# Patient Record
Sex: MALE | Race: WHITE | Employment: PART TIME | URBAN - METROPOLITAN AREA
[De-identification: names, ages, dates, MRNs, and addresses within clinical notes are randomized per-mention and may not be internally consistent; named-entity substitution may affect disease eponyms.]

---

## 2017-04-04 ENCOUNTER — HOSPITAL ENCOUNTER (OUTPATIENT)
Dept: GENERAL RADIOLOGY | Age: 77
Discharge: HOME | End: 2017-04-04
Payer: COMMERCIAL

## 2017-04-04 DIAGNOSIS — R09.89 CHEST CRACKLES: ICD-10-CM

## 2017-04-04 DIAGNOSIS — R05.9 COUGH: ICD-10-CM

## 2017-04-04 DIAGNOSIS — R61 NIGHT SWEAT: ICD-10-CM

## 2017-04-04 PROCEDURE — 71020 XR CHEST PA LAT: CPT

## 2017-04-04 NOTE — PROGRESS NOTES
cxr was normal. Continue cough medication as prescribed. Follow up if cough does not improve over the next week or if fever recurs.

## 2017-05-10 ENCOUNTER — HOSPITAL ENCOUNTER (EMERGENCY)
Age: 77
Discharge: HOME | End: 2017-05-10
Attending: EMERGENCY MEDICINE
Payer: COMMERCIAL

## 2017-05-10 ENCOUNTER — APPOINTMENT (OUTPATIENT)
Dept: GENERAL RADIOLOGY | Age: 77
End: 2017-05-10
Attending: EMERGENCY MEDICINE
Payer: COMMERCIAL

## 2017-05-10 VITALS
HEART RATE: 63 BPM | DIASTOLIC BLOOD PRESSURE: 95 MMHG | SYSTOLIC BLOOD PRESSURE: 193 MMHG | OXYGEN SATURATION: 96 % | TEMPERATURE: 98.5 F | RESPIRATION RATE: 17 BRPM | BODY MASS INDEX: 30.31 KG/M2 | HEIGHT: 68 IN | WEIGHT: 200 LBS

## 2017-05-10 DIAGNOSIS — R53.1 WEAKNESS: Primary | ICD-10-CM

## 2017-05-10 LAB
ALBUMIN SERPL BCP-MCNC: 3.3 G/DL (ref 3.2–4.6)
ALBUMIN/GLOB SERPL: 0.9 {RATIO} (ref 1.2–3.5)
ALP SERPL-CCNC: 74 U/L (ref 50–136)
ALT SERPL-CCNC: 22 U/L (ref 12–65)
ANION GAP BLD CALC-SCNC: 10 MMOL/L (ref 7–16)
AST SERPL W P-5'-P-CCNC: 14 U/L (ref 15–37)
ATRIAL RATE: 63 BPM
BASOPHILS # BLD AUTO: 0 K/UL (ref 0–0.2)
BASOPHILS # BLD: 0 % (ref 0–2)
BILIRUB SERPL-MCNC: 0.6 MG/DL (ref 0.2–1.1)
BNP SERPL-MCNC: 46 PG/ML
BUN SERPL-MCNC: 7 MG/DL (ref 8–23)
CALCIUM SERPL-MCNC: 8.7 MG/DL (ref 8.3–10.4)
CALCULATED P AXIS, ECG09: 29 DEGREES
CALCULATED R AXIS, ECG10: -33 DEGREES
CALCULATED T AXIS, ECG11: 56 DEGREES
CHLORIDE SERPL-SCNC: 106 MMOL/L (ref 98–107)
CO2 SERPL-SCNC: 29 MMOL/L (ref 21–32)
CREAT SERPL-MCNC: 0.79 MG/DL (ref 0.8–1.5)
DIAGNOSIS, 93000: NORMAL
DIFFERENTIAL METHOD BLD: ABNORMAL
EOSINOPHIL # BLD: 0.3 K/UL (ref 0–0.8)
EOSINOPHIL NFR BLD: 3 % (ref 0.5–7.8)
ERYTHROCYTE [DISTWIDTH] IN BLOOD BY AUTOMATED COUNT: 13.7 % (ref 11.9–14.6)
GLOBULIN SER CALC-MCNC: 3.8 G/DL (ref 2.3–3.5)
GLUCOSE SERPL-MCNC: 91 MG/DL (ref 65–100)
HCT VFR BLD AUTO: 42.8 % (ref 41.1–50.3)
HGB BLD-MCNC: 14.4 G/DL (ref 13.6–17.2)
IMM GRANULOCYTES # BLD: 0 K/UL (ref 0–0.5)
IMM GRANULOCYTES NFR BLD AUTO: 0.2 % (ref 0–5)
LYMPHOCYTES # BLD AUTO: 31 % (ref 13–44)
LYMPHOCYTES # BLD: 3.8 K/UL (ref 0.5–4.6)
MCH RBC QN AUTO: 29.8 PG (ref 26.1–32.9)
MCHC RBC AUTO-ENTMCNC: 33.6 G/DL (ref 31.4–35)
MCV RBC AUTO: 88.6 FL (ref 79.6–97.8)
MONOCYTES # BLD: 0.9 K/UL (ref 0.1–1.3)
MONOCYTES NFR BLD AUTO: 7 % (ref 4–12)
NEUTS SEG # BLD: 7.3 K/UL (ref 1.7–8.2)
NEUTS SEG NFR BLD AUTO: 59 % (ref 43–78)
P-R INTERVAL, ECG05: 172 MS
PLATELET # BLD AUTO: 200 K/UL (ref 150–450)
PMV BLD AUTO: 10 FL (ref 10.8–14.1)
POTASSIUM SERPL-SCNC: 3.6 MMOL/L (ref 3.5–5.1)
PROT SERPL-MCNC: 7.1 G/DL (ref 6.3–8.2)
Q-T INTERVAL, ECG07: 424 MS
QRS DURATION, ECG06: 88 MS
QTC CALCULATION (BEZET), ECG08: 433 MS
RBC # BLD AUTO: 4.83 M/UL (ref 4.23–5.67)
SODIUM SERPL-SCNC: 145 MMOL/L (ref 136–145)
TROPONIN I SERPL-MCNC: 0.03 NG/ML (ref 0.02–0.05)
TROPONIN I SERPL-MCNC: 0.03 NG/ML (ref 0.02–0.05)
VENTRICULAR RATE, ECG03: 63 BPM
WBC # BLD AUTO: 12.3 K/UL (ref 4.3–11.1)

## 2017-05-10 PROCEDURE — 99284 EMERGENCY DEPT VISIT MOD MDM: CPT | Performed by: EMERGENCY MEDICINE

## 2017-05-10 PROCEDURE — 85025 COMPLETE CBC W/AUTO DIFF WBC: CPT | Performed by: EMERGENCY MEDICINE

## 2017-05-10 PROCEDURE — 83880 ASSAY OF NATRIURETIC PEPTIDE: CPT | Performed by: EMERGENCY MEDICINE

## 2017-05-10 PROCEDURE — 93005 ELECTROCARDIOGRAM TRACING: CPT | Performed by: EMERGENCY MEDICINE

## 2017-05-10 PROCEDURE — 84484 ASSAY OF TROPONIN QUANT: CPT | Performed by: EMERGENCY MEDICINE

## 2017-05-10 PROCEDURE — 71020 XR CHEST PA LAT: CPT

## 2017-05-10 PROCEDURE — 80053 COMPREHEN METABOLIC PANEL: CPT | Performed by: EMERGENCY MEDICINE

## 2017-05-10 RX ORDER — LISINOPRIL 40 MG/1
40 TABLET ORAL DAILY
Qty: 30 TAB | Refills: 0 | Status: SHIPPED | OUTPATIENT
Start: 2017-05-10 | End: 2017-05-16 | Stop reason: SINTOL

## 2017-05-10 NOTE — ED NOTES
I have reviewed discharge instructions with the patient. The patient verbalized understanding. The patient is ambulatory upon exit and is in no acute distress. The patient has discharge instructions and prescription in hand.

## 2017-05-10 NOTE — ED PROVIDER NOTES
HPI Comments: Patient states he's been feeling fatigued and slightly worn out. He had the flu about 3 weeks ago. Yesterday started having a lot of trouble with his blood pressure running high does normally take metoprolol for that. He also reported minimal chest tightness yesterday and today. No pain. He has been taking his aspirin and Plavix as he did have a stent a couple years ago. Patient has  No fever or chills. Patient is a 65 y.o. male presenting with fatigue. The history is provided by the patient. Fatigue   Pertinent negatives include no shortness of breath, no chest pain, no vomiting and no nausea.         Past Medical History:   Diagnosis Date    Abnormal EKG 4/22/15    CAD (coronary artery disease) 5/8/2015    Carotid artery stenosis without cerebral infarction 6/7/2016    US 6/15: <50% bilat ICAs    Coronary atherosclerosis of native coronary vessel 5/8/2015    VEGAS on brilinta 5/7/15: prox LAD PCI, normal EF     Dyslipidemia 6/7/2016    Dyspnea 5/8/2015    Echo 6/15: EF 60%, mod MR, mod LVH, mild AI     ED (erectile dysfunction)     GERD (gastroesophageal reflux disease)     GERD (gastroesophageal reflux disease)     HTN (hypertension) 5/8/2015    Hypertension     Hypokalemia 6/7/2016    Hypokalemia     Mitral valve regurgitation 6/7/2016    Myasthenia gravis ([address] 6/17/15    Myasthenia gravis (HCC)     Nocturia     Osteoporosis     PUD (peptic ulcer disease)     S/P coronary artery stent placement 5/8/2015    3.0x38 mm Xience ADRIANNA to pLAD 5/7/15     Sleep apnea     Syncope and collapse     Unspecified sleep apnea        Past Surgical History:   Procedure Laterality Date    HX APPENDECTOMY      HX COLONOSCOPY  2007    HX HEART CATHETERIZATION  5/7/2015    HX Liliana Cervantes/Xin for sleep apnea and reconstruction for extending jaw         Family History:   Problem Relation Age of Onset    Cancer Mother      kidney    Cancer Father      stomach       Social History     Social History    Marital status:      Spouse name: N/A    Number of children: N/A    Years of education: N/A     Occupational History    Not on file. Social History Main Topics    Smoking status: Never Smoker    Smokeless tobacco: Never Used    Alcohol use No    Drug use: No    Sexual activity: Not on file     Other Topics Concern    Not on file     Social History Narrative         ALLERGIES: Review of patient's allergies indicates no known allergies. Review of Systems   Constitutional: Positive for fatigue. Negative for appetite change, chills and fever. Respiratory: Negative for chest tightness, shortness of breath and stridor. Cardiovascular: Negative. Negative for chest pain and palpitations. Gastrointestinal: Negative for abdominal pain, diarrhea, nausea and vomiting. Skin: Negative. All other systems reviewed and are negative. Vitals:    05/10/17 1254 05/10/17 1259   BP:  161/79   Pulse: 63    Resp: 17    Temp: 98.5 °F (36.9 °C)    SpO2: 96%    Weight: 90.7 kg (200 lb)    Height: 5' 8\" (1.727 m)             Physical Exam   Constitutional: He is oriented to person, place, and time. He appears well-developed and well-nourished. No distress. HENT:   Head: Normocephalic and atraumatic. Eyes: Conjunctivae are normal. No scleral icterus. Neck: Normal range of motion. Neck supple. Cardiovascular: Normal rate, regular rhythm and normal heart sounds. Pulmonary/Chest: Effort normal and breath sounds normal. No stridor. No respiratory distress. He has no wheezes. He has no rales. He exhibits no tenderness. Abdominal: Soft. There is no tenderness. There is no rebound and no guarding. Neurological: He is alert and oriented to person, place, and time. No cranial nerve deficit. Coordination normal.   No focal weakness   Skin: Skin is warm and dry. No rash noted. He is not diaphoretic.    Psychiatric: He has a normal mood and affect. His behavior is normal.   Nursing note and vitals reviewed. MDM  Number of Diagnoses or Management Options  Weakness:   Diagnosis management comments: Patient states he is feeling fine on reevaluation blood pressure has remained elevated  He has 2 negative troponins and normal appearing EKG. I had a long conversation with him about plan and I have called a cardiology to arrange close follow-up for patient I will start him on lisinopril and they will manage blood pressure on an outpatient basis. Jessica Horne MD; 5/10/2017 @4:59 PM Voice dictation software was used during the making of this note. This software is not perfect and grammatical and other typographical errors may be present.   This note has not been proofread for errors.  ===================================================================        Amount and/or Complexity of Data Reviewed  Clinical lab tests: ordered and reviewed (Results for orders placed or performed during the hospital encounter of 05/10/17  -CBC WITH AUTOMATED DIFF       Result                                            Value                         Ref Range                       WBC                                               12.3 (H)                      4.3 - 11.1 K/uL                 RBC                                               4.83                          4.23 - 5.67 M/uL                HGB                                               14.4                          13.6 - 17.2 g/dL                HCT                                               42.8                          41.1 - 50.3 %                   MCV                                               88.6                          79.6 - 97.8 FL                  MCH                                               29.8                          26.1 - 32.9 PG                  MCHC                                              33.6                          31.4 - 35.0 g/dL                RDW 13.7                          11.9 - 14.6 %                   PLATELET                                          200                           150 - 450 K/uL                  MPV                                               10.0 (L)                      10.8 - 14.1 FL                  DF                                                AUTOMATED                                                     NEUTROPHILS                                       59                            43 - 78 %                       LYMPHOCYTES                                       31                            13 - 44 %                       MONOCYTES                                         7                             4.0 - 12.0 %                    EOSINOPHILS                                       3                             0.5 - 7.8 %                     BASOPHILS                                         0                             0.0 - 2.0 %                     IMMATURE GRANULOCYTES                             0.2                           0.0 - 5.0 %                     ABS. NEUTROPHILS                                  7.3                           1.7 - 8.2 K/UL                  ABS. LYMPHOCYTES                                  3.8                           0.5 - 4.6 K/UL                  ABS. MONOCYTES                                    0.9                           0.1 - 1.3 K/UL                  ABS. EOSINOPHILS                                  0.3                           0.0 - 0.8 K/UL                  ABS. BASOPHILS                                    0.0                           0.0 - 0.2 K/UL                  ABS. IMM.  GRANS.                                  0.0                           0.0 - 0.5 K/UL             -TROPONIN I       Result                                            Value                         Ref Range                       Troponin-I, Qt. 0.03                          0.02 - 0.05 NG/ML          -METABOLIC PANEL, COMPREHENSIVE       Result                                            Value                         Ref Range                       Sodium                                            145                           136 - 145 mmol/L                Potassium                                         3.6                           3.5 - 5.1 mmol/L                Chloride                                          106                           98 - 107 mmol/L                 CO2                                               29                            21 - 32 mmol/L                  Anion gap                                         10                            7 - 16 mmol/L                   Glucose                                           91                            65 - 100 mg/dL                  BUN                                               7 (L)                         8 - 23 MG/DL                    Creatinine                                        0.79 (L)                      0.8 - 1.5 MG/DL                 GFR est AA                                        >60                           >60 ml/min/1.73m2               GFR est non-AA                                    >60                           >60 ml/min/1.73m2               Calcium                                           8.7                           8.3 - 10.4 MG/DL                Bilirubin, total                                  0.6                           0.2 - 1.1 MG/DL                 ALT (SGPT)                                        22                            12 - 65 U/L                     AST (SGOT)                                        14 (L)                        15 - 37 U/L                     Alk. phosphatase                                  74                            50 - 136 U/L                    Protein, total                                    7.1 6.3 - 8.2 g/dL                  Albumin                                           3.3                           3.2 - 4.6 g/dL                  Globulin                                          3.8 (H)                       2.3 - 3.5 g/dL                  A-G Ratio                                         0.9 (L)                       1.2 - 3.5                  -EKG, 12 LEAD, INITIAL       Result                                            Value                         Ref Range                       Ventricular Rate                                  63                            BPM                             Atrial Rate                                       63                            BPM                             P-R Interval                                      172                           ms                              QRS Duration                                      88                            ms                              Q-T Interval                                      424                           ms                              QTC Calculation (Bezet)                           433                           ms                              Calculated P Axis                                 29                            degrees                         Calculated R Axis                                 -33                           degrees                         Calculated T Axis                                 56                            degrees                         Diagnosis                                                                                                   Sinus rhythm with Premature atrial complexes   Left axis deviation   Abnormal ECG   When compared with ECG of 08-MAY-2015 07:00,   Premature atrial complexes are now Present    )  Tests in the radiology section of CPT®: ordered and reviewed (Xr Chest Pa Lat    Result Date: 5/10/2017  Two view chest: 05/10/2017 History: Fatigue, cough, hypertension x1 day. Comparison: 04/14/2017 Findings: The heart is mildly enlarged, slightly more pronounced than seen previously. The lungs and pleural spaces are clear. The pulmonary vascularity is within normal limits. The visualized osseous structures are unremarkable.      Impression: Mildly enlarged cardiac silhouette, increased in size since the previous exam. This can be an early indicator of congestive heart failure.    )  Independent visualization of images, tracings, or specimens: yes (Normal sinus rhythm, narrow QRS complex, no bundle branch blocks, and no ST segment elevation, several pac present)      ED Course       Procedures

## 2017-05-10 NOTE — ED TRIAGE NOTES
Pt diagnosed with flu 3 weeks ago, complains of feeling fatigued and worn out. States BP has recently been high. Denies any other symptoms. Went to [facility] today and was sent here via ems for evaluation.

## 2017-05-10 NOTE — DISCHARGE INSTRUCTIONS
Weakness: Care Instructions  Your Care Instructions  Weakness is a lack of physical or muscle strength. You may feel that you need to make extra effort to move your arms, legs, or other muscles. Generalized weakness means that you feel weak in most areas of your body. Another type of weakness may affect just one muscle or group of muscles. You may feel weak and tired after you have done too much activity, such as taking an extra-long hike. This is not a serious problem. It often goes away on its own. Feeling weak can also be caused by medical conditions like thyroid problems, depression, or a virus. Sometimes the cause can be serious. Your doctor may want to do more tests to try to find the cause of the weakness. The doctor has checked you carefully, but problems can develop later. If you notice any problems or new symptoms, get medical treatment right away. Follow-up care is a key part of your treatment and safety. Be sure to make and go to all appointments, and call your doctor if you are having problems. It's also a good idea to know your test results and keep a list of the medicines you take. How can you care for yourself at home? · Rest when you feel tired. · Be safe with medicines. If your doctor prescribed medicine, take it exactly as prescribed. Call your doctor if you think you are having a problem with your medicine. You will get more details on the specific medicines your doctor prescribes. · Do not skip meals. Eating a balanced diet may increase your energy level. · Get some physical activity every day, but do not get too tired. When should you call for help? Call your doctor now or seek immediate medical care if:  · You have new or worse weakness. · You are dizzy or lightheaded, or you feel like you may faint. Watch closely for changes in your health, and be sure to contact your doctor if:  · You do not get better as expected. Where can you learn more?   Go to http://alexis.info/. Enter [id number] in the search box to learn more about \"Weakness: Care Instructions. \"  Current as of: May 27, 2016  Content Version: 11.2  © 8703-6570 [employer]. Care instructions adapted under license by [employer] (which disclaims liability or warranty for this information). If you have questions about a medical condition or this instruction, always ask your healthcare professional. [address] any warranty or liability for your use of this information.

## 2017-05-10 NOTE — ED NOTES
Patient is resting in chair. Patient denies needs at this time. Call light within reach. Will continue to monitor.

## 2017-06-30 PROBLEM — I48.0 PAROXYSMAL ATRIAL FIBRILLATION (HCC): Status: ACTIVE | Noted: 2017-06-30

## 2017-06-30 PROBLEM — I49.5 SSS (SICK SINUS SYNDROME) (HCC): Status: ACTIVE | Noted: 2017-06-30

## 2017-06-30 PROBLEM — I77.9 BILATERAL CAROTID ARTERY DISEASE (HCC): Status: ACTIVE | Noted: 2017-06-30

## 2017-07-24 ENCOUNTER — HOSPITAL ENCOUNTER (OUTPATIENT)
Dept: LAB | Age: 77
Discharge: HOME | End: 2017-07-24
Attending: INTERNAL MEDICINE
Payer: COMMERCIAL

## 2017-07-24 DIAGNOSIS — I48.0 PAROXYSMAL ATRIAL FIBRILLATION (HCC): ICD-10-CM

## 2017-07-24 LAB
ANION GAP BLD CALC-SCNC: 8 MMOL/L
BUN SERPL-MCNC: 14 MG/DL (ref 8–23)
CALCIUM SERPL-MCNC: 8.9 MG/DL (ref 8.3–10.4)
CHLORIDE SERPL-SCNC: 109 MMOL/L (ref 98–107)
CO2 SERPL-SCNC: 25 MMOL/L (ref 21–32)
CREAT SERPL-MCNC: 1 MG/DL (ref 0.8–1.5)
GLUCOSE SERPL-MCNC: 115 MG/DL (ref 65–100)
MAGNESIUM SERPL-MCNC: 2.1 MG/DL (ref 1.8–2.4)
POTASSIUM SERPL-SCNC: 3.8 MMOL/L (ref 3.5–5.1)
SODIUM SERPL-SCNC: 142 MMOL/L (ref 136–145)
TSH SERPL DL<=0.005 MIU/L-ACNC: 1.48 UIU/ML (ref 0.36–3.74)

## 2017-07-24 PROCEDURE — 84443 ASSAY THYROID STIM HORMONE: CPT | Performed by: INTERNAL MEDICINE

## 2017-07-24 PROCEDURE — 80048 BASIC METABOLIC PNL TOTAL CA: CPT | Performed by: INTERNAL MEDICINE

## 2017-07-24 PROCEDURE — 83735 ASSAY OF MAGNESIUM: CPT | Performed by: INTERNAL MEDICINE

## 2017-07-24 PROCEDURE — 36415 COLL VENOUS BLD VENIPUNCTURE: CPT | Performed by: INTERNAL MEDICINE

## 2017-10-24 ENCOUNTER — HOSPITAL ENCOUNTER (INPATIENT)
Age: 77
LOS: 6 days | Discharge: HOME | DRG: 242 | End: 2017-10-30
Attending: INTERNAL MEDICINE | Admitting: INTERNAL MEDICINE
Payer: COMMERCIAL

## 2017-10-24 ENCOUNTER — APPOINTMENT (OUTPATIENT)
Dept: GENERAL RADIOLOGY | Age: 77
DRG: 242 | End: 2017-10-24
Payer: COMMERCIAL

## 2017-10-24 DIAGNOSIS — A41.9 SEPSIS, DUE TO UNSPECIFIED ORGANISM: ICD-10-CM

## 2017-10-24 DIAGNOSIS — J69.0 ASPIRATION PNEUMONIA OF LEFT UPPER LOBE, UNSPECIFIED ASPIRATION PNEUMONIA TYPE (HCC): ICD-10-CM

## 2017-10-24 DIAGNOSIS — I48.91 ATRIAL FIBRILLATION WITH RVR (HCC): ICD-10-CM

## 2017-10-24 DIAGNOSIS — I95.89 OTHER SPECIFIED HYPOTENSION: ICD-10-CM

## 2017-10-24 DIAGNOSIS — G70.00 MYASTHENIA GRAVIS (HCC): ICD-10-CM

## 2017-10-24 DIAGNOSIS — I48.0 PAROXYSMAL ATRIAL FIBRILLATION (HCC): ICD-10-CM

## 2017-10-24 PROBLEM — R55 SYNCOPE: Status: ACTIVE | Noted: 2017-10-24

## 2017-10-24 LAB
ANION GAP SERPL CALC-SCNC: 8 MMOL/L (ref 7–16)
BASOPHILS # BLD: 0 K/UL (ref 0–0.2)
BASOPHILS NFR BLD: 0 % (ref 0–2)
BUN SERPL-MCNC: 11 MG/DL (ref 8–23)
CALCIUM SERPL-MCNC: 9 MG/DL (ref 8.3–10.4)
CHLORIDE SERPL-SCNC: 108 MMOL/L (ref 98–107)
CO2 SERPL-SCNC: 26 MMOL/L (ref 21–32)
CREAT SERPL-MCNC: 0.88 MG/DL (ref 0.8–1.5)
DIFFERENTIAL METHOD BLD: ABNORMAL
EOSINOPHIL # BLD: 0.3 K/UL (ref 0–0.8)
EOSINOPHIL NFR BLD: 2 % (ref 0.5–7.8)
ERYTHROCYTE [DISTWIDTH] IN BLOOD BY AUTOMATED COUNT: 13.6 % (ref 11.9–14.6)
GLUCOSE SERPL-MCNC: 119 MG/DL (ref 65–100)
HCT VFR BLD AUTO: 44.8 % (ref 41.1–50.3)
HGB BLD-MCNC: 15.8 G/DL (ref 13.6–17.2)
IMM GRANULOCYTES # BLD: 0 K/UL (ref 0–0.5)
IMM GRANULOCYTES NFR BLD: 0 % (ref 0–5)
LYMPHOCYTES # BLD: 4.1 K/UL (ref 0.5–4.6)
LYMPHOCYTES NFR BLD: 33 % (ref 13–44)
MAGNESIUM SERPL-MCNC: 2.2 MG/DL (ref 1.8–2.4)
MCH RBC QN AUTO: 31 PG (ref 26.1–32.9)
MCHC RBC AUTO-ENTMCNC: 35.3 G/DL (ref 31.4–35)
MCV RBC AUTO: 87.8 FL (ref 79.6–97.8)
MONOCYTES # BLD: 1 K/UL (ref 0.1–1.3)
MONOCYTES NFR BLD: 8 % (ref 4–12)
NEUTS SEG # BLD: 7 K/UL (ref 1.7–8.2)
NEUTS SEG NFR BLD: 57 % (ref 43–78)
PLATELET # BLD AUTO: 195 K/UL (ref 150–450)
PMV BLD AUTO: 9.7 FL (ref 10.8–14.1)
POTASSIUM SERPL-SCNC: 3.7 MMOL/L (ref 3.5–5.1)
RBC # BLD AUTO: 5.1 M/UL (ref 4.23–5.67)
SODIUM SERPL-SCNC: 142 MMOL/L (ref 136–145)
TROPONIN I SERPL-MCNC: 0.05 NG/ML (ref 0.02–0.05)
WBC # BLD AUTO: 12.4 K/UL (ref 4.3–11.1)

## 2017-10-24 PROCEDURE — 74011250637 HC RX REV CODE- 250/637: Performed by: PHYSICIAN ASSISTANT

## 2017-10-24 PROCEDURE — 71010 XR CHEST SNGL V: CPT

## 2017-10-24 PROCEDURE — 65660000000 HC RM CCU STEPDOWN

## 2017-10-24 PROCEDURE — 74011000250 HC RX REV CODE- 250: Performed by: PHYSICIAN ASSISTANT

## 2017-10-24 PROCEDURE — 85025 COMPLETE CBC W/AUTO DIFF WBC: CPT | Performed by: PHYSICIAN ASSISTANT

## 2017-10-24 PROCEDURE — 36415 COLL VENOUS BLD VENIPUNCTURE: CPT | Performed by: PHYSICIAN ASSISTANT

## 2017-10-24 PROCEDURE — 84484 ASSAY OF TROPONIN QUANT: CPT | Performed by: PHYSICIAN ASSISTANT

## 2017-10-24 PROCEDURE — 74011250636 HC RX REV CODE- 250/636: Performed by: PHYSICIAN ASSISTANT

## 2017-10-24 PROCEDURE — 80048 BASIC METABOLIC PNL TOTAL CA: CPT | Performed by: PHYSICIAN ASSISTANT

## 2017-10-24 PROCEDURE — 74011000258 HC RX REV CODE- 258: Performed by: PHYSICIAN ASSISTANT

## 2017-10-24 PROCEDURE — 74011250636 HC RX REV CODE- 250/636: Performed by: INTERNAL MEDICINE

## 2017-10-24 PROCEDURE — 83735 ASSAY OF MAGNESIUM: CPT | Performed by: PHYSICIAN ASSISTANT

## 2017-10-24 PROCEDURE — 65660000004 HC RM CVT STEPDOWN

## 2017-10-24 RX ORDER — DILTIAZEM HYDROCHLORIDE 5 MG/ML
10 INJECTION INTRAVENOUS ONCE
Status: COMPLETED | OUTPATIENT
Start: 2017-10-24 | End: 2017-10-24

## 2017-10-24 RX ORDER — BENZONATATE 100 MG/1
100 CAPSULE ORAL
Status: DISCONTINUED | OUTPATIENT
Start: 2017-10-24 | End: 2017-10-30 | Stop reason: HOSPADM

## 2017-10-24 RX ORDER — GUAIFENESIN 100 MG/5ML
81 LIQUID (ML) ORAL DAILY
Status: DISCONTINUED | OUTPATIENT
Start: 2017-10-25 | End: 2017-10-30 | Stop reason: HOSPADM

## 2017-10-24 RX ORDER — HEPARIN SODIUM 5000 [USP'U]/100ML
12-25 INJECTION, SOLUTION INTRAVENOUS
Status: DISCONTINUED | OUTPATIENT
Start: 2017-10-24 | End: 2017-10-26

## 2017-10-24 RX ORDER — HYDROCODONE BITARTRATE AND ACETAMINOPHEN 5; 325 MG/1; MG/1
1 TABLET ORAL
Status: DISCONTINUED | OUTPATIENT
Start: 2017-10-24 | End: 2017-10-30 | Stop reason: HOSPADM

## 2017-10-24 RX ORDER — PYRIDOSTIGMINE BROMIDE 60 MG/1
60 TABLET ORAL DAILY
Status: DISCONTINUED | OUTPATIENT
Start: 2017-10-25 | End: 2017-10-30 | Stop reason: HOSPADM

## 2017-10-24 RX ORDER — METOPROLOL TARTRATE 25 MG/1
25 TABLET, FILM COATED ORAL 2 TIMES DAILY
Status: DISCONTINUED | OUTPATIENT
Start: 2017-10-24 | End: 2017-10-26

## 2017-10-24 RX ORDER — MORPHINE SULFATE 2 MG/ML
2 INJECTION, SOLUTION INTRAMUSCULAR; INTRAVENOUS
Status: DISCONTINUED | OUTPATIENT
Start: 2017-10-24 | End: 2017-10-30 | Stop reason: HOSPADM

## 2017-10-24 RX ORDER — CLOPIDOGREL BISULFATE 75 MG/1
75 TABLET ORAL DAILY
Status: DISCONTINUED | OUTPATIENT
Start: 2017-10-25 | End: 2017-10-30 | Stop reason: HOSPADM

## 2017-10-24 RX ORDER — HEPARIN SODIUM 5000 [USP'U]/100ML
12-25 INJECTION, SOLUTION INTRAVENOUS
Status: DISCONTINUED | OUTPATIENT
Start: 2017-10-24 | End: 2017-10-24

## 2017-10-24 RX ORDER — ACETAMINOPHEN 325 MG/1
650 TABLET ORAL
Status: DISCONTINUED | OUTPATIENT
Start: 2017-10-24 | End: 2017-10-30 | Stop reason: HOSPADM

## 2017-10-24 RX ORDER — NITROGLYCERIN 0.4 MG/1
0.4 TABLET SUBLINGUAL
Status: DISCONTINUED | OUTPATIENT
Start: 2017-10-24 | End: 2017-10-30 | Stop reason: HOSPADM

## 2017-10-24 RX ORDER — HEPARIN SODIUM 5000 [USP'U]/ML
4000 INJECTION, SOLUTION INTRAVENOUS; SUBCUTANEOUS ONCE
Status: COMPLETED | OUTPATIENT
Start: 2017-10-24 | End: 2017-10-24

## 2017-10-24 RX ORDER — ATORVASTATIN CALCIUM 40 MG/1
80 TABLET, FILM COATED ORAL
Status: DISCONTINUED | OUTPATIENT
Start: 2017-10-24 | End: 2017-10-30 | Stop reason: HOSPADM

## 2017-10-24 RX ADMIN — DILTIAZEM HYDROCHLORIDE 10 MG: 5 INJECTION INTRAVENOUS at 18:45

## 2017-10-24 RX ADMIN — HEPARIN SODIUM 4000 UNITS: 5000 INJECTION, SOLUTION INTRAVENOUS; SUBCUTANEOUS at 18:05

## 2017-10-24 RX ADMIN — HEPARIN SODIUM 12 UNITS/KG/HR: 5000 INJECTION, SOLUTION INTRAVENOUS at 19:32

## 2017-10-24 RX ADMIN — METOPROLOL TARTRATE 25 MG: 25 TABLET ORAL at 18:18

## 2017-10-24 RX ADMIN — SODIUM CHLORIDE 5 MG/HR: 900 INJECTION, SOLUTION INTRAVENOUS at 18:07

## 2017-10-24 RX ADMIN — HEPARIN SODIUM 12 UNITS/KG/HR: 5000 INJECTION, SOLUTION INTRAVENOUS at 18:09

## 2017-10-24 RX ADMIN — ATORVASTATIN CALCIUM 80 MG: 40 TABLET, FILM COATED ORAL at 21:44

## 2017-10-24 NOTE — IP AVS SNAPSHOT
[address] 
[phone] Patient: Cecille Patel MRN: [id number] MMH:[birth year] My Medications STOP taking these medications   
 aspirin 81 mg chewable tablet  
   
  
 metoprolol tartrate 25 mg tablet Commonly known as:  LOPRESSOR  
   
  
  
TAKE these medications as instructed Instructions Each Dose to Equal  
 Morning Noon Evening Bedtime  
 apixaban 5 mg tablet Commonly known as:  Wonda Deem Your next dose is: Tonight Take 1 Tab by mouth two (2) times a day. 5 mg  
    
   
   
  
   
  
 atorvastatin 80 mg tablet Commonly known as:  LIPITOR Your next dose is: Tonight Take 1 Tab by mouth nightly. 80 mg  
    
   
   
   
  
  
 benzonatate 100 mg capsule Commonly known as:  TESSALON Take 100 mg by mouth three (3) times daily as needed for Cough. 100 mg  
    
   
   
   
  
 cefpodoxime 200 mg tablet Commonly known as:  Carmen Adjuntas Your next dose is: Tonight Take 1 Tab by mouth two (2) times a day for 7 days. 200 mg  
    
   
   
  
   
  
 clopidogrel 75 mg Tab Commonly known as:  PLAVIX Your next dose is:  Tomorrow Notes to Patient:  Take once daily Take  by mouth. HYDROcodone-acetaminophen 5-325 mg per tablet Commonly known as:  Harrison Gaston Your last dose was:  9:30 am  
   
 Take 1 Tab by mouth every six (6) hours as needed. Max Daily Amount: 4 Tabs. 1 Tab  
    
   
   
   
  
 metoprolol succinate 200 mg XL tablet Commonly known as:  TOPROL-XL Start taking on:  10/31/2017 Take 1 Tab by mouth daily. 200 mg  
    
  
   
   
   
  
 nitroglycerin 0.4 mg SL tablet Commonly known as:  NITROSTAT Place 1 sl under the tongue q 5 min prn cp, max 3 sl in a 15-min time period. Call 911 if no relief after the 3rd sl. pyridostigmine 60 mg tablet Commonly known as:  MESTINON Your next dose is:  Tomorrow Take 60 mg by mouth daily. 60 mg Where to Get Your Medications Information on where to get these meds will be given to you by the nurse or doctor. ! Ask your nurse or doctor about these medications  
  apixaban 5 mg tablet  
 cefpodoxime 200 mg tablet HYDROcodone-acetaminophen 5-325 mg per tablet  
 metoprolol succinate 200 mg XL tablet

## 2017-10-24 NOTE — PROGRESS NOTES
Dual skin assessment completed with RN. Sacrum visualized and is benign. No redness or breakdown noted.

## 2017-10-24 NOTE — IP AVS SNAPSHOT
[address] [facility] 
 
 
 [address] [facility] [address] [facility] 
[phone] Patient: Ken Montgomery MRN: [id number] About your hospitalization You were admitted on:  October 24, 2017 You last received care in the:  [facility] 2 CV STEPDOWN You were discharged on:  October 30, 2017 Why you were hospitalized Your primary diagnosis was:  Atrial Fibrillation With Rvr (Hcc) Your diagnoses also included:  Coronary Atherosclerosis Of Native Coronary Vessel, Htn (Hypertension), Sss (Sick Sinus Syndrome) (Hcc), Syncope, Sepsis (Hcc), Aspiration Pneumonia (Hcc), Hypotension, Gerd (Gastroesophageal Reflux Disease) Things You Need To Do (next 8 weeks) Follow up with Jenn Dye MD  
As needed Phone:  [phone] Where:  [address], [address] [facility], [address] [place] [address] Tuesday Nov 07, 2017 OFFICE DEVICE CHECKS with [employer] [facility] at 11:00 AM  
This upcoming device check will take place IN OUR OFFICE. Please arrive 15 minutes early to review any necessary paperwork requirements. If you have any further questions or need to change this appointment, we are happy to help and can be reached at [phone]. Where:  [facility] ([address] [place] [address]) Follow up with Dylan Arshad MD  
Follow up on November 7th at 11:00am [facility]) Phone:  [phone] Where:  [address], [address] [place], [place] [address] Wednesday Nov 08, 2017 Office Visit with Derek Aguilar DO at  1:30 PM  
Where:  [facility] ([address] [place] [address]) Discharge Orders None A check trung indicates which time of day the medication should be taken. My Medications STOP taking these medications   
 aspirin 81 mg chewable tablet  
   
  
 metoprolol tartrate 25 mg tablet Commonly known as:  LOPRESSOR  
   
  
  
 TAKE these medications as instructed Instructions Each Dose to Equal  
 Morning Noon Evening Bedtime  
 apixaban 5 mg tablet Commonly known as:  Zarina Anette Your next dose is: Tonight Take 1 Tab by mouth two (2) times a day. 5 mg  
    
   
   
  
   
  
 atorvastatin 80 mg tablet Commonly known as:  LIPITOR Your next dose is: Tonight Take 1 Tab by mouth nightly. 80 mg  
    
   
   
   
  
  
 benzonatate 100 mg capsule Commonly known as:  TESSALON Take 100 mg by mouth three (3) times daily as needed for Cough. 100 mg  
    
   
   
   
  
 cefpodoxime 200 mg tablet Commonly known as:  Mildred Quails Your next dose is: Tonight Take 1 Tab by mouth two (2) times a day for 7 days. 200 mg  
    
   
   
  
   
  
 clopidogrel 75 mg Tab Commonly known as:  PLAVIX Your next dose is:  Tomorrow Notes to Patient:  Take once daily Take  by mouth. HYDROcodone-acetaminophen 5-325 mg per tablet Commonly known as:  Ileana Arts Your last dose was:  9:30 am  
   
 Take 1 Tab by mouth every six (6) hours as needed. Max Daily Amount: 4 Tabs. 1 Tab  
    
   
   
   
  
 metoprolol succinate 200 mg XL tablet Commonly known as:  TOPROL-XL Start taking on:  10/31/2017 Take 1 Tab by mouth daily. 200 mg  
    
  
   
   
   
  
 nitroglycerin 0.4 mg SL tablet Commonly known as:  NITROSTAT Place 1 sl under the tongue q 5 min prn cp, max 3 sl in a 15-min time period. Call 911 if no relief after the 3rd sl. pyridostigmine 60 mg tablet Commonly known as:  MESTINON Your next dose is:  Tomorrow Take 60 mg by mouth daily. 60 mg Where to Get Your Medications Information on where to get these meds will be given to you by the nurse or doctor. ! Ask your nurse or doctor about these medications  
  apixaban 5 mg tablet  
 cefpodoxime 200 mg tablet HYDROcodone-acetaminophen 5-325 mg per tablet  
 metoprolol succinate 200 mg XL tablet Discharge Instructions DISCHARGE SUMMARY from Nurse PATIENT INSTRUCTIONS: 
 
 
F-face looks uneven A-arms unable to move or move unevenly S-speech slurred or non-existent T-time-call 911 as soon as signs and symptoms begin-DO NOT go Back to bed or wait to see if you get better-TIME IS BRAIN. Warning Signs of HEART ATTACK Call 911 if you have these symptoms: 
? Chest discomfort. Most heart attacks involve discomfort in the center of the chest that lasts more than a few minutes, or that goes away and comes back. It can feel like uncomfortable pressure, squeezing, fullness, or pain. ? Discomfort in other areas of the upper body. Symptoms can include pain or discomfort in one or both arms, the back, neck, jaw, or stomach. ? Shortness of breath with or without chest discomfort. ? Other signs may include breaking out in a cold sweat, nausea, or lightheadedness. Don't wait more than five minutes to call [address]! Fast action can save your life. Calling 911 is almost always the fastest way to get lifesaving treatment. Emergency Medical Services staff can begin treatment when they arrive  up to an hour sooner than if someone gets to the hospital by car. The discharge information has been reviewed with the {PATIENT PARENT GUARDIAN:64264}. The {PATIENT PARENT GUARDIAN:94694} verbalized understanding. Discharge medications reviewed with the {Dishcarge meds reviewed USHR:97492} and appropriate educational materials and side effects teaching were provided. ___________________________________________________________________________________________________________________________________ Pacemaker Placement: What to Expect at [facility] Your Recovery Pacemaker placement is surgery to put a pacemaker in your chest. This surgery may be done if you have bradycardia (a slow heart rate). A pacemaker is a small, battery-powered device. It sends electrical signals to the heart. These signals work to keep the heartbeat steady. Thin wires, called leads, carry the signals from the pacemaker to the heart. A pacemaker can prevent or reduce dizziness, fainting, and shortness of breath caused by a slow or unsteady heartbeat. Your chest may be sore where the doctor made the cut (incision) and put in the pacemaker. You also may have a bruise and mild swelling. These symptoms usually get better in 1 to 2 weeks. You may feel a hard ridge along the incision. This usually gets softer in the months after surgery. You may be able to see or feel the outline of the pacemaker under your skin. You will probably be able to go back to work or your usual routine 1 to 2 weeks after surgery. Pacemaker batteries usually last 5 to 15 years. Your doctor will talk to you about how often you will need to have your pacemaker checked. You can safely use most household and office electronics. This includes things such as kitchen appliances, electric power tools, and computers. You will need to stay away from things with strong magnetic and electrical fields. This includes things such as an MRI machine (unless your pacemaker is safe for an MRI), welding equipment, and power generators. You can use a cell phone, but keep it at least 6 inches away from your pacemaker. Check with your doctor about what you need to stay away from, what you need to use with care, and what is okay to use. This care sheet gives you a general idea about how long it will take for you to recover. But each person recovers at a different pace. Follow the steps below to get better as quickly as possible. How can you care for yourself at home? Activity ? · Rest when you feel tired. ? · Be active. Walking is a good choice. ? · For 4 to 6 weeks: ¨ Avoid activities that strain your chest or upper arm muscles. This includes pushing a  or vacuum, or mopping floors. It also includes swimming, or swinging a golf club or tennis racquet. ¨ Do not raise your arm (the one on the side of your body where the pacemaker is located) above your shoulder. ¨ Allow your body to heal. Don't move quickly or lift anything heavy until you are feeling better. ? · Many people are able to return to work within 1 to 2 weeks after surgery. ? · Ask your doctor when it is okay for you to have sex. Diet ? · You can eat your normal diet. If your stomach is upset, try bland, low-fat foods like plain rice, broiled chicken, toast, and yogurt. Medicines ? · Your doctor will tell you if and when you can restart your medicines. He or she will also give you instructions about taking any new medicines. ? · If you take aspirin or some other blood thinner, be sure to talk to your doctor. He or she will tell you if and when to start taking this medicine again. Make sure that you understand exactly what your doctor wants you to do. ? · Be safe with medicines. Read and follow all instructions on the label. ¨ If the doctor gave you a prescription medicine for pain, take it as prescribed. ¨ If you are not taking a prescription pain medicine, ask your doctor if you can take an over-the-counter medicine. ¨ Do not take aspirin, ibuprofen (Advil, Motrin), naproxen (Aleve), or other nonsteroidal anti-inflammatory drugs (NSAIDs) unless your doctor says it is okay. ? · If your doctor prescribed antibiotics, take them as directed. Do not stop taking them just because you feel better. You need to take the full course of antibiotics. Incision care ? · If you have strips of tape on the incision, leave the tape on for a week or until it falls off.  
? · Keep the incision dry while it heals.  Your doctor may recommend sponge baths for about 7 days, but do not get the incision wet. Your doctor will let you know when you may take showers. After a shower, pat the incision dry. ? · Don't use hydrogen peroxide or alcohol on the incision, which can slow healing. You may cover the area with a gauze bandage if it oozes fluid or rubs against clothing. Change the bandage every day. ? · Do not take a bath or get into a hot tub for the first 2 weeks, or until your doctor tells you it is okay. Other instructions ? · Keep a medical ID card with you at all times that says you have a pacemaker. The card should include the  and model information. ? · Wear medical alert jewelry stating that you have a pacemaker. You can buy this at most [employer]. ? · Check your pulse as directed by your doctor. ? · Have your pacemaker checked as often as your doctor recommends. In some cases, this may be done over the phone or the Internet. Your doctor will give you instructions about how to do this. Follow-up care is a key part of your treatment and safety. Be sure to make and go to all appointments, and call your doctor if you are having problems. It's also a good idea to know your test results and keep a list of the medicines you take. When should you call for help? Call 911 anytime you think you may need emergency care. For example, call if: 
? · You passed out (lost consciousness). ? · You have trouble breathing. ?Call your doctor now or seek immediate medical care if: 
? · You are dizzy or light-headed, or you feel like you may faint. ? · You have pain that does not get better after you take pain medicine. ? · You hear an alarm or feel a vibration from your pacemaker. ? · You have loose stitches, or your incision comes open. ? · Bright red blood has soaked through the bandage over your incision. ? · You have signs of infection, such as: 
¨ Increased pain, swelling, warmth, or redness. ¨ Red streaks leading from the incision. ¨ Pus draining from the incision. ¨ A fever. ? Watch closely for changes in your health, and be sure to contact your doctor if: 
? · You have any problems with your pacemaker. Where can you learn more? Go to http://tess-micheline.info/. Enter G550 in the search box to learn more about \"Pacemaker Placement: What to Expect at Home. \" Current as of: September 21, 2016 Content Version: 11.4 © 5108-9140 [employer]. Care instructions adapted under license by [employer] (which disclaims liability or warranty for this information). If you have questions about a medical condition or this instruction, always ask your healthcare professional. [address] any warranty or liability for your use of this information. Atrial Fibrillation: Care Instructions Your Care Instructions Atrial fibrillation is an irregular and often fast heartbeat. Treating this condition is important for several reasons. It can cause blood clots, which can travel from your heart to your brain and cause a stroke. If you have a fast heartbeat, you may feel lightheaded, dizzy, and weak. An irregular heartbeat can also increase your risk for heart failure. Atrial fibrillation is often the result of another heart condition, such as high blood pressure or coronary artery disease. Making changes to improve your heart condition will help you stay healthy and active. Follow-up care is a key part of your treatment and safety. Be sure to make and go to all appointments, and call your doctor if you are having problems. It's also a good idea to know your test results and keep a list of the medicines you take. How can you care for yourself at home? Medicines ? · Take your medicines exactly as prescribed. Call your doctor if you think you are having a problem with your medicine.  You will get more details on the specific medicines your doctor prescribes. ? · If your doctor has given you a blood thinner to prevent a stroke, be sure you get instructions about how to take your medicine safely. Blood thinners can cause serious bleeding problems. ? · Do not take any vitamins, over-the-counter drugs, or herbal products without talking to your doctor first. ? Lifestyle changes ? · Do not smoke. Smoking can increase your chance of a stroke and heart attack. If you need help quitting, talk to your doctor about stop-smoking programs and medicines. These can increase your chances of quitting for good. ? · Eat a heart-healthy diet. ? · Stay at a healthy weight. Lose weight if you need to.  
? · Limit alcohol to 2 drinks a day for men and 1 drink a day for women. Too much alcohol can cause health problems. ? · Avoid colds and flu. Get a pneumococcal vaccine shot. If you have had one before, ask your doctor whether you need another dose. Get a flu shot every year. If you must be around people with colds or flu, wash your hands often. Activity ? · If your doctor recommends it, get more exercise. Walking is a good choice. Bit by bit, increase the amount you walk every day. Try for at least 30 minutes on most days of the week. You also may want to swim, bike, or do other activities. Your doctor may suggest that you join a cardiac rehabilitation program so that you can have help increasing your physical activity safely. ? · Start light exercise if your doctor says it is okay. Even a small amount will help you get stronger, have more energy, and manage stress. Walking is an easy way to get exercise. Start out by walking a little more than you did in the hospital. Gradually increase the amount you walk. ? · When you exercise, watch for signs that your heart is working too hard. You are pushing too hard if you cannot talk while you are exercising.  If you become short of breath or dizzy or have chest pain, sit down and rest immediately. ? · Check your pulse regularly. Place two fingers on the artery at the palm side of your wrist, in line with your thumb. If your heartbeat seems uneven or fast, talk to your doctor. When should you call for help? Call 911 anytime you think you may need emergency care. For example, call if: 
? · You have symptoms of a heart attack. These may include: ¨ Chest pain or pressure, or a strange feeling in the chest. 
¨ Sweating. ¨ Shortness of breath. ¨ Nausea or vomiting. ¨ Pain, pressure, or a strange feeling in the back, neck, jaw, or upper belly or in one or both shoulders or arms. ¨ Lightheadedness or sudden weakness. ¨ A fast or irregular heartbeat. After you call 911, the  may tell you to chew 1 adult-strength or 2 to 4 low-dose aspirin. Wait for an ambulance. Do not try to drive yourself. ? · You have symptoms of a stroke. These may include: 
¨ Sudden numbness, tingling, weakness, or loss of movement in your face, arm, or leg, especially on only one side of your body. ¨ Sudden vision changes. ¨ Sudden trouble speaking. ¨ Sudden confusion or trouble understanding simple statements. ¨ Sudden problems with walking or balance. ¨ A sudden, severe headache that is different from past headaches. ? · You passed out (lost consciousness). ?Call your doctor now or seek immediate medical care if: 
? · You have new or increased shortness of breath. ? · You feel dizzy or lightheaded, or you feel like you may faint. ? · Your heart rate becomes irregular. ? · You can feel your heart flutter in your chest or skip heartbeats. Tell your doctor if these symptoms are new or worse. ? Watch closely for changes in your health, and be sure to contact your doctor if you have any problems. Where can you learn more? Go to http://tess-micheline.info/. Enter U020 in the search box to learn more about \"Atrial Fibrillation: Care Instructions. \" 
 Current as of: September 21, 2016 Content Version: 11.4 © 2612-8367 [employer]. Care instructions adapted under license by [employer] (which disclaims liability or warranty for this information). If you have questions about a medical condition or this instruction, always ask your healthcare professional. [address] any warranty or liability for your use of this information. [employer] Announcement We are excited to announce that we are making your provider's discharge notes available to you in [employer]. You will see these notes when they are completed and signed by the physician that discharged you from your recent hospital stay. If you have any questions or concerns about any information you see in [employer], please call the Health Information Department where you were seen or reach out to your Primary Care Provider for more information about your plan of care. Introducing [facility]! [facility] introduces [employer] patient portal. Now you can access parts of your medical record, email your doctor's office, and request medication refills online. 1. In your internet browser, go to [email or web address] 2. Click on the First Time User? Click Here link in the Sign In box. You will see the New Member Sign Up page. 3. Enter your [employer] Access Code exactly as it appears below. You will not need to use this code after youve completed the sign-up process. If you do not sign up before the expiration date, you must request a new code. · [employer] Access Code: [email or web address] Expires: 1/22/2018  9:34 AM 
 
4. Enter the last four digits of your Social Security Number (xxxx) and Date of Birth (mm/dd/yyyy) as indicated and click Submit. You will be taken to the next sign-up page. 5. Create a [employer] ID. This will be your [employer] login ID and cannot be changed, so think of one that is secure and easy to remember. 6. Create a [employer] password. You can change your password at any time. 7. Enter your Password Reset Question and Answer. This can be used at a later time if you forget your password. 8. Enter your e-mail address. You will receive e-mail notification when new information is available in [address] 9. Click Sign Up. You can now view and download portions of your medical record. 10. Click the Download Summary menu link to download a portable copy of your medical information. If you have questions, please visit the Frequently Asked Questions section of the [employer] website. Remember, [employer] is NOT to be used for urgent needs. For medical emergencies, dial 911. Now available from your iPhone and Android! Unresulted Labs-Please follow up with your PCP about these lab tests Order Current Status CULTURE, BLOOD Preliminary result CULTURE, BLOOD Preliminary result Providers Seen During Your Hospitalization Provider Specialty Primary office phone Judy Izaguirre DO Cardiology [phone] Your Primary Care Physician (PCP) Primary Care Physician Office Phone Office Fax Lonita Krabbe [phone] You are allergic to the following No active allergies Recent Documentation Height Weight BMI Smoking Status 1.676 m 96.6 kg 34.38 kg/m2 Never Smoker Emergency Contacts Name Discharge Info Relation Home Work Peri Sanders  Spouse [3] [phone] Patient Belongings The following personal items are in your possession at time of discharge: 
  Dental Appliances: Uppers, Lowers, Partials  Visual Aid: Glasses      Home Medications: Sent home   Jewelry: Watch  Clothing: At bedside    Other Valuables: Cell Phone Discharge Instructions Attachments/References BIVENTRICULAR PACEMAKER: POST-OP (ENGLISH) HEALTHY DIET: HEART (ENGLISH) HEART ATTACK: MEDICINE TO REDUCE RISK (ENGLISH) SECONDHAND SMOKE (ENGLISH) SMOKING: STOPPING (ENGLISH) APIXABAN (BY MOUTH) (ENGLISH) METOPROLOL (BY MOUTH) (ENGLISH) MEFS - CEFPODOXIME PROXETIL (VANTIN) - (BY MOUTH) (ENGLISH) Patient Handouts Pacemaker Placement for Heart Failure: What to Expect at Home Your Recovery A pacemaker for heart failure is a biventricular pacemaker (say \"by-maggie-TRICK-yuh-audra\"). Treatment that uses this type of pacemaker is called cardiac resynchronization therapy (CRT). When you have heart failure, the lower chambers of your heart may not pump at the same time. The pacemaker sends painless electrical signals to your heart. These signals make the chambers pump at the same time. This can help your heart pump blood better and help you feel better. Your pacemaker may be combined with an ICD, or implantable cardioverter-defibrillator. It can control abnormal heart rhythms. This can prevent sudden death. Your chest may be sore where the doctor made the cut (incision) and put in the pacemaker. You also may have a bruise and mild swelling. These symptoms usually get better in 1 to 2 weeks. You may feel a hard ridge along the incision. This usually gets softer in the months after surgery. You may be able to see or feel the outline of the pacemaker under your skin. You will probably be able to go back to work or your usual routine 1 to 2 weeks after surgery. Pacemaker batteries usually last 5 to 15 years. Your doctor will talk to you about how often you will need to have your pacemaker checked. You can safely use most household and office electronics. This includes things such as kitchen appliances, electric power tools, and computers. You will need to stay away from things with strong magnetic and electrical fields. This includes things such as an MRI machine (unless your pacemaker is safe for an MRI), welding equipment, and power generators.  You can use a cell phone, but keep it at least 6 inches away from your pacemaker. Check with your doctor about what you need to stay away from, what you need to use with care, and what is okay to use. This care sheet gives you a general idea about how long it will take for you to recover. But each person recovers at a different pace. Follow the steps below to get better as quickly as possible. How can you care for yourself at home? Activity ? · Rest when you feel tired. ? · Be active. Walking is a good choice. ? · For 4 to 6 weeks: ¨ Avoid activities that strain your chest or upper arm muscles. This includes pushing a  or vacuum, mopping floors, swimming, or swinging a golf club or tennis racquet. ¨ Do not raise your arm (the one on the side of your body where the pacemaker is located) above your shoulder. ¨ Allow your body to heal. Don't move quickly or lift anything heavy until you are feeling better. ? · Many people are able to return to work within 1 to 2 weeks after surgery. ? · Ask your doctor when it is okay for you to have sex. Diet ? · You can eat your normal diet. If your stomach is upset, try bland, low-fat foods like plain rice, broiled chicken, toast, and yogurt. Medicines ? · Your doctor will tell you if and when you can restart your medicines. He or she will also give you instructions about taking any new medicines. ? · If you take aspirin or some other blood thinner, be sure to talk to your doctor. He or she will tell you if and when to start taking this medicine again. Make sure that you understand exactly what your doctor wants you to do. ? · Be safe with medicines. Read and follow all instructions on the label. ¨ If the doctor gave you a prescription medicine for pain, take it as prescribed. ¨ If you are not taking a prescription pain medicine, ask your doctor if you can take an over-the-counter medicine. ¨ Do not take aspirin, ibuprofen (Advil, Motrin), naproxen (Aleve), or other nonsteroidal anti-inflammatory drugs (NSAIDs) unless your doctor says it is okay. ? · If your doctor prescribed antibiotics, take them as directed. Do not stop taking them just because you feel better. You need to take the full course of antibiotics. Incision care ? · If you have strips of tape on the incision, leave the tape on for a week or until it falls off.  
? · Keep the incision dry while it heals. Your doctor may recommend sponge baths for about 7 days, but do not get the incision wet. Your doctor will let you know when you may take showers. After a shower, pat the incision dry. ? · Don't use hydrogen peroxide or alcohol on the incision, which can slow healing. You may cover the area with a gauze bandage if it oozes fluid or rubs against clothing. Change the bandage every day. ? · Do not take a bath or get into a hot tub for the first 2 weeks, or until your doctor tells you it is okay. Other instructions ? · Keep a medical ID card with you at all times that says you have a pacemaker. The card should include the  and model information. ? · Wear medical alert jewelry stating that you have a pacemaker. You can buy this at most [employer]. ? · Check your pulse as directed by your doctor. ? · Have your pacemaker checked as often as your doctor recommends. In some cases, this may be done over the phone or the Internet. Your doctor will give you instructions about how to do this. Follow-up care is a key part of your treatment and safety. Be sure to make and go to all appointments, and call your doctor if you are having problems. It's also a good idea to know your test results and keep a list of the medicines you take. When should you call for help? Call 911 anytime you think you may need emergency care. For example, call if: 
? · You passed out (lost consciousness). ? · You have trouble breathing. ?Call your doctor now or seek immediate medical care if: 
? · You are dizzy or light-headed, or you feel like you may faint. ? · You have pain that does not get better after you take pain medicine. ? · You hear an alarm or feel a vibration from your pacemaker. ? · You have loose stitches, or your incision comes open. ? · Bright red blood has soaked through the bandage over your incision. ? · You have signs of infection, such as: 
¨ Increased pain, swelling, warmth, or redness. ¨ Red streaks leading from the incision. ¨ Pus draining from the incision. ¨ A fever. ? Watch closely for changes in your health, and be sure to contact your doctor if: 
? · You have any problems with your pacemaker. Where can you learn more? Go to http://tess-micheline.info/. Enter T984 in the search box to learn more about \"Pacemaker Placement for Heart Failure: What to Expect at Home. \" Current as of: September 21, 2016 Content Version: 11.4 © 5015-5874 "[employer]". Care instructions adapted under license by [employer] (which disclaims liability or warranty for this information). If you have questions about a medical condition or this instruction, always ask your healthcare professional. [address] any warranty or liability for your use of this information. Heart-Healthy Diet: Care Instructions Your Care Instructions A heart-healthy diet has lots of vegetables, fruits, nuts, beans, and whole grains, and is low in salt. It limits foods that are high in saturated fat, such as meats, cheeses, and fried foods. It may be hard to change your diet, but even small changes can lower your risk of heart attack and heart disease. Follow-up care is a key part of your treatment and safety. Be sure to make and go to all appointments, and call your doctor if you are having problems.  It's also a good idea to know your test results and keep a list of the medicines you take. How can you care for yourself at home? Watch your portions · Learn what a serving is. A \"serving\" and a \"portion\" are not always the same thing. Make sure that you are not eating larger portions than are recommended. For example, a serving of pasta is ½ cup. A serving size of meat is 2 to 3 ounces. A 3-ounce serving is about the size of a deck of cards. Measure serving sizes until you are good at [place]" them. Keep in mind that restaurants often serve portions that are 2 or 3 times the size of one serving. · To keep your energy level up and keep you from feeling hungry, eat often but in smaller portions. · Eat only the number of calories you need to stay at a healthy weight. If you need to lose weight, eat fewer calories than your body burns (through exercise and other physical activity). Eat more fruits and vegetables · Eat a variety of fruit and vegetables every day. Dark green, deep orange, red, or yellow fruits and vegetables are especially good for you. Examples include spinach, carrots, peaches, and berries. · Keep carrots, celery, and other veggies handy for snacks. Buy fruit that is in season and store it where you can see it so that you will be tempted to eat it. · Cook dishes that have a lot of veggies in them, such as stir-fries and soups. Limit saturated and trans fat · Read food labels, and try to avoid saturated and trans fats. They increase your risk of heart disease. Trans fat is found in many processed foods such as cookies and crackers. · Use olive or canola oil when you cook. Try cholesterol-lowering spreads, such as Benecol or Take Control. · Bake, broil, grill, or steam foods instead of frying them. · Choose lean meats instead of high-fat meats such as hot dogs and sausages. Cut off all visible fat when you prepare meat.  
· Eat fish, skinless poultry, and meat alternatives such as soy products instead of high-fat meats. Soy products, such as tofu, may be especially good for your heart. · Choose low-fat or fat-free milk and dairy products. Eat fish · Eat at least two servings of fish a week. Certain fish, such as salmon and tuna, contain omega-3 fatty acids, which may help reduce your risk of heart attack. Eat foods high in fiber · Eat a variety of grain products every day. Include whole-grain foods that have lots of fiber and nutrients. Examples of whole-grain foods include oats, whole wheat bread, and brown rice. · Buy whole-grain breads and cereals, instead of white bread or pastries. Limit salt and sodium · Limit how much salt and sodium you eat to help lower your blood pressure. · Taste food before you salt it. Add only a little salt when you think you need it. With time, your taste buds will adjust to less salt. · Eat fewer snack items, fast foods, and other high-salt, processed foods. Check food labels for the amount of sodium in packaged foods. · Choose low-sodium versions of canned goods (such as soups, vegetables, and beans). Limit sugar · Limit drinks and foods with added sugar. These include candy, desserts, and soda pop. Limit alcohol · Limit alcohol to no more than 2 drinks a day for men and 1 drink a day for women. Too much alcohol can cause health problems. When should you call for help? Watch closely for changes in your health, and be sure to contact your doctor if: 
? · You would like help planning heart-healthy meals. Where can you learn more? Go to http://tess-micheline.info/. Enter V137 in the search box to learn more about \"Heart-Healthy Diet: Care Instructions. \" Current as of: September 21, 2016 Content Version: 11.4 © 5125-6967 [employer]. Care instructions adapted under license by [employer] (which disclaims liability or warranty for this information).  If you have questions about a medical condition or this instruction, always ask your healthcare professional. [address] any warranty or liability for your use of this information. Reducing Heart Attack Risk With Daily Medicine: Care Instructions Your Care Instructions Heart disease is the number one cause of death. If you are at risk for heart disease, there are many medicines that can reduce your risk. These include: · ACE inhibitors. These are a type of blood pressure medicine. They can reduce the risk of heart attacks and strokes if you are at high risk. · Statin medicines. These lower cholesterol. They can also reduce the risk of heart disease and strokes. · Aspirin. It can help certain people lower their risk of a heart attack or stroke. · Beta-blocker medicines. These are a type of blood pressure and heart medicine. They can reduce the chance of early death if you have had a heart attack. All medicines can cause side effects. So it is important to understand the pros and cons of any medicine you take. It is also important to take your medicines exactly as your doctor tells you to. Follow-up care is a key part of your treatment and safety. Be sure to make and go to all appointments, and call your doctor if you are having problems. It's also a good idea to know your test results and keep a list of the medicines you take. ACE inhibitors ACE (angiotensin-converting enzyme) inhibitors are used for three main reasons. They lower blood pressure, protect the kidneys, and prevent heart attacks and strokes. Examples include benazepril (Lotensin), lisinopril (Prinivil, Zestril), and ramipril (Altace). Before you start taking an ACE inhibitor, make sure your doctor knows if: 
· You are taking a water pill (diuretic). · You are taking potassium pills or using salt substitutes. · You are pregnant or breastfeeding. · You have had a kidney transplant or other kidney problems. ACE inhibitors can cause side effects. Call your doctor right away if you have: · Trouble breathing. · Swelling in your face, head, neck, or tongue. · Dizziness or lightheadedness. · A dry cough. Statins Statins lower cholesterol. Examples include atorvastatin (Lipitor), lovastatin (Mevacor), pravastatin (Pravachol), and simvastatin (Zocor). Before you start taking a statin, make sure your doctor knows if: 
· You have had a kidney transplant or other kidney problems. · You have liver disease. · You take any other prescription medicine, over-the-counter medicine, vitamins, supplements, or herbal remedies. · You are pregnant or breastfeeding. Statins can cause side effects. Call your doctor right away if you have: · New, severe muscle aches. · Brown urine. Aspirin Taking an aspirin every day can lower your risk for a heart attack. A heart attack occurs when a blood vessel in the heart gets blocked. When this happens, oxygen can't get to the heart muscle, and part of the heart dies. Aspirin can help prevent blood clots that can block the blood vessels. Talk to your doctor before you start taking aspirin every day. He or she may recommend that you take one low-dose aspirin (81 mg) tablet each day, with a meal and a full glass of water. Taking aspirin isn't right for everyone. This is because it can cause serious bleeding. And you may not be able to use aspirin if you: 
· Have asthma. · Have an ulcer or other stomach problem. · Take some other medicine (called a blood thinner) that prevents blood clots. · Are allergic to aspirin. Before having a surgery or procedure, tell your doctor or dentist that you take aspirin. He or she will tell you if you should stop taking aspirin beforehand. Make sure that you understand exactly what your doctor wants you to do. Aspirin can cause side effects. Call your doctor right away if you have: · Unusual bleeding or bruising. · Nausea, vomiting, or heartburn. · Black or bloody stools. Beta-blockers Beta-blockers are used for three main reasons. They lower blood pressure, relieve angina symptoms (such as chest pain or pressure), and reduce the chances of a second heart attack. They include atenolol (Tenormin), carvedilol (Coreg), and metoprolol (Lopressor). Before you start taking a beta-blocker, make sure your doctor knows if you have: · Severe asthma or frequent asthma attacks. · A very slow pulse (less than 55 beats a minute). Beta-blockers can cause side effects. Call your doctor right away if you have: · Wheezing or trouble breathing. · Dizziness or lightheadedness. · Asthma that gets worse. When should you call for help? Watch closely for changes in your health, and be sure to contact your doctor if you have any problems. Where can you learn more? Go to http://tess-micheline.info/. Enter R428 in the search box to learn more about \"Reducing Heart Attack Risk With Daily Medicine: Care Instructions. \" Current as of: September 21, 2016 Content Version: 11.4 © 2484-4031 [employer]. Care instructions adapted under license by [employer] (which disclaims liability or warranty for this information). If you have questions about a medical condition or this instruction, always ask your healthcare professional. [place] [address] any warranty or liability for your use of this information. Secondhand Smoke: Care Instructions Your Care Instructions Secondhand smoke comes from the burning end of a cigarette, cigar, or pipe and the smoke that a smoker exhales. The smoke contains nicotine and many other harmful chemicals. Breathing secondhand smoke can cause or worsen health problems including cancer, asthma, coronary artery disease, and respiratory infections. It can make your eyes and nose burn and cause a sore throat.  
Secondhand smoke is especially bad for babies and young children whose lungs are still developing. Babies whose parents smoke are more likely to have ear infections, pneumonia, and bronchitis in the first few years of their lives. Secondhand smoke can make asthma symptoms worse in children. If you are pregnant, it is important that you not smoke and that you avoid secondhand smoke. You are more likely to give birth to a baby who weighs less than expected (low birth weight) if you smoke. And your baby may have a greater risk for sudden infant death syndrome (SIDS). Babies whose mothers are exposed to secondhand smoke during pregnancy have a higher risk for health problems. Follow-up care is a key part of your treatment and safety. Be sure to make and go to all appointments, and call your doctor if you are having problems. It's also a good idea to know your test results and keep a list of the medicines you take. How can you care for yourself at home? · Do not smoke or let anyone else smoke in your home. If people must smoke, ask them to go outside. · If people do smoke in your home, choose a room where you can open a window or use a fan to get the smoke outside. · Do not let anyone smoke in your car. If someone must smoke, pull over in a safe place and let him or her smoke away from the car. · Ask your employer to make sure that you have a smoke-free work area. · Make sure that your children are not exposed to secondhand smoke at day care, school, and after-school programs. · Try to choose nonsmoking bars, restaurants, and other public places when you go out. · Help your family and friends who smoke to quit by encouraging them to try. Tell them about treatment resources. Having support from others often helps. · If you smoke, quit. Quitting is hard, but there are ways to boost your chance of quitting tobacco for good. ¨ Use nicotine gum, patches, or lozenges. Call a quitline. Ask your doctor about stop-smoking programs and medicines. ¨ Keep trying. When should you call for help? Watch closely for changes in your health, and be sure to contact your doctor if you have any problems. Where can you learn more? Go to http://tess-micheline.info/. Enter L004 in the search box to learn more about \"Secondhand Smoke: Care Instructions. \" Current as of: March 20, 2017 Content Version: 11.4 © 3966-4334 [employer]. Care instructions adapted under license by [employer] (which disclaims liability or warranty for this information). If you have questions about a medical condition or this instruction, always ask your healthcare professional. [place] [address] any warranty or liability for your use of this information. Stopping Smoking: Care Instructions Your Care Instructions Cigarette smokers crave the nicotine in cigarettes. Giving it up is much harder than simply changing a habit. Your body has to stop craving the nicotine. It is hard to quit, but you can do it. There are many tools that people use to quit smoking. You may find that combining tools works best for you. There are several steps to quitting. First you get ready to quit. Then you get support to help you. After that, you learn new skills and behaviors to become a nonsmoker. For many people, a necessary step is getting and using medicine. Your doctor will help you set up the plan that best meets your needs. You may want to attend a smoking cessation program to help you quit smoking. When you choose a program, look for one that has proven success. Ask your doctor for ideas. You will greatly increase your chances of success if you take medicine as well as get counseling or join a cessation program. 
Some of the changes you feel when you first quit tobacco are uncomfortable. Your body will miss the nicotine at first, and you may feel short-tempered and grumpy. You may have trouble sleeping or concentrating. Medicine can help you deal with these symptoms. You may struggle with changing your smoking habits and rituals. The last step is the tricky one: Be prepared for the smoking urge to continue for a time. This is a lot to deal with, but keep at it. You will feel better. Follow-up care is a key part of your treatment and safety. Be sure to make and go to all appointments, and call your doctor if you are having problems. It's also a good idea to know your test results and keep a list of the medicines you take. How can you care for yourself at home? · Ask your family, friends, and coworkers for support. You have a better chance of quitting if you have help and support. · Join a support group, such as Nicotine Anonymous, for people who are trying to quit smoking. · Consider signing up for a smoking cessation program, such as the American [employer] Association's Freedom from Smoking program. 
· Set a quit date. Pick your date carefully so that it is not right in the middle of a big deadline or stressful time. Once you quit, do not even take a puff. Get rid of all ashtrays and lighters after your last cigarette. Clean your house and your clothes so that they do not smell of smoke. · Learn how to be a nonsmoker. Think about ways you can avoid those things that make you reach for a cigarette. ¨ Avoid situations that put you at greatest risk for smoking. For some people, it is hard to have a drink with friends without smoking. For others, they might skip a coffee break with coworkers who smoke. ¨ Change your daily routine. Take a different route to work or eat a meal in a different place. · Cut down on stress. Calm yourself or release tension by doing an activity you enjoy, such as reading a book, taking a hot bath, or gardening. · Talk to your doctor or pharmacist about nicotine replacement therapy, which replaces the nicotine in your body.  You still get nicotine but you do not use tobacco. Nicotine replacement products help you slowly reduce the amount of nicotine you need. These products come in several forms, many of them available over-the-counter: ¨ Nicotine patches ¨ Nicotine gum and lozenges ¨ Nicotine inhaler · Ask your doctor about bupropion (Wellbutrin) or varenicline (Chantix), which are prescription medicines. They do not contain nicotine. They help you by reducing withdrawal symptoms, such as stress and anxiety. · Some people find hypnosis, acupuncture, and massage helpful for ending the smoking habit. · Eat a healthy diet and get regular exercise. Having healthy habits will help your body move past its craving for nicotine. · Be prepared to keep trying. Most people are not successful the first few times they try to quit. Do not get mad at yourself if you smoke again. Make a list of things you learned and think about when you want to try again, such as next week, next month, or next year. Where can you learn more? Go to http://tess-micheline.info/. Enter T937 in the search box to learn more about \"Stopping Smoking: Care Instructions. \" Current as of: March 20, 2017 Content Version: 11.4 © 1501-2126 [employer]). Care instructions adapted under license by [employer] (which disclaims liability or warranty for this information). If you have questions about a medical condition or this instruction, always ask your healthcare professional. [address] any warranty or liability for your use of this information. Apixaban (By mouth) Apixaban (a-PIX-a-ban) Treats and prevents blood clots. This medicine is a blood thinner. Brand Name(s): Eliquis There may be other brand names for this medicine. When This Medicine Should Not Be Used: This medicine is not right for everyone. Do not use it if you had an allergic reaction to apixaban or you have active bleeding. How to Use This Medicine: Tablet · Your doctor will tell you how much medicine to use. Do not use more than directed. · If you are not able to swallow the tablets whole, they may be crushed and mixed in water, 5% dextrose in water (D5W), apple juice, or applesauce. The crushed tablets may be mixed with 60 mL of water or D5W dose and given through a nasogastric tube (NGT). · This medicine should come with a Medication Guide. Ask your pharmacist for a copy if you do not have one. · Missed dose: Take a dose as soon as you remember. If it is almost time for your next dose, wait until then and take a regular dose. Do not take extra medicine to make up for a missed dose. · Store the medicine in a closed container at room temperature, away from heat, moisture, and direct light. Drugs and Foods to Avoid: Ask your doctor or pharmacist before using any other medicine, including over-the-counter medicines, vitamins, and herbal products. · Some medicines can affect how apixaban works. Tell your doctor if you are using any of the following: ¨ Carbamazepine, clarithromycin, itraconazole, ketoconazole, phenytoin, rifampin, ritonavir, Giovanni's wort ¨ Blood thinner (including clopidogrel, heparin, prasugrel, warfarin) ¨ Medicine to treat depression ¨ NSAID pain or arthritis medicine (including aspirin, celecoxib, diclofenac, ibuprofen, naproxen) Warnings While Using This Medicine: · Tell your doctor if you are pregnant or breastfeeding, or if you have kidney disease, liver disease, bleeding problems, or an artificial heart valve. · Do not stop using this medicine suddenly without asking your doctor. You might have a higher risk of stroke for a short time after you stop using this medicine. · This medicine increases your risk for bleeding that can become serious if not controlled. You may also bruise easily, and it may take longer than usual for bleeding to stop.  
· This medicine may increase your risk for blood clots in your spine or back if you undergo an epidural or spinal puncture. This could lead to paralysis. Tell your doctor if you ever had spine problems or back surgery. · Tell any doctor or dentist who treats you that you are using this medicine. With your doctor's supervision, you may need to stop using this medicine several days before you have surgery or medical tests. · Your doctor will do lab tests at regular visits to check on the effects of this medicine. Keep all appointments. · Keep all medicine out of the reach of children. Never share your medicine with anyone. Possible Side Effects While Using This Medicine:  
Call your doctor right away if you notice any of these side effects: · Allergic reaction: Itching or hives, swelling in your face or hands, swelling or tingling in your mouth or throat, chest tightness, trouble breathing · Change in how much or how often you urinate, red or pink urine · Chest pain, trouble breathing · Coughing up blood, vomiting blood or material that looks like coffee grounds · Numbness, tingling, or muscle weakness in your legs or feet · Red or black, tarry stools · Unusual bleeding, bruising, or weakness If you notice other side effects that you think are caused by this medicine, tell your doctor. Call your doctor for medical advice about side effects. You may report side effects to FDA at 8-324-FDA-6586 © 2017 [facility] Information is for End User's use only and may not be sold, redistributed or otherwise used for commercial purposes. The above information is an  only. It is not intended as medical advice for individual conditions or treatments. Talk to your doctor, nurse or pharmacist before following any medical regimen to see if it is safe and effective for you. Metoprolol (By mouth) Metoprolol (met-oh-PROE-lol) Treats high blood pressure, angina (chest pain), and heart failure.  May lower the risk of death after a heart attack. This medicine is a beta-blocker. Brand Name(s): Lopressor, Toprol XL There may be other brand names for this medicine. When This Medicine Should Not Be Used: This medicine is not right for everyone. Do not use if you had an allergic reaction to metoprolol or similar medicines. Do not use this medicine if you have certain blood circulation or heart problems. Ask your doctor about these problems. How to Use This Medicine:  
Tablet, Long Acting Tablet · Take your medicine as directed. Your dose may need to be changed several times to find what works best for you. · Take this medicine with a meal or right after a meal. Take this medicine the same way every day, at the same time. · Swallow the tablet whole with a glass of water. You may break the extended-release tablet in half, but do not chew or crush it. · Missed dose: Take a dose as soon as you remember. If it is almost time for your next dose, wait until then and take a regular dose. Do not take extra medicine to make up for a missed dose. · Store the medicine in a closed container at room temperature, away from heat, moisture, and direct light. Drugs and Foods to Avoid: Ask your doctor or pharmacist before using any other medicine, including over-the-counter medicines, vitamins, and herbal products. · Some medicines can affect how metoprolol works. Tell your doctor if you are taking any of the following: ¨ Digoxin, dipyridamole, hydralazine, hydroxychloroquine, methyldopa, quinidine ¨ Medicine to treat depression (such as bupropion, clomipramine, desipramine, fluoxetine, fluvoxamine, paroxetine, sertraline), medicine to treat mental illness (such as chlorpromazine, fluphenazine, haloperidol, thioridazine), medicine for heart rhythm problems (such as propafenone), HIV/AIDS medicine (such as ritonavir), medicine to treat a fungus infection (such as terbinafine), a monoamine oxidase inhibitor (MAOI), an ergot medicine for headaches, a calcium channel blocker (such as amlodipine, diltiazem, verapamil), or an alpha blocker (such as clonidine, prazosin, reserpine, guanethidine) Warnings While Using This Medicine: · Tell your doctor if you are pregnant or breastfeeding, or if you have blood vessel, heart, or circulation problems (such as heart failure, rhythm problems, or slow heartbeat). Tell your doctor if you have kidney disease, liver disease, diabetes, lung disease (such as asthma), an overactive thyroid, or a history of allergies. · This medicine may cause worse symptoms of heart failure while the dose is being adjusted. · Do not stop using this medicine suddenly. Your doctor will need to slowly decrease your dose before you stop it completely. · Tell any doctor or dentist who treats you that you are using this medicine. You may need to stop using this medicine several days before you have surgery or medical tests. · This medicine could lower your blood pressure too much, especially when you first use it or if you are dehydrated. Stand or sit up slowly if you feel lightheaded or dizzy. · This medicine may make you dizzy or drowsy. Do not drive or do anything else that could be dangerous until you know how this medicine affects you. · Your doctor will check your progress and the effects of this medicine at regular visits. Keep all appointments. · Keep all medicine out of the reach of children. Never share your medicine with anyone. Possible Side Effects While Using This Medicine:  
Call your doctor right away if you notice any of these side effects: · Allergic reaction: Itching or hives, swelling in your face or hands, swelling or tingling in your mouth or throat, chest tightness, trouble breathing · Lightheadedness, dizziness, or fainting · Slow heartbeat · Swelling in your hands, ankles, or feet, trouble breathing, tiredness · Worsening chest pain If you notice these less serious side effects, talk with your doctor: · Diarrhea · Mild dizziness or tiredness If you notice other side effects that you think are caused by this medicine, tell your doctor. Call your doctor for medical advice about side effects. You may report side effects to FDA at 7-386-FDA-2729 © 2017 [facility] Information is for End User's use only and may not be sold, redistributed or otherwise used for commercial purposes. The above information is an  only. It is not intended as medical advice for individual conditions or treatments. Talk to your doctor, nurse or pharmacist before following any medical regimen to see if it is safe and effective for you. Cefpodoxime Proxetil (Vantin) - (By mouth) Why this medicine is used:  
Treats bacterial infections. Contact a nurse or doctor right away if you have: · Blistering or peeling skin · Severe diarrhea Common side effects: · Vaginal itching or discharge · Nausea © 2017 [facility] Information is for End User's use only and may not be sold, redistributed or otherwise used for commercial purposes. Please provide this summary of care documentation to your next provider. Signatures-by signing, you are acknowledging that this After Visit Summary has been reviewed with you and you have received a copy. Patient Signature:  ____________________________________________________________ Date:  ____________________________________________________________  
  
[facility] Provider Signature:  ____________________________________________________________ Date:  ____________________________________________________________

## 2017-10-25 ENCOUNTER — ANESTHESIA (OUTPATIENT)
Dept: SURGERY | Age: 77
DRG: 242 | End: 2017-10-25
Payer: COMMERCIAL

## 2017-10-25 ENCOUNTER — ANESTHESIA EVENT (OUTPATIENT)
Dept: SURGERY | Age: 77
DRG: 242 | End: 2017-10-25
Payer: COMMERCIAL

## 2017-10-25 ENCOUNTER — APPOINTMENT (OUTPATIENT)
Dept: GENERAL RADIOLOGY | Age: 77
DRG: 242 | End: 2017-10-25
Attending: INTERNAL MEDICINE
Payer: COMMERCIAL

## 2017-10-25 LAB
ANION GAP SERPL CALC-SCNC: 9 MMOL/L (ref 7–16)
APTT PPP: 101.7 SEC (ref 23.5–31.7)
APTT PPP: 42.1 SEC (ref 23.5–31.7)
ATRIAL RATE: 156 BPM
BUN SERPL-MCNC: 12 MG/DL (ref 8–23)
CALCIUM SERPL-MCNC: 8.7 MG/DL (ref 8.3–10.4)
CALCULATED R AXIS, ECG10: -20 DEGREES
CALCULATED T AXIS, ECG11: 69 DEGREES
CHLORIDE SERPL-SCNC: 107 MMOL/L (ref 98–107)
CO2 SERPL-SCNC: 26 MMOL/L (ref 21–32)
CREAT SERPL-MCNC: 0.86 MG/DL (ref 0.8–1.5)
DIAGNOSIS, 93000: NORMAL
ERYTHROCYTE [DISTWIDTH] IN BLOOD BY AUTOMATED COUNT: 13.7 % (ref 11.9–14.6)
GLUCOSE SERPL-MCNC: 136 MG/DL (ref 65–100)
HCT VFR BLD AUTO: 42.5 % (ref 41.1–50.3)
HGB BLD-MCNC: 14.6 G/DL (ref 13.6–17.2)
MCH RBC QN AUTO: 30.4 PG (ref 26.1–32.9)
MCHC RBC AUTO-ENTMCNC: 34.4 G/DL (ref 31.4–35)
MCV RBC AUTO: 88.4 FL (ref 79.6–97.8)
PLATELET # BLD AUTO: 181 K/UL (ref 150–450)
PMV BLD AUTO: 10 FL (ref 10.8–14.1)
POTASSIUM SERPL-SCNC: 3.4 MMOL/L (ref 3.5–5.1)
Q-T INTERVAL, ECG07: 356 MS
QRS DURATION, ECG06: 92 MS
QTC CALCULATION (BEZET), ECG08: 498 MS
RBC # BLD AUTO: 4.81 M/UL (ref 4.23–5.67)
SODIUM SERPL-SCNC: 142 MMOL/L (ref 136–145)
TROPONIN I SERPL-MCNC: 0.05 NG/ML (ref 0.02–0.05)
TSH SERPL DL<=0.005 MIU/L-ACNC: 2.08 UIU/ML (ref 0.36–3.74)
VENTRICULAR RATE, ECG03: 118 BPM
WBC # BLD AUTO: 12.4 K/UL (ref 4.3–11.1)

## 2017-10-25 PROCEDURE — 33225 L VENTRIC PACING LEAD ADD-ON: CPT

## 2017-10-25 PROCEDURE — 77030008703 HC TU ET UNCUF COVD -A: Performed by: NURSE ANESTHETIST, CERTIFIED REGISTERED

## 2017-10-25 PROCEDURE — 77030012935 HC DRSG AQUACEL BMS -B

## 2017-10-25 PROCEDURE — C1892 INTRO/SHEATH,FIXED,PEEL-AWAY: HCPCS

## 2017-10-25 PROCEDURE — 74011250636 HC RX REV CODE- 250/636

## 2017-10-25 PROCEDURE — C2621 PMKR, OTHER THAN SING/DUAL: HCPCS

## 2017-10-25 PROCEDURE — 76937 US GUIDE VASCULAR ACCESS: CPT

## 2017-10-25 PROCEDURE — 0JH607Z INSERTION OF CARDIAC RESYNCHRONIZATION PACEMAKER PULSE GENERATOR INTO CHEST SUBCUTANEOUS TISSUE AND FASCIA, OPEN APPROACH: ICD-10-PCS | Performed by: INTERNAL MEDICINE

## 2017-10-25 PROCEDURE — 77030013687 HC GD NDL BARD -B

## 2017-10-25 PROCEDURE — 74011250637 HC RX REV CODE- 250/637: Performed by: PHYSICIAN ASSISTANT

## 2017-10-25 PROCEDURE — C8929 TTE W OR WO FOL WCON,DOPPLER: HCPCS

## 2017-10-25 PROCEDURE — L3650 SO 8 ABD RESTRAINT PRE OTS: HCPCS

## 2017-10-25 PROCEDURE — 76210000016 HC OR PH I REC 1 TO 1.5 HR: Performed by: INTERNAL MEDICINE

## 2017-10-25 PROCEDURE — 74011250636 HC RX REV CODE- 250/636: Performed by: INTERNAL MEDICINE

## 2017-10-25 PROCEDURE — 33208 INSRT HEART PM ATRIAL & VENT: CPT

## 2017-10-25 PROCEDURE — 02HK3JZ INSERTION OF PACEMAKER LEAD INTO RIGHT VENTRICLE, PERCUTANEOUS APPROACH: ICD-10-PCS | Performed by: INTERNAL MEDICINE

## 2017-10-25 PROCEDURE — 71010 XR CHEST PORT: CPT

## 2017-10-25 PROCEDURE — 74011000250 HC RX REV CODE- 250: Performed by: INTERNAL MEDICINE

## 2017-10-25 PROCEDURE — 77030008477 HC STYL SATN SLP COVD -A: Performed by: NURSE ANESTHETIST, CERTIFIED REGISTERED

## 2017-10-25 PROCEDURE — C1769 GUIDE WIRE: HCPCS

## 2017-10-25 PROCEDURE — 74011000258 HC RX REV CODE- 258: Performed by: NURSE PRACTITIONER

## 2017-10-25 PROCEDURE — 74011636320 HC RX REV CODE- 636/320: Performed by: INTERNAL MEDICINE

## 2017-10-25 PROCEDURE — 76060000035 HC ANESTHESIA 2 TO 2.5 HR: Performed by: INTERNAL MEDICINE

## 2017-10-25 PROCEDURE — 77030014650 HC SEAL MTRX FLOSEL BAXT -C

## 2017-10-25 PROCEDURE — 02HL3JZ INSERTION OF PACEMAKER LEAD INTO LEFT VENTRICLE, PERCUTANEOUS APPROACH: ICD-10-PCS | Performed by: INTERNAL MEDICINE

## 2017-10-25 PROCEDURE — 74011000250 HC RX REV CODE- 250

## 2017-10-25 PROCEDURE — 74011000250 HC RX REV CODE- 250: Performed by: PHYSICIAN ASSISTANT

## 2017-10-25 PROCEDURE — C1751 CATH, INF, PER/CENT/MIDLINE: HCPCS

## 2017-10-25 PROCEDURE — C1887 CATHETER, GUIDING: HCPCS

## 2017-10-25 PROCEDURE — C1898 LEAD, PMKR, OTHER THAN TRANS: HCPCS

## 2017-10-25 PROCEDURE — C1900 LEAD, CORONARY VENOUS: HCPCS

## 2017-10-25 PROCEDURE — 77030008467 HC STPLR SKN COVD -B

## 2017-10-25 PROCEDURE — 74011000272 HC RX REV CODE- 272: Performed by: INTERNAL MEDICINE

## 2017-10-25 PROCEDURE — 65660000000 HC RM CCU STEPDOWN

## 2017-10-25 PROCEDURE — 84484 ASSAY OF TROPONIN QUANT: CPT | Performed by: PHYSICIAN ASSISTANT

## 2017-10-25 PROCEDURE — 80048 BASIC METABOLIC PNL TOTAL CA: CPT | Performed by: PHYSICIAN ASSISTANT

## 2017-10-25 PROCEDURE — 36415 COLL VENOUS BLD VENIPUNCTURE: CPT | Performed by: PHYSICIAN ASSISTANT

## 2017-10-25 PROCEDURE — 93005 ELECTROCARDIOGRAM TRACING: CPT | Performed by: INTERNAL MEDICINE

## 2017-10-25 PROCEDURE — 85027 COMPLETE CBC AUTOMATED: CPT | Performed by: PHYSICIAN ASSISTANT

## 2017-10-25 PROCEDURE — 74011000250 HC RX REV CODE- 250: Performed by: NURSE PRACTITIONER

## 2017-10-25 PROCEDURE — 84443 ASSAY THYROID STIM HORMONE: CPT | Performed by: PHYSICIAN ASSISTANT

## 2017-10-25 PROCEDURE — 77030019557 HC ELECTRD VES SEAL MEDT -F

## 2017-10-25 PROCEDURE — 85730 THROMBOPLASTIN TIME PARTIAL: CPT | Performed by: INTERNAL MEDICINE

## 2017-10-25 PROCEDURE — 74011000258 HC RX REV CODE- 258: Performed by: PHYSICIAN ASSISTANT

## 2017-10-25 RX ORDER — DILTIAZEM HYDROCHLORIDE 5 MG/ML
5 INJECTION INTRAVENOUS ONCE
Status: COMPLETED | OUTPATIENT
Start: 2017-10-25 | End: 2017-10-25

## 2017-10-25 RX ORDER — CEFAZOLIN SODIUM IN 0.9 % NACL 2 G/50 ML
2 INTRAVENOUS SOLUTION, PIGGYBACK (ML) INTRAVENOUS EVERY 8 HOURS
Status: COMPLETED | OUTPATIENT
Start: 2017-10-25 | End: 2017-10-26

## 2017-10-25 RX ORDER — LIDOCAINE HYDROCHLORIDE 20 MG/ML
INJECTION, SOLUTION EPIDURAL; INFILTRATION; INTRACAUDAL; PERINEURAL AS NEEDED
Status: DISCONTINUED | OUTPATIENT
Start: 2017-10-25 | End: 2017-10-25 | Stop reason: HOSPADM

## 2017-10-25 RX ORDER — LIDOCAINE HYDROCHLORIDE AND EPINEPHRINE 10; 10 MG/ML; UG/ML
40 INJECTION, SOLUTION INFILTRATION; PERINEURAL
Status: DISCONTINUED | OUTPATIENT
Start: 2017-10-25 | End: 2017-10-29 | Stop reason: ALTCHOICE

## 2017-10-25 RX ORDER — ESMOLOL HYDROCHLORIDE 10 MG/ML
INJECTION INTRAVENOUS AS NEEDED
Status: DISCONTINUED | OUTPATIENT
Start: 2017-10-25 | End: 2017-10-25 | Stop reason: HOSPADM

## 2017-10-25 RX ORDER — CEFAZOLIN SODIUM IN 0.9 % NACL 2 G/50 ML
2 INTRAVENOUS SOLUTION, PIGGYBACK (ML) INTRAVENOUS
Status: COMPLETED | OUTPATIENT
Start: 2017-10-25 | End: 2017-10-25

## 2017-10-25 RX ORDER — NEOSTIGMINE METHYLSULFATE 1 MG/ML
INJECTION, SOLUTION INTRAVENOUS AS NEEDED
Status: DISCONTINUED | OUTPATIENT
Start: 2017-10-25 | End: 2017-10-25 | Stop reason: HOSPADM

## 2017-10-25 RX ORDER — ROCURONIUM BROMIDE 10 MG/ML
INJECTION, SOLUTION INTRAVENOUS AS NEEDED
Status: DISCONTINUED | OUTPATIENT
Start: 2017-10-25 | End: 2017-10-25 | Stop reason: HOSPADM

## 2017-10-25 RX ORDER — ONDANSETRON 2 MG/ML
INJECTION INTRAMUSCULAR; INTRAVENOUS AS NEEDED
Status: DISCONTINUED | OUTPATIENT
Start: 2017-10-25 | End: 2017-10-25 | Stop reason: HOSPADM

## 2017-10-25 RX ORDER — HEPARIN SODIUM 5000 [USP'U]/ML
3360 INJECTION, SOLUTION INTRAVENOUS; SUBCUTANEOUS ONCE
Status: COMPLETED | OUTPATIENT
Start: 2017-10-25 | End: 2017-10-25

## 2017-10-25 RX ORDER — SODIUM CHLORIDE, SODIUM LACTATE, POTASSIUM CHLORIDE, CALCIUM CHLORIDE 600; 310; 30; 20 MG/100ML; MG/100ML; MG/100ML; MG/100ML
INJECTION, SOLUTION INTRAVENOUS
Status: DISCONTINUED | OUTPATIENT
Start: 2017-10-25 | End: 2017-10-25 | Stop reason: HOSPADM

## 2017-10-25 RX ORDER — FENTANYL CITRATE 50 UG/ML
INJECTION, SOLUTION INTRAMUSCULAR; INTRAVENOUS AS NEEDED
Status: DISCONTINUED | OUTPATIENT
Start: 2017-10-25 | End: 2017-10-25 | Stop reason: HOSPADM

## 2017-10-25 RX ORDER — SUCCINYLCHOLINE CHLORIDE 20 MG/ML
INJECTION INTRAMUSCULAR; INTRAVENOUS AS NEEDED
Status: DISCONTINUED | OUTPATIENT
Start: 2017-10-25 | End: 2017-10-25 | Stop reason: HOSPADM

## 2017-10-25 RX ORDER — PROPOFOL 10 MG/ML
INJECTION, EMULSION INTRAVENOUS AS NEEDED
Status: DISCONTINUED | OUTPATIENT
Start: 2017-10-25 | End: 2017-10-25 | Stop reason: HOSPADM

## 2017-10-25 RX ORDER — PROPOFOL 10 MG/ML
INJECTION, EMULSION INTRAVENOUS
Status: DISCONTINUED | OUTPATIENT
Start: 2017-10-25 | End: 2017-10-25 | Stop reason: HOSPADM

## 2017-10-25 RX ORDER — GLYCOPYRROLATE 0.2 MG/ML
INJECTION INTRAMUSCULAR; INTRAVENOUS AS NEEDED
Status: DISCONTINUED | OUTPATIENT
Start: 2017-10-25 | End: 2017-10-25 | Stop reason: HOSPADM

## 2017-10-25 RX ORDER — SODIUM CHLORIDE 0.9 % (FLUSH) 0.9 %
5-10 SYRINGE (ML) INJECTION AS NEEDED
Status: DISCONTINUED | OUTPATIENT
Start: 2017-10-25 | End: 2017-10-30 | Stop reason: HOSPADM

## 2017-10-25 RX ORDER — SODIUM CHLORIDE 0.9 % (FLUSH) 0.9 %
5-10 SYRINGE (ML) INJECTION EVERY 8 HOURS
Status: DISCONTINUED | OUTPATIENT
Start: 2017-10-25 | End: 2017-10-29

## 2017-10-25 RX ADMIN — CEFAZOLIN 2 G: 1 INJECTION, POWDER, FOR SOLUTION INTRAMUSCULAR; INTRAVENOUS; PARENTERAL at 16:12

## 2017-10-25 RX ADMIN — LIDOCAINE HYDROCHLORIDE AND EPINEPHRINE 400 MG: 10; 10 INJECTION, SOLUTION INFILTRATION; PERINEURAL at 14:00

## 2017-10-25 RX ADMIN — PROPOFOL 20 MG: 10 INJECTION, EMULSION INTRAVENOUS at 16:13

## 2017-10-25 RX ADMIN — PROPOFOL 30 MG: 10 INJECTION, EMULSION INTRAVENOUS at 16:11

## 2017-10-25 RX ADMIN — GLYCOPYRROLATE 0.2 MG: 0.2 INJECTION INTRAMUSCULAR; INTRAVENOUS at 17:59

## 2017-10-25 RX ADMIN — PYRIDOSTIGMINE BROMIDE 60 MG: 60 TABLET ORAL at 08:25

## 2017-10-25 RX ADMIN — ESMOLOL HYDROCHLORIDE 10 MG: 10 INJECTION INTRAVENOUS at 18:09

## 2017-10-25 RX ADMIN — LIDOCAINE HYDROCHLORIDE 40 MG: 20 INJECTION, SOLUTION EPIDURAL; INFILTRATION; INTRACAUDAL; PERINEURAL at 16:11

## 2017-10-25 RX ADMIN — ACETAMINOPHEN 650 MG: 325 TABLET ORAL at 20:03

## 2017-10-25 RX ADMIN — ASPIRIN 81 MG CHEWABLE TABLET 81 MG: 81 TABLET CHEWABLE at 08:25

## 2017-10-25 RX ADMIN — NEOSTIGMINE METHYLSULFATE 2 MG: 1 INJECTION, SOLUTION INTRAVENOUS at 17:59

## 2017-10-25 RX ADMIN — SODIUM CHLORIDE, SODIUM LACTATE, POTASSIUM CHLORIDE, CALCIUM CHLORIDE: 600; 310; 30; 20 INJECTION, SOLUTION INTRAVENOUS at 16:03

## 2017-10-25 RX ADMIN — ESMOLOL HYDROCHLORIDE 30 MG: 10 INJECTION INTRAVENOUS at 18:06

## 2017-10-25 RX ADMIN — CEFAZOLIN 2 G: 1 INJECTION, POWDER, FOR SOLUTION INTRAMUSCULAR; INTRAVENOUS; PARENTERAL at 22:12

## 2017-10-25 RX ADMIN — PERFLUTREN 1 ML: 6.52 INJECTION, SUSPENSION INTRAVENOUS at 11:23

## 2017-10-25 RX ADMIN — METOPROLOL TARTRATE 25 MG: 25 TABLET ORAL at 08:26

## 2017-10-25 RX ADMIN — DILTIAZEM HYDROCHLORIDE 5 MG: 5 INJECTION INTRAVENOUS at 22:38

## 2017-10-25 RX ADMIN — ONDANSETRON 4 MG: 2 INJECTION INTRAMUSCULAR; INTRAVENOUS at 17:05

## 2017-10-25 RX ADMIN — IOPAMIDOL 20 ML: 755 INJECTION, SOLUTION INTRAVENOUS at 15:00

## 2017-10-25 RX ADMIN — HEPARIN SODIUM 3360 UNITS: 5000 INJECTION, SOLUTION INTRAVENOUS; SUBCUTANEOUS at 02:34

## 2017-10-25 RX ADMIN — HEPARIN SODIUM 16 UNITS/KG/HR: 5000 INJECTION, SOLUTION INTRAVENOUS at 04:00

## 2017-10-25 RX ADMIN — NEOMYCIN AND POLYMYXIN B SULFATES: 40; 200000 IRRIGANT IRRIGATION at 17:18

## 2017-10-25 RX ADMIN — LIDOCAINE HYDROCHLORIDE 20 MG: 20 INJECTION, SOLUTION EPIDURAL; INFILTRATION; INTRACAUDAL; PERINEURAL at 16:35

## 2017-10-25 RX ADMIN — ROCURONIUM BROMIDE 20 MG: 10 INJECTION, SOLUTION INTRAVENOUS at 17:20

## 2017-10-25 RX ADMIN — Medication 10 ML: at 22:11

## 2017-10-25 RX ADMIN — PROPOFOL 100 MCG/KG/MIN: 10 INJECTION, EMULSION INTRAVENOUS at 16:11

## 2017-10-25 RX ADMIN — PROPOFOL 100 MG: 10 INJECTION, EMULSION INTRAVENOUS at 16:35

## 2017-10-25 RX ADMIN — SODIUM CHLORIDE 5 MG/HR: 900 INJECTION, SOLUTION INTRAVENOUS at 23:24

## 2017-10-25 RX ADMIN — ESMOLOL HYDROCHLORIDE 10 MG: 10 INJECTION INTRAVENOUS at 18:22

## 2017-10-25 RX ADMIN — HYDROCODONE BITARTRATE AND ACETAMINOPHEN 1 TABLET: 5; 325 TABLET ORAL at 22:19

## 2017-10-25 RX ADMIN — SUCCINYLCHOLINE CHLORIDE 160 MG: 20 INJECTION INTRAMUSCULAR; INTRAVENOUS at 16:35

## 2017-10-25 RX ADMIN — CLOPIDOGREL BISULFATE 75 MG: 75 TABLET ORAL at 08:26

## 2017-10-25 RX ADMIN — HYDROCODONE BITARTRATE AND ACETAMINOPHEN 1 TABLET: 5; 325 TABLET ORAL at 00:58

## 2017-10-25 RX ADMIN — SODIUM CHLORIDE 2.5 MG/HR: 900 INJECTION, SOLUTION INTRAVENOUS at 22:38

## 2017-10-25 RX ADMIN — PROPOFOL 50 MG: 10 INJECTION, EMULSION INTRAVENOUS at 17:20

## 2017-10-25 RX ADMIN — FENTANYL CITRATE 50 MCG: 50 INJECTION, SOLUTION INTRAMUSCULAR; INTRAVENOUS at 16:35

## 2017-10-25 RX ADMIN — ATORVASTATIN CALCIUM 80 MG: 40 TABLET, FILM COATED ORAL at 22:11

## 2017-10-25 NOTE — PROCEDURES
Pre-Procedure Diagnosis  1. Documented non-reversible symptomatic bradycardia due to sinus node dysfunction. 2. Atrial fibrillation with RVR  3. Tachy trell syndrome  4. Anticipated high degree of ventricular pacing (planned AV node ablation). Procedure Performed  1. Insertion of Biotronik biventricular permanent pacemaker. 2. Insertion of left ventricular lead at time of new system Implantation. Anesthesia: MAC     Estimated Blood Loss: Less than 10 mL     Specimens: * No specimens in log *     The procedure, indications, risks, benefits, and alternatives were discussed with the patient and family members, who desired to proceed after questions were answered and informed consent was documented. Procedure: After informed consent was obtained, the patient was brought to the Electrophysiology Laboratory in a fasting state and was prepped and draped in sterile fashion. Prophylactic antibiotic was administered 10 minutes prior to skin incision (refer to anesthesia documentation for details). MAC was administered by the anesthesia team with continuous oxygen saturation measurement and blood pressure measurement. Local anesthetic (sensorcaine) was delivered to the left pectoral region and an incision was made parallel to the deltopectoral groove. A subcutaneous pocket was created using blunt dissection and electrocautery, and adequate hemostasis was established. Access x 3 was obtained under ultrasound guidance using a modified Seldinger technique with placement of 3 short wires down into the RA and advanced below the diaphragm. Next, placement of a peel-away sheath over the first guidewire was performed. A permanent RV pacemaker lead was then advanced under fluoroscopic guidance into the RV apex in a stable position with satisfactory sensing and pacing characteristics. The peel away sheath was removed. Another Safe-sheath was then placed over the second guidewire.  A permanent pacing lead was advanced under fluoroscopic guidance and positioned in the right atrium at the location of the RAA. Stable RA appendage position with satisfactory sensing characteristics were obtained. The sheath was peeled. The leads were sutured to the underlying pectoralis fascia using 2 non-absorbable sutures around each lead's collar. The lead slack and position was assessed under fluoroscopy while securing the lead collars to ensure proper length. After positioning the RA and RV leads, a AfterYes delivery sheath was advanced over a Glide wire and was used to cannulate the coronary sinus. A coronary sinus venogram was then performed using a balloon occluding catheter. The left ventricular lead was then advanced with into a posterolateral branch over a coronary wire. Adequate thresholds were obtained with an adequate margin between phrenic stim and loss of capture. The delivery sheaths were then slit with fluoroscopy demonstrating stable position. The lead was then sutured to the underlying pectoralis fascia using 2 non-absorbable sutures around the lead collar. Final lead testing via the pacing system analyzer (PSA) demonstrated stable sensing, impedance and pacing thresholds. The lead pins were then cleaned with antibiotic soaked gauze, dried gently, and attached to a new pacemaker generator. Pins were directly observed to pass the tip electrode, and the ring hex wrench screws were secured, and leads tug tested. The device and leads were gently positioned within the pocket after the pocket was irrigated copiously with a saline antimicrobial solution. The wound was closed with multiple layers of absorbable suture followed skin closure with staples. Fluoroscopic images were interpreted by me, in multiple projections. Final device position was confirmed by stored fluoroscopic image from device pocket to lead tips in AP projection. The patient tolerated the procedure well and left the lab in good condition.     Lead Data:    Device and Lead Information  Pulse Generator Model #  Serial # Location Implant/Explant   [id number] Biotronik [id number] Left Pectoral Implant     Lead Model Number  Serial Number Lead position Implant/Explant   RA [id number] Biotronik [id number] RA Appendage Implant   RV [id number] Biotronik [id number] RV Bromide Implant   LV [id number] Biotronik [id number] LV Lateral Implant     Lead Sensitivity and Threshold  Lead R or P sensitivity (mv) Threshold (V) Threshold PW (msec) Impedance (ohms) Final output Voltage (V) Final PW (msec)   RA 1.0 -- 0.4 565 3.6 0.4   RV 11.1 0.4 0.4 819 3.6 0.4   LV 3.2 1.0 0.4 838 3.6 1.0     Bradycardia Settings  Omero Mode LRL URL Pace AVD (ms) Sense AVD (ms) Rate Response Mode Switching Mode SW Rate   DDD 60 120 125 125 Off On 140     Contrast: 20 cc     Fluoro Time:  61.9 min    Complications: None    IMPRESSION: Successful implantation of [employer] biventricular permanent pacemaker    Godwin Bernard MD, MS  Clinical Cardiac Electrophysiology  10/25/2017  6:05 PM

## 2017-10-25 NOTE — PROGRESS NOTES
PTT result is 42.1. Heparin bolus of 3,360 units given IV as per protocol. Heparin gtt increased to 16 units/kg/hr as per protocol.   Next PTT scheduled for 0830

## 2017-10-25 NOTE — PROGRESS NOTES
TRANSFER - IN REPORT:    Verbal report received from Graciela Deutsch RN(name) on Tiffany Sage  being received from Cath Lab(unit) for routine progression of care      Report consisted of patients Situation, Background, Assessment and   Recommendations(SBAR). Information from the following report(s) SBAR, Procedure Summary, MAR and Cardiac Rhythm A fib with RVR was reviewed with the receiving nurse. Opportunity for questions and clarification was provided. Assessment completed upon patients arrival to unit and care assumed.

## 2017-10-25 NOTE — ROUTINE PROCESS
TRANSFER - OUT REPORT:    Verbal report given to [address] [facility]  being transferred to [facility] for routine post - op       Report consisted of patients Situation, Background, Assessment and   Recommendations(SBAR). Information from the following report(s) SBAR, Kardex, OR Summary, Procedure Summary, Intake/Output and MAR was reviewed with the receiving nurse. Lines:   Peripheral IV 10/24/17 Right Arm (Active)   Site Assessment Clean, dry, & intact 10/25/2017  7:00 PM   Phlebitis Assessment 0 10/25/2017  7:00 PM   Infiltration Assessment 0 10/25/2017  7:00 PM   Dressing Status Clean, dry, & intact 10/25/2017  7:00 PM   Dressing Type Transparent;Tape 10/25/2017  7:00 PM   Hub Color/Line Status Infusing 10/25/2017  7:00 PM   Action Taken Open ports on tubing capped 10/25/2017  3:06 AM   Alcohol Cap Used No 10/25/2017  7:00 PM       Peripheral IV 10/24/17 Right Forearm (Active)   Site Assessment Clean, dry, & intact 10/25/2017  7:00 PM   Phlebitis Assessment 0 10/25/2017  7:00 PM   Infiltration Assessment 0 10/25/2017  7:00 PM   Dressing Status Clean, dry, & intact 10/25/2017  7:00 PM   Dressing Type Transparent;Tape 10/25/2017  7:00 PM   Hub Color/Line Status Infusing 10/25/2017  7:00 PM   Alcohol Cap Used No 10/25/2017  7:00 PM        Opportunity for questions and clarification was provided. Patient transported with:  Registered Nurses  Cardiac Monitor   O2 @ 4 liters    VTE prophylaxis orders have not been written for [facility]. Patient and family given floor number and nurses name. Family updated re: pt status after security code verified.

## 2017-10-25 NOTE — ANESTHESIA POSTPROCEDURE EVALUATION
Post-Anesthesia Evaluation and Assessment    Patient: Joenathan Goodpasture MRN: [id number]  SSN: [id number]    YOB: 1940  Age: 65 y.o. Sex: male       Cardiovascular Function/Vital Signs  Visit Vitals    /70    Pulse (!) 130    Temp 36.4 °C (97.5 °F)    Resp 12    Wt 94.3 kg (207 lb 12.8 oz)    SpO2 91%    BMI 31.6 kg/m2     Bedside , Sat 95%. Patient is status post total IV anesthesia anesthesia for Procedure(s):  PACEMAKER INSERTION. Nausea/Vomiting: None    Postoperative hydration reviewed and adequate. Pain:  Pain Scale 1: Numeric (0 - 10) (10/25/17 0306)  Pain Intensity 1: 0 (10/25/17 0306)   Managed    Neurological Status: At baseline    Mental Status and Level of Consciousness: Awake. Pulmonary Status:   O2 Device: Nasal cannula (10/25/17 5122)   Adequate oxygenation and airway patent    Complications related to anesthesia: None    Post-anesthesia assessment completed.  No concerns    Signed By: Brea Holliday MD     October 25, 2017

## 2017-10-25 NOTE — PROGRESS NOTES
Dual nurse skin assessment completed. Skin warm, dry and intact. Sacrum assessed, skin intact with no signs of skin breakdown. No other skin issues noted. Pt has L subclavian pacemaker incision, covered with aquacel, sling in place on L arm. Site clean, dry, and intact. No bleeding or hematoma noted. Pressure dressing present and 5lb sand bag placed over incision per orders. Pt and family educated on keeping left arm close to body, do not raise arm. Pt and family voices understanding.

## 2017-10-25 NOTE — PROGRESS NOTES
Problem: Falls - Risk of  Goal: *Absence of Falls  Document Hang Fall Risk and appropriate interventions in the flowsheet.    Outcome: Progressing Towards Goal  Fall Risk Interventions:            Medication Interventions: Patient to call before getting OOB, Teach patient to arise slowly, Bed/chair exit alarm    Elimination Interventions: Call light in reach, Patient to call for help with toileting needs, Urinal in reach    History of Falls Interventions: Bed/chair exit alarm

## 2017-10-25 NOTE — PROGRESS NOTES
[facility] CARDIOLOGY PROGRESS NOTE           10/25/2017 11:11 AM    Admit Date: 10/24/2017         Subjective: Patient feels poorly this morning. He states dyspnea was improved yesterday evening but today feels weak/fatigued.      ROS:  Cardiovascular:  As noted above    Objective:      Vitals:    10/25/17 0730 10/25/17 0741 10/25/17 0800 10/25/17 0840   BP:  134/77  139/87   Pulse: 76 77 77 (!) 108   Resp:       Temp:       SpO2:       Weight:           On telemetry: a-fib       Physical Exam:  General: Well Developed, Well Nourished, No Acute Distress, Alert & Oriented x 3, Appropriate mood  Neck: supple, no JVD  Heart: S1S2 with IRR  Lungs: Clear throughout auscultation bilaterally  Abd: soft, nontender, nondistended, with good bowel sounds  Ext: no edema bilaterally  Skin: warm and dry      Data Review:   Recent Labs      10/25/17   0115  10/24/17   1714   NA  142  142   K  3.4*  3.7   MG   --   2.2   BUN  12  11   CREA  0.86  0.88   GLU  136*  119*   WBC  12.4*  12.4*   HGB  14.6  15.8   HCT  42.5  44.8   PLT  181  195       Recent Labs      10/25/17   0115  10/24/17   1714   TROIQ  0.05  0.05       Assessment/Plan:     Principal Problem:    Atrial fibrillation with RVR (HCC) (10/24/2017)  Pt with tachy/trell syndrome, HR ranging from 30s-160s, on IV Heparin    Active Problems:    HTN (hypertension) (5/8/2015)    BP stable      Coronary atherosclerosis of native coronary vessel (5/8/2015)  Pt denies any chest pain, troponins negative, echo pending, continue medical therapy       SSS (sick sinus syndrome) ([facility] [address] (6/30/2017)  As above, will have EP see given tachy/trell syndrome, pt with symptomatic bradycardia this morning, Cardizem drip stopped due to bradycardia     Near syncope   felt due to tachy/trell syndrome        Ayesha Hines PA-C  10/25/2017 11:11 AM       [facility] CARDIOLOGY     10/25/2017     11:21 AM    I have personally seen and examined [facility] with Bandera Lincoln PA. I agree and confirm findings in history, physical exam, and assessment/plan as outlined above with following pertinent additions/exceptions:   Patient denies any chest pain or dyspnea. Feels fatigued with bradycardia. PE: CV: IRIR  L: CTA bilaterally E: No edema. ASS/Plan:  Echo pending. EP consult to consider PM and possible AV node ablation.        Lissy Russ MD

## 2017-10-25 NOTE — CONSULTS
[facility]/Electrophyiology Consult                Date of  Admission: 10/24/2017  4:28 PM     CC/Reason for consult: tachy trell syndrome    Makayla Back is a 65 y.o. male admitted for afib ;Atrial fibrillation with rapid ventricular response (*.    67yo WM with a history of HTN, CAD s/p PCI, atrial fibrillation with RVR, and syncope was admitted with worsening shortness of breath with exertion, episodes of feeling lightheaded and dizzy, \"falling to the ground\" on occasion and having periods of rapid palpitations associated with SOB. Pt reports this has been going on for several weeks to months and was admitted to the hospital from the office with afib with RVR. Pt has been monitored on telemetry and having episodes of marked bradycardia in the setting of afib with HRs into the 30s as well as periods of afib with RVR. Pt is symptomatic with associated SOB, fatigue and overall malaise. Pt reports he has been in afib for a long time. Pt afib is a chronic problem, worsening, aggravated by activity, no alleviating factors, with symptoms as noted above.      Cardiac PMH: (Old records have been reviewed and summarized below)  TTE (10/17): low normal EF    Patient Active Problem List   Diagnosis Code    HTN (hypertension) I10    Coronary atherosclerosis of native coronary vessel I25.10    S/P coronary artery stent placement Z95.5    Dyspnea R06.00    Dyslipidemia E78.5    Hypokalemia E87.6    Mitral valve regurgitation I34.0    Myasthenia gravis ([facility]) G70.00    Bilateral carotid artery disease ([facility]) I77.9    Paroxysmal atrial fibrillation ([facility]) I48.0    SSS (sick sinus syndrome) ([facility]) I49.5    Atrial fibrillation with RVR ([facility]) I48.91    Syncope R55    Atrial fibrillation with rapid ventricular response ([facility]) I48.91       Past Medical History:   Diagnosis Date    Abnormal EKG 4/22/15    CAD (coronary artery disease) 5/8/2015    Carotid artery stenosis without cerebral infarction 6/7/2016 US 6/15: <50% bilat ICAs    Coronary atherosclerosis of native coronary vessel 5/8/2015    VEGAS on brilinta 5/7/15: prox LAD PCI, normal EF     Dyslipidemia 6/7/2016    Dyspnea 5/8/2015    Echo 6/15: EF 60%, mod MR, mod LVH, mild AI     ED (erectile dysfunction)     GERD (gastroesophageal reflux disease)     GERD (gastroesophageal reflux disease)     HTN (hypertension) 5/8/2015    Hypertension     Hypokalemia 6/7/2016    Hypokalemia     Mitral valve regurgitation 6/7/2016    Myasthenia gravis ([address] 6/17/15    Myasthenia gravis ([address]     Nocturia     Osteoporosis     PUD (peptic ulcer disease)     S/P coronary artery stent placement 5/8/2015    3.0x38 mm Xience ADRIANNA to pLAD 5/7/15     Sleep apnea     Syncope and collapse     Unspecified sleep apnea       Past Surgical History:   Procedure Laterality Date    HX APPENDECTOMY      HX COLONOSCOPY  2007    HX HEART CATHETERIZATION  5/7/2015    HX HEMORRHOIDECTOMY      Edvin Cervantes/Xin for sleep apnea and reconstruction for extending jaw     No Known Allergies   Family History   Problem Relation Age of Onset    Cancer Mother      kidney    Cancer Father      stomach        Current Facility-Administered Medications   Medication Dose Route Frequency    bacitracin 50,000 Units, neomycin-polymyxin B  2 mL in sterile water irrigation 1,000 mL irrigation   Irrigation ONCE    iopamidol (ISOVUE-370) 76 % injection 50 mL  50 mL IntraVENous RAD ONCE    lidocaine-EPINEPHrine (XYLOCAINE) 1 %-1:100,000 injection 400 mg  40 mL SubCUTAneous Multiple    aspirin chewable tablet 81 mg  81 mg Oral DAILY    atorvastatin (LIPITOR) tablet 80 mg  80 mg Oral QHS    benzonatate (TESSALON) capsule 100 mg  100 mg Oral TID PRN    clopidogrel (PLAVIX) tablet 75 mg  75 mg Oral DAILY    metoprolol tartrate (LOPRESSOR) tablet 25 mg  25 mg Oral BID    pyridostigmine (MESTINON) tablet 60 mg  60 mg Oral DAILY    dilTIAZem (CARDIZEM) 100 mg in 0.9% sodium chloride (MBP/ADV) 100 mL infusion  5-15 mg/hr IntraVENous TITRATE    nitroglycerin (NITROSTAT) tablet 0.4 mg  0.4 mg SubLINGual Q5MIN PRN    HYDROcodone-acetaminophen (NORCO) 5-325 mg per tablet 1 Tab  1 Tab Oral Q6H PRN    morphine injection 2 mg  2 mg IntraVENous Q4H PRN    acetaminophen (TYLENOL) tablet 650 mg  650 mg Oral Q4H PRN    heparin 12,500 units in 0.45% NaCl infusion  12-25 Units/kg/hr IntraVENous TITRATE       Review of Symptoms:  A comprehensive ROS was performed with the pertinent positives and negatives mentioned in the HPI, all other systems reviewed and are negative       Physical Exam  Vitals:    10/25/17 0730 10/25/17 0741 10/25/17 0800 10/25/17 0840   BP:  134/77  139/87   Pulse: 76 77 77 (!) 108   Resp:       Temp:       SpO2:       Weight:           Physical Exam:  General appearance - Alert, well appearing, and in no distress   Mental status - Affect appropriate to mood. Eyes - Sclerae anicteric,  ENMT - Hearing grossly normal bilaterally  Neck - Carotids upstroke normal bilaterally, no JVD. Resp - Clear to auscultation, no wheezes, rales or rhonchi, symmetric air entry. Heart - irreg irreg, bradycardic, distant S1, S2, no murmurs, rubs, clicks or gallops. GI - Soft, nontender, nondistended  Neurological - Grossly intact - normal speech, no focal findings  Musculoskeletal - No joint tenderness, deformity or swelling, no muscular tenderness noted. Extremities - Peripheral pulses normal, no pedal edema, no clubbing or cyanosis. Skin - Normal coloration and turgor. Psych -  oriented to person, place, and time.        Cardiographics    Telemetry: afib, periods of marked bradycardia and tachycardia  ECG (Indpendently visualized and interpreted): afib with RVR  Echocardiogram:     Labs:   Recent Labs      10/25/17   0115  10/24/17   1714   NA  142  142   K  3.4*  3.7   MG   --   2.2   BUN  12  11   CREA  0.86  0.88   GLU  136*  119*   WBC  12.4*  12.4*   HGB  14.6  15.8 HCT  42.5  44.8   PLT  181  195        Assessment:      Principal Problem:    Atrial fibrillation with RVR ([facility]) (10/24/2017)    Active Problems:    HTN (hypertension) (5/8/2015)      Coronary atherosclerosis of native coronary vessel (5/8/2015)      Overview: VEGAS on brilinta      5/7/15: prox LAD PCI, normal EF      SSS (sick sinus syndrome) ([facility] (6/30/2017)      Syncope (10/24/2017)      Atrial fibrillation with rapid ventricular response ([facility] (10/24/2017)       Plan:   1. Tachy trell syndrome, uncontrolled: Pt is a 76yo with uncontrolled afib with HRs ranging from 30s to 120s consistent with tachy trell syndrome and is symptomatic with syncope and VEGAS. I had a long discussion today with the patient regarding the risks, benefits, and alternatives of an implantable cardiac rhythm device. I discussed in detail the potential benefits of pacemaker therapy that are largely aimed at improving symptoms from symptomatic bradycardia. Additionally, I discussed with the patient the potential risks device implantation, including the risk of bleeding, infection, large blood vessel injury, lung or cardiac injury, venous occlusion, DVT/PE, pneumothorax, cardiac tamponade, perforation, need for urgent open heart surgery or additional procedures, device/lead failure, lead dislodgement, heart attack, stroke, arrhythmia, oversedation, respiratory arrest, and even death. After our comprehensive discussion, the patient would like to proceed with scheduling the procedure. Will plan for BiV PPM as patient will likely undergo AV node ablation. --BiV PPM implantation today    2. Atrial fibrillation with RVR, uncontrolled: will consider sotalol loading post procedure for an attempt at a rhythm control strategy, if this fails, patient will require AV node ablation. Will restart anticoagulation post procedure. 3. CAD: s/p PCI, no active chest pain, cont medical therapy    4.  Syncope: likely secondary to marked bradycardia    Thank you very much for this referral. We appreciate the opportunity to participate in this patient's care. We will follow along with above stated plan. Jonah Bernard MD, MS  Cardiology/Electrophysiology

## 2017-10-25 NOTE — ANESTHESIA PREPROCEDURE EVALUATION
Anesthetic History               Review of Systems / Medical History  Patient summary reviewed and pertinent labs reviewed    Pulmonary        Sleep apnea: CPAP  Shortness of breath         Neuro/Psych             Comments: Myasthenia gravis- well controlled Cardiovascular    Hypertension: well controlled        Dysrhythmias ([place]) : atrial fibrillation  CAD, cardiac stents (5/2015 ADRIANNA to LAD) and hyperlipidemia    Exercise tolerance: >4 METS  Comments: Increased fatigue  Nml EF in 5/2017  Denies CP with current condition  Rate controlled at this time  Rate from  during admission  Syncope x 2   GI/Hepatic/Renal     GERD: well controlled      PUD     Endo/Other        Obesity     Other Findings            Physical Exam    Airway  Mallampati: II  TM Distance: 4 - 6 cm  Neck ROM: normal range of motion   Mouth opening: Normal     Cardiovascular    Rhythm: regular  Rate: normal         Dental    Dentition: Poor dentition     Pulmonary  Breath sounds clear to auscultation               Abdominal  GI exam deferred       Other Findings            Anesthetic Plan    ASA: 3  Anesthesia type: total IV anesthesia          Induction: Intravenous  Anesthetic plan and risks discussed with: Patient

## 2017-10-26 ENCOUNTER — APPOINTMENT (OUTPATIENT)
Dept: GENERAL RADIOLOGY | Age: 77
DRG: 242 | End: 2017-10-26
Attending: INTERNAL MEDICINE
Payer: COMMERCIAL

## 2017-10-26 PROBLEM — J69.0 ASPIRATION PNEUMONIA (HCC): Status: ACTIVE | Noted: 2017-10-26

## 2017-10-26 PROBLEM — I95.9 HYPOTENSION: Status: ACTIVE | Noted: 2017-10-26

## 2017-10-26 PROBLEM — A41.9 SEPSIS (HCC): Status: ACTIVE | Noted: 2017-10-26

## 2017-10-26 LAB
ANION GAP SERPL CALC-SCNC: 6 MMOL/L (ref 7–16)
ANION GAP SERPL CALC-SCNC: 9 MMOL/L (ref 7–16)
ATRIAL RATE: 101 BPM
ATRIAL RATE: 98 BPM
BASOPHILS # BLD: 0 K/UL (ref 0–0.2)
BASOPHILS NFR BLD: 0 % (ref 0–2)
BUN SERPL-MCNC: 12 MG/DL (ref 8–23)
BUN SERPL-MCNC: 17 MG/DL (ref 8–23)
CALCIUM SERPL-MCNC: 7.5 MG/DL (ref 8.3–10.4)
CALCIUM SERPL-MCNC: 8.4 MG/DL (ref 8.3–10.4)
CALCULATED R AXIS, ECG10: -10 DEGREES
CALCULATED R AXIS, ECG10: -5 DEGREES
CALCULATED T AXIS, ECG11: 35 DEGREES
CALCULATED T AXIS, ECG11: 41 DEGREES
CHLORIDE SERPL-SCNC: 105 MMOL/L (ref 98–107)
CHLORIDE SERPL-SCNC: 105 MMOL/L (ref 98–107)
CO2 SERPL-SCNC: 26 MMOL/L (ref 21–32)
CO2 SERPL-SCNC: 28 MMOL/L (ref 21–32)
CREAT SERPL-MCNC: 0.97 MG/DL (ref 0.8–1.5)
CREAT SERPL-MCNC: 1.26 MG/DL (ref 0.8–1.5)
DIAGNOSIS, 93000: NORMAL
DIAGNOSIS, 93000: NORMAL
DIFFERENTIAL METHOD BLD: ABNORMAL
EOSINOPHIL # BLD: 0.1 K/UL (ref 0–0.8)
EOSINOPHIL NFR BLD: 0 % (ref 0.5–7.8)
ERYTHROCYTE [DISTWIDTH] IN BLOOD BY AUTOMATED COUNT: 13.8 % (ref 11.9–14.6)
ERYTHROCYTE [DISTWIDTH] IN BLOOD BY AUTOMATED COUNT: 14 % (ref 11.9–14.6)
GLUCOSE SERPL-MCNC: 115 MG/DL (ref 65–100)
GLUCOSE SERPL-MCNC: 142 MG/DL (ref 65–100)
HCT VFR BLD AUTO: 40.9 % (ref 41.1–50.3)
HCT VFR BLD AUTO: 43.6 % (ref 41.1–50.3)
HGB BLD-MCNC: 13.8 G/DL (ref 13.6–17.2)
HGB BLD-MCNC: 14.9 G/DL (ref 13.6–17.2)
IMM GRANULOCYTES # BLD: 0.1 K/UL (ref 0–0.5)
IMM GRANULOCYTES NFR BLD: 0 % (ref 0–5)
LACTATE SERPL-SCNC: 1.5 MMOL/L (ref 0.4–2)
LACTATE SERPL-SCNC: 2.2 MMOL/L (ref 0.4–2)
LYMPHOCYTES # BLD: 2.4 K/UL (ref 0.5–4.6)
LYMPHOCYTES NFR BLD: 13 % (ref 13–44)
MAGNESIUM SERPL-MCNC: 1.8 MG/DL (ref 1.8–2.4)
MCH RBC QN AUTO: 30.3 PG (ref 26.1–32.9)
MCH RBC QN AUTO: 30.5 PG (ref 26.1–32.9)
MCHC RBC AUTO-ENTMCNC: 33.7 G/DL (ref 31.4–35)
MCHC RBC AUTO-ENTMCNC: 34.2 G/DL (ref 31.4–35)
MCV RBC AUTO: 89.2 FL (ref 79.6–97.8)
MCV RBC AUTO: 89.9 FL (ref 79.6–97.8)
MONOCYTES # BLD: 1.9 K/UL (ref 0.1–1.3)
MONOCYTES NFR BLD: 10 % (ref 4–12)
NEUTS SEG # BLD: 14.1 K/UL (ref 1.7–8.2)
NEUTS SEG NFR BLD: 77 % (ref 43–78)
PLATELET # BLD AUTO: 173 K/UL (ref 150–450)
PLATELET # BLD AUTO: 182 K/UL (ref 150–450)
PMV BLD AUTO: 10 FL (ref 10.8–14.1)
PMV BLD AUTO: 9.8 FL (ref 10.8–14.1)
POTASSIUM SERPL-SCNC: 3.9 MMOL/L (ref 3.5–5.1)
POTASSIUM SERPL-SCNC: 3.9 MMOL/L (ref 3.5–5.1)
PROCALCITONIN SERPL-MCNC: 0.8 NG/ML
Q-T INTERVAL, ECG07: 368 MS
Q-T INTERVAL, ECG07: 414 MS
QRS DURATION, ECG06: 88 MS
QRS DURATION, ECG06: 96 MS
QTC CALCULATION (BEZET), ECG08: 457 MS
QTC CALCULATION (BEZET), ECG08: 468 MS
RBC # BLD AUTO: 4.55 M/UL (ref 4.23–5.67)
RBC # BLD AUTO: 4.89 M/UL (ref 4.23–5.67)
SODIUM SERPL-SCNC: 139 MMOL/L (ref 136–145)
SODIUM SERPL-SCNC: 140 MMOL/L (ref 136–145)
VENTRICULAR RATE, ECG03: 77 BPM
VENTRICULAR RATE, ECG03: 93 BPM
WBC # BLD AUTO: 18.5 K/UL (ref 4.3–11.1)
WBC # BLD AUTO: 21.1 K/UL (ref 4.3–11.1)

## 2017-10-26 PROCEDURE — 74011000258 HC RX REV CODE- 258: Performed by: INTERNAL MEDICINE

## 2017-10-26 PROCEDURE — 83735 ASSAY OF MAGNESIUM: CPT | Performed by: PHYSICIAN ASSISTANT

## 2017-10-26 PROCEDURE — 85027 COMPLETE CBC AUTOMATED: CPT | Performed by: PHYSICIAN ASSISTANT

## 2017-10-26 PROCEDURE — 36569 INSJ PICC 5 YR+ W/O IMAGING: CPT | Performed by: INTERNAL MEDICINE

## 2017-10-26 PROCEDURE — 74011250636 HC RX REV CODE- 250/636: Performed by: INTERNAL MEDICINE

## 2017-10-26 PROCEDURE — 80048 BASIC METABOLIC PNL TOTAL CA: CPT | Performed by: PHYSICIAN ASSISTANT

## 2017-10-26 PROCEDURE — 65660000000 HC RM CCU STEPDOWN

## 2017-10-26 PROCEDURE — 74011250637 HC RX REV CODE- 250/637: Performed by: PHYSICIAN ASSISTANT

## 2017-10-26 PROCEDURE — 74011250636 HC RX REV CODE- 250/636

## 2017-10-26 PROCEDURE — C1751 CATH, INF, PER/CENT/MIDLINE: HCPCS

## 2017-10-26 PROCEDURE — 80048 BASIC METABOLIC PNL TOTAL CA: CPT | Performed by: INTERNAL MEDICINE

## 2017-10-26 PROCEDURE — 93005 ELECTROCARDIOGRAM TRACING: CPT | Performed by: INTERNAL MEDICINE

## 2017-10-26 PROCEDURE — 71020 XR CHEST PA LAT: CPT

## 2017-10-26 PROCEDURE — 83605 ASSAY OF LACTIC ACID: CPT | Performed by: INTERNAL MEDICINE

## 2017-10-26 PROCEDURE — 99223 1ST HOSP IP/OBS HIGH 75: CPT | Performed by: INTERNAL MEDICINE

## 2017-10-26 PROCEDURE — 74011000258 HC RX REV CODE- 258: Performed by: NURSE PRACTITIONER

## 2017-10-26 PROCEDURE — 93308 TTE F-UP OR LMTD: CPT

## 2017-10-26 PROCEDURE — 74011250637 HC RX REV CODE- 250/637: Performed by: INTERNAL MEDICINE

## 2017-10-26 PROCEDURE — 71010 XR CHEST SNGL V: CPT

## 2017-10-26 PROCEDURE — 77030018719 HC DRSG PTCH ANTIMIC J&J -A

## 2017-10-26 PROCEDURE — 76937 US GUIDE VASCULAR ACCESS: CPT

## 2017-10-26 PROCEDURE — 77030018786 HC NDL GD F/USND BARD -B

## 2017-10-26 PROCEDURE — 85025 COMPLETE CBC W/AUTO DIFF WBC: CPT | Performed by: INTERNAL MEDICINE

## 2017-10-26 PROCEDURE — 87040 BLOOD CULTURE FOR BACTERIA: CPT | Performed by: INTERNAL MEDICINE

## 2017-10-26 PROCEDURE — 84145 PROCALCITONIN (PCT): CPT | Performed by: INTERNAL MEDICINE

## 2017-10-26 PROCEDURE — 74011000250 HC RX REV CODE- 250: Performed by: NURSE PRACTITIONER

## 2017-10-26 PROCEDURE — 36415 COLL VENOUS BLD VENIPUNCTURE: CPT | Performed by: PHYSICIAN ASSISTANT

## 2017-10-26 RX ORDER — SODIUM CHLORIDE 0.9 % (FLUSH) 0.9 %
20 SYRINGE (ML) INJECTION AS NEEDED
Status: DISCONTINUED | OUTPATIENT
Start: 2017-10-26 | End: 2017-10-30 | Stop reason: HOSPADM

## 2017-10-26 RX ORDER — HYDROMORPHONE HYDROCHLORIDE 1 MG/ML
INJECTION, SOLUTION INTRAMUSCULAR; INTRAVENOUS; SUBCUTANEOUS
Status: COMPLETED
Start: 2017-10-26 | End: 2017-10-26

## 2017-10-26 RX ORDER — SODIUM CHLORIDE 0.9 % (FLUSH) 0.9 %
20 SYRINGE (ML) INJECTION EVERY 8 HOURS
Status: DISCONTINUED | OUTPATIENT
Start: 2017-10-26 | End: 2017-10-30 | Stop reason: HOSPADM

## 2017-10-26 RX ORDER — VANCOMYCIN HYDROCHLORIDE
1250 EVERY 12 HOURS
Status: DISCONTINUED | OUTPATIENT
Start: 2017-10-27 | End: 2017-10-28

## 2017-10-26 RX ORDER — SOTALOL HYDROCHLORIDE 80 MG/1
160 TABLET ORAL EVERY 12 HOURS
Status: DISCONTINUED | OUTPATIENT
Start: 2017-10-26 | End: 2017-10-26

## 2017-10-26 RX ORDER — HEPARIN 100 UNIT/ML
600 SYRINGE INTRAVENOUS EVERY 8 HOURS
Status: DISCONTINUED | OUTPATIENT
Start: 2017-10-26 | End: 2017-10-30 | Stop reason: HOSPADM

## 2017-10-26 RX ORDER — SODIUM CHLORIDE 9 MG/ML
75 INJECTION, SOLUTION INTRAVENOUS CONTINUOUS
Status: DISPENSED | OUTPATIENT
Start: 2017-10-26 | End: 2017-10-26

## 2017-10-26 RX ORDER — VANCOMYCIN 1.75 GRAM/500 ML IN 0.9 % SODIUM CHLORIDE INTRAVENOUS
1750 ONCE
Status: COMPLETED | OUTPATIENT
Start: 2017-10-26 | End: 2017-10-27

## 2017-10-26 RX ORDER — HEPARIN 100 UNIT/ML
600 SYRINGE INTRAVENOUS AS NEEDED
Status: DISCONTINUED | OUTPATIENT
Start: 2017-10-26 | End: 2017-10-30 | Stop reason: HOSPADM

## 2017-10-26 RX ORDER — SODIUM CHLORIDE 9 MG/ML
250 INJECTION, SOLUTION INTRAVENOUS AS NEEDED
Status: DISPENSED | OUTPATIENT
Start: 2017-10-26 | End: 2017-10-29

## 2017-10-26 RX ORDER — CHLORHEXIDINE GLUCONATE 1.2 MG/ML
15 RINSE ORAL AS NEEDED
Status: DISCONTINUED | OUTPATIENT
Start: 2017-10-26 | End: 2017-10-30 | Stop reason: HOSPADM

## 2017-10-26 RX ORDER — HYDROMORPHONE HYDROCHLORIDE 1 MG/ML
0.5 INJECTION, SOLUTION INTRAMUSCULAR; INTRAVENOUS; SUBCUTANEOUS ONCE
Status: COMPLETED | OUTPATIENT
Start: 2017-10-26 | End: 2017-10-26

## 2017-10-26 RX ADMIN — Medication 10 ML: at 05:20

## 2017-10-26 RX ADMIN — CLOPIDOGREL BISULFATE 75 MG: 75 TABLET ORAL at 08:15

## 2017-10-26 RX ADMIN — ACETAMINOPHEN 650 MG: 325 TABLET ORAL at 15:46

## 2017-10-26 RX ADMIN — Medication 20 ML: at 20:09

## 2017-10-26 RX ADMIN — VANCOMYCIN HYDROCHLORIDE 1750 MG: 10 INJECTION, POWDER, LYOPHILIZED, FOR SOLUTION INTRAVENOUS at 12:26

## 2017-10-26 RX ADMIN — ACETAMINOPHEN 650 MG: 325 TABLET ORAL at 23:25

## 2017-10-26 RX ADMIN — Medication 10 ML: at 15:15

## 2017-10-26 RX ADMIN — SODIUM CHLORIDE, PRESERVATIVE FREE 600 UNITS: 5 INJECTION INTRAVENOUS at 20:09

## 2017-10-26 RX ADMIN — Medication 10 ML: at 20:18

## 2017-10-26 RX ADMIN — SODIUM CHLORIDE 250 ML: 900 INJECTION, SOLUTION INTRAVENOUS at 11:15

## 2017-10-26 RX ADMIN — CEFAZOLIN 2 G: 1 INJECTION, POWDER, FOR SOLUTION INTRAMUSCULAR; INTRAVENOUS; PARENTERAL at 05:20

## 2017-10-26 RX ADMIN — HYDROCODONE BITARTRATE AND ACETAMINOPHEN 1 TABLET: 5; 325 TABLET ORAL at 20:08

## 2017-10-26 RX ADMIN — HYDROMORPHONE HYDROCHLORIDE 0.5 MG: 1 INJECTION, SOLUTION INTRAMUSCULAR; INTRAVENOUS; SUBCUTANEOUS at 13:05

## 2017-10-26 RX ADMIN — PYRIDOSTIGMINE BROMIDE 60 MG: 60 TABLET ORAL at 08:15

## 2017-10-26 RX ADMIN — HYDROCODONE BITARTRATE AND ACETAMINOPHEN 1 TABLET: 5; 325 TABLET ORAL at 04:19

## 2017-10-26 RX ADMIN — ATORVASTATIN CALCIUM 80 MG: 40 TABLET, FILM COATED ORAL at 20:08

## 2017-10-26 RX ADMIN — SODIUM CHLORIDE 7.5 MG/HR: 900 INJECTION, SOLUTION INTRAVENOUS at 00:30

## 2017-10-26 RX ADMIN — SODIUM CHLORIDE 250 ML: 900 INJECTION, SOLUTION INTRAVENOUS at 20:10

## 2017-10-26 RX ADMIN — SOTALOL HYDROCHLORIDE 160 MG: 80 TABLET ORAL at 07:49

## 2017-10-26 RX ADMIN — PIPERACILLIN SODIUM,TAZOBACTAM SODIUM 3.38 G: 3; .375 INJECTION, POWDER, FOR SOLUTION INTRAVENOUS at 12:25

## 2017-10-26 RX ADMIN — PIPERACILLIN SODIUM,TAZOBACTAM SODIUM 3.38 G: 3; .375 INJECTION, POWDER, FOR SOLUTION INTRAVENOUS at 20:09

## 2017-10-26 RX ADMIN — Medication 20 ML: at 15:35

## 2017-10-26 RX ADMIN — CHLORHEXIDINE GLUCONATE 15 ML: 1.2 RINSE ORAL at 07:52

## 2017-10-26 RX ADMIN — ASPIRIN 81 MG CHEWABLE TABLET 81 MG: 81 TABLET CHEWABLE at 08:15

## 2017-10-26 RX ADMIN — SODIUM CHLORIDE 250 ML: 900 INJECTION, SOLUTION INTRAVENOUS at 11:30

## 2017-10-26 NOTE — PROGRESS NOTES
Urine output decreasing. 15 mL noted over past hour. BP upper 33'X low 61'B systolic. Dr. Briceno Half notified. Orders given to give 250 ml bolus.

## 2017-10-26 NOTE — ADT AUTH CERT NOTES
Pneumonia Due to Aspiration - Care Day 3 (10/26/2017) by Jyoti Schmid RN        Review Entered Review Status       10/26/2017 Completed       Details              Care Day: 3 Care Date: 10/26/2017 Level of Care: Telemetry       Guideline Day 1        Clinical Status       (X) * Clinical Indications met [D]              Routes       (X) IV fluids, medications       10/26/2017 2:30 PM EDT by Espinoza Banks         IV NS  75 cc/ hr                     Interventions       (X) CBC       (X) Aspiration precautions       (X) Head of bed at 30 degrees              Medications       (X) Possible IV antibiotics       10/26/2017 2:30 PM EDT by Espinoza Banks         Zosyn 3.375 GM IV q 8 hrs,   Vancomcyin 1750 mg IV x 1,  Vancomycin 1250 mg IV q 12 hrs                                          * Milestone                  Pneumonia Due to Aspiration - Clinical Indications for Admission to Inpatient Care by Jyoti Schmid RN        Review Entered Review Status       10/26/2017 Completed       Details              Clinical Indications for Admission to Inpatient Care       Most Recent : Espinoza Banks Most Recent Date: 10/26/2017 2:27 PM EDT       (X) Admission is indicated for 1 or more of the following  (1) (2) (3) (4) (5):          (X) Aspiration pneumonitis associated with acute event (eg, hematemesis, drug overdose) that requires           inpatient care                                   Notes:               10/26/2017 2:27 PM EDT by Espinoza Banks       Subject: Additional Clinical Information       PULMONARY CONSULT NOTE:77yoM who with PMH HTN, s. Fib, CAD s/p PCI, myasthenia gravis on mestinon who was admitted on 10/24 with atrial fibrillation with RVR who then developed symptomatic bradycardia while inpatient with HR in 30s. A PPM was placed on 10/25 for sinus node dysfunction and during the procedure the patient was suspected to have experienced aspiration event.  His CXR after the procedure revealed a ALEKS perihilar infiltrate. This morning at 1045 he developed hypotension with SBP and was bolused 500ml of normal saline. He was also ordered for vanco/zosyn. He has not experienced any fevers, but he has a new leukocytosis to 21K today.  Currently he complains of dyspnea which he rates as 7/10, but has no chest pain. He feels lightheaded and he has had a dry cough but no chills or rigors.     A limited TTE has been ordered to assess for any complication of PM implantation and stat labs to evaluate H/H are pending. VS: 97.4-73-   74/50Plan:   (Medical Decision Making)  --f/u lactic acid, H/H.   --Agree with vanco/zosyn. --likely needs ongoing IVF for sepsis due to aspiration pneumonia but respiratory status will need close monitoring. Will not exceed 2L IVF and would favor vasopressors after that point. Currently only requiring 2lpm of O2 and respiratory status is stable. --Will get PICC for better IV access (may need pressors once IVF complete). --f/u limited TTE--Full code.                                Atrial Fibrillation - Care Day 2 (10/25/2017) by Jose Harrell RN        Review Entered Review Status       10/26/2017 Completed       Details              Care Day: 2 Care Date: 10/25/2017 Level of Care: Telemetry       Guideline Day 2        Clinical Status       ( ) * Hemodynamic stability       ( ) * Sinus rhythm or acceptable ventricular rate       ( ) * No evidence of myocardial ischemia       ( ) * Anticoagulation regimen for next level of care established or not necessary       ( ) * Discharge plans and education understood              Activity       ( ) * Ambulatory              Routes       ( ) * Oral hydration, diet, and medications              Interventions       (X) Cardiac monitoring              Medications       (X) Possible medication for rhythm or rate control       10/26/2017 2:19 PM EDT by Hakan Limon IV drip              (X) Possible anticoagulation       10/26/2017 2:19 PM EDT by Marian Torres         Heparin IV drip                     10/26/2017 2:19 PM EDT by Marian Torres       Subject: Additional Clinical Information       1. Tachy trell syndrome, uncontrolled: Pt is a 76yo with uncontrolled afib with HRs ranging from 30s to 120s consistent with tachy trell syndrome and is symptomatic with syncope and VEGAS. I had a long discussion today with the patient regarding the risks, benefits, and alternatives of an implantable cardiac rhythm device.   I discussed in detail the potential benefits of pacemaker therapy that are largely aimed at improving symptoms from symptomatic bradycardia. Additionally, I discussed with the patient the potential risks device implantation, including the risk of bleeding, infection, large blood vessel injury, lung or cardiac injury, venous occlusion, DVT/PE, pneumothorax, cardiac tamponade, perforation, need for urgent open heart surgery or additional procedures, device/lead failure, lead dislodgement, heart attack, stroke, arrhythmia, oversedation, respiratory arrest, and even death.   After our comprehensive discussion, the patient would like to proceed with scheduling the procedure. Will plan for BiV PPM as patient will likely undergo AV node ablation.         --BiV PPM implantation today  2. Atrial fibrillation with RVR, uncontrolled: will consider sotalol loading post procedure for an attempt at a rhythm control strategy, if this fails, patient will require AV node ablation. Will restart anticoagulation post procedure.   3. CAD: s/p PCI, no active chest pain, cont medical therapy  4. Syncope: likely secondary to marked bradycardiaPre-Procedure Diagnosis1. Documented non-reversible symptomatic bradycardia due to sinus node dysfunction. 2. Atrial fibrillation with RVR3. Tachy trell syndrome4. Anticipated high degree of ventricular pacing (planned AV node ablation).  Procedure Performed1. Insertion of Biotronik biventricular permanent pacemaker. 2. Insertion of left ventricular lead at time of new system Implantation.                                   * Milestone

## 2017-10-26 NOTE — PROGRESS NOTES
Problem: Falls - Risk of  Goal: *Absence of Falls  Document Hang Fall Risk and appropriate interventions in the flowsheet. Outcome: Progressing Towards Goal  Fall Risk Interventions:            Medication Interventions: Bed/chair exit alarm, Evaluate medications/consider consulting pharmacy, Teach patient to arise slowly    Elimination Interventions: Bed/chair exit alarm, Call light in reach, Patient to call for help with toileting needs, Toileting schedule/hourly rounds, Urinal in reach    History of Falls Interventions: Bed/chair exit alarm, Door open when patient unattended, Evaluate medications/consider consulting pharmacy, Investigate reason for fall, Room close to nurse's station  AEB pt free from falls this shift. Problem: Patient Education: Go to Patient Education Activity  Goal: Patient/Family Education  Outcome: Progressing Towards Goal  AEB pt calls for assist.    Problem: Pressure Injury - Risk of  Goal: *Prevention of pressure ulcer  Outcome: Progressing Towards Goal  AEB no new signs of skin breakdown. Problem: Patient Education: Go to Patient Education Activity  Goal: Patient/Family Education  Outcome: Progressing Towards Goal  AEB pt repositions self frequently.

## 2017-10-26 NOTE — PROGRESS NOTES
Bedside report received from Kalli Manley RN. PM assessment complete. Denies any needs at this time.

## 2017-10-26 NOTE — PROGRESS NOTES
Dr. Shanika Lugo notified of lactic acid and procalcitonin results. No new orders given at this time.

## 2017-10-26 NOTE — PROGRESS NOTES
Bedside shift report given to Tio jolly RN (oncoming nurse) by Natasha Martinez RN (offgoing nurse). Bedside shift report included the following information: SBAR, Kardex, ED Summary, MAR, and Recent Results.

## 2017-10-26 NOTE — CONSULTS
CONSULT NOTE    Nathaniel Keller    10/26/2017    Date of Admission:  10/24/2017    The patient's chart is reviewed and the patient is discussed with the staff. Dr. Tabitha Davis has requested consultation for possible aspiration pneumonia and acute respiratory distress    HPI:     Nathaniel Keller is a 78yoM who with PMH HTN, s. Fib, CAD s/p PCI, myasthenia gravis on mestinon who was admitted on 10/24 with atrial fibrillation with RVR who then developed symptomatic bradycardia while inpatient with HR in 30s. A PPM was placed on 10/25 for sinus node dysfunction and during the procedure the patient was suspected to have experienced aspiration event. His CXR after the procedure revealed a ALEKS perihilar infiltrate. This morning at 1045 he developed hypotension with SBP and was bolused 500ml of normal saline. He was also ordered for vanco/zosyn. He has not experienced any fevers, but he has a new leukocytosis to 21K today. Currently he complains of dyspnea which he rates as 7/10, but has no chest pain. He feels lightheaded and he has had a dry cough but no chills or rigors. A limited TTE has been ordered to assess for any complication of PM implantation and stat labs to evaluate H/H are pending. REVIEW OF SYSTEMS:  CONSTITUTIONAL:  There is no history of fever, chills, night sweats  EYES:  Denies problems with eye pain, erythema, blurred vision, or visual field loss. ENTM:  Denies history of tinnitus, epistaxis, sore throat,   LYMPH:  Denies swollen glands. CARDIAC:  No chest pain, pressure, discomfort, palpitations, orthopnea, murmurs, or edema. GI:  No dysphagia, heartburn reflux, nausea/vomiting, diarrhea, abdominal pain, or bleeding. :Denies history of dysuria, hematuria, polyuria, or decreased urine output. MS:  No history of myalgias, arthralgias, bone pain, or muscle cramps. SKIN:  No history of rashes, jaundice, cyanosis, nodules, or ulcers.   ENDO:  Negative for heat or cold intolerance. No history of DM. PSYCH:  Negative for anxiety, depression, insomnia, hallucinations. NEURO:  There is no history of AMS, persistent headache, decreased level of consciousness, seizures, or motor or sensory deficits. Patient Active Problem List   Diagnosis Code    HTN (hypertension) I10    Coronary atherosclerosis of native coronary vessel I25.10    S/P coronary artery stent placement Z95.5    Dyspnea R06.00    Dyslipidemia E78.5    Hypokalemia E87.6    Mitral valve regurgitation I34.0    Myasthenia gravis ([facility] G70.00    Bilateral carotid artery disease ([facility]) I77.9    Paroxysmal atrial fibrillation ([facility]) I48.0    SSS (sick sinus syndrome) ([facility] I49.5    Atrial fibrillation with RVR ([facility]) I48.91    Syncope R55    Atrial fibrillation with rapid ventricular response ([facility]) I48.91    Sepsis ([facility]) A41.9    Aspiration pneumonia ([facility]) J69.0    Hypotension I95.9       Prior to Admission Medications   Prescriptions Last Dose Informant Patient Reported? Taking?   aspirin 81 mg chewable tablet   Yes No   Sig: Take 1 Tab by mouth daily. atorvastatin (LIPITOR) 80 mg tablet   No No   Sig: Take 1 Tab by mouth nightly. benzonatate (TESSALON) 100 mg capsule   Yes No   Sig: Take 100 mg by mouth three (3) times daily as needed for Cough. clopidogrel (PLAVIX) 75 mg tab   Yes No   Sig: Take  by mouth.   metoprolol tartrate (LOPRESSOR) 25 mg tablet   No No   Sig: Take 1 Tab by mouth two (2) times a day. nitroglycerin (NITROSTAT) 0.4 mg SL tablet   No No   Sig: Place 1 sl under the tongue q 5 min prn cp, max 3 sl in a 15-min time period. Call 911 if no relief after the 3rd sl. pyridostigmine (MESTINON) 60 mg tablet   Yes No   Sig: Take 60 mg by mouth daily.       Facility-Administered Medications: None       Past Medical History:   Diagnosis Date    Abnormal EKG 4/22/15    CAD (coronary artery disease) 5/8/2015    Carotid artery stenosis without cerebral infarction 6/7/2016 US 6/15: <50% bilat ICAs    Coronary atherosclerosis of native coronary vessel 5/8/2015    VEGAS on brilinta 5/7/15: prox LAD PCI, normal EF     Dyslipidemia 6/7/2016    Dyspnea 5/8/2015    Echo 6/15: EF 60%, mod MR, mod LVH, mild AI     ED (erectile dysfunction)     GERD (gastroesophageal reflux disease)     GERD (gastroesophageal reflux disease)     HTN (hypertension) 5/8/2015    Hypertension     Hypokalemia 6/7/2016    Hypokalemia     Mitral valve regurgitation 6/7/2016    Myasthenia gravis ([address] 6/17/15    Myasthenia gravis ([address]     Nocturia     Osteoporosis     PUD (peptic ulcer disease)     S/P coronary artery stent placement 5/8/2015    3.0x38 mm Xience ADRIANNA to pLAD 5/7/15     Sleep apnea     Syncope and collapse     Unspecified sleep apnea      Past Surgical History:   Procedure Laterality Date    HX APPENDECTOMY      HX COLONOSCOPY  2007    HX HEART CATHETERIZATION  5/7/2015    HX Rambo Cervantes/Xin for sleep apnea and reconstruction for extending jaw     Social History     Social History    Marital status:      Spouse name: N/A    Number of children: N/A    Years of education: N/A     Occupational History    Not on file.      Social History Main Topics    Smoking status: Never Smoker    Smokeless tobacco: Never Used    Alcohol use No    Drug use: No    Sexual activity: Not on file     Other Topics Concern    Not on file     Social History Narrative     Family History   Problem Relation Age of Onset    Cancer Mother      kidney    Cancer Father      stomach     No Known Allergies    Current Facility-Administered Medications   Medication Dose Route Frequency    chlorhexidine (PERIDEX) 0.12 % mouthwash 15 mL  15 mL Oral PRN    0.9% sodium chloride infusion 250 mL  250 mL IntraVENous PRN    piperacillin-tazobactam (ZOSYN) 3.375 g in 0.9% sodium chloride (MBP/ADV) 100 mL  3.375 g IntraVENous Q8H    vancomycin (VANCOCIN) 1750 mg in  ml infusion  1,750 mg IntraVENous ONCE    [START ON 10/27/2017] vancomycin (VANCOCIN) 1250 mg in  ml infusion  1,250 mg IntraVENous Q12H    0.9% sodium chloride infusion  50 mL/hr IntraVENous CONTINUOUS    lidocaine-EPINEPHrine (XYLOCAINE) 1 %-1:100,000 injection 400 mg  40 mL SubCUTAneous Multiple    sodium chloride (NS) flush 5-10 mL  5-10 mL IntraVENous Q8H    sodium chloride (NS) flush 5-10 mL  5-10 mL IntraVENous PRN    aspirin chewable tablet 81 mg  81 mg Oral DAILY    atorvastatin (LIPITOR) tablet 80 mg  80 mg Oral QHS    benzonatate (TESSALON) capsule 100 mg  100 mg Oral TID PRN    clopidogrel (PLAVIX) tablet 75 mg  75 mg Oral DAILY    pyridostigmine (MESTINON) tablet 60 mg  60 mg Oral DAILY    nitroglycerin (NITROSTAT) tablet 0.4 mg  0.4 mg SubLINGual Q5MIN PRN    HYDROcodone-acetaminophen (NORCO) 5-325 mg per tablet 1 Tab  1 Tab Oral Q6H PRN    morphine injection 2 mg  2 mg IntraVENous Q4H PRN    acetaminophen (TYLENOL) tablet 650 mg  650 mg Oral Q4H PRN         Objective:     Vitals:    10/26/17 1045 10/26/17 1050 10/26/17 1101 10/26/17 1211   BP: (!) 65/48 (!) 89/57 94/59    Pulse: 73 72 67    Resp:       Temp:   97.4 °F (36.3 °C)    SpO2:   95%    Weight:       Height:    5' 6\" (1.676 m)       PHYSICAL EXAM     Constitutional:  the patient is well developed and in no acute distress  EENMT:  Sclera clear, pupils equal, oral mucosa moist  Respiratory: coarse with crackles bilaterally  Cardiovascular:  RRR without M,G,R  Gastrointestinal: soft and non-tender; with positive bowel sounds. Musculoskeletal: warm without cyanosis. There is no lower leg edema.   Skin:  no jaundice or rashes, no wounds  Neurologic: no gross neuro deficits     Psychiatric:  alert and oriented x 3    CXR:    10/26    10/25 afternoon    10/24      Recent Labs      10/26/17   0354  10/25/17   0115  10/24/17   1714   WBC  21.1*  12.4*  12.4*   HGB  14.9  14.6  15.8   HCT  43.6  42.5  44.8   PLT 173  181  195     Recent Labs      10/26/17   0354  10/25/17   0115  10/24/17   1714   NA  140  142  142   K  3.9  3.4*  3.7   CL  105  107  108*   GLU  142*  136*  119*   CO2  26 26 26   BUN  12  12  11   CREA  0.97  0.86  0.88   MG  1.8   --   2.2   CA  8.4  8.7  9.0   TROIQ   --   0.05  0.05     No results for input(s): PH, PCO2, PO2, HCO3 in the last 72 hours. No results for input(s): LCAD, LAC in the last 72 hours. Assessment:  (Medical Decision Making)     Hospital Problems  Date Reviewed: 10/25/2017          Codes Class Noted POA    Sepsis [facility]) ICD-10-CM: A41.9  ICD-9-CM: 038.9, 995.91  10/26/2017 Unknown    Now getting vanco/zosyn. Aspiration pneumonia ([facility] ICD-10-CM: J69.0  ICD-9-CM: 507.0  10/26/2017 Unknown    Zosyn, Vanco.  Suspect left hilar aspiration pneumonia    Hypotension ICD-10-CM: I95.9  ICD-9-CM: 458.9  10/26/2017 Unknown    Improved s/p 500ml bolus of IVF. Will continue 75/hr cautiously and monitor lactic acid, BMP, H/H.    * (Principal)Atrial fibrillation with RVR (HCC) ICD-10-CM: I48.91  ICD-9-CM: 427.31  10/24/2017 Unknown        Syncope ICD-10-CM: R55  ICD-9-CM: 780.2  10/24/2017 Unknown        Atrial fibrillation with rapid ventricular response ([facility] ICD-10-CM: I48.91  ICD-9-CM: 427.31  10/24/2017 Unknown        SSS (sick sinus syndrome) ([facility] ICD-10-CM: I49.5  ICD-9-CM: 427.81  6/30/2017 Yes        HTN (hypertension) (Chronic) ICD-10-CM: I10  ICD-9-CM: 401.9  5/8/2015 Yes        Coronary atherosclerosis of native coronary vessel ICD-10-CM: I25.10  ICD-9-CM: 414.01  5/8/2015 Yes    Overview Signed 6/7/2016  8:35 AM by [address] on brilinta  5/7/15: prox LAD PCI, normal EF               Hypotension: s/p 500ml bolus. Will continue IVF cautiously at present while monitoring respiratory status. Favor pressors if hypotension continues after completion of 1.5-2L IVF    Plan:  (Medical Decision Making)     --f/u lactic acid, H/H. --Agree with vanco/zosyn.   --likely needs ongoing IVF for sepsis due to aspiration pneumonia but respiratory status will need close monitoring. Will not exceed 2L IVF and would favor vasopressors after that point. Currently only requiring 2lpm of O2 and respiratory status is stable. --Will get PICC for better IV access (may need pressors once IVF complete). --f/u limited TTE  --Full code. More than 50% of the time documented was spent in face-to-face contact with the patient and in the care of the patient on the floor/unit where the patient is located. Thank you very much for this referral.  We appreciate the opportunity to participate in this patient's care. Will follow along with above stated plan.     Omero Holland MD

## 2017-10-26 NOTE — PROGRESS NOTES
PICC Placement Note    PRE-PROCEDURE VERIFICATION  Correct Procedure: yes. Time out completed with assistant Elza Vicente RN and all persons present in agreement with time out. Correct Site:  yes  Temperature: Temp: 97.4 °F (36.3 °C), Temperature Source: Temp Source: Oral  Recent Labs      10/26/17   1305  10/26/17   1115   BUN  17   --    CREA  1.26   --    PLT   --   182   WBC   --   18.5*     Allergies: Review of patient's allergies indicates no known allergies. Education materials for [facility] given to patient or family. PROCEDURE DETAIL  A double lumen PICC line was started for vascular access. The following documentation is in addition to the PICC properties in the lines/airways flowsheet :  Lot #: [id number]  xylocaine used: yes  Mid-Arm Circumference: 33 (cm)  Internal Catheter Length: 43 (cm)  Internal Catheter Total Length: 43 (cm)  Vein Selection for PICC:right basilic  Central Line Bundle followed yes  Complication Related to Insertion: none  Both the insertion guidewire and sherlock guidewire were removed intact all ports have positive blood return and were flush well with normal saline. The placement was verified by Chest X-RAY. PICC is at the atrial caval junction.         Line is okay to use: yes    Aicha Naqvi RN

## 2017-10-26 NOTE — PROGRESS NOTES
[facility] CARDIOLOGY PROGRESS NOTE           10/26/2017 10:12 AM    Admit Date: 10/24/2017    Admit Diagnosis: afib ;Atrial fibrillation with rapid ventricular response (*      Subjective:   Appropriate post op device pocket pain, feels tired, did not rest well last night, overall does not feel well    Interval History: (History of pertinent interval events obtained from nursing staff)  Cardiac device placed yesterday, no events overnight, no immediate complications    ROS:  GEN:  No fever or chills  Cardiovascular:  As noted above  Pulmonary:  As noted above  Neuro:  No new focal motor or sensory loss      Objective:     Vitals:    10/26/17 0730 10/26/17 0745 10/26/17 0800 10/26/17 0815   BP:       Pulse: (!) 105 (!) 106 (!) 103 93   Resp:       Temp:       SpO2:       Weight:           Physical Exam:  General-Well Developed, Well Nourished, No Acute Distress, Alert & Oriented x 3, appropriate mood. CV- irreg irreg, tachycardic this AM, distant S1, S2, left infraclavicular pocket with dressing in place, no evidence of hematoma, redness, warmth or excessive drainage  Lung- coarse  Abd- soft, nontender, nondistended  Ext- no edema bilaterally.     Current Facility-Administered Medications   Medication Dose Route Frequency    sotalol (BETAPACE) tablet 160 mg  160 mg Oral Q12H    chlorhexidine (PERIDEX) 0.12 % mouthwash 15 mL  15 mL Oral PRN    lidocaine-EPINEPHrine (XYLOCAINE) 1 %-1:100,000 injection 400 mg  40 mL SubCUTAneous Multiple    sodium chloride (NS) flush 5-10 mL  5-10 mL IntraVENous Q8H    sodium chloride (NS) flush 5-10 mL  5-10 mL IntraVENous PRN    dilTIAZem (CARDIZEM) 100 mg in 0.9% sodium chloride (MBP/ADV) 100 mL infusion  0-15 mg/hr IntraVENous TITRATE    aspirin chewable tablet 81 mg  81 mg Oral DAILY    atorvastatin (LIPITOR) tablet 80 mg  80 mg Oral QHS    benzonatate (TESSALON) capsule 100 mg  100 mg Oral TID PRN    clopidogrel (PLAVIX) tablet 75 mg  75 mg Oral DAILY    pyridostigmine (MESTINON) tablet 60 mg  60 mg Oral DAILY    nitroglycerin (NITROSTAT) tablet 0.4 mg  0.4 mg SubLINGual Q5MIN PRN    HYDROcodone-acetaminophen (NORCO) 5-325 mg per tablet 1 Tab  1 Tab Oral Q6H PRN    morphine injection 2 mg  2 mg IntraVENous Q4H PRN    acetaminophen (TYLENOL) tablet 650 mg  650 mg Oral Q4H PRN     Data Review:   Recent Results (from the past 24 hour(s))   EKG, 12 LEAD, INITIAL    Collection Time: 10/25/17  6:44 PM   Result Value Ref Range    Ventricular Rate 118 BPM    Atrial Rate 156 BPM    QRS Duration 92 ms    Q-T Interval 356 ms    QTC Calculation (Bezet) 498 ms    Calculated R Axis -20 degrees    Calculated T Axis 69 degrees    Diagnosis       Atrial fibrillation with rapid ventricular response  Incomplete right bundle branch block  Abnormal ECG  When compared with ECG of 10-MAY-2017 13:03,  Atrial fibrillation has replaced Sinus rhythm  Vent.  rate has increased BY  55 BPM  Confirmed by ST YAMILETH CHOWDARY MD (), RIVKA BROUSSARD ([id number]) on 10/25/2017 57:21:74 PM     METABOLIC PANEL, BASIC    Collection Time: 10/26/17  3:54 AM   Result Value Ref Range    Sodium 140 136 - 145 mmol/L    Potassium 3.9 3.5 - 5.1 mmol/L    Chloride 105 98 - 107 mmol/L    CO2 26 21 - 32 mmol/L    Anion gap 9 7 - 16 mmol/L    Glucose 142 (H) 65 - 100 mg/dL    BUN 12 8 - 23 MG/DL    Creatinine 0.97 0.8 - 1.5 MG/DL    GFR est AA >60 >60 ml/min/1.73m2    GFR est non-AA >60 >60 ml/min/1.73m2    Calcium 8.4 8.3 - 10.4 MG/DL   CBC W/O DIFF    Collection Time: 10/26/17  3:54 AM   Result Value Ref Range    WBC 21.1 (H) 4.3 - 11.1 K/uL    RBC 4.89 4.23 - 5.67 M/uL    HGB 14.9 13.6 - 17.2 g/dL    HCT 43.6 41.1 - 50.3 %    MCV 89.2 79.6 - 97.8 FL    MCH 30.5 26.1 - 32.9 PG    MCHC 34.2 31.4 - 35.0 g/dL    RDW 13.8 11.9 - 14.6 %    PLATELET 999 377 - 005 K/uL    MPV 9.8 (L) 10.8 - 14.1 FL   MAGNESIUM    Collection Time: 10/26/17  3:54 AM   Result Value Ref Range    Magnesium 1.8 1.8 - 2.4 mg/dL   EKG, 12 LEAD, INITIAL    Collection Time: 10/26/17  7:25 AM   Result Value Ref Range    Ventricular Rate 93 BPM    Atrial Rate 98 BPM    QRS Duration 88 ms    Q-T Interval 368 ms    QTC Calculation (Bezet) 457 ms    Calculated R Axis -10 degrees    Calculated T Axis 41 degrees    Diagnosis       Atrial fibrillation  Abnormal ECG  When compared with ECG of 25-OCT-2017 18:44,  No significant change was found  Confirmed by [facility]  MD ()GORGE ([id number]) on 10/26/2017 9:34:11 AM     EKG, 12 LEAD, SUBSEQUENT    Collection Time: 10/26/17  9:59 AM   Result Value Ref Range    Ventricular Rate 77 BPM    Atrial Rate 101 BPM    QRS Duration 96 ms    Q-T Interval 414 ms    QTC Calculation (Bezet) 468 ms    Calculated R Axis -5 degrees    Calculated T Axis 35 degrees    Diagnosis       Atrial fibrillation  Abnormal ECG  When compared with ECG of 26-OCT-2017 07:25,  No significant change was found         EKG:  (EKG has been independently visualized by me with interpretation below)  Assessment:     Principal Problem:    Atrial fibrillation with RVR ([address] (10/24/2017)    Active Problems:    HTN (hypertension) (5/8/2015)      Coronary atherosclerosis of native coronary vessel (5/8/2015)      Overview: [facility] on [facility]      5/7/15: prox LAD PCI, normal EF      SSS (sick sinus syndrome) ([address] (6/30/2017)      Syncope (10/24/2017)      Atrial fibrillation with rapid ventricular response ([address] (10/24/2017)      Plan:   1. BiV PPM: deviced placed yesterday, multiple different LV branches were tested with very poor LV capture thresholds, placed in lateral branch with LV threshold of 2.4 mV, however, there has been an increase in LV threshold and new diaphragmatic stimulation suggesting movement of the LV lead. Pt was restless overnight per staff. Will check PA/Lat CXR today. RA and RV with excellent thresholds and sensing parameters, CXR without pneumothorax. Will check limited TTE.   2. Hypotension, new problem: Concerned this AM for infectious process, patient WBCs increased to 21,000 from chronic baseline of around 11-12,000. Pt has initial rough MAC anesthesia course prior to device implantation yesterday, had secretions and brief desaturation and am concerned at this point he may have aspirated prior to getting intubated by anesthesia prior to the start of his device implant. Will check PA/Lat CXR, blood cultures, and will start empiric antibiotics with vanc and zosyn. IVF bolus this AM, cont supportive care and follow up closely today. 3. Afib, uncontrolled: Pt with NSR just a few months ago, feel he would benefit from a rhythm control strategy, however, is currently not feeling well, hypotensive, and am concerned for aspiration. Will move forward with rate control strategy at this time to get him through acute illness. May consider sotalol loading as an outpatient. 4. CAD: stable, cont current medical therapy  5. Tachy trell, SSS: now s/p PPM  6. PPX: SCDs, will transition to oral anticoagulation pending clinical course    Karen Bernard MD  Cardiology/Electrophysiology

## 2017-10-26 NOTE — PROGRESS NOTES
Pharmacokinetic Consult to Pharmacist    Kacy Wesley is a 65 y.o. male being treated for HAP with vancomycin and zosyn. Height: 5' 6\" (167.6 cm)  Weight: 94.3 kg (207 lb 12.8 oz)  Lab Results   Component Value Date/Time    BUN 12 10/26/2017 03:54 AM    Creatinine 0.97 10/26/2017 03:54 AM    WBC 21.1 10/26/2017 03:54 AM      Estimated Creatinine Clearance: 67.5 mL/min (based on Cr of 0.97). CULTURES:  pending    Day 1 of vancomycin. Goal trough is 15-20. Vancomycin dose initiated at 1750 mg x 1, then 1250 mg q 12 hours. Will continue to follow patient.       Thank you,  Cami Muñoz, PharmD  Clinical Pharmacist  [phone]

## 2017-10-26 NOTE — PROGRESS NOTES
Bedside shift report received from Kaity Simmons, [facility].     Aminata Mcelroy RN

## 2017-10-26 NOTE — PROGRESS NOTES
Spoke with ABBY Mckee. Updated on pt status. HR 130s-150s. Afib. /68. New orders received for cardizem bolus, cardizem gtt, and NPO after midnight.     Nayely Mayorga RN

## 2017-10-26 NOTE — PROGRESS NOTES
Problem: Falls - Risk of  Goal: *Absence of Falls  Document Hang Fall Risk and appropriate interventions in the flowsheet.    Outcome: Progressing Towards Goal  Fall Risk Interventions:            Medication Interventions: Bed/chair exit alarm, Evaluate medications/consider consulting pharmacy, Teach patient to arise slowly    Elimination Interventions: Bed/chair exit alarm, Call light in reach, Patient to call for help with toileting needs, Toileting schedule/hourly rounds, Urinal in reach    History of Falls Interventions: Bed/chair exit alarm, Door open when patient unattended, Evaluate medications/consider consulting pharmacy, Investigate reason for fall, Room close to nurse's station

## 2017-10-27 PROBLEM — K21.9 GERD (GASTROESOPHAGEAL REFLUX DISEASE): Chronic | Status: ACTIVE | Noted: 2017-10-27

## 2017-10-27 LAB
ANION GAP SERPL CALC-SCNC: 8 MMOL/L (ref 7–16)
BUN SERPL-MCNC: 11 MG/DL (ref 8–23)
CALCIUM SERPL-MCNC: 7.6 MG/DL (ref 8.3–10.4)
CHLORIDE SERPL-SCNC: 110 MMOL/L (ref 98–107)
CO2 SERPL-SCNC: 25 MMOL/L (ref 21–32)
CREAT SERPL-MCNC: 0.74 MG/DL (ref 0.8–1.5)
ERYTHROCYTE [DISTWIDTH] IN BLOOD BY AUTOMATED COUNT: 14.1 % (ref 11.9–14.6)
GLUCOSE SERPL-MCNC: 99 MG/DL (ref 65–100)
HCT VFR BLD AUTO: 36.5 % (ref 41.1–50.3)
HGB BLD-MCNC: 12.1 G/DL (ref 13.6–17.2)
MCH RBC QN AUTO: 30 PG (ref 26.1–32.9)
MCHC RBC AUTO-ENTMCNC: 33.2 G/DL (ref 31.4–35)
MCV RBC AUTO: 90.6 FL (ref 79.6–97.8)
PLATELET # BLD AUTO: 135 K/UL (ref 150–450)
PMV BLD AUTO: 9.9 FL (ref 10.8–14.1)
POTASSIUM SERPL-SCNC: 3.4 MMOL/L (ref 3.5–5.1)
RBC # BLD AUTO: 4.03 M/UL (ref 4.23–5.67)
SODIUM SERPL-SCNC: 143 MMOL/L (ref 136–145)
WBC # BLD AUTO: 11 K/UL (ref 4.3–11.1)

## 2017-10-27 PROCEDURE — 74011250636 HC RX REV CODE- 250/636: Performed by: INTERNAL MEDICINE

## 2017-10-27 PROCEDURE — 74011250637 HC RX REV CODE- 250/637: Performed by: PHYSICIAN ASSISTANT

## 2017-10-27 PROCEDURE — 80048 BASIC METABOLIC PNL TOTAL CA: CPT | Performed by: PHYSICIAN ASSISTANT

## 2017-10-27 PROCEDURE — 74011250637 HC RX REV CODE- 250/637: Performed by: NURSE PRACTITIONER

## 2017-10-27 PROCEDURE — 99232 SBSQ HOSP IP/OBS MODERATE 35: CPT | Performed by: INTERNAL MEDICINE

## 2017-10-27 PROCEDURE — 85027 COMPLETE CBC AUTOMATED: CPT | Performed by: PHYSICIAN ASSISTANT

## 2017-10-27 PROCEDURE — 74011000258 HC RX REV CODE- 258: Performed by: INTERNAL MEDICINE

## 2017-10-27 PROCEDURE — 65660000000 HC RM CCU STEPDOWN

## 2017-10-27 PROCEDURE — 36592 COLLECT BLOOD FROM PICC: CPT

## 2017-10-27 PROCEDURE — 74011000250 HC RX REV CODE- 250: Performed by: PHYSICIAN ASSISTANT

## 2017-10-27 PROCEDURE — 74011250637 HC RX REV CODE- 250/637: Performed by: INTERNAL MEDICINE

## 2017-10-27 RX ORDER — BISACODYL 5 MG
5 TABLET, DELAYED RELEASE (ENTERIC COATED) ORAL DAILY PRN
Status: DISCONTINUED | OUTPATIENT
Start: 2017-10-27 | End: 2017-10-30 | Stop reason: HOSPADM

## 2017-10-27 RX ORDER — POTASSIUM CHLORIDE 20 MEQ/1
40 TABLET, EXTENDED RELEASE ORAL
Status: COMPLETED | OUTPATIENT
Start: 2017-10-27 | End: 2017-10-27

## 2017-10-27 RX ORDER — METOPROLOL TARTRATE 5 MG/5ML
5 INJECTION INTRAVENOUS ONCE
Status: COMPLETED | OUTPATIENT
Start: 2017-10-27 | End: 2017-10-27

## 2017-10-27 RX ORDER — SIMETHICONE 80 MG
80 TABLET,CHEWABLE ORAL
Status: DISCONTINUED | OUTPATIENT
Start: 2017-10-27 | End: 2017-10-30 | Stop reason: HOSPADM

## 2017-10-27 RX ORDER — FAMOTIDINE 20 MG/1
20 TABLET, FILM COATED ORAL EVERY 12 HOURS
Status: DISCONTINUED | OUTPATIENT
Start: 2017-10-27 | End: 2017-10-30 | Stop reason: HOSPADM

## 2017-10-27 RX ORDER — METOPROLOL TARTRATE 25 MG/1
25 TABLET, FILM COATED ORAL EVERY 6 HOURS
Status: DISCONTINUED | OUTPATIENT
Start: 2017-10-27 | End: 2017-10-29

## 2017-10-27 RX ADMIN — CLOPIDOGREL BISULFATE 75 MG: 75 TABLET ORAL at 09:08

## 2017-10-27 RX ADMIN — SODIUM CHLORIDE, PRESERVATIVE FREE 600 UNITS: 5 INJECTION INTRAVENOUS at 22:45

## 2017-10-27 RX ADMIN — PIPERACILLIN SODIUM,TAZOBACTAM SODIUM 3.38 G: 3; .375 INJECTION, POWDER, FOR SOLUTION INTRAVENOUS at 04:49

## 2017-10-27 RX ADMIN — ASPIRIN 81 MG CHEWABLE TABLET 81 MG: 81 TABLET CHEWABLE at 09:08

## 2017-10-27 RX ADMIN — APIXABAN 5 MG: 5 TABLET, FILM COATED ORAL at 20:17

## 2017-10-27 RX ADMIN — Medication 20 ML: at 20:18

## 2017-10-27 RX ADMIN — VANCOMYCIN HYDROCHLORIDE 1250 MG: 10 INJECTION, POWDER, LYOPHILIZED, FOR SOLUTION INTRAVENOUS at 00:46

## 2017-10-27 RX ADMIN — SIMETHICONE CHEW TAB 80 MG 80 MG: 80 TABLET ORAL at 13:31

## 2017-10-27 RX ADMIN — Medication 10 ML: at 13:38

## 2017-10-27 RX ADMIN — SODIUM CHLORIDE, PRESERVATIVE FREE 600 UNITS: 5 INJECTION INTRAVENOUS at 04:49

## 2017-10-27 RX ADMIN — PIPERACILLIN SODIUM,TAZOBACTAM SODIUM 3.38 G: 3; .375 INJECTION, POWDER, FOR SOLUTION INTRAVENOUS at 13:30

## 2017-10-27 RX ADMIN — METOPROLOL TARTRATE 25 MG: 25 TABLET ORAL at 20:17

## 2017-10-27 RX ADMIN — POTASSIUM CHLORIDE 40 MEQ: 20 TABLET, EXTENDED RELEASE ORAL at 09:08

## 2017-10-27 RX ADMIN — FAMOTIDINE 20 MG: 20 TABLET, FILM COATED ORAL at 13:31

## 2017-10-27 RX ADMIN — ACETAMINOPHEN 650 MG: 325 TABLET ORAL at 23:25

## 2017-10-27 RX ADMIN — PIPERACILLIN SODIUM,TAZOBACTAM SODIUM 3.38 G: 3; .375 INJECTION, POWDER, FOR SOLUTION INTRAVENOUS at 20:18

## 2017-10-27 RX ADMIN — Medication 20 ML: at 13:37

## 2017-10-27 RX ADMIN — FAMOTIDINE 20 MG: 20 TABLET, FILM COATED ORAL at 20:17

## 2017-10-27 RX ADMIN — ATORVASTATIN CALCIUM 80 MG: 40 TABLET, FILM COATED ORAL at 20:17

## 2017-10-27 RX ADMIN — SODIUM CHLORIDE, PRESERVATIVE FREE 600 UNITS: 5 INJECTION INTRAVENOUS at 13:37

## 2017-10-27 RX ADMIN — PYRIDOSTIGMINE BROMIDE 60 MG: 60 TABLET ORAL at 09:08

## 2017-10-27 RX ADMIN — BISACODYL 5 MG: 5 TABLET, COATED ORAL at 13:38

## 2017-10-27 RX ADMIN — METOPROLOL TARTRATE 25 MG: 25 TABLET ORAL at 17:52

## 2017-10-27 RX ADMIN — Medication 10 ML: at 20:19

## 2017-10-27 RX ADMIN — Medication 20 ML: at 04:49

## 2017-10-27 RX ADMIN — VANCOMYCIN HYDROCHLORIDE 1250 MG: 10 INJECTION, POWDER, LYOPHILIZED, FOR SOLUTION INTRAVENOUS at 13:39

## 2017-10-27 RX ADMIN — METOROPROLOL TARTRATE 5 MG: 5 INJECTION, SOLUTION INTRAVENOUS at 18:29

## 2017-10-27 NOTE — PROGRESS NOTES
10/27/2017 12:43 PM    Admit Date: 10/24/2017    Admit Diagnosis: afib ;Atrial fibrillation with rapid ventricular response (*      Subjective:   No cp- still sob      Objective:      Visit Vitals    /80    Pulse 94    Temp 98.6 °F (37 °C)    Resp 22    Ht 5' 6\" (1.676 m)    Wt 97.3 kg (214 lb 8 oz)    SpO2 91%    BMI 34.62 kg/m2       Physical Exam:  General-Well Developed, Well Nourished, No Acute Distress, Alert & Oriented x 3, appropriate mood. Neck- supple, no JVD  CV- irregular rate and rhythm no MRG  Lung- clear bilaterally  Abd- soft, nontender, nondistended  Ext- no edema bilaterally. Skin- warm and dry        Data Review:   Recent Labs      10/27/17   0504   NA  143   K  3.4*   BUN  11   CREA  0.74*   WBC  11.0   HGB  12.1*   HCT  36.5*   PLT  135*       Assessment/Plan:     Principal Problem:    Atrial fibrillation with RVR ([facility] (10/24/2017)rate ok- monitor- will discuss anti-coagulation with Dr Gin Norris- ? restart eliquis    Active Problems:    HTN (hypertension) (5/8/2015)Stable. Continue current medical therapy. Coronary atherosclerosis of native coronary vessel (5/8/2015)      Overview: VEGAS on brilinta      5/7/15: prox LAD PCI, normal EF      SSS (sick sinus syndrome) ([facility] (6/30/2017)      Syncope (10/24/2017)      Sepsis ([facility] (10/26/2017)      Aspiration pneumonia ([facility] (10/26/2017) on abx- per pulm      Hypotension (10/26/2017)Stable. Continue current medical therapy.       GERD (gastroesophageal reflux disease) (10/27/2017)

## 2017-10-27 NOTE — PROGRESS NOTES
[facility]  Admission Date: 10/24/2017             Daily Progress Note: 10/27/2017    The patient's chart is reviewed and the patient is discussed with the staff. 66 yo male with a history of GERD, HTN, s. Fib, CAD s/p PCI, myasthenia gravis on mestinon who was admitted on 10/24 with atrial fibrillation with RVR who then developed symptomatic bradycardia while inpatient with HR in 30s. A PPM was placed on 10/25 for sinus node dysfunction and during the procedure the patient was suspected to have experienced aspiration event. His CXR after the procedure revealed a ALEKS perihilar infiltrate. He then developed hypotension and was bolused 500ml of normal saline with ongoing marginal BPs now improved. He was also ordered for vanco/zosyn. He has not experienced any fevers, but had a new leukocytosis to 21K. Currently he complains of dyspnea which he rates as 7/10, but has no chest pain. He feels lightheaded and he has had a dry cough but no chills or rigors. A limited TTE has been ordered to assess for any complication of PM implantation and stat labs to evaluate H/H are pending    Subjective:     Remains afebrile. Currently on RA with O2 sat 94%. Minimal cough, denies mucus production. Soreness at [facility] insertion site.   C/O abd discomfort this am.      Current Facility-Administered Medications   Medication Dose Route Frequency    chlorhexidine (PERIDEX) 0.12 % mouthwash 15 mL  15 mL Oral PRN    0.9% sodium chloride infusion 250 mL  250 mL IntraVENous PRN    piperacillin-tazobactam (ZOSYN) 3.375 g in 0.9% sodium chloride (MBP/ADV) 100 mL  3.375 g IntraVENous Q8H    vancomycin (VANCOCIN) 1250 mg in  ml infusion  1,250 mg IntraVENous Q12H    sodium chloride (NS) flush 20 mL  20 mL InterCATHeter Q8H    heparin (porcine) pf 600 Units  600 Units InterCATHeter Q8H    sodium chloride (NS) flush 20 mL  20 mL InterCATHeter PRN    heparin (porcine) pf 600 Units  600 Units InterCATHeter PRN    lidocaine-EPINEPHrine (XYLOCAINE) 1 %-1:100,000 injection 400 mg  40 mL SubCUTAneous Multiple    sodium chloride (NS) flush 5-10 mL  5-10 mL IntraVENous Q8H    sodium chloride (NS) flush 5-10 mL  5-10 mL IntraVENous PRN    aspirin chewable tablet 81 mg  81 mg Oral DAILY    atorvastatin (LIPITOR) tablet 80 mg  80 mg Oral QHS    benzonatate (TESSALON) capsule 100 mg  100 mg Oral TID PRN    clopidogrel (PLAVIX) tablet 75 mg  75 mg Oral DAILY    pyridostigmine (MESTINON) tablet 60 mg  60 mg Oral DAILY    nitroglycerin (NITROSTAT) tablet 0.4 mg  0.4 mg SubLINGual Q5MIN PRN    HYDROcodone-acetaminophen (NORCO) 5-325 mg per tablet 1 Tab  1 Tab Oral Q6H PRN    morphine injection 2 mg  2 mg IntraVENous Q4H PRN    acetaminophen (TYLENOL) tablet 650 mg  650 mg Oral Q4H PRN       Review of Systems  Constitutional: negative for fever, chills, sweats  Cardiovascular: negative for chest pain, palpitations, syncope, edema  Gastrointestinal:  negative for dysphagia, reflux, vomiting, diarrhea, abdominal pain, or melena  Neurologic:  negative for focal weakness, numbness, headache    Objective:     Vitals:    10/27/17 0407 10/27/17 0437 10/27/17 0448 10/27/17 0708   BP: 124/77 129/67  131/78   Pulse: 87 83  99   Resp:    22   Temp:    98.8 °F (37.1 °C)   SpO2: (!) 89% 91%  95%   Weight:   214 lb 8 oz (97.3 kg)    Height:         Intake and Output:   10/25 1901 - 10/27 0700  In: 1560 [P.O.:1310; I.V.:250]  Out: 1790 [YWGGI:[id number]]  10/27 0701 - 10/27 1900  In: 120 [P.O.:120]  Out: -     Physical Exam:   Constitution:  the patient is well developed and in no acute distress  EENMT:  Sclera clear, pupils equal, oral mucosa moist  Respiratory: clear to auscultation bilaterally, RA  Cardiovascular:  iRRR without M,G,R  Gastrointestinal: soft and non-tender; with positive bowel sounds. Musculoskeletal: warm without cyanosis. There is no lower leg edema.   Skin:  no jaundice or rashes, L chest PPM site Neurologic: no gross neuro deficits     Psychiatric:  alert and oriented x 3    CXR:   10/26      LAB   Recent Labs      10/27/17   0504  10/26/17   1115  10/26/17   0354  10/25/17   0115   WBC  11.0  18.5*  21.1*  12.4*   HGB  12.1*  13.8  14.9  14.6   HCT  36.5*  40.9*  43.6  42.5   PLT  135*  182  173  181     Recent Labs      10/27/17   0504  10/26/17   1305  10/26/17   0354  10/25/17   0115  10/24/17   1714   NA  143  139  140  142  142   K  3.4*  3.9  3.9  3.4*  3.7   CL  110*  105  105  107  108*   CO2  25 28 26 26 26   GLU  99  115*  142*  136*  119*   BUN  11  17  12  12  11   CREA  0.74*  1.26  0.97  0.86  0.88   MG   --    --   1.8   --   2.2   CA  7.6*  7.5*  8.4  8.7  9.0   TROIQ   --    --    --   0.05  0.05     No results for input(s): PH, PCO2, PO2, HCO3 in the last 72 hours.   Recent Labs      10/26/17   1703  10/26/17   1305   LAC  1.5  2.2*         Assessment:  (Medical Decision Making)     Hospital Problems  Date Reviewed: 10/25/2017          Codes Class Noted POA    Sepsis ([facility] ICD-10-CM: A41.9  ICD-9-CM: 038.9, 995.91  10/26/2017 Unknown    From aspiration      Aspiration pneumonia ([facility] ICD-10-CM: J69.0  ICD-9-CM: 507.0  10/26/2017 Unknown    On ABX  WBC steadily improving 21.1 >>>18.5 >>>11.0      Hypotension ICD-10-CM: I95.9  ICD-9-CM: 458.9  10/26/2017 Unknown        * (Principal)Atrial fibrillation with RVR ([facility] ICD-10-CM: I48.91  ICD-9-CM: 427.31  10/24/2017 Unknown    HR in the 80-100s      Syncope ICD-10-CM: R55  ICD-9-CM: 780.2  10/24/2017 Unknown        SSS (sick sinus syndrome) ([facility] ICD-10-CM: I49.5  ICD-9-CM: 427.81  6/30/2017 Yes    S/P PPM placement      HTN (hypertension) (Chronic) ICD-10-CM: I10  ICD-9-CM: 401.9  5/8/2015 Yes        Coronary atherosclerosis of native coronary vessel ICD-10-CM: I25.10  ICD-9-CM: 414.01  5/8/2015 Yes     VEGAS on brilinta  5/7/15: prox LAD PCI, normal EF            Plan:  (Medical Decision Making)     --Zosyn / Vanc 2   PCT 0.8   LA 2.2 >>>1.5   BC pending  --PICC line placed 10/25  --limited TTE - no pericardial effusion  --supplement K+    More than 50% of the time documented was spent in face-to-face contact with the patient and in the care of the patient on the floor/unit where the patient is located. Bright Bray NP          Lungs:  Clear   Heart:  RRR with no Murmur/Rubs/Gallops    Additional Comments:  Continue ABX,. Will deescalate in AM if stable ,recheck CXR    I have spoken with and examined the patient. I agree with the above assessment and plan as documented.     Svetlana Dejesus MD

## 2017-10-27 NOTE — PROGRESS NOTES
Bedside and Verbal shift change report given to Araceli Babb RN (oncoming nurse) by Alex Bloom RN (offgoing nurse).

## 2017-10-27 NOTE — PROGRESS NOTES
Patient requested to ambulate in acuña. Assisted patient to ambulate in acuña on room air, gait steady. Patient stable. Will continue to monitor.

## 2017-10-27 NOTE — PROGRESS NOTES
Problem: Falls - Risk of  Goal: *Absence of Falls  Document Hang Fall Risk and appropriate interventions in the flowsheet.   Outcome: Progressing Towards Goal  Fall Risk Interventions:  Mobility Interventions: Bed/chair exit alarm         Medication Interventions: Patient to call before getting OOB, Teach patient to arise slowly    Elimination Interventions: Patient to call for help with toileting needs, Toilet paper/wipes in reach, Toileting schedule/hourly rounds, Urinal in reach, Elevated toilet seat, Call light in reach    History of Falls Interventions: Door open when patient unattended, Room close to nurse's station

## 2017-10-27 NOTE — PROGRESS NOTES
Called Rhoda with [address] Cardiology due to patient's -150, /104, patient still in afib. New orders received. Give 5mg IV lopressor. Will continue to monitor patient.

## 2017-10-28 ENCOUNTER — APPOINTMENT (OUTPATIENT)
Dept: GENERAL RADIOLOGY | Age: 77
DRG: 242 | End: 2017-10-28
Attending: INTERNAL MEDICINE
Payer: COMMERCIAL

## 2017-10-28 LAB — VANCOMYCIN TROUGH SERPL-MCNC: 11.2 UG/ML (ref 5–20)

## 2017-10-28 PROCEDURE — 97161 PT EVAL LOW COMPLEX 20 MIN: CPT

## 2017-10-28 PROCEDURE — 65660000000 HC RM CCU STEPDOWN

## 2017-10-28 PROCEDURE — 71010 XR CHEST SNGL V: CPT

## 2017-10-28 PROCEDURE — 36592 COLLECT BLOOD FROM PICC: CPT

## 2017-10-28 PROCEDURE — 74011000258 HC RX REV CODE- 258: Performed by: INTERNAL MEDICINE

## 2017-10-28 PROCEDURE — 74011250637 HC RX REV CODE- 250/637: Performed by: INTERNAL MEDICINE

## 2017-10-28 PROCEDURE — 80202 ASSAY OF VANCOMYCIN: CPT | Performed by: INTERNAL MEDICINE

## 2017-10-28 PROCEDURE — 74011250636 HC RX REV CODE- 250/636: Performed by: INTERNAL MEDICINE

## 2017-10-28 PROCEDURE — 99232 SBSQ HOSP IP/OBS MODERATE 35: CPT | Performed by: INTERNAL MEDICINE

## 2017-10-28 PROCEDURE — 74011250637 HC RX REV CODE- 250/637: Performed by: PHYSICIAN ASSISTANT

## 2017-10-28 RX ADMIN — FAMOTIDINE 20 MG: 20 TABLET, FILM COATED ORAL at 21:09

## 2017-10-28 RX ADMIN — Medication 10 ML: at 21:12

## 2017-10-28 RX ADMIN — HYDROCODONE BITARTRATE AND ACETAMINOPHEN 1 TABLET: 5; 325 TABLET ORAL at 21:09

## 2017-10-28 RX ADMIN — PIPERACILLIN SODIUM,TAZOBACTAM SODIUM 3.38 G: 3; .375 INJECTION, POWDER, FOR SOLUTION INTRAVENOUS at 14:00

## 2017-10-28 RX ADMIN — Medication 10 ML: at 21:25

## 2017-10-28 RX ADMIN — APIXABAN 5 MG: 5 TABLET, FILM COATED ORAL at 08:44

## 2017-10-28 RX ADMIN — PYRIDOSTIGMINE BROMIDE 60 MG: 60 TABLET ORAL at 08:44

## 2017-10-28 RX ADMIN — ATORVASTATIN CALCIUM 80 MG: 40 TABLET, FILM COATED ORAL at 21:09

## 2017-10-28 RX ADMIN — Medication 20 ML: at 22:00

## 2017-10-28 RX ADMIN — HYDROCODONE BITARTRATE AND ACETAMINOPHEN 1 TABLET: 5; 325 TABLET ORAL at 17:35

## 2017-10-28 RX ADMIN — Medication 10 ML: at 14:07

## 2017-10-28 RX ADMIN — SODIUM CHLORIDE, PRESERVATIVE FREE 600 UNITS: 5 INJECTION INTRAVENOUS at 21:10

## 2017-10-28 RX ADMIN — METOPROLOL TARTRATE 25 MG: 25 TABLET ORAL at 21:10

## 2017-10-28 RX ADMIN — METOPROLOL TARTRATE 25 MG: 25 TABLET ORAL at 05:03

## 2017-10-28 RX ADMIN — PIPERACILLIN SODIUM,TAZOBACTAM SODIUM 3.38 G: 3; .375 INJECTION, POWDER, FOR SOLUTION INTRAVENOUS at 21:11

## 2017-10-28 RX ADMIN — Medication 10 ML: at 05:04

## 2017-10-28 RX ADMIN — SODIUM CHLORIDE, PRESERVATIVE FREE 300 UNITS: 5 INJECTION INTRAVENOUS at 05:03

## 2017-10-28 RX ADMIN — APIXABAN 5 MG: 5 TABLET, FILM COATED ORAL at 21:09

## 2017-10-28 RX ADMIN — FAMOTIDINE 20 MG: 20 TABLET, FILM COATED ORAL at 08:44

## 2017-10-28 RX ADMIN — Medication 10 ML: at 22:00

## 2017-10-28 RX ADMIN — PIPERACILLIN SODIUM,TAZOBACTAM SODIUM 3.38 G: 3; .375 INJECTION, POWDER, FOR SOLUTION INTRAVENOUS at 05:03

## 2017-10-28 RX ADMIN — Medication 20 ML: at 14:06

## 2017-10-28 RX ADMIN — VANCOMYCIN HYDROCHLORIDE 1250 MG: 10 INJECTION, POWDER, LYOPHILIZED, FOR SOLUTION INTRAVENOUS at 01:30

## 2017-10-28 RX ADMIN — Medication 10 ML: at 05:03

## 2017-10-28 RX ADMIN — Medication 20 ML: at 22:10

## 2017-10-28 RX ADMIN — METOPROLOL TARTRATE 25 MG: 25 TABLET ORAL at 08:44

## 2017-10-28 RX ADMIN — ASPIRIN 81 MG CHEWABLE TABLET 81 MG: 81 TABLET CHEWABLE at 08:43

## 2017-10-28 RX ADMIN — METOPROLOL TARTRATE 25 MG: 25 TABLET ORAL at 15:42

## 2017-10-28 RX ADMIN — SODIUM CHLORIDE, PRESERVATIVE FREE 600 UNITS: 5 INJECTION INTRAVENOUS at 14:08

## 2017-10-28 RX ADMIN — CLOPIDOGREL BISULFATE 75 MG: 75 TABLET ORAL at 08:44

## 2017-10-28 NOTE — PROGRESS NOTES
Problem: Mobility Impaired (Adult and Pediatric)  Goal: *Acute Goals and Plan of Care (Insert Text)  LTG:  (1.)Mr. Marco A Ng will move from supine to sit and sit to supine  in bed with MODIFIED INDEPENDENCE within 3 day(s). (2.)Mr. Marco A Ng will transfer from bed to chair and chair to bed with MODIFIED INDEPENDENCE using the least restrictive device within 3 day(s). (3.)Mr. Marco A gN will ambulate with MODIFIED INDEPENDENCE for 300 feet with the least restrictive device within 3 day(s). ________________________________________________________________________________________________      PHYSICAL THERAPY: Initial Assessment, Treatment Day: Day of Assessment, AM 10/28/2017  INPATIENT: Hospital Day: 5  Payor: Adrian Rodriguez. / Plan: [place] [employer]. / Product Type: Commerical /      NAME/AGE/GENDER: Damian Chiu is a 65 y.o. male   PRIMARY DIAGNOSIS: afib   Atrial fibrillation with rapid ventricular response (HCC)  Heart Failure  Atrial fibrillation with RVR (HCC) Atrial fibrillation with RVR (HCC)  Procedure(s) (LRB):  PACEMAKER INSERTION (N/A)  3 Days Post-Op  ICD-10: Treatment Diagnosis:   · Difficulty in walking, Not elsewhere classified (R26.2)   Precaution/Allergies:  Review of patient's allergies indicates no known allergies. ASSESSMENT:     Mr. Marco A Ng presents with decreased independence and activity tolerance with functional mobility status post pacemaker insertion. He notes improving activity tolerance with decreased SOB. He presents with gait deviations listed in detail below. He works as  and plans to return to job which requires prolonged standing and walking. He would benefit from continued skilled physical therapy in order to restore prior level of function and prevent future impairment. This section established at most recent assessment   PROBLEM LIST (Impairments causing functional limitations):  1. Decreased Strength  2.  Decreased ADL/Functional Activities  3. Decreased Transfer Abilities  4. Decreased Ambulation Ability/Technique  5. Decreased Balance  6. Increased Pain  7. Decreased Activity Tolerance  8. Decreased Pacing Skills  9. Decreased Work Simplification/Energy Conservation Techniques  10. Increased Fatigue  11. Increased Shortness of Breath  12. Decreased Flexibility/Joint Mobility  13. Decreased [place] with Home Exercise Program   INTERVENTIONS PLANNED: (Benefits and precautions of physical therapy have been discussed with the patient.)  1. Balance Exercise  2. Bed Mobility  3. Family Education  4. Gait Training  5. Home Exercise Program (HEP)  6. Manual Therapy  7. Neuromuscular Re-education/Strengthening  8. Range of Motion (ROM)  9. Therapeutic Activites  10. Therapeutic Exercise/Strengthening  11. Transfer Training     TREATMENT PLAN: Frequency/Duration: three times per week for duration of hospital stay  Rehabilitation Potential For Stated Goals: Good     RECOMMENDED REHABILITATION/EQUIPMENT: (at time of discharge pending progress): Due to the probability of continued deficits (see above) this patient will likely need continued skilled physical therapy after discharge. Equipment:    to be determined at time of discharge              HISTORY:   History of Present Injury/Illness (Reason for Referral):  Per doctors note: \"68 yo male with a history of GERD, HTN, s. Fib, CAD s/p PCI, myasthenia gravis on mestinon who was admitted on 10/24 with atrial fibrillation with RVR who then developed symptomatic bradycardia while inpatient with HR in 30s.  A PPM was placed on 10/25 for sinus node dysfunction and during the procedure the patient was suspected to have experienced aspiration event.  His CXR after the procedure revealed a ALEKS perihilar infiltrate.  He then developed hypotension and was bolused 500ml of normal saline with ongoing marginal BPs now improved.  He was also ordered for vanco/zosyn. Tiffany Ernesto has not experienced any fevers, but had a new leukocytosis to 21K.  Currently he complains of dyspnea which he rates as 7/10, but has no chest pain. He feels lightheaded and he has had a dry cough but no chills or rigors.   A limited TTE has been ordered to assess for any complication of PM implantation and stat labs to evaluate H/H are pending\"  Past Medical History/Comorbidities:   Mr. Omer Reese  has a past medical history of Abnormal EKG (4/22/15); CAD (coronary artery disease) (5/8/2015); Carotid artery stenosis without cerebral infarction (6/7/2016); Coronary atherosclerosis of native coronary vessel (5/8/2015); Dyslipidemia (6/7/2016); Dyspnea (5/8/2015); ED (erectile dysfunction); GERD (gastroesophageal reflux disease); GERD (gastroesophageal reflux disease); HTN (hypertension) (5/8/2015); Hypertension; Hypokalemia (6/7/2016); Hypokalemia; Mitral valve regurgitation (6/7/2016); Myasthenia gravis ([address] (6/17/15); Myasthenia gravis ([address] Nocturia; Osteoporosis; PUD (peptic ulcer disease); S/P coronary artery stent placement (5/8/2015); Sleep apnea; Syncope and collapse; and Unspecified sleep apnea. He also has no past medical history of Asthma; Diabetes ([address] Hypercholesterolemia; Kidney disease; Kidney stone; or Thyroid disease. Mr. Omer Reese  has a past surgical history that includes appendectomy; hemorrhoidectomy; heart catheterization (5/7/2015); tonsillectomy (1999); and colonoscopy (2007). Social History/Living Environment:   Home Environment: Private residence  One/Two Story Residence: One story  Living Alone: No (lives with wife)  Support Systems: Spouse/Significant Other/Partner  Patient Expects to be Discharged to[de-identified] Private residence  Current DME Used/Available at Home: None  Prior Level of Function/Work/Activity:  Kacy Olson notes he is independent with functional mobility and activities of daily living. He works as  which requires prolonged standing and walking.    Personal Factors:          Sex: male        Age:  65 y.o. Number of Personal Factors/Comorbidities that affect the Plan of Care: 1-2: MODERATE COMPLEXITY   EXAMINATION:   Most Recent Physical Functioning:   Gross Assessment:               RLE Strength  R Hip Flexion: 3+  R Knee Flexion: 4-  R Knee Extension: 4-  R Ankle Dorsiflexion: 4-  R Ankle Plantar Flexion: 4-  LLE Strength  L Hip Flexion: 3+  L Knee Flexion: 4-  L Knee Extension: 4-  L Ankle Dorsiflexion: 4-  L Ankle Plantar Flexion: 4-  Posture:  Posture (WDL): Exceptions to WDL  Posture Assessment: Forward head, Rounded shoulders  Balance:  Sitting: Intact  Sitting - Static: Good (unsupported)  Sitting - Dynamic: Good (unsupported)  Standing: Impaired  Standing - Static: Good  Standing - Dynamic : Fair Bed Mobility:  Rolling: Minimum assistance  Supine to Sit: Minimum assistance  Sit to Supine: Minimum assistance  Scooting: Minimum assistance  Wheelchair Mobility:     Transfers:  Sit to Stand: Contact guard assistance  Stand to Sit: Contact guard assistance  Bed to Chair: Contact guard assistance  Gait:     Base of Support: Widened  Speed/Mariela: Shuffled; Slow  Gait Abnormalities: Decreased step clearance; Path deviations  Distance (ft): 150 Feet (ft)  Assistive Device:  (no assistive device)  Ambulation - Level of Assistance: Contact guard assistance      Body Structures Involved:  1. Joints  2. Muscles  3. Ligaments Body Functions Affected:  1. Sensory/Pain  2. Cardio  3. Respiratory  4. Neuromusculoskeletal  5. Movement Related  6. Skin Related Activities and Participation Affected:  1. General Tasks and Demands  2. Mobility  3. Self Care  4. Domestic Life  5. Interpersonal Interactions and Relationships  6.  Community, Social and [place]   Number of elements that affect the Plan of Care: 3: MODERATE COMPLEXITY   CLINICAL PRESENTATION:   Presentation: Stable and uncomplicated: LOW COMPLEXITY   CLINICAL DECISION MAKING:   Chelo Olivas Inpatient Short Form  How much difficulty does the patient currently have. .. Unable A Lot A Little None   1. Turning over in bed (including adjusting bedclothes, sheets and blankets)? [] 1   [] 2   [x] 3   [] 4   2. Sitting down on and standing up from a chair with arms ( e.g., wheelchair, bedside commode, etc.)   [] 1   [] 2   [x] 3   [] 4   3. Moving from lying on back to sitting on the side of the bed? [] 1   [] 2   [x] 3   [] 4   How much help from another person does the patient currently need. .. Total A Lot A Little None   4. Moving to and from a bed to a chair (including a wheelchair)? [] 1   [] 2   [x] 3   [] 4   5. Need to walk in hospital room? [] 1   [] 2   [x] 3   [] 4   6. Climbing 3-5 steps with a railing? [] 1   [x] 2   [] 3   [] 4   © 2007, Trustees of [address] [place] [address], under license to [employer]. All rights reserved      Score:  Initial: 17     Interpretation of Tool:  Represents activities that are increasingly more difficult (i.e. Bed mobility, Transfers, Gait). Score 24 23 22-20 19-15 14-10 9-7 6     Modifier CH CI CJ CK CL CM CN      ? Mobility - Walking and Moving Around:     - CURRENT STATUS: CK - 40%-59% impaired, limited or restricted    - GOAL STATUS: CK - 40%-59% impaired, limited or restricted    - D/C STATUS:  ---------------To be determined---------------  Payor: [employer] / Plan: [place] [employer] / Product Type: Commerical /      Medical Necessity:     · Patient is expected to demonstrate progress in strength, range of motion, balance, coordination and functional technique to increase independence with functional mobility. · Patient demonstrates good rehab potential due to higher previous functional level. Reason for Services/Other Comments:  · Patient continues to require skilled intervention due to medical complications and decreased activity tolerance with functional mobility.    Use of outcome tool(s) and clinical judgement create a POC that gives a: Clear prediction of patient's progress: LOW COMPLEXITY            TREATMENT:   (In addition to Assessment/Re-Assessment sessions the following treatments were rendered)   Pre-treatment Symptoms/Complaints:  Lilian Elmore notes no complaints of pain, he does note concern over weakness in left arm - reminded him of precautions and he verbalized understanding  Pain: Initial:   Pain Intensity 1: 0  Post Session:  0/10     Assessment/Reassessment only, no treatment provided today    Braces/Orthotics/Lines/Etc:   · O2 Device: Room air  Treatment/Session Assessment:    · Response to Treatment:  Lilian Elmore notes improving activity tolerance with increased distance ambulated this date  · Interdisciplinary Collaboration:   o Physical Therapist  o Registered Nurse  · After treatment position/precautions:   o Up in chair  o Bed/Chair-wheels locked  o Call light within reach   · Compliance with Program/Exercises: Will assess as treatment progresses. · Recommendations/Intent for next treatment session: \"Next visit will focus on advancements to more challenging activities and reduction in assistance provided\".   Total Treatment Duration:  PT Patient Time In/Time Out  Time In: 1034  Time Out: [address], PT

## 2017-10-28 NOTE — PROGRESS NOTES
Rupal Hogan  Admission Date: 10/24/2017             Daily Progress Note: 10/28/2017    The patient's chart is reviewed and the patient is discussed with the staff. 66 yo male with a history of GERD, HTN, s. Fib, CAD s/p PCI, myasthenia gravis on mestinon who was admitted on 10/24 with atrial fibrillation with RVR who then developed symptomatic bradycardia while inpatient with HR in 30s. A PPM was placed on 10/25 for sinus node dysfunction and during the procedure the patient was suspected to have experienced aspiration event. His CXR after the procedure revealed a ALEKS perihilar infiltrate. He then developed hypotension and was bolused 500ml of normal saline with ongoing marginal BPs now improved. He was also ordered for vanco/zosyn. He has not experienced any fevers, but had a new leukocytosis to 21K. Currently he complains of dyspnea which he rates as 7/10, but has no chest pain. He feels lightheaded and he has had a dry cough but no chills or rigors. A limited TTE has been ordered to assess for any complication of PM implantation and stat labs to evaluate H/H are pending    Subjective:    denies SOB, cough, on RA.       Current Facility-Administered Medications   Medication Dose Route Frequency    famotidine (PEPCID) tablet 20 mg  20 mg Oral Q12H    simethicone (MYLICON) tablet 80 mg  80 mg Oral QID PRN    bisacodyl (DULCOLAX) tablet 5 mg  5 mg Oral DAILY PRN    apixaban (ELIQUIS) tablet 5 mg  5 mg Oral BID    metoprolol tartrate (LOPRESSOR) tablet 25 mg  25 mg Oral Q6H    chlorhexidine (PERIDEX) 0.12 % mouthwash 15 mL  15 mL Oral PRN    0.9% sodium chloride infusion 250 mL  250 mL IntraVENous PRN    piperacillin-tazobactam (ZOSYN) 3.375 g in 0.9% sodium chloride (MBP/ADV) 100 mL  3.375 g IntraVENous Q8H    sodium chloride (NS) flush 20 mL  20 mL InterCATHeter Q8H    heparin (porcine) pf 600 Units  600 Units InterCATHeter Q8H    sodium chloride (NS) flush 20 mL 20 mL InterCATHeter PRN    heparin (porcine) pf 600 Units  600 Units InterCATHeter PRN    lidocaine-EPINEPHrine (XYLOCAINE) 1 %-1:100,000 injection 400 mg  40 mL SubCUTAneous Multiple    sodium chloride (NS) flush 5-10 mL  5-10 mL IntraVENous Q8H    sodium chloride (NS) flush 5-10 mL  5-10 mL IntraVENous PRN    aspirin chewable tablet 81 mg  81 mg Oral DAILY    atorvastatin (LIPITOR) tablet 80 mg  80 mg Oral QHS    benzonatate (TESSALON) capsule 100 mg  100 mg Oral TID PRN    clopidogrel (PLAVIX) tablet 75 mg  75 mg Oral DAILY    pyridostigmine (MESTINON) tablet 60 mg  60 mg Oral DAILY    nitroglycerin (NITROSTAT) tablet 0.4 mg  0.4 mg SubLINGual Q5MIN PRN    HYDROcodone-acetaminophen (NORCO) 5-325 mg per tablet 1 Tab  1 Tab Oral Q6H PRN    morphine injection 2 mg  2 mg IntraVENous Q4H PRN    acetaminophen (TYLENOL) tablet 650 mg  650 mg Oral Q4H PRN       Review of Systems  Constitutional: negative for fever, chills, sweats  Cardiovascular: negative for chest pain, palpitations, syncope, edema  Gastrointestinal:  negative for dysphagia, reflux, vomiting, diarrhea, abdominal pain, or melena  Neurologic:  negative for focal weakness, numbness, headache    Objective:     Vitals:    10/27/17 2247 10/28/17 0239 10/28/17 0503 10/28/17 0651   BP: 149/85 133/78 (!) 134/98 (!) 148/93   Pulse: 82 95 76 93   Resp: 18 18  18   Temp: 98 °F (36.7 °C) 97.7 °F (36.5 °C)  97.9 °F (36.6 °C)   SpO2: 94% 94%  94%   Weight:  213 lb (96.6 kg)     Height:         Intake and Output:   10/26 1901 - 10/28 0700  In: 1560 [P.O.:1310; I.V.:250]  Out: 2580 [Urine:2580]       Physical Exam:   Constitution:  the patient is well developed and in no acute distress  EENMT:  Sclera clear, pupils equal, oral mucosa moist  Respiratory: clear to auscultation bilaterally, RA  Cardiovascular:  iRRR without M,G,R  Gastrointestinal: soft and non-tender; with positive bowel sounds. Musculoskeletal: warm without cyanosis.  There is no lower leg edema.  Skin:  no jaundice or rashes, L chest PPM site   Neurologic: no gross neuro deficits     Psychiatric:  alert and oriented x 3    CXR:   10/28      LAB   Recent Labs      10/27/17   0504  10/26/17   1115  10/26/17   0354   WBC  11.0  18.5*  21.1*   HGB  12.1*  13.8  14.9   HCT  36.5*  40.9*  43.6   PLT  135*  182  173     Recent Labs      10/27/17   0504  10/26/17   1305  10/26/17   0354   NA  143  139  140   K  3.4*  3.9  3.9   CL  110*  105  105   CO2  25  28  26   GLU  99  115*  142*   BUN  11  17  12   CREA  0.74*  1.26  0.97   MG   --    --   1.8   CA  7.6*  7.5*  8.4       Recent Labs      10/26/17   1703  10/26/17   1305   LAC  1.5  2.2*         Assessment:  (Medical Decision Making)     Hospital Problems  Date Reviewed: 10/25/2017          Codes Class Noted POA    Sepsis ([facility] ICD-10-CM: A41.9  ICD-9-CM: 038.9, 995.91  10/26/2017 Unknown    From aspiration,resolved       Aspiration pneumonia ([facility] ICD-10-CM: J69.0  ICD-9-CM: 507.0  10/26/2017 Unknown    On ABX  WBC steadily improving 21.1 >>>18.5 >>>11.0      Hypotension- resolved  ICD-10-CM: I95.9  ICD-9-CM: 458.9  10/26/2017 Unknown        * (Principal)Atrial fibrillation with RVR (HCC) ICD-10-CM: I48.91  ICD-9-CM: 427.31  10/24/2017 Unknown    HR in the 80-100s          SSS (sick sinus syndrome) ([facility] ICD-10-CM: I49.5  ICD-9-CM: 427.81  6/30/2017 Yes    S/P PPM placement      HTN (hypertension) (Chronic) ICD-10-CM: I10  ICD-9-CM: 401.9  5/8/2015 Yes        Coronary atherosclerosis of native coronary vessel ICD-10-CM: I25.10  ICD-9-CM: 414.01  5/8/2015 Yes     VEGAS on brilinta  5/7/15: prox LAD PCI, normal EF            Plan:  (Medical Decision Making)     --Edi / Hetal Bird 3- will stop Vanco       More than 50% of the time documented was spent in face-to-face contact with the patient and in the care of the patient on the floor/unit where the patient is located.     Chuiyta Maloney MD

## 2017-10-28 NOTE — PROGRESS NOTES
Bedside and Verbal shift change report given to Dima Pendleton (oncoming nurse) by Rey Galloway (offgoing nurse). Report included the following information SBAR, Kardex, Intake/Output, MAR, Recent Results and Med Rec Status.

## 2017-10-28 NOTE — PROGRESS NOTES
Bedside and Verbal shift change report given to Rey Galloway RN (oncoming nurse) by Garcia Dodge RN (offgoing nurse).

## 2017-10-28 NOTE — PROGRESS NOTES
[facility] CARDIOLOGY PROGRESS NOTE           10/28/2017 11:13 AM    Admit Date: 10/24/2017    Admit Diagnosis: afib ;Atrial fibrillation with rapid ventricular response (*      Subjective:   No complaints this AM, no chest pain or shortness of breath, feels much better    Interval History: (History of pertinent interval events obtained from nursing staff)  Afib, rates better controlled, BPs stabilized, no fevers    ROS:  GEN:  No fever or chills  Cardiovascular:  As noted above  Pulmonary:  As noted above  Neuro:  No new focal motor or sensory loss      Objective:     Vitals:    10/28/17 0651 10/28/17 0844 10/28/17 1103 10/28/17 1109   BP: (!) 148/93  150/85    Pulse: 93 (!) 109 100 100   Resp: 18  18    Temp: 97.9 °F (36.6 °C)  97.8 °F (36.6 °C)    SpO2: 94%  95% 97%   Weight:       Height:           Physical Exam:  General-Well Developed, Well Nourished, No Acute Distress, Alert & Oriented x 3, appropriate mood. Neck- supple, no JVD  CV- irreg irreg, normal rate, distant S1, S2, no MRG  Lung- clear bilaterally  Abd- soft, nontender, nondistended  Ext- no edema bilaterally.   Skin- warm and dry    Current Facility-Administered Medications   Medication Dose Route Frequency    famotidine (PEPCID) tablet 20 mg  20 mg Oral Q12H    simethicone (MYLICON) tablet 80 mg  80 mg Oral QID PRN    bisacodyl (DULCOLAX) tablet 5 mg  5 mg Oral DAILY PRN    apixaban (ELIQUIS) tablet 5 mg  5 mg Oral BID    metoprolol tartrate (LOPRESSOR) tablet 25 mg  25 mg Oral Q6H    chlorhexidine (PERIDEX) 0.12 % mouthwash 15 mL  15 mL Oral PRN    0.9% sodium chloride infusion 250 mL  250 mL IntraVENous PRN    piperacillin-tazobactam (ZOSYN) 3.375 g in 0.9% sodium chloride (MBP/ADV) 100 mL  3.375 g IntraVENous Q8H    sodium chloride (NS) flush 20 mL  20 mL InterCATHeter Q8H    heparin (porcine) pf 600 Units  600 Units InterCATHeter Q8H    sodium chloride (NS) flush 20 mL  20 mL InterCATHeter PRN    heparin (porcine) pf 600 Units  600 Units InterCATHeter PRN    lidocaine-EPINEPHrine (XYLOCAINE) 1 %-1:100,000 injection 400 mg  40 mL SubCUTAneous Multiple    sodium chloride (NS) flush 5-10 mL  5-10 mL IntraVENous Q8H    sodium chloride (NS) flush 5-10 mL  5-10 mL IntraVENous PRN    aspirin chewable tablet 81 mg  81 mg Oral DAILY    atorvastatin (LIPITOR) tablet 80 mg  80 mg Oral QHS    benzonatate (TESSALON) capsule 100 mg  100 mg Oral TID PRN    clopidogrel (PLAVIX) tablet 75 mg  75 mg Oral DAILY    pyridostigmine (MESTINON) tablet 60 mg  60 mg Oral DAILY    nitroglycerin (NITROSTAT) tablet 0.4 mg  0.4 mg SubLINGual Q5MIN PRN    HYDROcodone-acetaminophen (NORCO) 5-325 mg per tablet 1 Tab  1 Tab Oral Q6H PRN    morphine injection 2 mg  2 mg IntraVENous Q4H PRN    acetaminophen (TYLENOL) tablet 650 mg  650 mg Oral Q4H PRN     Data Review:   Recent Results (from the past 24 hour(s))   VANCOMYCIN, TROUGH    Collection Time: 10/28/17 12:45 AM   Result Value Ref Range    Vancomycin,trough 11.2 5 - 20 ug/mL       EKG:  (EKG has been independently visualized by me with interpretation below)  Assessment:     Principal Problem:    Atrial fibrillation with RVR ([facility] (10/24/2017)    Active Problems:    HTN (hypertension) (5/8/2015)      Coronary atherosclerosis of native coronary vessel (5/8/2015)      Overview: VEGAS on [facility]      5/7/15: prox LAD PCI, normal EF      SSS (sick sinus syndrome) ([facility] (6/30/2017)      Syncope (10/24/2017)      Sepsis ([facility] (10/26/2017)      Aspiration pneumonia ([facility] (10/26/2017)      Hypotension (10/26/2017)      GERD (gastroesophageal reflux disease) (10/27/2017)      Plan:   1. Afib: rates improved, cont metoprolol, will consolidate dose tomorrow if remains stable, restarted eliquis for cardioembolic protection  2. Tachy trell: s/p PPM  3. CAD: stable, no chest pain, cont meds  4.  Aspiration pneumonia: much improved, stopping Vanc, ?transition to PO antibiotic soon, appreciate pulmonary input  5. HTN: blood pressures now slightly elevated, cont meds  6. PPx: elaine Bernard MD  Cardiology/Electrophysiology

## 2017-10-29 LAB
ANION GAP SERPL CALC-SCNC: 7 MMOL/L (ref 7–16)
BUN SERPL-MCNC: 6 MG/DL (ref 8–23)
CALCIUM SERPL-MCNC: 8.8 MG/DL (ref 8.3–10.4)
CHLORIDE SERPL-SCNC: 107 MMOL/L (ref 98–107)
CO2 SERPL-SCNC: 28 MMOL/L (ref 21–32)
CREAT SERPL-MCNC: 0.63 MG/DL (ref 0.8–1.5)
GLUCOSE SERPL-MCNC: 113 MG/DL (ref 65–100)
POTASSIUM SERPL-SCNC: 3.4 MMOL/L (ref 3.5–5.1)
SODIUM SERPL-SCNC: 142 MMOL/L (ref 136–145)

## 2017-10-29 PROCEDURE — 36592 COLLECT BLOOD FROM PICC: CPT

## 2017-10-29 PROCEDURE — 74011000258 HC RX REV CODE- 258: Performed by: INTERNAL MEDICINE

## 2017-10-29 PROCEDURE — 74011250637 HC RX REV CODE- 250/637: Performed by: INTERNAL MEDICINE

## 2017-10-29 PROCEDURE — 65660000000 HC RM CCU STEPDOWN

## 2017-10-29 PROCEDURE — 74011250637 HC RX REV CODE- 250/637: Performed by: PHYSICIAN ASSISTANT

## 2017-10-29 PROCEDURE — 80048 BASIC METABOLIC PNL TOTAL CA: CPT | Performed by: INTERNAL MEDICINE

## 2017-10-29 PROCEDURE — 74011250636 HC RX REV CODE- 250/636: Performed by: INTERNAL MEDICINE

## 2017-10-29 PROCEDURE — 99232 SBSQ HOSP IP/OBS MODERATE 35: CPT | Performed by: INTERNAL MEDICINE

## 2017-10-29 PROCEDURE — 74011250637 HC RX REV CODE- 250/637: Performed by: NURSE PRACTITIONER

## 2017-10-29 PROCEDURE — L3670 SO ACRO/CLAV CAN WEB PRE OTS: HCPCS | Performed by: INTERNAL MEDICINE

## 2017-10-29 RX ORDER — POTASSIUM CHLORIDE 20 MEQ/1
40 TABLET, EXTENDED RELEASE ORAL
Status: COMPLETED | OUTPATIENT
Start: 2017-10-29 | End: 2017-10-29

## 2017-10-29 RX ORDER — CEFPODOXIME PROXETIL 200 MG/1
200 TABLET, FILM COATED ORAL EVERY 12 HOURS
Status: COMPLETED | OUTPATIENT
Start: 2017-10-29 | End: 2017-10-30

## 2017-10-29 RX ORDER — METOPROLOL TARTRATE 50 MG/1
50 TABLET ORAL EVERY 6 HOURS
Status: DISCONTINUED | OUTPATIENT
Start: 2017-10-29 | End: 2017-10-30

## 2017-10-29 RX ADMIN — SODIUM CHLORIDE, PRESERVATIVE FREE 600 UNITS: 5 INJECTION INTRAVENOUS at 22:00

## 2017-10-29 RX ADMIN — SODIUM CHLORIDE, PRESERVATIVE FREE 600 UNITS: 5 INJECTION INTRAVENOUS at 15:49

## 2017-10-29 RX ADMIN — PYRIDOSTIGMINE BROMIDE 60 MG: 60 TABLET ORAL at 10:12

## 2017-10-29 RX ADMIN — CLOPIDOGREL BISULFATE 75 MG: 75 TABLET ORAL at 10:12

## 2017-10-29 RX ADMIN — CEFPODOXIME PROXETIL 200 MG: 200 TABLET, FILM COATED ORAL at 11:45

## 2017-10-29 RX ADMIN — Medication 20 ML: at 04:46

## 2017-10-29 RX ADMIN — FAMOTIDINE 20 MG: 20 TABLET, FILM COATED ORAL at 19:45

## 2017-10-29 RX ADMIN — METOPROLOL TARTRATE 25 MG: 25 TABLET ORAL at 03:50

## 2017-10-29 RX ADMIN — SODIUM CHLORIDE, PRESERVATIVE FREE 600 UNITS: 5 INJECTION INTRAVENOUS at 19:54

## 2017-10-29 RX ADMIN — METOPROLOL TARTRATE 50 MG: 50 TABLET ORAL at 15:50

## 2017-10-29 RX ADMIN — METOPROLOL TARTRATE 50 MG: 50 TABLET ORAL at 19:46

## 2017-10-29 RX ADMIN — APIXABAN 5 MG: 5 TABLET, FILM COATED ORAL at 10:13

## 2017-10-29 RX ADMIN — FAMOTIDINE 20 MG: 20 TABLET, FILM COATED ORAL at 10:12

## 2017-10-29 RX ADMIN — POTASSIUM CHLORIDE 40 MEQ: 20 TABLET, EXTENDED RELEASE ORAL at 10:13

## 2017-10-29 RX ADMIN — METOPROLOL TARTRATE 50 MG: 50 TABLET ORAL at 10:24

## 2017-10-29 RX ADMIN — SODIUM CHLORIDE, PRESERVATIVE FREE 600 UNITS: 5 INJECTION INTRAVENOUS at 04:44

## 2017-10-29 RX ADMIN — ASPIRIN 81 MG CHEWABLE TABLET 81 MG: 81 TABLET CHEWABLE at 10:13

## 2017-10-29 RX ADMIN — CEFPODOXIME PROXETIL 200 MG: 200 TABLET, FILM COATED ORAL at 19:45

## 2017-10-29 RX ADMIN — Medication 20 ML: at 15:47

## 2017-10-29 RX ADMIN — APIXABAN 5 MG: 5 TABLET, FILM COATED ORAL at 19:45

## 2017-10-29 RX ADMIN — ATORVASTATIN CALCIUM 80 MG: 40 TABLET, FILM COATED ORAL at 19:45

## 2017-10-29 RX ADMIN — PIPERACILLIN SODIUM,TAZOBACTAM SODIUM 3.38 G: 3; .375 INJECTION, POWDER, FOR SOLUTION INTRAVENOUS at 03:53

## 2017-10-29 RX ADMIN — HYDROCODONE BITARTRATE AND ACETAMINOPHEN 1 TABLET: 5; 325 TABLET ORAL at 17:06

## 2017-10-29 RX ADMIN — HYDROCODONE BITARTRATE AND ACETAMINOPHEN 1 TABLET: 5; 325 TABLET ORAL at 03:50

## 2017-10-29 RX ADMIN — Medication 10 ML: at 19:54

## 2017-10-29 NOTE — PROGRESS NOTES
[facility] CARDIOLOGY PROGRESS NOTE           10/29/2017 9:51 AM    Admit Date: 10/24/2017    Admit Diagnosis: afib ;Atrial fibrillation with rapid ventricular response (*      Subjective:   No complaints this AM, no chest pain or shortness of breath    Interval History: (History of pertinent interval events obtained from nursing staff)  Transitioned to PO antibiotics, rates improved    ROS:  GEN:  No fever or chills  Cardiovascular:  As noted above  Pulmonary:  As noted above  Neuro:  No new focal motor or sensory loss      Objective:     Vitals:    10/29/17 0352 10/29/17 0353 10/29/17 0451 10/29/17 0729   BP: (!) 175/102 (!) 175/102 (!) 145/97 (!) 174/99   Pulse: 97 93 73 91   Resp: 18   18   Temp: 97.6 °F (36.4 °C)   97 °F (36.1 °C)   SpO2: 96% 93%     Weight:       Height:           Physical Exam:  General-Well Developed, Well Nourished, No Acute Distress, Alert & Oriented x 3, appropriate mood. Neck- supple, no JVD  CV- irreg irreg, normal rate, distant S1, S2, no MRG  Lung- clear bilaterally  Abd- soft, nontender, nondistended  Ext- no edema bilaterally.   Skin- warm and dry    Current Facility-Administered Medications   Medication Dose Route Frequency    potassium chloride (K-DUR, KLOR-CON) SR tablet 40 mEq  40 mEq Oral NOW    cefpodoxime (VANTIN) tablet 200 mg  200 mg Oral Q12H    metoprolol tartrate (LOPRESSOR) tablet 50 mg  50 mg Oral Q6H    famotidine (PEPCID) tablet 20 mg  20 mg Oral Q12H    simethicone (MYLICON) tablet 80 mg  80 mg Oral QID PRN    bisacodyl (DULCOLAX) tablet 5 mg  5 mg Oral DAILY PRN    apixaban (ELIQUIS) tablet 5 mg  5 mg Oral BID    chlorhexidine (PERIDEX) 0.12 % mouthwash 15 mL  15 mL Oral PRN    0.9% sodium chloride infusion 250 mL  250 mL IntraVENous PRN    sodium chloride (NS) flush 20 mL  20 mL InterCATHeter Q8H    heparin (porcine) pf 600 Units  600 Units InterCATHeter Q8H    sodium chloride (NS) flush 20 mL  20 mL InterCATHeter PRN    heparin (porcine) pf 600 Units  600 Units InterCATHeter PRN    sodium chloride (NS) flush 5-10 mL  5-10 mL IntraVENous Q8H    sodium chloride (NS) flush 5-10 mL  5-10 mL IntraVENous PRN    aspirin chewable tablet 81 mg  81 mg Oral DAILY    atorvastatin (LIPITOR) tablet 80 mg  80 mg Oral QHS    benzonatate (TESSALON) capsule 100 mg  100 mg Oral TID PRN    clopidogrel (PLAVIX) tablet 75 mg  75 mg Oral DAILY    pyridostigmine (MESTINON) tablet 60 mg  60 mg Oral DAILY    nitroglycerin (NITROSTAT) tablet 0.4 mg  0.4 mg SubLINGual Q5MIN PRN    HYDROcodone-acetaminophen (NORCO) 5-325 mg per tablet 1 Tab  1 Tab Oral Q6H PRN    morphine injection 2 mg  2 mg IntraVENous Q4H PRN    acetaminophen (TYLENOL) tablet 650 mg  650 mg Oral Q4H PRN     Data Review:   Recent Results (from the past 24 hour(s))   METABOLIC PANEL, BASIC    Collection Time: 10/29/17  6:19 AM   Result Value Ref Range    Sodium 142 136 - 145 mmol/L    Potassium 3.4 (L) 3.5 - 5.1 mmol/L    Chloride 107 98 - 107 mmol/L    CO2 28 21 - 32 mmol/L    Anion gap 7 7 - 16 mmol/L    Glucose 113 (H) 65 - 100 mg/dL    BUN 6 (L) 8 - 23 MG/DL    Creatinine 0.63 (L) 0.8 - 1.5 MG/DL    GFR est AA >60 >60 ml/min/1.73m2    GFR est non-AA >60 >60 ml/min/1.73m2    Calcium 8.8 8.3 - 10.4 MG/DL       EKG:  (EKG has been independently visualized by me with interpretation below)  Assessment:     Principal Problem:    Atrial fibrillation with RVR (HCC) (10/24/2017)    Active Problems:    HTN (hypertension) (5/8/2015)      Coronary atherosclerosis of native coronary vessel (5/8/2015)      Overview: VEGAS on brilinta      5/7/15: prox LAD PCI, normal EF      SSS (sick sinus syndrome) ([facility] (6/30/2017)      Syncope (10/24/2017)      Sepsis ([facility] (10/26/2017)      [facility] pneumonia ([facility] (10/26/2017)      Hypotension (10/26/2017)      GERD (gastroesophageal reflux disease) (10/27/2017)      Plan:   1.  Afib: rates improved, increase metoprolol to 50mg Q6H and will consolidate tomorrow if stable, restarted eliquis for cardioembolic protection  2. Tachy trell: s/p PPM  3. CAD: stable, no chest pain, cont meds  4. Aspiration pneumonia: much improved, transitioned to vantin 200mg this AM for total of 7 day course  5. HTN: blood pressures now slightly elevated, increased metoprolol this AM  6. PPx: elaine Bernard MD  Cardiology/Electrophysiology

## 2017-10-29 NOTE — PROGRESS NOTES
Rupal Hogan  Admission Date: 10/24/2017             Daily Progress Note: 10/29/2017    The patient's chart is reviewed and the patient is discussed with the staff. 64 yo male with a history of GERD, HTN, s. Fib, CAD s/p PCI, myasthenia gravis on mestinon who was admitted on 10/24 with atrial fibrillation with RVR who then developed symptomatic bradycardia while inpatient with HR in 30s. A PPM was placed on 10/25 for sinus node dysfunction and during the procedure the patient was suspected to have experienced aspiration event. His CXR after the procedure revealed a ALEKS perihilar infiltrate. He then developed hypotension and was bolused 500ml of normal saline with ongoing marginal BPs now improved. He was also ordered for vanco/zosyn. He has not experienced any fevers, but had a new leukocytosis to 21K. He complained of dyspnea which he rated as 7/10, but had no chest pain. He felt lightheaded and had a dry cough but no chills or rigors. A limited TTE was ordered to assess for any complication of PM implantation and stat labs to evaluate H/H were ordered. Had ongoing a.fib with RVR and medications were adjusted    Subjective:     BP elevated. Currently on RA with O2 sat 93%. Denies sob, cough, or mucus production. Ongoing tenderness at PPM insertion site.   Denies palpitations - remains in a.fib    Current Facility-Administered Medications   Medication Dose Route Frequency    famotidine (PEPCID) tablet 20 mg  20 mg Oral Q12H    simethicone (MYLICON) tablet 80 mg  80 mg Oral QID PRN    bisacodyl (DULCOLAX) tablet 5 mg  5 mg Oral DAILY PRN    apixaban (ELIQUIS) tablet 5 mg  5 mg Oral BID    metoprolol tartrate (LOPRESSOR) tablet 25 mg  25 mg Oral Q6H    chlorhexidine (PERIDEX) 0.12 % mouthwash 15 mL  15 mL Oral PRN    0.9% sodium chloride infusion 250 mL  250 mL IntraVENous PRN    piperacillin-tazobactam (ZOSYN) 3.375 g in 0.9% sodium chloride (MBP/ADV) 100 mL  3.375 g IntraVENous Q8H    sodium chloride (NS) flush 20 mL  20 mL InterCATHeter Q8H    heparin (porcine) pf 600 Units  600 Units InterCATHeter Q8H    sodium chloride (NS) flush 20 mL  20 mL InterCATHeter PRN    heparin (porcine) pf 600 Units  600 Units InterCATHeter PRN    lidocaine-EPINEPHrine (XYLOCAINE) 1 %-1:100,000 injection 400 mg  40 mL SubCUTAneous Multiple    sodium chloride (NS) flush 5-10 mL  5-10 mL IntraVENous Q8H    sodium chloride (NS) flush 5-10 mL  5-10 mL IntraVENous PRN    aspirin chewable tablet 81 mg  81 mg Oral DAILY    atorvastatin (LIPITOR) tablet 80 mg  80 mg Oral QHS    benzonatate (TESSALON) capsule 100 mg  100 mg Oral TID PRN    clopidogrel (PLAVIX) tablet 75 mg  75 mg Oral DAILY    pyridostigmine (MESTINON) tablet 60 mg  60 mg Oral DAILY    nitroglycerin (NITROSTAT) tablet 0.4 mg  0.4 mg SubLINGual Q5MIN PRN    HYDROcodone-acetaminophen (NORCO) 5-325 mg per tablet 1 Tab  1 Tab Oral Q6H PRN    morphine injection 2 mg  2 mg IntraVENous Q4H PRN    acetaminophen (TYLENOL) tablet 650 mg  650 mg Oral Q4H PRN       Review of Systems  Constitutional: negative for fever, chills, sweats  Cardiovascular: negative for chest pain, palpitations, syncope, edema  Gastrointestinal:  negative for dysphagia, reflux, vomiting, diarrhea, abdominal pain, or melena  Neurologic:  negative for focal weakness, numbness, headache    Objective:     Vitals:    10/29/17 0352 10/29/17 0353 10/29/17 0451 10/29/17 0729   BP: (!) 175/102 (!) 175/102 (!) 145/97 (!) 174/99   Pulse: 97 93 73 91   Resp: 18   18   Temp: 97.6 °F (36.4 °C)   97 °F (36.1 °C)   SpO2: 96% 93%     Weight:       Height:         Intake and Output:   10/27 1901 - 10/29 0700  In: 2640 [P.O.:1740;  I.V.:900]  Out: 1200 [Urine:1200]       Physical Exam:   Constitution:  the patient is well developed and in no acute distress  EENMT:  Sclera clear, pupils equal, oral mucosa moist  Respiratory: clear to auscultation bilaterally, RA  Cardiovascular:  iRRR without M,G,R  Gastrointestinal: soft and non-tender; with positive bowel sounds. Musculoskeletal: warm without cyanosis. There is no lower leg edema.   Skin:  no jaundice or rashes, L chest PPM site   Neurologic: no gross neuro deficits     Psychiatric:  alert and oriented x 3    CXR:   10/29        LAB   Recent Labs      10/27/17   0504  10/26/17   1115   WBC  11.0  18.5*   HGB  12.1*  13.8   HCT  36.5*  40.9*   PLT  135*  182     Recent Labs      10/29/17   0619  10/27/17   0504  10/26/17   1305   NA  142  143  139   K  3.4*  3.4*  3.9   CL  107  110*  105   CO2  28  25  28   GLU  113*  99  115*   BUN  6*  11  17   CREA  0.63*  0.74*  1.26   CA  8.8  7.6*  7.5*       Recent Labs      10/26/17   1703  10/26/17   1305   LAC  1.5  2.2*         Assessment:  (Medical Decision Making)     Hospital Problems  Date Reviewed: 10/25/2017          Codes Class Noted POA    Sepsis ([facility] ICD-10-CM: A41.9  ICD-9-CM: 038.9, 995.91  10/26/2017 Unknown    From aspiration,resolved       Aspiration pneumonia ([facility] ICD-10-CM: J69.0  ICD-9-CM: 507.0  10/26/2017 Unknown    On ABX  WBC steadily improving 21.1 >>>18.5 >>>11.0      Hypotension- resolved  ICD-10-CM: I95.9  ICD-9-CM: 458.9  10/26/2017 Unknown        * (Principal)Atrial fibrillation with RVR (HCC) ICD-10-CM: I48.91  ICD-9-CM: 427.31  10/24/2017 Unknown    HR in the 80s          SSS (sick sinus syndrome) ([facility] ICD-10-CM: I49.5  ICD-9-CM: 427.81  6/30/2017 Yes    S/P PPM placement      HTN (hypertension) (Chronic) ICD-10-CM: I10  ICD-9-CM: 401.9  5/8/2015 Yes    Elevated DBP in the 90-100s      Coronary atherosclerosis of native coronary vessel ICD-10-CM: I25.10  ICD-9-CM: 414.01  5/8/2015 Yes     VEGAS on brilinta  5/7/15: prox LAD PCI, normal EF            Plan:  (Medical Decision Making)     --Zosyn 4 (Vanc 3) - if ready for discharge could likely transition to po and complete a 7 day course         PCT 0.8           LA 2.2 >>>1.5           BC: NGTD x 2  --on RA  --PICC line placed 10/25  --supplement K+  --limited TTE - no pericardial effusion  --rate control/anticoagulation per Cardiology  --PT following for mobility - ambulated 150' yesterday      More than 50% of the time documented was spent in face-to-face contact with the patient and in the care of the patient on the floor/unit where the patient is located. Jessica Shi NP          Lungs:  clear  Heart:  RRR with no Murmur/Rubs/Gallops    Additional Comments: Will change to PO vantin to finish 7 days course     I have spoken with and examined the patient. I agree with the above assessment and plan as documented.     Brock Castaneda MD

## 2017-10-30 VITALS
WEIGHT: 213 LBS | SYSTOLIC BLOOD PRESSURE: 137 MMHG | HEIGHT: 66 IN | TEMPERATURE: 97.4 F | BODY MASS INDEX: 34.23 KG/M2 | HEART RATE: 70 BPM | DIASTOLIC BLOOD PRESSURE: 89 MMHG | OXYGEN SATURATION: 97 % | RESPIRATION RATE: 20 BRPM

## 2017-10-30 PROCEDURE — 74011250637 HC RX REV CODE- 250/637: Performed by: PHYSICIAN ASSISTANT

## 2017-10-30 PROCEDURE — 74011250636 HC RX REV CODE- 250/636: Performed by: INTERNAL MEDICINE

## 2017-10-30 PROCEDURE — 74011250637 HC RX REV CODE- 250/637: Performed by: INTERNAL MEDICINE

## 2017-10-30 RX ORDER — HYDROCODONE BITARTRATE AND ACETAMINOPHEN 5; 325 MG/1; MG/1
1 TABLET ORAL
Qty: 5 TAB | Refills: 0 | Status: SHIPPED | OUTPATIENT
Start: 2017-10-30 | End: 2019-02-01

## 2017-10-30 RX ORDER — METOPROLOL SUCCINATE 200 MG/1
200 TABLET, EXTENDED RELEASE ORAL DAILY
Qty: 30 TAB | Refills: 6 | Status: SHIPPED | OUTPATIENT
Start: 2017-10-31 | End: 2017-12-02

## 2017-10-30 RX ORDER — METOPROLOL SUCCINATE 100 MG/1
200 TABLET, EXTENDED RELEASE ORAL DAILY
Status: DISCONTINUED | OUTPATIENT
Start: 2017-10-30 | End: 2017-10-30 | Stop reason: HOSPADM

## 2017-10-30 RX ORDER — CEFPODOXIME PROXETIL 200 MG/1
200 TABLET, FILM COATED ORAL 2 TIMES DAILY
Qty: 14 TAB | Refills: 0 | Status: SHIPPED | OUTPATIENT
Start: 2017-10-30 | End: 2017-11-06

## 2017-10-30 RX ADMIN — HYDROCODONE BITARTRATE AND ACETAMINOPHEN 1 TABLET: 5; 325 TABLET ORAL at 01:30

## 2017-10-30 RX ADMIN — METOPROLOL TARTRATE 50 MG: 50 TABLET ORAL at 01:41

## 2017-10-30 RX ADMIN — CLOPIDOGREL BISULFATE 75 MG: 75 TABLET ORAL at 09:24

## 2017-10-30 RX ADMIN — Medication 10 ML: at 05:26

## 2017-10-30 RX ADMIN — APIXABAN 5 MG: 5 TABLET, FILM COATED ORAL at 09:23

## 2017-10-30 RX ADMIN — ASPIRIN 81 MG CHEWABLE TABLET 81 MG: 81 TABLET CHEWABLE at 09:24

## 2017-10-30 RX ADMIN — SODIUM CHLORIDE, PRESERVATIVE FREE 600 UNITS: 5 INJECTION INTRAVENOUS at 05:25

## 2017-10-30 RX ADMIN — METOPROLOL SUCCINATE 200 MG: 100 TABLET, EXTENDED RELEASE ORAL at 09:22

## 2017-10-30 RX ADMIN — CEFPODOXIME PROXETIL 200 MG: 200 TABLET, FILM COATED ORAL at 09:54

## 2017-10-30 RX ADMIN — HYDROCODONE BITARTRATE AND ACETAMINOPHEN 1 TABLET: 5; 325 TABLET ORAL at 13:26

## 2017-10-30 RX ADMIN — PYRIDOSTIGMINE BROMIDE 60 MG: 60 TABLET ORAL at 09:23

## 2017-10-30 RX ADMIN — FAMOTIDINE 20 MG: 20 TABLET, FILM COATED ORAL at 09:24

## 2017-10-30 RX ADMIN — HYDROCODONE BITARTRATE AND ACETAMINOPHEN 1 TABLET: 5; 325 TABLET ORAL at 09:22

## 2017-10-30 NOTE — DISCHARGE SUMMARY
[facility] Discharge Summary     Patient ID:  Anmol Alfonso  [id number]  87 y.o.  1940    Admit date: 10/24/2017    Discharge date:  10/30/2017    Admitting Physician: Janelle Wild DO     Discharge Phys[facility]: LEE James[facility]    Admission Diagnoses: afib   Atrial fibrillation with rapid ventricular response ([facility]  Heart Failure     Discharge Diagnoses:   Patient Active Problem List    Diagnosis Date Noted    GERD (gastroesophageal reflux disease) 10/27/2017    Sepsis ([facility] 10/26/2017    Aspiration pneumonia ([address] 10/26/2017    Hypotension 10/26/2017    Atrial fibrillation with RVR ([address] 10/24/2017    Syncope 10/24/2017    Bilateral carotid artery disease ([facility] 06/30/2017    Paroxysmal atrial fibrillation ([address] 06/30/2017    SSS (sick sinus syndrome) ([facility] 06/30/2017    Myasthenia gravis ([facility]     Dyslipidemia 06/07/2016    Hypokalemia 06/07/2016    Mitral valve regurgitation 06/07/2016    HTN (hypertension) 05/08/2015    Coronary atherosclerosis of native coronary vessel 05/08/2015    S/P coronary artery stent placement 05/08/2015    Dyspnea 05/08/2015       Cardiology Procedures this admission:  EchoCardiogram  implantation of biventricular ICD  Consults: Pulmonary/Intensive care and Electrophysiology    Hospital Course: Patient was seen at the office of [facility] by Dr. Jennifer Rodriguez for complaints of feeling poorly, recent near syncope and tachypalpitations. He had 2 near syncopal episodes. He was evaluated at [facility] minute clinic and [facility] with noted elevated BP. He had noted more tachy-palpitations and progressive VEGAS. He was in atrial fibrillation with RVR in the office. He was noted to be in a-fib on previous office visit but was asymptomatic at the time. He had stopped Eliquis on his own. He was directly admitted to [facility]. He was started on IV Heparin and IV Cardizem for rate control. Serial troponins were negative.  He remained in atrial fibrillation. He was noted to have episodes of slow VR with HR in the 30s as well as RVR with HRs up to the 160s. Cardizem drip was stopped due to bradycardia. He continued to have a-fib with slow VR with HR in the 30s-40s. He felt poorly with weakness/fatigue. EP was consulted given his tachy/trell syndrome. An echocardiogram was performed with report as follows:  -  Left ventricle: Systolic function was normal. Ejection fraction was estimated to be 60 %. There were no regional wall motion abnormalities. There was mild concentric hypertrophy. -  Left atrium: The atrium was moderately dilated. -  Right atrium: The atrium was moderately dilated. -  Inferior vena cava, hepatic veins: The inferior vena cava was mildly dilated. -  Mitral valve: There was mild annular calcification. There was mild to moderate regurgitation. Treatment options were discussed. Biventricular pacemaker implantation was planned with probable AV node ablation in the future. He was taken to the EP lab and underwent Biotron[employer] biventricular pacemaker implantation by Dr. Jamin Blanchard. Prior to device implant, he was noted to have increased secretions and brief desaturation. He was then intubated by anesthesia. He tolerated the procedure well. Chest xray was negative for pneumothorax but showed left perihilar infiltrate. He was started on empiric antibiotics for probable aspiration. He was noted to be restless overnight. He developed leukocytosis and hypotension. Pulmonary service was consulted. He was given IV fluids. Given his acute illness, a rate control strategy was planned for now. He improved quickly on IV antibiotics. His BP improved. Eliquis was resumed. Medications were adjusted for BP and heart rate control. He was transitioned to oral antibiotic. The morning of 10/30 patient was up feeling well without any complaints of chest pain, shortness of breath, or palpitations.  Patient's left subclavian site was clean, dry and intact without hematoma. Patient was seen and examined by Dr. Ebony Mohan and determined stable and ready for discharge. Patient was instructed on the importance of medication compliance and outpatient follow up. The patient will follow up with [facility] for device check on November 7th at 11:00am [facility] He will complete course of antibiotic at discharge. DISPOSITION: Patient has been instructed to keep affected arm below shoulder level for the next 4 weeks or until cleared by doctor. The arm sling should be worn while sleeping. The dressing will be removed at follow up appointment. The incision site must be kept clean and dry. The patient may shower in a few days. Lotions, powders, or creams should be avoided as these can increase the risk of infection. The affected arm should not be used for any pushing, pulling, or lifting until cleared by doctor. Driving is prohibited until cleared by doctor in a follow up appointment. Any signs of infection including increased redness, suspicious drainage, or unexplained fever should be reported to the doctor immediately by calling [phone]. Discharge Exam:   Visit Vitals    /92    Pulse 80    Temp 97.5 °F (36.4 °C)    Resp 20    Ht 5' 6\" (1.676 m)    Wt 96.6 kg (213 lb)    SpO2 96%    BMI 34.38 kg/m2       Pt has been seen by Dr. Ebony Mohan: see his progress note for exam details. Patient Instructions:   Current Discharge Medication List      START taking these medications    Details   metoprolol succinate (TOPROL-XL) 200 mg XL tablet Take 1 Tab by mouth daily. Qty: 30 Tab, Refills: 6      apixaban (ELIQUIS) 5 mg tablet Take 1 Tab by mouth two (2) times a day. Qty: 60 Tab, Refills: 11      HYDROcodone-acetaminophen (NORCO) 5-325 mg per tablet Take 1 Tab by mouth every six (6) hours as needed. Max Daily Amount: 4 Tabs.   Qty: 5 Tab, Refills: 0      cefpodoxime (VANTIN) 200 mg tablet Take 1 Tab by mouth two (2) times a day for 7 days. Qty: 14 Tab, Refills: 0         CONTINUE these medications which have NOT CHANGED    Details   clopidogrel (PLAVIX) 75 mg tab Take  by mouth.      benzonatate (TESSALON) 100 mg capsule Take 100 mg by mouth three (3) times daily as needed for Cough. atorvastatin (LIPITOR) 80 mg tablet Take 1 Tab by mouth nightly. Qty: 30 Tab, Refills: 11      nitroglycerin (NITROSTAT) 0.4 mg SL tablet Place 1 sl under the tongue q 5 min prn cp, max 3 sl in a 15-min time period. Call 911 if no relief after the 3rd sl. Qty: 1 Bottle, Refills: prn      pyridostigmine (MESTINON) 60 mg tablet Take 60 mg by mouth daily.          STOP taking these medications       metoprolol tartrate (LOPRESSOR) 25 mg tablet Comments:   Reason for Stopping:         aspirin 81 mg chewable tablet Comments:   Reason for Stopping:                 Signed:  Ming Greenwood PA-C  10/30/2017

## 2017-10-30 NOTE — PROGRESS NOTES
Problem: Mobility Impaired (Adult and Pediatric)  Goal: *Acute Goals and Plan of Care (Insert Text)  LTG:  (1.)Mr. Dg Trejo will move from supine to sit and sit to supine  in bed with MODIFIED INDEPENDENCE within 3 day(s). (2.)Mr. Dg Trejo will transfer from bed to chair and chair to bed with MODIFIED INDEPENDENCE using the least restrictive device within 3 day(s). (3.)Mr. Dg Trejo will ambulate with MODIFIED INDEPENDENCE for 300 feet with the least restrictive device within 3 day(s). ________________________________________________________________________________________________     Physical Therapy Note:    Attempted to see patient this am. Patient asked PT to return secondary to being fatigued after shower. Will check back on patient later as time and schedule permits. Thank you.      Brittney Hess, FOZIA

## 2017-10-30 NOTE — DISCHARGE INSTRUCTIONS
DISCHARGE SUMMARY from Nurse    PATIENT INSTRUCTIONS:    After general anesthesia or intravenous sedation, for 24 hours or while taking prescription Narcotics:  · Limit your activities  · Do not drive and operate hazardous machinery  · Do not make important personal or business decisions  · Do  not drink alcoholic beverages  · If you have not urinated within 8 hours after discharge, please contact your surgeon on call. Report the following to your surgeon:  · Excessive pain, swelling, redness or odor of or around the surgical area  · Temperature over 100.5  · Nausea and vomiting lasting longer than 4 hours or if unable to take medications  · Any signs of decreased circulation or nerve impairment to extremity: change in color, persistent  numbness, tingling, coldness or increase pain  · Any questions    *  Please give a list of your current medications to your Primary Care Provider. *  Please update this list whenever your medications are discontinued, doses are      changed, or new medications (including over-the-counter products) are added. *  Please carry medication information at all times in case of emergency situations. These are general instructions for a healthy lifestyle:    No smoking/ No tobacco products/ Avoid exposure to second hand smoke  Surgeon General's Warning:  Quitting smoking now greatly reduces serious risk to your health. Obesity, smoking, and sedentary lifestyle greatly increases your risk for illness    A healthy diet, regular physical exercise & weight monitoring are important for maintaining a healthy lifestyle    You may be retaining fluid if you have a history of heart failure or if you experience any of the following symptoms:  Weight gain of 3 pounds or more overnight or 5 pounds in a week, increased swelling in our hands or feet or shortness of breath while lying flat in bed.   Please call your doctor as soon as you notice any of these symptoms; do not wait until your next office visit. Recognize signs and symptoms of STROKE:    F-face looks uneven    A-arms unable to move or move unevenly    S-speech slurred or non-existent    T-time-call 911 as soon as signs and symptoms begin-DO NOT go       Back to bed or wait to see if you get better-TIME IS BRAIN. Warning Signs of HEART ATTACK     Call 911 if you have these symptoms:   Chest discomfort. Most heart attacks involve discomfort in the center of the chest that lasts more than a few minutes, or that goes away and comes back. It can feel like uncomfortable pressure, squeezing, fullness, or pain.  Discomfort in other areas of the upper body. Symptoms can include pain or discomfort in one or both arms, the back, neck, jaw, or stomach.  Shortness of breath with or without chest discomfort.  Other signs may include breaking out in a cold sweat, nausea, or lightheadedness. Don't wait more than five minutes to call 911 - MINUTES MATTER! Fast action can save your life. Calling 911 is almost always the fastest way to get lifesaving treatment. Emergency Medical Services staff can begin treatment when they arrive -- up to an hour sooner than if someone gets to the hospital by car. The discharge information has been reviewed with the patient. The patient verbalized understanding. Discharge medications reviewed with the patient and appropriate educational materials and side effects teaching were provided. ___________________________________________________________________________________________________________________________________    Pacemaker Placement: What to Expect at [address] [place] [address]    Pacemaker placement is surgery to put a pacemaker in your chest. This surgery may be done if you have bradycardia (a slow heart rate). A pacemaker is a small, battery-powered device. It sends electrical signals to the heart. These signals work to keep the heartbeat steady.  Thin wires, called leads, carry the signals from the pacemaker to the heart. A pacemaker can prevent or reduce dizziness, fainting, and shortness of breath caused by a slow or unsteady heartbeat. Your chest may be sore where the doctor made the cut (incision) and put in the pacemaker. You also may have a bruise and mild swelling. These symptoms usually get better in 1 to 2 weeks. You may feel a hard ridge along the incision. This usually gets softer in the months after surgery. You may be able to see or feel the outline of the pacemaker under your skin. You will probably be able to go back to work or your usual routine 1 to 2 weeks after surgery. Pacemaker batteries usually last 5 to 15 years. Your doctor will talk to you about how often you will need to have your pacemaker checked. You can safely use most household and office electronics. This includes things such as kitchen appliances, electric power tools, and computers. You will need to stay away from things with strong magnetic and electrical fields. This includes things such as an MRI machine (unless your pacemaker is safe for an MRI), welding equipment, and power generators. You can use a cell phone, but keep it at least 6 inches away from your pacemaker. Check with your doctor about what you need to stay away from, what you need to use with care, and what is okay to use. This care sheet gives you a general idea about how long it will take for you to recover. But each person recovers at a different pace. Follow the steps below to get better as quickly as possible. How can you care for yourself at home? Activity  ? · Rest when you feel tired. ? · Be active. Walking is a good choice. ? · For 4 to 6 weeks:  ¨ Avoid activities that strain your chest or upper arm muscles. This includes pushing a  or vacuum, or mopping floors. It also includes swimming, or swinging a golf club or tennis racquet.   ¨ Do not raise your arm (the one on the side of your body where the pacemaker is located) above your shoulder. ¨ Allow your body to heal. Don't move quickly or lift anything heavy until you are feeling better. ? · Many people are able to return to work within 1 to 2 weeks after surgery. ? · Ask your doctor when it is okay for you to have sex. Diet  ? · You can eat your normal diet. If your stomach is upset, try bland, low-fat foods like plain rice, broiled chicken, toast, and yogurt. Medicines  ? · Your doctor will tell you if and when you can restart your medicines. He or she will also give you instructions about taking any new medicines. ? · If you take aspirin or some other blood thinner, be sure to talk to your doctor. He or she will tell you if and when to start taking this medicine again. Make sure that you understand exactly what your doctor wants you to do. ? · Be safe with medicines. Read and follow all instructions on the label. ¨ If the doctor gave you a prescription medicine for pain, take it as prescribed. ¨ If you are not taking a prescription pain medicine, ask your doctor if you can take an over-the-counter medicine. ¨ Do not take aspirin, ibuprofen (Advil, Motrin), naproxen (Aleve), or other nonsteroidal anti-inflammatory drugs (NSAIDs) unless your doctor says it is okay. ? · If your doctor prescribed antibiotics, take them as directed. Do not stop taking them just because you feel better. You need to take the full course of antibiotics. Incision care  ? · If you have strips of tape on the incision, leave the tape on for a week or until it falls off.   ? · Keep the incision dry while it heals. Your doctor may recommend sponge baths for about 7 days, but do not get the incision wet. Your doctor will let you know when you may take showers. After a shower, pat the incision dry. ? · Don't use hydrogen peroxide or alcohol on the incision, which can slow healing. You may cover the area with a gauze bandage if it oozes fluid or rubs against clothing. Change the bandage every day. ? · Do not take a bath or get into a hot tub for the first 2 weeks, or until your doctor tells you it is okay. Other instructions  ? · Keep a medical ID card with you at all times that says you have a pacemaker. The card should include the  and model information. ? · Wear medical alert jewelry stating that you have a pacemaker. You can buy this at most drugstores. ? · Check your pulse as directed by your doctor. ? · Have your pacemaker checked as often as your doctor recommends. In some cases, this may be done over the phone or the Internet. Your doctor will give you instructions about how to do this. Follow-up care is a key part of your treatment and safety. Be sure to make and go to all appointments, and call your doctor if you are having problems. It's also a good idea to know your test results and keep a list of the medicines you take. When should you call for help? Call 911 anytime you think you may need emergency care. For example, call if:  ? · You passed out (lost consciousness). ? · You have trouble breathing. ?Call your doctor now or seek immediate medical care if:  ? · You are dizzy or light-headed, or you feel like you may faint. ? · You have pain that does not get better after you take pain medicine. ? · You hear an alarm or feel a vibration from your pacemaker. ? · You have loose stitches, or your incision comes open. ? · Bright red blood has soaked through the bandage over your incision. ? · You have signs of infection, such as:  ¨ Increased pain, swelling, warmth, or redness. ¨ Red streaks leading from the incision. ¨ Pus draining from the incision. ¨ A fever. ? Watch closely for changes in your health, and be sure to contact your doctor if:  ? · You have any problems with your pacemaker. Where can you learn more? Go to http://tess-micheline.info/.   Enter G581 in the search box to learn more about \"Pacemaker Placement: What to Expect at Home.\"  Current as of: September 21, 2016  Content Version: 11.4  © 2965-4131 [employer]. Care instructions adapted under license by [employer] (which disclaims liability or warranty for this information). If you have questions about a medical condition or this instruction, always ask your healthcare professional. [facility] any warranty or liability for your use of this information. Atrial Fibrillation: Care Instructions  Your Care Instructions    Atrial fibrillation is an irregular and often fast heartbeat. Treating this condition is important for several reasons. It can cause blood clots, which can travel from your heart to your brain and cause a stroke. If you have a fast heartbeat, you may feel lightheaded, dizzy, and weak. An irregular heartbeat can also increase your risk for heart failure. Atrial fibrillation is often the result of another heart condition, such as high blood pressure or coronary artery disease. Making changes to improve your heart condition will help you stay healthy and active. Follow-up care is a key part of your treatment and safety. Be sure to make and go to all appointments, and call your doctor if you are having problems. It's also a good idea to know your test results and keep a list of the medicines you take. How can you care for yourself at home? Medicines  ? · Take your medicines exactly as prescribed. Call your doctor if you think you are having a problem with your medicine. You will get more details on the specific medicines your doctor prescribes. ? · If your doctor has given you a blood thinner to prevent a stroke, be sure you get instructions about how to take your medicine safely. Blood thinners can cause serious bleeding problems. ? · Do not take any vitamins, over-the-counter drugs, or herbal products without talking to your doctor first.   ? Lifestyle changes  ? · Do not smoke.  Smoking can increase your chance of a stroke and heart attack. If you need help quitting, talk to your doctor about stop-smoking programs and medicines. These can increase your chances of quitting for good. ? · Eat a heart-healthy diet. ? · Stay at a healthy weight. Lose weight if you need to.   ? · Limit alcohol to 2 drinks a day for men and 1 drink a day for women. Too much alcohol can cause health problems. ? · Avoid colds and flu. Get a pneumococcal vaccine shot. If you have had one before, ask your doctor whether you need another dose. Get a flu shot every year. If you must be around people with colds or flu, wash your hands often. Activity  ? · If your doctor recommends it, get more exercise. Walking is a good choice. Bit by bit, increase the amount you walk every day. Try for at least 30 minutes on most days of the week. You also may want to swim, bike, or do other activities. Your doctor may suggest that you join a cardiac rehabilitation program so that you can have help increasing your physical activity safely. ? · Start light exercise if your doctor says it is okay. Even a small amount will help you get stronger, have more energy, and manage stress. Walking is an easy way to get exercise. Start out by walking a little more than you did in the hospital. Gradually increase the amount you walk. ? · When you exercise, watch for signs that your heart is working too hard. You are pushing too hard if you cannot talk while you are exercising. If you become short of breath or dizzy or have chest pain, sit down and rest immediately. ? · Check your pulse regularly. Place two fingers on the artery at the palm side of your wrist, in line with your thumb. If your heartbeat seems uneven or fast, talk to your doctor. When should you call for help? Call 911 anytime you think you may need emergency care. For example, call if:  ? · You have symptoms of a heart attack.  These may include:  ¨ Chest pain or pressure, or a strange feeling in the chest.  ¨ Sweating. ¨ Shortness of breath. ¨ Nausea or vomiting. ¨ Pain, pressure, or a strange feeling in the back, neck, jaw, or upper belly or in one or both shoulders or arms. ¨ Lightheadedness or sudden weakness. ¨ A fast or irregular heartbeat. After you call 911, the  may tell you to chew 1 adult-strength or 2 to 4 low-dose aspirin. Wait for an ambulance. Do not try to drive yourself. ? · You have symptoms of a stroke. These may include:  ¨ Sudden numbness, tingling, weakness, or loss of movement in your face, arm, or leg, especially on only one side of your body. ¨ Sudden vision changes. ¨ Sudden trouble speaking. ¨ Sudden confusion or trouble understanding simple statements. ¨ Sudden problems with walking or balance. ¨ A sudden, severe headache that is different from past headaches. ? · You passed out (lost consciousness). ?Call your doctor now or seek immediate medical care if:  ? · You have new or increased shortness of breath. ? · You feel dizzy or lightheaded, or you feel like you may faint. ? · Your heart rate becomes irregular. ? · You can feel your heart flutter in your chest or skip heartbeats. Tell your doctor if these symptoms are new or worse. ? Watch closely for changes in your health, and be sure to contact your doctor if you have any problems. Where can you learn more? Go to http://tess-micheline.info/. Enter U020 in the search box to learn more about \"Atrial Fibrillation: Care Instructions. \"  Current as of: September 21, 2016  Content Version: 11.4  © 5499-3081 [employer]. Care instructions adapted under license by [employer] (which disclaims liability or warranty for this information). If you have questions about a medical condition or this instruction, always ask your healthcare professional. [address] any warranty or liability for your use of this information.

## 2017-10-30 NOTE — PROGRESS NOTES
[facility] CARDIOLOGY PROGRESS NOTE           10/30/2017 8:20 AM    Admit Date: 10/24/2017    Admit Diagnosis: afib ;Atrial fibrillation with rapid ventricular response (*      Subjective:   No complaints this AM, no chest pain or shortness of breath    Interval History: (History of pertinent interval events obtained from nursing staff)  No events overnight, improved HR control, high blood pressures overnight    ROS:  GEN:  No fever or chills  Cardiovascular:  As noted above  Pulmonary:  As noted above  Neuro:  No new focal motor or sensory loss      Objective:     Vitals:    10/29/17 2125 10/30/17 0136 10/30/17 0408 10/30/17 0712   BP: (!) 133/94 (!) 182/110 147/85 (!) 180/96   Pulse: 73 90 81 80   Resp:    20   Temp:   97.6 °F (36.4 °C) 97.5 °F (36.4 °C)   SpO2:    96%   Weight:       Height:           Physical Exam:  General-Well Developed, Well Nourished, No Acute Distress, Alert & Oriented x 3, appropriate mood. Neck- supple, no JVD  CV- irreg irreg, distant S1, S2, no MRG  Lung- clear bilaterally  Abd- soft, nontender, nondistended  Ext- no edema bilaterally.   Skin- warm and dry    Current Facility-Administered Medications   Medication Dose Route Frequency    metoprolol succinate (TOPROL-XL) tablet 200 mg  200 mg Oral DAILY    cefpodoxime (VANTIN) tablet 200 mg  200 mg Oral Q12H    famotidine (PEPCID) tablet 20 mg  20 mg Oral Q12H    simethicone (MYLICON) tablet 80 mg  80 mg Oral QID PRN    bisacodyl (DULCOLAX) tablet 5 mg  5 mg Oral DAILY PRN    apixaban (ELIQUIS) tablet 5 mg  5 mg Oral BID    chlorhexidine (PERIDEX) 0.12 % mouthwash 15 mL  15 mL Oral PRN    sodium chloride (NS) flush 20 mL  20 mL InterCATHeter Q8H    heparin (porcine) pf 600 Units  600 Units InterCATHeter Q8H    sodium chloride (NS) flush 20 mL  20 mL InterCATHeter PRN    heparin (porcine) pf 600 Units  600 Units InterCATHeter PRN    sodium chloride (NS) flush 5-10 mL  5-10 mL IntraVENous PRN    aspirin chewable tablet 81 mg  81 mg Oral DAILY    atorvastatin (LIPITOR) tablet 80 mg  80 mg Oral QHS    benzonatate (TESSALON) capsule 100 mg  100 mg Oral TID PRN    clopidogrel (PLAVIX) tablet 75 mg  75 mg Oral DAILY    pyridostigmine (MESTINON) tablet 60 mg  60 mg Oral DAILY    nitroglycerin (NITROSTAT) tablet 0.4 mg  0.4 mg SubLINGual Q5MIN PRN    HYDROcodone-acetaminophen (NORCO) 5-325 mg per tablet 1 Tab  1 Tab Oral Q6H PRN    morphine injection 2 mg  2 mg IntraVENous Q4H PRN    acetaminophen (TYLENOL) tablet 650 mg  650 mg Oral Q4H PRN     Data Review: No results found for this or any previous visit (from the past 24 hour(s)). EKG:  (EKG has been independently visualized by me with interpretation below)  Assessment:     Principal Problem:    Atrial fibrillation with RVR ([facility] [address] (10/24/2017)    Active Problems:    HTN (hypertension) (5/8/2015)      Coronary atherosclerosis of native coronary vessel (5/8/2015)      Overview: VEGAS on brilinta      5/7/15: prox LAD PCI, normal EF      SSS (sick sinus syndrome) ([facility] [address] (6/30/2017)      Syncope (10/24/2017)      Sepsis ([facility] [address] (10/26/2017)      Aspiration pneumonia ([facility] [address] (10/26/2017)      Hypotension (10/26/2017)      GERD (gastroesophageal reflux disease) (10/27/2017)      Plan:   1. Afib: rates improved, transition to toprol xl 200mg, restarted eliquis for cardioembolic protection  2. Tachy trell: s/p PPM, will interrogate again today  3. CAD: stable, no chest pain, cont meds  4. Aspiration pneumonia: much improved, plan to discharge on vantin 200mg   5. HTN: will need outpatient follow up, transition to toprol xl today  6. Dispo: home today with close follow up    [address] [facility] [address] Sellers MD  Cardiology/Electrophysiology

## 2017-10-30 NOTE — PROGRESS NOTES
Patient's discharge delayed due to waiting on son to arrive to pick him up. Son now on unit and patient has been given discharge instructions and prescriptions. Follow up appointments reviewed and given to patient. All personal belongings taken with patient. Patient escorted to discharge area via wheelchair. Family member will drive patient home. Patient is stable at discharge. Patient voices understanding of discharge teaching.

## 2017-10-31 LAB
BACTERIA SPEC CULT: NORMAL
BACTERIA SPEC CULT: NORMAL
SERVICE CMNT-IMP: NORMAL
SERVICE CMNT-IMP: NORMAL

## 2017-10-31 NOTE — ADT AUTH CERT NOTES
Pneumonia Due to Aspiration - Care Day 6 (10/29/2017) by Harsha Mcintosh, RN        Review Entered Review Status       10/30/2017 Completed       Details              Care Day: 6 Care Date: 10/29/2017 Level of Care: Telemetry       Guideline Day 4        Clinical Status       (X) * Hemodynamic stability       10/30/2017 1:38 PM EDT by Haleigh Art         vitals- 174/99, 91, 18, 97.0, 93%.              (X) * Oxygen requirement absent or at baseline       10/30/2017 1:38 PM EDT by Haleigh Art         absent              (X) * Mental status at baseline       10/30/2017 1:38 PM EDT by Jim King   Well Developed, Well Nourished, No Acute Distress, Alert & Oriented x 3, appropriate mood              (X) * Uncompensated respiratory acidosis absent       10/30/2017 1:38 PM EDT by Haleigh Art         none noted              (X) * Tachypnea absent       10/30/2017 1:38 PM EDT by Haleigh Art         RR 18              (X) * Infection absent or treatment at next level of care arranged       10/30/2017 1:38 PM EDT by Jim King   managed              (X) * Aspiration addressed [H]       10/30/2017 1:38 PM EDT by Haleigh Art         none noted              (X) * Diet or tube feedings tolerated       10/30/2017 1:38 PM EDT by Jim King   yes              ( ) * Discharge plans and education understood              Activity       (X) * Ambulatory [I]       10/30/2017 1:38 PM EDT by Haleigh Art         ambulated 150' yesterday                     Routes       (X) * Diet as tolerated or tube feedings       10/30/2017 1:38 PM EDT by Haleigh Art         cardiac diet              (X) * Oral or enteral hydration and medications       10/30/2017 1:38 PM EDT by Haleigh Art         eliquis 5 mg po bid, asa 81, lipitor 80 mg po q hs,plavix 75 mg po q d, pepcid 20 mg po q 12 hrs, heparin 600 u ik q 8 hrs, norco po q 6 hrs prn x 2,  mestinon 60 mg po q d, vantin 200 mg po q 12 hrs, kcl 40 meq po x 1, lopressor 25 mg po q 6 hrs,                     Interventions       (X) * Oxygen absent       10/30/2017 1:38 PM EDT by Yvette Matthews         yes                     Medications       (X) Antibiotics as indicated       10/30/2017 1:38 PM EDT by Yvette Matthews         zosyn 3.375 g iv q 8 hrs                     10/30/2017 1:38 PM EDT by Yvette Matthews       Subject: Additional Clinical Information       10-29-17Pulm- BP elevated.   Currently on RA with O2 sat 93%.   Denies sob, cough, or mucus production.   Ongoing tenderness at PPM insertion site.   Denies palpitations - remains in a.fib. Zosyn 4 (Vanc 3) - if ready for discharge could likely transition to po and complete a 7 day course                                                         PCT 0.8  LA 2.2 >>>1.5  BC: NGTD x 2--on RA--PICC line placed 10/25--supplement K+--limited TTE - no pericardial effusion--rate control/anticoagulation per Cardiology--PT following for mobility - ambulated 150' yesterdayCardiology- No complaints this AM, no chest pain or shortness of breath. Afib: rates improved, increase metoprolol to 50mg Q6H and will consolidate tomorrow if stable, restarted eliquis for cardioembolic protectionTachy trell: s/p PPMCAD: stable, no chest pain, cont medsAspiration pneumonia: much improved, transitioned to vantin 200mg this AM for total of 7 day courseHTN: blood pressures now slightly elevated, increased metoprolol this AMPPx: eliquisAbnl labs- k 3.4, glucose 113, bun 6, creat 0.63. Meds- lopressor 50 mg po q 6 hrsPlan- cardiac monitor, iv abx, PT, elevate hob                                   * Milestone                  Pneumonia Due to Aspiration - Care Day 5 (10/28/2017) by Evelin Matthews RN        Review Entered Review Status       10/30/2017 Completed       Details              Care Day: 5 Care Date: 10/28/2017 Level of Care: Telemetry       Guideline Day 3        Level Of Care       (X) Floor       10/30/2017 1:31 PM EDT by Seymour Boxer         tele floor                     Clinical Status       (X) * Hemodynamic stability       10/30/2017 1:31 PM EDT by Seymour Boxer         vitals- 150/85, 100, 18, 97.8, 95%.              (X) * Oxygen requirement absent or reduced       10/30/2017 1:31 PM EDT by Pedro Pablo Campos   pt on room air              (X) * Tachypnea absent or improved       10/30/2017 1:31 PM EDT by Seymour Boxer         RR 18              (X) * Mental status at baseline or improved       10/30/2017 1:31 PM EDT by Pedro Pablo Campos   Well Developed, Well Nourished, No Acute Distress, Alert & Oriented x 3, appropriate mood              (X) * Fever absent or reduced       10/30/2017 1:31 PM EDT by Seymour Boxer         geri 97.5                     Activity       (X) Activity as tolerated       10/30/2017 1:31 PM EDT by Seymour Boxer         bed rest with brp                     Routes       (X) * Oral or enteral hydration and medications       10/30/2017 1:31 PM EDT by Seymour Boxer         eliquis 5 mg po bid, asa 81 mgpo q d, lipitor 80 mg po q d, plavix 75 mg po q d, pepcid 20 mg po q 12 hrs, heparin 600 u ik q 8 hrs, norco po q 6 hrs parn x 2, mestinon 60 mg po q d, lopressor 25 mg po q 6 hrs,regular diet                     Medications       (X) Possible IV or oral antibiotics       10/30/2017 1:31 PM EDT by Seymour Boxer         zosyn 3.375 gy iv q 8 hrs, vancocin 1250 mg iv q 12 hrs.                     10/30/2017 1:31 PM EDT by Seymour Boxer       Subject: Additional Clinical Information       10-28-17Pulm- denies SOB, cough, on RA. Zosyn / Vanc 3- will stop Vanco  Cardiology- No complaints this AM, no chest pain or shortness of breath, feels much better.  History of pertinent interval events obtained from nursing staff)Afib, rates better controlled, BPs stabilized, no fevers. Afib: rates improved, cont metoprolol, will consolidate dose tomorrow if remains stable, restarted eliquis for cardioembolic protectionTachy trell: s/p PPMCAD: stable, no chest pain, cont medsAspiration pneumonia: much improved, stopping Vanc, ?transition to PO antibiotic soon, appreciate pulmonary inputHTN: blood pressures now slightly elevated, cont medsPPx: eliquisCXR- No acute abnormalityPlan- elevate hob, PT, cardiac monitor                                     * Milestone

## 2017-11-08 PROBLEM — I48.91 ATRIAL FIBRILLATION WITH RVR (HCC): Status: RESOLVED | Noted: 2017-10-24 | Resolved: 2017-11-08

## 2017-11-29 ENCOUNTER — HOSPITAL ENCOUNTER (INPATIENT)
Age: 77
LOS: 3 days | Discharge: HOME | DRG: 310 | End: 2017-12-02
Attending: INTERNAL MEDICINE | Admitting: INTERNAL MEDICINE
Payer: COMMERCIAL

## 2017-11-29 PROBLEM — I48.91 ATRIAL FIBRILLATION (HCC): Status: ACTIVE | Noted: 2017-11-29

## 2017-11-29 LAB
ALBUMIN SERPL-MCNC: 3.1 G/DL (ref 3.2–4.6)
ALBUMIN/GLOB SERPL: 0.9 {RATIO} (ref 1.2–3.5)
ALP SERPL-CCNC: 85 U/L (ref 50–136)
ALT SERPL-CCNC: 31 U/L (ref 12–65)
ANION GAP SERPL CALC-SCNC: 9 MMOL/L (ref 7–16)
AST SERPL-CCNC: 47 U/L (ref 15–37)
ATRIAL RATE: 267 BPM
ATRIAL RATE: 90 BPM
BILIRUB SERPL-MCNC: 0.5 MG/DL (ref 0.2–1.1)
BUN SERPL-MCNC: 9 MG/DL (ref 8–23)
CALCIUM SERPL-MCNC: 8.8 MG/DL (ref 8.3–10.4)
CALCULATED R AXIS, ECG10: -13 DEGREES
CALCULATED R AXIS, ECG10: -9 DEGREES
CALCULATED T AXIS, ECG11: 36 DEGREES
CALCULATED T AXIS, ECG11: 42 DEGREES
CHLORIDE SERPL-SCNC: 107 MMOL/L (ref 98–107)
CO2 SERPL-SCNC: 27 MMOL/L (ref 21–32)
CREAT SERPL-MCNC: 0.94 MG/DL (ref 0.8–1.5)
DIAGNOSIS, 93000: NORMAL
DIAGNOSIS, 93000: NORMAL
ERYTHROCYTE [DISTWIDTH] IN BLOOD BY AUTOMATED COUNT: 13.9 % (ref 11.9–14.6)
GLOBULIN SER CALC-MCNC: 3.5 G/DL (ref 2.3–3.5)
GLUCOSE SERPL-MCNC: 141 MG/DL (ref 65–100)
HCT VFR BLD AUTO: 41.5 % (ref 41.1–50.3)
HGB BLD-MCNC: 13.6 G/DL (ref 13.6–17.2)
MAGNESIUM SERPL-MCNC: 2.1 MG/DL (ref 1.8–2.4)
MCH RBC QN AUTO: 29.9 PG (ref 26.1–32.9)
MCHC RBC AUTO-ENTMCNC: 32.8 G/DL (ref 31.4–35)
MCV RBC AUTO: 91.2 FL (ref 79.6–97.8)
PLATELET # BLD AUTO: 158 K/UL (ref 150–450)
PMV BLD AUTO: 10 FL (ref 10.8–14.1)
POTASSIUM SERPL-SCNC: 4 MMOL/L (ref 3.5–5.1)
PROT SERPL-MCNC: 6.6 G/DL (ref 6.3–8.2)
Q-T INTERVAL, ECG07: 374 MS
Q-T INTERVAL, ECG07: 392 MS
QRS DURATION, ECG06: 92 MS
QRS DURATION, ECG06: 92 MS
QTC CALCULATION (BEZET), ECG08: 447 MS
QTC CALCULATION (BEZET), ECG08: 460 MS
RBC # BLD AUTO: 4.55 M/UL (ref 4.23–5.67)
SODIUM SERPL-SCNC: 143 MMOL/L (ref 136–145)
VENTRICULAR RATE, ECG03: 83 BPM
VENTRICULAR RATE, ECG03: 86 BPM
WBC # BLD AUTO: 10.7 K/UL (ref 4.3–11.1)

## 2017-11-29 PROCEDURE — 74011250637 HC RX REV CODE- 250/637: Performed by: NURSE PRACTITIONER

## 2017-11-29 PROCEDURE — 93005 ELECTROCARDIOGRAM TRACING: CPT | Performed by: INTERNAL MEDICINE

## 2017-11-29 PROCEDURE — 83735 ASSAY OF MAGNESIUM: CPT | Performed by: PHYSICIAN ASSISTANT

## 2017-11-29 PROCEDURE — 36415 COLL VENOUS BLD VENIPUNCTURE: CPT | Performed by: PHYSICIAN ASSISTANT

## 2017-11-29 PROCEDURE — 74011250637 HC RX REV CODE- 250/637: Performed by: INTERNAL MEDICINE

## 2017-11-29 PROCEDURE — 85027 COMPLETE CBC AUTOMATED: CPT | Performed by: PHYSICIAN ASSISTANT

## 2017-11-29 PROCEDURE — 80053 COMPREHEN METABOLIC PANEL: CPT | Performed by: PHYSICIAN ASSISTANT

## 2017-11-29 PROCEDURE — 65660000000 HC RM CCU STEPDOWN

## 2017-11-29 RX ORDER — PYRIDOSTIGMINE BROMIDE 60 MG/1
60 TABLET ORAL DAILY
Status: DISCONTINUED | OUTPATIENT
Start: 2017-11-29 | End: 2017-12-02 | Stop reason: HOSPADM

## 2017-11-29 RX ORDER — ONDANSETRON 2 MG/ML
4 INJECTION INTRAMUSCULAR; INTRAVENOUS
Status: DISCONTINUED | OUTPATIENT
Start: 2017-11-29 | End: 2017-12-02 | Stop reason: HOSPADM

## 2017-11-29 RX ORDER — SODIUM CHLORIDE 0.9 % (FLUSH) 0.9 %
5-10 SYRINGE (ML) INJECTION AS NEEDED
Status: DISCONTINUED | OUTPATIENT
Start: 2017-11-29 | End: 2017-12-02 | Stop reason: HOSPADM

## 2017-11-29 RX ORDER — NITROGLYCERIN 0.4 MG/1
0.4 TABLET SUBLINGUAL AS NEEDED
Status: DISCONTINUED | OUTPATIENT
Start: 2017-11-29 | End: 2017-12-02 | Stop reason: HOSPADM

## 2017-11-29 RX ORDER — SOTALOL HYDROCHLORIDE 80 MG/1
160 TABLET ORAL EVERY 12 HOURS
Status: DISCONTINUED | OUTPATIENT
Start: 2017-11-29 | End: 2017-12-02 | Stop reason: HOSPADM

## 2017-11-29 RX ORDER — HYDROCODONE BITARTRATE AND ACETAMINOPHEN 5; 325 MG/1; MG/1
1 TABLET ORAL
Status: DISCONTINUED | OUTPATIENT
Start: 2017-11-29 | End: 2017-12-02 | Stop reason: HOSPADM

## 2017-11-29 RX ORDER — BENZONATATE 100 MG/1
100 CAPSULE ORAL
Status: DISCONTINUED | OUTPATIENT
Start: 2017-11-29 | End: 2017-12-02 | Stop reason: HOSPADM

## 2017-11-29 RX ORDER — MORPHINE SULFATE 2 MG/ML
2 INJECTION, SOLUTION INTRAMUSCULAR; INTRAVENOUS
Status: DISCONTINUED | OUTPATIENT
Start: 2017-11-29 | End: 2017-12-02 | Stop reason: HOSPADM

## 2017-11-29 RX ORDER — TEMAZEPAM 15 MG/1
15 CAPSULE ORAL
Status: DISCONTINUED | OUTPATIENT
Start: 2017-11-29 | End: 2017-12-02 | Stop reason: HOSPADM

## 2017-11-29 RX ORDER — CLOPIDOGREL BISULFATE 75 MG/1
75 TABLET ORAL DAILY
Status: DISCONTINUED | OUTPATIENT
Start: 2017-11-29 | End: 2017-12-02 | Stop reason: HOSPADM

## 2017-11-29 RX ORDER — SODIUM CHLORIDE 0.9 % (FLUSH) 0.9 %
5-10 SYRINGE (ML) INJECTION EVERY 8 HOURS
Status: DISCONTINUED | OUTPATIENT
Start: 2017-11-29 | End: 2017-12-02 | Stop reason: HOSPADM

## 2017-11-29 RX ORDER — ATORVASTATIN CALCIUM 40 MG/1
80 TABLET, FILM COATED ORAL
Status: DISCONTINUED | OUTPATIENT
Start: 2017-11-29 | End: 2017-12-02 | Stop reason: HOSPADM

## 2017-11-29 RX ADMIN — Medication 10 ML: at 10:00

## 2017-11-29 RX ADMIN — TEMAZEPAM 15 MG: 15 CAPSULE ORAL at 22:05

## 2017-11-29 RX ADMIN — PYRIDOSTIGMINE BROMIDE 60 MG: 60 TABLET ORAL at 11:17

## 2017-11-29 RX ADMIN — Medication 5 ML: at 21:59

## 2017-11-29 RX ADMIN — CLOPIDOGREL BISULFATE 75 MG: 75 TABLET ORAL at 11:17

## 2017-11-29 RX ADMIN — ATORVASTATIN CALCIUM 80 MG: 40 TABLET, FILM COATED ORAL at 21:58

## 2017-11-29 RX ADMIN — APIXABAN 5 MG: 5 TABLET, FILM COATED ORAL at 18:00

## 2017-11-29 RX ADMIN — SOTALOL HYDROCHLORIDE 160 MG: 80 TABLET ORAL at 17:56

## 2017-11-29 NOTE — PROGRESS NOTES
Patient arrive to floor, direct admit from the office for betapace loading. Admission assessment completed along with initial skin assessment. Care of patient assumed at this time. Patient oriented to room, call light within reach, bed low and locked. Patient placed on telemetry monitor.

## 2017-11-29 NOTE — H&P
[facility] Cardiology History & Physical      Date of  Admission: 11/29/2017  9:26 AM        HPI:    Sury Arias is a very pleasant 67yo WM with a history of HTN, CAD s/p PCI, atrial fibrillation with RVR, and syncope was admitted to the hospital recently with worsening shortness of breath on exertion, episodes of feeling lightheaded and dizzy, \"falling to the ground\" on occasion and having periods of rapid palpitations associated with SOB and is now s/p PPM implantation. Pt reports this has been going on for several weeks to months and was admitted to the hospital from the office with afib with RVR. Pt has been monitored on telemetry and having episodes of marked bradycardia in the setting of afib with HRs into the 30s as well as periods of afib with RVR. Pt is symptomatic with associated SOB, fatigue and overall malaise. Pt reports he has been in afib for a long time. Pt afib is a chronic problem, worsening, aggravated by activity, no alleviating factors, with symptoms as noted above. He was seen in the office by Dr. Fidelia Pollock for consideration of drug loading for afib. He continues to feel tired and have shortness of breath. He was scheduled for direct admission on 11/29 for Sotalol loading.     Cardiac PMH: (Old records have been reviewed and summarized below)  TTE (10/17): low normal EF     Past Medical History, Past Surgical History, Family history, Social History, and Medications were all reviewed with the patient today and updated as necessary.             Current Outpatient Prescriptions   Medication Sig Dispense Refill    metoprolol succinate (TOPROL-XL) 200 mg XL tablet Take 1 Tab by mouth daily. 30 Tab 6    apixaban (ELIQUIS) 5 mg tablet Take 1 Tab by mouth two (2) times a day. 60 Tab 11    HYDROcodone-acetaminophen (NORCO) 5-325 mg per tablet Take 1 Tab by mouth every six (6) hours as needed. Max Daily Amount: 4 Tabs.  5 Tab 0    clopidogrel (PLAVIX) 75 mg tab Take  by mouth.        benzonatate (TESSALON) 100 mg capsule Take 100 mg by mouth three (3) times daily as needed for Cough.        atorvastatin (LIPITOR) 80 mg tablet Take 1 Tab by mouth nightly. 30 Tab 11    nitroglycerin (NITROSTAT) 0.4 mg SL tablet Place 1 sl under the tongue q 5 min prn cp, max 3 sl in a 15-min time period.  Call 911 if no relief after the 3rd sl. 1 Bottle prn    pyridostigmine (MESTINON) 60 mg tablet Take 60 mg by mouth daily.          No Known Allergies       Patient Active Problem List     Diagnosis    GERD (gastroesophageal reflux disease)    [facility] [address]    Aspiration pneumonia (HCC)    Hypotension    Syncope    Bilateral carotid artery disease (HCC)    Paroxysmal atrial fibrillation (HCC)    SSS (sick sinus syndrome) ([facility] [address]    Myasthenia gravis ([facility] [address]    Dyslipidemia    Hypokalemia    Mitral valve regurgitation    HTN (hypertension)    Coronary atherosclerosis of native coronary vessel       VEGAS on brilinta  5/7/15: prox LAD PCI, normal EF    S/P coronary artery stent placement       3.0x38 mm Xience ADRIANNA to pLAD 5/7/15    Dyspnea       Echo 6/15: EF 60%, mod MR, mod LVH, mild AI           Past Medical History:   Diagnosis Date    Abnormal EKG 4/22/15    CAD (coronary artery disease) 5/8/2015    Carotid artery stenosis without cerebral infarction 6/7/2016     US 6/15: <50% bilat ICAs    Coronary atherosclerosis of native coronary vessel 5/8/2015     VEGAS on brilinta 5/7/15: prox LAD PCI, normal EF     Dyslipidemia 6/7/2016    Dyspnea 5/8/2015     Echo 6/15: EF 60%, mod MR, mod LVH, mild AI     ED (erectile dysfunction)      GERD (gastroesophageal reflux disease)      GERD (gastroesophageal reflux disease)      HTN (hypertension) 5/8/2015    Hypertension      Hypokalemia 6/7/2016    Hypokalemia      Mitral valve regurgitation 6/7/2016    Myasthenia gravis ([address] 6/17/15    Myasthenia gravis (HCC)      Nocturia      Osteoporosis      PUD (peptic ulcer disease)      S/P coronary artery stent placement 5/8/2015     3.0x38 mm Xience ADRIANNA to pLAD 5/7/15     Sleep apnea      Syncope and collapse      Unspecified sleep apnea              Past Surgical History:   Procedure Laterality Date    HX APPENDECTOMY        HX COLONOSCOPY   2007    HX HEART CATHETERIZATION   5/7/2015    HX HEMORRHOIDECTOMY        HX [place] [place] Dr. Cervantes/Xin for sleep apnea and reconstruction for extending jaw             Family History   Problem Relation Age of Onset    Cancer Mother         kidney    Cancer Father         stomach           Social History   Substance Use Topics    Smoking status: Never Smoker    Smokeless tobacco: Never Used    Alcohol use No         ROS:  A comprehensive review of systems was performed with the pertinent positives and negatives as noted in the HPI.  All other systems were reviewed and are negative     PHYSICAL EXAM:          Visit Vitals    BP (!) 176/112    Pulse 98    Ht 5' 6\" (1.676 m)    Wt 205 lb (93 kg)    BMI 33.09 kg/m2             Wt Readings from Last 5 Encounters:   11/22/17 205 lb (93 kg)   11/08/17 207 lb (93.9 kg)   10/28/17 213 lb (96.6 kg)   10/24/17 211 lb 6.4 oz (95.9 kg)   08/07/17 211 lb (95.7 kg)         BP Readings from Last 5 Encounters:   11/22/17 (!) 176/112   11/08/17 134/86   10/30/17 137/89   10/24/17 134/84   08/07/17 110/80         General appearance: Alert, well appearing, and in no distress   Eyes: Sclerae anicteric, Pupils equal, EOMI  ENT: Hearing grossly normal bilaterally, no oral lesions, normal dentition  CV: irreg irreg, no M/R/G, PMI not palpable, no JVD, normal distal pulses, no lower extremity edema  Pulm: Clear to auscultation, no wheezes, no crackles, no accessory muscle use  GI: Soft, nontender, nondistended, normal bowel sounds  Neuro: Alert and oriented     Medical problems and test results were reviewed with the patient today.            Lab Results   Component Value Date/Time     Potassium 3.4 10/29/2017 06:19 AM            Lab Results   Component Value Date/Time     Creatinine 0.63 10/29/2017 06:19 AM            Lab Results   Component Value Date/Time     HGB 12.1 10/27/2017 05:04 AM            Lab Results   Component Value Date/Time     INR 1.0 05/06/2015 01:38 PM     Prothrombin time 9.9 05/06/2015 01:38 PM         EKG: (EKG has been independently visualized by me with interpretation below)        Diagnoses and all orders for this visit:     1. Paroxysmal atrial fibrillation (HCC)  -     AMB POC EKG ROUTINE W/ 12 LEADS, INTER & REP           ASSESSMENT and PLAN  1. Tachy trell syndrome, uncontrolled: Pt is a 67yo with uncontrolled afib with HRs ranging from 30s to 120s consistent with tachy trell syndrome and is symptomatic with syncope and VEGAS now s/p PPM implantation. Discussed options for afib management and Sotalol loading recommended.       2. Atrial fibrillation with RVR, uncontrolled: admission for sotalol loading, cont eliquis, check baseline ECG and labs, start Sotalol and monitor Qtc      3. CAD: s/p PCI, no active chest pain, cont medical therapy      4. Syncope: likely secondary to marked bradycardia, now s/p PPM     Patient directly admitted for Sotalol loading, he states last took Metoprolol yesterday. Will check baseline labs and ECG in preparation for starting Sotalol and monitoring Qtc.     Janelle Ng PA-C/Dr. Ebony Mohan

## 2017-11-29 NOTE — PROGRESS NOTES
Bedside and Verbal shift change report given to Ekaterina Dorsey RN (oncoming nurse) by self Bandar Bellamyling nurse). Report included the following information SBAR, Kardex, Intake/Output, MAR and Recent Results.

## 2017-11-29 NOTE — IP AVS SNAPSHOT
Chano Canseco 
 
 
 [address] [facility] 
[phone] Patient: Ken Montgomery MRN: [id number] LHP:[birth year] About your hospitalization You were admitted on:  November 29, 2017 You last received care in the:  [facility] 3 TELEMETRY You were discharged on:  December 2, 2017 Why you were hospitalized Your primary diagnosis was:  Not on File Your diagnoses also included:  Atrial Fibrillation (Hcc) Things You Need To Do (next 8 weeks) Follow up with Derek Aguilar DO  
December 19 @ 2:15 Phone:  [phone] Where:  [address], [address] [facility] [address] Cardiology [facility] [address] Follow up with Jenn Dye MD  
  
Phone:  [phone] Where:  [address], [address] [facility], [address] [place] [address] Tuesday Dec 19, 2017 HOSPITAL FOLLOW-UP with Derek Aguilar DO at  2:15 PM  
Where:  [facility]ICE ([address] [place] [address]) Discharge Orders Procedure Order Date Status Priority Quantity Spec Type Associated Dx METABOLIC PANEL, BASIC 48/11/29 0913 Future Routine 1 Blood Comments:  Indication--hypokalemia Check in one week A check trung indicates which time of day the medication should be taken. My Medications STOP taking these medications   
 metoprolol succinate 200 mg XL tablet Commonly known as:  TOPROL-XL  
   
  
  
TAKE these medications as instructed Instructions Each Dose to Equal  
 Morning Noon Evening Bedtime  
 apixaban 5 mg tablet Commonly known as:  Lyell Mola Take 1 Tab by mouth two (2) times a day. 5 mg  
    
  
   
   
  
   
  
 atorvastatin 80 mg tablet Commonly known as:  LIPITOR Take 1 Tab by mouth nightly. 80 mg  
    
   
   
   
  
  
 benzonatate 100 mg capsule Commonly known as:  TESSALON Take 100 mg by mouth three (3) times daily as needed for Cough. 100 mg  
    
   
   
   
  
 clopidogrel 75 mg Tab Commonly known as:  PLAVIX Take  by mouth. HYDROcodone-acetaminophen 5-325 mg per tablet Commonly known as:  Harrison Gaston Take 1 Tab by mouth every six (6) hours as needed. Max Daily Amount: 4 Tabs. 1 Tab  
    
   
   
   
  
 nitroglycerin 0.4 mg SL tablet Commonly known as:  NITROSTAT Place 1 sl under the tongue q 5 min prn cp, max 3 sl in a 15-min time period. Call 911 if no relief after the 3rd sl. potassium chloride 20 mEq tablet Commonly known as:  K-DUR, KLOR-CON Take 2 Tabs by mouth now for 1 dose. 40 mEq  
    
  
   
   
   
  
 pyridostigmine 60 mg tablet Commonly known as:  MESTINON Take 60 mg by mouth daily. 60 mg  
    
  
   
   
   
  
 sotalol 160 mg tablet Commonly known as:  Manuelita Margy Take 1 Tab by mouth every twelve (12) hours. 160 mg Where to Get Your Medications Information on where to get these meds will be given to you by the nurse or doctor. ! Ask your nurse or doctor about these medications  
  potassium chloride 20 mEq tablet  
 sotalol 160 mg tablet Discharge Instructions Atrial Fibrillation: Care Instructions Your Care Instructions Atrial fibrillation is an irregular and often fast heartbeat. Treating this condition is important for several reasons. It can cause blood clots, which can travel from your heart to your brain and cause a stroke. If you have a fast heartbeat, you may feel lightheaded, dizzy, and weak. An irregular heartbeat can also increase your risk for heart failure. Atrial fibrillation is often the result of another heart condition, such as high blood pressure or coronary artery disease. Making changes to improve your heart condition will help you stay healthy and active. Follow-up care is a key part of your treatment and safety. Be sure to make and go to all appointments, and call your doctor if you are having problems. It's also a good idea to know your test results and keep a list of the medicines you take. How can you care for yourself at home? Medicines ? · Take your medicines exactly as prescribed. Call your doctor if you think you are having a problem with your medicine. You will get more details on the specific medicines your doctor prescribes. ? · If your doctor has given you a blood thinner to prevent a stroke, be sure you get instructions about how to take your medicine safely. Blood thinners can cause serious bleeding problems. ? · Do not take any vitamins, over-the-counter drugs, or herbal products without talking to your doctor first. ? Lifestyle changes ? · Do not smoke. Smoking can increase your chance of a stroke and heart attack. If you need help quitting, talk to your doctor about stop-smoking programs and medicines. These can increase your chances of quitting for good. ? · Eat a heart-healthy diet. ? · Stay at a healthy weight. Lose weight if you need to.  
? · Limit alcohol to 2 drinks a day for men and 1 drink a day for women. Too much alcohol can cause health problems. ? · Avoid colds and flu. Get a pneumococcal vaccine shot. If you have had one before, ask your doctor whether you need another dose. Get a flu shot every year. If you must be around people with colds or flu, wash your hands often. Activity ? · If your doctor recommends it, get more exercise. Walking is a good choice. Bit by bit, increase the amount you walk every day. Try for at least 30 minutes on most days of the week. You also may want to swim, bike, or do other activities. Your doctor may suggest that you join a cardiac rehabilitation program so that you can have help increasing your physical activity safely. ? · Start light exercise if your doctor says it is okay. Even a small amount will help you get stronger, have more energy, and manage stress. Walking is an easy way to get exercise. Start out by walking a little more than you did in the hospital. Gradually increase the amount you walk. ? · When you exercise, watch for signs that your heart is working too hard. You are pushing too hard if you cannot talk while you are exercising. If you become short of breath or dizzy or have chest pain, sit down and rest immediately. ? · Check your pulse regularly. Place two fingers on the artery at the palm side of your wrist, in line with your thumb. If your heartbeat seems uneven or fast, talk to your doctor. When should you call for help? Call 911 anytime you think you may need emergency care. For example, call if: 
? · You have symptoms of a heart attack. These may include: ¨ Chest pain or pressure, or a strange feeling in the chest. 
¨ Sweating. ¨ Shortness of breath. ¨ Nausea or vomiting. ¨ Pain, pressure, or a strange feeling in the back, neck, jaw, or upper belly or in one or both shoulders or arms. ¨ Lightheadedness or sudden weakness. ¨ A fast or irregular heartbeat. After you call 911, the  may tell you to chew 1 adult-strength or 2 to 4 low-dose aspirin. Wait for an ambulance. Do not try to drive yourself. ? · You have symptoms of a stroke. These may include: 
¨ Sudden numbness, tingling, weakness, or loss of movement in your face, arm, or leg, especially on only one side of your body. ¨ Sudden vision changes. ¨ Sudden trouble speaking. ¨ Sudden confusion or trouble understanding simple statements. ¨ Sudden problems with walking or balance. ¨ A sudden, severe headache that is different from past headaches. ? · You passed out (lost consciousness). ?Call your doctor now or seek immediate medical care if: 
? · You have new or increased shortness of breath. ? · You feel dizzy or lightheaded, or you feel like you may faint. ? · Your heart rate becomes irregular. ? · You can feel your heart flutter in your chest or skip heartbeats. Tell your doctor if these symptoms are new or worse. ? Watch closely for changes in your health, and be sure to contact your doctor if you have any problems. Sotalol (By mouth) Sotalol (SHANNAN-ta-lol) Treats heart rhythm problems. This medicine is a beta blocker. Brand Name(s): Betapace, Betapace AF, Sorine, [employer] There may be other brand names for this medicine. When This Medicine Should Not Be Used: This medicine is not right for everyone. Do not use it if you had an allergic reaction to sotalol, or you have certain heart or lung problems. Talk with your doctor about what these problems are. How to Use This Medicine:  
Liquid, Tablet · Take your medicine as directed. Your dose may need to be changed several times to find what works best for you. · Measure the oral liquid medicine with a marked measuring spoon, oral syringe, or medicine cup. · Contact your doctor or pharmacist before your supply of this medicine starts to run low. Do not allow yourself to run out of medicine. · Read and follow the patient instructions that come with this medicine. Talk to your doctor or pharmacist if you have any questions. · Missed dose: Take a dose as soon as you remember. If it is almost time for your next dose, wait until then and take a regular dose. Do not take extra medicine to make up for a missed dose. · Store the medicine in a closed container at room temperature, away from heat, moisture, and direct light. Drugs and Foods to Avoid: Ask your doctor or pharmacist before using any other medicine, including over-the-counter medicines, vitamins, and herbal products. · Some foods and medicines can affect how sotalol works. Tell your doctor if you are using the following: ¨ Albuterol, clonidine, digoxin, guanethidine, isoproterenol, reserpine, terbutaline ¨ Blood pressure medicines ¨ Insulin or diabetes medicine ¨ Medicine to treat depression ¨ Medicine to treat an infection ¨ Other medicine to treat heart rhythm problems, such as amiodarone, disopyramide, procainamide, or quinidine ¨ Phenothiazine medicine (such as chlorpromazine, perphenazine, prochlorperazine, promethazine, thioridazine) · If you are taking an antacid that contains aluminum or magnesium hydroxide, take it 2 hours before or 2 hours after you take sotalol. Warnings While Using This Medicine: · Tell your doctor if you are pregnant or breastfeeding, or if you have kidney disease, diabetes, heart disease, angina, low blood pressure, lung or breathing problems, overactive thyroid, or a history of severe allergic reactions or heart attack. · This medicine may cause increased heart rhythm problems while your dose is being adjusted. · Do not stop using this medicine suddenly. Your doctor will need to slowly decrease your dose before you stop it completely. You could have worsening chest pain or heart rhythm problems if you stop using this medicine suddenly. · This medicine may raise or lower your blood sugar level. · This medicine may make you dizzy. Do not drive or do anything else that could be dangerous until you know how this medicine affects you. Stand up slowly if you feel dizzy or lightheaded. · Tell any doctor or dentist who treats you that you are using this medicine. · Your doctor will do lab tests at regular visits to check on the effects of this medicine. Keep all appointments. ECG tests will be needed to check for unwanted effects. · Keep all medicine out of the reach of children. Never share your medicine with anyone. Possible Side Effects While Using This Medicine:  
Call your doctor right away if you notice any of these side effects: · Allergic reaction: Itching or hives, swelling in your face or hands, swelling or tingling in your mouth or throat, chest tightness, trouble breathing · Chest pain · Dry mouth, increased thirst, muscle cramps, nausea or vomiting · Fainting, dizziness, lightheadedness · Fast, slow, or irregular heartbeat · Rapid weight gain, swelling in your hands, feet, or ankles, trouble breathing If you notice these less serious side effects, talk with your doctor: · Diarrhea · Increased sweating · Tiredness or weakness If you notice other side effects that you think are caused by this medicine, tell your doctor. Call your doctor for medical advice about side effects. You may report side effects to FDA at 9-176-SAM-5909 © 2017 [facility] Information is for End User's use only and may not be sold, redistributed or otherwise used for commercial purposes. The above information is an  only. It is not intended as medical advice for individual conditions or treatments. Talk to your doctor, nurse or pharmacist before following any medical regimen to see if it is safe and effective for you. DISCHARGE SUMMARY from Nurse PATIENT INSTRUCTIONS: 
 
 
F-face looks uneven A-arms unable to move or move unevenly S-speech slurred or non-existent T-time-call 911 as soon as signs and symptoms begin-DO NOT go Back to bed or wait to see if you get better-TIME IS BRAIN. Warning Signs of HEART ATTACK Call 911 if you have these symptoms: 
? Chest discomfort. Most heart attacks involve discomfort in the center of the chest that lasts more than a few minutes, or that goes away and comes back. It can feel like uncomfortable pressure, squeezing, fullness, or pain. ? Discomfort in other areas of the upper body.  Symptoms can include pain or discomfort in one or both arms, the back, neck, jaw, or stomach. ? Shortness of breath with or without chest discomfort. ? Other signs may include breaking out in a cold sweat, nausea, or lightheadedness. Don't wait more than five minutes to call [address]! Fast action can save your life. Calling 911 is almost always the fastest way to get lifesaving treatment. Emergency Medical Services staff can begin treatment when they arrive  up to an hour sooner than if someone gets to the hospital by car.  
 
___________________________________________________________________________________________________________________________________ [employer] Announcement We are excited to announce that we are making your provider's discharge notes available to you in [employer]. You will see these notes when they are completed and signed by the physician that discharged you from your recent hospital stay. If you have any questions or concerns about any information you see in [employer], please call the Health Information Department where you were seen or reach out to your Primary Care Provider for more information about your plan of care. Introducing [facility]! Tracey Mittal introduces [employer] patient portal. Now you can access parts of your medical record, email your doctor's office, and request medication refills online. 1. In your internet browser, go to [email or web address] 2. Click on the First Time User? Click Here link in the Sign In box. You will see the New Member Sign Up page. 3. Enter your [employer] Access Code exactly as it appears below. You will not need to use this code after youve completed the sign-up process. If you do not sign up before the expiration date, you must request a new code. · [employer] Access Code: [email or web address] Expires: 1/22/2018  8:34 AM 
 
4.  Enter the last four digits of your Social Security Number (xxxx) and Date of Birth (mm/dd/yyyy) as indicated and click Submit. You will be taken to the next sign-up page. 5. Create a Quick Key ID. This will be your Quick Key login ID and cannot be changed, so think of one that is secure and easy to remember. 6. Create a Quick Key password. You can change your password at any time. 7. Enter your Password Reset Question and Answer. This can be used at a later time if you forget your password. 8. Enter your e-mail address. You will receive e-mail notification when new information is available in [address] 9. Click Sign Up. You can now view and download portions of your medical record. 10. Click the Download Summary menu link to download a portable copy of your medical information. If you have questions, please visit the Frequently Asked Questions section of the Quick Key website. Remember, Quick Key is NOT to be used for urgent needs. For medical emergencies, dial 911. Now available from your iPhone and Android! Unresulted Labs-Please follow up with your PCP about these lab tests Order Current Status EKG, 12 LEAD, SUBSEQUENT Preliminary result EKG, 12 LEAD, SUBSEQUENT Preliminary result Providers Seen During Your Hospitalization Provider Specialty Primary office phone Dylan Arshad MD Cardiology [phone] Your Primary Care Physician (PCP) Primary Care Physician Office Phone Office Fax [facility] [phone] You are allergic to the following No active allergies Recent Documentation Height Weight BMI Smoking Status 1.727 m 92.8 kg 31.11 kg/m2 Never Smoker Emergency Contacts Name Discharge Info Relation Home Work Mobile Peri Cox  Spouse [3] [phone] Patient Belongings  The following personal items are in your possession at time of discharge: 
  Dental Appliances: Uppers, Lowers, Partials, With patient  Visual Aid: Glasses, With patient             Clothing: Pants, Shirt, Undergarments, With patient Please provide this summary of care documentation to your next provider. Signatures-by signing, you are acknowledging that this After Visit Summary has been reviewed with you and you have received a copy. Patient Signature:  ____________________________________________________________ Date:  ____________________________________________________________  
  
Paulene Greener Provider Signature:  ____________________________________________________________ Date:  ____________________________________________________________

## 2017-11-29 NOTE — IP AVS SNAPSHOT
[place] 
 
 
 [address] 
[phone] Patient: Romie Yanes MRN: [id number] KMV:7/67/2767 My Medications STOP taking these medications   
 metoprolol succinate 200 mg XL tablet Commonly known as:  TOPROL-XL  
   
  
  
TAKE these medications as instructed Instructions Each Dose to Equal  
 Morning Noon Evening Bedtime  
 apixaban 5 mg tablet Commonly known as:  Jairon Gal[place]on Take 1 Tab by mouth two (2) times a day. 5 mg  
    
  
   
   
  
   
  
 atorvastatin 80 mg tablet Commonly known as:  LIPITOR Take 1 Tab by mouth nightly. 80 mg  
    
   
   
   
  
  
 benzonatate 100 mg capsule Commonly known as:  TESSALON Take 100 mg by mouth three (3) times daily as needed for Cough. 100 mg  
    
   
   
   
  
 clopidogrel 75 mg Tab Commonly known as:  PLAVIX Take  by mouth. HYDROcodone-acetaminophen 5-325 mg per tablet Commonly known as:  Opal Mura Take 1 Tab by mouth every six (6) hours as needed. Max Daily Amount: 4 Tabs. 1 Tab  
    
   
   
   
  
 nitroglycerin 0.4 mg SL tablet Commonly known as:  NITROSTAT Place 1 sl under the tongue q 5 min prn cp, max 3 sl in a 15-min time period. Call 911 if no relief after the 3rd sl. potassium chloride 20 mEq tablet Commonly known as:  K-DUR, KLOR-CON Take 2 Tabs by mouth now for 1 dose. 40 mEq  
    
  
   
   
   
  
 pyridostigmine 60 mg tablet Commonly known as:  MESTINON Take 60 mg by mouth daily. 60 mg  
    
  
   
   
   
  
 sotalol 160 mg tablet Commonly known as:  Tamir Colla Take 1 Tab by mouth every twelve (12) hours. 160 mg Where to Get Your Medications Information on where to get these meds will be given to you by the nurse or doctor. ! Ask your nurse or doctor about these medications  
  potassium chloride 20 mEq tablet  
 sotalol 160 mg tablet

## 2017-11-29 NOTE — PROGRESS NOTES
Admission skin assessment completed with second RN and reveals the following: skin is intact, no redness or breakdown noted. Back and sacrum, are intact to redness or breakdown.

## 2017-11-30 LAB
ANION GAP SERPL CALC-SCNC: 11 MMOL/L (ref 7–16)
ATRIAL RATE: 141 BPM
BUN SERPL-MCNC: 10 MG/DL (ref 8–23)
CALCIUM SERPL-MCNC: 8.8 MG/DL (ref 8.3–10.4)
CALCULATED R AXIS, ECG10: 15 DEGREES
CALCULATED T AXIS, ECG11: 44 DEGREES
CHLORIDE SERPL-SCNC: 108 MMOL/L (ref 98–107)
CHOLEST SERPL-MCNC: 89 MG/DL
CO2 SERPL-SCNC: 24 MMOL/L (ref 21–32)
CREAT SERPL-MCNC: 0.74 MG/DL (ref 0.8–1.5)
DIAGNOSIS, 93000: NORMAL
ERYTHROCYTE [DISTWIDTH] IN BLOOD BY AUTOMATED COUNT: 13.7 % (ref 11.9–14.6)
GLUCOSE SERPL-MCNC: 110 MG/DL (ref 65–100)
HCT VFR BLD AUTO: 39.7 % (ref 41.1–50.3)
HDLC SERPL-MCNC: 40 MG/DL (ref 40–60)
HDLC SERPL: 2.2 {RATIO}
HGB BLD-MCNC: 13 G/DL (ref 13.6–17.2)
LDLC SERPL CALC-MCNC: 28.4 MG/DL
LIPID PROFILE,FLP: NORMAL
MAGNESIUM SERPL-MCNC: 2 MG/DL (ref 1.8–2.4)
MCH RBC QN AUTO: 29.7 PG (ref 26.1–32.9)
MCHC RBC AUTO-ENTMCNC: 32.7 G/DL (ref 31.4–35)
MCV RBC AUTO: 90.8 FL (ref 79.6–97.8)
PLATELET # BLD AUTO: 155 K/UL (ref 150–450)
PMV BLD AUTO: 10.2 FL (ref 10.8–14.1)
POTASSIUM SERPL-SCNC: 3.6 MMOL/L (ref 3.5–5.1)
Q-T INTERVAL, ECG07: 410 MS
QRS DURATION, ECG06: 90 MS
QTC CALCULATION (BEZET), ECG08: 476 MS
RBC # BLD AUTO: 4.37 M/UL (ref 4.23–5.67)
SODIUM SERPL-SCNC: 143 MMOL/L (ref 136–145)
TRIGL SERPL-MCNC: 103 MG/DL (ref 35–150)
VENTRICULAR RATE, ECG03: 81 BPM
VLDLC SERPL CALC-MCNC: 20.6 MG/DL (ref 6–23)
WBC # BLD AUTO: 11.4 K/UL (ref 4.3–11.1)

## 2017-11-30 PROCEDURE — 74011250637 HC RX REV CODE- 250/637: Performed by: INTERNAL MEDICINE

## 2017-11-30 PROCEDURE — 99152 MOD SED SAME PHYS/QHP 5/>YRS: CPT

## 2017-11-30 PROCEDURE — 93005 ELECTROCARDIOGRAM TRACING: CPT | Performed by: INTERNAL MEDICINE

## 2017-11-30 PROCEDURE — 80048 BASIC METABOLIC PNL TOTAL CA: CPT | Performed by: PHYSICIAN ASSISTANT

## 2017-11-30 PROCEDURE — 80061 LIPID PANEL: CPT | Performed by: PHYSICIAN ASSISTANT

## 2017-11-30 PROCEDURE — 92960 CARDIOVERSION ELECTRIC EXT: CPT

## 2017-11-30 PROCEDURE — 36415 COLL VENOUS BLD VENIPUNCTURE: CPT | Performed by: PHYSICIAN ASSISTANT

## 2017-11-30 PROCEDURE — 74011250636 HC RX REV CODE- 250/636

## 2017-11-30 PROCEDURE — 83735 ASSAY OF MAGNESIUM: CPT | Performed by: PHYSICIAN ASSISTANT

## 2017-11-30 PROCEDURE — 85027 COMPLETE CBC AUTOMATED: CPT | Performed by: PHYSICIAN ASSISTANT

## 2017-11-30 PROCEDURE — 65660000000 HC RM CCU STEPDOWN

## 2017-11-30 RX ORDER — MIDAZOLAM HYDROCHLORIDE 1 MG/ML
.5-5 INJECTION, SOLUTION INTRAMUSCULAR; INTRAVENOUS
Status: DISCONTINUED | OUTPATIENT
Start: 2017-11-30 | End: 2017-12-02 | Stop reason: HOSPADM

## 2017-11-30 RX ORDER — ACETAMINOPHEN 325 MG/1
650 TABLET ORAL
Status: DISCONTINUED | OUTPATIENT
Start: 2017-11-30 | End: 2017-12-02 | Stop reason: HOSPADM

## 2017-11-30 RX ORDER — FENTANYL CITRATE 50 UG/ML
25-100 INJECTION, SOLUTION INTRAMUSCULAR; INTRAVENOUS
Status: DISCONTINUED | OUTPATIENT
Start: 2017-11-30 | End: 2017-12-01 | Stop reason: HOSPADM

## 2017-11-30 RX ADMIN — CLOPIDOGREL BISULFATE 75 MG: 75 TABLET ORAL at 09:08

## 2017-11-30 RX ADMIN — APIXABAN 5 MG: 5 TABLET, FILM COATED ORAL at 09:09

## 2017-11-30 RX ADMIN — Medication 10 ML: at 14:00

## 2017-11-30 RX ADMIN — MIDAZOLAM HYDROCHLORIDE 2 MG: 1 INJECTION, SOLUTION INTRAMUSCULAR; INTRAVENOUS at 16:25

## 2017-11-30 RX ADMIN — PYRIDOSTIGMINE BROMIDE 60 MG: 60 TABLET ORAL at 09:08

## 2017-11-30 RX ADMIN — Medication 5 ML: at 21:32

## 2017-11-30 RX ADMIN — FENTANYL CITRATE 25 MCG: 50 INJECTION, SOLUTION INTRAMUSCULAR; INTRAVENOUS at 16:26

## 2017-11-30 RX ADMIN — FENTANYL CITRATE 25 MCG: 50 INJECTION, SOLUTION INTRAMUSCULAR; INTRAVENOUS at 16:27

## 2017-11-30 RX ADMIN — Medication 10 ML: at 05:49

## 2017-11-30 RX ADMIN — SOTALOL HYDROCHLORIDE 160 MG: 80 TABLET ORAL at 17:48

## 2017-11-30 RX ADMIN — ATORVASTATIN CALCIUM 80 MG: 40 TABLET, FILM COATED ORAL at 21:32

## 2017-11-30 RX ADMIN — APIXABAN 5 MG: 5 TABLET, FILM COATED ORAL at 18:00

## 2017-11-30 RX ADMIN — SOTALOL HYDROCHLORIDE 160 MG: 80 TABLET ORAL at 05:46

## 2017-11-30 RX ADMIN — MIDAZOLAM HYDROCHLORIDE 1 MG: 1 INJECTION, SOLUTION INTRAMUSCULAR; INTRAVENOUS at 16:30

## 2017-11-30 NOTE — PROGRESS NOTES
Bedside and Verbal shift change report given to Carolyn Cotto RN (oncoming nurse) by self (offgoing nurse). Report included the following information SBAR, Kardex, MAR and Recent Results.

## 2017-11-30 NOTE — PROGRESS NOTES
Problem: Falls - Risk of  Goal: *Absence of Falls  Document Hang Fall Risk and appropriate interventions in the flowsheet. Outcome: Progressing Towards Goal  Fall Risk Interventions:            Medication Interventions: Patient to call before getting OOB, Teach patient to arise slowly              Patient free of falls during current admission.

## 2017-11-30 NOTE — PROGRESS NOTES
Care Management Interventions  PCP Verified by CM: Yes (2-3 months ago)  Transition of Care Consult (CM Consult): Discharge Planning  Current Support Network: Lives with Spouse  Confirm Follow Up Transport: Self  Plan discussed with Pt/Family/Caregiver: Yes  Freedom of Choice Offered: Yes  Discharge Location  Discharge Placement: Home  Met with patient for d/c planning. He is alert and oriented x 3, independent of ADL's and lives with his wife. He is currently working at [employer] and is still able to drive. Has Planned  that assist with his prescription medications. He voices no concerns or needs. Current d/c plan is home with wife.

## 2017-11-30 NOTE — PROGRESS NOTES
[facility] CARDIOLOGY PROGRESS NOTE           11/30/2017 6:40 AM    Admit Date: 11/29/2017    Admit Diagnosis: a fib; Atrial fibrillation (HCC)      Subjective:   No complaints this AM, no chest pain or shortness of breath    Interval History: (History of pertinent interval events obtained from nursing staff)  Remains in afib    ROS:  GEN:  No fever or chills  Cardiovascular:  As noted above  Pulmonary:  As noted above  Neuro:  No new focal motor or sensory loss      Objective:     Vitals:    11/29/17 2034 11/30/17 0053 11/30/17 0439 11/30/17 0543   BP: 151/89 131/84 123/85    Pulse: 86 75 88    Resp: 17 16 17    Temp: 98.3 °F (36.8 °C) 97.4 °F (36.3 °C) 97.9 °F (36.6 °C)    SpO2: 94% 91% 98%    Weight:    93.1 kg (205 lb 3.2 oz)   Height:           Physical Exam:  General-Well Developed, Well Nourished, No Acute Distress, Alert & Oriented x 3, appropriate mood. Neck- supple, no JVD  CV- irreg irreg, distant S1, S2 no MRG  Lung- distant BS  Abd- soft, nontender, nondistended  Ext- no edema bilaterally.   Skin- warm and dry    Current Facility-Administered Medications   Medication Dose Route Frequency    apixaban (ELIQUIS) tablet 5 mg  5 mg Oral BID    atorvastatin (LIPITOR) tablet 80 mg  80 mg Oral QHS    benzonatate (TESSALON) capsule 100 mg  100 mg Oral TID PRN    clopidogrel (PLAVIX) tablet 75 mg  75 mg Oral DAILY    HYDROcodone-acetaminophen (NORCO) 5-325 mg per tablet 1 Tab  1 Tab Oral Q6H PRN    nitroglycerin (NITROSTAT) tablet 0.4 mg  0.4 mg SubLINGual PRN    pyridostigmine (MESTINON) tablet 60 mg  60 mg Oral DAILY    sodium chloride (NS) flush 5-10 mL  5-10 mL IntraVENous Q8H    sodium chloride (NS) flush 5-10 mL  5-10 mL IntraVENous PRN    morphine injection 2 mg  2 mg IntraVENous Q4H PRN    ondansetron (ZOFRAN) injection 4 mg  4 mg IntraVENous Q4H PRN    sotalol (BETAPACE) tablet 160 mg  160 mg Oral Q12H    temazepam (RESTORIL) capsule 15 mg  15 mg Oral QHS PRN     Data Review:   Recent Results (from the past 24 hour(s))   EKG, 12 LEAD, INITIAL    Collection Time: 11/29/17  9:58 AM   Result Value Ref Range    Ventricular Rate 83 BPM    Atrial Rate 267 BPM    QRS Duration 92 ms    Q-T Interval 392 ms    QTC Calculation (Bezet) 460 ms    Calculated R Axis -13 degrees    Calculated T Axis 42 degrees    Diagnosis       Atrial fibrillation  Abnormal ECG  When compared with ECG of 26-OCT-2017 09:59,  Premature ventricular complexes are no longer Present  Confirmed by ELODIA MANRIQUEZ (), DEYSI REGALADO ([id number]) on 11/29/2017 10:34:21 AM     CBC W/O DIFF    Collection Time: 11/29/17 10:48 AM   Result Value Ref Range    WBC 10.7 4.3 - 11.1 K/uL    RBC 4.55 4.23 - 5.67 M/uL    HGB 13.6 13.6 - 17.2 g/dL    HCT 41.5 41.1 - 50.3 %    MCV 91.2 79.6 - 97.8 FL    MCH 29.9 26.1 - 32.9 PG    MCHC 32.8 31.4 - 35.0 g/dL    RDW 13.9 11.9 - 14.6 %    PLATELET 626 634 - 237 K/uL    MPV 10.0 (L) 10.8 - 26.8 FL   METABOLIC PANEL, COMPREHENSIVE    Collection Time: 11/29/17 10:48 AM   Result Value Ref Range    Sodium 143 136 - 145 mmol/L    Potassium 4.0 3.5 - 5.1 mmol/L    Chloride 107 98 - 107 mmol/L    CO2 27 21 - 32 mmol/L    Anion gap 9 7 - 16 mmol/L    Glucose 141 (H) 65 - 100 mg/dL    BUN 9 8 - 23 MG/DL    Creatinine 0.94 0.8 - 1.5 MG/DL    GFR est AA >60 >60 ml/min/1.73m2    GFR est non-AA >60 >60 ml/min/1.73m2    Calcium 8.8 8.3 - 10.4 MG/DL    Bilirubin, total 0.5 0.2 - 1.1 MG/DL    ALT (SGPT) 31 12 - 65 U/L    AST (SGOT) 47 (H) 15 - 37 U/L    Alk.  phosphatase 85 50 - 136 U/L    Protein, total 6.6 6.3 - 8.2 g/dL    Albumin 3.1 (L) 3.2 - 4.6 g/dL    Globulin 3.5 2.3 - 3.5 g/dL    A-G Ratio 0.9 (L) 1.2 - 3.5     MAGNESIUM    Collection Time: 11/29/17 10:48 AM   Result Value Ref Range    Magnesium 2.1 1.8 - 2.4 mg/dL   EKG, 12 LEAD, SUBSEQUENT    Collection Time: 11/29/17  8:01 PM   Result Value Ref Range    Ventricular Rate 86 BPM    Atrial Rate 90 BPM    QRS Duration 92 ms    Q-T Interval 374 ms    QTC Calculation (Bezet) 447 ms    Calculated R Axis -9 degrees    Calculated T Axis 36 degrees    Diagnosis       Atrial fibrillation  Abnormal ECG  When compared with ECG of 29-NOV-2017 09:58,  No significant change was found  Confirmed by ST YAMILETH CHOWDARY MD (), RIVKA BROUSSADR ([id number]) on 11/29/2017 9:51:29 PM         EKG:  (EKG has been independently visualized by me with interpretation below) afib, QTc ~450 msec  Assessment:     Active Problems:    Atrial fibrillation ([address] (11/29/2017)      Plan:   1. Afib: admission for sotalol loading, cont home eliquis, plan for DCCV today  2. Sotalol: cont telemetry and post dose ECGs, currently tolerating dose, QTc acceptable, HRs stable  3. PPx: eliquveronica Bernard MD  Cardiology/Electrophysiology

## 2017-11-30 NOTE — PROGRESS NOTES
Bedside and Verbal shift change report given to self (oncoming nurse) by Aura Branham RN (offgoing nurse). Report included the following information SBAR, Kardex, Intake/Output, MAR and Recent Results.

## 2017-11-30 NOTE — PROGRESS NOTES
Bedside and Verbal shift change report given to self (oncoming nurse) by Roz Vigil RN (offgoing nurse). Report included the following information SBAR, Kardex, MAR and Recent Results.

## 2017-12-01 LAB
ANION GAP SERPL CALC-SCNC: 12 MMOL/L (ref 7–16)
ATRIAL RATE: 60 BPM
ATRIAL RATE: 60 BPM
BUN SERPL-MCNC: 14 MG/DL (ref 8–23)
CALCIUM SERPL-MCNC: 8.6 MG/DL (ref 8.3–10.4)
CALCULATED R AXIS, ECG10: -11 DEGREES
CALCULATED R AXIS, ECG10: -7 DEGREES
CALCULATED T AXIS, ECG11: 35 DEGREES
CALCULATED T AXIS, ECG11: 39 DEGREES
CHLORIDE SERPL-SCNC: 110 MMOL/L (ref 98–107)
CO2 SERPL-SCNC: 25 MMOL/L (ref 21–32)
CREAT SERPL-MCNC: 0.84 MG/DL (ref 0.8–1.5)
DIAGNOSIS, 93000: NORMAL
DIAGNOSIS, 93000: NORMAL
ERYTHROCYTE [DISTWIDTH] IN BLOOD BY AUTOMATED COUNT: 14.1 % (ref 11.9–14.6)
GLUCOSE SERPL-MCNC: 95 MG/DL (ref 65–100)
HCT VFR BLD AUTO: 38.7 % (ref 41.1–50.3)
HGB BLD-MCNC: 13 G/DL (ref 13.6–17.2)
MAGNESIUM SERPL-MCNC: 2.1 MG/DL (ref 1.8–2.4)
MCH RBC QN AUTO: 30 PG (ref 26.1–32.9)
MCHC RBC AUTO-ENTMCNC: 33.6 G/DL (ref 31.4–35)
MCV RBC AUTO: 89.2 FL (ref 79.6–97.8)
P-R INTERVAL, ECG05: 180 MS
P-R INTERVAL, ECG05: 182 MS
PLATELET # BLD AUTO: 160 K/UL (ref 150–450)
PMV BLD AUTO: 9.8 FL (ref 10.8–14.1)
POTASSIUM SERPL-SCNC: 3.6 MMOL/L (ref 3.5–5.1)
Q-T INTERVAL, ECG07: 456 MS
Q-T INTERVAL, ECG07: 490 MS
QRS DURATION, ECG06: 90 MS
QRS DURATION, ECG06: 92 MS
QTC CALCULATION (BEZET), ECG08: 456 MS
QTC CALCULATION (BEZET), ECG08: 490 MS
RBC # BLD AUTO: 4.34 M/UL (ref 4.23–5.67)
SODIUM SERPL-SCNC: 147 MMOL/L (ref 136–145)
VENTRICULAR RATE, ECG03: 60 BPM
VENTRICULAR RATE, ECG03: 60 BPM
WBC # BLD AUTO: 9.9 K/UL (ref 4.3–11.1)

## 2017-12-01 PROCEDURE — 93005 ELECTROCARDIOGRAM TRACING: CPT | Performed by: INTERNAL MEDICINE

## 2017-12-01 PROCEDURE — 74011250637 HC RX REV CODE- 250/637: Performed by: INTERNAL MEDICINE

## 2017-12-01 PROCEDURE — 80048 BASIC METABOLIC PNL TOTAL CA: CPT | Performed by: PHYSICIAN ASSISTANT

## 2017-12-01 PROCEDURE — 36415 COLL VENOUS BLD VENIPUNCTURE: CPT | Performed by: PHYSICIAN ASSISTANT

## 2017-12-01 PROCEDURE — 74011250637 HC RX REV CODE- 250/637: Performed by: NURSE PRACTITIONER

## 2017-12-01 PROCEDURE — 83735 ASSAY OF MAGNESIUM: CPT | Performed by: PHYSICIAN ASSISTANT

## 2017-12-01 PROCEDURE — 85027 COMPLETE CBC AUTOMATED: CPT | Performed by: PHYSICIAN ASSISTANT

## 2017-12-01 PROCEDURE — 65660000000 HC RM CCU STEPDOWN

## 2017-12-01 RX ADMIN — SOTALOL HYDROCHLORIDE 160 MG: 80 TABLET ORAL at 17:51

## 2017-12-01 RX ADMIN — APIXABAN 5 MG: 5 TABLET, FILM COATED ORAL at 08:18

## 2017-12-01 RX ADMIN — ACETAMINOPHEN 650 MG: 325 TABLET ORAL at 06:20

## 2017-12-01 RX ADMIN — SOTALOL HYDROCHLORIDE 160 MG: 80 TABLET ORAL at 06:14

## 2017-12-01 RX ADMIN — ATORVASTATIN CALCIUM 80 MG: 40 TABLET, FILM COATED ORAL at 22:36

## 2017-12-01 RX ADMIN — Medication 10 ML: at 22:38

## 2017-12-01 RX ADMIN — CLOPIDOGREL BISULFATE 75 MG: 75 TABLET ORAL at 08:18

## 2017-12-01 RX ADMIN — PYRIDOSTIGMINE BROMIDE 60 MG: 60 TABLET ORAL at 08:18

## 2017-12-01 RX ADMIN — Medication 5 ML: at 06:15

## 2017-12-01 RX ADMIN — APIXABAN 5 MG: 5 TABLET, FILM COATED ORAL at 17:51

## 2017-12-01 RX ADMIN — Medication 10 ML: at 14:00

## 2017-12-01 RX ADMIN — ACETAMINOPHEN 650 MG: 325 TABLET ORAL at 22:40

## 2017-12-01 NOTE — PROGRESS NOTES
Verbal bedside report received from Bigg, [address] [facility]. Assumed care of patient.

## 2017-12-01 NOTE — PROGRESS NOTES
Bedside and Verbal shift change report given to Carol Orozco RN (oncoming nurse) by self Cam Waldrop nurse).  Report included the following information SBAR, Kardex, ED Summary, Procedure Summary, Intake/Output, MAR, Recent Results and Cardiac Rhythm SR.

## 2017-12-01 NOTE — PROGRESS NOTES
Bedside and Verbal shift change report given to self (oncoming nurse) by Opal Dobbins RN (offgoing nurse).  Report included the following information SBAR, Kardex, Procedure Summary, MAR, Recent Results and Cardiac Rhythm SR.

## 2017-12-01 NOTE — PROGRESS NOTES
Problem: Falls - Risk of  Goal: *Absence of Falls  Document Hang Fall Risk and appropriate interventions in the flowsheet. Outcome: Progressing Towards Goal  Fall Risk Interventions:            Medication Interventions: Teach patient to arise slowly, Patient to call before getting OOB       Patient progressing towards goal with no falls on current admission. Patient without confusion, agitation, or sensory perception deficits. Patient has steady gait on ambulation. Personal belongings are within reach. Bed is in the low and locked position with side rails up x2. Yellow gripper socks to bilateral feet. Call light within reach and patient verbalizes understanding of use. Problem: Afib Pathway: Day 2  Goal: Diagnostic Test/Procedures  Outcome: Resolved/Met Date Met: 11/30/17  Patient had a cardioversion today, resulting in NSR. EKGs have been completed 2 hours after each dose of Betapace. Goal: *Hemodynamically stable  Outcome: Resolved/Met Date Met: 11/30/17  Patient has been hemodynamically stable with HR primarily 60-80s and BP 110s-150s/60s-80s. Goal: *Optimal pain control at patient's stated goal  Outcome: Resolved/Met Date Met: 11/30/17  Patient has reported some discomfort at [address] pad site on chest following cardioversion, however that pain subsided and resolved. Goal: *Stable cardiac rhythm  Outcome: Resolved/Met Date Met: 11/30/17  Following cardioversion, patient has been in NSR.

## 2017-12-02 VITALS
RESPIRATION RATE: 18 BRPM | HEIGHT: 68 IN | DIASTOLIC BLOOD PRESSURE: 76 MMHG | WEIGHT: 204.6 LBS | HEART RATE: 60 BPM | OXYGEN SATURATION: 96 % | TEMPERATURE: 98.2 F | SYSTOLIC BLOOD PRESSURE: 133 MMHG | BODY MASS INDEX: 31.01 KG/M2

## 2017-12-02 LAB
ANION GAP SERPL CALC-SCNC: 11 MMOL/L (ref 7–16)
ATRIAL RATE: 60 BPM
ATRIAL RATE: 60 BPM
BUN SERPL-MCNC: 10 MG/DL (ref 8–23)
CALCIUM SERPL-MCNC: 9.3 MG/DL (ref 8.3–10.4)
CALCULATED P AXIS, ECG09: 12 DEGREES
CALCULATED R AXIS, ECG10: -4 DEGREES
CALCULATED R AXIS, ECG10: -6 DEGREES
CALCULATED T AXIS, ECG11: 41 DEGREES
CALCULATED T AXIS, ECG11: 50 DEGREES
CHLORIDE SERPL-SCNC: 107 MMOL/L (ref 98–107)
CO2 SERPL-SCNC: 24 MMOL/L (ref 21–32)
CREAT SERPL-MCNC: 0.75 MG/DL (ref 0.8–1.5)
DIAGNOSIS, 93000: NORMAL
DIAGNOSIS, 93000: NORMAL
GLUCOSE SERPL-MCNC: 135 MG/DL (ref 65–100)
MAGNESIUM SERPL-MCNC: 2.1 MG/DL (ref 1.8–2.4)
P-R INTERVAL, ECG05: 172 MS
P-R INTERVAL, ECG05: 180 MS
POTASSIUM SERPL-SCNC: 3.2 MMOL/L (ref 3.5–5.1)
Q-T INTERVAL, ECG07: 468 MS
Q-T INTERVAL, ECG07: 480 MS
QRS DURATION, ECG06: 94 MS
QRS DURATION, ECG06: 94 MS
QTC CALCULATION (BEZET), ECG08: 468 MS
QTC CALCULATION (BEZET), ECG08: 480 MS
SODIUM SERPL-SCNC: 142 MMOL/L (ref 136–145)
VENTRICULAR RATE, ECG03: 60 BPM
VENTRICULAR RATE, ECG03: 60 BPM

## 2017-12-02 PROCEDURE — 83735 ASSAY OF MAGNESIUM: CPT | Performed by: PHYSICIAN ASSISTANT

## 2017-12-02 PROCEDURE — 74011250637 HC RX REV CODE- 250/637: Performed by: INTERNAL MEDICINE

## 2017-12-02 PROCEDURE — 36415 COLL VENOUS BLD VENIPUNCTURE: CPT | Performed by: PHYSICIAN ASSISTANT

## 2017-12-02 PROCEDURE — 80048 BASIC METABOLIC PNL TOTAL CA: CPT | Performed by: PHYSICIAN ASSISTANT

## 2017-12-02 PROCEDURE — 93005 ELECTROCARDIOGRAM TRACING: CPT | Performed by: INTERNAL MEDICINE

## 2017-12-02 RX ORDER — POTASSIUM CHLORIDE 20 MEQ/1
40 TABLET, EXTENDED RELEASE ORAL
Status: COMPLETED | OUTPATIENT
Start: 2017-12-02 | End: 2017-12-02

## 2017-12-02 RX ORDER — SOTALOL HYDROCHLORIDE 160 MG/1
160 TABLET ORAL EVERY 12 HOURS
Qty: 60 TAB | Refills: 11 | Status: SHIPPED | OUTPATIENT
Start: 2017-12-02 | End: 2018-12-03 | Stop reason: SDUPTHER

## 2017-12-02 RX ORDER — POTASSIUM CHLORIDE 20 MEQ/1
40 TABLET, EXTENDED RELEASE ORAL
Qty: 30 TAB | Refills: 2 | Status: SHIPPED | OUTPATIENT
Start: 2017-12-02 | End: 2017-12-02

## 2017-12-02 RX ADMIN — CLOPIDOGREL BISULFATE 75 MG: 75 TABLET ORAL at 09:43

## 2017-12-02 RX ADMIN — Medication 10 ML: at 09:43

## 2017-12-02 RX ADMIN — APIXABAN 5 MG: 5 TABLET, FILM COATED ORAL at 09:43

## 2017-12-02 RX ADMIN — PYRIDOSTIGMINE BROMIDE 60 MG: 60 TABLET ORAL at 09:43

## 2017-12-02 RX ADMIN — POTASSIUM CHLORIDE 40 MEQ: 20 TABLET, EXTENDED RELEASE ORAL at 09:43

## 2017-12-02 RX ADMIN — Medication 10 ML: at 05:10

## 2017-12-02 RX ADMIN — SOTALOL HYDROCHLORIDE 160 MG: 80 TABLET ORAL at 06:04

## 2017-12-02 NOTE — PROGRESS NOTES
Patient removed heart monitor and returned to monitor room. Patient IVs removed. Discharge instructions reviewed with Patient. Prescriptions given for Betapace and k-Clor and med info sheets provided for all new medications. Opportunity for questions provided. Patient voiced understanding of all discharge instructions.

## 2017-12-02 NOTE — PROGRESS NOTES
Bedside and Verbal shift change report given to Gamal Weber RN (oncoming nurse) by self and Piyush Esparza RN (offgoing nurse).  Report included the following information SBAR, Kardex, Intake/Output, MAR, Recent Results and Cardiac Rhythm paced SR.

## 2017-12-02 NOTE — PROGRESS NOTES
[facility] CARDIOLOGY PROGRESS NOTE           12/2/2017 7:54 AM    Admit Date: 11/29/2017    Admit Diagnosis: a fib; Atrial fibrillation (HCC)      Subjective:   No complaints this AM, no chest pain or shortness of breath, feels better in NSR    Interval History: (History of pertinent interval events obtained from nursing staff)  No events overnight    ROS:  GEN:  No fever or chills  Cardiovascular:  As noted above  Pulmonary:  As noted above  Neuro:  No new focal motor or sensory loss      Objective:     Vitals:    12/01/17 1751 12/01/17 2130 12/02/17 0109 12/02/17 0501   BP:  131/73 146/78 138/77   Pulse: 70 (!) 59 64 60   Resp:  18 22 20   Temp:  97.9 °F (36.6 °C) 98 °F (36.7 °C) 98.2 °F (36.8 °C)   SpO2:  97% 94% 94%   Weight:    92.8 kg (204 lb 9.6 oz)   Height:           Physical Exam:  General-Well Developed, Well Nourished, No Acute Distress, Alert & Oriented x 3, appropriate mood. Neck- supple, no JVD  CV- regular rate and atrial paced rhythm no MRG  Lung- clear bilaterally  Abd- soft, nontender, nondistended  Ext- no edema bilaterally.   Skin- warm and dry    Current Facility-Administered Medications   Medication Dose Route Frequency    midazolam (VERSED) injection 0.5-5 mg  0.5-5 mg IntraVENous Multiple    acetaminophen (TYLENOL) tablet 650 mg  650 mg Oral Q6H PRN    apixaban (ELIQUIS) tablet 5 mg  5 mg Oral BID    atorvastatin (LIPITOR) tablet 80 mg  80 mg Oral QHS    benzonatate (TESSALON) capsule 100 mg  100 mg Oral TID PRN    clopidogrel (PLAVIX) tablet 75 mg  75 mg Oral DAILY    HYDROcodone-acetaminophen (NORCO) 5-325 mg per tablet 1 Tab  1 Tab Oral Q6H PRN    nitroglycerin (NITROSTAT) tablet 0.4 mg  0.4 mg SubLINGual PRN    pyridostigmine (MESTINON) tablet 60 mg  60 mg Oral DAILY    sodium chloride (NS) flush 5-10 mL  5-10 mL IntraVENous Q8H    sodium chloride (NS) flush 5-10 mL  5-10 mL IntraVENous PRN    morphine injection 2 mg  2 mg IntraVENous Q4H PRN    ondansetron (ZOFRAN) injection 4 mg  4 mg IntraVENous Q4H PRN    sotalol (BETAPACE) tablet 160 mg  160 mg Oral Q12H    temazepam (RESTORIL) capsule 15 mg  15 mg Oral QHS PRN     Data Review:   Recent Results (from the past 24 hour(s))   EKG, 12 LEAD, SUBSEQUENT    Collection Time: 12/01/17  8:09 AM   Result Value Ref Range    Ventricular Rate 60 BPM    Atrial Rate 60 BPM    P-R Interval 180 ms    QRS Duration 92 ms    Q-T Interval 490 ms    QTC Calculation (Bezet) 490 ms    Calculated R Axis -7 degrees    Calculated T Axis 35 degrees    Diagnosis       Electronic atrial pacemaker  Prolonged QT  Abnormal ECG  When compared with ECG of 30-NOV-2017 19:47,  Nonspecific T wave abnormality, improved in Anterolateral leads  Confirmed by TILA MANRIQUEZ (), Tia Christensen ([id number]) on 12/1/2017 8:25:33 AM     EKG, 12 LEAD, SUBSEQUENT    Collection Time: 12/01/17  8:08 PM   Result Value Ref Range    Ventricular Rate 60 BPM    Atrial Rate 60 BPM    P-R Interval 172 ms    QRS Duration 94 ms    Q-T Interval 468 ms    QTC Calculation (Bezet) 468 ms    Calculated P Axis 12 degrees    Calculated R Axis -4 degrees    Calculated T Axis 50 degrees    Diagnosis       Electronic atrial pacemaker  When compared with ECG of 01-DEC-2017 08:09,  No significant change was found         EKG:  (EKG has been independently visualized by me with interpretation below) atrial paced, QTc 458 msec  Assessment:     Active Problems:    Atrial fibrillation ([address] (11/29/2017)      Plan:   1. Afib: admission for sotalol loading, cont home eliquis, now NSR post DCCV  2. Sotalol: cont telemetry and post dose ECGs, currently tolerating dose, QTc acceptable, HRs stable, currently atrial paced rhythm, QTc ~460 msec, plan for home today pending AM ECG, cont eliquis  3. PPx: elaine Bernard MD  Cardiology/Electrophysiology

## 2017-12-02 NOTE — DISCHARGE INSTRUCTIONS
Atrial Fibrillation: Care Instructions  Your Care Instructions    Atrial fibrillation is an irregular and often fast heartbeat. Treating this condition is important for several reasons. It can cause blood clots, which can travel from your heart to your brain and cause a stroke. If you have a fast heartbeat, you may feel lightheaded, dizzy, and weak. An irregular heartbeat can also increase your risk for heart failure. Atrial fibrillation is often the result of another heart condition, such as high blood pressure or coronary artery disease. Making changes to improve your heart condition will help you stay healthy and active. Follow-up care is a key part of your treatment and safety. Be sure to make and go to all appointments, and call your doctor if you are having problems. It's also a good idea to know your test results and keep a list of the medicines you take. How can you care for yourself at home? Medicines  ? · Take your medicines exactly as prescribed. Call your doctor if you think you are having a problem with your medicine. You will get more details on the specific medicines your doctor prescribes. ? · If your doctor has given you a blood thinner to prevent a stroke, be sure you get instructions about how to take your medicine safely. Blood thinners can cause serious bleeding problems. ? · Do not take any vitamins, over-the-counter drugs, or herbal products without talking to your doctor first.   ? Lifestyle changes  ? · Do not smoke. Smoking can increase your chance of a stroke and heart attack. If you need help quitting, talk to your doctor about stop-smoking programs and medicines. These can increase your chances of quitting for good. ? · Eat a heart-healthy diet. ? · Stay at a healthy weight. Lose weight if you need to.   ? · Limit alcohol to 2 drinks a day for men and 1 drink a day for women. Too much alcohol can cause health problems. ? · Avoid colds and flu.  Get a pneumococcal vaccine shot. If you have had one before, ask your doctor whether you need another dose. Get a flu shot every year. If you must be around people with colds or flu, wash your hands often. Activity  ? · If your doctor recommends it, get more exercise. Walking is a good choice. Bit by bit, increase the amount you walk every day. Try for at least 30 minutes on most days of the week. You also may want to swim, bike, or do other activities. Your doctor may suggest that you join a cardiac rehabilitation program so that you can have help increasing your physical activity safely. ? · Start light exercise if your doctor says it is okay. Even a small amount will help you get stronger, have more energy, and manage stress. Walking is an easy way to get exercise. Start out by walking a little more than you did in the hospital. Gradually increase the amount you walk. ? · When you exercise, watch for signs that your heart is working too hard. You are pushing too hard if you cannot talk while you are exercising. If you become short of breath or dizzy or have chest pain, sit down and rest immediately. ? · Check your pulse regularly. Place two fingers on the artery at the palm side of your wrist, in line with your thumb. If your heartbeat seems uneven or fast, talk to your doctor. When should you call for help? Call 911 anytime you think you may need emergency care. For example, call if:  ? · You have symptoms of a heart attack. These may include:  ¨ Chest pain or pressure, or a strange feeling in the chest.  ¨ Sweating. ¨ Shortness of breath. ¨ Nausea or vomiting. ¨ Pain, pressure, or a strange feeling in the back, neck, jaw, or upper belly or in one or both shoulders or arms. ¨ Lightheadedness or sudden weakness. ¨ A fast or irregular heartbeat. After you call 911, the  may tell you to chew 1 adult-strength or 2 to 4 low-dose aspirin. Wait for an ambulance. Do not try to drive yourself.    ? · You have symptoms of a stroke. These may include:  ¨ Sudden numbness, tingling, weakness, or loss of movement in your face, arm, or leg, especially on only one side of your body. ¨ Sudden vision changes. ¨ Sudden trouble speaking. ¨ Sudden confusion or trouble understanding simple statements. ¨ Sudden problems with walking or balance. ¨ A sudden, severe headache that is different from past headaches. ? · You passed out (lost consciousness). ?Call your doctor now or seek immediate medical care if:  ? · You have new or increased shortness of breath. ? · You feel dizzy or lightheaded, or you feel like you may faint. ? · Your heart rate becomes irregular. ? · You can feel your heart flutter in your chest or skip heartbeats. Tell your doctor if these symptoms are new or worse. ? Watch closely for changes in your health, and be sure to contact your doctor if you have any problems. Sotalol (By mouth)   Sotalol (SHANNAN-ta-lol)  Treats heart rhythm problems. This medicine is a beta blocker. Brand Name(s): Betapace, Betapace AF, Sorine, Sotylize   There may be other brand names for this medicine. When This Medicine Should Not Be Used: This medicine is not right for everyone. Do not use it if you had an allergic reaction to sotalol, or you have certain heart or lung problems. Talk with your doctor about what these problems are. How to Use This Medicine:   Liquid, Tablet  · Take your medicine as directed. Your dose may need to be changed several times to find what works best for you. · Measure the oral liquid medicine with a marked measuring spoon, oral syringe, or medicine cup. · Contact your doctor or pharmacist before your supply of this medicine starts to run low. Do not allow yourself to run out of medicine. · Read and follow the patient instructions that come with this medicine. Talk to your doctor or pharmacist if you have any questions. · Missed dose: Take a dose as soon as you remember.  If it is almost time for your next dose, wait until then and take a regular dose. Do not take extra medicine to make up for a missed dose. · Store the medicine in a closed container at room temperature, away from heat, moisture, and direct light. Drugs and Foods to Avoid:   Ask your doctor or pharmacist before using any other medicine, including over-the-counter medicines, vitamins, and herbal products. · Some foods and medicines can affect how sotalol works. Tell your doctor if you are using the following:  ¨ Albuterol, clonidine, digoxin, guanethidine, isoproterenol, reserpine, terbutaline  ¨ Blood pressure medicines  ¨ Insulin or diabetes medicine  ¨ Medicine to treat depression  ¨ Medicine to treat an infection  ¨ Other medicine to treat heart rhythm problems, such as amiodarone, disopyramide, procainamide, or quinidine  ¨ Phenothiazine medicine (such as chlorpromazine, perphenazine, prochlorperazine, promethazine, thioridazine)  · If you are taking an antacid that contains aluminum or magnesium hydroxide, take it 2 hours before or 2 hours after you take sotalol. Warnings While Using This Medicine:   · Tell your doctor if you are pregnant or breastfeeding, or if you have kidney disease, diabetes, heart disease, angina, low blood pressure, lung or breathing problems, overactive thyroid, or a history of severe allergic reactions or heart attack. · This medicine may cause increased heart rhythm problems while your dose is being adjusted. · Do not stop using this medicine suddenly. Your doctor will need to slowly decrease your dose before you stop it completely. You could have worsening chest pain or heart rhythm problems if you stop using this medicine suddenly. · This medicine may raise or lower your blood sugar level. · This medicine may make you dizzy. Do not drive or do anything else that could be dangerous until you know how this medicine affects you. Stand up slowly if you feel dizzy or lightheaded.   · Tell any doctor or dentist who treats you that you are using this medicine. · Your doctor will do lab tests at regular visits to check on the effects of this medicine. Keep all appointments. ECG tests will be needed to check for unwanted effects. · Keep all medicine out of the reach of children. Never share your medicine with anyone. Possible Side Effects While Using This Medicine:   Call your doctor right away if you notice any of these side effects:  · Allergic reaction: Itching or hives, swelling in your face or hands, swelling or tingling in your mouth or throat, chest tightness, trouble breathing  · Chest pain  · Dry mouth, increased thirst, muscle cramps, nausea or vomiting  · Fainting, dizziness, lightheadedness  · Fast, slow, or irregular heartbeat  · Rapid weight gain, swelling in your hands, feet, or ankles, trouble breathing  If you notice these less serious side effects, talk with your doctor:   · Diarrhea  · Increased sweating  · Tiredness or weakness  If you notice other side effects that you think are caused by this medicine, tell your doctor. Call your doctor for medical advice about side effects. You may report side effects to FDA at 0-163-FDA-4757  © 2017 [address] Information is for End User's use only and may not be sold, redistributed or otherwise used for commercial purposes. The above information is an  only. It is not intended as medical advice for individual conditions or treatments. Talk to your doctor, nurse or pharmacist before following any medical regimen to see if it is safe and effective for you.       DISCHARGE SUMMARY from Nurse    PATIENT INSTRUCTIONS:    After general anesthesia or intravenous sedation, for 24 hours or while taking prescription Narcotics:  · Limit your activities  · Do not drive and operate hazardous machinery  · Do not make important personal or business decisions  · Do  not drink alcoholic beverages  · If you have not urinated within 8 hours after discharge, please contact your surgeon on call. Report the following to your surgeon:  · Excessive pain, swelling, redness or odor of or around the surgical area  · Temperature over 100.5  · Nausea and vomiting lasting longer than 4 hours or if unable to take medications  · Any signs of decreased circulation or nerve impairment to extremity: change in color, persistent  numbness, tingling, coldness or increase pain  · Any questions    What to do at Home:  Recommended activity: Activity as tolerated,     *  Please give a list of your current medications to your Primary Care Provider. *  Please update this list whenever your medications are discontinued, doses are      changed, or new medications (including over-the-counter products) are added. *  Please carry medication information at all times in case of emergency situations. These are general instructions for a healthy lifestyle:    No smoking/ No tobacco products/ Avoid exposure to second hand smoke  Surgeon General's Warning:  Quitting smoking now greatly reduces serious risk to your health. Obesity, smoking, and sedentary lifestyle greatly increases your risk for illness    A healthy diet, regular physical exercise & weight monitoring are important for maintaining a healthy lifestyle    You may be retaining fluid if you have a history of heart failure or if you experience any of the following symptoms:  Weight gain of 3 pounds or more overnight or 5 pounds in a week, increased swelling in our hands or feet or shortness of breath while lying flat in bed. Please call your doctor as soon as you notice any of these symptoms; do not wait until your next office visit. Recognize signs and symptoms of STROKE:    F-face looks uneven    A-arms unable to move or move unevenly    S-speech slurred or non-existent    T-time-call 911 as soon as signs and symptoms begin-DO NOT go       Back to bed or wait to see if you get better-TIME IS BRAIN.     Warning Signs of HEART ATTACK     Call 911 if you have these symptoms:   Chest discomfort. Most heart attacks involve discomfort in the center of the chest that lasts more than a few minutes, or that goes away and comes back. It can feel like uncomfortable pressure, squeezing, fullness, or pain.  Discomfort in other areas of the upper body. Symptoms can include pain or discomfort in one or both arms, the back, neck, jaw, or stomach.  Shortness of breath with or without chest discomfort.  Other signs may include breaking out in a cold sweat, nausea, or lightheadedness. Don't wait more than five minutes to call 911 - MINUTES MATTER! Fast action can save your life. Calling 911 is almost always the fastest way to get lifesaving treatment.  Emergency Medical Services staff can begin treatment when they arrive -- up to an hour sooner than if someone gets to the hospital by car.     ___________________________________________________________________________________________________________________________________

## 2017-12-02 NOTE — PROGRESS NOTES
Bedside and Verbal shift change report given to Audrey Presley RN and self (oncoming nurse) by Ruby Enriquez RN (offgoing nurse).  Report included the following information SBAR, Kardex, Intake/Output, MAR, Recent Results and Cardiac Rhythm SR.

## 2017-12-02 NOTE — PROGRESS NOTES
Bedside shift change report given to [facility], RN. Report included the following information SBAR, Kardex, MAR and Recent Results.

## 2017-12-02 NOTE — PROGRESS NOTES
Received bedside and verbal report from [place], [address] [facility]

## 2017-12-02 NOTE — PROGRESS NOTES
Problem: Falls - Risk of  Goal: *Absence of Falls  Document Hang Fall Risk and appropriate interventions in the flowsheet. Outcome: Progressing Towards Goal  Fall Risk Interventions:            Medication Interventions: Patient to call before getting OOB, Teach patient to arise slowly  Patient progressing towards goal with no falls on current admission. Patient without confusion, agitation, or sensory perception deficits. Patient has steady gait on ambulation. Personal belongings are within reach. Bed is in the low and locked position with side rails up x2. Yellow gripper socks to bilateral feet. Call light within reach and patient verbalizes understanding of use. Problem: Afib Pathway: Day 3  Goal: Discharge Planning  Outcome: Resolved/Met Date Met: 12/01/17  Patient anticipates going home with his wife Saturday morning after 6th dose of Betapace. Goal: Medications  Outcome: Resolved/Met Date Met: 12/01/17  All ordered medications given including Betapace every 12 hours, loading doses 4 and 5.

## 2017-12-02 NOTE — PROGRESS NOTES
[address] December 2, 2017       RE: Ken Montgomery      To Whom It May Concern,    This is to certify that Ken Montgomery may return to work on December 11th. Please feel free to contact my office if you have any questions or concerns. Thank you for your assistance in this matter.       Sincerely,  Garrel Gaucher, NP

## 2017-12-15 NOTE — PROCEDURES
Late entry for procedure completed 11/30    Pre-Procedure Diagnosis  1. Atrial fibrilllation     Procedure Performed  1. Direct Current Cardioversion    Anesthesia: * No surgery found *     Specimens: * Cannot find log *     Estimated Blood Loss: None, not applicable    Procedural Description: The risks, benefits and alternatives of the procedure were reviewed with the patient, and final questions answered. Informed consent was confirmed. A procedural timeout was called and completed per institutional policy. Once appropriate monitors were applied, conscious sedation was provided with fentanyl and versed with continuous blood pressure and oxygen monitoring by cath lab staff. Once an appropriate level of sedation was achieved, the patient was converted to sinus rhythm with pads across the anterior and posterior chest wall. The patient awoke from his procedure without overt complications. Post Procedure Diagnosis: Normal sinus rhythm    Godwin Bernard MD, MS  Clinical Cardiac Electrophysiology  12/15/2017  7:38 AM

## 2017-12-19 ENCOUNTER — HOSPITAL ENCOUNTER (OUTPATIENT)
Dept: LAB | Age: 77
Discharge: HOME | End: 2017-12-19
Payer: COMMERCIAL

## 2017-12-19 PROBLEM — I48.0 PAROXYSMAL ATRIAL FIBRILLATION (HCC): Status: RESOLVED | Noted: 2017-06-30 | Resolved: 2017-12-19

## 2017-12-19 PROBLEM — I49.5 SSS (SICK SINUS SYNDROME) (HCC): Status: RESOLVED | Noted: 2017-06-30 | Resolved: 2017-12-19

## 2017-12-19 LAB
ANION GAP SERPL CALC-SCNC: 7 MMOL/L
BUN SERPL-MCNC: 13 MG/DL (ref 8–23)
CALCIUM SERPL-MCNC: 9.1 MG/DL (ref 8.3–10.4)
CHLORIDE SERPL-SCNC: 106 MMOL/L (ref 98–107)
CO2 SERPL-SCNC: 27 MMOL/L (ref 21–32)
CREAT SERPL-MCNC: 0.8 MG/DL (ref 0.8–1.5)
GLUCOSE SERPL-MCNC: 109 MG/DL (ref 65–100)
POTASSIUM SERPL-SCNC: 3.9 MMOL/L (ref 3.5–5.1)
SODIUM SERPL-SCNC: 140 MMOL/L (ref 136–145)

## 2017-12-19 PROCEDURE — 80048 BASIC METABOLIC PNL TOTAL CA: CPT | Performed by: INTERNAL MEDICINE

## 2017-12-19 PROCEDURE — 36415 COLL VENOUS BLD VENIPUNCTURE: CPT | Performed by: INTERNAL MEDICINE

## 2018-02-13 ENCOUNTER — HOSPITAL ENCOUNTER (EMERGENCY)
Age: 78
Discharge: HOME | End: 2018-02-13
Attending: EMERGENCY MEDICINE
Payer: COMMERCIAL

## 2018-02-13 VITALS
HEIGHT: 68 IN | RESPIRATION RATE: 19 BRPM | TEMPERATURE: 97.9 F | BODY MASS INDEX: 31.83 KG/M2 | HEART RATE: 61 BPM | WEIGHT: 210 LBS | DIASTOLIC BLOOD PRESSURE: 88 MMHG | OXYGEN SATURATION: 96 % | SYSTOLIC BLOOD PRESSURE: 178 MMHG

## 2018-02-13 DIAGNOSIS — R58 MUCOSAL BLEEDING: Primary | ICD-10-CM

## 2018-02-13 PROCEDURE — 74011000250 HC RX REV CODE- 250: Performed by: EMERGENCY MEDICINE

## 2018-02-13 PROCEDURE — 99283 EMERGENCY DEPT VISIT LOW MDM: CPT | Performed by: EMERGENCY MEDICINE

## 2018-02-13 PROCEDURE — 17250 CHEM CAUT OF GRANLTJ TISSUE: CPT | Performed by: EMERGENCY MEDICINE

## 2018-02-13 RX ORDER — SILVER NITRATE 38.21; 12.74 MG/1; MG/1
4 STICK TOPICAL ONCE
Status: COMPLETED | OUTPATIENT
Start: 2018-02-13 | End: 2018-02-13

## 2018-02-13 RX ADMIN — SILVER NITRATE APPLICATORS 4 APPLICATOR: 25; 75 STICK TOPICAL at 19:46

## 2018-02-13 NOTE — Clinical Note
Keep gauze in place throughout the night Call and arrange follow-up with your dentist 
Return to the ER for any new or worsening symptoms

## 2018-02-13 NOTE — ED TRIAGE NOTES
Patient reports having a tooth pulled today. Patient family reports dentist did not \"sew him up\" after and is on a blood thinner.

## 2018-02-14 NOTE — ED PROVIDER NOTES
HPI Comments: Patient presents to the ER complaining of continuous bleeding from his mouth. Patient apparently had a dental extraction earlier. Reports when he got home he noticed significant bleeding from wound. States he does take Eliquis for atrial fibrillation. Patient is a 65 y.o. male presenting with dental problem. The history is provided by the patient. Dental Pain    This is a new problem. The current episode started 3 to 5 hours ago. The problem occurs constantly. The pain is located in the right lower mouth. The patient is experiencing no pain.          Past Medical History:   Diagnosis Date    Abnormal EKG 4/22/15    CAD (coronary artery disease) 5/8/2015    Carotid artery stenosis without cerebral infarction 6/7/2016    US 6/15: <50% bilat ICAs    Coronary atherosclerosis of native coronary vessel 5/8/2015    VEGAS on brilinta 5/7/15: prox LAD PCI, normal EF     Dyslipidemia 6/7/2016    Dyspnea 5/8/2015    Echo 6/15: EF 60%, mod MR, mod LVH, mild AI     ED (erectile dysfunction)     GERD (gastroesophageal reflux disease)     GERD (gastroesophageal reflux disease)     HTN (hypertension) 5/8/2015    Hypertension     Hypokalemia 6/7/2016    Hypokalemia     Mitral valve regurgitation 6/7/2016    Myasthenia gravis ([address] 6/17/15    Myasthenia gravis (HCC)     Nocturia     Osteoporosis     PUD (peptic ulcer disease)     S/P coronary artery stent placement 5/8/2015    3.0x38 mm Xience ADRIANNA to pLAD 5/7/15     Sleep apnea     Syncope and collapse     Unspecified sleep apnea        Past Surgical History:   Procedure Laterality Date    HX APPENDECTOMY      HX COLONOSCOPY  2007    HX HEART CATHETERIZATION  5/7/2015    HX Lul Cervantes/Xin for sleep apnea and reconstruction for extending jaw         Family History:   Problem Relation Age of Onset    Cancer Mother      kidney    Cancer Father      stomach       Social History     Social History    Marital status:      Spouse name: N/A    Number of children: N/A    Years of education: N/A     Occupational History    Not on file. Social History Main Topics    Smoking status: Never Smoker    Smokeless tobacco: Never Used    Alcohol use No    Drug use: No    Sexual activity: Not on file     Other Topics Concern    Not on file     Social History Narrative         ALLERGIES: Review of patient's allergies indicates no known allergies. Review of Systems   Constitutional: Negative for fatigue and fever. HENT: Positive for dental problem. Eyes: Negative for photophobia, redness and visual disturbance. Respiratory: Negative for chest tightness and shortness of breath. Endocrine: Negative for polydipsia and polyphagia. Genitourinary: Negative for discharge and hematuria. Allergic/Immunologic: Negative for food allergies and immunocompromised state. Neurological: Negative for syncope and speech difficulty. All other systems reviewed and are negative. Vitals:    02/13/18 1836   BP: (!) 196/95   Pulse: 60   Resp: 20   Temp: 97.5 °F (36.4 °C)   SpO2: 95%   Weight: 95.3 kg (210 lb)   Height: 5' 8\" (1.727 m)            Physical Exam   Constitutional: He is oriented to person, place, and time. He appears well-developed. HENT:   Mouth/Throat:       Cardiovascular: Normal rate and regular rhythm. Neurological: He is alert and oriented to person, place, and time. He has normal reflexes. Nursing note and vitals reviewed. MDM  Number of Diagnoses or Management Options  Diagnosis management comments: We'll remove clot, attempt chemical cauterization with silver nitrate    8:51 PM  Bleeding seems to have slowed. Patient appears stable to discharge.     Risk of Complications, Morbidity, and/or Mortality  Presenting problems: moderate  Diagnostic procedures: low  Management options: low    Patient Progress  Patient progress: stable        ED Course Procedures

## 2018-02-14 NOTE — DISCHARGE INSTRUCTIONS
Bleeding After Surgery: Care Instructions  Your Care Instructions  After surgery, it is common to have some minor bleeding from the cut (incision) made by your doctor. But problems may occur that cause you to bleed too much. An injury to a blood vessel can cause bleeding after surgery. Other causes include medicines such as aspirin or anticoagulants (blood thinners). Your doctor examined you to find the cause of the bleeding. You may have had imaging tests, such as a CT scan, MRI, or ultrasound. To help stop the bleeding, your doctor probably put pressure on the area and may have sewn up or cauterized (sealed) the incision. Your doctor also may have given you medicines that help stop the bleeding. Follow-up care is a key part of your treatment and safety. Be sure to make and go to all appointments, and call your doctor if you are having problems. It's also a good idea to know your test results and keep a list of the medicines you take. How can you care for yourself at home? · If you have strips of tape on the incision, leave the tape on for a week or until it falls off. Or follow your doctor's instructions for removing the tape. Keep the area dry at all times. · You will have a dressing over the incision. A dressing helps the incision heal and protects it. Your doctor will tell you how to take care of this. · If you do not have tape on the incision, wash the area daily with warm, soapy water, and pat it dry. Don't use hydrogen peroxide or alcohol, which can slow healing. · You may cover the area with a gauze bandage if it weeps or rubs against clothing. Change the bandage every day. · Keep the area clean and dry. When should you call for help? Call 911 anytime you think you may need emergency care. For example, call if:  ? · You passed out (lost consciousness). ?Call your doctor now or seek immediate medical care if:  ? · You are dizzy or lightheaded, or you feel like you may faint.    ? · You have bleeding that starts again or gets worse, such as soaking one or more bandages over 2 to 4 hours. ? Watch closely for changes in your health, and be sure to contact your doctor if:  ? · You do not get better as expected. Where can you learn more? Go to http://tess-micheline.info/. Enter C729 in the search box to learn more about \"Bleeding After Surgery: Care Instructions. \"  Current as of: March 20, 2017  Content Version: 11.4  © 5749-3385 [employer]. Care instructions adapted under license by [employer] (which disclaims liability or warranty for this information). If you have questions about a medical condition or this instruction, always ask your healthcare professional. [address] any warranty or liability for your use of this information.

## 2018-02-26 ENCOUNTER — HOSPITAL ENCOUNTER (OUTPATIENT)
Dept: LAB | Age: 78
Discharge: HOME | End: 2018-02-26
Payer: COMMERCIAL

## 2018-02-26 DIAGNOSIS — I10 ESSENTIAL HYPERTENSION: Chronic | ICD-10-CM

## 2018-02-26 DIAGNOSIS — I48.91 ATRIAL FIBRILLATION, UNSPECIFIED TYPE (HCC): ICD-10-CM

## 2018-02-26 LAB
ANION GAP SERPL CALC-SCNC: 7 MMOL/L
BUN SERPL-MCNC: 10 MG/DL (ref 8–23)
CALCIUM SERPL-MCNC: 9 MG/DL (ref 8.3–10.4)
CHLORIDE SERPL-SCNC: 104 MMOL/L (ref 98–107)
CO2 SERPL-SCNC: 29 MMOL/L (ref 21–32)
CREAT SERPL-MCNC: 0.9 MG/DL (ref 0.8–1.5)
GLUCOSE SERPL-MCNC: 120 MG/DL (ref 65–100)
POTASSIUM SERPL-SCNC: 4.3 MMOL/L (ref 3.5–5.1)
SODIUM SERPL-SCNC: 140 MMOL/L (ref 136–145)

## 2018-02-26 PROCEDURE — 80048 BASIC METABOLIC PNL TOTAL CA: CPT | Performed by: INTERNAL MEDICINE

## 2018-02-26 PROCEDURE — 36415 COLL VENOUS BLD VENIPUNCTURE: CPT | Performed by: INTERNAL MEDICINE

## 2018-07-18 ENCOUNTER — HOSPITAL ENCOUNTER (OUTPATIENT)
Dept: LAB | Age: 78
Discharge: HOME | End: 2018-07-18
Payer: COMMERCIAL

## 2018-07-18 DIAGNOSIS — R06.09 DYSPNEA ON EXERTION: Chronic | ICD-10-CM

## 2018-07-18 LAB
ANION GAP SERPL CALC-SCNC: 6 MMOL/L
BASOPHILS # BLD: 0 K/UL (ref 0–0.2)
BASOPHILS NFR BLD: 0 % (ref 0–2)
BNP SERPL-MCNC: 27 PG/ML
BUN SERPL-MCNC: 13 MG/DL (ref 8–23)
CALCIUM SERPL-MCNC: 8.8 MG/DL (ref 8.3–10.4)
CHLORIDE SERPL-SCNC: 104 MMOL/L (ref 98–107)
CO2 SERPL-SCNC: 28 MMOL/L (ref 21–32)
CREAT SERPL-MCNC: 0.8 MG/DL (ref 0.8–1.5)
DIFFERENTIAL METHOD BLD: ABNORMAL
EOSINOPHIL # BLD: 0.4 K/UL (ref 0–0.8)
EOSINOPHIL NFR BLD: 3 % (ref 0.5–7.8)
ERYTHROCYTE [DISTWIDTH] IN BLOOD BY AUTOMATED COUNT: 14.4 % (ref 11.9–14.6)
GLUCOSE SERPL-MCNC: 117 MG/DL (ref 65–100)
HCT VFR BLD AUTO: 40 % (ref 41.1–50.3)
HGB BLD-MCNC: 12.6 G/DL (ref 13.6–17.2)
LYMPHOCYTES # BLD: 3.2 K/UL (ref 0.5–4.6)
LYMPHOCYTES NFR BLD: 28 % (ref 13–44)
MCH RBC QN AUTO: 27.4 PG (ref 26.1–32.9)
MCHC RBC AUTO-ENTMCNC: 31.5 G/DL (ref 31.4–35)
MCV RBC AUTO: 87 FL (ref 79.6–97.8)
MONOCYTES # BLD: 0.7 K/UL (ref 0.1–1.3)
MONOCYTES NFR BLD: 7 % (ref 4–12)
NEUTS SEG # BLD: 7 K/UL (ref 1.7–8.2)
NEUTS SEG NFR BLD: 62 % (ref 43–78)
PLATELET # BLD AUTO: 185 K/UL (ref 150–450)
PMV BLD AUTO: 9.7 FL (ref 10.8–14.1)
POTASSIUM SERPL-SCNC: 4.5 MMOL/L (ref 3.5–5.1)
RBC # BLD AUTO: 4.6 M/UL (ref 4.23–5.67)
SODIUM SERPL-SCNC: 138 MMOL/L (ref 136–145)
WBC # BLD AUTO: 11.4 K/UL (ref 4.3–11.1)

## 2018-07-18 PROCEDURE — 85025 COMPLETE CBC W/AUTO DIFF WBC: CPT | Performed by: INTERNAL MEDICINE

## 2018-07-18 PROCEDURE — 83880 ASSAY OF NATRIURETIC PEPTIDE: CPT | Performed by: INTERNAL MEDICINE

## 2018-07-18 PROCEDURE — 36415 COLL VENOUS BLD VENIPUNCTURE: CPT | Performed by: INTERNAL MEDICINE

## 2018-07-18 PROCEDURE — 80048 BASIC METABOLIC PNL TOTAL CA: CPT | Performed by: INTERNAL MEDICINE

## 2018-07-20 NOTE — PROGRESS NOTES
Patient pre-assessment complete for Ayaka Viera scheduled for [facility], arrival time 0600 am. Patient verified using . Patient instructed to bring all home medications in labeled bottles on the day of procedure. NPO status reinforced. Patient informed to take a full dose aspirin 325mg  or 81 mg x 4 on the day of procedure. Patient instructed to HOLD Eliquis night prior to and morning of procedure. Instructed they can take all other medications excluding vitamins & supplements. Patient verbalizes understanding of all instructions & denies any questions at this time.

## 2018-07-23 ENCOUNTER — HOSPITAL ENCOUNTER (OUTPATIENT)
Dept: CARDIAC CATH/INVASIVE PROCEDURES | Age: 78
Discharge: HOME | End: 2018-07-23
Attending: INTERNAL MEDICINE | Admitting: INTERNAL MEDICINE
Payer: MEDICARE

## 2018-07-23 VITALS
WEIGHT: 216 LBS | OXYGEN SATURATION: 94 % | BODY MASS INDEX: 32.74 KG/M2 | TEMPERATURE: 97.8 F | HEART RATE: 60 BPM | HEIGHT: 68 IN | SYSTOLIC BLOOD PRESSURE: 154 MMHG | RESPIRATION RATE: 16 BRPM | DIASTOLIC BLOOD PRESSURE: 84 MMHG

## 2018-07-23 LAB
ATRIAL RATE: 82 BPM
CALCULATED P AXIS, ECG09: 30 DEGREES
CALCULATED R AXIS, ECG10: -45 DEGREES
CALCULATED T AXIS, ECG11: 57 DEGREES
DIAGNOSIS, 93000: NORMAL
P-R INTERVAL, ECG05: 202 MS
Q-T INTERVAL, ECG07: 410 MS
QRS DURATION, ECG06: 94 MS
QTC CALCULATION (BEZET), ECG08: 479 MS
VENTRICULAR RATE, ECG03: 82 BPM

## 2018-07-23 PROCEDURE — 77030019569 HC BND COMPR RAD TERU -B

## 2018-07-23 PROCEDURE — 93005 ELECTROCARDIOGRAM TRACING: CPT | Performed by: INTERNAL MEDICINE

## 2018-07-23 PROCEDURE — 74011000250 HC RX REV CODE- 250: Performed by: INTERNAL MEDICINE

## 2018-07-23 PROCEDURE — C1894 INTRO/SHEATH, NON-LASER: HCPCS

## 2018-07-23 PROCEDURE — 77030015766

## 2018-07-23 PROCEDURE — 74011250636 HC RX REV CODE- 250/636: Performed by: INTERNAL MEDICINE

## 2018-07-23 PROCEDURE — 74011000258 HC RX REV CODE- 258: Performed by: INTERNAL MEDICINE

## 2018-07-23 PROCEDURE — C1769 GUIDE WIRE: HCPCS

## 2018-07-23 PROCEDURE — 93458 L HRT ARTERY/VENTRICLE ANGIO: CPT

## 2018-07-23 PROCEDURE — 77030004534 HC CATH ANGI DX INFN CARD -A

## 2018-07-23 PROCEDURE — 74011250636 HC RX REV CODE- 250/636

## 2018-07-23 PROCEDURE — 74011636320 HC RX REV CODE- 636/320: Performed by: INTERNAL MEDICINE

## 2018-07-23 PROCEDURE — 99152 MOD SED SAME PHYS/QHP 5/>YRS: CPT

## 2018-07-23 RX ORDER — FENTANYL CITRATE 50 UG/ML
25-50 INJECTION, SOLUTION INTRAMUSCULAR; INTRAVENOUS
Status: DISCONTINUED | OUTPATIENT
Start: 2018-07-23 | End: 2018-07-23 | Stop reason: HOSPADM

## 2018-07-23 RX ORDER — SODIUM CHLORIDE 9 MG/ML
75 INJECTION, SOLUTION INTRAVENOUS CONTINUOUS
Status: DISCONTINUED | OUTPATIENT
Start: 2018-07-23 | End: 2018-07-23 | Stop reason: HOSPADM

## 2018-07-23 RX ORDER — SODIUM CHLORIDE 9 MG/ML
75 INJECTION, SOLUTION INTRAVENOUS CONTINUOUS
Status: CANCELLED | OUTPATIENT
Start: 2018-07-23

## 2018-07-23 RX ORDER — MIDAZOLAM HYDROCHLORIDE 1 MG/ML
.5-2 INJECTION, SOLUTION INTRAMUSCULAR; INTRAVENOUS
Status: DISCONTINUED | OUTPATIENT
Start: 2018-07-23 | End: 2018-07-23 | Stop reason: HOSPADM

## 2018-07-23 RX ORDER — SODIUM CHLORIDE 0.9 % (FLUSH) 0.9 %
5-10 SYRINGE (ML) INJECTION EVERY 8 HOURS
Status: CANCELLED | OUTPATIENT
Start: 2018-07-23

## 2018-07-23 RX ORDER — HEPARIN SODIUM 200 [USP'U]/100ML
3 INJECTION, SOLUTION INTRAVENOUS CONTINUOUS
Status: DISCONTINUED | OUTPATIENT
Start: 2018-07-23 | End: 2018-07-23 | Stop reason: HOSPADM

## 2018-07-23 RX ORDER — ACETAMINOPHEN 325 MG/1
650 TABLET ORAL
Status: CANCELLED | OUTPATIENT
Start: 2018-07-23

## 2018-07-23 RX ORDER — HYDROCODONE BITARTRATE AND ACETAMINOPHEN 5; 325 MG/1; MG/1
1 TABLET ORAL
Status: CANCELLED | OUTPATIENT
Start: 2018-07-23

## 2018-07-23 RX ORDER — DIAZEPAM 5 MG/1
5 TABLET ORAL ONCE
Status: DISCONTINUED | OUTPATIENT
Start: 2018-07-23 | End: 2018-07-23 | Stop reason: HOSPADM

## 2018-07-23 RX ORDER — SODIUM CHLORIDE 0.9 % (FLUSH) 0.9 %
5-10 SYRINGE (ML) INJECTION AS NEEDED
Status: CANCELLED | OUTPATIENT
Start: 2018-07-23

## 2018-07-23 RX ORDER — ONDANSETRON 2 MG/ML
4 INJECTION INTRAMUSCULAR; INTRAVENOUS
Status: CANCELLED | OUTPATIENT
Start: 2018-07-23 | End: 2018-07-24

## 2018-07-23 RX ORDER — GUAIFENESIN 100 MG/5ML
324 LIQUID (ML) ORAL ONCE
Status: DISCONTINUED | OUTPATIENT
Start: 2018-07-23 | End: 2018-07-23 | Stop reason: HOSPADM

## 2018-07-23 RX ORDER — LIDOCAINE HYDROCHLORIDE 20 MG/ML
60-140 INJECTION, SOLUTION INFILTRATION; PERINEURAL ONCE
Status: COMPLETED | OUTPATIENT
Start: 2018-07-23 | End: 2018-07-23

## 2018-07-23 RX ADMIN — FENTANYL CITRATE 25 MCG: 50 INJECTION, SOLUTION INTRAMUSCULAR; INTRAVENOUS at 08:12

## 2018-07-23 RX ADMIN — HEPARIN SODIUM 3 ML/HR: 5000 INJECTION, SOLUTION INTRAVENOUS; SUBCUTANEOUS at 08:12

## 2018-07-23 RX ADMIN — IOPAMIDOL 105 ML: 755 INJECTION, SOLUTION INTRAVENOUS at 08:25

## 2018-07-23 RX ADMIN — HEPARIN SODIUM 2 ML: 10000 INJECTION, SOLUTION INTRAVENOUS; SUBCUTANEOUS at 08:13

## 2018-07-23 RX ADMIN — SODIUM CHLORIDE 75 ML/HR: 900 INJECTION, SOLUTION INTRAVENOUS at 07:00

## 2018-07-23 RX ADMIN — MIDAZOLAM HYDROCHLORIDE 2 MG: 1 INJECTION, SOLUTION INTRAMUSCULAR; INTRAVENOUS at 08:12

## 2018-07-23 RX ADMIN — LIDOCAINE HYDROCHLORIDE 80 MG: 20 INJECTION, SOLUTION INFILTRATION; PERINEURAL at 08:13

## 2018-07-23 NOTE — PROGRESS NOTES
TRANSFER - OUT REPORT:    R radial diagnostic C with Dr Graciela Ponce  Versed 2 mg  Fentanyl 25 mcg  Tr band 14 ml at 0825  No bleeding or hematoma noted at site. Site soft    Verbal report given to Chen(name) on [facility]  being transferred to CPRU(unit) for routine progression of care       Report consisted of patients Situation, Background, Assessment and   Recommendations(SBAR). Information from the following report(s) Procedure Summary was reviewed with the receiving nurse. Lines:   Peripheral IV 07/23/18 Left Antecubital (Active)       Peripheral IV 07/23/18 Right Arm (Active)        Opportunity for questions and clarification was provided.       Patient transported with:   Registered Nurse

## 2018-07-23 NOTE — PROGRESS NOTES
Patient received to [address] [place] [address] room # 12  Ambulatory from [facility]. Patient scheduled for [facility] today with Dr Graciela Ponce. Procedure reviewed & questions answered, voiced good understanding consent obtained & placed on chart. All medications and medical history reviewed. Will prep patient per orders. Patient & family updated on plan of care.       The patient has a fraility score of 4-VULNERABLE, based on patient with shortness of breath on exertion to from waiting room

## 2018-07-23 NOTE — PROGRESS NOTES
Discharge instructions given. Wife at bedside. TR band removed with 4x4 gauze dsg placed and tegaderm applied.

## 2018-07-23 NOTE — PROCEDURES
[address].  MR#: [id number]  : 1940  ACCOUNT #: [de-identified]   DATE OF SERVICE: 2018    PROCEDURE:  Left heart catheterization, selective coronary aortography, left ventriculogram via the right radial approach. INDICATION:  Worsening dyspnea with exertion, felt to be anginal equivalent. Prior PCI stent of the LAD. Recent stress test showed normal perfusion, but given ongoing symptoms, cardiac catheterization arranged by Dr. Jessica Floyd. TECHNICAL FACTORS:  After informed consent was obtained, the patient was brought to the cardiac catheterization lab. Right radial artery was prepped and draped in usual sterile fashion. Utilizing modified Seldinger technique and micropuncture needle, the right radial artery was entered. A 6-Setswana Terumo slender sheath was placed without difficulty. A Tiger 4.0 catheter was advanced into the left heart into the aortic root. There was significant tortuosity of the left subclavian. The left main coronary artery was engaged with a Tiger catheter and a JR4 catheter was used to selectively engage the ostium of the right coronary artery with a moderate amount of difficulty given the subclavian tortuosity. Selective injection in various projections was performed. A pigtail catheter was used to cross the aortic valve and enter the left ventricle. Hemodynamic measurements and left ventriculogram were obtained. Left ventricular aortic pressure gradient was obtained by pullback technique. At the conclusion of the diagnostic procedure, the radial sheath was removed and a pneumatic band was placed with good hemostasis. No complications were encountered. SEDATION:  The patient received moderate supervised conscious sedation with 2 mg of Versed, 25 mcg of fentanyl. Sedation start time was 8:06 a.m. with a procedure completion time of 8:25 a.m.   Sedation was administered by Jennifer Marino Meryl Sosa RN, under my supervision. CONTRAST:  Isovue 105. HEMODYNAMIC RESULTS:  1. Aortic pressure 128/70 with a mean of 97.  2.  Left ventricular end diastolic pressure of 8.  3.  There is no significant gradient across the aortic valve. ANGIOGRAPHIC RESULTS:  1. Left main coronary artery:  Large caliber vessel. Angiographically normal.    2.  Left anterior descending artery:  The proximal vessel has been stented. Stent is widely patent with no significant in-stent restenosis. The mid vessel contains a 20-30% stenosis after the first diagonal artery and the distal vessel contains a 30% stenosis. 3.  First diagonal artery:  Small caliber vessel. 20% ostial stenosis. 4.  Left circumflex: This is a large caliber nondominant vessel. Becomes medium to small caliber after the origin of a large obtuse marginal.  There is a 40-50% eccentric stenosis in the mid circumflex after the origin of the obtuse marginal.  The ongoing circumflex is patent. 5.  First obtuse marginal:  This is a medium caliber vessel. 20% proximal stenosis. 6.  Right coronary artery: This is a medium caliber dominant vessel. It contains a 30% proximal stenosis. 7.  Right posterior descending artery:  Medium caliber vessel. Patent. 8.  Right posterolateral branch:  Small caliber vessel. Patent. 9.  Left ventriculogram performed in TUCKER projection shows normal left ventricular systolic function, EF 95-58%. No focal segmental wall motion abnormalities. Aortic root is mildly dilated. CONCLUSIONS:  1. Patent left anterior descending artery stent. 2.  Mild to moderate nonobstructive disease in other myocardial segments. 3.  Normal left ventricular systolic function. 4.  Dilated aortic root. PLAN:  Follow up with Dr. Nadine Campuzano in 2 weeks for ongoing care.       MD PAULETTE Webb / MARIA ESTHER  D: 07/23/2018 08:34     T: 07/23/2018 08:54  JOB #: [id number]  CC: Senait Chris MD

## 2018-07-23 NOTE — PROCEDURES
Brief Cardiac Procedure Note    Patient: Tien Wynne MRN: [id number]  SSN: [id number]    YOB: 1940  Age: 77 y.o. Sex: male      Date of Procedure: 7/23/2018     Pre-procedure Diagnosis: Shortness of Breath    Post-procedure Diagnosis: Coronary Artery Disease    Procedure: Left Heart Catheterization    Brief Description of Procedure: As above    Performed By: Akilah Moy MD     Assistants: None    Anesthesia: Moderate Sedation    Estimated Blood Loss: Less than 10 mL      Specimens: None    Implants: None    Findings: Stable CAD. Patent LAD stent. Normal LV function. LVEDP 8. Complications: None. Right radial     Recommendations: Continue medical therapy.     Signed By: Akilah Moy MD     July 23, 2018

## 2018-07-23 NOTE — IP AVS SNAPSHOT
[address] 
[phone] Patient: Yessy Layton MRN: [id number]:[id number] Discharge Summary 7/23/2018 Yessy Layton MRN[de-identified]  [id number] Admission Information Provider Pager Service Admission Date Expected D/C Date Yessenia Samuel MD  CARDIAC CATH LAB 7/23/2018 Actual LOS Patient Class 0 days OUTPATIENT Follow-up Information Follow up With Details Comments Contact Info Glo Davis DO On 8/13/2018 Follow-up @ 1:45 [address] [place] [address] [facility] [address] Cardiology [place] [facility] [address] 
[phone] My Medications ASK your physician about these medications Instructions Each Dose to Equal  
 Morning Noon Evening Bedtime  
 apixaban 5 mg tablet Commonly known as:  Lindsay Net Your last dose was: Your next dose is: Take 1 Tab by mouth two (2) times a day. 5 mg  
    
   
   
   
  
 atorvastatin 80 mg tablet Commonly known as:  LIPITOR Your last dose was: Your next dose is: Take 1 Tab by mouth nightly. 80 mg  
    
   
   
   
  
 clopidogrel 75 mg Tab Commonly known as:  PLAVIX Your last dose was: Your next dose is: Take 1 Tab by mouth daily. 75 mg HYDROcodone-acetaminophen 5-325 mg per tablet Commonly known as:  Benetta Pock Your last dose was: Your next dose is: Take 1 Tab by mouth every six (6) hours as needed. Max Daily Amount: 4 Tabs. 1 Tab  
    
   
   
   
  
 lisinopril 10 mg tablet Commonly known as:  Tawana Needy Your last dose was: Your next dose is: Take 1 Tab by mouth two (2) times a day. 10 mg  
    
   
   
   
  
 nitroglycerin 0.4 mg SL tablet Commonly known as:  NITROSTAT Your last dose was: Your next dose is: Place 1 sl under the tongue q 5 min prn cp, max 3 sl in a 15-min time period. Call 911 if no relief after the 3rd sl. potassium chloride 20 mEq tablet Commonly known as:  KLOR-CON M20 Your last dose was: Your next dose is: Take 2 Tabs by mouth daily. 40 mEq  
    
   
   
   
  
 pyridostigmine 60 mg tablet Commonly known as:  MESTINON Your last dose was: Your next dose is: Take 60 mg by mouth daily. 60 mg  
    
   
   
   
  
 sotalol 160 mg tablet Commonly known as:  Vernadine Jose Miguel Your last dose was: Your next dose is: Take 1 Tab by mouth every twelve (12) hours. 160 mg  
    
   
   
   
  
  
  
  
 
  
General Information Please provide this summary of care documentation to your next provider. Allergies No Known Allergies Current Immunizations  Never Reviewed Name Date Pneumococcal Polysaccharide (PPSV-23) 5/8/2015 Discharge Instructions Discharge Instructions HEART CATHETERIZATION/ANGIOGRAPHY DISCHARGE INSTRUCTIONS 1. Check puncture site frequently for swelling or bleeding. If there is any bleeding, lie down and apply pressure over the area with a clean towel or washcloth. Notify your doctor for any redness, swelling, drainage, or oozing from the puncture site. Notify your doctor for any fever or chills. 2. If the extremity becomes cold, numb, or painful call [facility] Cardiology at [phone]. 
3. Activity should be limited for the next 48 hours. Climb stairs as little as possible and avoid any stooping, bending, or strenuous activity for 48 hours. No heavy lifting (anything over 10 pounds) for 3 days. 4. You may resume your usual diet. Drink more fluids than usual. 
5. Have a responsible person drive you home and stay with you for at least 24 hours after your heart catheterization/angiography. 6. You may remove bandage from your Right wrist in 24 hours. You may shower in 24 hours. No tub baths, hot tubs, or swimming for 1 week. Do not place any lotions, creams, powders, or ointments over puncture site for 1 week. You may place a clean band-aid over the puncture site each day for 5 days. Change daily. I have read the above instructions and have had the opportunity to ask questions. Patient: ________________________   Date: 7/23/2018 Witness: _______________________   Date: 7/23/2018 Discharge Orders None  
  
` Patient Signature:  ____________________________________________________________ Date:  ____________________________________________________________  
  
 Hetal Moulding Provider Signature:  ____________________________________________________________ Date:  ____________________________________________________________

## 2018-07-23 NOTE — PROGRESS NOTES
Report received from [address] [place] [address]. Procedural findings communicated. Intra procedural  medication administration reviewed. Progression of care discussed.      Patient received into [address] [facility] 1 post sheath removal.     Access site without bleeding or swelling yes    Dressing dry and intact yes    Patient instructed to limit movement to right upp extremity    Routine post procedural vital signs and site assessment initiated yes

## 2018-07-23 NOTE — DISCHARGE INSTRUCTIONS
HEART CATHETERIZATION/ANGIOGRAPHY DISCHARGE INSTRUCTIONS    1. Check puncture site frequently for swelling or bleeding. If there is any bleeding, lie down and apply pressure over the area with a clean towel or washcloth. Notify your doctor for any redness, swelling, drainage, or oozing from the puncture site. Notify your doctor for any fever or chills. 2. If the extremity becomes cold, numb, or painful call [address] Cardiology at [phone].  3. Activity should be limited for the next 48 hours. Climb stairs as little as possible and avoid any stooping, bending, or strenuous activity for 48 hours. No heavy lifting (anything over 10 pounds) for 3 days. 4. You may resume your usual diet. Drink more fluids than usual.  5. Have a responsible person drive you home and stay with you for at least 24 hours after your heart catheterization/angiography. 6. You may remove bandage from your Right wrist in 24 hours. You may shower in 24 hours. No tub baths, hot tubs, or swimming for 1 week. Do not place any lotions, creams, powders, or ointments over puncture site for 1 week. You may place a clean band-aid over the puncture site each day for 5 days. Change daily. I have read the above instructions and have had the opportunity to ask questions.       Patient: ________________________   Date: 7/23/2018    Witness: _______________________   Date: 7/23/2018

## 2018-07-23 NOTE — PROGRESS NOTES
Patient states that he has been off of Eliquis for 2 to 3 months. Patient states he only takes Plavix and ASA.       mg and Plavix 75 mg given at 0700 am

## 2018-10-22 NOTE — DISCHARGE SUMMARY
Physician Discharge Summary     Patient ID:  Sharon Bee  [id number]  89 y.o.  1940    Admit date: 11/29/2017    Discharge date and time: 132/2/17    Admitting Physician: Maggie Allan MD     Primary Cardiologist:Dr Sanam Moyer     Primary Care MD:Jason Wyatt MD    Discharge Physician: Kenroy Claros NP    Admission Diagnoses: a fib  Atrial fibrillation [facility])    Discharge Diagnoses:   Patient Active Problem List    Diagnosis Date Noted    Atrial fibrillation ([address] 11/29/2017    GERD (gastroesophageal reflux disease) 10/27/2017    Sepsis ([address] 10/26/2017    Aspiration pneumonia ([facility] 10/26/2017    Hypotension 10/26/2017    Syncope 10/24/2017    Bilateral carotid artery disease ([address] 06/30/2017    Paroxysmal atrial fibrillation ([address] 06/30/2017    SSS (sick sinus syndrome) ([facility] 06/30/2017    Myasthenia gravis ([facility]     Dyslipidemia 06/07/2016    Hypokalemia 06/07/2016    Mitral valve regurgitation 06/07/2016    HTN (hypertension) 05/08/2015    Coronary atherosclerosis of native coronary vessel 05/08/2015    S/P coronary artery stent placement 05/08/2015    Dyspnea 05/08/2015           Hospital Course: Varun Coronel a very pleasant 67yo WM with a history of HTN, CAD s/p PCI, atrial fibrillation with RVR, and syncope was admitted to the hospital recently with worsening shortness of breath on exertion, episodes of feeling lightheaded and dizzy, \"falling to the ground\" on occasion and having periods of rapid palpitations associated with SOB and is now s/p PPM implantation. Pt reports this has been going on for several weeks to months and was admitted to the hospital from the office with afib with RVR. Pt has been monitored on telemetry and having episodes of marked bradycardia in the setting of afib with HRs into the 30s as well as periods of afib with RVR. Pt is symptomatic with associated SOB, fatigue and overall malaise. Pt reports he has been in afib for a long time. Prevention Guidelines, Women Ages 48 to 59  Screening tests and vaccines are an important part of managing your health. A screening test is done to find possible disorders or diseases in people who don't have any symptoms.  The goal is to find a disease e Chlamydia Women at increased risk for infection At routine exams   Colorectal cancer All women in this age group Flexible sigmoidoscopy every 5 years, or colonoscopy every 10 years, or double-contrast barium enema every 5 years; yearly fecal occult blood t Pt afib is a chronic problem, worsening, aggravated by activity, no alleviating factors, with symptoms as noted above. He was seen in the office by Dr. Jovana Navas for consideration of drug loading for afib. He continues to feel tired and have shortness of breath. He was scheduled for direct admission on 11/29 for Sotalol loading.    ---------------------------------------------------------------------------------------------------------------------------------------  Sotalol 160 mg BID initiated. He was in atrial fib on admission. He spontaneously converted to NSR. His QTC, electrolytes and HR were closely monitored. His ECG was reviewed today after 6th dose and Dr. Jovana Navas felt pt was acceptable for discharge. He will continue plavix 75 mg daily for CAD. He is not on aspirin due to bleeding risk. Hypokalemia was noted and supplemental K was started. Follow up BMP in one week.          It was discussed with patient the importance of oral anticoagulation, to take this every day and monitor stools for signs and symptoms of GI bleed. Report to us or PCP any dark tarry stools or kevin blood in stool. Discharge Exam:     Visit Vitals    /77 (BP 1 Location: Right arm, BP Patient Position: Sitting)    Pulse 60    Temp 98.2 °F (36.8 °C)    Resp 20    Ht 5' 8\" (1.727 m)    Wt 92.8 kg (204 lb 9.6 oz)    SpO2 94%    BMI 31.11 kg/m2     General Appearance:  Well developed, well nourished,alert and oriented x 3, and individual in no acute distress. Ears/Nose/Mouth/Throat:   Hearing grossly normal.         Neck: Supple. Chest:   Lungs clear to auscultation bilaterally. Cardiovascular:  Regular rate and rhythm, S1, S2 normal, no murmur. Abdomen:   Soft, non-tender, bowel sounds are active. Extremities: No edema bilaterally. Skin: Warm and dry.                  Final Laboratory Data:Recent Results (from the past 24 hour(s))   EKG, 12 LEAD, SUBSEQUENT    Collection Time: 12/01/17  8:08 PM   Result Hepatitis B Women at increased risk for infection – talk with your healthcare provider 3 doses over 6 months; second dose should be given 1 month after the first dose; the third dose should be given at least 2 months after the second dose and at least 4 mo Value Ref Range    Ventricular Rate 60 BPM    Atrial Rate 60 BPM    P-R Interval 172 ms    QRS Duration 94 ms    Q-T Interval 468 ms    QTC Calculation (Bezet) 468 ms    Calculated P Axis 12 degrees    Calculated R Axis -4 degrees    Calculated T Axis 50 degrees    Diagnosis       Electronic atrial pacemaker  When compared with ECG of 01-DEC-2017 08:09,  No significant change was found     METABOLIC PANEL, BASIC    Collection Time: 12/02/17  5:53 AM   Result Value Ref Range    Sodium 142 136 - 145 mmol/L    Potassium 3.2 (L) 3.5 - 5.1 mmol/L    Chloride 107 98 - 107 mmol/L    CO2 24 21 - 32 mmol/L    Anion gap 11 7 - 16 mmol/L    Glucose 135 (H) 65 - 100 mg/dL    BUN 10 8 - 23 MG/DL    Creatinine 0.75 (L) 0.8 - 1.5 MG/DL    GFR est AA >60 >60 ml/min/1.73m2    GFR est non-AA >60 >60 ml/min/1.73m2    Calcium 9.3 8.3 - 10.4 MG/DL   MAGNESIUM    Collection Time: 12/02/17  5:53 AM   Result Value Ref Range    Magnesium 2.1 1.8 - 2.4 mg/dL   EKG, 12 LEAD, SUBSEQUENT    Collection Time: 12/02/17  8:02 AM   Result Value Ref Range    Ventricular Rate 60 BPM    Atrial Rate 60 BPM    P-R Interval 180 ms    QRS Duration 94 ms    Q-T Interval 480 ms    QTC Calculation (Bezet) 480 ms    Calculated R Axis -6 degrees    Calculated T Axis 41 degrees    Diagnosis       Electronic atrial pacemaker  Prolonged QT  Abnormal ECG  When compared with ECG of 01-DEC-2017 20:08,  No significant change was found         Disposition: home    Patient Instructions:   Current Discharge Medication List      START taking these medications    Details   potassium chloride (K-DUR, KLOR-CON) 20 mEq tablet Take 2 Tabs by mouth now for 1 dose. Qty: 30 Tab, Refills: 2      sotalol (BETAPACE) 160 mg tablet Take 1 Tab by mouth every twelve (12) hours. Qty: 60 Tab, Refills: 11         CONTINUE these medications which have NOT CHANGED    Details   apixaban (ELIQUIS) 5 mg tablet Take 1 Tab by mouth two (2) times a day.   Qty: 60 Tab, Refills: 11 HYDROcodone-acetaminophen (NORCO) 5-325 mg per tablet Take 1 Tab by mouth every six (6) hours as needed. Max Daily Amount: 4 Tabs. Qty: 5 Tab, Refills: 0      clopidogrel (PLAVIX) 75 mg tab Take  by mouth.      benzonatate (TESSALON) 100 mg capsule Take 100 mg by mouth three (3) times daily as needed for Cough. atorvastatin (LIPITOR) 80 mg tablet Take 1 Tab by mouth nightly. Qty: 30 Tab, Refills: 11      nitroglycerin (NITROSTAT) 0.4 mg SL tablet Place 1 sl under the tongue q 5 min prn cp, max 3 sl in a 15-min time period. Call 911 if no relief after the 3rd sl. Qty: 1 Bottle, Refills: prn      pyridostigmine (MESTINON) 60 mg tablet Take 60 mg by mouth daily. STOP taking these medications       metoprolol succinate (TOPROL-XL) 200 mg XL tablet Comments:   Reason for Stopping:               Referenced discharge instructions provided by nursing for diet and activity. Follow-up:  Primary Cardiologist:Dr. Stella Lamar in 2 weeks  PCP: Conchita Arboleda MD) in about 4 weeks.     Signed:  Jennifer Aiken NP  12/2/2017  9:05 AM Use of daily aspirin Women ages 54 and up in this age group who are at risk for cardiovascular health problems such as stroke When your risk is known   Use of tobacco and the health effects it can cause All women in this age group Every exam   [address] [place]

## 2019-03-28 ENCOUNTER — HOSPITAL ENCOUNTER (OUTPATIENT)
Dept: CARDIAC CATH/INVASIVE PROCEDURES | Age: 79
Discharge: HOME | End: 2019-03-28
Attending: INTERNAL MEDICINE | Admitting: INTERNAL MEDICINE
Payer: MEDICARE

## 2019-03-28 VITALS
OXYGEN SATURATION: 96 % | SYSTOLIC BLOOD PRESSURE: 156 MMHG | HEART RATE: 60 BPM | BODY MASS INDEX: 30.31 KG/M2 | RESPIRATION RATE: 16 BRPM | WEIGHT: 200 LBS | DIASTOLIC BLOOD PRESSURE: 64 MMHG | HEIGHT: 68 IN

## 2019-03-28 LAB
ATRIAL RATE: 60 BPM
CALCULATED P AXIS, ECG09: -21 DEGREES
CALCULATED R AXIS, ECG10: -35 DEGREES
CALCULATED T AXIS, ECG11: 67 DEGREES
DIAGNOSIS, 93000: NORMAL
ERYTHROCYTE [DISTWIDTH] IN BLOOD BY AUTOMATED COUNT: 14.5 % (ref 11.9–14.6)
HCT VFR BLD AUTO: 40.8 % (ref 41.1–50.3)
HGB BLD-MCNC: 12.8 G/DL (ref 13.6–17.2)
INR PPP: 1
MAGNESIUM SERPL-MCNC: 2 MG/DL (ref 1.8–2.4)
MCH RBC QN AUTO: 26.6 PG (ref 26.1–32.9)
MCHC RBC AUTO-ENTMCNC: 31.4 G/DL (ref 31.4–35)
MCV RBC AUTO: 84.8 FL (ref 79.6–97.8)
NRBC # BLD: 0 K/UL (ref 0–0.2)
P-R INTERVAL, ECG05: 168 MS
PLATELET # BLD AUTO: 189 K/UL (ref 150–450)
PMV BLD AUTO: 10 FL (ref 9.4–12.3)
PROTHROMBIN TIME: 13.2 SEC (ref 11.7–14.5)
Q-T INTERVAL, ECG07: 438 MS
QRS DURATION, ECG06: 92 MS
QTC CALCULATION (BEZET), ECG08: 438 MS
RBC # BLD AUTO: 4.81 M/UL (ref 4.23–5.6)
VENTRICULAR RATE, ECG03: 60 BPM
WBC # BLD AUTO: 11.2 K/UL (ref 4.3–11.1)

## 2019-03-28 PROCEDURE — 74011250636 HC RX REV CODE- 250/636

## 2019-03-28 PROCEDURE — 83735 ASSAY OF MAGNESIUM: CPT

## 2019-03-28 PROCEDURE — 85610 PROTHROMBIN TIME: CPT

## 2019-03-28 PROCEDURE — 74011000250 HC RX REV CODE- 250: Performed by: INTERNAL MEDICINE

## 2019-03-28 PROCEDURE — 99152 MOD SED SAME PHYS/QHP 5/>YRS: CPT

## 2019-03-28 PROCEDURE — 85027 COMPLETE CBC AUTOMATED: CPT

## 2019-03-28 PROCEDURE — 93312 ECHO TRANSESOPHAGEAL: CPT

## 2019-03-28 PROCEDURE — 93005 ELECTROCARDIOGRAM TRACING: CPT | Performed by: INTERNAL MEDICINE

## 2019-03-28 RX ORDER — SODIUM CHLORIDE 9 MG/ML
75 INJECTION, SOLUTION INTRAVENOUS CONTINUOUS
Status: DISCONTINUED | OUTPATIENT
Start: 2019-03-28 | End: 2019-03-28 | Stop reason: HOSPADM

## 2019-03-28 RX ORDER — MIDAZOLAM HYDROCHLORIDE 1 MG/ML
.5-2 INJECTION, SOLUTION INTRAMUSCULAR; INTRAVENOUS
Status: DISCONTINUED | OUTPATIENT
Start: 2019-03-28 | End: 2019-03-28 | Stop reason: HOSPADM

## 2019-03-28 RX ORDER — LIDOCAINE HYDROCHLORIDE 20 MG/ML
15 SOLUTION OROPHARYNGEAL AS NEEDED
Status: DISCONTINUED | OUTPATIENT
Start: 2019-03-28 | End: 2019-03-28 | Stop reason: HOSPADM

## 2019-03-28 RX ORDER — FENTANYL CITRATE 50 UG/ML
25-50 INJECTION, SOLUTION INTRAMUSCULAR; INTRAVENOUS
Status: DISCONTINUED | OUTPATIENT
Start: 2019-03-28 | End: 2019-03-28 | Stop reason: HOSPADM

## 2019-03-28 RX ADMIN — MIDAZOLAM HYDROCHLORIDE 2 MG: 1 INJECTION, SOLUTION INTRAMUSCULAR; INTRAVENOUS at 10:20

## 2019-03-28 RX ADMIN — MIDAZOLAM HYDROCHLORIDE 2 MG: 1 INJECTION, SOLUTION INTRAMUSCULAR; INTRAVENOUS at 10:24

## 2019-03-28 RX ADMIN — FENTANYL CITRATE 50 MCG: 50 INJECTION, SOLUTION INTRAMUSCULAR; INTRAVENOUS at 10:15

## 2019-03-28 RX ADMIN — FENTANYL CITRATE 50 MCG: 50 INJECTION, SOLUTION INTRAMUSCULAR; INTRAVENOUS at 10:24

## 2019-03-28 RX ADMIN — LIDOCAINE HYDROCHLORIDE 15 ML: 20 SOLUTION ORAL; TOPICAL at 10:10

## 2019-03-28 RX ADMIN — MIDAZOLAM HYDROCHLORIDE 2 MG: 1 INJECTION, SOLUTION INTRAMUSCULAR; INTRAVENOUS at 10:15

## 2019-03-28 NOTE — PROGRESS NOTES
Patient received to [address] [place] [address] room # 1  Ambulatory from [facility]. Patient scheduled for [facility] 
 today with Dr Marya Nolan. Procedure reviewed & questions answered, voiced good understanding consent obtained & placed on chart. All medications and medical history reviewed. Will prep patient per orders. Patient & family updated on plan of care. The patient has a fraility score of 3-MANAGING WELL, based on ability to perform ADLS by self

## 2019-03-28 NOTE — H&P
[facility] Cardiology History & Physical  
  
Date of  Admission: 3/28/2019  8:49 AM  
 
CC: Watchman device evaluation HPI:  Giorgio Cardoza is a [de-identified] y.o. male Patient referred for consideration of left atrial appendage occlusion. He has no history of paroxysmal atrial fibrillation currently maintained on sotalol therapy. He has a history of tachy/trell syndrome with prior pacemaker placement. He has known history of coronary artery disease with most recent cardiac catheterization on 7/23/18 with npatent LAD stent and mild/moderate non-obstructive CAD.   
  
Had teeth pulled with pronounced bleeding. Any cuts has excessive bleeding. Not willing to take Eliquis long term. Past Medical History:  
Diagnosis Date  Abnormal EKG 4/22/15  CAD (coronary artery disease) 5/8/2015  Carotid artery stenosis without cerebral infarction 6/7/2016  6/15: <50% bilat ICAs  Coronary atherosclerosis of native coronary vessel 5/8/2015 VEGAS on [id number] [place] [address] 5/7/15: prox LAD PCI, normal EF  Dyslipidemia 6/7/2016  Dyspnea 5/8/2015 Echo 6/15: EF 60%, mod MR, mod LVH, mild AI   
 ED (erectile dysfunction)  GERD (gastroesophageal reflux disease)  GERD (gastroesophageal reflux disease)  HTN (hypertension) 5/8/2015  Hypertension  Hypokalemia 6/7/2016  Hypokalemia  Mitral valve regurgitation 6/7/2016  Myasthenia gravis ([address] 6/17/15  Myasthenia gravis ([address]  Nocturia  Osteoporosis  PUD (peptic ulcer disease)  S/P coronary artery stent placement 5/8/2015 3.0x38 mm Xience ADRIANNA to pLAD 5/7/15  Sleep apnea  Syncope and collapse  Unspecified sleep apnea Past Surgical History:  
Procedure Laterality Date  HX APPENDECTOMY  HX COLONOSCOPY  2007  HX HEART CATHETERIZATION  5/7/2015  HX HEMORRHOIDECTOMY 7[address] [facility] Dr. Cervantes/Xin for sleep apnea and reconstruction for extending jaw No Known Allergies Social History Socioeconomic History  Marital status:  Spouse name: Not on file  Number of children: Not on file  Years of education: Not on file  Highest education level: Not on file Occupational History  Not on file Social Needs  Financial resource strain: Not on file  Food insecurity:  
  Worry: Not on file Inability: Not on file  Transportation needs:  
  Medical: Not on file Non-medical: Not on file Tobacco Use  Smoking status: Never Smoker  Smokeless tobacco: Never Used Substance and Sexual Activity  Alcohol use: No  
 Drug use: No  
 Sexual activity: Not on file Lifestyle  Physical activity:  
  Days per week: Not on file Minutes per session: Not on file  Stress: Not on file Relationships  Social connections:  
  Talks on phone: Not on file Gets together: Not on file Attends Holiness service: Not on file Active member of club or organization: Not on file Attends meetings of clubs or organizations: Not on file Relationship status: Not on file  Intimate partner violence:  
  Fear of current or ex partner: Not on file Emotionally abused: Not on file Physically abused: Not on file Forced sexual activity: Not on file Other Topics Concern  Not on file Social History Narrative  Not on file Family History Problem Relation Age of Onset  Cancer Mother   
     kidney  Cancer Father   
     stomach Current Facility-Administered Medications Medication Dose Route Frequency  0.9% sodium chloride infusion  75 mL/hr IntraVENous CONTINUOUS Review of Systems ROS Constitution: Negative for chills and fever. HENT: Negative for hoarse voice. Eyes: Negative for blurred vision and double vision. Cardiovascular: Positive for irregular heartbeat. Negative for chest pain. Respiratory: Negative for cough and hemoptysis. Endocrine: Negative for cold intolerance and heat intolerance. Hematologic/Lymphatic: Negative for adenopathy. Does not bruise/bleed easily. Skin: Negative for color change and itching. Musculoskeletal: Positive for arthritis. Negative for muscle weakness. Gastrointestinal: Negative for bloating and heartburn. Genitourinary: Negative for dysuria and flank pain. Neurological: Negative for headaches, loss of balance, seizures and tremors. Psychiatric/Behavioral: Negative for depression and memory loss. Allergic/Immunologic: Negative for hives and persistent infections. Subjective:  
 
Visit Vitals /86 Pulse 60 Resp 16 Ht 5' 8\" (1.727 m) Wt 90.7 kg (200 lb) SpO2 98% BMI 30.41 kg/m² No intake/output data recorded. No intake/output data recorded. Physical Exam: 
 
Constitutional: He is oriented to person, place, and time. He appears well-developed and well-nourished. HENT:  
Head: Normocephalic. Eyes: Conjunctivae are normal. Pupils are equal, round, and reactive to light. Neck: Normal range of motion. No JVD present. Cardiovascular: Normal rate and regular rhythm. Exam reveals no friction rub. No murmur heard. Pulmonary/Chest: Effort normal and breath sounds normal.  
Abdominal: Soft. Bowel sounds are normal. He exhibits no distension. Musculoskeletal: He exhibits no edema. Neurological: He is alert and oriented to person, place, and time. Skin: Skin is warm and dry. No erythema. Psychiatric: He has a normal mood and affect. Assessment/Plan: 1. Paroxysmal atrial fibrillation (HCC) Patient is an appropriate candidate for consideration of left atrial appendage occlusion. The patient's [id number] score is  4 which indicates a 4.0% annual risk of stroke. Derrick Saunas Has-BLED score is 3 which indicates a 3.72% annual risk of a major bleeding event. . 
  
Plan for J LUIS for evaluation for candidacy.   
  
 2. Atherosclerosis of native coronary artery of native heart without angina pectoris Patient is doing well without any anginal symptoms. Continue current medical regimen. 
  
  
3. Myasthenia gravis ([address] Stable on medications.   
  
4. Dyslipidemia Lipids are at goal.    Patient is on appropriate statin therapy. Brisa Morales MD 
3/28/2019 10:14 AM

## 2019-03-28 NOTE — DISCHARGE INSTRUCTIONS
After your Transesophageal echocardiogram (J LUIS)    Do not eat or drink for at least two hours after your procedure (12:15). Your throat will be numb and there is a risk you might have difficulty swallowing for a while. Be careful when you do eat or drink for the first time especially with hot fluids since you could easily burn your throat. Call your doctor if:    You are bleeding from your throat or mouth  You have trouble breathing all of a sudden  You have chest pain or any pain that spreads to your neck, jaw or arms. You have questions or concerns  You have a fever greater than 101 F    Do not drive for 24 hours. Do not drink hot fluids for the next 3 hours (1:15).

## 2019-03-28 NOTE — PROGRESS NOTES
Post watchman J LUIS with Dr Zee Lipoma Versed 6 mg Fentanyl 100 mcg Pt numbed at 1015 Pt tolerated procedure well Pt in [address] room 1 report given to José Dawson

## 2019-03-28 NOTE — PROGRESS NOTES
Report received from Barbara Hyde cath lab RN for continue of care post J LUIS. Patient resting quietly will monitor until discharge

## 2019-04-03 ENCOUNTER — TELEPHONE (OUTPATIENT)
Dept: CARDIAC CATH/INVASIVE PROCEDURES | Age: 79
End: 2019-04-03

## 2019-04-03 DIAGNOSIS — I48.0 PAROXYSMAL A-FIB (HCC): Primary | ICD-10-CM

## 2019-04-05 ENCOUNTER — HOSPITAL ENCOUNTER (OUTPATIENT)
Dept: CT IMAGING | Age: 79
Discharge: HOME | End: 2019-04-05
Payer: MEDICARE

## 2019-04-05 DIAGNOSIS — I48.0 PAROXYSMAL A-FIB (HCC): ICD-10-CM

## 2019-04-05 LAB — CREAT BLD-MCNC: 0.9 MG/DL (ref 0.8–1.5)

## 2019-04-05 PROCEDURE — 82565 ASSAY OF CREATININE: CPT

## 2019-04-05 PROCEDURE — 74011000258 HC RX REV CODE- 258: Performed by: INTERNAL MEDICINE

## 2019-04-05 PROCEDURE — 75574 CT ANGIO HRT W/3D IMAGE: CPT

## 2019-04-05 PROCEDURE — 74011636320 HC RX REV CODE- 636/320: Performed by: INTERNAL MEDICINE

## 2019-04-05 RX ORDER — SODIUM CHLORIDE 0.9 % (FLUSH) 0.9 %
10 SYRINGE (ML) INJECTION
Status: COMPLETED | OUTPATIENT
Start: 2019-04-05 | End: 2019-04-05

## 2019-04-05 RX ADMIN — SODIUM CHLORIDE 100 ML: 900 INJECTION, SOLUTION INTRAVENOUS at 09:32

## 2019-04-05 RX ADMIN — IOPAMIDOL 119 ML: 755 INJECTION, SOLUTION INTRAVENOUS at 09:32

## 2019-04-05 RX ADMIN — Medication 10 ML: at 09:32

## 2019-04-22 ENCOUNTER — TELEPHONE (OUTPATIENT)
Dept: CARDIAC CATH/INVASIVE PROCEDURES | Age: 79
End: 2019-04-22

## 2019-05-20 ENCOUNTER — ANESTHESIA EVENT (OUTPATIENT)
Dept: SURGERY | Age: 79
DRG: 274 | End: 2019-05-20
Payer: MEDICARE

## 2019-05-20 ENCOUNTER — HOSPITAL ENCOUNTER (OUTPATIENT)
Dept: LAB | Age: 79
Discharge: HOME | DRG: 274 | End: 2019-05-20
Attending: INTERNAL MEDICINE
Payer: MEDICARE

## 2019-05-20 DIAGNOSIS — I48.0 PAROXYSMAL ATRIAL FIBRILLATION (HCC): ICD-10-CM

## 2019-05-20 DIAGNOSIS — I10 ESSENTIAL HYPERTENSION: Chronic | ICD-10-CM

## 2019-05-20 LAB
ANION GAP SERPL CALC-SCNC: 8 MMOL/L (ref 7–16)
BASOPHILS # BLD: 0 K/UL (ref 0–0.2)
BASOPHILS NFR BLD: 0 % (ref 0–2)
BUN SERPL-MCNC: 11 MG/DL (ref 8–23)
CALCIUM SERPL-MCNC: 8.9 MG/DL (ref 8.3–10.4)
CHLORIDE SERPL-SCNC: 107 MMOL/L (ref 98–107)
CO2 SERPL-SCNC: 27 MMOL/L (ref 21–32)
CREAT SERPL-MCNC: 0.8 MG/DL (ref 0.8–1.5)
DIFFERENTIAL METHOD BLD: ABNORMAL
EOSINOPHIL # BLD: 0.3 K/UL (ref 0–0.8)
EOSINOPHIL NFR BLD: 3 % (ref 0.5–7.8)
ERYTHROCYTE [DISTWIDTH] IN BLOOD BY AUTOMATED COUNT: 14.6 % (ref 11.9–14.6)
GLUCOSE SERPL-MCNC: 118 MG/DL (ref 65–100)
HCT VFR BLD AUTO: 38.6 % (ref 41.1–50.3)
HGB BLD-MCNC: 12.3 G/DL (ref 13.6–17.2)
IMM GRANULOCYTES # BLD AUTO: 0.1 K/UL (ref 0–0.5)
IMM GRANULOCYTES NFR BLD AUTO: 1 % (ref 0–5)
INR PPP: 1.3
LYMPHOCYTES # BLD: 3.1 K/UL (ref 0.5–4.6)
LYMPHOCYTES NFR BLD: 30 % (ref 13–44)
MCH RBC QN AUTO: 27.2 PG (ref 26.1–32.9)
MCHC RBC AUTO-ENTMCNC: 31.9 G/DL (ref 31.4–35)
MCV RBC AUTO: 85.2 FL (ref 79.6–97.8)
MONOCYTES # BLD: 0.7 K/UL (ref 0.1–1.3)
MONOCYTES NFR BLD: 7 % (ref 4–12)
NEUTS SEG # BLD: 6.2 K/UL (ref 1.7–8.2)
NEUTS SEG NFR BLD: 60 % (ref 43–78)
NRBC # BLD: 0 K/UL (ref 0–0.2)
PLATELET # BLD AUTO: 194 K/UL (ref 150–450)
PMV BLD AUTO: 9.7 FL (ref 9.4–12.3)
POTASSIUM SERPL-SCNC: 4 MMOL/L (ref 3.5–5.1)
PROTHROMBIN TIME: 15.3 SEC (ref 11.7–14.5)
RBC # BLD AUTO: 4.53 M/UL (ref 4.23–5.6)
SODIUM SERPL-SCNC: 142 MMOL/L (ref 136–145)
WBC # BLD AUTO: 10.4 K/UL (ref 4.3–11.1)

## 2019-05-20 PROCEDURE — 80048 BASIC METABOLIC PNL TOTAL CA: CPT

## 2019-05-20 PROCEDURE — 36415 COLL VENOUS BLD VENIPUNCTURE: CPT

## 2019-05-20 PROCEDURE — 86923 COMPATIBILITY TEST ELECTRIC: CPT

## 2019-05-20 PROCEDURE — 85610 PROTHROMBIN TIME: CPT

## 2019-05-20 PROCEDURE — 86900 BLOOD TYPING SEROLOGIC ABO: CPT

## 2019-05-20 PROCEDURE — 85025 COMPLETE CBC W/AUTO DIFF WBC: CPT

## 2019-05-20 NOTE — PROGRESS NOTES
Patient pre-assessment complete for LAAO scheduled for 19, arrival time 0800. Patient verified using . Patient instructed to bring all home medications in labeled bottles on the day of procedure. NPO status reinforced. Patient's last dose of eliquis will be on 19. He will take sotatol  with a small sip of water, the morning of his procedure ( he takes his others medications at night). Patient instructed to HOLD all other medications and supplements. Patient verbalizes understanding of all instructions & denies any questions at this time. Patient has watchman contact info if he/she were to have any other questions.

## 2019-05-21 ENCOUNTER — ANESTHESIA (OUTPATIENT)
Dept: SURGERY | Age: 79
DRG: 274 | End: 2019-05-21
Payer: MEDICARE

## 2019-05-21 ENCOUNTER — HOSPITAL ENCOUNTER (INPATIENT)
Age: 79
LOS: 1 days | Discharge: HOME | DRG: 274 | End: 2019-05-22
Attending: INTERNAL MEDICINE | Admitting: INTERNAL MEDICINE
Payer: MEDICARE

## 2019-05-21 ENCOUNTER — APPOINTMENT (OUTPATIENT)
Dept: CARDIAC CATH/INVASIVE PROCEDURES | Age: 79
DRG: 274 | End: 2019-05-21
Payer: MEDICARE

## 2019-05-21 PROBLEM — I48.91 A-FIB (HCC): Status: ACTIVE | Noted: 2019-05-21

## 2019-05-21 PROBLEM — I48.91 A-FIB (HCC): Status: RESOLVED | Noted: 2019-05-21 | Resolved: 2019-05-21

## 2019-05-21 LAB
ACT BLD: 274 SECS (ref 70–128)
ACT BLD: 340 SECS (ref 70–128)
ATRIAL RATE: 60 BPM
ATRIAL RATE: 63 BPM
CALCULATED P AXIS, ECG09: -10 DEGREES
CALCULATED P AXIS, ECG09: -8 DEGREES
CALCULATED R AXIS, ECG10: -20 DEGREES
CALCULATED R AXIS, ECG10: -31 DEGREES
CALCULATED T AXIS, ECG11: 45 DEGREES
CALCULATED T AXIS, ECG11: 63 DEGREES
DIAGNOSIS, 93000: NORMAL
DIAGNOSIS, 93000: NORMAL
ERYTHROCYTE [DISTWIDTH] IN BLOOD BY AUTOMATED COUNT: 14.3 % (ref 11.9–14.6)
HCT VFR BLD AUTO: 38.5 % (ref 41.1–50.3)
HGB BLD-MCNC: 12.1 G/DL (ref 13.6–17.2)
MCH RBC QN AUTO: 26.9 PG (ref 26.1–32.9)
MCHC RBC AUTO-ENTMCNC: 31.4 G/DL (ref 31.4–35)
MCV RBC AUTO: 85.7 FL (ref 79.6–97.8)
NRBC # BLD: 0 K/UL (ref 0–0.2)
P-R INTERVAL, ECG05: 162 MS
P-R INTERVAL, ECG05: 242 MS
PLATELET # BLD AUTO: 187 K/UL (ref 150–450)
PMV BLD AUTO: 9.8 FL (ref 9.4–12.3)
Q-T INTERVAL, ECG07: 450 MS
Q-T INTERVAL, ECG07: 480 MS
QRS DURATION, ECG06: 100 MS
QRS DURATION, ECG06: 94 MS
QTC CALCULATION (BEZET), ECG08: 450 MS
QTC CALCULATION (BEZET), ECG08: 491 MS
RBC # BLD AUTO: 4.49 M/UL (ref 4.23–5.6)
VENTRICULAR RATE, ECG03: 60 BPM
VENTRICULAR RATE, ECG03: 63 BPM
WBC # BLD AUTO: 10.5 K/UL (ref 4.3–11.1)

## 2019-05-21 PROCEDURE — 94760 N-INVAS EAR/PLS OXIMETRY 1: CPT

## 2019-05-21 PROCEDURE — 93005 ELECTROCARDIOGRAM TRACING: CPT | Performed by: INTERNAL MEDICINE

## 2019-05-21 PROCEDURE — 74011250636 HC RX REV CODE- 250/636

## 2019-05-21 PROCEDURE — 77030019908 HC STETH ESOPH SIMS -A: Performed by: NURSE ANESTHETIST, CERTIFIED REGISTERED

## 2019-05-21 PROCEDURE — 74011250636 HC RX REV CODE- 250/636: Performed by: INTERNAL MEDICINE

## 2019-05-21 PROCEDURE — 74011000258 HC RX REV CODE- 258

## 2019-05-21 PROCEDURE — C1893 INTRO/SHEATH, FIXED,NON-PEEL: HCPCS

## 2019-05-21 PROCEDURE — C1894 INTRO/SHEATH, NON-LASER: HCPCS

## 2019-05-21 PROCEDURE — 33340 PERQ CLSR TCAT L ATR APNDGE: CPT

## 2019-05-21 PROCEDURE — 85347 COAGULATION TIME ACTIVATED: CPT

## 2019-05-21 PROCEDURE — 77030013794 HC KT TRNSDUC BLD EDWD -B: Performed by: NURSE ANESTHETIST, CERTIFIED REGISTERED

## 2019-05-21 PROCEDURE — 74011250636 HC RX REV CODE- 250/636: Performed by: NURSE PRACTITIONER

## 2019-05-21 PROCEDURE — 74011250637 HC RX REV CODE- 250/637: Performed by: NURSE PRACTITIONER

## 2019-05-21 PROCEDURE — 77030013292 HC BOWL MX PRSM J&J -A: Performed by: NURSE ANESTHETIST, CERTIFIED REGISTERED

## 2019-05-21 PROCEDURE — 02L73DK OCCLUSION OF LEFT ATRIAL APPENDAGE WITH INTRALUMINAL DEVICE, PERCUTANEOUS APPROACH: ICD-10-PCS | Performed by: INTERNAL MEDICINE

## 2019-05-21 PROCEDURE — 74011000250 HC RX REV CODE- 250

## 2019-05-21 PROCEDURE — 77030038109 HC IMPL LAA WATCHMAN 21MM BSC -L

## 2019-05-21 PROCEDURE — 74011000258 HC RX REV CODE- 258: Performed by: INTERNAL MEDICINE

## 2019-05-21 PROCEDURE — 77030037088 HC TUBE ENDOTRACH ORAL NSL COVD-A: Performed by: NURSE ANESTHETIST, CERTIFIED REGISTERED

## 2019-05-21 PROCEDURE — 76210000006 HC OR PH I REC 0.5 TO 1 HR: Performed by: INTERNAL MEDICINE

## 2019-05-21 PROCEDURE — 94640 AIRWAY INHALATION TREATMENT: CPT

## 2019-05-21 PROCEDURE — 85027 COMPLETE CBC AUTOMATED: CPT

## 2019-05-21 PROCEDURE — C1769 GUIDE WIRE: HCPCS

## 2019-05-21 PROCEDURE — 74011250637 HC RX REV CODE- 250/637

## 2019-05-21 PROCEDURE — 74011250637 HC RX REV CODE- 250/637: Performed by: INTERNAL MEDICINE

## 2019-05-21 PROCEDURE — 77030013687 HC GD NDL BARD -B

## 2019-05-21 PROCEDURE — 77030004559 HC CATH ANGI DX SUPT CARD -B

## 2019-05-21 PROCEDURE — 77030039425 HC BLD LARYNG TRULITE DISP TELE -A: Performed by: NURSE ANESTHETIST, CERTIFIED REGISTERED

## 2019-05-21 PROCEDURE — 77030020506 HC NDL TRNSPTL NRG BAYL -F

## 2019-05-21 PROCEDURE — 65660000000 HC RM CCU STEPDOWN

## 2019-05-21 PROCEDURE — 74011636320 HC RX REV CODE- 636/320: Performed by: INTERNAL MEDICINE

## 2019-05-21 PROCEDURE — B24BZZ4 ULTRASONOGRAPHY OF HEART WITH AORTA, TRANSESOPHAGEAL: ICD-10-PCS | Performed by: INTERNAL MEDICINE

## 2019-05-21 PROCEDURE — 76060000034 HC ANESTHESIA 1.5 TO 2 HR: Performed by: INTERNAL MEDICINE

## 2019-05-21 PROCEDURE — 74011000250 HC RX REV CODE- 250: Performed by: NURSE PRACTITIONER

## 2019-05-21 PROCEDURE — C1760 CLOSURE DEV, VASC: HCPCS

## 2019-05-21 PROCEDURE — 74011250636 HC RX REV CODE- 250/636: Performed by: ANESTHESIOLOGY

## 2019-05-21 PROCEDURE — 77030005401 HC CATH RAD ARRO -A: Performed by: NURSE ANESTHETIST, CERTIFIED REGISTERED

## 2019-05-21 PROCEDURE — 93312 ECHO TRANSESOPHAGEAL: CPT

## 2019-05-21 RX ORDER — SODIUM CHLORIDE, SODIUM LACTATE, POTASSIUM CHLORIDE, CALCIUM CHLORIDE 600; 310; 30; 20 MG/100ML; MG/100ML; MG/100ML; MG/100ML
100 INJECTION, SOLUTION INTRAVENOUS CONTINUOUS
Status: DISCONTINUED | OUTPATIENT
Start: 2019-05-21 | End: 2019-05-21

## 2019-05-21 RX ORDER — PYRIDOSTIGMINE BROMIDE 60 MG/1
60 TABLET ORAL DAILY
Status: DISCONTINUED | OUTPATIENT
Start: 2019-05-21 | End: 2019-05-22 | Stop reason: HOSPADM

## 2019-05-21 RX ORDER — CLOPIDOGREL BISULFATE 75 MG/1
75 TABLET ORAL DAILY
Status: DISCONTINUED | OUTPATIENT
Start: 2019-05-21 | End: 2019-05-21

## 2019-05-21 RX ORDER — ASPIRIN 81 MG/1
81 TABLET ORAL DAILY
Status: DISCONTINUED | OUTPATIENT
Start: 2019-05-21 | End: 2019-05-21

## 2019-05-21 RX ORDER — ACETAMINOPHEN 325 MG/1
650 TABLET ORAL
Status: DISCONTINUED | OUTPATIENT
Start: 2019-05-21 | End: 2019-05-22 | Stop reason: HOSPADM

## 2019-05-21 RX ORDER — SODIUM CHLORIDE 0.9 % (FLUSH) 0.9 %
5-40 SYRINGE (ML) INJECTION EVERY 8 HOURS
Status: DISCONTINUED | OUTPATIENT
Start: 2019-05-21 | End: 2019-05-22 | Stop reason: HOSPADM

## 2019-05-21 RX ORDER — SOTALOL HYDROCHLORIDE 80 MG/1
160 TABLET ORAL EVERY 12 HOURS
Status: DISCONTINUED | OUTPATIENT
Start: 2019-05-21 | End: 2019-05-22 | Stop reason: HOSPADM

## 2019-05-21 RX ORDER — SODIUM CHLORIDE 9 MG/ML
75 INJECTION, SOLUTION INTRAVENOUS CONTINUOUS
Status: DISCONTINUED | OUTPATIENT
Start: 2019-05-21 | End: 2019-05-21 | Stop reason: HOSPADM

## 2019-05-21 RX ORDER — SODIUM CHLORIDE 0.9 % (FLUSH) 0.9 %
5-40 SYRINGE (ML) INJECTION AS NEEDED
Status: DISCONTINUED | OUTPATIENT
Start: 2019-05-21 | End: 2019-05-22 | Stop reason: HOSPADM

## 2019-05-21 RX ORDER — LEVALBUTEROL INHALATION SOLUTION 1.25 MG/3ML
1.25 SOLUTION RESPIRATORY (INHALATION)
Status: COMPLETED | OUTPATIENT
Start: 2019-05-21 | End: 2019-05-21

## 2019-05-21 RX ORDER — LIDOCAINE HYDROCHLORIDE 20 MG/ML
INJECTION, SOLUTION EPIDURAL; INFILTRATION; INTRACAUDAL; PERINEURAL AS NEEDED
Status: DISCONTINUED | OUTPATIENT
Start: 2019-05-21 | End: 2019-05-21 | Stop reason: HOSPADM

## 2019-05-21 RX ORDER — SODIUM CHLORIDE, SODIUM LACTATE, POTASSIUM CHLORIDE, CALCIUM CHLORIDE 600; 310; 30; 20 MG/100ML; MG/100ML; MG/100ML; MG/100ML
100 INJECTION, SOLUTION INTRAVENOUS CONTINUOUS
Status: ACTIVE | OUTPATIENT
Start: 2019-05-21 | End: 2019-05-21

## 2019-05-21 RX ORDER — FENTANYL CITRATE 50 UG/ML
INJECTION, SOLUTION INTRAMUSCULAR; INTRAVENOUS AS NEEDED
Status: DISCONTINUED | OUTPATIENT
Start: 2019-05-21 | End: 2019-05-21 | Stop reason: HOSPADM

## 2019-05-21 RX ORDER — LISINOPRIL 5 MG/1
10 TABLET ORAL 2 TIMES DAILY
Status: DISCONTINUED | OUTPATIENT
Start: 2019-05-21 | End: 2019-05-22 | Stop reason: HOSPADM

## 2019-05-21 RX ORDER — HEPARIN SODIUM 1000 [USP'U]/ML
INJECTION, SOLUTION INTRAVENOUS; SUBCUTANEOUS AS NEEDED
Status: DISCONTINUED | OUTPATIENT
Start: 2019-05-21 | End: 2019-05-21 | Stop reason: HOSPADM

## 2019-05-21 RX ORDER — METOPROLOL TARTRATE 5 MG/5ML
INJECTION INTRAVENOUS AS NEEDED
Status: DISCONTINUED | OUTPATIENT
Start: 2019-05-21 | End: 2019-05-21 | Stop reason: HOSPADM

## 2019-05-21 RX ORDER — HEPARIN SODIUM 200 [USP'U]/100ML
3 INJECTION, SOLUTION INTRAVENOUS CONTINUOUS
Status: DISCONTINUED | OUTPATIENT
Start: 2019-05-21 | End: 2019-05-21 | Stop reason: HOSPADM

## 2019-05-21 RX ORDER — CEFAZOLIN SODIUM/WATER 2 G/20 ML
2 SYRINGE (ML) INTRAVENOUS
Status: COMPLETED | OUTPATIENT
Start: 2019-05-21 | End: 2019-05-21

## 2019-05-21 RX ORDER — HYDROMORPHONE HYDROCHLORIDE 2 MG/ML
0.5 INJECTION, SOLUTION INTRAMUSCULAR; INTRAVENOUS; SUBCUTANEOUS
Status: DISCONTINUED | OUTPATIENT
Start: 2019-05-21 | End: 2019-05-21 | Stop reason: HOSPADM

## 2019-05-21 RX ORDER — FUROSEMIDE 10 MG/ML
40 INJECTION INTRAMUSCULAR; INTRAVENOUS ONCE
Status: COMPLETED | OUTPATIENT
Start: 2019-05-21 | End: 2019-05-21

## 2019-05-21 RX ORDER — ROCURONIUM BROMIDE 10 MG/ML
INJECTION, SOLUTION INTRAVENOUS AS NEEDED
Status: DISCONTINUED | OUTPATIENT
Start: 2019-05-21 | End: 2019-05-21 | Stop reason: HOSPADM

## 2019-05-21 RX ORDER — PROPOFOL 10 MG/ML
INJECTION, EMULSION INTRAVENOUS AS NEEDED
Status: DISCONTINUED | OUTPATIENT
Start: 2019-05-21 | End: 2019-05-21 | Stop reason: HOSPADM

## 2019-05-21 RX ORDER — LIDOCAINE HYDROCHLORIDE 10 MG/ML
0.3 INJECTION INFILTRATION; PERINEURAL ONCE
Status: DISCONTINUED | OUTPATIENT
Start: 2019-05-21 | End: 2019-05-21 | Stop reason: HOSPADM

## 2019-05-21 RX ORDER — SUCCINYLCHOLINE CHLORIDE 20 MG/ML
INJECTION INTRAMUSCULAR; INTRAVENOUS AS NEEDED
Status: DISCONTINUED | OUTPATIENT
Start: 2019-05-21 | End: 2019-05-21 | Stop reason: HOSPADM

## 2019-05-21 RX ORDER — OXYCODONE HYDROCHLORIDE 5 MG/1
10 TABLET ORAL
Status: DISCONTINUED | OUTPATIENT
Start: 2019-05-21 | End: 2019-05-21 | Stop reason: HOSPADM

## 2019-05-21 RX ORDER — ALBUTEROL SULFATE 90 UG/1
AEROSOL, METERED RESPIRATORY (INHALATION) AS NEEDED
Status: DISCONTINUED | OUTPATIENT
Start: 2019-05-21 | End: 2019-05-21 | Stop reason: HOSPADM

## 2019-05-21 RX ORDER — SODIUM CHLORIDE, SODIUM LACTATE, POTASSIUM CHLORIDE, CALCIUM CHLORIDE 600; 310; 30; 20 MG/100ML; MG/100ML; MG/100ML; MG/100ML
INJECTION, SOLUTION INTRAVENOUS
Status: DISCONTINUED | OUTPATIENT
Start: 2019-05-21 | End: 2019-05-21 | Stop reason: HOSPADM

## 2019-05-21 RX ORDER — HYDROCODONE BITARTRATE AND ACETAMINOPHEN 5; 325 MG/1; MG/1
1 TABLET ORAL
Status: DISCONTINUED | OUTPATIENT
Start: 2019-05-21 | End: 2019-05-22 | Stop reason: HOSPADM

## 2019-05-21 RX ORDER — ASPIRIN 81 MG/1
81 TABLET ORAL DAILY
Status: DISCONTINUED | OUTPATIENT
Start: 2019-05-21 | End: 2019-05-22 | Stop reason: HOSPADM

## 2019-05-21 RX ORDER — ATORVASTATIN CALCIUM 40 MG/1
80 TABLET, FILM COATED ORAL
Status: DISCONTINUED | OUTPATIENT
Start: 2019-05-21 | End: 2019-05-22 | Stop reason: HOSPADM

## 2019-05-21 RX ORDER — DIPHENHYDRAMINE HCL 25 MG
25 CAPSULE ORAL
Status: DISCONTINUED | OUTPATIENT
Start: 2019-05-21 | End: 2019-05-22 | Stop reason: HOSPADM

## 2019-05-21 RX ADMIN — Medication 10 ML: at 20:51

## 2019-05-21 RX ADMIN — ROCURONIUM BROMIDE 10 MG: 10 INJECTION, SOLUTION INTRAVENOUS at 10:02

## 2019-05-21 RX ADMIN — METOPROLOL TARTRATE 3 MG: 5 INJECTION INTRAVENOUS at 10:30

## 2019-05-21 RX ADMIN — PROPOFOL 30 MG: 10 INJECTION, EMULSION INTRAVENOUS at 09:18

## 2019-05-21 RX ADMIN — LISINOPRIL 10 MG: 5 TABLET ORAL at 17:08

## 2019-05-21 RX ADMIN — LEVALBUTEROL HYDROCHLORIDE 1.25 MG: 1.25 SOLUTION RESPIRATORY (INHALATION) at 20:34

## 2019-05-21 RX ADMIN — HEPARIN SODIUM 11000 UNITS: 1000 INJECTION, SOLUTION INTRAVENOUS; SUBCUTANEOUS at 09:41

## 2019-05-21 RX ADMIN — PROPOFOL 40 MG: 10 INJECTION, EMULSION INTRAVENOUS at 10:13

## 2019-05-21 RX ADMIN — PROPOFOL 40 MG: 10 INJECTION, EMULSION INTRAVENOUS at 09:59

## 2019-05-21 RX ADMIN — ALBUTEROL SULFATE 4 PUFF: 90 AEROSOL, METERED RESPIRATORY (INHALATION) at 10:30

## 2019-05-21 RX ADMIN — FUROSEMIDE 40 MG: 10 INJECTION, SOLUTION INTRAMUSCULAR; INTRAVENOUS at 20:46

## 2019-05-21 RX ADMIN — ATORVASTATIN CALCIUM 80 MG: 40 TABLET, FILM COATED ORAL at 21:57

## 2019-05-21 RX ADMIN — SODIUM CHLORIDE, SODIUM LACTATE, POTASSIUM CHLORIDE, CALCIUM CHLORIDE: 600; 310; 30; 20 INJECTION, SOLUTION INTRAVENOUS at 09:01

## 2019-05-21 RX ADMIN — ROCURONIUM BROMIDE 15 MG: 10 INJECTION, SOLUTION INTRAVENOUS at 09:30

## 2019-05-21 RX ADMIN — LIDOCAINE HYDROCHLORIDE 100 MG: 20 INJECTION, SOLUTION EPIDURAL; INFILTRATION; INTRACAUDAL; PERINEURAL at 09:09

## 2019-05-21 RX ADMIN — ROCURONIUM BROMIDE 10 MG: 10 INJECTION, SOLUTION INTRAVENOUS at 09:59

## 2019-05-21 RX ADMIN — SUCCINYLCHOLINE CHLORIDE 60 MG: 20 INJECTION INTRAMUSCULAR; INTRAVENOUS at 09:09

## 2019-05-21 RX ADMIN — Medication 10 ML: at 16:15

## 2019-05-21 RX ADMIN — SOTALOL HYDROCHLORIDE 160 MG: 80 TABLET ORAL at 17:08

## 2019-05-21 RX ADMIN — FENTANYL CITRATE 50 MCG: 50 INJECTION, SOLUTION INTRAMUSCULAR; INTRAVENOUS at 09:16

## 2019-05-21 RX ADMIN — PROPOFOL 30 MG: 10 INJECTION, EMULSION INTRAVENOUS at 09:16

## 2019-05-21 RX ADMIN — Medication 2 G: at 09:25

## 2019-05-21 RX ADMIN — PYRIDOSTIGMINE BROMIDE 60 MG: 60 TABLET ORAL at 16:14

## 2019-05-21 RX ADMIN — HEPARIN SODIUM 3 ML/HR: 200 INJECTION, SOLUTION INTRAVENOUS at 09:27

## 2019-05-21 RX ADMIN — IOPAMIDOL 70 ML: 755 INJECTION, SOLUTION INTRAVENOUS at 10:23

## 2019-05-21 RX ADMIN — PROPOFOL 140 MG: 10 INJECTION, EMULSION INTRAVENOUS at 09:09

## 2019-05-21 RX ADMIN — SODIUM CHLORIDE: 900 INJECTION, SOLUTION INTRAVENOUS at 09:01

## 2019-05-21 RX ADMIN — DIPHENHYDRAMINE HYDROCHLORIDE 25 MG: 25 CAPSULE ORAL at 21:57

## 2019-05-21 NOTE — PROGRESS NOTES
Initial visit to assess pt's spiritual needs. Feeling today?  good    Receiving good care? The best!    Needs from Spiritual Care:  Prayer    [facility]. Pt stated that when he came back to 3rd floor, 5 nurses were in his room, waiting for him, to welcome him back. Pt stated \"that will stay with me for a long time. \"    Ministry of presence & prayer to demonstrate caring & concern, convey emotional & spiritual support.      Nathanael Boas, MDiv,ThM,PhD

## 2019-05-21 NOTE — OP NOTES
PROCEDURE/OPERATIVE REPORT    Patient: Toño Reina MRN: [id number]  SSN: [id number]    YOB: 1940  Age: [de-identified] y.o. Sex: male      DATE OF PROCEDURE: 5/21/2019     PROCEDURE: Left atrial appendage occlusion with Watchman device. Pre-Procedure Diagnosis  1. Atrial fibrillation  2. Intolerant to long term anticoagulation     Procedure Performed  1. Transesophageal echocardiogram  2. Transeptal puncture   3. Watchman implantation    Cardiac Electrophysiologist: Feliberot Pierce. Raffi Jones MD  Interventional Cardiologist: Bradley Ocampo MD    Anesthesia: General     Estimated Blood Loss: Less than 10 mL     Specimens: * No specimens in log *    Procedure in Detail:  The patient was brought to the [facility] OR in the fasting state. The patient was intubated by anesthesiology, invasive arterial blood pressure monitoring obtained, a coronel catheter inserted. A tranesophageal echocardiogram was performed directly prior to the procedure and was negative for a HARI thrombus (see full report in chart). As the secondary , I obtained venous access under ultrasound guidance x 1 using modified Seldinger technique with placement of a 16Fr short sidearm sheath into the right femoral vein after deployment of Perclose device in \"preclose\" fashion. I placed an SL-O 63cm long braided sheath through the 16 Fr sheath in the right femoral vein and guided over a wire to the RA. Next, I inserted a trans-septal needle into the SLO and it was used to perform a trans-septal puncture with assistance from J LUIS, as well as fluoroscopy. The SLO sheath was advanced into the LA. Total weight based heparin bolus was administered (1/2 prior to transeptal puncture and 1/2 just after transeptal access) and systemic blood pressure monitored invasively. The ACT target was 250-300. As the primary implanting physician, Dr. Raffi Jones prepped the Watchman delivery sheath and delivered the device as outlined in his procedure note.   I was present and assisted him with this portion of the procedure. At the completion of the watchman delivery, all catheters were removed, the sheath was then removed and I deployed the Perclose device with good hemostasis. The patient tolerated the procedure well with no acute complications recognized. Just prior to pulling shealths, the J LUIS was used to obtain ultrasound images and revealed no evidence of pericardial effusion. Complications: None    Summary:   1. Successful Watchman implantation of a 21 mm device.       Maribeth Jeffries MD

## 2019-05-21 NOTE — PROGRESS NOTES
Bedside and Verbal shift change report given to Markus Delaney RN (oncoming nurse) by self Bandar Bellamyling nurse). Report included the following information SBAR, Kardex, MAR and Recent Results. Left radial and right groin sites assessed. Sites ecchymotic but soft.

## 2019-05-21 NOTE — ADDENDUM NOTE
Addendum  created 05/21/19 1405 by Raul Dangelo MD    Child order released for a procedure order, Intraprocedure Blocks edited, Sign clinical note, SmartForm saved

## 2019-05-21 NOTE — H&P
[facility] CARDIOLOGY History & Physical                   Subjective:     Patient is an [de-identified] male with known history of paroxysmal atrial fibrillation who presents for left atrial appendage occlusion. He has had recurrent excessive bleeding with procedures and cuts. He has declined long-term anticoagulation but is willing to take anticoagulation for short period of time to have left atrial appendage occlusion. Currently denies any chest pain, palpitations or tachycardia.     Past Medical History:   Diagnosis Date    Abnormal EKG 4/22/15    CAD (coronary artery disease) 5/8/2015    Carotid artery stenosis without cerebral infarction 6/7/2016    US 6/15: <50% bilat ICAs    Coronary atherosclerosis of native coronary vessel 5/8/2015    VEGAS on brilinta 5/7/15: prox LAD PCI, normal EF     Dyslipidemia 6/7/2016    Dyspnea 5/8/2015    Echo 6/15: EF 60%, mod MR, mod LVH, mild AI     ED (erectile dysfunction)     GERD (gastroesophageal reflux disease)     GERD (gastroesophageal reflux disease)     HTN (hypertension) 5/8/2015    Hypertension     Hypokalemia 6/7/2016    Hypokalemia     Mitral valve regurgitation 6/7/2016    Myasthenia gravis ([address] 6/17/15    Myasthenia gravis ([address]     Nocturia     Osteoporosis     PUD (peptic ulcer disease)     S/P coronary artery stent placement 5/8/2015    3.0x38 mm Xience ADRIANNA to pLAD 5/7/15     Sleep apnea     Syncope and collapse     Unspecified sleep apnea       Past Surgical History:   Procedure Laterality Date    HX APPENDECTOMY      HX COLONOSCOPY  2007    HX HEART CATHETERIZATION  5/7/2015    HX Allegra Manuel Cervantes/Xin for sleep apnea and reconstruction for extending jaw      No Known Allergies  Social History     Tobacco Use    Smoking status: Never Smoker    Smokeless tobacco: Never Used   Substance Use Topics    Alcohol use: No      FH:   Family History   Problem Relation Age of Onset    Cancer Mother         kidney    Cancer Father         stomach        Review of Systems   Constitutional: Negative for chills and fever. HENT: Negative for tinnitus. Eyes: Negative for blurred vision. Respiratory: Negative for cough and shortness of breath. Cardiovascular: Negative for orthopnea. Gastrointestinal: Negative for abdominal pain and nausea. Genitourinary: Negative for dysuria. Musculoskeletal: Positive for joint pain. Skin: Negative for rash. Neurological: Negative for tremors, sensory change and headaches. Endo/Heme/Allergies: Does not bruise/bleed easily. Psychiatric/Behavioral: Negative for depression and suicidal ideas. Objective: There were no vitals taken for this visit. No intake/output data recorded. No intake/output data recorded. Physical Exam   Constitutional: He is oriented to person, place, and time and well-developed, well-nourished, and in no distress. HENT:   Head: Atraumatic. Eyes: Pupils are equal, round, and reactive to light. Conjunctivae are normal.   Neck: No JVD present. No thyromegaly present. Cardiovascular: Normal rate and regular rhythm. No murmur heard. Pulmonary/Chest: Effort normal and breath sounds normal.   Abdominal: Soft. Bowel sounds are normal. He exhibits no distension. There is no tenderness. Musculoskeletal: He exhibits no edema. Neurological: He is alert and oriented to person, place, and time. Skin: Skin is warm. No rash noted. Psychiatric: Mood normal.             Data Review:   Labs:   Recent Labs     05/20/19  0837      K 4.0   BUN 11   CREA 0.80   *   WBC 10.4   HGB 12.3*   HCT 38.6*      INR 1.3      No results found for: TROIQ, PATRIZIA, TROPT, TNIPOC        Assessment/Plan:   Active Problems:    HTN (hypertension) (5/8/2015)  Resume home medications post procedure. Coronary atherosclerosis of native coronary vessel (5/8/2015)   No angina.   Continue medications directed at secondary prevention. Paroxysmal atrial fibrillation ([facility] [address] (6/30/2017)  Patient is an appropriate candidate for  left atrial appendage occlusion. The patient's [id number] score is  4 which indicates a 4.0% annual risk of stroke. Ubaldo Reasoner Has-BLED score is 3 which indicates a 3.72% annual risk of a major bleeding event. . I have discussed with the rationale for  left atrial appendage occlusion. We discussed the available data from randomized trials which demonstate left atrial appendage occlusion is as effective as long term anticoagulation at prevention of CVA or systemic embolism. We also discussed that left atrial appendage closure reduces risk of CVA or sytemic embolization by 67-83% compared to no anticoagulation. Randomized data also showed signifcant reduction in the following endpoints compared to long term Coumadin administration (Rate per 100 PY):                - 80% reduction in hemorrhagic CVA (0.37 vs 0.94%),              - 59% reduction in disabling CVA (0.37% vs 0.94%),                - 41% reduction in CV/unexplained death (1.3% vs 2.2%)              - 27% reduction in all cause death (3.6% vs 4.9%)              - 72% risk reduction of bleeding.       The risk of left atrial appendage were also discussed. Risk of major complication is approximately 1.5% based on available data. Risk include but not limited:              - Pericardial tamponade (1.28%) which can be treated percutaneously in 63% of cases but can require sugical therapy in  31% of cases (3 out of 1000 patients). - Procedural related CVA in 2 out of 1000 patients. - Device embolization in less than 3 out of 1000 patients              - Death related to procedure in approximately 6 out of 10,000 patients.       Based on our discussion, it is felt benefits of left atrial appendage significantly outweigh risk. Patient would like to proceed. All questions answered. Informed consent obtained. DOE Chavez  5/21/2019  8:37 AM

## 2019-05-21 NOTE — PROGRESS NOTES
TRANSFER - IN REPORT:    Verbal report received from Mireya Olivas RN on Jacobo Johnston being received from PACU for routine progression of care      Report consisted of patients Situation, Background, Assessment and Recommendations(SBAR). Information from the following report(s) SBAR, Kardex and Procedure Summary was reviewed with the receiving nurse. Opportunity for questions and clarification was provided. Assessment completed upon patients arrival to unit and care assumed. Telemetry monitor applied. VS taken. Right groin site and left radial site assessed and WDL. Both sites with dressing. Sites C/D/I. Pt verbalizes understanding of limited movement of RLE. Bed low and locked. Side rails x 2. Call light within reach. Pt verbalizes understanding to call for assistance.

## 2019-05-21 NOTE — ANESTHESIA PREPROCEDURE EVALUATION
Anesthetic History               Review of Systems / Medical History  Patient summary reviewed and pertinent labs reviewed    Pulmonary        Sleep apnea: CPAP  Shortness of breath         Neuro/Psych             Comments: Myasthenia gravis- well controlled Cardiovascular    Hypertension: well controlled        Dysrhythmias ([place]) : atrial fibrillation  CAD, PAD (nonobstructive carotid dz), cardiac stents (5/2015 ADRIANNA to LAD x2) and hyperlipidemia    Exercise tolerance: >4 METS  Comments: Normal EF and mild AI on J LUIS 2019   GI/Hepatic/Renal     GERD: well controlled      PUD     Endo/Other        Obesity     Other Findings              Physical Exam    Airway  Mallampati: II  TM Distance: 4 - 6 cm  Neck ROM: normal range of motion   Mouth opening: Normal     Cardiovascular    Rhythm: regular  Rate: normal         Dental    Dentition: Poor dentition     Pulmonary  Breath sounds clear to auscultation               Abdominal  GI exam deferred       Other Findings            Anesthetic Plan    ASA: 3  Anesthesia type: general    Monitoring Plan: Arterial line      Induction: Intravenous  Anesthetic plan and risks discussed with: Patient

## 2019-05-21 NOTE — PROGRESS NOTES
Verbal and bedside report received from [facility]. Left radial and right groin visualized, dressing c/d/i.

## 2019-05-21 NOTE — PROGRESS NOTES
Pt arrived, ambulated to room with no visible problems, planned LAAO for Dr Simona Cruz. Consent signed, Procedure discussed with pt all questions answered voiced understanding. Medications and history discussed with pt. Pt prepped per orders    The patient has a fraility score of 4-VULNERABLE, based on ability to complete ADLs without assistance, increased symptoms on exertion.

## 2019-05-21 NOTE — ANESTHESIA PROCEDURE NOTES
Arterial Line Placement after the induction of GA    Start time: 5/21/2019 9:07 AM  End time: 5/21/2019 9:17 AM  Performed by: Nate Agrawal MD  Authorized by:  Nate Agrawal MD     Pre-Procedure  Indications:  Arterial pressure monitoring and blood sampling  Preanesthetic Checklist: patient identified, risks and benefits discussed, anesthesia consent, site marked, patient being monitored and patient being monitored      Procedure:   Prep:  Chlorhexidine  Seldinger Technique?: Yes    Orientation:  Right (Kym Jorge Luis attempted left radial x2)  Location:  Radial artery  Catheter size:  20 G  Number of attempts:  3  Cont Cardiac Output Sensor: No      Assessment:   Post-procedure:  Line secured and sterile dressing applied  Patient Tolerance:  Patient tolerated the procedure well with no immediate complications

## 2019-05-21 NOTE — PROCEDURES
Pre-Electrophysiology Diagnosis  1. Atrial fibrillation  2. Intolerant to long term anticoagulation     Procedure Performed  1. Transesophageal echocardiogram  2. Transeptal puncture   3. Watchman implantation    Cardiac Electrophysiologist: Juan Nguyễn. Lorie Castellano MD  Interventional Cardiologist: Jeovanny Marroquin MD    Anesthesia: General     Estimated Blood Loss: Less than 10 mL     Specimens: * No specimens in log *    Procedure in Detail:  The patient was brought to the hybrid OR suite in the fasting state. The patient was intubated by anesthesiology and invasive arterial blood pressure monitoring obtained. A tranesophageal echocardiogram was performed directly prior to the procedure and was negative for a HARI thrombus (see full report in chart). Dr. Jose Gambino obtained venous access, placed an SLO and performed the transseptal as outlined in his procedure note. I was present and assisted with this portion of the procedure. As the primary implanting phyisician, I prepped and draped the Watchman delivery sheath and exchanged for the SLO via an Amplatz super stiff wire located in the left upper pulmonary vein. A pig tail catheter was then inserted into the delivery sheath and used to guide the delivery sheath into the appendage. Depth measurements were performed with fluoroscopy and depth and size measurements determined via J LUIS. The sheath was then advanced over the pig tail catheter into position within the HARI. The Watchman device was then prepped per protocol and placed into the delivery sheath via a wet to wet connection and advanced into the sheath. The sheath was then pulled back to allow delivery of the Watchman device within the left atrial appendage. The initial attempt was made with a 21 mm device. There was significant shoulder left protruding into the HARI and therefore this was recaptured. Successful delivery of a 21 mm device revealed excellent PASS criteria.  A contrast appendogram was performed revealing an adequate seal. After extensive evaluation including and excellent tug test, the device was deployed. Further measurements were taken post implant. The sheath was removed. At the completion of the Watchman implantation procedure, all catheters were removed, and a Perclose device was deployed by Dr. Garrick Nguyen for hemostasis. The patient tolerated the procedure well with no acute complications recognized. Just prior to pulling shealths, the J LUIS was used to obtain ultrasound images and revealed no evidence of pericardial effusion. Complications: None    Summary:   1. Successful Watchman implantation  2. Family updated. Lanney Heimlich Sellers MD, MS  Clinical Cardiac Electrophysiology

## 2019-05-21 NOTE — PERIOP NOTES
TRANSFER - OUT REPORT:    Verbal report given to [facility] RN on [facility]  being transferred to [phone] for routine post - op       Report consisted of patients Situation, Background, Assessment and   Recommendations(SBAR). Information from the following report(s) SBAR, OR Summary, Procedure Summary, Intake/Output and MAR was reviewed with the receiving nurse. Lines:   Peripheral IV 05/21/19 Right Hand (Active)   Site Assessment Clean, dry, & intact 5/21/2019 11:26 AM   Phlebitis Assessment 0 5/21/2019 11:26 AM   Infiltration Assessment 0 5/21/2019 11:26 AM   Dressing Status Clean, dry, & intact; Occlusive 5/21/2019 11:26 AM   Dressing Type Transparent;Tape 5/21/2019 11:26 AM   Hub Color/Line Status Green;Patent 5/21/2019 11:26 AM   Alcohol Cap Used No 5/21/2019 11:26 AM       Peripheral IV 05/21/19 Left Wrist (Active)   Site Assessment Clean, dry, & intact 5/21/2019 11:26 AM   Phlebitis Assessment 0 5/21/2019 11:26 AM   Infiltration Assessment 0 5/21/2019 11:26 AM   Dressing Status Clean, dry, & intact; Occlusive 5/21/2019 11:26 AM   Dressing Type Transparent;Tape 5/21/2019 11:26 AM   Hub Color/Line Status Pink;Patent 5/21/2019 11:26 AM   Alcohol Cap Used No 5/21/2019 11:26 AM        Opportunity for questions and clarification was provided. Patient transported with:   Monitor  O2 @ 4 liters  Registered Nurse    VTE prophylaxis orders have been written for [facility]. Patient and family given floor number and nurses name. Family updated re: pt status after security code verified.

## 2019-05-21 NOTE — PERIOP NOTES
Pt c/o urinary urgency; attempted in and out urinary catheter  insertion X2 with inability to get catheter tip past prostate. Noted scant amount of blood in catheter tip. Notified Idelle Riedel that patient requests to stand to urinate. Bedrest discontinued, per Dr. Garrick Nguyen, to allow pt to stand and urinate. Pt voided 200 pink urine with red flecks. Bladder palpation indicates soft bladder. Right groin c/d/i no bleeding or hematoma noted.

## 2019-05-21 NOTE — PROGRESS NOTES
Skin assessment completed with secondary RN. Sacrum and heels intact with no breakdown noted. Right groin and left radial puncture s/p watchman. Both sites intact and dressings C/D/I.

## 2019-05-21 NOTE — PROGRESS NOTES
RONAL with Dr Stevens Query  Successful implementation  16 fr sheath in R femoral vein  Site closed with Perclose   Site covered with tegaderm and gauze  No bleeding or hematoma noted at site  Pt taken to PACU

## 2019-05-21 NOTE — PROGRESS NOTES
Called to the floor for progressive wheezing the the patient and family stated has been increase since he was at home for several weaks. The patient is able to speak in clear sentences but state he had mild SOB. On physical exam the patient had expiratory wheezing in his bases with tight lung sounds, and trace to pitting edema in his lower extremities. Orders were given for Xopenex treatment along with a one time does of 40 of lasix after labs reviewed. Will continue to reevaluate overnight.       Snow Potter NP

## 2019-05-21 NOTE — ANESTHESIA POSTPROCEDURE EVALUATION
Procedure(s):  LEFT ATRIAL APPENDAGE CLOSURE.    general    Anesthesia Post Evaluation      Multimodal analgesia: multimodal analgesia used between 6 hours prior to anesthesia start to PACU discharge  Patient location during evaluation: PACU  Patient participation: complete - patient participated  Level of consciousness: awake and alert  Pain management: adequate  Airway patency: patent  Anesthetic complications: no  Cardiovascular status: acceptable  Respiratory status: acceptable  Hydration status: acceptable  Post anesthesia nausea and vomiting:  none      Vitals Value Taken Time   /83 5/21/2019  1:16 PM   Temp 36.7 °C (98 °F) 5/21/2019 11:26 AM   Pulse 60 5/21/2019  1:30 PM   Resp 18 5/21/2019  1:00 PM   SpO2 97 % 5/21/2019  1:30 PM   Vitals shown include unvalidated device data.

## 2019-05-22 VITALS
DIASTOLIC BLOOD PRESSURE: 62 MMHG | BODY MASS INDEX: 32.69 KG/M2 | HEART RATE: 67 BPM | SYSTOLIC BLOOD PRESSURE: 102 MMHG | TEMPERATURE: 97.7 F | RESPIRATION RATE: 18 BRPM | WEIGHT: 215.7 LBS | OXYGEN SATURATION: 93 % | HEIGHT: 68 IN

## 2019-05-22 LAB
ANION GAP SERPL CALC-SCNC: 9 MMOL/L (ref 7–16)
ATRIAL RATE: 60 BPM
BASOPHILS # BLD: 0 K/UL (ref 0–0.2)
BASOPHILS NFR BLD: 0 % (ref 0–2)
BNP SERPL-MCNC: 44 PG/ML
BUN SERPL-MCNC: 13 MG/DL (ref 8–23)
CALCIUM SERPL-MCNC: 8.4 MG/DL (ref 8.3–10.4)
CALCULATED P AXIS, ECG09: -28 DEGREES
CALCULATED R AXIS, ECG10: -40 DEGREES
CALCULATED T AXIS, ECG11: 61 DEGREES
CHLORIDE SERPL-SCNC: 104 MMOL/L (ref 98–107)
CO2 SERPL-SCNC: 27 MMOL/L (ref 21–32)
CREAT SERPL-MCNC: 0.78 MG/DL (ref 0.8–1.5)
DIAGNOSIS, 93000: NORMAL
DIFFERENTIAL METHOD BLD: ABNORMAL
EOSINOPHIL # BLD: 0.3 K/UL (ref 0–0.8)
EOSINOPHIL NFR BLD: 2 % (ref 0.5–7.8)
ERYTHROCYTE [DISTWIDTH] IN BLOOD BY AUTOMATED COUNT: 14.3 % (ref 11.9–14.6)
GLUCOSE SERPL-MCNC: 106 MG/DL (ref 65–100)
HCT VFR BLD AUTO: 33.9 % (ref 41.1–50.3)
HGB BLD-MCNC: 10.8 G/DL (ref 13.6–17.2)
IMM GRANULOCYTES # BLD AUTO: 0 K/UL (ref 0–0.5)
IMM GRANULOCYTES NFR BLD AUTO: 0 % (ref 0–5)
LYMPHOCYTES # BLD: 2.9 K/UL (ref 0.5–4.6)
LYMPHOCYTES NFR BLD: 24 % (ref 13–44)
MAGNESIUM SERPL-MCNC: 2.1 MG/DL (ref 1.8–2.4)
MCH RBC QN AUTO: 27.2 PG (ref 26.1–32.9)
MCHC RBC AUTO-ENTMCNC: 31.9 G/DL (ref 31.4–35)
MCV RBC AUTO: 85.4 FL (ref 79.6–97.8)
MONOCYTES # BLD: 1 K/UL (ref 0.1–1.3)
MONOCYTES NFR BLD: 8 % (ref 4–12)
NEUTS SEG # BLD: 7.7 K/UL (ref 1.7–8.2)
NEUTS SEG NFR BLD: 65 % (ref 43–78)
NRBC # BLD: 0 K/UL (ref 0–0.2)
P-R INTERVAL, ECG05: 228 MS
PLATELET # BLD AUTO: 160 K/UL (ref 150–450)
PMV BLD AUTO: 9.7 FL (ref 9.4–12.3)
POTASSIUM SERPL-SCNC: 3.4 MMOL/L (ref 3.5–5.1)
Q-T INTERVAL, ECG07: 468 MS
QRS DURATION, ECG06: 92 MS
QTC CALCULATION (BEZET), ECG08: 468 MS
RBC # BLD AUTO: 3.97 M/UL (ref 4.23–5.6)
SODIUM SERPL-SCNC: 140 MMOL/L (ref 136–145)
VENTRICULAR RATE, ECG03: 60 BPM
WBC # BLD AUTO: 11.9 K/UL (ref 4.3–11.1)

## 2019-05-22 PROCEDURE — 74011250637 HC RX REV CODE- 250/637: Performed by: INTERNAL MEDICINE

## 2019-05-22 PROCEDURE — 93005 ELECTROCARDIOGRAM TRACING: CPT | Performed by: INTERNAL MEDICINE

## 2019-05-22 PROCEDURE — 36415 COLL VENOUS BLD VENIPUNCTURE: CPT

## 2019-05-22 PROCEDURE — 80048 BASIC METABOLIC PNL TOTAL CA: CPT

## 2019-05-22 PROCEDURE — 74011250637 HC RX REV CODE- 250/637: Performed by: NURSE PRACTITIONER

## 2019-05-22 PROCEDURE — 83735 ASSAY OF MAGNESIUM: CPT

## 2019-05-22 PROCEDURE — 83880 ASSAY OF NATRIURETIC PEPTIDE: CPT

## 2019-05-22 PROCEDURE — 85025 COMPLETE CBC W/AUTO DIFF WBC: CPT

## 2019-05-22 RX ORDER — POTASSIUM CHLORIDE 20 MEQ/1
40 TABLET, EXTENDED RELEASE ORAL
Status: COMPLETED | OUTPATIENT
Start: 2019-05-22 | End: 2019-05-22

## 2019-05-22 RX ADMIN — PYRIDOSTIGMINE BROMIDE 60 MG: 60 TABLET ORAL at 09:03

## 2019-05-22 RX ADMIN — ASPIRIN 81 MG: 81 TABLET, COATED ORAL at 09:03

## 2019-05-22 RX ADMIN — Medication 5 ML: at 06:35

## 2019-05-22 RX ADMIN — SOTALOL HYDROCHLORIDE 160 MG: 80 TABLET ORAL at 06:35

## 2019-05-22 RX ADMIN — APIXABAN 5 MG: 5 TABLET, FILM COATED ORAL at 09:03

## 2019-05-22 RX ADMIN — LISINOPRIL 10 MG: 5 TABLET ORAL at 09:03

## 2019-05-22 RX ADMIN — POTASSIUM CHLORIDE 40 MEQ: 20 TABLET, EXTENDED RELEASE ORAL at 09:03

## 2019-05-22 NOTE — DISCHARGE SUMMARY
[facility] Discharge Summary     Patient ID:  Ezequiel Stinson  [id number]  89 y.o.  1940    Admit date: 5/21/2019    Discharge date:  5/22/19    Admitting Physician: Bette Muse MD     Discharge Physician: Seema Barcenas NP/Dr. Asha Ramirez    Admission Diagnoses: Atrial fibrillation, unspecified type ([facility] [I48.91]  A-fib ([facility] [address] [I48.91]  A-fib [facility]) [I48.91]    Discharge Diagnoses:   Patient Active Problem List    Diagnosis Date Noted    Atrial fibrillation ([address] 11/29/2017    GERD (gastroesophageal reflux disease) 10/27/2017    Sepsis ([address] 10/26/2017    Aspiration pneumonia ([address] 10/26/2017    Hypotension 10/26/2017    Syncope 10/24/2017    Bilateral carotid artery disease ([address] 06/30/2017    Paroxysmal atrial fibrillation ([address] 06/30/2017    Myasthenia gravis ([address]     Dyslipidemia 06/07/2016    Hypokalemia 06/07/2016    Mitral valve regurgitation 06/07/2016    HTN (hypertension) 05/08/2015    Coronary atherosclerosis of native coronary vessel 05/08/2015    S/P coronary artery stent placement 05/08/2015    Dyspnea 05/08/2015       Cardiology Procedures this admission:  J LUIS, Implantation of Watchman device, Echocardiogram    Consults: None    Hospital Course: Patient was seen at the office of [facility] by Dr. Sarabia Host in follow up for consideration for watchman device. The patient has prior h/o recurrent excessive bleeding with procedures and cuts. Patient is an appropriate candidate for  left atrial appendage occlusion.  The patient's [id number] score is  4 which indicates a 4.0% annual risk of stroke. .   Has-BLED score is 3 which indicates a 3.72% annual risk of a major bleeding event. The patient presented for procedure on 5/21/19. J LUIS was performed directly prior to procedure that was negative for HARI thrombus. The patient underwent successful implantation of a 21 mm Watchman device.   The patient was monitored closely in the telemetry unit  An post procedure J LUIS showed no evidence of pericardial effusion. The morning of 5/22/19, the patient was up feeling well without any complaints of chest pain or shortness of breath. Patient's labs were stable. Patient was seen and examined by Dr. Fidelia Pollock and determined stable and ready for discharge. Patient was instructed on the importance of medication compliance. The patient will be discharged home on eliquis and ASA 81mg. ASA 81mg will be continued indefinitely. The eliquis will be continued for 45 days until HARI seal is examined using J LUIS. Once HARI is noted to be sealed, eliquis will be discontinued, and Plavix 75mg will be started and will be continued until 6 months post watchman implant. This medication plan is due to hemorrhagic complications in the past.    The patient will have repeat J LUIS performed in 45 days at [facility] with Dr. Helga Roach on 6/27/19 at 8am. The patient will follow up with [facility] Dr. Fidelia Pollock on 7/1/19 at 65 am in [facility]. DISPOSITION: The patient is being discharged home in stable condition on a low saturated fat, low cholesterol and low salt diet. The patient is instructed to advance activities as tolerated to the limit of fatigue or shortness of breath. The patient is instructed to avoid lifting anything heavier than 10 lbs for 1 weeks. The patient is instructed to avoid any straining, stooping or squatting for 2 weeks. The patient is instructed not to drive for 1 week. The patient is instructed to watch the groin site for bleeding/oozing; if seen, the patient is instructed to apply firm pressure with a clean cloth and call [facility] at [phone]. The patient is instructed to watch for signs of infection which include: increasing area of redness, fever/hot to touch or purulent drainage at the groin site. The patient is instructed not to soak in a bathtub for 7-10 days, but is cleared to shower.    The patient is instructed to return to the ER immediately for any severe pain, color change, or temperature change in leg. The patient is informed not to stop any medications without discussing with our office and to contact our office if any dental work or possible surgeries are expected    Discharge Exam:   Visit Vitals  BP (!) 86/56 (BP 1 Location: Left arm, BP Patient Position: At rest)   Pulse 67   Temp 97.7 °F (36.5 °C)   Resp 18   Ht 5' 8\" (1.727 m)   Wt 97.8 kg (215 lb 11.2 oz)   SpO2 93%   BMI 32.80 kg/m²     Patient has been seen by Dr. Neville Care: see his progress note for exam details.     Recent Results (from the past 24 hour(s))   POC ACTIVATED CLOTTING TIME    Collection Time: 05/21/19 10:20 AM   Result Value Ref Range    Activated Clotting Time (POC) 274 (H) 70 - 128 SECS   EKG, 12 LEAD, INITIAL    Collection Time: 05/21/19 10:50 AM   Result Value Ref Range    Ventricular Rate 63 BPM    Atrial Rate 63 BPM    P-R Interval 162 ms    QRS Duration 100 ms    Q-T Interval 480 ms    QTC Calculation (Bezet) 491 ms    Calculated P Axis -8 degrees    Calculated R Axis -20 degrees    Calculated T Axis 45 degrees    Diagnosis       Normal sinus rhythm  Prolonged QT  Abnormal ECG  When compared with ECG of 21-MAY-2019 08:32,  Sinus rhythm has replaced Electronic atrial pacemaker  Confirmed by TILA MANRIQUEZ (), Rowena Nieves ([id number]) on 5/21/2019 83:85:54 PM     METABOLIC PANEL, BASIC    Collection Time: 05/22/19  3:48 AM   Result Value Ref Range    Sodium 140 136 - 145 mmol/L    Potassium 3.4 (L) 3.5 - 5.1 mmol/L    Chloride 104 98 - 107 mmol/L    CO2 27 21 - 32 mmol/L    Anion gap 9 7 - 16 mmol/L    Glucose 106 (H) 65 - 100 mg/dL    BUN 13 8 - 23 MG/DL    Creatinine 0.78 (L) 0.8 - 1.5 MG/DL    GFR est AA >60 >60 ml/min/1.73m2    GFR est non-AA >60 >60 ml/min/1.73m2    Calcium 8.4 8.3 - 10.4 MG/DL   MAGNESIUM    Collection Time: 05/22/19  3:48 AM   Result Value Ref Range    Magnesium 2.1 1.8 - 2.4 mg/dL   CBC WITH AUTOMATED DIFF    Collection Time: 05/22/19 3:48 AM   Result Value Ref Range    WBC 11.9 (H) 4.3 - 11.1 K/uL    RBC 3.97 (L) 4.23 - 5.6 M/uL    HGB 10.8 (L) 13.6 - 17.2 g/dL    HCT 33.9 (L) 41.1 - 50.3 %    MCV 85.4 79.6 - 97.8 FL    MCH 27.2 26.1 - 32.9 PG    MCHC 31.9 31.4 - 35.0 g/dL    RDW 14.3 11.9 - 14.6 %    PLATELET 793 228 - 089 K/uL    MPV 9.7 9.4 - 12.3 FL    ABSOLUTE NRBC 0.00 0.0 - 0.2 K/uL    DF AUTOMATED      NEUTROPHILS 65 43 - 78 %    LYMPHOCYTES 24 13 - 44 %    MONOCYTES 8 4.0 - 12.0 %    EOSINOPHILS 2 0.5 - 7.8 %    BASOPHILS 0 0.0 - 2.0 %    IMMATURE GRANULOCYTES 0 0.0 - 5.0 %    ABS. NEUTROPHILS 7.7 1.7 - 8.2 K/UL    ABS. LYMPHOCYTES 2.9 0.5 - 4.6 K/UL    ABS. MONOCYTES 1.0 0.1 - 1.3 K/UL    ABS. EOSINOPHILS 0.3 0.0 - 0.8 K/UL    ABS. BASOPHILS 0.0 0.0 - 0.2 K/UL    ABS. IMM. GRANS. 0.0 0.0 - 0.5 K/UL   BNP    Collection Time: 05/22/19  3:48 AM   Result Value Ref Range    BNP 44 (H) 0 pg/mL   EKG, 12 LEAD, INITIAL    Collection Time: 05/22/19  7:28 AM   Result Value Ref Range    Ventricular Rate 60 BPM    Atrial Rate 60 BPM    P-R Interval 228 ms    QRS Duration 92 ms    Q-T Interval 468 ms    QTC Calculation (Bezet) 468 ms    Calculated P Axis -28 degrees    Calculated R Axis -40 degrees    Calculated T Axis 61 degrees    Diagnosis       Atrial-paced rhythm with prolonged AV conduction  Left axis deviation  Abnormal ECG  When compared with ECG of 21-MAY-2019 10:50,  Electronic atrial pacemaker has replaced Sinus rhythm  Confirmed by Genoveva Moore MD (), Dee Quinones ([id number]) on 5/22/2019 9:44:30 AM           Patient Instructions:   Current Discharge Medication List      CONTINUE these medications which have CHANGED    Details   apixaban (ELIQUIS) 5 mg tablet Take 1 Tab by mouth two (2) times a day. Qty: 60 Tab, Refills: 5         CONTINUE these medications which have NOT CHANGED    Details   atorvastatin (LIPITOR) 80 mg tablet Take 1 Tab by mouth nightly.   Qty: 30 Tab, Refills: 11      lisinopril (PRINIVIL, ZESTRIL) 10 mg tablet Take 1 Tab by mouth two (2) times a day. Qty: 180 Tab, Refills: 3      sotalol (BETAPACE) 160 mg tablet Take 1 Tab by mouth every twelve (12) hours. Qty: 60 Tab, Refills: 11      aspirin delayed-release 81 mg tablet Take  by mouth daily. potassium chloride (KLOR-CON M20) 20 mEq tablet Take 2 Tabs by mouth daily. Qty: 180 Tab, Refills: 3      pyridostigmine (MESTINON) 60 mg tablet Take 60 mg by mouth daily. nitroglycerin (NITROSTAT) 0.4 mg SL tablet Place 1 sl under the tongue q 5 min prn cp, max 3 sl in a 15-min time period. Call 911 if no relief after the 3rd sl.   Qty: 1 Bottle, Refills: prn         STOP taking these medications       clopidogrel (PLAVIX) 75 mg tab Comments:   Reason for Stopping:                 Signed:  KAROLINE Osman  5/22/2019  7:32 AM

## 2019-05-22 NOTE — PROGRESS NOTES
[facility] CARDIOLOGY PROGRESS NOTE           5/22/2019 6:25 AM    Admit Date: 5/21/2019    Admit Diagnosis: Atrial fibrillation, unspecified type ([address] [I48.91]; A-fib ([address] [I48.91]; A-fib ([facility] [I48.91]      Subjective:   No complaints this AM, no chest pain or shortness of breath    Interval History: (History of pertinent interval events obtained from nursing staff)  S/p Watchman implantation yesterday without immediate complications    ROS:  GEN:  No fever or chills  Cardiovascular:  As noted above  Pulmonary:  As noted above  Neuro:  No new focal motor or sensory loss      Objective:     Vitals:    05/21/19 2046 05/21/19 2047 05/22/19 0055 05/22/19 0500   BP: 109/67 109/67 92/62 99/48   Pulse:  60 63 60   Resp:  17 16 18   Temp:  97.6 °F (36.4 °C) 97.9 °F (36.6 °C) 99 °F (37.2 °C)   SpO2:  94% 96% 95%   Weight:    97.8 kg (215 lb 11.2 oz)   Height:           Physical Exam:  General-Well Developed, Well Nourished, No Acute Distress, Alert & Oriented x 3, appropriate mood.   Neck- supple, no JVD  CV- regular rate and rhythm no MRG  Lung- clear bilaterally  Abd- soft, nontender, nondistended  Ext- no edema bilaterally, R femoral access site without hematoma or bruits  Skin- warm and dry    Current Facility-Administered Medications   Medication Dose Route Frequency    atorvastatin (LIPITOR) tablet 80 mg  80 mg Oral QHS    lisinopril (PRINIVIL, ZESTRIL) tablet 10 mg  10 mg Oral BID    pyridostigmine (MESTINON) tablet 60 mg  60 mg Oral DAILY    sotalol (BETAPACE) tablet 160 mg  160 mg Oral Q12H    sodium chloride (NS) flush 5-40 mL  5-40 mL IntraVENous Q8H    sodium chloride (NS) flush 5-40 mL  5-40 mL IntraVENous PRN    acetaminophen (TYLENOL) tablet 650 mg  650 mg Oral Q4H PRN    HYDROcodone-acetaminophen (NORCO) 5-325 mg per tablet 1 Tab  1 Tab Oral Q4H PRN    apixaban (ELIQUIS) tablet 5 mg  5 mg Oral Q12H    aspirin delayed-release tablet 81 mg  81 mg Oral DAILY    diphenhydrAMINE (BENADRYL) capsule 25 mg  25 mg Oral QHS PRN     Data Review:   Recent Results (from the past 24 hour(s))   EKG, 12 LEAD, INITIAL    Collection Time: 05/21/19  8:32 AM   Result Value Ref Range    Ventricular Rate 60 BPM    Atrial Rate 60 BPM    P-R Interval 242 ms    QRS Duration 94 ms    Q-T Interval 450 ms    QTC Calculation (Bezet) 450 ms    Calculated P Axis -10 degrees    Calculated R Axis -31 degrees    Calculated T Axis 63 degrees    Diagnosis       Atrial-paced rhythm with prolonged AV conduction  Left axis deviation  Abnormal ECG  When compared with ECG of 28-MAR-2019 09:36,  Electronic atrial pacemaker has replaced Sinus rhythm  Confirmed by TILA MANRIQUEZ (), VINICIUS PERERA ([id number]) on 5/21/2019 8:50:27 AM     CBC W/O DIFF    Collection Time: 05/21/19  8:48 AM   Result Value Ref Range    WBC 10.5 4.3 - 11.1 K/uL    RBC 4.49 4.23 - 5.6 M/uL    HGB 12.1 (L) 13.6 - 17.2 g/dL    HCT 38.5 (L) 41.1 - 50.3 %    MCV 85.7 79.6 - 97.8 FL    MCH 26.9 26.1 - 32.9 PG    MCHC 31.4 31.4 - 35.0 g/dL    RDW 14.3 11.9 - 14.6 %    PLATELET 170 181 - 243 K/uL    MPV 9.8 9.4 - 12.3 FL    ABSOLUTE NRBC 0.00 0.0 - 0.2 K/uL   POC ACTIVATED CLOTTING TIME    Collection Time: 05/21/19  9:47 AM   Result Value Ref Range    Activated Clotting Time (POC) 340 (H) 70 - 128 SECS   POC ACTIVATED CLOTTING TIME    Collection Time: 05/21/19 10:20 AM   Result Value Ref Range    Activated Clotting Time (POC) 274 (H) 70 - 128 SECS   EKG, 12 LEAD, INITIAL    Collection Time: 05/21/19 10:50 AM   Result Value Ref Range    Ventricular Rate 63 BPM    Atrial Rate 63 BPM    P-R Interval 162 ms    QRS Duration 100 ms    Q-T Interval 480 ms    QTC Calculation (Bezet) 491 ms    Calculated P Axis -8 degrees    Calculated R Axis -20 degrees    Calculated T Axis 45 degrees    Diagnosis       Normal sinus rhythm  Prolonged QT  Abnormal ECG  When compared with ECG of 21-MAY-2019 08:32,  Sinus rhythm has replaced Electronic atrial pacemaker  Confirmed by TILA MANRIQUEZ (), [facility] ([id number]) on 5/21/2019 76:54:96 PM     METABOLIC PANEL, BASIC    Collection Time: 05/22/19  3:48 AM   Result Value Ref Range    Sodium 140 136 - 145 mmol/L    Potassium 3.4 (L) 3.5 - 5.1 mmol/L    Chloride 104 98 - 107 mmol/L    CO2 27 21 - 32 mmol/L    Anion gap 9 7 - 16 mmol/L    Glucose 106 (H) 65 - 100 mg/dL    BUN 13 8 - 23 MG/DL    Creatinine 0.78 (L) 0.8 - 1.5 MG/DL    GFR est AA >60 >60 ml/min/1.73m2    GFR est non-AA >60 >60 ml/min/1.73m2    Calcium 8.4 8.3 - 10.4 MG/DL   MAGNESIUM    Collection Time: 05/22/19  3:48 AM   Result Value Ref Range    Magnesium 2.1 1.8 - 2.4 mg/dL   CBC WITH AUTOMATED DIFF    Collection Time: 05/22/19  3:48 AM   Result Value Ref Range    WBC 11.9 (H) 4.3 - 11.1 K/uL    RBC 3.97 (L) 4.23 - 5.6 M/uL    HGB 10.8 (L) 13.6 - 17.2 g/dL    HCT 33.9 (L) 41.1 - 50.3 %    MCV 85.4 79.6 - 97.8 FL    MCH 27.2 26.1 - 32.9 PG    MCHC 31.9 31.4 - 35.0 g/dL    RDW 14.3 11.9 - 14.6 %    PLATELET 721 673 - 220 K/uL    MPV 9.7 9.4 - 12.3 FL    ABSOLUTE NRBC 0.00 0.0 - 0.2 K/uL    DF AUTOMATED      NEUTROPHILS 65 43 - 78 %    LYMPHOCYTES 24 13 - 44 %    MONOCYTES 8 4.0 - 12.0 %    EOSINOPHILS 2 0.5 - 7.8 %    BASOPHILS 0 0.0 - 2.0 %    IMMATURE GRANULOCYTES 0 0.0 - 5.0 %    ABS. NEUTROPHILS 7.7 1.7 - 8.2 K/UL    ABS. LYMPHOCYTES 2.9 0.5 - 4.6 K/UL    ABS. MONOCYTES 1.0 0.1 - 1.3 K/UL    ABS. EOSINOPHILS 0.3 0.0 - 0.8 K/UL    ABS. BASOPHILS 0.0 0.0 - 0.2 K/UL    ABS. IMM.  GRANS. 0.0 0.0 - 0.5 K/UL   BNP    Collection Time: 05/22/19  3:48 AM   Result Value Ref Range    BNP 44 (H) 0 pg/mL       EKG:  (EKG has been independently visualized by me with interpretation below)  Assessment:     Active Problems:    HTN (hypertension) (5/8/2015)      Coronary atherosclerosis of native coronary vessel (5/8/2015)      Overview: VEGAS on brilinta      5/7/15: prox LAD PCI, normal EF      Paroxysmal atrial fibrillation ([address] (6/30/2017)      Overview: 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 Plan:   1. Watchman:  Pt underwent Watchman implantation yesterday without immediate complication, no questions or concerns this AM, ready and stable for discharge with appropriate follow up. Cont eliquis, ASA  2. Dispo: 45 day J LUIS follow up as scheduled     Nat Bernard MD  Cardiology/Electrophysiology

## 2019-05-22 NOTE — PROGRESS NOTES
Care Management Interventions  PCP Verified by CM: Yes  Last Visit to PCP: 01/20/19  Mode of Transport at Discharge: Other (see comment)(Spouse Yayo Officer [phone])  Transition of Care Consult (CM Consult): Discharge Planning  Discharge Durable Medical Equipment: No  Physical Therapy Consult: No  Occupational Therapy Consult: No  Speech Therapy Consult: No  Current Support Network: Lives with Spouse, Own Home  Confirm Follow Up Transport: Family  Plan discussed with Pt/Family/Caregiver: Yes  Freedom of Choice Offered: Yes  Discharge Location  Discharge Placement: Home      Pt admitted to 3rd floor [facility] for afib. CM met with pt to discuss CM needs & DCP. Pt is A&Ox4. Pt is indep at home with all ADLS. Pt lives with spouse. Pt has no DME needs. Pt has no difficulty with obtaining medications or transport. DCP home with spouse. No further needs noted. CM to continue to monitor.

## 2019-05-22 NOTE — PROGRESS NOTES
Bedside and Verbal shift change report given to Sukhjinder Braden RN (oncoming nurse) by Blanca Osuna RN (offgoing nurse). Report included the following information SBAR, Kardex, Accordion and Recent Results.

## 2019-05-22 NOTE — PROGRESS NOTES
Bedside and Verbal shift change report given to self (oncoming nurse) by Yang Valenzuela RN (offgoing nurse). Report included the following information SBAR, Kardex, MAR and Recent Results.

## 2019-05-22 NOTE — DISCHARGE INSTRUCTIONS
Watchman Discharge Instructions      Activity:     Follow your doctors recommendations   Return to normal activities gradually, pacing yourself as you feel better, resting when tired. · Activity should be limited for the next 48 hours. Climb stairs as little as possible and avoid any stooping, bending or strenuous activity for 48 hours. No heavy lifting (anything over 10 pounds) for three days. · Do not drive for 48 hoursHave a responsible person drive you home and stay with you for at least 24 hours after your heart catheterization/angiography. · Check the puncture site frequently for swelling or bleeding. If you see any bleeding, lie down and apply pressure over the area with a clean town or washcloth. Notify your doctor for any redness, swelling, drainage or oozing from the puncture site. Notify your doctor for any fever or chills. · You may resume your usual diet. Drink more fluids than usual.        Incision / Wound Care       Cleanse wounds with mild soap and water. Keep wound dry.  A small amount of bloody or clear drainage is normal.   Watch for redness, swelling, incision site hot to touch, foul or colored drainage from the incision site, these are all signs of infection and should be reported immediately to the physicians.  If there is site concern please notify your implanting physician.  You may remove the bandage from your Right and Groin in 24 hours. You may shower in 24 hours. No tub baths, hot tubs or swimming for one week. Do not place any lotions, creams, powders, ointments over the puncture site for one week. You may place a clean band-aid over the puncture site each day for 5 days. Change this daily. Continued        Medications:   DO NOT STOP TAKING YOUR WARFARIN OR ASPIRIN  (and/or PLAVIX) WITHOUT SPEAKING WITH YOUR CARDIOLOGIST FIRST!  Take your medications as ordered at the time of discharge.    Do NOT stop taking any medications without first discussing it with your doctor.  Notify all your doctors of current medication lists. Follow instructions on medication administration especially if blood thinning medications are prescribed. Your doctor will monitor your medications and advise you when or if you can stop taking them. Notify your doctor immediately if any of the following:   Sudden weight gain   Increasing shortness of breath   Pain, change in color, temperature or swelling in lower legs or feet.  Fevers greater than 101 degrees, redness, swelling, incision site hot to touch, foul or colored drainage from the incision site, these are all signs of infection and should be reported immediately to the physicians. Post Watchman Discharge Instructions Timeline     After a minimum of 45 days from date of procedure you will need to return to hospital to have an outpatient J LUIS performed to determine if the implant has closed the opening of the appendage. At this time you will see the [place] [id number]. Coordinator for a follow up. If the J LUIS reveals the appendage is not fully closed - you will require another J LUIS at a later date to reassess. Once the results from J LUIS have been reviewed the physicians will determine what medication changes need to be made. Your [facility] Coordinator can facilitate arranging these appointments.  6 months post implant you will need to see the [facility] Coordinator and visit the physician for a routine follow up and potentially EKG, and lab work. Your Coordinator can facilitate with setting up these appointments.  At 1 and 2 years post implant you will be contacted by your [place] [id number]. Coordinator for a brief interview. This allows us to complete required patient monitoring.        Prior to any dental work or surgery notify your dentist or surgeon about your Watchman implant and the medications you are on.     Maintain regular follow up visits with your cardiologist     Keep all bloodwork appointments. Thank you for entrusting us with your care! Patient Education        Atrial Fibrillation: Care Instructions  Your Care Instructions    Atrial fibrillation is an irregular and often fast heartbeat. Treating this condition is important for several reasons. It can cause blood clots, which can travel from your heart to your brain and cause a stroke. If you have a fast heartbeat, you may feel lightheaded, dizzy, and weak. An irregular heartbeat can also increase your risk for heart failure. Atrial fibrillation is often the result of another heart condition, such as high blood pressure or coronary artery disease. Making changes to improve your heart condition will help you stay healthy and active. Follow-up care is a key part of your treatment and safety. Be sure to make and go to all appointments, and call your doctor if you are having problems. It's also a good idea to know your test results and keep a list of the medicines you take. How can you care for yourself at home? Medicines    · Take your medicines exactly as prescribed. Call your doctor if you think you are having a problem with your medicine. You will get more details on the specific medicines your doctor prescribes.     · If your doctor has given you a blood thinner to prevent a stroke, be sure you get instructions about how to take your medicine safely. Blood thinners can cause serious bleeding problems.     · Do not take any vitamins, over-the-counter drugs, or herbal products without talking to your doctor first.    Lifestyle changes    · Do not smoke. Smoking can increase your chance of a stroke and heart attack. If you need help quitting, talk to your doctor about stop-smoking programs and medicines. These can increase your chances of quitting for good.     · Eat a heart-healthy diet.     · Stay at a healthy weight.  Lose weight if you need to.     · Limit alcohol to 2 drinks a day for men and 1 drink a day for women. Too much alcohol can cause health problems.     · Avoid colds and flu. Get a pneumococcal vaccine shot. If you have had one before, ask your doctor whether you need another dose. Get a flu shot every year. If you must be around people with colds or flu, wash your hands often. Activity    · If your doctor recommends it, get more exercise. Walking is a good choice. Bit by bit, increase the amount you walk every day. Try for at least 30 minutes on most days of the week. You also may want to swim, bike, or do other activities. Your doctor may suggest that you join a cardiac rehabilitation program so that you can have help increasing your physical activity safely.     · Start light exercise if your doctor says it is okay. Even a small amount will help you get stronger, have more energy, and manage stress. Walking is an easy way to get exercise. Start out by walking a little more than you did in the hospital. Gradually increase the amount you walk.     · When you exercise, watch for signs that your heart is working too hard. You are pushing too hard if you cannot talk while you are exercising. If you become short of breath or dizzy or have chest pain, sit down and rest immediately.     · Check your pulse regularly. Place two fingers on the artery at the palm side of your wrist, in line with your thumb. If your heartbeat seems uneven or fast, talk to your doctor. When should you call for help? Call 911 anytime you think you may need emergency care. For example, call if:    · You have symptoms of a heart attack. These may include:  ? Chest pain or pressure, or a strange feeling in the chest.  ? Sweating. ? Shortness of breath. ? Nausea or vomiting. ? Pain, pressure, or a strange feeling in the back, neck, jaw, or upper belly or in one or both shoulders or arms. ? Lightheadedness or sudden weakness. ? A fast or irregular heartbeat.   After you call 911, the  may tell you to chew 1 adult-strength or 2 to 4 low-dose aspirin. Wait for an ambulance. Do not try to drive yourself.     · You have symptoms of a stroke. These may include:  ? Sudden numbness, tingling, weakness, or loss of movement in your face, arm, or leg, especially on only one side of your body. ? Sudden vision changes. ? Sudden trouble speaking. ? Sudden confusion or trouble understanding simple statements. ? Sudden problems with walking or balance. ? A sudden, severe headache that is different from past headaches.     · You passed out (lost consciousness).    Call your doctor now or seek immediate medical care if:    · You have new or increased shortness of breath.     · You feel dizzy or lightheaded, or you feel like you may faint.     · Your heart rate becomes irregular.     · You can feel your heart flutter in your chest or skip heartbeats. Tell your doctor if these symptoms are new or worse.    Watch closely for changes in your health, and be sure to contact your doctor if you have any problems. Where can you learn more? Go to http://tess-micheline.info/. Enter U020 in the search box to learn more about \"Atrial Fibrillation: Care Instructions. \"  Current as of: July 22, 2018  Content Version: 11.9  © 3765-2467 [employer]. Care instructions adapted under license by [employer] (which disclaims liability or warranty for this information). If you have questions about a medical condition or this instruction, always ask your healthcare professional. [place] [address] any warranty or liability for your use of this information.          DISCHARGE SUMMARY from Nurse    PATIENT INSTRUCTIONS:    After general anesthesia or intravenous sedation, for 24 hours or while taking prescription Narcotics:  · Limit your activities  · Do not drive and operate hazardous machinery  · Do not make important personal or business decisions  · Do  not drink alcoholic beverages  · If you have not urinated within 8 hours after discharge, please contact your surgeon on call. Report the following to your surgeon:  · Excessive pain, swelling, redness or odor of or around the surgical area  · Temperature over 100.5  · Nausea and vomiting lasting longer than 4 hours or if unable to take medications  · Any signs of decreased circulation or nerve impairment to extremity: change in color, persistent  numbness, tingling, coldness or increase pain  · Any questions    What to do at Home:  Recommended activity: Activity as tolerated,    If you experience any of the following symptoms redness, swelling, oozing, or pain at site, please follow up with [facility]ology. *  Please give a list of your current medications to your Primary Care Provider. *  Please update this list whenever your medications are discontinued, doses are      changed, or new medications (including over-the-counter products) are added. *  Please carry medication information at all times in case of emergency situations. These are general instructions for a healthy lifestyle:    No smoking/ No tobacco products/ Avoid exposure to second hand smoke  Surgeon General's Warning:  Quitting smoking now greatly reduces serious risk to your health. Obesity, smoking, and sedentary lifestyle greatly increases your risk for illness    A healthy diet, regular physical exercise & weight monitoring are important for maintaining a healthy lifestyle    You may be retaining fluid if you have a history of heart failure or if you experience any of the following symptoms:  Weight gain of 3 pounds or more overnight or 5 pounds in a week, increased swelling in our hands or feet or shortness of breath while lying flat in bed. Please call your doctor as soon as you notice any of these symptoms; do not wait until your next office visit.     Recognize signs and symptoms of STROKE:    F-face looks uneven    A-arms unable to move or move unevenly    S-speech slurred or non-existent    T-time-call 911 as soon as signs and symptoms begin-DO NOT go       Back to bed or wait to see if you get better-TIME IS BRAIN. Warning Signs of HEART ATTACK     Call 911 if you have these symptoms:   Chest discomfort. Most heart attacks involve discomfort in the center of the chest that lasts more than a few minutes, or that goes away and comes back. It can feel like uncomfortable pressure, squeezing, fullness, or pain.  Discomfort in other areas of the upper body. Symptoms can include pain or discomfort in one or both arms, the back, neck, jaw, or stomach.  Shortness of breath with or without chest discomfort.  Other signs may include breaking out in a cold sweat, nausea, or lightheadedness. Don't wait more than five minutes to call 911 - MINUTES MATTER! Fast action can save your life. Calling 911 is almost always the fastest way to get lifesaving treatment. Emergency Medical Services staff can begin treatment when they arrive -- up to an hour sooner than if someone gets to the hospital by car. The discharge information has been reviewed with the patient. The patient verbalized understanding. Discharge medications reviewed with the patient and appropriate educational materials and side effects teaching were provided.   ___________________________________________________________________________________________________________________________________

## 2019-05-24 LAB
ABO + RH BLD: NORMAL
BLD PROD TYP BPU: NORMAL
BLOOD GROUP ANTIBODIES SERPL: NORMAL
BPU ID: NORMAL
CROSSMATCH RESULT,%XM: NORMAL
SPECIMEN EXP DATE BLD: NORMAL
STATUS OF UNIT,%ST: NORMAL
UNIT DIVISION, %UDIV: 0

## 2019-06-12 ENCOUNTER — APPOINTMENT (OUTPATIENT)
Dept: ULTRASOUND IMAGING | Age: 79
End: 2019-06-12
Payer: MEDICARE

## 2019-06-12 ENCOUNTER — HOSPITAL ENCOUNTER (EMERGENCY)
Age: 79
Discharge: HOME | End: 2019-06-12
Payer: MEDICARE

## 2019-06-12 VITALS
RESPIRATION RATE: 16 BRPM | OXYGEN SATURATION: 99 % | WEIGHT: 215 LBS | BODY MASS INDEX: 32.58 KG/M2 | SYSTOLIC BLOOD PRESSURE: 137 MMHG | HEIGHT: 68 IN | TEMPERATURE: 97.8 F | DIASTOLIC BLOOD PRESSURE: 84 MMHG | HEART RATE: 82 BPM

## 2019-06-12 DIAGNOSIS — I72.9 PSEUDOANEURYSM FOLLOWING PROCEDURE (HCC): Primary | ICD-10-CM

## 2019-06-12 DIAGNOSIS — T81.718A PSEUDOANEURYSM FOLLOWING PROCEDURE (HCC): Primary | ICD-10-CM

## 2019-06-12 PROCEDURE — 93926 LOWER EXTREMITY STUDY: CPT

## 2019-06-12 PROCEDURE — 99283 EMERGENCY DEPT VISIT LOW MDM: CPT

## 2019-06-12 NOTE — ED TRIAGE NOTES
C/o right wrist pain with swelling. Onset of swelling approx 10 days pta however worse tonight. Reports watchman placed 5/21, art line to right wrist. Swelling noted to site. Cap refill to right fingers <3 seconds. +radial pulse noted.  Currently taking eliquis

## 2019-06-12 NOTE — ED PROVIDER NOTES
[de-identified]year-old male by her painful lesion on his right wrist.  Patient had a watchman device placed to his right radial artery last monthor small raised area afterwards got much bigger recently became painful last night. The history is provided by the patient. Wrist Swelling    The incident occurred more than 2 days ago. The incident occurred at home. There was no injury mechanism (percutaneous arterial cannulation). The pain is present in the right wrist. The quality of the pain is described as burning. The pain is at a severity of 8/10. The pain is moderate. Pertinent negatives include no fever.         Past Medical History:   Diagnosis Date    Abnormal EKG 4/22/15    CAD (coronary artery disease) 5/8/2015    Carotid artery stenosis without cerebral infarction 6/7/2016    US 6/15: <50% bilat ICAs    Coronary atherosclerosis of native coronary vessel 5/8/2015    VEGAS on brilinta 5/7/15: prox LAD PCI, normal EF     Dyslipidemia 6/7/2016    Dyspnea 5/8/2015    Echo 6/15: EF 60%, mod MR, mod LVH, mild AI     ED (erectile dysfunction)     GERD (gastroesophageal reflux disease)     GERD (gastroesophageal reflux disease)     HTN (hypertension) 5/8/2015    Hypertension     Hypokalemia 6/7/2016    Hypokalemia     Mitral valve regurgitation 6/7/2016    Myasthenia gravis ([address] 6/17/15    Myasthenia gravis (HCC)     Nocturia     Osteoporosis     PUD (peptic ulcer disease)     S/P coronary artery stent placement 5/8/2015    3.0x38 mm Xience ADRIANNA to pLAD 5/7/15     Sleep apnea     Syncope and collapse     Unspecified sleep apnea        Past Surgical History:   Procedure Laterality Date    HX APPENDECTOMY      HX COLONOSCOPY  2007    HX HEART CATHETERIZATION  5/7/2015    HX Vasiliy Cervantes/Xin for sleep apnea and reconstruction for extending jaw         Family History:   Problem Relation Age of Onset    Cancer Mother         kidney    Cancer Father stomach       Social History     Socioeconomic History    Marital status:      Spouse name: Not on file    Number of children: Not on file    Years of education: Not on file    Highest education level: Not on file   Occupational History    Not on file   Social Needs    Financial resource strain: Not on file    Food insecurity:     Worry: Not on file     Inability: Not on file    Transportation needs:     Medical: Not on file     Non-medical: Not on file   Tobacco Use    Smoking status: Never Smoker    Smokeless tobacco: Never Used   Substance and Sexual Activity    Alcohol use: No    Drug use: No    Sexual activity: Not on file   Lifestyle    Physical activity:     Days per week: Not on file     Minutes per session: Not on file    Stress: Not on file   Relationships    Social connections:     Talks on phone: Not on file     Gets together: Not on file     Attends [employer] service: Not on file     Active member of club or organization: Not on file     Attends meetings of clubs or organizations: Not on file     Relationship status: Not on file    Intimate partner violence:     Fear of current or ex partner: Not on file     Emotionally abused: Not on file     Physically abused: Not on file     Forced sexual activity: Not on file   Other Topics Concern    Not on file   Social History Narrative    Not on file         ALLERGIES: Patient has no known allergies. Review of Systems   Constitutional: Negative. Negative for activity change and fever. HENT: Negative. Eyes: Negative. Respiratory: Negative. Cardiovascular: Negative. Gastrointestinal: Negative. Genitourinary: Negative. Musculoskeletal: Negative. Skin: Negative. Neurological: Negative. Psychiatric/Behavioral: Negative. All other systems reviewed and are negative.       Vitals:    06/12/19 0305   BP: 147/87   Pulse: 64   Resp: 18   Temp: 97.8 °F (36.6 °C)   SpO2: 94%   Weight: 97.5 kg (215 lb)   Height: 5' 8\" (1.727 m)            Physical Exam   Constitutional: He is oriented to person, place, and time. He appears well-developed and well-nourished. No distress. HENT:   Head: Normocephalic and atraumatic. Right Ear: External ear normal.   Left Ear: External ear normal.   Nose: Nose normal.   Mouth/Throat: Oropharynx is clear and moist. No oropharyngeal exudate. Eyes: Pupils are equal, round, and reactive to light. Conjunctivae and EOM are normal. Right eye exhibits no discharge. Left eye exhibits no discharge. No scleral icterus. Neck: Normal range of motion. Neck supple. No JVD present. No tracheal deviation present. Cardiovascular: Normal rate, regular rhythm and intact distal pulses. Pulmonary/Chest: Effort normal and breath sounds normal. No stridor. No respiratory distress. He has no wheezes. He exhibits no tenderness. Abdominal: Soft. Bowel sounds are normal. He exhibits no distension and no mass. There is no tenderness. Musculoskeletal: Normal range of motion. He exhibits no edema. Right wrist: He exhibits tenderness. Neurological: He is alert and oriented to person, place, and time. No cranial nerve deficit. Skin: Skin is warm and dry. No rash noted. He is not diaphoretic. No erythema. No pallor. Psychiatric: He has a normal mood and affect. His behavior is normal. Thought content normal.   Nursing note and vitals reviewed. 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        Amount and/or Complexity of Data Reviewed  Tests in the radiology section of CPT®: ordered and reviewed    Risk of Complications, Morbidity, and/or Mortality  Presenting problems: low  Diagnostic procedures: moderate  Management options: low           Procedures

## 2019-06-12 NOTE — DISCHARGE INSTRUCTIONS
Patient Education        Pseudo-Aneurysm: Care Instructions  Your Care Instructions  A pseudo-aneurysm is a hole in the wall of a blood vessel (artery). The blood from inside the artery can then leak out of the artery. It is most common in the artery that runs from the hip to the knee. It can be caused by the puncture of an artery during a medical test, such as certain heart tests. People who take blood thinners are more likely to develop a pseudo-aneurysm. Some pseudo-aneurysms heal without treatment. Those that continue to grow larger may need treatment. The doctor may inject medicine through a needle to make the blood clot in the pseudo-aneurysm. He or she may also use pressure (compression) to make the blood clot. If these do not work, you may need surgery to repair the hole in the artery. Follow-up care is a key part of your treatment and safety. Be sure to make and go to all appointments, and call your doctor if you are having problems. It's also a good idea to know your test results and keep a list of the medicines you take. How can you care for yourself at home? · Control high blood pressure. High blood pressure increases the chance of an aneurysm bursting. A healthy lifestyle along with medicines may help you lower your blood pressure. · Manage cholesterol to help keep your blood vessels healthy. A healthy lifestyle along with medicines may help you manage cholesterol. · Do not smoke. If you need help quitting, talk to your doctor about stop-smoking programs and medicines. These can increase your chances of quitting for good. · Eat heart-healthy foods. These include fruits, vegetables, whole grains, fish, and low-fat or nonfat dairy foods. Limit sodium, alcohol, and sweets. · Stay at a healthy weight. Being overweight may not make the pseudo-aneurysm any worse. But being at a healthy weight can reduce your risks from surgery if you need it. When should you call for help?   Call 911 anytime you think you may need emergency care. For example, call if:    · You have sudden chest pain and shortness of breath, or you cough up blood.     · You passed out (lost consciousness).    Call your doctor now or seek immediate medical care if:    · You have sudden or increasing pain in your groin.     · Your leg or foot is cool or pale or changes color.     · You have tingling or numbness in your foot.    Watch closely for changes in your health, and be sure to contact your doctor if:    · You do not get better as expected. Where can you learn more? Go to http://tess-micheline.info/. Enter Q112 in the search box to learn more about \"Pseudo-Aneurysm: Care Instructions. \"  Current as of: September 26, 2018  Content Version: 11.9  © 1444-8099 [employer]. Care instructions adapted under license by [employer] (which disclaims liability or warranty for this information). If you have questions about a medical condition or this instruction, always ask your healthcare professional. [address] any warranty or liability for your use of this information.

## 2019-06-12 NOTE — ED NOTES
I have reviewed discharge instructions with the patient. The patient verbalized understanding. Patient left ED via Discharge Method: ambulatory to Home with self    Opportunity for questions and clarification provided. Patient given 0 scripts. To continue your aftercare when you leave the hospital, you may receive an automated call from our care team to check in on how you are doing. This is a free service and part of our promise to provide the best care and service to meet your aftercare needs.  If you have questions, or wish to unsubscribe from this service please call [phone]. Thank you for Choosing our [facility] Emergency Department.

## 2019-06-24 NOTE — PROGRESS NOTES
Patient pre-assessment complete for J LUIS scheduled for 19, arrival time 0745. Patient verified using . Patient instructed to bring all home medications in labeled bottles on the day of procedure. NPO status reinforced. Patient instructed to take sotalol with a small sip of water, but to hold all other medications. Patient verbalizes understanding of all instructions & denies any questions at this time.

## 2019-06-27 ENCOUNTER — HOSPITAL ENCOUNTER (OUTPATIENT)
Dept: CARDIAC CATH/INVASIVE PROCEDURES | Age: 79
Discharge: HOME | End: 2019-06-27
Attending: INTERNAL MEDICINE | Admitting: INTERNAL MEDICINE
Payer: MEDICARE

## 2019-06-27 VITALS
OXYGEN SATURATION: 96 % | DIASTOLIC BLOOD PRESSURE: 81 MMHG | SYSTOLIC BLOOD PRESSURE: 184 MMHG | HEART RATE: 60 BPM | RESPIRATION RATE: 13 BRPM

## 2019-06-27 LAB
ANION GAP SERPL CALC-SCNC: 7 MMOL/L (ref 7–16)
ATRIAL RATE: 60 BPM
BUN SERPL-MCNC: 11 MG/DL (ref 8–23)
CALCIUM SERPL-MCNC: 9.1 MG/DL (ref 8.3–10.4)
CALCULATED R AXIS, ECG10: -29 DEGREES
CALCULATED T AXIS, ECG11: 49 DEGREES
CHLORIDE SERPL-SCNC: 104 MMOL/L (ref 98–107)
CO2 SERPL-SCNC: 28 MMOL/L (ref 21–32)
CREAT SERPL-MCNC: 0.88 MG/DL (ref 0.8–1.5)
DIAGNOSIS, 93000: NORMAL
ERYTHROCYTE [DISTWIDTH] IN BLOOD BY AUTOMATED COUNT: 14.2 % (ref 11.9–14.6)
GLUCOSE SERPL-MCNC: 113 MG/DL (ref 65–100)
HCT VFR BLD AUTO: 37.1 % (ref 41.1–50.3)
HGB BLD-MCNC: 11.4 G/DL (ref 13.6–17.2)
INR PPP: 1.1
MAGNESIUM SERPL-MCNC: 2.2 MG/DL (ref 1.8–2.4)
MCH RBC QN AUTO: 26.3 PG (ref 26.1–32.9)
MCHC RBC AUTO-ENTMCNC: 30.7 G/DL (ref 31.4–35)
MCV RBC AUTO: 85.5 FL (ref 79.6–97.8)
NRBC # BLD: 0 K/UL (ref 0–0.2)
P-R INTERVAL, ECG05: 252 MS
PLATELET # BLD AUTO: 211 K/UL (ref 150–450)
PMV BLD AUTO: 9.6 FL (ref 9.4–12.3)
POTASSIUM SERPL-SCNC: 3.8 MMOL/L (ref 3.5–5.1)
PROTHROMBIN TIME: 13.6 SEC (ref 11.7–14.5)
Q-T INTERVAL, ECG07: 464 MS
QRS DURATION, ECG06: 94 MS
QTC CALCULATION (BEZET), ECG08: 464 MS
RBC # BLD AUTO: 4.34 M/UL (ref 4.23–5.6)
SODIUM SERPL-SCNC: 139 MMOL/L (ref 136–145)
VENTRICULAR RATE, ECG03: 60 BPM
WBC # BLD AUTO: 11.4 K/UL (ref 4.3–11.1)

## 2019-06-27 PROCEDURE — 85610 PROTHROMBIN TIME: CPT

## 2019-06-27 PROCEDURE — 83735 ASSAY OF MAGNESIUM: CPT

## 2019-06-27 PROCEDURE — 80048 BASIC METABOLIC PNL TOTAL CA: CPT

## 2019-06-27 PROCEDURE — 99152 MOD SED SAME PHYS/QHP 5/>YRS: CPT

## 2019-06-27 PROCEDURE — 74011250636 HC RX REV CODE- 250/636

## 2019-06-27 PROCEDURE — 93005 ELECTROCARDIOGRAM TRACING: CPT | Performed by: INTERNAL MEDICINE

## 2019-06-27 PROCEDURE — 93312 ECHO TRANSESOPHAGEAL: CPT

## 2019-06-27 PROCEDURE — 85027 COMPLETE CBC AUTOMATED: CPT

## 2019-06-27 RX ORDER — MIDAZOLAM HYDROCHLORIDE 1 MG/ML
.5-5 INJECTION, SOLUTION INTRAMUSCULAR; INTRAVENOUS
Status: DISCONTINUED | OUTPATIENT
Start: 2019-06-27 | End: 2019-06-27 | Stop reason: HOSPADM

## 2019-06-27 RX ORDER — SODIUM CHLORIDE 0.9 % (FLUSH) 0.9 %
5 SYRINGE (ML) INJECTION AS NEEDED
Status: DISCONTINUED | OUTPATIENT
Start: 2019-06-27 | End: 2019-06-27 | Stop reason: HOSPADM

## 2019-06-27 RX ORDER — SODIUM CHLORIDE 9 MG/ML
75 INJECTION, SOLUTION INTRAVENOUS CONTINUOUS
Status: DISCONTINUED | OUTPATIENT
Start: 2019-06-27 | End: 2019-06-27 | Stop reason: HOSPADM

## 2019-06-27 RX ORDER — CLOPIDOGREL BISULFATE 75 MG/1
75 TABLET ORAL DAILY
Qty: 30 TAB | Refills: 6 | Status: SHIPPED | OUTPATIENT
Start: 2019-06-27 | End: 2020-08-19

## 2019-06-27 RX ORDER — FENTANYL CITRATE 50 UG/ML
25-100 INJECTION, SOLUTION INTRAMUSCULAR; INTRAVENOUS
Status: DISCONTINUED | OUTPATIENT
Start: 2019-06-27 | End: 2019-06-27 | Stop reason: HOSPADM

## 2019-06-27 RX ADMIN — FENTANYL CITRATE 25 MCG: 50 INJECTION, SOLUTION INTRAMUSCULAR; INTRAVENOUS at 08:37

## 2019-06-27 RX ADMIN — FENTANYL CITRATE 25 MCG: 50 INJECTION, SOLUTION INTRAMUSCULAR; INTRAVENOUS at 08:45

## 2019-06-27 RX ADMIN — MIDAZOLAM HYDROCHLORIDE 2 MG: 1 INJECTION, SOLUTION INTRAMUSCULAR; INTRAVENOUS at 08:42

## 2019-06-27 RX ADMIN — MIDAZOLAM HYDROCHLORIDE 2 MG: 1 INJECTION, SOLUTION INTRAMUSCULAR; INTRAVENOUS at 08:37

## 2019-06-27 RX ADMIN — MIDAZOLAM HYDROCHLORIDE 2 MG: 1 INJECTION, SOLUTION INTRAMUSCULAR; INTRAVENOUS at 08:45

## 2019-06-27 NOTE — PROGRESS NOTES
39 Day watchman follow up    Patient can d/c eliquis now per Dr. Miakla Saxena. He is to start plavix 75mg and continue asa 81mg.  He will d/c plavix at the 6 months post watchman

## 2019-06-27 NOTE — H&P
[facility] Cardiology History & Physical      Date of  Admission: 6/27/2019  7:42 AM     CC: Post watchman J LUIS    HPI:  Rosalino Rojas is a [de-identified] y.o. male     Patient presents for post watchman J LUIS. Alexi Fiore has a history of paroxysmal atrial fibrillation currently maintained on sotalol therapy. Alexi Fiore has a history of tachy/trell syndrome with prior pacemaker placement. Alexi Fiore has known history of coronary artery disease with most recent cardiac catheterization on 7/23/18 with npatent LAD stent and mild/moderate non-obstructive CAD.       Had teeth pulled with pronounced bleeding.  Any cuts has excessive bleeding. Not willing to take Eliquis long term. Doing well post watchman implantation.      Past Medical History:   Diagnosis Date    Abnormal EKG 4/22/15    CAD (coronary artery disease) 5/8/2015    Carotid artery stenosis without cerebral infarction 6/7/2016    US 6/15: <50% bilat ICAs    Coronary atherosclerosis of native coronary vessel 5/8/2015    VEGAS on brilinta 5/7/15: prox LAD PCI, normal EF     Dyslipidemia 6/7/2016    Dyspnea 5/8/2015    Echo 6/15: EF 60%, mod MR, mod LVH, mild AI     ED (erectile dysfunction)     GERD (gastroesophageal reflux disease)     GERD (gastroesophageal reflux disease)     HTN (hypertension) 5/8/2015    Hypertension     Hypokalemia 6/7/2016    Hypokalemia     Mitral valve regurgitation 6/7/2016    Myasthenia gravis ([address] 6/17/15    Myasthenia gravis (HCC)     Nocturia     Osteoporosis     PUD (peptic ulcer disease)     S/P coronary artery stent placement 5/8/2015    3.0x38 mm Xience ADRIANNA to pLAD 5/7/15     Sleep apnea     Syncope and collapse     Unspecified sleep apnea       Past Surgical History:   Procedure Laterality Date    HX APPENDECTOMY      HX COLONOSCOPY  2007    HX HEART CATHETERIZATION  5/7/2015    HX Lonzell Orn    Dr. Cervantes/Xin for sleep apnea and reconstruction for extending jaw       No Known Allergies Social History     Socioeconomic History    Marital status:      Spouse name: Not on file    Number of children: Not on file    Years of education: Not on file    Highest education level: Not on file   Occupational History    Not on file   Social Needs    Financial resource strain: Not on file    Food insecurity:     Worry: Not on file     Inability: Not on file    Transportation needs:     Medical: Not on file     Non-medical: Not on file   Tobacco Use    Smoking status: Never Smoker    Smokeless tobacco: Never Used   Substance and Sexual Activity    Alcohol use: No    Drug use: No    Sexual activity: Not on file   Lifestyle    Physical activity:     Days per week: Not on file     Minutes per session: Not on file    Stress: Not on file   Relationships    Social connections:     Talks on phone: Not on file     Gets together: Not on file     Attends Baptism service: Not on file     Active member of club or organization: Not on file     Attends meetings of clubs or organizations: Not on file     Relationship status: Not on file    Intimate partner violence:     Fear of current or ex partner: Not on file     Emotionally abused: Not on file     Physically abused: Not on file     Forced sexual activity: Not on file   Other Topics Concern    Not on file   Social History Narrative    Not on file     Family History   Problem Relation Age of Onset    Cancer Mother         kidney    Cancer Father         stomach        Current Facility-Administered Medications   Medication Dose Route Frequency    0.9% sodium chloride infusion  75 mL/hr IntraVENous CONTINUOUS    saline peripheral flush soln 5 mL  5 mL InterCATHeter PRN       Review of Systems    ROS    Constitutional: Negative for chills and fever. HENT: Negative for tinnitus. Eyes: Negative for blurred vision. Respiratory: Negative for cough and shortness of breath. Cardiovascular: Negative for orthopnea.    Gastrointestinal: Negative for abdominal pain and nausea. Genitourinary: Negative for dysuria. Musculoskeletal: Positive for joint pain. Skin: Negative for rash. Neurological: Negative for tremors, sensory change and headaches. Endo/Heme/Allergies: Does not bruise/bleed easily. Psychiatric/Behavioral: Negative for depression and suicidal ideas. Subjective:     Visit Vitals  /75 (BP 1 Location: Left arm)   Pulse 72   Resp 16   SpO2 97%       No intake/output data recorded. No intake/output data recorded. Physical Exam:  Constitutional: He is oriented to person, place, and time and well-developed, well-nourished, and in no distress. HENT:   Head: Atraumatic. Eyes: Pupils are equal, round, and reactive to light. Conjunctivae are normal.   Neck: No JVD present. No thyromegaly present. Cardiovascular: Normal rate and regular rhythm. No murmur heard. Pulmonary/Chest: Effort normal and breath sounds normal.   Abdominal: Soft. Bowel sounds are normal. He exhibits no distension. There is no tenderness. Musculoskeletal: He exhibits no edema. Neurological: He is alert and oriented to person, place, and time. Skin: Skin is warm. No rash noted. Psychiatric: Mood normal.         Labs:   Recent Results (from the past 24 hour(s))   EKG, 12 LEAD, INITIAL    Collection Time: 06/27/19  8:14 AM   Result Value Ref Range    Ventricular Rate 60 BPM    Atrial Rate 60 BPM    P-R Interval 252 ms    QRS Duration 94 ms    Q-T Interval 464 ms    QTC Calculation (Bezet) 464 ms    Calculated R Axis -29 degrees    Calculated T Axis 49 degrees    Diagnosis       Atrial-paced rhythm with prolonged AV conduction  Abnormal ECG  When compared with ECG of 22-MAY-2019 07:28,  No significant change was found          Assessment/Plan:      1. PAF s/p watchman device  2. CAD  3. Hyperlipidemia  4.  Myasthenia gravis    Plan for J LUIS; if no significant peridevice flow noted, will stop anticoagulation and plan aspirin/plavix for 6 months followed by aspirin indefinitely.        Nell Shipman MD  6/27/2019 8:26 AM

## 2019-06-27 NOTE — PROGRESS NOTES
Patient received to [address] [place] [address] room # 3  Ambulatory from [facility]. Patient scheduled for J LUIS today with Dr Linda Ruvalcaba. Procedure reviewed & questions answered, voiced good understanding consent obtained & placed on chart. All medications and medical history reviewed. Will prep patient per orders. Patient & family updated on plan of care.       The patient has a fraility score of 3-MANAGING WELL, based on ability to perform ADLs by self

## 2019-06-27 NOTE — PROGRESS NOTES
J LUIS with Dr Baltazar Yousif  ASA II Mallampati II  Post watchman  Versed 6mg  Fentanyl 50mcg  Pt tolerated procedure well

## 2019-07-02 ENCOUNTER — HOSPITAL ENCOUNTER (OUTPATIENT)
Dept: SURGERY | Age: 79
Discharge: HOME | End: 2019-07-02
Payer: MEDICARE

## 2019-07-02 VITALS
HEART RATE: 60 BPM | RESPIRATION RATE: 16 BRPM | HEIGHT: 68 IN | DIASTOLIC BLOOD PRESSURE: 70 MMHG | SYSTOLIC BLOOD PRESSURE: 143 MMHG | OXYGEN SATURATION: 96 % | BODY MASS INDEX: 33.8 KG/M2 | TEMPERATURE: 98.2 F | WEIGHT: 223 LBS

## 2019-07-02 LAB
ANION GAP SERPL CALC-SCNC: 7 MMOL/L (ref 7–16)
BUN SERPL-MCNC: 9 MG/DL (ref 8–23)
CALCIUM SERPL-MCNC: 8.9 MG/DL (ref 8.3–10.4)
CHLORIDE SERPL-SCNC: 106 MMOL/L (ref 98–107)
CO2 SERPL-SCNC: 27 MMOL/L (ref 21–32)
CREAT SERPL-MCNC: 0.71 MG/DL (ref 0.8–1.5)
ERYTHROCYTE [DISTWIDTH] IN BLOOD BY AUTOMATED COUNT: 14.2 % (ref 11.9–14.6)
GLUCOSE SERPL-MCNC: 94 MG/DL (ref 65–100)
HCT VFR BLD AUTO: 36.2 % (ref 41.1–50.3)
HGB BLD-MCNC: 11.4 G/DL (ref 13.6–17.2)
MCH RBC QN AUTO: 26.5 PG (ref 26.1–32.9)
MCHC RBC AUTO-ENTMCNC: 31.5 G/DL (ref 31.4–35)
MCV RBC AUTO: 84.2 FL (ref 79.6–97.8)
NRBC # BLD: 0 K/UL (ref 0–0.2)
PLATELET # BLD AUTO: 223 K/UL (ref 150–450)
PMV BLD AUTO: 9.4 FL (ref 9.4–12.3)
POTASSIUM SERPL-SCNC: 3.7 MMOL/L (ref 3.5–5.1)
RBC # BLD AUTO: 4.3 M/UL (ref 4.23–5.6)
SODIUM SERPL-SCNC: 140 MMOL/L (ref 136–145)
WBC # BLD AUTO: 12.2 K/UL (ref 4.3–11.1)

## 2019-07-02 PROCEDURE — 85027 COMPLETE CBC AUTOMATED: CPT

## 2019-07-02 PROCEDURE — 80048 BASIC METABOLIC PNL TOTAL CA: CPT

## 2019-07-02 NOTE — PERIOP NOTES
Recent Results (from the past 24 hour(s))   CBC W/O DIFF    Collection Time: 07/02/19 10:55 AM   Result Value Ref Range    WBC 12.2 (H) 4.3 - 11.1 K/uL    RBC 4.30 4.23 - 5.6 M/uL    HGB 11.4 (L) 13.6 - 17.2 g/dL    HCT 36.2 (L) 41.1 - 50.3 %    MCV 84.2 79.6 - 97.8 FL    MCH 26.5 26.1 - 32.9 PG    MCHC 31.5 31.4 - 35.0 g/dL    RDW 14.2 11.9 - 14.6 %    PLATELET 202 382 - 043 K/uL    MPV 9.4 9.4 - 12.3 FL    ABSOLUTE NRBC 0.00 0.0 - 0.2 K/uL   METABOLIC PANEL, BASIC    Collection Time: 07/02/19 10:55 AM   Result Value Ref Range    Sodium 140 136 - 145 mmol/L    Potassium 3.7 3.5 - 5.1 mmol/L    Chloride 106 98 - 107 mmol/L    CO2 27 21 - 32 mmol/L    Anion gap 7 7 - 16 mmol/L    Glucose 94 65 - 100 mg/dL    BUN 9 8 - 23 MG/DL    Creatinine 0.71 (L) 0.8 - 1.5 MG/DL    GFR est AA >60 >60 ml/min/1.73m2    GFR est non-AA >60 >60 ml/min/1.73m2    Calcium 8.9 8.3 - 10.4 MG/DL

## 2019-07-02 NOTE — PERIOP NOTES
Simin Goldstein at Dr. Kailee Juarez office called regarding patient stress test and that Dr. Carmen Pond wants results seen prior to surgery.

## 2019-07-02 NOTE — PERIOP NOTES
Patient verified name&  . Order to obtain consent  found in EHR &  matches case posting. Type 1B surgery,  assessment complete. Orders  received. Labs per surgeon: cbc, bmp 19  Labs per anesthesia protocol: none  Pacer interrogation 19, cardiology note 19, EKG 19, echo 19 reviewed by Dr. Kash Monson. No pacer rep needed as long as pacer is on opposite side. History of CAGB, pacemaker, watchman device, upcoming stress test discussed with Dr. Kash Monson. Stress test result to be followed up on per Dr. Kash Monson. Patient answered medical/surgical history questions at their best of ability. All prior to admission medications reviewed with patient and documented in [employer]. Patient instructed to take ONLY THE FOLLOWING MEDICATIONS ON THE DAY OF SURGERY according to anesthesia guidelines with sips of water: aspirin 81 mg, sotolol 160 mg . Pt verbalizes understanding to HOLD THE FOLLOWING MEDICATIONS: none. Plavix to be addressed by Dr. Evonne Barcenas. PT VERBALIZES UNDERSTANDING TO HOLD ALL VITAMINS AND SUPPLEMENTS 7 DAYS PRIOR TO SURGERY DATE (with exception to Renal Vitamins) and NSAIDS 5 DAYS PRIOR TO SURGERY DATE PER ANESTHESIA PROTOCOL. Patient instructed on the following:  Arrive at [facility], time of arrival to be called the day before by 1700. NPO after midnight including gum, mints, tobacco and ice chips. Responsible adult must drive patient to the hospital, stay during surgery, and patient requires supervision 24 hours after anesthesia  Use hibiclens in shower the night before surgery and on the morning of surgery. Leave all valuables (money and jewelry) at home but bring insurance card and ID on DOS. Do not wear make-up, nail polish, lotions, cologne, perfumes, powders, or oil on skin. Patient teach back successful and patient demonstrates knowledge of instruction.     If you do not receive a call with your arrival time by 5pm the business day prior to surgery, please call [phone].

## 2019-07-02 NOTE — PERIOP NOTES
[id number] Ashley Morales for patient to take pyridostigmine day of surgery per Dr. Becky Meyer. Called patient to request that he take this medication. Patient verbalized understanding.

## 2019-07-05 NOTE — PERIOP NOTES
Called and checked to see if patient had made it to appointment for stress test and per [facility] at [address] Cardiology patient is there now. Per note in [facility] patient may hold plavix for 5 days. Called and spoke with  Marco Sigrid and he said he was instructed for his last dose of plavix to be yesterday by Dr Curtis Mendez office, and he was in office having stress test as we spoke.

## 2019-07-08 NOTE — PERIOP NOTES
Spoke to Alek Aviles at [address] Cardiology. Results from NM stress test from 7/5/19 not in connect care. Alek Aviles states this has not been signed off on yet. Should be able to access report tomorrow.

## 2019-07-09 ENCOUNTER — ANESTHESIA EVENT (OUTPATIENT)
Dept: SURGERY | Age: 79
End: 2019-07-09
Payer: MEDICARE

## 2019-07-10 ENCOUNTER — HOSPITAL ENCOUNTER (OUTPATIENT)
Age: 79
Setting detail: OUTPATIENT SURGERY
Discharge: HOME | End: 2019-07-10
Attending: SURGERY | Admitting: SURGERY
Payer: MEDICARE

## 2019-07-10 ENCOUNTER — ANESTHESIA (OUTPATIENT)
Dept: SURGERY | Age: 79
End: 2019-07-10
Payer: MEDICARE

## 2019-07-10 VITALS
TEMPERATURE: 98.2 F | SYSTOLIC BLOOD PRESSURE: 185 MMHG | RESPIRATION RATE: 16 BRPM | OXYGEN SATURATION: 97 % | HEART RATE: 60 BPM | DIASTOLIC BLOOD PRESSURE: 87 MMHG

## 2019-07-10 PROCEDURE — 76210000020 HC REC RM PH II FIRST 0.5 HR: Performed by: SURGERY

## 2019-07-10 PROCEDURE — 74011000250 HC RX REV CODE- 250: Performed by: SURGERY

## 2019-07-10 PROCEDURE — 77030010512 HC APPL CLP LIG J&J -C: Performed by: SURGERY

## 2019-07-10 PROCEDURE — 76010000138 HC OR TIME 0.5 TO 1 HR: Performed by: SURGERY

## 2019-07-10 PROCEDURE — 77030019908 HC STETH ESOPH SIMS -A: Performed by: ANESTHESIOLOGY

## 2019-07-10 PROCEDURE — 74011250636 HC RX REV CODE- 250/636: Performed by: SURGERY

## 2019-07-10 PROCEDURE — 76210000063 HC OR PH I REC FIRST 0.5 HR: Performed by: SURGERY

## 2019-07-10 PROCEDURE — 77030031139 HC SUT VCRL2 J&J -A: Performed by: SURGERY

## 2019-07-10 PROCEDURE — 77030002916 HC SUT ETHLN J&J -A: Performed by: SURGERY

## 2019-07-10 PROCEDURE — 77030002933 HC SUT MCRYL J&J -A: Performed by: SURGERY

## 2019-07-10 PROCEDURE — 74011250636 HC RX REV CODE- 250/636: Performed by: ANESTHESIOLOGY

## 2019-07-10 PROCEDURE — 74011250636 HC RX REV CODE- 250/636

## 2019-07-10 PROCEDURE — 77030000032 HC CUF TRNQT ZIMM -B: Performed by: SURGERY

## 2019-07-10 PROCEDURE — 76060000033 HC ANESTHESIA 1 TO 1.5 HR: Performed by: SURGERY

## 2019-07-10 PROCEDURE — 77030032490 HC SLV COMPR SCD KNE COVD -B: Performed by: SURGERY

## 2019-07-10 PROCEDURE — 77030002996 HC SUT SLK J&J -A: Performed by: SURGERY

## 2019-07-10 PROCEDURE — 77030034888 HC SUT PROL 2 J&J -B: Performed by: SURGERY

## 2019-07-10 PROCEDURE — 74011250637 HC RX REV CODE- 250/637: Performed by: ANESTHESIOLOGY

## 2019-07-10 RX ORDER — FENTANYL CITRATE 50 UG/ML
INJECTION, SOLUTION INTRAMUSCULAR; INTRAVENOUS AS NEEDED
Status: DISCONTINUED | OUTPATIENT
Start: 2019-07-10 | End: 2019-07-10 | Stop reason: HOSPADM

## 2019-07-10 RX ORDER — LIDOCAINE HYDROCHLORIDE 10 MG/ML
INJECTION INFILTRATION; PERINEURAL AS NEEDED
Status: DISCONTINUED | OUTPATIENT
Start: 2019-07-10 | End: 2019-07-10 | Stop reason: HOSPADM

## 2019-07-10 RX ORDER — SODIUM CHLORIDE, SODIUM LACTATE, POTASSIUM CHLORIDE, CALCIUM CHLORIDE 600; 310; 30; 20 MG/100ML; MG/100ML; MG/100ML; MG/100ML
75 INJECTION, SOLUTION INTRAVENOUS CONTINUOUS
Status: DISCONTINUED | OUTPATIENT
Start: 2019-07-10 | End: 2019-07-10 | Stop reason: HOSPADM

## 2019-07-10 RX ORDER — GUAIFENESIN 100 MG/5ML
81 LIQUID (ML) ORAL
Status: COMPLETED | OUTPATIENT
Start: 2019-07-10 | End: 2019-07-10

## 2019-07-10 RX ORDER — FAMOTIDINE 20 MG/1
20 TABLET, FILM COATED ORAL ONCE
Status: COMPLETED | OUTPATIENT
Start: 2019-07-10 | End: 2019-07-10

## 2019-07-10 RX ORDER — BUPIVACAINE HYDROCHLORIDE 2.5 MG/ML
INJECTION, SOLUTION EPIDURAL; INFILTRATION; INTRACAUDAL AS NEEDED
Status: DISCONTINUED | OUTPATIENT
Start: 2019-07-10 | End: 2019-07-10 | Stop reason: HOSPADM

## 2019-07-10 RX ORDER — SODIUM CHLORIDE 0.9 % (FLUSH) 0.9 %
5-40 SYRINGE (ML) INJECTION AS NEEDED
Status: DISCONTINUED | OUTPATIENT
Start: 2019-07-10 | End: 2019-07-10 | Stop reason: HOSPADM

## 2019-07-10 RX ORDER — CEFAZOLIN SODIUM/WATER 2 G/20 ML
2 SYRINGE (ML) INTRAVENOUS ONCE
Status: COMPLETED | OUTPATIENT
Start: 2019-07-10 | End: 2019-07-10

## 2019-07-10 RX ORDER — OXYCODONE HYDROCHLORIDE 5 MG/1
5 TABLET ORAL
Status: DISCONTINUED | OUTPATIENT
Start: 2019-07-10 | End: 2019-07-10 | Stop reason: HOSPADM

## 2019-07-10 RX ORDER — LIDOCAINE HYDROCHLORIDE 10 MG/ML
0.1 INJECTION INFILTRATION; PERINEURAL AS NEEDED
Status: DISCONTINUED | OUTPATIENT
Start: 2019-07-10 | End: 2019-07-10 | Stop reason: HOSPADM

## 2019-07-10 RX ORDER — PROPOFOL 10 MG/ML
INJECTION, EMULSION INTRAVENOUS
Status: DISCONTINUED | OUTPATIENT
Start: 2019-07-10 | End: 2019-07-10 | Stop reason: HOSPADM

## 2019-07-10 RX ORDER — HEPARIN SODIUM 5000 [USP'U]/ML
INJECTION, SOLUTION INTRAVENOUS; SUBCUTANEOUS AS NEEDED
Status: DISCONTINUED | OUTPATIENT
Start: 2019-07-10 | End: 2019-07-10 | Stop reason: HOSPADM

## 2019-07-10 RX ORDER — LIDOCAINE HYDROCHLORIDE 20 MG/ML
INJECTION, SOLUTION EPIDURAL; INFILTRATION; INTRACAUDAL; PERINEURAL AS NEEDED
Status: DISCONTINUED | OUTPATIENT
Start: 2019-07-10 | End: 2019-07-10 | Stop reason: HOSPADM

## 2019-07-10 RX ORDER — OXYCODONE HYDROCHLORIDE 5 MG/1
10 TABLET ORAL
Status: DISCONTINUED | OUTPATIENT
Start: 2019-07-10 | End: 2019-07-10 | Stop reason: HOSPADM

## 2019-07-10 RX ORDER — SODIUM CHLORIDE 0.9 % (FLUSH) 0.9 %
5-40 SYRINGE (ML) INJECTION EVERY 8 HOURS
Status: DISCONTINUED | OUTPATIENT
Start: 2019-07-10 | End: 2019-07-10 | Stop reason: HOSPADM

## 2019-07-10 RX ORDER — HYDROMORPHONE HYDROCHLORIDE 2 MG/ML
0.5 INJECTION, SOLUTION INTRAMUSCULAR; INTRAVENOUS; SUBCUTANEOUS
Status: DISCONTINUED | OUTPATIENT
Start: 2019-07-10 | End: 2019-07-10 | Stop reason: HOSPADM

## 2019-07-10 RX ADMIN — ASPIRIN 81 MG 81 MG: 81 TABLET ORAL at 06:47

## 2019-07-10 RX ADMIN — PROPOFOL 100 MCG/KG/MIN: 10 INJECTION, EMULSION INTRAVENOUS at 07:06

## 2019-07-10 RX ADMIN — SODIUM CHLORIDE, SODIUM LACTATE, POTASSIUM CHLORIDE, AND CALCIUM CHLORIDE 75 ML/HR: 600; 310; 30; 20 INJECTION, SOLUTION INTRAVENOUS at 06:06

## 2019-07-10 RX ADMIN — LIDOCAINE HYDROCHLORIDE 100 MG: 20 INJECTION, SOLUTION EPIDURAL; INFILTRATION; INTRACAUDAL; PERINEURAL at 07:09

## 2019-07-10 RX ADMIN — Medication 2 G: at 06:59

## 2019-07-10 RX ADMIN — SODIUM CHLORIDE, SODIUM LACTATE, POTASSIUM CHLORIDE, AND CALCIUM CHLORIDE: 600; 310; 30; 20 INJECTION, SOLUTION INTRAVENOUS at 06:58

## 2019-07-10 RX ADMIN — FENTANYL CITRATE 25 MCG: 50 INJECTION, SOLUTION INTRAMUSCULAR; INTRAVENOUS at 07:10

## 2019-07-10 RX ADMIN — FAMOTIDINE 20 MG: 20 TABLET ORAL at 06:47

## 2019-07-10 NOTE — BRIEF OP NOTE
Judy  [facility]   [address] [facility]. 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 As a result, errors of speech recognition may have occurred.

## 2019-07-10 NOTE — ANESTHESIA PREPROCEDURE EVALUATION
Anesthetic History               Review of Systems / Medical History  Patient summary reviewed and pertinent labs reviewed    Pulmonary        Sleep apnea: CPAP  Shortness of breath         Neuro/Psych             Comments: Myasthenia gravis- well controlled Cardiovascular    Hypertension: well controlled        Dysrhythmias ([place]) : atrial fibrillation  CAD, PAD (nonobstructive carotid dz), cardiac stents (5/2015 ADRINANA to LAD x2) and hyperlipidemia    Exercise tolerance: >4 METS  Comments: Normal EF and mild AI on J LUIS 2019   GI/Hepatic/Renal     GERD: well controlled      PUD     Endo/Other        Obesity     Other Findings              Physical Exam    Airway  Mallampati: II  TM Distance: 4 - 6 cm  Neck ROM: normal range of motion   Mouth opening: Normal     Cardiovascular    Rhythm: regular  Rate: normal         Dental    Dentition: Poor dentition     Pulmonary  Breath sounds clear to auscultation               Abdominal  GI exam deferred       Other Findings            Anesthetic Plan    ASA: 3  Anesthesia type: total IV anesthesia          Induction: Intravenous  Anesthetic plan and risks discussed with: Patient

## 2019-07-10 NOTE — DISCHARGE INSTRUCTIONS
ACTIVITY  · As tolerated and as directed by your doctor. · Bathe or shower as directed by your doctor. DIET  · Clear liquids until no nausea or vomiting; then light diet for the first day. · Advance to regular diet on second day, unless your doctor orders otherwise. · If nausea and vomiting continues, call your doctor. PAIN  · Take pain medication as directed by your doctor. · Call your doctor if pain is NOT relieved by medication. · DO NOT take aspirin of blood thinners unless directed by your doctor. CALL YOUR DOCTOR IF   · Excessive bleeding that does not stop after holding pressure over the area  · Temperature of 101 degrees F or above  · Excessive redness, swelling or bruising, and/ or green or yellow, smelly discharge from incision    AFTER ANESTHESIA   · For the first 24 hours: DO NOT Drive, Drink alcoholic beverages, or Make important decisions. · Be aware of dizziness following anesthesia and while taking pain medication. After general anesthesia or intravenous sedation, for 24 hours or while taking prescription Narcotics:  · Limit your activities  · A responsible adult needs to be with you for the next 24 hours  · Do not drive and operate hazardous machinery  · Do not make important personal or business decisions  · Do  not drink alcoholic beverages  · If you have not urinated within 8 hours after discharge, please contact your surgeon on call. *  Please give a list of your current medications to your Primary Care Provider. *  Please update this list whenever your medications are discontinued, doses are      changed, or new medications (including over-the-counter products) are added. *  Please carry medication information at all times in case of emergency situations.     These are general instructions for a healthy lifestyle:  No smoking/ No tobacco products/ Avoid exposure to second hand smoke  Surgeon General's Warning:  Quitting smoking now greatly reduces serious risk to your health. Obesity, smoking, and sedentary lifestyle greatly increases your risk for illness  A healthy diet, regular physical exercise & weight monitoring are important for maintaining a healthy lifestyle    You may be retaining fluid if you have a history of heart failure or if you experience any of the following symptoms:  Weight gain of 3 pounds or more overnight or 5 pounds in a week, increased swelling in our hands or feet or shortness of breath while lying flat in bed. Please call your doctor as soon as you notice any of these symptoms; do not wait until your next office visit.

## 2019-07-10 NOTE — ANESTHESIA POSTPROCEDURE EVALUATION
Procedure(s):  REPAIR OF RIGHT RADIAL PSEUDOANEURYSM PACEMAKER. general    Anesthesia Post Evaluation      Multimodal analgesia: multimodal analgesia not used between 6 hours prior to anesthesia start to PACU discharge  Patient location during evaluation: PACU  Patient participation: complete - patient participated  Level of consciousness: awake and alert  Pain management: adequate  Airway patency: patent  Anesthetic complications: no  Cardiovascular status: hemodynamically stable  Respiratory status: acceptable  Hydration status: acceptable        Vitals Value Taken Time   /92 7/10/2019  8:20 AM   Temp 36.7 °C (98 °F) 7/10/2019  8:00 AM   Pulse 60 7/10/2019  8:21 AM   Resp 15 7/10/2019  8:05 AM   SpO2 95 % 7/10/2019  8:21 AM   Vitals shown include unvalidated device data.

## 2019-07-11 NOTE — OP NOTES
[address] [facility]  OPERATIVE REPORT    Name:  Pascual Castro  MR#:  [id number]  :  1940  ACCOUNT #:  [de-identified]  DATE OF SERVICE:  07/10/2019    CLINICAL SERVICE:  Vascular Surgery. PREOPERATIVE DIAGNOSIS:  Right radial artery symptomatic pseudoaneurysm. POSTOPERATIVE DIAGNOSIS:  Right radial artery symptomatic pseudoaneurysm. PROCEDURE PERFORMED:  Repair of right radial artery pseudoaneurysm. SURGEON:  Gabriela Rincon MD    ASSISTANT:      ANESTHESIA:  Sedation. COMPLICATIONS:  None. SPECIMENS REMOVED:  None. IMPLANTS:    ESTIMATED BLOOD LOSS:      INDICATION FOR PROCEDURE:  This is an 18-year-old male with history of coronary artery disease, status post right radial cath. Postoperatively, he developed a pseudoaneurysm, on anticoagulation. Aneurysm was symptomatic and secondary to pain, we elected to proceed with elective repair. PROCEDURE IN DETAIL:  After getting informed consent, the patient was brought to the operating room. Anesthesia was then induced. Preop antibiotics were given before skin incision. The patient's right arm was then prepped and draped in normal sterile fashion. Incision was made over the top of the swelling on the distal radial artery. We dissected down through the subcutaneous tissue where a thrombosed pseudoaneurysm catheter was identified. We dissected it circumferentially. In doing dissection, we transected it. There was a small neck. However, there was only minimal bleeding through the radial artery. Once we removed that pseudoaneurysm catheter, we put one figure-of-eight #6-0 Prolene stitched over the top of the anterior portion of the radial artery. There was good Doppler signal distally and proximal to the repair. The wound was then irrigated out with antibiotic solution. We closed with 3-0 Vicryl and 4-0 nylon. The patient was then extubated and returned to the PACU in stable condition.         KISHOR Maradiaga, MD [id number]  D:  07/10/2019 12:52  T:  07/10/2019 22:35  JOB #:  [id number]

## 2019-11-25 ENCOUNTER — TELEPHONE (OUTPATIENT)
Dept: CARDIAC CATH/INVASIVE PROCEDURES | Age: 79
End: 2019-11-25

## 2019-11-25 NOTE — TELEPHONE ENCOUNTER
Shayna 6 month follow up phone call from 11/22/19    Patient contacted for watchman 6 month follow up Phone Call. Patient currently taking TFF41sp and plavix 75mg. Patient can now stop plavix 75 mg per Dr. Nelson Friedman and the [facility] protocol. He will continue ASA 81mg indefinitely. He states that over all he feels well. His eliquis was d/c on 6/27/19 after adequate seal of his HARI was shown through J LUIS. Patient did have a repair of his right radial pseudoaneurysm. Patient has not had any other procedures or hospitalizations. He does not have any questions at this time.

## 2020-04-28 ENCOUNTER — TELEPHONE (OUTPATIENT)
Dept: CARDIAC CATH/INVASIVE PROCEDURES | Age: 80
End: 2020-04-28

## 2020-04-28 NOTE — TELEPHONE ENCOUNTER
Shayna 1 year Follow up Complete via phone    Patient is currently taking asa 81mg. Plavix was d/c at his 6 month follow up on 11/22/19. Patient states that he is doing very well. He still works some, but has cut back. He has not had any procedures or hospitalizations since his 6 month follow up.     THE   BARTHEL INDEX - completed through interview  Activity Score  FEEDING  0 = unable  5 = needs help cutting, spreading butter, etc., or requires modified diet  10 = independent   10  BATHING  0 = dependent  5 = independent (or in shower)  5  GROOMING  0 = needs to help with personal care  5 = independent face/hair/teeth/shaving (implements provided)  5  DRESSING  0 = dependent  5 = needs help but can do about half unaided  10 = independent (including buttons, zips, laces, etc.)   10  BOWELS  0 = incontinent (or needs to be given enemas)  5 = occasional accident  10 = continent  10  BLADDER  0 = incontinent, or catheterized and unable to manage alone  5 = occasional accident  10 = continent  10  TOILET USE  0 = dependent  5 = needs some help, but can do something alone  10 = independent (on and off, dressing, wiping)  10  TRANSFERS (BED TO CHAIR AND BACK)  0 = unable, no sitting balance  5 = major help (one or two people, physical), can sit  10 = minor help (verbal or physical)  15 = independent   15  MOBILITY (ON LEVEL SURFACES)  0 = immobile or < 50 yards  5 = wheelchair independent, including corners, > 50 yards  10 = walks with help of one person (verbal or physical) > 50 yards  15 = independent (but may use any aid; for example, stick) > 50 yards   15  STAIRS  0 = unable  5 = needs help (verbal, physical, carrying aid)  10 = independent  10  TOTAL (0-100): 100

## 2020-08-20 ENCOUNTER — HOSPITAL ENCOUNTER (OUTPATIENT)
Dept: LAB | Age: 80
Discharge: HOME | End: 2020-08-20
Payer: MEDICARE

## 2020-08-20 DIAGNOSIS — I25.10 ATHEROSCLEROSIS OF NATIVE CORONARY ARTERY OF NATIVE HEART WITHOUT ANGINA PECTORIS: ICD-10-CM

## 2020-08-20 LAB
ALBUMIN SERPL-MCNC: 3.4 G/DL (ref 3.2–4.6)
ALBUMIN/GLOB SERPL: 0.9 {RATIO} (ref 1.2–3.5)
ALP SERPL-CCNC: 96 U/L (ref 50–136)
ALT SERPL-CCNC: 24 U/L (ref 12–65)
ANION GAP SERPL CALC-SCNC: 4 MMOL/L (ref 7–16)
AST SERPL-CCNC: 17 U/L (ref 15–37)
BASOPHILS # BLD: 0 K/UL (ref 0–0.2)
BASOPHILS NFR BLD: 0 % (ref 0–2)
BILIRUB SERPL-MCNC: 0.4 MG/DL (ref 0.2–1.1)
BUN SERPL-MCNC: 9 MG/DL (ref 8–23)
CALCIUM SERPL-MCNC: 9 MG/DL (ref 8.3–10.4)
CHLORIDE SERPL-SCNC: 106 MMOL/L (ref 98–107)
CHOLEST SERPL-MCNC: 169 MG/DL
CO2 SERPL-SCNC: 30 MMOL/L (ref 21–32)
CREAT SERPL-MCNC: 0.88 MG/DL (ref 0.8–1.5)
DIFFERENTIAL METHOD BLD: ABNORMAL
EOSINOPHIL # BLD: 0.4 K/UL (ref 0–0.8)
EOSINOPHIL NFR BLD: 4 % (ref 0.5–7.8)
ERYTHROCYTE [DISTWIDTH] IN BLOOD BY AUTOMATED COUNT: 15.6 % (ref 11.9–14.6)
GLOBULIN SER CALC-MCNC: 3.6 G/DL (ref 2.3–3.5)
GLUCOSE SERPL-MCNC: 128 MG/DL (ref 65–100)
HCT VFR BLD AUTO: 39.2 % (ref 41.1–50.3)
HDLC SERPL-MCNC: 33 MG/DL (ref 40–60)
HDLC SERPL: 5.1 {RATIO}
HGB BLD-MCNC: 12.1 G/DL (ref 13.6–17.2)
IMM GRANULOCYTES # BLD AUTO: 0.1 K/UL (ref 0–0.5)
IMM GRANULOCYTES NFR BLD AUTO: 1 % (ref 0–5)
LDLC SERPL CALC-MCNC: 103 MG/DL
LIPID PROFILE,FLP: ABNORMAL
LYMPHOCYTES # BLD: 2.6 K/UL (ref 0.5–4.6)
LYMPHOCYTES NFR BLD: 27 % (ref 13–44)
MAGNESIUM SERPL-MCNC: 2.2 MG/DL (ref 1.8–2.4)
MCH RBC QN AUTO: 25.5 PG (ref 26.1–32.9)
MCHC RBC AUTO-ENTMCNC: 30.9 G/DL (ref 31.4–35)
MCV RBC AUTO: 82.5 FL (ref 79.6–97.8)
MONOCYTES # BLD: 0.6 K/UL (ref 0.1–1.3)
MONOCYTES NFR BLD: 7 % (ref 4–12)
NEUTS SEG # BLD: 5.7 K/UL (ref 1.7–8.2)
NEUTS SEG NFR BLD: 61 % (ref 43–78)
NRBC # BLD: 0 K/UL (ref 0–0.2)
PLATELET # BLD AUTO: 194 K/UL (ref 150–450)
PMV BLD AUTO: 10.1 FL (ref 9.4–12.3)
POTASSIUM SERPL-SCNC: 3.6 MMOL/L (ref 3.5–5.1)
PROT SERPL-MCNC: 7 G/DL (ref 6.3–8.2)
RBC # BLD AUTO: 4.75 M/UL (ref 4.23–5.6)
SODIUM SERPL-SCNC: 140 MMOL/L (ref 136–145)
TRIGL SERPL-MCNC: 165 MG/DL (ref 35–150)
TSH SERPL DL<=0.005 MIU/L-ACNC: 1.84 UIU/ML (ref 0.36–3.74)
VLDLC SERPL CALC-MCNC: 33 MG/DL (ref 6–23)
WBC # BLD AUTO: 9.4 K/UL (ref 4.3–11.1)

## 2020-08-20 PROCEDURE — 80053 COMPREHEN METABOLIC PANEL: CPT

## 2020-08-20 PROCEDURE — 85025 COMPLETE CBC W/AUTO DIFF WBC: CPT

## 2020-08-20 PROCEDURE — 84443 ASSAY THYROID STIM HORMONE: CPT

## 2020-08-20 PROCEDURE — 36415 COLL VENOUS BLD VENIPUNCTURE: CPT

## 2020-08-20 PROCEDURE — 80061 LIPID PANEL: CPT

## 2020-08-20 PROCEDURE — 83735 ASSAY OF MAGNESIUM: CPT

## 2021-03-17 ENCOUNTER — HOSPITAL ENCOUNTER (OUTPATIENT)
Dept: LAB | Age: 81
Discharge: HOME | End: 2021-03-17
Payer: MEDICARE

## 2021-03-17 DIAGNOSIS — I25.119 ATHEROSCLEROSIS OF NATIVE CORONARY ARTERY OF NATIVE HEART WITH ANGINA PECTORIS (HCC): ICD-10-CM

## 2021-03-17 LAB
ANION GAP SERPL CALC-SCNC: 3 MMOL/L (ref 7–16)
BASOPHILS # BLD: 0.1 K/UL (ref 0–0.2)
BASOPHILS NFR BLD: 1 % (ref 0–2)
BUN SERPL-MCNC: 10 MG/DL (ref 8–23)
CALCIUM SERPL-MCNC: 9.1 MG/DL (ref 8.3–10.4)
CHLORIDE SERPL-SCNC: 107 MMOL/L (ref 98–107)
CO2 SERPL-SCNC: 29 MMOL/L (ref 21–32)
CREAT SERPL-MCNC: 0.84 MG/DL (ref 0.8–1.5)
DIFFERENTIAL METHOD BLD: ABNORMAL
EOSINOPHIL # BLD: 0.2 K/UL (ref 0–0.8)
EOSINOPHIL NFR BLD: 2 % (ref 0.5–7.8)
ERYTHROCYTE [DISTWIDTH] IN BLOOD BY AUTOMATED COUNT: 16.4 % (ref 11.9–14.6)
GLUCOSE SERPL-MCNC: 124 MG/DL (ref 65–100)
HCT VFR BLD AUTO: 41.8 % (ref 41.1–50.3)
HGB BLD-MCNC: 12.8 G/DL (ref 13.6–17.2)
IMM GRANULOCYTES # BLD AUTO: 0 K/UL (ref 0–0.5)
IMM GRANULOCYTES NFR BLD AUTO: 0 % (ref 0–5)
INR PPP: 0.9
LYMPHOCYTES # BLD: 3.1 K/UL (ref 0.5–4.6)
LYMPHOCYTES NFR BLD: 29 % (ref 13–44)
MCH RBC QN AUTO: 25.3 PG (ref 26.1–32.9)
MCHC RBC AUTO-ENTMCNC: 30.6 G/DL (ref 31.4–35)
MCV RBC AUTO: 82.8 FL (ref 79.6–97.8)
MONOCYTES # BLD: 0.8 K/UL (ref 0.1–1.3)
MONOCYTES NFR BLD: 8 % (ref 4–12)
NEUTS SEG # BLD: 6.4 K/UL (ref 1.7–8.2)
NEUTS SEG NFR BLD: 60 % (ref 43–78)
NRBC # BLD: 0 K/UL (ref 0–0.2)
PLATELET # BLD AUTO: 223 K/UL (ref 150–450)
PMV BLD AUTO: 10.4 FL (ref 9.4–12.3)
POTASSIUM SERPL-SCNC: 4.2 MMOL/L (ref 3.5–5.1)
PROTHROMBIN TIME: 12.6 SEC (ref 12.5–14.7)
RBC # BLD AUTO: 5.05 M/UL (ref 4.23–5.6)
SODIUM SERPL-SCNC: 139 MMOL/L (ref 136–145)
WBC # BLD AUTO: 10.7 K/UL (ref 4.3–11.1)

## 2021-03-17 PROCEDURE — 36415 COLL VENOUS BLD VENIPUNCTURE: CPT

## 2021-03-17 PROCEDURE — 85025 COMPLETE CBC W/AUTO DIFF WBC: CPT

## 2021-03-17 PROCEDURE — 80048 BASIC METABOLIC PNL TOTAL CA: CPT

## 2021-03-17 PROCEDURE — 85610 PROTHROMBIN TIME: CPT

## 2021-03-18 NOTE — PROGRESS NOTES
Pre-assessment call completed. Instructed to arrive at 12 pm on 3/19/21 for [facility]. Instructed to take ASA 81 mg x 4 before arrival and to take all other am prescribed medications while HOLDING all vitamins, supplements.  Instructed to remain NPO after Mn.

## 2021-03-19 ENCOUNTER — HOSPITAL ENCOUNTER (OUTPATIENT)
Dept: CARDIAC CATH/INVASIVE PROCEDURES | Age: 81
Discharge: HOME | End: 2021-03-19
Attending: INTERNAL MEDICINE | Admitting: INTERNAL MEDICINE
Payer: MEDICARE

## 2021-03-19 VITALS
DIASTOLIC BLOOD PRESSURE: 76 MMHG | RESPIRATION RATE: 16 BRPM | WEIGHT: 216 LBS | SYSTOLIC BLOOD PRESSURE: 139 MMHG | HEIGHT: 68 IN | HEART RATE: 74 BPM | OXYGEN SATURATION: 94 % | BODY MASS INDEX: 32.74 KG/M2

## 2021-03-19 DIAGNOSIS — I25.119 ATHEROSCLEROSIS OF NATIVE CORONARY ARTERY OF NATIVE HEART WITH ANGINA PECTORIS (HCC): ICD-10-CM

## 2021-03-19 DIAGNOSIS — I48.0 PAROXYSMAL ATRIAL FIBRILLATION (HCC): ICD-10-CM

## 2021-03-19 LAB
ATRIAL RATE: 85 BPM
CALCULATED R AXIS, ECG10: -32 DEGREES
CALCULATED T AXIS, ECG11: 43 DEGREES
DIAGNOSIS, 93000: NORMAL
Q-T INTERVAL, ECG07: 410 MS
QRS DURATION, ECG06: 88 MS
QTC CALCULATION (BEZET), ECG08: 490 MS
VENTRICULAR RATE, ECG03: 86 BPM

## 2021-03-19 PROCEDURE — 99153 MOD SED SAME PHYS/QHP EA: CPT

## 2021-03-19 PROCEDURE — C1769 GUIDE WIRE: HCPCS

## 2021-03-19 PROCEDURE — 77030009397 HC ELECTRD ECG PACE ZOLL -B

## 2021-03-19 PROCEDURE — 74011250636 HC RX REV CODE- 250/636: Performed by: INTERNAL MEDICINE

## 2021-03-19 PROCEDURE — 93458 L HRT ARTERY/VENTRICLE ANGIO: CPT

## 2021-03-19 PROCEDURE — 93320 DOPPLER ECHO COMPLETE: CPT | Performed by: INTERNAL MEDICINE

## 2021-03-19 PROCEDURE — 99152 MOD SED SAME PHYS/QHP 5/>YRS: CPT | Performed by: INTERNAL MEDICINE

## 2021-03-19 PROCEDURE — 93315 ECHO TRANSESOPHAGEAL: CPT | Performed by: INTERNAL MEDICINE

## 2021-03-19 PROCEDURE — 77030042317 HC BND COMPR HEMSTAT -B

## 2021-03-19 PROCEDURE — 92960 CARDIOVERSION ELECTRIC EXT: CPT | Performed by: INTERNAL MEDICINE

## 2021-03-19 PROCEDURE — 93325 DOPPLER ECHO COLOR FLOW MAPG: CPT | Performed by: INTERNAL MEDICINE

## 2021-03-19 PROCEDURE — 99152 MOD SED SAME PHYS/QHP 5/>YRS: CPT

## 2021-03-19 PROCEDURE — 77030016699 HC CATH ANGI DX INFN1 CARD -A

## 2021-03-19 PROCEDURE — 74011000636 HC RX REV CODE- 636: Performed by: INTERNAL MEDICINE

## 2021-03-19 PROCEDURE — 93458 L HRT ARTERY/VENTRICLE ANGIO: CPT | Performed by: INTERNAL MEDICINE

## 2021-03-19 PROCEDURE — 93312 ECHO TRANSESOPHAGEAL: CPT

## 2021-03-19 PROCEDURE — 92960 CARDIOVERSION ELECTRIC EXT: CPT

## 2021-03-19 PROCEDURE — C1894 INTRO/SHEATH, NON-LASER: HCPCS

## 2021-03-19 PROCEDURE — 93005 ELECTROCARDIOGRAM TRACING: CPT | Performed by: INTERNAL MEDICINE

## 2021-03-19 PROCEDURE — 74011000250 HC RX REV CODE- 250: Performed by: INTERNAL MEDICINE

## 2021-03-19 RX ORDER — LIDOCAINE HYDROCHLORIDE 10 MG/ML
3-20 INJECTION, SOLUTION EPIDURAL; INFILTRATION; INTRACAUDAL; PERINEURAL ONCE
Status: COMPLETED | OUTPATIENT
Start: 2021-03-19 | End: 2021-03-19

## 2021-03-19 RX ORDER — LIDOCAINE HYDROCHLORIDE 20 MG/ML
15 SOLUTION OROPHARYNGEAL AS NEEDED
Status: DISCONTINUED | OUTPATIENT
Start: 2021-03-19 | End: 2021-03-19 | Stop reason: HOSPADM

## 2021-03-19 RX ORDER — MIDAZOLAM HYDROCHLORIDE 1 MG/ML
.5-2 INJECTION, SOLUTION INTRAMUSCULAR; INTRAVENOUS
Status: DISCONTINUED | OUTPATIENT
Start: 2021-03-19 | End: 2021-03-19 | Stop reason: HOSPADM

## 2021-03-19 RX ORDER — FENTANYL CITRATE 50 UG/ML
25-100 INJECTION, SOLUTION INTRAMUSCULAR; INTRAVENOUS
Status: DISCONTINUED | OUTPATIENT
Start: 2021-03-19 | End: 2021-03-19 | Stop reason: HOSPADM

## 2021-03-19 RX ORDER — HEPARIN SODIUM 200 [USP'U]/100ML
2 INJECTION, SOLUTION INTRAVENOUS CONTINUOUS
Status: DISCONTINUED | OUTPATIENT
Start: 2021-03-19 | End: 2021-03-19 | Stop reason: HOSPADM

## 2021-03-19 RX ORDER — GUAIFENESIN 100 MG/5ML
324 LIQUID (ML) ORAL
Status: DISCONTINUED | OUTPATIENT
Start: 2021-03-19 | End: 2021-03-19 | Stop reason: HOSPADM

## 2021-03-19 RX ORDER — SODIUM CHLORIDE 9 MG/ML
75 INJECTION, SOLUTION INTRAVENOUS CONTINUOUS
Status: DISCONTINUED | OUTPATIENT
Start: 2021-03-19 | End: 2021-03-19 | Stop reason: HOSPADM

## 2021-03-19 RX ADMIN — MIDAZOLAM 2 MG: 1 INJECTION INTRAMUSCULAR; INTRAVENOUS at 13:34

## 2021-03-19 RX ADMIN — LIDOCAINE HYDROCHLORIDE 15 ML: 20 SOLUTION ORAL; TOPICAL at 15:20

## 2021-03-19 RX ADMIN — MIDAZOLAM 2 MG: 1 INJECTION INTRAMUSCULAR; INTRAVENOUS at 15:27

## 2021-03-19 RX ADMIN — MIDAZOLAM 2 MG: 1 INJECTION INTRAMUSCULAR; INTRAVENOUS at 15:26

## 2021-03-19 RX ADMIN — MIDAZOLAM 1 MG: 1 INJECTION INTRAMUSCULAR; INTRAVENOUS at 15:32

## 2021-03-19 RX ADMIN — FENTANYL CITRATE 25 MCG: 50 INJECTION, SOLUTION INTRAMUSCULAR; INTRAVENOUS at 13:34

## 2021-03-19 RX ADMIN — IOPAMIDOL 130 ML: 755 INJECTION, SOLUTION INTRAVENOUS at 13:58

## 2021-03-19 RX ADMIN — LIDOCAINE HYDROCHLORIDE 3 ML: 10 INJECTION, SOLUTION EPIDURAL; INFILTRATION; INTRACAUDAL; PERINEURAL at 13:37

## 2021-03-19 RX ADMIN — VERAPAMIL HYDROCHLORIDE 2 ML: 2.5 INJECTION, SOLUTION INTRAVENOUS at 13:39

## 2021-03-19 RX ADMIN — FENTANYL CITRATE 50 MCG: 50 INJECTION, SOLUTION INTRAMUSCULAR; INTRAVENOUS at 15:26

## 2021-03-19 RX ADMIN — SODIUM CHLORIDE 75 ML/HR: 900 INJECTION, SOLUTION INTRAVENOUS at 12:03

## 2021-03-19 RX ADMIN — HEPARIN SODIUM 2 UNITS/HR: 200 INJECTION, SOLUTION INTRAVENOUS at 13:24

## 2021-03-19 NOTE — PROGRESS NOTES
TRANSFER - OUT REPORT:    Verbal report given to RN(name) on Bernardino Wang  being transferred to CPRU(unit) for routine progression of care       Report consisted of patients Situation, Background, Assessment and   Recommendations(SBAR). Information from the following report(s) SBAR was reviewed with the receiving nurse. J LUIS and cardioversion with Dr. Antonia Sam  5 mg versed  50 mcg fentanyl  1 synchonized shock at 200 joules into NSR  Biotronik here to interrogate device.  Change base rate from 60 BPM to 70 BPM.

## 2021-03-19 NOTE — PROGRESS NOTES
TRANSFER - OUT REPORT:    [facility]  RRA  Diagnostic no interventions  Versed 2 mg  Fentanyl 25 mcg  Band 14 ml  No bleeding/hematoma    Verbal report given to justin(name) on Kennedy Ralph  being transferred to cpru(unit) for routine progression of care       Report consisted of patients Situation, Background, Assessment and   Recommendations(SBAR). Information from the following report(s) SBAR and Procedure Summary was reviewed with the receiving nurse. Lines:   Peripheral IV 03/19/21 Left Hand (Active)        Opportunity for questions and clarification was provided.

## 2021-03-19 NOTE — PROCEDURES
[facility]  CARDIAC CATH    Name:  Evans Botello  MR#:  [id number]  :  1940  ACCOUNT #:  [de-identified]  DATE OF SERVICE:  2021    PROCEDURE PERFORMED:  Cardioversion of symptomatic atrial fibrillation. PREOPERATIVE DIAGNOSIS:  Symptomatic atrial fibrillation. POSTOPERATIVE DIAGNOSIS:  Symptomatic atrial fibrillation. SURGEON:  Vishal Cowart. MD Alvarez    ASSISTANT:  None. ESTIMATED BLOOD LOSS:  None. SPECIMENS REMOVED:  None. COMPLICATIONS:  None. IMPLANTS:  None. ANESTHESIA:  The patient received 5 mg of Versed, 50 mcg of fentanyl. Sedation start time was 15:15 with a procedure place time of 15:32. Sedation was administered by Yanelis Parekh RN under my supervision. The patient was monitored continuously in regards to his respiratory status, level of consciousness and hemodynamics. He remained stable throughout the procedure. TECHNIQUE FACTORS:  The patient was brought to the cardiac catheterization holding area. Transesophageal echocardiogram showed his Watchman device was in position with no evidence of device related thrombus. It was felt safe to proceed with cardioversion. Defibrillation pads were placed in the apical and right upper chest projections. 200 joules synchronized biphasic energy was applied with restoration of sinus rhythm. No complications were encountered. CONCLUSION:  Successful DC cardioversion of symptomatic atrial fibrillation to sinus rhythm. PLAN:  Continue sotalol. His pacemaker rate was increased to 70 to suppress ectopy.         Alla Mccray MD      [id number]  D:  2021 17:24  T:  2021 17:49  JOB #:  [id number]  CC:  Cornelia Angelo MD       [facility] Cardiology

## 2021-03-19 NOTE — PROGRESS NOTES
Patient received to [address] [place] [address] room # 10  Ambulatory from [facility]. Patient scheduled for [facility] today with Dr Peter Tay. Procedure reviewed & questions answered, voiced good understanding consent obtained & placed on chart. All medications and medical history reviewed. Will prep patient per orders. Patient & family updated on plan of care. The patient has a fraility score of 4-VULNERABLE, based on age and weakness.

## 2021-03-19 NOTE — PROGRESS NOTES
TRANSFER - IN REPORT:    [facility] for routine progression of care      Report consisted of patients Situation, Background, Assessment and Recommendations(SBAR). Information from the following report(s) Procedure Summary was reviewed with the receiving nurse. Opportunity for questions and clarification was provided. Assessment completed upon patients arrival to unit and care assumed.

## 2021-03-19 NOTE — DISCHARGE INSTRUCTIONS
HEART CATHETERIZATION/ANGIOGRAPHY DISCHARGE INSTRUCTIONS    1. Check puncture site frequently for swelling or bleeding. If there is any bleeding, lie down and apply pressure over the area with a clean towel or washcloth. Notify your doctor for any redness, swelling, drainage, or oozing from the puncture site. Notify your doctor for any fever or chills. 2. If the extremity becomes cold, numb, or painful call Dr. Sandra Manuel at [phone].  3. Activity should be limited for the next 48 hours. Climb stairs as little as possible and avoid any stooping, bending, or strenuous activity for 48 hours. No heavy lifting (anything over 10 pounds) for 3 days. 4. You may resume your usual diet. Drink more fluids than usual.  5. Have a responsible person drive you home and stay with you for at least 24 hours after your heart catheterization/angiography. 6. You may remove bandage from your Right Arm in 24 hours. You may shower in 24 hours. No tub baths, hot tubs, or swimming for 1 week. Do not place any lotions, creams, powders, or ointments over puncture site for 1 week. You may place a clean band-aid over the puncture site each day for 5 days. Change daily. I have read the above instructions and have had the opportunity to ask questions. Patient: ________________________   Date: 3/19/2021    Witness: _______________________   Date: 3/19/2021     AFTER YOU TRANSESOPHAGEAL ECHOCARDIOGRAM    Be sure someone else drives you home. You may feel drowsy for several hours. Do not eat or drink for at least two hours after your procedure. Your throat will be numb and there is a risk you might have difficulty swallowing for a while. Be careful when you do eat or drink for the first time especially with hot fluids since you could easily burn your throat. Call your doctor if:    · You are bleeding from your throat or mouth. · You have trouble breathing all of a sudden.   · You have chest pain or any pain that spreads to your neck, jaw, or arms. · You have questions or concerns. · You have a fever greater than 101°F.    Doctor: ***    Special Instructions:    No driving for 24 hours. ***    Patient Education        Electrical Cardioversion: What to Expect at Home  Your Recovery     Electrical cardioversion is a treatment for an abnormal heartbeat, such as atrial fibrillation, supraventricular tachycardia, or ventricular tachycardia (VT). Your doctor used a brief electrical shock to reset your heart's rhythm. After the procedure, you may have redness, like a sunburn, where the patches were. The medicines you got to make you sleepy may make you feel drowsy for the rest of the day. Your doctor may have you take medicines to help the heart beat normally and to prevent blood clots. This care sheet gives you a general idea about how long it will take for you to recover. But each person recovers at a different pace. Follow the steps below to feel better as quickly as possible. How can you care for yourself at home? Medicines    · Be safe with medicines. Take your medicines exactly as prescribed. Call your doctor if you think you are having a problem with your medicine. You may take one or more of the following medicines:  ? Rate-control medicines to slow the heart rate. These include beta-blockers, calcium channel blockers, and digoxin. ? Rhythm control medicines that help the heart keep a normal rhythm. ? Blood thinners, also called anticoagulants, which help prevent blood clots. You will get more details on the specific medicines your doctor prescribes. Be sure you know how to take your medicines safely.     · Do not take any vitamins, over-the-counter medicines, or herbal products without talking to your doctor first.   Exercise    · Start light exercise if your doctor says that it's okay. Even a small amount will help you get stronger, have more energy, and manage your stress. Walking is an easy way to get exercise.  Start out by walking a little more than you did in the hospital. Bit by bit, increase the amount you walk.     · When you exercise, watch for signs that your heart is working too hard. You are pushing too hard if you cannot talk while you are exercising. If you become short of breath or dizzy or have chest pain, sit down and rest right away.     · Check your pulse regularly. Place two fingers on the artery at the palm side of your wrist in line with your thumb. If your heartbeat seems uneven or fast, talk to your doctor. Other instructions    · Ask your doctor when you can drive again.     · Do not smoke. If you need help quitting, talk to your doctor about stop-smoking programs and medicines. These can increase your chances of quitting for good.     · Limit alcohol. Follow-up care is a key part of your treatment and safety. Be sure to make and go to all appointments, and call your doctor if you are having problems. It's also a good idea to know your test results and keep a list of the medicines you take. When should you call for help? Call 911 anytime you think you may need emergency care. For example, call if:    · You passed out (lost consciousness).     · You have chest pain or pressure. This may occur with:  ? Sweating. ? Shortness of breath. ? Nausea or vomiting. ? Pain that spreads from the chest to the neck, jaw, or one or both shoulders or arms. ? A fast or uneven pulse. After calling 911, the  may tell you to chew 1 adult-strength or 2 to 4 low-dose aspirin. Wait for an ambulance. Do not try to drive yourself.     · You have symptoms of a stroke. These may include:  ? Sudden numbness, tingling, weakness, or loss of movement in your face, arm, or leg, especially on only one side of your body. ? Sudden vision changes. ? Sudden trouble speaking. ? Sudden confusion or trouble understanding simple statements. ? Sudden problems with walking or balance.   ? A sudden, severe headache that is different from past headaches. Call your doctor now or seek immediate medical care if:    · You feel dizzy or lightheaded, or you feel like you may faint.     · You have a fast or irregular heartbeat. Watch closely for any changes in your health, and be sure to contact your doctor if you have any problems. Where can you learn more? Go to http://www.gray.com/  Enter A617 in the search box to learn more about \"Electrical Cardioversion: What to Expect at Home. \"  Current as of: December 16, 2019               Content Version: 12.6  © 7268-6543 [employer]. Care instructions adapted under license by [employer] (which disclaims liability or warranty for this information). If you have questions about a medical condition or this instruction, always ask your healthcare professional. [address] any warranty or liability for your use of this information.

## 2021-03-19 NOTE — PROGRESS NOTES
Discharge instructions given to patient and daughter in law. Patient alert. VSS. R radial dressing dry and intact. No hematoma noted. IV removed, tip intact. Transferred via wheelchair for discharge.

## 2021-03-19 NOTE — PROCEDURES
Brief Cardiac Procedure Note    Patient: Deidra Perez MRN: [id number]  SSN: [id number]    YOB: 1940  Age: 79 y.o. Sex: male      Date of Procedure: 3/19/2021     Pre-procedure Diagnosis: Shortness of Breath    Post-procedure Diagnosis: Coronary Artery Disease    Procedure: Left Heart Catheterization    Brief Description of Procedure: As above    Performed By: Cornelia Angelo MD     Assistants: None    Anesthesia: Moderate Sedation    Estimated Blood Loss: Less than 10 mL      Specimens: None    Implants: None    Findings: Small vessel disease involving distal circ. Normal EF. Complications: None    Recommendations: Continue medical therapy.     Signed By: Cornelia Angelo MD     March 19, 2021

## 2021-03-19 NOTE — PROGRESS NOTES
Radial compression band removed at 1600 after slowly reducing air from 12 cc to zero as per hospital protocol. No bleeding or hematoma noted. 2 x 2 gauze with tegaderm placed over puncture site. The affected extremity is warm and dry to the touch. Frequent vital signs documented per flowheet. Patient instructed to call if any bleeding noted on gauze. Patient verbalized understanding the nursing instructions.

## 2021-03-19 NOTE — PROCEDURES
Brief Cardiac Procedure Note    Patient: Reginald Anderson MRN: [id number]  SSN: [id number]    YOB: 1940  Age: 79 y.o. Sex: male      Date of Procedure: 3/19/2021     Pre-procedure Diagnosis: Atrial Fibrillation/Atrial Flutter    Post-procedure Diagnosis: Atrial fibrillation/Atrial Flutter    Procedure: Transesophageal Echocardiogram with Cardioversion    Brief Description of Procedure: See above    Performed By: Alva Nails MD     Assistants: None    Anesthesia: Moderate Sedation    Estimated Blood Loss: Less than 10 mL              Specimens: None    Implants: None    Findings: No clot in left atrium, left atrial appendage.  Successful DCCV to sinus rhythm.  Full report to follow. Complications: None    Recommendations: Continue medical therapy.       Signed By: Olena Loera MD     March 19, 2021

## 2021-03-19 NOTE — PROCEDURES
[facility]  CARDIAC CATH    Name:  Marylin Cherry  MR#:  [id number]  :  1940  ACCOUNT #:  [de-identified]  DATE OF SERVICE:  2021    PROCEDURES PERFORMED:  Left heart catheterization, selective coronary arteriography, and left ventriculogram via the right radial approach. PREOPERATIVE DIAGNOSES:  Exertional dyspnea with abnormal stress test indicative of lateral ischemia concerning for anginal equivalent. POSTOPERATIVE DIAGNOSIS:  Small vessel disease involving the distal circumflex. SURGEON:  Alan Brown MD    ASSISTANT:  None. ESTIMATED BLOOD LOSS:  Less than 5 mL. SPECIMENS REMOVED:  None. COMPLICATIONS:  None. IMPLANTS:  None. ANESTHESIA:  The patient received moderate supervised conscious sedation with 2 mg Versed, 25 mcg fentanyl. Sedation start time was 1334 with a procedure completion time of 1400. Sedation was administered by Olayinka Draper RN, under my supervision. The patient was monitored closely throughout the procedure in regards to level of consciousness, hemodynamic status, and respiratory status. He remained stable. FINDINGS:  As below. TECHNIQUE:  After informed consent was obtained, the patient was brought to the cardiac catheterization lab. The right radial artery was prepped and draped in the usual sterile fashion. Utilizing modified Seldinger technique and micropuncture needle, the right radial artery was entered. A 6-Croatian Terumo Slender sheath was placed without difficulty. A radial cocktail consisting of 2000 units of heparin, 2 mg of verapamil, and 200 mcg of nitroglycerin was administered. A 5-Croatian JR4 catheter was used to select and engage the ostium of the right coronary artery. This was very difficult given subclavian tortuosity. A 5-Croatian JL3.5 catheter was used to select and engage the ostium of the left main coronary artery. Selective injections were performed.   A pigtail catheter was used to cross the aortic valve and the left ventricle. Hemodynamic measurements and left ventriculogram were obtained. Left ventricular aortic pressure gradient was obtained by pullback technique. At the conclusion of the diagnostic procedure, the radial sheath was removed and a pneumatic band was placed with good hemostasis. CONTRAST:  Isovue 130. HEMODYNAMICS:  1. Aortic pressure is 126/89.  2.  Left ventricular end-diastolic pressure 29.  3.  There is no significant gradient across the aortic valve. ANGIOGRAPHIC RESULTS:  1. Left main coronary artery:  Diffuse 20% mid stenosis. 2.  LAD:  It is a medium-caliber vessel. It contains a stent in the mid segment which is patent with 20% in-stent restenosis. Distal vessel contains 20% stenosis. 3.  First and second diagonal arteries:  Small-caliber vessels. Patent. 4.  Left circumflex: It is a medium-caliber vessel. It gives rise to a large first obtuse marginal and it continues in the distal circumflex to a small posterolateral distribution. The proximal to mid circumflex contains 40% stenosis but the distal circumflex after the origin of the first obtuse marginal contains a 70% stenosis. 5.  First obtuse marginal.  Medium-caliber vessel. Patent. 6.  Right coronary artery is a medium-caliber vessel, 30% proximal stenosis, 20% distal stenosis. 7.  Right PDA is a medium-caliber vessel. Patent. 8.  Right posterolateral branch:  Small-caliber vessel. Patent. 9.  Left ventriculogram performed in the TUCKER projection shows normal left ventricular systolic function. EF 60% to 65%. No focal segmental wall motion abnormalities. A 1 to 2+ mitral regurgitation. CONCLUSIONS:  1.  Small vessel coronary artery disease involving the distal circumflex. This would certainly account for basilar lateral ischemia but given the small distribution felt best treated medically.   For intervention, the stenting of the ostium would likely jeopardize the large first obtuse marginal which is a much larger vascular distribution. Risks appear to outweigh benefit given the small vessel size and the small territory of ischemia. 2.  Normal left ventricular systolic function. 3.  Elevated left ventricular end-systolic pressure. NOTE:  Review of prior pacemaker interrogation shows that he has been atrially paced until recently. In the cath lab and on his stress test, he is in atrial fibrillation. I am concerned that persistent atrial fibrillation may be the reason for his worsening dyspnea. We will discuss this with his primary cardiologist and consider attempts at restoration of sinus rhythm. Treat coronary artery disease medically.     Francheska Whaley MD      [id number]  D:  03/19/2021 14:10  T:  03/19/2021 15:18  JOB #:  [id number]

## 2021-03-19 NOTE — PROGRESS NOTES
TRANSFER - IN REPORT:    Verbal report received from LAISHA Roberts on [facility]  being received from [address] [place] [address] for routine progression of care      Report consisted of patients Situation, Background, Assessment and   Recommendations(SBAR). Information from the following report(s) SBAR, Procedure Summary, MAR and Recent Results was reviewed with the receiving nurse. Opportunity for questions and clarification was provided. Assessment completed upon patients arrival to unit and care assumed.

## 2021-04-08 ENCOUNTER — HOSPITAL ENCOUNTER (OUTPATIENT)
Dept: CARDIAC REHAB | Age: 81
Discharge: HOME | End: 2021-04-08

## 2021-04-08 NOTE — ROUTINE PROCESS
Dear Dr. Julia Cheek,    Thank you for referring your patient,Mr. Casandra Maldonado ( 38), to the Cardiopulmonary Rehabilitation Program at [facility]. He is a good candidate for the Cardiac Rehab Program and should see improvements with regular participation. We will be addressing appropriate interventions for modifiable risk factors with your patient during the next 12 weeks. We will contact you with any issues or concerns that may arise, or you can follow your patient's progress through [address] [place] [address] at any time. A final summary will be sent to you when the program is completed. Again, thank you for the referral. If we can be of further assistance, please feel free to contact the Cardiopulmonary Rehab staff at [phone].     Sincerely,    NIKKI Diego, RN  Cardiopulmonary Rehabilitation Nurse Liaison  [facility]

## 2021-04-14 PROBLEM — Z95.0 PACEMAKER: Status: ACTIVE | Noted: 2021-04-14

## 2021-04-15 ENCOUNTER — HOSPITAL ENCOUNTER (OUTPATIENT)
Dept: CARDIAC REHAB | Age: 81
Discharge: HOME | End: 2021-04-15
Payer: MEDICARE

## 2021-04-15 VITALS — WEIGHT: 216.8 LBS | BODY MASS INDEX: 32.86 KG/M2 | HEIGHT: 68 IN

## 2021-04-15 PROCEDURE — 93798 PHYS/QHP OP CAR RHAB W/ECG: CPT

## 2021-04-19 ENCOUNTER — HOSPITAL ENCOUNTER (OUTPATIENT)
Dept: CARDIAC REHAB | Age: 81
Discharge: HOME | End: 2021-04-19
Payer: MEDICARE

## 2021-04-19 PROCEDURE — 93798 PHYS/QHP OP CAR RHAB W/ECG: CPT

## 2021-04-21 ENCOUNTER — APPOINTMENT (OUTPATIENT)
Dept: CARDIAC REHAB | Age: 81
End: 2021-04-21
Payer: MEDICARE

## 2021-04-21 ENCOUNTER — HOSPITAL ENCOUNTER (OUTPATIENT)
Dept: CARDIAC REHAB | Age: 81
Discharge: HOME | End: 2021-04-21
Payer: MEDICARE

## 2021-04-21 VITALS — WEIGHT: 212.8 LBS | BODY MASS INDEX: 32.36 KG/M2

## 2021-04-21 PROCEDURE — 93798 PHYS/QHP OP CAR RHAB W/ECG: CPT

## 2021-04-23 ENCOUNTER — HOSPITAL ENCOUNTER (OUTPATIENT)
Dept: CARDIAC REHAB | Age: 81
Discharge: HOME | End: 2021-04-23
Payer: MEDICARE

## 2021-04-23 ENCOUNTER — APPOINTMENT (OUTPATIENT)
Dept: CARDIAC REHAB | Age: 81
End: 2021-04-23
Payer: MEDICARE

## 2021-04-23 PROCEDURE — 93798 PHYS/QHP OP CAR RHAB W/ECG: CPT

## 2021-04-26 ENCOUNTER — APPOINTMENT (OUTPATIENT)
Dept: CARDIAC REHAB | Age: 81
End: 2021-04-26
Payer: MEDICARE

## 2021-04-26 ENCOUNTER — HOSPITAL ENCOUNTER (OUTPATIENT)
Dept: CARDIAC REHAB | Age: 81
Discharge: HOME | End: 2021-04-26
Payer: MEDICARE

## 2021-04-26 PROCEDURE — 93798 PHYS/QHP OP CAR RHAB W/ECG: CPT

## 2021-04-28 ENCOUNTER — HOSPITAL ENCOUNTER (OUTPATIENT)
Dept: CARDIAC REHAB | Age: 81
Discharge: HOME | End: 2021-04-28
Payer: MEDICARE

## 2021-04-28 ENCOUNTER — APPOINTMENT (OUTPATIENT)
Dept: CARDIAC REHAB | Age: 81
End: 2021-04-28
Payer: MEDICARE

## 2021-04-28 VITALS — WEIGHT: 213 LBS | BODY MASS INDEX: 32.39 KG/M2

## 2021-04-28 PROCEDURE — 93798 PHYS/QHP OP CAR RHAB W/ECG: CPT

## 2021-04-30 ENCOUNTER — HOSPITAL ENCOUNTER (OUTPATIENT)
Dept: CARDIAC REHAB | Age: 81
Discharge: HOME | End: 2021-04-30
Payer: MEDICARE

## 2021-04-30 ENCOUNTER — APPOINTMENT (OUTPATIENT)
Dept: CARDIAC REHAB | Age: 81
End: 2021-04-30
Payer: MEDICARE

## 2021-04-30 PROCEDURE — 93798 PHYS/QHP OP CAR RHAB W/ECG: CPT

## 2021-05-03 ENCOUNTER — HOSPITAL ENCOUNTER (OUTPATIENT)
Dept: CARDIAC REHAB | Age: 81
Discharge: HOME | End: 2021-05-03
Payer: MEDICARE

## 2021-05-03 ENCOUNTER — HOSPITAL ENCOUNTER (OUTPATIENT)
Dept: LAB | Age: 81
Discharge: HOME | End: 2021-05-03
Payer: MEDICARE

## 2021-05-03 ENCOUNTER — APPOINTMENT (OUTPATIENT)
Dept: CARDIAC REHAB | Age: 81
End: 2021-05-03
Payer: MEDICARE

## 2021-05-03 DIAGNOSIS — I48.0 PAROXYSMAL ATRIAL FIBRILLATION (HCC): ICD-10-CM

## 2021-05-03 DIAGNOSIS — E78.5 DYSLIPIDEMIA: ICD-10-CM

## 2021-05-03 DIAGNOSIS — I10 ESSENTIAL HYPERTENSION: ICD-10-CM

## 2021-05-03 LAB
ALBUMIN SERPL-MCNC: 3.4 G/DL (ref 3.2–4.6)
ALBUMIN/GLOB SERPL: 1 {RATIO} (ref 1.2–3.5)
ALP SERPL-CCNC: 138 U/L (ref 50–136)
ALT SERPL-CCNC: 20 U/L (ref 12–65)
ANION GAP SERPL CALC-SCNC: 5 MMOL/L (ref 7–16)
AST SERPL-CCNC: 15 U/L (ref 15–37)
BILIRUB SERPL-MCNC: 0.6 MG/DL (ref 0.2–1.1)
BUN SERPL-MCNC: 8 MG/DL (ref 8–23)
CALCIUM SERPL-MCNC: 8.9 MG/DL (ref 8.3–10.4)
CHLORIDE SERPL-SCNC: 107 MMOL/L (ref 98–107)
CHOLEST SERPL-MCNC: 96 MG/DL
CO2 SERPL-SCNC: 27 MMOL/L (ref 21–32)
CREAT SERPL-MCNC: 0.68 MG/DL (ref 0.8–1.5)
GLOBULIN SER CALC-MCNC: 3.4 G/DL (ref 2.3–3.5)
GLUCOSE SERPL-MCNC: 116 MG/DL (ref 65–100)
HDLC SERPL-MCNC: 36 MG/DL (ref 40–60)
HDLC SERPL: 2.7 {RATIO}
LDLC SERPL CALC-MCNC: 40.2 MG/DL
LIPID PROFILE,FLP: ABNORMAL
POTASSIUM SERPL-SCNC: 3.7 MMOL/L (ref 3.5–5.1)
PROT SERPL-MCNC: 6.8 G/DL (ref 6.3–8.2)
SODIUM SERPL-SCNC: 139 MMOL/L (ref 138–145)
TRIGL SERPL-MCNC: 99 MG/DL (ref 35–150)
VLDLC SERPL CALC-MCNC: 19.8 MG/DL (ref 6–23)

## 2021-05-03 PROCEDURE — 80053 COMPREHEN METABOLIC PANEL: CPT

## 2021-05-03 PROCEDURE — 80061 LIPID PANEL: CPT

## 2021-05-03 PROCEDURE — 36415 COLL VENOUS BLD VENIPUNCTURE: CPT

## 2021-05-03 PROCEDURE — 93798 PHYS/QHP OP CAR RHAB W/ECG: CPT

## 2021-05-05 ENCOUNTER — ANESTHESIA EVENT (OUTPATIENT)
Dept: SURGERY | Age: 81
End: 2021-05-05
Payer: MEDICARE

## 2021-05-05 ENCOUNTER — HOSPITAL ENCOUNTER (OUTPATIENT)
Dept: CARDIAC REHAB | Age: 81
Discharge: HOME | End: 2021-05-05
Payer: MEDICARE

## 2021-05-05 ENCOUNTER — APPOINTMENT (OUTPATIENT)
Dept: CARDIAC REHAB | Age: 81
End: 2021-05-05
Payer: MEDICARE

## 2021-05-05 VITALS — BODY MASS INDEX: 32.08 KG/M2 | WEIGHT: 211 LBS

## 2021-05-05 PROCEDURE — 93798 PHYS/QHP OP CAR RHAB W/ECG: CPT

## 2021-05-05 RX ORDER — LIDOCAINE HYDROCHLORIDE 10 MG/ML
0.1 INJECTION INFILTRATION; PERINEURAL AS NEEDED
Status: CANCELLED | OUTPATIENT
Start: 2021-05-05

## 2021-05-05 RX ORDER — SODIUM CHLORIDE, SODIUM LACTATE, POTASSIUM CHLORIDE, CALCIUM CHLORIDE 600; 310; 30; 20 MG/100ML; MG/100ML; MG/100ML; MG/100ML
75 INJECTION, SOLUTION INTRAVENOUS CONTINUOUS
Status: CANCELLED | OUTPATIENT
Start: 2021-05-05 | End: 2021-05-06

## 2021-05-05 RX ORDER — NALOXONE HYDROCHLORIDE 0.4 MG/ML
0.1 INJECTION, SOLUTION INTRAMUSCULAR; INTRAVENOUS; SUBCUTANEOUS
Status: CANCELLED | OUTPATIENT
Start: 2021-05-05

## 2021-05-05 RX ORDER — HYDROMORPHONE HYDROCHLORIDE 1 MG/ML
0.5 INJECTION, SOLUTION INTRAMUSCULAR; INTRAVENOUS; SUBCUTANEOUS
Status: CANCELLED | OUTPATIENT
Start: 2021-05-05

## 2021-05-05 RX ORDER — OXYCODONE HYDROCHLORIDE 5 MG/1
10 TABLET ORAL
Status: CANCELLED | OUTPATIENT
Start: 2021-05-05 | End: 2021-05-06

## 2021-05-05 RX ORDER — FLUMAZENIL 0.1 MG/ML
0.2 INJECTION INTRAVENOUS AS NEEDED
Status: CANCELLED | OUTPATIENT
Start: 2021-05-05

## 2021-05-05 RX ORDER — OXYCODONE HYDROCHLORIDE 5 MG/1
5 TABLET ORAL
Status: CANCELLED | OUTPATIENT
Start: 2021-05-05 | End: 2021-05-06

## 2021-05-05 RX ORDER — SODIUM CHLORIDE, SODIUM LACTATE, POTASSIUM CHLORIDE, CALCIUM CHLORIDE 600; 310; 30; 20 MG/100ML; MG/100ML; MG/100ML; MG/100ML
75 INJECTION, SOLUTION INTRAVENOUS CONTINUOUS
Status: CANCELLED | OUTPATIENT
Start: 2021-05-05

## 2021-05-05 RX ORDER — DIPHENHYDRAMINE HYDROCHLORIDE 50 MG/ML
12.5 INJECTION, SOLUTION INTRAMUSCULAR; INTRAVENOUS
Status: CANCELLED | OUTPATIENT
Start: 2021-05-05

## 2021-05-05 NOTE — PROGRESS NOTES
Patient pre-assessment complete for AV Node ablation with Dr Gabriel Belle scheduled for 21 at 1:30, arrival time 11:30am. Patient verified using . Patient instructed to bring all home medications in labeled bottles on the day of procedure. NPO status reinforced. Patient informed to take a full dose aspirin 325mg  or 81 mg x 4 on the day of procedure. Patient instructed to HOLD lisinopril in am. Instructed they can take all other medications excluding vitamins & supplements. Patient verbalizes understanding of all instructions & denies any questions at this time.

## 2021-05-06 ENCOUNTER — ANESTHESIA (OUTPATIENT)
Dept: SURGERY | Age: 81
End: 2021-05-06
Payer: MEDICARE

## 2021-05-06 ENCOUNTER — HOSPITAL ENCOUNTER (OUTPATIENT)
Age: 81
Setting detail: OUTPATIENT SURGERY
Discharge: HOME | End: 2021-05-06
Attending: INTERNAL MEDICINE | Admitting: INTERNAL MEDICINE
Payer: MEDICARE

## 2021-05-06 ENCOUNTER — HOSPITAL ENCOUNTER (OUTPATIENT)
Dept: CARDIAC CATH/INVASIVE PROCEDURES | Age: 81
Discharge: HOME | End: 2021-05-06
Attending: INTERNAL MEDICINE | Admitting: INTERNAL MEDICINE
Payer: MEDICARE

## 2021-05-06 VITALS
DIASTOLIC BLOOD PRESSURE: 64 MMHG | WEIGHT: 210 LBS | HEART RATE: 71 BPM | RESPIRATION RATE: 14 BRPM | HEIGHT: 68 IN | SYSTOLIC BLOOD PRESSURE: 136 MMHG | OXYGEN SATURATION: 100 % | TEMPERATURE: 98.4 F | BODY MASS INDEX: 31.83 KG/M2

## 2021-05-06 DIAGNOSIS — I48.19 PERSISTENT ATRIAL FIBRILLATION (HCC): ICD-10-CM

## 2021-05-06 LAB
ANION GAP SERPL CALC-SCNC: 7 MMOL/L (ref 7–16)
ATRIAL RATE: 73 BPM
BUN SERPL-MCNC: 12 MG/DL (ref 8–23)
CALCIUM SERPL-MCNC: 9 MG/DL (ref 8.3–10.4)
CALCULATED R AXIS, ECG10: -48 DEGREES
CALCULATED T AXIS, ECG11: 39 DEGREES
CHLORIDE SERPL-SCNC: 106 MMOL/L (ref 98–107)
CO2 SERPL-SCNC: 27 MMOL/L (ref 21–32)
CREAT SERPL-MCNC: 0.74 MG/DL (ref 0.8–1.5)
DIAGNOSIS, 93000: NORMAL
ERYTHROCYTE [DISTWIDTH] IN BLOOD BY AUTOMATED COUNT: 16.6 % (ref 11.9–14.6)
GLUCOSE SERPL-MCNC: 113 MG/DL (ref 65–100)
HCT VFR BLD AUTO: 41 % (ref 41.1–50.3)
HGB BLD-MCNC: 12.6 G/DL (ref 13.6–17.2)
INR PPP: 1
MAGNESIUM SERPL-MCNC: 2 MG/DL (ref 1.8–2.4)
MCH RBC QN AUTO: 25.1 PG (ref 26.1–32.9)
MCHC RBC AUTO-ENTMCNC: 30.7 G/DL (ref 31.4–35)
MCV RBC AUTO: 81.7 FL (ref 79.6–97.8)
NRBC # BLD: 0 K/UL (ref 0–0.2)
P-R INTERVAL, ECG05: 192 MS
PLATELET # BLD AUTO: 199 K/UL (ref 150–450)
PMV BLD AUTO: 9.7 FL (ref 9.4–12.3)
POTASSIUM SERPL-SCNC: 3.7 MMOL/L (ref 3.5–5.1)
PROTHROMBIN TIME: 13.2 SEC (ref 12.5–14.7)
Q-T INTERVAL, ECG07: 410 MS
QRS DURATION, ECG06: 94 MS
QTC CALCULATION (BEZET), ECG08: 451 MS
RBC # BLD AUTO: 5.02 M/UL (ref 4.23–5.6)
SODIUM SERPL-SCNC: 140 MMOL/L (ref 138–145)
VENTRICULAR RATE, ECG03: 73 BPM
WBC # BLD AUTO: 12.5 K/UL (ref 4.3–11.1)

## 2021-05-06 PROCEDURE — 93650 ICAR CATH ABLTJ AV NODE FUNC: CPT | Performed by: INTERNAL MEDICINE

## 2021-05-06 PROCEDURE — 76060000032 HC ANESTHESIA 0.5 TO 1 HR: Performed by: INTERNAL MEDICINE

## 2021-05-06 PROCEDURE — 74011250636 HC RX REV CODE- 250/636: Performed by: NURSE ANESTHETIST, CERTIFIED REGISTERED

## 2021-05-06 PROCEDURE — 74011250636 HC RX REV CODE- 250/636: Performed by: INTERNAL MEDICINE

## 2021-05-06 PROCEDURE — 74011000250 HC RX REV CODE- 250: Performed by: INTERNAL MEDICINE

## 2021-05-06 PROCEDURE — 85027 COMPLETE CBC AUTOMATED: CPT

## 2021-05-06 PROCEDURE — 93281 PM DEVICE PROGR EVAL MULTI: CPT

## 2021-05-06 PROCEDURE — 77030027107 HC PTCH EXT REF CARTO3 J&J -F

## 2021-05-06 PROCEDURE — C1894 INTRO/SHEATH, NON-LASER: HCPCS

## 2021-05-06 PROCEDURE — 77030035291 HC TBNG PMP SMARTABLATE J&J -B

## 2021-05-06 PROCEDURE — 80048 BASIC METABOLIC PNL TOTAL CA: CPT

## 2021-05-06 PROCEDURE — C1732 CATH, EP, DIAG/ABL, 3D/VECT: HCPCS

## 2021-05-06 PROCEDURE — 93005 ELECTROCARDIOGRAM TRACING: CPT | Performed by: INTERNAL MEDICINE

## 2021-05-06 PROCEDURE — 85610 PROTHROMBIN TIME: CPT

## 2021-05-06 PROCEDURE — 83735 ASSAY OF MAGNESIUM: CPT

## 2021-05-06 PROCEDURE — 93650 ICAR CATH ABLTJ AV NODE FUNC: CPT

## 2021-05-06 PROCEDURE — 77030013687 HC GD NDL BARD -B

## 2021-05-06 RX ORDER — PROPOFOL 10 MG/ML
INJECTION, EMULSION INTRAVENOUS
Status: DISCONTINUED | OUTPATIENT
Start: 2021-05-06 | End: 2021-05-06 | Stop reason: HOSPADM

## 2021-05-06 RX ORDER — PROPOFOL 10 MG/ML
INJECTION, EMULSION INTRAVENOUS AS NEEDED
Status: DISCONTINUED | OUTPATIENT
Start: 2021-05-06 | End: 2021-05-06 | Stop reason: HOSPADM

## 2021-05-06 RX ORDER — SODIUM CHLORIDE, SODIUM LACTATE, POTASSIUM CHLORIDE, CALCIUM CHLORIDE 600; 310; 30; 20 MG/100ML; MG/100ML; MG/100ML; MG/100ML
75 INJECTION, SOLUTION INTRAVENOUS CONTINUOUS
Status: DISCONTINUED | OUTPATIENT
Start: 2021-05-06 | End: 2021-05-07 | Stop reason: HOSPADM

## 2021-05-06 RX ORDER — LIDOCAINE HYDROCHLORIDE 10 MG/ML
10 INJECTION INFILTRATION; PERINEURAL
Status: DISCONTINUED | OUTPATIENT
Start: 2021-05-06 | End: 2021-05-07 | Stop reason: HOSPADM

## 2021-05-06 RX ORDER — HEPARIN SODIUM 200 [USP'U]/100ML
2000 INJECTION, SOLUTION INTRAVENOUS AS NEEDED
Status: DISCONTINUED | OUTPATIENT
Start: 2021-05-06 | End: 2021-05-07 | Stop reason: HOSPADM

## 2021-05-06 RX ADMIN — HEPARIN SODIUM 4000 UNITS: 200 INJECTION, SOLUTION INTRAVENOUS at 15:22

## 2021-05-06 RX ADMIN — PROPOFOL 50 MG: 10 INJECTION, EMULSION INTRAVENOUS at 14:52

## 2021-05-06 RX ADMIN — SODIUM CHLORIDE, SODIUM LACTATE, POTASSIUM CHLORIDE, AND CALCIUM CHLORIDE: 600; 310; 30; 20 INJECTION, SOLUTION INTRAVENOUS at 14:44

## 2021-05-06 RX ADMIN — LIDOCAINE HYDROCHLORIDE 10 ML: 10 INJECTION, SOLUTION INFILTRATION; PERINEURAL at 15:21

## 2021-05-06 RX ADMIN — PROPOFOL 200 MCG/KG/MIN: 10 INJECTION, EMULSION INTRAVENOUS at 14:52

## 2021-05-06 NOTE — ANESTHESIA PREPROCEDURE EVALUATION
Relevant Problems   RESPIRATORY SYSTEM   (+) Aspiration pneumonia (HCC)   (+) Dyspnea      CARDIOVASCULAR   (+) Atrial fibrillation (HCC)   (+) Coronary atherosclerosis of native coronary vessel   (+) HTN (hypertension)   (+) Mitral valve regurgitation   (+) Pacemaker   (+) Paroxysmal atrial fibrillation (HCC)   (+) SSS (sick sinus syndrome) (HCC)      GASTROINTESTINAL   (+) GERD (gastroesophageal reflux disease)       Anesthetic History   No history of anesthetic complications            Review of Systems / Medical History  Patient summary reviewed and pertinent labs reviewed    Pulmonary        Sleep apnea (Corrected with jaw surgery)           Neuro/Psych   Within defined limits           Cardiovascular    Hypertension        Dysrhythmias ([place]) : atrial fibrillation  Pacemaker, CAD, cardiac stents (2015 - remains on bASA) and hyperlipidemia    Exercise tolerance: >4 METS  Comments: Echo - normal EF, mild-mod AI    S/p Watchman 2019   GI/Hepatic/Renal     GERD: well controlled           Endo/Other        Obesity     Other Findings            Physical Exam    Airway  Mallampati: II  TM Distance: > 6 cm  Neck ROM: normal range of motion   Mouth opening: Normal     Cardiovascular    Rhythm: regular  Rate: normal         Dental    Dentition: Full upper dentures and Lower partial plate     Pulmonary  Breath sounds clear to auscultation               Abdominal         Other Findings            Anesthetic Plan    ASA: 3  Anesthesia type: total IV anesthesia            Anesthetic plan and risks discussed with: Patient and Spouse

## 2021-05-06 NOTE — PROGRESS NOTES
Patient up to bedside, vital signs and site stable. Patient ambulated to bathroom without difficulty. Patient voided without difficulty. Vascular site stable. Discharge instructions and home medications reviewed with patient. Time allowed for questions and answers. Site stable after ambulation. Peripheral IV sites dc'd without difficulty with tips intact.

## 2021-05-06 NOTE — DISCHARGE INSTRUCTIONS
Patient Education        Electrophysiology Study and Catheter Ablation: What to Expect at [address] [place] [address] Drive had an electrophysiology study for a problem with your heartbeat. You may also have had a catheter ablation to try to correct the problem. You may have swelling, bruising, or a small lump around the site where the catheters went into your body. These should go away in 3 to 4 weeks. Do not exercise hard or lift anything heavy for a week. You may be able to go back to work and to your normal routine in 1 or 2 days. This care sheet gives you a general idea about how long it will take for you to recover. But each person recovers at a different pace. Follow the steps below to get better as quickly as possible. How can you care for yourself at home? Activity    · For 1 week, do not lift anything that would make you strain. This may include heavy grocery bags and milk containers, a heavy briefcase or backpack, cat litter or dog food bags, a vacuum , or a child.     · For 1 week, do not exercise hard or do any activity that could strain your blood vessels or the site where the catheters went into your body.     · Ask your doctor when it is okay to have sex. Diet    · You can eat your normal diet. If your stomach is upset, try bland, low-fat foods like plain rice, broiled chicken, toast, and yogurt.     · Drink plenty of fluids (unless your doctor tells you not to). Medicines    · Your doctor will tell you if and when you can restart your medicines. He or she will also give you instructions about taking any new medicines.     · If you take aspirin or some other blood thinner, ask your doctor if and when to start taking it again. Make sure that you understand exactly what your doctor wants you to do.     · Ask your doctor if you can take acetaminophen (Tylenol) for pain.  Do not take aspirin for 3 days, unless your doctor says it is okay.     · Check with your doctor before you take aspirin or anti-inflammatory medicines to reduce pain and swelling. These include ibuprofen (Advil, Motrin) and naproxen (Aleve).   · Make sure you know which heart medicines to continue and which ones to stop. Ask your doctor if you aren't sure. Catheter site care    · You can remove your bandages the day after the procedure.     · You may shower 24 to 48 hours after the procedure, if your doctor okays it. Pat the incision dry.     · Do not soak the catheter site until it is healed. Don't take a bath for 1 week, or until your doctor tells you it is okay.     · Watch for bleeding from the site. A small amount of blood (up to the size of a quarter) on the bandage can be normal.     · If you are bleeding, lie down and press on the area for 15 minutes to try to make it stop. If the bleeding does not stop, call your doctor or seek immediate medical care. Follow-up care is a key part of your treatment and safety. Be sure to make and go to all appointments, and call your doctor if you are having problems. It's also a good idea to know your test results and keep a list of the medicines you take. When should you call for help? Call  911 anytime you think you may need emergency care. For example, call if:    · You passed out (lost consciousness).     · You have symptoms of a heart attack. These may include:  ? Chest pain or pressure, or a strange feeling in the chest.  ? Sweating. ? Shortness of breath. ? Nausea or vomiting. ? Pain, pressure, or a strange feeling in the back, neck, jaw, or upper belly, or in one or both shoulders or arms. ? Lightheadedness or sudden weakness. ? A fast or irregular heartbeat. After you call 911, the  may tell you to chew 1 adult-strength or 2 to 4 low-dose aspirin. Wait for an ambulance. Do not try to drive yourself.     · You have symptoms of a stroke.  These may include:  ? Sudden numbness, tingling, weakness, or loss of movement in your face, arm, or leg, especially on only one side of your body. ? Sudden vision changes. ? Sudden trouble speaking. ? Sudden confusion or trouble understanding simple statements. ? Sudden problems with walking or balance. ? A sudden, severe headache that is different from past headaches. Call your doctor now or seek immediate medical care if:    · You are bleeding from the area where the catheter was put in your blood vessel.     · You have a fast-growing, painful lump at the catheter site.     · You have signs of infection, such as:  ? Increased pain, swelling, warmth, or redness. ? Red streaks leading from the catheter site. ? Pus draining from the catheter site. ? A fever.     · Your leg, arm, or hand is painful, looks blue, or feels cold, numb, or tingly. Watch closely for any changes in your health, and be sure to contact your doctor if you have any problems. Where can you learn more? Go to http://www.gray.com/  Enter H580 in the search box to learn more about \"Electrophysiology Study and Catheter Ablation: What to Expect at Home. \"  Current as of: August 31, 2020               Content Version: 12.8  © 1152-0394 [employer]. Care instructions adapted under license by [employer] (which disclaims liability or warranty for this information). If you have questions about a medical condition or this instruction, always ask your healthcare professional. [place] [address] any warranty or liability for your use of this information.

## 2021-05-06 NOTE — PROCEDURES
Pre-Procedure Diagnosis  1. Persistent atrial fibrillation     Procedure Performed  1. AV node ablation     Anesthesia: MAC      Estimated Blood Loss: Less than 10 mL          Specimens: * No specimens in log *      The procedure, indications, risks, benefits, and alternatives were discussed with the patient and family members, who desired to proceed after questions were answered and informed consent was documented. Procedure: After informed consent was obtained, the patient was brought to the Electrophysiology Laboratory in a fasting state and was prepped and draped in sterile fashion. Local anesthetic was delivered to the region over the right femoral vein. Access x 1 was obtained under ultrasound guidance using a modified Seldinger technique with placement of a short sheath into the right femoral vein. An 3.5 mm Smart Touch Biosense Mckinnon irrigated ablation catheter was used with CARTO to create an electroanatomical map of the RA focusing on the His location. The ablation catheter was advanced to the region of the AV junction at the location of the marked His on CARTO and with delivery of RF there was complete heart block. This remained after a 15 minute waiting period. Ro Bernard MD, MS  Clinical Cardiac Electrophysiology  5/6/2021  3:29 PM

## 2021-05-06 NOTE — ANESTHESIA POSTPROCEDURE EVALUATION
Procedure(s):  AV NODE ABLATION. total IV anesthesia    Anesthesia Post Evaluation      Multimodal analgesia: multimodal analgesia used between 6 hours prior to anesthesia start to PACU discharge  Patient location during evaluation: PACU  Patient participation: complete - patient participated  Level of consciousness: awake and awake and alert  Pain management: adequate  Airway patency: patent  Anesthetic complications: no  Cardiovascular status: acceptable  Respiratory status: acceptable  Hydration status: acceptable  Post anesthesia nausea and vomiting:  controlled      INITIAL Post-op Vital signs:   Vitals Value Taken Time   /80 05/06/21 1621   Temp 36.9 °C (98.4 °F) 05/06/21 1537   Pulse 73 05/06/21 1622   Resp 14 05/06/21 1537   SpO2 100 % 05/06/21 1612   Vitals shown include unvalidated device data.

## 2021-05-06 NOTE — PROGRESS NOTES
Patient received to [address] [place] [address] room # 16  Ambulatory from [facility]. Patient scheduled for AVNA today with Dr Anshul Cabral. Procedure reviewed & questions answered, voiced good understanding consent obtained & placed on chart. All medications and medical history reviewed. Will prep patient per orders. Patient & family updated on plan of care.       The patient has a fraility score of 3-MANAGING WELL, based on ambulates independently

## 2021-05-07 ENCOUNTER — APPOINTMENT (OUTPATIENT)
Dept: CARDIAC REHAB | Age: 81
End: 2021-05-07
Payer: MEDICARE

## 2021-05-10 ENCOUNTER — APPOINTMENT (OUTPATIENT)
Dept: CARDIAC REHAB | Age: 81
End: 2021-05-10
Payer: MEDICARE

## 2021-05-12 ENCOUNTER — APPOINTMENT (OUTPATIENT)
Dept: CARDIAC REHAB | Age: 81
End: 2021-05-12
Payer: MEDICARE

## 2021-05-14 ENCOUNTER — HOSPITAL ENCOUNTER (OUTPATIENT)
Dept: CARDIAC REHAB | Age: 81
Discharge: HOME | End: 2021-05-14
Payer: MEDICARE

## 2021-05-14 ENCOUNTER — APPOINTMENT (OUTPATIENT)
Dept: CARDIAC REHAB | Age: 81
End: 2021-05-14
Payer: MEDICARE

## 2021-05-14 PROCEDURE — 93798 PHYS/QHP OP CAR RHAB W/ECG: CPT

## 2021-05-17 ENCOUNTER — HOSPITAL ENCOUNTER (OUTPATIENT)
Dept: CARDIAC REHAB | Age: 81
Discharge: HOME | End: 2021-05-17
Payer: MEDICARE

## 2021-05-17 ENCOUNTER — APPOINTMENT (OUTPATIENT)
Dept: CARDIAC REHAB | Age: 81
End: 2021-05-17
Payer: MEDICARE

## 2021-05-17 PROCEDURE — 93798 PHYS/QHP OP CAR RHAB W/ECG: CPT

## 2021-05-19 ENCOUNTER — APPOINTMENT (OUTPATIENT)
Dept: CARDIAC REHAB | Age: 81
End: 2021-05-19
Payer: MEDICARE

## 2021-05-19 ENCOUNTER — HOSPITAL ENCOUNTER (OUTPATIENT)
Dept: CARDIAC REHAB | Age: 81
Discharge: HOME | End: 2021-05-19
Payer: MEDICARE

## 2021-05-19 VITALS — WEIGHT: 215.4 LBS | BODY MASS INDEX: 32.75 KG/M2

## 2021-05-19 PROCEDURE — 93798 PHYS/QHP OP CAR RHAB W/ECG: CPT

## 2021-05-21 ENCOUNTER — APPOINTMENT (OUTPATIENT)
Dept: CARDIAC REHAB | Age: 81
End: 2021-05-21
Payer: MEDICARE

## 2021-05-21 ENCOUNTER — HOSPITAL ENCOUNTER (OUTPATIENT)
Dept: CARDIAC REHAB | Age: 81
Discharge: HOME | End: 2021-05-21
Payer: MEDICARE

## 2021-05-21 PROCEDURE — 93798 PHYS/QHP OP CAR RHAB W/ECG: CPT

## 2021-05-24 ENCOUNTER — HOSPITAL ENCOUNTER (OUTPATIENT)
Dept: CARDIAC REHAB | Age: 81
Discharge: HOME | End: 2021-05-24
Payer: MEDICARE

## 2021-05-24 ENCOUNTER — APPOINTMENT (OUTPATIENT)
Dept: CARDIAC REHAB | Age: 81
End: 2021-05-24
Payer: MEDICARE

## 2021-05-24 PROCEDURE — 93798 PHYS/QHP OP CAR RHAB W/ECG: CPT

## 2021-05-26 ENCOUNTER — APPOINTMENT (OUTPATIENT)
Dept: CARDIAC REHAB | Age: 81
End: 2021-05-26
Payer: MEDICARE

## 2021-05-26 ENCOUNTER — HOSPITAL ENCOUNTER (OUTPATIENT)
Dept: CARDIAC REHAB | Age: 81
Discharge: HOME | End: 2021-05-26
Payer: MEDICARE

## 2021-05-26 VITALS — BODY MASS INDEX: 32.57 KG/M2 | WEIGHT: 214.2 LBS

## 2021-05-26 PROCEDURE — 93798 PHYS/QHP OP CAR RHAB W/ECG: CPT

## 2021-05-28 ENCOUNTER — HOSPITAL ENCOUNTER (OUTPATIENT)
Dept: CARDIAC REHAB | Age: 81
Discharge: HOME | End: 2021-05-28
Payer: MEDICARE

## 2021-05-28 ENCOUNTER — APPOINTMENT (OUTPATIENT)
Dept: CARDIAC REHAB | Age: 81
End: 2021-05-28
Payer: MEDICARE

## 2021-05-28 PROCEDURE — 93798 PHYS/QHP OP CAR RHAB W/ECG: CPT

## 2021-06-02 ENCOUNTER — APPOINTMENT (OUTPATIENT)
Dept: CARDIAC REHAB | Age: 81
End: 2021-06-02
Payer: MEDICARE

## 2021-06-02 ENCOUNTER — HOSPITAL ENCOUNTER (OUTPATIENT)
Dept: CARDIAC REHAB | Age: 81
Discharge: HOME | End: 2021-06-02
Payer: MEDICARE

## 2021-06-02 VITALS — BODY MASS INDEX: 32.6 KG/M2 | WEIGHT: 214.4 LBS

## 2021-06-02 PROCEDURE — 93798 PHYS/QHP OP CAR RHAB W/ECG: CPT

## 2021-06-04 ENCOUNTER — HOSPITAL ENCOUNTER (OUTPATIENT)
Dept: CARDIAC REHAB | Age: 81
Discharge: HOME | End: 2021-06-04
Payer: MEDICARE

## 2021-06-04 ENCOUNTER — APPOINTMENT (OUTPATIENT)
Dept: CARDIAC REHAB | Age: 81
End: 2021-06-04
Payer: MEDICARE

## 2021-06-04 PROCEDURE — 93798 PHYS/QHP OP CAR RHAB W/ECG: CPT

## 2021-06-04 NOTE — PROGRESS NOTES
79year old male s/p pacemaker, with chronic a-fib and angina enrolled in cardiac rehabilitation, seen today for initial nutrition counseling. States goodenergy level and no concerns with appetite today. Stated Nutrition Goals: to learn about health tips when dining out. Medical History: CAD, GERD, Dyslipidemia, arrhythmia, HTN, sleep apnea  Nutrition related medications/supplements: statin, antihypertensive, aspirin, KCl  Nutrition Related Labs: reviewed. 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 Spouse works as well. Physical Activity: Rehabilitation 3x per week, off days pt remains active with work    Lifestyle, Culture Family Influence: eats out most meals. Presently does not have the time to prepare meals at home. Food and Nutrition Intake History:   Eats out most meals  Food Preferences: high saturated fat cuts of meat, cheese. Stated 1 day diet recall includes   Breakfast: take out- ascencio, eggs, grits and coffee  AM Snack: none   Lunch: pack of crackers and cola  Snack: none  Dinner: dinner out- rib eye steak, mashed potatoes, gravy, sweet tea  PM Snack: none  Beverages: cola, sweet tea- not in the habit of drinking water  Alcohol use: none    One day recall food recall appears high in fat, sugar, sodium, calories  One day food recall appears inadequate in fiber, fruit and non starchy vegetables     GI Hx: /Digestive Issues: reflux- on PPI    Anthropometric Data:  BMI: 32.6 kg/m²  Height : 5' 8\" (1.727 m). Weight: 97.3 kg (214 lb 6.4 oz).     Waist measurement (inches): >40- places pt at metabolic risk     Estimated Nutritional Needs:  Calories: MSJ x 1.2 (sedentary) for weight maintenance to start: 1900kcal/day   Protein: 95g (20% of estimated energy low end needs)  CHO: ~215 g per day(45% of estimated low end energy needs)      Stage of Behavior Change: preparation     Nutrition Diagnosis:    Excess intake of sodium, total fat related to food preferences and nutrition knowledge evidenced by diet recall, and >40 inches WC    NUTRITION INTERVENTIONS:  1. Nutrition Prescription Recommendation:  Recommended Modifications of Meals, Snacks and Beverages:  Include vegetables, fruits, whole grains, nuts/seeds, beans/legumes in all meals. Less than 13 grams of saturated fat and avoid trans fat daily. Include unsaturated fat sources (nuts, seeds, avocado). Consuming fish 2 or more times weekly. Daily-weekly consumption of dairy, poultry, and eggs  Limit standard western diet foods include processed meat, processed carbohydrates and fried foods  Water as primary beverage    2. Cardioprotective Nutrition Education:       Educated patient on cardioprotective meal pattern/Mediterranean meal pattern including  Guidelines for saturated fat (<7% total calories), sodium ( < 2000mg/day or follow MD recommendations), and added sugars ( < 25 grams for women/<36 grams for men) and high sources of each reviewed  Demonstrated food label reading and food nancy (Fooducate) for home use to support self efficacy goals. Sodium education; \"salty six\" foods, food label reading, low sodium food sources and meal planning. - tips for eating out were reviewed today. Handouts provided for home use:   Eating out tips  [place] plate method  Tips for reducing sodium    Nutrition Goals: To improve diet quality, by decreasing intake of sodium using tips for eating out and increasing intake of f/v by end of cardiopulmonary rehabilitation. Download food nancy to and scan foods and pick lower sodium options. Monitoring/Evaluation: RD to follow up with participant during rehab sessions for questions and assessment of progression toward goals. Anticipated Compliance: Fair. Pt agreeable to review materials and will work on cutting back sodium. Pt verbalizes understanding to recommendations discussed.    Barriers: None identified at this time     Grace Nunez, MS, RD, LD  Cardiopulmonary Rehab Dietitian

## 2021-06-07 ENCOUNTER — APPOINTMENT (OUTPATIENT)
Dept: CARDIAC REHAB | Age: 81
End: 2021-06-07
Payer: MEDICARE

## 2021-06-07 ENCOUNTER — HOSPITAL ENCOUNTER (OUTPATIENT)
Dept: CARDIAC REHAB | Age: 81
Discharge: HOME | End: 2021-06-07
Payer: MEDICARE

## 2021-06-07 PROCEDURE — 93798 PHYS/QHP OP CAR RHAB W/ECG: CPT

## 2021-06-09 ENCOUNTER — HOSPITAL ENCOUNTER (OUTPATIENT)
Dept: CARDIAC REHAB | Age: 81
Discharge: HOME | End: 2021-06-09
Payer: MEDICARE

## 2021-06-09 ENCOUNTER — APPOINTMENT (OUTPATIENT)
Dept: CARDIAC REHAB | Age: 81
End: 2021-06-09
Payer: MEDICARE

## 2021-06-09 VITALS — BODY MASS INDEX: 32.66 KG/M2 | WEIGHT: 214.8 LBS

## 2021-06-09 PROCEDURE — 93798 PHYS/QHP OP CAR RHAB W/ECG: CPT

## 2021-06-11 ENCOUNTER — APPOINTMENT (OUTPATIENT)
Dept: CARDIAC REHAB | Age: 81
End: 2021-06-11
Payer: MEDICARE

## 2021-06-11 ENCOUNTER — HOSPITAL ENCOUNTER (OUTPATIENT)
Dept: CARDIAC REHAB | Age: 81
Discharge: HOME | End: 2021-06-11
Payer: MEDICARE

## 2021-06-11 PROCEDURE — 93798 PHYS/QHP OP CAR RHAB W/ECG: CPT

## 2021-06-14 ENCOUNTER — APPOINTMENT (OUTPATIENT)
Dept: CARDIAC REHAB | Age: 81
End: 2021-06-14
Payer: MEDICARE

## 2021-06-14 ENCOUNTER — HOSPITAL ENCOUNTER (OUTPATIENT)
Dept: CARDIAC REHAB | Age: 81
Discharge: HOME | End: 2021-06-14
Payer: MEDICARE

## 2021-06-14 PROCEDURE — 93798 PHYS/QHP OP CAR RHAB W/ECG: CPT

## 2021-06-16 ENCOUNTER — HOSPITAL ENCOUNTER (OUTPATIENT)
Dept: CARDIAC REHAB | Age: 81
Discharge: HOME | End: 2021-06-16
Payer: MEDICARE

## 2021-06-16 ENCOUNTER — APPOINTMENT (OUTPATIENT)
Dept: CARDIAC REHAB | Age: 81
End: 2021-06-16
Payer: MEDICARE

## 2021-06-16 VITALS — BODY MASS INDEX: 32.63 KG/M2 | WEIGHT: 214.6 LBS

## 2021-06-16 PROCEDURE — 93798 PHYS/QHP OP CAR RHAB W/ECG: CPT

## 2021-06-18 ENCOUNTER — APPOINTMENT (OUTPATIENT)
Dept: CARDIAC REHAB | Age: 81
End: 2021-06-18
Payer: MEDICARE

## 2021-06-18 ENCOUNTER — HOSPITAL ENCOUNTER (OUTPATIENT)
Dept: CARDIAC REHAB | Age: 81
Discharge: HOME | End: 2021-06-18
Payer: MEDICARE

## 2021-06-18 PROCEDURE — 93798 PHYS/QHP OP CAR RHAB W/ECG: CPT

## 2021-06-21 ENCOUNTER — HOSPITAL ENCOUNTER (OUTPATIENT)
Dept: CARDIAC REHAB | Age: 81
Discharge: HOME | End: 2021-06-21
Payer: MEDICARE

## 2021-06-21 ENCOUNTER — APPOINTMENT (OUTPATIENT)
Dept: CARDIAC REHAB | Age: 81
End: 2021-06-21
Payer: MEDICARE

## 2021-06-21 PROCEDURE — 93798 PHYS/QHP OP CAR RHAB W/ECG: CPT

## 2021-06-23 ENCOUNTER — HOSPITAL ENCOUNTER (OUTPATIENT)
Dept: CARDIAC REHAB | Age: 81
Discharge: HOME | End: 2021-06-23
Payer: MEDICARE

## 2021-06-23 ENCOUNTER — APPOINTMENT (OUTPATIENT)
Dept: CARDIAC REHAB | Age: 81
End: 2021-06-23
Payer: MEDICARE

## 2021-06-23 VITALS — BODY MASS INDEX: 32.63 KG/M2 | WEIGHT: 214.6 LBS

## 2021-06-23 PROCEDURE — 93798 PHYS/QHP OP CAR RHAB W/ECG: CPT

## 2021-06-25 ENCOUNTER — APPOINTMENT (OUTPATIENT)
Dept: CARDIAC REHAB | Age: 81
End: 2021-06-25
Payer: MEDICARE

## 2021-06-25 ENCOUNTER — HOSPITAL ENCOUNTER (OUTPATIENT)
Dept: CARDIAC REHAB | Age: 81
Discharge: HOME | End: 2021-06-25
Payer: MEDICARE

## 2021-06-25 PROCEDURE — 93798 PHYS/QHP OP CAR RHAB W/ECG: CPT

## 2021-06-28 ENCOUNTER — HOSPITAL ENCOUNTER (OUTPATIENT)
Dept: CARDIAC REHAB | Age: 81
Discharge: HOME | End: 2021-06-28
Payer: MEDICARE

## 2021-06-28 ENCOUNTER — APPOINTMENT (OUTPATIENT)
Dept: CARDIAC REHAB | Age: 81
End: 2021-06-28
Payer: MEDICARE

## 2021-06-28 PROCEDURE — 93798 PHYS/QHP OP CAR RHAB W/ECG: CPT

## 2021-06-30 ENCOUNTER — APPOINTMENT (OUTPATIENT)
Dept: CARDIAC REHAB | Age: 81
End: 2021-06-30
Payer: MEDICARE

## 2021-06-30 ENCOUNTER — HOSPITAL ENCOUNTER (OUTPATIENT)
Dept: CARDIAC REHAB | Age: 81
Discharge: HOME | End: 2021-06-30
Payer: MEDICARE

## 2021-06-30 VITALS — BODY MASS INDEX: 32.48 KG/M2 | WEIGHT: 213.6 LBS

## 2021-06-30 PROCEDURE — 93798 PHYS/QHP OP CAR RHAB W/ECG: CPT

## 2021-07-02 ENCOUNTER — APPOINTMENT (OUTPATIENT)
Dept: CARDIAC REHAB | Age: 81
End: 2021-07-02
Payer: MEDICARE

## 2021-07-02 ENCOUNTER — HOSPITAL ENCOUNTER (OUTPATIENT)
Dept: CARDIAC REHAB | Age: 81
Discharge: HOME | End: 2021-07-02
Payer: MEDICARE

## 2021-07-02 VITALS — BODY MASS INDEX: 32.26 KG/M2 | WEIGHT: 212.2 LBS

## 2021-07-02 PROCEDURE — 93798 PHYS/QHP OP CAR RHAB W/ECG: CPT

## 2021-07-07 ENCOUNTER — HOSPITAL ENCOUNTER (OUTPATIENT)
Dept: CARDIAC REHAB | Age: 81
Discharge: HOME | End: 2021-07-07
Payer: MEDICARE

## 2021-07-07 ENCOUNTER — APPOINTMENT (OUTPATIENT)
Dept: CARDIAC REHAB | Age: 81
End: 2021-07-07
Payer: MEDICARE

## 2021-07-07 VITALS — BODY MASS INDEX: 32.36 KG/M2 | WEIGHT: 212.8 LBS

## 2021-07-07 PROCEDURE — 93798 PHYS/QHP OP CAR RHAB W/ECG: CPT

## 2021-07-09 ENCOUNTER — APPOINTMENT (OUTPATIENT)
Dept: CARDIAC REHAB | Age: 81
End: 2021-07-09
Payer: MEDICARE

## 2021-07-12 ENCOUNTER — HOSPITAL ENCOUNTER (OUTPATIENT)
Dept: CARDIAC REHAB | Age: 81
Discharge: HOME | End: 2021-07-12
Payer: MEDICARE

## 2021-07-12 PROCEDURE — 93798 PHYS/QHP OP CAR RHAB W/ECG: CPT

## 2021-07-14 ENCOUNTER — HOSPITAL ENCOUNTER (OUTPATIENT)
Dept: CARDIAC REHAB | Age: 81
Discharge: HOME | End: 2021-07-14
Payer: MEDICARE

## 2021-07-14 VITALS — WEIGHT: 212.2 LBS | BODY MASS INDEX: 32.26 KG/M2

## 2021-07-14 PROCEDURE — 93798 PHYS/QHP OP CAR RHAB W/ECG: CPT

## 2021-07-16 ENCOUNTER — HOSPITAL ENCOUNTER (OUTPATIENT)
Dept: CARDIAC REHAB | Age: 81
Discharge: HOME | End: 2021-07-16
Payer: MEDICARE

## 2021-07-16 PROCEDURE — 93798 PHYS/QHP OP CAR RHAB W/ECG: CPT

## 2021-07-19 ENCOUNTER — HOSPITAL ENCOUNTER (OUTPATIENT)
Dept: CARDIAC REHAB | Age: 81
Discharge: HOME | End: 2021-07-19
Payer: MEDICARE

## 2021-07-19 PROCEDURE — 93798 PHYS/QHP OP CAR RHAB W/ECG: CPT

## 2021-07-21 ENCOUNTER — HOSPITAL ENCOUNTER (OUTPATIENT)
Dept: CARDIAC REHAB | Age: 81
Discharge: HOME | End: 2021-07-21
Payer: MEDICARE

## 2021-07-21 VITALS — BODY MASS INDEX: 32.02 KG/M2 | WEIGHT: 210.6 LBS

## 2021-07-21 PROCEDURE — 93798 PHYS/QHP OP CAR RHAB W/ECG: CPT

## 2021-11-19 ENCOUNTER — APPOINTMENT (OUTPATIENT)
Dept: GENERAL RADIOLOGY | Age: 81
End: 2021-11-19
Payer: MEDICARE

## 2021-11-19 ENCOUNTER — TELEPHONE (OUTPATIENT)
Dept: CARDIOLOGY | Age: 81
End: 2021-11-19

## 2021-11-19 ENCOUNTER — HOSPITAL ENCOUNTER (EMERGENCY)
Age: 81
Discharge: HOME | End: 2021-11-19
Payer: MEDICARE

## 2021-11-19 ENCOUNTER — HOSPITAL ENCOUNTER (EMERGENCY)
Age: 81
Discharge: HOME | End: 2021-11-19
Attending: EMERGENCY MEDICINE
Payer: MEDICARE

## 2021-11-19 ENCOUNTER — APPOINTMENT (OUTPATIENT)
Dept: GENERAL RADIOLOGY | Age: 81
End: 2021-11-19
Attending: EMERGENCY MEDICINE
Payer: MEDICARE

## 2021-11-19 VITALS
HEART RATE: 64 BPM | BODY MASS INDEX: 32.89 KG/M2 | WEIGHT: 217 LBS | HEIGHT: 68 IN | TEMPERATURE: 98.1 F | RESPIRATION RATE: 16 BRPM | SYSTOLIC BLOOD PRESSURE: 178 MMHG | DIASTOLIC BLOOD PRESSURE: 84 MMHG

## 2021-11-19 VITALS
WEIGHT: 217 LBS | HEART RATE: 65 BPM | DIASTOLIC BLOOD PRESSURE: 77 MMHG | OXYGEN SATURATION: 98 % | SYSTOLIC BLOOD PRESSURE: 163 MMHG | BODY MASS INDEX: 32.99 KG/M2 | TEMPERATURE: 98.2 F | RESPIRATION RATE: 16 BRPM

## 2021-11-19 DIAGNOSIS — T82.110A PACEMAKER LEAD FAILURE, INITIAL ENCOUNTER: Primary | ICD-10-CM

## 2021-11-19 DIAGNOSIS — I25.118 ATHEROSCLEROSIS OF NATIVE CORONARY ARTERY OF NATIVE HEART WITH STABLE ANGINA PECTORIS (HCC): ICD-10-CM

## 2021-11-19 DIAGNOSIS — R07.89 OTHER CHEST PAIN: ICD-10-CM

## 2021-11-19 DIAGNOSIS — Z95.0 PACEMAKER-DEPENDENT DUE TO NATIVE CARDIAC RHYTHM INSUFFICIENT TO SUPPORT LIFE: ICD-10-CM

## 2021-11-19 DIAGNOSIS — I48.0 PAROXYSMAL ATRIAL FIBRILLATION (HCC): ICD-10-CM

## 2021-11-19 DIAGNOSIS — T82.9XXA PACEMAKER COMPLICATIONS, INITIAL ENCOUNTER: Primary | ICD-10-CM

## 2021-11-19 DIAGNOSIS — I49.8 PACEMAKER-DEPENDENT DUE TO NATIVE CARDIAC RHYTHM INSUFFICIENT TO SUPPORT LIFE: ICD-10-CM

## 2021-11-19 PROBLEM — R07.9 CHEST PAIN: Status: ACTIVE | Noted: 2021-11-19

## 2021-11-19 LAB
ALBUMIN SERPL-MCNC: 3.1 G/DL (ref 3.2–4.6)
ALBUMIN SERPL-MCNC: 3.1 G/DL (ref 3.2–4.6)
ALBUMIN/GLOB SERPL: 0.8 {RATIO} (ref 1.2–3.5)
ALBUMIN/GLOB SERPL: 0.9 {RATIO} (ref 1.2–3.5)
ALP SERPL-CCNC: 117 U/L (ref 50–136)
ALP SERPL-CCNC: 120 U/L (ref 50–136)
ALT SERPL-CCNC: 21 U/L (ref 12–65)
ALT SERPL-CCNC: 22 U/L (ref 12–65)
ANION GAP SERPL CALC-SCNC: 8 MMOL/L (ref 7–16)
ANION GAP SERPL CALC-SCNC: 8 MMOL/L (ref 7–16)
AST SERPL-CCNC: 16 U/L (ref 15–37)
AST SERPL-CCNC: 27 U/L (ref 15–37)
ATRIAL RATE: 250 BPM
BASOPHILS # BLD: 0.1 K/UL (ref 0–0.2)
BASOPHILS # BLD: 0.1 K/UL (ref 0–0.2)
BASOPHILS NFR BLD: 0 % (ref 0–2)
BASOPHILS NFR BLD: 0 % (ref 0–2)
BILIRUB SERPL-MCNC: 0.4 MG/DL (ref 0.2–1.1)
BILIRUB SERPL-MCNC: 0.5 MG/DL (ref 0.2–1.1)
BUN SERPL-MCNC: 12 MG/DL (ref 8–23)
BUN SERPL-MCNC: 13 MG/DL (ref 8–23)
CALCIUM SERPL-MCNC: 9.2 MG/DL (ref 8.3–10.4)
CALCIUM SERPL-MCNC: 9.4 MG/DL (ref 8.3–10.4)
CALCULATED R AXIS, ECG10: 126 DEGREES
CALCULATED T AXIS, ECG11: 58 DEGREES
CHLORIDE SERPL-SCNC: 109 MMOL/L (ref 98–107)
CHLORIDE SERPL-SCNC: 109 MMOL/L (ref 98–107)
CO2 SERPL-SCNC: 24 MMOL/L (ref 21–32)
CO2 SERPL-SCNC: 25 MMOL/L (ref 21–32)
CREAT SERPL-MCNC: 0.76 MG/DL (ref 0.8–1.5)
CREAT SERPL-MCNC: 0.89 MG/DL (ref 0.8–1.5)
DIAGNOSIS, 93000: NORMAL
DIFFERENTIAL METHOD BLD: ABNORMAL
DIFFERENTIAL METHOD BLD: ABNORMAL
EOSINOPHIL # BLD: 0.4 K/UL (ref 0–0.8)
EOSINOPHIL # BLD: 0.4 K/UL (ref 0–0.8)
EOSINOPHIL NFR BLD: 3 % (ref 0.5–7.8)
EOSINOPHIL NFR BLD: 3 % (ref 0.5–7.8)
ERYTHROCYTE [DISTWIDTH] IN BLOOD BY AUTOMATED COUNT: 16.7 % (ref 11.9–14.6)
ERYTHROCYTE [DISTWIDTH] IN BLOOD BY AUTOMATED COUNT: 16.8 % (ref 11.9–14.6)
GLOBULIN SER CALC-MCNC: 3.6 G/DL (ref 2.3–3.5)
GLOBULIN SER CALC-MCNC: 3.8 G/DL (ref 2.3–3.5)
GLUCOSE SERPL-MCNC: 117 MG/DL (ref 65–100)
GLUCOSE SERPL-MCNC: 122 MG/DL (ref 65–100)
HCT VFR BLD AUTO: 36 % (ref 41.1–50.3)
HCT VFR BLD AUTO: 36 % (ref 41.1–50.3)
HGB BLD-MCNC: 10.6 G/DL (ref 13.6–17.2)
HGB BLD-MCNC: 10.6 G/DL (ref 13.6–17.2)
IMM GRANULOCYTES # BLD AUTO: 0 K/UL (ref 0–0.5)
IMM GRANULOCYTES # BLD AUTO: 0 K/UL (ref 0–0.5)
IMM GRANULOCYTES NFR BLD AUTO: 0 % (ref 0–5)
IMM GRANULOCYTES NFR BLD AUTO: 0 % (ref 0–5)
LIPASE SERPL-CCNC: 104 U/L (ref 73–393)
LIPASE SERPL-CCNC: 126 U/L (ref 73–393)
LYMPHOCYTES # BLD: 3.3 K/UL (ref 0.5–4.6)
LYMPHOCYTES # BLD: 3.4 K/UL (ref 0.5–4.6)
LYMPHOCYTES NFR BLD: 28 % (ref 13–44)
LYMPHOCYTES NFR BLD: 30 % (ref 13–44)
MAGNESIUM SERPL-MCNC: 2 MG/DL (ref 1.8–2.4)
MAGNESIUM SERPL-MCNC: 2.1 MG/DL (ref 1.8–2.4)
MCH RBC QN AUTO: 22.2 PG (ref 26.1–32.9)
MCH RBC QN AUTO: 22.8 PG (ref 26.1–32.9)
MCHC RBC AUTO-ENTMCNC: 29.4 G/DL (ref 31.4–35)
MCHC RBC AUTO-ENTMCNC: 29.4 G/DL (ref 31.4–35)
MCV RBC AUTO: 75.5 FL (ref 79.6–97.8)
MCV RBC AUTO: 77.6 FL (ref 79.6–97.8)
MONOCYTES # BLD: 1 K/UL (ref 0.1–1.3)
MONOCYTES # BLD: 1.1 K/UL (ref 0.1–1.3)
MONOCYTES NFR BLD: 8 % (ref 4–12)
MONOCYTES NFR BLD: 9 % (ref 4–12)
NEUTS SEG # BLD: 6.5 K/UL (ref 1.7–8.2)
NEUTS SEG # BLD: 7.4 K/UL (ref 1.7–8.2)
NEUTS SEG NFR BLD: 57 % (ref 43–78)
NEUTS SEG NFR BLD: 60 % (ref 43–78)
NRBC # BLD: 0 K/UL (ref 0–0.2)
NRBC # BLD: 0 K/UL (ref 0–0.2)
PLATELET # BLD AUTO: 222 K/UL (ref 150–450)
PLATELET # BLD AUTO: 234 K/UL (ref 150–450)
PMV BLD AUTO: 9.7 FL (ref 9.4–12.3)
PMV BLD AUTO: 9.9 FL (ref 9.4–12.3)
POTASSIUM SERPL-SCNC: 3.8 MMOL/L (ref 3.5–5.1)
POTASSIUM SERPL-SCNC: 4 MMOL/L (ref 3.5–5.1)
PROT SERPL-MCNC: 6.7 G/DL (ref 6.3–8.2)
PROT SERPL-MCNC: 6.9 G/DL (ref 6.3–8.2)
Q-T INTERVAL, ECG07: 390 MS
QRS DURATION, ECG06: 138 MS
QTC CALCULATION (BEZET), ECG08: 449 MS
RBC # BLD AUTO: 4.64 M/UL (ref 4.23–5.6)
RBC # BLD AUTO: 4.77 M/UL (ref 4.23–5.6)
SODIUM SERPL-SCNC: 141 MMOL/L (ref 136–145)
SODIUM SERPL-SCNC: 142 MMOL/L (ref 136–145)
TROPONIN-HIGH SENSITIVITY: 46.2 PG/ML (ref 0–14)
TROPONIN-HIGH SENSITIVITY: 47.6 PG/ML (ref 0–14)
TROPONIN-HIGH SENSITIVITY: 52.1 PG/ML (ref 0–14)
VENTRICULAR RATE, ECG03: 80 BPM
WBC # BLD AUTO: 11.3 K/UL (ref 4.3–11.1)
WBC # BLD AUTO: 12.3 K/UL (ref 4.3–11.1)

## 2021-11-19 PROCEDURE — 99283 EMERGENCY DEPT VISIT LOW MDM: CPT | Performed by: INTERNAL MEDICINE

## 2021-11-19 PROCEDURE — 83735 ASSAY OF MAGNESIUM: CPT

## 2021-11-19 PROCEDURE — 94762 N-INVAS EAR/PLS OXIMTRY CONT: CPT

## 2021-11-19 PROCEDURE — 99284 EMERGENCY DEPT VISIT MOD MDM: CPT

## 2021-11-19 PROCEDURE — 99285 EMERGENCY DEPT VISIT HI MDM: CPT

## 2021-11-19 PROCEDURE — 71045 X-RAY EXAM CHEST 1 VIEW: CPT

## 2021-11-19 PROCEDURE — 93005 ELECTROCARDIOGRAM TRACING: CPT

## 2021-11-19 PROCEDURE — 84484 ASSAY OF TROPONIN QUANT: CPT

## 2021-11-19 PROCEDURE — 85025 COMPLETE CBC W/AUTO DIFF WBC: CPT

## 2021-11-19 PROCEDURE — 71046 X-RAY EXAM CHEST 2 VIEWS: CPT

## 2021-11-19 PROCEDURE — 74011250637 HC RX REV CODE- 250/637

## 2021-11-19 PROCEDURE — 93005 ELECTROCARDIOGRAM TRACING: CPT | Performed by: EMERGENCY MEDICINE

## 2021-11-19 PROCEDURE — 83690 ASSAY OF LIPASE: CPT

## 2021-11-19 PROCEDURE — 80053 COMPREHEN METABOLIC PANEL: CPT

## 2021-11-19 RX ORDER — SODIUM CHLORIDE 0.9 % (FLUSH) 0.9 %
5-10 SYRINGE (ML) INJECTION EVERY 8 HOURS
Status: DISCONTINUED | OUTPATIENT
Start: 2021-11-19 | End: 2021-11-19 | Stop reason: HOSPADM

## 2021-11-19 RX ORDER — ACETAMINOPHEN 325 MG/1
650 TABLET ORAL
Status: COMPLETED | OUTPATIENT
Start: 2021-11-19 | End: 2021-11-19

## 2021-11-19 RX ORDER — SODIUM CHLORIDE 0.9 % (FLUSH) 0.9 %
5-10 SYRINGE (ML) INJECTION AS NEEDED
Status: DISCONTINUED | OUTPATIENT
Start: 2021-11-19 | End: 2021-11-19 | Stop reason: HOSPADM

## 2021-11-19 RX ORDER — SODIUM CHLORIDE 0.9 % (FLUSH) 0.9 %
5-10 SYRINGE (ML) INJECTION AS NEEDED
Status: DISCONTINUED | OUTPATIENT
Start: 2021-11-19 | End: 2021-11-20 | Stop reason: HOSPADM

## 2021-11-19 RX ORDER — SODIUM CHLORIDE 0.9 % (FLUSH) 0.9 %
5-10 SYRINGE (ML) INJECTION EVERY 8 HOURS
Status: DISCONTINUED | OUTPATIENT
Start: 2021-11-19 | End: 2021-11-20 | Stop reason: HOSPADM

## 2021-11-19 RX ADMIN — ACETAMINOPHEN 650 MG: 325 TABLET ORAL at 10:53

## 2021-11-19 NOTE — ED PROVIDER NOTES
58-year-old male brought to the ER by [facility] ambulance with early complaint chest pain now having possible  ICD defibrillation. Chest Pain   This is a new problem. The current episode started 1 to 2 hours ago. The problem has not changed since onset. The problem occurs constantly. The pain is associated with normal activity. The pain is present in the epigastric region. The pain is at a severity of 4/10. The pain is moderate. The quality of the pain is described as sharp. The pain does not radiate.         Past Medical History:   Diagnosis Date    Abnormal EKG 4/22/15    Arrhythmia     CAD (coronary artery disease) 5/8/2015    Carotid artery stenosis without cerebral infarction 6/7/2016    US 6/15: <50% bilat ICAs    Coronary atherosclerosis of native coronary vessel 5/8/2015    VEGAS on brilinta 5/7/15: prox LAD PCI, normal EF     Dyslipidemia 6/7/2016    Dyspnea 5/8/2015    Echo 6/15: EF 60%, mod MR, mod LVH, mild AI     ED (erectile dysfunction)     GERD (gastroesophageal reflux disease)     no medication    GERD (gastroesophageal reflux disease)     HTN (hypertension) 5/8/2015    Hypertension     Hypokalemia 6/7/2016    Hypokalemia     Mitral valve regurgitation 6/7/2016    Morbid obesity ([address]     Myasthenia gravis ([address] 6/17/15    Myasthenia gravis (HCC)     Nocturia     Osteoporosis     PUD (peptic ulcer disease) 25 yrs ago    S/P coronary artery stent placement 5/8/2015    3.0x38 mm Xience ADRIANNA to pLAD 5/7/15     Sleep apnea     Syncope and collapse     Unspecified sleep apnea     no cpap       Past Surgical History:   Procedure Laterality Date    HX APPENDECTOMY      HX COLONOSCOPY  2007    HX HEART CATHETERIZATION  05/27/2015    stent    HX HEART CATHETERIZATION  05/21/2019    watchman device    HX HEMORRHOIDECTOMY      HX PACEMAKER  2018   Renetta Cervantes/Xin for sleep apnea and reconstruction for extending jaw    VASCULAR SURGERY PROCEDURE UNLIST Right 07/10/2019     Repair of right radial artery pseudoaneurysm         Family History:   Problem Relation Age of Onset    Cancer Mother         kidney    Cancer Father         stomach    No Known Problems Sister     Cancer Brother         brain tumor    No Known Problems Brother        Social History     Socioeconomic History    Marital status:      Spouse name: Not on file    Number of children: Not on file    Years of education: Not on file    Highest education level: Not on file   Occupational History    Not on file   Tobacco Use    Smoking status: Never Smoker    Smokeless tobacco: Never Used   Substance and Sexual Activity    Alcohol use: No    Drug use: No    Sexual activity: Not on file   Other Topics Concern    Not on file   Social History Narrative    Not on file     Social Determinants of Health     Financial Resource Strain:     Difficulty of Paying Living Expenses: Not on file   Food Insecurity:     Worried About Running Out of Food in the Last Year: Not on file    Libby of Food in the Last Year: Not on file   Transportation Needs:     Lack of Transportation (Medical): Not on file    Lack of Transportation (Non-Medical):  Not on file   Physical Activity:     Days of Exercise per Week: Not on file    Minutes of Exercise per Session: Not on file   Stress:     Feeling of Stress : Not on file   Social Connections:     Frequency of Communication with Friends and Family: Not on file    Frequency of Social Gatherings with Friends and Family: Not on file    Attends Synagogue Services: Not on file    Active Member of Clubs or Organizations: Not on file    Attends Club or Organization Meetings: Not on file    Marital Status: Not on file   Intimate Partner Violence:     Fear of Current or Ex-Partner: Not on file    Emotionally Abused: Not on file    Physically Abused: Not on file    Sexually Abused: Not on file   Housing Stability:     Unable to Pay for Housing in the Last Year: Not on file    Number of Places Lived in the Last Year: Not on file    Unstable Housing in the Last Year: Not on file         ALLERGIES: Patient has no known allergies. Review of Systems   Constitutional: Negative. Negative for activity change. HENT: Negative. Eyes: Negative. Respiratory: Negative. Cardiovascular: Positive for chest pain. Gastrointestinal: Negative. Genitourinary: Negative. Musculoskeletal: Negative. Skin: Negative. Neurological: Negative. Psychiatric/Behavioral: Negative. All other systems reviewed and are negative. Vitals:    11/19/21 0822 11/19/21 0824   BP: (!) 176/85    Pulse: 81    Resp: 16    Temp:  98.2 °F (36.8 °C)   SpO2: 95%    Weight: 98.4 kg (217 lb)             Physical Exam  Vitals and nursing note reviewed. Constitutional:       General: He is not in acute distress. Appearance: He is well-developed. HENT:      Head: Normocephalic and atraumatic. Right Ear: External ear normal.      Left Ear: External ear normal.      Nose: Nose normal.   Eyes:      General: No scleral icterus. Right eye: No discharge. Left eye: No discharge. Conjunctiva/sclera: Conjunctivae normal.      Pupils: Pupils are equal, round, and reactive to light. Cardiovascular:      Rate and Rhythm: Regular rhythm. Pulmonary:      Effort: Pulmonary effort is normal. No respiratory distress. Breath sounds: Normal breath sounds. No stridor. No wheezing or rales. Abdominal:      General: Bowel sounds are normal. There is no distension. Palpations: Abdomen is soft. Tenderness: There is no abdominal tenderness. Comments: Patient's diaphragms being stimulated by the pacemaker causing diaphragm contractions. Musculoskeletal:         General: Normal range of motion. Cervical back: Normal range of motion. Skin:     General: Skin is warm and dry. Findings: No rash.    Neurological:      Mental Status: He is alert and oriented to person, place, and time. Motor: No abnormal muscle tone. Coordination: Coordination normal.   Psychiatric:         Behavior: Behavior normal.          MDM  Number of Diagnoses or Management Options  Diagnosis management comments: Patient denies chest pain cardiology is at bedside upon arrival given the EMS thought he was having a STEMI. They do not that he is having a STEMI does not need to go to Cath Lab at this time. Appears that his pacemaker is stimulating his diaphragm causing contractions. The pacemaker  will be contacted to come to the ER stat and readjust the settings.        Amount and/or Complexity of Data Reviewed  Clinical lab tests: ordered and reviewed  Tests in the radiology section of CPT®: ordered and reviewed  Tests in the medicine section of CPT®: ordered and reviewed  Decide to obtain previous medical records or to obtain history from someone other than the patient: yes  Review and summarize past medical records: yes  Discuss the patient with other providers: yes  Independent visualization of images, tracings, or specimens: yes    Risk of Complications, Morbidity, and/or Mortality  Presenting problems: high  Diagnostic procedures: high  Management options: high           EKG    Date/Time: 11/19/2021 8:30 AM  Performed by: Ruben Cruz MD  Authorized by: Ruben Cruz MD     ECG reviewed by ED Physician in the absence of a cardiologist: no    Previous ECG:     Previous ECG:  Compared to current    Similarity:  Changes noted  Interpretation:     Interpretation: abnormal    Rate:     ECG rate:  80    ECG rate assessment: normal    Rhythm:     Rhythm: paced    ST segments:     ST segments:  Normal

## 2021-11-19 NOTE — ED TRIAGE NOTES
Patient presents with complaints of chest pain and \"jerking feeling\"  Patient noted by EMS to have pacemaker. 3 nitro enroute and 324 asa.    -cardiologist at the bedside. No STEMI noted on EKG. [employer] consulted by cardiology to look at pacemaker.

## 2021-11-19 NOTE — ED NOTES
I have reviewed discharge instructions with the patient. The patient verbalized understanding. Patient left ED via Discharge Method: ambulatory to Home with wife. Opportunity for questions and clarification provided. Patient given 0 scripts. To continue your aftercare when you leave the hospital, you may receive an automated call from our care team to check in on how you are doing. This is a free service and part of our promise to provide the best care and service to meet your aftercare needs.  If you have questions, or wish to unsubscribe from this service please call [phone]. Thank you for Choosing our [facility] Emergency Department.

## 2021-11-19 NOTE — H&P
[facility] Cardiology Initial Cardiac Evaluation                 Date of  Admission: 11/19/2021  8:15 AM     Primary Care Physician: Ronnie Latif MD  Primary Cardiologist: Dr Keegan Kyae  Referring Physician: Dr Arlin Ferraro  Attending Physician: Dr Kaylan Radford    CC: chest pain       Reginald Anderson is a 79 y.o. male  Seen in the ER for chest pain. He has a h/o htn, myasthenia gravis, PAF s/p [facility] PM in 2017, [facility] in 2019, [place] node ablation 63989, CAD s/p  PCI pLAD in 2015, [facility] 3-2021 showing small vessel disease. He had been doing well until he woke this morning at 0630 with chest pain- it felt like a \"jerking\" in his left chest. No nausea, diaphoresis or SOB. Pain was constant, severe, not radiating, associated with hiccups. No dizziness, edema, syncope, F/C. He came to the ER where EKG showed v pacing at 80. Labs pending. /85. His PM was interrogated, showed diaphragmatic pacing which was adjusted by pacer rep and chest pain resolved. Past cardiac work up:  PCI pLAD 2015  10/17: PPM placement for SSS.  5/2019: watchman device placement  [facility] 3/19/2021: small vessel CAD, no PCI.    [facility] 3/2021: normal EF, mild to mod AI.   5/2021: AVN ablation    Soc: No tobacco use  FH: Brother and sister w MI before age 72  Covid: Vaccinated w booster    Patient Active Problem List   Diagnosis Code    HTN (hypertension) I10    Coronary atherosclerosis of native coronary vessel I25.10    S/P coronary artery stent placement Z95.5    Dyspnea R06.00    Dyslipidemia E78.5    Hypokalemia E87.6    Mitral valve regurgitation I34.0    Myasthenia gravis ([address] G70.00    Bilateral carotid artery disease (HCC) I77.9    Paroxysmal atrial fibrillation (HCC) I48.0    SSS (sick sinus syndrome) ([facility]) I49.5    Syncope R55    Sepsis (HCC) A41.9    Aspiration pneumonia ([facility]) J69.0    Hypotension I95.9    GERD (gastroesophageal reflux disease) K21.9    Atrial fibrillation ([facility]) I48.91    Pacemaker Z95.0       Past Medical History:   Diagnosis Date    Abnormal EKG 4/22/15    Arrhythmia     CAD (coronary artery disease) 5/8/2015    Carotid artery stenosis without cerebral infarction 6/7/2016    US 6/15: <50% bilat ICAs    Coronary atherosclerosis of native coronary vessel 5/8/2015    VEGAS on brilinta 5/7/15: prox LAD PCI, normal EF     Dyslipidemia 6/7/2016    Dyspnea 5/8/2015    Echo 6/15: EF 60%, mod MR, mod LVH, mild AI     ED (erectile dysfunction)     GERD (gastroesophageal reflux disease)     no medication    GERD (gastroesophageal reflux disease)     HTN (hypertension) 5/8/2015    Hypertension     Hypokalemia 6/7/2016    Hypokalemia     Mitral valve regurgitation 6/7/2016    Morbid obesity ([address]     Myasthenia gravis ([address] 6/17/15    Myasthenia gravis ([address]     Nocturia     Osteoporosis     PUD (peptic ulcer disease) 25 yrs ago    S/P coronary artery stent placement 5/8/2015    3.0x38 mm Xience ADRIANNA to pLAD 5/7/15     Sleep apnea     Syncope and collapse     Unspecified sleep apnea     no cpap      Past Surgical History:   Procedure Laterality Date    HX APPENDECTOMY      HX COLONOSCOPY  2007    HX HEART CATHETERIZATION  05/27/2015    stent    HX HEART CATHETERIZATION  05/21/2019    watchman device    HX HEMORRHOIDECTOMY      HX PACEMAKER  2018   [facility] [employer]    Dr. Cervantes/Xin for sleep apnea and reconstruction for extending jaw    VASCULAR SURGERY PROCEDURE UNLIST Right 07/10/2019     Repair of right radial artery pseudoaneurysm     No Known Allergies   Family History   Problem Relation Age of Onset    Cancer Mother         kidney    Cancer Father         stomach    No Known Problems Sister     Cancer Brother         brain tumor    No Known Problems Brother       Social History     Tobacco Use    Smoking status: Never Smoker    Smokeless tobacco: Never Used   Substance Use Topics    Alcohol use: No        Current Facility-Administered Medications   Medication Dose Route Frequency    sodium chloride (NS) flush 5-10 mL  5-10 mL IntraVENous Q8H    sodium chloride (NS) flush 5-10 mL  5-10 mL IntraVENous PRN     Current Outpatient Medications   Medication Sig    lisinopriL (PRINIVIL, ZESTRIL) 10 mg tablet Take 1 Tablet by mouth two (2) times a day.  atorvastatin (LIPITOR) 80 mg tablet Take 1 Tablet by mouth nightly.  nitroglycerin (NITROSTAT) 0.4 mg SL tablet Place 1 sl under the tongue q 5 min prn cp, max 3 sl in a 15-min time period. Call 911 if no relief after the 3rd sl.  sildenafil citrate (VIAGRA) 100 mg tablet Take 1 Tab by mouth as needed for Erectile Dysfunction.  aspirin delayed-release 81 mg tablet Take 81 mg by mouth daily.  potassium chloride (KLOR-CON M20) 20 mEq tablet Take 2 Tabs by mouth daily.        Review of Symptoms:  General: no weight change,  no weakness, fever or chills  Skin: no rashes, lumps, or other skin changes  HEENT: no headache, dizziness, lightheadedness, vision changes, hearing changes, tinnitus, vertigo, sinus pressure/pain, bleeding gums, sore throat, or hoarseness  Neck: no swollen glands, goiter, pain or stiffness  Respiratory: no cough, sputum, hemoptysis, no dyspnea, wheezing  Cardiovascular: + as per HPI  Gastrointestinal: no GERD, constipation, diarrhea, liver problems, or h/o GI bleed  Urinary: no frequency, urgency , hematuria, burning/pain with urination, recent flank pain, polyuria, nocturia, or difficulty urinating  Peripheral Vascular: no claudication, leg cramps, prior DVTs, swelling of calves, legs, or feet, color change, or swelling with redness or tenderness  Musculoskeletal: no muscle or joint pain/stiffness, joint swelling, erythema of joints, or back pain  Psychiatric: no depression or excessive stress  Neurological: no sensory or motor loss, seizures, syncope, tremors, numbness, no dementia  Hematologic: no anemia, easy bruising or bleeding  Endocrine: no thyroid problems, heat or cold intolerance, excessive sweating, polyuria, polydipsia, no  diabetes. Physical Exam  Vitals:    11/19/21 0822 11/19/21 0824   BP: (!) 176/85    Pulse: 81    Resp: 16    Temp:  98.2 °F (36.8 °C)   SpO2: 95%    Weight: 98.4 kg (217 lb)        Physical Exam:  General: Well Developed, Well Nourished, No Acute Distress  Head: normocephalic atraumatic   ENT: pupils equal and round, no abnormalities noted  Neck: supple, no JVD, no carotid bruits  Heart: S1S2 with RRR without murmurs or gallops  Lungs: Clear throughout auscultation bilaterally without adventitious sounds  Abd: soft, nontender, nondistended, with good bowel sounds  Ext: warm, no edema, calves supple/nontender, pulses 2+ bilaterally  Skin: warm and dry  Psychiatric: Normal mood and affect  Neurologic: Alert and oriented X 3      Cardiographics    Telemetry: v paced      Labs: No results for input(s): NA, K, MG, BUN, CREA, GLU, WBC, HGB, HCT, PLT, INR, TRIGL, LDL, HDL, HGBEXT, HCTEXT, PLTEXT, INREXT in the last 72 hours. No lab exists for component: TROPI, TCHOL     Assessment/Plan:     Assessment:   Chest pain (11/19/2021)- probably due to diaphragmatic pacing, resolved w PM adjustment, check labs, cont home meds    HTN (hypertension) (5/8/2015)- ACE    Coronary atherosclerosis of native coronary vessel - cont ASA, statin, ACE    Paroxysmal atrial fibrillation - s/p watchman, PM and AV node ablation    Thank you very much for this referral. We appreciate the opportunity to participate in this patient's care. We will follow along with above stated plan.     Cristhian Harvey PA-C  Consulting MD: Cierra Chambers

## 2021-11-20 LAB
ATRIAL RATE: 138 BPM
CALCULATED P AXIS, ECG09: -135 DEGREES
CALCULATED R AXIS, ECG10: 126 DEGREES
CALCULATED T AXIS, ECG11: 66 DEGREES
DIAGNOSIS, 93000: NORMAL
Q-T INTERVAL, ECG07: 406 MS
QRS DURATION, ECG06: 132 MS
QTC CALCULATION (BEZET), ECG08: 456 MS
VENTRICULAR RATE, ECG03: 76 BPM

## 2021-11-20 NOTE — TELEPHONE ENCOUNTER
1924 -- Pt called and states that his PM is \"jerking\" again. States happened this AM at 0600. He came to ED and states adjustments were made to his PM. States he stayed in the ED 5-6hrs and was then discharged home as issue seemed to have resolved. However, has now returned. Instructed to return to ED for further evaluation. Pt v/u.        Linda Wright, NP-C

## 2021-11-20 NOTE — ED NOTES
I have reviewed discharge instructions with the patient. The patient verbalized understanding. Patient left ED via Discharge Method: ambulatory to Home with wife. .    Opportunity for questions and clarification provided. Patient given 0 scripts. To continue your aftercare when you leave the hospital, you may receive an automated call from our care team to check in on how you are doing. This is a free service and part of our promise to provide the best care and service to meet your aftercare needs.  If you have questions, or wish to unsubscribe from this service please call [phone]. Thank you for Choosing our [address] [place] [address] Emergency Department.

## 2021-11-20 NOTE — ED PROVIDER NOTES
Presents with complaint of pacemaker dysfunction. Patient was here earlier today and his pacemaker was evaluated and reprogrammed due to phrenic nerve stimulation. He was feeling better but then it restarted when he got home. He returns with the same problem and pacemaker was interrogated by [employer] rep prior to me seeing him. The [employer] rep turned off his LV lead and he feels much better at this time. Patient denies any significant chest pain new shortness of breath lower extremity swelling abdominal pain nausea or vomiting. Per [employer] rep pt is completely PM dependent. The history is provided by the patient. Pacemaker Problem   This is a recurrent problem. The current episode started 12 to 24 hours ago. The problem has been resolved. The problem occurs constantly. The pain is present in the epigastric region. The quality of the pain is described as tightness. Pertinent negatives include no diaphoresis, no dizziness, no fever, no leg pain, no lower extremity edema, no nausea, no near-syncope, no shortness of breath, no vomiting and no weakness.         Past Medical History:   Diagnosis Date    Abnormal EKG 4/22/15    Arrhythmia     CAD (coronary artery disease) 5/8/2015    Carotid artery stenosis without cerebral infarction 6/7/2016    US 6/15: <50% bilat ICAs    Coronary atherosclerosis of native coronary vessel 5/8/2015    VEGAS on brilinta 5/7/15: prox LAD PCI, normal EF     Dyslipidemia 6/7/2016    Dyspnea 5/8/2015    Echo 6/15: EF 60%, mod MR, mod LVH, mild AI     ED (erectile dysfunction)     GERD (gastroesophageal reflux disease)     no medication    GERD (gastroesophageal reflux disease)     HTN (hypertension) 5/8/2015    Hypertension     Hypokalemia 6/7/2016    Hypokalemia     Mitral valve regurgitation 6/7/2016    Morbid obesity ([address]     Myasthenia gravis ([address] 6/17/15    Myasthenia gravis (HCC)     Nocturia     Osteoporosis     PUD (peptic ulcer disease) 25 yrs ago  S/P coronary artery stent placement 5/8/2015    3.0x38 mm Xience ADRIANNA to pLAD 5/7/15     Sleep apnea     Syncope and collapse     Unspecified sleep apnea     no cpap       Past Surgical History:   Procedure Laterality Date    HX APPENDECTOMY      HX COLONOSCOPY  2007    HX HEART CATHETERIZATION  05/27/2015    stent    HX HEART CATHETERIZATION  05/21/2019    watchman device    HX HEMORRHOIDECTOMY      HX PACEMAKER  2018   Trell Cervantes/Xin for sleep apnea and reconstruction for extending jaw    VASCULAR SURGERY PROCEDURE UNLIST Right 07/10/2019     Repair of right radial artery pseudoaneurysm         Family History:   Problem Relation Age of Onset    Cancer Mother         kidney    Cancer Father         stomach    No Known Problems Sister     Cancer Brother         brain tumor    No Known Problems Brother        Social History     Socioeconomic History    Marital status:      Spouse name: Not on file    Number of children: Not on file    Years of education: Not on file    Highest education level: Not on file   Occupational History    Not on file   Tobacco Use    Smoking status: Never Smoker    Smokeless tobacco: Never Used   Substance and Sexual Activity    Alcohol use: No    Drug use: No    Sexual activity: Not on file   Other Topics Concern    Not on file   Social History Narrative    Not on file     Social Determinants of Health     Financial Resource Strain:     Difficulty of Paying Living Expenses: Not on file   Food Insecurity:     Worried About Running Out of Food in the Last Year: Not on file    Libby of Food in the Last Year: Not on file   Transportation Needs:     Lack of Transportation (Medical): Not on file    Lack of Transportation (Non-Medical):  Not on file   Physical Activity:     Days of Exercise per Week: Not on file    Minutes of Exercise per Session: Not on file   Stress:     Feeling of Stress : Not on file   Social Connections:     Frequency of Communication with Friends and Family: Not on file    Frequency of Social Gatherings with Friends and Family: Not on file    Attends Spiritism Services: Not on file    Active Member of Clubs or Organizations: Not on file    Attends Club or Organization Meetings: Not on file    Marital Status: Not on file   Intimate Partner Violence:     Fear of Current or Ex-Partner: Not on file    Emotionally Abused: Not on file    Physically Abused: Not on file    Sexually Abused: Not on file   Housing Stability:     Unable to Pay for Housing in the Last Year: Not on file    Number of [place] in the Last Year: Not on file    Unstable Housing in the Last Year: Not on file         ALLERGIES: Patient has no known allergies. Review of Systems   Constitutional: Negative for diaphoresis and fever. Respiratory: Negative for shortness of breath. Cardiovascular: Negative for near-syncope. Gastrointestinal: Negative for nausea and vomiting. Neurological: Negative for dizziness and weakness. All other systems reviewed and are negative. Vitals:    11/19/21 2017 11/19/21 2112   BP: (!) 238/105 (!) 178/84   Pulse: 75 64   Resp: 16    Temp: 98.1 °F (36.7 °C)    Weight: 98.4 kg (217 lb)    Height: 5' 8\" (1.727 m)             Physical Exam  Vitals and nursing note reviewed. Constitutional:       General: He is not in acute distress. Appearance: Normal appearance. He is well-developed. He is obese. He is not ill-appearing or diaphoretic. HENT:      Head: Normocephalic and atraumatic. Eyes:      General:         Right eye: No discharge. Left eye: No discharge. Conjunctiva/sclera: Conjunctivae normal.   Cardiovascular:      Rate and Rhythm: Normal rate and regular rhythm. Pulmonary:      Effort: Pulmonary effort is normal. No respiratory distress. Breath sounds: Normal breath sounds. Chest:      Chest wall: No tenderness.    Abdominal:      General: There is no distension. Palpations: Abdomen is soft. Tenderness: There is no abdominal tenderness. Musculoskeletal:         General: Normal range of motion. Cervical back: Normal range of motion and neck supple. Right lower leg: No edema. Left lower leg: No edema. Skin:     General: Skin is warm and dry. Capillary Refill: Capillary refill takes less than 2 seconds. Neurological:      General: No focal deficit present. Mental Status: He is alert and oriented to person, place, and time. Cranial Nerves: No cranial nerve deficit. Psychiatric:         Mood and Affect: Mood normal.         Behavior: Behavior normal.          MDM  Number of Diagnoses or Management Options  Pacemaker lead failure, initial encounter  Pacemaker-dependent due to native cardiac rhythm insufficient to support life  Diagnosis management comments: Discussed with Dr. Deja Billings. Patient is 100% pacemaker dependent and will need a follow-up appointment soon with Dr. Gino Harper. The [facility] also spoke with Dr. Deja Billings and will follow up on Monday with [place] office for an appointment. A electronic note was sent to the [facility] office.          Amount and/or Complexity of Data Reviewed  Clinical lab tests: ordered and reviewed  Tests in the radiology section of CPT®: ordered and reviewed  Discuss the patient with other providers: yes  Independent visualization of images, tracings, or specimens: yes (============================================  ED EKG Interpretation  EKG was interpreted in the absence of a cardiologist.    EKG rhythm: Paced rhythm  Rate: 76  ST Segments: Nonspecific ST segments - NO STEMI      Danial Tang MD; 11/19/2021 @10:37 PM================  )    Risk of Complications, Morbidity, and/or Mortality  Presenting problems: high  Diagnostic procedures: minimal  Management options: moderate    Patient Progress  Patient progress: improved         Procedures

## 2021-11-20 NOTE — ED TRIAGE NOTES
Patient states that he is having a problem with his pacemaker. States was seen here recently for same problem. States called his cardiologist and was told to come back up here. Patient is having abdominal spasms.

## 2022-02-28 ENCOUNTER — HOSPITAL ENCOUNTER (OUTPATIENT)
Dept: LAB | Age: 82
Discharge: HOME | End: 2022-02-28
Payer: MEDICARE

## 2022-02-28 DIAGNOSIS — R06.02 SOB (SHORTNESS OF BREATH): ICD-10-CM

## 2022-02-28 LAB
ALBUMIN SERPL-MCNC: 3.2 G/DL (ref 3.2–4.6)
ALBUMIN/GLOB SERPL: 0.9 {RATIO} (ref 1.2–3.5)
ALP SERPL-CCNC: 109 U/L (ref 50–136)
ALT SERPL-CCNC: 20 U/L (ref 12–65)
ANION GAP SERPL CALC-SCNC: 6 MMOL/L (ref 7–16)
AST SERPL-CCNC: 17 U/L (ref 15–37)
BILIRUB SERPL-MCNC: 0.3 MG/DL (ref 0.2–1.1)
BNP SERPL-MCNC: 626 PG/ML
BUN SERPL-MCNC: 13 MG/DL (ref 8–23)
CALCIUM SERPL-MCNC: 8.8 MG/DL (ref 8.3–10.4)
CHLORIDE SERPL-SCNC: 108 MMOL/L (ref 98–107)
CO2 SERPL-SCNC: 26 MMOL/L (ref 21–32)
CREAT SERPL-MCNC: 0.8 MG/DL (ref 0.8–1.5)
GLOBULIN SER CALC-MCNC: 3.5 G/DL (ref 2.3–3.5)
GLUCOSE SERPL-MCNC: 100 MG/DL (ref 65–100)
MAGNESIUM SERPL-MCNC: 2.2 MG/DL (ref 1.8–2.4)
POTASSIUM SERPL-SCNC: 4.3 MMOL/L (ref 3.5–5.1)
PROT SERPL-MCNC: 6.7 G/DL (ref 6.3–8.2)
SODIUM SERPL-SCNC: 140 MMOL/L (ref 138–145)
TSH SERPL DL<=0.005 MIU/L-ACNC: 1.87 UIU/ML (ref 0.36–3.74)

## 2022-02-28 PROCEDURE — 84443 ASSAY THYROID STIM HORMONE: CPT

## 2022-02-28 PROCEDURE — 83735 ASSAY OF MAGNESIUM: CPT

## 2022-02-28 PROCEDURE — 80053 COMPREHEN METABOLIC PANEL: CPT

## 2022-02-28 PROCEDURE — 83880 ASSAY OF NATRIURETIC PEPTIDE: CPT

## 2022-02-28 PROCEDURE — 36415 COLL VENOUS BLD VENIPUNCTURE: CPT

## 2022-03-02 ENCOUNTER — APPOINTMENT (OUTPATIENT)
Dept: CT IMAGING | Age: 82
End: 2022-03-02
Attending: EMERGENCY MEDICINE
Payer: MEDICARE

## 2022-03-02 VITALS
BODY MASS INDEX: 33.65 KG/M2 | HEART RATE: 70 BPM | DIASTOLIC BLOOD PRESSURE: 74 MMHG | SYSTOLIC BLOOD PRESSURE: 142 MMHG | OXYGEN SATURATION: 97 % | TEMPERATURE: 98 F | HEIGHT: 68 IN | WEIGHT: 222 LBS | RESPIRATION RATE: 18 BRPM

## 2022-03-02 PROBLEM — F33.0 MAJOR DEPRESSIVE DISORDER, RECURRENT, MILD (HCC): Status: ACTIVE | Noted: 2022-03-02

## 2022-03-02 PROBLEM — I50.32 DIASTOLIC CHF, CHRONIC (HCC): Status: ACTIVE | Noted: 2022-03-02

## 2022-03-02 PROBLEM — F33.1 MAJOR DEPRESSIVE DISORDER, RECURRENT, MODERATE (HCC): Status: ACTIVE | Noted: 2022-03-02

## 2022-03-02 PROBLEM — F33.9 MAJOR DEPRESSIVE DISORDER, RECURRENT, UNSPECIFIED (HCC): Status: ACTIVE | Noted: 2022-03-02

## 2022-03-02 PROCEDURE — 99284 EMERGENCY DEPT VISIT MOD MDM: CPT

## 2022-03-02 PROCEDURE — 70450 CT HEAD/BRAIN W/O DYE: CPT

## 2022-03-02 PROCEDURE — 72125 CT NECK SPINE W/O DYE: CPT

## 2022-03-02 PROCEDURE — 90471 IMMUNIZATION ADMIN: CPT

## 2022-03-03 ENCOUNTER — HOSPITAL ENCOUNTER (EMERGENCY)
Age: 82
Discharge: HOME | End: 2022-03-03
Attending: EMERGENCY MEDICINE
Payer: MEDICARE
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
## 2022-03-03 DIAGNOSIS — S00.83XA CONTUSION OF FACE, INITIAL ENCOUNTER: ICD-10-CM

## 2022-03-03 DIAGNOSIS — S00.31XA ABRASION OF NOSE, INITIAL ENCOUNTER: ICD-10-CM

## 2022-03-03 PROCEDURE — 90715 TDAP VACCINE 7 YRS/> IM: CPT | Performed by: EMERGENCY MEDICINE

## 2022-03-03 PROCEDURE — 74011250636 HC RX REV CODE- 250/636: Performed by: EMERGENCY MEDICINE

## 2022-03-03 PROCEDURE — 90471 IMMUNIZATION ADMIN: CPT

## 2022-03-03 RX ADMIN — TETANUS TOXOID, REDUCED DIPHTHERIA TOXOID AND ACELLULAR PERTUSSIS VACCINE, ADSORBED 0.5 ML: 5; 2.5; 8; 8; 2.5 SUSPENSION INTRAMUSCULAR at 02:08

## 2022-03-03 NOTE — ED TRIAGE NOTES
Patient ambulatory to triage with mask in place accompanied by wife. Pt reports that he was walking into the [place] and tripped over a parking barrier and hit his forehead and his nose. Pt is only on Aspirin. Pt wife denies the patient passing out or losing consciousness. Pt reports he occasionally has tingling in his forehead, but he denies pain.

## 2022-03-03 NOTE — ED NOTES
I have reviewed discharge instructions with the patient. The patient verbalized understanding. Patient left ED via Discharge Method: ambulatory to Home with self. Opportunity for questions and clarification provided. Patient given 0 scripts. To continue your aftercare when you leave the hospital, you may receive an automated call from our care team to check in on how you are doing. This is a free service and part of our promise to provide the best care and service to meet your aftercare needs.  If you have questions, or wish to unsubscribe from this service please call [phone]. Thank you for Choosing our [facility] Emergency Department.

## 2022-03-03 NOTE — ED PROVIDER NOTES
81 Danielle Alcocer is a 79 y.o. male seen on 3/3/2022 in the [facility] EMERGENCY DEPT in room ER16/16. Chief Complaint   Patient presents with   [facility] Fall     HPI: 48-year-old  male presented emergency department for evaluation after tripping and falling just prior to arrival.  Patient was not on blood thinners. Patient tripped over a parking barrier and fell directly onto his face and hit his glasses on the ground. Patient with abrasion to the bridge of his nose. Patient complains of mild tenderness to the bridge of his nose and mild pain to his anterior forehead. Patient did not lose consciousness. He denies any other injuries. He denies any headache, neck pain, nausea, vomiting, chest pain, abdominal pain or any other concerns. Patient tetanus shot is not up-to-date. Historian: Patient    REVIEW OF SYSTEMS     Review of Systems   Constitutional: Negative. HENT: Negative. Respiratory: Negative. Cardiovascular: Negative. Genitourinary: Negative. Musculoskeletal: Negative. Skin: Positive for wound. Neurological: Negative. Psychiatric/Behavioral: Negative. All other systems reviewed and are negative.       PAST MEDICAL HISTORY     Past Medical History:   Diagnosis Date    Abnormal EKG 4/22/15    Arrhythmia     CAD (coronary artery disease) 5/8/2015    Carotid artery stenosis without cerebral infarction 6/7/2016    US 6/15: <50% bilat ICAs    Coronary atherosclerosis of native coronary vessel 5/8/2015    VEGAS on brilinta 5/7/15: prox LAD PCI, normal EF     Diastolic CHF, chronic ([address] 3/2/2022    Dyslipidemia 6/7/2016    Dyspnea 5/8/2015    Echo 6/15: EF 60%, mod MR, mod LVH, mild AI     ED (erectile dysfunction)     GERD (gastroesophageal reflux disease)     no medication    GERD (gastroesophageal reflux disease)     HTN (hypertension) 5/8/2015    Hypertension     Hypokalemia 6/7/2016    Hypokalemia     Mitral valve regurgitation 6/7/2016    Morbid obesity [facility]     Myasthenia gravis ([facility] [facility] 6/17/15    Myasthenia gravis (HCC)     Nocturia     Osteoporosis     PUD (peptic ulcer disease) 25 yrs ago    S/P coronary artery stent placement 5/8/2015    3.0x38 mm Xience ADRIANNA to pLAD 5/7/15     Sleep apnea     Syncope and collapse     Unspecified sleep apnea     no cpap     Past Surgical History:   Procedure Laterality Date    HX APPENDECTOMY      HX COLONOSCOPY  2007    HX HEART CATHETERIZATION  05/27/2015    stent    HX HEART CATHETERIZATION  05/21/2019    watchman device    HX HEMORRHOIDECTOMY      HX PACEMAKER  2018   Lorene Cervantes/Xin for sleep apnea and reconstruction for extending jaw    VASCULAR SURGERY PROCEDURE UNLIST Right 07/10/2019     Repair of right radial artery pseudoaneurysm     Social History     Socioeconomic History    Marital status:    Tobacco Use    Smoking status: Never Smoker    Smokeless tobacco: Never Used   Substance and Sexual Activity    Alcohol use: No    Drug use: No     Prior to Admission Medications   Prescriptions Last Dose Informant Patient Reported? Taking?   aspirin delayed-release 81 mg tablet   Yes No   Sig: Take 81 mg by mouth daily. atorvastatin (LIPITOR) 80 mg tablet   No No   Sig: Take 1 Tablet by mouth nightly. escitalopram oxalate (LEXAPRO) 10 mg tablet   No No   Sig: Take 1 Tablet by mouth daily. furosemide (LASIX) 40 mg tablet   No No   Sig: Take 1 Tablet by mouth daily. lisinopriL (PRINIVIL, ZESTRIL) 10 mg tablet   No No   Sig: Take 1 Tablet by mouth two (2) times a day. nitroglycerin (NITROSTAT) 0.4 mg SL tablet   No No   Sig: Place 1 sl under the tongue q 5 min prn cp, max 3 sl in a 15-min time period. Call 911 if no relief after the 3rd sl. potassium chloride (KLOR-CON M20) 20 mEq tablet   No No   Sig: Take 2 Tabs by mouth daily.    sildenafil citrate (VIAGRA) 100 mg tablet   No No   Sig: Take 1 Tab by mouth as needed for Erectile Dysfunction. Facility-Administered Medications: None     No Known Allergies     PHYSICAL EXAM       Vitals:    03/02/22 2213 03/02/22 2258   BP: (!) 167/86 (!) 142/74   Pulse: 76 70   Resp: 18    Temp: 98 °F (36.7 °C)    SpO2: 97% 97%    Vital signs were reviewed. Physical Exam  Vitals and nursing note reviewed. Constitutional:       Appearance: Normal appearance. HENT:      Head: Normocephalic and atraumatic. Nose:      Comments: Abrasion to the bridge of the nose without obvious deformity or bleeding. No septal hematoma     Mouth/Throat:      Mouth: Mucous membranes are moist.   Eyes:      Extraocular Movements: Extraocular movements intact. Pupils: Pupils are equal, round, and reactive to light. Cardiovascular:      Rate and Rhythm: Normal rate and regular rhythm. Pulses: Normal pulses. Heart sounds: Normal heart sounds. Pulmonary:      Effort: Pulmonary effort is normal.      Breath sounds: Normal breath sounds. Abdominal:      Palpations: Abdomen is soft. Tenderness: There is no abdominal tenderness. Musculoskeletal:         General: Normal range of motion. Cervical back: Normal range of motion. Skin:     General: Skin is warm and dry. Neurological:      General: No focal deficit present. Mental Status: He is alert and oriented to person, place, and time. Psychiatric:         Mood and Affect: Mood normal.         Behavior: Behavior normal.         Thought Content: Thought content normal.         Judgment: Judgment normal.          MEDICAL DECISION MAKING     ED Course:    Orders Placed This Encounter    CT HEAD WO CONT    CT SPINE CERV WO CONT    diph,Pertuss(AC),Tet Vac-PF (BOOSTRIX) suspension 0.5 mL     No results found for this or any previous visit (from the past 8 hour(s)). CT HEAD WO CONT    Result Date: 3/2/2022  Noncontrast CT of the brain. COMPARISON: none INDICATION: Head trauma. Fall, hit forehead. Nose injury. TECHNIQUE: Contiguous axial images were obtained from the skull base through the vertex without IV contrast. Radiation dose reduction techniques were used for this study:  Our CT scanners use one or all of the following: Automated exposure control, adjustment of the mA and/or kVp according to patient's size, iterative reconstruction. FINDINGS: There is no acute intracranial hemorrhage or evidence for acute territorial infarction. There is no mass effect, midline shift or hydrocephalus. There is no extra-axial fluid collection. The cerebellum and brainstem are grossly unremarkable. Periventricular diffuse hypodensities are nonspecific and likely secondary to chronic small vessel changes. Included globes appear intact. Orbital walls are intact. The nasal bone and the zygomatic arches are intact. Mild mucosal thickening of the right maxillary sinus. Rest of the paranasal sinuses and the mastoid air cells are aerated. No skull fracture. 1. No CT evidence of acute intracranial process. CT SPINE CERV WO CONT    Result Date: 3/2/2022  CT cervical spine INDICATION: Head trauma. Fall, hit forehead. Nose injury. TECHNIQUE: Contiguous axial images from the skull base through the superior mediastinum were obtained with coronal and sagittal reformations. Radiation dose reduction techniques were used for this study:  Our CT scanners use one or all of the following: Automated exposure control, adjustment of the mA and/or kVp according to patient's size, iterative reconstruction. FINDINGS: Alignment of the cervical spine is maintained. There is no acute fracture or dislocation. Prevertebral soft tissue and the craniocervical junction are unremarkable. C1-C2 articulation is maintained. The right posterior arch of C1 appears hypoplastic. Multilevel disc height loss, most pronounced from C5 to T1. Anterior/posterior bone spurs and facet arthropathy are noted at multiple levels.  The posterior bone spurs result in neural foraminal canal narrowing. Visualized lung apices are unremarkable. 1. No acute fracture or dislocation in the cervical spine. 2. Moderate degenerative changes. MDM  Number of Diagnoses or Management Options  Abrasion of nose, initial encounter  Contusion of face, initial encounter  Fall from slip, trip, or stumble, initial encounter  Diagnosis management comments: 76-year-old  male presented emergency department for evaluation after trip and fall. Patient with small abrasion to the bridge of his nose. Patient's tetanus shot was up-to-date and it was updated in the emergency department today. Patient's CT head and neck are negative for acute injury. Patient will be discharged home with follow-up with his family doctor. Amount and/or Complexity of Data Reviewed  Tests in the radiology section of CPT®: reviewed    Patient Progress  Patient progress: stable      Disposition:   Discharged  Diagnosis:     ICD-10-CM ICD-9-CM   1. Fall from slip, trip, or stumble, initial encounter  W01. 0XXA E885.9   2. Contusion of face, initial encounter  S00.83XA 920   3. Abrasion of nose, initial encounter  S00.31XA 910.0     ____________________________________________________________________  A portion of this note was generated using voice recognition dictation software. While the note has been reviewed for accuracy, please note certain words and phrases may not be transcribed as intended and some grammatical and/or typographical errors may be present.

## 2022-03-18 PROBLEM — R55 SYNCOPE: Status: ACTIVE | Noted: 2017-10-24

## 2022-03-18 PROBLEM — I48.0 PAROXYSMAL ATRIAL FIBRILLATION (HCC): Status: ACTIVE | Noted: 2017-06-30

## 2022-03-18 PROBLEM — Z95.0 PACEMAKER: Status: ACTIVE | Noted: 2021-04-14

## 2022-03-18 PROBLEM — J69.0 ASPIRATION PNEUMONIA (HCC): Status: ACTIVE | Noted: 2017-10-26

## 2022-03-19 PROBLEM — F33.9 MAJOR DEPRESSIVE DISORDER, RECURRENT, UNSPECIFIED (HCC): Status: ACTIVE | Noted: 2022-03-02

## 2022-03-19 PROBLEM — I49.5 SSS (SICK SINUS SYNDROME) (HCC): Status: ACTIVE | Noted: 2017-06-30

## 2022-03-19 PROBLEM — I48.91 ATRIAL FIBRILLATION (HCC): Status: ACTIVE | Noted: 2017-11-29

## 2022-03-19 PROBLEM — F33.0 MAJOR DEPRESSIVE DISORDER, RECURRENT, MILD (HCC): Status: ACTIVE | Noted: 2022-03-02

## 2022-03-19 PROBLEM — I77.9 BILATERAL CAROTID ARTERY DISEASE (HCC): Status: ACTIVE | Noted: 2017-06-30

## 2022-03-19 PROBLEM — F33.1 MAJOR DEPRESSIVE DISORDER, RECURRENT, MODERATE (HCC): Status: ACTIVE | Noted: 2022-03-02

## 2022-03-19 PROBLEM — R07.9 CHEST PAIN: Status: ACTIVE | Noted: 2021-11-19

## 2022-03-19 PROBLEM — I95.9 HYPOTENSION: Status: ACTIVE | Noted: 2017-10-26

## 2022-03-19 PROBLEM — I50.32 DIASTOLIC CHF, CHRONIC (HCC): Status: ACTIVE | Noted: 2022-03-02

## 2022-03-19 PROBLEM — A41.9 SEPSIS (HCC): Status: ACTIVE | Noted: 2017-10-26

## 2022-03-19 PROBLEM — K21.9 GERD (GASTROESOPHAGEAL REFLUX DISEASE): Status: ACTIVE | Noted: 2017-10-27

## 2022-03-27 ENCOUNTER — APPOINTMENT (OUTPATIENT)
Dept: CT IMAGING | Age: 82
DRG: 853 | End: 2022-03-27
Attending: EMERGENCY MEDICINE
Payer: MEDICARE

## 2022-03-27 ENCOUNTER — APPOINTMENT (OUTPATIENT)
Dept: GENERAL RADIOLOGY | Age: 82
DRG: 853 | End: 2022-03-27
Attending: EMERGENCY MEDICINE
Payer: MEDICARE

## 2022-03-27 ENCOUNTER — HOSPITAL ENCOUNTER (INPATIENT)
Age: 82
LOS: 10 days | Discharge: NURSING, REHAB OR LONG-TERM CARE | DRG: 853 | End: 2022-04-07
Attending: EMERGENCY MEDICINE | Admitting: FAMILY MEDICINE
Payer: MEDICARE

## 2022-03-27 ENCOUNTER — APPOINTMENT (OUTPATIENT)
Dept: ULTRASOUND IMAGING | Age: 82
DRG: 853 | End: 2022-03-27
Attending: EMERGENCY MEDICINE
Payer: MEDICARE

## 2022-03-27 DIAGNOSIS — Z95.0 PACEMAKER: ICD-10-CM

## 2022-03-27 DIAGNOSIS — Z95.5 S/P CORONARY ARTERY STENT PLACEMENT: ICD-10-CM

## 2022-03-27 DIAGNOSIS — R33.9 URINARY RETENTION: ICD-10-CM

## 2022-03-27 DIAGNOSIS — R78.81 BACTEREMIA: ICD-10-CM

## 2022-03-27 DIAGNOSIS — I49.5 SSS (SICK SINUS SYNDROME) (HCC): ICD-10-CM

## 2022-03-27 DIAGNOSIS — I25.10 ATHEROSCLEROSIS OF NATIVE CORONARY ARTERY OF NATIVE HEART WITHOUT ANGINA PECTORIS: ICD-10-CM

## 2022-03-27 DIAGNOSIS — K80.00 CALCULUS OF GALLBLADDER WITH ACUTE CHOLECYSTITIS WITHOUT OBSTRUCTION: Primary | ICD-10-CM

## 2022-03-27 DIAGNOSIS — I10 PRIMARY HYPERTENSION: Chronic | ICD-10-CM

## 2022-03-27 DIAGNOSIS — K81.0 ACUTE CHOLECYSTITIS: ICD-10-CM

## 2022-03-27 DIAGNOSIS — R93.5 ABNORMAL CT OF THE ABDOMEN: ICD-10-CM

## 2022-03-27 DIAGNOSIS — R93.2 ABNORMAL GALLBLADDER ULTRASOUND: ICD-10-CM

## 2022-03-27 DIAGNOSIS — K85.10 ACUTE GALLSTONE PANCREATITIS: ICD-10-CM

## 2022-03-27 DIAGNOSIS — I50.32 DIASTOLIC CHF, CHRONIC (HCC): ICD-10-CM

## 2022-03-27 DIAGNOSIS — I48.19 PERSISTENT ATRIAL FIBRILLATION (HCC): ICD-10-CM

## 2022-03-27 LAB
ALBUMIN SERPL-MCNC: 3 G/DL (ref 3.2–4.6)
ALBUMIN/GLOB SERPL: 0.8 {RATIO} (ref 1.2–3.5)
ALP SERPL-CCNC: 198 U/L (ref 50–136)
ALT SERPL-CCNC: 55 U/L (ref 12–65)
ANION GAP SERPL CALC-SCNC: 11 MMOL/L (ref 7–16)
AST SERPL-CCNC: 127 U/L (ref 15–37)
BASOPHILS # BLD: 0 K/UL (ref 0–0.2)
BASOPHILS NFR BLD: 0 % (ref 0–2)
BILIRUB SERPL-MCNC: 0.5 MG/DL (ref 0.2–1.1)
BILIRUB UR QL: NEGATIVE
BNP SERPL-MCNC: 784 PG/ML
BUN SERPL-MCNC: 11 MG/DL (ref 8–23)
CALCIUM SERPL-MCNC: 8.8 MG/DL (ref 8.3–10.4)
CHLORIDE SERPL-SCNC: 103 MMOL/L (ref 98–107)
CO2 SERPL-SCNC: 24 MMOL/L (ref 21–32)
CREAT SERPL-MCNC: 1 MG/DL (ref 0.8–1.5)
DIFFERENTIAL METHOD BLD: ABNORMAL
EOSINOPHIL # BLD: 0.1 K/UL (ref 0–0.8)
EOSINOPHIL NFR BLD: 0 % (ref 0.5–7.8)
ERYTHROCYTE [DISTWIDTH] IN BLOOD BY AUTOMATED COUNT: 20.4 % (ref 11.9–14.6)
GLOBULIN SER CALC-MCNC: 3.6 G/DL (ref 2.3–3.5)
GLUCOSE SERPL-MCNC: 145 MG/DL (ref 65–100)
GLUCOSE UR QL STRIP.AUTO: 100 MG/DL
HCT VFR BLD AUTO: 27.9 % (ref 41.1–50.3)
HGB BLD-MCNC: 7.2 G/DL (ref 13.6–17.2)
IMM GRANULOCYTES # BLD AUTO: 0.1 K/UL (ref 0–0.5)
IMM GRANULOCYTES NFR BLD AUTO: 0 % (ref 0–5)
KETONES UR-MCNC: NEGATIVE MG/DL
LACTATE SERPL-SCNC: 4.9 MMOL/L (ref 0.4–2)
LEUKOCYTE ESTERASE UR QL STRIP: NEGATIVE
LIPASE SERPL-CCNC: 2029 U/L (ref 73–393)
LYMPHOCYTES # BLD: 0.8 K/UL (ref 0.5–4.6)
LYMPHOCYTES NFR BLD: 4 % (ref 13–44)
MCH RBC QN AUTO: 16.4 PG (ref 26.1–32.9)
MCHC RBC AUTO-ENTMCNC: 25.8 G/DL (ref 31.4–35)
MCV RBC AUTO: 63.6 FL (ref 79.6–97.8)
MONOCYTES # BLD: 0.9 K/UL (ref 0.1–1.3)
MONOCYTES NFR BLD: 5 % (ref 4–12)
NEUTS SEG # BLD: 18.3 K/UL (ref 1.7–8.2)
NEUTS SEG NFR BLD: 91 % (ref 43–78)
NITRITE UR QL: NEGATIVE
NRBC # BLD: 0.03 K/UL (ref 0–0.2)
PH UR: 5.5 [PH] (ref 5–9)
PLATELET # BLD AUTO: 217 K/UL (ref 150–450)
PMV BLD AUTO: 8.9 FL (ref 9.4–12.3)
POTASSIUM SERPL-SCNC: 4.1 MMOL/L (ref 3.5–5.1)
PROCALCITONIN SERPL-MCNC: 0.98 NG/ML (ref 0–0.49)
PROT SERPL-MCNC: 6.6 G/DL (ref 6.3–8.2)
PROT UR QL: NEGATIVE MG/DL
RBC # BLD AUTO: 4.39 M/UL (ref 4.23–5.6)
RBC # UR STRIP: NEGATIVE /UL
SERVICE CMNT-IMP: ABNORMAL
SODIUM SERPL-SCNC: 138 MMOL/L (ref 138–145)
SP GR UR: 1.02 (ref 1–1.02)
TROPONIN-HIGH SENSITIVITY: 36.5 PG/ML (ref 0–14)
UROBILINOGEN UR QL: 1 EU/DL (ref 0.2–1)
WBC # BLD AUTO: 20.2 K/UL (ref 4.3–11.1)

## 2022-03-27 PROCEDURE — 87205 SMEAR GRAM STAIN: CPT

## 2022-03-27 PROCEDURE — 83605 ASSAY OF LACTIC ACID: CPT

## 2022-03-27 PROCEDURE — 74011250636 HC RX REV CODE- 250/636: Performed by: EMERGENCY MEDICINE

## 2022-03-27 PROCEDURE — 85025 COMPLETE CBC W/AUTO DIFF WBC: CPT

## 2022-03-27 PROCEDURE — 84145 PROCALCITONIN (PCT): CPT

## 2022-03-27 PROCEDURE — 81003 URINALYSIS AUTO W/O SCOPE: CPT

## 2022-03-27 PROCEDURE — 83690 ASSAY OF LIPASE: CPT

## 2022-03-27 PROCEDURE — 87150 DNA/RNA AMPLIFIED PROBE: CPT

## 2022-03-27 PROCEDURE — 74177 CT ABD & PELVIS W/CONTRAST: CPT

## 2022-03-27 PROCEDURE — 84484 ASSAY OF TROPONIN QUANT: CPT

## 2022-03-27 PROCEDURE — 87186 SC STD MICRODIL/AGAR DIL: CPT

## 2022-03-27 PROCEDURE — 96375 TX/PRO/DX INJ NEW DRUG ADDON: CPT

## 2022-03-27 PROCEDURE — 87040 BLOOD CULTURE FOR BACTERIA: CPT

## 2022-03-27 PROCEDURE — 96374 THER/PROPH/DIAG INJ IV PUSH: CPT

## 2022-03-27 PROCEDURE — 74011000258 HC RX REV CODE- 258: Performed by: EMERGENCY MEDICINE

## 2022-03-27 PROCEDURE — 76705 ECHO EXAM OF ABDOMEN: CPT

## 2022-03-27 PROCEDURE — 71045 X-RAY EXAM CHEST 1 VIEW: CPT

## 2022-03-27 PROCEDURE — 74011000636 HC RX REV CODE- 636: Performed by: EMERGENCY MEDICINE

## 2022-03-27 PROCEDURE — 83880 ASSAY OF NATRIURETIC PEPTIDE: CPT

## 2022-03-27 PROCEDURE — 93005 ELECTROCARDIOGRAM TRACING: CPT | Performed by: EMERGENCY MEDICINE

## 2022-03-27 PROCEDURE — 2709999900 HC NON-CHARGEABLE SUPPLY

## 2022-03-27 PROCEDURE — 99285 EMERGENCY DEPT VISIT HI MDM: CPT

## 2022-03-27 PROCEDURE — 36415 COLL VENOUS BLD VENIPUNCTURE: CPT

## 2022-03-27 PROCEDURE — 80053 COMPREHEN METABOLIC PANEL: CPT

## 2022-03-27 PROCEDURE — 87077 CULTURE AEROBIC IDENTIFY: CPT

## 2022-03-27 RX ORDER — METRONIDAZOLE 500 MG/100ML
500 INJECTION, SOLUTION INTRAVENOUS ONCE
Status: COMPLETED | OUTPATIENT
Start: 2022-03-27 | End: 2022-03-28

## 2022-03-27 RX ORDER — SODIUM CHLORIDE 0.9 % (FLUSH) 0.9 %
5-10 SYRINGE (ML) INJECTION EVERY 8 HOURS
Status: DISCONTINUED | OUTPATIENT
Start: 2022-03-27 | End: 2022-04-07 | Stop reason: HOSPADM

## 2022-03-27 RX ORDER — ONDANSETRON 2 MG/ML
4 INJECTION INTRAMUSCULAR; INTRAVENOUS
Status: COMPLETED | OUTPATIENT
Start: 2022-03-27 | End: 2022-03-27

## 2022-03-27 RX ORDER — SODIUM CHLORIDE 0.9 % (FLUSH) 0.9 %
10 SYRINGE (ML) INJECTION
Status: COMPLETED | OUTPATIENT
Start: 2022-03-27 | End: 2022-03-27

## 2022-03-27 RX ORDER — MORPHINE SULFATE 4 MG/ML
4 INJECTION INTRAVENOUS
Status: COMPLETED | OUTPATIENT
Start: 2022-03-27 | End: 2022-03-27

## 2022-03-27 RX ORDER — SODIUM CHLORIDE 0.9 % (FLUSH) 0.9 %
5-10 SYRINGE (ML) INJECTION AS NEEDED
Status: DISCONTINUED | OUTPATIENT
Start: 2022-03-27 | End: 2022-04-07 | Stop reason: HOSPADM

## 2022-03-27 RX ADMIN — ONDANSETRON 4 MG: 2 INJECTION INTRAMUSCULAR; INTRAVENOUS at 22:35

## 2022-03-27 RX ADMIN — SODIUM CHLORIDE 100 ML: 9 INJECTION, SOLUTION INTRAVENOUS at 21:28

## 2022-03-27 RX ADMIN — MORPHINE SULFATE 4 MG: 4 INJECTION INTRAVENOUS at 22:35

## 2022-03-27 RX ADMIN — IOPAMIDOL 100 ML: 755 INJECTION, SOLUTION INTRAVENOUS at 21:28

## 2022-03-27 RX ADMIN — SODIUM CHLORIDE 500 ML: 900 INJECTION, SOLUTION INTRAVENOUS at 22:33

## 2022-03-27 RX ADMIN — Medication 10 ML: at 21:28

## 2022-03-27 NOTE — ED TRIAGE NOTES
Pt presents from home via [facility] with multiple complaints. Pt states during Baptism his heart started beating fast and abd pain. Now states low abd pain just above the pelvic bone. Unable to state the last time he voided. Last bowel movement this am. Also c/o being cold. Per EMS, the heat was on full in the home.

## 2022-03-28 ENCOUNTER — ANESTHESIA EVENT (OUTPATIENT)
Dept: SURGERY | Age: 82
DRG: 853 | End: 2022-03-28
Payer: MEDICARE

## 2022-03-28 PROBLEM — D64.9 ANEMIA: Status: ACTIVE | Noted: 2022-03-28

## 2022-03-28 PROBLEM — K81.0 ACUTE CHOLECYSTITIS: Status: ACTIVE | Noted: 2022-03-28

## 2022-03-28 PROBLEM — R78.81 BACTEREMIA: Status: ACTIVE | Noted: 2022-03-28

## 2022-03-28 PROBLEM — K85.10 PANCREATITIS, GALLSTONE: Status: ACTIVE | Noted: 2022-03-28

## 2022-03-28 LAB
ACC. NO. FROM MICRO ORDER, ACCP: ABNORMAL
ALBUMIN SERPL-MCNC: 2.7 G/DL (ref 3.2–4.6)
ALBUMIN/GLOB SERPL: 0.8 {RATIO} (ref 1.2–3.5)
ALP SERPL-CCNC: 165 U/L (ref 50–136)
ALT SERPL-CCNC: 53 U/L (ref 12–65)
ANION GAP SERPL CALC-SCNC: 9 MMOL/L (ref 7–16)
AST SERPL-CCNC: 68 U/L (ref 15–37)
ATRIAL RATE: 0 BPM
BASOPHILS # BLD: 0.1 K/UL (ref 0–0.2)
BASOPHILS NFR BLD: 0 % (ref 0–2)
BILIRUB SERPL-MCNC: 0.5 MG/DL (ref 0.2–1.1)
BUN SERPL-MCNC: 15 MG/DL (ref 8–23)
CALCIUM SERPL-MCNC: 8.3 MG/DL (ref 8.3–10.4)
CALCULATED P AXIS, ECG09: 0 DEGREES
CALCULATED R AXIS, ECG10: -87 DEGREES
CALCULATED T AXIS, ECG11: 81 DEGREES
CHLORIDE SERPL-SCNC: 106 MMOL/L (ref 98–107)
CO2 SERPL-SCNC: 24 MMOL/L (ref 21–32)
CREAT SERPL-MCNC: 1.4 MG/DL (ref 0.8–1.5)
DIAGNOSIS, 93000: NORMAL
DIFFERENTIAL METHOD BLD: ABNORMAL
EOSINOPHIL # BLD: 0.1 K/UL (ref 0–0.8)
EOSINOPHIL NFR BLD: 0 % (ref 0.5–7.8)
ERYTHROCYTE [DISTWIDTH] IN BLOOD BY AUTOMATED COUNT: 20.1 % (ref 11.9–14.6)
FERRITIN SERPL-MCNC: 14 NG/ML (ref 8–388)
FOLATE SERPL-MCNC: 6 NG/ML (ref 3.1–17.5)
GLOBULIN SER CALC-MCNC: 3.3 G/DL (ref 2.3–3.5)
GLUCOSE SERPL-MCNC: 142 MG/DL (ref 65–100)
HCT VFR BLD AUTO: 24.1 % (ref 41.1–50.3)
HCT VFR BLD AUTO: 25.4 % (ref 41.1–50.3)
HGB BLD-MCNC: 6.2 G/DL (ref 13.6–17.2)
HGB BLD-MCNC: 6.4 G/DL (ref 13.6–17.2)
HGB RETIC QN AUTO: 15 PG (ref 29–35)
HISTORY CHECKED?,CKHIST: NORMAL
IMM GRANULOCYTES # BLD AUTO: 0.3 K/UL (ref 0–0.5)
IMM GRANULOCYTES NFR BLD AUTO: 1 % (ref 0–5)
IMM RETICS NFR: 34.4 % (ref 2.3–13.4)
INTERPRETATION: ABNORMAL
IRON SATN MFR SERPL: 2 %
IRON SERPL-MCNC: 10 UG/DL (ref 35–150)
KLEBSIELLA PNEUMONIAE: DETECTED
KPC (CARBAPENEM RESISTANCE GENE): NOT DETECTED
LACTATE SERPL-SCNC: 3 MMOL/L (ref 0.4–2)
LACTATE SERPL-SCNC: 3.3 MMOL/L (ref 0.4–2)
LYMPHOCYTES # BLD: 0.9 K/UL (ref 0.5–4.6)
LYMPHOCYTES NFR BLD: 3 % (ref 13–44)
MCH RBC QN AUTO: 16 PG (ref 26.1–32.9)
MCHC RBC AUTO-ENTMCNC: 25.2 G/DL (ref 31.4–35)
MCV RBC AUTO: 63.3 FL (ref 79.6–97.8)
MONOCYTES # BLD: 1.6 K/UL (ref 0.1–1.3)
MONOCYTES NFR BLD: 6 % (ref 4–12)
NEUTS SEG # BLD: 25.4 K/UL (ref 1.7–8.2)
NEUTS SEG NFR BLD: 89 % (ref 43–78)
NRBC # BLD: 0.03 K/UL (ref 0–0.2)
P-R INTERVAL, ECG05: 53 MS
PLATELET # BLD AUTO: 200 K/UL (ref 150–450)
PMV BLD AUTO: 8.9 FL (ref 9.4–12.3)
POTASSIUM SERPL-SCNC: 4.4 MMOL/L (ref 3.5–5.1)
PROT SERPL-MCNC: 6 G/DL (ref 6.3–8.2)
Q-T INTERVAL, ECG07: 388 MS
QRS DURATION, ECG06: 151 MS
QTC CALCULATION (BEZET), ECG08: 442 MS
RBC # BLD AUTO: 4.01 M/UL (ref 4.23–5.6)
RETICS # AUTO: 0.06 M/UL (ref 0.03–0.1)
RETICS/RBC NFR AUTO: 1.7 % (ref 0.3–2)
SODIUM SERPL-SCNC: 139 MMOL/L (ref 138–145)
TIBC SERPL-MCNC: 440 UG/DL (ref 250–450)
VENTRICULAR RATE, ECG03: 78 BPM
VIT B12 SERPL-MCNC: 270 PG/ML (ref 193–986)
WBC # BLD AUTO: 28.4 K/UL (ref 4.3–11.1)

## 2022-03-28 PROCEDURE — P9016 RBC LEUKOCYTES REDUCED: HCPCS

## 2022-03-28 PROCEDURE — 85018 HEMOGLOBIN: CPT

## 2022-03-28 PROCEDURE — 99223 1ST HOSP IP/OBS HIGH 75: CPT | Performed by: SURGERY

## 2022-03-28 PROCEDURE — 74011000258 HC RX REV CODE- 258: Performed by: EMERGENCY MEDICINE

## 2022-03-28 PROCEDURE — 80053 COMPREHEN METABOLIC PANEL: CPT

## 2022-03-28 PROCEDURE — 65270000029 HC RM PRIVATE

## 2022-03-28 PROCEDURE — 74011250636 HC RX REV CODE- 250/636: Performed by: EMERGENCY MEDICINE

## 2022-03-28 PROCEDURE — 87040 BLOOD CULTURE FOR BACTERIA: CPT

## 2022-03-28 PROCEDURE — 86923 COMPATIBILITY TEST ELECTRIC: CPT

## 2022-03-28 PROCEDURE — 30233N1 TRANSFUSION OF NONAUTOLOGOUS RED BLOOD CELLS INTO PERIPHERAL VEIN, PERCUTANEOUS APPROACH: ICD-10-PCS | Performed by: INTERNAL MEDICINE

## 2022-03-28 PROCEDURE — 82728 ASSAY OF FERRITIN: CPT

## 2022-03-28 PROCEDURE — 36430 TRANSFUSION BLD/BLD COMPNT: CPT

## 2022-03-28 PROCEDURE — 74011250636 HC RX REV CODE- 250/636: Performed by: FAMILY MEDICINE

## 2022-03-28 PROCEDURE — 74011250636 HC RX REV CODE- 250/636: Performed by: INTERNAL MEDICINE

## 2022-03-28 PROCEDURE — 77010033678 HC OXYGEN DAILY

## 2022-03-28 PROCEDURE — 2709999900 HC NON-CHARGEABLE SUPPLY

## 2022-03-28 PROCEDURE — 99222 1ST HOSP IP/OBS MODERATE 55: CPT | Performed by: INTERNAL MEDICINE

## 2022-03-28 PROCEDURE — 74011000250 HC RX REV CODE- 250: Performed by: FAMILY MEDICINE

## 2022-03-28 PROCEDURE — 82746 ASSAY OF FOLIC ACID SERUM: CPT

## 2022-03-28 PROCEDURE — 82607 VITAMIN B-12: CPT

## 2022-03-28 PROCEDURE — 83605 ASSAY OF LACTIC ACID: CPT

## 2022-03-28 PROCEDURE — 74011000250 HC RX REV CODE- 250: Performed by: INTERNAL MEDICINE

## 2022-03-28 PROCEDURE — 99222 1ST HOSP IP/OBS MODERATE 55: CPT | Performed by: NURSE PRACTITIONER

## 2022-03-28 PROCEDURE — 36415 COLL VENOUS BLD VENIPUNCTURE: CPT

## 2022-03-28 PROCEDURE — 85025 COMPLETE CBC W/AUTO DIFF WBC: CPT

## 2022-03-28 PROCEDURE — 85046 RETICYTE/HGB CONCENTRATE: CPT

## 2022-03-28 PROCEDURE — 74011000250 HC RX REV CODE- 250: Performed by: EMERGENCY MEDICINE

## 2022-03-28 PROCEDURE — C9113 INJ PANTOPRAZOLE SODIUM, VIA: HCPCS | Performed by: INTERNAL MEDICINE

## 2022-03-28 PROCEDURE — 87205 SMEAR GRAM STAIN: CPT

## 2022-03-28 PROCEDURE — 94760 N-INVAS EAR/PLS OXIMETRY 1: CPT

## 2022-03-28 PROCEDURE — 86900 BLOOD TYPING SEROLOGIC ABO: CPT

## 2022-03-28 PROCEDURE — 74011000258 HC RX REV CODE- 258: Performed by: FAMILY MEDICINE

## 2022-03-28 PROCEDURE — 83540 ASSAY OF IRON: CPT

## 2022-03-28 RX ORDER — ACETAMINOPHEN 325 MG/1
650 TABLET ORAL
Status: DISCONTINUED | OUTPATIENT
Start: 2022-03-28 | End: 2022-04-07 | Stop reason: HOSPADM

## 2022-03-28 RX ORDER — SODIUM CHLORIDE 9 MG/ML
250 INJECTION, SOLUTION INTRAVENOUS AS NEEDED
Status: DISCONTINUED | OUTPATIENT
Start: 2022-03-28 | End: 2022-03-30

## 2022-03-28 RX ORDER — SODIUM CHLORIDE, SODIUM LACTATE, POTASSIUM CHLORIDE, CALCIUM CHLORIDE 600; 310; 30; 20 MG/100ML; MG/100ML; MG/100ML; MG/100ML
100 INJECTION, SOLUTION INTRAVENOUS CONTINUOUS
Status: DISCONTINUED | OUTPATIENT
Start: 2022-03-28 | End: 2022-03-28

## 2022-03-28 RX ORDER — FUROSEMIDE 40 MG/1
40 TABLET ORAL DAILY
Status: DISCONTINUED | OUTPATIENT
Start: 2022-03-29 | End: 2022-04-05

## 2022-03-28 RX ORDER — POLYETHYLENE GLYCOL 3350 17 G/17G
17 POWDER, FOR SOLUTION ORAL DAILY PRN
Status: DISCONTINUED | OUTPATIENT
Start: 2022-03-28 | End: 2022-04-07 | Stop reason: HOSPADM

## 2022-03-28 RX ORDER — ONDANSETRON 8 MG/1
4 TABLET, ORALLY DISINTEGRATING ORAL
Status: DISCONTINUED | OUTPATIENT
Start: 2022-03-28 | End: 2022-04-07 | Stop reason: HOSPADM

## 2022-03-28 RX ORDER — ONDANSETRON 2 MG/ML
4 INJECTION INTRAMUSCULAR; INTRAVENOUS
Status: DISCONTINUED | OUTPATIENT
Start: 2022-03-28 | End: 2022-04-07 | Stop reason: HOSPADM

## 2022-03-28 RX ORDER — HYDRALAZINE HYDROCHLORIDE 20 MG/ML
10 INJECTION INTRAMUSCULAR; INTRAVENOUS
Status: DISCONTINUED | OUTPATIENT
Start: 2022-03-28 | End: 2022-04-07 | Stop reason: HOSPADM

## 2022-03-28 RX ORDER — SODIUM CHLORIDE 0.9 % (FLUSH) 0.9 %
5-40 SYRINGE (ML) INJECTION EVERY 8 HOURS
Status: DISCONTINUED | OUTPATIENT
Start: 2022-03-28 | End: 2022-04-07 | Stop reason: HOSPADM

## 2022-03-28 RX ORDER — FUROSEMIDE 10 MG/ML
40 INJECTION INTRAMUSCULAR; INTRAVENOUS ONCE
Status: COMPLETED | OUTPATIENT
Start: 2022-03-28 | End: 2022-03-28

## 2022-03-28 RX ORDER — SODIUM CHLORIDE 0.9 % (FLUSH) 0.9 %
5-40 SYRINGE (ML) INJECTION AS NEEDED
Status: DISCONTINUED | OUTPATIENT
Start: 2022-03-28 | End: 2022-04-07 | Stop reason: HOSPADM

## 2022-03-28 RX ORDER — MORPHINE SULFATE 2 MG/ML
1 INJECTION, SOLUTION INTRAMUSCULAR; INTRAVENOUS
Status: DISCONTINUED | OUTPATIENT
Start: 2022-03-28 | End: 2022-04-07 | Stop reason: HOSPADM

## 2022-03-28 RX ORDER — ACETAMINOPHEN 650 MG/1
650 SUPPOSITORY RECTAL
Status: DISCONTINUED | OUTPATIENT
Start: 2022-03-28 | End: 2022-04-07 | Stop reason: HOSPADM

## 2022-03-28 RX ADMIN — PIPERACILLIN AND TAZOBACTAM 3.38 G: 3; .375 INJECTION, POWDER, LYOPHILIZED, FOR SOLUTION INTRAVENOUS; PARENTERAL at 14:41

## 2022-03-28 RX ADMIN — PIPERACILLIN AND TAZOBACTAM 3.38 G: 3; .375 INJECTION, POWDER, LYOPHILIZED, FOR SOLUTION INTRAVENOUS; PARENTERAL at 06:40

## 2022-03-28 RX ADMIN — FUROSEMIDE 40 MG: 10 INJECTION, SOLUTION INTRAMUSCULAR; INTRAVENOUS at 09:51

## 2022-03-28 RX ADMIN — SODIUM CHLORIDE, PRESERVATIVE FREE 10 ML: 5 INJECTION INTRAVENOUS at 21:47

## 2022-03-28 RX ADMIN — SODIUM CHLORIDE, PRESERVATIVE FREE 10 ML: 5 INJECTION INTRAVENOUS at 06:00

## 2022-03-28 RX ADMIN — SODIUM CHLORIDE, PRESERVATIVE FREE 10 ML: 5 INJECTION INTRAVENOUS at 14:46

## 2022-03-28 RX ADMIN — SODIUM CHLORIDE 40 MG: 9 INJECTION, SOLUTION INTRAMUSCULAR; INTRAVENOUS; SUBCUTANEOUS at 09:51

## 2022-03-28 RX ADMIN — METRONIDAZOLE 500 MG: 500 INJECTION, SOLUTION INTRAVENOUS at 01:20

## 2022-03-28 RX ADMIN — SODIUM CHLORIDE, PRESERVATIVE FREE 10 ML: 5 INJECTION INTRAVENOUS at 02:50

## 2022-03-28 RX ADMIN — PIPERACILLIN AND TAZOBACTAM 3.38 G: 3; .375 INJECTION, POWDER, LYOPHILIZED, FOR SOLUTION INTRAVENOUS; PARENTERAL at 21:47

## 2022-03-28 RX ADMIN — CEFTRIAXONE 2 G: 2 INJECTION, POWDER, FOR SOLUTION INTRAMUSCULAR; INTRAVENOUS at 00:41

## 2022-03-28 RX ADMIN — SODIUM CHLORIDE 40 MG: 9 INJECTION, SOLUTION INTRAMUSCULAR; INTRAVENOUS; SUBCUTANEOUS at 21:47

## 2022-03-28 RX ADMIN — SODIUM CHLORIDE, POTASSIUM CHLORIDE, SODIUM LACTATE AND CALCIUM CHLORIDE 100 ML/HR: 600; 310; 30; 20 INJECTION, SOLUTION INTRAVENOUS at 02:50

## 2022-03-28 NOTE — CONSULTS
Urology Consult    Requesting MD: Qi Torres    Patient: Reginald Holt MRN: [id number]  SSN: [id number]    YOB: 1940  Age: 79 y.o. Sex: male      Subjective:      Reginald Holt is a 79 y.o. male who we are consulted on for R ureteral dilatation. He presented to ER with c/o abdominal pain associated with nausea and vomiting without fever. ER workup showed WBC of 20.2. Lipase is 2,029. LFTs somewhat elevated. Cr 1.00, GFR >60, UA (see media tab) with NO bacteria, no blood. Lactic acid is 4.9 with elevated procal of 0.98. Blood cultures growing GNR. CT was obtained which shows evidence of likely gallstone pancreatitis as well as possible acute cholecystitis. Also shows the right distal ureter to be dilated with no evidence of hydronephrosis. General Surgery consulted. GI consulted. Hospitalist admitted pt. On IV zosyn. Pt voiding well. Urine ricky colored. Pt with hx of BPH on flomax. No LUTS reported. No other urological imaging available for comparison. Pt denies hematuria, dysuria. Reports RLQ pain on exam.   Denies CVA tenderness.     Past Medical History:   Diagnosis Date    Abnormal EKG 4/22/15    Arrhythmia     CAD (coronary artery disease) 5/8/2015    Carotid artery stenosis without cerebral infarction 6/7/2016    US 6/15: <50% bilat ICAs    Coronary atherosclerosis of native coronary vessel 5/8/2015    VEGAS on brilinta 5/7/15: prox LAD PCI, normal EF     Diastolic CHF, chronic ([address] 3/2/2022    Dyslipidemia 6/7/2016    Dyspnea 5/8/2015    Echo 6/15: EF 60%, mod MR, mod LVH, mild AI     ED (erectile dysfunction)     GERD (gastroesophageal reflux disease)     no medication    GERD (gastroesophageal reflux disease)     HTN (hypertension) 5/8/2015    Hypertension     Hypokalemia 6/7/2016    Hypokalemia     Mitral valve regurgitation 6/7/2016    Morbid obesity ([address]     Myasthenia gravis ([address] 6/17/15    Myasthenia gravis ([address]     Nocturia Osteoporosis     PUD (peptic ulcer disease) 25 yrs ago    S/P coronary artery stent placement 5/8/2015    3.0x38 mm Xience ADRIANNA to pLAD 5/7/15     Sleep apnea     Syncope and collapse     Unspecified sleep apnea     no cpap     Past Surgical History:   Procedure Laterality Date    HX APPENDECTOMY      HX COLONOSCOPY  2007    HX HEART CATHETERIZATION  05/27/2015    stent    HX HEART CATHETERIZATION  05/21/2019    watchman device    HX HEMORRHOIDECTOMY      HX PACEMAKER  2018    [place] Cervantes/Xin for sleep apnea and reconstruction for extending jaw    VASCULAR SURGERY PROCEDURE UNLIST Right 07/10/2019     Repair of right radial artery pseudoaneurysm      Family History   Problem Relation Age of Onset    Cancer Mother         kidney    Cancer Father         stomach    No Known Problems Sister     Cancer Brother         brain tumor    No Known Problems Brother      Social History     Tobacco Use    Smoking status: Never Smoker    Smokeless tobacco: Never Used   Substance Use Topics    Alcohol use: No      Prior to Admission medications    Medication Sig Start Date End Date Taking? Authorizing Provider   lisinopriL (PRINIVIL, ZESTRIL) 10 mg tablet Take 1 Tablet by mouth two (2) times a day. 3/3/22   Mittal, Court Goodpasture, MD   escitalopram oxalate (LEXAPRO) 10 mg tablet Take 1 Tablet by mouth daily. 3/3/22   Mittal, Court Goodpasture, MD   sildenafil citrate (Viagra) 100 mg tablet Take 1 Tablet by mouth as needed for Erectile Dysfunction. Patient not taking: Reported on 3/28/2022 3/3/22   Sun Mahoney MD   furosemide (LASIX) 40 mg tablet Take 1 Tablet by mouth daily. 3/3/22   Harrison Reyes MD   potassium chloride (Klor-Con M20) 20 mEq tablet Take 2 Tablets by mouth daily. 3/3/22   Harrison Reyes MD   atorvastatin (LIPITOR) 80 mg tablet Take 1 Tablet by mouth nightly. 3/3/22   Mittal, Court Goodpasture, MD   aspirin delayed-release 81 mg tablet Take 1 Tablet by mouth daily.  Once daily cheaper over-the-counter store brand 3/3/22   Osmany Reyes MD   rOPINIRole (REQUIP) 0.5 mg tablet 1 nightly, increase to 1 twice a day if needed 3/3/22   Harrison Reyes MD   nitroglycerin (NITROSTAT) 0.4 mg SL tablet Place 1 sl under the tongue q 5 min prn cp, max 3 sl in a 15-min time period. Call 911 if no relief after the 3rd sl. 2/13/20   Ma Dilling, DO        No Known Allergies    Review of Systems:  A comprehensive review of systems was negative except for that written in the History of Present Illness.     Objective:     Vitals:    03/28/22 0524 03/28/22 0740 03/28/22 0922 03/28/22 1114   BP: 123/76 108/80  112/67   Pulse: 77 81  78   Resp: 18 20  20   Temp: 98.3 °F (36.8 °C) 98.1 °F (36.7 °C)  98.2 °F (36.8 °C)   SpO2: 91% 96% 93% 97%   Weight:       Height:            Physical Exam:  GENERAL: alert, cooperative, weak, ill appearing  EYE: PERRLA   THROAT & NECK: neck supple  LUNG: clear to auscultation bilaterally  HEART: regular rate and rhythm, S1, S2   ABDOMEN: soft, RLQ tenderness  EXTREMITIES:  extremities normal, atraumatic, no cyanosis or edema  SKIN: no rash or abnormalities  NEUROLOGIC: AOx3    Assessment:     Hospital Problems  Date Reviewed: 3/2/2022            Codes Class Noted POA    Acute cholecystitis ICD-10-CM: K81.0  ICD-9-CM: 575.0  3/28/2022 Unknown        Pancreatitis, gallstone ICD-10-CM: K85.10  ICD-9-CM: 482.6, 574.20  3/28/2022 Unknown        Anemia ICD-10-CM: D64.9  ICD-9-CM: 285.9  3/28/2022 Yes        Bacteremia ICD-10-CM: R78.81  ICD-9-CM: 790.7  3/28/2022 Yes        Diastolic CHF, chronic (HCC) ICD-10-CM: I50.32  ICD-9-CM: 428.32, 428.0  3/2/2022 Yes        Atrial fibrillation (HCC) ICD-10-CM: I48.91  ICD-9-CM: 427.31  11/29/2017 Yes        * (Principal) Sepsis (HCC) ICD-10-CM: A41.9  ICD-9-CM: 038.9, 995.91  10/26/2017 Unknown        Paroxysmal atrial fibrillation (HCC) ICD-10-CM: I48.0  ICD-9-CM: 427.31  6/30/2017 Yes    Overview Signed 5/21/2019 10:36 AM by Elvin Clayton MD 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 SSS (sick sinus syndrome) ([facility]) ICD-10-CM: I49.5  ICD-9-CM: 427.81  6/30/2017 Yes        Myasthenia gravis ([facility] [address] ICD-10-CM: G70.00  ICD-9-CM: 358.00  Unknown Yes        HTN (hypertension) (Chronic) ICD-10-CM: I10  ICD-9-CM: 401.9  5/8/2015 Yes        Coronary atherosclerosis of native coronary vessel ICD-10-CM: I25.10  ICD-9-CM: 414.01  5/8/2015 Yes    Overview Signed 6/7/2016  8:35 AM by [address], [address] [facility] [address] [facility]  5/7/15: prox LAD PCI, normal EF                     Plan:     CT images personally reviewed. Pt with no hydronephrosis. Cr is normal.  UA with NO blood/bacteria (see POC urine macroscopic). Pt voiding well. Denies LUTS. R distal ureter is abnormal.  Dr. Darinel Patterson to review images for further plan. Signed By: Meggan Florez NP     March 28, 2022      Reviewed CT, he roberts has marked but focal dilation of the right distal ureter. Will need outpatient follow up. I have reviewed the above note and examined the patient. I agree with the exam, assessment and plan.     Chetan Levy MD

## 2022-03-28 NOTE — ED PROVIDER NOTES
Patient ate a sandwich for lunch. Shortly thereafter around 1300 developed some abdominal pain. Progressed throughout the afternoon into this evening getting worse and worse. Has had some nausea and vomiting with it. No diarrhea or constipation. No dysuria or hematuria. Feeling some better here at rest.  Mild confusion. The history is provided by the patient. No  was used. Abdominal Pain   This is a new problem. The current episode started 6 to 12 hours ago. The problem occurs constantly. The problem has been gradually improving. The pain is associated with an unknown factor. The pain is located in the suprapubic region, RLQ, generalized abdominal region and epigastric region. The quality of the pain is aching. The pain is moderate. Associated symptoms include nausea and vomiting. Pertinent negatives include no fever, no diarrhea, no melena, no constipation, no dysuria, no hematuria, no headaches, no chest pain and no back pain. The pain is worsened by palpation. The pain is relieved by nothing. His past medical history is significant for GERD. The patient's surgical history includes appendectomy.        Past Medical History:   Diagnosis Date    Abnormal EKG 4/22/15    Arrhythmia     CAD (coronary artery disease) 5/8/2015    Carotid artery stenosis without cerebral infarction 6/7/2016    US 6/15: <50% bilat ICAs    Coronary atherosclerosis of native coronary vessel 5/8/2015    VEGAS on brilinta 5/7/15: prox LAD PCI, normal EF     Diastolic CHF, chronic ([address] 3/2/2022    Dyslipidemia 6/7/2016    Dyspnea 5/8/2015    Echo 6/15: EF 60%, mod MR, mod LVH, mild AI     ED (erectile dysfunction)     GERD (gastroesophageal reflux disease)     no medication    GERD (gastroesophageal reflux disease)     HTN (hypertension) 5/8/2015    Hypertension     Hypokalemia 6/7/2016    Hypokalemia     Mitral valve regurgitation 6/7/2016    Morbid obesity ([address]     Myasthenia gravis ([address] 6/17/15    [facility]     Nocturia     Osteoporosis     PUD (peptic ulcer disease) 25 yrs ago    S/P coronary artery stent placement 5/8/2015    3.0x38 mm Xience ADRIANNA to pLAD 5/7/15     Sleep apnea     Syncope and collapse     Unspecified sleep apnea     no cpap       Past Surgical History:   Procedure Laterality Date    HX APPENDECTOMY      HX COLONOSCOPY  2007    HX HEART CATHETERIZATION  05/27/2015    stent    HX HEART CATHETERIZATION  05/21/2019    watchman device    HX HEMORRHOIDECTOMY      HX PACEMAKER  2018   Silvia Cervantes/Xin for sleep apnea and reconstruction for extending jaw    VASCULAR SURGERY PROCEDURE UNLIST Right 07/10/2019     Repair of right radial artery pseudoaneurysm         Family History:   Problem Relation Age of Onset    Cancer Mother         kidney    Cancer Father         stomach    No Known Problems Sister     Cancer Brother         brain tumor    No Known Problems Brother        Social History     Socioeconomic History    Marital status:      Spouse name: Not on file    Number of children: Not on file    Years of education: Not on file    Highest education level: Not on file   Occupational History    Not on file   Tobacco Use    Smoking status: Never Smoker    Smokeless tobacco: Never Used   Substance and Sexual Activity    Alcohol use: No    Drug use: No    Sexual activity: Not on file   Other Topics Concern    Not on file   Social History Narrative    Not on file     Social Determinants of Health     Financial Resource Strain:     Difficulty of Paying Living Expenses: Not on file   Food Insecurity:     Worried About [address] in the Last Year: Not on file    Libby of Food in the Last Year: Not on file   Transportation Needs:     Lack of Transportation (Medical): Not on file    Lack of Transportation (Non-Medical):  Not on file   Physical Activity:     Days of Exercise per Week: Not on file    Minutes of Exercise per Session: Not on file   Stress:     Feeling of Stress : Not on file   Social Connections:     Frequency of Communication with Friends and Family: Not on file    Frequency of Social Gatherings with Friends and Family: Not on file    Attends Buddhist Services: Not on file    Active Member of Clubs or Organizations: Not on file    Attends Club or Organization Meetings: Not on file    Marital Status: Not on file   Intimate Partner Violence:     Fear of Current or Ex-Partner: Not on file    Emotionally Abused: Not on file    Physically Abused: Not on file    Sexually Abused: Not on file   Housing Stability:     Unable to Pay for Housing in the Last Year: Not on file    Number of [place] in the Last Year: Not on file    Unstable Housing in the Last Year: Not on file         ALLERGIES: Patient has no known allergies. Review of Systems   Constitutional: Negative for chills and fever. HENT: Negative for rhinorrhea and sore throat. Eyes: Negative for pain and redness. Respiratory: Negative for chest tightness, shortness of breath and wheezing. Cardiovascular: Negative for chest pain and leg swelling. Gastrointestinal: Positive for abdominal pain, nausea and vomiting. Negative for constipation, diarrhea and melena. Genitourinary: Negative for dysuria and hematuria. Musculoskeletal: Negative for back pain, gait problem, neck pain and neck stiffness. Skin: Negative for color change and rash. Neurological: Negative for weakness, numbness and headaches. Psychiatric/Behavioral: Positive for confusion. Vitals:    03/27/22 1950   BP: (!) 153/60   Pulse: 75   Resp: 22   Temp: 99.4 °F (37.4 °C)   SpO2: 94%   Weight: 90.7 kg (200 lb)   Height: 5' 8\" (1.727 m)            Physical Exam  Constitutional:       Appearance: He is well-developed. He is ill-appearing. HENT:      Head: Normocephalic and atraumatic.    Cardiovascular:      Rate and Rhythm: Normal rate and regular rhythm. Pulmonary:      Effort: Pulmonary effort is normal.      Breath sounds: Normal breath sounds. Abdominal:      General: Bowel sounds are normal.      Palpations: Abdomen is soft. Tenderness: There is abdominal tenderness (epigastric). There is guarding. Musculoskeletal:         General: Swelling present. Normal range of motion. Cervical back: Normal range of motion and neck supple. Skin:     General: Skin is warm and dry. Neurological:      General: No focal deficit present. Mental Status: He is alert. Cranial Nerves: No cranial nerve deficit. MDM  Number of Diagnoses or Management Options  Diagnosis management comments: Patient with gallstone pancreatitis and cholecystitis. Has multiple gallstones with thickened gallbladder wall 7.6 mm. Has elevated white blood count of 20 and lipase of 2000. Elevated lactic and procalcitonin. No elevation in bilirubin and slight elevation in liver enzymes. Will consult surgery and admit patient for further work-up and treatment. Amount and/or Complexity of Data Reviewed  Clinical lab tests: ordered and reviewed  Tests in the radiology section of CPT®: ordered and reviewed  Tests in the medicine section of CPT®: ordered and reviewed    Patient Progress  Patient progress: stable         Procedures        EKG: nonspecific ST and T waves changes. Pace rhythm rate 78.              XR CHEST PORT (Final result)  Result time 03/27/22 20:09:58  Final result by Sylvia Barcenas MD (03/27/22 20:09:58)                Impression:    Cardiomegaly with pulmonary vascular congestion suggests CHF. There is no consolidation or significant pleural effusion. Narrative:    EXAM: XR CHEST PORT     HISTORY: palpitations.       TECHNIQUE: Frontal chest.     COMPARISON: 11/19/2021     FINDINGS:   There is moderate cardiomegaly. There is pulmonary vascular congestion.      There is no consolidation, significant pleural effusion, or pneumothorax. Stable left-sided cardiac device. No significant osseous abnormalities are observed.                        US ABD LTD (Final result)  Result time 03/27/22 22:59:17  Final result by Osiel Daniel MD (03/27/22 22:59:17)                Impression:    Cholelithiasis. The gallbladder wall is thickened measuring 7.6 mm. There is no   appreciable pericholecystic fluid. Sonographic Lynn Damon sign was not documented. The pancreas was not visualized. Hepatic steatosis. The right kidney appears somewhat echogenic. This can be seen with medical renal   disease. Please correlate clinically. Narrative:    EXAM: US ABD LTD     HISTORY: RUQ pain.       TECHNIQUE: Grayscale and limited color Doppler ultrasound of the right upper   quadrant. COMPARISON: CT abdomen and pelvis dated 3/27/2022     FINDINGS:   Aorta/IVC: Not visualized/Visualized portions are within normal limits. Pancreas: Not visualized     Liver: There is diffuse increased echogenicity within the liver parenchyma,   suggestive of hepatic steatosis. No focal lesions are demonstrated on the   provided images. Portal vein: The portal vein shows hepatopedal flow. Gallbladder: Multiple tiny gallstones are present. The wall is thickened   measuring 7.6 mm. No evidence of pericholecystic fluid. Sonographic Lynn Damon sign   was not documented. CBD: Normal in caliber and measures 7.1 mm. Right kidney: 12 cm in length and without evidence of obstruction. The kidney   appears somewhat echogenic.                         CT ABD PELV W CONT (Final result)  Result time 03/27/22 22:15:13  Final result by Osiel Daniel MD (03/27/22 22:15:13)                Impression:    There are multiple hyperechoic dense structures rather in the gallbladder neck. These may represent multiple stones. There is mild gallbladder wall thickening   with subtle pericholecystic fluid.  These findings raise suspicion for acute   cholecystitis. There is also mild peripancreatic fluid. This fluid could be due to   cholecystitis or acute pancreatitis. There is focal dilatation of the distal right ureter. There is no evidence of   hydronephrosis proximal to this point. Further work up with urology consult is   recommended to assess this region. Chronic appearing changes are noted in the kidneys. Multiple small low-density lesions are seen in the liver and are too small to   characterize. Narrative:    EXAM: CT ABD PELV W CONT     HISTORY: Abd pain. TECHNIQUE: Axial images of the abdomen and pelvis were performed following the   administration of intravenous contrast. Images were obtained in the axial plane   and coronal reformatted images were created and reviewed. Dose reduction technique used: Automated exposure control/Adjustment of the mA   and/or kV according to patient size/Use of iterative reconstruction technique. 100 mL of Isovue-370 were utilized. COMPARISON: None. FINDINGS:   The visualized lung bases are unremarkable. There are bilateral pleural-based   calcifications posteriorly. The visualized portion of the heart shows leads likely from a pacemaker. The liver contains multiple small hypodensities. These are too small to   characterize. The spleen and adrenal glands are within normal limits. The pancreas is not enlarged. There is mild peripancreatic fatty stranding and   fluid. There are no obvious pancreatic lesions. The gallbladder is mildly distended. There is mild wall thickening with   pericholecystic fluid. Multiple tiny hyperdensities are present in the neck of   the gallbladder. These may represent stones. The kidneys show a symmetric nephrogram. There is a small hypodense lesion in   the right kidney which is too small to characterize. Both kidneys show focal   areas suggesting scarring.      There is a tiny nonobstructing calcification in the left kidney. The right   ureter is normal in caliber throughout most of its course. Just proximal to the   ureterovesical junction there is focal dilatation of the distal ureter measuring   up to 2.3 cm. The bladder appears grossly normal.     The prostate is enlarged. Distal esophagus and stomach are unremarkable. Small and large bowel are without   evidence of obstruction. Patient history states appendectomy. Diverticulosis   with no evidence of acute diverticulitis. No evidence of free fluid, free air, focal fluid collection. No pathologic   adenopathy. No abdominal aortic aneurysm. Osseous structures are without evidence of acute fracture or suspicious lesion.                   Results Include:    Recent Results (from the past 24 hour(s))   CBC WITH AUTOMATED DIFF    Collection Time: 03/27/22  7:58 PM   Result Value Ref Range    WBC 20.2 (H) 4.3 - 11.1 K/uL    RBC 4.39 4.23 - 5.6 M/uL    HGB 7.2 (L) 13.6 - 17.2 g/dL    HCT 27.9 (L) 41.1 - 50.3 %    MCV 63.6 (L) 79.6 - 97.8 FL    MCH 16.4 (L) 26.1 - 32.9 PG    MCHC 25.8 (L) 31.4 - 35.0 g/dL    RDW 20.4 (H) 11.9 - 14.6 %    PLATELET 722 922 - 134 K/uL    MPV 8.9 (L) 9.4 - 12.3 FL    ABSOLUTE NRBC 0.03 0.0 - 0.2 K/uL    DF AUTOMATED      NEUTROPHILS 91 (H) 43 - 78 %    LYMPHOCYTES 4 (L) 13 - 44 %    MONOCYTES 5 4.0 - 12.0 %    EOSINOPHILS 0 (L) 0.5 - 7.8 %    BASOPHILS 0 0.0 - 2.0 %    IMMATURE GRANULOCYTES 0 0.0 - 5.0 %    ABS. NEUTROPHILS 18.3 (H) 1.7 - 8.2 K/UL    ABS. LYMPHOCYTES 0.8 0.5 - 4.6 K/UL    ABS. MONOCYTES 0.9 0.1 - 1.3 K/UL    ABS. EOSINOPHILS 0.1 0.0 - 0.8 K/UL    ABS. BASOPHILS 0.0 0.0 - 0.2 K/UL    ABS. IMM.  GRANS. 0.1 0.0 - 0.5 K/UL   METABOLIC PANEL, COMPREHENSIVE    Collection Time: 03/27/22  7:58 PM   Result Value Ref Range    Sodium 138 138 - 145 mmol/L    Potassium 4.1 3.5 - 5.1 mmol/L    Chloride 103 98 - 107 mmol/L    CO2 24 21 - 32 mmol/L    Anion gap 11 7 - 16 mmol/L    Glucose 145 (H) 65 - 100 mg/dL    BUN 11 8 - 23 MG/DL Creatinine 1.00 0.8 - 1.5 MG/DL    GFR est AA >60 >60 ml/min/1.73m2    GFR est non-AA >60 >60 ml/min/1.73m2    Calcium 8.8 8.3 - 10.4 MG/DL    Bilirubin, total 0.5 0.2 - 1.1 MG/DL    ALT (SGPT) 55 12 - 65 U/L    AST (SGOT) 127 (H) 15 - 37 U/L    Alk.  phosphatase 198 (H) 50 - 136 U/L    Protein, total 6.6 6.3 - 8.2 g/dL    Albumin 3.0 (L) 3.2 - 4.6 g/dL    Globulin 3.6 (H) 2.3 - 3.5 g/dL    A-G Ratio 0.8 (L) 1.2 - 3.5     LIPASE    Collection Time: 03/27/22  7:58 PM   Result Value Ref Range    Lipase 2,029 (H) 73 - 393 U/L   LACTIC ACID    Collection Time: 03/27/22  7:58 PM   Result Value Ref Range    Lactic acid 4.9 (HH) 0.4 - 2.0 MMOL/L   TROPONIN-HIGH SENSITIVITY    Collection Time: 03/27/22  7:58 PM   Result Value Ref Range    Troponin-High Sensitivity 36.5 (H) 0 - 14 pg/mL   NT-PRO BNP    Collection Time: 03/27/22  7:58 PM   Result Value Ref Range    NT pro- (H) <450 PG/ML   PROCALCITONIN    Collection Time: 03/27/22  7:58 PM   Result Value Ref Range    Procalcitonin 0.98 (H) 0.00 - 0.49 ng/mL   EKG, 12 LEAD, INITIAL    Collection Time: 03/27/22  8:03 PM   Result Value Ref Range    Ventricular Rate 78 BPM    Atrial Rate 0 BPM    P-R Interval 53 ms    QRS Duration 151 ms    Q-T Interval 388 ms    QTC Calculation (Bezet) 442 ms    Calculated P Axis 0 degrees    Calculated R Axis -87 degrees    Calculated T Axis 81 degrees    Diagnosis       Ventricular-paced rhythm  No further analysis attempted due to paced rhythm  Baseline wander in lead(s) V3     POC URINE MACROSCOPIC    Collection Time: 03/27/22  8:21 PM   Result Value Ref Range    Spec. gravity (POC) 1.020 1.001 - 1.023      pH, urine  (POC) 5.5 5.0 - 9.0      Protein (POC) Negative NEG mg/dL    Glucose, urine (POC) 100 (A) NEG mg/dL    Ketones (POC) Negative NEG mg/dL    Bilirubin (POC) Negative NEG      Blood (POC) Negative NEG      Urobilinogen (POC) 1.0 0.2 - 1.0 EU/dL    Nitrite (POC) Negative NEG      Leukocyte esterase (POC) Negative NEG Performed by Dhaval Corona minimal

## 2022-03-28 NOTE — PROGRESS NOTES
[address]       NAME: Dario Baldwin is a 11 y o  female  : 2006    MRN: [id number]  DATE: 2022  TIME: 12:55 PM    Assessment and Plan   Diagnoses and all orders for this visit:    Anxiety with depression    Adjustment disorder with mixed anxiety and depressed mood    Other orders  -     busPIRone (BUSPAR) 5 mg tablet; Take 5 mg by mouth 2 (two) times a day  -     busPIRone (BUSPAR) 5 mg tablet    Patient has an appointment with Psychiatry to review lab work which was ordered by Psychiatry  Patient also has referral to Neurology for what appears to be questionable absent seizures  Psychiatry also place referral for occupational therapy  Patient has appointment with psychiatry on  to review lab work and discuss further workup which was ordered by them  Patient instructed to call me with any questions concerns  Patient will follow with behavior health / counseling as well  Chief Complaint     Chief Complaint   Patient presents with    Follow-up     discuss paperwork         History of Present Illness       HPI    76-year-old female here today for follow-up visit  Patient was seen by Psychiatry  Multiple labs were ordered  Patient states psychiatry also placed referrals for Neurology and for occupational therapy  Patient has appointment on  review lab work and discuss further treatment with psychiatry  Patient denies any suicidal ideation or thoughts of hurting others  Patient was started on BuSpar  Review of Systems   Review of Systems   Psychiatric/Behavioral: Negative for hallucinations, self-injury and suicidal ideas  The patient is nervous/anxious  All other systems reviewed and are negative        As per HPI   all other systems negative    Current Medications       Current Outpatient Medications:     busPIRone (BUSPAR) 5 mg tablet, Take 5 mg by mouth 2 (two) times a day, Disp: , Rfl:     busPIRone (BUSPAR) 5 mg tablet, , Disp: , Patient resting in bed with no pain or distress at this time. Patient on 02 at 1 liter via NC with RR even/unlabored. Telemetry verified and patient is A-fib 63 per monitor room. LR Infusing at 100/ml/hr to right arm and Zosyn infusing to left upper arm. Call light in reach and will prepare bedside shift report for oncoming nurse. Rfl:     cholecalciferol (VITAMIN D3) 1,000 units tablet, Take 1 tablet (1,000 Units total) by mouth daily, Disp: 30 tablet, Rfl: 5    lidocaine (LIDODERM) 5 %, Apply 1 patch topically daily Remove & Discard patch within 12 hours or as directed by MD (Patient not taking: Reported on 1/11/2022 ), Disp: 6 patch, Rfl: 0    naproxen (NAPROSYN) 500 mg tablet, Take 1 tablet (500 mg total) by mouth 2 (two) times a day with meals (Patient not taking: Reported on 1/11/2022 ), Disp: 30 tablet, Rfl: 0    Current Allergies     Allergies as of 02/02/2022    (No Known Allergies)            The following portions of the patient's history were reviewed and updated as appropriate: allergies, current medications, past family history, past medical history, past social history, past surgical history and problem list      Past Medical History:   Diagnosis Date    Anxiety     Anxiety with depression 4/10/2019    Anxiety with depression     Depression     [facility]        History reviewed  No pertinent surgical history  History reviewed  No pertinent family history  Medications have been verified  Objective   BP (!) 108/68   Pulse 81   Temp 98 °F (36 7 °C)   Resp 16   Ht 5' 2" (1 575 m)   Wt 50 5 kg (111 lb 6 4 oz)   SpO2 93%   BMI 20 38 kg/m²        Physical Exam     Physical Exam  Vitals reviewed  Constitutional:       General: She is not in acute distress  Appearance: She is not ill-appearing, toxic-appearing or diaphoretic  Cardiovascular:      Rate and Rhythm: Normal rate and regular rhythm  Pulmonary:      Effort: Pulmonary effort is normal       Breath sounds: Normal breath sounds  Neurological:      Mental Status: She is alert     Psychiatric:         Mood and Affect: Mood normal

## 2022-03-28 NOTE — H&P
Hospitalist History and Physical   Admit Date:  3/27/2022  7:41 PM   Name:  Luis Alberto Fair   Age:  79 y.o. Sex:  male  :  1940   MRN:  [id number]     Presenting Complaint: abdominal pain  Reason(s) for Admission: Sepsis ([facility] [A41.9]  Acute cholecystitis [K81.0]  Pancreatitis, gallstone [K85.10]     History of Present Illness:   Luis Alberto Fair is a 79 y.o. male with medical history of HTN who presented to ED with abdominal pain. Pain started after lunch yesterday. Reports symptoms worsening throughout the evening. Associated nausea and vomiting. Denies overt fever. Mildly confused. Upon ER workup, patient has elevated WBC of 20.2. Lipase is 2,029. LFTs somewhat elevated. Lactic acid is 4.9 with elevated procal of 0.98. CT was obtained which shows evidence of likely gallstone pancreatitis as well as possible acute cholecystitis. General Surgery consulted and will follow. Hospitalist asked to admit. Review of Systems:  10 systems reviewed and negative except as noted in HPI. Assessment & Plan:   * Sepsis ([facility]  - Mets sepsis criteria  - IVFs  - Empiric antibiotics  - Blood cultures ordered in ER    Acute cholecystitis  - CT with findings suggestive of cholecystitis  - IV zosyn  - General Surgery aware  - NPO  - PRN pain meds    Pancreatitis, gallstone  - CT with likely obstructing stones and evidence of pancreatitis by imaging as well as lipase  - NPO  - IVFs  - PRN pain meds  - Zofran prn for N/V    Paroxysmal atrial fibrillation (HCC)  - Currently rate controlled  - H/o Watchman device  - Not on anticoagulation    HTN (hypertension)  - BP stable  - Holding PO meds  - IV hydralazine prn       Disposition: inpatient    Diet: DIET NPO  VTE ppx: SCDs  Code status: Prior      Past medical history reviewed.     Past Medical History:   Diagnosis Date    Abnormal EKG 4/22/15    Arrhythmia     CAD (coronary artery disease) 2015    Carotid artery stenosis without cerebral infarction 6/7/2016    US 6/15: <50% bilat ICAs    Coronary atherosclerosis of native coronary vessel 5/8/2015    VEGAS on brilinta 5/7/15: prox LAD PCI, normal EF     Diastolic CHF, chronic ([facility] 3/2/2022    Dyslipidemia 6/7/2016    Dyspnea 5/8/2015    Echo 6/15: EF 60%, mod MR, mod LVH, mild AI     ED (erectile dysfunction)     GERD (gastroesophageal reflux disease)     no medication    GERD (gastroesophageal reflux disease)     HTN (hypertension) 5/8/2015    Hypertension     Hypokalemia 6/7/2016    Hypokalemia     Mitral valve regurgitation 6/7/2016    Morbid obesity ([address]     Myasthenia gravis ([address] 6/17/15    Myasthenia gravis ([facility]     Nocturia     Osteoporosis     PUD (peptic ulcer disease) 25 yrs ago    S/P coronary artery stent placement 5/8/2015    3.0x38 mm Xience ADRIANNA to pLAD 5/7/15     Sleep apnea     Syncope and collapse     Unspecified sleep apnea     no cpap     Past surgical history reviewed. Past Surgical History:   Procedure Laterality Date    HX APPENDECTOMY      HX COLONOSCOPY  2007    HX HEART CATHETERIZATION  05/27/2015    stent    HX HEART CATHETERIZATION  05/21/2019    watchman device    HX HEMORRHOIDECTOMY      HX PACEMAKER  2018   Charlie Cervantes/Xin for sleep apnea and reconstruction for extending jaw    VASCULAR SURGERY PROCEDURE UNLIST Right 07/10/2019     Repair of right radial artery pseudoaneurysm      No Known Allergies   Social History     Tobacco Use    Smoking status: Never Smoker    Smokeless tobacco: Never Used   Substance Use Topics    Alcohol use: No      Family History   Problem Relation Age of Onset    Cancer Mother         kidney    Cancer Father         stomach    No Known Problems Sister     Cancer Brother         brain tumor    No Known Problems Brother       Family history reviewed and noncontributory to patient's acute condition; no relevant family history unless otherwise noted above.   Immunization History Administered Date(s) Administered    COVID-19, Pfizer Purple top, DILUTE for use, 12+ yrs, 30mcg/0.3mL dose 02/02/2021, 02/23/2021    Pneumococcal Polysaccharide (PPSV-23) 05/08/2015    Tdap 03/03/2022     PTA Medications:  Current Outpatient Medications   Medication Instructions    aspirin delayed-release 81 mg, Oral, DAILY, Once daily cheaper over-the-counter store brand    atorvastatin (LIPITOR) 80 mg, Oral, EVERY BEDTIME    escitalopram oxalate (LEXAPRO) 10 mg, Oral, DAILY    furosemide (LASIX) 40 mg, Oral, DAILY    lisinopriL (PRINIVIL, ZESTRIL) 10 mg, Oral, 2 TIMES DAILY    nitroglycerin (NITROSTAT) 0.4 mg SL tablet Place 1 sl under the tongue q 5 min prn cp, max 3 sl in a 15-min time period. Call 911 if no relief after the 3rd sl.  potassium chloride (Klor-Con M20) 20 mEq tablet 40 mEq, Oral, DAILY    rOPINIRole (REQUIP) 0.5 mg tablet 1 nightly, increase to 1 twice a day if needed    sildenafil citrate (VIAGRA) 100 mg, Oral, AS NEEDED       Objective:     Patient Vitals for the past 24 hrs:   Temp Pulse Resp BP SpO2   03/27/22 1950 99.4 °F (37.4 °C) 75 22 (!) 153/60 94 %     Oxygen Therapy  O2 Sat (%): 94 % (03/27/22 1950)  O2 Device: None (Room air) (03/27/22 1950)    Estimated body mass index is 30.41 kg/m² as calculated from the following:    Height as of this encounter: 5' 8\" (1.727 m). Weight as of this encounter: 90.7 kg (200 lb). No intake or output data in the 24 hours ending 03/28/22 0016      Physical Exam:  General:    Well-developed. Ill-appearing  Head:  Normocephalic, atraumatic  Eyes:  Sclerae appear normal.  Pupils equally round. HENT:  Nares appear normal, no drainage. Moist mucous membranes  Neck:  No restricted ROM. Trachea midline  CV:   RRR. S1/S2 auscultated  Lungs:   CTAB. No wheezing, rhonchi, or rales. Appears even, unlabored  Abdomen: Bowel sounds present. TTP throughout abdomen, worse in RUQ and epigastrium  Extremities: Warm and dry.   No cyanosis or clubbing. No edema. Skin:     No rashes. Normal turgor. Normal coloration  Neuro:  Cranial nerves II-XII grossly intact. Sensation intact  Psych:  Normal mood and affect. Alert and oriented x3    Data Ordered and Personally Reviewed:    Last 24hr Labs:  Recent Results (from the past 24 hour(s))   CBC WITH AUTOMATED DIFF    Collection Time: 03/27/22  7:58 PM   Result Value Ref Range    WBC 20.2 (H) 4.3 - 11.1 K/uL    RBC 4.39 4.23 - 5.6 M/uL    HGB 7.2 (L) 13.6 - 17.2 g/dL    HCT 27.9 (L) 41.1 - 50.3 %    MCV 63.6 (L) 79.6 - 97.8 FL    MCH 16.4 (L) 26.1 - 32.9 PG    MCHC 25.8 (L) 31.4 - 35.0 g/dL    RDW 20.4 (H) 11.9 - 14.6 %    PLATELET 343 286 - 840 K/uL    MPV 8.9 (L) 9.4 - 12.3 FL    ABSOLUTE NRBC 0.03 0.0 - 0.2 K/uL    DF AUTOMATED      NEUTROPHILS 91 (H) 43 - 78 %    LYMPHOCYTES 4 (L) 13 - 44 %    MONOCYTES 5 4.0 - 12.0 %    EOSINOPHILS 0 (L) 0.5 - 7.8 %    BASOPHILS 0 0.0 - 2.0 %    IMMATURE GRANULOCYTES 0 0.0 - 5.0 %    ABS. NEUTROPHILS 18.3 (H) 1.7 - 8.2 K/UL    ABS. LYMPHOCYTES 0.8 0.5 - 4.6 K/UL    ABS. MONOCYTES 0.9 0.1 - 1.3 K/UL    ABS. EOSINOPHILS 0.1 0.0 - 0.8 K/UL    ABS. BASOPHILS 0.0 0.0 - 0.2 K/UL    ABS. IMM. GRANS. 0.1 0.0 - 0.5 K/UL   METABOLIC PANEL, COMPREHENSIVE    Collection Time: 03/27/22  7:58 PM   Result Value Ref Range    Sodium 138 138 - 145 mmol/L    Potassium 4.1 3.5 - 5.1 mmol/L    Chloride 103 98 - 107 mmol/L    CO2 24 21 - 32 mmol/L    Anion gap 11 7 - 16 mmol/L    Glucose 145 (H) 65 - 100 mg/dL    BUN 11 8 - 23 MG/DL    Creatinine 1.00 0.8 - 1.5 MG/DL    GFR est AA >60 >60 ml/min/1.73m2    GFR est non-AA >60 >60 ml/min/1.73m2    Calcium 8.8 8.3 - 10.4 MG/DL    Bilirubin, total 0.5 0.2 - 1.1 MG/DL    ALT (SGPT) 55 12 - 65 U/L    AST (SGOT) 127 (H) 15 - 37 U/L    Alk.  phosphatase 198 (H) 50 - 136 U/L    Protein, total 6.6 6.3 - 8.2 g/dL    Albumin 3.0 (L) 3.2 - 4.6 g/dL    Globulin 3.6 (H) 2.3 - 3.5 g/dL    A-G Ratio 0.8 (L) 1.2 - 3.5     LIPASE    Collection Time: 03/27/22  7:58 PM   Result Value Ref Range    Lipase 2,029 (H) 73 - 393 U/L   LACTIC ACID    Collection Time: 03/27/22  7:58 PM   Result Value Ref Range    Lactic acid 4.9 (HH) 0.4 - 2.0 MMOL/L   TROPONIN-HIGH SENSITIVITY    Collection Time: 03/27/22  7:58 PM   Result Value Ref Range    Troponin-High Sensitivity 36.5 (H) 0 - 14 pg/mL   NT-PRO BNP    Collection Time: 03/27/22  7:58 PM   Result Value Ref Range    NT pro- (H) <450 PG/ML   PROCALCITONIN    Collection Time: 03/27/22  7:58 PM   Result Value Ref Range    Procalcitonin 0.98 (H) 0.00 - 0.49 ng/mL   EKG, 12 LEAD, INITIAL    Collection Time: 03/27/22  8:03 PM   Result Value Ref Range    Ventricular Rate 78 BPM    Atrial Rate 0 BPM    P-R Interval 53 ms    QRS Duration 151 ms    Q-T Interval 388 ms    QTC Calculation (Bezet) 442 ms    Calculated P Axis 0 degrees    Calculated R Axis -87 degrees    Calculated T Axis 81 degrees    Diagnosis       Ventricular-paced rhythm  No further analysis attempted due to paced rhythm  Baseline wander in lead(s) V3     POC URINE MACROSCOPIC    Collection Time: 03/27/22  8:21 PM   Result Value Ref Range    Spec. gravity (POC) 1.020 1.001 - 1.023      pH, urine  (POC) 5.5 5.0 - 9.0      Protein (POC) Negative NEG mg/dL    Glucose, urine (POC) 100 (A) NEG mg/dL    Ketones (POC) Negative NEG mg/dL    Bilirubin (POC) Negative NEG      Blood (POC) Negative NEG      Urobilinogen (POC) 1.0 0.2 - 1.0 EU/dL    Nitrite (POC) Negative NEG      Leukocyte esterase (POC) Negative NEG      Performed by [facility] [employer]        All Micro Results     Procedure Component Value Units Date/Time    CULTURE, BLOOD [[id number]] Collected: 03/27/22 2350    Order Status: Completed Specimen: Blood Updated: 03/28/22 0015    CULTURE, BLOOD [[id number]]     Order Status: Sent Specimen: Blood           Other Studies:  CT ABD PELV W CONT    Result Date: 3/27/2022  EXAM: CT ABD PELV W CONT HISTORY: Abd pain.  TECHNIQUE: Axial images of the abdomen and pelvis were performed following the administration of intravenous contrast. Images were obtained in the axial plane and coronal reformatted images were created and reviewed. Dose reduction technique used: Automated exposure control/Adjustment of the mA and/or kV according to patient size/Use of iterative reconstruction technique. 100 mL of Isovue-370 were utilized. COMPARISON: None. FINDINGS: The visualized lung bases are unremarkable. There are bilateral pleural-based calcifications posteriorly. The visualized portion of the heart shows leads likely from a pacemaker. The liver contains multiple small hypodensities. These are too small to characterize. The spleen and adrenal glands are within normal limits. The pancreas is not enlarged. There is mild peripancreatic fatty stranding and fluid. There are no obvious pancreatic lesions. The gallbladder is mildly distended. There is mild wall thickening with pericholecystic fluid. Multiple tiny hyperdensities are present in the neck of the gallbladder. These may represent stones. The kidneys show a symmetric nephrogram. There is a small hypodense lesion in the right kidney which is too small to characterize. Both kidneys show focal areas suggesting scarring. There is a tiny nonobstructing calcification in the left kidney. The right ureter is normal in caliber throughout most of its course. Just proximal to the ureterovesical junction there is focal dilatation of the distal ureter measuring up to 2.3 cm. The bladder appears grossly normal. The prostate is enlarged. Distal esophagus and stomach are unremarkable. Small and large bowel are without evidence of obstruction. Patient history states appendectomy. Diverticulosis with no evidence of acute diverticulitis. No evidence of free fluid, free air, focal fluid collection. No pathologic adenopathy. No abdominal aortic aneurysm. Osseous structures are without evidence of acute fracture or suspicious lesion.      There are multiple hyperechoic dense structures rather in the gallbladder neck. These may represent multiple stones. There is mild gallbladder wall thickening with subtle pericholecystic fluid. These findings raise suspicion for acute cholecystitis. There is also mild peripancreatic fluid. This fluid could be due to cholecystitis or acute pancreatitis. There is focal dilatation of the distal right ureter. There is no evidence of hydronephrosis proximal to this point. Further work up with urology consult is recommended to assess this region. Chronic appearing changes are noted in the kidneys. Multiple small low-density lesions are seen in the liver and are too small to characterize. US ABD LTD    Result Date: 3/27/2022  EXAM: US ABD LTD HISTORY: RUQ pain. TECHNIQUE: Grayscale and limited color Doppler ultrasound of the right upper quadrant. COMPARISON: CT abdomen and pelvis dated 3/27/2022 FINDINGS: Aorta/IVC: Not visualized/Visualized portions are within normal limits. Pancreas: Not visualized Liver: There is diffuse increased echogenicity within the liver parenchyma, suggestive of hepatic steatosis. No focal lesions are demonstrated on the provided images. Portal vein: The portal vein shows hepatopedal flow. Gallbladder: Multiple tiny gallstones are present. The wall is thickened measuring 7.6 mm. No evidence of pericholecystic fluid. Sonographic Esperanza Pair sign was not documented. CBD: Normal in caliber and measures 7.1 mm. Right kidney: 12 cm in length and without evidence of obstruction. The kidney appears somewhat echogenic. Cholelithiasis. The gallbladder wall is thickened measuring 7.6 mm. There is no appreciable pericholecystic fluid. Sonographic Esperanza Pair sign was not documented. The pancreas was not visualized. Hepatic steatosis. The right kidney appears somewhat echogenic. This can be seen with medical renal disease. Please correlate clinically.     XR CHEST PORT    Result Date: 3/27/2022  EXAM: XR CHEST PORT HISTORY: palpitations. TECHNIQUE: Frontal chest. COMPARISON: 11/19/2021 FINDINGS: There is moderate cardiomegaly. There is pulmonary vascular congestion. There is no consolidation, significant pleural effusion, or pneumothorax. Stable left-sided cardiac device. No significant osseous abnormalities are observed. Cardiomegaly with pulmonary vascular congestion suggests CHF. There is no consolidation or significant pleural effusion.          Medications Administered     iopamidoL (ISOVUE-370) 76 % injection 100 mL     Admin Date  03/27/2022 Action  Given Dose  100 mL Route  IntraVENous Administered By  Werner PERERA          morphine injection 4 mg     Admin Date  03/27/2022 Action  Given Dose  4 mg Route  IntraVENous Administered By  Danielle Hinkle RN          ondansetron [facility]) injection 4 mg     Admin Date  03/27/2022 Action  Given Dose  4 mg Route  IntraVENous Administered By  Danielle Hinkle RN          saline peripheral flush soln 10 mL     Admin Date  03/27/2022 Action  Given Dose  10 mL Route  InterCATHeter Administered By  Werner PERERA          sodium chloride 0.9 % bolus infusion 100 mL     Admin Date  03/27/2022 Action  New Bag Dose  100 mL Route  IntraVENous Administered By  Werner PERERA          sodium chloride 0.9 % bolus infusion 500 mL     Admin Date  03/27/2022 Action  New Bag Dose  500 mL Route  IntraVENous Administered By  Danielle Hinkle RN                  Signed:  Dana Celis MD

## 2022-03-28 NOTE — PROGRESS NOTES
New 22 gauge inserted to left upper by ICU [place] and is patent with flushing.   Patient tolerated well and bed alarm on with call light in reach

## 2022-03-28 NOTE — PROGRESS NOTES
This notified by Lab (Ej) that patient HGB is 6.4 and this nurse notified hospitalist [facility]) at this time and will check back for response.

## 2022-03-28 NOTE — PROGRESS NOTES
Dr. Claudell Birkenhead came to room and talked with son about patient care and condition at this time with this nurse present. Patient is stable and sleeping in bed at this time.   Bed alarm on and call light in reach

## 2022-03-28 NOTE — PROGRESS NOTES
Hospitalist Progress Note   Admit Date:  3/27/2022  7:41 PM   Name:  Edward Hernandez   Age:  79 y.o. Sex:  male  :  1940   MRN:  [id number]   Room:  604/01    Presenting Complaint: Abdominal Pain    Reason(s) for Admission: Sepsis [facility]) [A41.9]  Acute cholecystitis [K81.0]  Pancreatitis, gallstone [facility] Course & Interval History:     Mr. Tracey Interiano is an 80 yo male with PMH of AFIB s/p watchman, SSS s/p PPM, LVDD2, CAD,myasthenia gravis admitted with abdominal pain and meeting sepsis criteria. He has leukocytosis, lactic acidosis, elevated lipase and LFTS. CTAP shows   \"    IMPRESSION  There are multiple hyperechoic dense structures rather in the gallbladder neck. These may represent multiple stones. There is mild gallbladder wall thickening  with subtle pericholecystic fluid. These findings raise suspicion for acute  cholecystitis.     There is also mild peripancreatic fluid. This fluid could be due to  cholecystitis or acute pancreatitis.     There is focal dilatation of the distal right ureter. There is no evidence of  hydronephrosis proximal to this point. Further work up with urology consult is  recommended to assess this region.     Chronic appearing changes are noted in the kidneys.     Multiple small low-density lesions are seen in the liver and are too small to  Characterize. \"    ABD US     \"  IMPRESSION  Cholelithiasis. The gallbladder wall is thickened measuring 7.6 mm. There is no  appreciable pericholecystic fluid. Sonographic Marlyne Jaron sign was not documented.     The pancreas was not visualized.     Hepatic steatosis.     The right kidney appears somewhat echogenic. This can be seen with medical renal  disease. Please correlate clinically. \"        He is NPO pending surgery consult. He is on course of zosyn. Blood cultures pending. CXR shows vascular congestion. Discharge plans pending. Subjective/24hr Events (22):     Wife Nelson Hoover present, uncomfortable, wants water, some lower abd pain, no nausea/ emesis or bleeding.  No dyspnea or cough       Assessment & Plan:     Principal Problem:    Sepsis ([facility]   Likely GI source with acute cholecystitis  · NPO  · D2 zosyn  · Check UA  · followup blood cultures   · Stop IVF due to mild hypoxia and possible developing volume overload         Active Problems:    HTN (hypertension)   · BP low on admit, laisx/ lisinopril on hold             Coronary atherosclerosis of native coronary vessel  · Asa held   · lipitor held due to elevated LFTS        Myasthenia gravis (HCC) ()  ·   Monitor        SSS (sick sinus syndrome) ([facility])     Paroxysmal atrial fibrillation (HCC)   · In NSR  · S/p prior watchman / PPM         Diastolic CHF, chronic ([facility] (3/2/2022)  · Will resume lasix as tolerant  · O2 as needed         Pancreatitis, gallstone (3/28/2022)    Acute cholecystitis (3/28/2022)  ·  NPO  · GI/surgery consults   · D2 antibiotics           Anemia (3/28/2022)- worse 3-28-22  · NPO  · IV protonix every 12 hours   · Trend HGB  · 1 PRBC unit ordered  · Check occult stool        Right ureteral dilation:  · Check UA  · Will need eventual urology consult - no kevin hydronephrosis         Dispo/Discharge Planning:      Pending           Diet:  DIET NPO Ice Chips  DVT PPx:  SCD  Code status: Full Code    Hospital Problems as of 3/28/2022 Date Reviewed: 3/2/2022          Codes Class Noted - Resolved POA    Acute cholecystitis ICD-10-CM: K81.0  ICD-9-CM: 575.0  3/28/2022 - Present Unknown        Pancreatitis, gallstone ICD-10-CM: K85.10  ICD-9-CM: 577.0, 574.20  3/28/2022 - Present Unknown        Anemia ICD-10-CM: D64.9  ICD-9-CM: 285.9  3/28/2022 - Present Yes        Diastolic CHF, chronic (HCC) ICD-10-CM: I50.32  ICD-9-CM: 428.32, 428.0  3/2/2022 - Present Yes        Atrial fibrillation ([facility] ICD-10-CM: I48.91  ICD-9-CM: 427.31  11/29/2017 - Present Yes        * (Principal) Sepsis ([facility] ICD-10-CM: A41.9  ICD-9-CM: 038.9, 995.91 10/26/2017 - Present Unknown        Paroxysmal atrial fibrillation (HCC) ICD-10-CM: I48.0  ICD-9-CM: 427.31  6/30/2017 - Present Yes    Overview Signed 5/21/2019 10:36 AM by Krystal Bowen MD     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               SSS (sick sinus syndrome) (HCC) ICD-10-CM: I49.5  ICD-9-CM: 427.81  6/30/2017 - Present Yes        Myasthenia gravis ([facility] ICD-10-CM: G70.00  ICD-9-CM: 358.00  Unknown - Present Yes        HTN (hypertension) (Chronic) ICD-10-CM: I10  ICD-9-CM: 401.9  5/8/2015 - Present Yes        Coronary atherosclerosis of native coronary vessel ICD-10-CM: I25.10  ICD-9-CM: 414.01  5/8/2015 - Present Yes    Overview Signed 6/7/2016  8:35 AM by [address] on [facility]  5/7/15: prox LAD PCI, normal EF                   Objective:     Patient Vitals for the past 24 hrs:   Temp Pulse Resp BP SpO2   03/28/22 0740 98.1 °F (36.7 °C) 81 20 108/80 96 %   03/28/22 0524 98.3 °F (36.8 °C) 77 18 123/76 91 %   03/28/22 0215    100/61    03/28/22 0212 97.7 °F (36.5 °C) 70 18 (!) 78/49 91 %   03/28/22 0030  70  (!) 104/54    03/28/22 0015  70  (!) 99/54    03/28/22 0000  70  (!) 98/56    03/27/22 2345  70  (!) 101/59 94 %   03/27/22 2330  75  (!) 92/59 93 %   03/27/22 2315  76  98/62 (!) 89 %   03/27/22 2300  76  (!) 96/57 93 %   03/27/22 2245  75  (!) 93/53 90 %   03/27/22 2230  75  (!) 118/50 95 %   03/27/22 2215  75  (!) 103/48 96 %   03/27/22 2200  75  (!) 116/55 95 %   03/27/22 2145  75  (!) 121/55 94 %   03/27/22 2130    (!) 116/56    03/27/22 2115  76  110/62 92 %   03/27/22 2100  75  105/61 (!) 87 %   03/27/22 2045  82  132/67 95 %   03/27/22 2030  76  (!) 111/52 (!) 88 %   03/27/22 2015  100  (!) 134/53 93 %   03/27/22 2000  77 25 (!) 127/59 94 %   03/27/22 1950 99.4 °F (37.4 °C) 75 22 (!) 153/60 94 %     Oxygen Therapy  O2 Sat (%): 96 % (03/28/22 0740)  O2 Device: Nasal cannula (03/28/22 0215)  Skin Assessment: Clean, dry, & intact (03/28/22 0215)  O2 Flow Rate (L/min): 1 l/min (03/28/22 0215)    Estimated body mass index is 30.41 kg/m² as calculated from the following:    Height as of this encounter: 5' 8\" (1.727 m). Weight as of this encounter: 90.7 kg (200 lb). No intake or output data in the 24 hours ending 03/28/22 0847      Physical Exam:     Blood pressure 108/80, pulse 81, temperature 98.1 °F (36.7 °C), resp. rate 20, height 5' 8\" (1.727 m), weight 90.7 kg (200 lb), SpO2 96 %. General:    Well nourished. No overt distress, elderly, hard of hearing   CV:   RRR. No m/r/g. No jugular venous distension. No edema   Lungs:   CTAB. No wheezing, rhonchi, or rales. Respirations even, unlabored  Abdomen: Bowel sounds present. Soft,diffusely tender, nondistended. Extremities: No cyanosis or clubbing. Trace  edema  Skin:     No rashes and normal coloration. Warm and dry. Neuro:   grossly intact. Psych:  Normal mood and affect. I have reviewed ordered lab tests and independently visualized imaging below:    Recent Labs:  Recent Results (from the past 48 hour(s))   CBC WITH AUTOMATED DIFF    Collection Time: 03/27/22  7:58 PM   Result Value Ref Range    WBC 20.2 (H) 4.3 - 11.1 K/uL    RBC 4.39 4.23 - 5.6 M/uL    HGB 7.2 (L) 13.6 - 17.2 g/dL    HCT 27.9 (L) 41.1 - 50.3 %    MCV 63.6 (L) 79.6 - 97.8 FL    MCH 16.4 (L) 26.1 - 32.9 PG    MCHC 25.8 (L) 31.4 - 35.0 g/dL    RDW 20.4 (H) 11.9 - 14.6 %    PLATELET 383 606 - 104 K/uL    MPV 8.9 (L) 9.4 - 12.3 FL    ABSOLUTE NRBC 0.03 0.0 - 0.2 K/uL    DF AUTOMATED      NEUTROPHILS 91 (H) 43 - 78 %    LYMPHOCYTES 4 (L) 13 - 44 %    MONOCYTES 5 4.0 - 12.0 %    EOSINOPHILS 0 (L) 0.5 - 7.8 %    BASOPHILS 0 0.0 - 2.0 %    IMMATURE GRANULOCYTES 0 0.0 - 5.0 %    ABS. NEUTROPHILS 18.3 (H) 1.7 - 8.2 K/UL    ABS. LYMPHOCYTES 0.8 0.5 - 4.6 K/UL    ABS. MONOCYTES 0.9 0.1 - 1.3 K/UL    ABS. EOSINOPHILS 0.1 0.0 - 0.8 K/UL    ABS. BASOPHILS 0.0 0.0 - 0.2 K/UL    ABS. IMM. GRANS. 0.1 0.0 - 0.5 K/UL   METABOLIC PANEL, COMPREHENSIVE    Collection Time: 03/27/22  7:58 PM   Result Value Ref Range    Sodium 138 138 - 145 mmol/L    Potassium 4.1 3.5 - 5.1 mmol/L    Chloride 103 98 - 107 mmol/L    CO2 24 21 - 32 mmol/L    Anion gap 11 7 - 16 mmol/L    Glucose 145 (H) 65 - 100 mg/dL    BUN 11 8 - 23 MG/DL    Creatinine 1.00 0.8 - 1.5 MG/DL    GFR est AA >60 >60 ml/min/1.73m2    GFR est non-AA >60 >60 ml/min/1.73m2    Calcium 8.8 8.3 - 10.4 MG/DL    Bilirubin, total 0.5 0.2 - 1.1 MG/DL    ALT (SGPT) 55 12 - 65 U/L    AST (SGOT) 127 (H) 15 - 37 U/L    Alk.  phosphatase 198 (H) 50 - 136 U/L    Protein, total 6.6 6.3 - 8.2 g/dL    Albumin 3.0 (L) 3.2 - 4.6 g/dL    Globulin 3.6 (H) 2.3 - 3.5 g/dL    A-G Ratio 0.8 (L) 1.2 - 3.5     LIPASE    Collection Time: 03/27/22  7:58 PM   Result Value Ref Range    Lipase 2,029 (H) 73 - 393 U/L   LACTIC ACID    Collection Time: 03/27/22  7:58 PM   Result Value Ref Range    Lactic acid 4.9 (HH) 0.4 - 2.0 MMOL/L   TROPONIN-HIGH SENSITIVITY    Collection Time: 03/27/22  7:58 PM   Result Value Ref Range    Troponin-High Sensitivity 36.5 (H) 0 - 14 pg/mL   NT-PRO BNP    Collection Time: 03/27/22  7:58 PM   Result Value Ref Range    NT pro- (H) <450 PG/ML   PROCALCITONIN    Collection Time: 03/27/22  7:58 PM   Result Value Ref Range    Procalcitonin 0.98 (H) 0.00 - 0.49 ng/mL   EKG, 12 LEAD, INITIAL    Collection Time: 03/27/22  8:03 PM   Result Value Ref Range    Ventricular Rate 78 BPM    Atrial Rate 0 BPM    P-R Interval 53 ms    QRS Duration 151 ms    Q-T Interval 388 ms    QTC Calculation (Bezet) 442 ms    Calculated P Axis 0 degrees    Calculated R Axis -87 degrees    Calculated T Axis 81 degrees    Diagnosis       Ventricular-paced rhythm  No further analysis attempted due to paced rhythm  Baseline wander in lead(s) V3     POC URINE MACROSCOPIC    Collection Time: 03/27/22  8:21 PM   Result Value Ref Range    Spec. gravity (POC) 1.020 1.001 - 1.023 pH, urine  (POC) 5.5 5.0 - 9.0      Protein (POC) Negative NEG mg/dL    Glucose, urine (POC) 100 (A) NEG mg/dL    Ketones (POC) Negative NEG mg/dL    Bilirubin (POC) Negative NEG      Blood (POC) Negative NEG      Urobilinogen (POC) 1.0 0.2 - 1.0 EU/dL    Nitrite (POC) Negative NEG      Leukocyte esterase (POC) Negative NEG      Performed by Kong Manuel    LACTIC ACID    Collection Time: 03/27/22 11:50 PM   Result Value Ref Range    Lactic acid 3.0 (H) 0.4 - 2.0 MMOL/L   LACTIC ACID    Collection Time: 03/28/22  4:03 AM   Result Value Ref Range    Lactic acid 3.3 (H) 0.4 - 2.0 MMOL/L   METABOLIC PANEL, COMPREHENSIVE    Collection Time: 03/28/22  4:03 AM   Result Value Ref Range    Sodium 139 138 - 145 mmol/L    Potassium 4.4 3.5 - 5.1 mmol/L    Chloride 106 98 - 107 mmol/L    CO2 24 21 - 32 mmol/L    Anion gap 9 7 - 16 mmol/L    Glucose 142 (H) 65 - 100 mg/dL    BUN 15 8 - 23 MG/DL    Creatinine 1.40 0.8 - 1.5 MG/DL    GFR est AA >60 >60 ml/min/1.73m2    GFR est non-AA 51 (L) >60 ml/min/1.73m2    Calcium 8.3 8.3 - 10.4 MG/DL    Bilirubin, total 0.5 0.2 - 1.1 MG/DL    ALT (SGPT) 53 12 - 65 U/L    AST (SGOT) 68 (H) 15 - 37 U/L    Alk.  phosphatase 165 (H) 50 - 136 U/L    Protein, total 6.0 (L) 6.3 - 8.2 g/dL    Albumin 2.7 (L) 3.2 - 4.6 g/dL    Globulin 3.3 2.3 - 3.5 g/dL    A-G Ratio 0.8 (L) 1.2 - 3.5     CBC WITH AUTOMATED DIFF    Collection Time: 03/28/22  4:03 AM   Result Value Ref Range    WBC 28.4 (H) 4.3 - 11.1 K/uL    RBC 4.01 (L) 4.23 - 5.6 M/uL    HGB 6.4 (LL) 13.6 - 17.2 g/dL    HCT 25.4 (L) 41.1 - 50.3 %    MCV 63.3 (L) 79.6 - 97.8 FL    MCH 16.0 (L) 26.1 - 32.9 PG    MCHC 25.2 (L) 31.4 - 35.0 g/dL    RDW 20.1 (H) 11.9 - 14.6 %    PLATELET 185 265 - 166 K/uL    MPV 8.9 (L) 9.4 - 12.3 FL    ABSOLUTE NRBC 0.03 0.0 - 0.2 K/uL    DF AUTOMATED      NEUTROPHILS 89 (H) 43 - 78 %    LYMPHOCYTES 3 (L) 13 - 44 %    MONOCYTES 6 4.0 - 12.0 %    EOSINOPHILS 0 (L) 0.5 - 7.8 %    BASOPHILS 0 0.0 - 2.0 %    IMMATURE GRANULOCYTES 1 0.0 - 5.0 %    ABS. NEUTROPHILS 25.4 (H) 1.7 - 8.2 K/UL    ABS. LYMPHOCYTES 0.9 0.5 - 4.6 K/UL    ABS. MONOCYTES 1.6 (H) 0.1 - 1.3 K/UL    ABS. EOSINOPHILS 0.1 0.0 - 0.8 K/UL    ABS. BASOPHILS 0.1 0.0 - 0.2 K/UL    ABS. IMM. GRANS. 0.3 0.0 - 0.5 K/UL       All Micro Results     Procedure Component Value Units Date/Time    CULTURE, BLOOD [[id number]] Collected: 03/28/22 0019    Order Status: Completed Specimen: Blood Updated: 03/28/22 0144    CULTURE, BLOOD [[id number]] Collected: 03/27/22 2350    Order Status: Completed Specimen: Blood Updated: 03/28/22 0015          Other Studies:  CT ABD PELV W CONT    Result Date: 3/27/2022  EXAM: CT ABD PELV W CONT HISTORY: Abd pain. TECHNIQUE: Axial images of the abdomen and pelvis were performed following the administration of intravenous contrast. Images were obtained in the axial plane and coronal reformatted images were created and reviewed. Dose reduction technique used: Automated exposure control/Adjustment of the mA and/or kV according to patient size/Use of iterative reconstruction technique. 100 mL of Isovue-370 were utilized. COMPARISON: None. FINDINGS: The visualized lung bases are unremarkable. There are bilateral pleural-based calcifications posteriorly. The visualized portion of the heart shows leads likely from a pacemaker. The liver contains multiple small hypodensities. These are too small to characterize. The spleen and adrenal glands are within normal limits. The pancreas is not enlarged. There is mild peripancreatic fatty stranding and fluid. There are no obvious pancreatic lesions. The gallbladder is mildly distended. There is mild wall thickening with pericholecystic fluid. Multiple tiny hyperdensities are present in the neck of the gallbladder. These may represent stones. The kidneys show a symmetric nephrogram. There is a small hypodense lesion in the right kidney which is too small to characterize.  Both kidneys show focal areas suggesting scarring. There is a tiny nonobstructing calcification in the left kidney. The right ureter is normal in caliber throughout most of its course. Just proximal to the ureterovesical junction there is focal dilatation of the distal ureter measuring up to 2.3 cm. The bladder appears grossly normal. The prostate is enlarged. Distal esophagus and stomach are unremarkable. Small and large bowel are without evidence of obstruction. Patient history states appendectomy. Diverticulosis with no evidence of acute diverticulitis. No evidence of free fluid, free air, focal fluid collection. No pathologic adenopathy. No abdominal aortic aneurysm. Osseous structures are without evidence of acute fracture or suspicious lesion. There are multiple hyperechoic dense structures rather in the gallbladder neck. These may represent multiple stones. There is mild gallbladder wall thickening with subtle pericholecystic fluid. These findings raise suspicion for acute cholecystitis. There is also mild peripancreatic fluid. This fluid could be due to cholecystitis or acute pancreatitis. There is focal dilatation of the distal right ureter. There is no evidence of hydronephrosis proximal to this point. Further work up with urology consult is recommended to assess this region. Chronic appearing changes are noted in the kidneys. Multiple small low-density lesions are seen in the liver and are too small to characterize. US ABD LTD    Result Date: 3/27/2022  EXAM: US ABD LTD HISTORY: RUQ pain. TECHNIQUE: Grayscale and limited color Doppler ultrasound of the right upper quadrant. COMPARISON: CT abdomen and pelvis dated 3/27/2022 FINDINGS: Aorta/IVC: Not visualized/Visualized portions are within normal limits. Pancreas: Not visualized Liver: There is diffuse increased echogenicity within the liver parenchyma, suggestive of hepatic steatosis. No focal lesions are demonstrated on the provided images.  Portal vein: The portal vein shows hepatopedal flow. Gallbladder: Multiple tiny gallstones are present. The wall is thickened measuring 7.6 mm. No evidence of pericholecystic fluid. Sonographic Chelsea Reyna sign was not documented. CBD: Normal in caliber and measures 7.1 mm. Right kidney: 12 cm in length and without evidence of obstruction. The kidney appears somewhat echogenic. Cholelithiasis. The gallbladder wall is thickened measuring 7.6 mm. There is no appreciable pericholecystic fluid. Sonographic Chelsea Reyna sign was not documented. The pancreas was not visualized. Hepatic steatosis. The right kidney appears somewhat echogenic. This can be seen with medical renal disease. Please correlate clinically. XR CHEST PORT    Result Date: 3/27/2022  EXAM: XR CHEST PORT HISTORY: palpitations. TECHNIQUE: Frontal chest. COMPARISON: 11/19/2021 FINDINGS: There is moderate cardiomegaly. There is pulmonary vascular congestion. There is no consolidation, significant pleural effusion, or pneumothorax. Stable left-sided cardiac device. No significant osseous abnormalities are observed. Cardiomegaly with pulmonary vascular congestion suggests CHF. There is no consolidation or significant pleural effusion.        Current Meds:  Current Facility-Administered Medications   Medication Dose Route Frequency    sodium chloride (NS) flush 5-40 mL  5-40 mL IntraVENous Q8H    sodium chloride (NS) flush 5-40 mL  5-40 mL IntraVENous PRN    acetaminophen (TYLENOL) tablet 650 mg  650 mg Oral Q6H PRN    Or    acetaminophen (TYLENOL) suppository 650 mg  650 mg Rectal Q6H PRN    polyethylene glycol (MIRALAX) packet 17 g  17 g Oral DAILY PRN    ondansetron (ZOFRAN ODT) tablet 4 mg  4 mg Oral Q8H PRN    Or    ondansetron (ZOFRAN) injection 4 mg  4 mg IntraVENous Q6H PRN    piperacillin-tazobactam (ZOSYN) 3.375 g in 0.9% sodium chloride (MBP/ADV) 100 mL MBP  3.375 g IntraVENous Q8H    hydrALAZINE (APRESOLINE) 20 mg/mL injection 10 mg  10 mg IntraVENous Q6H PRN    lactated Ringers infusion  100 mL/hr IntraVENous CONTINUOUS    morphine injection 1 mg  1 mg IntraVENous Q4H PRN    0.9% sodium chloride infusion 250 mL  250 mL IntraVENous PRN    pantoprazole (PROTONIX) 40 mg in 0.9% sodium chloride 10 mL injection  40 mg IntraVENous Q12H    sodium chloride (NS) flush 5-10 mL  5-10 mL IntraVENous Q8H    sodium chloride (NS) flush 5-10 mL  5-10 mL IntraVENous PRN       Signed:  Tessa Potts MD    Part of this note may have been written by using a voice dictation software. The note has been proof read but may still contain some grammatical/other typographical errors.

## 2022-03-28 NOTE — ASSESSMENT & PLAN NOTE
- CT with likely obstructing stones and evidence of pancreatitis by imaging as well as lipase  - NPO  - IVFs  - PRN pain meds  - Zofran prn for N/V

## 2022-03-28 NOTE — PROGRESS NOTES
TRANSFER - IN REPORT:    Verbal report received from Jordy Shah, [facility] on Katlyn Carrera  being received from ER for routine progression of care      Report consisted of patients Situation, Background, Assessment and   Recommendations(SBAR). Information from the following report(s) SBAR, Kardex, Intake/Output, MAR and Recent Results was reviewed with the receiving nurse. Opportunity for questions and clarification was provided. Assessment completed upon patients arrival to unit and care assumed.

## 2022-03-28 NOTE — CONSULTS
Rubina Medrano MD   Bariatric & Advanced Laparoscopic Surgery & Endoscopy  [facility] [address] [facility]  Phone [phone]   Fax [phone]      Date of visit: 3/28/2022      Primary/Requesting provider: Anthony Robbins MD         Name: Jenny Cortez      MRN: [id number]       : 1940       Age: 79 y.o. Sex: male        PCP: Anthony Robbins MD     CC:    Chief Complaint   Patient presents with    Abdominal Pain       HPI:    Jenny Cortez is a 79 y.o. male with history of atrial fibrillation s/p watchman device implantation, s/p pacemaker/AV keerthi ablation, CAD with prior PCI LAD in , HFpEF, mild pulmonary HTN, mild to moderate aortic insufficiency who present with altered mental status, and abdominal pain. Pain started 1 day ago when patient was coming back home from [place]. He rpeoerts eating a lot of gravy prior to the pain starting. He reports having this pain in the past. He reports pain was 10/10/ he denies any nausea or vomiting. He reports some black tarry stools.      Previous abdominal surgeries - appendectomy  Last colonoscopy -         PMH:    Past Medical History:   Diagnosis Date    Abnormal EKG 4/22/15    Arrhythmia     CAD (coronary artery disease) 2015    Carotid artery stenosis without cerebral infarction 2016    US 6/15: <50% bilat ICAs    Coronary atherosclerosis of native coronary vessel 2015    VEGAS on brilinta 5/7/15: prox LAD PCI, normal EF     Diastolic CHF, chronic ([address] 3/2/2022    Dyslipidemia 2016    Dyspnea 2015    Echo 6/15: EF 60%, mod MR, mod LVH, mild AI     ED (erectile dysfunction)     GERD (gastroesophageal reflux disease)     no medication    GERD (gastroesophageal reflux disease)     HTN (hypertension) 2015    Hypertension     Hypokalemia 2016    Hypokalemia     Mitral valve regurgitation 2016    Morbid obesity ([address]     Myasthenia gravis ([address] 6/17/15    [facility]is [facility]     Nocturia     Osteoporosis     PUD (peptic ulcer disease) 25 yrs ago    S/P coronary artery stent placement 5/8/2015    3.0x38 mm Xience ADRIANNA to pLAD 5/7/15     Sleep apnea     Syncope and collapse     Unspecified sleep apnea     no cpap       PSH:    Past Surgical History:   Procedure Laterality Date    HX APPENDECTOMY      HX COLONOSCOPY  2007    HX HEART CATHETERIZATION  05/27/2015    stent    HX HEART CATHETERIZATION  05/21/2019    watchman device    HX HEMORRHOIDECTOMY      HX PACEMAKER  2018   Ivar Basket    Dr. Cervantes/Xin for sleep apnea and reconstruction for extending jaw    VASCULAR SURGERY PROCEDURE UNLIST Right 07/10/2019     Repair of right radial artery pseudoaneurysm       MEDS:    Current Facility-Administered Medications   Medication    sodium chloride (NS) flush 5-40 mL    sodium chloride (NS) flush 5-40 mL    acetaminophen (TYLENOL) tablet 650 mg    Or    acetaminophen (TYLENOL) suppository 650 mg    polyethylene glycol (MIRALAX) packet 17 g    ondansetron (ZOFRAN ODT) tablet 4 mg    Or    ondansetron (ZOFRAN) injection 4 mg    piperacillin-tazobactam (ZOSYN) 3.375 g in 0.9% sodium chloride (MBP/ADV) 100 mL MBP    hydrALAZINE (APRESOLINE) 20 mg/mL injection 10 mg    morphine injection 1 mg    0.9% sodium chloride infusion 250 mL    pantoprazole (PROTONIX) 40 mg in 0.9% sodium chloride 10 mL injection    [START ON 3/29/2022] furosemide (LASIX) tablet 40 mg    sodium chloride (NS) flush 5-10 mL    sodium chloride (NS) flush 5-10 mL       ALLERGIES:      No Known Allergies    SH:    Social History     Tobacco Use    Smoking status: Never Smoker    Smokeless tobacco: Never Used   Substance Use Topics    Alcohol use: No    Drug use: No       FH:    Family History   Problem Relation Age of Onset    Cancer Mother         kidney    Cancer Father         stomach    No Known Problems Sister     Cancer Brother         brain tumor    No Known Problems Brother        Review of systems:  Review of Systems   Constitutional: Negative for chills, fever and weight loss. HENT: Negative for sore throat. Eyes: Negative for discharge and redness. Respiratory: Negative for cough, hemoptysis, shortness of breath and stridor. Cardiovascular: Negative for chest pain and palpitations. Gastrointestinal: Positive for abdominal pain. Negative for blood in stool, constipation, diarrhea, heartburn, melena, nausea and vomiting. Genitourinary: Negative for dysuria and hematuria. Musculoskeletal: Negative for back pain, falls and joint pain. Skin: Negative for itching and rash. Neurological: Negative for sensory change, speech change, focal weakness and loss of consciousness. Endo/Heme/Allergies: Does not bruise/bleed easily. Psychiatric/Behavioral: Negative for memory loss. The patient is not nervous/anxious and does not have insomnia. Physical Exam:     Visit Vitals  BP 91/61 (BP 1 Location: Left upper arm, BP Patient Position: At rest)   Pulse 75   Temp 98 °F (36.7 °C)   Resp 18   Ht 5' 8\" (1.727 m)   Wt 200 lb (90.7 kg)   SpO2 99%   BMI 30.41 kg/m²       General:  Well-developed, well-nourished, no distress. Psych:  Cooperative, good insight and judgement. Neuro:  Alert, oriented to person, place and time. HEENT:  Normocephalic, atraumatic. Sclera clear. Lungs:  Unlabored breathing. Symmetrical chest expansion. Chest wall:  No tenderness or deformity. Heart:  Regular rate and rhythm. No JVD. Abdomen:  Soft, mild RUQ tenderness, non-distended. No guarding or rebound. Extremities:  Extremities normal, atraumatic, no cyanosis or edema. Skin:  Skin color, texture, turgor normal. No rashes. Labs: All recent labs were reviewed. Elevated WBC. Low Hgb.      Recent Results (from the past 24 hour(s))   EKG, 12 LEAD, INITIAL    Collection Time: 03/27/22  8:03 PM   Result Value Ref Range    Ventricular Rate 78 BPM    Atrial Rate 0 BPM    P-R Interval 53 ms    QRS Duration 151 ms    Q-T Interval 388 ms    QTC Calculation (Bezet) 442 ms    Calculated P Axis 0 degrees    Calculated R Axis -87 degrees    Calculated T Axis 81 degrees    Diagnosis       Ventricular-paced rhythm  Confirmed by Domenick Peabody MD (), DELFINA REYES ([id number]) on 3/28/2022 9:50:35 AM     POC URINE MACROSCOPIC    Collection Time: 03/27/22  8:21 PM   Result Value Ref Range    Spec. gravity (POC) 1.020 1.001 - 1.023      pH, urine  (POC) 5.5 5.0 - 9.0      Protein (POC) Negative NEG mg/dL    Glucose, urine (POC) 100 (A) NEG mg/dL    Ketones (POC) Negative NEG mg/dL    Bilirubin (POC) Negative NEG      Blood (POC) Negative NEG      Urobilinogen (POC) 1.0 0.2 - 1.0 EU/dL    Nitrite (POC) Negative NEG      Leukocyte esterase (POC) Negative NEG      Performed by Angie Tipton    LACTIC ACID    Collection Time: 03/27/22 11:50 PM   Result Value Ref Range    Lactic acid 3.0 (H) 0.4 - 2.0 MMOL/L   CULTURE, BLOOD    Collection Time: 03/27/22 11:50 PM    Specimen: Blood   Result Value Ref Range    Special Requests: RIGHT  ARM        GRAM STAIN GRAM NEGATIVE RODS      GRAM STAIN AEROBIC AND ANAEROBIC BOTTLES      GRAM STAIN        RESULTS VERIFIED, PHONED TO AND READ BACK BY Damaso Baptiste, RN @ 2252 ON 3/28/22 BY AMM    Culture result: CULTURE IN PROGRESS,FURTHER UPDATES TO FOLLOW      Culture result:        Refer to Blood Culture ID Panel Accession  [id number]     BLOOD CULTURE ID PANEL    Collection Time: 03/27/22 11:50 PM    Specimen: Blood   Result Value Ref Range    Acc. no. from Micro Order [id number]     Klebsiella pneumoniae Detected (A) NOTDET      KPC (Carbapenem Resistance Gene) NOT DETECTED NOTDET      INTERPRETATION        Gram negative ney, identified by real time PCR as Klebsiella pneumoniae.    CULTURE, BLOOD    Collection Time: 03/28/22 12:19 AM    Specimen: Blood   Result Value Ref Range    Special Requests: LEFT  ARM        GRAM STAIN GRAM NEGATIVE RODS      GRAM STAIN ANAEROBIC BOTTLE POSITIVE      GRAM STAIN        CRITICAL RESULT NOT CALLED DUE TO PREVIOUS NOTIFICATION OF CRITICAL RESULT WITHIN THE LAST 24 HOURS. Culture result: CULTURE IN PROGRESS,FURTHER UPDATES TO FOLLOW     LACTIC ACID    Collection Time: 03/28/22  4:03 AM   Result Value Ref Range    Lactic acid 3.3 (H) 0.4 - 2.0 MMOL/L   METABOLIC PANEL, COMPREHENSIVE    Collection Time: 03/28/22  4:03 AM   Result Value Ref Range    Sodium 139 138 - 145 mmol/L    Potassium 4.4 3.5 - 5.1 mmol/L    Chloride 106 98 - 107 mmol/L    CO2 24 21 - 32 mmol/L    Anion gap 9 7 - 16 mmol/L    Glucose 142 (H) 65 - 100 mg/dL    BUN 15 8 - 23 MG/DL    Creatinine 1.40 0.8 - 1.5 MG/DL    GFR est AA >60 >60 ml/min/1.73m2    GFR est non-AA 51 (L) >60 ml/min/1.73m2    Calcium 8.3 8.3 - 10.4 MG/DL    Bilirubin, total 0.5 0.2 - 1.1 MG/DL    ALT (SGPT) 53 12 - 65 U/L    AST (SGOT) 68 (H) 15 - 37 U/L    Alk. phosphatase 165 (H) 50 - 136 U/L    Protein, total 6.0 (L) 6.3 - 8.2 g/dL    Albumin 2.7 (L) 3.2 - 4.6 g/dL    Globulin 3.3 2.3 - 3.5 g/dL    A-G Ratio 0.8 (L) 1.2 - 3.5     CBC WITH AUTOMATED DIFF    Collection Time: 03/28/22  4:03 AM   Result Value Ref Range    WBC 28.4 (H) 4.3 - 11.1 K/uL    RBC 4.01 (L) 4.23 - 5.6 M/uL    HGB 6.4 (LL) 13.6 - 17.2 g/dL    HCT 25.4 (L) 41.1 - 50.3 %    MCV 63.3 (L) 79.6 - 97.8 FL    MCH 16.0 (L) 26.1 - 32.9 PG    MCHC 25.2 (L) 31.4 - 35.0 g/dL    RDW 20.1 (H) 11.9 - 14.6 %    PLATELET 050 786 - 471 K/uL    MPV 8.9 (L) 9.4 - 12.3 FL    ABSOLUTE NRBC 0.03 0.0 - 0.2 K/uL    DF AUTOMATED      NEUTROPHILS 89 (H) 43 - 78 %    LYMPHOCYTES 3 (L) 13 - 44 %    MONOCYTES 6 4.0 - 12.0 %    EOSINOPHILS 0 (L) 0.5 - 7.8 %    BASOPHILS 0 0.0 - 2.0 %    IMMATURE GRANULOCYTES 1 0.0 - 5.0 %    ABS. NEUTROPHILS 25.4 (H) 1.7 - 8.2 K/UL    ABS. LYMPHOCYTES 0.9 0.5 - 4.6 K/UL    ABS. MONOCYTES 1.6 (H) 0.1 - 1.3 K/UL    ABS. EOSINOPHILS 0.1 0.0 - 0.8 K/UL    ABS. BASOPHILS 0.1 0.0 - 0.2 K/UL    ABS. IMM.  GRANS. 0.3 0.0 - 0.5 K/UL   RBC, ALLOCATE    Collection Time: 03/28/22  8:45 AM   Result Value Ref Range    HISTORY CHECKED? Historical check performed    TYPE & SCREEN    Collection Time: 03/28/22 10:07 AM   Result Value Ref Range    Crossmatch Expiration 03/31/2022,2359     ABO/Rh(D) A POSITIVE     Antibody screen NEG     Unit number [id number]     Blood component type RC LR     Unit division 00     Status of unit ISSUED     Crossmatch result Compatible    RETICULOCYTE COUNT    Collection Time: 03/28/22 10:07 AM   Result Value Ref Range    Reticulocyte count 1.7 0.3 - 2.0 %    Absolute Retic Cnt. 0.0643 0.026 - 0.095 M/ul    Immature Retic Fraction 34.4 (H) 2.3 - 13.4 %    Retic Hgb Conc. 15 (L) 29 - 35 pg   HGB & HCT    Collection Time: 03/28/22 10:08 AM   Result Value Ref Range    HGB 6.2 (LL) 13.6 - 17.2 g/dL    HCT 24.1 (L) 41.1 - 50.3 %   TRANSFERRIN SATURATION    Collection Time: 03/28/22  1:09 PM   Result Value Ref Range    Iron 10 (L) 35 - 150 ug/dL    TIBC 440 250 - 450 ug/dL    Transferrin Saturation 2 (L) >20 %   VITAMIN B12    Collection Time: 03/28/22  1:09 PM   Result Value Ref Range    Vitamin B12 270 193 - 986 pg/mL   FERRITIN    Collection Time: 03/28/22  1:09 PM   Result Value Ref Range    Ferritin 14 8 - 388 NG/ML   FOLATE    Collection Time: 03/28/22  1:09 PM   Result Value Ref Range    Folate 6.0 3.1 - 17.5 ng/mL       Imaging: CT images were independently reviewed by me. + gallstones. Mild gallbladder thickening. No pericholecystic fluid. CT ABD PELV W CONT    Result Date: 3/27/2022  There are multiple hyperechoic dense structures rather in the gallbladder neck. These may represent multiple stones. There is mild gallbladder wall thickening with subtle pericholecystic fluid. These findings raise suspicion for acute cholecystitis. There is also mild peripancreatic fluid. This fluid could be due to cholecystitis or acute pancreatitis. There is focal dilatation of the distal right ureter.  There is no evidence of hydronephrosis proximal to this point. Further work up with urology consult is recommended to assess this region. Chronic appearing changes are noted in the kidneys. Multiple small low-density lesions are seen in the liver and are too small to characterize. US ABD LTD    Result Date: 3/27/2022  Cholelithiasis. The gallbladder wall is thickened measuring 7.6 mm. There is no appreciable pericholecystic fluid. Sonographic Ruthellen Majors sign was not documented. The pancreas was not visualized. Hepatic steatosis. The right kidney appears somewhat echogenic. This can be seen with medical renal disease. Please correlate clinically. XR CHEST PORT    Result Date: 3/27/2022  Cardiomegaly with pulmonary vascular congestion suggests CHF. There is no consolidation or significant pleural effusion. ICD-10-CM ICD-9-CM    1. Calculus of gallbladder with acute cholecystitis without obstruction  K80.00 574.00    2. Acute gallstone pancreatitis  K85.10 577.0      574.20    3. Bacteremia  R78.81 790.7    4. Acute cholecystitis  K81.0 575.0    5. Persistent atrial fibrillation (HCC)  I48.19 427.31    6. Diastolic CHF, chronic (HCC)  I50.32 428.32      428.0    7. Atherosclerosis of native coronary artery of native heart without angina pectoris  I25.10 414.01    8. S/P coronary artery stent placement  Z95.5 V45.82          Assessment/Plan:  Ama Holt is a 79 y.o. male who has signs and symptoms consistent with gallstone pancreatitis and possible cholecystitis    We discussed the gallbladder disease pathophysiology and patient verbalized understanding. He had elevated lipase on admission concerning for gallstone pancreatitis. He has multiple medical cardiac conditions and medical comorbidities. and cardiology has been consulted for surgical risk evaluation. They report lap cholecystectomy is low risk surgery and his cardiac conditions are stable currently. He also has anemia and black tarry stools per report.  GI has been consulted. I anticipate he will need EGD to look for possible cause of anemia as Hgb was 10 in Nov 2021. GI following. He will also need to be transfused to Hgb > 7 prior to surgery. Maybe 8 would be a better threshold due to his cardia conditions. Bacteremia - ID consulted. Cont IV antibiotics. HIDA scan ordered to eval for cholecystitis as gallbladder is mildly thickened but he needs optimization of Hgb and lipase to trend down. Time: I spent 70 minutes preparing to see patient (including chart review and preparation), obtaining and/or reviewing additional medical history, performing a physical exam and evaluation, documenting clinical information in the electronic health record, independently interpreting results, communicating results to patient, family or caregiver, and/or coordinating care.       Signed: Eri Vincent MD  Bariatric & Minimally Invasive Surgery  3/28/2022 7:58 PM

## 2022-03-28 NOTE — PROGRESS NOTES
Admit patient to room 604. Patient is alert to person and place with periodic confusion. Patient having no pain or distress at this time. Patient's son Kike Finn) accompanied patient to room. Patient is hard of hearing and wears glasses. Patient on 02 at 1-2 liters via NC with RR even/unlabored and patient is mouth breathing. Patient heart rate is 70 with S1 and S2 sounds. Patient abdomen is semi-soft and tender to touch with hypoactive bowel sounds in all quads and LBM yesterday afternoon. Bed alarm on and call light in reach for safety with son at bedside. Will continue to assess.

## 2022-03-28 NOTE — ASSESSMENT & PLAN NOTE
- CT with findings suggestive of cholecystitis  - IV zosyn  - General Surgery aware  - NPO  - PRN pain meds

## 2022-03-28 NOTE — H&P
[facility] Initial Cardiac Evaluation                 Date of  Admission: 3/27/2022  7:41 PM     Primary Care Physician: Deanna Taveras MD  Primary Cardiologist: Dr Yomaira Nguyen  Referring Physician: Dr Candelaria Max  Attending Physician: Dr Bao Carranza    CC: PAF, CAD      Kt Gaytan is a 79 y.o. male admitted for Sepsis [facility]) [A41.9]  Acute cholecystitis [K81.0]  Pancreatitis, gallstone [K85.10]. He has a h/o htn, myasthenia gravis, PAF s/p Biotronik PM in 2017 (no LV lead due to fracture), watchman in 2019, AV node ablation 89764, CAD s/p  PCI pLAD in 2015, [facility] 3-2021 showing small vessel disease. H/O dHF w echo  w normal EF and mod AI and pulm htn. Presented to the ER 3-27 w palpitations, abdominal pain, difficulty voiding and AMS. In ER lactic 4.9, procal .98, WBC 20, LFTs elevated, CT gallstone pancreatitis. Admitted, antibiotics, surgery and GI consulted. Pending possible cholecystectomy. Volume status - 200 cc, /60, HR 75, hgb dropped to 6.4, K 4.4, cr 1.4, HS trop 36, pBNP 784, CXR cardiomegaly w possible congestion. EKG v paced at 78. CT abdomen showed abnormal R distal ureter - urology following. BC positive for gram negative rods- ID pending. GI following- HIDA scan pending- not candidate for MRI due to LV lead fracture and non function. Pt denies any recent CP, SOB, dizziness, palpitations, syncope, LE edema. He was started on po lasix a few weeks ago and has done well. Weight is stable. Per wife he does not follow a low NA diet. Being transfused one unit PRB cells- given IV lasix this AM. IV hydration stopped. Past cardiac work up:  PCI pLAD 2015  10/17: Biotronik PM in 2017 (no LV lead due to fracture)  5/2019: watchman device placement  [facility] 3/19/2021: small vessel CAD, no PCI.    [facility]IS 3/2021: normal EF, mild to mod AI.   5/2021: AVN ablation  Echo 12/2021: normal EF, mod AI, mod pHTN     Soc: No tobacco use, worked at [employer]   FH: Brother and sister w MI before age 71  Covid: Vaccinated w booster    Patient Active Problem List   Diagnosis Code    HTN (hypertension) I10    Coronary atherosclerosis of native coronary vessel I25.10    S/P coronary artery stent placement Z95.5    Dyspnea R06.00    Dyslipidemia E78.5    Hypokalemia E87.6    Mitral valve regurgitation I34.0    Myasthenia gravis ([facility]) G70.00    Bilateral carotid artery disease ([facility]) I77.9    Paroxysmal atrial fibrillation ([facility]) I48.0    SSS (sick sinus syndrome) ([facility]) I49.5    Syncope R55    Sepsis ([facility]) A41.9    Aspiration pneumonia ([facility]) J69.0    Hypotension I95.9    GERD (gastroesophageal reflux disease) K21.9    Atrial fibrillation ([facility]) I48.91    Pacemaker Z95.0    Chest pain K49.1    Diastolic CHF, chronic ([facility]) I50.32    Major depressive disorder, recurrent, mild F33.0    Major depressive disorder, recurrent, moderate F33.1    Major depressive disorder, recurrent, unspecified F33.9    Acute cholecystitis K81.0    Pancreatitis, gallstone K85.10    Anemia D64.9    Bacteremia R78.81       Past Medical History:   Diagnosis Date    Abnormal EKG 4/22/15    Arrhythmia     CAD (coronary artery disease) 5/8/2015    Carotid artery stenosis without cerebral infarction 6/7/2016    US 6/15: <50% bilat ICAs    Coronary atherosclerosis of native coronary vessel 5/8/2015    VEGAS on brilinta 5/7/15: prox LAD PCI, normal EF     Diastolic CHF, chronic ([facility] [address] 3/2/2022    Dyslipidemia 6/7/2016    Dyspnea 5/8/2015    Echo 6/15: EF 60%, mod MR, mod LVH, mild AI     ED (erectile dysfunction)     GERD (gastroesophageal reflux disease)     no medication    GERD (gastroesophageal reflux disease)     HTN (hypertension) 5/8/2015    Hypertension     Hypokalemia 6/7/2016    Hypokalemia     Mitral valve regurgitation 6/7/2016    Morbid obesity ([address]     Myasthenia gravis ([address] 6/17/15    Myasthenia gravis ([facility])     Nocturia     Osteoporosis     PUD (peptic ulcer disease) 25 yrs ago    S/P coronary artery stent placement 5/8/2015    3.0x38 mm Xience ADRIANNA to pLAD 5/7/15     Sleep apnea     Syncope and collapse     Unspecified sleep apnea     no cpap      Past Surgical History:   Procedure Laterality Date    HX APPENDECTOMY      HX COLONOSCOPY  2007    HX HEART CATHETERIZATION  05/27/2015    stent    HX HEART CATHETERIZATION  05/21/2019    watchman device    HX HEMORRHOIDECTOMY      HX PACEMAKER  2018   Meliza Stamp    Dr. Cervantes/Xin for sleep apnea and reconstruction for extending jaw    VASCULAR SURGERY PROCEDURE UNLIST Right 07/10/2019     Repair of right radial artery pseudoaneurysm     No Known Allergies   Family History   Problem Relation Age of Onset    Cancer Mother         kidney    Cancer Father         stomach    No Known Problems Sister     Cancer Brother         brain tumor    No Known Problems Brother       Social History     Tobacco Use    Smoking status: Never Smoker    Smokeless tobacco: Never Used   Substance Use Topics    Alcohol use: No        Current Facility-Administered Medications   Medication Dose Route Frequency    sodium chloride (NS) flush 5-40 mL  5-40 mL IntraVENous Q8H    sodium chloride (NS) flush 5-40 mL  5-40 mL IntraVENous PRN    acetaminophen (TYLENOL) tablet 650 mg  650 mg Oral Q6H PRN    Or    acetaminophen (TYLENOL) suppository 650 mg  650 mg Rectal Q6H PRN    polyethylene glycol (MIRALAX) packet 17 g  17 g Oral DAILY PRN    ondansetron (ZOFRAN ODT) tablet 4 mg  4 mg Oral Q8H PRN    Or    ondansetron (ZOFRAN) injection 4 mg  4 mg IntraVENous Q6H PRN    piperacillin-tazobactam (ZOSYN) 3.375 g in 0.9% sodium chloride (MBP/ADV) 100 mL MBP  3.375 g IntraVENous Q8H    hydrALAZINE (APRESOLINE) 20 mg/mL injection 10 mg  10 mg IntraVENous Q6H PRN    morphine injection 1 mg  1 mg IntraVENous Q4H PRN    0.9% sodium chloride infusion 250 mL  250 mL IntraVENous PRN    pantoprazole (PROTONIX) 40 mg in 0.9% sodium chloride 10 mL injection 40 mg IntraVENous Q12H    [START ON 3/29/2022] furosemide (LASIX) tablet 40 mg  40 mg Oral DAILY    sodium chloride (NS) flush 5-10 mL  5-10 mL IntraVENous Q8H    sodium chloride (NS) flush 5-10 mL  5-10 mL IntraVENous PRN       Review of Symptoms:  General: no weight change,  no weakness, fever or chills  Skin: no rashes, lumps, or other skin changes  HEENT: no headache, + decreased hearing   Neck: no swollen glands, goiter, pain or stiffness  Respiratory: no cough, sputum, hemoptysis, no dyspnea, wheezing  Cardiovascular: + as per HPI  Gastrointestinal: + abdominal pain   Urinary: no frequency, urgency , hematuria, burning/pain with urination, recent flank pain, polyuria, nocturia, or difficulty urinating  Peripheral Vascular: no claudication, leg cramps, prior DVTs, swelling of calves, legs, or feet, color change, or swelling with redness or tenderness  Musculoskeletal: no muscle or joint pain/stiffness, joint swelling, erythema of joints, or back pain  Psychiatric: no depression or excessive stress  Neurological: + myasthenia gravis   Hematologic: + anemia  Endocrine: no thyroid problems, heat or cold intolerance, excessive sweating, polyuria, polydipsia, no  diabetes.        Physical Exam  Vitals:    03/28/22 0740 03/28/22 0922 03/28/22 1114 03/28/22 1459   BP: 108/80  112/67 117/60   Pulse: 81  78 75   Resp: 20  20 18   Temp: 98.1 °F (36.7 °C)  98.2 °F (36.8 °C) 100.3 °F (37.9 °C)   SpO2: 96% 93% 97% 98%   Weight:       Height:           Physical Exam:  General: Well Developed, Well Nourished, No Acute Distress, 1L O1 by NC  Head: normocephalic atraumatic   ENT: pupils equal and round, no abnormalities noted  Neck: supple, no JVD, no carotid bruits  Heart: S1S2 with RRR  Lungs: Few crackles at bases  Abd: soft, nontender, nondistended, with good bowel sounds  Ext: warm, trace B edema  Skin: warm and dry  Psychiatric: Normal mood and affect, A&O x 3  Neurologic: normal muscle tone    Labs:   Recent Labs 03/28/22  1008 03/28/22  0403 03/27/22 1958 03/27/22 1958   NA  --  139  --  138   K  --  4.4  --  4.1   BUN  --  15  --  11   CREA  --  1.40  --  1.00   GLU  --  142*  --  145*   WBC  --  28.4*  --  20.2*   HGB 6.2* 6.4*   < > 7.2*   HCT 24.1* 25.4*   < > 27.9*   PLT  --  200  --  217    < > = values in this interval not displayed. Assessment/Plan:     Assessment:   Sepsis ([facility] (10/26/2017)- GNR bacteremia, ID pending, and possible cholecystitis  - GI, surgery and ID consulted  - zosyn  - fluid resuscitation stopped     HTN (hypertension) (5/8/2015)- BP fluctuating  - lisinopril     Coronary atherosclerosis of native coronary vessel - PCI to LAD 2015, mild CAD 3-2021  - restart ASA, statin and ACE when stable    Myasthenia gravis ([facility] [address] ()    Paroxysmal atrial fibrillation-  s/p Biotronik PM in 2017 (no LV lead due to fracture), watchman in 2019, AV node ablation 22214  - not candidate for MRI due to LV lead fracture    Diastolic CHF, chronic ([facility]) (3/2/2022)- EF 50-55%, mod AI and mod pulmonary htn  - monitor volume status closely  - s/p IV lasix this AM, may need a dose after blood transfusion  - restart ACE when BP stable    Acute cholecystitis (3/28/2022)- low risk for non elective lap mala due to PAF, dHF and CAD, no further work up indicated prior to proceeding with surgery, monitor volume status and BP closely ryan-op    Pancreatitis, gallstone (3/28/2022)- Per GI     Anemia (3/28/2022)- s/p one unit PRB cells    Bacteremia (3/28/2022)- zosyn, ID pending    Thank you very much for this referral. We appreciate the opportunity to participate in this patient's care. We will follow along with above stated plan.     Jana Bear PA-C  Consulting MD: Carrie Rodriguez

## 2022-03-28 NOTE — ED NOTES
TRANSFER - OUT REPORT:    Verbal report given to 604(name) on Jose Mark  being transferred to 604(unit) for routine progression of care       Report consisted of patients Situation, Background, Assessment and   Recommendations(SBAR). Information from the following report(s) SBAR, ED Summary, MAR, Recent Results and Cardiac Rhythm paced was reviewed with the receiving nurse. Lines:   Peripheral IV 03/27/22 Posterior;Right Hand (Active)   Site Assessment Clean, dry, & intact 03/27/22 2012   Phlebitis Assessment 0 03/27/22 2012   Infiltration Assessment 0 03/27/22 2012   Dressing Status Clean, dry, & intact 03/27/22 2012   Dressing Type Transparent 03/27/22 2012   Hub Color/Line Status Pink 03/27/22 2012   Action Taken Blood drawn 03/27/22 2012        Opportunity for questions and clarification was provided.       Patient transported with:   O2 @ 2 liters  Tech

## 2022-03-28 NOTE — ED NOTES
Pt able to provide urine sample. Small amount voided in urinal. Pt states that the date is 2039 and the month is October. Family states that pt is normally not confused.

## 2022-03-29 ENCOUNTER — ANESTHESIA (OUTPATIENT)
Dept: ENDOSCOPY | Age: 82
DRG: 853 | End: 2022-03-29
Payer: MEDICARE

## 2022-03-29 ENCOUNTER — ANESTHESIA EVENT (OUTPATIENT)
Dept: ENDOSCOPY | Age: 82
DRG: 853 | End: 2022-03-29
Payer: MEDICARE

## 2022-03-29 ENCOUNTER — APPOINTMENT (OUTPATIENT)
Dept: GENERAL RADIOLOGY | Age: 82
DRG: 853 | End: 2022-03-29
Attending: SURGERY
Payer: MEDICARE

## 2022-03-29 ENCOUNTER — ANESTHESIA (OUTPATIENT)
Dept: SURGERY | Age: 82
DRG: 853 | End: 2022-03-29
Payer: MEDICARE

## 2022-03-29 ENCOUNTER — HOSPITAL ENCOUNTER (OUTPATIENT)
Dept: NUCLEAR MEDICINE | Age: 82
Discharge: HOME | DRG: 853 | End: 2022-03-29
Attending: SURGERY
Payer: MEDICARE

## 2022-03-29 LAB
ALBUMIN SERPL-MCNC: 2.7 G/DL (ref 3.2–4.6)
ALBUMIN/GLOB SERPL: 0.8 {RATIO} (ref 1.2–3.5)
ALP SERPL-CCNC: 127 U/L (ref 50–136)
ALT SERPL-CCNC: 41 U/L (ref 12–65)
ANION GAP SERPL CALC-SCNC: 11 MMOL/L (ref 7–16)
AST SERPL-CCNC: 32 U/L (ref 15–37)
BASOPHILS # BLD: 0 K/UL (ref 0–0.2)
BASOPHILS NFR BLD: 0 % (ref 0–2)
BILIRUB SERPL-MCNC: 0.8 MG/DL (ref 0.2–1.1)
BUN SERPL-MCNC: 24 MG/DL (ref 8–23)
CALCIUM SERPL-MCNC: 8.6 MG/DL (ref 8.3–10.4)
CHLORIDE SERPL-SCNC: 106 MMOL/L (ref 98–107)
CO2 SERPL-SCNC: 22 MMOL/L (ref 21–32)
CREAT SERPL-MCNC: 1.3 MG/DL (ref 0.8–1.5)
DIFFERENTIAL METHOD BLD: ABNORMAL
EOSINOPHIL # BLD: 0 K/UL (ref 0–0.8)
EOSINOPHIL NFR BLD: 0 % (ref 0.5–7.8)
ERYTHROCYTE [DISTWIDTH] IN BLOOD BY AUTOMATED COUNT: 23.3 % (ref 11.9–14.6)
GLOBULIN SER CALC-MCNC: 3.6 G/DL (ref 2.3–3.5)
GLUCOSE SERPL-MCNC: 148 MG/DL (ref 65–100)
HCT VFR BLD AUTO: 23.6 % (ref 41.1–50.3)
HCT VFR BLD AUTO: 27 % (ref 41.1–50.3)
HGB BLD-MCNC: 6.3 G/DL (ref 13.6–17.2)
HGB BLD-MCNC: 7.3 G/DL (ref 13.6–17.2)
HGB BLD-MCNC: 7.7 G/DL (ref 13.6–17.2)
HISTORY CHECKED?,CKHIST: NORMAL
IMM GRANULOCYTES # BLD AUTO: 0.1 K/UL (ref 0–0.5)
IMM GRANULOCYTES NFR BLD AUTO: 1 % (ref 0–5)
LIPASE SERPL-CCNC: 406 U/L (ref 73–393)
LYMPHOCYTES # BLD: 0.9 K/UL (ref 0.5–4.6)
LYMPHOCYTES NFR BLD: 6 % (ref 13–44)
MCH RBC QN AUTO: 17.7 PG (ref 26.1–32.9)
MCHC RBC AUTO-ENTMCNC: 27 G/DL (ref 31.4–35)
MCV RBC AUTO: 65.5 FL (ref 79.6–97.8)
MONOCYTES # BLD: 1 K/UL (ref 0.1–1.3)
MONOCYTES NFR BLD: 7 % (ref 4–12)
NEUTS SEG # BLD: 13.9 K/UL (ref 1.7–8.2)
NEUTS SEG NFR BLD: 87 % (ref 43–78)
NRBC # BLD: 0.02 K/UL (ref 0–0.2)
PLATELET # BLD AUTO: 200 K/UL (ref 150–450)
PMV BLD AUTO: 9.2 FL (ref 9.4–12.3)
POTASSIUM SERPL-SCNC: 3.8 MMOL/L (ref 3.5–5.1)
PROT SERPL-MCNC: 6.3 G/DL (ref 6.3–8.2)
RBC # BLD AUTO: 4.12 M/UL (ref 4.23–5.6)
SODIUM SERPL-SCNC: 139 MMOL/L (ref 138–145)
WBC # BLD AUTO: 15.9 K/UL (ref 4.3–11.1)

## 2022-03-29 PROCEDURE — 74011250636 HC RX REV CODE- 250/636: Performed by: NURSE ANESTHETIST, CERTIFIED REGISTERED

## 2022-03-29 PROCEDURE — 0FT44ZZ RESECTION OF GALLBLADDER, PERCUTANEOUS ENDOSCOPIC APPROACH: ICD-10-PCS | Performed by: SURGERY

## 2022-03-29 PROCEDURE — 77030008606 HC TRCR ENDOSC KII AMR -B: Performed by: SURGERY

## 2022-03-29 PROCEDURE — 77030020751 HC FLTR TBNG TRNSFUS HAEM -A: Performed by: ANESTHESIOLOGY

## 2022-03-29 PROCEDURE — 74300 X-RAY BILE DUCTS/PANCREAS: CPT

## 2022-03-29 PROCEDURE — 74011000250 HC RX REV CODE- 250: Performed by: SURGERY

## 2022-03-29 PROCEDURE — 77030019908 HC STETH ESOPH SIMS -A: Performed by: ANESTHESIOLOGY

## 2022-03-29 PROCEDURE — 36430 TRANSFUSION BLD/BLD COMPNT: CPT

## 2022-03-29 PROCEDURE — 94640 AIRWAY INHALATION TREATMENT: CPT

## 2022-03-29 PROCEDURE — P9016 RBC LEUKOCYTES REDUCED: HCPCS

## 2022-03-29 PROCEDURE — C1894 INTRO/SHEATH, NON-LASER: HCPCS | Performed by: SURGERY

## 2022-03-29 PROCEDURE — 77030040922 HC BLNKT HYPOTHRM STRY -A: Performed by: ANESTHESIOLOGY

## 2022-03-29 PROCEDURE — 76040000025: Performed by: INTERNAL MEDICINE

## 2022-03-29 PROCEDURE — 76010000162 HC OR TIME 1.5 TO 2 HR INTENSV-TIER 1: Performed by: SURGERY

## 2022-03-29 PROCEDURE — 0DJ08ZZ INSPECTION OF UPPER INTESTINAL TRACT, VIA NATURAL OR ARTIFICIAL OPENING ENDOSCOPIC: ICD-10-PCS | Performed by: INTERNAL MEDICINE

## 2022-03-29 PROCEDURE — 74011000250 HC RX REV CODE- 250: Performed by: FAMILY MEDICINE

## 2022-03-29 PROCEDURE — 86580 TB INTRADERMAL TEST: CPT | Performed by: INTERNAL MEDICINE

## 2022-03-29 PROCEDURE — 74011000250 HC RX REV CODE- 250: Performed by: INTERNAL MEDICINE

## 2022-03-29 PROCEDURE — 74011000250 HC RX REV CODE- 250: Performed by: STUDENT IN AN ORGANIZED HEALTH CARE EDUCATION/TRAINING PROGRAM

## 2022-03-29 PROCEDURE — 77010033678 HC OXYGEN DAILY

## 2022-03-29 PROCEDURE — 36415 COLL VENOUS BLD VENIPUNCTURE: CPT

## 2022-03-29 PROCEDURE — 99232 SBSQ HOSP IP/OBS MODERATE 35: CPT | Performed by: SURGERY

## 2022-03-29 PROCEDURE — 77030010513 HC APPL CLP LIG J&J -C: Performed by: SURGERY

## 2022-03-29 PROCEDURE — 77030020407 HC IV BLD WRMR ST 3M -A: Performed by: ANESTHESIOLOGY

## 2022-03-29 PROCEDURE — 74011250636 HC RX REV CODE- 250/636: Performed by: ANESTHESIOLOGY

## 2022-03-29 PROCEDURE — 74011000636 HC RX REV CODE- 636: Performed by: SURGERY

## 2022-03-29 PROCEDURE — 97165 OT EVAL LOW COMPLEX 30 MIN: CPT

## 2022-03-29 PROCEDURE — 77030031139 HC SUT VCRL2 J&J -A: Performed by: SURGERY

## 2022-03-29 PROCEDURE — 2709999900 HC NON-CHARGEABLE SUPPLY

## 2022-03-29 PROCEDURE — 76060000031 HC ANESTHESIA FIRST 0.5 HR: Performed by: INTERNAL MEDICINE

## 2022-03-29 PROCEDURE — 85025 COMPLETE CBC W/AUTO DIFF WBC: CPT

## 2022-03-29 PROCEDURE — 74011000250 HC RX REV CODE- 250: Performed by: NURSE ANESTHETIST, CERTIFIED REGISTERED

## 2022-03-29 PROCEDURE — 77030018876 HC APPL CLP LIG4 COVD -B: Performed by: SURGERY

## 2022-03-29 PROCEDURE — 47563 LAPARO CHOLECYSTECTOMY/GRAPH: CPT | Performed by: SURGERY

## 2022-03-29 PROCEDURE — 94664 DEMO&/EVAL PT USE INHALER: CPT

## 2022-03-29 PROCEDURE — 77030020829: Performed by: SURGERY

## 2022-03-29 PROCEDURE — 2709999900 HC NON-CHARGEABLE SUPPLY: Performed by: SURGERY

## 2022-03-29 PROCEDURE — 2709999900 HC NON-CHARGEABLE SUPPLY: Performed by: INTERNAL MEDICINE

## 2022-03-29 PROCEDURE — 74011250636 HC RX REV CODE- 250/636: Performed by: INTERNAL MEDICINE

## 2022-03-29 PROCEDURE — 83690 ASSAY OF LIPASE: CPT

## 2022-03-29 PROCEDURE — 80053 COMPREHEN METABOLIC PANEL: CPT

## 2022-03-29 PROCEDURE — 76210000016 HC OR PH I REC 1 TO 1.5 HR: Performed by: SURGERY

## 2022-03-29 PROCEDURE — 77030004818 HC CATH CHOLGM TELE -B: Performed by: SURGERY

## 2022-03-29 PROCEDURE — 74011000258 HC RX REV CODE- 258: Performed by: FAMILY MEDICINE

## 2022-03-29 PROCEDURE — 74011000250 HC RX REV CODE- 250: Performed by: EMERGENCY MEDICINE

## 2022-03-29 PROCEDURE — 78226 HEPATOBILIARY SYSTEM IMAGING: CPT

## 2022-03-29 PROCEDURE — 77030003575 HC NDL INSUF ENDOPTH J&J -B: Performed by: SURGERY

## 2022-03-29 PROCEDURE — 76060000034 HC ANESTHESIA 1.5 TO 2 HR: Performed by: SURGERY

## 2022-03-29 PROCEDURE — 65660000000 HC RM CCU STEPDOWN

## 2022-03-29 PROCEDURE — 77030008756 HC TU IRR SUC STRY -B: Performed by: SURGERY

## 2022-03-29 PROCEDURE — 77030002933 HC SUT MCRYL J&J -A: Performed by: SURGERY

## 2022-03-29 PROCEDURE — C9113 INJ PANTOPRAZOLE SODIUM, VIA: HCPCS | Performed by: INTERNAL MEDICINE

## 2022-03-29 PROCEDURE — P9040 RBC LEUKOREDUCED IRRADIATED: HCPCS

## 2022-03-29 PROCEDURE — 77030007955 HC PCH ENDOSC SPEC J&J -B: Performed by: SURGERY

## 2022-03-29 PROCEDURE — 97161 PT EVAL LOW COMPLEX 20 MIN: CPT

## 2022-03-29 PROCEDURE — 77030037088 HC TUBE ENDOTRACH ORAL NSL COVD-A: Performed by: ANESTHESIOLOGY

## 2022-03-29 PROCEDURE — 77030039425 HC BLD LARYNG TRULITE DISP TELE -A: Performed by: ANESTHESIOLOGY

## 2022-03-29 PROCEDURE — 77030000038 HC TIP SCIS LAPSCP SURI -B: Performed by: SURGERY

## 2022-03-29 PROCEDURE — 99232 SBSQ HOSP IP/OBS MODERATE 35: CPT | Performed by: INTERNAL MEDICINE

## 2022-03-29 PROCEDURE — 77030008518 HC TBNG INSUF ENDO STRY -B: Performed by: SURGERY

## 2022-03-29 PROCEDURE — 94760 N-INVAS EAR/PLS OXIMETRY 1: CPT

## 2022-03-29 PROCEDURE — 85018 HEMOGLOBIN: CPT

## 2022-03-29 PROCEDURE — BF131ZZ FLUOROSCOPY OF GALLBLADDER AND BILE DUCTS USING LOW OSMOLAR CONTRAST: ICD-10-PCS | Performed by: SURGERY

## 2022-03-29 PROCEDURE — 74011250637 HC RX REV CODE- 250/637: Performed by: INTERNAL MEDICINE

## 2022-03-29 PROCEDURE — 74011250636 HC RX REV CODE- 250/636: Performed by: FAMILY MEDICINE

## 2022-03-29 PROCEDURE — 88304 TISSUE EXAM BY PATHOLOGIST: CPT

## 2022-03-29 RX ORDER — IPRATROPIUM BROMIDE AND ALBUTEROL SULFATE 2.5; .5 MG/3ML; MG/3ML
3 SOLUTION RESPIRATORY (INHALATION)
Status: COMPLETED | OUTPATIENT
Start: 2022-03-29 | End: 2022-03-29

## 2022-03-29 RX ORDER — HYDRALAZINE HYDROCHLORIDE 20 MG/ML
INJECTION INTRAMUSCULAR; INTRAVENOUS AS NEEDED
Status: DISCONTINUED | OUTPATIENT
Start: 2022-03-29 | End: 2022-03-29 | Stop reason: HOSPADM

## 2022-03-29 RX ORDER — PROPOFOL 10 MG/ML
INJECTION, EMULSION INTRAVENOUS AS NEEDED
Status: DISCONTINUED | OUTPATIENT
Start: 2022-03-29 | End: 2022-03-29 | Stop reason: HOSPADM

## 2022-03-29 RX ORDER — FLUMAZENIL 0.1 MG/ML
0.2 INJECTION INTRAVENOUS AS NEEDED
Status: DISCONTINUED | OUTPATIENT
Start: 2022-03-29 | End: 2022-03-29 | Stop reason: HOSPADM

## 2022-03-29 RX ORDER — FENTANYL CITRATE 50 UG/ML
INJECTION, SOLUTION INTRAMUSCULAR; INTRAVENOUS AS NEEDED
Status: DISCONTINUED | OUTPATIENT
Start: 2022-03-29 | End: 2022-03-29 | Stop reason: HOSPADM

## 2022-03-29 RX ORDER — KIT FOR THE PREPARATION OF TECHNETIUM TC 99M MEBROFENIN 45 MG/10ML
6.2 INJECTION, POWDER, LYOPHILIZED, FOR SOLUTION INTRAVENOUS
Status: COMPLETED | OUTPATIENT
Start: 2022-03-29 | End: 2022-03-29

## 2022-03-29 RX ORDER — HYDROMORPHONE HYDROCHLORIDE 2 MG/ML
0.5 INJECTION, SOLUTION INTRAMUSCULAR; INTRAVENOUS; SUBCUTANEOUS
Status: COMPLETED | OUTPATIENT
Start: 2022-03-29 | End: 2022-03-29

## 2022-03-29 RX ORDER — SODIUM CHLORIDE 0.9 % (FLUSH) 0.9 %
10 SYRINGE (ML) INJECTION
Status: COMPLETED | OUTPATIENT
Start: 2022-03-29 | End: 2022-03-29

## 2022-03-29 RX ORDER — NALOXONE HYDROCHLORIDE 0.4 MG/ML
0.1 INJECTION, SOLUTION INTRAMUSCULAR; INTRAVENOUS; SUBCUTANEOUS
Status: DISCONTINUED | OUTPATIENT
Start: 2022-03-29 | End: 2022-03-29 | Stop reason: HOSPADM

## 2022-03-29 RX ORDER — SODIUM CHLORIDE 9 MG/ML
INJECTION, SOLUTION INTRAVENOUS
Status: DISCONTINUED | OUTPATIENT
Start: 2022-03-29 | End: 2022-03-29 | Stop reason: HOSPADM

## 2022-03-29 RX ORDER — ONDANSETRON 2 MG/ML
INJECTION INTRAMUSCULAR; INTRAVENOUS AS NEEDED
Status: DISCONTINUED | OUTPATIENT
Start: 2022-03-29 | End: 2022-03-29 | Stop reason: HOSPADM

## 2022-03-29 RX ORDER — SODIUM CHLORIDE, SODIUM LACTATE, POTASSIUM CHLORIDE, CALCIUM CHLORIDE 600; 310; 30; 20 MG/100ML; MG/100ML; MG/100ML; MG/100ML
75 INJECTION, SOLUTION INTRAVENOUS CONTINUOUS
Status: DISCONTINUED | OUTPATIENT
Start: 2022-03-29 | End: 2022-03-29 | Stop reason: HOSPADM

## 2022-03-29 RX ORDER — OXYCODONE HYDROCHLORIDE 5 MG/1
5 TABLET ORAL
Status: DISCONTINUED | OUTPATIENT
Start: 2022-03-29 | End: 2022-03-29 | Stop reason: HOSPADM

## 2022-03-29 RX ORDER — BUPIVACAINE HYDROCHLORIDE 5 MG/ML
INJECTION, SOLUTION EPIDURAL; INTRACAUDAL AS NEEDED
Status: DISCONTINUED | OUTPATIENT
Start: 2022-03-29 | End: 2022-03-29 | Stop reason: HOSPADM

## 2022-03-29 RX ORDER — SODIUM CHLORIDE 9 MG/ML
250 INJECTION, SOLUTION INTRAVENOUS AS NEEDED
Status: DISCONTINUED | OUTPATIENT
Start: 2022-03-29 | End: 2022-03-30

## 2022-03-29 RX ORDER — ROCURONIUM BROMIDE 10 MG/ML
INJECTION, SOLUTION INTRAVENOUS AS NEEDED
Status: DISCONTINUED | OUTPATIENT
Start: 2022-03-29 | End: 2022-03-29 | Stop reason: HOSPADM

## 2022-03-29 RX ORDER — SODIUM CHLORIDE, SODIUM LACTATE, POTASSIUM CHLORIDE, CALCIUM CHLORIDE 600; 310; 30; 20 MG/100ML; MG/100ML; MG/100ML; MG/100ML
INJECTION, SOLUTION INTRAVENOUS
Status: DISCONTINUED | OUTPATIENT
Start: 2022-03-29 | End: 2022-03-29 | Stop reason: HOSPADM

## 2022-03-29 RX ORDER — LIDOCAINE HYDROCHLORIDE 20 MG/ML
INJECTION, SOLUTION EPIDURAL; INFILTRATION; INTRACAUDAL; PERINEURAL AS NEEDED
Status: DISCONTINUED | OUTPATIENT
Start: 2022-03-29 | End: 2022-03-29 | Stop reason: HOSPADM

## 2022-03-29 RX ORDER — SODIUM CHLORIDE 9 MG/ML
250 INJECTION, SOLUTION INTRAVENOUS AS NEEDED
Status: DISCONTINUED | OUTPATIENT
Start: 2022-03-29 | End: 2022-04-07 | Stop reason: HOSPADM

## 2022-03-29 RX ORDER — DIPHENHYDRAMINE HYDROCHLORIDE 50 MG/ML
12.5 INJECTION, SOLUTION INTRAMUSCULAR; INTRAVENOUS
Status: DISCONTINUED | OUTPATIENT
Start: 2022-03-29 | End: 2022-03-29 | Stop reason: HOSPADM

## 2022-03-29 RX ORDER — OXYCODONE HYDROCHLORIDE 5 MG/1
10 TABLET ORAL
Status: DISCONTINUED | OUTPATIENT
Start: 2022-03-29 | End: 2022-03-29 | Stop reason: HOSPADM

## 2022-03-29 RX ORDER — LANOLIN ALCOHOL/MO/W.PET/CERES
1000 CREAM (GRAM) TOPICAL DAILY
Status: DISCONTINUED | OUTPATIENT
Start: 2022-03-29 | End: 2022-04-07 | Stop reason: HOSPADM

## 2022-03-29 RX ADMIN — SODIUM CHLORIDE: 900 INJECTION, SOLUTION INTRAVENOUS at 17:05

## 2022-03-29 RX ADMIN — LIDOCAINE HYDROCHLORIDE 60 MG: 20 INJECTION, SOLUTION EPIDURAL; INFILTRATION; INTRACAUDAL; PERINEURAL at 16:55

## 2022-03-29 RX ADMIN — HYDROMORPHONE HYDROCHLORIDE 0.5 MG: 2 INJECTION INTRAMUSCULAR; INTRAVENOUS; SUBCUTANEOUS at 19:10

## 2022-03-29 RX ADMIN — IPRATROPIUM BROMIDE AND ALBUTEROL SULFATE 3 ML: .5; 3 SOLUTION RESPIRATORY (INHALATION) at 14:52

## 2022-03-29 RX ADMIN — PROPOFOL 10 MG: 10 INJECTION, EMULSION INTRAVENOUS at 14:24

## 2022-03-29 RX ADMIN — FUROSEMIDE 40 MG: 40 TABLET ORAL at 09:27

## 2022-03-29 RX ADMIN — HYDROMORPHONE HYDROCHLORIDE 0.5 MG: 2 INJECTION INTRAMUSCULAR; INTRAVENOUS; SUBCUTANEOUS at 19:05

## 2022-03-29 RX ADMIN — PROPOFOL 150 MG: 10 INJECTION, EMULSION INTRAVENOUS at 16:55

## 2022-03-29 RX ADMIN — HYDRALAZINE HYDROCHLORIDE 5 MG: 20 INJECTION INTRAMUSCULAR; INTRAVENOUS at 14:40

## 2022-03-29 RX ADMIN — HYDROMORPHONE HYDROCHLORIDE 0.5 MG: 2 INJECTION INTRAMUSCULAR; INTRAVENOUS; SUBCUTANEOUS at 19:00

## 2022-03-29 RX ADMIN — PHENYLEPHRINE HYDROCHLORIDE 100 MCG: 10 INJECTION INTRAVENOUS at 17:33

## 2022-03-29 RX ADMIN — KIT FOR THE PREPARATION OF TECHNETIUM TC 99M MEBROFENIN 6.2 MILLICURIE: 45 INJECTION, POWDER, LYOPHILIZED, FOR SOLUTION INTRAVENOUS at 11:50

## 2022-03-29 RX ADMIN — TUBERCULIN PURIFIED PROTEIN DERIVATIVE 5 UNITS: 5 INJECTION INTRADERMAL at 09:23

## 2022-03-29 RX ADMIN — CYANOCOBALAMIN TAB 1000 MCG 1000 MCG: 1000 TAB at 09:27

## 2022-03-29 RX ADMIN — PIPERACILLIN AND TAZOBACTAM 3.38 G: 3; .375 INJECTION, POWDER, LYOPHILIZED, FOR SOLUTION INTRAVENOUS; PARENTERAL at 21:30

## 2022-03-29 RX ADMIN — PIPERACILLIN AND TAZOBACTAM 3.38 G: 3; .375 INJECTION, POWDER, LYOPHILIZED, FOR SOLUTION INTRAVENOUS; PARENTERAL at 16:36

## 2022-03-29 RX ADMIN — HYDROMORPHONE HYDROCHLORIDE 0.5 MG: 2 INJECTION INTRAMUSCULAR; INTRAVENOUS; SUBCUTANEOUS at 18:55

## 2022-03-29 RX ADMIN — PROPOFOL 20 MG: 10 INJECTION, EMULSION INTRAVENOUS at 14:22

## 2022-03-29 RX ADMIN — Medication 10 ML: at 11:50

## 2022-03-29 RX ADMIN — SODIUM CHLORIDE 40 MG: 9 INJECTION, SOLUTION INTRAMUSCULAR; INTRAVENOUS; SUBCUTANEOUS at 09:28

## 2022-03-29 RX ADMIN — FENTANYL CITRATE 50 MCG: 50 INJECTION INTRAMUSCULAR; INTRAVENOUS at 16:55

## 2022-03-29 RX ADMIN — ONDANSETRON 4 MG: 2 INJECTION INTRAMUSCULAR; INTRAVENOUS at 17:07

## 2022-03-29 RX ADMIN — PROPOFOL 60 MG: 10 INJECTION, EMULSION INTRAVENOUS at 14:20

## 2022-03-29 RX ADMIN — PIPERACILLIN AND TAZOBACTAM 3.38 G: 3; .375 INJECTION, POWDER, LYOPHILIZED, FOR SOLUTION INTRAVENOUS; PARENTERAL at 05:17

## 2022-03-29 RX ADMIN — SODIUM CHLORIDE, SODIUM LACTATE, POTASSIUM CHLORIDE, AND CALCIUM CHLORIDE: 600; 310; 30; 20 INJECTION, SOLUTION INTRAVENOUS at 14:14

## 2022-03-29 RX ADMIN — SODIUM CHLORIDE, PRESERVATIVE FREE 10 ML: 5 INJECTION INTRAVENOUS at 21:31

## 2022-03-29 RX ADMIN — FENTANYL CITRATE 50 MCG: 50 INJECTION INTRAMUSCULAR; INTRAVENOUS at 18:43

## 2022-03-29 RX ADMIN — ROCURONIUM BROMIDE 40 MG: 10 INJECTION, SOLUTION INTRAVENOUS at 16:56

## 2022-03-29 RX ADMIN — SUGAMMADEX 200 MG: 100 INJECTION, SOLUTION INTRAVENOUS at 18:29

## 2022-03-29 RX ADMIN — SODIUM CHLORIDE, PRESERVATIVE FREE 10 ML: 5 INJECTION INTRAVENOUS at 05:17

## 2022-03-29 RX ADMIN — LIDOCAINE HYDROCHLORIDE 100 MG: 20 INJECTION, SOLUTION EPIDURAL; INFILTRATION; INTRACAUDAL; PERINEURAL at 14:20

## 2022-03-29 RX ADMIN — SODIUM CHLORIDE, PRESERVATIVE FREE 10 ML: 5 INJECTION INTRAVENOUS at 21:30

## 2022-03-29 RX ADMIN — SODIUM CHLORIDE, SODIUM LACTATE, POTASSIUM CHLORIDE, AND CALCIUM CHLORIDE: 600; 310; 30; 20 INJECTION, SOLUTION INTRAVENOUS at 16:48

## 2022-03-29 RX ADMIN — SODIUM CHLORIDE 40 MG: 9 INJECTION, SOLUTION INTRAMUSCULAR; INTRAVENOUS; SUBCUTANEOUS at 21:30

## 2022-03-29 NOTE — ROUTINE PROCESS
TRANSFER - OUT REPORT:    Verbal report given to Coleman Silva RN(name) on [facility]  being transferred [facility] for routine post - op       Report consisted of patients Situation, Background, Assessment and   Recommendations(SBAR). Information from the following report(s) SBAR and Procedure Summary was reviewed with the receiving nurse. Lines:   Peripheral IV 03/27/22 Posterior;Right Hand (Active)   Site Assessment Clean, dry, & intact 03/29/22 0720   Phlebitis Assessment 0 03/29/22 0720   Infiltration Assessment 0 03/29/22 0720   Dressing Status Clean, dry, & intact 03/29/22 0720   Dressing Type Transparent;Tape 03/29/22 0720   Hub Color/Line Status Patent; Infusing 03/29/22 0720   Action Taken Open ports on tubing capped 03/29/22 0720   Alcohol Cap Used Yes 03/29/22 0720       Peripheral IV 03/28/22 Left;Posterior; Upper Arm (Active)   Site Assessment Clean, dry, & intact 03/29/22 0720   Phlebitis Assessment 0 03/29/22 0720   Infiltration Assessment 0 03/29/22 0720   Dressing Status Clean, dry, & intact 03/29/22 0720   Dressing Type Transparent;Tape 03/29/22 0720   Hub Color/Line Status Patent 03/29/22 0720   Action Taken Open ports on tubing capped 03/29/22 0720   Alcohol Cap Used Yes 03/29/22 0720        Opportunity for questions and clarification was provided.       Patient transported with:   Registered Nurse

## 2022-03-29 NOTE — PROCEDURES
Endoscopic Gastroduodenoscopy Procedure Note    Indications:    80 yo male with PMH including but not limited to A FIB s/p ablation and Watchman 2019 - now on ASA, Echo 12/2021: normal EF, mod AI, mod pHTN, SSS s/p PPM, LVDD2, HTN, HLD, CAD, carotid artery stenosis, myasthenia gravis, JEROD (no CPAP), diverticulosis, colon polyps, who is seen in consultation 28 March 2022 at the request of Dr. Niranjan Dawn for [facility], who was admitted 3/27/22 with abdominal pain, nausea, diarrhea, and met sepsis criteria with leukocytosis, lactic acidosis, elevated lipase and LFTs. CT A/P showed multiple hyperechoic dense structures in the gallbladder neck (? stones), GB wall thickening with subtle pericholecystic fluid - suspicion for acute cholecystitis, mild peripancreatic fluid, focal dilatation of the distal right ureter w/o  hydronephrosis, chronic appearing changes are noted in the kidneys, and multiple small low-density lesions are seen in the liver and are too small to characterize. RUQ US showed cholelithiasis, GB wall thickening measuring 7.6 mm without appreciable pericholecystic fluid, no sonographic [place] sign was not documented, CBD 7.1 mm, pancreas not visualized, hepatic steatosis, right kidney appears somewhat echogenic (? medicorenal disease). Today, labs significant for WBC 28.4, HGB 6.4 with MCV 63 (baseline 10.6 and hgb 7.2 on admission). He has not had overt bleeding. LFTs trending down with normal Tbili, but ALT 53, AST 68, and Alk phos 165. Lactic acid remains 3.3. Surgery has also been consulted. Symptoms may be associated with acute cholecystitis, pancreatitis - possible biliary etiology. With normal bili, may have passed a stone.     EGD to evaluate for a source of gi blood loss anemia    Anesthesia/Sedation: MAC IV     Pre-Procedure Physical:    Current Facility-Administered Medications   Medication Dose Route Frequency    0.9% sodium chloride infusion 250 mL  250 mL IntraVENous PRN    tuberculin injection 5 Units  5 Units IntraDERMal ONCE    cyanocobalamin tablet 1,000 mcg  1,000 mcg Oral DAILY    0.9% sodium chloride infusion 250 mL  250 mL IntraVENous PRN    sodium chloride (NS) flush 5-40 mL  5-40 mL IntraVENous Q8H    sodium chloride (NS) flush 5-40 mL  5-40 mL IntraVENous PRN    acetaminophen (TYLENOL) tablet 650 mg  650 mg Oral Q6H PRN    Or    acetaminophen (TYLENOL) suppository 650 mg  650 mg Rectal Q6H PRN    polyethylene glycol (MIRALAX) packet 17 g  17 g Oral DAILY PRN    ondansetron (ZOFRAN ODT) tablet 4 mg  4 mg Oral Q8H PRN    Or    ondansetron (ZOFRAN) injection 4 mg  4 mg IntraVENous Q6H PRN    piperacillin-tazobactam (ZOSYN) 3.375 g in 0.9% sodium chloride (MBP/ADV) 100 mL MBP  3.375 g IntraVENous Q8H    hydrALAZINE (APRESOLINE) 20 mg/mL injection 10 mg  10 mg IntraVENous Q6H PRN    morphine injection 1 mg  1 mg IntraVENous Q4H PRN    0.9% sodium chloride infusion 250 mL  250 mL IntraVENous PRN    pantoprazole (PROTONIX) 40 mg in 0.9% sodium chloride 10 mL injection  40 mg IntraVENous Q12H    furosemide (LASIX) tablet 40 mg  40 mg Oral DAILY    sodium chloride (NS) flush 5-10 mL  5-10 mL IntraVENous Q8H    sodium chloride (NS) flush 5-10 mL  5-10 mL IntraVENous PRN      Patient has no known allergies. Patient Vitals for the past 8 hrs:   BP Temp Pulse Resp SpO2   03/29/22 1340 (!) 147/86 97.7 °F (36.5 °C) 75 18 95 %   03/29/22 1042   75  96 %   03/29/22 0749 137/66 97.8 °F (36.6 °C) 75 24 98 %   03/29/22 0633 119/71 97.9 °F (36.6 °C) 70 20 98 %       Exam      Airway: clear   Heart: normal S1and S2    Lungs: clear bilateral  Abdomen: soft, nontender, bowel sounds present and normal in all quads   Mental Status: awake, alert and oriented to person, place and time          Procedure Details     Informed consent was obtained for the procedure, including conscious sedation.  Risks of pancreatitis, infection, perforation, hemorrhage, adverse drug reaction and aspiration were discussed. The patient was placed in the left lateral decubitus position. Based on the pre-procedure assessment, including review of the patient's medical history, medications, allergies, and review of systems, he had been deemed to be an appropriate candidate for conscious sedation; he was therefore sedated with the medications listed below. He was monitored continuously with ECG tracing, pulse oximetry, blood pressure monitoring, and direct observation. The EGD gastroscope was inserted into the mouth and advanced under direct vision to the second portion of the duodenum. A careful inspection was made as the gastroscope was withdrawn, including a retroflexed view of the proximal stomach; findings and interventions are described below. Appropriate photodocumentation was obtained. Findings:   Esophagus- Normal.  Stomach- Normal except for a small hiatal hernia  Duodenum- Normal.    Therapies: None    Specimens: None    Estimated Blood Loss: 0 cc           Complications:   None; patient tolerated the procedure well. Attending Attestation:  I performed the procedure. Impression:  No bleeding or source for gi blood loss anemia. Small hiatal hernia. Otherwise normal exam.      Recommendations: OK for surgery. Continue Protonix for now.     Jcarlos Menjivar MD

## 2022-03-29 NOTE — OP NOTES
Operative Report    Name: Kimo Velez      MRN: [id number]       : 1940       Age: 79 y.o. Sex: male    Date of Surgery: 3/29/2022     Preoperative Diagnosis: CHOLECYSTITIS     Postoperative Diagnosis: Cholecystitis      Name of procedure: Procedure(s):  [address] [place] [address]      Surgeon: Pablo Fuentes) and Role:     * Wyman Essex, MD - Primary      Anesthesia: General     Complications: None    Estimated Blood Loss: Minimal           Specimens:   ID Type Source Tests Collected by Time Destination   1 : Gallbladder Preservative Gallbladder  Wyman Essex, MD 3/29/2022 1814 Pathology       Implants:  * No implants in log *    Findings: Positive IOC. Statement of Medical Necessity: Kimo Velez is a 79 y.o. male who presented to the ER complaining of abdominal pain. Patient had an [address] showing gallstones. He was also found with bacteremia and elevated lipase. He also had low Hgb and EGD was done. EGD did not showed acute findings. A cholecystectomy with cholangiogram was recommended. We discussed risks, benefits and alternatives of surgery. Patient understood and agreed to proceed with laparoscopic cholecystectomy. We discussed risks of cholecystectomy including but not limited to pain, infection, bleeding, scar, conversion from laparoscopic to open, injury to organs, enterotomy, cautery injury, hernia, injury to bile ducts, biliary leak, biliary stricture, retained stone, need for additional procedures, need for cholangiography, change in bowel habits. Procedure Details   After informed consent was taken, patient was taken to the operating room and placed in supine position. Anesthesia was induced and patient was intubated. Patient received preoperative antibiotics. Abdomen was prepped and draped in standard sterile fashion. A timeout was performed with all team member present.     A 1 cm horizontal incision was made in the right upper quadrant area. A Veress needle was inserted. Saline drop test was normal. The abdomen was insufflated with carbon dioxide to a pressure of 15 mmHg. The patient tolerated the insufflation well. A 5 mm trocar was inserted using an Optiview technique. A general survey was performed and no injury was observed from trocar placement. Two additional 5 mm ports were inserted in the right upper quadrant as well as a 12 mm trocar in the subxiphoid area under direct visualization. The patient was placed in reverse Trendelenberg with his right side up. The fundus of the gallbladder was retracted cephalad with a grasper. The infundibulum of the gallbladder was exposed. The infundibulum was retracted inferiorly and laterally. Using a combination of hook cautery and blunt dissection, we cleared the triangle of Calot of peritoneum and fat both anteriorly and posteriorly. This dissection was extended laterally and cephalad on the anterior and posterior walls of the gallbladder. The cystic duct was identified at its connection with the gallbladder infundibulum and circumferentially dissected. The anterior cystic artery was identified and dissected circumferentially. A critical view was obtained of the triangle of Calot both anteriorly and posteriorly. The cystic artery was clipped twice proximally and once distally. The cystic duct was clipped once distally. The cystic artery was divided sharply. The cystic duct was partially divided. We inserted a cholangiogram catheter in the proximal cystic duct and clipped it in place. The C-arm was position. The cystic duct was injected with dye and fluoroscopy pictures were taken. The cystic duct was seen entering the common bile duct and filling of the right and left hepatic ducts was seen as well as filling of the duodenum. There was a filling defect within the common bile duct with what appear to be a floating stone. No filling defects were observed.  Pictures were recorded and sent to radiology. We then removed the clip and the cholangiogram catheter. The cystic duct was clipped twice proximally and divided. The gallbladder was dissected off the gallbladder fossa using a hook cautery. Hemostasis was verified along the liver bed and the clips were visualized with no evidence of bile leaking or bleeding. The gallbladder was placed in an endocatch bag and removed through the 12 mm port incision. The liver bed and clips were again visualized and in place, there was good hemostasis. The 12 mm port fascia was closed with 0-Vicryl sutures in interrupted fashion using ans Endoclosure device. The ports were removed and the abdomen was desufflated. The skin of all cannula insertion sites was closed with 4-0 Monocryl subcuticular sutures. Mastisol and Steri strips were applied to the skin incisions. All counts were reported as correct. The patient was awakened from anesthesia, transferred to a stretcher, and transported to the PACU in good condition.         Signed by: Antonio Handy MD  [id number] [facility]  3/29/2022 6:43 PM

## 2022-03-29 NOTE — PROGRESS NOTES
TRANSFER - IN REPORT:    Verbal report received from Eva Avila RN(name) on [facility]  being received from 605(unit) for ordered procedure      Report consisted of patients Situation, Background, Assessment and   Recommendations(SBAR). Information from the following report(s) SBAR and Kardex was reviewed with the receiving nurse. Opportunity for questions and clarification was provided. Assessment completed upon patients arrival to unit and care assumed.

## 2022-03-29 NOTE — PROGRESS NOTES
Hospitalist Progress Note   Admit Date:  3/27/2022  7:41 PM   Name:  Nishant Love   Age:  79 y.o. Sex:  male  :  1940   MRN:  [id number]   Room:  604/01    Presenting Complaint: Abdominal Pain    Reason(s) for Admission: Sepsis [facility]) [A41.9]  Acute cholecystitis [K81.0]  Pancreatitis, gallstone [facility] Course & Interval History:     Mr. Lulú Prince is an 78 yo male with PMH of AFIB s/p watchman, SSS s/p PPM, LVDD2, CAD,myasthenia gravis admitted with abdominal pain and meeting sepsis criteria. He has leukocytosis, lactic acidosis, elevated lipase and LFTS. CTAP shows   \"    IMPRESSION  There are multiple hyperechoic dense structures rather in the gallbladder neck. These may represent multiple stones. There is mild gallbladder wall thickening  with subtle pericholecystic fluid. These findings raise suspicion for acute  cholecystitis.     There is also mild peripancreatic fluid. This fluid could be due to  cholecystitis or acute pancreatitis.     There is focal dilatation of the distal right ureter. There is no evidence of  hydronephrosis proximal to this point. Further work up with urology consult is  recommended to assess this region.     Chronic appearing changes are noted in the kidneys.     Multiple small low-density lesions are seen in the liver and are too small to  Characterize. \"    ABD US     \"  IMPRESSION  Cholelithiasis. The gallbladder wall is thickened measuring 7.6 mm. There is no  appreciable pericholecystic fluid. Sonographic Vernona Rower sign was not documented.     The pancreas was not visualized.     Hepatic steatosis.     The right kidney appears somewhat echogenic. This can be seen with medical renal  disease. Please correlate clinically. \"        He has been NPO . S/p surgery consult. HIDA scan pending. He had acute blood loss anemia with recent dark stools. S/p 2 PRBC. GI following. He is on course of zosyn. Blood cultures 2/2 GNR. ID following.      Seen by urology for abnormal appearing right ureter on CTAP- no hydronephrosis. CXR shows vascular congestion. Lasix resumed. Cardiology following. Discharge plans pending. Lives with wife.            Subjective/24hr Events (03/29/22):    Up to chair, no family present, had recent dark stools, no current BM or bleeding, some intermittent lower abdominal pains, NPO, no edema or dyspnea, no chest pain    .3       Assessment & Plan:     Principal Problem:    Sepsis ([facility]   Likely GI source with acute cholecystitis:  GNR bacteremia:  · NPO  · D3 zosyn  ·  UA pending  · followup blood cultures   · ID following  · Stopped IVF3-28-22  due to mild hypoxia and possible developing volume overload         Active Problems:    HTN (hypertension)   · BP low on admit, laisx resumed/ lisinopril on hold             Coronary atherosclerosis of native coronary vessel  · Asa held   · lipitor held due to elevated LFTS  · Cardiology following         Myasthenia gravis ([facility] ()  ·   Monitor   · PT,OT       SSS (sick sinus syndrome) (HCC)     Paroxysmal atrial fibrillation (HCC)   · In NSR  · S/p prior watchman / PPM         Diastolic CHF, chronic ([facility] [address] (3/2/2022)  · Continued lasix  · O2 as needed to wean as tolerant         Pancreatitis, gallstone (3/28/2022)    Acute cholecystitis (3/28/2022)  ·  NPO  · GI/surgery consulted  · D3 antibiotics   · Held IVF          Acute iron deficient Anemia (3/28/2022)- worse 3-28-22  · NPO  · IV protonix every 12 hours   · Trend HGB  · 2 PRBC transfused   · pending occult stool  · Add oral B12        Right ureteral dilation: no kevin hydronephrosis   · pendingUA  ·  urology consulted -         Dispo/Discharge Planning:      Pending , PPD, PT,OT           Diet:  DIET NPO  DVT PPx:  SCD  Code status: Full Code    Hospital Problems as of 3/29/2022 Date Reviewed: 3/2/2022          Codes Class Noted - Resolved POA    Acute cholecystitis ICD-10-CM: K81.0  ICD-9-CM: 575.0  3/28/2022 - Present Unknown Pancreatitis, gallstone ICD-10-CM: K85.10  ICD-9-CM: 219.2, 574.20  3/28/2022 - Present Unknown        Anemia ICD-10-CM: D64.9  ICD-9-CM: 285.9  3/28/2022 - Present Yes        Bacteremia ICD-10-CM: R78.81  ICD-9-CM: 790.7  3/28/2022 - Present Yes        Diastolic CHF, chronic ([facility] ICD-10-CM: I50.32  ICD-9-CM: 428.32, 428.0  3/2/2022 - Present Yes        Atrial fibrillation ([facility] ICD-10-CM: I48.91  ICD-9-CM: 427.31  11/29/2017 - Present Yes        * (Principal) Sepsis ([facility] ICD-10-CM: A41.9  ICD-9-CM: 038.9, 995.91  10/26/2017 - Present Unknown        Paroxysmal atrial fibrillation ([facility] ICD-10-CM: I48.0  ICD-9-CM: 427.31  6/30/2017 - Present Yes    Overview Signed 5/21/2019 10:36 AM by Santo Mccallum MD     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               SSS (sick sinus syndrome) (HCC) ICD-10-CM: I49.5  ICD-9-CM: 427.81  6/30/2017 - Present Yes        Myasthenia gravis ([facility] ICD-10-CM: G70.00  ICD-9-CM: 358.00  Unknown - Present Yes        HTN (hypertension) (Chronic) ICD-10-CM: I10  ICD-9-CM: 401.9  5/8/2015 - Present Yes        Coronary atherosclerosis of native coronary vessel ICD-10-CM: I25.10  ICD-9-CM: 414.01  5/8/2015 - Present Yes    Overview Signed 6/7/2016  8:35 AM by Arthur Victor on brilinta  5/7/15: prox LAD PCI, normal EF                   Objective:     Patient Vitals for the past 24 hrs:   Temp Pulse Resp BP SpO2   03/29/22 0749 97.8 °F (36.6 °C) 75 24 137/66 98 %   03/29/22 0633 97.9 °F (36.6 °C) 70 20 119/71 98 %   03/29/22 0400  72      03/29/22 0353 98.1 °F (36.7 °C) 70 20 112/65 98 %   03/29/22 0334 97.6 °F (36.4 °C) 71 20 (!) 117/55 97 %   03/29/22 0331 97.6 °F (36.4 °C) 71 20 (!) 117/55 97 %   03/29/22 0023 98.5 °F (36.9 °C) 71 20 (!) 112/58 93 %   03/28/22 2150  76 18 (!) 99/48 91 %   03/28/22 1930 98 °F (36.7 °C) 75 18 91/61 99 %   03/28/22 1735 98.6 °F (37 °C) 77 18 120/60 95 %   03/28/22 1528 98.7 °F (37.1 °C) 77 18 122/64 95 % 03/28/22 1459 100.3 °F (37.9 °C) 75 18 117/60 98 %   03/28/22 1114 98.2 °F (36.8 °C) 78 20 112/67 97 %   03/28/22 0922     93 %     Oxygen Therapy  O2 Sat (%): 98 % (03/29/22 0749)  Pulse via Oximetry: 76 beats per minute (03/28/22 0922)  O2 Device: Nasal cannula (03/29/22 0209)  Skin Assessment: Clean, dry, & intact (03/28/22 0215)  O2 Flow Rate (L/min): 1 l/min (03/29/22 0209)    Estimated body mass index is 34.76 kg/m² as calculated from the following:    Height as of this encounter: 5' 8\" (1.727 m). Weight as of this encounter: 103.7 kg (228 lb 9.9 oz). Intake/Output Summary (Last 24 hours) at 3/29/2022 0751  Last data filed at 3/29/2022 6571  Gross per 24 hour   Intake 1485.4 ml   Output 400 ml   Net 1085.4 ml         Physical Exam:     Blood pressure 137/66, pulse 75, temperature 97.8 °F (36.6 °C), resp. rate 24, height 5' 8\" (1.727 m), weight 103.7 kg (228 lb 9.9 oz), SpO2 98 %. General:    Well nourished. No overt distress, elderly, hard of hearing, pleasant    CV:   RRR. No m/r/g. No jugular venous distension. Trace  edema   Lungs:   Coarse  Abdomen: Bowel sounds present. Soft,diffusely tender, nondistended. obese  Extremities: No cyanosis or clubbing. Trace  edema  Skin:     No rashes and normal coloration. Warm and dry. Neuro:   grossly intact. Psych:  Normal mood and affect.       I have reviewed ordered lab tests and independently visualized imaging below:    Recent Labs:  Recent Results (from the past 48 hour(s))   CBC WITH AUTOMATED DIFF    Collection Time: 03/27/22  7:58 PM   Result Value Ref Range    WBC 20.2 (H) 4.3 - 11.1 K/uL    RBC 4.39 4.23 - 5.6 M/uL    HGB 7.2 (L) 13.6 - 17.2 g/dL    HCT 27.9 (L) 41.1 - 50.3 %    MCV 63.6 (L) 79.6 - 97.8 FL    MCH 16.4 (L) 26.1 - 32.9 PG    MCHC 25.8 (L) 31.4 - 35.0 g/dL    RDW 20.4 (H) 11.9 - 14.6 %    PLATELET 247 139 - 137 K/uL    MPV 8.9 (L) 9.4 - 12.3 FL    ABSOLUTE NRBC 0.03 0.0 - 0.2 K/uL    DF AUTOMATED      NEUTROPHILS 91 (H) 43 - 78 %    LYMPHOCYTES 4 (L) 13 - 44 %    MONOCYTES 5 4.0 - 12.0 %    EOSINOPHILS 0 (L) 0.5 - 7.8 %    BASOPHILS 0 0.0 - 2.0 %    IMMATURE GRANULOCYTES 0 0.0 - 5.0 %    ABS. NEUTROPHILS 18.3 (H) 1.7 - 8.2 K/UL    ABS. LYMPHOCYTES 0.8 0.5 - 4.6 K/UL    ABS. MONOCYTES 0.9 0.1 - 1.3 K/UL    ABS. EOSINOPHILS 0.1 0.0 - 0.8 K/UL    ABS. BASOPHILS 0.0 0.0 - 0.2 K/UL    ABS. IMM. GRANS. 0.1 0.0 - 0.5 K/UL   METABOLIC PANEL, COMPREHENSIVE    Collection Time: 03/27/22  7:58 PM   Result Value Ref Range    Sodium 138 138 - 145 mmol/L    Potassium 4.1 3.5 - 5.1 mmol/L    Chloride 103 98 - 107 mmol/L    CO2 24 21 - 32 mmol/L    Anion gap 11 7 - 16 mmol/L    Glucose 145 (H) 65 - 100 mg/dL    BUN 11 8 - 23 MG/DL    Creatinine 1.00 0.8 - 1.5 MG/DL    GFR est AA >60 >60 ml/min/1.73m2    GFR est non-AA >60 >60 ml/min/1.73m2    Calcium 8.8 8.3 - 10.4 MG/DL    Bilirubin, total 0.5 0.2 - 1.1 MG/DL    ALT (SGPT) 55 12 - 65 U/L    AST (SGOT) 127 (H) 15 - 37 U/L    Alk.  phosphatase 198 (H) 50 - 136 U/L    Protein, total 6.6 6.3 - 8.2 g/dL    Albumin 3.0 (L) 3.2 - 4.6 g/dL    Globulin 3.6 (H) 2.3 - 3.5 g/dL    A-G Ratio 0.8 (L) 1.2 - 3.5     LIPASE    Collection Time: 03/27/22  7:58 PM   Result Value Ref Range    Lipase 2,029 (H) 73 - 393 U/L   LACTIC ACID    Collection Time: 03/27/22  7:58 PM   Result Value Ref Range    Lactic acid 4.9 (HH) 0.4 - 2.0 MMOL/L   TROPONIN-HIGH SENSITIVITY    Collection Time: 03/27/22  7:58 PM   Result Value Ref Range    Troponin-High Sensitivity 36.5 (H) 0 - 14 pg/mL   NT-PRO BNP    Collection Time: 03/27/22  7:58 PM   Result Value Ref Range    NT pro- (H) <450 PG/ML   PROCALCITONIN    Collection Time: 03/27/22  7:58 PM   Result Value Ref Range    Procalcitonin 0.98 (H) 0.00 - 0.49 ng/mL   EKG, 12 LEAD, INITIAL    Collection Time: 03/27/22  8:03 PM   Result Value Ref Range    Ventricular Rate 78 BPM    Atrial Rate 0 BPM    P-R Interval 53 ms    QRS Duration 151 ms    Q-T Interval 388 ms    QTC Calculation (Bezet) 442 ms    Calculated P Axis 0 degrees    Calculated R Axis -87 degrees    Calculated T Axis 81 degrees    Diagnosis       Ventricular-paced rhythm  Confirmed by César Ascencio MD (), DELFINA REYES ([id number]) on 3/28/2022 9:50:35 AM     POC URINE MACROSCOPIC    Collection Time: 03/27/22  8:21 PM   Result Value Ref Range    Spec. gravity (POC) 1.020 1.001 - 1.023      pH, urine  (POC) 5.5 5.0 - 9.0      Protein (POC) Negative NEG mg/dL    Glucose, urine (POC) 100 (A) NEG mg/dL    Ketones (POC) Negative NEG mg/dL    Bilirubin (POC) Negative NEG      Blood (POC) Negative NEG      Urobilinogen (POC) 1.0 0.2 - 1.0 EU/dL    Nitrite (POC) Negative NEG      Leukocyte esterase (POC) Negative NEG      Performed by Aiyana Neely    LACTIC ACID    Collection Time: 03/27/22 11:50 PM   Result Value Ref Range    Lactic acid 3.0 (H) 0.4 - 2.0 MMOL/L   CULTURE, BLOOD    Collection Time: 03/27/22 11:50 PM    Specimen: Blood   Result Value Ref Range    Special Requests: RIGHT  ARM        GRAM STAIN GRAM NEGATIVE RODS      GRAM STAIN AEROBIC AND ANAEROBIC BOTTLES      GRAM STAIN        RESULTS VERIFIED, PHONED TO AND READ BACK BY Kenny Harris RN @ 6405 ON 3/28/22 BY AMM    Culture result: (A)       GRAM NEGATIVE RODS IDENTIFICATION AND SUSCEPTIBILITY TO FOLLOW    Culture result:        Refer to Blood Culture ID Panel Accession  [id number]     BLOOD CULTURE ID PANEL    Collection Time: 03/27/22 11:50 PM    Specimen: Blood   Result Value Ref Range    Acc. no. from Micro Order [id number]     Klebsiella pneumoniae Detected (A) NOTDET      KPC (Carbapenem Resistance Gene) NOT DETECTED NOTDET      INTERPRETATION        Gram negative ney, identified by real time PCR as Klebsiella pneumoniae.    CULTURE, BLOOD    Collection Time: 03/28/22 12:19 AM    Specimen: Blood   Result Value Ref Range    Special Requests: LEFT  ARM        GRAM STAIN GRAM NEGATIVE RODS      GRAM STAIN AEROBIC AND ANAEROBIC BOTTLES      GRAM STAIN        CRITICAL RESULT NOT CALLED DUE TO PREVIOUS NOTIFICATION OF CRITICAL RESULT WITHIN THE LAST 24 HOURS. Culture result: GRAM NEGATIVE RODS (A)      Culture result: CULTURE IN PROGRESS,FURTHER UPDATES TO FOLLOW     LACTIC ACID    Collection Time: 03/28/22  4:03 AM   Result Value Ref Range    Lactic acid 3.3 (H) 0.4 - 2.0 MMOL/L   METABOLIC PANEL, COMPREHENSIVE    Collection Time: 03/28/22  4:03 AM   Result Value Ref Range    Sodium 139 138 - 145 mmol/L    Potassium 4.4 3.5 - 5.1 mmol/L    Chloride 106 98 - 107 mmol/L    CO2 24 21 - 32 mmol/L    Anion gap 9 7 - 16 mmol/L    Glucose 142 (H) 65 - 100 mg/dL    BUN 15 8 - 23 MG/DL    Creatinine 1.40 0.8 - 1.5 MG/DL    GFR est AA >60 >60 ml/min/1.73m2    GFR est non-AA 51 (L) >60 ml/min/1.73m2    Calcium 8.3 8.3 - 10.4 MG/DL    Bilirubin, total 0.5 0.2 - 1.1 MG/DL    ALT (SGPT) 53 12 - 65 U/L    AST (SGOT) 68 (H) 15 - 37 U/L    Alk. phosphatase 165 (H) 50 - 136 U/L    Protein, total 6.0 (L) 6.3 - 8.2 g/dL    Albumin 2.7 (L) 3.2 - 4.6 g/dL    Globulin 3.3 2.3 - 3.5 g/dL    A-G Ratio 0.8 (L) 1.2 - 3.5     CBC WITH AUTOMATED DIFF    Collection Time: 03/28/22  4:03 AM   Result Value Ref Range    WBC 28.4 (H) 4.3 - 11.1 K/uL    RBC 4.01 (L) 4.23 - 5.6 M/uL    HGB 6.4 (LL) 13.6 - 17.2 g/dL    HCT 25.4 (L) 41.1 - 50.3 %    MCV 63.3 (L) 79.6 - 97.8 FL    MCH 16.0 (L) 26.1 - 32.9 PG    MCHC 25.2 (L) 31.4 - 35.0 g/dL    RDW 20.1 (H) 11.9 - 14.6 %    PLATELET 072 527 - 388 K/uL    MPV 8.9 (L) 9.4 - 12.3 FL    ABSOLUTE NRBC 0.03 0.0 - 0.2 K/uL    DF AUTOMATED      NEUTROPHILS 89 (H) 43 - 78 %    LYMPHOCYTES 3 (L) 13 - 44 %    MONOCYTES 6 4.0 - 12.0 %    EOSINOPHILS 0 (L) 0.5 - 7.8 %    BASOPHILS 0 0.0 - 2.0 %    IMMATURE GRANULOCYTES 1 0.0 - 5.0 %    ABS. NEUTROPHILS 25.4 (H) 1.7 - 8.2 K/UL    ABS. LYMPHOCYTES 0.9 0.5 - 4.6 K/UL    ABS. MONOCYTES 1.6 (H) 0.1 - 1.3 K/UL    ABS. EOSINOPHILS 0.1 0.0 - 0.8 K/UL    ABS. BASOPHILS 0.1 0.0 - 0.2 K/UL    ABS. IMM.  GRANS. 0.3 0.0 - 0.5 K/UL   RBC, ALLOCATE    Collection Time: 03/28/22  8:45 AM   Result Value Ref Range    HISTORY CHECKED? Historical check performed    TYPE & SCREEN    Collection Time: 03/28/22 10:07 AM   Result Value Ref Range    Crossmatch Expiration 03/31/2022,2351     ABO/Rh(D) A POSITIVE     Antibody screen NEG     Unit number [id number]     Blood component type  LR     Unit division 00     Status of unit TRANSFUSED     Crossmatch result Compatible     Unit number [id number]     Blood component type RC LRIR     Unit division 00     Status of unit ISSUED     Crossmatch result Compatible    RETICULOCYTE COUNT    Collection Time: 03/28/22 10:07 AM   Result Value Ref Range    Reticulocyte count 1.7 0.3 - 2.0 %    Absolute Retic Cnt. 0.0643 0.026 - 0.095 M/ul    Immature Retic Fraction 34.4 (H) 2.3 - 13.4 %    Retic Hgb Conc. 15 (L) 29 - 35 pg   HGB & HCT    Collection Time: 03/28/22 10:08 AM   Result Value Ref Range    HGB 6.2 (LL) 13.6 - 17.2 g/dL    HCT 24.1 (L) 41.1 - 50.3 %   TRANSFERRIN SATURATION    Collection Time: 03/28/22  1:09 PM   Result Value Ref Range    Iron 10 (L) 35 - 150 ug/dL    TIBC 440 250 - 450 ug/dL    Transferrin Saturation 2 (L) >20 %   VITAMIN B12    Collection Time: 03/28/22  1:09 PM   Result Value Ref Range    Vitamin B12 270 193 - 986 pg/mL   FERRITIN    Collection Time: 03/28/22  1:09 PM   Result Value Ref Range    Ferritin 14 8 - 388 NG/ML   FOLATE    Collection Time: 03/28/22  1:09 PM   Result Value Ref Range    Folate 6.0 3.1 - 17.5 ng/mL   HGB & HCT    Collection Time: 03/28/22  9:41 PM   Result Value Ref Range    HGB 6.3 (LL) 13.6 - 17.2 g/dL    HCT 23.6 (L) 41.1 - 50.3 %   RBC, ALLOCATE    Collection Time: 03/29/22  1:45 AM   Result Value Ref Range    HISTORY CHECKED?  Historical check performed    METABOLIC PANEL, COMPREHENSIVE    Collection Time: 03/29/22  5:54 AM   Result Value Ref Range    Sodium 139 138 - 145 mmol/L    Potassium 3.8 3.5 - 5.1 mmol/L    Chloride 106 98 - 107 mmol/L    CO2 22 21 - 32 mmol/L    Anion gap 11 7 - 16 mmol/L    Glucose 148 (H) 65 - 100 mg/dL    BUN 24 (H) 8 - 23 MG/DL    Creatinine 1.30 0.8 - 1.5 MG/DL    GFR est AA >60 >60 ml/min/1.73m2    GFR est non-AA 56 (L) >60 ml/min/1.73m2    Calcium 8.6 8.3 - 10.4 MG/DL    Bilirubin, total 0.8 0.2 - 1.1 MG/DL    ALT (SGPT) 41 12 - 65 U/L    AST (SGOT) 32 15 - 37 U/L    Alk. phosphatase 127 50 - 136 U/L    Protein, total 6.3 6.3 - 8.2 g/dL    Albumin 2.7 (L) 3.2 - 4.6 g/dL    Globulin 3.6 (H) 2.3 - 3.5 g/dL    A-G Ratio 0.8 (L) 1.2 - 3.5     LIPASE    Collection Time: 03/29/22  5:54 AM   Result Value Ref Range    Lipase 406 (H) 73 - 393 U/L       All Micro Results     Procedure Component Value Units Date/Time    CULTURE, BLOOD [[id number]]  (Abnormal) Collected: 03/28/22 0019    Order Status: Completed Specimen: Blood Updated: 03/29/22 0713     Special Requests: --        LEFT  ARM       GRAM STAIN GRAM NEGATIVE RODS               AEROBIC AND ANAEROBIC BOTTLES                  CRITICAL RESULT NOT CALLED DUE TO PREVIOUS NOTIFICATION OF CRITICAL RESULT WITHIN THE LAST 24 HOURS.            Culture result: GRAM NEGATIVE RODS               CULTURE IN PROGRESS,FURTHER UPDATES TO FOLLOW          CULTURE, BLOOD [[id number]]  (Abnormal) Collected: 03/27/22 2350    Order Status: Completed Specimen: Blood Updated: 03/29/22 0708     Special Requests: --        RIGHT  ARM       GRAM STAIN GRAM NEGATIVE RODS               AEROBIC AND ANAEROBIC BOTTLES                  RESULTS VERIFIED, PHONED TO AND READ BACK BY Daja Monge RN @ 4896 ON 3/28/22 BY AMM           Culture result:       GRAM NEGATIVE RODS IDENTIFICATION AND SUSCEPTIBILITY TO FOLLOW                  Refer to Blood Culture ID Panel Accession  [id number]      BLOOD CULTURE ID PANEL [[id number]]  (Abnormal) Collected: 03/27/22 2350    Order Status: Completed Specimen: Blood Updated: 03/28/22 1236     Acc. no. from Micro Order [id number]     Klebsiella pneumoniae Detected        Comment: RESULTS VERIFIED, PHONED TO AND READ BACK BY  Mouna Diaz, RN @ 2270 ON 3/28/22 BY SERA          KPC (Carbapenem Resistance Gene) NOT DETECTED        Comment: WARNING:  A Not Detected result for the KPC gene does not indicate susceptibility to carbapenems. Gram negative bacteria can be resistant to carbapenems by mechanisms other than carrying the KPC gene. INTERPRETATION       Gram negative ney, identified by real time PCR as Klebsiella pneumoniae. Other Studies:  No results found. Current Meds:  Current Facility-Administered Medications   Medication Dose Route Frequency    0.9% sodium chloride infusion 250 mL  250 mL IntraVENous PRN    tuberculin injection 5 Units  5 Units IntraDERMal ONCE    sodium chloride (NS) flush 5-40 mL  5-40 mL IntraVENous Q8H    sodium chloride (NS) flush 5-40 mL  5-40 mL IntraVENous PRN    acetaminophen (TYLENOL) tablet 650 mg  650 mg Oral Q6H PRN    Or    acetaminophen (TYLENOL) suppository 650 mg  650 mg Rectal Q6H PRN    polyethylene glycol (MIRALAX) packet 17 g  17 g Oral DAILY PRN    ondansetron (ZOFRAN ODT) tablet 4 mg  4 mg Oral Q8H PRN    Or    ondansetron (ZOFRAN) injection 4 mg  4 mg IntraVENous Q6H PRN    piperacillin-tazobactam (ZOSYN) 3.375 g in 0.9% sodium chloride (MBP/ADV) 100 mL MBP  3.375 g IntraVENous Q8H    hydrALAZINE (APRESOLINE) 20 mg/mL injection 10 mg  10 mg IntraVENous Q6H PRN    morphine injection 1 mg  1 mg IntraVENous Q4H PRN    0.9% sodium chloride infusion 250 mL  250 mL IntraVENous PRN    pantoprazole (PROTONIX) 40 mg in 0.9% sodium chloride 10 mL injection  40 mg IntraVENous Q12H    furosemide (LASIX) tablet 40 mg  40 mg Oral DAILY    sodium chloride (NS) flush 5-10 mL  5-10 mL IntraVENous Q8H    sodium chloride (NS) flush 5-10 mL  5-10 mL IntraVENous PRN       Signed:  Marisa Noriega MD    Part of this note may have been written by using a voice dictation software.   The note has been proof read but may still contain some grammatical/other typographical errors.

## 2022-03-29 NOTE — ANESTHESIA POSTPROCEDURE EVALUATION
Procedure(s):  CHOLECYSTECTOMY LAPAROSCOPIC WITH INTRAOP CHOLANGIOGRAM.    general    Anesthesia Post Evaluation      Multimodal analgesia: multimodal analgesia used between 6 hours prior to anesthesia start to PACU discharge  Patient location during evaluation: PACU  Patient participation: complete - patient participated  Level of consciousness: awake  Pain management: adequate  Airway patency: patent  Anesthetic complications: no  Cardiovascular status: acceptable and hemodynamically stable  Respiratory status: acceptable  Hydration status: acceptable  Comments: Acceptable for discharge from PACU. Post anesthesia nausea and vomiting:  none  Final Post Anesthesia Temperature Assessment:  Normothermia (36.0-37.5 degrees C)      INITIAL Post-op Vital signs:   Vitals Value Taken Time   /63 03/29/22 1923   Temp 36.3 °C (97.4 °F) 03/29/22 1846   Pulse 77 03/29/22 1927   Resp 17 03/29/22 1919   SpO2 92 % 03/29/22 1927   Vitals shown include unvalidated device data.

## 2022-03-29 NOTE — PROGRESS NOTES
ACUTE OT GOALS:  (Developed with and agreed upon by patient and/or caregiver.)  1. Patient will complete lower body bathing and dressing with modified independence and adaptive equipment as needed. 2. Patient will complete toileting with modified independence. 3. Patient will tolerate 30 minutes of OT treatment with 2-3 rest breaks to increase activity tolerance for ADLs. 4. Patient will complete functional transfers with modified independence and adaptive equipment as needed. 5. Patient will complete functional mobility for household distances and good safety awareness with modified independence.       Timeframe: 7 visits       OCCUPATIONAL THERAPY ASSESSMENT: Initial Assessment OT Treatment Day Wilfrido Ferrer is a 79 y.o. male   PRIMARY DIAGNOSIS: Sepsis ([facility] [address]  Sepsis ([facility] [address] [A41.9]  Acute cholecystitis [K81.0]  Pancreatitis, gallstone [K85.10]  Procedure(s) (LRB):  CHOLECYSTECTOMY LAPAROSCOPIC WITH INTRAOP CHOLANGIOGRAM / AVAILABLE AT 1500 (N/A)  Day of Surgery  Reason for Referral:    ICD-10: Treatment Diagnosis: Generalized Muscle Weakness (M62.81)  Other lack of cordination (R27.8)  History of falling (Z91.81)  [place]PAT[place] / [place] / Product Type: Medicare /   ASSESSMENT:     REHAB RECOMMENDATIONS:   Recommendation to date pending progress:  Setting:   DC plan TBD while awaiting GI work-up  Equipment:    To Be Determined     PRIOR LEVEL OF FUNCTION:  (Prior to Hospitalization)  INITIAL/CURRENT LEVEL OF FUNCTION:  (Based on today's evaluation)   Bathing:   Independent  Dressing:   Independent  Feeding/Grooming:   Independent  Toileting:   Independent  Functional Mobility:   Independent Bathing:   Minimal Assistance  Dressing:   Minimal Assistance  Feeding/Grooming:   Contact Guard Assistance  Toileting:   Minimal Assistance  Functional Mobility:   Contact Guard Assistance     ASSESSMENT:  Mr. Gladys Hough presents to the hospital with sepsis, acute cholecystitis, and pancreatitis. Pt has been anemic with further GI work-up pending. Pt is up to the chair upon arrival. Pt is alert and pleasant but hard of hearing. Supportive wife is at bedside. Pt states he is typically independent. Pt states he does get short of breath with activity but states that this is chronic. O2 levels monitored with pt at 96% on RA and HR 75 at rest and increased HR to 91 with activity. Pt is a little unsteady needing CGA to min A at times with functional mobility. Pt not up for a lot of activity and will be going to ENDO soon. Pt back to the chair at end of session. Pt is currently functioning below baseline and will benefit from OT services to address stated goals and plan of care. SUBJECTIVE:   Mr. Catracho Good states, \"I don't hear well\"    SOCIAL HISTORY/LIVING ENVIRONMENT: Lives at home with his wife; 1 story with 1 FRANCOISE; Independent with ADL/functional mobility; 1 recent fall (fell over cement parking space barrier); walk-in shower;  No DME reported   Home Environment: Private residence  # Steps to Enter: 1  One/Two Story Residence: One story  Living Alone: No  Support Systems: Spouse/Significant Other,Child(naveen)    OBJECTIVE:     PAIN: VITAL SIGNS: LINES/DRAINS:   Pre Treatment: Pain Screen  Pain Scale 1: Numeric (0 - 10)  Pain Intensity 1: 0  Post Treatment: 0   IV  O2 Device: None (Room air)     GROSS EVALUATION:   Within Functional Limits Abnormal/ Functional Abnormal/ Non-Functional (see comments) Not Tested Comments:   AROM [x] [] [] []    PROM [] [] [] [x]    Strength [] [x] [] [] Generally decreased   Balance [] [x] [] [] Good sitting; fair standing    Posture [] [x] [] []    Sensation [x] [] [] []    Coordination [x] [] [] []    Tone [x] [] [] []    Edema [x] [] [] []    Activity Tolerance [] [x] [] []     [] [] [] []      COGNITION/  PERCEPTION: Intact Impaired   (see comments) Comments:   Orientation [x] []    Vision [x] []    Hearing [] [x] Does not have hearing aids   Judgment/ Insight [x] []    Attention [x] []    Memory [x] []    Command Following [x] []    Emotional Regulation [x] []     [] []      ACTIVITIES OF DAILY LIVING: I Mod I S SBA CGA Min Mod Max Total NT Comments   BASIC ADLs:              Bathing/ Showering [] [] [] [] [] [] [] [] [] [x]    Toileting [] [] [] [] [] [] [] [] [] [x]    Dressing [] [] [] [] [] [] [] [] [] [x]    Feeding [] [] [] [] [] [] [] [] [] [x]    Grooming [] [] [] [] [] [] [] [] [] [x]    Personal Device Care [] [] [] [] [] [] [] [] [] [x]    Functional Mobility [] [] [] [] [x] [x] [] [] [] [] No AD   I=Independent, Mod I=Modified Independent, S=Supervision, SBA=Standby Assistance, CGA=Contact Guard Assistance,   Min=Minimal Assistance, Mod=Moderate Assistance, Max=Maximal Assistance, Total=Total Assistance, NT=Not Tested    MOBILITY: I Mod I S SBA CGA Min Mod Max Total  NT x2 Comments:   Supine to sit [] [] [] [] [] [] [] [] [] [x] []    Sit to supine [] [] [] [] [] [] [] [] [] [x] []    Sit to stand [] [] [] [] [x] [] [] [] [] [] []    Bed to chair [] [] [] [] [x] [x] [] [] [] [] []    I=Independent, Mod I=Modified Independent, S=Supervision, SBA=Standby Assistance, CGA=Contact Guard Assistance,   Min=Minimal Assistance, Mod=Moderate Assistance, Max=Maximal Assistance, Total=Total Assistance, NT=Not [facility] 6 Clicks   Daily Activity Inpatient Short Form        How much help from another person does the patient currently need. .. Total A Lot A Little None   1. Putting on and taking off regular lower body clothing? [] 1   [] 2   [x] 3   [] 4   2. Bathing (including washing, rinsing, drying)? [] 1   [] 2   [x] 3   [] 4   3. Toileting, which includes using toilet, bedpan or urinal?   [] 1   [] 2   [x] 3   [] 4   4. Putting on and taking off regular upper body clothing? [] 1   [] 2   [x] 3   [] 4   5. Taking care of personal grooming such as brushing teeth? [] 1   [] 2   [x] 3   [] 4   6.   Eating meals?   [] 1   [] 2   [x] 3   [] 4   © 2007, Trustees of [address] [place] [address] [address] [address], under license to [employer]. All rights reserved     Score:  Initial: 18 Most Recent: X (Date: -- )   Interpretation of Tool:  Represents activities that are increasingly more difficult (i.e. Bed mobility, Transfers, Gait). PLAN:   FREQUENCY/DURATION: OT Plan of Care: 3 times/week for duration of hospital stay or until stated goals are met, whichever comes first.    PROBLEM LIST:   (Skilled intervention is medically necessary to address:)  1. Decreased ADL/Functional Activities  2. Decreased Activity Tolerance  3. Decreased Balance  4. Decreased Coordination  5. Decreased Gait Ability  6. Decreased Strength  7. Decreased Transfer Abilities   INTERVENTIONS PLANNED:   (Benefits and precautions of occupational therapy have been discussed with the patient.)  1. Self Care Training  2. Therapeutic Activity  3. Therapeutic Exercise/HEP  4. Neuromuscular Re-education  5.  Education     TREATMENT:     EVALUATION: Low Complexity : (Untimed Charge)    TREATMENT:   (     )  Evaluation only    TREATMENT GRID:  N/A    AFTER TREATMENT POSITION/PRECAUTIONS:  Alarm Activated, Chair, Needs within reach, RN notified and Visitors at bedside    INTERDISCIPLINARY COLLABORATION:  RN/PCT, PT/PTA and OT/GOULD    TOTAL TREATMENT DURATION:  OT Patient Time In/Time Out  Time In: 1042  Time Out: 888 [facility], OT

## 2022-03-29 NOTE — PROGRESS NOTES
Heber Lanes, MD   Bariatric & Advanced Laparoscopic Surgery & Endoscopy  [address] [facility] [address], [address] [facility]  [place]  Phone [phone]   Fax [phone]      Date of visit: 3/29/2022      Primary/Requesting provider: Anthony Robbins MD         Name: Katlyn Carrera      MRN: [id number]       : 1940       Age: 79 y.o. Sex: male        PCP: Anthony Robbins MD     CC:    Chief Complaint   Patient presents with    Abdominal Pain       HPI:    Katlyn Carrera is a 79 y.o. male with history of atrial fibrillation s/p watchman device implantation, s/p pacemaker/AV keerthi ablation, CAD with prior PCI LAD in , HFpEF, mild pulmonary HTN, mild to moderate aortic insufficiency who present with altered mental status, and abdominal pain. Pain started 1 day ago when patient was coming back home from [place]. He rpeoerts eating a lot of gravy prior to the pain starting. He reports having this pain in the past. He reports pain was 10/10/ he denies any nausea or vomiting. He reports some black tarry stools. Previous abdominal surgeries - appendectomy  Last colonoscopy - 2007    3/29/22 - doing well today. Still having some RUQ discomfort. EGD done and no bleeding found.          PMH:    Past Medical History:   Diagnosis Date    Abnormal EKG 4/22/15    Arrhythmia     CAD (coronary artery disease) 2015    Carotid artery stenosis without cerebral infarction 2016    US 6/15: <50% bilat ICAs    Coronary atherosclerosis of native coronary vessel 2015    VEGAS on brilinta 5/7/15: prox LAD PCI, normal EF     Diastolic CHF, chronic ([address] 3/2/2022    Dyslipidemia 2016    Dyspnea 2015    Echo 6/15: EF 60%, mod MR, mod LVH, mild AI     ED (erectile dysfunction)     GERD (gastroesophageal reflux disease)     no medication    GERD (gastroesophageal reflux disease)     HTN (hypertension) 2015    Hypertension     Hypokalemia 2016    Hypokalemia     Mitral valve regurgitation 6/7/2016    Morbid obesity ([facility] [address]     Myasthenia gravis ([facility] [address] 6/17/15    Myasthenia gravis (HCC)     Nocturia     Osteoporosis     PUD (peptic ulcer disease) 25 yrs ago    S/P coronary artery stent placement 5/8/2015    3.0x38 mm Xience ADRIANNA to pLAD 5/7/15     Sleep apnea     Syncope and collapse     Unspecified sleep apnea     no cpap       PSH:    Past Surgical History:   Procedure Laterality Date    HX APPENDECTOMY      HX COLONOSCOPY  2007    HX HEART CATHETERIZATION  05/27/2015    stent    HX HEART CATHETERIZATION  05/21/2019    watchman device    HX HEMORRHOIDECTOMY      HX PACEMAKER  2018   Olga Cervantes/Xin for sleep apnea and reconstruction for extending jaw    VASCULAR SURGERY PROCEDURE UNLIST Right 07/10/2019     Repair of right radial artery pseudoaneurysm       MEDS:    Current Facility-Administered Medications   Medication    0.9% sodium chloride infusion 250 mL    tuberculin injection 5 Units    cyanocobalamin tablet 1,000 mcg    0.9% sodium chloride infusion 250 mL    lactated Ringers infusion    0.9% sodium chloride infusion 250 mL    sodium chloride (NS) flush 5-40 mL    sodium chloride (NS) flush 5-40 mL    acetaminophen (TYLENOL) tablet 650 mg    Or    acetaminophen (TYLENOL) suppository 650 mg    polyethylene glycol (MIRALAX) packet 17 g    ondansetron (ZOFRAN ODT) tablet 4 mg    Or    ondansetron (ZOFRAN) injection 4 mg    piperacillin-tazobactam (ZOSYN) 3.375 g in 0.9% sodium chloride (MBP/ADV) 100 mL MBP    hydrALAZINE (APRESOLINE) 20 mg/mL injection 10 mg    morphine injection 1 mg    0.9% sodium chloride infusion 250 mL    pantoprazole (PROTONIX) 40 mg in 0.9% sodium chloride 10 mL injection    furosemide (LASIX) tablet 40 mg    sodium chloride (NS) flush 5-10 mL    sodium chloride (NS) flush 5-10 mL       ALLERGIES:      No Known Allergies    SH:    Social History     Tobacco Use    Smoking status: Never Smoker    Smokeless tobacco: Never Used   Substance Use Topics    Alcohol use: No    Drug use: No       FH:    Family History   Problem Relation Age of Onset   Chelsie Bran Cancer Mother         kidney    Cancer Father         stomach    No Known Problems Sister     Cancer Brother         brain tumor    No Known Problems Brother        Review of systems:  Review of Systems   Constitutional: Negative for chills, fever and weight loss. HENT: Negative for sore throat. Eyes: Negative for discharge and redness. Respiratory: Negative for cough, hemoptysis, shortness of breath and stridor. Cardiovascular: Negative for chest pain and palpitations. Gastrointestinal: Positive for abdominal pain. Negative for blood in stool, constipation, diarrhea, heartburn, melena, nausea and vomiting. Genitourinary: Negative for dysuria and hematuria. Musculoskeletal: Negative for back pain, falls and joint pain. Skin: Negative for itching and rash. Neurological: Negative for sensory change, speech change, focal weakness and loss of consciousness. Endo/Heme/Allergies: Does not bruise/bleed easily. Psychiatric/Behavioral: Negative for memory loss. The patient is not nervous/anxious and does not have insomnia. Physical Exam:     Visit Vitals  BP (!) 146/68 (BP 1 Location: Left upper arm, BP Patient Position: At rest)   Pulse 75   Temp 98.3 °F (36.8 °C)   Resp 24   Ht 5' 8\" (1.727 m)   Wt 228 lb 9.9 oz (103.7 kg)   SpO2 97%   BMI 34.76 kg/m²       General:  Well-developed, well-nourished, no distress. Psych:  Cooperative, good insight and judgement. Neuro:  Alert, oriented to person, place and time. HEENT:  Normocephalic, atraumatic. Sclera clear. Lungs:  Unlabored breathing. Symmetrical chest expansion. Chest wall:  No tenderness or deformity. Heart:  Regular rate and rhythm. No JVD. Abdomen:  Soft, mild RUQ tenderness, non-distended. No guarding or rebound.    Extremities:  Extremities normal, atraumatic, no cyanosis or edema. Skin:  Skin color, texture, turgor normal. No rashes. Labs: All recent labs were reviewed. Elevated WBC. Low Hgb. Recent Results (from the past 24 hour(s))   HGB & HCT    Collection Time: 03/28/22  9:41 PM   Result Value Ref Range    HGB 6.3 (LL) 13.6 - 17.2 g/dL    HCT 23.6 (L) 41.1 - 50.3 %   RBC, ALLOCATE    Collection Time: 03/29/22  1:45 AM   Result Value Ref Range    HISTORY CHECKED? Historical check performed    METABOLIC PANEL, COMPREHENSIVE    Collection Time: 03/29/22  5:54 AM   Result Value Ref Range    Sodium 139 138 - 145 mmol/L    Potassium 3.8 3.5 - 5.1 mmol/L    Chloride 106 98 - 107 mmol/L    CO2 22 21 - 32 mmol/L    Anion gap 11 7 - 16 mmol/L    Glucose 148 (H) 65 - 100 mg/dL    BUN 24 (H) 8 - 23 MG/DL    Creatinine 1.30 0.8 - 1.5 MG/DL    GFR est AA >60 >60 ml/min/1.73m2    GFR est non-AA 56 (L) >60 ml/min/1.73m2    Calcium 8.6 8.3 - 10.4 MG/DL    Bilirubin, total 0.8 0.2 - 1.1 MG/DL    ALT (SGPT) 41 12 - 65 U/L    AST (SGOT) 32 15 - 37 U/L    Alk. phosphatase 127 50 - 136 U/L    Protein, total 6.3 6.3 - 8.2 g/dL    Albumin 2.7 (L) 3.2 - 4.6 g/dL    Globulin 3.6 (H) 2.3 - 3.5 g/dL    A-G Ratio 0.8 (L) 1.2 - 3.5     CBC WITH AUTOMATED DIFF    Collection Time: 03/29/22  5:54 AM   Result Value Ref Range    WBC 15.9 (H) 4.3 - 11.1 K/uL    RBC 4.12 (L) 4.23 - 5.6 M/uL    HGB 7.3 (L) 13.6 - 17.2 g/dL    HCT 27.0 (L) 41.1 - 50.3 %    MCV 65.5 (L) 79.6 - 97.8 FL    MCH 17.7 (L) 26.1 - 32.9 PG    MCHC 27.0 (L) 31.4 - 35.0 g/dL    RDW 23.3 (H) 11.9 - 14.6 %    PLATELET 333 543 - 229 K/uL    MPV 9.2 (L) 9.4 - 12.3 FL    ABSOLUTE NRBC 0.02 0.0 - 0.2 K/uL    DF AUTOMATED      NEUTROPHILS 87 (H) 43 - 78 %    LYMPHOCYTES 6 (L) 13 - 44 %    MONOCYTES 7 4.0 - 12.0 %    EOSINOPHILS 0 (L) 0.5 - 7.8 %    BASOPHILS 0 0.0 - 2.0 %    IMMATURE GRANULOCYTES 1 0.0 - 5.0 %    ABS. NEUTROPHILS 13.9 (H) 1.7 - 8.2 K/UL    ABS. LYMPHOCYTES 0.9 0.5 - 4.6 K/UL    ABS.  MONOCYTES 1.0 0.1 - 1.3 K/UL ABS. EOSINOPHILS 0.0 0.0 - 0.8 K/UL    ABS. BASOPHILS 0.0 0.0 - 0.2 K/UL    ABS. IMM. GRANS. 0.1 0.0 - 0.5 K/UL   LIPASE    Collection Time: 03/29/22  5:54 AM   Result Value Ref Range    Lipase 406 (H) 73 - 393 U/L   RBC, ALLOCATE    Collection Time: 03/29/22 10:15 AM   Result Value Ref Range    HISTORY CHECKED? Historical check performed        Imaging: CT images were independently reviewed by me. + gallstones. Mild gallbladder thickening. No pericholecystic fluid. NM HEPATOBILIARY DUCT SCAN    Result Date: 3/29/2022  No evidence of acute cholecystitis or biliary obstruction. ICD-10-CM ICD-9-CM    1. Calculus of gallbladder with acute cholecystitis without obstruction  K80.00 574.00    2. Acute gallstone pancreatitis  K85.10 577.0      574.20    3. Bacteremia  R78.81 790.7    4. Acute cholecystitis  K81.0 575.0    5. Persistent atrial fibrillation ([facility])  I48.19 427.31    6. Diastolic CHF, chronic ([facility])  I50.32 428.32      428.0    7. Atherosclerosis of native coronary artery of native heart without angina pectoris  I25.10 414.01    8. S/P coronary artery stent placement  Z95.5 V45.82    9. Abnormal CT of the abdomen  R93.5 793.6    10. Abnormal gallbladder ultrasound  R93.2 793.3    11. SSS (sick sinus syndrome) ([facility])  I49.5 427.81    12. Pacemaker  Z95.0 V45.01          Assessment/Plan:  Héctor Clements is a 79 y.o. male who has signs and symptoms consistent with gallstone pancreatitis and possible cholecystitis    HIDA negative but patient has gallstones and complains of RUQ pain. GNR bacteremia on IV antibiotics. EGD done by Dr. Timothy Hood without major findings. Plan for laparoscopic, possible open, cholecystectomy with cholangiogram. I discussed risks, benefits and alternatives of surgery. Patient understood and agreed to proceed with cholecystectomy.    We discussed risks of cholecystectomy including but not limited to pain, infection, bleeding, scar, conversion from laparoscopic to open, injury to organs, enterotomy, cautery injury, hernia, injury to bile ducts, biliary leak, biliary stricture, retained stone, need for additional procedures, need for cholangiography, change in bowel habits. Time: I spent 25 minutes preparing to see patient (including chart review and preparation), obtaining and/or reviewing additional medical history, performing a physical exam and evaluation, documenting clinical information in the electronic health record, independently interpreting results, communicating results to patient, family or caregiver, and/or coordinating care.       Signed: Felton Castelan MD  Bariatric & Minimally Invasive Surgery  3/29/2022

## 2022-03-29 NOTE — PROGRESS NOTES
Gastroenterology    Discussed with Dr. Wu Dunbar with surgery and she prefers pt have EGD to evaluate anemia and melena prior to lap mala, scheduled for later today. She has also ordered HIDA scan. Discussed EGD and risks including but not limited to bleeding, perforation, acute cardiopulmonary event, sedation risks, IV complications, aspiration, and pt states understanding and agrees to proceed. - NPO.  - EGD today with Dr. Pepe Dey.  - HIDA scan pending.  - Follow. Ramona Ho  [facility]

## 2022-03-29 NOTE — ANESTHESIA PREPROCEDURE EVALUATION
Anesthetic History   No history of anesthetic complications            Review of Systems / Medical History  Patient summary reviewed and pertinent labs reviewed    Pulmonary        Sleep apnea (Corrected with jaw surgery)  Shortness of breath         Neuro/Psych   Within defined limits          Comments: Myasthenia gravis - no recent symptoms. Not on medication. Cardiovascular    Hypertension: well controlled        Dysrhythmias (paf, SSS) : atrial fibrillation  Pacemaker (Pacemaker for SSS), CAD, cardiac stents (2015 - remains on bASA) and hyperlipidemia    Exercise tolerance: >4 METS  Comments: Echo 12/2021 - normal EF, mild-mod AI    S/p Watchman 2019       GI/Hepatic/Renal     GERD: well controlled           Endo/Other        Obesity and anemia (Hgb 7.3)     Other Findings              Physical Exam    Airway  Mallampati: II  TM Distance: > 6 cm  Neck ROM: normal range of motion   Mouth opening: Normal     Cardiovascular    Rhythm: regular  Rate: normal         Dental    Dentition: Full upper dentures and Lower partial plate     Pulmonary        Wheezes:bilateral         Abdominal         Other Findings            Anesthetic Plan    ASA: 3, emergent  Anesthesia type: general            Anesthetic plan and risks discussed with: Patient and Spouse      Patient received Duoneb after EGD.

## 2022-03-29 NOTE — ANESTHESIA PREPROCEDURE EVALUATION
Relevant Problems   RESPIRATORY SYSTEM   (+) Aspiration pneumonia (HCC)   (+) Dyspnea      NEUROLOGY   (+) Major depressive disorder, recurrent, mild   (+) Major depressive disorder, recurrent, moderate   (+) Major depressive disorder, recurrent, unspecified      CARDIOVASCULAR   (+) Atrial fibrillation (HCC)   (+) Coronary atherosclerosis of native coronary vessel   (+) HTN (hypertension)   (+) Mitral valve regurgitation   (+) Pacemaker   (+) Paroxysmal atrial fibrillation (HCC)   (+) SSS (sick sinus syndrome) (HCC)      GASTROINTESTINAL   (+) GERD (gastroesophageal reflux disease)      HEMATOLOGY   (+) Anemia       Anesthetic History   No history of anesthetic complications            Review of Systems / Medical History  Patient summary reviewed and pertinent labs reviewed    Pulmonary        Sleep apnea (Corrected with jaw surgery)           Neuro/Psych   Within defined limits          Comments: Myasthenia gravis Cardiovascular    Hypertension        Dysrhythmias (paf, SSS) : atrial fibrillation  Pacemaker, CAD, cardiac stents (2015 - remains on bASA) and hyperlipidemia    Exercise tolerance: >4 METS  Comments: Echo - normal EF, mild-mod AI    S/p Watchman 2019    TTE 2021: LA: Left Atrium volume index is 44.4 mL/m2. AV: Aortic valve mean gradient is 10 mmHg. (RVSP) is 48 mmHg.  Normal EF, Moderate aortic insuff       GI/Hepatic/Renal     GERD: well controlled           Endo/Other        Obesity and anemia     Other Findings              Physical Exam    Airway  Mallampati: II  TM Distance: > 6 cm  Neck ROM: normal range of motion   Mouth opening: Normal     Cardiovascular    Rhythm: regular  Rate: normal         Dental    Dentition: Full upper dentures and Lower partial plate     Pulmonary  Breath sounds clear to auscultation               Abdominal         Other Findings            Anesthetic Plan    ASA: 3, emergent  Anesthesia type: total IV anesthesia            Anesthetic plan and risks discussed with: Patient and Spouse      I discussed with GI as well as general surgery. General surgery wants EGD done by GI as this may alter surgical plan for this afternoon vs delaying surgery. Normal HIDA scan.

## 2022-03-29 NOTE — H&P
Date of Surgery Update:  Jenny Cortez was seen and examined. History and physical has been reviewed. The patient has been examined.  There have been no significant clinical changes since the completion of the originally dated History and Physical.    Signed By: Claudette Charnley, MD     March 29, 2022 1:46 PM

## 2022-03-29 NOTE — PROGRESS NOTES
ACUTE PHYSICAL THERAPY GOALS:  (Developed with and agreed upon by patient and/or caregiver.)  (1.) Duy Carr will move from supine to sit and sit to supine , scoot up and down and roll side to side with MODIFIED INDEPENDENCE within 7 treatment day(s). (2.) Duy Carr will transfer from bed to chair and chair to bed with MODIFIED INDEPENDENCE using the least restrictive device within 7 treatment day(s). (3.) Duy Carr will ambulate with MODIFIED INDEPENDENCE for 300+ feet with the least restrictive device within 7 treatment day(s). (4.) Duy Carr will perform standing static and dynamic balance activities x 25 minutes with MODIFIED INDEPENDENCE to improve safety within 7 treatment day(s). (5.) Duy Carr will ascend and descend 1 stair using one hand rail(s) with MODIFIED INDEPENDENCE to improve functional mobility and safety within 7 treatment day(s). (6.) Duy Carr will perform therapeutic exercises x 15 min for HEP with INDEPENDENCE to improve strength, endurance, and functional mobility within 7 treatment day(s).      PHYSICAL THERAPY ASSESSMENT: Initial Assessment PT Treatment Day # 1    Duy Carr is a 79 y.o. male   PRIMARY DIAGNOSIS: Sepsis ([facility] [address]  Sepsis ([facility] [address] [A41.9]  Acute cholecystitis [K81.0]  Pancreatitis, gallstone [K85.10]  Procedure(s) (LRB):  CHOLECYSTECTOMY LAPAROSCOPIC WITH INTRAOP CHOLANGIOGRAM / AVAILABLE AT 1500 (N/A)     Reason for Referral:  ICD-10: Treatment Diagnosis: Difficulty in walking, Not elsewhere classified (R26.2)  Other abnormalities of gait and mobility (R26.89)  [place]PAT[place] / [place] / Product Type: Medicare /     ASSESSMENT:     REHAB RECOMMENDATIONS:   Recommendation to date pending progress:  Setting:   TBD pending GI workup/intervention  Equipment:    To Be Determined     PRIOR LEVEL OF FUNCTION:  (Prior to Hospitalization) INITIAL/CURRENT LEVEL OF FUNCTION:  (Most Recently Demonstrated)   Bed Mobility:   Independent  Sit to Stand:   Independent  Transfers:   Independent  Gait/Mobility:   Independent Bed Mobility:   Contact Guard Assistance  Sit to Stand:   Contact Guard Assistance  Transfers:   Contact Guard Assistance  Gait/Mobility:   Contact Guard Assistance     ASSESSMENT:  Mr. Thad Hodgkins is an 79year old M who presents to hospital with abdominal pain, admitted with sepsis criteria. Found to be anemic, received several units PRBCs. GI workup is pending, pt to go to [facility] today. This date pt performs mobility including transfers, standing balance activities, and ambulation in room with CGA. Pt with mild unsteadiness, increased trunk sway and path devaiations. He endorses some SOB with exertion; SpO2 96% on room air both at rest and with exertion, HR increased from 75 bpm at rest, to 91 bpm with exertion. Pt presents as functioning below his baseline, with deficits in mobility including transfers, gait, balance, and activity tolerance. Pt will benefit from skilled therapy services to address stated deficits to promote return to highest level of function, independence, and safety. Will continue to follow. SUBJECTIVE:   Mr. Thad Hodgkins states, \"I am feeling alright\"    SOCIAL HISTORY/LIVING ENVIRONMENT: Lives with his spouse, 1 level home, 1 [address]. Endorses on fall/trip. Does not use any DME. 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   Home Environment: Private residence  # Steps to Enter: 1  One/Two Story Residence: One story  Living Alone: No  Support Systems: Spouse/Significant Other,Child(naveen)  OBJECTIVE:     PAIN: VITAL SIGNS: LINES/DRAINS:   Pre Treatment: Pain Screen  Pain Scale 1: Numeric (0 - 10)  Pain Intensity 1: 0  Post Treatment: 0/10 Vital Signs  Pulse (Heart Rate): 75  O2 Sat (%): 96 %  O2 Device: None (Room air) IV  O2 Device: None (Room air)     GROSS EVALUATION:  BLE Within Functional Limits Abnormal/ Functional Abnormal/ Non-Functional (see comments) Not Tested Comments:   AROM [x] [] [] []    PROM [x] [] [] []    Strength [x] [] [] []    Balance [] [x] [] []  decreased standing balance control   Posture [x] [] [] []    Sensation [x] [] [] []  light touch BLE intact   Coordination [x] [] [] []    Tone [] [] [] [x]    Edema [] [] [] [x]    Activity Tolerance [] [x] [] []  SOB with exertion; SpO2 96%    [] [] [] []      COGNITION/  PERCEPTION: Intact Impaired   (see comments) Comments:   Orientation [x] []    Vision [x] []    Hearing [] [x]  hard of hearing   Command Following [x] []    Safety Awareness [x] []     [] []      MOBILITY: I Mod I S SBA CGA Min Mod Max Total  NT x2 Comments:   Bed Mobility    Rolling [] [] [] [] [] [] [] [] [] [x] []    Supine to Sit [] [] [] [] [] [] [] [] [] [x] []    Scooting [] [] [] [] [x] [] [] [] [] [] []    Sit to Supine [] [] [] [] [] [] [] [] [] [x] []    Transfers    Sit to Stand [] [] [] [] [x] [] [] [] [] [] []    Bed to Chair [] [] [] [] [x] [] [] [] [] [] []    Stand to Sit [] [] [] [] [x] [] [] [] [] [] []    I=Independent, Mod I=Modified Independent, S=Supervision, SBA=Standby Assistance, CGA=Contact Guard Assistance,   Min=Minimal Assistance, Mod=Moderate Assistance, Max=Maximal Assistance, Total=Total Assistance, NT=Not Tested  GAIT: I Mod I S SBA CGA Min Mod Max Total  NT x2 Comments:   Level of Assistance [] [] [] [] [x] [] [] [] [] [] []    Distance 40 ft    DME None    Gait Quality Mild trunk sway, path deviations, widened NGHIA    Weightbearing Status N/A     I=Independent, Mod I=Modified Independent, S=Supervision, SBA=Standby Assistance, CGA=Contact Guard Assistance,   Min=Minimal Assistance, Mod=Moderate Assistance, Max=Maximal Assistance, Total=Total Assistance, NT=Not Tested    [facility] [address] [address] AM-PAC [address] [place] [address],[address] Floor Inpatient Short Form       How much difficulty does the patient currently have. .. Unable A Lot A Little None   1. Turning over in bed (including adjusting bedclothes, sheets and blankets)? [] 1   [] 2   [x] 3   [] 4   2. Sitting down on and standing up from a chair with arms ( e.g., wheelchair, bedside commode, etc.)   [] 1   [] 2   [x] 3   [] 4   3. Moving from lying on back to sitting on the side of the bed? [] 1   [] 2   [x] 3   [] 4   How much help from another person does the patient currently need. .. Total A Lot A Little None   4. Moving to and from a bed to a chair (including a wheelchair)? [] 1   [] 2   [x] 3   [] 4   5. Need to walk in hospital room? [] 1   [] 2   [x] 3   [] 4   6. Climbing 3-5 steps with a railing? [] 1   [] 2   [x] 3   [] 4   © 2007, Trustees of [facility] [employer], under license to [employer]. All rights reserved     Score:  Initial: 18 Most Recent: X (Date: -- )    Interpretation of Tool:  Represents activities that are increasingly more difficult (i.e. Bed mobility, Transfers, Gait). PLAN:   FREQUENCY/DURATION: PT Plan of Care: 3 times/week for duration of hospital stay or until stated goals are met, whichever comes first.    PROBLEM LIST:   (Skilled intervention is medically necessary to address:)  1. Decreased Activity Tolerance  2. Decreased Balance  3. Decreased Coordination  4. Decreased Gait Ability  5. Decreased Strength  6. Decreased Transfer Abilities   INTERVENTIONS PLANNED:   (Benefits and precautions of physical therapy have been discussed with the patient.)  1. Therapeutic Activity  2. Therapeutic Exercise/HEP  3. Neuromuscular Re-education  4. Gait Training  5.  Education     TREATMENT:     EVALUATION: Low Complexity : (Untimed Charge)    TREATMENT:   (     )  No tx provided or billed today    TREATMENT GRID:  N/A    AFTER TREATMENT POSITION/PRECAUTIONS:  Alarm Activated, Chair, Needs within reach, RN notified and Visitors at bedside    INTERDISCIPLINARY COLLABORATION:  RN/PCT, PT/PTA and OT/GOULD    TOTAL TREATMENT DURATION:  PT Patient Time In/Time Out  Time In: 1042  Time Out: [address], PT

## 2022-03-29 NOTE — PROGRESS NOTES
TRANSFER - OUT REPORT:    Verbal report given to [address] on [facility]  being transferred to [facility] for routine post - op       Report consisted of patients Situation, Background, Assessment and   Recommendations(SBAR). Information from the following report(s) SBAR, OR Summary, Procedure Summary, Intake/Output, MAR and Cardiac Rhythm Ventricular Paced was reviewed with the receiving nurse. Lines:   Peripheral IV 03/27/22 Posterior;Right Hand (Active)   Site Assessment Clean, dry, & intact 03/29/22 1846   Phlebitis Assessment 0 03/29/22 1846   Infiltration Assessment 0 03/29/22 1846   Dressing Status Clean, dry, & intact 03/29/22 1846   Dressing Type Transparent;Tape 03/29/22 1846   Hub Color/Line Status Pink; Infusing;Patent 03/29/22 1846   Action Taken Open ports on tubing capped 03/29/22 0720   Alcohol Cap Used No 03/29/22 1846       Peripheral IV 03/28/22 Left;Posterior; Upper Arm (Active)   Site Assessment Clean, dry, & intact 03/29/22 1846   Phlebitis Assessment 0 03/29/22 1846   Infiltration Assessment 0 03/29/22 1846   Dressing Status Clean, dry, & intact 03/29/22 1846   Dressing Type Transparent;Tape 03/29/22 1846   Hub Color/Line Status Patent;Capped 03/29/22 1846   Action Taken Open ports on tubing capped 03/29/22 0720   Alcohol Cap Used No 03/29/22 1846       Peripheral IV 03/29/22 Right Arm (Active)   Site Assessment Clean, dry, & intact 03/29/22 1846   Phlebitis Assessment 0 03/29/22 1846   Infiltration Assessment 0 03/29/22 1846   Dressing Status Clean, dry, & intact 03/29/22 1846   Dressing Type Transparent;Tape 03/29/22 1846   Hub Color/Line Status Pink;Patent;Capped 03/29/22 1846   Alcohol Cap Used No 03/29/22 1846        Opportunity for questions and clarification was provided.       Patient transported with:   O2 @ 4 liters  Tech

## 2022-03-29 NOTE — PROGRESS NOTES
[facility] CARDIOLOGY PROGRESS NOTE           3/29/2022 9:15 AM    Admit Date: 3/27/2022      Subjective:   Patient denies chest pain, dyspnea, leg swelling. Does report that he has continued to have abdominal pain, no overt bleeding. No other complaints at this time. ROS:  Cardiovascular:  As noted above    Objective:      Vitals:    03/29/22 0353 03/29/22 0400 03/29/22 0633 03/29/22 0749   BP: 112/65  119/71 137/66   Pulse: 70 72 70 75   Resp: 20  20 24   Temp: 98.1 °F (36.7 °C)  97.9 °F (36.6 °C) 97.8 °F (36.6 °C)   SpO2: 98%  98% 98%   Weight:       Height:           Physical Exam:  General-No Acute Distress, awake, alert   Neck- supple, no JVD  CV- regular rate and rhythm no MRG  Lung- clear bilaterally aside from minimal rales right base, non-labored   Abd- soft, nontender, nondistended  Ext- no edema bilaterally in his legs. Skin- warm and dry    Data Review:   Recent Labs     03/29/22  0554 03/28/22  2141 03/28/22  1008 03/28/22  0403     --   --  139   K 3.8  --   --  4.4   BUN 24*  --   --  15   CREA 1.30  --   --  1.40   *  --   --  142*   WBC 15.9*  --   --  28.4*   HGB 7.3* 6.3*   < > 6.4*   HCT 27.0* 23.6*   < > 25.4*     --   --  200    < > = values in this interval not displayed. Assessment/Plan:     Principal Problem:    Sepsis ([address] (10/26/2017)    Acute cholecystitis (3/28/2022)    Pancreatitis, gallstone (3/28/2022)    - ongoing evaluation, HIDA planned, seen by GI/general surgery     - cholecystectomy is low risk operation, no obvious modifiable cardiac risk factors identified at this time. No evidence of ACS, decompensated CHF, or severe renal failure. - transfusions as needed, per medicine     Active Problems:    HTN (hypertension) (5/8/2015)    - controlled at this time       Coronary atherosclerosis of native coronary vessel (5/8/2015)    - no chest pain, ASA held re: Anemia. Not on beta blocker as he is pacer dependent. Paroxysmal atrial fibrillation (HCC) (6/30/2017)    SSS (sick sinus syndrome) ([facility] [address] (6/30/2017)    - s/p AV keerthi ablation, pacer dependent    - post Watchman  , ASA held re: anemia. - pacer in place, paced rhythm. LV lead is fractured and is off. Diastolic CHF, chronic ([facility] [address] (3/2/2022)    - on PO diuretics, does not appear decompensated from volume perspective at this time . Mixed HLD    - statin held RE: elevated LFTS, look to resume as labs improve        Myasthenia gravis (HCC) ()    - per medicine       Anemia (3/28/2022)    - transfusions as per medicine, GI consulted. Bacteremia   - on ABX, monitor cultures. Per medicine.      Lm Levy DO  3/29/2022 9:15 AM

## 2022-03-29 NOTE — PROGRESS NOTES
TRANSFER - IN REPORT:    Verbal report received from [address] on [facility] [address]  being received from PACU for progression of care. Report consisted of patients Situation, Background, Assessment and   Recommendations(SBAR). Information from the following report(s) IV to right hand 20 g and 20 g to left arm was reviewed with the receiving nurse. Opportunity for questions and clarification was provided. Awaiting pt arrival to unit.

## 2022-03-30 ENCOUNTER — APPOINTMENT (OUTPATIENT)
Dept: GENERAL RADIOLOGY | Age: 82
DRG: 853 | End: 2022-03-30
Attending: INTERNAL MEDICINE
Payer: MEDICARE

## 2022-03-30 ENCOUNTER — ANESTHESIA EVENT (OUTPATIENT)
Dept: ENDOSCOPY | Age: 82
DRG: 853 | End: 2022-03-30
Payer: MEDICARE

## 2022-03-30 ENCOUNTER — ANESTHESIA (OUTPATIENT)
Dept: ENDOSCOPY | Age: 82
DRG: 853 | End: 2022-03-30
Payer: MEDICARE

## 2022-03-30 LAB
ALBUMIN SERPL-MCNC: 2.5 G/DL (ref 3.2–4.6)
ALBUMIN/GLOB SERPL: 0.7 {RATIO} (ref 1.2–3.5)
ALP SERPL-CCNC: 126 U/L (ref 50–136)
ALT SERPL-CCNC: 46 U/L (ref 12–65)
ANION GAP SERPL CALC-SCNC: 9 MMOL/L (ref 7–16)
AST SERPL-CCNC: 35 U/L (ref 15–37)
BACTERIA SPEC CULT: ABNORMAL
BASOPHILS # BLD: 0 K/UL (ref 0–0.2)
BASOPHILS NFR BLD: 0 % (ref 0–2)
BILIRUB SERPL-MCNC: 0.6 MG/DL (ref 0.2–1.1)
BUN SERPL-MCNC: 25 MG/DL (ref 8–23)
CALCIUM SERPL-MCNC: 8.6 MG/DL (ref 8.3–10.4)
CHLORIDE SERPL-SCNC: 108 MMOL/L (ref 98–107)
CO2 SERPL-SCNC: 25 MMOL/L (ref 21–32)
CREAT SERPL-MCNC: 1.1 MG/DL (ref 0.8–1.5)
DIFFERENTIAL METHOD BLD: ABNORMAL
EOSINOPHIL # BLD: 0 K/UL (ref 0–0.8)
EOSINOPHIL NFR BLD: 0 % (ref 0.5–7.8)
ERYTHROCYTE [DISTWIDTH] IN BLOOD BY AUTOMATED COUNT: 24 % (ref 11.9–14.6)
GLOBULIN SER CALC-MCNC: 3.8 G/DL (ref 2.3–3.5)
GLUCOSE SERPL-MCNC: 131 MG/DL (ref 65–100)
GRAM STN SPEC: ABNORMAL
HCT VFR BLD AUTO: 31.4 % (ref 41.1–50.3)
HCT VFR BLD AUTO: 34 % (ref 41.1–50.3)
HGB BLD-MCNC: 8.8 G/DL (ref 13.6–17.2)
IMM GRANULOCYTES # BLD AUTO: 0.1 K/UL (ref 0–0.5)
IMM GRANULOCYTES NFR BLD AUTO: 1 % (ref 0–5)
LYMPHOCYTES # BLD: 0.9 K/UL (ref 0.5–4.6)
LYMPHOCYTES NFR BLD: 5 % (ref 13–44)
MCH RBC QN AUTO: 19 PG (ref 26.1–32.9)
MCHC RBC AUTO-ENTMCNC: 28 G/DL (ref 31.4–35)
MCV RBC AUTO: 67.8 FL (ref 79.6–97.8)
MM INDURATION POC: 0 MM (ref 0–5)
MONOCYTES # BLD: 1 K/UL (ref 0.1–1.3)
MONOCYTES NFR BLD: 5 % (ref 4–12)
NEUTS SEG # BLD: 18.2 K/UL (ref 1.7–8.2)
NEUTS SEG NFR BLD: 90 % (ref 43–78)
NRBC # BLD: 0.05 K/UL (ref 0–0.2)
PLATELET # BLD AUTO: 210 K/UL (ref 150–450)
PMV BLD AUTO: 9.2 FL (ref 9.4–12.3)
POTASSIUM SERPL-SCNC: 4 MMOL/L (ref 3.5–5.1)
PPD POC: NEGATIVE NEGATIVE
PROT SERPL-MCNC: 6.3 G/DL (ref 6.3–8.2)
RBC # BLD AUTO: 4.63 M/UL (ref 4.23–5.6)
SERVICE CMNT-IMP: ABNORMAL
SERVICE CMNT-IMP: ABNORMAL
SODIUM SERPL-SCNC: 142 MMOL/L (ref 138–145)
WBC # BLD AUTO: 20.3 K/UL (ref 4.3–11.1)

## 2022-03-30 PROCEDURE — 74011000250 HC RX REV CODE- 250: Performed by: ANESTHESIOLOGY

## 2022-03-30 PROCEDURE — 77030039425 HC BLD LARYNG TRULITE DISP TELE -A: Performed by: ANESTHESIOLOGY

## 2022-03-30 PROCEDURE — 77010033678 HC OXYGEN DAILY

## 2022-03-30 PROCEDURE — 36415 COLL VENOUS BLD VENIPUNCTURE: CPT

## 2022-03-30 PROCEDURE — 85025 COMPLETE CBC W/AUTO DIFF WBC: CPT

## 2022-03-30 PROCEDURE — 74011250637 HC RX REV CODE- 250/637: Performed by: ANESTHESIOLOGY

## 2022-03-30 PROCEDURE — 74011000250 HC RX REV CODE- 250: Performed by: EMERGENCY MEDICINE

## 2022-03-30 PROCEDURE — 2709999900 HC NON-CHARGEABLE SUPPLY: Performed by: INTERNAL MEDICINE

## 2022-03-30 PROCEDURE — 80053 COMPREHEN METABOLIC PANEL: CPT

## 2022-03-30 PROCEDURE — 74011250636 HC RX REV CODE- 250/636: Performed by: NURSE ANESTHETIST, CERTIFIED REGISTERED

## 2022-03-30 PROCEDURE — 74011250636 HC RX REV CODE- 250/636: Performed by: ANESTHESIOLOGY

## 2022-03-30 PROCEDURE — 74011000250 HC RX REV CODE- 250: Performed by: INTERNAL MEDICINE

## 2022-03-30 PROCEDURE — 77030040361 HC SLV COMPR DVT MDII -B: Performed by: INTERNAL MEDICINE

## 2022-03-30 PROCEDURE — 77030037088 HC TUBE ENDOTRACH ORAL NSL COVD-A: Performed by: ANESTHESIOLOGY

## 2022-03-30 PROCEDURE — C9113 INJ PANTOPRAZOLE SODIUM, VIA: HCPCS | Performed by: INTERNAL MEDICINE

## 2022-03-30 PROCEDURE — 77030009038 HC CATH BILI STN RTVR BSC -C: Performed by: INTERNAL MEDICINE

## 2022-03-30 PROCEDURE — 76040000026: Performed by: INTERNAL MEDICINE

## 2022-03-30 PROCEDURE — 85014 HEMATOCRIT: CPT

## 2022-03-30 PROCEDURE — 74011250636 HC RX REV CODE- 250/636: Performed by: INTERNAL MEDICINE

## 2022-03-30 PROCEDURE — 74011250637 HC RX REV CODE- 250/637: Performed by: FAMILY MEDICINE

## 2022-03-30 PROCEDURE — 76060000032 HC ANESTHESIA 0.5 TO 1 HR: Performed by: INTERNAL MEDICINE

## 2022-03-30 PROCEDURE — 74011000258 HC RX REV CODE- 258: Performed by: FAMILY MEDICINE

## 2022-03-30 PROCEDURE — 74011000636 HC RX REV CODE- 636: Performed by: INTERNAL MEDICINE

## 2022-03-30 PROCEDURE — 94760 N-INVAS EAR/PLS OXIMETRY 1: CPT

## 2022-03-30 PROCEDURE — 87040 BLOOD CULTURE FOR BACTERIA: CPT

## 2022-03-30 PROCEDURE — 74011000250 HC RX REV CODE- 250: Performed by: FAMILY MEDICINE

## 2022-03-30 PROCEDURE — 74011250636 HC RX REV CODE- 250/636: Performed by: FAMILY MEDICINE

## 2022-03-30 PROCEDURE — 74011250637 HC RX REV CODE- 250/637: Performed by: INTERNAL MEDICINE

## 2022-03-30 PROCEDURE — 0FC98ZZ EXTIRPATION OF MATTER FROM COMMON BILE DUCT, VIA NATURAL OR ARTIFICIAL OPENING ENDOSCOPIC: ICD-10-PCS | Performed by: INTERNAL MEDICINE

## 2022-03-30 PROCEDURE — 65270000029 HC RM PRIVATE

## 2022-03-30 PROCEDURE — 77030007288 HC DEV LOK BILI BSC -A: Performed by: INTERNAL MEDICINE

## 2022-03-30 PROCEDURE — 74011000250 HC RX REV CODE- 250: Performed by: NURSE ANESTHETIST, CERTIFIED REGISTERED

## 2022-03-30 PROCEDURE — 74330 X-RAY BILE/PANC ENDOSCOPY: CPT

## 2022-03-30 PROCEDURE — 99232 SBSQ HOSP IP/OBS MODERATE 35: CPT | Performed by: INTERNAL MEDICINE

## 2022-03-30 PROCEDURE — 77030012595 HC SPHNTOM BILI BSC -D: Performed by: INTERNAL MEDICINE

## 2022-03-30 PROCEDURE — 2709999900 HC NON-CHARGEABLE SUPPLY

## 2022-03-30 RX ORDER — PROPOFOL 10 MG/ML
INJECTION, EMULSION INTRAVENOUS AS NEEDED
Status: DISCONTINUED | OUTPATIENT
Start: 2022-03-30 | End: 2022-03-30 | Stop reason: HOSPADM

## 2022-03-30 RX ORDER — SODIUM CHLORIDE 0.9 % (FLUSH) 0.9 %
5-40 SYRINGE (ML) INJECTION EVERY 8 HOURS
Status: DISCONTINUED | OUTPATIENT
Start: 2022-03-30 | End: 2022-03-30 | Stop reason: HOSPADM

## 2022-03-30 RX ORDER — PANTOPRAZOLE SODIUM 40 MG/1
40 TABLET, DELAYED RELEASE ORAL
Status: DISCONTINUED | OUTPATIENT
Start: 2022-03-31 | End: 2022-04-07 | Stop reason: HOSPADM

## 2022-03-30 RX ORDER — SODIUM CHLORIDE 9 MG/ML
10 INJECTION, SOLUTION INTRAVENOUS CONTINUOUS
Status: DISCONTINUED | OUTPATIENT
Start: 2022-03-30 | End: 2022-03-31

## 2022-03-30 RX ORDER — GUAIFENESIN 100 MG/5ML
81 LIQUID (ML) ORAL
Status: COMPLETED | OUTPATIENT
Start: 2022-03-30 | End: 2022-03-30

## 2022-03-30 RX ORDER — SODIUM CHLORIDE, SODIUM LACTATE, POTASSIUM CHLORIDE, CALCIUM CHLORIDE 600; 310; 30; 20 MG/100ML; MG/100ML; MG/100ML; MG/100ML
100 INJECTION, SOLUTION INTRAVENOUS CONTINUOUS
Status: DISCONTINUED | OUTPATIENT
Start: 2022-03-30 | End: 2022-03-30 | Stop reason: HOSPADM

## 2022-03-30 RX ORDER — LIDOCAINE HYDROCHLORIDE 20 MG/ML
INJECTION, SOLUTION EPIDURAL; INFILTRATION; INTRACAUDAL; PERINEURAL AS NEEDED
Status: DISCONTINUED | OUTPATIENT
Start: 2022-03-30 | End: 2022-03-30 | Stop reason: HOSPADM

## 2022-03-30 RX ORDER — GUAIFENESIN 100 MG/5ML
81 LIQUID (ML) ORAL DAILY
Status: DISCONTINUED | OUTPATIENT
Start: 2022-03-31 | End: 2022-03-30

## 2022-03-30 RX ORDER — SODIUM CHLORIDE 0.9 % (FLUSH) 0.9 %
5-40 SYRINGE (ML) INJECTION AS NEEDED
Status: DISCONTINUED | OUTPATIENT
Start: 2022-03-30 | End: 2022-03-30 | Stop reason: HOSPADM

## 2022-03-30 RX ORDER — ONDANSETRON 2 MG/ML
INJECTION INTRAMUSCULAR; INTRAVENOUS AS NEEDED
Status: DISCONTINUED | OUTPATIENT
Start: 2022-03-30 | End: 2022-03-30 | Stop reason: HOSPADM

## 2022-03-30 RX ORDER — ROCURONIUM BROMIDE 10 MG/ML
INJECTION, SOLUTION INTRAVENOUS AS NEEDED
Status: DISCONTINUED | OUTPATIENT
Start: 2022-03-30 | End: 2022-03-30

## 2022-03-30 RX ADMIN — PHENYLEPHRINE HYDROCHLORIDE 100 MCG: 10 INJECTION INTRAVENOUS at 15:49

## 2022-03-30 RX ADMIN — CYANOCOBALAMIN TAB 1000 MCG 1000 MCG: 1000 TAB at 09:21

## 2022-03-30 RX ADMIN — SODIUM CHLORIDE, PRESERVATIVE FREE 10 ML: 5 INJECTION INTRAVENOUS at 22:02

## 2022-03-30 RX ADMIN — SODIUM CHLORIDE, PRESERVATIVE FREE 10 ML: 5 INJECTION INTRAVENOUS at 13:00

## 2022-03-30 RX ADMIN — MORPHINE SULFATE 1 MG: 2 INJECTION, SOLUTION INTRAMUSCULAR; INTRAVENOUS at 23:31

## 2022-03-30 RX ADMIN — SODIUM CHLORIDE, PRESERVATIVE FREE 10 ML: 5 INJECTION INTRAVENOUS at 05:03

## 2022-03-30 RX ADMIN — IOPAMIDOL 5 ML: 755 INJECTION, SOLUTION INTRAVENOUS at 15:50

## 2022-03-30 RX ADMIN — SODIUM CHLORIDE 10 ML/HR: 9 INJECTION, SOLUTION INTRAVENOUS at 18:05

## 2022-03-30 RX ADMIN — LIDOCAINE HYDROCHLORIDE 100 MG: 20 INJECTION, SOLUTION EPIDURAL; INFILTRATION; INTRACAUDAL; PERINEURAL at 15:28

## 2022-03-30 RX ADMIN — SODIUM CHLORIDE, PRESERVATIVE FREE 10 ML: 5 INJECTION INTRAVENOUS at 05:01

## 2022-03-30 RX ADMIN — ACETAMINOPHEN 650 MG: 325 TABLET ORAL at 19:17

## 2022-03-30 RX ADMIN — PROPOFOL 200 MG: 10 INJECTION, EMULSION INTRAVENOUS at 15:28

## 2022-03-30 RX ADMIN — SODIUM CHLORIDE, POTASSIUM CHLORIDE, SODIUM LACTATE AND CALCIUM CHLORIDE 100 ML/HR: 600; 310; 30; 20 INJECTION, SOLUTION INTRAVENOUS at 13:49

## 2022-03-30 RX ADMIN — PIPERACILLIN AND TAZOBACTAM 3.38 G: 3; .375 INJECTION, POWDER, LYOPHILIZED, FOR SOLUTION INTRAVENOUS; PARENTERAL at 21:59

## 2022-03-30 RX ADMIN — MORPHINE SULFATE 1 MG: 2 INJECTION, SOLUTION INTRAMUSCULAR; INTRAVENOUS at 04:55

## 2022-03-30 RX ADMIN — MORPHINE SULFATE 1 MG: 2 INJECTION, SOLUTION INTRAMUSCULAR; INTRAVENOUS at 00:50

## 2022-03-30 RX ADMIN — ASPIRIN 81 MG: 81 TABLET, CHEWABLE ORAL at 13:59

## 2022-03-30 RX ADMIN — FUROSEMIDE 40 MG: 40 TABLET ORAL at 09:21

## 2022-03-30 RX ADMIN — MORPHINE SULFATE 1 MG: 2 INJECTION, SOLUTION INTRAMUSCULAR; INTRAVENOUS at 17:33

## 2022-03-30 RX ADMIN — ONDANSETRON 4 MG: 2 INJECTION INTRAMUSCULAR; INTRAVENOUS at 15:44

## 2022-03-30 RX ADMIN — PIPERACILLIN AND TAZOBACTAM 3.38 G: 3; .375 INJECTION, POWDER, LYOPHILIZED, FOR SOLUTION INTRAVENOUS; PARENTERAL at 05:03

## 2022-03-30 RX ADMIN — SODIUM CHLORIDE 250 ML: 900 INJECTION, SOLUTION INTRAVENOUS at 17:33

## 2022-03-30 RX ADMIN — SODIUM CHLORIDE, PRESERVATIVE FREE 10 ML: 5 INJECTION INTRAVENOUS at 13:16

## 2022-03-30 RX ADMIN — FAMOTIDINE 20 MG: 10 INJECTION INTRAVENOUS at 17:33

## 2022-03-30 RX ADMIN — SODIUM CHLORIDE 40 MG: 9 INJECTION, SOLUTION INTRAMUSCULAR; INTRAVENOUS; SUBCUTANEOUS at 09:21

## 2022-03-30 RX ADMIN — SODIUM CHLORIDE, PRESERVATIVE FREE 10 ML: 5 INJECTION INTRAVENOUS at 22:03

## 2022-03-30 NOTE — PROGRESS NOTES
[facility] Progress Note         Admit Date:  3/27/2022    Today's Date:  3/30/2022    CC:  KISHORE    Subjective:     Patient is tired following surgery yesterday. He has not passed much gas. He has continued having abd pain, especially over right side abd, unsure if pain he had when admitted or if post-surgical pain. Discussed IOC. Medications:   Current Facility-Administered Medications   Medication Dose Route Frequency    0.9% sodium chloride infusion 250 mL  250 mL IntraVENous PRN    tuberculin injection 5 Units  5 Units IntraDERMal ONCE    cyanocobalamin tablet 1,000 mcg  1,000 mcg Oral DAILY    0.9% sodium chloride infusion 250 mL  250 mL IntraVENous PRN    0.9% sodium chloride infusion 250 mL  250 mL IntraVENous PRN    sodium chloride (NS) flush 5-40 mL  5-40 mL IntraVENous Q8H    sodium chloride (NS) flush 5-40 mL  5-40 mL IntraVENous PRN    acetaminophen (TYLENOL) tablet 650 mg  650 mg Oral Q6H PRN    Or    acetaminophen (TYLENOL) suppository 650 mg  650 mg Rectal Q6H PRN    polyethylene glycol (MIRALAX) packet 17 g  17 g Oral DAILY PRN    ondansetron (ZOFRAN ODT) tablet 4 mg  4 mg Oral Q8H PRN    Or    ondansetron (ZOFRAN) injection 4 mg  4 mg IntraVENous Q6H PRN    piperacillin-tazobactam (ZOSYN) 3.375 g in 0.9% sodium chloride (MBP/ADV) 100 mL MBP  3.375 g IntraVENous Q8H    hydrALAZINE (APRESOLINE) 20 mg/mL injection 10 mg  10 mg IntraVENous Q6H PRN    morphine injection 1 mg  1 mg IntraVENous Q4H PRN    0.9% sodium chloride infusion 250 mL  250 mL IntraVENous PRN    pantoprazole (PROTONIX) 40 mg in 0.9% sodium chloride 10 mL injection  40 mg IntraVENous Q12H    furosemide (LASIX) tablet 40 mg  40 mg Oral DAILY    sodium chloride (NS) flush 5-10 mL  5-10 mL IntraVENous Q8H    sodium chloride (NS) flush 5-10 mL  5-10 mL IntraVENous PRN       Review of Systems:  ROS was obtained, with pertinent positives as listed above. No chest pain or SOB.     Diet: NPO    Objective:   Vitals:  Visit Vitals  /76 (BP 1 Location: Left upper arm, BP Patient Position: At rest)   Pulse 74   Temp 97.9 °F (36.6 °C)   Resp 20   Ht 5' 8\" (1.727 m)   Wt 103.4 kg (228 lb)   SpO2 95%   BMI 34.67 kg/m²     Intake/Output:  No intake/output data recorded. 03/28 1901 - 03/30 0700  In: 1622.9 [I.V.:975]  Out: 535 [Urine:500]  Exam:  General appearance: alert, cooperative, no distress  Lungs: clear to auscultation bilaterally anteriorly  Heart: regular rate and rhythm  Abdomen: soft, non-tender. Bowel sounds normal. No masses, no organomegaly  Neuro:  alert and oriented    Data Review (Labs):    Recent Labs     03/30/22  0648 03/30/22  0044 03/29/22  1629 03/29/22  0554 03/28/22  2141 03/28/22  1008 03/28/22  0403 03/27/22  1958   WBC 20.3*  --   --  15.9*  --   --  28.4* 20.2*   HGB 8.8*  --  7.7* 7.3* 6.3* 6.2* 6.4* 7.2*   HCT 31.4* 34.0*  --  27.0* 23.6* 24.1* 25.4* 27.9*     --   --  200  --   --  200 217   MCV 67.8*  --   --  65.5*  --   --  63.3* 63.6*     --   --  139  --   --  139 138   K 4.0  --   --  3.8  --   --  4.4 4.1   *  --   --  106  --   --  106 103   CO2 25  --   --  22  --   --  24 24   BUN 25*  --   --  24*  --   --  15 11   CREA 1.10  --   --  1.30  --   --  1.40 1.00   CA 8.6  --   --  8.6  --   --  8.3 8.8   *  --   --  148*  --   --  142* 145*     --   --  127  --   --  165* 198*   AST 35  --   --  32  --   --  68* 127*   ALT 46  --   --  41  --   --  53 55   TBILI 0.6  --   --  0.8  --   --  0.5 0.5   ALB 2.5*  --   --  2.7*  --   --  2.7* 3.0*   TP 6.3  --   --  6.3  --   --  6.0* 6.6   LPSE  --   --   --  406*  --   --   --  2,029*     CT A/P with contrast 3/27: FINDINGS:   The visualized lung bases are unremarkable. There are bilateral pleural-based  calcifications posteriorly.   The visualized portion of the heart shows leads likely from a pacemaker.   The liver contains multiple small hypodensities.  These are too small to  characterize. The spleen and adrenal glands are within normal limits.    The pancreas is not enlarged. There is mild peripancreatic fatty stranding and  fluid. There are no obvious pancreatic lesions.   The gallbladder is mildly distended. There is mild wall thickening with  pericholecystic fluid. Multiple tiny hyperdensities are present in the neck of  the gallbladder. These may represent stones.   The kidneys show a symmetric nephrogram. There is a small hypodense lesion in  the right kidney which is too small to characterize. Both kidneys show focal  areas suggesting scarring.    There is a tiny nonobstructing calcification in the left kidney. The right  ureter is normal in caliber throughout most of its course. Just proximal to the  ureterovesical junction there is focal dilatation of the distal ureter measuring  up to 2.3 cm. The bladder appears grossly normal.   The prostate is enlarged.   Distal esophagus and stomach are unremarkable. Small and large bowel are without  evidence of obstruction. Patient history states appendectomy. Diverticulosis  with no evidence of acute diverticulitis.   No evidence of free fluid, free air, focal fluid collection. No pathologic  adenopathy. No abdominal aortic aneurysm.   Osseous structures are without evidence of acute fracture or suspicious lesion.    IMPRESSION  There are multiple hyperechoic dense structures rather in the gallbladder neck. These may represent multiple stones. There is mild gallbladder wall thickening  with subtle pericholecystic fluid. These findings raise suspicion for acute  cholecystitis.   There is also mild peripancreatic fluid. This fluid could be due to  cholecystitis or acute pancreatitis.   There is focal dilatation of the distal right ureter. There is no evidence of  hydronephrosis proximal to this point.  Further work up with urology consult is  recommended to assess this region.   Chronic appearing changes are noted in the kidneys.   Multiple small low-density lesions are seen in the liver and are too small to  Characterize.     RUQ US 3/27/22: FINDINGS:   Aorta/IVC: Not visualized/Visualized portions are within normal limits.   Pancreas: Not visualized   Liver: There is diffuse increased echogenicity within the liver parenchyma,  suggestive of hepatic steatosis. No focal lesions are demonstrated on the  provided images.   Portal vein: The portal vein shows hepatopedal flow.   Gallbladder: Multiple tiny gallstones are present. The wall is thickened  measuring 7.6 mm. No evidence of pericholecystic fluid. Sonographic Vernona Rower sign  was not documented.    CBD: Normal in caliber and measures 7.1 mm.   Right kidney: 12 cm in length and without evidence of obstruction. The kidney  appears somewhat echogenic.   IMPRESSION  Cholelithiasis. The gallbladder wall is thickened measuring 7.6 mm. There is no  appreciable pericholecystic fluid. Sonographic Vernona Rower sign was not documented.   The pancreas was not visualized.   Hepatic steatosis.   The right kidney appears somewhat echogenic. This can be seen with medical renal  disease. Please correlate clinically.     CXR 3/27/22; FINDINGS:   There is moderate cardiomegaly. There is pulmonary vascular congestion.   There is no consolidation, significant pleural effusion, or pneumothorax.   Stable left-sided cardiac device.   No significant osseous abnormalities are observed.   IMPRESSION  Cardiomegaly with pulmonary vascular congestion suggests CHF.   There is no consolidation or significant pleural effusion. EGD 29 March 2022  Findings:   Esophagus- Normal.  Stomach- Normal except for a small hiatal hernia  Duodenum- Normal.  Impression:  No bleeding or source for gi blood loss anemia. Small hiatal hernia. Otherwise normal exam.   Recommendations: OK for surgery. Continue Protonix for now.     OPERATIVE CHOLANGIOGRAM 29 March 2022   INDICATION: Cholecystectomy   Fluoroscopy time:  45 seconds, 4 spot films   Multiple spot films were obtained during an intraoperative cholangiogram  performed by Dr. Cammie Valenzuela .    FINDINGS: There is at least one small filling defect in the common duct near the  ampulla. There is also filling defect in the common hepatic duct. No bile duct  dilatation. No focal stricture. Contrast passes normally into the small bowel.   IMPRESSION  At least 2 small filling defects in the biliary system, probably air  bubbles. Small stones not completely excluded.     Assessment:     Principal Problem:    Sepsis ([address] (10/26/2017)    Active Problems:    HTN (hypertension) (5/8/2015)      Coronary atherosclerosis of native coronary vessel (5/8/2015)      Overview: VEGAS on brilinta      5/7/15: prox LAD PCI, normal EF      Myasthenia gravis ([address] ()      Paroxysmal atrial fibrillation ([address] (6/30/2017)      Overview: 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 SSS (sick sinus syndrome) (HCC) (6/30/2017)      Atrial fibrillation ([address] (15/16/1854)      Diastolic CHF, chronic (HCC) (3/2/2022)      Acute cholecystitis (3/28/2022)      Pancreatitis, gallstone (3/28/2022)      Anemia (3/28/2022)      Bacteremia (3/28/2022)    78 yo male with PMH including but not limited to A FIB s/p ablation and Watchman 2019 - now on ASA, Echo 12/2021: normal EF, mod AI, mod pHTN, SSS s/p PPM, LVDD2, HTN, HLD, CAD, carotid artery stenosis, myasthenia gravis, JEROD (no CPAP), diverticulosis, colon polyps, who was seen in consultation 28 March 2022 at the request of Dr. Mendoza Coughlin for [facility], who was admitted 3/27/22 with abdominal pain, nausea, diarrhea, and met sepsis criteria with leukocytosis, lactic acidosis, elevated lipase and LFTs.   CT A/P showed multiple hyperechoic dense structures in the gallbladder neck (? stones), GB wall thickening with subtle pericholecystic fluid - suspicion for acute cholecystitis, mild peripancreatic fluid, focal dilatation of the distal right ureter w/o  hydronephrosis, chronic appearing changes are noted in the kidneys, and multiple small low-density lesions are seen in the liver and are too small to characterize. RUQ US showed cholelithiasis, GB wall thickening measuring 7.6 mm without appreciable pericholecystic fluid, no sonographic Glendia Spray sign was not documented, CBD 7.1 mm, pancreas not visualized, hepatic steatosis, right kidney appears somewhat echogenic (? medicorenal disease). Labs 28 March 2022 significant for WBC 28.4, HGB 6.4 with MCV 63 (baseline 10.6 and hgb 7.2 on admission). Hgb has improved to 8 range since transfusion. He has not had overt bleeding. LFTs initially elevated, but have since improved, now normal Surgery has also been consulted for lap mala and requested EGD evaluation prior to surgery given microcytic anemia. EGD 29 March 2022 with Dr. Tiburcio Burleson with no bleeding or source for gi blood loss anemia, small hiatal hernia, otherwise normal exam.  Underwent lap mala 29 March 2022 with possible filling defect in CBD - air bubbles. Plan:     - Supportive care, maintain electrolytes. - Continue Protonix 40 mg IV Q12.  - On Zosyn. - ERCP today with Dr. Óscar Welsh. Discussed risks including but not limited to bleeding, perforation, acute cardiopulmonary event, sedation risks, IV complications, aspiration, cholangitis, and pancreatitis, and pt states understanding and agrees to proceed. - NPO.  - Consider colonoscopy as outpatient for further evaluation of microcytic anemia once current acute illness resolves. - Follow.     Krishna Montoya MD

## 2022-03-30 NOTE — INTERVAL H&P NOTE
Update History & Physical    The Patient's History and Physical of March 29, 2022 was reviewed with the patient and I examined the patient. There was no change. The surgical site was confirmed by the patient and me. Plan:  The risk, benefits, expected outcome, and alternative to the recommended procedure have been discussed with the patient. Patient understands and wants to proceed with the procedure.     Electronically signed by Carole Garrido MD on 3/30/2022 at 3:06 PM

## 2022-03-30 NOTE — PROCEDURES
ENDOSCOPIC  RETROGRADE CHOLANGIOPANCREATOGRAPHY    DATE of PROCEDURE: 3/30/2022    PT NAME: Earl Givens     [id number]    MEDICATION: general    INSTRUMENT:  [id number]    SPECIAL PROCEDURE:papillotomy w/ balloon sweep- 9/12 mm  BLOOD LOSS- 0 to min. SPEC- no  IMPLANT- none    PROCEDURE: After informed consent, the patient was placed under anesthesia in the semi-prone position. The duodenoscope was passed without difficulty to the area of the ampulla. A standard cannulation and ERCP was performed with the findings as outlined and described below. Patient tolerated the procedure well. ASSESSMENT:  1. Multiple small pigmented stones- 12 mm balloon easily passed post clearance  2.  Pancreas not evaluated    3. GB absent    PLAN:  1. inpt f/u     Napoleon Temple MD

## 2022-03-30 NOTE — PROGRESS NOTES
[facility] CARDIOLOGY PROGRESS NOTE           3/30/2022 7:24 PM    Admit Date: 3/27/2022      Subjective:   -No complaints of chest pain no any worsening dyspnea. His only complaint is associated with abdominal pain and distention postoperatively at the site of surgery. Has not had a bowel movement and does not seem to be passing gas. No significant palpitations. ROS:  Cardiovascular:  As noted above    Objective:      Vitals:    03/30/22 1636 03/30/22 1640 03/30/22 1706 03/30/22 1912   BP: 131/62 131/62 136/80 125/66   Pulse: 75 74 77 75   Resp: 16 16 20 20   Temp:  98.9 °F (37.2 °C) 98.1 °F (36.7 °C) 98.3 °F (36.8 °C)   SpO2: 96% 97% 99% 96%   Weight:       Height:           Physical Exam:  General-No Acute Distress  Neck- supple, no JVD  CV- regular rate and rhythm no MRG  Lung- clear bilaterally  Abd- soft, nontender, nondistended  Ext- no edema bilaterally. Skin- warm and dry    Data Review:   Recent Labs     03/30/22  0648 03/30/22  0044 03/29/22  1629 03/29/22  0554 03/29/22  0554     --   --   --  139   K 4.0  --   --   --  3.8   BUN 25*  --   --   --  24*   CREA 1.10  --   --   --  1.30   *  --   --   --  148*   WBC 20.3*  --   --   --  15.9*   HGB 8.8*  --  7.7*   < > 7.3*   HCT 31.4* 34.0*  --    < > 27.0*     --   --   --  200    < > = values in this interval not displayed. Assessment/Plan:     Principal Problem:    Sepsis ([address] (10/26/2017)    Acute cholecystitis (3/28/2022)    Pancreatitis, gallstone (3/28/2022)   -Status post ERCP with sphincterotomy and laparoscopic cholecystectomy.     Active Problems:    HTN (hypertension) (5/8/2015)    - controlled at this time , brief elevation depression noted while he was in pain last evening postop       Coronary atherosclerosis of native coronary vessel (5/8/2015)    - no chest pain, ASA held re: Anemia.  Not on beta blocker as he is pacer dependent.        Paroxysmal atrial fibrillation ([facility] (6/30/2017)    SSS (sick sinus syndrome) ([facility] (6/30/2017)    - s/p AV keerthi ablation, pacer dependent    - post Watchman  , ASA held re: anemia. - pacer in place, paced rhythm. LV lead is fractured and  has been turned off.        Diastolic CHF, chronic ([facility] (3/2/2022)    - on PO diuretics, does not appear decompensated from volume perspective at this time . Will need to be monitored -on Lasix 40 mg daily       Mixed HLD    - statin held RE: elevated LFTS, look to resume as labs improve         Myasthenia gravis (HCC) ()    - per medicine        Anemia (3/28/2022)    - transfusions as per medicine, GI also following  -Hemoglobin better today  -Significant iron deficiency with a transferrin saturation of 2%  -Consider IV iron       Bacteremia   - on ABX, monitor cultures. Per medicine-should be better following ERCP and cholecystectomy-Zosyn    Hemodynamically stable at this point postoperatively. -Should be stable to resume home antihypertensive therapy and statin therapy as blood pressures trend upwards and LFTs normalize .  -We will sign off for now. Please do not hesitate to contact us if needed.     Adan Morgan MD  3/30/2022 7:24 PM

## 2022-03-30 NOTE — PROGRESS NOTES
Pt returned to room from PACU. Pt assessed upon arrival. VS stable pt is very groggy and loopy. Will continue to monitor pt. Pt's bed alarm placed on pt and pt's wife at bedside educated on use.

## 2022-03-30 NOTE — ANESTHESIA PREPROCEDURE EVALUATION
Anesthetic History   No history of anesthetic complications            Review of Systems / Medical History  Patient summary reviewed and pertinent labs reviewed    Pulmonary        Sleep apnea (Corrected with jaw surgery)  Shortness of breath         Neuro/Psych   Within defined limits          Comments: Myasthenia gravis - no recent symptoms. Not on medication. Cardiovascular    Hypertension: well controlled        Dysrhythmias (paf, SSS) : atrial fibrillation  Pacemaker (Pacemaker for SSS), CAD, cardiac stents (2015 - remains on bASA) and hyperlipidemia    Exercise tolerance: >4 METS  Comments: Echo 12/2021 - normal EF, mild-mod AI    S/p Watchman 2019       GI/Hepatic/Renal     GERD: well controlled      PUD     Endo/Other        Obesity and anemia (Hgb 7.3)     Other Findings   Comments: Right radial pseudoaneurysm repair    Hx Myasthenia Gravis--lost touch with MD, stopped taking meds           Physical Exam    Airway  Mallampati: II  TM Distance: > 6 cm  Neck ROM: normal range of motion   Mouth opening: Normal     Cardiovascular    Rhythm: regular  Rate: normal         Dental    Dentition: Full upper dentures and Lower partial plate     Pulmonary        Wheezes (mild):bilateral         Abdominal  GI exam deferred       Other Findings            Anesthetic Plan    ASA: 3, emergent  Anesthesia type: general          Induction: Intravenous  Anesthetic plan and risks discussed with: Patient and Spouse      Patient received Duoneb after EGD this morning. Will be succinylcholine resistant; NDMR sensitive. Glidescope. Will try to avoid muscle relaxants/deep intubation.

## 2022-03-30 NOTE — PROGRESS NOTES
Admit Date: 3/27/2022    POD 1 Day Post-Op    Procedure: 3/29/22 Procedure(s):  CHOLECYSTECTOMY LAPAROSCOPIC WITH INTRAOP CHOLANGIOGRAM    Subjective:     Pt alert in bed Ox3. Needs reminders for EGD and CCY 3/29/22. NAD noted. On 4 L/min NC. Endorses abd pain 5/10  voiding    Objective:       Vitals:    22 0012 22 0314 22 0504 22 0806   BP: (!) 147/75 122/70  137/76   Pulse: 70 70  74   Resp: 16 20  20   Temp: 97 °F (36.1 °C) 98.2 °F (36.8 °C)  97.9 °F (36.6 °C)   SpO2: 97% 99%  95%   Weight:   228 lb (103.4 kg)    Height:           Temp (24hrs), Av.8 °F (36.6 °C), Min:96.9 °F (36.1 °C), Max:98.4 °F (36.9 °C)  . I&O reviewed as documented. Voiding   -flatus/-bm      Intake/Output Summary (Last 24 hours) at 3/30/2022 1057  Last data filed at 3/30/2022 0532  Gross per 24 hour   Intake 1285 ml   Output 335 ml   Net 950 ml        Physical Exam:   Physical Exam  Constitutional:       General: He is in acute distress. HENT:      Mouth/Throat:      Mouth: Mucous membranes are dry. Cardiovascular:      Rate and Rhythm: Normal rate. Rhythm irregular. Pulses: Normal pulses. Heart sounds: Normal heart sounds. No murmur heard. No friction rub. No gallop. Pulmonary:      Effort: Pulmonary effort is normal. No respiratory distress. Breath sounds: Normal breath sounds. Comments: 4 L/min NC  Abdominal:      General: Bowel sounds are decreased. There is distension. Comments: Moderate abd distention; hypoactive BS throughout; abd laparoscopic incisions sites with steri strips c/d/i   Genitourinary:     Comments: voiding  Skin:     General: Skin is warm and dry. Capillary Refill: Capillary refill takes 2 to 3 seconds. Comments: laparoscopic incisions sites with steri strips c/d/i   Neurological:      Mental Status: He is alert and oriented to person, place, and time.           Labs:   Recent Results (from the past 24 hour(s))   HEMOGLOBIN    Collection Time: 03/29/22  4:29 PM   Result Value Ref Range    HGB 7.7 (L) 13.6 - 17.2 g/dL   RBC, ALLOCATE    Collection Time: 03/29/22  4:30 PM   Result Value Ref Range    HISTORY CHECKED? Historical check performed    HEMATOCRIT    Collection Time: 03/30/22 12:44 AM   Result Value Ref Range    HCT 34.0 (L) 41.1 - 32.5 %   METABOLIC PANEL, COMPREHENSIVE    Collection Time: 03/30/22  6:48 AM   Result Value Ref Range    Sodium 142 138 - 145 mmol/L    Potassium 4.0 3.5 - 5.1 mmol/L    Chloride 108 (H) 98 - 107 mmol/L    CO2 25 21 - 32 mmol/L    Anion gap 9 7 - 16 mmol/L    Glucose 131 (H) 65 - 100 mg/dL    BUN 25 (H) 8 - 23 MG/DL    Creatinine 1.10 0.8 - 1.5 MG/DL    GFR est AA >60 >60 ml/min/1.73m2    GFR est non-AA >60 >60 ml/min/1.73m2    Calcium 8.6 8.3 - 10.4 MG/DL    Bilirubin, total 0.6 0.2 - 1.1 MG/DL    ALT (SGPT) 46 12 - 65 U/L    AST (SGOT) 35 15 - 37 U/L    Alk. phosphatase 126 50 - 136 U/L    Protein, total 6.3 6.3 - 8.2 g/dL    Albumin 2.5 (L) 3.2 - 4.6 g/dL    Globulin 3.8 (H) 2.3 - 3.5 g/dL    A-G Ratio 0.7 (L) 1.2 - 3.5     CBC WITH AUTOMATED DIFF    Collection Time: 03/30/22  6:48 AM   Result Value Ref Range    WBC 20.3 (H) 4.3 - 11.1 K/uL    RBC 4.63 4.23 - 5.6 M/uL    HGB 8.8 (L) 13.6 - 17.2 g/dL    HCT 31.4 (L) 41.1 - 50.3 %    MCV 67.8 (L) 79.6 - 97.8 FL    MCH 19.0 (L) 26.1 - 32.9 PG    MCHC 28.0 (L) 31.4 - 35.0 g/dL    RDW 24.0 (H) 11.9 - 14.6 %    PLATELET 947 318 - 955 K/uL    MPV 9.2 (L) 9.4 - 12.3 FL    ABSOLUTE NRBC 0.05 0.0 - 0.2 K/uL    DF AUTOMATED      NEUTROPHILS 90 (H) 43 - 78 %    LYMPHOCYTES 5 (L) 13 - 44 %    MONOCYTES 5 4.0 - 12.0 %    EOSINOPHILS 0 (L) 0.5 - 7.8 %    BASOPHILS 0 0.0 - 2.0 %    IMMATURE GRANULOCYTES 1 0.0 - 5.0 %    ABS. NEUTROPHILS 18.2 (H) 1.7 - 8.2 K/UL    ABS. LYMPHOCYTES 0.9 0.5 - 4.6 K/UL    ABS. MONOCYTES 1.0 0.1 - 1.3 K/UL    ABS. EOSINOPHILS 0.0 0.0 - 0.8 K/UL    ABS. BASOPHILS 0.0 0.0 - 0.2 K/UL    ABS. IMM.  GRANS. 0.1 0.0 - 0.5 K/UL   CULTURE, BLOOD    Collection Time: 03/30/22  6:48 AM    Specimen: Blood   Result Value Ref Range    Special Requests: LEFT  ARM        Culture result: PENDING          XR Results (most recent):  Results from Hospital Encounter encounter on 03/27/22    XR CHOLANG INTRAOPERATIVE    Narrative  OPERATIVE CHOLANGIOGRAM    INDICATION: Cholecystectomy    Fluoroscopy time:  45 seconds, 4 spot films    Multiple spot films were obtained during an intraoperative cholangiogram  performed by Dr. Jc Prather . FINDINGS: There is at least one small filling defect in the common duct near the  ampulla. There is also filling defect in the common hepatic duct. No bile duct  dilatation. No focal stricture. Contrast passes normally into the small bowel. Impression  At least 2 small filling defects in the biliary system, probably air  bubbles. Small stones not completely excluded. Assessment:     Principal Problem:    Sepsis ([address] (10/26/2017)    Active Problems:    HTN (hypertension) (5/8/2015)      Coronary atherosclerosis of native coronary vessel (5/8/2015)      Overview: VEGAS on [facility]      5/7/15: prox LAD PCI, normal EF      Myasthenia gravis ([address] ()      Paroxysmal atrial fibrillation ([address] (6/30/2017)      Overview: 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        SSS (sick sinus syndrome) (HCC) (6/30/2017)      Atrial fibrillation ([address] (51/84/1638)      Diastolic CHF, chronic (HCC) (3/2/2022)      Acute cholecystitis (3/28/2022)      Pancreatitis, gallstone (3/28/2022)      Anemia (3/28/2022)      Bacteremia (3/28/2022)          Plan/Recommendations/Medical Decision Making:     Continue present treatment  Attending care per Hospitalist services  GI following    GI notified of Dr. Wade Lua recommendation for ERCP  Lindsay Gonsalves NP

## 2022-03-30 NOTE — PROGRESS NOTES
TRANSFER - IN REPORT:    Verbal [place] from [place]  (name) on Nguyễn Curet  being received from [phone](unit) for ordered procedure      Report consisted of patients Situation, Background, Assessment and   Recommendations(SBAR). Information from the following report(s) SBAR and Kardex was reviewed with the receiving nurse. Opportunity for questions and clarification was provided. Assessment completed upon patients arrival to unit and care assumed.

## 2022-03-30 NOTE — PROGRESS NOTES
Hospitalist Progress Note   Admit Date:  3/27/2022  7:41 PM   Name:  Ama Holt   Age:  79 y.o. Sex:  male  :  1940   MRN:  [id number]   Room:  ENDO/PL    Presenting Complaint: Abdominal Pain    Reason(s) for Admission: Sepsis ([facility] [address] [A41.9]  Acute cholecystitis [K81.0]  Pancreatitis, gallstone [facility] Course & Interval History:     Mr. Alejandrina Bazan is an 80 yo male with PMH of AFIB s/p watchman, SSS s/p PPM, LVDD2, CAD,myasthenia gravis admitted with abdominal pain and meeting sepsis criteria. He has leukocytosis, lactic acidosis, elevated lipase and LFTS. CTAP shows   \"    IMPRESSION  There are multiple hyperechoic dense structures rather in the gallbladder neck. These may represent multiple stones. There is mild gallbladder wall thickening  with subtle pericholecystic fluid. These findings raise suspicion for acute  cholecystitis.     There is also mild peripancreatic fluid. This fluid could be due to  cholecystitis or acute pancreatitis.     There is focal dilatation of the distal right ureter. There is no evidence of  hydronephrosis proximal to this point. Further work up with urology consult is  recommended to assess this region.     Chronic appearing changes are noted in the kidneys.     Multiple small low-density lesions are seen in the liver and are too small to  Characterize. \"    ABD US     \"  IMPRESSION  Cholelithiasis. The gallbladder wall is thickened measuring 7.6 mm. There is no  appreciable pericholecystic fluid. Sonographic Ruthellen Majors sign was not documented.     The pancreas was not visualized.     Hepatic steatosis.     The right kidney appears somewhat echogenic. This can be seen with medical renal  disease. Please correlate clinically. \"        He has been NPO . S/p surgery consult. HIDA scan completed. Postop lap cholecystectomy on 3-29-22. He had acute blood loss anemia with recent dark stools. S/p 2 PRBC. GI following.   S/p EGD 3-29-22 no obvious source of bleeding. ERCP planned for 3-30-22. He is on course of zosyn. Blood cultures 2/2 klebsiella. ID following. Seen by urology for abnormal appearing right ureter on CTAP- no hydronephrosis. Plan for office followup. CXR shows vascular congestion. Lasix resumed. Cardiology following. Discharge plans pending. Lives with wife. Subjective/24hr Events (03/30/22):     Wife present, NPO, some abd pain, no nausea, no dyspnea , very hard of hearing        Assessment & Plan:     Principal Problem:    Sepsis ([address]   Likely GI source with acute cholecystitis:  GNR bacteremia:  · NPO  · D4 zosyn  ·  UA pending  · ID following  · Stopped IVF3-28-22  due to mild hypoxia and possible developing volume overload         Active Problems:    HTN (hypertension)   · BP low on admit, laisx resumed/ lisinopril on hold             Coronary atherosclerosis of native coronary vessel  · Asa held   · lipitor held due to elevated LFTS  · Cardiology following         Myasthenia gravis ([facility] [address] ()  ·   Monitor   · PT,OT       SSS (sick sinus syndrome) (HCC)     Paroxysmal atrial fibrillation (HCC)   · In NSR  · S/p prior watchman / PPM         Diastolic CHF, chronic ([facility] [address] (3/2/2022)  · Continued lasix  · O2 as needed to wean as tolerant         Pancreatitis, gallstone (3/28/2022)    Acute cholecystitis (3/28/2022)  ·  NPO  · GI/surgery consulted POD 1 s/p lap cholecystectomy   · D4 antibiotics   · Held IVF  · ERCP for 3-30-22          Acute iron deficient Anemia (3/28/2022)- worse 3-28-22  · NPO  · IV protonix every 12 hours   · Trend HGB  · 3 total PRBC transfused   · pending occult stool  · Added oral B12        Right ureteral dilation: no kevin hydronephrosis   · pendingUA  ·  urology consulted -office followup recommended         Dispo/Discharge Planning:      Pending , PPD, PT,OT           Diet:  DIET NPO  DVT PPx:  SCD  Code status: Full Code    Hospital Problems as of 3/30/2022 Date Reviewed: 3/2/2022 Codes Class Noted - Resolved POA    Acute cholecystitis ICD-10-CM: K81.0  ICD-9-CM: 575.0  3/28/2022 - Present Unknown        Pancreatitis, gallstone ICD-10-CM: K85.10  ICD-9-CM: 411.5, 574.20  3/28/2022 - Present Unknown        Anemia ICD-10-CM: D64.9  ICD-9-CM: 285.9  3/28/2022 - Present Yes        Bacteremia ICD-10-CM: R78.81  ICD-9-CM: 790.7  3/28/2022 - Present Yes        Diastolic CHF, chronic ([facility] ICD-10-CM: I50.32  ICD-9-CM: 428.32, 428.0  3/2/2022 - Present Yes        Atrial fibrillation ([facility] ICD-10-CM: I48.91  ICD-9-CM: 427.31  11/29/2017 - Present Yes        * (Principal) Sepsis ([facility] ICD-10-CM: A41.9  ICD-9-CM: 038.9, 995.91  10/26/2017 - Present Unknown        Paroxysmal atrial fibrillation ([facility] ICD-10-CM: I48.0  ICD-9-CM: 427.31  6/30/2017 - Present Yes    Overview Signed 5/21/2019 10:36 AM by Jessica Martinez MD     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               SSS (sick sinus syndrome) (HCC) ICD-10-CM: I49.5  ICD-9-CM: 427.81  6/30/2017 - Present Yes        Myasthenia gravis ([facility] ICD-10-CM: G70.00  ICD-9-CM: 358.00  Unknown - Present Yes        HTN (hypertension) (Chronic) ICD-10-CM: I10  ICD-9-CM: 401.9  5/8/2015 - Present Yes        Coronary atherosclerosis of native coronary vessel ICD-10-CM: I25.10  ICD-9-CM: 414.01  5/8/2015 - Present Yes    Overview Signed 6/7/2016  8:35 AM by Андрей Hassan on brilinta  5/7/15: prox LAD PCI, normal EF                   Objective:     Patient Vitals for the past 24 hrs:   Temp Pulse Resp BP SpO2   03/30/22 1323 98.8 °F (37.1 °C) 75 17 129/71 98 %   03/30/22 1206 98.4 °F (36.9 °C) 75 16 (!) 140/71 97 %   03/30/22 1149     94 %   03/30/22 0806 97.9 °F (36.6 °C) 74 20 137/76 95 %   03/30/22 0314 98.2 °F (36.8 °C) 70 20 122/70 99 %   03/30/22 0012 97 °F (36.1 °C) 70 16 (!) 147/75 97 %   03/29/22 2311 97.7 °F (36.5 °C) 74 20 (!) 150/79 96 %   03/29/22 2103 98.4 °F (36.9 °C) 76 18 (!) 152/92 96 %   03/29/22 2028 96.9 °F (36.1 °C) 74 18 125/79 92 %   03/29/22 2002 97.6 °F (36.4 °C) 83 20 (!) 147/87 94 %   03/29/22 1943  75 18 137/64 96 %   03/29/22 1938 97.9 °F (36.6 °C) 77 17 (!) 142/65 96 %   03/29/22 1933  78 18 138/63 96 %   03/29/22 1929  82 17 (!) 141/65 95 %   03/29/22 1923  75 18 133/63 97 %   03/29/22 1919  75 17 138/65 97 %   03/29/22 1914  75 19 (!) 145/69 96 %   03/29/22 1909  75 19 (!) 144/66 96 %   03/29/22 1903  75 18 (!) 140/67 96 %   03/29/22 1854  75 19 (!) 171/79 97 %   03/29/22 1849  91 20 (!) 168/90 96 %   03/29/22 1846 97.4 °F (36.3 °C) 80 16 (!) 193/76 97 %   03/29/22 1538 98.3 °F (36.8 °C) 75 24 (!) 146/68 97 %   03/29/22 1515  75 22 (!) 143/70 97 %   03/29/22 1505  75 22 (!) 162/81 96 %   03/29/22 1455  75 18 (!) 148/76 100 %   03/29/22 1443 98 °F (36.7 °C) 67 12 (!) 144/72 99 %     Oxygen Therapy  O2 Sat (%): 98 % (03/30/22 1323)  Pulse via Oximetry: 75 beats per minute (03/30/22 1323)  O2 Device: Nasal cannula (03/30/22 1323)  Skin Assessment: Clean, dry, & intact (03/28/22 0215)  O2 Flow Rate (L/min): 4 l/min (03/30/22 1323)    Estimated body mass index is 34.67 kg/m² as calculated from the following:    Height as of this encounter: 5' 8\" (1.727 m). Weight as of this encounter: 103.4 kg (228 lb). Intake/Output Summary (Last 24 hours) at 3/30/2022 1350  Last data filed at 3/30/2022 0532  Gross per 24 hour   Intake 1285 ml   Output 335 ml   Net 950 ml         Physical Exam:     Blood pressure 129/71, pulse 75, temperature 98.8 °F (37.1 °C), resp. rate 17, height 5' 8\" (1.727 m), weight 103.4 kg (228 lb), SpO2 98 %. General:    Well nourished. No overt distress, elderly, hard of hearing  CV:   RRR. No m/r/g. No jugular venous distension. Trace  edema   Lungs:   Coarse anterior   Abdomen: Bowel sounds decreased. ,diffusely tender, obese  Extremities: No cyanosis or clubbing. Trace  edema  Skin:     No rashes and normal coloration. Warm and dry. Neuro:   grossly intact. Psych:  Normal mood and affect. I have reviewed ordered lab tests and independently visualized imaging below:    Recent Labs:  Recent Results (from the past 48 hour(s))   HGB & HCT    Collection Time: 03/28/22  9:41 PM   Result Value Ref Range    HGB 6.3 (LL) 13.6 - 17.2 g/dL    HCT 23.6 (L) 41.1 - 50.3 %   RBC, ALLOCATE    Collection Time: 03/29/22  1:45 AM   Result Value Ref Range    HISTORY CHECKED? Historical check performed    METABOLIC PANEL, COMPREHENSIVE    Collection Time: 03/29/22  5:54 AM   Result Value Ref Range    Sodium 139 138 - 145 mmol/L    Potassium 3.8 3.5 - 5.1 mmol/L    Chloride 106 98 - 107 mmol/L    CO2 22 21 - 32 mmol/L    Anion gap 11 7 - 16 mmol/L    Glucose 148 (H) 65 - 100 mg/dL    BUN 24 (H) 8 - 23 MG/DL    Creatinine 1.30 0.8 - 1.5 MG/DL    GFR est AA >60 >60 ml/min/1.73m2    GFR est non-AA 56 (L) >60 ml/min/1.73m2    Calcium 8.6 8.3 - 10.4 MG/DL    Bilirubin, total 0.8 0.2 - 1.1 MG/DL    ALT (SGPT) 41 12 - 65 U/L    AST (SGOT) 32 15 - 37 U/L    Alk. phosphatase 127 50 - 136 U/L    Protein, total 6.3 6.3 - 8.2 g/dL    Albumin 2.7 (L) 3.2 - 4.6 g/dL    Globulin 3.6 (H) 2.3 - 3.5 g/dL    A-G Ratio 0.8 (L) 1.2 - 3.5     CBC WITH AUTOMATED DIFF    Collection Time: 03/29/22  5:54 AM   Result Value Ref Range    WBC 15.9 (H) 4.3 - 11.1 K/uL    RBC 4.12 (L) 4.23 - 5.6 M/uL    HGB 7.3 (L) 13.6 - 17.2 g/dL    HCT 27.0 (L) 41.1 - 50.3 %    MCV 65.5 (L) 79.6 - 97.8 FL    MCH 17.7 (L) 26.1 - 32.9 PG    MCHC 27.0 (L) 31.4 - 35.0 g/dL    RDW 23.3 (H) 11.9 - 14.6 %    PLATELET 905 206 - 562 K/uL    MPV 9.2 (L) 9.4 - 12.3 FL    ABSOLUTE NRBC 0.02 0.0 - 0.2 K/uL    DF AUTOMATED      NEUTROPHILS 87 (H) 43 - 78 %    LYMPHOCYTES 6 (L) 13 - 44 %    MONOCYTES 7 4.0 - 12.0 %    EOSINOPHILS 0 (L) 0.5 - 7.8 %    BASOPHILS 0 0.0 - 2.0 %    IMMATURE GRANULOCYTES 1 0.0 - 5.0 %    ABS. NEUTROPHILS 13.9 (H) 1.7 - 8.2 K/UL    ABS. LYMPHOCYTES 0.9 0.5 - 4.6 K/UL    ABS. MONOCYTES 1.0 0.1 - 1.3 K/UL    ABS. EOSINOPHILS 0.0 0.0 - 0.8 K/UL    ABS. BASOPHILS 0.0 0.0 - 0.2 K/UL    ABS. IMM. GRANS. 0.1 0.0 - 0.5 K/UL   LIPASE    Collection Time: 03/29/22  5:54 AM   Result Value Ref Range    Lipase 406 (H) 73 - 393 U/L   RBC, ALLOCATE    Collection Time: 03/29/22 10:15 AM   Result Value Ref Range    HISTORY CHECKED? Historical check performed    HEMOGLOBIN    Collection Time: 03/29/22  4:29 PM   Result Value Ref Range    HGB 7.7 (L) 13.6 - 17.2 g/dL   RBC, ALLOCATE    Collection Time: 03/29/22  4:30 PM   Result Value Ref Range    HISTORY CHECKED? Historical check performed    HEMATOCRIT    Collection Time: 03/30/22 12:44 AM   Result Value Ref Range    HCT 34.0 (L) 41.1 - 29.0 %   METABOLIC PANEL, COMPREHENSIVE    Collection Time: 03/30/22  6:48 AM   Result Value Ref Range    Sodium 142 138 - 145 mmol/L    Potassium 4.0 3.5 - 5.1 mmol/L    Chloride 108 (H) 98 - 107 mmol/L    CO2 25 21 - 32 mmol/L    Anion gap 9 7 - 16 mmol/L    Glucose 131 (H) 65 - 100 mg/dL    BUN 25 (H) 8 - 23 MG/DL    Creatinine 1.10 0.8 - 1.5 MG/DL    GFR est AA >60 >60 ml/min/1.73m2    GFR est non-AA >60 >60 ml/min/1.73m2    Calcium 8.6 8.3 - 10.4 MG/DL    Bilirubin, total 0.6 0.2 - 1.1 MG/DL    ALT (SGPT) 46 12 - 65 U/L    AST (SGOT) 35 15 - 37 U/L    Alk.  phosphatase 126 50 - 136 U/L    Protein, total 6.3 6.3 - 8.2 g/dL    Albumin 2.5 (L) 3.2 - 4.6 g/dL    Globulin 3.8 (H) 2.3 - 3.5 g/dL    A-G Ratio 0.7 (L) 1.2 - 3.5     CBC WITH AUTOMATED DIFF    Collection Time: 03/30/22  6:48 AM   Result Value Ref Range    WBC 20.3 (H) 4.3 - 11.1 K/uL    RBC 4.63 4.23 - 5.6 M/uL    HGB 8.8 (L) 13.6 - 17.2 g/dL    HCT 31.4 (L) 41.1 - 50.3 %    MCV 67.8 (L) 79.6 - 97.8 FL    MCH 19.0 (L) 26.1 - 32.9 PG    MCHC 28.0 (L) 31.4 - 35.0 g/dL    RDW 24.0 (H) 11.9 - 14.6 %    PLATELET 776 598 - 524 K/uL    MPV 9.2 (L) 9.4 - 12.3 FL    ABSOLUTE NRBC 0.05 0.0 - 0.2 K/uL    DF AUTOMATED      NEUTROPHILS 90 (H) 43 - 78 %    LYMPHOCYTES 5 (L) 13 - 44 %    MONOCYTES 5 4.0 - 12.0 % EOSINOPHILS 0 (L) 0.5 - 7.8 %    BASOPHILS 0 0.0 - 2.0 %    IMMATURE GRANULOCYTES 1 0.0 - 5.0 %    ABS. NEUTROPHILS 18.2 (H) 1.7 - 8.2 K/UL    ABS. LYMPHOCYTES 0.9 0.5 - 4.6 K/UL    ABS. MONOCYTES 1.0 0.1 - 1.3 K/UL    ABS. EOSINOPHILS 0.0 0.0 - 0.8 K/UL    ABS. BASOPHILS 0.0 0.0 - 0.2 K/UL    ABS. IMM. GRANS. 0.1 0.0 - 0.5 K/UL   CULTURE, BLOOD    Collection Time: 03/30/22  6:48 AM    Specimen: Blood   Result Value Ref Range    Special Requests: LEFT  ARM        Culture result: PENDING        All Micro Results     Procedure Component Value Units Date/Time    CULTURE, BLOOD [[id number]] Collected: 03/30/22 3857    Order Status: Completed Specimen: Blood Updated: 03/30/22 0740    CULTURE, BLOOD [[id number]] Collected: 03/30/22 0648    Order Status: Completed Specimen: Blood Updated: 03/30/22 0733     Special Requests: --        LEFT  ARM       Culture result: PENDING    CULTURE, BLOOD [[id number]]  (Abnormal) Collected: 03/28/22 0019    Order Status: Completed Specimen: Blood Updated: 03/30/22 0726     Special Requests: --        LEFT  ARM       GRAM STAIN GRAM NEGATIVE RODS               AEROBIC AND ANAEROBIC BOTTLES                  CRITICAL RESULT NOT CALLED DUE TO PREVIOUS NOTIFICATION OF CRITICAL RESULT WITHIN THE LAST 24 HOURS.            Culture result: GRAM NEGATIVE RODS               For identification and susceptibility refer to culture  Accession [id number]      CULTURE, BLOOD [[id number]]  (Abnormal)  (Susceptibility) Collected: 03/27/22 2350    Order Status: Completed Specimen: Blood Updated: 03/30/22 0725     Special Requests: --        RIGHT  ARM       GRAM STAIN GRAM NEGATIVE RODS               AEROBIC AND ANAEROBIC BOTTLES                  RESULTS VERIFIED, PHONED TO AND READ BACK BY Natalia Peacock RN @ 8862 ON 3/28/22 BY AMM           Culture result: KLEBSIELLA PNEUMONIAE               Refer to Blood Culture ID Panel Accession  [id number]      BLOOD CULTURE ID PANEL [[id number]]  (Abnormal) Collected: 03/27/22 2350    Order Status: Completed Specimen: Blood Updated: 03/28/22 1236     Acc. no. from Micro Order [id number]     Klebsiella pneumoniae Detected        Comment: RESULTS VERIFIED, PHONED TO AND READ BACK BY  Rebecca Urena RN @ 6774 ON 3/28/22 BY SERA          KPC (Carbapenem Resistance Gene) NOT DETECTED        Comment: WARNING:  A Not Detected result for the KPC gene does not indicate susceptibility to carbapenems. Gram negative bacteria can be resistant to carbapenems by mechanisms other than carrying the KPC gene. INTERPRETATION       Gram negative ney, identified by real time PCR as Klebsiella pneumoniae. Other Studies:  XR CHOLANG INTRAOPERATIVE    Result Date: 3/29/2022  OPERATIVE CHOLANGIOGRAM INDICATION: Cholecystectomy Fluoroscopy time:  45 seconds, 4 spot films Multiple spot films were obtained during an intraoperative cholangiogram performed by Dr. Lexie Bob . FINDINGS: There is at least one small filling defect in the common duct near the ampulla. There is also filling defect in the common hepatic duct. No bile duct dilatation. No focal stricture. Contrast passes normally into the small bowel. At least 2 small filling defects in the biliary system, probably air bubbles. Small stones not completely excluded.        Current Meds:  Current Facility-Administered Medications   Medication Dose Route Frequency    lactated Ringers infusion  100 mL/hr IntraVENous CONTINUOUS    tuberculin injection 5 Units  5 Units IntraDERMal ONCE    cyanocobalamin tablet 1,000 mcg  1,000 mcg Oral DAILY    0.9% sodium chloride infusion 250 mL  250 mL IntraVENous PRN    sodium chloride (NS) flush 5-40 mL  5-40 mL IntraVENous Q8H    sodium chloride (NS) flush 5-40 mL  5-40 mL IntraVENous PRN    acetaminophen (TYLENOL) tablet 650 mg  650 mg Oral Q6H PRN    Or    acetaminophen (TYLENOL) suppository 650 mg  650 mg Rectal Q6H PRN    polyethylene glycol (MIRALAX) packet 17 g  17 g Oral DAILY PRN    ondansetron (ZOFRAN ODT) tablet 4 mg  4 mg Oral Q8H PRN    Or    ondansetron (ZOFRAN) injection 4 mg  4 mg IntraVENous Q6H PRN    piperacillin-tazobactam (ZOSYN) 3.375 g in 0.9% sodium chloride (MBP/ADV) 100 mL MBP  3.375 g IntraVENous Q8H    hydrALAZINE (APRESOLINE) 20 mg/mL injection 10 mg  10 mg IntraVENous Q6H PRN    morphine injection 1 mg  1 mg IntraVENous Q4H PRN    pantoprazole (PROTONIX) 40 mg in 0.9% sodium chloride 10 mL injection  40 mg IntraVENous Q12H    furosemide (LASIX) tablet 40 mg  40 mg Oral DAILY    sodium chloride (NS) flush 5-10 mL  5-10 mL IntraVENous Q8H    sodium chloride (NS) flush 5-10 mL  5-10 mL IntraVENous PRN       Signed:  Yariel Carvalho MD    Part of this note may have been written by using a voice dictation software. The note has been proof read but may still contain some grammatical/other typographical errors.

## 2022-03-30 NOTE — PROGRESS NOTES
TRANSFER - IN REPORT:    Verbal report received from Juana(name) on Kaylynn Gear  being received from [facility]) for routine progression of care      Report consisted of patients Situation, Background, Assessment and   Recommendations(SBAR). Information from the following report(s) SBAR was reviewed with the receiving nurse. Opportunity for questions and clarification was provided. Assessment completed upon patients arrival to unit and care assumed.

## 2022-03-30 NOTE — PROGRESS NOTES
Problem: Falls - Risk of  Goal: *Absence of Falls  Description: Document Ely Dominguez Fall Risk and appropriate interventions in the flowsheet. Outcome: Progressing Towards Goal  Note: Fall Risk Interventions:  Mobility Interventions: Bed/chair exit alarm,Patient to call before getting OOB,Utilize walker, cane, or other assistive device    Mentation Interventions: Bed/chair exit alarm,Toileting rounds,Update white board,Reorient patient,More frequent rounding,Increase mobility    Medication Interventions: Evaluate medications/consider consulting pharmacy,Patient to call before getting OOB,Teach patient to arise slowly    Elimination Interventions: Patient to call for help with toileting needs,Toileting schedule/hourly rounds,Urinal in reach,Call light in reach    History of Falls Interventions: Bed/chair exit alarm         Problem: Patient Education: Go to Patient Education Activity  Goal: Patient/Family Education  Outcome: Progressing Towards Goal     Problem: Pressure Injury - Risk of  Goal: *Prevention of pressure injury  Description: Document Arvind Scale and appropriate interventions in the flowsheet. Outcome: Progressing Towards Goal  Note: Pressure Injury Interventions:  Sensory Interventions: Assess changes in LOC,Keep linens dry and wrinkle-free,Minimize linen layers,Maintain/enhance activity level,Pad between skin to skin,Pressure redistribution bed/mattress (bed type)    Moisture Interventions: Check for incontinence Q2 hours and as needed,Absorbent underpads,Apply protective barrier, creams and emollients,Offer toileting Q_hr,Moisture barrier,Minimize layers    Activity Interventions: Pressure redistribution bed/mattress(bed type),Increase time out of bed,PT/OT evaluation    Mobility Interventions: Pressure redistribution bed/mattress (bed type),Turn and reposition approx.  every two hours(pillow and wedges)    Nutrition Interventions: Document food/fluid/supplement intake,Discuss nutritional consult with provider,Offer support with meals,snacks and hydration    Friction and Shear Interventions: Apply protective barrier, creams and emollients,Foam dressings/transparent film/skin sealants,Lift sheet,Lift team/patient mobility team,Minimize layers                Problem: Patient Education: Go to Patient Education Activity  Goal: Patient/Family Education  Outcome: Progressing Towards Goal     Problem: Surgical Wound Care  Goal: *Non-infected Wound: Absence of infection signs and symptoms  Description: Infection control procedures (eg: clean dressings, clean gloves, hand washing, precautions to isolate wound from contamination, sterile instruments used for wound debridement) should be implemented.   Outcome: Progressing Towards Goal  Goal: *Improvement of existing wound and maintenance of skin integrity  Outcome: Progressing Towards Goal     Problem: Patient Education: Go to Patient Education Activity  Goal: Patient/Family Education  Outcome: Progressing Towards Goal

## 2022-03-30 NOTE — PERIOP NOTES
TRANSFER - OUT REPORT:    Verbal report given to Marek Wilkins on Jose Mark  being transferred to [facility] for routine post - op       Report consisted of patients Situation, Background, Assessment and   Recommendations(SBAR). Information from the following report(s) OR Summary, Procedure Summary, Intake/Output and MAR was reviewed with the receiving nurse. Lines:   Peripheral IV 03/27/22 Posterior;Right Hand (Active)   Site Assessment Clean, dry, & intact 03/30/22 1640   Phlebitis Assessment 0 03/30/22 1640   Infiltration Assessment 0 03/30/22 1640   Dressing Status Clean, dry, & intact 03/30/22 1640   Dressing Type Tape;Transparent 03/30/22 1640   Hub Color/Line Status Patent 03/30/22 1640   Action Taken Open ports on tubing capped 03/30/22 0730   Alcohol Cap Used No 03/30/22 0730       Peripheral IV 03/29/22 Right Arm (Active)   Site Assessment Clean, dry, & intact 03/30/22 1640   Phlebitis Assessment 0 03/30/22 1640   Infiltration Assessment 0 03/30/22 1640   Dressing Status Clean, dry, & intact 03/30/22 1640   Dressing Type Tape;Transparent 03/30/22 1640   Hub Color/Line Status Patent 03/30/22 1640   Alcohol Cap Used Yes 03/30/22 0730        Opportunity for questions and clarification was provided. Patient transported with:   O2 @ 3 liters    VTE prophylaxis orders have been written for Jose Mark. Patient and family given floor number and nurses name. Family updated re: pt status after security code verified.

## 2022-03-30 NOTE — ANESTHESIA POSTPROCEDURE EVALUATION
Procedure(s):  ESOPHAGOGASTRODUODENOSCOPY (EGD) * .     total IV anesthesia    Anesthesia Post Evaluation      Multimodal analgesia: multimodal analgesia used between 6 hours prior to anesthesia start to PACU discharge  Patient location during evaluation: bedside  Patient participation: complete - patient participated  Level of consciousness: awake and alert  Pain management: adequate  Airway patency: patent  Anesthetic complications: no  Cardiovascular status: acceptable  Respiratory status: acceptable  Hydration status: acceptable  Post anesthesia nausea and vomiting:  controlled  Final Post Anesthesia Temperature Assessment:  Normothermia (36.0-37.5 degrees C)      INITIAL Post-op Vital signs:   Vitals Value Taken Time   /76 03/30/22 0806   Temp 36.6 °C (97.9 °F) 03/30/22 0806   Pulse 74 03/30/22 0806   Resp 20 03/30/22 0806   SpO2 95 % 03/30/22 0806

## 2022-03-31 ENCOUNTER — APPOINTMENT (OUTPATIENT)
Dept: GENERAL RADIOLOGY | Age: 82
DRG: 853 | End: 2022-03-31
Attending: INTERNAL MEDICINE
Payer: MEDICARE

## 2022-03-31 LAB
ANION GAP SERPL CALC-SCNC: 6 MMOL/L (ref 7–16)
BASOPHILS # BLD: 0 K/UL (ref 0–0.2)
BASOPHILS NFR BLD: 0 % (ref 0–2)
BUN SERPL-MCNC: 20 MG/DL (ref 8–23)
CALCIUM SERPL-MCNC: 8.4 MG/DL (ref 8.3–10.4)
CHLORIDE SERPL-SCNC: 108 MMOL/L (ref 98–107)
CO2 SERPL-SCNC: 28 MMOL/L (ref 21–32)
CREAT SERPL-MCNC: 0.9 MG/DL (ref 0.8–1.5)
DIFFERENTIAL METHOD BLD: ABNORMAL
EOSINOPHIL # BLD: 0.1 K/UL (ref 0–0.8)
EOSINOPHIL NFR BLD: 1 % (ref 0.5–7.8)
ERYTHROCYTE [DISTWIDTH] IN BLOOD BY AUTOMATED COUNT: 24.7 % (ref 11.9–14.6)
GLUCOSE SERPL-MCNC: 133 MG/DL (ref 65–100)
HCT VFR BLD AUTO: 29 % (ref 41.1–50.3)
HGB BLD-MCNC: 8 G/DL (ref 13.6–17.2)
IMM GRANULOCYTES # BLD AUTO: 0.1 K/UL (ref 0–0.5)
IMM GRANULOCYTES NFR BLD AUTO: 1 % (ref 0–5)
LYMPHOCYTES # BLD: 1.2 K/UL (ref 0.5–4.6)
LYMPHOCYTES NFR BLD: 11 % (ref 13–44)
MCH RBC QN AUTO: 18.6 PG (ref 26.1–32.9)
MCHC RBC AUTO-ENTMCNC: 27.6 G/DL (ref 31.4–35)
MCV RBC AUTO: 67.3 FL (ref 79.6–97.8)
MM INDURATION POC: 0 MM (ref 0–5)
MONOCYTES # BLD: 1.1 K/UL (ref 0.1–1.3)
MONOCYTES NFR BLD: 9 % (ref 4–12)
NEUTS SEG # BLD: 8.8 K/UL (ref 1.7–8.2)
NEUTS SEG NFR BLD: 78 % (ref 43–78)
NRBC # BLD: 0.04 K/UL (ref 0–0.2)
PLATELET # BLD AUTO: 187 K/UL (ref 150–450)
PMV BLD AUTO: 9.2 FL (ref 9.4–12.3)
POTASSIUM SERPL-SCNC: 3.6 MMOL/L (ref 3.5–5.1)
PPD POC: NEGATIVE NEGATIVE
RBC # BLD AUTO: 4.31 M/UL (ref 4.23–5.6)
SODIUM SERPL-SCNC: 142 MMOL/L (ref 138–145)
WBC # BLD AUTO: 11.3 K/UL (ref 4.3–11.1)

## 2022-03-31 PROCEDURE — 74011250637 HC RX REV CODE- 250/637: Performed by: INTERNAL MEDICINE

## 2022-03-31 PROCEDURE — 85025 COMPLETE CBC W/AUTO DIFF WBC: CPT

## 2022-03-31 PROCEDURE — 94760 N-INVAS EAR/PLS OXIMETRY 1: CPT

## 2022-03-31 PROCEDURE — 74011250637 HC RX REV CODE- 250/637: Performed by: FAMILY MEDICINE

## 2022-03-31 PROCEDURE — 80048 BASIC METABOLIC PNL TOTAL CA: CPT

## 2022-03-31 PROCEDURE — 74011000258 HC RX REV CODE- 258: Performed by: INTERNAL MEDICINE

## 2022-03-31 PROCEDURE — 65270000029 HC RM PRIVATE

## 2022-03-31 PROCEDURE — 74011250636 HC RX REV CODE- 250/636: Performed by: INTERNAL MEDICINE

## 2022-03-31 PROCEDURE — 74011000250 HC RX REV CODE- 250: Performed by: FAMILY MEDICINE

## 2022-03-31 PROCEDURE — 74011250636 HC RX REV CODE- 250/636: Performed by: FAMILY MEDICINE

## 2022-03-31 PROCEDURE — 36415 COLL VENOUS BLD VENIPUNCTURE: CPT

## 2022-03-31 PROCEDURE — 74011000250 HC RX REV CODE- 250: Performed by: EMERGENCY MEDICINE

## 2022-03-31 PROCEDURE — 74011000258 HC RX REV CODE- 258: Performed by: FAMILY MEDICINE

## 2022-03-31 PROCEDURE — 74018 RADEX ABDOMEN 1 VIEW: CPT

## 2022-03-31 RX ORDER — HYDROCODONE BITARTRATE AND ACETAMINOPHEN 5; 325 MG/1; MG/1
1 TABLET ORAL
Status: DISCONTINUED | OUTPATIENT
Start: 2022-03-31 | End: 2022-04-07 | Stop reason: HOSPADM

## 2022-03-31 RX ORDER — FACIAL-BODY WIPES
10 EACH TOPICAL DAILY PRN
Status: DISCONTINUED | OUTPATIENT
Start: 2022-03-31 | End: 2022-04-07 | Stop reason: HOSPADM

## 2022-03-31 RX ORDER — METRONIDAZOLE 500 MG/1
500 TABLET ORAL 2 TIMES DAILY
Status: DISCONTINUED | OUTPATIENT
Start: 2022-03-31 | End: 2022-04-07 | Stop reason: HOSPADM

## 2022-03-31 RX ORDER — DOCUSATE SODIUM 100 MG/1
100 CAPSULE, LIQUID FILLED ORAL 2 TIMES DAILY
Status: DISCONTINUED | OUTPATIENT
Start: 2022-03-31 | End: 2022-04-02

## 2022-03-31 RX ORDER — SIMETHICONE 80 MG
80 TABLET,CHEWABLE ORAL
Status: DISCONTINUED | OUTPATIENT
Start: 2022-03-31 | End: 2022-04-07 | Stop reason: HOSPADM

## 2022-03-31 RX ADMIN — FUROSEMIDE 40 MG: 40 TABLET ORAL at 09:24

## 2022-03-31 RX ADMIN — ACETAMINOPHEN 650 MG: 325 TABLET ORAL at 09:57

## 2022-03-31 RX ADMIN — METRONIDAZOLE 500 MG: 500 TABLET ORAL at 17:42

## 2022-03-31 RX ADMIN — CEFTRIAXONE 2 G: 2 INJECTION, POWDER, FOR SOLUTION INTRAMUSCULAR; INTRAVENOUS at 12:19

## 2022-03-31 RX ADMIN — BISACODYL 10 MG: 10 SUPPOSITORY RECTAL at 17:42

## 2022-03-31 RX ADMIN — PANTOPRAZOLE SODIUM 40 MG: 40 TABLET, DELAYED RELEASE ORAL at 05:56

## 2022-03-31 RX ADMIN — PIPERACILLIN AND TAZOBACTAM 3.38 G: 3; .375 INJECTION, POWDER, LYOPHILIZED, FOR SOLUTION INTRAVENOUS; PARENTERAL at 05:53

## 2022-03-31 RX ADMIN — DOCUSATE SODIUM 100 MG: 100 CAPSULE, LIQUID FILLED ORAL at 16:01

## 2022-03-31 RX ADMIN — SIMETHICONE 80 MG: 80 TABLET, CHEWABLE ORAL at 13:31

## 2022-03-31 RX ADMIN — SODIUM CHLORIDE, PRESERVATIVE FREE 10 ML: 5 INJECTION INTRAVENOUS at 05:54

## 2022-03-31 RX ADMIN — SODIUM CHLORIDE, PRESERVATIVE FREE 10 ML: 5 INJECTION INTRAVENOUS at 16:02

## 2022-03-31 RX ADMIN — POLYETHYLENE GLYCOL 3350 17 G: 17 POWDER, FOR SOLUTION ORAL at 09:57

## 2022-03-31 RX ADMIN — HYDROCODONE BITARTRATE AND ACETAMINOPHEN 1 TABLET: 5; 325 TABLET ORAL at 16:01

## 2022-03-31 RX ADMIN — SODIUM CHLORIDE, PRESERVATIVE FREE 10 ML: 5 INJECTION INTRAVENOUS at 21:03

## 2022-03-31 RX ADMIN — METRONIDAZOLE 500 MG: 500 TABLET ORAL at 12:19

## 2022-03-31 RX ADMIN — SODIUM CHLORIDE, PRESERVATIVE FREE 10 ML: 5 INJECTION INTRAVENOUS at 21:04

## 2022-03-31 RX ADMIN — CYANOCOBALAMIN TAB 1000 MCG 1000 MCG: 1000 TAB at 09:26

## 2022-03-31 NOTE — PROGRESS NOTES
Care of pt assumed at 1900. Hourly rounds performed throughout shift. Pt bed in low/locked position, call bell and personal items within reach. Pt reports no needs or concerns at this time. Will continue to monitor and report to oncoming dayshift RN.

## 2022-03-31 NOTE — PROGRESS NOTES
Problem: Falls - Risk of  Goal: *Absence of Falls  Description: Document Mellissa Saleem Fall Risk and appropriate interventions in the flowsheet. Outcome: Progressing Towards Goal  Note: Fall Risk Interventions:  Mobility Interventions: Communicate number of staff needed for ambulation/transfer,Bed/chair exit alarm    Mentation Interventions: Adequate sleep, hydration, pain control,Bed/chair exit alarm    Medication Interventions: Patient to call before getting OOB,Teach patient to arise slowly,Bed/chair exit alarm    Elimination Interventions: Call light in reach,Bed/chair exit alarm,Toileting schedule/hourly rounds,Patient to call for help with toileting needs,Toilet paper/wipes in reach,Urinal in reach    History of Falls Interventions: Investigate reason for fall      Problem: Pressure Injury - Risk of  Goal: *Prevention of pressure injury  Description: Document Arvind Scale and appropriate interventions in the flowsheet.   Outcome: Progressing Towards Goal  Note: Pressure Injury Interventions:  Sensory Interventions: Assess changes in LOC,Keep linens dry and wrinkle-free,Minimize linen layers,Maintain/enhance activity level,Pad between skin to skin,Pressure redistribution bed/mattress (bed type)    Moisture Interventions: Check for incontinence Q2 hours and as needed,Absorbent underpads,Apply protective barrier, creams and emollients,Offer toileting Q_hr,Moisture barrier,Minimize layers    Activity Interventions: PT/OT evaluation,Increase time out of bed    Mobility Interventions: PT/OT evaluation,Pressure redistribution bed/mattress (bed type)    Nutrition Interventions: Document food/fluid/supplement intake,Offer support with meals,snacks and hydration    Friction and Shear Interventions: Apply protective barrier, creams and emollients,Minimize layers       Problem: Surgical Wound Care  Goal: *Non-infected Wound: Absence of infection signs and symptoms  Description: Infection control procedures (eg: clean dressings, clean gloves, hand washing, precautions to isolate wound from contamination, sterile instruments used for wound debridement) should be implemented.   Outcome: Progressing Towards Goal  Goal: *Improvement of existing wound and maintenance of skin integrity  Outcome: Progressing Towards Goal      Problem: Patient Education: Go to Patient Education Activity  Goal: Patient/Family Education  Outcome: Progressing Towards Goal

## 2022-03-31 NOTE — PROGRESS NOTES
Admit Date: 3/27/2022    POD 1 Day Post-Op    Procedure:  3/29/22 Procedure(s):  CHOLECYSTECTOMY LAPAROSCOPIC WITH INTRAOP CHOLANGIOGRAM  3/29/22 EGD  3/30/22 ERCP  Subjective:   Pt alert in bed with NAD noted. Respirations even and unlabored on 3 L/min NC. Tolerating clear liquid diet   Pt voiding   Pt with c/o abd pain 10. RN will administer pain medication. abd trocar incision with steri strips c/d/i  Objective:       Vitals:    22 0340 22 0544 22 0731 22 1116   BP: 113/63  (!) 143/77 123/69   Pulse: 69  75 75   Resp: 20  16 20   Temp: 97.6 °F (36.4 °C)  97.8 °F (36.6 °C) 98.2 °F (36.8 °C)   SpO2: 94%  98% 91%   Weight:  227 lb 15.3 oz (103.4 kg)     Height:           Temp (24hrs), Av.3 °F (36.8 °C), Min:97.6 °F (36.4 °C), Max:98.9 °F (37.2 °C)  . I&O reviewed as documented. Voiding with adequate UOP  -flatus/-bm    Intake/Output Summary (Last 24 hours) at 3/31/2022 1316  Last data filed at 3/31/2022 0944  Gross per 24 hour   Intake 760 ml   Output 300 ml   Net 460 ml        Physical Exam:   Physical Exam  Constitutional:       General: He is not in acute distress. Appearance: Normal appearance. HENT:      Mouth/Throat:      Mouth: Mucous membranes are moist.   Cardiovascular:      Rate and Rhythm: Normal rate. Rhythm irregular. Pulses: Normal pulses. Heart sounds: Normal heart sounds. No murmur heard. No friction rub. No gallop. Pulmonary:      Effort: Pulmonary effort is normal.      Breath sounds: Normal breath sounds. Abdominal:      General: Bowel sounds are normal. There is distension. Comments: Moderate abd distention  BS active throughout  -flatus/-bm   Genitourinary:     Comments: voiding with adequate UOP  Musculoskeletal:      Cervical back: Normal range of motion. Skin:     General: Skin is warm and dry. Capillary Refill: Capillary refill takes 2 to 3 seconds.       Comments: ABD trocar sites with steri strips fallon c/d/i Neurological:      Mental Status: He is alert and oriented to person, place, and time. Labs:   Recent Results (from the past 24 hour(s))   CBC WITH AUTOMATED DIFF    Collection Time: 03/31/22  5:46 AM   Result Value Ref Range    WBC 11.3 (H) 4.3 - 11.1 K/uL    RBC 4.31 4.23 - 5.6 M/uL    HGB 8.0 (L) 13.6 - 17.2 g/dL    HCT 29.0 (L) 41.1 - 50.3 %    MCV 67.3 (L) 79.6 - 97.8 FL    MCH 18.6 (L) 26.1 - 32.9 PG    MCHC 27.6 (L) 31.4 - 35.0 g/dL    RDW 24.7 (H) 11.9 - 14.6 %    PLATELET 862 553 - 076 K/uL    MPV 9.2 (L) 9.4 - 12.3 FL    ABSOLUTE NRBC 0.04 0.0 - 0.2 K/uL    DF AUTOMATED      NEUTROPHILS 78 43 - 78 %    LYMPHOCYTES 11 (L) 13 - 44 %    MONOCYTES 9 4.0 - 12.0 %    EOSINOPHILS 1 0.5 - 7.8 %    BASOPHILS 0 0.0 - 2.0 %    IMMATURE GRANULOCYTES 1 0.0 - 5.0 %    ABS. NEUTROPHILS 8.8 (H) 1.7 - 8.2 K/UL    ABS. LYMPHOCYTES 1.2 0.5 - 4.6 K/UL    ABS. MONOCYTES 1.1 0.1 - 1.3 K/UL    ABS. EOSINOPHILS 0.1 0.0 - 0.8 K/UL    ABS. BASOPHILS 0.0 0.0 - 0.2 K/UL    ABS. IMM. GRANS. 0.1 0.0 - 0.5 K/UL   METABOLIC PANEL, BASIC    Collection Time: 03/31/22  5:46 AM   Result Value Ref Range    Sodium 142 138 - 145 mmol/L    Potassium 3.6 3.5 - 5.1 mmol/L    Chloride 108 (H) 98 - 107 mmol/L    CO2 28 21 - 32 mmol/L    Anion gap 6 (L) 7 - 16 mmol/L    Glucose 133 (H) 65 - 100 mg/dL    BUN 20 8 - 23 MG/DL    Creatinine 0.90 0.8 - 1.5 MG/DL    GFR est AA >60 >60 ml/min/1.73m2    GFR est non-AA >60 >60 ml/min/1.73m2    Calcium 8.4 8.3 - 10.4 MG/DL       Data Review     XR Results (most recent):  Results from Hospital Encounter encounter on 03/27/22    XR ABD (KUB)    Narrative  History: Abdominal distention, pain    EXAM: Supine views of the abdomen    FINDINGS: There are mildly dilated loops of small bowel present. However, there  is also bowel gas within the colon. There is a small left pleural effusion. Impression  Mildly distended loops of small bowel.        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 SSS (sick sinus syndrome) (HCC) (6/30/2017)      Atrial fibrillation ([address] (35/53/1017)      Diastolic CHF, chronic (HCC) (3/2/2022)      Acute cholecystitis (3/28/2022)      Pancreatitis, gallstone (3/28/2022)      Anemia (3/28/2022)      Bacteremia (3/28/2022)          Plan/Recommendations/Medical Decision Making:   Needs to increase mobility with PT  Wbc 11.3 3/31 from 20.3 3/30  Labs normal 3/31/22  General surgery is signing off.  Please call with acute surgical needs  Pt will follow up with NP in the [place] office in 10 days    Mone Pham NP

## 2022-03-31 NOTE — PROGRESS NOTES
3/31/2022  Attempted to see patient for OT but patient states he is not feeling well and is unable to participate this afternoon. Will re-attempt as time permits.   Thank you,  Keke Justice, OT

## 2022-03-31 NOTE — PROGRESS NOTES
Spoke to Mr. Lizette Calloway in room 604 about discharge planning. He lives with his wife, and prior to this admission, was independent with ADLs. He wants to go home at discharge, but wait and see what OT and PT recommend:  HH vs STR at a SNF? Case Management to follow. Care Management Interventions  PCP Verified by CM:  Yes ([address] [place] a year aga, says Dr. Declan Winchester is his doctor, but he is cardiologist)  Support Systems: Spouse/Significant Other  The Plan for Transition of Care is Related to the Following Treatment Goals : he wants to go home when stable  Discharge Location  Patient Expects to be Discharged to[de-identified] Home

## 2022-03-31 NOTE — PROGRESS NOTES
Infectious Disease Progress Note    Today's Date: 3/31/2022   Admit Date: 3/27/2022    Impression:   · Klebsiella bacteremia, abdominal source (3/27, 3/28), repeat cx 3/30 pending  · Cholelithiasis, s/p EGD and lap mala 3/29/22, and ERCP 3/30  · Atrial fibrillation, SSS, CAD, stent, pacer  · Myasthenia gravis, cipro contraindicated    Plan:   · He cannot have cipro - while inpatient will change to ceftriaxone/flagyl and then transition to oral Bactrim/flagyl to complete two week course from negative cultures; EOT 22. · ID will sign off today but please call with questions. Anti-infectives:    zosyn   Ceftriaxone  metronidazole    Subjective: Interval History: ERCP 3/30 cleared multiple small stones. No Known Allergies     Review of Systems:  Pertinent items are noted in the History of Present Illness.     Objective:     Visit Vitals  BP (!) 143/77 (BP 1 Location: Left upper arm, BP Patient Position: At rest)   Pulse 75   Temp 97.8 °F (36.6 °C)   Resp 16   Ht 5' 8\" (1.727 m)   Wt 103.4 kg (227 lb 15.3 oz)   SpO2 98%   BMI 34.66 kg/m²     Temp (24hrs), Av.3 °F (36.8 °C), Min:97.6 °F (36.4 °C), Max:98.9 °F (37.2 °C)     Physical exam performed by me 3/31 and unchanged from prior 3/28 except as noted below:  Lines:  Peripheral IV:       Physical Exam:    General:  Alert, cooperative, well nourished, obese, well developed, appears stated age, sitting in the chair   Eyes:  Sclera anicteric, EOMI   Mouth/Throat: Mucous membranes normal, oral pharynx clear   Neck: Supple   Lungs:   Anterior lungs clear, sitting in chair   CV:  Regular rate and rhythm, (+) murmur, pacer   Abdomen:   Soft, mildly tender, distention   Extremities: No cyanosis, trace edema   Skin: No acute rash   Musculoskeletal: No swelling or deformity   Lines/Devices:  Intact, no erythema, drainage or tenderness   Psych: Alert and oriented, appropriate mood and affect given the setting       Data Review:     CBC:  Recent Labs 03/31/22  0546 03/30/22  8902 03/30/22  0044 03/29/22  1629 03/29/22  0554 03/29/22  0554   WBC 11.3* 20.3*  --   --   --  15.9*   GRANS 78 90*  --   --    < > 87*   MONOS 9 5  --   --    < > 7   EOS 1 0*  --   --    < > 0*   ANEU 8.8* 18.2*  --   --    < > 13.9*   ABL 1.2 0.9  --   --    < > 0.9   HGB 8.0* 8.8*  --  7.7*   < > 7.3*   HCT 29.0* 31.4* 34.0*  --    < > 27.0*    210  --   --   --  200    < > = values in this interval not displayed. BMP:  Recent Labs     03/31/22  0546 03/30/22  0648 03/29/22  0554   CREA 0.90 1.10 1.30   BUN 20 25* 24*    142 139   K 3.6 4.0 3.8   * 108* 106   CO2 28 25 22   AGAP 6* 9 11   * 131* 148*       LFTS:  Recent Labs     03/30/22  0648 03/29/22  0554   TBILI 0.6 0.8   ALT 46 41    127   TP 6.3 6.3   ALB 2.5* 2.7*       Microbiology:     All Micro Results     Procedure Component Value Units Date/Time    CULTURE, BLOOD [[id number]] Collected: 03/30/22 0712    Order Status: Completed Specimen: Blood Updated: 03/30/22 0740    CULTURE, BLOOD [[id number]] Collected: 03/30/22 0648    Order Status: Completed Specimen: Blood Updated: 03/30/22 0733     Special Requests: --        LEFT  ARM       Culture result: PENDING    CULTURE, BLOOD [[id number]]  (Abnormal) Collected: 03/28/22 0019    Order Status: Completed Specimen: Blood Updated: 03/30/22 0726     Special Requests: --        LEFT  ARM       GRAM STAIN GRAM NEGATIVE RODS               AEROBIC AND ANAEROBIC BOTTLES                  CRITICAL RESULT NOT CALLED DUE TO PREVIOUS NOTIFICATION OF CRITICAL RESULT WITHIN THE LAST 24 HOURS.            Culture result: GRAM NEGATIVE RODS               For identification and susceptibility refer to culture  Accession [id number]      CULTURE, BLOOD [[id number]]  (Abnormal)  (Susceptibility) Collected: 03/27/22 4810    Order Status: Completed Specimen: Blood Updated: 03/30/22 6904     Special Requests: --        RIGHT  ARM       GRAM STAIN GRAM NEGATIVE RODS AEROBIC AND ANAEROBIC BOTTLES                  RESULTS VERIFIED, PHONED TO AND READ BACK BY Zachary Wheatley RN @ 6033 ON 3/28/22 BY AMM           Culture result: KLEBSIELLA PNEUMONIAE               Refer to Blood Culture ID Panel Accession  [id number]      BLOOD CULTURE ID PANEL [[id number]]  (Abnormal) Collected: 03/27/22 2350    Order Status: Completed Specimen: Blood Updated: 03/28/22 1236     Acc. no. from Micro Order [id number]     Klebsiella pneumoniae Detected        Comment: RESULTS VERIFIED, PHONED TO AND READ BACK BY  Zachary Wheatley RN @ 5592 ON 3/28/22 BY AMM          KPC (Carbapenem Resistance Gene) NOT DETECTED        Comment: WARNING:  A Not Detected result for the KPC gene does not indicate susceptibility to carbapenems. Gram negative bacteria can be resistant to carbapenems by mechanisms other than carrying the KPC gene. INTERPRETATION       Gram negative ney, identified by real time PCR as Klebsiella pneumoniae. Imaging:   CXR 3/27 suggests HF, no consolidation  CT abd pelv 3/27  IMPRESSION  There are multiple hyperechoic dense structures rather in the gallbladder neck. These may represent multiple stones. There is mild gallbladder wall thickening with subtle pericholecystic fluid. These findings raise suspicion for acute cholecystitis. There is also mild peripancreatic fluid. This fluid could be due to cholecystitis or acute pancreatitis. There is focal dilatation of the distal right ureter. There is no evidence of hydronephrosis proximal to this point. Further work up with urology consult is recommended to assess this region. Chronic appearing changes are noted in the kidneys.   Multiple small low-density lesions are seen in the liver and are too small to characterize. US abd 3/27  IMPRESSION  Cholelithiasis. The gallbladder wall is thickened measuring 7.6 mm. There is no appreciable pericholecystic fluid. Sonographic Jesse Wade sign was not documented.   The pancreas was not visualized.   Hepatic steatosis. The right kidney appears somewhat echogenic. This can be seen with medical renal disease. Please correlate clinically.     Signed By: Todd Drake NP     March 31, 2022

## 2022-03-31 NOTE — PROGRESS NOTES
[facility] Progress Note         Admit Date:  3/27/2022    Today's Date:  3/31/2022    CC:  KISHORE    Subjective:     Patient is feeling better and has had decreased abd pain. He feels gas pain over upper abd, but not as severe as prior to surgery. He denies any nausea or vomiting. He has not had a BM, but feels he may be having once soon. He denies any bleeding. Discussed ERCP. Medications:   Current Facility-Administered Medications   Medication Dose Route Frequency    0.9% sodium chloride infusion  10 mL/hr IntraVENous CONTINUOUS    pantoprazole (PROTONIX) tablet 40 mg  40 mg Oral ACB    cyanocobalamin tablet 1,000 mcg  1,000 mcg Oral DAILY    0.9% sodium chloride infusion 250 mL  250 mL IntraVENous PRN    sodium chloride (NS) flush 5-40 mL  5-40 mL IntraVENous Q8H    sodium chloride (NS) flush 5-40 mL  5-40 mL IntraVENous PRN    acetaminophen (TYLENOL) tablet 650 mg  650 mg Oral Q6H PRN    Or    acetaminophen (TYLENOL) suppository 650 mg  650 mg Rectal Q6H PRN    polyethylene glycol (MIRALAX) packet 17 g  17 g Oral DAILY PRN    ondansetron (ZOFRAN ODT) tablet 4 mg  4 mg Oral Q8H PRN    Or    ondansetron (ZOFRAN) injection 4 mg  4 mg IntraVENous Q6H PRN    piperacillin-tazobactam (ZOSYN) 3.375 g in 0.9% sodium chloride (MBP/ADV) 100 mL MBP  3.375 g IntraVENous Q8H    hydrALAZINE (APRESOLINE) 20 mg/mL injection 10 mg  10 mg IntraVENous Q6H PRN    morphine injection 1 mg  1 mg IntraVENous Q4H PRN    furosemide (LASIX) tablet 40 mg  40 mg Oral DAILY    sodium chloride (NS) flush 5-10 mL  5-10 mL IntraVENous Q8H    sodium chloride (NS) flush 5-10 mL  5-10 mL IntraVENous PRN       Review of Systems:  ROS was obtained, with pertinent positives as listed above. No chest pain or SOB.     Diet:  Clear liquids    Objective:   Vitals:  Visit Vitals  BP (!) 143/77 (BP 1 Location: Left upper arm, BP Patient Position: At rest)   Pulse 75   Temp 97.8 °F (36.6 °C)   Resp 16   Ht 5' 8\" (1.727 m)   Wt 103.4 kg (227 lb 15.3 oz)   SpO2 98%   BMI 34.66 kg/m²     Intake/Output:  03/31 0701 - 03/31 1900  In: 660 [P.O.:660]  Out: 100 [Urine:100]  03/29 1901 - 03/31 0700  In: 325 [I.V.:325]  Out: 500 [Urine:500]  Exam:  General appearance: alert, cooperative, no distress, sitting in chair at bedside, O2 NC in place  Lungs: coarse BS to auscultation bilaterally anteriorly  Heart: regular rate and rhythm  Abdomen: soft in areas, mildly distended, mild-mod tender over RUQ. Bowel sounds present. No masses, no organomegaly  Neuro:  alert and oriented    Data Review (Labs):    Recent Labs     03/31/22  0546 03/30/22  0648 03/30/22  0044 03/29/22  1629 03/29/22  0554 03/28/22  2141 03/28/22  1008   WBC 11.3* 20.3*  --   --  15.9*  --   --    HGB 8.0* 8.8*  --  7.7* 7.3* 6.3* 6.2*   HCT 29.0* 31.4* 34.0*  --  27.0* 23.6* 24.1*    210  --   --  200  --   --    MCV 67.3* 67.8*  --   --  65.5*  --   --     142  --   --  139  --   --    K 3.6 4.0  --   --  3.8  --   --    * 108*  --   --  106  --   --    CO2 28 25  --   --  22  --   --    BUN 20 25*  --   --  24*  --   --    CREA 0.90 1.10  --   --  1.30  --   --    CA 8.4 8.6  --   --  8.6  --   --    * 131*  --   --  148*  --   --    AP  --  126  --   --  127  --   --    AST  --  35  --   --  32  --   --    ALT  --  46  --   --  41  --   --    TBILI  --  0.6  --   --  0.8  --   --    ALB  --  2.5*  --   --  2.7*  --   --    TP  --  6.3  --   --  6.3  --   --    LPSE  --   --   --   --  406*  --   --      CT A/P with contrast 3/27: FINDINGS:   The visualized lung bases are unremarkable. There are bilateral pleural-based  calcifications posteriorly.   The visualized portion of the heart shows leads likely from a pacemaker.   The liver contains multiple small hypodensities. These are too small to  characterize. The spleen and adrenal glands are within normal limits.    The pancreas is not enlarged.  There is mild peripancreatic fatty stranding and  fluid. There are no obvious pancreatic lesions.   The gallbladder is mildly distended. There is mild wall thickening with  pericholecystic fluid. Multiple tiny hyperdensities are present in the neck of  the gallbladder. These may represent stones.   The kidneys show a symmetric nephrogram. There is a small hypodense lesion in  the right kidney which is too small to characterize. Both kidneys show focal  areas suggesting scarring.    There is a tiny nonobstructing calcification in the left kidney. The right  ureter is normal in caliber throughout most of its course. Just proximal to the  ureterovesical junction there is focal dilatation of the distal ureter measuring  up to 2.3 cm. The bladder appears grossly normal.   The prostate is enlarged.   Distal esophagus and stomach are unremarkable. Small and large bowel are without  evidence of obstruction. Patient history states appendectomy. Diverticulosis  with no evidence of acute diverticulitis.   No evidence of free fluid, free air, focal fluid collection. No pathologic  adenopathy. No abdominal aortic aneurysm.   Osseous structures are without evidence of acute fracture or suspicious lesion.    IMPRESSION  There are multiple hyperechoic dense structures rather in the gallbladder neck. These may represent multiple stones. There is mild gallbladder wall thickening  with subtle pericholecystic fluid. These findings raise suspicion for acute  cholecystitis.   There is also mild peripancreatic fluid. This fluid could be due to  cholecystitis or acute pancreatitis.   There is focal dilatation of the distal right ureter. There is no evidence of  hydronephrosis proximal to this point.  Further work up with urology consult is  recommended to assess this region.   Chronic appearing changes are noted in the kidneys.   Multiple small low-density lesions are seen in the liver and are too small to  Characterize.     RUQ US 3/27/22: FINDINGS:   Aorta/IVC: Not visualized/Visualized portions are within normal limits.   Pancreas: Not visualized   Liver: There is diffuse increased echogenicity within the liver parenchyma,  suggestive of hepatic steatosis. No focal lesions are demonstrated on the  provided images.   Portal vein: The portal vein shows hepatopedal flow.   Gallbladder: Multiple tiny gallstones are present. The wall is thickened  measuring 7.6 mm. No evidence of pericholecystic fluid. Sonographic Jd Form sign  was not documented.    CBD: Normal in caliber and measures 7.1 mm.   Right kidney: 12 cm in length and without evidence of obstruction. The kidney  appears somewhat echogenic.   IMPRESSION  Cholelithiasis. The gallbladder wall is thickened measuring 7.6 mm. There is no  appreciable pericholecystic fluid. Sonographic Jd Form sign was not documented.   The pancreas was not visualized.   Hepatic steatosis.   The right kidney appears somewhat echogenic. This can be seen with medical renal  disease. Please correlate clinically.     CXR 3/27/22; FINDINGS:   There is moderate cardiomegaly. There is pulmonary vascular congestion.   There is no consolidation, significant pleural effusion, or pneumothorax.   Stable left-sided cardiac device.   No significant osseous abnormalities are observed.   IMPRESSION  Cardiomegaly with pulmonary vascular congestion suggests CHF.   There is no consolidation or significant pleural effusion.     EGD 29 March 2022  Findings:   Esophagus- Normal.  Stomach- Normal except for a small hiatal hernia  Duodenum- Normal.  Impression:  No bleeding or source for gi blood loss anemia. Small hiatal hernia. Otherwise normal exam.   Recommendations: OK for surgery.   Continue Protonix for now.     OPERATIVE CHOLANGIOGRAM 29 March 2022   INDICATION: Cholecystectomy   Fluoroscopy time:  45 seconds, 4 spot films   Multiple spot films were obtained during an intraoperative cholangiogram  performed by Dr. Josué Tuttle .    FINDINGS: There is at least one small filling defect in the common duct near the  ampulla.  There is also filling defect in the common hepatic duct.  No bile duct  dilatation.  No focal stricture.  Contrast passes normally into the small bowel.   IMPRESSION  At least 2 small filling defects in the biliary system, probably air  bubbles.  Small stones not completely excluded. ERCP with Dr. Armstrong Exon 30 March 2022  ASSESSMENT:  1. Multiple small pigmented stones- 12 mm balloon easily passed post clearance  2. Pancreas not evaluated    3. GB absent  PLAN:  1. 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        SSS (sick sinus syndrome) (HCC) (6/30/2017)      Atrial fibrillation ([address] (69/81/5551)      Diastolic CHF, chronic (HCC) (3/2/2022)      Acute cholecystitis (3/28/2022)      Pancreatitis, gallstone (3/28/2022)      Anemia (3/28/2022)      Bacteremia (3/28/2022)    83 yo male with PMH including but not limited to A FIB s/p ablation and Watchman 2019 - now on ASA, Echo 12/2021: normal EF, mod AI, mod pHTN, SSS s/p PPM, LVDD2, HTN, HLD, CAD, carotid artery stenosis, myasthenia gravis, JEROD (no CPAP), diverticulosis, colon polyps, who was seen in consultation 28 March 2022 at the request of Dr. Devyn Mackenzie, who was admitted 3/27/22 with abdominal pain, nausea, diarrhea, and met sepsis criteria with leukocytosis, lactic acidosis, elevated lipase and LFTs.  CT A/P showed multiple hyperechoic dense structures in the gallbladder neck (? stones), GB wall thickening with subtle pericholecystic fluid - suspicion for acute cholecystitis, mild peripancreatic fluid, focal dilatation of the distal right ureter w/o  hydronephrosis, chronic appearing changes are noted in the kidneys, and multiple small low-density lesions are seen in the liver and are too small to characterize. RUQ US showed cholelithiasis, GB wall thickening measuring 7.6 mm without appreciable pericholecystic fluid, no sonographic Kumar sign was not documented, CBD 7.1 mm, pancreas not visualized, hepatic steatosis, right kidney appears somewhat echogenic (? medicorenal disease).   Labs 28 March 2022 significant for WBC 28.4, HGB 6.4 with MCV 63 (baseline 10.6 and hgb 7.2 on admission).  Hgb has improved to 7-8 range since transfusion. He has not had overt bleeding.  LFTs initially elevated, but have since improved, now normal.  Surgery was consulted for lap mala and requested EGD evaluation prior to surgery given microcytic anemia.  EGD 29 March 2022 with Dr. Florecita Buitrago with no bleeding or source for gi blood loss anemia, small hiatal hernia, otherwise normal exam.  Underwent lap mala 29 March 2022 with IOC with possible filling defect in CBD - air bubbles. He underwent ERCP 30 March 2022 with Dr. Adrian Payne with multiple small pigmented stones noted, papillotomy. He is feeling better today and leukocytosis has improving. Plan:     - Supportive care, maintain electrolytes. - Continue Protonix 40 mg IV Q12.  - On Zosyn. - Clear liquids and ok to advance diet as tolerated to low fat diet. - Consider colonoscopy as outpatient for further evaluation of microcytic anemia once current acute illness resolves. - Follow up in our office in 4-6 weeks. Our office will contact pt to make the appt. Patient is seen and examined in collaboration with Dr. Jv Orourke. Assessment and plan as per Dr. Florecita Buitrago. Ramona Perales  [facility]

## 2022-03-31 NOTE — PROGRESS NOTES
Hospitalist Progress Note   Admit Date:  3/27/2022  7:41 PM   Name:  Apurva Castro   Age:  79 y.o. Sex:  male  :  1940   MRN:  [id number]   Room:  604/01    Presenting Complaint: Abdominal Pain    Reason(s) for Admission: Sepsis [facility]) [A41.9]  Acute cholecystitis [K81.0]  Pancreatitis, gallstone [facility] Course & Interval History:     Mr. Thad Hodgkins is an 80 yo male with PMH of AFIB s/p watchman, SSS s/p PPM- has fractured LV lead, LVDD2, CAD, myasthenia gravis admitted with abdominal pain and meeting sepsis criteria. He has leukocytosis, lactic acidosis, elevated lipase and LFTS. CTAP shows   \"    IMPRESSION  There are multiple hyperechoic dense structures rather in the gallbladder neck. These may represent multiple stones. There is mild gallbladder wall thickening  with subtle pericholecystic fluid. These findings raise suspicion for acute  cholecystitis.     There is also mild peripancreatic fluid. This fluid could be due to  cholecystitis or acute pancreatitis.     There is focal dilatation of the distal right ureter. There is no evidence of  hydronephrosis proximal to this point. Further work up with urology consult is  recommended to assess this region.     Chronic appearing changes are noted in the kidneys.     Multiple small low-density lesions are seen in the liver and are too small to  Characterize. \"    ABD US     \"  IMPRESSION  Cholelithiasis. The gallbladder wall is thickened measuring 7.6 mm. There is no  appreciable pericholecystic fluid. Sonographic Parviz Eastford sign was not documented.     The pancreas was not visualized.     Hepatic steatosis.     The right kidney appears somewhat echogenic. This can be seen with medical renal  disease. Please correlate clinically. \"        He has been NPO . S/p surgery consult. HIDA scan completed. Postop lap cholecystectomy on 3-29-22. ERCP completed  3-30-22. He had acute blood loss anemia with recent dark stools. S/p 3 PRBC. GI following. S/p EGD 3-29-22 no obvious source of bleeding. He is on course of zosyn. Blood cultures 2/2 klebsiella. ID following. Seen by urology for abnormal appearing right ureter on CTAP- no hydronephrosis. Plan for office followup. CXR shows vascular congestion. Lasix resumed. Cardiology following. Discharge plans pending. Lives with wife. Subjective/24hr Events (03/31/22):     Has abdominal pain, more right side, tolerant to clears, not passing gas, plans to get up today, no dyspnea or cough, no chest pain       Assessment & Plan:     Principal Problem:    Sepsis ([facility] [address]   Likely GI source with acute cholecystitis:  klebsiella bacteremia:  · D5 zosyn, defer to ID cipro/levaquin sensitive though contraindicated with myasthenia gravis, changed to rocephin/ flagyl and can use bactrim / flagyl on discharge EOT 4-13-22  ·  UA pending      Active Problems:    HTN (hypertension)   · BP low on admit, laisx resumed/ lisinopril on hold           Coronary atherosclerosis of native coronary vessel  · Asa held   · lipitor held due to elevated LFTS  · Cardiology following         Myasthenia gravis ([facility] [address] ()  ·   Monitor   · PT,OT       SSS (sick sinus syndrome) (HCC)     Paroxysmal atrial fibrillation (HCC)   · In NSR  · S/p prior watchman / PPM         Diastolic CHF, chronic ([facility] [address] (3/2/2022)  · Continued lasix  · O2 as needed to wean as tolerant         Pancreatitis, gallstone (3/28/2022)    Acute cholecystitis (3/28/2022)  ·  clear liquids  · KUB ordered 3-31-22  · GI/surgery consulted POD 2 s/p lap cholecystectomy   · D5 antibiotics   · ERCP 3-30-22          Acute iron deficient Anemia (3/28/2022)- worse 3-28-22  · IV protonix every 12 hours   · Trend HGB  · 3 total PRBC transfused   · pending occult stool  · cotninued oral B12        Right ureteral dilation: no kevin hydronephrosis   · Pending UA  ·  urology consulted -office followup recommended         Dispo/Discharge Planning:      Pending , PPD, PT,OT           Diet:  ADULT DIET Clear Liquid  DVT PPx:  SCD  Code status: Full Code    Hospital Problems as of 3/31/2022 Date Reviewed: 3/2/2022          Codes Class Noted - Resolved POA    Acute cholecystitis ICD-10-CM: K81.0  ICD-9-CM: 575.0  3/28/2022 - Present Unknown        Pancreatitis, gallstone ICD-10-CM: K85.10  ICD-9-CM: 577.0, 574.20  3/28/2022 - Present Unknown        Anemia ICD-10-CM: D64.9  ICD-9-CM: 285.9  3/28/2022 - Present Yes        Bacteremia ICD-10-CM: R78.81  ICD-9-CM: 790.7  3/28/2022 - Present Yes        Diastolic CHF, chronic (HCC) ICD-10-CM: I50.32  ICD-9-CM: 428.32, 428.0  3/2/2022 - Present Yes        Atrial fibrillation ([facility] ICD-10-CM: I48.91  ICD-9-CM: 427.31  11/29/2017 - Present Yes        * (Principal) Sepsis ([facility] ICD-10-CM: A41.9  ICD-9-CM: 038.9, 995.91  10/26/2017 - Present Unknown        Paroxysmal atrial fibrillation ([facility] ICD-10-CM: I48.0  ICD-9-CM: 427.31  6/30/2017 - Present Yes    Overview Signed 5/21/2019 10:36 AM by Vane Cheng MD     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               SSS (sick sinus syndrome) (HCC) ICD-10-CM: I49.5  ICD-9-CM: 427.81  6/30/2017 - Present Yes        Myasthenia gravis ([facility] ICD-10-CM: G70.00  ICD-9-CM: 358.00  Unknown - Present Yes        HTN (hypertension) (Chronic) ICD-10-CM: I10  ICD-9-CM: 401.9  5/8/2015 - Present Yes        Coronary atherosclerosis of native coronary vessel ICD-10-CM: I25.10  ICD-9-CM: 414.01  5/8/2015 - Present Yes    Overview Signed 6/7/2016  8:35 AM by Chase Unger on brilinta  5/7/15: prox LAD PCI, normal EF                   Objective:     Patient Vitals for the past 24 hrs:   Temp Pulse Resp BP SpO2   03/31/22 1116 98.2 °F (36.8 °C) 75 20 123/69 91 %   03/31/22 0731 97.8 °F (36.6 °C) 75 16 (!) 143/77 98 %   03/31/22 0340 97.6 °F (36.4 °C) 69 20 113/63 94 %   03/30/22 2336 98.3 °F (36.8 °C) 69 18 122/70 96 %   03/30/22 2025     96 %   03/30/22 1912 98.3 °F (36.8 °C) 75 20 125/66 96 %   03/30/22 1706 98.1 °F (36.7 °C) 77 20 136/80 99 %   03/30/22 1640 98.9 °F (37.2 °C) 74 16 131/62 97 %   03/30/22 1636  75 16 131/62 96 %   03/30/22 1631  85 16 114/68 94 %   03/30/22 1626  75 16 (!) 120/56 95 %   03/30/22 1621  79 16 (!) 112/58 96 %   03/30/22 1615  75 16 (!) 142/71 96 %   03/30/22 1611  75 16 138/68 97 %   03/30/22 1607 98.9 °F (37.2 °C) 75 14 130/63 97 %   03/30/22 1323 98.8 °F (37.1 °C) 75 17 129/71 98 %     Oxygen Therapy  O2 Sat (%): 91 % (03/31/22 1116)  Pulse via Oximetry: 71 beats per minute (03/30/22 2025)  O2 Device: Nasal cannula (03/31/22 0721)  Skin Assessment: Clean, dry, & intact (03/28/22 0215)  O2 Flow Rate (L/min): 3 l/min (03/31/22 0721)    Estimated body mass index is 34.66 kg/m² as calculated from the following:    Height as of this encounter: 5' 8\" (1.727 m). Weight as of this encounter: 103.4 kg (227 lb 15.3 oz). Intake/Output Summary (Last 24 hours) at 3/31/2022 1254  Last data filed at 3/31/2022 0944  Gross per 24 hour   Intake 760 ml   Output 300 ml   Net 460 ml         Physical Exam:     Blood pressure 123/69, pulse 75, temperature 98.2 °F (36.8 °C), resp. rate 20, height 5' 8\" (1.727 m), weight 103.4 kg (227 lb 15.3 oz), SpO2 91 %. General:    Well nourished. No overt distress  CV:   RRR. No m/r/g. No jugular venous distension. Trace  edema   Lungs:   Coarse anterior   Abdomen: Bowel sounds decreased. TTP RUQ, distended   Extremities: No cyanosis or clubbing. Trace  edema  Skin:     No rashes and normal coloration. Warm and dry. Neuro:   grossly intact. Psych:  Normal mood and affect.  Pleasant       I have reviewed ordered lab tests and independently visualized imaging below:    Recent Labs:  Recent Results (from the past 48 hour(s))   HEMOGLOBIN    Collection Time: 03/29/22  4:29 PM   Result Value Ref Range    HGB 7.7 (L) 13.6 - 17.2 g/dL   RBC, ALLOCATE    Collection Time: 03/29/22  4:30 PM   Result Value Ref Range HISTORY CHECKED? Historical check performed    HEMATOCRIT    Collection Time: 03/30/22 12:44 AM   Result Value Ref Range    HCT 34.0 (L) 41.1 - 64.4 %   METABOLIC PANEL, COMPREHENSIVE    Collection Time: 03/30/22  6:48 AM   Result Value Ref Range    Sodium 142 138 - 145 mmol/L    Potassium 4.0 3.5 - 5.1 mmol/L    Chloride 108 (H) 98 - 107 mmol/L    CO2 25 21 - 32 mmol/L    Anion gap 9 7 - 16 mmol/L    Glucose 131 (H) 65 - 100 mg/dL    BUN 25 (H) 8 - 23 MG/DL    Creatinine 1.10 0.8 - 1.5 MG/DL    GFR est AA >60 >60 ml/min/1.73m2    GFR est non-AA >60 >60 ml/min/1.73m2    Calcium 8.6 8.3 - 10.4 MG/DL    Bilirubin, total 0.6 0.2 - 1.1 MG/DL    ALT (SGPT) 46 12 - 65 U/L    AST (SGOT) 35 15 - 37 U/L    Alk. phosphatase 126 50 - 136 U/L    Protein, total 6.3 6.3 - 8.2 g/dL    Albumin 2.5 (L) 3.2 - 4.6 g/dL    Globulin 3.8 (H) 2.3 - 3.5 g/dL    A-G Ratio 0.7 (L) 1.2 - 3.5     CBC WITH AUTOMATED DIFF    Collection Time: 03/30/22  6:48 AM   Result Value Ref Range    WBC 20.3 (H) 4.3 - 11.1 K/uL    RBC 4.63 4.23 - 5.6 M/uL    HGB 8.8 (L) 13.6 - 17.2 g/dL    HCT 31.4 (L) 41.1 - 50.3 %    MCV 67.8 (L) 79.6 - 97.8 FL    MCH 19.0 (L) 26.1 - 32.9 PG    MCHC 28.0 (L) 31.4 - 35.0 g/dL    RDW 24.0 (H) 11.9 - 14.6 %    PLATELET 954 794 - 252 K/uL    MPV 9.2 (L) 9.4 - 12.3 FL    ABSOLUTE NRBC 0.05 0.0 - 0.2 K/uL    DF AUTOMATED      NEUTROPHILS 90 (H) 43 - 78 %    LYMPHOCYTES 5 (L) 13 - 44 %    MONOCYTES 5 4.0 - 12.0 %    EOSINOPHILS 0 (L) 0.5 - 7.8 %    BASOPHILS 0 0.0 - 2.0 %    IMMATURE GRANULOCYTES 1 0.0 - 5.0 %    ABS. NEUTROPHILS 18.2 (H) 1.7 - 8.2 K/UL    ABS. LYMPHOCYTES 0.9 0.5 - 4.6 K/UL    ABS. MONOCYTES 1.0 0.1 - 1.3 K/UL    ABS. EOSINOPHILS 0.0 0.0 - 0.8 K/UL    ABS. BASOPHILS 0.0 0.0 - 0.2 K/UL    ABS. IMM.  GRANS. 0.1 0.0 - 0.5 K/UL   CULTURE, BLOOD    Collection Time: 03/30/22  6:48 AM    Specimen: Blood   Result Value Ref Range    Special Requests: LEFT  ARM        Culture result: NO GROWTH 1 DAY     CULTURE, BLOOD Collection Time: 03/30/22  7:12 AM    Specimen: Blood   Result Value Ref Range    Special Requests: LEFT  FOREARM        Culture result: NO GROWTH 1 DAY     PLEASE READ & DOCUMENT PPD TEST IN 24 HRS    Collection Time: 03/30/22  9:23 AM   Result Value Ref Range    PPD Negative Negative    mm Induration 0 0 - 5 mm   CBC WITH AUTOMATED DIFF    Collection Time: 03/31/22  5:46 AM   Result Value Ref Range    WBC 11.3 (H) 4.3 - 11.1 K/uL    RBC 4.31 4.23 - 5.6 M/uL    HGB 8.0 (L) 13.6 - 17.2 g/dL    HCT 29.0 (L) 41.1 - 50.3 %    MCV 67.3 (L) 79.6 - 97.8 FL    MCH 18.6 (L) 26.1 - 32.9 PG    MCHC 27.6 (L) 31.4 - 35.0 g/dL    RDW 24.7 (H) 11.9 - 14.6 %    PLATELET 217 469 - 652 K/uL    MPV 9.2 (L) 9.4 - 12.3 FL    ABSOLUTE NRBC 0.04 0.0 - 0.2 K/uL    DF AUTOMATED      NEUTROPHILS 78 43 - 78 %    LYMPHOCYTES 11 (L) 13 - 44 %    MONOCYTES 9 4.0 - 12.0 %    EOSINOPHILS 1 0.5 - 7.8 %    BASOPHILS 0 0.0 - 2.0 %    IMMATURE GRANULOCYTES 1 0.0 - 5.0 %    ABS. NEUTROPHILS 8.8 (H) 1.7 - 8.2 K/UL    ABS. LYMPHOCYTES 1.2 0.5 - 4.6 K/UL    ABS. MONOCYTES 1.1 0.1 - 1.3 K/UL    ABS. EOSINOPHILS 0.1 0.0 - 0.8 K/UL    ABS. BASOPHILS 0.0 0.0 - 0.2 K/UL    ABS. IMM.  GRANS. 0.1 0.0 - 0.5 K/UL   METABOLIC PANEL, BASIC    Collection Time: 03/31/22  5:46 AM   Result Value Ref Range    Sodium 142 138 - 145 mmol/L    Potassium 3.6 3.5 - 5.1 mmol/L    Chloride 108 (H) 98 - 107 mmol/L    CO2 28 21 - 32 mmol/L    Anion gap 6 (L) 7 - 16 mmol/L    Glucose 133 (H) 65 - 100 mg/dL    BUN 20 8 - 23 MG/DL    Creatinine 0.90 0.8 - 1.5 MG/DL    GFR est AA >60 >60 ml/min/1.73m2    GFR est non-AA >60 >60 ml/min/1.73m2    Calcium 8.4 8.3 - 10.4 MG/DL       All Micro Results     Procedure Component Value Units Date/Time    CULTURE, BLOOD [[id number]] Collected: 03/30/22 0648    Order Status: Completed Specimen: Blood Updated: 03/31/22 0907     Special Requests: --        LEFT  ARM       Culture result: NO GROWTH 1 DAY       CULTURE, BLOOD [[id number]] Collected: 03/30/22 4475    Order Status: Completed Specimen: Blood Updated: 03/31/22 0907     Special Requests: --        LEFT  FOREARM       Culture result: NO GROWTH 1 DAY       CULTURE, BLOOD [[id number]]  (Abnormal) Collected: 03/28/22 0019    Order Status: Completed Specimen: Blood Updated: 03/30/22 0726     Special Requests: --        LEFT  ARM       GRAM STAIN GRAM NEGATIVE RODS               AEROBIC AND ANAEROBIC BOTTLES                  CRITICAL RESULT NOT CALLED DUE TO PREVIOUS NOTIFICATION OF CRITICAL RESULT WITHIN THE LAST 24 HOURS. Culture result: GRAM NEGATIVE RODS               For identification and susceptibility refer to culture  Accession [id number]      CULTURE, BLOOD [[id number]]  (Abnormal)  (Susceptibility) Collected: 03/27/22 2350    Order Status: Completed Specimen: Blood Updated: 03/30/22 0725     Special Requests: --        RIGHT  ARM       GRAM STAIN GRAM NEGATIVE RODS               AEROBIC AND ANAEROBIC BOTTLES                  RESULTS VERIFIED, PHONED TO AND READ BACK BY Lorie Saldana RN @ 3526 ON 3/28/22 BY [facility]           Culture result: KLEBSIELLA PNEUMONIAE               Refer to Blood Culture ID Panel Accession  [id number]      BLOOD CULTURE ID PANEL [[id number]]  (Abnormal) Collected: 03/27/22 2350    Order Status: Completed Specimen: Blood Updated: 03/28/22 1236     Acc. no. from Micro Order [id number]     Klebsiella pneumoniae Detected        Comment: RESULTS VERIFIED, PHONED TO AND READ BACK BY  Lorie Saldana RN @ 6388 ON 3/28/22 BY [facility]          KPC (Carbapenem Resistance Gene) NOT DETECTED        Comment: WARNING:  A Not Detected result for the KPC gene does not indicate susceptibility to carbapenems. Gram negative bacteria can be resistant to carbapenems by mechanisms other than carrying the KPC gene. INTERPRETATION       Gram negative ney, identified by real time PCR as Klebsiella pneumoniae.                 Other Studies:  XR ABD (KUB)    Result Date: 3/31/2022  History: Abdominal distention, pain EXAM: Supine views of the abdomen FINDINGS: There are mildly dilated loops of small bowel present. However, there is also bowel gas within the colon. There is a small left pleural effusion. Mildly distended loops of small bowel. XR ERCP / ERCB COMBINED    Result Date: 3/30/2022  ERCP CLINICAL INDICATION: Fluoroscopic spot images during an ERCP procedure for common bile duct stones FINDINGS: Six fluoroscopic spot images performed during ERCP procedure. A papillotomy was performed. Contrast was administered into the common bile duct. Filling defects are noted in keeping with common bile ducts stones. A balloon was performed. The stones were successfully extracted. Multiple common bile duct stones extracted after balloon sweep. Total fluoroscopy time: 1.0 minutes; six fluoroscopic spot image saved.       Current Meds:  Current Facility-Administered Medications   Medication Dose Route Frequency    cefTRIAXone (ROCEPHIN) 2 g in 0.9% sodium chloride (MBP/ADV) 50 mL MBP  2 g IntraVENous Q24H    metroNIDAZOLE (FLAGYL) tablet 500 mg  500 mg Oral BID    0.9% sodium chloride infusion  10 mL/hr IntraVENous CONTINUOUS    pantoprazole (PROTONIX) tablet 40 mg  40 mg Oral ACB    cyanocobalamin tablet 1,000 mcg  1,000 mcg Oral DAILY    0.9% sodium chloride infusion 250 mL  250 mL IntraVENous PRN    sodium chloride (NS) flush 5-40 mL  5-40 mL IntraVENous Q8H    sodium chloride (NS) flush 5-40 mL  5-40 mL IntraVENous PRN    acetaminophen (TYLENOL) tablet 650 mg  650 mg Oral Q6H PRN    Or    acetaminophen (TYLENOL) suppository 650 mg  650 mg Rectal Q6H PRN    polyethylene glycol (MIRALAX) packet 17 g  17 g Oral DAILY PRN    ondansetron (ZOFRAN ODT) tablet 4 mg  4 mg Oral Q8H PRN    Or    ondansetron (ZOFRAN) injection 4 mg  4 mg IntraVENous Q6H PRN    hydrALAZINE (APRESOLINE) 20 mg/mL injection 10 mg  10 mg IntraVENous Q6H PRN    morphine injection 1 mg  1 mg IntraVENous Q4H PRN    furosemide (LASIX) tablet 40 mg  40 mg Oral DAILY    sodium chloride (NS) flush 5-10 mL  5-10 mL IntraVENous Q8H    sodium chloride (NS) flush 5-10 mL  5-10 mL IntraVENous PRN       Signed:  Marisa Noriega MD    Part of this note may have been written by using a voice dictation software. The note has been proof read but may still contain some grammatical/other typographical errors.

## 2022-04-01 LAB
ABO + RH BLD: NORMAL
ANION GAP SERPL CALC-SCNC: 8 MMOL/L (ref 7–16)
BASOPHILS # BLD: 0.1 K/UL (ref 0–0.2)
BASOPHILS NFR BLD: 1 % (ref 0–2)
BLD PROD TYP BPU: NORMAL
BLOOD GROUP ANTIBODIES SERPL: NORMAL
BPU ID: NORMAL
BUN SERPL-MCNC: 19 MG/DL (ref 8–23)
CALCIUM SERPL-MCNC: 8.5 MG/DL (ref 8.3–10.4)
CHLORIDE SERPL-SCNC: 104 MMOL/L (ref 98–107)
CO2 SERPL-SCNC: 29 MMOL/L (ref 21–32)
CREAT SERPL-MCNC: 0.9 MG/DL (ref 0.8–1.5)
CROSSMATCH RESULT,%XM: NORMAL
DIFFERENTIAL METHOD BLD: ABNORMAL
EOSINOPHIL # BLD: 0.3 K/UL (ref 0–0.8)
EOSINOPHIL NFR BLD: 2 % (ref 0.5–7.8)
ERYTHROCYTE [DISTWIDTH] IN BLOOD BY AUTOMATED COUNT: 25.7 % (ref 11.9–14.6)
GLUCOSE SERPL-MCNC: 133 MG/DL (ref 65–100)
HCT VFR BLD AUTO: 32.1 % (ref 41.1–50.3)
HGB BLD-MCNC: 8.9 G/DL (ref 13.6–17.2)
IMM GRANULOCYTES # BLD AUTO: 0.2 K/UL (ref 0–0.5)
IMM GRANULOCYTES NFR BLD AUTO: 2 % (ref 0–5)
LYMPHOCYTES # BLD: 1.6 K/UL (ref 0.5–4.6)
LYMPHOCYTES NFR BLD: 11 % (ref 13–44)
MCH RBC QN AUTO: 18.6 PG (ref 26.1–32.9)
MCHC RBC AUTO-ENTMCNC: 27.7 G/DL (ref 31.4–35)
MCV RBC AUTO: 67.2 FL (ref 79.6–97.8)
MONOCYTES # BLD: 0.7 K/UL (ref 0.1–1.3)
MONOCYTES NFR BLD: 5 % (ref 4–12)
NEUTS SEG # BLD: 11.1 K/UL (ref 1.7–8.2)
NEUTS SEG NFR BLD: 80 % (ref 43–78)
NRBC # BLD: 0.07 K/UL (ref 0–0.2)
PLATELET # BLD AUTO: 205 K/UL (ref 150–450)
PMV BLD AUTO: 9.4 FL (ref 9.4–12.3)
POTASSIUM SERPL-SCNC: 3.2 MMOL/L (ref 3.5–5.1)
RBC # BLD AUTO: 4.78 M/UL (ref 4.23–5.6)
SODIUM SERPL-SCNC: 141 MMOL/L (ref 138–145)
SPECIMEN EXP DATE BLD: NORMAL
STATUS OF UNIT,%ST: NORMAL
UNIT DIVISION, %UDIV: 0
WBC # BLD AUTO: 14 K/UL (ref 4.3–11.1)

## 2022-04-01 PROCEDURE — 74011250637 HC RX REV CODE- 250/637: Performed by: FAMILY MEDICINE

## 2022-04-01 PROCEDURE — 74011000258 HC RX REV CODE- 258: Performed by: INTERNAL MEDICINE

## 2022-04-01 PROCEDURE — 74011250637 HC RX REV CODE- 250/637: Performed by: INTERNAL MEDICINE

## 2022-04-01 PROCEDURE — 74011250636 HC RX REV CODE- 250/636: Performed by: INTERNAL MEDICINE

## 2022-04-01 PROCEDURE — 65270000029 HC RM PRIVATE

## 2022-04-01 PROCEDURE — 80048 BASIC METABOLIC PNL TOTAL CA: CPT

## 2022-04-01 PROCEDURE — 97535 SELF CARE MNGMENT TRAINING: CPT

## 2022-04-01 PROCEDURE — 74011000250 HC RX REV CODE- 250: Performed by: FAMILY MEDICINE

## 2022-04-01 PROCEDURE — 85025 COMPLETE CBC W/AUTO DIFF WBC: CPT

## 2022-04-01 PROCEDURE — 74011000250 HC RX REV CODE- 250: Performed by: EMERGENCY MEDICINE

## 2022-04-01 PROCEDURE — 97112 NEUROMUSCULAR REEDUCATION: CPT

## 2022-04-01 PROCEDURE — 97530 THERAPEUTIC ACTIVITIES: CPT

## 2022-04-01 PROCEDURE — 36415 COLL VENOUS BLD VENIPUNCTURE: CPT

## 2022-04-01 RX ADMIN — PANTOPRAZOLE SODIUM 40 MG: 40 TABLET, DELAYED RELEASE ORAL at 06:04

## 2022-04-01 RX ADMIN — SODIUM CHLORIDE, PRESERVATIVE FREE 10 ML: 5 INJECTION INTRAVENOUS at 06:05

## 2022-04-01 RX ADMIN — SODIUM CHLORIDE, PRESERVATIVE FREE 10 ML: 5 INJECTION INTRAVENOUS at 17:28

## 2022-04-01 RX ADMIN — DOCUSATE SODIUM 100 MG: 100 CAPSULE, LIQUID FILLED ORAL at 09:11

## 2022-04-01 RX ADMIN — ACETAMINOPHEN 650 MG: 325 TABLET ORAL at 09:12

## 2022-04-01 RX ADMIN — SODIUM CHLORIDE, PRESERVATIVE FREE 5 ML: 5 INJECTION INTRAVENOUS at 21:15

## 2022-04-01 RX ADMIN — POTASSIUM BICARBONATE 40 MEQ: 782 TABLET, EFFERVESCENT ORAL at 09:16

## 2022-04-01 RX ADMIN — SODIUM CHLORIDE, PRESERVATIVE FREE 10 ML: 5 INJECTION INTRAVENOUS at 17:29

## 2022-04-01 RX ADMIN — DOCUSATE SODIUM 100 MG: 100 CAPSULE, LIQUID FILLED ORAL at 17:27

## 2022-04-01 RX ADMIN — CYANOCOBALAMIN TAB 1000 MCG 1000 MCG: 1000 TAB at 09:11

## 2022-04-01 RX ADMIN — SODIUM CHLORIDE, PRESERVATIVE FREE 10 ML: 5 INJECTION INTRAVENOUS at 06:04

## 2022-04-01 RX ADMIN — Medication 1 EACH: at 17:28

## 2022-04-01 RX ADMIN — FUROSEMIDE 40 MG: 40 TABLET ORAL at 09:11

## 2022-04-01 RX ADMIN — SIMETHICONE 80 MG: 80 TABLET, CHEWABLE ORAL at 09:11

## 2022-04-01 RX ADMIN — CEFTRIAXONE 2 G: 2 INJECTION, POWDER, FOR SOLUTION INTRAMUSCULAR; INTRAVENOUS at 13:04

## 2022-04-01 RX ADMIN — POLYETHYLENE GLYCOL 3350 17 G: 17 POWDER, FOR SOLUTION ORAL at 09:11

## 2022-04-01 RX ADMIN — METRONIDAZOLE 500 MG: 500 TABLET ORAL at 09:11

## 2022-04-01 RX ADMIN — METRONIDAZOLE 500 MG: 500 TABLET ORAL at 17:27

## 2022-04-01 NOTE — PROGRESS NOTES
ACUTE OT GOALS:  (Developed with and agreed upon by patient and/or caregiver.)  1. Patient will complete lower body bathing and dressing with modified independence and adaptive equipment as needed. 2. Patient will complete toileting with modified independence. 3. Patient will tolerate 30 minutes of OT treatment with 2-3 rest breaks to increase activity tolerance for ADLs. 4. Patient will complete functional transfers with modified independence and adaptive equipment as needed. 5. Patient will complete functional mobility for household distances and good safety awareness with modified independence.       Timeframe: 7 visits      OCCUPATIONAL THERAPY: Daily Note OT Treatment Day # 1    Earl Givens is a 79 y.o. male   PRIMARY DIAGNOSIS: Sepsis ([facility] [address]  Sepsis ([facility] [address] [A41.9]  Acute cholecystitis [K81.0]  Pancreatitis, gallstone [K85.10]  Procedure(s) (LRB):  ENDOSCOPIC RETROGRADE CHOLANGIOPANCREATOGRAPHY (ERCP) Rm 604 (N/A)  ENDOSCOPIC SPHINCTEROTOMY (N/A)  ENDOSCOPIC STONE EXTRACTION/BALLOON SWEEP (N/A)  2 Days Post-Op  [place] / [place] / Product Type: Medicare /   ASSESSMENT:     REHAB RECOMMENDATIONS: CURRENT LEVEL OF FUNCTION:  (Most Recently Demonstrated)   Recommendation to date pending progress:  [address] [place] [address]  Equipment:    To Be Determined Bathing:   Not tested  Dressing:   Not tested  Feeding/Grooming:   Contact Guard Assistance  Toileting:   Minimal Assistance  Functional Mobility:   Contact Guard Assistance     ASSESSMENT:  Mr. Tavon Callahan presents now s/p cholecystectomy and ERCP. Pt reports no significant pain this am and was ready to participate with therapy this am. Pt assisted up to sitting with pt needing CGA but reporting some discomfort in abdomen. Pt needed safety cueing for getting into the bathroom. Pt typically doesn't need AD for functional mobility but did use a RW today to assist with safety/balance.  Pt completed toileting standing with CGA and then grooming tasks sink side. Pt did not feel he needed a sitting rest break and was ready to work on more standing/fucntional mobility. Pt cued throughout for posture and safety (needs cues to stay inside walker). Pt did appear fatigued by end of session. Pt left up in the chair and with all needs at bedside. SUBJECTIVE:   Mr. Alejandrina Bazan states, \"Lets go! \"    SOCIAL HISTORY/LIVING ENVIRONMENT: See initial assessment   Home Environment: Private residence  # Steps to Enter: 1  One/Two Story Residence: One story  Living Alone: No  Support Systems: Spouse/Significant Other    OBJECTIVE:     PAIN: VITAL SIGNS: LINES/DRAINS:   Pre Treatment: Pain Screen  Pain Scale 1: Numeric (0 - 10)  Pain Intensity 1: 0  Post Treatment: 0   None  O2 Device: None (Room air)     ACTIVITIES OF DAILY LIVING: I Mod I S SBA CGA Min Mod Max Total NT Comments   BASIC ADLs:              Bathing/ Showering [] [] [] [] [] [] [] [] [] [x]    Toileting [] [] [] [] [x] [] [] [] [] [] Standing at the toilet   Dressing [] [] [] [] [] [] [] [] [] [x]    Feeding [] [] [] [] [] [] [] [] [] [x]    Grooming [] [] [] [] [x] [] [] [] [] [] Standing sink side   Personal Device Care [] [] [] [] [] [] [] [] [] [x]    Functional Mobility [] [] [] [] [x] [] [] [] [] [] Using RW   I=Independent, Mod I=Modified Independent, S=Supervision, SBA=Standby Assistance, CGA=Contact Guard Assistance,   Min=Minimal Assistance, Mod=Moderate Assistance, Max=Maximal Assistance, Total=Total Assistance, NT=Not Tested    MOBILITY: I Mod I S SBA CGA Min Mod Max Total  NT x2 Comments:   Supine to sit [] [] [] [] [x] [x] [] [] [] [] []    Sit to supine [] [] [] [] [] [] [] [] [] [x] []    Sit to stand [] [] [] [] [x] [] [] [] [] [] []    Bed to chair [] [] [] [] [x] [] [] [] [] [] []    I=Independent, Mod I=Modified Independent, S=Supervision, SBA=Standby Assistance, CGA=Contact Guard Assistance,   Min=Minimal Assistance, Mod=Moderate Assistance, Max=Maximal Assistance, Total=Total Assistance, NT=Not Tested    BALANCE: Good Fair+ Fair Fair- Poor NT Comments   Sitting Static [x] [] [] [] [] []    Sitting Dynamic [] [x] [] [] [] []              Standing Static [] [] [x] [] [] []    Standing Dynamic [] [] [x] [] [] []      PLAN:   FREQUENCY/DURATION: OT Plan of Care: 3 times/week for duration of hospital stay or until stated goals are met, whichever comes first.    TREATMENT:   TREATMENT:   ($$ Self Care/Home Management: 8-22 mins$$ Neuromuscular Re-Education: 8-22 mins   )  Co-Treatment PT/OT necessary due to patient's decreased overall endurance/tolerance levels, as well as need for high level skilled assistance to complete functional transfers/mobility and functional tasks  Self Care (15 Minutes): Self care including Toileting and Grooming to increase independence and decrease level of assistance required. Neuromuscular Re-education (8 Minutes): Neuromuscular Re-education included Balance Training, Coordination training, Postural training, Sitting balance training and Standing balance training to improve Balance, Coordination and Postural Control.     TREATMENT GRID:  N/A    AFTER TREATMENT POSITION/PRECAUTIONS:  Chair, Needs within reach and RN notified    INTERDISCIPLINARY COLLABORATION:  RN/PCT, PT/PTA and OT/GOULD    TOTAL TREATMENT DURATION:  OT Patient Time In/Time Out  Time In: 1128  Time Out: Professor Torres 108, OT

## 2022-04-01 NOTE — PROGRESS NOTES
ACUTE PHYSICAL THERAPY GOALS:  (Developed with and agreed upon by patient and/or caregiver.)  (1.) Dave Ferrell will move from supine to sit and sit to supine , scoot up and down and roll side to side with MODIFIED INDEPENDENCE within 7 treatment day(s). (2.) Dave Ferrell will transfer from bed to chair and chair to bed with MODIFIED INDEPENDENCE using the least restrictive device within 7 treatment day(s). (3.) Dave Ferrell will ambulate with MODIFIED INDEPENDENCE for 300+ feet with the least restrictive device within 7 treatment day(s). (4.) Dave Ferrell will perform standing static and dynamic balance activities x 25 minutes with MODIFIED INDEPENDENCE to improve safety within 7 treatment day(s). (5.) Dave Ferrell will ascend and descend 1 stair using one hand rail(s) with MODIFIED INDEPENDENCE to improve functional mobility and safety within 7 treatment day(s). (6.) Dave Ferrell will perform therapeutic exercises x 15 min for HEP with INDEPENDENCE to improve strength, endurance, and functional mobility within 7 treatment day(s).      PHYSICAL THERAPY: Daily Note Treatment Day # 1    Dave Ferrell is a 79 y.o. male   PRIMARY DIAGNOSIS: Sepsis ([facility] [address]  Sepsis ([facility] [address] [A41.9]  Acute cholecystitis [K81.0]  Pancreatitis, gallstone [K85.10]  Procedure(s) (LRB):  ENDOSCOPIC RETROGRADE CHOLANGIOPANCREATOGRAPHY (ERCP) Rm 604 (N/A)  ENDOSCOPIC SPHINCTEROTOMY (N/A)  ENDOSCOPIC STONE EXTRACTION/BALLOON SWEEP (N/A)  2 Days Post-Op    ASSESSMENT:     REHAB RECOMMENDATIONS: CURRENT LEVEL OF FUNCTION:  (Most Recently Demonstrated)   Recommendation to date pending progress:  [address] [place] [address]  Equipment:    None - pt already has  and BSC Bed Mobility:   Contact Guard Assistance  Sit to Stand:   Contact Guard Assistance  Transfers:   Contact Guard Assistance  Gait/Mobility:   Contact Guard Assistance     ASSESSMENT:  Mr. Cristobal Cain is an 79year old M who presents to hospital with abdominal pain, admitted with sepsis criteria. Since initial evaluation pt underwent cholecystectomy and ERCP. Pt is doing well post-op. Today her performed mobility with overall CGA. Does benefit from BUE support at RW during standing mobility to facilitate upright posture and balance control. Cues for upright posture and safe positioning and progression of RW. Pt ambulated x200 ft with RW. Spo2 97% on room air at rest, and 93% with mobility. Overall pt is mobilizing well. He will continue to benefit from skilled PT services. Will continue therapy efforts. SUBJECTIVE:   Mr. Juan Miguel Rahman states, \"I would love a hot dog and a coke\"    SOCIAL HISTORY/ LIVING ENVIRONMENT: Lives with his spouse, 1 level home, 1 [address]AN[address]ISE. Endorses on fall/trip. Does not use any DME. 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   Home Environment: Private residence  # Steps to Enter: 1  One/Two Story Residence: One story  Living Alone: No  Support Systems: Spouse/Significant Other  OBJECTIVE:     PAIN: VITAL SIGNS: LINES/DRAINS:   Pre Treatment: Pain Screen  Pain Scale 1: Numeric (0 - 10)  Pain Intensity 1: 0  Post Treatment: 0/10 Vital Signs  O2 Device: None (Room air) None  O2 Device: None (Room air)     MOBILITY: I Mod I S SBA CGA Min Mod Max Total  NT x2 Comments:   Bed Mobility    Rolling [] [] [] [] [x] [] [] [] [] [] []    Supine to Sit [] [] [] [] [x] [] [] [] [] [] []    Scooting [] [] [] [] [x] [] [] [] [] [] []    Sit to Supine [] [] [] [] [x] [] [] [] [] [] []    Transfers    Sit to Stand [] [] [] [] [x] [] [] [] [] [] []    Bed to Chair [] [] [] [] [x] [] [] [] [] [] []    Stand to Sit [] [] [] [] [x] [] [] [] [] [] []    I=Independent, Mod I=Modified Independent, S=Supervision, SBA=Standby Assistance, CGA=Contact Guard Assistance,   Min=Minimal Assistance, Mod=Moderate Assistance, Max=Maximal Assistance, Total=Total Assistance, NT=Not Tested    BALANCE: Good Fair+ Fair Fair- Poor NT Comments   Sitting Static [x] [] [] [] [] [] Sitting Dynamic [x] [] [] [] [] []              Standing Static [] [x] [] [] [] []    Standing Dynamic [] [] [x] [] [] []      GAIT: I Mod I S SBA CGA Min Mod Max Total  NT x2 Comments:   Level of Assistance [] [] [] [] [x] [] [] [] [] [] []    Distance 200 ft    DME Rolling Walker    Gait Quality Slow, shuffled    Weightbearing  Status N/A     I=Independent, Mod I=Modified Independent, S=Supervision, SBA=Standby Assistance, CGA=Contact Guard Assistance,   Min=Minimal Assistance, Mod=Moderate Assistance, Max=Maximal Assistance, Total=Total Assistance, NT=Not Tested    PLAN:   FREQUENCY/DURATION: PT Plan of Care: 3 times/week for duration of hospital stay or until stated goals are met, whichever comes first.  TREATMENT:     TREATMENT:    ($$ Therapeutic Activity: 23-37 mins    )  Therapeutic Activity (23 Minutes): Therapeutic activity included Rolling, Supine to Sit, Scooting, Transfer Training, Ambulation on level ground, Sitting balance  and Standing balance to improve functional Mobility, Strength and Activity tolerance.     TREATMENT GRID:  N/A    AFTER TREATMENT POSITION/PRECAUTIONS:  Alarm Activated, Chair, Needs within reach and RN notified    INTERDISCIPLINARY COLLABORATION:  RN/PCT, PT/PTA and OT/GOULD    TOTAL TREATMENT DURATION:  PT Patient Time In/Time Out  Time In: 1128  Time Out: Son 29, PT

## 2022-04-01 NOTE — PROGRESS NOTES
Problem: Falls - Risk of  Goal: *Absence of Falls  Description: Document Tracy Logan Fall Risk and appropriate interventions in the flowsheet. Outcome: Progressing Towards Goal  Note: Fall Risk Interventions:  Mobility Interventions: PT Consult for mobility concerns,OT consult for ADLs,Patient to call before getting OOB    Mentation Interventions: Adequate sleep, hydration, pain control    Medication Interventions: Patient to call before getting OOB,Teach patient to arise slowly    Elimination Interventions: Call light in reach,Stay With Me (per policy),Toileting schedule/hourly rounds,Patient to call for help with toileting needs,Urinal in reach,Toilet paper/wipes in reach,Bed/chair exit alarm    History of Falls Interventions: Investigate reason for fall         Problem: Patient Education: Go to Patient Education Activity  Goal: Patient/Family Education  Outcome: Progressing Towards Goal     Problem: Pressure Injury - Risk of  Goal: *Prevention of pressure injury  Description: Document Arvind Scale and appropriate interventions in the flowsheet.   Outcome: Progressing Towards Goal  Note: Pressure Injury Interventions:  Sensory Interventions: Assess changes in LOC,Keep linens dry and wrinkle-free,Minimize linen layers,Maintain/enhance activity level,Pad between skin to skin,Pressure redistribution bed/mattress (bed type)    Moisture Interventions: Check for incontinence Q2 hours and as needed,Absorbent underpads,Apply protective barrier, creams and emollients,Offer toileting Q_hr,Moisture barrier,Minimize layers    Activity Interventions: PT/OT evaluation,Increase time out of bed,Pressure redistribution bed/mattress(bed type)    Mobility Interventions: PT/OT evaluation    Nutrition Interventions: Document food/fluid/supplement intake,Offer support with meals,snacks and hydration    Friction and Shear Interventions: Apply protective barrier, creams and emollients,Minimize layers       Problem: Surgical Wound Care  Goal: *Non-infected Wound: Absence of infection signs and symptoms  Description: Infection control procedures (eg: clean dressings, clean gloves, hand washing, precautions to isolate wound from contamination, sterile instruments used for wound debridement) should be implemented.   Outcome: Progressing Towards Goal  Goal: *Improvement of existing wound and maintenance of skin integrity  Outcome: Progressing Towards Goal

## 2022-04-02 LAB
ANION GAP SERPL CALC-SCNC: 6 MMOL/L (ref 7–16)
APPEARANCE UR: ABNORMAL
BACTERIA URNS QL MICRO: 0 /HPF
BASOPHILS # BLD: 0.1 K/UL (ref 0–0.2)
BASOPHILS NFR BLD: 1 % (ref 0–2)
BILIRUB UR QL: NEGATIVE
BUN SERPL-MCNC: 16 MG/DL (ref 8–23)
CALCIUM SERPL-MCNC: 8.5 MG/DL (ref 8.3–10.4)
CASTS URNS QL MICRO: NORMAL /LPF
CHLORIDE SERPL-SCNC: 103 MMOL/L (ref 98–107)
CO2 SERPL-SCNC: 30 MMOL/L (ref 21–32)
COLOR UR: ABNORMAL
CREAT SERPL-MCNC: 0.8 MG/DL (ref 0.8–1.5)
CRYSTALS URNS QL MICRO: 0 /LPF
DIFFERENTIAL METHOD BLD: ABNORMAL
EOSINOPHIL # BLD: 0.5 K/UL (ref 0–0.8)
EOSINOPHIL NFR BLD: 4 % (ref 0.5–7.8)
EPI CELLS #/AREA URNS HPF: 0 /HPF
ERYTHROCYTE [DISTWIDTH] IN BLOOD BY AUTOMATED COUNT: 26.5 % (ref 11.9–14.6)
GLUCOSE SERPL-MCNC: 109 MG/DL (ref 65–100)
GLUCOSE UR STRIP.AUTO-MCNC: 100 MG/DL
HCT VFR BLD AUTO: 31.5 % (ref 41.1–50.3)
HGB BLD-MCNC: 8.8 G/DL (ref 13.6–17.2)
HGB UR QL STRIP: ABNORMAL
IMM GRANULOCYTES # BLD AUTO: 0.5 K/UL (ref 0–0.5)
IMM GRANULOCYTES NFR BLD AUTO: 3 % (ref 0–5)
KETONES UR QL STRIP.AUTO: ABNORMAL MG/DL
LEUKOCYTE ESTERASE UR QL STRIP.AUTO: ABNORMAL
LYMPHOCYTES # BLD: 2.1 K/UL (ref 0.5–4.6)
LYMPHOCYTES NFR BLD: 13 % (ref 13–44)
MCH RBC QN AUTO: 18.8 PG (ref 26.1–32.9)
MCHC RBC AUTO-ENTMCNC: 27.9 G/DL (ref 31.4–35)
MCV RBC AUTO: 67.5 FL (ref 79.6–97.8)
MONOCYTES # BLD: 1.1 K/UL (ref 0.1–1.3)
MONOCYTES NFR BLD: 7 % (ref 4–12)
MUCOUS THREADS URNS QL MICRO: 0 /LPF
NEUTS SEG # BLD: 11.1 K/UL (ref 1.7–8.2)
NEUTS SEG NFR BLD: 72 % (ref 43–78)
NITRITE UR QL STRIP.AUTO: POSITIVE
NRBC # BLD: 0.06 K/UL (ref 0–0.2)
OTHER OBSERVATIONS,UCOM: NORMAL
OTHER OBSERVATIONS,UCOM: NORMAL
PH UR STRIP: 7 [PH] (ref 5–9)
PLATELET # BLD AUTO: 219 K/UL (ref 150–450)
PMV BLD AUTO: 8.9 FL (ref 9.4–12.3)
POTASSIUM SERPL-SCNC: 3.2 MMOL/L (ref 3.5–5.1)
PROT UR STRIP-MCNC: 100 MG/DL
RBC # BLD AUTO: 4.67 M/UL (ref 4.23–5.6)
RBC #/AREA URNS HPF: >100 /HPF
SODIUM SERPL-SCNC: 139 MMOL/L (ref 138–145)
SP GR UR REFRACTOMETRY: 1.01 (ref 1–1.02)
UROBILINOGEN UR QL STRIP.AUTO: 0.2 EU/DL (ref 0.2–1)
WBC # BLD AUTO: 15.4 K/UL (ref 4.3–11.1)
WBC URNS QL MICRO: NORMAL /HPF

## 2022-04-02 PROCEDURE — 74011250636 HC RX REV CODE- 250/636: Performed by: INTERNAL MEDICINE

## 2022-04-02 PROCEDURE — 85025 COMPLETE CBC W/AUTO DIFF WBC: CPT

## 2022-04-02 PROCEDURE — 74011250637 HC RX REV CODE- 250/637: Performed by: INTERNAL MEDICINE

## 2022-04-02 PROCEDURE — 74011250636 HC RX REV CODE- 250/636: Performed by: FAMILY MEDICINE

## 2022-04-02 PROCEDURE — 74011000258 HC RX REV CODE- 258: Performed by: INTERNAL MEDICINE

## 2022-04-02 PROCEDURE — 36415 COLL VENOUS BLD VENIPUNCTURE: CPT

## 2022-04-02 PROCEDURE — 74011000250 HC RX REV CODE- 250: Performed by: EMERGENCY MEDICINE

## 2022-04-02 PROCEDURE — 74011000250 HC RX REV CODE- 250: Performed by: FAMILY MEDICINE

## 2022-04-02 PROCEDURE — 52000 CYSTOURETHROSCOPY: CPT | Performed by: UROLOGY

## 2022-04-02 PROCEDURE — 81015 MICROSCOPIC EXAM OF URINE: CPT

## 2022-04-02 PROCEDURE — 87086 URINE CULTURE/COLONY COUNT: CPT

## 2022-04-02 PROCEDURE — 81003 URINALYSIS AUTO W/O SCOPE: CPT

## 2022-04-02 PROCEDURE — 80048 BASIC METABOLIC PNL TOTAL CA: CPT

## 2022-04-02 PROCEDURE — 65270000029 HC RM PRIVATE

## 2022-04-02 RX ORDER — HYDROCODONE BITARTRATE AND ACETAMINOPHEN 5; 325 MG/1; MG/1
1 TABLET ORAL
Qty: 5 TABLET | Refills: 0 | Status: SHIPPED | OUTPATIENT
Start: 2022-04-02 | End: 2022-04-05

## 2022-04-02 RX ORDER — FUROSEMIDE 10 MG/ML
40 INJECTION INTRAMUSCULAR; INTRAVENOUS ONCE
Status: COMPLETED | OUTPATIENT
Start: 2022-04-02 | End: 2022-04-02

## 2022-04-02 RX ORDER — PANTOPRAZOLE SODIUM 40 MG/1
40 TABLET, DELAYED RELEASE ORAL
Qty: 30 TABLET | Refills: 0 | Status: SHIPPED | OUTPATIENT
Start: 2022-04-03

## 2022-04-02 RX ORDER — DOCUSATE SODIUM 100 MG/1
100 CAPSULE, LIQUID FILLED ORAL 2 TIMES DAILY
Qty: 60 CAPSULE | Refills: 0 | Status: SHIPPED | OUTPATIENT
Start: 2022-04-02 | End: 2022-04-02

## 2022-04-02 RX ORDER — SULFAMETHOXAZOLE AND TRIMETHOPRIM 800; 160 MG/1; MG/1
1 TABLET ORAL 2 TIMES DAILY
Qty: 24 TABLET | Refills: 0 | Status: SHIPPED | OUTPATIENT
Start: 2022-04-02 | End: 2022-04-07 | Stop reason: SDUPTHER

## 2022-04-02 RX ORDER — TAMSULOSIN HYDROCHLORIDE 0.4 MG/1
0.4 CAPSULE ORAL DAILY
Status: DISCONTINUED | OUTPATIENT
Start: 2022-04-03 | End: 2022-04-07 | Stop reason: HOSPADM

## 2022-04-02 RX ORDER — METRONIDAZOLE 500 MG/1
500 TABLET ORAL 2 TIMES DAILY
Qty: 24 TABLET | Refills: 0 | Status: SHIPPED | OUTPATIENT
Start: 2022-04-02 | End: 2022-04-07 | Stop reason: SDUPTHER

## 2022-04-02 RX ORDER — SULFAMETHOXAZOLE AND TRIMETHOPRIM 400; 80 MG/1; MG/1
1 TABLET ORAL 2 TIMES DAILY
Qty: 24 TABLET | Refills: 0 | Status: SHIPPED | OUTPATIENT
Start: 2022-04-02 | End: 2022-04-02

## 2022-04-02 RX ORDER — LANOLIN ALCOHOL/MO/W.PET/CERES
1000 CREAM (GRAM) TOPICAL DAILY
Qty: 90 TABLET | Refills: 0 | Status: SHIPPED | OUTPATIENT
Start: 2022-04-02

## 2022-04-02 RX ADMIN — METRONIDAZOLE 500 MG: 500 TABLET ORAL at 09:30

## 2022-04-02 RX ADMIN — FUROSEMIDE 40 MG: 10 INJECTION, SOLUTION INTRAMUSCULAR; INTRAVENOUS at 09:30

## 2022-04-02 RX ADMIN — SODIUM CHLORIDE, PRESERVATIVE FREE 10 ML: 5 INJECTION INTRAVENOUS at 22:00

## 2022-04-02 RX ADMIN — POTASSIUM BICARBONATE 40 MEQ: 782 TABLET, EFFERVESCENT ORAL at 06:51

## 2022-04-02 RX ADMIN — SODIUM CHLORIDE, PRESERVATIVE FREE 10 ML: 5 INJECTION INTRAVENOUS at 05:13

## 2022-04-02 RX ADMIN — CYANOCOBALAMIN TAB 1000 MCG 1000 MCG: 1000 TAB at 09:30

## 2022-04-02 RX ADMIN — PANTOPRAZOLE SODIUM 40 MG: 40 TABLET, DELAYED RELEASE ORAL at 05:13

## 2022-04-02 RX ADMIN — METRONIDAZOLE 500 MG: 500 TABLET ORAL at 17:29

## 2022-04-02 RX ADMIN — HYDROCODONE BITARTRATE AND ACETAMINOPHEN 1 TABLET: 5; 325 TABLET ORAL at 19:47

## 2022-04-02 RX ADMIN — CEFTRIAXONE 2 G: 2 INJECTION, POWDER, FOR SOLUTION INTRAMUSCULAR; INTRAVENOUS at 11:44

## 2022-04-02 RX ADMIN — MORPHINE SULFATE 1 MG: 2 INJECTION, SOLUTION INTRAMUSCULAR; INTRAVENOUS at 22:14

## 2022-04-02 RX ADMIN — MORPHINE SULFATE 1 MG: 2 INJECTION, SOLUTION INTRAMUSCULAR; INTRAVENOUS at 16:20

## 2022-04-02 RX ADMIN — SODIUM CHLORIDE, PRESERVATIVE FREE 10 ML: 5 INJECTION INTRAVENOUS at 17:29

## 2022-04-02 NOTE — PROGRESS NOTES
Hospitalist Progress Note   Admit Date:  3/27/2022  7:41 PM   Name:  Rand Hall   Age:  79 y.o. Sex:  male  :  1940   MRN:  [id number]   Room:  604/    Presenting Complaint: Abdominal Pain    Reason(s) for Admission: Sepsis [facility]) [A41.9]  Acute cholecystitis [K81.0]  Pancreatitis, gallstone [facility] Course & Interval History:       Mr. Bren Walker is an 80 yo male with PMH of AFIB s/p watchman, SSS s/p PPM- has fractured LV lead, LVDD2, CAD, myasthenia gravis admitted with abdominal pain and meeting sepsis criteria. He has leukocytosis, lactic acidosis, elevated lipase and LFTS.      CTAP shows   \"     IMPRESSION  There are multiple hyperechoic dense structures rather in the gallbladder neck. These may represent multiple stones. There is mild gallbladder wall thickening  with subtle pericholecystic fluid. These findings raise suspicion for acute  cholecystitis.     There is also mild peripancreatic fluid. This fluid could be due to  cholecystitis or acute pancreatitis.     There is focal dilatation of the distal right ureter. There is no evidence of  hydronephrosis proximal to this point. Further work up with urology consult is  recommended to assess this region.     Chronic appearing changes are noted in the kidneys.     Multiple small low-density lesions are seen in the liver and are too small to  Characterize. \"     ABD US      \"  IMPRESSION  Cholelithiasis. The gallbladder wall is thickened measuring 7.6 mm. There is no  appreciable pericholecystic fluid. Sonographic Karma Bottoms sign was not documented.     The pancreas was not visualized.     Hepatic steatosis.     The right kidney appears somewhat echogenic. This can be seen with medical renal  disease. Please correlate clinically. \"           He has been NPO . S/p surgery consult. HIDA scan completed. Postop lap cholecystectomy on 3-29-22.      ERCP completed  3-30-22.         He had acute blood loss anemia with recent dark stools. S/p 3 PRBC. GI following. S/p EGD 3-29-22 no obvious source of bleeding.      Blood cultures 2/2 klebsiella. ID following. He is on course of zosyn. Adjusted to rocephin and flagyl while admitted. Can discharge to home on bactrim/flagyl as unable to use quinolones with his known myasthenia gravis. EOT 4-13-22.               Seen by urology for abnormal appearing right ureter on CTAP- no hydronephrosis. Plan for office followup.     CXR shows vascular congestion. Lasix resumed. Cardiology following.         Discharge plans pending. Lives with wife.          Subjective/24hr Events (04/02/22):       Ate ok, having loose BM now, unable to fully urinate , no dyspnea, denies edema        Assessment & Plan:     Principal Problem:    Sepsis ([facility] [address]   Likely GI source with acute cholecystitis:  klebsiella bacteremia:  · D7 antibiotics, changed to rocephin/ flagyl and can use bactrim DS / flagyl on discharge EOT 4-13-22  ·  UA pending- reordered       Active Problems:    HTN (hypertension)   · BP low on admit, laisx resumed/ lisinopril on hold           Coronary atherosclerosis of native coronary vessel  · Asa held   · lipitor held due to elevated LFTS, resume at discharge  · Cardiology following         Myasthenia gravis ([facility] [address] ()  ·   Monitor   · PT,OT       SSS (sick sinus syndrome) (HCC)     Paroxysmal atrial fibrillation (HCC)   · In NSR  · S/p prior watchman / PPM         Diastolic CHF, chronic ([facility] [address] (3/2/2022)  · Continued lasix, dose IV once today and reassess edema  · O2 as needed to wean as tolerant , on room air         Pancreatitis, gallstone (3/28/2022)    Acute cholecystitis (3/28/2022)  ·  advance diet as tolerant, GI soft/ low fat   · KUB ordered 3-31-22, non obstructive  · GI/surgery consulted POD 4 s/p lap cholecystectomy   · D7 antibiotics   · ERCP 3-30-22          Acute iron deficient Anemia (3/28/2022)- worse 3-28-22  · IV protonix every 12 hours   · Trend HGB  · 3 total PRBC transfused   · pending occult stool  · continued oral B12        Right ureteral dilation: no kevin hydronephrosis   Urine retention  · Pending UA  ·  urology consulted -office followup recommended   · IN and OUT as needed        Hypokalemia:  · Replace and repeat lab        Dispo/Discharge Planning:      Pending , PPD, PT,OT           Diet:  ADULT DIET Easy to Chew; Low Fat/Low Chol/High Fiber/PATTIE  DVT PPx:  SCD  Code status: Full Code    Hospital Problems as of 4/2/2022 Date Reviewed: 3/2/2022          Codes Class Noted - Resolved POA    Acute cholecystitis ICD-10-CM: K81.0  ICD-9-CM: 575.0  3/28/2022 - Present Unknown        Pancreatitis, gallstone ICD-10-CM: K85.10  ICD-9-CM: 577.0, 574.20  3/28/2022 - Present Unknown        Anemia ICD-10-CM: D64.9  ICD-9-CM: 285.9  3/28/2022 - Present Yes        Bacteremia ICD-10-CM: R78.81  ICD-9-CM: 790.7  3/28/2022 - Present Yes        Diastolic CHF, chronic ([facility] ICD-10-CM: I50.32  ICD-9-CM: 428.32, 428.0  3/2/2022 - Present Yes        Atrial fibrillation ([facility] ICD-10-CM: I48.91  ICD-9-CM: 427.31  11/29/2017 - Present Yes        * (Principal) Sepsis ([facility] ICD-10-CM: A41.9  ICD-9-CM: 038.9, 995.91  10/26/2017 - Present Unknown        Paroxysmal atrial fibrillation ([facility] ICD-10-CM: I48.0  ICD-9-CM: 427.31  6/30/2017 - Present Yes    Overview Signed 5/21/2019 10:36 AM by Elvin Clayton MD     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               SSS (sick sinus syndrome) (HCC) ICD-10-CM: I49.5  ICD-9-CM: 427.81  6/30/2017 - Present Yes        Myasthenia gravis ([facility] ICD-10-CM: G70.00  ICD-9-CM: 358.00  Unknown - Present Yes        HTN (hypertension) (Chronic) ICD-10-CM: I10  ICD-9-CM: 401.9  5/8/2015 - Present Yes        Coronary atherosclerosis of native coronary vessel ICD-10-CM: I25.10  ICD-9-CM: 414.01  5/8/2015 - Present Yes    Overview Signed 6/7/2016  8:35 AM by [address] [facility]  5/7/15: prox LAD PCI, normal EF                   Objective:     Patient Vitals for the past 24 hrs:   Temp Pulse Resp BP SpO2   04/02/22 1131 98.5 °F (36.9 °C) 74 18 (!) 142/70 94 %   04/02/22 0225 98.2 °F (36.8 °C) 69 17 (!) 155/74 94 %   04/01/22 2320 98.1 °F (36.7 °C) 74 19 (!) 167/81 92 %   04/01/22 1914 97.9 °F (36.6 °C) 74 19 139/67 96 %   04/01/22 1731 98.3 °F (36.8 °C) 75 22 (!) 151/80 93 %     Oxygen Therapy  O2 Sat (%): 94 % (04/02/22 1131)  Pulse via Oximetry: 70 beats per minute (04/01/22 0017)  O2 Device: None (Room air) (04/02/22 0729)  Skin Assessment: Clean, dry, & intact (04/01/22 0017)  O2 Flow Rate (L/min): 2 l/min (04/01/22 0017)    Estimated body mass index is 34.66 kg/m² as calculated from the following:    Height as of this encounter: 5' 8\" (1.727 m). Weight as of this encounter: 103.4 kg (227 lb 15.3 oz). Intake/Output Summary (Last 24 hours) at 4/2/2022 1525  Last data filed at 4/2/2022 6770  Gross per 24 hour   Intake 240 ml   Output    Net 240 ml         Physical Exam:     Blood pressure (!) 142/70, pulse 74, temperature 98.5 °F (36.9 °C), resp. rate 18, height 5' 8\" (1.727 m), weight 103.4 kg (227 lb 15.3 oz), SpO2 94 %. General:    Well nourished. No overt distress, elderly   CV:   RRR. No m/r/g. No jugular venous distension. 1+ edema   Lungs:   Coarse   Abdomen: Bowel sounds present. nontender, slightly distended   Extremities: No cyanosis or clubbing. 1+ edema  Skin:     No rashes and normal coloration. Warm and dry. Neuro:   grossly intact.     Psych:  Normal mood and affect    I have reviewed ordered lab tests and independently visualized imaging below:    Recent Labs:  Recent Results (from the past 48 hour(s))   CBC WITH AUTOMATED DIFF    Collection Time: 04/01/22  5:21 AM   Result Value Ref Range    WBC 14.0 (H) 4.3 - 11.1 K/uL    RBC 4.78 4.23 - 5.6 M/uL    HGB 8.9 (L) 13.6 - 17.2 g/dL    HCT 32.1 (L) 41.1 - 50.3 %    MCV 67.2 (L) 79.6 - 97.8 FL    MCH 18.6 (L) 26.1 - 32.9 PG    MCHC 27.7 (L) 31.4 - 35.0 g/dL    RDW 25.7 (H) 11.9 - 14.6 %    PLATELET 386 322 - 331 K/uL    MPV 9.4 9.4 - 12.3 FL    ABSOLUTE NRBC 0.07 0.0 - 0.2 K/uL    DF AUTOMATED      NEUTROPHILS 80 (H) 43 - 78 %    LYMPHOCYTES 11 (L) 13 - 44 %    MONOCYTES 5 4.0 - 12.0 %    EOSINOPHILS 2 0.5 - 7.8 %    BASOPHILS 1 0.0 - 2.0 %    IMMATURE GRANULOCYTES 2 0.0 - 5.0 %    ABS. NEUTROPHILS 11.1 (H) 1.7 - 8.2 K/UL    ABS. LYMPHOCYTES 1.6 0.5 - 4.6 K/UL    ABS. MONOCYTES 0.7 0.1 - 1.3 K/UL    ABS. EOSINOPHILS 0.3 0.0 - 0.8 K/UL    ABS. BASOPHILS 0.1 0.0 - 0.2 K/UL    ABS. IMM. GRANS. 0.2 0.0 - 0.5 K/UL   METABOLIC PANEL, BASIC    Collection Time: 04/01/22  5:21 AM   Result Value Ref Range    Sodium 141 138 - 145 mmol/L    Potassium 3.2 (L) 3.5 - 5.1 mmol/L    Chloride 104 98 - 107 mmol/L    CO2 29 21 - 32 mmol/L    Anion gap 8 7 - 16 mmol/L    Glucose 133 (H) 65 - 100 mg/dL    BUN 19 8 - 23 MG/DL    Creatinine 0.90 0.8 - 1.5 MG/DL    GFR est AA >60 >60 ml/min/1.73m2    GFR est non-AA >60 >60 ml/min/1.73m2    Calcium 8.5 8.3 - 10.4 MG/DL   CBC WITH AUTOMATED DIFF    Collection Time: 04/02/22  5:10 AM   Result Value Ref Range    WBC 15.4 (H) 4.3 - 11.1 K/uL    RBC 4.67 4.23 - 5.6 M/uL    HGB 8.8 (L) 13.6 - 17.2 g/dL    HCT 31.5 (L) 41.1 - 50.3 %    MCV 67.5 (L) 79.6 - 97.8 FL    MCH 18.8 (L) 26.1 - 32.9 PG    MCHC 27.9 (L) 31.4 - 35.0 g/dL    RDW 26.5 (H) 11.9 - 14.6 %    PLATELET 460 249 - 257 K/uL    MPV 8.9 (L) 9.4 - 12.3 FL    ABSOLUTE NRBC 0.06 0.0 - 0.2 K/uL    DF AUTOMATED      NEUTROPHILS 72 43 - 78 %    LYMPHOCYTES 13 13 - 44 %    MONOCYTES 7 4.0 - 12.0 %    EOSINOPHILS 4 0.5 - 7.8 %    BASOPHILS 1 0.0 - 2.0 %    IMMATURE GRANULOCYTES 3 0.0 - 5.0 %    ABS. NEUTROPHILS 11.1 (H) 1.7 - 8.2 K/UL    ABS. LYMPHOCYTES 2.1 0.5 - 4.6 K/UL    ABS. MONOCYTES 1.1 0.1 - 1.3 K/UL    ABS. EOSINOPHILS 0.5 0.0 - 0.8 K/UL    ABS. BASOPHILS 0.1 0.0 - 0.2 K/UL    ABS. IMM. GRANS.  0.5 0.0 - 0.5 K/UL   METABOLIC PANEL, BASIC    Collection Time: 04/02/22  5:10 AM   Result Value Ref Range    Sodium 139 138 - 145 mmol/L    Potassium 3.2 (L) 3.5 - 5.1 mmol/L    Chloride 103 98 - 107 mmol/L    CO2 30 21 - 32 mmol/L    Anion gap 6 (L) 7 - 16 mmol/L    Glucose 109 (H) 65 - 100 mg/dL    BUN 16 8 - 23 MG/DL    Creatinine 0.80 0.8 - 1.5 MG/DL    GFR est AA >60 >60 ml/min/1.73m2    GFR est non-AA >60 >60 ml/min/1.73m2    Calcium 8.5 8.3 - 10.4 MG/DL       All Micro Results     Procedure Component Value Units Date/Time    CULTURE, BLOOD [[id number]] Collected: 03/30/22 0648    Order Status: Completed Specimen: Blood Updated: 04/02/22 0642     Special Requests: --        LEFT  ARM       Culture result: NO GROWTH 3 DAYS       CULTURE, BLOOD [[id number]] Collected: 03/30/22 9135    Order Status: Completed Specimen: Blood Updated: 04/02/22 0642     Special Requests: --        LEFT  FOREARM       Culture result: NO GROWTH 3 DAYS       CULTURE, BLOOD [[id number]]  (Abnormal) Collected: 03/28/22 0019    Order Status: Completed Specimen: Blood Updated: 03/30/22 0726     Special Requests: --        LEFT  ARM       GRAM STAIN GRAM NEGATIVE RODS               AEROBIC AND ANAEROBIC BOTTLES                  CRITICAL RESULT NOT CALLED DUE TO PREVIOUS NOTIFICATION OF CRITICAL RESULT WITHIN THE LAST 24 HOURS.            Culture result: GRAM NEGATIVE RODS               For identification and susceptibility refer to culture  Accession [id number]      CULTURE, BLOOD [[id number]]  (Abnormal)  (Susceptibility) Collected: 03/27/22 2350    Order Status: Completed Specimen: Blood Updated: 03/30/22 0725     Special Requests: --        RIGHT  ARM       GRAM STAIN GRAM NEGATIVE RODS               AEROBIC AND ANAEROBIC BOTTLES                  RESULTS VERIFIED, PHONED TO AND READ BACK BY Daja Monge RN @ 2031 ON 3/28/22 BY M           Culture result: KLEBSIELLA PNEUMONIAE               Refer to Blood Culture ID Panel Accession  [id number]      BLOOD CULTURE ID PANEL [[id number]]  (Abnormal) Collected: 03/27/22 2350    Order Status: Completed Specimen: Blood Updated: 03/28/22 1236     Acc. no. from Micro Order [id number]     Klebsiella pneumoniae Detected        Comment: RESULTS VERIFIED, PHONED TO AND READ BACK BY  Renan Hernandez RN @ 0077 ON 3/28/22 BY SERA          KPC (Carbapenem Resistance Gene) NOT DETECTED        Comment: WARNING:  A Not Detected result for the KPC gene does not indicate susceptibility to carbapenems. Gram negative bacteria can be resistant to carbapenems by mechanisms other than carrying the KPC gene. INTERPRETATION       Gram negative ney, identified by real time PCR as Klebsiella pneumoniae. Other Studies:  No results found.     Current Meds:  Current Facility-Administered Medications   Medication Dose Route Frequency    cefTRIAXone (ROCEPHIN) 2 g in 0.9% sodium chloride (MBP/ADV) 50 mL MBP  2 g IntraVENous Q24H    metroNIDAZOLE (FLAGYL) tablet 500 mg  500 mg Oral BID    HYDROcodone-acetaminophen (NORCO) 5-325 mg per tablet 1 Tablet  1 Tablet Oral Q6H PRN    simethicone (MYLICON) tablet 80 mg  80 mg Oral QID PRN    lip protectant (BLISTEX) ointment 1 Each  1 Each Topical PRN    bisacodyL (DULCOLAX) suppository 10 mg  10 mg Rectal DAILY PRN    pantoprazole (PROTONIX) tablet 40 mg  40 mg Oral ACB    cyanocobalamin tablet 1,000 mcg  1,000 mcg Oral DAILY    0.9% sodium chloride infusion 250 mL  250 mL IntraVENous PRN    sodium chloride (NS) flush 5-40 mL  5-40 mL IntraVENous Q8H    sodium chloride (NS) flush 5-40 mL  5-40 mL IntraVENous PRN    acetaminophen (TYLENOL) tablet 650 mg  650 mg Oral Q6H PRN    Or    acetaminophen (TYLENOL) suppository 650 mg  650 mg Rectal Q6H PRN    polyethylene glycol (MIRALAX) packet 17 g  17 g Oral DAILY PRN    ondansetron (ZOFRAN ODT) tablet 4 mg  4 mg Oral Q8H PRN    Or    ondansetron (ZOFRAN) injection 4 mg  4 mg IntraVENous Q6H PRN    hydrALAZINE (APRESOLINE) 20 mg/mL injection 10 mg  10 mg IntraVENous Q6H PRN    morphine injection 1 mg  1 mg IntraVENous Q4H PRN    furosemide (LASIX) tablet 40 mg  40 mg Oral DAILY    sodium chloride (NS) flush 5-10 mL  5-10 mL IntraVENous Q8H    sodium chloride (NS) flush 5-10 mL  5-10 mL IntraVENous PRN       Signed:  Essence Holland MD    Part of this note may have been written by using a voice dictation software. The note has been proof read but may still contain some grammatical/other typographical errors.

## 2022-04-02 NOTE — PROGRESS NOTES
Physician Progress Note      Harmeet Kidd  CSN #:                  [id number]  :                       1940  ADMIT DATE:       3/27/2022 7:41 PM  [address] [place] DATE:  RESPONDING  PROVIDER #:        BESS BAKER MD          QUERY TEXT:    Patient admitted with GI bleeding, Sepsis, and acute Cholecystitis noted to have cecal poly, internal hemorrhoids and diverticulosis on  colonoscopy. If possible, please document in progress notes and discharge summary the cause of the GI bleeding: The medical record reflects the following:  Risk Factors: 79 YOM, colon polyp, internal hemorrhoids, diverticulosis per  colonoscopy, GERD  Clinical Indicators: tarry dark stools,3/27--3/28  HGB 7.2---6.4---6.2  3/28 GI Consult: Last colonoscopy according to our records was in 2003 with findings of TA cecal polyp, IH, diverticulosis. 3/28 Surgical consult: He also has anemia and black tarry stools per report  Treatment: Monitoring, GI consult, Surgical Consult, Transfusion 3 U PRBCs, B12 PO, IV PPI,      Alexia Soliman RNC, BSN  Clinical   [email or web address]  Options provided:  -- GI bleeding due to colon polyps  -- GI bleeding due to hemorrhoids  -- Other - I will add my own diagnosis  -- Disagree - Not applicable / Not valid  -- Disagree - Clinically unable to determine / Unknown  -- Refer to Clinical Documentation Reviewer    PROVIDER RESPONSE TEXT:    Occult GI bleed    Query created by:  Yfn Smith on 2022 3:23 PM      Electronically signed by:  Nerissa BAKER MD 2022 6:31 AM

## 2022-04-02 NOTE — PROGRESS NOTES
1544: Patient complained of not being able to urinate. Patient bladdered scanned showing 650mL in the bladder. Nurse notified MD and received orders to straight cath. 1550: Nurse attempted to straight cath 3 times and met resistance. 1555: Nurse contacted Urology. 1600: Dr. Sandor Cloud placed coronel for patient. Will continue to monitor.

## 2022-04-02 NOTE — PROGRESS NOTES
Problem: Falls - Risk of  Goal: *Absence of Falls  Description: Document Harjinder Hicks Fall Risk and appropriate interventions in the flowsheet. Outcome: Progressing Towards Goal  Note: Fall Risk Interventions:  Mobility Interventions: OT consult for ADLs,PT Consult for mobility concerns    Mentation Interventions: Adequate sleep, hydration, pain control,More frequent rounding,Door open when patient unattended,Reorient patient    Medication Interventions: Bed/chair exit alarm    Elimination Interventions: Bed/chair exit alarm,Call light in reach,Toileting schedule/hourly rounds,Stay With Me (per policy),Patient to call for help with toileting needs    History of Falls Interventions: Investigate reason for fall       Problem: Patient Education: Go to Patient Education Activity  Goal: Patient/Family Education  Outcome: Progressing Towards Goal     Problem: Pressure Injury - Risk of  Goal: *Prevention of pressure injury  Description: Document Arvind Scale and appropriate interventions in the flowsheet.   Outcome: Progressing Towards Goal  Note: Pressure Injury Interventions:  Sensory Interventions: Assess changes in LOC,Keep linens dry and wrinkle-free,Minimize linen layers,Maintain/enhance activity level,Pad between skin to skin,Pressure redistribution bed/mattress (bed type)    Moisture Interventions: Absorbent underpads,Limit adult briefs,Minimize layers    Activity Interventions: PT/OT evaluation,Increase time out of bed    Mobility Interventions: PT/OT evaluation    Nutrition Interventions: Document food/fluid/supplement intake,Offer support with meals,snacks and hydration    Friction and Shear Interventions: Apply protective barrier, creams and emollients,Minimize layers       Problem: Patient Education: Go to Patient Education Activity  Goal: Patient/Family Education  Outcome: Progressing Towards Goal

## 2022-04-02 NOTE — DISCHARGE SUMMARY
Hospitalist Discharge Summary   Admit Date:  3/27/2022  7:41 PM   DC Note date: 2022  Name:  Katlyn Carrera   Age:  79 y.o. Sex:  male  :  1940   MRN:  [id number]   Room:  [facility]  PCP:  Anthony Robbins MD    Presenting Complaint: Abdominal Pain    Initial Admission Diagnosis: Sepsis ([facility] [A41.9]  Acute cholecystitis [K81.0]  Pancreatitis, gallstone [K85.10]     Problem List for this Hospitalization:  Hospital Problems as of 2022 Date Reviewed: 3/2/2022            Codes Class Noted - Resolved POA    Acute cholecystitis ICD-10-CM: K81.0  ICD-9-CM: 575.0  3/28/2022 - Present Unknown        Pancreatitis, gallstone ICD-10-CM: K85.10  ICD-9-CM: 577.0, 574.20  3/28/2022 - Present Unknown        Anemia ICD-10-CM: D64.9  ICD-9-CM: 285.9  3/28/2022 - Present Yes        Bacteremia ICD-10-CM: R78.81  ICD-9-CM: 790.7  3/28/2022 - Present Yes        Diastolic CHF, chronic ([facility] ICD-10-CM: I50.32  ICD-9-CM: 428.32, 428.0  3/2/2022 - Present Yes        Atrial fibrillation ([facility] ICD-10-CM: I48.91  ICD-9-CM: 427.31  2017 - Present Yes        * (Principal) Sepsis ([facility] ICD-10-CM: A41.9  ICD-9-CM: 038.9, 995.91  10/26/2017 - Present Unknown        Paroxysmal atrial fibrillation ([facility] ICD-10-CM: I48.0  ICD-9-CM: 427.31  2017 - Present Yes    Overview Signed 2019 10:36 AM by Brittny Rubi MD     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               SSS (sick sinus syndrome) ([facility] ICD-10-CM: I49.5  ICD-9-CM: 427.81  2017 - Present Yes        Myasthenia gravis ([facility] ICD-10-CM: G70.00  ICD-9-CM: 358.00  Unknown - Present Yes        HTN (hypertension) (Chronic) ICD-10-CM: I10  ICD-9-CM: 401.9  2015 - Present Yes        Coronary atherosclerosis of native coronary vessel ICD-10-CM: I25.10  ICD-9-CM: 414.01  2015 - Present Yes    Overview Signed 2016  8:35 AM by Erum Porter on brilin  5/7/15: prox LAD PCI, normal EF                   Did Patient have Sepsis (YES OR NO): yes     Hospital Course:    Mr. Nikolas Ann is an 80 yo male with PMH of AFIB s/p watchman, SSS s/p PPM- has fractured LV lead, LVDD2, CAD, myasthenia gravis admitted with abdominal pain and meeting sepsis criteria. He has leukocytosis, lactic acidosis, elevated lipase and LFTS. CTAP shows   \"     IMPRESSION  There are multiple hyperechoic dense structures rather in the gallbladder neck. These may represent multiple stones. There is mild gallbladder wall thickening  with subtle pericholecystic fluid. These findings raise suspicion for acute  cholecystitis. There is also mild peripancreatic fluid. This fluid could be due to  cholecystitis or acute pancreatitis. There is focal dilatation of the distal right ureter. There is no evidence of  hydronephrosis proximal to this point. Further work up with urology consult is  recommended to assess this region. Chronic appearing changes are noted in the kidneys. Multiple small low-density lesions are seen in the liver and are too small to  Characterize. \"     ABD US      \"  IMPRESSION  Cholelithiasis. The gallbladder wall is thickened measuring 7.6 mm. There is no  appreciable pericholecystic fluid. Sonographic Janas Shouts sign was not documented. The pancreas was not visualized. Hepatic steatosis. The right kidney appears somewhat echogenic. This can be seen with medical renal  disease. Please correlate clinically. \"           He has been NPO . S/p surgery consult. HIDA scan completed. Postop lap cholecystectomy on 3-29-22. He will need 10 day surgical followup. Diet to advance as tolerant. ERCP completed  3-30-22. He had acute blood loss anemia with recent dark stools. S/p 3 PRBC. GI following. S/p EGD 3-29-22 no obvious source of bleeding. He will need GI followup. Blood cultures 2/2 klebsiella. ID following. He is on course of zosyn. Adjusted to rocephin and flagyl while admitted.  Can discharge to home on bactrim/flagyl as unable to use quinolones with his known myasthenia gravis. EOT 4-13-22. he will hold his potassium and lisinopril while taking bactrim. He will need his renal function followed as well as potassium levels. He was Seen by urology for abnormal appearing right ureter on CTAP- no hydronephrosis. Plan for office followup. CXR shows vascular congestion. Lasix resumed. Cardiology following. Discharge plans are to home with home health. Lives with wife. Disposition:   Diet: ADULT DIET Easy to Chew  Code Status: Full Code    Follow Up Orders: Follow-up Appointments   Procedures    FOLLOW UP VISIT Appointment in: Ten Days Follow up with NP for laparoscopic cholecystectomy in 10 days. At [facility]. Follow up with NP for laparoscopic cholecystectomy in 10 days. At [facility]. Standing Status:   Standing     Number of Occurrences:   1     Order Specific Question:   Appointment in     Answer:   Ten Days    FOLLOW UP VISIT Appointment in: One Week Pcp 1 week, GI 2 weeks, surgery 10 days     Pcp 1 week, GI 2 weeks, surgery 10 days     Standing Status:   Standing     Number of Occurrences:   1     Standing Expiration Date:   4/3/2022     Order Specific Question:   Appointment in     Answer: One Week    FOLLOW UP VISIT Appointment in: One Week 1 week PCP, 2 weeks GI, [facility] weeks [facility], 2-4 weeks urology , 10 days surgery     1 week PCP, 2 weeks GI, 2 weeks [facility], 2-4 weeks urology , 10 days surgery     Standing Status:   Standing     Number of Occurrences:   1     Order Specific Question:   Appointment in     Answer:    One Week       Follow-up Information    None         Follow up labs/diagnostics (ultimately defer to outpatient provider):    BMP, HGB, potassium         Surgery 10 days  2 weeks cardiology  2 weeks GI  4 weeks urology   PCP 1 week     Time spent in patient discharge and coordination 35 minutes. Plan was discussed with patient. All questions answered. Patient was stable at time of discharge. Instructions given to call a physician or return if any concerns. Discharge Info:   Current Discharge Medication List        START taking these medications    Details   pantoprazole (PROTONIX) 40 mg tablet Take 1 Tablet by mouth Daily (before breakfast). Qty: 30 Tablet, Refills: 0  Start date: 4/3/2022      metroNIDAZOLE (FLAGYL) 500 mg tablet Take 1 Tablet by mouth two (2) times a day for 12 days. Qty: 24 Tablet, Refills: 0  Start date: 4/2/2022, End date: 4/14/2022      HYDROcodone-acetaminophen (NORCO) 5-325 mg per tablet Take 1 Tablet by mouth every six (6) hours as needed for Pain for up to 3 days. Max Daily Amount: 4 Tablets. Qty: 5 Tablet, Refills: 0  Start date: 4/2/2022, End date: 4/5/2022    Associated Diagnoses: Acute gallstone pancreatitis      cyanocobalamin 1,000 mcg tablet Take 1 Tablet by mouth daily. Qty: 90 Tablet, Refills: 0  Start date: 4/2/2022      trimethoprim-sulfamethoxazole (BACTRIM DS, SEPTRA DS) 160-800 mg per tablet Take 1 Tablet by mouth two (2) times a day for 12 days. Qty: 24 Tablet, Refills: 0  Start date: 4/2/2022, End date: 4/14/2022           CONTINUE these medications which have NOT CHANGED    Details   escitalopram oxalate (LEXAPRO) 10 mg tablet Take 1 Tablet by mouth daily. Qty: 90 Tablet, Refills: 3      furosemide (LASIX) 40 mg tablet Take 1 Tablet by mouth daily. Qty: 90 Tablet, Refills: 5      atorvastatin (LIPITOR) 80 mg tablet Take 1 Tablet by mouth nightly. Qty: 90 Tablet, Refills: 3      rOPINIRole (REQUIP) 0.5 mg tablet 1 nightly, increase to 1 twice a day if needed  Qty: 180 Tablet, Refills: 3    Associated Diagnoses: Restless leg syndrome      nitroglycerin (NITROSTAT) 0.4 mg SL tablet Place 1 sl under the tongue q 5 min prn cp, max 3 sl in a 15-min time period. Call 911 if no relief after the 3rd sl.   Qty: 1 Bottle, Refills: prn           STOP taking these medications       lisinopriL (PRINIVIL, ZESTRIL) 10 mg tablet Comments:   Reason for Stopping:         sildenafil citrate (Viagra) 100 mg tablet Comments:   Reason for Stopping:         potassium chloride (Klor-Con M20) 20 mEq tablet Comments:   Reason for Stopping:         aspirin delayed-release 81 mg tablet Comments:   Reason for Stopping:               Procedures done this admission:  Procedure(s):  ENDOSCOPIC RETROGRADE CHOLANGIOPANCREATOGRAPHY (ERCP) Rm 604  ENDOSCOPIC SPHINCTEROTOMY  ENDOSCOPIC STONE EXTRACTION/BALLOON SWEEP    Consults this admission:  IP CONSULT TO GENERAL SURGERY  IP CONSULT TO GASTROENTEROLOGY  IP CONSULT TO UROLOGY  IP CONSULT TO INFECTIOUS DISEASES  IP CONSULT TO CARDIOLOGY    Echocardiogram/EKG results:  Results from Ancillary Procedure encounter on 12/10/21    ECHO ADULT COMPLETE    Interpretation Summary  · LA: Left Atrium volume index is 44.4 mL/m2. · AV: Aortic valve mean gradient is 10 mmHg. · TV: Right Ventricular Arterial Pressure (RVSP) is 48 mmHg. · Mild LVH  · Normal EF  · Moderate aortic insuff       EKG Results       Procedure 720 Value Units Date/Time    EKG (Check If Upper Abdominal Pain or symptoms of SOB, Diaphoresis, or Tachycardia) [[id number]] Collected: 03/27/22 2003    Order Status: Completed Updated: 03/28/22 0950     Ventricular Rate 78 BPM      Atrial Rate 0 BPM      P-R Interval 53 ms      QRS Duration 151 ms      Q-T Interval 388 ms      QTC Calculation (Bezet) 442 ms      Calculated P Axis 0 degrees      Calculated R Axis -87 degrees      Calculated T Axis 81 degrees      Diagnosis --     Ventricular-paced rhythm  Confirmed by Star Leyva MD (), DELFINA REYES ([id number]) on 3/28/2022 9:50:35 AM              Diagnostic Imaging/Tests:   NM HEPATOBILIARY DUCT SCAN    Result Date: 3/29/2022  EXAM:  NM HEPATOBILIARY DUCT SCAN INDICATION:  Right upper quadrant pain. Concern for cholecystitis.  COMPARISON: Right upper quadrant ultrasound and CT abdomen and pelvis, 3/27/2022. TRACER: 6.2 mCi of Tc-99m Choletec. TECHNIQUE: Following the uneventful intravenous administration of Tc 99m Choletec, imaging of the abdomen was performed in the anterior projection for 60 minutes. Delayed right lateral view was also obtained. FINDINGS: Initial images demonstrate prompt hepatic tracer uptake. The gallbladder is visualized at 15 minutes. It demonstrates progressive filling. The common bile duct is visualized at 45 minutes. The duodenum is visualized at 50 minutes. No evidence of acute cholecystitis or biliary obstruction. XR CHOLANG INTRAOPERATIVE    Result Date: 3/29/2022  OPERATIVE CHOLANGIOGRAM INDICATION: Cholecystectomy Fluoroscopy time:  45 seconds, 4 spot films Multiple spot films were obtained during an intraoperative cholangiogram performed by Dr. Cliff Cortez . FINDINGS: There is at least one small filling defect in the common duct near the ampulla. There is also filling defect in the common hepatic duct. No bile duct dilatation. No focal stricture. Contrast passes normally into the small bowel. At least 2 small filling defects in the biliary system, probably air bubbles. Small stones not completely excluded. XR ERCP / ERCB COMBINED    Result Date: 3/30/2022  ERCP CLINICAL INDICATION: Fluoroscopic spot images during an ERCP procedure for common bile duct stones FINDINGS: Six fluoroscopic spot images performed during ERCP procedure. A papillotomy was performed. Contrast was administered into the common bile duct. Filling defects are noted in keeping with common bile ducts stones. A balloon was performed. The stones were successfully extracted. Multiple common bile duct stones extracted after balloon sweep. Total fluoroscopy time: 1.0 minutes; six fluoroscopic spot image saved.       All Micro Results       Procedure Component Value Units Date/Time    CULTURE, BLOOD [[id number]] Collected: 03/30/22 0648    Order Status: Completed Specimen: Blood Updated: 04/02/22 0642     Special Requests: --        LEFT  ARM       Culture result: NO GROWTH 3 DAYS       CULTURE, BLOOD [[id number]] Collected: 03/30/22 7966    Order Status: Completed Specimen: Blood Updated: 04/02/22 0642     Special Requests: --        LEFT  FOREARM       Culture result: NO GROWTH 3 DAYS       CULTURE, BLOOD [[id number]]  (Abnormal) Collected: 03/28/22 0019    Order Status: Completed Specimen: Blood Updated: 03/30/22 0726     Special Requests: --        LEFT  ARM       GRAM STAIN GRAM NEGATIVE RODS               AEROBIC AND ANAEROBIC BOTTLES                  CRITICAL RESULT NOT CALLED DUE TO PREVIOUS NOTIFICATION OF CRITICAL RESULT WITHIN THE LAST 24 HOURS. Culture result: GRAM NEGATIVE RODS               For identification and susceptibility refer to culture  Accession [id number]      CULTURE, BLOOD [[id number]]  (Abnormal)  (Susceptibility) Collected: 03/27/22 2350    Order Status: Completed Specimen: Blood Updated: 03/30/22 0725     Special Requests: --        RIGHT  ARM       GRAM STAIN GRAM NEGATIVE RODS               AEROBIC AND ANAEROBIC BOTTLES                  RESULTS VERIFIED, PHONED TO AND READ BACK BY Amelia Mead RN @ [id number] ON 3/28/22 BY AM           Culture result: KLEBSIELLA PNEUMONIAE               Refer to Blood Culture ID Panel Accession  [id number]      BLOOD CULTURE ID PANEL [[id number]]  (Abnormal) Collected: 03/27/22 2350    Order Status: Completed Specimen: Blood Updated: 03/28/22 1236     Acc. no. from Micro Order [id number]     Klebsiella pneumoniae Detected        Comment: RESULTS VERIFIED, PHONED TO AND READ BACK BY  Amelia Mead RN @ 7876 ON 3/28/22 BY AMM          KPC (Carbapenem Resistance Gene) NOT DETECTED        Comment: WARNING:  A Not Detected result for the KPC gene does not indicate susceptibility to carbapenems. Gram negative bacteria can be resistant to carbapenems by mechanisms other than carrying the KPC gene.         INTERPRETATION       Gram negative ney, identified by real time PCR as Klebsiella pneumoniae. Labs: Results:       BMP, Mg, Phos Recent Labs     04/02/22  0510 04/01/22  0521 03/31/22  0546    141 142   K 3.2* 3.2* 3.6    104 108*   CO2 30 29 28   AGAP 6* 8 6*   BUN 16 19 20   CREA 0.80 0.90 0.90   CA 8.5 8.5 8.4   * 133* 133*      CBC Recent Labs     04/02/22  0510 04/01/22  0521 03/31/22  0546   WBC 15.4* 14.0* 11.3*   RBC 4.67 4.78 4.31   HGB 8.8* 8.9* 8.0*   HCT 31.5* 32.1* 29.0*    205 187   GRANS 72 80* 78   LYMPH 13 11* 11*   EOS 4 2 1   MONOS 7 5 9   BASOS 1 1 0   IG 3 2 1   ANEU 11.1* 11.1* 8.8*   ABL 2.1 1.6 1.2   JAKOB 0.5 0.3 0.1   ABM 1.1 0.7 1.1   ABB 0.1 0.1 0.0   AIG 0.5 0.2 0.1      LFT No results for input(s): ALT, TBIL, AP, TP, ALB, GLOB, AGRAT in the last 72 hours.     No lab exists for component: SGOT, GPT   Cardiac Testing Lab Results   Component Value Date/Time    BNP 44 (H) 05/22/2019 03:48 AM    BNP 27 07/18/2018 10:35 AM    BNP 46 05/10/2017 01:12 PM    Troponin-I, Qt. 0.05 10/25/2017 01:15 AM    Troponin-I, Qt. 0.05 10/24/2017 05:14 PM    Troponin-I, Qt. 0.03 05/10/2017 03:45 PM      Coagulation Tests Lab Results   Component Value Date/Time    Prothrombin time 13.2 05/06/2021 11:53 AM    Prothrombin time 12.6 03/17/2021 09:35 AM    Prothrombin time 13.6 06/27/2019 08:08 AM    INR 1.0 05/06/2021 11:53 AM    INR 0.9 03/17/2021 09:35 AM    INR 1.1 06/27/2019 08:08 AM    aPTT 101.7 (HH) 10/25/2017 08:46 AM    aPTT 42.1 (H) 10/25/2017 01:15 AM      A1c No results found for: HBA1C, INV8CPQL, OHA7PEWJ   Lipid Panel Lab Results   Component Value Date/Time    Cholesterol, total 96 05/03/2021 10:33 AM    HDL Cholesterol 36 (L) 05/03/2021 10:33 AM    LDL, calculated 40.2 05/03/2021 10:33 AM    VLDL, calculated 19.8 05/03/2021 10:33 AM    Triglyceride 99 05/03/2021 10:33 AM    CHOL/HDL Ratio 2.7 05/03/2021 10:33 AM      Thyroid Panel Lab Results   Component Value Date/Time    TSH 1.870 02/28/2022 12:33 PM    TSH 1.840 08/20/2020 09:05 AM        Most Recent UA No results found for: COLOR, APPRN, REFSG, MYRIAM, PROTU, GLUCU, KETU, BILU, BLDU, UROU, JIGNESH, LEUKU, WBCU, RBCU, UEPI, BACTU, CASTS, UCRY, MUCUS, UCOM       All Labs from Last 24 Hrs:  Recent Results (from the past 24 hour(s))   CBC WITH AUTOMATED DIFF    Collection Time: 04/02/22  5:10 AM   Result Value Ref Range    WBC 15.4 (H) 4.3 - 11.1 K/uL    RBC 4.67 4.23 - 5.6 M/uL    HGB 8.8 (L) 13.6 - 17.2 g/dL    HCT 31.5 (L) 41.1 - 50.3 %    MCV 67.5 (L) 79.6 - 97.8 FL    MCH 18.8 (L) 26.1 - 32.9 PG    MCHC 27.9 (L) 31.4 - 35.0 g/dL    RDW 26.5 (H) 11.9 - 14.6 %    PLATELET 732 626 - 486 K/uL    MPV 8.9 (L) 9.4 - 12.3 FL    ABSOLUTE NRBC 0.06 0.0 - 0.2 K/uL    DF AUTOMATED      NEUTROPHILS 72 43 - 78 %    LYMPHOCYTES 13 13 - 44 %    MONOCYTES 7 4.0 - 12.0 %    EOSINOPHILS 4 0.5 - 7.8 %    BASOPHILS 1 0.0 - 2.0 %    IMMATURE GRANULOCYTES 3 0.0 - 5.0 %    ABS. NEUTROPHILS 11.1 (H) 1.7 - 8.2 K/UL    ABS. LYMPHOCYTES 2.1 0.5 - 4.6 K/UL    ABS. MONOCYTES 1.1 0.1 - 1.3 K/UL    ABS. EOSINOPHILS 0.5 0.0 - 0.8 K/UL    ABS. BASOPHILS 0.1 0.0 - 0.2 K/UL    ABS. IMM. GRANS.  0.5 0.0 - 0.5 K/UL   METABOLIC PANEL, BASIC    Collection Time: 04/02/22  5:10 AM   Result Value Ref Range    Sodium 139 138 - 145 mmol/L    Potassium 3.2 (L) 3.5 - 5.1 mmol/L    Chloride 103 98 - 107 mmol/L    CO2 30 21 - 32 mmol/L    Anion gap 6 (L) 7 - 16 mmol/L    Glucose 109 (H) 65 - 100 mg/dL    BUN 16 8 - 23 MG/DL    Creatinine 0.80 0.8 - 1.5 MG/DL    GFR est AA >60 >60 ml/min/1.73m2    GFR est non-AA >60 >60 ml/min/1.73m2    Calcium 8.5 8.3 - 10.4 MG/DL       Current Med List in Hospital:   Current Facility-Administered Medications   Medication Dose Route Frequency    cefTRIAXone (ROCEPHIN) 2 g in 0.9% sodium chloride (MBP/ADV) 50 mL MBP  2 g IntraVENous Q24H    metroNIDAZOLE (FLAGYL) tablet 500 mg  500 mg Oral BID    HYDROcodone-acetaminophen (NORCO) 5-325 mg per tablet 1 Tablet  1 Tablet Oral Q6H PRN    simethicone (MYLICON) tablet 80 mg  80 mg Oral QID PRN    lip protectant (BLISTEX) ointment 1 Each  1 Each Topical PRN    bisacodyL (DULCOLAX) suppository 10 mg  10 mg Rectal DAILY PRN    pantoprazole (PROTONIX) tablet 40 mg  40 mg Oral ACB    cyanocobalamin tablet 1,000 mcg  1,000 mcg Oral DAILY    0.9% sodium chloride infusion 250 mL  250 mL IntraVENous PRN    sodium chloride (NS) flush 5-40 mL  5-40 mL IntraVENous Q8H    sodium chloride (NS) flush 5-40 mL  5-40 mL IntraVENous PRN    acetaminophen (TYLENOL) tablet 650 mg  650 mg Oral Q6H PRN    Or    acetaminophen (TYLENOL) suppository 650 mg  650 mg Rectal Q6H PRN    polyethylene glycol (MIRALAX) packet 17 g  17 g Oral DAILY PRN    ondansetron (ZOFRAN ODT) tablet 4 mg  4 mg Oral Q8H PRN    Or    ondansetron (ZOFRAN) injection 4 mg  4 mg IntraVENous Q6H PRN    hydrALAZINE (APRESOLINE) 20 mg/mL injection 10 mg  10 mg IntraVENous Q6H PRN    morphine injection 1 mg  1 mg IntraVENous Q4H PRN    [Held by provider] furosemide (LASIX) tablet 40 mg  40 mg Oral DAILY    sodium chloride (NS) flush 5-10 mL  5-10 mL IntraVENous Q8H    sodium chloride (NS) flush 5-10 mL  5-10 mL IntraVENous PRN       No Known Allergies  Immunization History   Administered Date(s) Administered    COVID-19, Pfizer Purple top, DILUTE for use, 12+ yrs, 30mcg/0.3mL dose 02/02/2021, 02/23/2021    Pneumococcal Polysaccharide (PPSV-23) 05/08/2015    TB Skin Test (PPD) Intradermal 03/29/2022    Tdap 03/03/2022       Recent Vital Data:  Patient Vitals for the past 24 hrs:   Temp Pulse Resp BP SpO2   04/02/22 0225 98.2 °F (36.8 °C) 69 17 (!) 155/74 94 %   04/01/22 2320 98.1 °F (36.7 °C) 74 19 (!) 167/81 92 %   04/01/22 1914 97.9 °F (36.6 °C) 74 19 139/67 96 %   04/01/22 1731 98.3 °F (36.8 °C) 75 22 (!) 151/80 93 %   04/01/22 1250 98.1 °F (36.7 °C) 74 22 (!) 141/70 95 %     Oxygen Therapy  O2 Sat (%): 94 % (04/02/22 0225)  Pulse via Oximetry: 70 beats per minute (04/01/22 0017)  O2 Device: None (Room air) (04/02/22 0729)  Skin Assessment: Clean, dry, & intact (04/01/22 0017)  O2 Flow Rate (L/min): 2 l/min (04/01/22 0017)    Estimated body mass index is 34.66 kg/m² as calculated from the following:    Height as of this encounter: 5' 8\" (1.727 m). Weight as of this encounter: 103.4 kg (227 lb 15.3 oz). Intake/Output Summary (Last 24 hours) at 4/2/2022 1100  Last data filed at 4/2/2022 3198  Gross per 24 hour   Intake 240 ml   Output --   Net 240 ml         Physical Exam:    General:    Well nourished. No overt distress, elderly, obese  CV:   RRR. No m/r/g. No JVD, 1+ edema   Lungs:   CTAB. No wheezing, rhonchi, or rales. Even, unlabored  Abdomen:   Soft, nontender, nondistended. obese  Extremities: Warm and dry. No cyanosis or clubbing. Skin:     No rashes. Normal coloration  Neuro:  grossly intact. Psych:  Normal mood and affect. Signed:  Candelaria Mera MD    Part of this note may have been written by using a voice dictation software. The note has been proof read but may still contain some grammatical/other typographical errors.

## 2022-04-02 NOTE — PROGRESS NOTES
Called to the bedside due to urinary retention. The nurses had been unable to place a Velasquez catheter. I tried to place a coudé catheter [address] without success both with and without a catheter guide. Ultimately we performed cystoscopy at the bedside with a flexible cystoscope. This revealed a normal pendulous urethra. There is a false passage in the posterior aspect of the proximal bulbar urethra. Patient's prostate is enlarged. I was able to get into the bladder the patient was quite uncomfortable and I was not able to do a global inspection of the mucosa. Guidewire was placed through the cystoscope which was subsequently removed leaving the guidewire behind. An [address] [place] tip catheter was placed over the wire. The wire was removed and there was return of ricky-colored urine. The balloon was inflated with 10 cc of sterile water with egress of about 1500 cc of urine. Velasquez was left to gravity. I will start Flomax 0.4 mg 1 p.o. daily.

## 2022-04-03 PROBLEM — R33.9 URINE RETENTION: Status: ACTIVE | Noted: 2022-04-03

## 2022-04-03 LAB
ANION GAP SERPL CALC-SCNC: 5 MMOL/L (ref 7–16)
BASOPHILS # BLD: 0.1 K/UL (ref 0–0.2)
BASOPHILS NFR BLD: 1 % (ref 0–2)
BUN SERPL-MCNC: 15 MG/DL (ref 8–23)
CALCIUM SERPL-MCNC: 8.5 MG/DL (ref 8.3–10.4)
CHLORIDE SERPL-SCNC: 104 MMOL/L (ref 98–107)
CO2 SERPL-SCNC: 31 MMOL/L (ref 21–32)
CREAT SERPL-MCNC: 0.8 MG/DL (ref 0.8–1.5)
DIFFERENTIAL METHOD BLD: ABNORMAL
EOSINOPHIL # BLD: 0.6 K/UL (ref 0–0.8)
EOSINOPHIL NFR BLD: 4 % (ref 0.5–7.8)
ERYTHROCYTE [DISTWIDTH] IN BLOOD BY AUTOMATED COUNT: 27 % (ref 11.9–14.6)
GLUCOSE SERPL-MCNC: 107 MG/DL (ref 65–100)
HCT VFR BLD AUTO: 31.7 % (ref 41.1–50.3)
HGB BLD-MCNC: 8.8 G/DL (ref 13.6–17.2)
IMM GRANULOCYTES # BLD AUTO: 0.6 K/UL (ref 0–0.5)
IMM GRANULOCYTES NFR BLD AUTO: 4 % (ref 0–5)
LYMPHOCYTES # BLD: 2.4 K/UL (ref 0.5–4.6)
LYMPHOCYTES NFR BLD: 14 % (ref 13–44)
MAGNESIUM SERPL-MCNC: 2.2 MG/DL (ref 1.8–2.4)
MCH RBC QN AUTO: 18.8 PG (ref 26.1–32.9)
MCHC RBC AUTO-ENTMCNC: 27.8 G/DL (ref 31.4–35)
MCV RBC AUTO: 67.6 FL (ref 79.6–97.8)
MONOCYTES # BLD: 1.5 K/UL (ref 0.1–1.3)
MONOCYTES NFR BLD: 9 % (ref 4–12)
NEUTS SEG # BLD: 11.4 K/UL (ref 1.7–8.2)
NEUTS SEG NFR BLD: 69 % (ref 43–78)
NRBC # BLD: 0.04 K/UL (ref 0–0.2)
PLATELET # BLD AUTO: 203 K/UL (ref 150–450)
PMV BLD AUTO: 8.7 FL (ref 9.4–12.3)
POTASSIUM SERPL-SCNC: 3.3 MMOL/L (ref 3.5–5.1)
RBC # BLD AUTO: 4.69 M/UL (ref 4.23–5.6)
SODIUM SERPL-SCNC: 140 MMOL/L (ref 138–145)
WBC # BLD AUTO: 16.6 K/UL (ref 4.3–11.1)

## 2022-04-03 PROCEDURE — 85025 COMPLETE CBC W/AUTO DIFF WBC: CPT

## 2022-04-03 PROCEDURE — 74011250637 HC RX REV CODE- 250/637: Performed by: UROLOGY

## 2022-04-03 PROCEDURE — 74011250637 HC RX REV CODE- 250/637: Performed by: INTERNAL MEDICINE

## 2022-04-03 PROCEDURE — 74011250636 HC RX REV CODE- 250/636: Performed by: INTERNAL MEDICINE

## 2022-04-03 PROCEDURE — 65270000029 HC RM PRIVATE

## 2022-04-03 PROCEDURE — 36415 COLL VENOUS BLD VENIPUNCTURE: CPT

## 2022-04-03 PROCEDURE — 80048 BASIC METABOLIC PNL TOTAL CA: CPT

## 2022-04-03 PROCEDURE — 74011250636 HC RX REV CODE- 250/636: Performed by: FAMILY MEDICINE

## 2022-04-03 PROCEDURE — 74011000258 HC RX REV CODE- 258: Performed by: INTERNAL MEDICINE

## 2022-04-03 PROCEDURE — 74011000250 HC RX REV CODE- 250: Performed by: EMERGENCY MEDICINE

## 2022-04-03 PROCEDURE — 74011000250 HC RX REV CODE- 250: Performed by: FAMILY MEDICINE

## 2022-04-03 PROCEDURE — 83735 ASSAY OF MAGNESIUM: CPT

## 2022-04-03 RX ADMIN — POTASSIUM BICARBONATE 40 MEQ: 782 TABLET, EFFERVESCENT ORAL at 09:37

## 2022-04-03 RX ADMIN — SODIUM CHLORIDE, PRESERVATIVE FREE 10 ML: 5 INJECTION INTRAVENOUS at 21:21

## 2022-04-03 RX ADMIN — MORPHINE SULFATE 1 MG: 2 INJECTION, SOLUTION INTRAMUSCULAR; INTRAVENOUS at 21:18

## 2022-04-03 RX ADMIN — SODIUM CHLORIDE, PRESERVATIVE FREE 10 ML: 5 INJECTION INTRAVENOUS at 14:01

## 2022-04-03 RX ADMIN — PANTOPRAZOLE SODIUM 40 MG: 40 TABLET, DELAYED RELEASE ORAL at 05:38

## 2022-04-03 RX ADMIN — METRONIDAZOLE 500 MG: 500 TABLET ORAL at 18:11

## 2022-04-03 RX ADMIN — CYANOCOBALAMIN TAB 1000 MCG 1000 MCG: 1000 TAB at 09:37

## 2022-04-03 RX ADMIN — METRONIDAZOLE 500 MG: 500 TABLET ORAL at 09:37

## 2022-04-03 RX ADMIN — SODIUM CHLORIDE, PRESERVATIVE FREE 10 ML: 5 INJECTION INTRAVENOUS at 05:37

## 2022-04-03 RX ADMIN — TAMSULOSIN HYDROCHLORIDE 0.4 MG: 0.4 CAPSULE ORAL at 09:37

## 2022-04-03 RX ADMIN — CEFTRIAXONE 2 G: 2 INJECTION, POWDER, FOR SOLUTION INTRAMUSCULAR; INTRAVENOUS at 12:38

## 2022-04-03 RX ADMIN — FUROSEMIDE 40 MG: 40 TABLET ORAL at 09:38

## 2022-04-03 NOTE — PROGRESS NOTES
Hospitalist Progress Note   Admit Date:  3/27/2022  7:41 PM   Name:  Maia Lara   Age:  79 y.o. Sex:  male  :  1940   MRN:  [id number]   Room:  604/01    Presenting Complaint: Abdominal Pain    Reason(s) for Admission: Sepsis [facility]) [A41.9]  Acute cholecystitis [K81.0]  Pancreatitis, gallstone [facility] Course & Interval History:       Mr. Nikolas Ann is an 80 yo male with PMH of AFIB s/p watchman, SSS s/p PPM- has fractured LV lead, LVDD2, CAD, myasthenia gravis admitted with abdominal pain and meeting sepsis criteria. He has leukocytosis, lactic acidosis, elevated lipase and LFTS.      CTAP shows   \"     IMPRESSION  There are multiple hyperechoic dense structures rather in the gallbladder neck. These may represent multiple stones. There is mild gallbladder wall thickening  with subtle pericholecystic fluid. These findings raise suspicion for acute  cholecystitis.     There is also mild peripancreatic fluid. This fluid could be due to  cholecystitis or acute pancreatitis.     There is focal dilatation of the distal right ureter. There is no evidence of  hydronephrosis proximal to this point. Further work up with urology consult is  recommended to assess this region.     Chronic appearing changes are noted in the kidneys.     Multiple small low-density lesions are seen in the liver and are too small to  Characterize. \"     ABD US      \"  IMPRESSION  Cholelithiasis. The gallbladder wall is thickened measuring 7.6 mm. There is no  appreciable pericholecystic fluid. Sonographic Janas Shouts sign was not documented.     The pancreas was not visualized.     Hepatic steatosis.     The right kidney appears somewhat echogenic. This can be seen with medical renal  disease. Please correlate clinically. \"           He has been NPO . S/p surgery consult. HIDA scan completed. Postop lap cholecystectomy on 3-29-22.      ERCP completed  3-30-22.         He had acute blood loss anemia with recent dark stools. S/p 3 PRBC. GI following. S/p EGD 3-29-22 no obvious source of bleeding.      Blood cultures 2/2 klebsiella. ID following. He is on course of zosyn. Adjusted to rocephin and flagyl while admitted. Can discharge to home on bactrim/flagyl as unable to use quinolones with his known myasthenia gravis. EOT 4-13-22.          Seen by urology for abnormal appearing right ureter on CTAP- no hydronephrosis. Plan for office followup. Subsequently had urine retention without ability to place coronel and required repeat urology consult with bedside cystoscopy/ coronel placed. Started flomax.      CXR shows vascular congestion. Lasix resumed. Cardiology following.         Discharge plans pending.  Lives with wife.          Subjective/24hr Events (04/03/22):      Up to chair, ate, no more diarrhea, has gas, tolerant to coronel, some blood noted in bag,  Has edema, no dyspnea       Assessment & Plan:     Principal Problem:    Sepsis ([facility]   Likely GI source with acute cholecystitis:  klebsiella bacteremia:  · D8 antibiotics, changed to rocephin/ flagyl and can use bactrim DS / flagyl on discharge EOT 4-13-22 per ID note  · WBC increasing daily, ID to re-evaluate 4-3-22  ·  UA positive, check culture      Active Problems:    HTN (hypertension)   · BP low on admit, laisx resumed/ lisinopril on hold           Coronary atherosclerosis of native coronary vessel  · Asa held   · lipitor held due to elevated LFTS, resume at discharge  · Cardiology signed off         Myasthenia gravis ([facility] [address] ()  ·   Monitor   · PT,OT       SSS (sick sinus syndrome) (HCC)     Paroxysmal atrial fibrillation (HCC)   · In NSR  · S/p prior watchman / PPM         Diastolic CHF, chronic ([facility] [address] (3/2/2022)  · Continued oral lasix  · O2 as needed to wean as tolerant , on room air         Pancreatitis, gallstone (3/28/2022)    Acute cholecystitis (3/28/2022)  ·  advance diet as tolerant, GI soft/ low fat   · KUB ordered 3-31-22, non obstructive  · GI/surgery consulted POD 4 s/p lap cholecystectomy   · D8 antibiotics   · ERCP 3-30-22          Acute iron deficient Anemia (3/28/2022)- worse 3-28-22  · IV protonix every 12 hours   · Trend HGB, 8.8  · 3 total PRBC transfused   · pending occult stool  · continued oral B12        Right ureteral dilation: no kevin hydronephrosis   Urine retention  ·  urology consulted   · Continued flomax  · Velasquez remains         Hypokalemia:  · Replace and repeat lab        Dispo/Discharge Planning:      Pending , PPD, PT,OT           Diet:  ADULT DIET Easy to Chew; Low Fat/Low Chol/High Fiber/PATTIE  DVT PPx:  SCD  Code status: Full Code    Hospital Problems as of 4/3/2022 Date Reviewed: 3/2/2022          Codes Class Noted - Resolved POA    Acute cholecystitis ICD-10-CM: K81.0  ICD-9-CM: 575.0  3/28/2022 - Present Unknown        Pancreatitis, gallstone ICD-10-CM: K85.10  ICD-9-CM: 184.5, 574.20  3/28/2022 - Present Unknown        Anemia ICD-10-CM: D64.9  ICD-9-CM: 285.9  3/28/2022 - Present Yes        Bacteremia ICD-10-CM: R78.81  ICD-9-CM: 790.7  3/28/2022 - Present Yes        Diastolic CHF, chronic ([facility] ICD-10-CM: I50.32  ICD-9-CM: 428.32, 428.0  3/2/2022 - Present Yes        Atrial fibrillation ([facility] ICD-10-CM: I48.91  ICD-9-CM: 427.31  11/29/2017 - Present Yes        * (Principal) Sepsis ([facility] ICD-10-CM: A41.9  ICD-9-CM: 038.9, 995.91  10/26/2017 - Present Unknown        Paroxysmal atrial fibrillation ([facility] ICD-10-CM: I48.0  ICD-9-CM: 427.31  6/30/2017 - Present Yes    Overview Signed 5/21/2019 10:36 AM by Parag Lopes MD     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               SSS (sick sinus syndrome) (HCC) ICD-10-CM: I49.5  ICD-9-CM: 427.81  6/30/2017 - Present Yes        Myasthenia gravis ([facility] ICD-10-CM: G70.00  ICD-9-CM: 358.00  Unknown - Present Yes        HTN (hypertension) (Chronic) ICD-10-CM: I10  ICD-9-CM: 401.9  5/8/2015 - Present Yes        Coronary atherosclerosis of native coronary vessel ICD-10-CM: I25.10  ICD-9-CM: 414.01  5/8/2015 - Present Yes    Overview Signed 6/7/2016  8:35 AM by Eric Mcqueen sheryl hernandez  5/7/15: prox LAD PCI, normal EF                   Objective:     Patient Vitals for the past 24 hrs:   Temp Pulse Resp BP SpO2   04/03/22 0741 98.9 °F (37.2 °C) 76 18 136/87 91 %   04/03/22 0340 98 °F (36.7 °C) 70 18 113/73 91 %   04/02/22 2340 97.7 °F (36.5 °C) 70 18 125/73 95 %   04/02/22 1942 98.3 °F (36.8 °C) 75 20 (!) 164/83 95 %   04/02/22 1131 98.5 °F (36.9 °C) 74 18 (!) 142/70 94 %     Oxygen Therapy  O2 Sat (%): 91 % (04/03/22 0741)  Pulse via Oximetry: 70 beats per minute (04/01/22 0017)  O2 Device: None (Room air) (04/02/22 1940)  Skin Assessment: Clean, dry, & intact (04/01/22 0017)  O2 Flow Rate (L/min): 2 l/min (04/01/22 0017)    Estimated body mass index is 34.66 kg/m² as calculated from the following:    Height as of this encounter: 5' 8\" (1.727 m). Weight as of this encounter: 103.4 kg (227 lb 15.3 oz). Intake/Output Summary (Last 24 hours) at 4/3/2022 1022  Last data filed at 4/3/2022 0549  Gross per 24 hour   Intake    Output 1500 ml   Net -1500 ml         Physical Exam:     Blood pressure 136/87, pulse 76, temperature 98.9 °F (37.2 °C), resp. rate 18, height 5' 8\" (1.727 m), weight 103.4 kg (227 lb 15.3 oz), SpO2 91 %. General:    Well nourished. No overt distress, elderly, pleasant   CV:   RRR. No m/r/g. No jugular venous distension. 1+ edema   Lungs:   Clear   Abdomen: Bowel sounds present. nontender, slightly distended   Extremities: No cyanosis or clubbing. 1+ edema  Skin:     No rashes and normal coloration. Warm and dry. Neuro:   grossly intact.     Psych:  Normal mood and affect    I have reviewed ordered lab tests and independently visualized imaging below:    Recent Labs:  Recent Results (from the past 48 hour(s))   CBC WITH AUTOMATED DIFF    Collection Time: 04/02/22  5:10 AM   Result Value Ref Range    WBC 15.4 (H) 4.3 - 11.1 K/uL    RBC 4.67 4.23 - 5.6 M/uL    HGB 8.8 (L) 13.6 - 17.2 g/dL    HCT 31.5 (L) 41.1 - 50.3 %    MCV 67.5 (L) 79.6 - 97.8 FL    MCH 18.8 (L) 26.1 - 32.9 PG    MCHC 27.9 (L) 31.4 - 35.0 g/dL    RDW 26.5 (H) 11.9 - 14.6 %    PLATELET 157 042 - 567 K/uL    MPV 8.9 (L) 9.4 - 12.3 FL    ABSOLUTE NRBC 0.06 0.0 - 0.2 K/uL    DF AUTOMATED      NEUTROPHILS 72 43 - 78 %    LYMPHOCYTES 13 13 - 44 %    MONOCYTES 7 4.0 - 12.0 %    EOSINOPHILS 4 0.5 - 7.8 %    BASOPHILS 1 0.0 - 2.0 %    IMMATURE GRANULOCYTES 3 0.0 - 5.0 %    ABS. NEUTROPHILS 11.1 (H) 1.7 - 8.2 K/UL    ABS. LYMPHOCYTES 2.1 0.5 - 4.6 K/UL    ABS. MONOCYTES 1.1 0.1 - 1.3 K/UL    ABS. EOSINOPHILS 0.5 0.0 - 0.8 K/UL    ABS. BASOPHILS 0.1 0.0 - 0.2 K/UL    ABS. IMM. GRANS.  0.5 0.0 - 0.5 K/UL   METABOLIC PANEL, BASIC    Collection Time: 04/02/22  5:10 AM   Result Value Ref Range    Sodium 139 138 - 145 mmol/L    Potassium 3.2 (L) 3.5 - 5.1 mmol/L    Chloride 103 98 - 107 mmol/L    CO2 30 21 - 32 mmol/L    Anion gap 6 (L) 7 - 16 mmol/L    Glucose 109 (H) 65 - 100 mg/dL    BUN 16 8 - 23 MG/DL    Creatinine 0.80 0.8 - 1.5 MG/DL    GFR est AA >60 >60 ml/min/1.73m2    GFR est non-AA >60 >60 ml/min/1.73m2    Calcium 8.5 8.3 - 10.4 MG/DL   URINALYSIS W/ RFLX MICROSCOPIC    Collection Time: 04/02/22  6:11 PM   Result Value Ref Range    Color RED      Appearance CLOUDY      Specific gravity 1.015 1.001 - 1.023      pH (UA) 7.0 5.0 - 9.0      Protein 100 (A) NEG mg/dL    Glucose 100 mg/dL    Ketone TRACE (A) NEG mg/dL    Bilirubin Negative NEG      Blood LARGE (A) NEG      Urobilinogen 0.2 0.2 - 1.0 EU/dL    Nitrites Positive (A) NEG      Leukocyte Esterase TRACE (A) NEG     URINE MICROSCOPIC    Collection Time: 04/02/22  6:11 PM   Result Value Ref Range    Other observations DUPLICATE REQUEST    URINE MICROSCOPIC    Collection Time: 04/02/22  6:11 PM   Result Value Ref Range    WBC 0-3 0 /hpf    RBC >100 0 /hpf    Epithelial cells 0 0 /hpf    Bacteria 0 0 /hpf    Casts 0-3 0 /lpf    Crystals, urine 0 0 /LPF    Mucus 0 0 /lpf    Other observations RESULTS VERIFIED MANUALLY     METABOLIC PANEL, BASIC    Collection Time: 04/03/22  5:25 AM   Result Value Ref Range    Sodium 140 138 - 145 mmol/L    Potassium 3.3 (L) 3.5 - 5.1 mmol/L    Chloride 104 98 - 107 mmol/L    CO2 31 21 - 32 mmol/L    Anion gap 5 (L) 7 - 16 mmol/L    Glucose 107 (H) 65 - 100 mg/dL    BUN 15 8 - 23 MG/DL    Creatinine 0.80 0.8 - 1.5 MG/DL    GFR est AA >60 >60 ml/min/1.73m2    GFR est non-AA >60 >60 ml/min/1.73m2    Calcium 8.5 8.3 - 10.4 MG/DL   CBC WITH AUTOMATED DIFF    Collection Time: 04/03/22  5:25 AM   Result Value Ref Range    WBC 16.6 (H) 4.3 - 11.1 K/uL    RBC 4.69 4.23 - 5.6 M/uL    HGB 8.8 (L) 13.6 - 17.2 g/dL    HCT 31.7 (L) 41.1 - 50.3 %    MCV 67.6 (L) 79.6 - 97.8 FL    MCH 18.8 (L) 26.1 - 32.9 PG    MCHC 27.8 (L) 31.4 - 35.0 g/dL    RDW 27.0 (H) 11.9 - 14.6 %    PLATELET 576 565 - 433 K/uL    MPV 8.7 (L) 9.4 - 12.3 FL    ABSOLUTE NRBC 0.04 0.0 - 0.2 K/uL    DF AUTOMATED      NEUTROPHILS 69 43 - 78 %    LYMPHOCYTES 14 13 - 44 %    MONOCYTES 9 4.0 - 12.0 %    EOSINOPHILS 4 0.5 - 7.8 %    BASOPHILS 1 0.0 - 2.0 %    IMMATURE GRANULOCYTES 4 0.0 - 5.0 %    ABS. NEUTROPHILS 11.4 (H) 1.7 - 8.2 K/UL    ABS. LYMPHOCYTES 2.4 0.5 - 4.6 K/UL    ABS. MONOCYTES 1.5 (H) 0.1 - 1.3 K/UL    ABS. EOSINOPHILS 0.6 0.0 - 0.8 K/UL    ABS. BASOPHILS 0.1 0.0 - 0.2 K/UL    ABS. IMM.  GRANS. 0.6 (H) 0.0 - 0.5 K/UL   MAGNESIUM    Collection Time: 04/03/22  5:25 AM   Result Value Ref Range    Magnesium 2.2 1.8 - 2.4 mg/dL       All Micro Results     Procedure Component Value Units Date/Time    CULTURE, BLOOD [[id number]] Collected: 03/30/22 0648    Order Status: Completed Specimen: Blood Updated: 04/03/22 0638     Special Requests: --        LEFT  ARM       Culture result: NO GROWTH 4 DAYS       CULTURE, BLOOD [[id number]] Collected: 03/30/22 9256    Order Status: Completed Specimen: Blood Updated: 04/03/22 3537     Special Requests: --        LEFT  FOREARM Culture result: NO GROWTH 4 DAYS       CULTURE, BLOOD [[id number]]  (Abnormal) Collected: 03/28/22 0019    Order Status: Completed Specimen: Blood Updated: 03/30/22 0726     Special Requests: --        LEFT  ARM       GRAM STAIN GRAM NEGATIVE RODS               AEROBIC AND ANAEROBIC BOTTLES                  CRITICAL RESULT NOT CALLED DUE TO PREVIOUS NOTIFICATION OF CRITICAL RESULT WITHIN THE LAST 24 HOURS. Culture result: GRAM NEGATIVE RODS               For identification and susceptibility refer to culture  Accession [id number]      CULTURE, BLOOD [[id number]]  (Abnormal)  (Susceptibility) Collected: 03/27/22 2350    Order Status: Completed Specimen: Blood Updated: 03/30/22 0725     Special Requests: --        RIGHT  ARM       GRAM STAIN GRAM NEGATIVE RODS               AEROBIC AND ANAEROBIC BOTTLES                  RESULTS VERIFIED, PHONED TO AND READ BACK BY Gurpreet Calloway RN @ 8268 ON 3/28/22 BY [facility]           Culture result: KLEBSIELLA PNEUMONIAE               Refer to Blood Culture ID Panel Accession  [id number]      BLOOD CULTURE ID PANEL [[id number]]  (Abnormal) Collected: 03/27/22 2350    Order Status: Completed Specimen: Blood Updated: 03/28/22 1236     Acc. no. from Micro Order [id number]     Klebsiella pneumoniae Detected        Comment: RESULTS VERIFIED, PHONED TO AND READ BACK BY  Gurpreet Calloway RN @ 5917 ON 3/28/22 BY [facility]          KPC (Carbapenem Resistance Gene) NOT DETECTED        Comment: WARNING:  A Not Detected result for the KPC gene does not indicate susceptibility to carbapenems. Gram negative bacteria can be resistant to carbapenems by mechanisms other than carrying the KPC gene. INTERPRETATION       Gram negative ney, identified by real time PCR as Klebsiella pneumoniae. Other Studies:  No results found.     Current Meds:  Current Facility-Administered Medications   Medication Dose Route Frequency    tamsulosin (FLOMAX) capsule 0.4 mg  0.4 mg Oral DAILY    cefTRIAXone (ROCEPHIN) 2 g in 0.9% sodium chloride (MBP/ADV) 50 mL MBP  2 g IntraVENous Q24H    metroNIDAZOLE (FLAGYL) tablet 500 mg  500 mg Oral BID    HYDROcodone-acetaminophen (NORCO) 5-325 mg per tablet 1 Tablet  1 Tablet Oral Q6H PRN    simethicone (MYLICON) tablet 80 mg  80 mg Oral QID PRN    lip protectant (BLISTEX) ointment 1 Each  1 Each Topical PRN    bisacodyL (DULCOLAX) suppository 10 mg  10 mg Rectal DAILY PRN    pantoprazole (PROTONIX) tablet 40 mg  40 mg Oral ACB    cyanocobalamin tablet 1,000 mcg  1,000 mcg Oral DAILY    0.9% sodium chloride infusion 250 mL  250 mL IntraVENous PRN    sodium chloride (NS) flush 5-40 mL  5-40 mL IntraVENous Q8H    sodium chloride (NS) flush 5-40 mL  5-40 mL IntraVENous PRN    acetaminophen (TYLENOL) tablet 650 mg  650 mg Oral Q6H PRN    Or    acetaminophen (TYLENOL) suppository 650 mg  650 mg Rectal Q6H PRN    polyethylene glycol (MIRALAX) packet 17 g  17 g Oral DAILY PRN    ondansetron (ZOFRAN ODT) tablet 4 mg  4 mg Oral Q8H PRN    Or    ondansetron (ZOFRAN) injection 4 mg  4 mg IntraVENous Q6H PRN    hydrALAZINE (APRESOLINE) 20 mg/mL injection 10 mg  10 mg IntraVENous Q6H PRN    morphine injection 1 mg  1 mg IntraVENous Q4H PRN    furosemide (LASIX) tablet 40 mg  40 mg Oral DAILY    sodium chloride (NS) flush 5-10 mL  5-10 mL IntraVENous Q8H    sodium chloride (NS) flush 5-10 mL  5-10 mL IntraVENous PRN       Signed:  Marisa Noriega MD    Part of this note may have been written by using a voice dictation software. The note has been proof read but may still contain some grammatical/other typographical errors.

## 2022-04-03 NOTE — PROGRESS NOTES
Problem: Falls - Risk of  Goal: *Absence of Falls  Description: Document Marciana Distance Fall Risk and appropriate interventions in the flowsheet. Outcome: Progressing Towards Goal  Note: Fall Risk Interventions:  Mobility Interventions: OT consult for ADLs,Patient to call before getting OOB,PT Consult for mobility concerns,PT Consult for assist device competence,Bed/chair exit alarm    Mentation Interventions: Adequate sleep, hydration, pain control,Bed/chair exit alarm,Door open when patient unattended    Medication Interventions: Bed/chair exit alarm,Patient to call before getting OOB    Elimination Interventions: Bed/chair exit alarm,Call light in reach,Patient to call for help with toileting needs,Toilet paper/wipes in reach,Toileting schedule/hourly rounds    History of Falls Interventions: Bed/chair exit alarm  Problem: Pressure Injury - Risk of  Goal: *Prevention of pressure injury  Description: Document Arvind Scale and appropriate interventions in the flowsheet.   Outcome: Progressing Towards Goal  Note: Pressure Injury Interventions:  Sensory Interventions: Assess changes in LOC,Assess need for specialty bed,Discuss PT/OT consult with provider,Maintain/enhance activity level,Minimize linen layers    Moisture Interventions: Internal/External urinary devices    Activity Interventions: Pressure redistribution bed/mattress(bed type),Increase time out of bed,PT/OT evaluation    Mobility Interventions: Pressure redistribution bed/mattress (bed type),PT/OT evaluation    Nutrition Interventions: Document food/fluid/supplement intake    Friction and Shear Interventions: Apply protective barrier, creams and emollients,Minimize layers  Problem: Surgical Wound Care  Goal: *Non-infected Wound: Absence of infection signs and symptoms  Description: Infection control procedures (eg: clean dressings, clean gloves, hand washing, precautions to isolate wound from contamination, sterile instruments used for wound debridement) should be implemented.   Outcome: Progressing Towards Goal  Goal: *Improvement of existing wound and maintenance of skin integrity  Outcome: Progressing Towards Goal     Problem: Infection - Risk of, Urinary Catheter-Associated Urinary Tract Infection  Goal: *Absence of infection signs and symptoms  Outcome: Progressing Towards Goal

## 2022-04-03 NOTE — PROGRESS NOTES
Urine is clear. He will need at least 5 days of coronel drainage before removing catheter for trial of voiding. Continue flomax.

## 2022-04-04 ENCOUNTER — HOME HEALTH ADMISSION (OUTPATIENT)
Dept: HOME HEALTH SERVICES | Facility: HOME HEALTH | Age: 82
End: 2022-04-04

## 2022-04-04 LAB
ANION GAP SERPL CALC-SCNC: 8 MMOL/L (ref 7–16)
BACTERIA SPEC CULT: NORMAL
BACTERIA SPEC CULT: NORMAL
BASOPHILS # BLD: 0.2 K/UL (ref 0–0.2)
BASOPHILS NFR BLD: 1 % (ref 0–2)
BUN SERPL-MCNC: 14 MG/DL (ref 8–23)
CALCIUM SERPL-MCNC: 8.4 MG/DL (ref 8.3–10.4)
CHLORIDE SERPL-SCNC: 102 MMOL/L (ref 98–107)
CO2 SERPL-SCNC: 29 MMOL/L (ref 21–32)
CREAT SERPL-MCNC: 0.8 MG/DL (ref 0.8–1.5)
DIFFERENTIAL METHOD BLD: ABNORMAL
EOSINOPHIL # BLD: 0.6 K/UL (ref 0–0.8)
EOSINOPHIL NFR BLD: 4 % (ref 0.5–7.8)
ERYTHROCYTE [DISTWIDTH] IN BLOOD BY AUTOMATED COUNT: 27.3 % (ref 11.9–14.6)
GLUCOSE SERPL-MCNC: 131 MG/DL (ref 65–100)
HCT VFR BLD AUTO: 31.3 % (ref 41.1–50.3)
HGB BLD-MCNC: 8.6 G/DL (ref 13.6–17.2)
IMM GRANULOCYTES # BLD AUTO: 0.8 K/UL (ref 0–0.5)
IMM GRANULOCYTES NFR BLD AUTO: 5 % (ref 0–5)
LYMPHOCYTES # BLD: 2.5 K/UL (ref 0.5–4.6)
LYMPHOCYTES NFR BLD: 16 % (ref 13–44)
MCH RBC QN AUTO: 18.4 PG (ref 26.1–32.9)
MCHC RBC AUTO-ENTMCNC: 27.5 G/DL (ref 31.4–35)
MCV RBC AUTO: 67 FL (ref 79.6–97.8)
MONOCYTES # BLD: 1.3 K/UL (ref 0.1–1.3)
MONOCYTES NFR BLD: 8 % (ref 4–12)
NEUTS SEG # BLD: 10.4 K/UL (ref 1.7–8.2)
NEUTS SEG NFR BLD: 66 % (ref 43–78)
NRBC # BLD: 0.05 K/UL (ref 0–0.2)
PLATELET # BLD AUTO: 205 K/UL (ref 150–450)
PLATELET COMMENTS,PCOM: ADEQUATE
PMV BLD AUTO: 8.9 FL (ref 9.4–12.3)
POTASSIUM SERPL-SCNC: 3.4 MMOL/L (ref 3.5–5.1)
RBC # BLD AUTO: 4.67 M/UL (ref 4.23–5.6)
RBC MORPH BLD: ABNORMAL
SERVICE CMNT-IMP: NORMAL
SERVICE CMNT-IMP: NORMAL
SODIUM SERPL-SCNC: 139 MMOL/L (ref 138–145)
WBC # BLD AUTO: 15.8 K/UL (ref 4.3–11.1)
WBC MORPH BLD: ABNORMAL

## 2022-04-04 PROCEDURE — 74011250636 HC RX REV CODE- 250/636: Performed by: STUDENT IN AN ORGANIZED HEALTH CARE EDUCATION/TRAINING PROGRAM

## 2022-04-04 PROCEDURE — 74011000250 HC RX REV CODE- 250: Performed by: FAMILY MEDICINE

## 2022-04-04 PROCEDURE — 85025 COMPLETE CBC W/AUTO DIFF WBC: CPT

## 2022-04-04 PROCEDURE — 74011000250 HC RX REV CODE- 250: Performed by: EMERGENCY MEDICINE

## 2022-04-04 PROCEDURE — 65270000029 HC RM PRIVATE

## 2022-04-04 PROCEDURE — 74011250637 HC RX REV CODE- 250/637: Performed by: STUDENT IN AN ORGANIZED HEALTH CARE EDUCATION/TRAINING PROGRAM

## 2022-04-04 PROCEDURE — 74011250637 HC RX REV CODE- 250/637: Performed by: UROLOGY

## 2022-04-04 PROCEDURE — 97530 THERAPEUTIC ACTIVITIES: CPT

## 2022-04-04 PROCEDURE — 36415 COLL VENOUS BLD VENIPUNCTURE: CPT

## 2022-04-04 PROCEDURE — 80048 BASIC METABOLIC PNL TOTAL CA: CPT

## 2022-04-04 PROCEDURE — 74011000258 HC RX REV CODE- 258: Performed by: INTERNAL MEDICINE

## 2022-04-04 PROCEDURE — 74011250636 HC RX REV CODE- 250/636: Performed by: INTERNAL MEDICINE

## 2022-04-04 PROCEDURE — 2709999900 HC NON-CHARGEABLE SUPPLY

## 2022-04-04 PROCEDURE — 74011250637 HC RX REV CODE- 250/637: Performed by: INTERNAL MEDICINE

## 2022-04-04 PROCEDURE — 74011250636 HC RX REV CODE- 250/636: Performed by: FAMILY MEDICINE

## 2022-04-04 PROCEDURE — 97112 NEUROMUSCULAR REEDUCATION: CPT

## 2022-04-04 RX ORDER — ESCITALOPRAM OXALATE 10 MG/1
10 TABLET ORAL DAILY
Status: DISCONTINUED | OUTPATIENT
Start: 2022-04-05 | End: 2022-04-07 | Stop reason: HOSPADM

## 2022-04-04 RX ORDER — ROPINIROLE 0.5 MG/1
0.5 TABLET, FILM COATED ORAL EVERY EVENING
Status: DISCONTINUED | OUTPATIENT
Start: 2022-04-04 | End: 2022-04-07 | Stop reason: HOSPADM

## 2022-04-04 RX ORDER — ENOXAPARIN SODIUM 100 MG/ML
40 INJECTION SUBCUTANEOUS EVERY 24 HOURS
Status: DISCONTINUED | OUTPATIENT
Start: 2022-04-04 | End: 2022-04-07 | Stop reason: HOSPADM

## 2022-04-04 RX ORDER — ATORVASTATIN CALCIUM 80 MG/1
80 TABLET, FILM COATED ORAL
Status: DISCONTINUED | OUTPATIENT
Start: 2022-04-04 | End: 2022-04-07 | Stop reason: HOSPADM

## 2022-04-04 RX ORDER — ASPIRIN 81 MG/1
81 TABLET ORAL DAILY
Status: DISCONTINUED | OUTPATIENT
Start: 2022-04-05 | End: 2022-04-07 | Stop reason: HOSPADM

## 2022-04-04 RX ADMIN — METRONIDAZOLE 500 MG: 500 TABLET ORAL at 09:12

## 2022-04-04 RX ADMIN — FUROSEMIDE 40 MG: 40 TABLET ORAL at 09:12

## 2022-04-04 RX ADMIN — CYANOCOBALAMIN TAB 1000 MCG 1000 MCG: 1000 TAB at 09:12

## 2022-04-04 RX ADMIN — MORPHINE SULFATE 1 MG: 2 INJECTION, SOLUTION INTRAMUSCULAR; INTRAVENOUS at 19:48

## 2022-04-04 RX ADMIN — HYDROCODONE BITARTRATE AND ACETAMINOPHEN 1 TABLET: 5; 325 TABLET ORAL at 12:11

## 2022-04-04 RX ADMIN — MORPHINE SULFATE 1 MG: 2 INJECTION, SOLUTION INTRAMUSCULAR; INTRAVENOUS at 03:54

## 2022-04-04 RX ADMIN — METRONIDAZOLE 500 MG: 500 TABLET ORAL at 17:15

## 2022-04-04 RX ADMIN — ROPINIROLE HYDROCHLORIDE 0.5 MG: 0.5 TABLET, FILM COATED ORAL at 17:15

## 2022-04-04 RX ADMIN — SODIUM CHLORIDE, PRESERVATIVE FREE 10 ML: 5 INJECTION INTRAVENOUS at 14:27

## 2022-04-04 RX ADMIN — CEFTRIAXONE 2 G: 2 INJECTION, POWDER, FOR SOLUTION INTRAMUSCULAR; INTRAVENOUS at 12:03

## 2022-04-04 RX ADMIN — TAMSULOSIN HYDROCHLORIDE 0.4 MG: 0.4 CAPSULE ORAL at 09:12

## 2022-04-04 RX ADMIN — ENOXAPARIN SODIUM 40 MG: 40 INJECTION SUBCUTANEOUS at 12:03

## 2022-04-04 NOTE — PROGRESS NOTES
Hospitalist Progress Note   Admit Date:  3/27/2022  7:41 PM   Name:  Dedrick Monge   Age:  79 y.o. Sex:  male  :  1940   MRN:  [id number]   Room:  604/    Presenting Complaint: Abdominal Pain    Reason(s) for Admission: Sepsis [facility]) [A41.9]  Acute cholecystitis [K81.0]  Pancreatitis, gallstone [facility] Course & Interval History: This is an 30-year-old male with a history of A. fib status post watchman, sick sinus syndrome status post PPM (is known that he has a fractured LV lead), diastolic heart failure, CAD myasthenia gravis admitted with acute cholecystitis and pancreatitis. He presented on 3/28 with a WBC of 20 lipase of 2000 and LFTs elevated elevated lactate of 4.9. CT showed likely gallstone pancreatitis as well as acute cholecystitis. General surgery was consulted. He is status post a cholecystectomy on 3/29/2022. ERCP was completed by GI on 3/30/2022. He had acute blood loss anemia though to some extent he may have had this on admission because his MCV was depressed and he had a decreased hemoglobin even on arrival.  During this hospitalization he status post 3 units of RBCs. GI was following. He did have an EGD on 3/29/2022 without any obvious source of bleeding. He had blood cultures secondary to Klebsiella for which ID was consulted. He was on a course of Zosyn switched to Rocephin and Flagyl. End of treatment is 2022 and can be on p.o. antibiotics on discharge which would be Bactrim and Flagyl per ID. He was ready to be discharged on  but he was having acute urinary retention. A CT abdomen pelvis showed abnormal appearing right ureter but no hydronephrosis. It was a very difficult Velasquez placement so urology came bedside and use a cystoscopy to perform this procedure. They recommended continuing Velasquez catheter on discharge for least 5 days and then trying a void trial.  He will follow up with urology as an outpatient. He was started on Flomax. On 4/4 restarted a lot of his home medicines including aspirin and statin for his peripheral artery disease, Escitalopram for his depression and ropinirole for his restless leg syndrome. Subjective (04/04/22): Patient is feeling well today. Asked me repeatedly if we can take out the Velasquez catheter but I explained that he does not have it when he leaves the hospital before is safe to try a voiding trial again. Assessment & Plan:     1. Acute pancreatitis, acute cholecystitis: Status post CCK as well as ERCP, continue with IV antibiotics: Patient switched to p.o. patient Bactrim and Flagyl EOT: 4/13/2022.  2. Acute urinary retention: Appreciate urology assistance recommendations. Continue with his Velasquez catheter for a minimum of 5 days, that would be a minimum of 4/9 until he tries a voiding trial.  This can be done as an outpatient. He should follow-up with Dr. Shruthi Aiken prescription. 3. Acute blood loss anemia: Status post EGD during this hospitalization no source of bleeding found. Started empirically on PPI which will continue. 4. Peripheral artery disease, CAD, carotid artery stenosis: Statin and aspirin restarted on 4/4, no signs of bleeding and's hemoglobin is stable. .  5. Hypertension: Hold off on his home lisinopril as he is relatively normotensive.   6. Depression: Restarting his home escitalopram on 4/4.  7. Restless leg syndrome: Restarting his home ropinirole 4/4.  8. Diastolic heart failure: Continue with his home Lasix    DVT ppx: Started Lovenox 4/4, no signs or symptoms of bleeding  Dispo: Home with home health  PT/OT: Consulted    Full Code    Objective:     Patient Vitals for the past 24 hrs:   Temp Pulse Resp BP SpO2   04/04/22 0802 97.6 °F (36.4 °C) 75 18 137/79 96 %   04/04/22 0401 97.9 °F (36.6 °C) 69 18 (!) 145/71 93 %   04/03/22 2352 97.8 °F (36.6 °C) 70 18 137/88 96 %   04/03/22 1934 99.2 °F (37.3 °C) 73 18 128/72 99 %   04/03/22 1512 98.2 °F (36.8 °C) 79 17 129/82 96 %     Oxygen Therapy  O2 Sat (%): 96 % (04/04/22 0802)  Pulse via Oximetry: 70 beats per minute (04/01/22 0017)  O2 Device: None (Room air) (04/03/22 1930)  Skin Assessment: Clean, dry, & intact (04/01/22 0017)  O2 Flow Rate (L/min): 2 l/min (04/01/22 0017)    Estimated body mass index is 34.12 kg/m² as calculated from the following:    Height as of this encounter: 5' 8\" (1.727 m). Weight as of this encounter: 101.8 kg (224 lb 6.9 oz). Intake/Output Summary (Last 24 hours) at 4/4/2022 1128  Last data filed at 4/4/2022 0409  Gross per 24 hour   Intake    Output 1220 ml   Net -1220 ml         Physical Exam:   Physical Exam  Constitutional:       Appearance: Normal appearance. HENT:      Head: Normocephalic. Nose: Nose normal.      Mouth/Throat:      Mouth: Mucous membranes are moist.   Eyes:      Extraocular Movements: Extraocular movements intact. Cardiovascular:      Rate and Rhythm: Normal rate. Pulses: Normal pulses. Pulmonary:      Effort: Pulmonary effort is normal.      Breath sounds: Normal breath sounds. Abdominal:      Palpations: Abdomen is soft. Tenderness: There is no abdominal tenderness. Genitourinary:     Comments: Velasquez catheter draining concentrated, darker urine  Musculoskeletal:         General: Normal range of motion. Cervical back: Normal range of motion. Skin:     General: Skin is warm. Findings: No rash. Neurological:      General: No focal deficit present. Mental Status: He is alert and oriented to person, place, and time.    Psychiatric:         Mood and Affect: Mood normal.           I have reviewed ordered lab tests and independently visualized imaging below:    Recent Labs:  Recent Results (from the past 48 hour(s))   CULTURE, URINE    Collection Time: 04/02/22  6:10 PM    Specimen: Clean catch; Urine   Result Value Ref Range    Special Requests: NO SPECIAL REQUESTS      Culture result: NO GROWTH 1 DAY     URINALYSIS W/ RFLX MICROSCOPIC    Collection Time: 04/02/22  6:11 PM   Result Value Ref Range    Color RED      Appearance CLOUDY      Specific gravity 1.015 1.001 - 1.023      pH (UA) 7.0 5.0 - 9.0      Protein 100 (A) NEG mg/dL    Glucose 100 mg/dL    Ketone TRACE (A) NEG mg/dL    Bilirubin Negative NEG      Blood LARGE (A) NEG      Urobilinogen 0.2 0.2 - 1.0 EU/dL    Nitrites Positive (A) NEG      Leukocyte Esterase TRACE (A) NEG     URINE MICROSCOPIC    Collection Time: 04/02/22  6:11 PM   Result Value Ref Range    Other observations DUPLICATE REQUEST    URINE MICROSCOPIC    Collection Time: 04/02/22  6:11 PM   Result Value Ref Range    WBC 0-3 0 /hpf    RBC >100 0 /hpf    Epithelial cells 0 0 /hpf    Bacteria 0 0 /hpf    Casts 0-3 0 /lpf    Crystals, urine 0 0 /LPF    Mucus 0 0 /lpf    Other observations RESULTS VERIFIED MANUALLY     METABOLIC PANEL, BASIC    Collection Time: 04/03/22  5:25 AM   Result Value Ref Range    Sodium 140 138 - 145 mmol/L    Potassium 3.3 (L) 3.5 - 5.1 mmol/L    Chloride 104 98 - 107 mmol/L    CO2 31 21 - 32 mmol/L    Anion gap 5 (L) 7 - 16 mmol/L    Glucose 107 (H) 65 - 100 mg/dL    BUN 15 8 - 23 MG/DL    Creatinine 0.80 0.8 - 1.5 MG/DL    GFR est AA >60 >60 ml/min/1.73m2    GFR est non-AA >60 >60 ml/min/1.73m2    Calcium 8.5 8.3 - 10.4 MG/DL   CBC WITH AUTOMATED DIFF    Collection Time: 04/03/22  5:25 AM   Result Value Ref Range    WBC 16.6 (H) 4.3 - 11.1 K/uL    RBC 4.69 4.23 - 5.6 M/uL    HGB 8.8 (L) 13.6 - 17.2 g/dL    HCT 31.7 (L) 41.1 - 50.3 %    MCV 67.6 (L) 79.6 - 97.8 FL    MCH 18.8 (L) 26.1 - 32.9 PG    MCHC 27.8 (L) 31.4 - 35.0 g/dL    RDW 27.0 (H) 11.9 - 14.6 %    PLATELET 034 932 - 079 K/uL    MPV 8.7 (L) 9.4 - 12.3 FL    ABSOLUTE NRBC 0.04 0.0 - 0.2 K/uL    DF AUTOMATED      NEUTROPHILS 69 43 - 78 %    LYMPHOCYTES 14 13 - 44 %    MONOCYTES 9 4.0 - 12.0 %    EOSINOPHILS 4 0.5 - 7.8 %    BASOPHILS 1 0.0 - 2.0 %    IMMATURE GRANULOCYTES 4 0.0 - 5.0 %    ABS. NEUTROPHILS 11.4 (H) 1.7 - 8.2 K/UL    ABS.  LYMPHOCYTES 2.4 0.5 - 4.6 K/UL    ABS. MONOCYTES 1.5 (H) 0.1 - 1.3 K/UL    ABS. EOSINOPHILS 0.6 0.0 - 0.8 K/UL    ABS. BASOPHILS 0.1 0.0 - 0.2 K/UL    ABS. IMM. GRANS. 0.6 (H) 0.0 - 0.5 K/UL   MAGNESIUM    Collection Time: 04/03/22  5:25 AM   Result Value Ref Range    Magnesium 2.2 1.8 - 2.4 mg/dL   CBC WITH AUTOMATED DIFF    Collection Time: 04/04/22  5:04 AM   Result Value Ref Range    WBC 15.8 (H) 4.3 - 11.1 K/uL    RBC 4.67 4.23 - 5.6 M/uL    HGB 8.6 (L) 13.6 - 17.2 g/dL    HCT 31.3 (L) 41.1 - 50.3 %    MCV 67.0 (L) 79.6 - 97.8 FL    MCH 18.4 (L) 26.1 - 32.9 PG    MCHC 27.5 (L) 31.4 - 35.0 g/dL    RDW 27.3 (H) 11.9 - 14.6 %    PLATELET 681 379 - 131 K/uL    MPV 8.9 (L) 9.4 - 12.3 FL    ABSOLUTE NRBC 0.05 0.0 - 0.2 K/uL    NEUTROPHILS 66 43 - 78 %    LYMPHOCYTES 16 13 - 44 %    MONOCYTES 8 4.0 - 12.0 %    EOSINOPHILS 4 0.5 - 7.8 %    BASOPHILS 1 0.0 - 2.0 %    IMMATURE GRANULOCYTES 5 0.0 - 5.0 %    ABS. NEUTROPHILS 10.4 (H) 1.7 - 8.2 K/UL    ABS. LYMPHOCYTES 2.5 0.5 - 4.6 K/UL    ABS. MONOCYTES 1.3 0.1 - 1.3 K/UL    ABS. EOSINOPHILS 0.6 0.0 - 0.8 K/UL    ABS. BASOPHILS 0.2 0.0 - 0.2 K/UL    ABS. IMM.  GRANS. 0.8 (H) 0.0 - 0.5 K/UL    RBC COMMENTS MICROCYTOSIS      RBC COMMENTS HYPOCHROMIA      RBC COMMENTS MODERATE  ANISOCYTOSIS + POIKILOCYTOSIS        WBC COMMENTS Result Confirmed By Smear      PLATELET COMMENTS ADEQUATE      DF AUTOMATED     METABOLIC PANEL, BASIC    Collection Time: 04/04/22  5:04 AM   Result Value Ref Range    Sodium 139 138 - 145 mmol/L    Potassium 3.4 (L) 3.5 - 5.1 mmol/L    Chloride 102 98 - 107 mmol/L    CO2 29 21 - 32 mmol/L    Anion gap 8 7 - 16 mmol/L    Glucose 131 (H) 65 - 100 mg/dL    BUN 14 8 - 23 MG/DL    Creatinine 0.80 0.8 - 1.5 MG/DL    GFR est AA >60 >60 ml/min/1.73m2    GFR est non-AA >60 >60 ml/min/1.73m2    Calcium 8.4 8.3 - 10.4 MG/DL       All Micro Results     Procedure Component Value Units Date/Time    CULTURE, URINE [[id number]] Collected: 04/02/22 1810    Order Status: Completed Specimen: Urine from Clean catch Updated: 04/04/22 0818     Special Requests: NO SPECIAL REQUESTS        Culture result: NO GROWTH 1 DAY       CULTURE, BLOOD [[id number]] Collected: 03/30/22 0648    Order Status: Completed Specimen: Blood Updated: 04/04/22 0729     Special Requests: --        LEFT  ARM       Culture result: NO GROWTH 5 DAYS       CULTURE, BLOOD [[id number]] Collected: 03/30/22 9392    Order Status: Completed Specimen: Blood Updated: 04/04/22 0729     Special Requests: --        LEFT  FOREARM       Culture result: NO GROWTH 5 DAYS       CULTURE, BLOOD [[id number]]  (Abnormal) Collected: 03/28/22 0019    Order Status: Completed Specimen: Blood Updated: 03/30/22 0726     Special Requests: --        LEFT  ARM       GRAM STAIN GRAM NEGATIVE RODS               AEROBIC AND ANAEROBIC BOTTLES                  CRITICAL RESULT NOT CALLED DUE TO PREVIOUS NOTIFICATION OF CRITICAL RESULT WITHIN THE LAST 24 HOURS.            Culture result: GRAM NEGATIVE RODS               For identification and susceptibility refer to culture  Accession [id number]      CULTURE, BLOOD [[id number]]  (Abnormal)  (Susceptibility) Collected: 03/27/22 2350    Order Status: Completed Specimen: Blood Updated: 03/30/22 0725     Special Requests: --        RIGHT  ARM       GRAM STAIN GRAM NEGATIVE RODS               AEROBIC AND ANAEROBIC BOTTLES                  RESULTS VERIFIED, PHONED TO AND READ BACK BY Kristine Trivedi RN @ 0347 ON 3/28/22 BY AMM           Culture result: KLEBSIELLA PNEUMONIAE               Refer to Blood Culture ID Panel Accession  [id number]      BLOOD CULTURE ID PANEL [[id number]]  (Abnormal) Collected: 03/27/22 2350    Order Status: Completed Specimen: Blood Updated: 03/28/22 1236     Acc. no. from Micro Order [id number]     Klebsiella pneumoniae Detected        Comment: RESULTS VERIFIED, PHONED TO AND READ BACK BY  Kristine Trivedi RN @ 2710 ON 3/28/22 BY AMM          KPC (Carbapenem Resistance Gene) NOT DETECTED        Comment: WARNING:  A Not Detected result for the KPC gene does not indicate susceptibility to carbapenems. Gram negative bacteria can be resistant to carbapenems by mechanisms other than carrying the KPC gene. INTERPRETATION       Gram negative ney, identified by real time PCR as Klebsiella pneumoniae. Other Studies:  No results found.     Current Meds:  Current Facility-Administered Medications   Medication Dose Route Frequency    tamsulosin (FLOMAX) capsule 0.4 mg  0.4 mg Oral DAILY    cefTRIAXone (ROCEPHIN) 2 g in 0.9% sodium chloride (MBP/ADV) 50 mL MBP  2 g IntraVENous Q24H    metroNIDAZOLE (FLAGYL) tablet 500 mg  500 mg Oral BID    HYDROcodone-acetaminophen (NORCO) 5-325 mg per tablet 1 Tablet  1 Tablet Oral Q6H PRN    simethicone (MYLICON) tablet 80 mg  80 mg Oral QID PRN    lip protectant (BLISTEX) ointment 1 Each  1 Each Topical PRN    bisacodyL (DULCOLAX) suppository 10 mg  10 mg Rectal DAILY PRN    pantoprazole (PROTONIX) tablet 40 mg  40 mg Oral ACB    cyanocobalamin tablet 1,000 mcg  1,000 mcg Oral DAILY    0.9% sodium chloride infusion 250 mL  250 mL IntraVENous PRN    sodium chloride (NS) flush 5-40 mL  5-40 mL IntraVENous Q8H    sodium chloride (NS) flush 5-40 mL  5-40 mL IntraVENous PRN    acetaminophen (TYLENOL) tablet 650 mg  650 mg Oral Q6H PRN    Or    acetaminophen (TYLENOL) suppository 650 mg  650 mg Rectal Q6H PRN    polyethylene glycol (MIRALAX) packet 17 g  17 g Oral DAILY PRN    ondansetron (ZOFRAN ODT) tablet 4 mg  4 mg Oral Q8H PRN    Or    ondansetron (ZOFRAN) injection 4 mg  4 mg IntraVENous Q6H PRN    hydrALAZINE (APRESOLINE) 20 mg/mL injection 10 mg  10 mg IntraVENous Q6H PRN    morphine injection 1 mg  1 mg IntraVENous Q4H PRN    furosemide (LASIX) tablet 40 mg  40 mg Oral DAILY    sodium chloride (NS) flush 5-10 mL  5-10 mL IntraVENous Q8H    sodium chloride (NS) flush 5-10 mL  5-10 mL IntraVENous PRN       Signed:  Jarvis Lugo MD    This note was generated using Dragon speech recognition software.

## 2022-04-04 NOTE — PROGRESS NOTES
Dispo update:  Spoke to Mr. Susu Field in room 604 [facility]). We discussed discharge planning again. He agrees to go home with home health RN and PT, and from the choices provided, choose [facility] Order and referral placed for [facility]. However, Mr. Cristobal Dies says \"I'm not ready to go home today, maybe tomorrow. \"

## 2022-04-04 NOTE — PROGRESS NOTES
ACUTE PHYSICAL THERAPY GOALS:  (Developed with and agreed upon by patient and/or caregiver.)  (1.) Adolfo Pearl will move from supine to sit and sit to supine , scoot up and down and roll side to side with MODIFIED INDEPENDENCE within 7 treatment day(s). (2.) Adolfo Pearl will transfer from bed to chair and chair to bed with MODIFIED INDEPENDENCE using the least restrictive device within 7 treatment day(s). (3.) Adolfo Pearl will ambulate with MODIFIED INDEPENDENCE for 300+ feet with the least restrictive device within 7 treatment day(s). (4.) Adolfo Pearl will perform standing static and dynamic balance activities x 25 minutes with MODIFIED INDEPENDENCE to improve safety within 7 treatment day(s). (5.) Adolfo Pearl will ascend and descend 1 stair using one hand rail(s) with MODIFIED INDEPENDENCE to improve functional mobility and safety within 7 treatment day(s). (6.) Adolfo Pearl will perform therapeutic exercises x 15 min for HEP with INDEPENDENCE to improve strength, endurance, and functional mobility within 7 treatment day(s).      PHYSICAL THERAPY: Daily Note Treatment Day # 2    Adolfo Pearl is a 79 y.o. male   PRIMARY DIAGNOSIS: Sepsis ([facility] [address]  Sepsis ([facility] [address] [A41.9]  Acute cholecystitis [K81.0]  Pancreatitis, gallstone [K85.10]  Procedure(s) (LRB):  ENDOSCOPIC RETROGRADE CHOLANGIOPANCREATOGRAPHY (ERCP) Rm 604 (N/A)  ENDOSCOPIC SPHINCTEROTOMY (N/A)  ENDOSCOPIC STONE EXTRACTION/BALLOON SWEEP (N/A)  5 Days Post-Op    ASSESSMENT:     REHAB RECOMMENDATIONS: CURRENT LEVEL OF FUNCTION:  (Most Recently Demonstrated)   Recommendation to date pending progress:  Setting:   Short-term Rehab (however, pt not agreeable)  Equipment:    None - pt already has  and BS Bed Mobility:   Minimal Assistance  Sit to Stand:   Minimal Assistance  Transfers:   Minimal Assistance  Gait/Mobility:   Minimal Assistance     ASSESSMENT:  Mr. Benji Thompson is an 79year old M who presents to hospital with abdominal pain, admitted with sepsis criteria. Since initial evaluation pt underwent cholecystectomy and ERCP. Pt is not doing as well today. Endorses fatigue and pain. Required more assistance with bed mobility and transfers and gait. Only able to ambulate ~80 ft with RW; very slow. Had to sit and then be wheeled back to his room with a prolonged seated rest break. Vital signs assessed: /80 mm Hg, HR 73 bpm, O2 96%. No real progress made today. Concern for him returning home if he is functioning at this level. He will continue to benefit from skilled PT services. Will continue therapy efforts. SUBJECTIVE:   Mr. Gladys Hough states, \"I have not had a good day. \"    SOCIAL HISTORY/ LIVING ENVIRONMENT: Lives with his spouse, 1 level home, 1 [address]. Endorses on fall/trip. Does not use any DME. 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   Home Environment: Private residence  # Steps to Enter: 1  One/Two Story Residence: One story  Living Alone: No  Support Systems: Spouse/Significant Other  OBJECTIVE:     PAIN: VITAL SIGNS: LINES/DRAINS:   Pre Treatment:  0/10  Post Treatment: 0/10   None  O2 Device: None (Room air)     MOBILITY: I Mod I S SBA CGA Min Mod Max Total  NT x2 Comments:   Bed Mobility    Rolling [] [] [] [] [] [x] [] [] [] [] []    Supine to Sit [] [] [] [] [] [x] [] [] [] [] []    Scooting [] [] [] [] [] [x] [] [] [] [] []    Sit to Supine [] [] [] [] [] [x] [] [] [] [] []    Transfers    Sit to Stand [] [] [] [] [] [x] [] [] [] [] []    Bed to Chair [] [] [] [] [] [x] [] [] [] [] []    Stand to Sit [] [] [] [] [x] [x] [] [] [] [] []    I=Independent, Mod I=Modified Independent, S=Supervision, SBA=Standby Assistance, CGA=Contact Guard Assistance,   Min=Minimal Assistance, Mod=Moderate Assistance, Max=Maximal Assistance, Total=Total Assistance, NT=Not Tested    BALANCE: Good Fair+ Fair Fair- Poor NT Comments   Sitting Static [x] [] [] [] [] []    Sitting Dynamic [x] [] [] [] [] []              Standing Static [] [x] [] [] [] []    Standing Dynamic [] [] [x] [] [] []      GAIT: I Mod I S SBA CGA Min Mod Max Total  NT x2 Comments:   Level of Assistance [] [] [] [] [x] [] [] [] [] [] []    Distance 75 ft    DME Rolling Walker    Gait Quality Very slow, shuffled    Weightbearing  Status N/A     I=Independent, Mod I=Modified Independent, S=Supervision, SBA=Standby Assistance, CGA=Contact Guard Assistance,   Min=Minimal Assistance, Mod=Moderate Assistance, Max=Maximal Assistance, Total=Total Assistance, NT=Not Tested    PLAN:   FREQUENCY/DURATION: PT Plan of Care: 3 times/week for duration of hospital stay or until stated goals are met, whichever comes first.  TREATMENT:     TREATMENT:    ($$ Therapeutic Activity: 23-37 mins    )  Therapeutic Activity (31 Minutes): Therapeutic activity included Rolling, Supine to Sit, Scooting, Transfer Training, Ambulation on level ground, Sitting balance  and Standing balance to improve functional Mobility, Strength and Activity tolerance.     TREATMENT GRID:  N/A    AFTER TREATMENT POSITION/PRECAUTIONS:  Alarm Activated, Chair, Needs within reach and RN notified    INTERDISCIPLINARY COLLABORATION:  RN/PCT, PT/PTA and OT/GOULD    TOTAL TREATMENT DURATION:  PT Patient Time In/Time Out  Time In: 1512  Time Out: 733 [facility], PT

## 2022-04-04 NOTE — PROGRESS NOTES
ACUTE OT GOALS:  (Developed with and agreed upon by patient and/or caregiver.)  1. Patient will complete lower body bathing and dressing with modified independence and adaptive equipment as needed. 2. Patient will complete toileting with modified independence. 3. Patient will tolerate 30 minutes of OT treatment with 2-3 rest breaks to increase activity tolerance for ADLs. 4. Patient will complete functional transfers with modified independence and adaptive equipment as needed. 5. Patient will complete functional mobility for household distances and good safety awareness with modified independence.       Timeframe: 7 visits      OCCUPATIONAL THERAPY: Daily Note OT Treatment Day # 2    Leopoldo Moreno is a 79 y.o. male   PRIMARY DIAGNOSIS: Sepsis ([facility] [address]  Sepsis ([facility] [address] [A41.9]  Acute cholecystitis [K81.0]  Pancreatitis, gallstone [K85.10]  Procedure(s) (LRB):  ENDOSCOPIC RETROGRADE CHOLANGIOPANCREATOGRAPHY (ERCP) Rm 604 (N/A)  ENDOSCOPIC SPHINCTEROTOMY (N/A)  ENDOSCOPIC STONE EXTRACTION/BALLOON SWEEP (N/A)  5 Days Post-Op  [place] / [place] / Product Type: Medicare /   ASSESSMENT:     REHAB RECOMMENDATIONS: CURRENT LEVEL OF FUNCTION:  (Most Recently Demonstrated)   Recommendation to date pending progress:  Setting:   Short-term Rehab   (per chart pt declining rehab)  Equipment:    To Be Determined Bathing:   Not tested  Dressing:   Not tested  Feeding/Grooming:   Total Assistance (too fatigued today)  Toileting:   Not tested  Functional Mobility:   Minimal Assistance x 2     ASSESSMENT:  Mr. Pete Barrett reports he isn't feeling as well today! Pt is having more pain in his abdomen today and overall he just doesn't feel as well. Pt wanted to use the walker today and is moving slower today and needing more assistance. Pt cued for safety and balance throughout needing minimal assistance x 2 for functional mobility to increase safety.  Pt had to sit in the wheelchair in the hallway and be wheeled back to the room due to fatigue. Pt took extended rest break in sitting with vitals taken, which were stable but /80. Pt was too fatigued to comb hair after it was washed and needed total assistance for this today. Pt initially transferred to the bed but then asking to go to the chair with min A x 2 to get to chair. Pt not doing as well as previous session. Continue per plan of care. SUBJECTIVE:   Mr. Thad Hodgkins states, \"I'm not doing as good today! \"    SOCIAL HISTORY/LIVING ENVIRONMENT: See initial assessment   Home Environment: Private residence  # Steps to Enter: 1  One/Two Story Residence: One story  Living Alone: No  Support Systems: Spouse/Significant Other    OBJECTIVE:     PAIN: VITAL SIGNS: LINES/DRAINS:   Pre Treatment: Pain Screen  Pain Scale 1: Numeric (0 - 10)  Pain Intensity 1: 7 (7)  Pain Onset 1: post op  Pain Location 1: Abdomen  Pain Intervention(s) 1: Ambulation/Increased Activity  Post Treatment: 0   None  O2 Device: None (Room air)     ACTIVITIES OF DAILY LIVING: I Mod I S SBA CGA Min Mod Max Total NT Comments   BASIC ADLs:              Bathing/ Showering [] [] [] [] [] [] [] [] [] [x]    Toileting [] [] [] [] [] [] [] [] [] [x]    Dressing [] [] [] [] [] [] [] [] [] [x]    Feeding [] [] [] [] [] [] [] [] [] [x]    Grooming [] [] [] [] [] [] [] [] [x] [] Combing hair   Personal Device Care [] [] [] [] [] [] [] [] [] [x]    Functional Mobility [] [] [] [] [] [x] [] [] [] []  x  2Using RW   I=Independent, Mod I=Modified Independent, S=Supervision, SBA=Standby Assistance, CGA=Contact Guard Assistance,   Min=Minimal Assistance, Mod=Moderate Assistance, Max=Maximal Assistance, Total=Total Assistance, NT=Not Tested    MOBILITY: I Mod I S SBA CGA Min Mod Max Total  NT x2 Comments:   Supine to sit [] [] [] [] [] [x] [x] [] [] [] [x]    Sit to supine [] [] [] [] [] [] [] [] [] [x] []    Sit to stand [] [] [] [] [] [x] [] [] [] [] []    Bed to chair [] [] [] [] [] [x] [] [] [] [] [x]    I=Independent, Mod I=Modified Independent, S=Supervision, SBA=Standby Assistance, CGA=Contact Guard Assistance,   Min=Minimal Assistance, Mod=Moderate Assistance, Max=Maximal Assistance, Total=Total Assistance, NT=Not Tested    BALANCE: Good Fair+ Fair Fair- Poor NT Comments   Sitting Static [] [x] [] [] [] []    Sitting Dynamic [] [x] [] [] [] []              Standing Static [] [] [x] [] [] []    Standing Dynamic [] [] [] [x] [] []      PLAN:   FREQUENCY/DURATION: OT Plan of Care: 3 times/week for duration of hospital stay or until stated goals are met, whichever comes first.    TREATMENT:   TREATMENT:   ( $$ Neuromuscular Re-Education: 23-37 mins   )  Co-Treatment PT/OT necessary due to patient's decreased overall endurance/tolerance levels, as well as need for high level skilled assistance to complete functional transfers/mobility and functional tasks  Self Care (15 Minutes): Self care including Toileting and Grooming to increase independence and decrease level of assistance required. Neuromuscular Re-education (8 Minutes): Neuromuscular Re-education included Balance Training, Coordination training, Postural training, Sitting balance training and Standing balance training to improve Balance, Coordination and Postural Control.     TREATMENT GRID:  N/A    AFTER TREATMENT POSITION/PRECAUTIONS:  Chair, Needs within reach and RN notified    INTERDISCIPLINARY COLLABORATION:  RN/PCT, PT/PTA and OT/GOULD    TOTAL TREATMENT DURATION:  OT Patient Time In/Time Out  Time In: 1512  Time Out: [address], OT

## 2022-04-04 NOTE — PROGRESS NOTES
Hourly rounds completed throughout shift. Bed low/locked. Call light within reach. All pt needs are met at this time. Ron wells. Prn meds given per MAR. Will monitor and give bedside report to oncoming nurse.

## 2022-04-05 LAB
ANION GAP SERPL CALC-SCNC: 5 MMOL/L (ref 7–16)
B PERT DNA SPEC QL NAA+PROBE: NOT DETECTED
BACTERIA SPEC CULT: NORMAL
BASOPHILS # BLD: 0.2 K/UL (ref 0–0.2)
BASOPHILS NFR BLD: 1 % (ref 0–2)
BORDETELLA PARAPERTUSSIS PCR, BORPAR: NOT DETECTED
BUN SERPL-MCNC: 11 MG/DL (ref 8–23)
C PNEUM DNA SPEC QL NAA+PROBE: NOT DETECTED
CALCIUM SERPL-MCNC: 8.2 MG/DL (ref 8.3–10.4)
CHLORIDE SERPL-SCNC: 102 MMOL/L (ref 98–107)
CO2 SERPL-SCNC: 28 MMOL/L (ref 21–32)
CREAT SERPL-MCNC: 0.8 MG/DL (ref 0.8–1.5)
DIFFERENTIAL METHOD BLD: ABNORMAL
EOSINOPHIL # BLD: 0.6 K/UL (ref 0–0.8)
EOSINOPHIL NFR BLD: 4 % (ref 0.5–7.8)
ERYTHROCYTE [DISTWIDTH] IN BLOOD BY AUTOMATED COUNT: 27.8 % (ref 11.9–14.6)
FLUAV SUBTYP SPEC NAA+PROBE: NOT DETECTED
FLUBV RNA SPEC QL NAA+PROBE: NOT DETECTED
GLUCOSE SERPL-MCNC: 121 MG/DL (ref 65–100)
HADV DNA SPEC QL NAA+PROBE: NOT DETECTED
HCOV 229E RNA SPEC QL NAA+PROBE: NOT DETECTED
HCOV HKU1 RNA SPEC QL NAA+PROBE: NOT DETECTED
HCOV NL63 RNA SPEC QL NAA+PROBE: NOT DETECTED
HCOV OC43 RNA SPEC QL NAA+PROBE: NOT DETECTED
HCT VFR BLD AUTO: 31.1 % (ref 41.1–50.3)
HGB BLD-MCNC: 8.7 G/DL (ref 13.6–17.2)
HMPV RNA SPEC QL NAA+PROBE: NOT DETECTED
HPIV1 RNA SPEC QL NAA+PROBE: NOT DETECTED
HPIV2 RNA SPEC QL NAA+PROBE: NOT DETECTED
HPIV3 RNA SPEC QL NAA+PROBE: NOT DETECTED
HPIV4 RNA SPEC QL NAA+PROBE: NOT DETECTED
IMM GRANULOCYTES # BLD AUTO: 1.1 K/UL (ref 0–0.5)
IMM GRANULOCYTES NFR BLD AUTO: 7 % (ref 0–5)
LYMPHOCYTES # BLD: 2.4 K/UL (ref 0.5–4.6)
LYMPHOCYTES NFR BLD: 15 % (ref 13–44)
M PNEUMO DNA SPEC QL NAA+PROBE: NOT DETECTED
MCH RBC QN AUTO: 18.6 PG (ref 26.1–32.9)
MCHC RBC AUTO-ENTMCNC: 28 G/DL (ref 31.4–35)
MCV RBC AUTO: 66.3 FL (ref 79.6–97.8)
MONOCYTES # BLD: 1.3 K/UL (ref 0.1–1.3)
MONOCYTES NFR BLD: 8 % (ref 4–12)
NEUTS SEG # BLD: 10.2 K/UL (ref 1.7–8.2)
NEUTS SEG NFR BLD: 65 % (ref 43–78)
NRBC # BLD: 0.02 K/UL (ref 0–0.2)
PLATELET # BLD AUTO: 206 K/UL (ref 150–450)
PLATELET COMMENTS,PCOM: ADEQUATE
PMV BLD AUTO: 9.1 FL (ref 9.4–12.3)
POTASSIUM SERPL-SCNC: 3.7 MMOL/L (ref 3.5–5.1)
RBC # BLD AUTO: 4.69 M/UL (ref 4.23–5.6)
RBC MORPH BLD: ABNORMAL
RSV RNA SPEC QL NAA+PROBE: NOT DETECTED
RV+EV RNA SPEC QL NAA+PROBE: NOT DETECTED
SARS-COV-2 PCR, COVPCR: NOT DETECTED
SERVICE CMNT-IMP: NORMAL
SODIUM SERPL-SCNC: 135 MMOL/L (ref 138–145)
WBC # BLD AUTO: 15.8 K/UL (ref 4.3–11.1)
WBC MORPH BLD: ABNORMAL

## 2022-04-05 PROCEDURE — 74011250637 HC RX REV CODE- 250/637: Performed by: INTERNAL MEDICINE

## 2022-04-05 PROCEDURE — 74011000250 HC RX REV CODE- 250: Performed by: EMERGENCY MEDICINE

## 2022-04-05 PROCEDURE — 80048 BASIC METABOLIC PNL TOTAL CA: CPT

## 2022-04-05 PROCEDURE — 74011000258 HC RX REV CODE- 258: Performed by: INTERNAL MEDICINE

## 2022-04-05 PROCEDURE — 97110 THERAPEUTIC EXERCISES: CPT

## 2022-04-05 PROCEDURE — 74011250636 HC RX REV CODE- 250/636: Performed by: FAMILY MEDICINE

## 2022-04-05 PROCEDURE — 74011250636 HC RX REV CODE- 250/636: Performed by: EMERGENCY MEDICINE

## 2022-04-05 PROCEDURE — 36415 COLL VENOUS BLD VENIPUNCTURE: CPT

## 2022-04-05 PROCEDURE — 65270000029 HC RM PRIVATE

## 2022-04-05 PROCEDURE — 74011000250 HC RX REV CODE- 250: Performed by: FAMILY MEDICINE

## 2022-04-05 PROCEDURE — 85025 COMPLETE CBC W/AUTO DIFF WBC: CPT

## 2022-04-05 PROCEDURE — 74011250637 HC RX REV CODE- 250/637: Performed by: STUDENT IN AN ORGANIZED HEALTH CARE EDUCATION/TRAINING PROGRAM

## 2022-04-05 PROCEDURE — 97530 THERAPEUTIC ACTIVITIES: CPT

## 2022-04-05 PROCEDURE — 74011250637 HC RX REV CODE- 250/637: Performed by: UROLOGY

## 2022-04-05 PROCEDURE — 74011250636 HC RX REV CODE- 250/636: Performed by: INTERNAL MEDICINE

## 2022-04-05 PROCEDURE — 0202U NFCT DS 22 TRGT SARS-COV-2: CPT

## 2022-04-05 PROCEDURE — 74011250636 HC RX REV CODE- 250/636: Performed by: STUDENT IN AN ORGANIZED HEALTH CARE EDUCATION/TRAINING PROGRAM

## 2022-04-05 RX ORDER — LORAZEPAM 2 MG/ML
1 INJECTION INTRAMUSCULAR
Status: DISCONTINUED | OUTPATIENT
Start: 2022-04-05 | End: 2022-04-07 | Stop reason: HOSPADM

## 2022-04-05 RX ORDER — FUROSEMIDE 10 MG/ML
40 INJECTION INTRAMUSCULAR; INTRAVENOUS 2 TIMES DAILY
Status: DISCONTINUED | OUTPATIENT
Start: 2022-04-05 | End: 2022-04-07 | Stop reason: HOSPADM

## 2022-04-05 RX ADMIN — ESCITALOPRAM OXALATE 10 MG: 10 TABLET ORAL at 09:36

## 2022-04-05 RX ADMIN — FUROSEMIDE 40 MG: 40 TABLET ORAL at 09:36

## 2022-04-05 RX ADMIN — SODIUM CHLORIDE, PRESERVATIVE FREE 10 ML: 5 INJECTION INTRAVENOUS at 21:54

## 2022-04-05 RX ADMIN — LORAZEPAM 1 MG: 2 INJECTION INTRAMUSCULAR at 21:37

## 2022-04-05 RX ADMIN — SODIUM CHLORIDE, PRESERVATIVE FREE 10 ML: 5 INJECTION INTRAVENOUS at 14:35

## 2022-04-05 RX ADMIN — METRONIDAZOLE 500 MG: 500 TABLET ORAL at 17:48

## 2022-04-05 RX ADMIN — METRONIDAZOLE 500 MG: 500 TABLET ORAL at 09:35

## 2022-04-05 RX ADMIN — CEFTRIAXONE 2 G: 2 INJECTION, POWDER, FOR SOLUTION INTRAMUSCULAR; INTRAVENOUS at 12:43

## 2022-04-05 RX ADMIN — SODIUM CHLORIDE, PRESERVATIVE FREE 10 ML: 5 INJECTION INTRAVENOUS at 05:31

## 2022-04-05 RX ADMIN — FUROSEMIDE 40 MG: 10 INJECTION, SOLUTION INTRAMUSCULAR; INTRAVENOUS at 17:48

## 2022-04-05 RX ADMIN — ROPINIROLE HYDROCHLORIDE 0.5 MG: 0.5 TABLET, FILM COATED ORAL at 17:48

## 2022-04-05 RX ADMIN — MORPHINE SULFATE 1 MG: 2 INJECTION, SOLUTION INTRAMUSCULAR; INTRAVENOUS at 19:59

## 2022-04-05 RX ADMIN — PANTOPRAZOLE SODIUM 40 MG: 40 TABLET, DELAYED RELEASE ORAL at 05:30

## 2022-04-05 RX ADMIN — ASPIRIN 81 MG: 81 TABLET ORAL at 09:34

## 2022-04-05 RX ADMIN — SODIUM CHLORIDE, PRESERVATIVE FREE 10 ML: 5 INJECTION INTRAVENOUS at 21:55

## 2022-04-05 RX ADMIN — CYANOCOBALAMIN TAB 1000 MCG 1000 MCG: 1000 TAB at 09:34

## 2022-04-05 RX ADMIN — ATORVASTATIN CALCIUM 80 MG: 80 TABLET, FILM COATED ORAL at 22:00

## 2022-04-05 RX ADMIN — TAMSULOSIN HYDROCHLORIDE 0.4 MG: 0.4 CAPSULE ORAL at 09:35

## 2022-04-05 RX ADMIN — ENOXAPARIN SODIUM 40 MG: 40 INJECTION SUBCUTANEOUS at 12:43

## 2022-04-05 NOTE — PROGRESS NOTES
ACUTE OT GOALS:  (Developed with and agreed upon by patient and/or caregiver.)  1. Patient will complete lower body bathing and dressing with modified independence and adaptive equipment as needed. 2. Patient will complete toileting with modified independence. 3. Patient will tolerate 30 minutes of OT treatment with 2-3 rest breaks to increase activity tolerance for ADLs. 4. Patient will complete functional transfers with modified independence and adaptive equipment as needed. 5. Patient will complete functional mobility for household distances and good safety awareness with modified independence.       Timeframe: 7 visits      OCCUPATIONAL THERAPY: Daily Note OT Treatment Day # 3    Kaylynn Sehn is a 79 y.o. male   PRIMARY DIAGNOSIS: Sepsis ([facility] [address]  Sepsis ([facility] [address] [A41.9]  Acute cholecystitis [K81.0]  Pancreatitis, gallstone [K85.10]  Procedure(s) (LRB):  ENDOSCOPIC RETROGRADE CHOLANGIOPANCREATOGRAPHY (ERCP) Rm 604 (N/A)  ENDOSCOPIC SPHINCTEROTOMY (N/A)  ENDOSCOPIC STONE EXTRACTION/BALLOON SWEEP (N/A)  6 Days Post-Op  [place] / Product Type: Medicare /   ASSESSMENT:     REHAB RECOMMENDATIONS: CURRENT LEVEL OF FUNCTION:  (Most Recently Demonstrated)   Recommendation to date pending progress:  Setting:   Short-term Rehab  Equipment:    To Be Determined Bathing:   Not tested  Dressing:   Not tested  Feeding/Grooming:   Not tested   Toileting:   Not tested  Functional Mobility:   Not tested     ASSESSMENT:  Mr. Sandip Campa is doing fair today. Pt presents sitting up in chair upon arrival. Pt was educated on energy conservation techniques today. Pt verbalized understanding. Pt was also instructed in UE exercises to increase strength and activity tolerance. Pt tolerated exercises fairly. Pt noted to be fatigued after 2 exercises. Pt left in chair. All needs in reach. Minimal to no progress made today. Will continue to benefit from skilled OT during stay. SUBJECTIVE:   Mr. Alejandrina Bazan states, \"I will go if I have to\"-referring to going to rehab if necessary    SOCIAL HISTORY/LIVING ENVIRONMENT: See initial assessment   Home Environment: Private residence  # Steps to Enter: 1  One/Two Story Residence: One story  Living Alone: No  Support Systems: Spouse/Significant Other    OBJECTIVE:     PAIN: VITAL SIGNS: LINES/DRAINS:   Pre Treatment: Pain Screen  Pain Scale 1: Numeric (0 - 10)  Pain Intensity 1: 0  Post Treatment: 0   Velasquez Catheter and IV  O2 Device: None (Room air)     ACTIVITIES OF DAILY LIVING: I Mod I S SBA CGA Min Mod Max Total NT Comments   BASIC ADLs:              Bathing/ Showering [] [] [] [] [] [] [] [] [] [x]    Toileting [] [] [] [] [] [] [] [] [] [x]    Dressing [] [] [] [] [] [] [] [] [] [x]    Feeding [] [] [] [] [] [] [] [] [] [x]    Grooming [] [] [] [] [] [] [] [] [] [x] Combing hair   Personal Device Care [] [] [] [] [] [] [] [] [] [x]    Functional Mobility [] [] [] [] [] [] [] [] [] [x]  x  2Using RW   I=Independent, Mod I=Modified Independent, S=Supervision, SBA=Standby Assistance, CGA=Contact Guard Assistance,   Min=Minimal Assistance, Mod=Moderate Assistance, Max=Maximal Assistance, Total=Total Assistance, NT=Not Tested    MOBILITY: I Mod I S SBA CGA Min Mod Max Total  NT x2 Comments:   Supine to sit [] [] [] [] [] [] [] [] [] [x] [x]    Sit to supine [] [] [] [] [] [] [] [] [] [x] []    Sit to stand [] [] [] [] [] [] [] [] [] [x] []    Bed to chair [] [] [] [] [] [] [] [] [] [x] [x]    I=Independent, Mod I=Modified Independent, S=Supervision, SBA=Standby Assistance, CGA=Contact Guard Assistance,   Min=Minimal Assistance, Mod=Moderate Assistance, Max=Maximal Assistance, Total=Total Assistance, NT=Not Tested    BALANCE: Good Fair+ Fair Fair- Poor NT Comments   Sitting Static [] [] [x] [] [] []    Sitting Dynamic [] [] [x] [] [] []              Standing Static [] [] [] [] [] [x]    Standing Dynamic [] [] [] [] [] [x]      PLAN: FREQUENCY/DURATION: OT Plan of Care: 3 times/week for duration of hospital stay or until stated goals are met, whichever comes first.    TREATMENT:   TREATMENT:   ( $$ Therapeutic Exercises: 8-22 mins   )  Therapeutic Exercise (10 Minutes): Therapeutic exercises noted below to improve functional activity tolerance, strength and mobility.      TREATMENT GRID:   Date:  4/5/22 Date:   Date:     Activity/Exercise Parameters Parameters Parameters   Shoulder Abd/Adduction 10 reps     Punches  10 reps                                     AFTER TREATMENT POSITION/PRECAUTIONS:  Chair, Needs within reach and RN notified    INTERDISCIPLINARY COLLABORATION:  RN/PCT and OT/GOULD    TOTAL TREATMENT DURATION:  OT Patient Time In/Time Out  Time In: 1450  Time Out: [address] [facility]

## 2022-04-05 NOTE — PROGRESS NOTES
Dispo update:  Spoke to Mr. Catracho Good again in room 604 about discharge planning. He needs STR at a SNF. Provided him a SNF list, and he requested that I call his wife Ladonna Vera at [phone]. Called her and spoke. She will review, and call  back tomorrow morning.

## 2022-04-05 NOTE — PROGRESS NOTES
ACUTE PHYSICAL THERAPY GOALS:  (Developed with and agreed upon by patient and/or caregiver.)  (1.) Acosta Prather will move from supine to sit and sit to supine , scoot up and down and roll side to side with MODIFIED INDEPENDENCE within 7 treatment day(s). (2.) Acosta Prather will transfer from bed to chair and chair to bed with MODIFIED INDEPENDENCE using the least restrictive device within 7 treatment day(s). (3.) Acosta Prather will ambulate with MODIFIED INDEPENDENCE for 300+ feet with the least restrictive device within 7 treatment day(s). (4.) Acosta Prather will perform standing static and dynamic balance activities x 25 minutes with MODIFIED INDEPENDENCE to improve safety within 7 treatment day(s). (5.) Acosta Prather will ascend and descend 1 stair using one hand rail(s) with MODIFIED INDEPENDENCE to improve functional mobility and safety within 7 treatment day(s). (6.) Acosta Prather will perform therapeutic exercises x 15 min for HEP with INDEPENDENCE to improve strength, endurance, and functional mobility within 7 treatment day(s).      PHYSICAL THERAPY: Daily Note Treatment Day # 3    Acosta Prather is a 79 y.o. male   PRIMARY DIAGNOSIS: Sepsis ([facility] [address]  Sepsis ([facility] [address] [A41.9]  Acute cholecystitis [K81.0]  Pancreatitis, gallstone [K85.10]  Procedure(s) (LRB):  ENDOSCOPIC RETROGRADE CHOLANGIOPANCREATOGRAPHY (ERCP) Rm 604 (N/A)  ENDOSCOPIC SPHINCTEROTOMY (N/A)  ENDOSCOPIC STONE EXTRACTION/BALLOON SWEEP (N/A)  6 Days Post-Op    ASSESSMENT:     REHAB RECOMMENDATIONS: CURRENT LEVEL OF FUNCTION:  (Most Recently Demonstrated)   Recommendation to date pending progress:  Setting:   Short-term Rehab (however, pt not agreeable)  Equipment:    None - pt already has  and BSC Bed Mobility:   Contact Guard Assistance  Sit to Stand:   Contact Guard Assistance  Transfers:   Minimal Assistance  Gait/Mobility:   Minimal Assistance     ASSESSMENT:  Mr. Janneth Elise is an 79year old M who presents to hospital with abdominal pain, admitted with sepsis criteria. Since initial evaluation pt underwent cholecystectomy and ERCP. Pt continues to feel fatigued. He only felt up to ambulating to/from bathroom and performing sit <> stand transfers from bedside chair, toilet, and edge of bed before he requested to rest. Overall CGA to Min A for mobility. He did have a few episodes of unsteadiness, but able to correct with CGA. Requiring BUE support at [facility] for steadiness and energy conservation today. Continue to recommend STR as best and safest discharge plan at pt's current level of function. He will continue to benefit from skilled PT services. Will continue therapy efforts. SUBJECTIVE:   Mr. Susan Jacobs states, \"I am wiped out. \"    SOCIAL HISTORY/ LIVING ENVIRONMENT: Lives with his spouse, 1 level home, 1 [address]. Endorses on fall/trip. Does not use any DME. 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   Home Environment: Private residence  # Steps to Enter: 1  One/Two Story Residence: One story  Living Alone: No  Support Systems: Spouse/Significant Other  OBJECTIVE:     PAIN: VITAL SIGNS: LINES/DRAINS:   Pre Treatment: Pain Screen  Pain Scale 1: Numeric (0 - 10)  Pain Intensity 1: 00/10  Post Treatment: 0/10 Vital Signs  O2 Device: None (Room air) None  O2 Device: None (Room air)     MOBILITY: I Mod I S SBA CGA Min Mod Max Total  NT x2 Comments:   Bed Mobility    Rolling [] [] [] [] [x] [] [] [] [] [] []    Supine to Sit [] [] [] [] [x] [] [] [] [] [] []    Scooting [] [] [] [] [x] [] [] [] [] [] []    Sit to Supine [] [] [] [] [x] [] [] [] [] [] []    Transfers    Sit to Stand [] [] [] [] [] [x] [] [] [] [] []    Bed to Chair [] [] [] [] [] [x] [] [] [] [] []    Stand to Sit [] [] [] [] [x] [x] [] [] [] [] []    I=Independent, Mod I=Modified Independent, S=Supervision, SBA=Standby Assistance, CGA=Contact Guard Assistance,   Min=Minimal Assistance, Mod=Moderate Assistance, Max=Maximal Assistance, Total=Total Assistance, NT=Not Tested    BALANCE: Good Fair+ Fair Fair- Poor NT Comments   Sitting Static [x] [] [] [] [] []    Sitting Dynamic [x] [] [] [] [] []              Standing Static [] [x] [] [] [] []    Standing Dynamic [] [] [x] [] [] []      GAIT: I Mod I S SBA CGA Min Mod Max Total  NT x2 Comments:   Level of Assistance [] [] [] [] [x] [] [] [] [] [] []    Distance 2 x 15'    DME Rolling Walker    Gait Quality Slow, shuffled    Weightbearing  Status N/A     I=Independent, Mod I=Modified Independent, S=Supervision, SBA=Standby Assistance, CGA=Contact Guard Assistance,   Min=Minimal Assistance, Mod=Moderate Assistance, Max=Maximal Assistance, Total=Total Assistance, NT=Not Tested    PLAN:   FREQUENCY/DURATION: PT Plan of Care: 3 times/week for duration of hospital stay or until stated goals are met, whichever comes first.  TREATMENT:     TREATMENT:    ($$ Therapeutic Activity: 8-22 mins    )  Therapeutic Activity (9 Minutes): Therapeutic activity included Sit to Supine, Scooting, Transfer Training, Ambulation on level ground, Sitting balance  and Standing balance to improve functional Mobility, Strength and Activity tolerance.     TREATMENT GRID:  N/A    AFTER TREATMENT POSITION/PRECAUTIONS:  Bed, Needs within reach and RN notified    INTERDISCIPLINARY COLLABORATION:  RN/PCT and PT/PTA    TOTAL TREATMENT DURATION:  PT Patient Time In/Time Out  Time In: 1522  Time Out: [address] [facility], PT

## 2022-04-05 NOTE — PROGRESS NOTES
Hospitalist Progress Note   Admit Date:  3/27/2022  7:41 PM   Name:  Caleb Gonsales   Age:  79 y.o. Sex:  male  :  1940   MRN:  [id number]   Room:  604/    Presenting Complaint: Abdominal Pain    Reason(s) for Admission: Sepsis [facility]) [A41.9]  Acute cholecystitis [K81.0]  Pancreatitis, gallstone [facility] Course & Interval History: This is an 39-year-old male with a history of A. fib status post watchman, sick sinus syndrome status post PPM (is known that he has a fractured LV lead), diastolic heart failure, CAD myasthenia gravis admitted with acute cholecystitis and pancreatitis. He presented on 3/28 with a WBC of 20 lipase of 2000 and LFTs elevated elevated lactate of 4.9. CT showed likely gallstone pancreatitis as well as acute cholecystitis. General surgery was consulted. He is status post a cholecystectomy on 3/29/2022. ERCP was completed by GI on 3/30/2022. He had acute blood loss anemia though to some extent he may have had this on admission because his MCV was depressed and he had a decreased hemoglobin even on arrival.  During this hospitalization he status post 3 units of RBCs. GI was following. He did have an EGD on 3/29/2022 without any obvious source of bleeding. He had blood cultures secondary to Klebsiella for which ID was consulted. He was on a course of Zosyn switched to Rocephin and Flagyl. End of treatment is 2022 and can be on p.o. antibiotics on discharge which would be Bactrim and Flagyl per ID. He was ready to be discharged on  but he was having acute urinary retention. A CT abdomen pelvis showed abnormal appearing right ureter but no hydronephrosis. It was a very difficult Velasquez placement so urology came bedside and use a cystoscopy to perform this procedure. They recommended continuing Velasquez catheter on discharge for least 5 days and then trying a void trial.  He will follow up with urology as an outpatient. He was started on Flomax. On 4/4 restarted a lot of his home medicines including aspirin and statin for his peripheral artery disease, Escitalopram for his depression and ropinirole for his restless leg syndrome. Subjective/24hr Events (04/05/22):  C/o abdominal discomfort  Says willing to go to [address]:   1. Acute pancreatitis, acute cholecystitis: Status post CCK as well as ERCP, continue with IV antibiotics: on Rocephin and flagyl  2. Bactrim and Flagyl EOT: 4/13/2022.  3. Acute urinary retention: Appreciate urology assistance recommendations. Continue with his Velasquez catheter for a minimum of 5 days, that would be a minimum of 4/9 until he tries a voiding trial.  This can be done as an outpatient. He should follow-up with Dr. Huntley Moh prescription. 4. 4/5/2022- cont urinary cath  5. Acute blood loss anemia: Status post EGD during this hospitalization no source of bleeding found. Started empirically on PPI which will continue. 6. Peripheral artery disease, CAD, carotid artery stenosis: Statin and aspirin restarted on 4/4, no signs of bleeding and's hemoglobin is stable. .  7. Hypertension: Hold off on his home lisinopril as he is relatively normotensive. 8. Depression: Restarting his home escitalopram on 4/4.  9. Restless leg syndrome: Restarting his home ropinirole 4/4.   10. Diastolic heart failure- bilateral lower extremity pitting edema: Continue with his home Lasix     DVT ppx: Started Lovenox 4/4, no signs or symptoms of bleeding  Dispo: Home with home health  PT/OT: [facility] Problems as of 4/5/2022 Date Reviewed: 3/2/2022          Codes Class Noted - Resolved POA    Urine retention ICD-10-CM: R33.9  ICD-9-CM: 788.20  4/3/2022 - Present No        Acute cholecystitis ICD-10-CM: K81.0  ICD-9-CM: 575.0  3/28/2022 - Present Unknown        Pancreatitis, gallstone ICD-10-CM: K85.10  ICD-9-CM: 699.6, 574.20  3/28/2022 - Present Unknown        Anemia ICD-10-CM: D64.9  ICD-9-CM: 285.9  3/28/2022 - Present Yes        Bacteremia ICD-10-CM: R78.81  ICD-9-CM: 790.7  3/28/2022 - Present Yes        Diastolic CHF, chronic (HCC) ICD-10-CM: I50.32  ICD-9-CM: 428.32, 428.0  3/2/2022 - Present Yes        Atrial fibrillation ([place] [address].) ICD-10-CM: I48.91  ICD-9-CM: 427.31  11/29/2017 - Present Yes        * (Principal) Sepsis ([facility] ICD-10-CM: A41.9  ICD-9-CM: 038.9, 995.91  10/26/2017 - Present Unknown        Paroxysmal atrial fibrillation (HCC) ICD-10-CM: I48.0  ICD-9-CM: 427.31  6/30/2017 - Present Yes    Overview Signed 5/21/2019 10:36 AM by Krystal Bowen MD     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 SSS (sick sinus syndrome) (HCC) ICD-10-CM: I49.5  ICD-9-CM: 427.81  6/30/2017 - Present Yes        Myasthenia gravis ([facility] ICD-10-CM: G70.00  ICD-9-CM: 358.00  Unknown - Present Yes        HTN (hypertension) (Chronic) ICD-10-CM: I10  ICD-9-CM: 401.9  5/8/2015 - Present Yes        Coronary atherosclerosis of native coronary vessel ICD-10-CM: I25.10  ICD-9-CM: 414.01  5/8/2015 - Present Yes    Overview Signed 6/7/2016  8:35 AM by Shania Frank on brilinta  5/7/15: prox LAD PCI, normal EF                   Objective:     Patient Vitals for the past 24 hrs:   Temp Pulse Resp BP SpO2   04/05/22 0732 98.1 °F (36.7 °C) 75 24 (!) 151/82 95 %   04/05/22 0444 98.4 °F (36.9 °C) 69 18 (!) 145/78 93 %   04/04/22 2330 97.2 °F (36.2 °C) 74 18 128/72 93 %   04/04/22 1951 98.2 °F (36.8 °C) 75 18 135/70 96 %   04/04/22 1600 97.9 °F (36.6 °C) 74 18 (!) 141/72 94 %   04/04/22 1143 97.8 °F (36.6 °C) 75 18 (!) 150/76 91 %     Oxygen Therapy  O2 Sat (%): 95 % (04/05/22 0732)  Pulse via Oximetry: 70 beats per minute (04/01/22 0017)  O2 Device: None (Room air) (04/04/22 1910)  Skin Assessment: Clean, dry, & intact (04/01/22 0017)  O2 Flow Rate (L/min): 2 l/min (04/01/22 0017)    Estimated body mass index is 34.12 kg/m² as calculated from the following:    Height as of this encounter: 5' 8\" (1.727 m). Weight as of this encounter: 101.8 kg (224 lb 6.9 oz). Intake/Output Summary (Last 24 hours) at 4/5/2022 1053  Last data filed at 4/5/2022 0851  Gross per 24 hour   Intake 118 ml   Output 1800 ml   Net -1682 ml         Physical Exam:     Blood pressure (!) 151/82, pulse 75, temperature 98.1 °F (36.7 °C), resp. rate 24, height 5' 8\" (1.727 m), weight 101.8 kg (224 lb 6.9 oz), SpO2 95 %. General:    Well nourished. No overt distress  Head:  Normocephalic, atraumatic  Eyes:  Sclerae appear normal.  Pupils equally round. ENT:  Nares appear normal, no drainage. Moist oral mucosa  Neck:  No restricted ROM. Trachea midline   CV:   RRR. No m/r/g. No jugular venous distension. Lungs:   CTAB. No wheezing, rhonchi, or rales. Respirations even, unlabored  Abdomen:   Mild lower abdominal discomfort on palpation. Laparoscopic port areas intact- no signs of infection  Extremities: No cyanosis or clubbing. Bilateral lower extremity pitting edema  Skin:     No rashes and normal coloration. Warm and dry. Neuro:  CN II-XII grossly intact. Sensation intact. A&Ox3  Psych:  Normal mood and affect. I have reviewed ordered lab tests and independently visualized imaging below:    Recent Labs:  Recent Results (from the past 48 hour(s))   CBC WITH AUTOMATED DIFF    Collection Time: 04/04/22  5:04 AM   Result Value Ref Range    WBC 15.8 (H) 4.3 - 11.1 K/uL    RBC 4.67 4.23 - 5.6 M/uL    HGB 8.6 (L) 13.6 - 17.2 g/dL    HCT 31.3 (L) 41.1 - 50.3 %    MCV 67.0 (L) 79.6 - 97.8 FL    MCH 18.4 (L) 26.1 - 32.9 PG    MCHC 27.5 (L) 31.4 - 35.0 g/dL    RDW 27.3 (H) 11.9 - 14.6 %    PLATELET 831 681 - 667 K/uL    MPV 8.9 (L) 9.4 - 12.3 FL    ABSOLUTE NRBC 0.05 0.0 - 0.2 K/uL    NEUTROPHILS 66 43 - 78 %    LYMPHOCYTES 16 13 - 44 %    MONOCYTES 8 4.0 - 12.0 %    EOSINOPHILS 4 0.5 - 7.8 %    BASOPHILS 1 0.0 - 2.0 %    IMMATURE GRANULOCYTES 5 0.0 - 5.0 %    ABS. NEUTROPHILS 10.4 (H) 1.7 - 8.2 K/UL    ABS.  LYMPHOCYTES 2.5 0.5 - 4.6 K/UL    ABS. MONOCYTES 1.3 0.1 - 1.3 K/UL    ABS. EOSINOPHILS 0.6 0.0 - 0.8 K/UL    ABS. BASOPHILS 0.2 0.0 - 0.2 K/UL    ABS. IMM. GRANS. 0.8 (H) 0.0 - 0.5 K/UL    RBC COMMENTS MICROCYTOSIS      RBC COMMENTS HYPOCHROMIA      RBC COMMENTS MODERATE  ANISOCYTOSIS + POIKILOCYTOSIS        WBC COMMENTS Result Confirmed By Smear      PLATELET COMMENTS ADEQUATE      DF AUTOMATED     METABOLIC PANEL, BASIC    Collection Time: 04/04/22  5:04 AM   Result Value Ref Range    Sodium 139 138 - 145 mmol/L    Potassium 3.4 (L) 3.5 - 5.1 mmol/L    Chloride 102 98 - 107 mmol/L    CO2 29 21 - 32 mmol/L    Anion gap 8 7 - 16 mmol/L    Glucose 131 (H) 65 - 100 mg/dL    BUN 14 8 - 23 MG/DL    Creatinine 0.80 0.8 - 1.5 MG/DL    GFR est AA >60 >60 ml/min/1.73m2    GFR est non-AA >60 >60 ml/min/1.73m2    Calcium 8.4 8.3 - 10.4 MG/DL   CBC WITH AUTOMATED DIFF    Collection Time: 04/05/22  5:47 AM   Result Value Ref Range    WBC 15.8 (H) 4.3 - 11.1 K/uL    RBC 4.69 4.23 - 5.6 M/uL    HGB 8.7 (L) 13.6 - 17.2 g/dL    HCT 31.1 (L) 41.1 - 50.3 %    MCV 66.3 (L) 79.6 - 97.8 FL    MCH 18.6 (L) 26.1 - 32.9 PG    MCHC 28.0 (L) 31.4 - 35.0 g/dL    RDW 27.8 (H) 11.9 - 14.6 %    PLATELET 425 529 - 203 K/uL    MPV 9.1 (L) 9.4 - 12.3 FL    ABSOLUTE NRBC 0.02 0.0 - 0.2 K/uL    NEUTROPHILS 65 43 - 78 %    LYMPHOCYTES 15 13 - 44 %    MONOCYTES 8 4.0 - 12.0 %    EOSINOPHILS 4 0.5 - 7.8 %    BASOPHILS 1 0.0 - 2.0 %    IMMATURE GRANULOCYTES 7 (H) 0.0 - 5.0 %    ABS. NEUTROPHILS 10.2 (H) 1.7 - 8.2 K/UL    ABS. LYMPHOCYTES 2.4 0.5 - 4.6 K/UL    ABS. MONOCYTES 1.3 0.1 - 1.3 K/UL    ABS. EOSINOPHILS 0.6 0.0 - 0.8 K/UL    ABS. BASOPHILS 0.2 0.0 - 0.2 K/UL    ABS. IMM. GRANS.  1.1 (H) 0.0 - 0.5 K/UL    RBC COMMENTS MODERATE  ANISOCYTOSIS        RBC COMMENTS OCCASIONAL  POLYCHROMASIA        RBC COMMENTS MICROCYTOSIS      RBC COMMENTS OCCASIONAL  HYPOCHROMIA        WBC COMMENTS OCCASIONAL      PLATELET COMMENTS ADEQUATE      DF AUTOMATED     METABOLIC PANEL, BASIC Collection Time: 04/05/22  5:47 AM   Result Value Ref Range    Sodium 135 (L) 138 - 145 mmol/L    Potassium 3.7 3.5 - 5.1 mmol/L    Chloride 102 98 - 107 mmol/L    CO2 28 21 - 32 mmol/L    Anion gap 5 (L) 7 - 16 mmol/L    Glucose 121 (H) 65 - 100 mg/dL    BUN 11 8 - 23 MG/DL    Creatinine 0.80 0.8 - 1.5 MG/DL    GFR est AA >60 >60 ml/min/1.73m2    GFR est non-AA >60 >60 ml/min/1.73m2    Calcium 8.2 (L) 8.3 - 10.4 MG/DL       All Micro Results     Procedure Component Value Units Date/Time    RESPIRATORY VIRUS PANEL W/COVID-19, PCR [[id number]]     Order Status: Sent Specimen: NASOPHARYNGEAL SWAB     CULTURE, URINE [[id number]] Collected: 04/02/22 1810    Order Status: Completed Specimen: Urine from Clean catch Updated: 04/05/22 0916     Special Requests: NO SPECIAL REQUESTS        Culture result: NO GROWTH 2 DAYS       CULTURE, BLOOD [[id number]] Collected: 03/30/22 0648    Order Status: Completed Specimen: Blood Updated: 04/04/22 0729     Special Requests: --        LEFT  ARM       Culture result: NO GROWTH 5 DAYS       CULTURE, BLOOD [[id number]] Collected: 03/30/22 6144    Order Status: Completed Specimen: Blood Updated: 04/04/22 0729     Special Requests: --        LEFT  FOREARM       Culture result: NO GROWTH 5 DAYS       CULTURE, BLOOD [[id number]]  (Abnormal) Collected: 03/28/22 0019    Order Status: Completed Specimen: Blood Updated: 03/30/22 0726     Special Requests: --        LEFT  ARM       GRAM STAIN GRAM NEGATIVE RODS               AEROBIC AND ANAEROBIC BOTTLES                  CRITICAL RESULT NOT CALLED DUE TO PREVIOUS NOTIFICATION OF CRITICAL RESULT WITHIN THE LAST 24 HOURS.            Culture result: GRAM NEGATIVE RODS               For identification and susceptibility refer to culture  Accession [id number]      CULTURE, BLOOD [[id number]]  (Abnormal)  (Susceptibility) Collected: 03/27/22 2350    Order Status: Completed Specimen: Blood Updated: 03/30/22 0725     Special Requests: --        RIGHT  ARM GRAM STAIN GRAM NEGATIVE RODS               AEROBIC AND ANAEROBIC BOTTLES                  RESULTS VERIFIED, PHONED TO AND READ BACK BY Natalia Peacock RN @ 5564 ON 3/28/22 BY [facility]           Culture result: KLEBSIELLA PNEUMONIAE               Refer to Blood Culture ID Panel Accession  [id number]      BLOOD CULTURE ID PANEL [[id number]]  (Abnormal) Collected: 03/27/22 2391    Order Status: Completed Specimen: Blood Updated: 03/28/22 1236     Acc. no. from Micro Order [id number]     Klebsiella pneumoniae Detected        Comment: RESULTS VERIFIED, PHONED TO AND READ BACK BY  Natalia Peacock RN @ 307 ON 3/28/22 BY [facility]          KPC (Carbapenem Resistance Gene) NOT DETECTED        Comment: WARNING:  A Not Detected result for the KPC gene does not indicate susceptibility to carbapenems. Gram negative bacteria can be resistant to carbapenems by mechanisms other than carrying the KPC gene. INTERPRETATION       Gram negative ney, identified by real time PCR as Klebsiella pneumoniae. Other Studies:  No results found.     Current Meds:  Current Facility-Administered Medications   Medication Dose Route Frequency    aspirin delayed-release tablet 81 mg  81 mg Oral DAILY    atorvastatin (LIPITOR) tablet 80 mg  80 mg Oral QHS    escitalopram oxalate (LEXAPRO) tablet 10 mg  10 mg Oral DAILY    rOPINIRole (REQUIP) tablet 0.5 mg  0.5 mg Oral QPM    enoxaparin (LOVENOX) injection 40 mg  40 mg SubCUTAneous Q24H    tamsulosin (FLOMAX) capsule 0.4 mg  0.4 mg Oral DAILY    cefTRIAXone (ROCEPHIN) 2 g in 0.9% sodium chloride (MBP/ADV) 50 mL MBP  2 g IntraVENous Q24H    metroNIDAZOLE (FLAGYL) tablet 500 mg  500 mg Oral BID    HYDROcodone-acetaminophen (NORCO) 5-325 mg per tablet 1 Tablet  1 Tablet Oral Q6H PRN    simethicone (MYLICON) tablet 80 mg  80 mg Oral QID PRN    lip protectant (BLISTEX) ointment 1 Each  1 Each Topical PRN    bisacodyL (DULCOLAX) suppository 10 mg  10 mg Rectal DAILY PRN    pantoprazole (PROTONIX) tablet 40 mg  40 mg Oral ACB    cyanocobalamin tablet 1,000 mcg  1,000 mcg Oral DAILY    0.9% sodium chloride infusion 250 mL  250 mL IntraVENous PRN    sodium chloride (NS) flush 5-40 mL  5-40 mL IntraVENous Q8H    sodium chloride (NS) flush 5-40 mL  5-40 mL IntraVENous PRN    acetaminophen (TYLENOL) tablet 650 mg  650 mg Oral Q6H PRN    Or    acetaminophen (TYLENOL) suppository 650 mg  650 mg Rectal Q6H PRN    polyethylene glycol (MIRALAX) packet 17 g  17 g Oral DAILY PRN    ondansetron (ZOFRAN ODT) tablet 4 mg  4 mg Oral Q8H PRN    Or    ondansetron (ZOFRAN) injection 4 mg  4 mg IntraVENous Q6H PRN    hydrALAZINE (APRESOLINE) 20 mg/mL injection 10 mg  10 mg IntraVENous Q6H PRN    morphine injection 1 mg  1 mg IntraVENous Q4H PRN    furosemide (LASIX) tablet 40 mg  40 mg Oral DAILY    sodium chloride (NS) flush 5-10 mL  5-10 mL IntraVENous Q8H    sodium chloride (NS) flush 5-10 mL  5-10 mL IntraVENous PRN       Signed:  Riley Arroyo MD

## 2022-04-06 LAB
ANION GAP SERPL CALC-SCNC: 8 MMOL/L (ref 7–16)
BASOPHILS # BLD: 0.1 K/UL (ref 0–0.2)
BASOPHILS NFR BLD: 1 % (ref 0–2)
BUN SERPL-MCNC: 10 MG/DL (ref 8–23)
CALCIUM SERPL-MCNC: 8.6 MG/DL (ref 8.3–10.4)
CHLORIDE SERPL-SCNC: 102 MMOL/L (ref 98–107)
CO2 SERPL-SCNC: 27 MMOL/L (ref 21–32)
CREAT SERPL-MCNC: 0.8 MG/DL (ref 0.8–1.5)
DIFFERENTIAL METHOD BLD: ABNORMAL
EOSINOPHIL # BLD: 0.6 K/UL (ref 0–0.8)
EOSINOPHIL NFR BLD: 4 % (ref 0.5–7.8)
ERYTHROCYTE [DISTWIDTH] IN BLOOD BY AUTOMATED COUNT: 28.1 % (ref 11.9–14.6)
GLUCOSE SERPL-MCNC: 97 MG/DL (ref 65–100)
HCT VFR BLD AUTO: 31.8 % (ref 41.1–50.3)
HGB BLD-MCNC: 8.8 G/DL (ref 13.6–17.2)
IMM GRANULOCYTES # BLD AUTO: 0.8 K/UL (ref 0–0.5)
IMM GRANULOCYTES NFR BLD AUTO: 5 % (ref 0–5)
LYMPHOCYTES # BLD: 2.5 K/UL (ref 0.5–4.6)
LYMPHOCYTES NFR BLD: 17 % (ref 13–44)
MCH RBC QN AUTO: 18.9 PG (ref 26.1–32.9)
MCHC RBC AUTO-ENTMCNC: 27.7 G/DL (ref 31.4–35)
MCV RBC AUTO: 68.4 FL (ref 79.6–97.8)
MONOCYTES # BLD: 1.2 K/UL (ref 0.1–1.3)
MONOCYTES NFR BLD: 8 % (ref 4–12)
NEUTS SEG # BLD: 10 K/UL (ref 1.7–8.2)
NEUTS SEG NFR BLD: 66 % (ref 43–78)
NRBC # BLD: 0 K/UL (ref 0–0.2)
PLATELET # BLD AUTO: 241 K/UL (ref 150–450)
PMV BLD AUTO: 9.6 FL (ref 9.4–12.3)
POTASSIUM SERPL-SCNC: 4 MMOL/L (ref 3.5–5.1)
RBC # BLD AUTO: 4.65 M/UL (ref 4.23–5.6)
SODIUM SERPL-SCNC: 137 MMOL/L (ref 138–145)
WBC # BLD AUTO: 15.1 K/UL (ref 4.3–11.1)

## 2022-04-06 PROCEDURE — 74011000258 HC RX REV CODE- 258: Performed by: INTERNAL MEDICINE

## 2022-04-06 PROCEDURE — 74011000250 HC RX REV CODE- 250: Performed by: FAMILY MEDICINE

## 2022-04-06 PROCEDURE — 74011250637 HC RX REV CODE- 250/637: Performed by: INTERNAL MEDICINE

## 2022-04-06 PROCEDURE — 97530 THERAPEUTIC ACTIVITIES: CPT

## 2022-04-06 PROCEDURE — 74011250637 HC RX REV CODE- 250/637: Performed by: STUDENT IN AN ORGANIZED HEALTH CARE EDUCATION/TRAINING PROGRAM

## 2022-04-06 PROCEDURE — 36415 COLL VENOUS BLD VENIPUNCTURE: CPT

## 2022-04-06 PROCEDURE — 74011000250 HC RX REV CODE- 250: Performed by: EMERGENCY MEDICINE

## 2022-04-06 PROCEDURE — 74011250636 HC RX REV CODE- 250/636: Performed by: STUDENT IN AN ORGANIZED HEALTH CARE EDUCATION/TRAINING PROGRAM

## 2022-04-06 PROCEDURE — 65270000029 HC RM PRIVATE

## 2022-04-06 PROCEDURE — 80048 BASIC METABOLIC PNL TOTAL CA: CPT

## 2022-04-06 PROCEDURE — 85025 COMPLETE CBC W/AUTO DIFF WBC: CPT

## 2022-04-06 PROCEDURE — 74011250636 HC RX REV CODE- 250/636: Performed by: INTERNAL MEDICINE

## 2022-04-06 PROCEDURE — 74011250636 HC RX REV CODE- 250/636: Performed by: FAMILY MEDICINE

## 2022-04-06 PROCEDURE — 74011250637 HC RX REV CODE- 250/637: Performed by: UROLOGY

## 2022-04-06 RX ADMIN — SODIUM CHLORIDE, PRESERVATIVE FREE 10 ML: 5 INJECTION INTRAVENOUS at 22:05

## 2022-04-06 RX ADMIN — METRONIDAZOLE 500 MG: 500 TABLET ORAL at 18:06

## 2022-04-06 RX ADMIN — ATORVASTATIN CALCIUM 80 MG: 80 TABLET, FILM COATED ORAL at 22:04

## 2022-04-06 RX ADMIN — ESCITALOPRAM OXALATE 10 MG: 10 TABLET ORAL at 09:45

## 2022-04-06 RX ADMIN — CEFTRIAXONE 2 G: 2 INJECTION, POWDER, FOR SOLUTION INTRAMUSCULAR; INTRAVENOUS at 13:01

## 2022-04-06 RX ADMIN — CYANOCOBALAMIN TAB 1000 MCG 1000 MCG: 1000 TAB at 09:46

## 2022-04-06 RX ADMIN — METRONIDAZOLE 500 MG: 500 TABLET ORAL at 09:45

## 2022-04-06 RX ADMIN — FUROSEMIDE 40 MG: 10 INJECTION, SOLUTION INTRAMUSCULAR; INTRAVENOUS at 09:45

## 2022-04-06 RX ADMIN — HYDROCODONE BITARTRATE AND ACETAMINOPHEN 1 TABLET: 5; 325 TABLET ORAL at 23:26

## 2022-04-06 RX ADMIN — SODIUM CHLORIDE, PRESERVATIVE FREE 10 ML: 5 INJECTION INTRAVENOUS at 13:30

## 2022-04-06 RX ADMIN — SODIUM CHLORIDE, PRESERVATIVE FREE 10 ML: 5 INJECTION INTRAVENOUS at 06:19

## 2022-04-06 RX ADMIN — FUROSEMIDE 40 MG: 10 INJECTION, SOLUTION INTRAMUSCULAR; INTRAVENOUS at 18:06

## 2022-04-06 RX ADMIN — PANTOPRAZOLE SODIUM 40 MG: 40 TABLET, DELAYED RELEASE ORAL at 06:19

## 2022-04-06 RX ADMIN — ROPINIROLE HYDROCHLORIDE 0.5 MG: 0.5 TABLET, FILM COATED ORAL at 18:33

## 2022-04-06 RX ADMIN — TAMSULOSIN HYDROCHLORIDE 0.4 MG: 0.4 CAPSULE ORAL at 09:45

## 2022-04-06 RX ADMIN — SODIUM CHLORIDE, PRESERVATIVE FREE 10 ML: 5 INJECTION INTRAVENOUS at 06:20

## 2022-04-06 RX ADMIN — ASPIRIN 81 MG: 81 TABLET ORAL at 09:45

## 2022-04-06 RX ADMIN — ENOXAPARIN SODIUM 40 MG: 40 INJECTION SUBCUTANEOUS at 13:01

## 2022-04-06 NOTE — PROGRESS NOTES
ACUTE PHYSICAL THERAPY GOALS:  (Developed with and agreed upon by patient and/or caregiver.)  (1.) Edmar Lopez will move from supine to sit and sit to supine , scoot up and down and roll side to side with MODIFIED INDEPENDENCE within 7 treatment day(s). (2.) Edmar Lopez will transfer from bed to chair and chair to bed with MODIFIED INDEPENDENCE using the least restrictive device within 7 treatment day(s). (3.) Edmar Lopez will ambulate with MODIFIED INDEPENDENCE for 300+ feet with the least restrictive device within 7 treatment day(s). (4.) Edmar Lopez will perform standing static and dynamic balance activities x 25 minutes with MODIFIED INDEPENDENCE to improve safety within 7 treatment day(s). (5.) Edmar Lopez will ascend and descend 1 stair using one hand rail(s) with MODIFIED INDEPENDENCE to improve functional mobility and safety within 7 treatment day(s). (6.) Edmar Lopez will perform therapeutic exercises x 15 min for HEP with INDEPENDENCE to improve strength, endurance, and functional mobility within 7 treatment day(s).      PHYSICAL THERAPY: Daily Note Treatment Day # 4    Edmar Lopez is a 79 y.o. male   PRIMARY DIAGNOSIS: Sepsis ([facility]  Sepsis ([facility] [A41.9]  Acute cholecystitis [K81.0]  Pancreatitis, gallstone [K85.10]  Procedure(s) (LRB):  ENDOSCOPIC RETROGRADE CHOLANGIOPANCREATOGRAPHY (ERCP) Rm 604 (N/A)  ENDOSCOPIC SPHINCTEROTOMY (N/A)  ENDOSCOPIC STONE EXTRACTION/BALLOON SWEEP (N/A)  7 Days Post-Op    ASSESSMENT:     REHAB RECOMMENDATIONS: CURRENT LEVEL OF FUNCTION:  (Most Recently Demonstrated)   Recommendation to date pending progress:  Setting:   Short-term Rehab  Equipment:    None - pt already has  and BSC Bed Mobility:   Contact Guard Assistance  Sit to Stand:   Contact Guard Assistance  Transfers:   Contact Guard Assistance  Gait/Mobility:   Minimal Assistance     ASSESSMENT:  Mr. Catracho Good is an 79year old M who presents to hospital with abdominal pain, admitted with sepsis criteria. Since initial evaluation pt underwent cholecystectomy and ERCP. Today pt is feeling better than previous 2 sessions. Required less assistance than yesterday with transfers and bed mobility, and was able to increase ambulation to 120 ft in room and hallway with BUE support at [facility]. Still with slow, shuffled pace, and unsteady and decreased activity tolerance without use of RW, which pt does not use at baseline. Continue to recommend STR as best and safest discharge plan to promote return to highest level of function, mobility, and safety. He will continue to benefit from skilled PT services. Will continue therapy efforts. SUBJECTIVE:   Mr. Juancarlos Daly states, \"I hope I can get home soon\"    SOCIAL HISTORY/ LIVING ENVIRONMENT: Lives with his spouse, 1 level home, 1 FRANCOISE. Endorses on fall/trip. Does not use any DME. 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   Home Environment: Private residence  # Steps to Enter: 1  One/Two Story Residence: One story  Living Alone: No  Support Systems: Spouse/Significant Other  OBJECTIVE:     PAIN: VITAL SIGNS: LINES/DRAINS:   Pre Treatment: Pain Screen  Pain Scale 1: Numeric (0 - 10)  Pain Intensity 1: 0 /10  Post Treatment: 0/10 Vital Signs  O2 Device: None (Room air) None  O2 Device: None (Room air)     MOBILITY: I Mod I S SBA CGA Min Mod Max Total  NT x2 Comments:   Bed Mobility    Rolling [] [] [] [] [x] [] [] [] [] [] []    Supine to Sit [] [] [] [] [x] [] [] [] [] [] []    Scooting [] [] [] [] [x] [] [] [] [] [] []    Sit to Supine [] [] [] [] [x] [] [] [] [] [] []    Transfers    Sit to Stand [] [] [] [] [x] [] [] [] [] [] []    Bed to Chair [] [] [] [] [x] [] [] [] [] [] []    Stand to Sit [] [] [] [] [x] [] [] [] [] [] []    I=Independent, Mod I=Modified Independent, S=Supervision, SBA=Standby Assistance, CGA=Contact Guard Assistance,   Min=Minimal Assistance, Mod=Moderate Assistance, Max=Maximal Assistance, Total=Total Assistance, NT=Not Tested    BALANCE: Good Fair+ Fair Fair- Poor NT Comments   Sitting Static [x] [] [] [] [] []    Sitting Dynamic [x] [] [] [] [] []              Standing Static [] [x] [] [] [] []    Standing Dynamic [] [x] [x] [] [] []      GAIT: I Mod I S SBA CGA Min Mod Max Total  NT x2 Comments:   Level of Assistance [] [] [] [] [x] [] [] [] [] [] []    Distance 120'    DME Rolling Walker    Gait Quality Slow, shuffled    Weightbearing  Status N/A     I=Independent, Mod I=Modified Independent, S=Supervision, SBA=Standby Assistance, CGA=Contact Guard Assistance,   Min=Minimal Assistance, Mod=Moderate Assistance, Max=Maximal Assistance, Total=Total Assistance, NT=Not Tested    PLAN:   FREQUENCY/DURATION: PT Plan of Care: 3 times/week for duration of hospital stay or until stated goals are met, whichever comes first.  TREATMENT:     TREATMENT:    ($$ Therapeutic Activity: 23-37 mins    )  Therapeutic Activity (23 Minutes): Therapeutic activity included Supine to Sit, Scooting, Transfer Training, Ambulation on level ground, Sitting balance  and Standing balance to improve functional Mobility, Strength and Activity tolerance.     TREATMENT GRID:  N/A    AFTER TREATMENT POSITION/PRECAUTIONS:  Chair, Needs within reach and RN notified    INTERDISCIPLINARY COLLABORATION:  RN/PCT and PT/PTA    TOTAL TREATMENT DURATION:  PT Patient Time In/Time Out  Time In: 5260  Time Out: Giancarlo PT

## 2022-04-06 NOTE — PROGRESS NOTES
Hourly rounds completed throughout shift. Bed low/locked. Call light within reach. All pt needs are met at this time. Prn meds given per MAR. Ron wells. Will monitor and give bedside report to oncoming nurse.

## 2022-04-06 NOTE — PROGRESS NOTES
Comprehensive Nutrition Assessment    Type and Reason for Visit: Initial,RD nutrition re-screen/LOS    Nutrition Recommendations/Plan:   Meals and Snacks:  Continue current diet. Nutrition Supplement Therapy:   Medical food supplement therapy:  Pt declines changes at this time.  Pt encouraged to ask for additional items ad nicko between meals from tk to promote increased calorie and protein intake as current po meets 50-60% needs. Malnutrition Assessment:  Malnutrition Status: At risk for malnutrition (specify) (comrpomised oral intake this admission )    Nutrition Assessment:   Nutrition History:  Pt reports he eats well at baseline, denies any changes in intake until acute presentation. Cultural/Methodist/Ethnic Food Preference(s): None   Nutrition Background:  PMH remarkable for HTN, p a-fib. Presented with abdominal pain. Admitted with sepsis, acute cholecystitis, pancreatitis, acute blood loss anemia, acute urinary retention. s/p CCK, ERCP. Nutrition Interval:  Pt up to chair at RD visit. He  Reports po intake improving over earlier in admission. He is now consistently eating 50-75% of low fat diet (advanced 4/2). He reports anxious about getting too full, not wanting to [employer] do it. \"   Abdominal Status (last documented): Tender,Semi-soft abdomen with Active  bowel sounds. Last BM 04/02/22. Nutrition Related Findings:   no muscle or fat wasting appreciated      Current Nutrition Therapies:  ADULT DIET Easy to Chew; Low Fat/Low Chol/High Fiber/PATTIE    Current Intake:   Average Meal Intake: 51-75% Average Supplement Intake: None ordered      Anthropometric Measures:  Height: 5' 8\" (172.7 cm)  Current Body Wt: 101.8 kg (224 lb 6.9 oz) (4/4), Weight source: Not specified  BMI: 34.1, Obese class 1 (BMI 30.0-34. 9)  Admission Body Weight: 199 lb 15.3 oz (no wt source)  Ideal Body Weight (lbs) (Calculated): 154 lbs (70 kg), 145.7 %  Usual Body Wt: 97.3 kg (214 lb 8.1 oz) (Cardiac Rehab RD note in June unknown source), Percent weight change: 4.6        Edema: LLE: 1+; Non-pitting (4/6/2022  6:54 AM)  RLE: 1+; Non-pitting (4/6/2022  6:54 AM)    Estimated Daily Nutrient Needs:  Energy (kcal/day): 8069-4190 (Kcal/kg (20-25), Weight Used: Admission (97 kg))  Protein (g/day):  (20% calories) Weight Used: (Admission)  Fluid (ml/day):   (1 ml/kcal)    Nutrition Diagnosis:   · Inadequate oral intake related to early satiety (abdominal fullness) as evidenced by intake 51-75% (self reported barriers)    Nutrition Interventions:   Food and/or Nutrient Delivery: Continue current diet     Coordination of Nutrition Care: Continue to monitor while inpatient,Interdisciplinary rounds    Goals:       Active Goal: Meet >75% estimated needs by nutrition follow-up    Nutrition Monitoring and Evaluation:      Food/Nutrient Intake Outcomes: Food and nutrient intake,Supplement intake  Physical Signs/Symptoms Outcomes: Biochemical data,GI status,Meal time behavior    Discharge Planning:    Continue current diet    King Dandy, [place], RD, LDN   Contact: [phone]

## 2022-04-06 NOTE — PROGRESS NOTES
Hospitalist Progress Note   Admit Date:  3/27/2022  7:41 PM   Name:  Asa Palmer   Age:  79 y.o. Sex:  male  :  1940   MRN:  [id number]   Room:  604/01    Presenting Complaint: Abdominal Pain    Reason(s) for Admission: Sepsis [facility]) [A41.9]  Acute cholecystitis [K81.0]  Pancreatitis, gallstone [facility] Course & Interval History: This is an 26-year-old male with a history of A. fib status post watchman, sick sinus syndrome status post PPM (is known that he has a fractured LV lead), diastolic heart failure, CAD myasthenia gravis admitted with acute cholecystitis and pancreatitis. He presented on 3/28 with a WBC of 20 lipase of 2000 and LFTs elevated elevated lactate of 4.9. CT showed likely gallstone pancreatitis as well as acute cholecystitis. General surgery was consulted. He is status post a cholecystectomy on 3/29/2022. ERCP was completed by GI on 3/30/2022. He had acute blood loss anemia though to some extent he may have had this on admission because his MCV was depressed and he had a decreased hemoglobin even on arrival.  During this hospitalization he status post 3 units of RBCs. GI was following. He did have an EGD on 3/29/2022 without any obvious source of bleeding. He had blood cultures secondary to Klebsiella for which ID was consulted. He was on a course of Zosyn switched to Rocephin and Flagyl. End of treatment is 2022 and can be on p.o. antibiotics on discharge which would be Bactrim and Flagyl per ID. He was ready to be discharged on  but he was having acute urinary retention. A CT abdomen pelvis showed abnormal appearing right ureter but no hydronephrosis. It was a very difficult Velasquez placement so urology came bedside and use a cystoscopy to perform this procedure. They recommended continuing Velasquez catheter on discharge for least 5 days and then trying a void trial.  He will follow up with urology as an outpatient. He was started on Flomax. On 4/4 restarted a lot of his home medicines including aspirin and statin for his peripheral artery disease, Escitalopram for his depression and ropinirole for his restless leg syndrome. Subjective/24hr Events (04/06/22):  4/5/2022  C/o abdominal discomfort  Says willing to go to Rehab    4/6/2022  Says abdominal discomfort better  Case management discussed with the patient and patient wife yesterday regarding rehab placement. Assessment & Plan:   1. Acute pancreatitis, acute cholecystitis: Status post CCK as well as ERCP, continue with IV antibiotics: on Rocephin and flagyl  2. Bactrim and Flagyl EOT: 4/13/2022.  3. Acute urinary retention: Appreciate urology assistance recommendations. Continue with his Velasquez catheter for a minimum of 5 days, that would be a minimum of 4/9 until he tries a voiding trial.  This can be done as an outpatient. He should follow-up with Dr. Yojana Cleary prescription. 4. 4/5/2022- cont urinary cath  5. Acute blood loss anemia: Status post EGD during this hospitalization no source of bleeding found. Started empirically on PPI which will continue. 6. Peripheral artery disease, CAD, carotid artery stenosis: Statin and aspirin restarted on 4/4, no signs of bleeding and's hemoglobin is stable. .  7. Hypertension: Hold off on his home lisinopril as he is relatively normotensive. 8. Depression: Restarting his home escitalopram on 4/4.  9. Restless leg syndrome: Restarting his home ropinirole 4/4.   10. Diastolic heart failure- bilateral lower extremity pitting edema: Continue with his home Lasix     DVT ppx: Started Lovenox 4/4, no signs or symptoms of bleeding  Dispo: Home with home health  PT/OT: [facility] Problems as of 4/6/2022 Date Reviewed: 3/2/2022          Codes Class Noted - Resolved POA    Urine retention ICD-10-CM: R33.9  ICD-9-CM: 788.20  4/3/2022 - Present No        Acute cholecystitis ICD-10-CM: K81.0  ICD-9-CM: 575.0  3/28/2022 - Present Unknown Pancreatitis, gallstone ICD-10-CM: K85.10  ICD-9-CM: 310.0, 574.20  3/28/2022 - Present Unknown        Anemia ICD-10-CM: D64.9  ICD-9-CM: 285.9  3/28/2022 - Present Yes        Bacteremia ICD-10-CM: R78.81  ICD-9-CM: 790.7  3/28/2022 - Present Yes        Diastolic CHF, chronic ([facility] ICD-10-CM: I50.32  ICD-9-CM: 428.32, 428.0  3/2/2022 - Present Yes        Atrial fibrillation ([facility] ICD-10-CM: I48.91  ICD-9-CM: 427.31  11/29/2017 - Present Yes        * (Principal) Sepsis ([facility] ICD-10-CM: A41.9  ICD-9-CM: 038.9, 995.91  10/26/2017 - Present Unknown        Paroxysmal atrial fibrillation ([facility] ICD-10-CM: I48.0  ICD-9-CM: 427.31  6/30/2017 - Present Yes    Overview Signed 5/21/2019 10:36 AM by Eneida Mack MD     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               SSS (sick sinus syndrome) (HCC) ICD-10-CM: I49.5  ICD-9-CM: 427.81  6/30/2017 - Present Yes        Myasthenia gravis ([facility] ICD-10-CM: G70.00  ICD-9-CM: 358.00  Unknown - Present Yes        HTN (hypertension) (Chronic) ICD-10-CM: I10  ICD-9-CM: 401.9  5/8/2015 - Present Yes        Coronary atherosclerosis of native coronary vessel ICD-10-CM: I25.10  ICD-9-CM: 414.01  5/8/2015 - Present Yes    Overview Signed 6/7/2016  8:35 AM by Khang White on brilinta  5/7/15: prox LAD PCI, normal EF                   Objective:     Patient Vitals for the past 24 hrs:   Temp Pulse Resp BP SpO2   04/06/22 0731 97.8 °F (36.6 °C) 73 22 (!) 158/73 91 %   04/06/22 0414 97.5 °F (36.4 °C) 69 22 (!) 142/76 91 %   04/05/22 2346 97.5 °F (36.4 °C) 69 22 (!) 152/74 93 %   04/05/22 1913 97.7 °F (36.5 °C) 75 22 125/66 94 %   04/05/22 1550 97.9 °F (36.6 °C) 70 22 139/77 93 %   04/05/22 1102 97.3 °F (36.3 °C) 75 24 (!) 147/82 95 %     Oxygen Therapy  O2 Sat (%): 91 % (04/06/22 0731)  Pulse via Oximetry: 70 beats per minute (04/01/22 0017)  O2 Device: None (Room air) (04/06/22 0654)  Skin Assessment: Clean, dry, & intact (04/01/22 0017)  O2 Flow Rate (L/min): 2 l/min (04/01/22 0017)    Estimated body mass index is 34.12 kg/m² as calculated from the following:    Height as of this encounter: 5' 8\" (1.727 m). Weight as of this encounter: 101.8 kg (224 lb 6.9 oz). Intake/Output Summary (Last 24 hours) at 4/6/2022 0844  Last data filed at 4/5/2022 2347  Gross per 24 hour   Intake 598 ml   Output 1450 ml   Net -852 ml         Physical Exam:     Blood pressure (!) 158/73, pulse 73, temperature 97.8 °F (36.6 °C), resp. rate 22, height 5' 8\" (1.727 m), weight 101.8 kg (224 lb 6.9 oz), SpO2 91 %. General:    Well nourished. No overt distress  Head:  Normocephalic, atraumatic  Eyes:  Sclerae appear normal.  Pupils equally round. ENT:  Nares appear normal, no drainage. Moist oral mucosa  Neck:  No restricted ROM. Trachea midline   CV:   RRR. No m/r/g. No jugular venous distension. Lungs:   CTAB. No wheezing, rhonchi, or rales. Respirations even, unlabored  Abdomen:   Mild lower abdominal discomfort on palpation, improving  Laparoscopic port areas intact- no signs of infection  Extremities: No cyanosis or clubbing. Bilateral lower extremity pitting edema-improving  Skin:     No rashes and normal coloration. Warm and dry. Neuro:  CN II-XII grossly intact. Sensation intact. A&Ox3  Psych:  Normal mood and affect.       I have reviewed ordered lab tests and independently visualized imaging below:    Recent Labs:  Recent Results (from the past 48 hour(s))   CBC WITH AUTOMATED DIFF    Collection Time: 04/05/22  5:47 AM   Result Value Ref Range    WBC 15.8 (H) 4.3 - 11.1 K/uL    RBC 4.69 4.23 - 5.6 M/uL    HGB 8.7 (L) 13.6 - 17.2 g/dL    HCT 31.1 (L) 41.1 - 50.3 %    MCV 66.3 (L) 79.6 - 97.8 FL    MCH 18.6 (L) 26.1 - 32.9 PG    MCHC 28.0 (L) 31.4 - 35.0 g/dL    RDW 27.8 (H) 11.9 - 14.6 %    PLATELET 827 046 - 857 K/uL    MPV 9.1 (L) 9.4 - 12.3 FL    ABSOLUTE NRBC 0.02 0.0 - 0.2 K/uL    NEUTROPHILS 65 43 - 78 %    LYMPHOCYTES 15 13 - 44 %    MONOCYTES 8 4.0 - 12.0 %    EOSINOPHILS 4 0.5 - 7.8 %    BASOPHILS 1 0.0 - 2.0 %    IMMATURE GRANULOCYTES 7 (H) 0.0 - 5.0 %    ABS. NEUTROPHILS 10.2 (H) 1.7 - 8.2 K/UL    ABS. LYMPHOCYTES 2.4 0.5 - 4.6 K/UL    ABS. MONOCYTES 1.3 0.1 - 1.3 K/UL    ABS. EOSINOPHILS 0.6 0.0 - 0.8 K/UL    ABS. BASOPHILS 0.2 0.0 - 0.2 K/UL    ABS. IMM. GRANS.  1.1 (H) 0.0 - 0.5 K/UL    RBC COMMENTS MODERATE  ANISOCYTOSIS        RBC COMMENTS OCCASIONAL  POLYCHROMASIA        RBC COMMENTS MICROCYTOSIS      RBC COMMENTS OCCASIONAL  HYPOCHROMIA        WBC COMMENTS OCCASIONAL      PLATELET COMMENTS ADEQUATE      DF AUTOMATED     METABOLIC PANEL, BASIC    Collection Time: 04/05/22  5:47 AM   Result Value Ref Range    Sodium 135 (L) 138 - 145 mmol/L    Potassium 3.7 3.5 - 5.1 mmol/L    Chloride 102 98 - 107 mmol/L    CO2 28 21 - 32 mmol/L    Anion gap 5 (L) 7 - 16 mmol/L    Glucose 121 (H) 65 - 100 mg/dL    BUN 11 8 - 23 MG/DL    Creatinine 0.80 0.8 - 1.5 MG/DL    GFR est AA >60 >60 ml/min/1.73m2    GFR est non-AA >60 >60 ml/min/1.73m2    Calcium 8.2 (L) 8.3 - 10.4 MG/DL   RESPIRATORY VIRUS PANEL W/COVID-19, PCR    Collection Time: 04/05/22 11:13 AM    Specimen: Nasopharyngeal   Result Value Ref Range    Adenovirus NOT DETECTED NOTDET      Coronavirus 229E NOT DETECTED NOTDET      Coronavirus HKU1 NOT DETECTED NOTDET      Coronavirus CVNL63 NOT DETECTED NOTDET      Coronavirus OC43 NOT DETECTED NOTDET      SARS-CoV-2, PCR NOT DETECTED NOTDET      Metapneumovirus NOT DETECTED NOTDET      Rhinovirus and Enterovirus NOT DETECTED NOTDET      Influenza A NOT DETECTED NOTDET      Influenza B NOT DETECTED NOTDET      Parainfluenza 1 NOT DETECTED NOTDET      Parainfluenza 2 NOT DETECTED NOTDET      Parainfluenza 3 NOT DETECTED NOTDET      Parainfluenza virus 4 NOT DETECTED NOTDET      RSV by PCR NOT DETECTED NOTDET      B. parapertussis, PCR NOT DETECTED NOTDET      Bordetella pertussis - PCR NOT DETECTED NOTDET      Chlamydophila pneumoniae DNA, QL, PCR NOT DETECTED NOTDET      Mycoplasma pneumoniae DNA, QL, PCR NOT DETECTED NOTDET     CBC WITH AUTOMATED DIFF    Collection Time: 04/06/22  3:13 AM   Result Value Ref Range    WBC 15.1 (H) 4.3 - 11.1 K/uL    RBC 4.65 4.23 - 5.6 M/uL    HGB 8.8 (L) 13.6 - 17.2 g/dL    HCT 31.8 (L) 41.1 - 50.3 %    MCV 68.4 (L) 79.6 - 97.8 FL    MCH 18.9 (L) 26.1 - 32.9 PG    MCHC 27.7 (L) 31.4 - 35.0 g/dL    RDW 28.1 (H) 11.9 - 14.6 %    PLATELET 507 400 - 614 K/uL    MPV 9.6 9.4 - 12.3 FL    ABSOLUTE NRBC 0.00 0.0 - 0.2 K/uL    DF AUTOMATED      NEUTROPHILS 66 43 - 78 %    LYMPHOCYTES 17 13 - 44 %    MONOCYTES 8 4.0 - 12.0 %    EOSINOPHILS 4 0.5 - 7.8 %    BASOPHILS 1 0.0 - 2.0 %    IMMATURE GRANULOCYTES 5 0.0 - 5.0 %    ABS. NEUTROPHILS 10.0 (H) 1.7 - 8.2 K/UL    ABS. LYMPHOCYTES 2.5 0.5 - 4.6 K/UL    ABS. MONOCYTES 1.2 0.1 - 1.3 K/UL    ABS. EOSINOPHILS 0.6 0.0 - 0.8 K/UL    ABS. BASOPHILS 0.1 0.0 - 0.2 K/UL    ABS. IMM.  GRANS. 0.8 (H) 0.0 - 0.5 K/UL   METABOLIC PANEL, BASIC    Collection Time: 04/06/22  3:13 AM   Result Value Ref Range    Sodium 137 (L) 138 - 145 mmol/L    Potassium 4.0 3.5 - 5.1 mmol/L    Chloride 102 98 - 107 mmol/L    CO2 27 21 - 32 mmol/L    Anion gap 8 7 - 16 mmol/L    Glucose 97 65 - 100 mg/dL    BUN 10 8 - 23 MG/DL    Creatinine 0.80 0.8 - 1.5 MG/DL    GFR est AA >60 >60 ml/min/1.73m2    GFR est non-AA >60 >60 ml/min/1.73m2    Calcium 8.6 8.3 - 10.4 MG/DL       All Micro Results     Procedure Component Value Units Date/Time    RESPIRATORY VIRUS PANEL W/COVID-19, PCR [[id number]] Collected: 04/05/22 1113    Order Status: Completed Specimen: Nasopharyngeal Updated: 04/05/22 1228     Adenovirus NOT DETECTED        Coronavirus 229E NOT DETECTED        Coronavirus HKU1 NOT DETECTED        Coronavirus CVNL63 NOT DETECTED        Coronavirus OC43 NOT DETECTED        SARS-CoV-2, PCR NOT DETECTED        Metapneumovirus NOT DETECTED        Rhinovirus and Enterovirus NOT DETECTED        Influenza A NOT DETECTED        Influenza B NOT DETECTED        Parainfluenza 1 NOT DETECTED        Parainfluenza 2 NOT DETECTED        Parainfluenza 3 NOT DETECTED        Parainfluenza virus 4 NOT DETECTED        RSV by PCR NOT DETECTED        B. parapertussis, PCR NOT DETECTED        Bordetella pertussis - PCR NOT DETECTED        Chlamydophila pneumoniae DNA, QL, PCR NOT DETECTED        Mycoplasma pneumoniae DNA, QL, PCR NOT DETECTED       CULTURE, URINE [[id number]] Collected: 04/02/22 1810    Order Status: Completed Specimen: Urine from Clean catch Updated: 04/05/22 0916     Special Requests: NO SPECIAL REQUESTS        Culture result: NO GROWTH 2 DAYS       CULTURE, BLOOD [[id number]] Collected: 03/30/22 0648    Order Status: Completed Specimen: Blood Updated: 04/04/22 0729     Special Requests: --        LEFT  ARM       Culture result: NO GROWTH 5 DAYS       CULTURE, BLOOD [[id number]] Collected: 03/30/22 1418    Order Status: Completed Specimen: Blood Updated: 04/04/22 0729     Special Requests: --        LEFT  FOREARM       Culture result: NO GROWTH 5 DAYS       CULTURE, BLOOD [[id number]]  (Abnormal) Collected: 03/28/22 0019    Order Status: Completed Specimen: Blood Updated: 03/30/22 0726     Special Requests: --        LEFT  ARM       GRAM STAIN GRAM NEGATIVE RODS               AEROBIC AND ANAEROBIC BOTTLES                  CRITICAL RESULT NOT CALLED DUE TO PREVIOUS NOTIFICATION OF CRITICAL RESULT WITHIN THE LAST 24 HOURS.            Culture result: GRAM NEGATIVE RODS               For identification and susceptibility refer to culture  Accession [id number]      CULTURE, BLOOD [[id number]]  (Abnormal)  (Susceptibility) Collected: 03/27/22 2350    Order Status: Completed Specimen: Blood Updated: 03/30/22 0725     Special Requests: --        RIGHT  ARM       GRAM STAIN GRAM NEGATIVE RODS               AEROBIC AND ANAEROBIC BOTTLES                  RESULTS VERIFIED, PHONED TO AND READ BACK BY Rowdy Pierre RN @ 9638 ON 3/28/22 BY AMM           Culture result: KLEBSIELLA PNEUMONIAE               Refer to Blood Culture ID Panel Accession  [id number]      BLOOD CULTURE ID PANEL [[id number]]  (Abnormal) Collected: 03/27/22 7500    Order Status: Completed Specimen: Blood Updated: 03/28/22 1236     Acc. no. from Micro Order [id number]     Klebsiella pneumoniae Detected        Comment: RESULTS VERIFIED, PHONED TO AND READ BACK BY  Rowdy Pierre RN @ 6639 ON 3/28/22 BY [id number]          KPC (Carbapenem Resistance Gene) NOT DETECTED        Comment: WARNING:  A Not Detected result for the KPC gene does not indicate susceptibility to carbapenems. Gram negative bacteria can be resistant to carbapenems by mechanisms other than carrying the KPC gene. INTERPRETATION       Gram negative ney, identified by real time PCR as Klebsiella pneumoniae. Other Studies:  No results found.     Current Meds:  Current Facility-Administered Medications   Medication Dose Route Frequency    furosemide (LASIX) injection 40 mg  40 mg IntraVENous BID    LORazepam (ATIVAN) injection 1 mg  1 mg IntraVENous Q4H PRN    aspirin delayed-release tablet 81 mg  81 mg Oral DAILY    atorvastatin (LIPITOR) tablet 80 mg  80 mg Oral QHS    escitalopram oxalate (LEXAPRO) tablet 10 mg  10 mg Oral DAILY    rOPINIRole (REQUIP) tablet 0.5 mg  0.5 mg Oral QPM    enoxaparin (LOVENOX) injection 40 mg  40 mg SubCUTAneous Q24H    tamsulosin (FLOMAX) capsule 0.4 mg  0.4 mg Oral DAILY    cefTRIAXone (ROCEPHIN) 2 g in 0.9% sodium chloride (MBP/ADV) 50 mL MBP  2 g IntraVENous Q24H    metroNIDAZOLE (FLAGYL) tablet 500 mg  500 mg Oral BID    HYDROcodone-acetaminophen (NORCO) 5-325 mg per tablet 1 Tablet  1 Tablet Oral Q6H PRN    simethicone (MYLICON) tablet 80 mg  80 mg Oral QID PRN    lip protectant (BLISTEX) ointment 1 Each  1 Each Topical PRN    bisacodyL (DULCOLAX) suppository 10 mg  10 mg Rectal DAILY PRN    pantoprazole (PROTONIX) tablet 40 mg  40 mg Oral ACB    cyanocobalamin tablet 1,000 mcg  1,000 mcg Oral DAILY    0.9% sodium chloride infusion 250 mL  250 mL IntraVENous PRN    sodium chloride (NS) flush 5-40 mL  5-40 mL IntraVENous Q8H    sodium chloride (NS) flush 5-40 mL  5-40 mL IntraVENous PRN    acetaminophen (TYLENOL) tablet 650 mg  650 mg Oral Q6H PRN    Or    acetaminophen (TYLENOL) suppository 650 mg  650 mg Rectal Q6H PRN    polyethylene glycol (MIRALAX) packet 17 g  17 g Oral DAILY PRN    ondansetron (ZOFRAN ODT) tablet 4 mg  4 mg Oral Q8H PRN    Or    ondansetron (ZOFRAN) injection 4 mg  4 mg IntraVENous Q6H PRN    hydrALAZINE (APRESOLINE) 20 mg/mL injection 10 mg  10 mg IntraVENous Q6H PRN    morphine injection 1 mg  1 mg IntraVENous Q4H PRN    sodium chloride (NS) flush 5-10 mL  5-10 mL IntraVENous Q8H    sodium chloride (NS) flush 5-10 mL  5-10 mL IntraVENous PRN       Signed:  Enid Pedraza MD

## 2022-04-07 VITALS
HEIGHT: 68 IN | SYSTOLIC BLOOD PRESSURE: 124 MMHG | OXYGEN SATURATION: 98 % | WEIGHT: 213.63 LBS | BODY MASS INDEX: 32.38 KG/M2 | RESPIRATION RATE: 17 BRPM | HEART RATE: 75 BPM | DIASTOLIC BLOOD PRESSURE: 68 MMHG | TEMPERATURE: 97.9 F

## 2022-04-07 LAB
ANION GAP SERPL CALC-SCNC: 6 MMOL/L (ref 7–16)
BUN SERPL-MCNC: 10 MG/DL (ref 8–23)
CALCIUM SERPL-MCNC: 8.5 MG/DL (ref 8.3–10.4)
CHLORIDE SERPL-SCNC: 101 MMOL/L (ref 98–107)
CO2 SERPL-SCNC: 29 MMOL/L (ref 21–32)
CREAT SERPL-MCNC: 0.8 MG/DL (ref 0.8–1.5)
GLUCOSE SERPL-MCNC: 109 MG/DL (ref 65–100)
POTASSIUM SERPL-SCNC: 3.4 MMOL/L (ref 3.5–5.1)
SODIUM SERPL-SCNC: 136 MMOL/L (ref 138–145)

## 2022-04-07 PROCEDURE — 80048 BASIC METABOLIC PNL TOTAL CA: CPT

## 2022-04-07 PROCEDURE — 74011000258 HC RX REV CODE- 258: Performed by: INTERNAL MEDICINE

## 2022-04-07 PROCEDURE — 2709999900 HC NON-CHARGEABLE SUPPLY

## 2022-04-07 PROCEDURE — 74011250637 HC RX REV CODE- 250/637: Performed by: STUDENT IN AN ORGANIZED HEALTH CARE EDUCATION/TRAINING PROGRAM

## 2022-04-07 PROCEDURE — 74011250637 HC RX REV CODE- 250/637: Performed by: INTERNAL MEDICINE

## 2022-04-07 PROCEDURE — 97530 THERAPEUTIC ACTIVITIES: CPT

## 2022-04-07 PROCEDURE — 74011000250 HC RX REV CODE- 250: Performed by: EMERGENCY MEDICINE

## 2022-04-07 PROCEDURE — 74011250636 HC RX REV CODE- 250/636: Performed by: STUDENT IN AN ORGANIZED HEALTH CARE EDUCATION/TRAINING PROGRAM

## 2022-04-07 PROCEDURE — 97535 SELF CARE MNGMENT TRAINING: CPT

## 2022-04-07 PROCEDURE — 36415 COLL VENOUS BLD VENIPUNCTURE: CPT

## 2022-04-07 PROCEDURE — 74011250636 HC RX REV CODE- 250/636: Performed by: FAMILY MEDICINE

## 2022-04-07 PROCEDURE — 74011250637 HC RX REV CODE- 250/637: Performed by: FAMILY MEDICINE

## 2022-04-07 PROCEDURE — 74011250636 HC RX REV CODE- 250/636: Performed by: INTERNAL MEDICINE

## 2022-04-07 PROCEDURE — 74011250637 HC RX REV CODE- 250/637: Performed by: UROLOGY

## 2022-04-07 RX ORDER — METRONIDAZOLE 500 MG/1
500 TABLET ORAL 3 TIMES DAILY
Qty: 18 TABLET | Refills: 0 | Status: SHIPPED | OUTPATIENT
Start: 2022-04-07 | End: 2022-04-13

## 2022-04-07 RX ORDER — SULFAMETHOXAZOLE AND TRIMETHOPRIM 800; 160 MG/1; MG/1
1 TABLET ORAL 2 TIMES DAILY
Qty: 12 TABLET | Refills: 0 | Status: SHIPPED | OUTPATIENT
Start: 2022-04-07 | End: 2022-04-13

## 2022-04-07 RX ORDER — POTASSIUM CHLORIDE 20 MEQ/1
40 TABLET, EXTENDED RELEASE ORAL EVERY 4 HOURS
Status: COMPLETED | OUTPATIENT
Start: 2022-04-07 | End: 2022-04-07

## 2022-04-07 RX ORDER — POTASSIUM CHLORIDE 20 MEQ/1
20 TABLET, EXTENDED RELEASE ORAL DAILY
Qty: 4 TABLET | Refills: 0 | Status: SHIPPED | OUTPATIENT
Start: 2022-04-07

## 2022-04-07 RX ORDER — TAMSULOSIN HYDROCHLORIDE 0.4 MG/1
0.4 CAPSULE ORAL DAILY
Qty: 30 CAPSULE | Refills: 0 | Status: SHIPPED | OUTPATIENT
Start: 2022-04-08 | End: 2022-05-09 | Stop reason: SDUPTHER

## 2022-04-07 RX ORDER — ASPIRIN 81 MG/1
81 TABLET ORAL DAILY
Qty: 30 TABLET | Refills: 0 | Status: SHIPPED | OUTPATIENT
Start: 2022-04-08

## 2022-04-07 RX ADMIN — POTASSIUM CHLORIDE 40 MEQ: 20 TABLET, EXTENDED RELEASE ORAL at 08:25

## 2022-04-07 RX ADMIN — FUROSEMIDE 40 MG: 10 INJECTION, SOLUTION INTRAMUSCULAR; INTRAVENOUS at 08:21

## 2022-04-07 RX ADMIN — CYANOCOBALAMIN TAB 1000 MCG 1000 MCG: 1000 TAB at 08:21

## 2022-04-07 RX ADMIN — PANTOPRAZOLE SODIUM 40 MG: 40 TABLET, DELAYED RELEASE ORAL at 05:21

## 2022-04-07 RX ADMIN — TAMSULOSIN HYDROCHLORIDE 0.4 MG: 0.4 CAPSULE ORAL at 08:21

## 2022-04-07 RX ADMIN — METRONIDAZOLE 500 MG: 500 TABLET ORAL at 08:21

## 2022-04-07 RX ADMIN — ESCITALOPRAM OXALATE 10 MG: 10 TABLET ORAL at 08:21

## 2022-04-07 RX ADMIN — POTASSIUM CHLORIDE 40 MEQ: 20 TABLET, EXTENDED RELEASE ORAL at 12:13

## 2022-04-07 RX ADMIN — CEFTRIAXONE 2 G: 2 INJECTION, POWDER, FOR SOLUTION INTRAMUSCULAR; INTRAVENOUS at 12:13

## 2022-04-07 RX ADMIN — ENOXAPARIN SODIUM 40 MG: 40 INJECTION SUBCUTANEOUS at 12:13

## 2022-04-07 RX ADMIN — SODIUM CHLORIDE, PRESERVATIVE FREE 10 ML: 5 INJECTION INTRAVENOUS at 05:22

## 2022-04-07 RX ADMIN — ASPIRIN 81 MG: 81 TABLET ORAL at 08:21

## 2022-04-07 NOTE — DISCHARGE SUMMARY
Hospitalist Discharge Summary     Admit Date:  3/27/2022  7:41 PM   Name:  Héctor Clements   Age:  79 y.o.  :  1940   MRN:  [id number]   PCP:  Lendia Cheadle, MD  Treatment Team: Attending Provider: Kristin Pride MD; Consulting Provider: Erik Godoy NP; Care Manager: Hudson Kay RN; Utilization Review: Brant García RN; Hospitalist: Kristin Pride MD; Physical Therapist: Derrick Carter PT; Occupational Therapy Assistant: LIT Powell    Problem List for this Hospitalization:  Hospital Problems as of 2022 Date Reviewed: 3/2/2022          Codes Class Noted - Resolved POA    Urine retention ICD-10-CM: R33.9  ICD-9-CM: 788.20  4/3/2022 - Present No        Acute cholecystitis ICD-10-CM: K81.0  ICD-9-CM: 575.0  3/28/2022 - Present Unknown        Pancreatitis, gallstone ICD-10-CM: K85.10  ICD-9-CM: 577.0, 574.20  3/28/2022 - Present Unknown        Anemia ICD-10-CM: D64.9  ICD-9-CM: 285.9  3/28/2022 - Present Yes        Bacteremia ICD-10-CM: R78.81  ICD-9-CM: 790.7  3/28/2022 - Present Yes        Diastolic CHF, chronic ([facility] ICD-10-CM: I50.32  ICD-9-CM: 428.32, 428.0  3/2/2022 - Present Yes        Atrial fibrillation ([facility] ICD-10-CM: I48.91  ICD-9-CM: 427.31  2017 - Present Yes        * (Principal) Sepsis ([facility] ICD-10-CM: A41.9  ICD-9-CM: 038.9, 995.91  10/26/2017 - Present Unknown        Paroxysmal atrial fibrillation ([facility] ICD-10-CM: I48.0  ICD-9-CM: 427.31  2017 - Present Yes    Overview Signed 2019 10:36 AM by Ruben Reese MD     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               SSS (sick sinus syndrome) (HCC) ICD-10-CM: I49.5  ICD-9-CM: 427.81  2017 - Present Yes        Myasthenia gravis ([facility] ICD-10-CM: G70.00  ICD-9-CM: 358.00  Unknown - Present Yes        HTN (hypertension) (Chronic) ICD-10-CM: I10  ICD-9-CM: 401.9  2015 - Present Yes        Coronary atherosclerosis of native coronary vessel ICD-10-CM: I25.10  ICD-9-CM: 414.01  5/8/2015 - Present Yes    Overview Signed 6/7/2016  8:35 AM by Arthur Victor on brilinta  5/7/15: prox LAD PCI, normal EF                     Admission HPI from 3/27/2022:    Kaylynn Shen is a 79 y.o. male with medical history of HTN who presented to ED with abdominal pain. Pain started after lunch yesterday. Reports symptoms worsening throughout the evening. Associated nausea and vomiting. Denies overt fever. Mildly confused.     Upon ER workup, patient has elevated WBC of 20.2. Lipase is 2,029. LFTs somewhat elevated. Lactic acid is 4.9 with elevated procal of 0.98. CT was obtained which shows evidence of likely gallstone pancreatitis as well as possible acute cholecystitis. General Surgery consulted and will follow. Hospitalist asked to admit. Hospital Course:  Mr. Sandip Campa is an 80 yo male with PMH of AFIB s/p watchman, SSS s/p PPM- has fractured LV lead, LVDD2, CAD, myasthenia gravis admitted with abdominal pain and meeting sepsis criteria. He has leukocytosis, lactic acidosis, elevated lipase and LFTS.     CTAP shows   \"     IMPRESSION  There are multiple hyperechoic dense structures rather in the gallbladder neck. These may represent multiple stones. There is mild gallbladder wall thickening  with subtle pericholecystic fluid. These findings raise suspicion for acute  cholecystitis.     There is also mild peripancreatic fluid. This fluid could be due to  cholecystitis or acute pancreatitis.     There is focal dilatation of the distal right ureter. There is no evidence of  hydronephrosis proximal to this point. Further work up with urology consult is  recommended to assess this region.     Chronic appearing changes are noted in the kidneys.     Multiple small low-density lesions are seen in the liver and are too small to  Characterize. \"     ABD US      \"  IMPRESSION  Cholelithiasis. The gallbladder wall is thickened measuring 7.6 mm.  There is no  appreciable pericholecystic fluid. Sonographic Lorren Deal sign was not documented.     The pancreas was not visualized.     Hepatic steatosis.     The right kidney appears somewhat echogenic. This can be seen with medical renal  disease. Please correlate clinically. \"           He has been NPO . S/p surgery consult. HIDA scan completed. Postop lap cholecystectomy on 3-29-22. He will need 10 day surgical followup. Diet to advance as tolerant.      ERCP completed  3-30-22.         He had acute blood loss anemia with recent dark stools. S/p 3 PRBC. GI following.  S/p EGD 3-29-22 no obvious source of bleeding. He will need GI followup.     Blood cultures 2/2 klebsiella. ID following. He is on course of zosyn. Adjusted to rocephin and flagyl while admitted. Can discharge to home on bactrim/flagyl as unable to use quinolones with his known myasthenia gravis.  EOT 4-13-22.      Close follow up of potassium and creatinine levels on bactrim     He was Seen by urology for abnormal appearing right ureter on CTAP- no hydronephrosis. Plan for office followup. Can do a trial of removal of coronel catheter at rehab.     CXR shows vascular congestion. Lasix resumed. Cardiology following. Bilateral lower extremity swelling improving. Paroxysmal atrial fibrillation (HCC) (6/30/2017)    SSS (sick sinus syndrome) ([facility] (6/30/2017)    - s/p AV keerthi ablation, pacer dependent    - post Watchman .     - pacer in place, paced rhythm. LV lead is fractured and  has been turned off. PT recommended Rehab. Pt is stable to go to rehab.       Follow up instructions below. Plan was discussed with patient. All questions answered. Patient was stable at time of discharge and was instructed to call or return if there are any concerns or recurrence of symptoms. Diagnostic Imaging/Tests:   NM HEPATOBILIARY DUCT SCAN    Result Date: 3/29/2022  EXAM:  NM HEPATOBILIARY DUCT SCAN INDICATION:  Right upper quadrant pain.  Concern for cholecystitis. COMPARISON: Right upper quadrant ultrasound and CT abdomen and pelvis, 3/27/2022. TRACER: 6.2 mCi of Tc-99m Choletec. TECHNIQUE: Following the uneventful intravenous administration of Tc 99m Choletec, imaging of the abdomen was performed in the anterior projection for 60 minutes. Delayed right lateral view was also obtained. FINDINGS: Initial images demonstrate prompt hepatic tracer uptake. The gallbladder is visualized at 15 minutes. It demonstrates progressive filling. The common bile duct is visualized at 45 minutes. The duodenum is visualized at 50 minutes. No evidence of acute cholecystitis or biliary obstruction. XR CHOLANG INTRAOPERATIVE    Result Date: 3/29/2022  OPERATIVE CHOLANGIOGRAM INDICATION: Cholecystectomy Fluoroscopy time:  45 seconds, 4 spot films Multiple spot films were obtained during an intraoperative cholangiogram performed by Dr. Belkis Morgan . FINDINGS: There is at least one small filling defect in the common duct near the ampulla. There is also filling defect in the common hepatic duct. No bile duct dilatation. No focal stricture. Contrast passes normally into the small bowel. At least 2 small filling defects in the biliary system, probably air bubbles. Small stones not completely excluded. XR ERCP / ERCB COMBINED    Result Date: 3/30/2022  ERCP CLINICAL INDICATION: Fluoroscopic spot images during an ERCP procedure for common bile duct stones FINDINGS: Six fluoroscopic spot images performed during ERCP procedure. A papillotomy was performed. Contrast was administered into the common bile duct. Filling defects are noted in keeping with common bile ducts stones. A balloon was performed. The stones were successfully extracted. Multiple common bile duct stones extracted after balloon sweep. Total fluoroscopy time: 1.0 minutes; six fluoroscopic spot image saved. Echocardiogram results:  No results found for this visit on 03/27/22.       All Micro Results     Procedure Component Value Units Date/Time    RESPIRATORY VIRUS PANEL W/COVID-19, PCR [[id number]] Collected: 04/05/22 1113    Order Status: Completed Specimen: Nasopharyngeal Updated: 04/05/22 1228     Adenovirus NOT DETECTED        Coronavirus 229E NOT DETECTED        Coronavirus HKU1 NOT DETECTED        Coronavirus CVNL63 NOT DETECTED        Coronavirus OC43 NOT DETECTED        SARS-CoV-2, PCR NOT DETECTED        Metapneumovirus NOT DETECTED        Rhinovirus and Enterovirus NOT DETECTED        Influenza A NOT DETECTED        Influenza B NOT DETECTED        Parainfluenza 1 NOT DETECTED        Parainfluenza 2 NOT DETECTED        Parainfluenza 3 NOT DETECTED        Parainfluenza virus 4 NOT DETECTED        RSV by PCR NOT DETECTED        B. parapertussis, PCR NOT DETECTED        Bordetella pertussis - PCR NOT DETECTED        Chlamydophila pneumoniae DNA, QL, PCR NOT DETECTED        Mycoplasma pneumoniae DNA, QL, PCR NOT DETECTED       CULTURE, URINE [[id number]] Collected: 04/02/22 1810    Order Status: Completed Specimen: Urine from Clean catch Updated: 04/05/22 0916     Special Requests: NO SPECIAL REQUESTS        Culture result: NO GROWTH 2 DAYS       CULTURE, BLOOD [[id number]] Collected: 03/30/22 0648    Order Status: Completed Specimen: Blood Updated: 04/04/22 0729     Special Requests: --        LEFT  ARM       Culture result: NO GROWTH 5 DAYS       CULTURE, BLOOD [[id number]] Collected: 03/30/22 9315    Order Status: Completed Specimen: Blood Updated: 04/04/22 0729     Special Requests: --        LEFT  FOREARM       Culture result: NO GROWTH 5 DAYS       CULTURE, BLOOD [[id number]]  (Abnormal) Collected: 03/28/22 0019    Order Status: Completed Specimen: Blood Updated: 03/30/22 0726     Special Requests: --        LEFT  ARM       GRAM STAIN GRAM NEGATIVE RODS               AEROBIC AND ANAEROBIC BOTTLES                  CRITICAL RESULT NOT CALLED DUE TO PREVIOUS NOTIFICATION OF CRITICAL RESULT WITHIN THE LAST 24 HOURS. Culture result: GRAM NEGATIVE RODS               For identification and susceptibility refer to culture  Accession [id number]      CULTURE, BLOOD [[id number]]  (Abnormal)  (Susceptibility) Collected: 03/27/22 2350    Order Status: Completed Specimen: Blood Updated: 03/30/22 0772     Special Requests: --        RIGHT  ARM       GRAM STAIN GRAM NEGATIVE RODS               AEROBIC AND ANAEROBIC BOTTLES                  RESULTS VERIFIED, PHONED TO AND READ BACK BY Damaso Baptiste RN @ 2727 ON 3/28/22 BY AMM           Culture result: KLEBSIELLA PNEUMONIAE               Refer to Blood Culture ID Panel Accession  [id number]      BLOOD CULTURE ID PANEL [[id number]]  (Abnormal) Collected: 03/27/22 2350    Order Status: Completed Specimen: Blood Updated: 03/28/22 1236     Acc. no. from Micro Order [id number]     Klebsiella pneumoniae Detected        Comment: RESULTS VERIFIED, PHONED TO AND READ BACK BY  Damaso Baptiste, RN @ 8957 ON 3/28/22 BY AMM          KPC (Carbapenem Resistance Gene) NOT DETECTED        Comment: WARNING:  A Not Detected result for the KPC gene does not indicate susceptibility to carbapenems. Gram negative bacteria can be resistant to carbapenems by mechanisms other than carrying the KPC gene. INTERPRETATION       Gram negative ney, identified by real time PCR as Klebsiella pneumoniae.                 Labs: Results:       BMP, Mg, Phos Recent Labs     04/07/22  0444 04/06/22  0313 04/05/22  0547   * 137* 135*   K 3.4* 4.0 3.7    102 102   CO2 29 27 28   AGAP 6* 8 5*   BUN 10 10 11   CREA 0.80 0.80 0.80   CA 8.5 8.6 8.2*   * 97 121*      CBC Recent Labs     04/06/22  0313 04/05/22  0547   WBC 15.1* 15.8*   RBC 4.65 4.69   HGB 8.8* 8.7*   HCT 31.8* 31.1*    206   GRANS 66 65   LYMPH 17 15   EOS 4 4   MONOS 8 8   BASOS 1 1   IG 5 7*   ANEU 10.0* 10.2*   ABL 2.5 2.4   JAKOB 0.6 0.6   ABM 1.2 1.3   ABB 0.1 0.2   AIG 0.8* 1.1*      LFT No results for input(s): ALT, TBIL, AP, TP, ALB, GLOB, AGRAT in the last 72 hours.     No lab exists for component: SGOT, GPT   Cardiac Testing Lab Results   Component Value Date/Time    BNP 44 (H) 05/22/2019 03:48 AM    BNP 27 07/18/2018 10:35 AM    BNP 46 05/10/2017 01:12 PM    Troponin-I, Qt. 0.05 10/25/2017 01:15 AM    Troponin-I, Qt. 0.05 10/24/2017 05:14 PM    Troponin-I, Qt. 0.03 05/10/2017 03:45 PM      Coagulation Tests Lab Results   Component Value Date/Time    Prothrombin time 13.2 05/06/2021 11:53 AM    Prothrombin time 12.6 03/17/2021 09:35 AM    Prothrombin time 13.6 06/27/2019 08:08 AM    INR 1.0 05/06/2021 11:53 AM    INR 0.9 03/17/2021 09:35 AM    INR 1.1 06/27/2019 08:08 AM    aPTT 101.7 (HH) 10/25/2017 08:46 AM    aPTT 42.1 (H) 10/25/2017 01:15 AM      A1c No results found for: HBA1C, DNR0XTTY, EOD5JIYX   Lipid Panel Lab Results   Component Value Date/Time    Cholesterol, total 96 05/03/2021 10:33 AM    HDL Cholesterol 36 (L) 05/03/2021 10:33 AM    LDL, calculated 40.2 05/03/2021 10:33 AM    VLDL, calculated 19.8 05/03/2021 10:33 AM    Triglyceride 99 05/03/2021 10:33 AM    CHOL/HDL Ratio 2.7 05/03/2021 10:33 AM      Thyroid Panel Lab Results   Component Value Date/Time    TSH 1.870 02/28/2022 12:33 PM    TSH 1.840 08/20/2020 09:05 AM        Most Recent UA Lab Results   Component Value Date/Time    Color RED 04/02/2022 06:11 PM    Appearance CLOUDY 04/02/2022 06:11 PM    pH (UA) 7.0 04/02/2022 06:11 PM    Protein 100 (A) 04/02/2022 06:11 PM    Glucose 100 04/02/2022 06:11 PM    Ketone TRACE (A) 04/02/2022 06:11 PM    Bilirubin Negative 04/02/2022 06:11 PM    Blood LARGE (A) 04/02/2022 06:11 PM    Urobilinogen 0.2 04/02/2022 06:11 PM    Nitrites Positive (A) 04/02/2022 06:11 PM    Leukocyte Esterase TRACE (A) 04/02/2022 06:11 PM        No Known Allergies  Immunization History   Administered Date(s) Administered    COVID-19, Pfizer Purple top, DILUTE for use, 12+ yrs, 30mcg/0.3mL dose 02/02/2021, 02/23/2021    Pneumococcal Polysaccharide (PPSV-23) 05/08/2015    TB Skin Test (PPD) Intradermal 03/29/2022    Tdap 03/03/2022       All Labs from Last 24 Hrs:  Recent Results (from the past 24 hour(s))   METABOLIC PANEL, BASIC    Collection Time: 04/07/22  4:44 AM   Result Value Ref Range    Sodium 136 (L) 138 - 145 mmol/L    Potassium 3.4 (L) 3.5 - 5.1 mmol/L    Chloride 101 98 - 107 mmol/L    CO2 29 21 - 32 mmol/L    Anion gap 6 (L) 7 - 16 mmol/L    Glucose 109 (H) 65 - 100 mg/dL    BUN 10 8 - 23 MG/DL    Creatinine 0.80 0.8 - 1.5 MG/DL    GFR est AA >60 >60 ml/min/1.73m2    GFR est non-AA >60 >60 ml/min/1.73m2    Calcium 8.5 8.3 - 10.4 MG/DL       Discharge Exam:  Patient Vitals for the past 24 hrs:   Temp Pulse Resp BP SpO2   04/07/22 1118 97.9 °F (36.6 °C) 75 16 115/70 95 %   04/07/22 0746 98 °F (36.7 °C) 75  (!) 143/80 91 %   04/07/22 0745   18     04/07/22 0317 97.4 °F (36.3 °C) 69 20 135/77 93 %   04/07/22 0004 97.5 °F (36.4 °C) 70 20 (!) 145/78 95 %   04/06/22 1908 97.6 °F (36.4 °C) 74 20 (!) 151/75 96 %   04/06/22 1601 97.4 °F (36.3 °C) 75 20 (!) 144/73 96 %     Oxygen Therapy  O2 Sat (%): 95 % (04/07/22 1118)  Pulse via Oximetry: 70 beats per minute (04/01/22 0017)  O2 Device: None (Room air) (04/07/22 1130)  Skin Assessment: Clean, dry, & intact (04/01/22 0017)  O2 Flow Rate (L/min): 2 l/min (04/01/22 0017)    Intake/Output Summary (Last 24 hours) at 4/7/2022 1338  Last data filed at 4/7/2022 0004  Gross per 24 hour   Intake 240 ml   Output 1700 ml   Net -1460 ml       General:    Well nourished. Alert. No distress. Eyes:   Normal sclera. Extraocular movements intact. ENT:  Normocephalic, atraumatic. Moist mucous membranes  CV:   Regular rate and rhythm. No murmur, rub, or gallop. Lungs:  Clear to auscultation bilaterally. No wheezing, rhonchi, or rales. Abdomen: Soft, nontender, nondistended. Bowel sounds normal.   Extremities: Warm and dry. No cyanosis or edema. Neurologic: CN II-XII grossly intact. Sensation intact. Skin:     No rashes or jaundice. Psych:  Normal mood and affect. Discharge Info:   Current Discharge Medication List      START taking these medications    Details   metroNIDAZOLE (FLAGYL) 500 mg tablet Take 1 Tablet by mouth three (3) times daily for 6 days. Qty: 18 Tablet, Refills: 0  Start date: 4/7/2022, End date: 4/13/2022      tamsulosin (FLOMAX) 0.4 mg capsule Take 1 Capsule by mouth daily. Qty: 30 Capsule, Refills: 0  Start date: 4/8/2022      trimethoprim-sulfamethoxazole (BACTRIM DS, SEPTRA DS) 160-800 mg per tablet Take 1 Tablet by mouth two (2) times a day for 6 days. Qty: 12 Tablet, Refills: 0  Start date: 4/7/2022, End date: 4/13/2022      pantoprazole (PROTONIX) 40 mg tablet Take 1 Tablet by mouth Daily (before breakfast). Qty: 30 Tablet, Refills: 0  Start date: 4/3/2022      cyanocobalamin 1,000 mcg tablet Take 1 Tablet by mouth daily. Qty: 90 Tablet, Refills: 0  Start date: 4/2/2022         CONTINUE these medications which have CHANGED    Details   aspirin delayed-release 81 mg tablet Take 1 Tablet by mouth daily. Qty: 30 Tablet, Refills: 0  Start date: 4/8/2022      potassium chloride (K-DUR, KLOR-CON M20) 20 mEq tablet Take 1 Tablet by mouth daily. Qty: 4 Tablet, Refills: 0  Start date: 4/7/2022         CONTINUE these medications which have NOT CHANGED    Details   escitalopram oxalate (LEXAPRO) 10 mg tablet Take 1 Tablet by mouth daily. Qty: 90 Tablet, Refills: 3      furosemide (LASIX) 40 mg tablet Take 1 Tablet by mouth daily. Qty: 90 Tablet, Refills: 5      atorvastatin (LIPITOR) 80 mg tablet Take 1 Tablet by mouth nightly. Qty: 90 Tablet, Refills: 3      rOPINIRole (REQUIP) 0.5 mg tablet 1 nightly, increase to 1 twice a day if needed  Qty: 180 Tablet, Refills: 3    Associated Diagnoses: Restless leg syndrome      nitroglycerin (NITROSTAT) 0.4 mg SL tablet Place 1 sl under the tongue q 5 min prn cp, max 3 sl in a 15-min time period.  Call 911 if no relief after the 3rd sl. Qty: 1 Bottle, Refills: prn         STOP taking these medications       HYDROcodone-acetaminophen (NORCO) 5-325 mg per tablet Comments:   Reason for Stopping:         lisinopriL (PRINIVIL, ZESTRIL) 10 mg tablet Comments:   Reason for Stopping:         sildenafil citrate (Viagra) 100 mg tablet Comments:   Reason for Stopping:                 Disposition: Rehab   Activity: As per physical therapy. Diet: ADULT DIET Easy to Chew; Low Fat/Low Chol/High Fiber/PATTIE    Follow-up Appointments   Procedures    FOLLOW UP VISIT Appointment in: Ten Days Follow up with NP for laparoscopic cholecystectomy in 10 days. At [facility]. Follow up with NP for laparoscopic cholecystectomy in 10 days. At [facility]. Standing Status:   Standing     Number of Occurrences:   1     Order Specific Question:   Appointment in     Answer:   Ten Days    FOLLOW UP VISIT Appointment in: One Week 1 week PCP, 2 weeks GI, 2 weeks [facility], 2-4 weeks urology , 10 days surgery     1 week PCP, 2 weeks GI, 2 weeks [facility], 2-4 weeks urology , 10 days surgery     Standing Status:   Standing     Number of Occurrences:   1     Order Specific Question:   Appointment in     Answer: One Week    FOLLOW UP VISIT Appointment in: One Week CBC and BMP  every 4 days x2 at rehab as pt is on bactrim and potassium supplementation,also to follow up on creatinine. Rehab physician to follow up on the labs. Follow up with GI as per there recommendati. ..     CBC and BMP  every 4 days x2 at rehab as pt is on bactrim and potassium supplementation,also to follow up on creatinine. Rehab physician to follow up on the labs. Follow up with GI as per there recommendations. Follow up with Surgery as per there recommendations. Can do a trial at the rehab to remove urinary catheter. Follow up with Urology on 1-2 weeks.   If the blood pressure are persistently above 707 mmhg systolic ,can start on Lisinopril. Standing Status:   Standing     Number of Occurrences:   1     Standing Expiration Date:   4/8/2022     Order Specific Question:   Appointment in     Answer: One Week         Follow-up Information     Follow up With Specialties Details Why Leonardo Marquez [address] [place] [address]    Anthony Robbins MD    Patient can only remember the practice name and not the physician      Narendra Loza MD Family Medicine   [address] [place] [address]. [facility]  [address]  [phone]            Time spent in patient discharge planning and coordination 35 minutes.     Signed:  Michelle Kent MD

## 2022-04-07 NOTE — PROGRESS NOTES
ACUTE OT GOALS:  (Developed with and agreed upon by patient and/or caregiver.)  1. Patient will complete lower body bathing and dressing with modified independence and adaptive equipment as needed. 2. Patient will complete toileting with modified independence. 3. Patient will tolerate 30 minutes of OT treatment with 2-3 rest breaks to increase activity tolerance for ADLs. 4. Patient will complete functional transfers with modified independence and adaptive equipment as needed. 5. Patient will complete functional mobility for household distances and good safety awareness with modified independence.       Timeframe: 7 visits      OCCUPATIONAL THERAPY: Daily Note OT Treatment Day # 4    Kt Gaytan is a 79 y.o. male   PRIMARY DIAGNOSIS: Sepsis ([facility] [address]  Sepsis ([facility] [address] [A41.9]  Acute cholecystitis [K81.0]  Pancreatitis, gallstone [K85.10]  Procedure(s) (LRB):  ENDOSCOPIC RETROGRADE CHOLANGIOPANCREATOGRAPHY (ERCP) Rm 604 (N/A)  ENDOSCOPIC SPHINCTEROTOMY (N/A)  ENDOSCOPIC STONE EXTRACTION/BALLOON SWEEP (N/A)  8 Days Post-Op  [place] / [place] B / Product Type: Medicare /   ASSESSMENT:     REHAB RECOMMENDATIONS: CURRENT LEVEL OF FUNCTION:  (Most Recently Demonstrated)   Recommendation to date pending progress:  Setting:   Short-term Rehab  Equipment:    To Be Determined Bathing:   Moderate Assistance  Dressing:   Maximal Assistance for socks and pullup  Feeding/Grooming:   Maximal Assistance for shaving   Toileting:   Maximal Assistance for bowel hygiene  Functional Mobility:   Standby Assistance     ASSESSMENT:  Mr. Marianen Gonzalez is doing fair today. Pt presents supine upon arrival. Pt performed bed mobility with SBA. Pt demonstrates fair sitting balance at EOB. Pt assisted with shaving at EOB. Max A due to using safety razor and patient having skin tags on his face. Pt was able to enter bathroom and complete BM. Max A for bowel hygiene. Pt also was assisted with ADLs at sink.  Pt required more assistance with due to being fatigued. Pt left in recliner with all needs in reach. Good session for patient. Pt is to be discharged to SNF today.      SUBJECTIVE:   Mr. Chas Camara states, \"I left my wallet at home\"    SOCIAL HISTORY/LIVING ENVIRONMENT: See initial assessment   Home Environment: Private residence  # Steps to Enter: 1  One/Two Story Residence: One story  Living Alone: No  Support Systems: Spouse/Significant Other    OBJECTIVE:     PAIN: VITAL SIGNS: LINES/DRAINS:   Pre Treatment: Pain Screen  Pain Scale 1: Numeric (0 - 10)  Pain Intensity 1: 0  Post Treatment: 0   Velasquez Catheter and IV  O2 Device: None (Room air)     ACTIVITIES OF DAILY LIVING: I Mod I S SBA CGA Min Mod Max Total NT Comments   BASIC ADLs:              Bathing/ Showering [] [] [] [] [] [] [x] [] [] []    Toileting [] [] [] [] [] [] [] [x] [] []    Dressing [] [] [] [] [] [] [] [x] [] []    Feeding [] [] [] [] [] [] [] [] [] [x]    Grooming [] [] [] [] [] [] [] [x] [] []    Personal Device Care [] [] [] [] [] [] [] [] [] [x]    Functional Mobility [] [] [] [x] [] [] [] [] [] [x]  Using RW   I=Independent, Mod I=Modified Independent, S=Supervision, SBA=Standby Assistance, CGA=Contact Guard Assistance,   Min=Minimal Assistance, Mod=Moderate Assistance, Max=Maximal Assistance, Total=Total Assistance, NT=Not Tested    MOBILITY: I Mod I S SBA CGA Min Mod Max Total  NT x2 Comments:   Supine to sit [] [] [] [x] [] [] [] [] [] [] []    Sit to supine [] [] [] [] [] [] [] [] [] [] []    Sit to stand [] [] [] [x] [] [] [] [] [] [] []    Bed to chair [] [] [] [x] [] [] [] [] [] [] []    I=Independent, Mod I=Modified Independent, S=Supervision, SBA=Standby Assistance, CGA=Contact Guard Assistance,   Min=Minimal Assistance, Mod=Moderate Assistance, Max=Maximal Assistance, Total=Total Assistance, NT=Not Tested    BALANCE: Good Fair+ Fair Fair- Poor NT Comments   Sitting Static [] [] [x] [] [] []    Sitting Dynamic [] [] [x] [] [] [] Standing Static [] [] [x] [] [] []    Standing Dynamic [] [] [x] [] [] []      PLAN:   FREQUENCY/DURATION: OT Plan of Care: 3 times/week for duration of hospital stay or until stated goals are met, whichever comes first.    TREATMENT:   TREATMENT:   ($$ Self Care/Home Management: 23-37 mins$$ Therapeutic Activity: 8-22 mins   )  Therapeutic Activity (10 Minutes): Therapeutic activity included Supine to Sit, Transfer Training, Ambulation on level ground, Sitting balance  and Standing balance to improve functional Mobility, Strength and Activity tolerance. Self Care (30 Minutes): Self care including Upper Body Bathing, Lower Body Bathing, Toileting, Upper Body Dressing, Lower Body Dressing and Grooming to increase independence and decrease level of assistance required.     TREATMENT GRID:   Date:  4/5/22 Date:   Date:     Activity/Exercise Parameters Parameters Parameters   Shoulder Abd/Adduction 10 reps     Punches  10 reps                                     AFTER TREATMENT POSITION/PRECAUTIONS:  Chair, Needs within reach and RN notified    INTERDISCIPLINARY COLLABORATION:  RN/PCT and OT/GOULD    TOTAL TREATMENT DURATION:  OT Patient Time In/Time Out  Time In: 1330  Time Out: [address], LIT

## 2022-04-07 NOTE — DISCHARGE INSTRUCTIONS
CBC and BMP  in 4 days at rehab. Rehab physician to follow up on the labs. Follow up with GI as per there recommendations. Follow up with Surgery as per there recommendations. Can do a trial at the rehab to remove urinary catheter. Follow up with Urology on 1-2 weeks. If the blood pressure are persistently above 884 mmhg systolic ,can start on Lisinopril. No bathtub or pool for 2 weeks. Ok to shower. No weight lifting greater than 20 lbs for 4 weeks. Avoid fatty and spicy foods for 2-3 days and then introduce then slowly. Leave the Steri strips in place. They will fall off on their own in 7-10 days. Take tylenol or ibuprofen for as needed for mild pain every 4-6 hours. Percocet prescribed for mod to severe pain. This is a narcotic and can cause drowsiness, nausea and vomiting and constipation. Take Colace 100mg BID (over the counter) if constipated.

## 2022-04-07 NOTE — PROGRESS NOTES
[facility] transport arranged for 4 pm pickup (first available) for [facility], room 416, report [phone]. This plan is ok with Mr. Jonny Corbett in room 604 and with his wife Ms. Lyndsey Cespedes at [phone]. Care Management Interventions  PCP Verified by CM:  Yes ([address] adiel a year aga, says Dr. Brandin Russell is his doctor, but he is cardiologist)  Mode of Transport at Discharge: S  Transition of Care Consult (CM Consult): SNF  [address]: Yes  Physical Therapy Consult: Yes  Support Systems: Spouse/Significant Other  Confirm Follow Up Transport: Family  The Plan for Transition of Care is Related to the Following Treatment Goals : he wants to go home when stable  The Patient and/or Patient Representative was Provided with a Choice of Provider and Agrees with the Discharge Plan?: Yes  Freedom of Choice List was Provided with Basic Dialogue that Supports the Patient's Individualized Plan of Care/Goals, Treatment Preferences and Shares the Quality Data Associated with the Providers?: Yes  Discharge Location  Patient Expects to be Discharged to[de-identified] Skilled nursing facility [facility] for STR)

## 2022-04-07 NOTE — PROGRESS NOTES
ACUTE PHYSICAL THERAPY GOALS:  (Developed with and agreed upon by patient and/or caregiver.)  (1.) Kaylynn Shen will move from supine to sit and sit to supine , scoot up and down and roll side to side with MODIFIED INDEPENDENCE within 7 treatment day(s). (2.) Kaylynn Shen will transfer from bed to chair and chair to bed with MODIFIED INDEPENDENCE using the least restrictive device within 7 treatment day(s). (3.) Kaylynn Shen will ambulate with MODIFIED INDEPENDENCE for 300+ feet with the least restrictive device within 7 treatment day(s). (4.) Kaylynn Shen will perform standing static and dynamic balance activities x 25 minutes with MODIFIED INDEPENDENCE to improve safety within 7 treatment day(s). (5.) Kaylynn Shen will ascend and descend 1 stair using one hand rail(s) with MODIFIED INDEPENDENCE to improve functional mobility and safety within 7 treatment day(s). (6.) Kaylynn Shen will perform therapeutic exercises x 15 min for HEP with INDEPENDENCE to improve strength, endurance, and functional mobility within 7 treatment day(s).      PHYSICAL THERAPY: Daily Note Treatment Day # 5    Kaylynn Shen is a 79 y.o. male   PRIMARY DIAGNOSIS: Sepsis ([facility] [address]  Sepsis ([facility] [address] [A41.9]  Acute cholecystitis [K81.0]  Pancreatitis, gallstone [K85.10]  Procedure(s) (LRB):  ENDOSCOPIC RETROGRADE CHOLANGIOPANCREATOGRAPHY (ERCP) Rm 604 (N/A)  ENDOSCOPIC SPHINCTEROTOMY (N/A)  ENDOSCOPIC STONE EXTRACTION/BALLOON SWEEP (N/A)  8 Days Post-Op    ASSESSMENT:     REHAB RECOMMENDATIONS: CURRENT LEVEL OF FUNCTION:  (Most Recently Demonstrated)   Recommendation to date pending progress:  Setting:   Short-term Rehab  Equipment:    None - pt already has  and BSC Bed Mobility:   Contact Guard Assistance  Sit to Stand:   Contact Guard Assistance  Transfers:   Minimal Assistance  Gait/Mobility:   Contact Guard Assistance     ASSESSMENT:  Mr. Sandip Campa is an 79year old M who presents to hospital with abdominal pain, admitted with sepsis criteria. Since initial evaluation pt underwent cholecystectomy and ERCP. Pt continues to feel better and improve with therapy. Min A transfers, otherwise CGA for mobility. Increased ambulation distance in room and hallway; still requiring support at RW due to unsteadiness and decreased activity tolerance. PT instructed pt in seated and standing BLE exercises. Good progress towards goals which continue to be appropriate. He will continue to benefit from skilled PT services. Will continue therapy efforts. SUBJECTIVE:   Mr. Pete Barrett states, \"If you have my keys, we could go for a ride in my truck\"    SOCIAL HISTORY/ LIVING ENVIRONMENT: Lives with his spouse, 1 level home, 1 [address]. Endorses on fall/trip. Does not use any DME. 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   Home Environment: Private residence  # Steps to Enter: 1  One/Two Story Residence: One story  Living Alone: No  Support Systems: Spouse/Significant Other  OBJECTIVE:     PAIN: VITAL SIGNS: LINES/DRAINS:   Pre Treatment: Pain Screen  Pain Scale 1: Numeric (0 - 10)  Pain Intensity 1: 0 /10  Post Treatment: 0/10 Vital Signs  O2 Device: None (Room air) None  O2 Device: None (Room air)     MOBILITY: I Mod I S SBA CGA Min Mod Max Total  NT x2 Comments:   Bed Mobility    Rolling [] [] [] [] [x] [] [] [] [] [] []    Supine to Sit [] [] [] [] [x] [] [] [] [] [] []    Scooting [] [] [] [] [x] [] [] [] [] [] []    Sit to Supine [] [] [] [] [x] [] [] [] [] [] []    Transfers    Sit to Stand [] [] [] [] [x] [x] [] [] [] [] []    Bed to Chair [] [] [] [] [x] [] [] [] [] [] []    Stand to Sit [] [] [] [] [x] [] [] [] [] [] []    I=Independent, Mod I=Modified Independent, S=Supervision, SBA=Standby Assistance, CGA=Contact Guard Assistance,   Min=Minimal Assistance, Mod=Moderate Assistance, Max=Maximal Assistance, Total=Total Assistance, NT=Not Tested    BALANCE: Good Fair+ Fair Fair- Poor NT Comments   Sitting Static [x] [] [] [] [] []    Sitting Dynamic [x] [] [] [] [] []              Standing Static [] [x] [] [] [] []    Standing Dynamic [] [x] [x] [] [] []      GAIT: I Mod I S SBA CGA Min Mod Max Total  NT x2 Comments:   Level of Assistance [] [] [] [] [x] [] [] [] [] [] []    Distance 200 ft    DME Rolling Walker    Gait Quality Slow, shuffled    Weightbearing  Status N/A     I=Independent, Mod I=Modified Independent, S=Supervision, SBA=Standby Assistance, CGA=Contact Guard Assistance,   Min=Minimal Assistance, Mod=Moderate Assistance, Max=Maximal Assistance, Total=Total Assistance, NT=Not Tested    PLAN:   FREQUENCY/DURATION: PT Plan of Care: 3 times/week for duration of hospital stay or until stated goals are met, whichever comes first.  TREATMENT:     TREATMENT:    ($$ Therapeutic Activity: 23-37 mins    )  Therapeutic Activity (23 Minutes): Therapeutic activity included Supine to Sit, Scooting, Transfer Training, Ambulation on level ground, Sitting balance  and Standing balance to improve functional Mobility, Strength and Activity tolerance.     TREATMENT GRID:   Date:  4/7/22 Date:   Date:     Activity/Exercise Parameters Parameters Parameters   Standing Heel Raises 1 x 10 BLE     Seated Marches 1 x 10 BLE     Seated LAQ 1 x 10 BLE                               AFTER TREATMENT POSITION/PRECAUTIONS:  Chair, Needs within reach and RN notified    INTERDISCIPLINARY COLLABORATION:  RN/PCT and PT/PTA    TOTAL TREATMENT DURATION:  PT Patient Time In/Time Out  Time In: 1130  Time Out: [address] [facility], PT

## 2022-04-11 ENCOUNTER — HOSPITAL ENCOUNTER (EMERGENCY)
Age: 82
Discharge: HOME | End: 2022-04-11
Attending: EMERGENCY MEDICINE
Payer: MEDICARE

## 2022-04-11 VITALS
HEART RATE: 74 BPM | RESPIRATION RATE: 18 BRPM | BODY MASS INDEX: 32.28 KG/M2 | DIASTOLIC BLOOD PRESSURE: 74 MMHG | WEIGHT: 213 LBS | SYSTOLIC BLOOD PRESSURE: 127 MMHG | HEIGHT: 68 IN | OXYGEN SATURATION: 97 % | TEMPERATURE: 97.9 F

## 2022-04-11 DIAGNOSIS — R33.8 ACUTE URINARY RETENTION: Primary | ICD-10-CM

## 2022-04-11 LAB
BILIRUB UR QL: NEGATIVE
GLUCOSE UR QL STRIP.AUTO: NEGATIVE MG/DL
KETONES UR-MCNC: NEGATIVE MG/DL
LEUKOCYTE ESTERASE UR QL STRIP: NEGATIVE
NITRITE UR QL: NEGATIVE
PH UR: 5.5 [PH] (ref 5–9)
PROT UR QL: 30 MG/DL
RBC # UR STRIP: ABNORMAL /UL
SERVICE CMNT-IMP: ABNORMAL
SP GR UR: >1.03 (ref 1–1.02)
UROBILINOGEN UR QL: 0.2 EU/DL (ref 0.2–1)

## 2022-04-11 PROCEDURE — 96372 THER/PROPH/DIAG INJ SC/IM: CPT

## 2022-04-11 PROCEDURE — 74011250636 HC RX REV CODE- 250/636: Performed by: EMERGENCY MEDICINE

## 2022-04-11 PROCEDURE — 74011000250 HC RX REV CODE- 250: Performed by: EMERGENCY MEDICINE

## 2022-04-11 PROCEDURE — 99284 EMERGENCY DEPT VISIT MOD MDM: CPT

## 2022-04-11 PROCEDURE — 51702 INSERT TEMP BLADDER CATH: CPT

## 2022-04-11 PROCEDURE — 81003 URINALYSIS AUTO W/O SCOPE: CPT

## 2022-04-11 RX ORDER — LIDOCAINE HYDROCHLORIDE 20 MG/ML
JELLY TOPICAL ONCE
Status: COMPLETED | OUTPATIENT
Start: 2022-04-11 | End: 2022-04-11

## 2022-04-11 RX ORDER — ONDANSETRON 8 MG/1
8 TABLET, ORALLY DISINTEGRATING ORAL
Status: DISCONTINUED | OUTPATIENT
Start: 2022-04-11 | End: 2022-04-12 | Stop reason: HOSPADM

## 2022-04-11 RX ORDER — HYDROMORPHONE HYDROCHLORIDE 1 MG/ML
0.5 INJECTION, SOLUTION INTRAMUSCULAR; INTRAVENOUS; SUBCUTANEOUS
Status: COMPLETED | OUTPATIENT
Start: 2022-04-11 | End: 2022-04-11

## 2022-04-11 RX ADMIN — LIDOCAINE HYDROCHLORIDE: 20 JELLY TOPICAL at 19:26

## 2022-04-11 RX ADMIN — HYDROMORPHONE HYDROCHLORIDE 0.5 MG: 1 INJECTION, SOLUTION INTRAMUSCULAR; INTRAVENOUS; SUBCUTANEOUS at 19:26

## 2022-04-11 NOTE — ED TRIAGE NOTES
Patient arrives via EMS from [address] [place] [address] out d/t facility being unable to place coronel cath. Gall bladder sx about 1 week ago, rehab post op. Removed coronel yesterday and has been unable to produce urine. Tried multiple times to place catheter, but unsuccessful.

## 2022-04-12 NOTE — ED NOTES
I have reviewed discharge instructions with the patient. The patient verbalized understanding. Patient left ED via Discharge Method: stretcher to SNF with [facility] for questions and clarification provided. Patient given 0 scripts. To continue your aftercare when you leave the hospital, you may receive an automated call from our care team to check in on how you are doing. This is a free service and part of our promise to provide the best care and service to meet your aftercare needs.  If you have questions, or wish to unsubscribe from this service please call [phone]. Thank you for Choosing our [facility] Emergency Department.

## 2022-04-12 NOTE — ED PROVIDER NOTES
Per nurses notes: \"Patient arrives via EMS from [address] [place] [address] out d/t facility being unable to place coronel cath. Gall bladder sx about 1 week ago, rehab post op. Removed coronel yesterday and has been unable to produce urine. Tried multiple times to place catheter, but unsuccessful. \"    The history is provided by the patient. Urinary Catheter Problem   This is a new problem. The current episode started 12 to 24 hours ago. The problem occurs every urination. The problem has not changed since onset. The quality of the pain is described as aching. The pain is at a severity of 10/10. There has been no fever. He is not sexually active. There is no history of pyelonephritis. Associated symptoms include abdominal pain (Suprapubic). Pertinent negatives include no chills, no sweats, no nausea, no vomiting, no discharge and no flank pain. Treatments tried: Staff attempted to replace Coronel catheter without success. The treatment provided no relief. His past medical history is significant for catheterization and urinary catheter problem.         Past Medical History:   Diagnosis Date    Abnormal EKG 4/22/15    Arrhythmia     CAD (coronary artery disease) 5/8/2015    Carotid artery stenosis without cerebral infarction 6/7/2016    US 6/15: <50% bilat ICAs    Coronary atherosclerosis of native coronary vessel 5/8/2015    VEGAS on brilinta 5/7/15: prox LAD PCI, normal EF     Diastolic CHF, chronic ([address] 3/2/2022    Dyslipidemia 6/7/2016    Dyspnea 5/8/2015    Echo 6/15: EF 60%, mod MR, mod LVH, mild AI     ED (erectile dysfunction)     GERD (gastroesophageal reflux disease)     no medication    GERD (gastroesophageal reflux disease)     HTN (hypertension) 5/8/2015    Hypertension     Hypokalemia 6/7/2016    Hypokalemia     Mitral valve regurgitation 6/7/2016    Morbid obesity ([address]     Myasthenia gravis ([address] 6/17/15    Myasthenia gravis (HCC)     Nocturia     Osteoporosis     PUD (peptic ulcer disease) 25 yrs ago    S/P coronary artery stent placement 5/8/2015    3.0x38 mm Xience ADRIANNA to pLAD 5/7/15     Sleep apnea     Syncope and collapse     Unspecified sleep apnea     no cpap       Past Surgical History:   Procedure Laterality Date    ERCP  3/30/2022         HX APPENDECTOMY      HX COLONOSCOPY  2007    HX HEART CATHETERIZATION  05/27/2015    stent    HX HEART CATHETERIZATION  05/21/2019    watchman device    HX HEMORRHOIDECTOMY      HX PACEMAKER  2018   Maddy Cervantes/Xin for sleep apnea and reconstruction for extending jaw    VASCULAR SURGERY PROCEDURE UNLIST Right 07/10/2019     Repair of right radial artery pseudoaneurysm         Family History:   Problem Relation Age of Onset    Cancer Mother         kidney    Cancer Father         stomach    No Known Problems Sister     Cancer Brother         brain tumor    No Known Problems Brother        Social History     Socioeconomic History    Marital status:      Spouse name: Not on file    Number of children: Not on file    Years of education: Not on file    Highest education level: Not on file   Occupational History    Not on file   Tobacco Use    Smoking status: Never Smoker    Smokeless tobacco: Never Used   Substance and Sexual Activity    Alcohol use: No    Drug use: No    Sexual activity: Not on file   Other Topics Concern    Not on file   Social History Narrative    Not on file     Social Determinants of Health     Financial Resource Strain:     Difficulty of Paying Living Expenses: Not on file   Food Insecurity:     Worried About [address] [facility] in the Last Year: Not on file    Libby of Food in the Last Year: Not on file   Transportation Needs:     Lack of Transportation (Medical): Not on file    Lack of Transportation (Non-Medical):  Not on file   Physical Activity:     Days of Exercise per Week: Not on file    Minutes of Exercise per Session: Not on file   Stress:     Feeling of Stress : Not on file   Social Connections:     Frequency of Communication with Friends and Family: Not on file    Frequency of Social Gatherings with Friends and Family: Not on file    Attends Caodaism Services: Not on file    Active Member of Clubs or Organizations: Not on file    Attends Club or Organization Meetings: Not on file    Marital Status: Not on file   Intimate Partner Violence:     Fear of Current or Ex-Partner: Not on file    Emotionally Abused: Not on file    Physically Abused: Not on file    Sexually Abused: Not on file   Housing Stability:     Unable to Pay for Housing in the Last Year: Not on file    Number of [place] in the Last Year: Not on file    Unstable Housing in the Last Year: Not on file         ALLERGIES: Patient has no known allergies. Review of Systems   Constitutional: Negative for chills and fever. Gastrointestinal: Positive for abdominal pain (Suprapubic). Negative for blood in stool, nausea and vomiting. Genitourinary: Negative for flank pain. All other systems reviewed and are negative. Vitals:    04/11/22 1801   BP: 127/74   Pulse: 74   Resp: 18   Temp: 97.9 °F (36.6 °C)   SpO2: 97%   Weight: 96.6 kg (213 lb)   Height: 5' 8\" (1.727 m)            Physical Exam  Vitals and nursing note reviewed. Constitutional:       General: He is in acute distress. Appearance: He is well-developed. HENT:      Head: Normocephalic and atraumatic. Right Ear: External ear normal.      Left Ear: External ear normal.   Eyes:      Extraocular Movements: Extraocular movements intact. Conjunctiva/sclera: Conjunctivae normal.      Pupils: Pupils are equal, round, and reactive to light. Cardiovascular:      Rate and Rhythm: Normal rate and regular rhythm. Heart sounds: Normal heart sounds. No murmur heard. Pulmonary:      Effort: Pulmonary effort is normal.      Breath sounds: Normal breath sounds.    Abdominal:      General: Bowel sounds are normal. There is no distension. Palpations: Abdomen is soft. Tenderness: There is abdominal tenderness. There is no right CVA tenderness, left CVA tenderness, guarding or rebound. Hernia: No hernia is present. Musculoskeletal:         General: Normal range of motion. Cervical back: Normal range of motion and neck supple. Right lower leg: No edema. Left lower leg: No edema. Skin:     General: Skin is warm and dry. Capillary Refill: Capillary refill takes less than 2 seconds. Neurological:      Mental Status: He is alert and oriented to person, place, and time. Psychiatric:         Mood and Affect: Mood normal.         Behavior: Behavior normal.          MDM  Number of Diagnoses or Management Options  Acute urinary retention: new and requires workup     Amount and/or Complexity of Data Reviewed  Clinical lab tests: reviewed and ordered  Review and summarize past medical records: yes    Risk of Complications, Morbidity, and/or Mortality  Presenting problems: moderate  Diagnostic procedures: minimal  Management options: moderate    Patient Progress  Patient progress: improved         Procedures    The patient was observed in the ED. patient was given a Velasquez catheter here in the emergency department with significant relief of his discomfort and output of greater than a liter of urine. Patient is remained pain-free since that time.     Results Reviewed:      Recent Results (from the past 24 hour(s))   POC URINE MACROSCOPIC    Collection Time: 04/11/22  7:59 PM   Result Value Ref Range    Spec. gravity (POC) >1.030 (H) 1.001 - 1.023    pH, urine  (POC) 5.5 5.0 - 9.0      Protein (POC) 30 (A) NEG mg/dL    Glucose, urine (POC) Negative NEG mg/dL    Ketones (POC) Negative NEG mg/dL    Bilirubin (POC) Negative NEG      Blood (POC) LARGE (A) NEG      Urobilinogen (POC) 0.2 0.2 - 1.0 EU/dL    Nitrite (POC) Negative NEG      Leukocyte esterase (POC) Negative NEG Performed by Errol Oliveros discussed the results of all labs, procedures, radiographs, and treatments with the patient and available family. Treatment plan is agreed upon and the patient is ready for discharge. All voiced understanding of the discharge plan and medication instructions or changes as appropriate. Questions about treatment in the ED were answered. All were encouraged to return should symptoms worsen or new problems develop.

## 2022-04-29 ENCOUNTER — TELEPHONE (OUTPATIENT)
Dept: CARDIOLOGY | Age: 82
End: 2022-04-29

## 2022-04-30 NOTE — TELEPHONE ENCOUNTER
2027 -- received page from service. Attempted to return call to number provided [phone]. VM reached for [employer]. \"     Generic VM left instructed return call to service at [phone] or to go to ED if having a medical emergency.        Jeremy Cohn, NP-C

## 2022-04-30 NOTE — TELEPHONE ENCOUNTER
2104 -- Spoke with Pts son Ritchie D eLa Cruz. Has questions about a couple of Pts home meds. States Pt here at [facility] for GB surgery. D/c to STR and just home a couple days ago. Noted on d/c paperwork from STR that Pts Lisinopril and Sotalol had been discontinued. Also, Pt taking Lasix daily and was on K+ but that was stopped and now on Lasix only. Son states Pts BP has been 116-119/60s. Chart reviewed with son on phone --     Appears Lisinopril was stopped on dc from [facility] as Pt had low BPs during admit. Ok to con to hold med with above reported BPs. Would like to avoid hypotension with Pts age (ie falls, ortho hypoT). Asked Son to eight check BP daily or ask [facility] staff to check. Record -- can bring to next appt or  Rn can call if concerned BP getting too high. Med can be resumed as indicated. Appears Sotalol was stopped as Pt is s/p Watchman and s/p AVN ablation. Pt does not need to take this med. Appears K+ was only for a few doses on dc from [facility] ? ? Told son to ask [facility] RN if they can check Pts K level. May need to get an order from [facility] authorizing provider or may be covered under some protocol -- but will need to ask them how to best proceed. Son v/u to all above.        Justin Joy, NP-C

## 2022-06-02 ENCOUNTER — OFFICE VISIT (OUTPATIENT)
Dept: CARDIOLOGY CLINIC | Age: 82
End: 2022-06-02
Payer: MEDICARE

## 2022-06-02 VITALS
BODY MASS INDEX: 32.43 KG/M2 | DIASTOLIC BLOOD PRESSURE: 72 MMHG | WEIGHT: 214 LBS | HEART RATE: 80 BPM | SYSTOLIC BLOOD PRESSURE: 134 MMHG | HEIGHT: 68 IN

## 2022-06-02 DIAGNOSIS — I50.32 DIASTOLIC CHF, CHRONIC (HCC): ICD-10-CM

## 2022-06-02 DIAGNOSIS — I10 PRIMARY HYPERTENSION: ICD-10-CM

## 2022-06-02 DIAGNOSIS — I49.5 SSS (SICK SINUS SYNDROME) (HCC): ICD-10-CM

## 2022-06-02 DIAGNOSIS — I48.0 PAROXYSMAL ATRIAL FIBRILLATION (HCC): Primary | ICD-10-CM

## 2022-06-02 DIAGNOSIS — I65.23 BILATERAL CAROTID ARTERY STENOSIS: ICD-10-CM

## 2022-06-02 DIAGNOSIS — Z95.0 PACEMAKER: ICD-10-CM

## 2022-06-02 LAB
ALBUMIN SERPL-MCNC: 3.3 G/DL (ref 3.2–4.6)
ALBUMIN/GLOB SERPL: 1 {RATIO} (ref 1.2–3.5)
ALP SERPL-CCNC: 110 U/L (ref 50–136)
ALT SERPL-CCNC: 25 U/L (ref 12–65)
ANION GAP SERPL CALC-SCNC: 8 MMOL/L (ref 7–16)
AST SERPL-CCNC: 19 U/L (ref 15–37)
BILIRUB SERPL-MCNC: 0.4 MG/DL (ref 0.2–1.1)
BUN SERPL-MCNC: 12 MG/DL (ref 8–23)
CALCIUM SERPL-MCNC: 9.2 MG/DL (ref 8.3–10.4)
CHLORIDE SERPL-SCNC: 104 MMOL/L (ref 98–107)
CO2 SERPL-SCNC: 30 MMOL/L (ref 21–32)
CREAT SERPL-MCNC: 0.8 MG/DL (ref 0.8–1.5)
ERYTHROCYTE [DISTWIDTH] IN BLOOD BY AUTOMATED COUNT: 22.5 % (ref 11.9–14.6)
GLOBULIN SER CALC-MCNC: 3.2 G/DL (ref 2.3–3.5)
GLUCOSE SERPL-MCNC: 108 MG/DL (ref 65–100)
HCT VFR BLD AUTO: 29.7 % (ref 41.1–50.3)
HGB BLD-MCNC: 8 G/DL (ref 13.6–17.2)
MCH RBC QN AUTO: 20.2 PG (ref 26.1–32.9)
MCHC RBC AUTO-ENTMCNC: 26.9 G/DL (ref 31.4–35)
MCV RBC AUTO: 74.8 FL (ref 79.6–97.8)
NRBC # BLD: 0 K/UL (ref 0–0.2)
NT PRO BNP: 857 PG/ML
PLATELET # BLD AUTO: 257 K/UL (ref 150–450)
PMV BLD AUTO: 9.7 FL (ref 9.4–12.3)
POTASSIUM SERPL-SCNC: 3 MMOL/L (ref 3.5–5.1)
PROT SERPL-MCNC: 6.5 G/DL (ref 6.3–8.2)
RBC # BLD AUTO: 3.97 M/UL (ref 4.23–5.6)
SODIUM SERPL-SCNC: 142 MMOL/L (ref 138–145)
TSH, 3RD GENERATION: 2.51 UIU/ML (ref 0.36–3.74)
WBC # BLD AUTO: 11.3 K/UL (ref 4.3–11.1)

## 2022-06-02 PROCEDURE — 1123F ACP DISCUSS/DSCN MKR DOCD: CPT | Performed by: INTERNAL MEDICINE

## 2022-06-02 PROCEDURE — G8428 CUR MEDS NOT DOCUMENT: HCPCS | Performed by: INTERNAL MEDICINE

## 2022-06-02 PROCEDURE — G8417 CALC BMI ABV UP PARAM F/U: HCPCS | Performed by: INTERNAL MEDICINE

## 2022-06-02 PROCEDURE — 99214 OFFICE O/P EST MOD 30 MIN: CPT | Performed by: INTERNAL MEDICINE

## 2022-06-02 PROCEDURE — 1036F TOBACCO NON-USER: CPT | Performed by: INTERNAL MEDICINE

## 2022-06-02 RX ORDER — POTASSIUM CHLORIDE 20 MEQ/1
20 TABLET, EXTENDED RELEASE ORAL DAILY
Qty: 30 TABLET | Refills: 11 | Status: SHIPPED | OUTPATIENT
Start: 2022-06-02 | End: 2022-08-27

## 2022-06-02 NOTE — PROGRESS NOTES
[address], [address] [facility], [address] [place] [address]  PHONE: [phone]     22    NAME:  Leafy Jeans  : 1940  MRN: [id number]       SUBJECTIVE:   Leafy Jeans is a 79 y.o. male seen for a follow up visit regarding the following:     Chief Complaint   Patient presents with    Follow-up Chronic Condition     PAF    Hypertension       HPI: Here for CAD   prox LAD PCI 2015;   (GERMAIN on brilinta). 10/17: PPM placement for SSS.   2019: watchman device placement   C 3/19/2021: small vessel CAD, no PCI. 2021: AVN ablation   Echo 2021: normal EF, mod AI, mod pHTN    3/2022: GIB s/o 3u PRBC, ERCP     Home now, getting better. Off Ace with low BP. Taking lasix 40 daily. Edema and GERMAIN better. Some LE edema. Still working at [employer]. No CP, pressure. Some anxiety issues. Patient denies recent history of orthopnea, PND, excessive dizziness and/or syncope. Has myasthenia gravis, this has been well controlled. Past Medical History, Past Surgical History, Family history, Social History, and Medications were all reviewed with the patient today and updated as necessary. Current Outpatient Medications   Medication Sig Dispense Refill    potassium chloride (KLOR-CON M) 20 MEQ extended release tablet Take 1 tablet by mouth daily 30 tablet 11    aspirin 81 MG EC tablet Take 81 mg by mouth daily      atorvastatin (LIPITOR) 80 MG tablet Take 80 mg by mouth      cyanocobalamin 1000 MCG tablet Take 1,000 mcg by mouth daily      escitalopram (LEXAPRO) 10 MG tablet Take 10 mg by mouth daily      furosemide (LASIX) 40 MG tablet Take 40 mg by mouth daily      nitroGLYCERIN (NITROSTAT) 0.4 MG SL tablet Place 1 sl under the tongue q 5 min prn cp, max 3 sl in a 15-min time period. Call 911 if no relief after the 3rd sl.       pantoprazole (PROTONIX) 40 MG tablet Take 40 mg by mouth every morning (before breakfast)      rOPINIRole (REQUIP) 0.5 MG tablet 1 nightly, increase to 1 twice a day if needed      tamsulosin (FLOMAX) 0.4 MG capsule Take 0.4 mg by mouth daily       No current facility-administered medications for this visit. No Known Allergies  Patient Active Problem List    Diagnosis Date Noted    Urine retention 04/03/2022    Acute cholecystitis 03/28/2022    Pancreatitis, gallstone 03/28/2022    Anemia 03/28/2022    Bacteremia 03/28/2022    Major depressive disorder, recurrent, moderate (HCC) 04/19/2146    Diastolic CHF, chronic ([address] 03/02/2022    Major depressive disorder, recurrent, mild ([address] 03/02/2022    Major depressive disorder, recurrent, unspecified ([address] 03/02/2022    Chest pain 11/19/2021    Pacemaker 04/14/2021    Atrial fibrillation ([address] 11/29/2017    GERD (gastroesophageal reflux disease) 10/27/2017    Aspiration pneumonia ([address] 10/26/2017    Hypotension 10/26/2017    Sepsis ([address] 10/26/2017    Syncope 10/24/2017    Paroxysmal atrial fibrillation ([address] 06/30/2017     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          SSS (sick sinus syndrome) ([address] 06/30/2017    Bilateral carotid artery disease (HCC) 06/30/2017    Myasthenia gravis ([address]     Mitral valve regurgitation 06/07/2016    Hypokalemia 06/07/2016    Dyslipidemia 06/07/2016    HTN (hypertension) 05/08/2015    Dyspnea 05/08/2015     Echo 6/15: EF 60%, mod MR, mod LVH, mild AI        S/P coronary artery stent placement 05/08/2015     3.0x38 mm Xience CAROL to pLAD 5/7/15        Coronary atherosclerosis of native coronary vessel 05/08/2015     GERMAIN on brilinta  5/7/15: prox LAD PCI, normal EF          Past Surgical History:   Procedure Laterality Date    APPENDECTOMY      CARDIAC CATHETERIZATION  05/21/2019    watchman device    CARDIAC CATHETERIZATION  05/27/2015    stent    COLONOSCOPY  2007    ERCP  3/30/2022         HEMORRHOID SURGERY      PACEMAKER  2018   Beth Kraus/Giana for sleep apnea and reconstruction for extending jaw    VASCULAR SURGERY Right 07/10/2019     Repair of right radial artery pseudoaneurysm     Family History   Problem Relation Age of Onset    No Known Problems Brother     Cancer Brother         brain tumor    No Known Problems Sister     Cancer Mother         kidney    Cancer Father         stomach     Social History     Tobacco Use    Smoking status: Never Smoker    Smokeless tobacco: Never Used   Substance Use Topics    Alcohol use: No         ROS:    No obvious pertinent positives on review of systems except for what was outlined in the HPI today.       PHYSICAL EXAM:     /72   Pulse 80   Ht 5' 8\" (1.727 m)   Wt 214 lb (97.1 kg)   BMI 32.54 kg/m²    General/Constitutional:   Alert and oriented x 3, no acute distress  HEENT:   normocephalic, atraumatic, moist mucous membranes  Neck:   No JVD or carotid bruits bilaterally  Cardiovascular:   regular rate and rhythm, no murmur/rub/gallop appreciated  Pulmonary:   clear to auscultation bilaterally, no respiratory distress  Abdomen:   soft, non-tender, non-distended  Ext:   mod LE edema bilaterally  Skin:  warm and dry, no obvious rashes seen  Neuro:   no obvious sensory or motor deficits  Psychiatric:   normal mood and affect      Lab Results   Component Value Date     04/07/2022    K 3.4 04/07/2022     04/07/2022    CO2 29 04/07/2022    BUN 10 04/07/2022    CREATININE 0.80 04/07/2022    GLUCOSE 109 04/07/2022    CALCIUM 8.5 04/07/2022        Lab Results   Component Value Date    WBC 15.1 (H) 04/06/2022    HGB 8.8 (L) 04/06/2022    HCT 31.8 (L) 04/06/2022    MCV 68.4 (L) 04/06/2022     04/06/2022       Lab Results   Component Value Date    TSH 1.870 02/28/2022       No results found for: LABA1C  No results found for: EAG    Lab Results   Component Value Date    CHOL 96 05/03/2021    CHOL 169 08/20/2020     Lab Results   Component Value Date    TRIG 99 05/03/2021    TRIG 165 (H) 08/20/2020 Lab Results   Component Value Date    HDL 36 (L) 05/03/2021    HDL 33 (L) 08/20/2020     Lab Results   Component Value Date    LDLCALC 40.2 05/03/2021    LDLCALC 103 (H) 08/20/2020     Lab Results   Component Value Date    LABVLDL 19.8 05/03/2021    LABVLDL 33 (H) 08/20/2020     Lab Results   Component Value Date    CHOLHDLRATIO 2.7 05/03/2021    CHOLHDLRATIO 5.1 08/20/2020           I have Independently reviewed prior care notes, any ER records available, cardiac testing, labs and results with the patient and before seeing the patient today. Also independently reviewed outside records when available. ASSESSMENT:    Aubrey Jacobo was seen today for follow-up chronic condition and hypertension. Diagnoses and all orders for this visit:    Paroxysmal atrial fibrillation ([address]  -     Comprehensive Metabolic Panel; Future    Pacemaker    Primary hypertension  -     Comprehensive Metabolic Panel; Future    SSS (sick sinus syndrome) (HCC)    Bilateral carotid artery stenosis    Diastolic CHF, chronic (HCC)  -     Comprehensive Metabolic Panel; Future  -     proBNP, N-TERMINAL; Future  -     TSH; Future  -     CBC; Future    Other orders  -     potassium chloride (KLOR-CON M) 20 MEQ extended release tablet; Take 1 tablet by mouth daily          PLAN:    1. CAD:  Chronic stable angina. Follow for more angina, call PRN. Getting back into cardiac rehab, this is KEY for him. 2. DHF:  Better on lasix daily, Need to follow K and Cr. Get back on K. Labs today. More lasix needed??     3. PAF:  On ASA, s/p watchman. S/p AVN ablation. 4. HPL:  Remain on lipitor. 5. HTN:  off Ace, home Bps ok. 6. Device:  No events recorded. RV pacing 99%. LV lead off due to fx. Patient has been instructed and agrees to call our office with any issues or other concerns related to their cardiac condition(s) and/or complaint(s). Return in about 3 months (around 9/2/2022).        JOSE DAI, DO  6/2/2022

## 2022-06-13 ENCOUNTER — TELEPHONE (OUTPATIENT)
Dept: CARDIOLOGY CLINIC | Age: 82
End: 2022-06-13

## 2022-06-13 DIAGNOSIS — I48.0 PAROXYSMAL ATRIAL FIBRILLATION (HCC): ICD-10-CM

## 2022-06-13 DIAGNOSIS — I10 PRIMARY HYPERTENSION: ICD-10-CM

## 2022-06-13 DIAGNOSIS — I50.32 DIASTOLIC CHF, CHRONIC (HCC): Primary | ICD-10-CM

## 2022-06-13 NOTE — TELEPHONE ENCOUNTER
----- Message from [employer], DO sent at 6/12/2022  1:47 PM EDT -----  Please call him, labs are ok, Hb stable. K remains low. Please make sure that he is taking K with lasix now. Needs another BMP and CBC in 3 weeks. Call for issues, good news though.

## 2022-07-18 ENCOUNTER — OFFICE VISIT (OUTPATIENT)
Dept: CARDIOLOGY CLINIC | Age: 82
End: 2022-07-18
Payer: MEDICARE

## 2022-07-18 VITALS
WEIGHT: 207.8 LBS | HEIGHT: 68 IN | SYSTOLIC BLOOD PRESSURE: 112 MMHG | DIASTOLIC BLOOD PRESSURE: 62 MMHG | HEART RATE: 72 BPM | BODY MASS INDEX: 31.49 KG/M2

## 2022-07-18 DIAGNOSIS — Z95.0 PACEMAKER: ICD-10-CM

## 2022-07-18 DIAGNOSIS — I10 PRIMARY HYPERTENSION: ICD-10-CM

## 2022-07-18 DIAGNOSIS — I65.23 BILATERAL CAROTID ARTERY STENOSIS: ICD-10-CM

## 2022-07-18 DIAGNOSIS — I25.10 ATHEROSCLEROSIS OF NATIVE CORONARY ARTERY OF NATIVE HEART WITHOUT ANGINA PECTORIS: ICD-10-CM

## 2022-07-18 DIAGNOSIS — I34.0 NONRHEUMATIC MITRAL VALVE REGURGITATION: ICD-10-CM

## 2022-07-18 DIAGNOSIS — I49.5 SSS (SICK SINUS SYNDROME) (HCC): ICD-10-CM

## 2022-07-18 DIAGNOSIS — I50.32 DIASTOLIC CHF, CHRONIC (HCC): ICD-10-CM

## 2022-07-18 DIAGNOSIS — I48.0 PAROXYSMAL ATRIAL FIBRILLATION (HCC): Primary | ICD-10-CM

## 2022-07-18 PROCEDURE — 1123F ACP DISCUSS/DSCN MKR DOCD: CPT | Performed by: INTERNAL MEDICINE

## 2022-07-18 PROCEDURE — G8428 CUR MEDS NOT DOCUMENT: HCPCS | Performed by: INTERNAL MEDICINE

## 2022-07-18 PROCEDURE — 1036F TOBACCO NON-USER: CPT | Performed by: INTERNAL MEDICINE

## 2022-07-18 PROCEDURE — 99214 OFFICE O/P EST MOD 30 MIN: CPT | Performed by: INTERNAL MEDICINE

## 2022-07-18 PROCEDURE — G8417 CALC BMI ABV UP PARAM F/U: HCPCS | Performed by: INTERNAL MEDICINE

## 2022-07-18 NOTE — PROGRESS NOTES
[address], [address] [facility], [address] [place] [address]  PHONE: [phone]     22    NAME:  Caroline Sy  : 1940  MRN: [id number]       SUBJECTIVE:   Caroline Sy is a 79 y.o. male seen for a follow up visit regarding the following:     Chief Complaint   Patient presents with    Cardiomyopathy       HPI: Here for CAD   prox LAD PCI 2015;   (GERMAIN on brilinta). 10/17: PPM placement for SSS.   2019: watchman device placement   C 3/19/2021: small vessel CAD, no PCI. 2021: AVN ablation   Echo 2021: normal EF, mod AI, mod pHTN     3/2022: GIB s/p 3u PRBC, ERCP     Off Ace with low BP. Taking lasix 40 daily. Edema and GERMAIN better. Some LE edema. Still working at [employer]. No CP, pressure. Some anxiety issues. Patient denies recent history of orthopnea, PND, excessive dizziness and/or syncope. Has myasthenia gravis, this has been well controlled. Past Medical History, Past Surgical History, Family history, Social History, and Medications were all reviewed with the patient today and updated as necessary. Current Outpatient Medications   Medication Sig Dispense Refill    potassium chloride (KLOR-CON M) 20 MEQ extended release tablet Take 1 tablet by mouth daily 30 tablet 11    aspirin 81 MG EC tablet Take 81 mg by mouth daily      atorvastatin (LIPITOR) 80 MG tablet Take 80 mg by mouth      furosemide (LASIX) 40 MG tablet Take 40 mg by mouth daily      nitroGLYCERIN (NITROSTAT) 0.4 MG SL tablet Place 1 sl under the tongue q 5 min prn cp, max 3 sl in a 15-min time period. Call 911 if no relief after the 3rd sl.       rOPINIRole (REQUIP) 0.5 MG tablet 1 nightly, increase to 1 twice a day if needed      tamsulosin (FLOMAX) 0.4 MG capsule Take 0.4 mg by mouth daily      cyanocobalamin 1000 MCG tablet Take 1,000 mcg by mouth daily (Patient not taking: Reported on 2022)      escitalopram (LEXAPRO) 10 MG tablet Take 10 mg by mouth daily (Patient not taking: Reported on 7/18/2022)      pantoprazole (PROTONIX) 40 MG tablet Take 40 mg by mouth every morning (before breakfast) (Patient not taking: Reported on 7/18/2022)       No current facility-administered medications for this visit. No Known Allergies  Patient Active Problem List    Diagnosis Date Noted    Urine retention 04/03/2022    Acute cholecystitis 03/28/2022    Pancreatitis, gallstone 03/28/2022    Anemia 03/28/2022    Bacteremia 03/28/2022    Major depressive disorder, recurrent, moderate ([address] 00/28/4211    Diastolic CHF, chronic ([address] 03/02/2022    Major depressive disorder, recurrent, mild ([address] 03/02/2022    Major depressive disorder, recurrent, unspecified ([address] 03/02/2022    Chest pain 11/19/2021    Pacemaker 04/14/2021    Atrial fibrillation ([address] 11/29/2017    GERD (gastroesophageal reflux disease) 10/27/2017    Aspiration pneumonia ([address] 10/26/2017    Hypotension 10/26/2017    Sepsis ([address] 10/26/2017    Syncope 10/24/2017    Paroxysmal atrial fibrillation ([address] 06/30/2017     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          SSS (sick sinus syndrome) ([address] 06/30/2017    Bilateral carotid artery disease ([address] 06/30/2017    Myasthenia gravis ([address]     Mitral valve regurgitation 06/07/2016    Hypokalemia 06/07/2016    Dyslipidemia 06/07/2016    HTN (hypertension) 05/08/2015    Dyspnea 05/08/2015     Echo 6/15: EF 60%, mod MR, mod LVH, mild AI        S/P coronary artery stent placement 05/08/2015     3.0x38 mm Xience CAROL to pLAD 5/7/15        Coronary atherosclerosis of native coronary vessel 05/08/2015     GERMAIN on brilinta  5/7/15: prox LAD PCI, normal EF          Past Surgical History:   Procedure Laterality Date    APPENDECTOMY      CARDIAC CATHETERIZATION  05/21/2019    watchman device    CARDIAC CATHETERIZATION  05/27/2015    stent    COLONOSCOPY  2007    ERCP  3/30/2022         HEMORRHOID SURGERY      PACEMAKER  2018    [address] Lab Results   Component Value Date    TRIG 99 05/03/2021    TRIG 165 (H) 08/20/2020     Lab Results   Component Value Date    HDL 36 (L) 05/03/2021    HDL 33 (L) 08/20/2020     Lab Results   Component Value Date    LDLCALC 40.2 05/03/2021    LDLCALC 103 (H) 08/20/2020     Lab Results   Component Value Date    LABVLDL 19.8 05/03/2021    LABVLDL 33 (H) 08/20/2020     Lab Results   Component Value Date    CHOLHDLRATIO 2.7 05/03/2021    CHOLHDLRATIO 5.1 08/20/2020           I have Independently reviewed prior care notes, any ER records available, cardiac testing, labs and results with the patient and before seeing the patient today. Also independently reviewed outside records when available. ASSESSMENT:    Jose Costa was seen today for cardiomyopathy. Diagnoses and all orders for this visit:    Paroxysmal atrial fibrillation [facility])    Pacemaker    Primary hypertension    Nonrheumatic mitral valve regurgitation  -     Comprehensive Metabolic Panel; Future  -     Magnesium; Future    SSS (sick sinus syndrome) (HCC)    Bilateral carotid artery stenosis    Diastolic CHF, chronic (HCC)    Atherosclerosis of native coronary artery of native heart without angina pectoris        PLAN:    1. CAD:  Chronic stable angina. Follow for more angina, call PRN. Home PT, needs more rehab. 2. DHF:  Better on lasix daily, Need to follow K and Cr. Get back on K. Check labs, call him. 3. PAF:  On ASA, s/p watchman. S/p AVN ablation. 4. HPL:  Remain on lipitor. 5. HTN:  off Ace, home Bps ok. 6. Device:  No events recorded. RV pacing 99%. LV lead off due to fx. Patient has been instructed and agrees to call our office with any issues or other concerns related to their cardiac condition(s) and/or complaint(s). Return in about 4 months (around 11/18/2022).        JOSE DAI DO  7/18/2022

## 2022-07-20 DIAGNOSIS — I50.32 DIASTOLIC CHF, CHRONIC (HCC): ICD-10-CM

## 2022-07-20 DIAGNOSIS — I34.0 NONRHEUMATIC MITRAL VALVE REGURGITATION: ICD-10-CM

## 2022-07-20 DIAGNOSIS — I48.0 PAROXYSMAL ATRIAL FIBRILLATION (HCC): ICD-10-CM

## 2022-07-20 DIAGNOSIS — I10 PRIMARY HYPERTENSION: ICD-10-CM

## 2022-07-20 LAB
ALBUMIN SERPL-MCNC: 3.2 G/DL (ref 3.2–4.6)
ALBUMIN/GLOB SERPL: 1.1 {RATIO} (ref 1.2–3.5)
ALP SERPL-CCNC: 110 U/L (ref 50–136)
ALT SERPL-CCNC: 21 U/L (ref 12–65)
ANION GAP SERPL CALC-SCNC: 6 MMOL/L (ref 7–16)
AST SERPL-CCNC: 22 U/L (ref 15–37)
BASOPHILS # BLD: 0 K/UL (ref 0–0.2)
BASOPHILS NFR BLD: 0 % (ref 0–2)
BILIRUB SERPL-MCNC: 0.2 MG/DL (ref 0.2–1.1)
BUN SERPL-MCNC: 15 MG/DL (ref 8–23)
CALCIUM SERPL-MCNC: 9.1 MG/DL (ref 8.3–10.4)
CHLORIDE SERPL-SCNC: 109 MMOL/L (ref 98–107)
CO2 SERPL-SCNC: 25 MMOL/L (ref 21–32)
CREAT SERPL-MCNC: 1 MG/DL (ref 0.8–1.5)
DIFFERENTIAL METHOD BLD: ABNORMAL
EOSINOPHIL # BLD: 0.4 K/UL (ref 0–0.8)
EOSINOPHIL NFR BLD: 4 % (ref 0.5–7.8)
ERYTHROCYTE [DISTWIDTH] IN BLOOD BY AUTOMATED COUNT: 18.5 % (ref 11.9–14.6)
GLOBULIN SER CALC-MCNC: 2.9 G/DL (ref 2.3–3.5)
GLUCOSE SERPL-MCNC: 123 MG/DL (ref 65–100)
HCT VFR BLD AUTO: 26.6 % (ref 41.1–50.3)
HGB BLD-MCNC: 7 G/DL (ref 13.6–17.2)
IMM GRANULOCYTES # BLD AUTO: 0.1 K/UL (ref 0–0.5)
IMM GRANULOCYTES NFR BLD AUTO: 1 % (ref 0–5)
LYMPHOCYTES # BLD: 2.5 K/UL (ref 0.5–4.6)
LYMPHOCYTES NFR BLD: 23 % (ref 13–44)
MAGNESIUM SERPL-MCNC: 2.1 MG/DL (ref 1.8–2.4)
MCH RBC QN AUTO: 18.5 PG (ref 26.1–32.9)
MCHC RBC AUTO-ENTMCNC: 26.3 G/DL (ref 31.4–35)
MCV RBC AUTO: 70.2 FL (ref 79.6–97.8)
MONOCYTES # BLD: 1 K/UL (ref 0.1–1.3)
MONOCYTES NFR BLD: 10 % (ref 4–12)
NEUTS SEG # BLD: 6.9 K/UL (ref 1.7–8.2)
NEUTS SEG NFR BLD: 63 % (ref 43–78)
NRBC # BLD: 0 K/UL (ref 0–0.2)
PLATELET # BLD AUTO: 235 K/UL (ref 150–450)
PMV BLD AUTO: 10.2 FL (ref 9.4–12.3)
POTASSIUM SERPL-SCNC: 3.9 MMOL/L (ref 3.5–5.1)
PROT SERPL-MCNC: 6.1 G/DL (ref 6.3–8.2)
RBC # BLD AUTO: 3.79 M/UL (ref 4.23–5.6)
SODIUM SERPL-SCNC: 140 MMOL/L (ref 136–145)
WBC # BLD AUTO: 10.9 K/UL (ref 4.3–11.1)

## 2022-07-25 ENCOUNTER — TELEPHONE (OUTPATIENT)
Dept: CARDIOLOGY CLINIC | Age: 82
End: 2022-07-25

## 2022-07-25 DIAGNOSIS — D64.9 ANEMIA: ICD-10-CM

## 2022-07-25 DIAGNOSIS — K92.2 GIB (GASTROINTESTINAL BLEEDING): Primary | ICD-10-CM

## 2022-07-29 ENCOUNTER — PREP FOR PROCEDURE (OUTPATIENT)
Dept: ADMINISTRATIVE | Age: 82
End: 2022-07-29

## 2022-07-29 RX ORDER — SODIUM CHLORIDE 0.9 % (FLUSH) 0.9 %
5-40 SYRINGE (ML) INJECTION EVERY 12 HOURS SCHEDULED
OUTPATIENT
Start: 2022-07-29

## 2022-07-29 RX ORDER — SODIUM CHLORIDE 0.9 % (FLUSH) 0.9 %
5-40 SYRINGE (ML) INJECTION PRN
OUTPATIENT
Start: 2022-07-29

## 2022-07-29 RX ORDER — SODIUM CHLORIDE 9 MG/ML
INJECTION, SOLUTION INTRAVENOUS PRN
OUTPATIENT
Start: 2022-07-29

## 2022-08-04 ENCOUNTER — HOSPITAL ENCOUNTER (INPATIENT)
Age: 82
LOS: 15 days | Discharge: HOME HEALTH | DRG: 330 | End: 2022-08-19
Attending: STUDENT IN AN ORGANIZED HEALTH CARE EDUCATION/TRAINING PROGRAM | Admitting: INTERNAL MEDICINE
Payer: MEDICARE

## 2022-08-04 ENCOUNTER — APPOINTMENT (OUTPATIENT)
Dept: GENERAL RADIOLOGY | Age: 82
DRG: 330 | End: 2022-08-04
Payer: MEDICARE

## 2022-08-04 DIAGNOSIS — C18.0 ADENOCARCINOMA OF CECUM (HCC): ICD-10-CM

## 2022-08-04 DIAGNOSIS — D64.9 ANEMIA, UNSPECIFIED TYPE: Primary | ICD-10-CM

## 2022-08-04 DIAGNOSIS — K63.89 CECUM MASS: ICD-10-CM

## 2022-08-04 PROBLEM — I50.32 DIASTOLIC CHF, CHRONIC (HCC): Status: ACTIVE | Noted: 2022-03-02

## 2022-08-04 PROBLEM — K21.9 GERD (GASTROESOPHAGEAL REFLUX DISEASE): Status: ACTIVE | Noted: 2017-10-27

## 2022-08-04 PROBLEM — K92.2 GIB (GASTROINTESTINAL BLEEDING): Status: ACTIVE | Noted: 2022-08-04

## 2022-08-04 PROBLEM — D62 ACUTE BLOOD LOSS ANEMIA: Status: ACTIVE | Noted: 2022-08-04

## 2022-08-04 LAB
ALBUMIN SERPL-MCNC: 3 G/DL (ref 3.2–4.6)
ALBUMIN/GLOB SERPL: 0.9 {RATIO} (ref 1.2–3.5)
ALP SERPL-CCNC: 102 U/L (ref 50–136)
ALT SERPL-CCNC: 19 U/L (ref 12–65)
ANION GAP SERPL CALC-SCNC: 9 MMOL/L (ref 7–16)
APPEARANCE UR: ABNORMAL
AST SERPL-CCNC: 20 U/L (ref 15–37)
BACTERIA URNS QL MICRO: ABNORMAL /HPF
BASOPHILS # BLD: 0.1 K/UL (ref 0–0.2)
BASOPHILS NFR BLD: 1 % (ref 0–2)
BILIRUB SERPL-MCNC: 0.2 MG/DL (ref 0.2–1.1)
BILIRUB UR QL: NEGATIVE
BUN SERPL-MCNC: 16 MG/DL (ref 8–23)
CALCIUM SERPL-MCNC: 8.6 MG/DL (ref 8.3–10.4)
CASTS URNS QL MICRO: 0 /LPF
CHLORIDE SERPL-SCNC: 110 MMOL/L (ref 98–107)
CO2 SERPL-SCNC: 22 MMOL/L (ref 21–32)
COLOR UR: ABNORMAL
CREAT SERPL-MCNC: 0.9 MG/DL (ref 0.8–1.5)
CRYSTALS URNS QL MICRO: 0 /LPF
DIFFERENTIAL METHOD BLD: ABNORMAL
EOSINOPHIL # BLD: 0.4 K/UL (ref 0–0.8)
EOSINOPHIL NFR BLD: 3 % (ref 0.5–7.8)
EPI CELLS #/AREA URNS HPF: 0 /HPF
ERYTHROCYTE [DISTWIDTH] IN BLOOD BY AUTOMATED COUNT: 18.6 % (ref 11.9–14.6)
GLOBULIN SER CALC-MCNC: 3.4 G/DL (ref 2.3–3.5)
GLUCOSE SERPL-MCNC: 105 MG/DL (ref 65–100)
GLUCOSE UR STRIP.AUTO-MCNC: NEGATIVE MG/DL
HCT VFR BLD AUTO: 24.2 % (ref 41.1–50.3)
HGB BLD-MCNC: 6.5 G/DL (ref 13.6–17.2)
HGB UR QL STRIP: NEGATIVE
HISTORY CHECK: NORMAL
IMM GRANULOCYTES # BLD AUTO: 0.1 K/UL (ref 0–0.5)
IMM GRANULOCYTES NFR BLD AUTO: 1 % (ref 0–5)
KETONES UR QL STRIP.AUTO: NEGATIVE MG/DL
LEUKOCYTE ESTERASE UR QL STRIP.AUTO: ABNORMAL
LYMPHOCYTES # BLD: 3 K/UL (ref 0.5–4.6)
LYMPHOCYTES NFR BLD: 23 % (ref 13–44)
MCH RBC QN AUTO: 18.2 PG (ref 26.1–32.9)
MCHC RBC AUTO-ENTMCNC: 26.9 G/DL (ref 31.4–35)
MCV RBC AUTO: 67.8 FL (ref 79.6–97.8)
MONOCYTES # BLD: 1.1 K/UL (ref 0.1–1.3)
MONOCYTES NFR BLD: 9 % (ref 4–12)
MUCOUS THREADS URNS QL MICRO: 0 /LPF
NEUTS SEG # BLD: 8.1 K/UL (ref 1.7–8.2)
NEUTS SEG NFR BLD: 63 % (ref 43–78)
NITRITE UR QL STRIP.AUTO: NEGATIVE
NRBC # BLD: 0 K/UL (ref 0–0.2)
OTHER OBSERVATIONS: ABNORMAL
PH UR STRIP: 6 [PH] (ref 5–9)
PLATELET # BLD AUTO: 270 K/UL (ref 150–450)
PMV BLD AUTO: 9 FL (ref 9.4–12.3)
POTASSIUM SERPL-SCNC: 3.8 MMOL/L (ref 3.5–5.1)
PROT SERPL-MCNC: 6.4 G/DL (ref 6.3–8.2)
PROT UR STRIP-MCNC: NEGATIVE MG/DL
RBC # BLD AUTO: 3.57 M/UL (ref 4.23–5.6)
RBC #/AREA URNS HPF: ABNORMAL /HPF
SODIUM SERPL-SCNC: 141 MMOL/L (ref 136–145)
SP GR UR REFRACTOMETRY: 1.01 (ref 1–1.02)
URINE CULTURE IF INDICATED: ABNORMAL
UROBILINOGEN UR QL STRIP.AUTO: 1 EU/DL (ref 0.2–1)
WBC # BLD AUTO: 12.8 K/UL (ref 4.3–11.1)
WBC URNS QL MICRO: >100 /HPF
YEAST URNS QL MICRO: ABNORMAL

## 2022-08-04 PROCEDURE — 6370000000 HC RX 637 (ALT 250 FOR IP): Performed by: FAMILY MEDICINE

## 2022-08-04 PROCEDURE — 6370000000 HC RX 637 (ALT 250 FOR IP): Performed by: INTERNAL MEDICINE

## 2022-08-04 PROCEDURE — 1100000000 HC RM PRIVATE

## 2022-08-04 PROCEDURE — C9113 INJ PANTOPRAZOLE SODIUM, VIA: HCPCS | Performed by: INTERNAL MEDICINE

## 2022-08-04 PROCEDURE — 2580000003 HC RX 258: Performed by: INTERNAL MEDICINE

## 2022-08-04 PROCEDURE — 85025 COMPLETE CBC W/AUTO DIFF WBC: CPT

## 2022-08-04 PROCEDURE — 86923 COMPATIBILITY TEST ELECTRIC: CPT

## 2022-08-04 PROCEDURE — 71045 X-RAY EXAM CHEST 1 VIEW: CPT

## 2022-08-04 PROCEDURE — 87088 URINE BACTERIA CULTURE: CPT

## 2022-08-04 PROCEDURE — 86901 BLOOD TYPING SEROLOGIC RH(D): CPT

## 2022-08-04 PROCEDURE — 80053 COMPREHEN METABOLIC PANEL: CPT

## 2022-08-04 PROCEDURE — 6360000002 HC RX W HCPCS: Performed by: INTERNAL MEDICINE

## 2022-08-04 PROCEDURE — P9016 RBC LEUKOCYTES REDUCED: HCPCS

## 2022-08-04 PROCEDURE — 81001 URINALYSIS AUTO W/SCOPE: CPT

## 2022-08-04 PROCEDURE — 99285 EMERGENCY DEPT VISIT HI MDM: CPT

## 2022-08-04 PROCEDURE — 87086 URINE CULTURE/COLONY COUNT: CPT

## 2022-08-04 PROCEDURE — 87186 SC STD MICRODIL/AGAR DIL: CPT

## 2022-08-04 PROCEDURE — A4216 STERILE WATER/SALINE, 10 ML: HCPCS | Performed by: INTERNAL MEDICINE

## 2022-08-04 PROCEDURE — 30233N1 TRANSFUSION OF NONAUTOLOGOUS RED BLOOD CELLS INTO PERIPHERAL VEIN, PERCUTANEOUS APPROACH: ICD-10-PCS | Performed by: STUDENT IN AN ORGANIZED HEALTH CARE EDUCATION/TRAINING PROGRAM

## 2022-08-04 RX ORDER — ACETAMINOPHEN 650 MG/1
650 SUPPOSITORY RECTAL EVERY 6 HOURS PRN
Status: DISCONTINUED | OUTPATIENT
Start: 2022-08-04 | End: 2022-08-19 | Stop reason: HOSPADM

## 2022-08-04 RX ORDER — ONDANSETRON 4 MG/1
4 TABLET, ORALLY DISINTEGRATING ORAL EVERY 8 HOURS PRN
Status: DISCONTINUED | OUTPATIENT
Start: 2022-08-04 | End: 2022-08-19 | Stop reason: HOSPADM

## 2022-08-04 RX ORDER — SODIUM CHLORIDE 0.9 % (FLUSH) 0.9 %
5-40 SYRINGE (ML) INJECTION PRN
Status: DISCONTINUED | OUTPATIENT
Start: 2022-08-04 | End: 2022-08-19 | Stop reason: HOSPADM

## 2022-08-04 RX ORDER — IPRATROPIUM BROMIDE AND ALBUTEROL SULFATE 2.5; .5 MG/3ML; MG/3ML
1 SOLUTION RESPIRATORY (INHALATION)
Status: DISCONTINUED | OUTPATIENT
Start: 2022-08-04 | End: 2022-08-04

## 2022-08-04 RX ORDER — ROPINIROLE 0.5 MG/1
0.5 TABLET, FILM COATED ORAL ONCE
Status: COMPLETED | OUTPATIENT
Start: 2022-08-04 | End: 2022-08-04

## 2022-08-04 RX ORDER — POLYETHYLENE GLYCOL 3350 17 G/17G
17 POWDER, FOR SOLUTION ORAL DAILY PRN
Status: DISCONTINUED | OUTPATIENT
Start: 2022-08-04 | End: 2022-08-11

## 2022-08-04 RX ORDER — SODIUM CHLORIDE 9 MG/ML
INJECTION, SOLUTION INTRAVENOUS PRN
Status: DISCONTINUED | OUTPATIENT
Start: 2022-08-04 | End: 2022-08-19 | Stop reason: HOSPADM

## 2022-08-04 RX ORDER — TEMAZEPAM 15 MG/1
15 CAPSULE ORAL NIGHTLY PRN
Status: DISCONTINUED | OUTPATIENT
Start: 2022-08-04 | End: 2022-08-19 | Stop reason: HOSPADM

## 2022-08-04 RX ORDER — ALBUTEROL SULFATE 2.5 MG/3ML
2.5 SOLUTION RESPIRATORY (INHALATION) EVERY 6 HOURS PRN
Status: DISCONTINUED | OUTPATIENT
Start: 2022-08-04 | End: 2022-08-19 | Stop reason: HOSPADM

## 2022-08-04 RX ORDER — SODIUM CHLORIDE 9 MG/ML
INJECTION, SOLUTION INTRAVENOUS PRN
Status: DISCONTINUED | OUTPATIENT
Start: 2022-08-04 | End: 2022-08-11

## 2022-08-04 RX ORDER — SODIUM CHLORIDE 0.9 % (FLUSH) 0.9 %
5-40 SYRINGE (ML) INJECTION EVERY 12 HOURS SCHEDULED
Status: DISCONTINUED | OUTPATIENT
Start: 2022-08-04 | End: 2022-08-19 | Stop reason: HOSPADM

## 2022-08-04 RX ORDER — ACETAMINOPHEN 325 MG/1
650 TABLET ORAL EVERY 6 HOURS PRN
Status: DISCONTINUED | OUTPATIENT
Start: 2022-08-04 | End: 2022-08-19 | Stop reason: HOSPADM

## 2022-08-04 RX ORDER — ONDANSETRON 2 MG/ML
4 INJECTION INTRAMUSCULAR; INTRAVENOUS EVERY 6 HOURS PRN
Status: DISCONTINUED | OUTPATIENT
Start: 2022-08-04 | End: 2022-08-19 | Stop reason: HOSPADM

## 2022-08-04 RX ADMIN — SODIUM CHLORIDE, PRESERVATIVE FREE 10 ML: 5 INJECTION INTRAVENOUS at 23:29

## 2022-08-04 RX ADMIN — SODIUM CHLORIDE, PRESERVATIVE FREE 40 MG: 5 INJECTION INTRAVENOUS at 21:34

## 2022-08-04 RX ADMIN — ROPINIROLE HYDROCHLORIDE 0.5 MG: 0.5 TABLET, FILM COATED ORAL at 20:40

## 2022-08-04 RX ADMIN — TEMAZEPAM 15 MG: 15 CAPSULE ORAL at 22:31

## 2022-08-04 RX ADMIN — ALBUTEROL SULFATE 2.5 MG: 2.5 SOLUTION RESPIRATORY (INHALATION) at 21:21

## 2022-08-04 ASSESSMENT — ENCOUNTER SYMPTOMS
CHEST TIGHTNESS: 0
ABDOMINAL PAIN: 0
BACK PAIN: 0
VOMITING: 0
WHEEZING: 1
ABDOMINAL DISTENTION: 0
CONSTIPATION: 0
COLOR CHANGE: 0
COUGH: 0
SHORTNESS OF BREATH: 1
NAUSEA: 0

## 2022-08-04 ASSESSMENT — PAIN - FUNCTIONAL ASSESSMENT: PAIN_FUNCTIONAL_ASSESSMENT: NONE - DENIES PAIN

## 2022-08-04 NOTE — ED NOTES
Pt recently admitted for low HBG that resulted in a blood transfusion. Home health nurse sent out to obtain new labwork on pt. Per pt hgb \"below 6\".      Cuate Mane RN  08/04/22 [id number]

## 2022-08-04 NOTE — H&P
Admit date: 2022   Name:  Rissa Berumen   Age:  79 y.o.   :  1940   MRN:  [id number]   PCP:  None Provider   Provider:  Jess Espinoza MD      ASSESSMENT AND PLAN  79-year-old male with a past medical history of coronary artery disease, hyperlipidemia, hypertension, GERD, heart failure with preserved ejection fraction, coronary artery disease, that presented in the setting of black stools associated with weakness and low hemoglobin    1. Acute blood loss anemia likely in the setting of upper GI bleed  -N.p.o. for now  -Start IV PPI  -Transfuse 1 unit of PRBC  -Monitor H&H  -Transfuse if hemoglobin lower than 7  -Avoid antithrombotics and anticoagulants  -Gastroenterology consultation    2. Leukocytosis, likely reactive.  -Currently no signs of infection  -Obtain urinalysis  -Obtain chest x-ray  -Hold off on antibiotics  -Monitor CBC    3. Coronary artery disease  -Hold aspirin for now  -Resume statin    4. Heart failure with preserved ejection fraction  -Hold Lasix for now  -Monitor volume status    5. Hyperlipidemia  -Continue statin    DVT prophylaxis with SCD pumps    Full code    Patient aware of plan        CHIEF COMPLAINT:  Black stools, low hemoglobin, weakness      HISTORY OF PRESENT ILLNESS:  79-year-old male with a past medical history of coronary artery disease, hyperlipidemia, hypertension, GERD, heart failure with preserved ejection fraction, coronary artery disease, that presented in the setting of black stools associated with weakness and low hemoglobin. The patient states that for the past couple of days he has been presented with black stools associated with generalized weakness. He was sent for blood work today and he was called back letting him know his hemoglobin was lower than 7 so he needed to come to the emergency department for possible blood transfusion. He thinks he probably ran out of pantoprazole for the past week or 2.   Otherwise he denies any nausea, no vomiting, no diarrhea, no shortness of breath, no cough. In the emergency department patient was found to be hemodynamically stable. Past Medical History:   Diagnosis Date    Abnormal EKG 4/22/15    Arrhythmia     CAD (coronary artery disease) 5/8/2015    Carotid artery stenosis without cerebral infarction 6/7/2016    US 6/15: <50% bilat ICAs    Coronary atherosclerosis of native coronary vessel 5/8/2015    GERMAIN on brilinta 5/7/15: prox LAD PCI, normal EF     Diastolic CHF, chronic ([address] 3/2/2022    Dyslipidemia 6/7/2016    Dyspnea 5/8/2015    Echo 6/15: EF 60%, mod MR, mod LVH, mild AI     ED (erectile dysfunction)     GERD (gastroesophageal reflux disease)     GERD (gastroesophageal reflux disease)     no medication    HTN (hypertension) 5/8/2015    Hypertension     Hypokalemia     Hypokalemia 6/7/2016    Mitral valve regurgitation 6/7/2016    Morbid obesity ([address]     Myasthenia gravis ([address]     Myasthenia gravis ([address] 6/17/15    Nocturia     Osteoporosis     PUD (peptic ulcer disease) 25 yrs ago    S/P coronary artery stent placement 5/8/2015    3.0x38 mm Xience CAROL to pLAD 5/7/15     Sleep apnea     Syncope and collapse     Unspecified sleep apnea     no cpap       Past Surgical History:   Procedure Laterality Date    APPENDECTOMY      CARDIAC CATHETERIZATION  05/21/2019    watchman device    CARDIAC CATHETERIZATION  05/27/2015    stent    COLONOSCOPY  2007    ERCP  3/30/2022         HEMORRHOID SURGERY      PACEMAKER  2018    Angel Kraus/Giana for sleep apnea and reconstruction for extending jaw    VASCULAR SURGERY Right 07/10/2019     Repair of right radial artery pseudoaneurysm          HOME MEDICATION:  Prior to Admission medications    Medication Sig Start Date End Date Taking?  Authorizing Provider   potassium chloride (KLOR-CON M) 20 MEQ extended release tablet Take 1 tablet by mouth daily 6/2/22   [facility], DO   aspirin 81 MG EC tablet Take 81 mg by mouth daily 4/8/22   Ar Automatic Reconciliation   atorvastatin (LIPITOR) 80 MG tablet Take 80 mg by mouth 3/3/22   Ar Automatic Reconciliation   cyanocobalamin 1000 MCG tablet Take 1,000 mcg by mouth daily  Patient not taking: Reported on 7/18/2022 4/2/22   Ar Automatic Reconciliation   escitalopram (LEXAPRO) 10 MG tablet Take 10 mg by mouth daily  Patient not taking: Reported on 7/18/2022 3/3/22   Ar Automatic Reconciliation   furosemide (LASIX) 40 MG tablet Take 40 mg by mouth daily 3/3/22   Ar Automatic Reconciliation   nitroGLYCERIN (NITROSTAT) 0.4 MG SL tablet Place 1 sl under the tongue q 5 min prn cp, max 3 sl in a 15-min time period.  Call 911 if no relief after the 3rd sl. 2/13/20   Ar Automatic Reconciliation   pantoprazole (PROTONIX) 40 MG tablet Take 40 mg by mouth every morning (before breakfast)  Patient not taking: Reported on 7/18/2022 4/3/22   Ar Automatic Reconciliation   rOPINIRole (REQUIP) 0.5 MG tablet 1 nightly, increase to 1 twice a day if needed 3/3/22   Ar Automatic Reconciliation   tamsulosin (FLOMAX) 0.4 MG capsule Take 0.4 mg by mouth daily 4/8/22   Ar Automatic Reconciliation         REVIEW OF SYSTEMS:  14 ROS negative except from stated on HPI      Social History     Tobacco Use    Smoking status: Never    Smokeless tobacco: Never   Substance Use Topics    Alcohol use: No    Drug use: No         Family History   Problem Relation Age of Onset    No Known Problems Brother     Cancer Brother         brain tumor    No Known Problems Sister     Cancer Mother         kidney    Cancer Father         stomach       No Known Allergies      Vitals:    08/04/22 1700 08/04/22 1715 08/04/22 1730 08/04/22 1745   BP: 134/78 137/75 (!) 142/76 (!) 144/80   Pulse:       Resp:       Temp:       TempSrc:       SpO2:       Weight:       Height:             PHYSICAL EXAM:  General: Alert, oriented, NAD  HEENT: NC/AT, EOM are intact  Neck: supple, no JVD  Cardiovascular: RRR, S1, S2, no murmurs  Respiratory: Lungs are clear, no wheezes or rales  Abdomen: Soft, NT, ND  Back: No CVA tenderness, no paraspinal tenderness  Extremities: LE without pedal edema, no erythema  Neuro: A&O, CN are intact, no focal deficits  Skin: no rash or ulcers  Psych: good mood and affect      I have personally reviewed patients laboratory data showing  Lab Results   Component Value Date    WBC 12.8 (H) 08/04/2022    HGB 6.5 (LL) 08/04/2022    HCT 24.2 (L) 08/04/2022    MCV 67.8 (L) 08/04/2022     08/04/2022     No results found for: CKTOTAL, CKMB, CKMBINDEX, TROPONINI  Lab Results   Component Value Date    ANIONGAP 6 (L) 07/20/2022    CALCIUM 9.1 07/20/2022     07/20/2022    K 3.9 07/20/2022    CO2 25 07/20/2022     (H) 07/20/2022    BUN 15 07/20/2022    CREATININE 1.00 07/20/2022       Patients imaging showing  XR CHEST (2 VW)    (Results Pending)         Likely length of stay more than 2 midnights

## 2022-08-05 ENCOUNTER — ANESTHESIA EVENT (OUTPATIENT)
Dept: ENDOSCOPY | Age: 82
DRG: 330 | End: 2022-08-05
Payer: MEDICARE

## 2022-08-05 ENCOUNTER — ANESTHESIA (OUTPATIENT)
Dept: ENDOSCOPY | Age: 82
DRG: 330 | End: 2022-08-05
Payer: MEDICARE

## 2022-08-05 LAB
ABO + RH BLD: NORMAL
ANION GAP SERPL CALC-SCNC: 6 MMOL/L (ref 7–16)
BASOPHILS # BLD: 0.1 K/UL (ref 0–0.2)
BASOPHILS NFR BLD: 1 % (ref 0–2)
BLD PROD TYP BPU: NORMAL
BLOOD BANK CMNT PATIENT-IMP: NORMAL
BLOOD BANK DISPENSE STATUS: NORMAL
BLOOD GROUP ANTIBODIES SERPL: NORMAL
BPU ID: NORMAL
BUN SERPL-MCNC: 12 MG/DL (ref 8–23)
CALCIUM SERPL-MCNC: 8.6 MG/DL (ref 8.3–10.4)
CHLORIDE SERPL-SCNC: 110 MMOL/L (ref 98–107)
CO2 SERPL-SCNC: 26 MMOL/L (ref 21–32)
CREAT SERPL-MCNC: 0.8 MG/DL (ref 0.8–1.5)
CROSSMATCH RESULT: NORMAL
DIFFERENTIAL METHOD BLD: ABNORMAL
EOSINOPHIL # BLD: 0.4 K/UL (ref 0–0.8)
EOSINOPHIL NFR BLD: 4 % (ref 0.5–7.8)
ERYTHROCYTE [DISTWIDTH] IN BLOOD BY AUTOMATED COUNT: 20.5 % (ref 11.9–14.6)
GLUCOSE SERPL-MCNC: 103 MG/DL (ref 65–100)
HCT VFR BLD AUTO: 29.2 % (ref 41.1–50.3)
HCT VFR BLD AUTO: 29.8 % (ref 41.1–50.3)
HGB BLD-MCNC: 8 G/DL (ref 13.6–17.2)
HGB BLD-MCNC: 8.1 G/DL (ref 13.6–17.2)
HGB RETIC QN AUTO: 16 PG (ref 29–35)
IMM GRANULOCYTES # BLD AUTO: 0.1 K/UL (ref 0–0.5)
IMM GRANULOCYTES NFR BLD AUTO: 1 % (ref 0–5)
IMM RETICS NFR: 45.5 % (ref 2.3–13.4)
IRON SATN MFR SERPL: 5 %
IRON SERPL-MCNC: 24 UG/DL (ref 35–150)
LYMPHOCYTES # BLD: 2.4 K/UL (ref 0.5–4.6)
LYMPHOCYTES NFR BLD: 22 % (ref 13–44)
MCH RBC QN AUTO: 19.6 PG (ref 26.1–32.9)
MCHC RBC AUTO-ENTMCNC: 27.4 G/DL (ref 31.4–35)
MCV RBC AUTO: 71.6 FL (ref 79.6–97.8)
MONOCYTES # BLD: 0.9 K/UL (ref 0.1–1.3)
MONOCYTES NFR BLD: 8 % (ref 4–12)
NEUTS SEG # BLD: 7 K/UL (ref 1.7–8.2)
NEUTS SEG NFR BLD: 64 % (ref 43–78)
NRBC # BLD: 0 K/UL (ref 0–0.2)
PLATELET # BLD AUTO: 258 K/UL (ref 150–450)
PMV BLD AUTO: 9.1 FL (ref 9.4–12.3)
POTASSIUM SERPL-SCNC: 4 MMOL/L (ref 3.5–5.1)
RBC # BLD AUTO: 4.08 M/UL (ref 4.23–5.6)
RETICS # AUTO: 0.07 M/UL (ref 0.03–0.1)
RETICS/RBC NFR AUTO: 1.6 % (ref 0.3–2)
SODIUM SERPL-SCNC: 142 MMOL/L (ref 136–145)
SPECIMEN EXP DATE BLD: NORMAL
TIBC SERPL-MCNC: 500 UG/DL (ref 250–450)
UNIT DIVISION: 0
WBC # BLD AUTO: 10.8 K/UL (ref 4.3–11.1)

## 2022-08-05 PROCEDURE — 2709999900 HC NON-CHARGEABLE SUPPLY: Performed by: INTERNAL MEDICINE

## 2022-08-05 PROCEDURE — 36415 COLL VENOUS BLD VENIPUNCTURE: CPT

## 2022-08-05 PROCEDURE — 2580000003 HC RX 258: Performed by: INTERNAL MEDICINE

## 2022-08-05 PROCEDURE — C9113 INJ PANTOPRAZOLE SODIUM, VIA: HCPCS | Performed by: INTERNAL MEDICINE

## 2022-08-05 PROCEDURE — 2580000003 HC RX 258: Performed by: ANESTHESIOLOGY

## 2022-08-05 PROCEDURE — A4216 STERILE WATER/SALINE, 10 ML: HCPCS | Performed by: INTERNAL MEDICINE

## 2022-08-05 PROCEDURE — 2500000003 HC RX 250 WO HCPCS: Performed by: REGISTERED NURSE

## 2022-08-05 PROCEDURE — 1100000003 HC PRIVATE W/ TELEMETRY

## 2022-08-05 PROCEDURE — 82607 VITAMIN B-12: CPT

## 2022-08-05 PROCEDURE — 6360000002 HC RX W HCPCS: Performed by: INTERNAL MEDICINE

## 2022-08-05 PROCEDURE — 85018 HEMOGLOBIN: CPT

## 2022-08-05 PROCEDURE — 85025 COMPLETE CBC W/AUTO DIFF WBC: CPT

## 2022-08-05 PROCEDURE — 1100000000 HC RM PRIVATE

## 2022-08-05 PROCEDURE — 82728 ASSAY OF FERRITIN: CPT

## 2022-08-05 PROCEDURE — 6360000002 HC RX W HCPCS: Performed by: REGISTERED NURSE

## 2022-08-05 PROCEDURE — 80048 BASIC METABOLIC PNL TOTAL CA: CPT

## 2022-08-05 PROCEDURE — 7100000010 HC PHASE II RECOVERY - FIRST 15 MIN: Performed by: INTERNAL MEDICINE

## 2022-08-05 PROCEDURE — 0DJ08ZZ INSPECTION OF UPPER INTESTINAL TRACT, VIA NATURAL OR ARTIFICIAL OPENING ENDOSCOPIC: ICD-10-PCS | Performed by: INTERNAL MEDICINE

## 2022-08-05 PROCEDURE — 6370000000 HC RX 637 (ALT 250 FOR IP): Performed by: INTERNAL MEDICINE

## 2022-08-05 PROCEDURE — 3700000000 HC ANESTHESIA ATTENDED CARE: Performed by: INTERNAL MEDICINE

## 2022-08-05 PROCEDURE — 6370000000 HC RX 637 (ALT 250 FOR IP): Performed by: NURSE PRACTITIONER

## 2022-08-05 PROCEDURE — 6370000000 HC RX 637 (ALT 250 FOR IP): Performed by: FAMILY MEDICINE

## 2022-08-05 PROCEDURE — 85046 RETICYTE/HGB CONCENTRATE: CPT

## 2022-08-05 PROCEDURE — 3609017100 HC EGD: Performed by: INTERNAL MEDICINE

## 2022-08-05 PROCEDURE — 83540 ASSAY OF IRON: CPT

## 2022-08-05 PROCEDURE — 7100000011 HC PHASE II RECOVERY - ADDTL 15 MIN: Performed by: INTERNAL MEDICINE

## 2022-08-05 PROCEDURE — 82746 ASSAY OF FOLIC ACID SERUM: CPT

## 2022-08-05 RX ORDER — POLYETHYLENE GLYCOL 3350 17 G/17G
238 POWDER, FOR SOLUTION ORAL ONCE
Status: COMPLETED | OUTPATIENT
Start: 2022-08-05 | End: 2022-08-05

## 2022-08-05 RX ORDER — PROPOFOL 10 MG/ML
INJECTION, EMULSION INTRAVENOUS PRN
Status: DISCONTINUED | OUTPATIENT
Start: 2022-08-05 | End: 2022-08-05 | Stop reason: SDUPTHER

## 2022-08-05 RX ORDER — LIDOCAINE HYDROCHLORIDE 20 MG/ML
INJECTION, SOLUTION EPIDURAL; INFILTRATION; INTRACAUDAL; PERINEURAL PRN
Status: DISCONTINUED | OUTPATIENT
Start: 2022-08-05 | End: 2022-08-05 | Stop reason: SDUPTHER

## 2022-08-05 RX ORDER — PROPOFOL 10 MG/ML
INJECTION, EMULSION INTRAVENOUS CONTINUOUS PRN
Status: DISCONTINUED | OUTPATIENT
Start: 2022-08-05 | End: 2022-08-05 | Stop reason: SDUPTHER

## 2022-08-05 RX ORDER — SODIUM CHLORIDE, SODIUM LACTATE, POTASSIUM CHLORIDE, CALCIUM CHLORIDE 600; 310; 30; 20 MG/100ML; MG/100ML; MG/100ML; MG/100ML
INJECTION, SOLUTION INTRAVENOUS CONTINUOUS
Status: DISCONTINUED | OUTPATIENT
Start: 2022-08-05 | End: 2022-08-09

## 2022-08-05 RX ADMIN — TEMAZEPAM 15 MG: 15 CAPSULE ORAL at 22:59

## 2022-08-05 RX ADMIN — SODIUM CHLORIDE, PRESERVATIVE FREE 40 MG: 5 INJECTION INTRAVENOUS at 16:55

## 2022-08-05 RX ADMIN — BISACODYL 20 MG: 5 TABLET, COATED ORAL at 16:56

## 2022-08-05 RX ADMIN — LIDOCAINE HYDROCHLORIDE 100 MG: 20 INJECTION, SOLUTION EPIDURAL; INFILTRATION; INTRACAUDAL; PERINEURAL at 11:31

## 2022-08-05 RX ADMIN — POLYETHYLENE GLYCOL 3350 238 G: 17 POWDER, FOR SOLUTION ORAL at 18:30

## 2022-08-05 RX ADMIN — PROPOFOL 40 MG: 10 INJECTION, EMULSION INTRAVENOUS at 11:31

## 2022-08-05 RX ADMIN — SODIUM CHLORIDE, PRESERVATIVE FREE 40 MG: 5 INJECTION INTRAVENOUS at 05:04

## 2022-08-05 RX ADMIN — SODIUM CHLORIDE, PRESERVATIVE FREE 10 ML: 5 INJECTION INTRAVENOUS at 20:06

## 2022-08-05 RX ADMIN — SODIUM CHLORIDE, POTASSIUM CHLORIDE, SODIUM LACTATE AND CALCIUM CHLORIDE: 600; 310; 30; 20 INJECTION, SOLUTION INTRAVENOUS at 10:57

## 2022-08-05 RX ADMIN — PROPOFOL 20 MG: 10 INJECTION, EMULSION INTRAVENOUS at 11:33

## 2022-08-05 RX ADMIN — PROPOFOL 120 MCG/KG/MIN: 10 INJECTION, EMULSION INTRAVENOUS at 11:33

## 2022-08-05 RX ADMIN — CEFTRIAXONE 1000 MG: 1 INJECTION, POWDER, FOR SOLUTION INTRAMUSCULAR; INTRAVENOUS at 13:08

## 2022-08-05 RX ADMIN — ACETAMINOPHEN 650 MG: 325 TABLET, FILM COATED ORAL at 22:58

## 2022-08-05 RX ADMIN — SODIUM CHLORIDE, PRESERVATIVE FREE 10 ML: 5 INJECTION INTRAVENOUS at 09:26

## 2022-08-05 ASSESSMENT — PAIN DESCRIPTION - DESCRIPTORS: DESCRIPTORS: ACHING

## 2022-08-05 ASSESSMENT — PAIN DESCRIPTION - LOCATION: LOCATION: LEG

## 2022-08-05 ASSESSMENT — PAIN SCALES - GENERAL
PAINLEVEL_OUTOF10: 0
PAINLEVEL_OUTOF10: 3
PAINLEVEL_OUTOF10: 0

## 2022-08-05 ASSESSMENT — PAIN - FUNCTIONAL ASSESSMENT
PAIN_FUNCTIONAL_ASSESSMENT: NONE - DENIES PAIN

## 2022-08-05 ASSESSMENT — PAIN DESCRIPTION - ORIENTATION: ORIENTATION: LEFT;RIGHT

## 2022-08-05 NOTE — OP NOTE
EGD Procedure Note    PreOp Diagnosis:   Melena  ABLA    PostOp Diagnosis:  Gastric erosions  No active bleeding    Medications:  Monitored Anesthesia    Procedure:  EGD   Instrument:   After informed consent was obtained, the patient was sedated and the endoscope was inserted  in the mouth and advanced into the duodenum without difficulty. The scope was slowly withdrawn while the mucosa was carefully inspected, including a retroflexed view of the cardia and fundus. Findings:   Esophagus: The proximal and mid esophagus appeared normal.  In the distal esophagus, the Z line is minimally irregular. Stomach: There are 2 small erosions in the proximal stomach. Otherwise, there are no ulcers, erosions, or polyps. No trace of blood present. Duodenum:   Normal    Recommendations:   Follow up Hgb results  Avoid NSAIDs  Cont PPI  Clears today  Colonoscopy in AM to r/o lower source of bleeding    Waldemar Davis MD

## 2022-08-05 NOTE — PROGRESS NOTES
Hospitalist Progress Note   Admit Date:  2022  4:05 PM   Name:  Angelique Crandall   Age:  79 y.o. Sex:  male  :  1940   MRN:  [id number]   Room:  [facility]    Presenting Complaint: Abnormal Lab     Reason(s) for Admission: Acute blood loss anemia [D62]  Anemia, unspecified type [D64.9]     Hospital Course & Interval History:     50-year-old male with a past medical history of coronary artery disease, hyperlipidemia, hypertension, GERD, heart failure with preserved ejection fraction, coronary artery disease, that presented in the setting of black stools associated with weakness and low hemoglobin. The patient states that for the past couple of days he has been presented with black stools associated with generalized weakness. He was sent for blood work today and he was called back letting him know his hemoglobin was lower than 7 so he needed to come to the emergency department for possible blood transfusion. He thinks he probably ran out of pantoprazole for the past week or 2. Otherwise he denies any nausea, no vomiting, no diarrhea, no shortness of breath, no cough. In the emergency department patient was found to be hemodynamically stable. Subjective/24hr Events (22): Pt seen and evaluated. Overnight events discussed. Pt states that he has not seen any more bleeding this am.. He denies any CP/SOB. Assessment & Plan:     Principal Problem:    Acute blood loss anemia        IMP:    1.) ABLA/Anemia - prob due to GI Bleed. ? UGI v prox LGIB. For EGD +/- colo per GI. Ck retic count with LFT's and Fe studies in am.    2.) Leukocytosis - urine abnormal, repeat with cx, ck PCT and CRP with L Acid in am.    3.) CAD - ASA on hold due to above.  Resume statin    4.) Chronic LVDD/CHF - compensated    5.) HL - statin    6.) GERD - PPI    7.) PAD with h/o Carotid dz - resume statin and antiplt when able    8.) HTN - resume meds    9.) Myasthenia gravis - resume meds    10.) Osteoporosis    11.) 144/80 --   08/04/22 1730 -- -- -- (!) 142/76 --   08/04/22 1715 -- -- -- 137/75 --   08/04/22 1700 -- -- -- 134/78 --   08/04/22 1645 -- -- -- (!) 140/77 --       Oxygen Therapy  SpO2: 93 %  Pulse via Oximetry: 76 beats per minute  Pulse Oximeter Device Mode: Continuous  Pulse Oximeter Device Location: Right, Hand  O2 Device: None (Room air)  O2 Flow Rate (L/min): 4 L/min    Estimated body mass index is 28.89 kg/m² as calculated from the following:    Height as of this encounter: 5' 8\" (1.727 m). Weight as of this encounter: 190 lb (86.2 kg). Intake/Output Summary (Last 24 hours) at 8/5/2022 1637  Last data filed at 8/5/2022 1139  Gross per 24 hour   Intake 550 ml   Output 0 ml   Net 550 ml         Physical Exam:   Pt seen and evaluated on 8/5/2022    Blood pressure 115/73, pulse 76, temperature 97.6 °F (36.4 °C), temperature source Oral, resp. rate 20, height 5' 8\" (1.727 m), weight 190 lb (86.2 kg), SpO2 93 %. General:    Ill appearing  Head:  Normocephalic, atraumatic, + Pallor  Eyes:  Sclerae appear normal.  Pupils equally round. ENT:  Nares appear normal, no drainage. Moist oral mucosa  Neck:  No restricted ROM. Trachea midline   CV:   RRR. No m/r/g. No jugular venous distension. Lungs:   Diminished. No wheezing, rhonchi, or rales. Symmetric expansion. Abdomen: Bowel sounds present. Soft, nontender, nondistended. Extremities: No cyanosis or clubbing. No edema  Skin:     No rashes and normal coloration. Warm and dry. Neuro:  CN II-XII grossly intact. Sensation intact.   A&Ox3  Psych:  Flat      I have personally reviewed labs and tests showing:  Recent Labs:  Recent Results (from the past 48 hour(s))   CBC with Auto Differential    Collection Time: 08/04/22  3:44 PM   Result Value Ref Range    WBC 12.8 (H) 4.3 - 11.1 K/uL    RBC 3.57 (L) 4.23 - 5.6 M/uL    Hemoglobin 6.5 (LL) 13.6 - 17.2 g/dL    Hematocrit 24.2 (L) 41.1 - 50.3 %    MCV 67.8 (L) 79.6 - 97.8 FL    MCH 18.2 (L) 26.1 - 32.9 PG    MCHC 26.9 (L) 31.4 - 35.0 g/dL    RDW 18.6 (H) 11.9 - 14.6 %    Platelets 800 685 - 178 K/uL    MPV 9.0 (L) 9.4 - 12.3 FL    nRBC 0.00 0.0 - 0.2 K/uL    Differential Type AUTOMATED      Seg Neutrophils 63 43 - 78 %    Lymphocytes 23 13 - 44 %    Monocytes 9 4.0 - 12.0 %    Eosinophils % 3 0.5 - 7.8 %    Basophils 1 0.0 - 2.0 %    Immature Granulocytes 1 0.0 - 5.0 %    Segs Absolute 8.1 1.7 - 8.2 K/UL    Absolute Lymph # 3.0 0.5 - 4.6 K/UL    Absolute Mono # 1.1 0.1 - 1.3 K/UL    Absolute Eos # 0.4 0.0 - 0.8 K/UL    Basophils Absolute 0.1 0.0 - 0.2 K/UL    Absolute Immature Granulocyte 0.1 0.0 - 0.5 K/UL   Comprehensive Metabolic Panel    Collection Time: 08/04/22  3:44 PM   Result Value Ref Range    Sodium 141 136 - 145 mmol/L    Potassium 3.8 3.5 - 5.1 mmol/L    Chloride 110 (H) 98 - 107 mmol/L    CO2 22 21 - 32 mmol/L    Anion Gap 9 7 - 16 mmol/L    Glucose 105 (H) 65 - 100 mg/dL    BUN 16 8 - 23 MG/DL    Creatinine 0.90 0.8 - 1.5 MG/DL    GFR African American >60 >60 ml/min/1.73m2    GFR Non- >60 >60 ml/min/1.73m2    Calcium 8.6 8.3 - 10.4 MG/DL    Total Bilirubin 0.2 0.2 - 1.1 MG/DL    ALT 19 12 - 65 U/L    AST 20 15 - 37 U/L    Alk Phosphatase 102 50 - 136 U/L    Total Protein 6.4 6.3 - 8.2 g/dL    Albumin 3.0 (L) 3.2 - 4.6 g/dL    Globulin 3.4 2.3 - 3.5 g/dL    Albumin/Globulin Ratio 0.9 (L) 1.2 - 3.5     TYPE AND SCREEN    Collection Time: 08/04/22  3:44 PM   Result Value Ref Range    Crossmatch expiration date 08/07/2022,6725     ABO/Rh A POSITIVE     Antibody Screen NEG     Blood Bank Comment        NEENA IN ER NOTIFIED THAT BLOOD IS READY AT 1740 ON 146326 MT    Unit Number [id number]     Product Code Blood Bank RC LR     Unit Divison 00     Dispense Status Blood Bank TRANSFUSED     Crossmatch Result Compatible    PREPARE RBC (CROSSMATCH), 1 Units    Collection Time: 08/04/22  5:15 PM   Result Value Ref Range    History Check Historical check performed    Urinalysis with Reflex to Culture    Collection Time: 08/04/22  6:45 PM    Specimen: Urine   Result Value Ref Range    Color, UA YELLOW/STRAW      Appearance CLOUDY      Specific [place], UA 1.013 1.001 - 1.023      pH, Urine 6.0 5.0 - 9.0      Protein, UA Negative NEG mg/dL    Glucose, UA Negative mg/dL    Ketones, Urine Negative NEG mg/dL    Bilirubin Urine Negative NEG      Blood, Urine Negative NEG      Urobilinogen, Urine 1.0 0.2 - 1.0 EU/dL    Nitrite, Urine Negative NEG      Leukocyte Esterase, Urine MODERATE (A) NEG      WBC, UA >100 0 /hpf    RBC, UA 0-3 0 /hpf    BACTERIA, URINE 4+ (H) 0 /hpf    Urine Culture if Indicated URINE CULTURE ORDERED      Epithelial Cells UA 0 0 /hpf    Casts 0 0 /lpf    Crystals 0 0 /LPF    Mucus, UA 0 0 /lpf    Yeast, UA OCCASIONAL      OTHER OBSERVATIONS RESULTS VERIFIED MANUALLY     Culture, Urine    Collection Time: 08/04/22  6:45 PM    Specimen: Urine   Result Value Ref Range    Special Requests NO SPECIAL REQUESTS  Reflexed from [id number]        Culture        No growth after short period of incubation. Further results to follow after overnight incubation.    Hemoglobin and Hematocrit    Collection Time: 08/05/22  1:08 AM   Result Value Ref Range    Hemoglobin 8.1 (L) 13.6 - 17.2 g/dL    Hematocrit 29.8 (L) 41.1 - 50.3 %   CBC with Auto Differential    Collection Time: 08/05/22  7:13 AM   Result Value Ref Range    WBC 10.8 4.3 - 11.1 K/uL    RBC 4.08 (L) 4.23 - 5.6 M/uL    Hemoglobin 8.0 (L) 13.6 - 17.2 g/dL    Hematocrit 29.2 (L) 41.1 - 50.3 %    MCV 71.6 (L) 79.6 - 97.8 FL    MCH 19.6 (L) 26.1 - 32.9 PG    MCHC 27.4 (L) 31.4 - 35.0 g/dL    RDW 20.5 (H) 11.9 - 14.6 %    Platelets 611 172 - 515 K/uL    MPV 9.1 (L) 9.4 - 12.3 FL    nRBC 0.00 0.0 - 0.2 K/uL    Differential Type AUTOMATED      Seg Neutrophils 64 43 - 78 %    Lymphocytes 22 13 - 44 %    Monocytes 8 4.0 - 12.0 %    Eosinophils % 4 0.5 - 7.8 %    Basophils 1 0.0 - 2.0 %    Immature Granulocytes 1 0.0 - 5.0 %    Segs Absolute 7.0 1.7 - 8.2 K/UL    Absolute Lymph # 2.4 0.5 - 4.6 K/UL    Absolute Mono # 0.9 0.1 - 1.3 K/UL    Absolute Eos # 0.4 0.0 - 0.8 K/UL    Basophils Absolute 0.1 0.0 - 0.2 K/UL    Absolute Immature Granulocyte 0.1 0.0 - 0.5 K/UL   Basic Metabolic Panel    Collection Time: 08/05/22  7:13 AM   Result Value Ref Range    Sodium 142 136 - 145 mmol/L    Potassium 4.0 3.5 - 5.1 mmol/L    Chloride 110 (H) 98 - 107 mmol/L    CO2 26 21 - 32 mmol/L    Anion Gap 6 (L) 7 - 16 mmol/L    Glucose 103 (H) 65 - 100 mg/dL    BUN 12 8 - 23 MG/DL    Creatinine 0.80 0.8 - 1.5 MG/DL    GFR African American >60 >60 ml/min/1.73m2    GFR Non- >60 >60 ml/min/1.73m2    Calcium 8.6 8.3 - 10.4 MG/DL       I have personally reviewed imaging studies showing: Other Studies:  XR CHEST PORTABLE   Final Result   1. No consolidation. 2. Chronic cardiac enlargement, pacemaker.              Current Meds:  Current Facility-Administered Medications   Medication Dose Route Frequency    lactated ringers infusion   IntraVENous Continuous    cefTRIAXone (ROCEPHIN) 1,000 mg in sodium chloride 0.9 % 50 mL IVPB mini-bag  1,000 mg IntraVENous Q24H    bisacodyl (DULCOLAX) EC tablet 20 mg  20 mg Oral Once    polyethylene glycol (GLYCOLAX) powder 238 g  238 g Oral Once    0.9 % sodium chloride infusion   IntraVENous PRN    sodium chloride flush 0.9 % injection 5-40 mL  5-40 mL IntraVENous 2 times per day    sodium chloride flush 0.9 % injection 5-40 mL  5-40 mL IntraVENous PRN    0.9 % sodium chloride infusion   IntraVENous PRN    ondansetron (ZOFRAN-ODT) disintegrating tablet 4 mg  4 mg Oral Q8H PRN    Or    ondansetron (ZOFRAN) injection 4 mg  4 mg IntraVENous Q6H PRN    polyethylene glycol (GLYCOLAX) packet 17 g  17 g Oral Daily PRN    acetaminophen (TYLENOL) tablet 650 mg  650 mg Oral Q6H PRN    Or    acetaminophen (TYLENOL) suppository 650 mg  650 mg Rectal Q6H PRN    pantoprazole (PROTONIX) 40 mg in sodium chloride (PF) 10 mL injection  40 mg IntraVENous Q12H    albuterol (PROVENTIL) nebulizer solution 2.5 mg  2.5 mg Nebulization Q6H PRN    temazepam (RESTORIL) capsule 15 mg  15 mg Oral Nightly PRN       Signed:    Lavern Lezama. Nilson Leon MD, Dariel Bradley  Internal Medicine - Hospitalist      Part of this note may have been written by using a voice dictation software. The note has been proof read but may still contain some grammatical/other typographical errors.

## 2022-08-05 NOTE — PROGRESS NOTES
TRANSFER - OUT REPORT:    Verbal report given to george nicole on [facility]  being transferred to [facility] for routine post-op       Report consisted of patient's Situation, Background, Assessment and   Recommendations(SBAR). Information from the following report(s) Nurse Handoff Report was reviewed with the receiving nurse. Lines:   Peripheral IV 08/05/22 Right;Upper Forearm (Active)   Site Assessment Clean, dry & intact 08/05/22 1056   Line Status Blood return noted; Flushed; Infusing 08/05/22 1056   Phlebitis Assessment No symptoms 08/05/22 1056   Infiltration Assessment 0 08/05/22 1056   Dressing Status Clean, dry & intact 08/05/22 1056   Dressing Type Transparent 08/05/22 1056        Opportunity for questions and clarification was provided.       Patient transported with:hospital transport

## 2022-08-05 NOTE — ANESTHESIA POSTPROCEDURE EVALUATION
Department of Anesthesiology  Postprocedure Note    Patient: Opal Hassan  MRN: [id number]  YOB: 1940  Date of evaluation: 8/5/2022      Procedure Summary     Date: 08/05/22 Room / Location: [facility] ENDO 05 / [facility] ENDOSCOPY    Anesthesia Start: 1124 Anesthesia Stop: 7438    Procedure: EGD ESOPHAGOGASTRODUODENOSCOPY Rm 525 (Upper GI Region) Diagnosis:       Acute blood loss anemia      Melena      (Acute blood loss anemia [D62])      (Melena [K92.1])    Surgeons: Hilario Nuñez MD Responsible Provider: Catalina Ramírez MD    Anesthesia Type: TIVA ASA Status: 4          Anesthesia Type: TIVA    Vale Phase I: Vale Score: 10    Vale Phase II: Vale Score: 10      Anesthesia Post Evaluation    Patient location during evaluation: PACU  Patient participation: complete - patient participated  Level of consciousness: awake and alert  Pain score: 0  Airway patency: patent  Nausea & Vomiting: no nausea and no vomiting  Complications: no  Cardiovascular status: hemodynamically stable  Respiratory status: acceptable, nonlabored ventilation and spontaneous ventilation  Hydration status: euvolemic  Comments: BP (!) 154/111   Pulse (!) 41   Temp 97.7 °F (36.5 °C) (Temporal)   Resp 20   Ht 5' 8\" (1.727 m)   Wt 190 lb (86.2 kg)   SpO2 95%   BMI 28.89 kg/m²     Multimodal analgesia pain management approach

## 2022-08-05 NOTE — PLAN OF CARE
Problem: Safety - Adult  Goal: Free from fall injury  8/5/2022 1925 by Barney Robledo RN  Outcome: Progressing  8/5/2022 0846 by Kasey Schmidt RN  Outcome: Progressing     Problem: Chronic Conditions and Co-morbidities  Goal: Patient's chronic conditions and co-morbidity symptoms are monitored and maintained or improved  8/5/2022 1925 by Barney Robledo RN  Outcome: Progressing  8/5/2022 0846 by Kasey Schmidt RN  Outcome: Progressing  Flowsheets (Taken 8/5/2022 2348)  Care Plan - Patient's Chronic Conditions and Co-Morbidity Symptoms are Monitored and Maintained or Improved: Monitor and assess patient's chronic conditions and comorbid symptoms for stability, deterioration, or improvement     Problem: Skin/Tissue Integrity  Goal: Absence of new skin breakdown  Description: 1. Monitor for areas of redness and/or skin breakdown  2. Assess vascular access sites hourly  3. Every 4-6 hours minimum:  Change oxygen saturation probe site  4. Every 4-6 hours:  If on nasal continuous positive airway pressure, respiratory therapy assess nares and determine need for appliance change or resting period.   8/5/2022 0846 by Kasey Schmidt RN  Outcome: Progressing

## 2022-08-05 NOTE — H&P (VIEW-ONLY)
[facility] Consult Note           Referring Physician:     Consult Date: 8/4/2022    Reason for Consult: Melena, ABLA    History of Present Illness:  Patient is a 79 y.o. male with PMH afib a s/p Watchman and SSS s/p pacemaker who is seen in consultation for melena and ABLA. Hgb has been chronically low but now has fallen to 6.5. Having few days of melena. Had similar episode back in 3/22 when was hospitalized here and had EGD that only found hiatal hernia. He has a repeat EGD scheduled next month. He says his last colonoscopy was 20 yrs ago. He has had some n/v and mild heartburn but no abdominal pain. He is not on anticoagulation. Just on ASA. Also on Protonix 40 qd. [facility] gastric cancer in father.         Past Medical History:   Diagnosis Date    Abnormal EKG 4/22/15    Arrhythmia     CAD (coronary artery disease) 5/8/2015    Carotid artery stenosis without cerebral infarction 6/7/2016    US 6/15: <50% bilat ICAs    Coronary atherosclerosis of native coronary vessel 5/8/2015    GERMAIN on brilinta 5/7/15: prox LAD PCI, normal EF     Diastolic CHF, chronic ([address] 3/2/2022    Dyslipidemia 6/7/2016    Dyspnea 5/8/2015    Echo 6/15: EF 60%, mod MR, mod LVH, mild AI     ED (erectile dysfunction)     GERD (gastroesophageal reflux disease)     GERD (gastroesophageal reflux disease)     no medication    HTN (hypertension) 5/8/2015    Hypertension     Hypokalemia     Hypokalemia 6/7/2016    Mitral valve regurgitation 6/7/2016    Morbid obesity ([address]     Myasthenia gravis ([address]     Myasthenia gravis ([address] 6/17/15    Nocturia     Osteoporosis     PUD (peptic ulcer disease) 25 yrs ago    S/P coronary artery stent placement 5/8/2015    3.0x38 mm Xience CAROL to pLAD 5/7/15     Sleep apnea     Syncope and collapse     Unspecified sleep apnea     no cpap      Past Surgical History:   Procedure Laterality Date    APPENDECTOMY      CARDIAC CATHETERIZATION  05/21/2019    watchman device    CARDIAC CATHETERIZATION 05/27/2015    stent    COLONOSCOPY  2007    ERCP  3/30/2022         HEMORRHOID SURGERY      PACEMAKER  2018    TONSILLECTOMY  1999    Dr. Kraus/Giana for sleep apnea and reconstruction for extending jaw    VASCULAR SURGERY Right 07/10/2019     Repair of right radial artery pseudoaneurysm      Family History   Problem Relation Age of Onset    No Known Problems Brother     Cancer Brother         brain tumor    No Known Problems Sister     Cancer Mother         kidney    Cancer Father         stomach     Social History     Occupational History    Not on file   Tobacco Use    Smoking status: Never    Smokeless tobacco: Never   Substance and Sexual Activity    Alcohol use: No    Drug use: No    Sexual activity: Not on file       Hospital Medications:  Current Facility-Administered Medications   Medication Dose Route Frequency    0.9 % sodium chloride infusion   IntraVENous PRN    sodium chloride flush 0.9 % injection 5-40 mL  5-40 mL IntraVENous 2 times per day    sodium chloride flush 0.9 % injection 5-40 mL  5-40 mL IntraVENous PRN    0.9 % sodium chloride infusion   IntraVENous PRN    ondansetron (ZOFRAN-ODT) disintegrating tablet 4 mg  4 mg Oral Q8H PRN    Or    ondansetron (ZOFRAN) injection 4 mg  4 mg IntraVENous Q6H PRN    polyethylene glycol (GLYCOLAX) packet 17 g  17 g Oral Daily PRN    acetaminophen (TYLENOL) tablet 650 mg  650 mg Oral Q6H PRN    Or    acetaminophen (TYLENOL) suppository 650 mg  650 mg Rectal Q6H PRN    pantoprazole (PROTONIX) 40 mg in sodium chloride (PF) 10 mL injection  40 mg IntraVENous Q12H    albuterol (PROVENTIL) nebulizer solution 2.5 mg  2.5 mg Nebulization Q6H PRN     Current Outpatient Medications   Medication Sig    potassium chloride (KLOR-CON M) 20 MEQ extended release tablet Take 1 tablet by mouth daily    aspirin 81 MG EC tablet Take 81 mg by mouth daily    atorvastatin (LIPITOR) 80 MG tablet Take 80 mg by mouth    cyanocobalamin 1000 MCG tablet Take 1,000 mcg by mouth daily (Patient not taking: Reported on 7/18/2022)    escitalopram (LEXAPRO) 10 MG tablet Take 10 mg by mouth daily (Patient not taking: Reported on 7/18/2022)    furosemide (LASIX) 40 MG tablet Take 40 mg by mouth daily    nitroGLYCERIN (NITROSTAT) 0.4 MG SL tablet Place 1 sl under the tongue q 5 min prn cp, max 3 sl in a 15-min time period. Call 911 if no relief after the 3rd sl.    pantoprazole (PROTONIX) 40 MG tablet Take 40 mg by mouth every morning (before breakfast) (Patient not taking: Reported on 7/18/2022)    rOPINIRole (REQUIP) 0.5 MG tablet 1 nightly, increase to 1 twice a day if needed    tamsulosin (FLOMAX) 0.4 MG capsule Take 0.4 mg by mouth daily       Allergies:  No Known Allergies    Review of Systems:  A comprehensive review of systems was negative except for that written in the History of Present Illness. Objective:     Physical Exam:  Vitals:  Visit Vitals  BP (!) 143/86   Pulse 75   Temp 98.3 °F (36.8 °C)   Resp 20   Ht 5' 8\" (1.727 m)   Wt 198 lb (89.8 kg)   SpO2 98%   BMI 30.11 kg/m²       General: No acute distress. Skin:  Extremities and face reveal no rashes. No cody erythema. No telangiectasias on the chest wall. HEENT: Sclerae anicteric. No oral ulcers. No abnormal pigmentation of the lips. The neck is supple. Cardiovascular: Regular rate and rhythm. No murmurs, gallops, or rubs. Respiratory:  Comfortable breathing  With no accessory muscle use. Clear breath sounds with no wheezes, rales, or rhonchi. GI:  Abdomen nondistended, soft, and nontender. Normal active bowel sounds. No enlargement of the liver or spleen. No masses palpable. Musculoskeletal:  No pitting edema of the lower legs. Extremities have good range of motion. Neurological:  Gross memory appears intact. Patient is alert and oriented. Psychiatric:  Mood appears appropriate with judgement intact. Lymphatic:  No cervical or supraclavicular adenopathy.     Laboratory:    Recent Labs     08/04/22  1544   WBC 12.8* RBC 3.57*   HGB 6.5*   HCT 24.2*         No results for input(s): GLU, NA, K, CL, CO2, BUN, CREA, CA in the last 72 hours. No results for input(s): INR, APTT in the last 72 hours. Invalid input(s): PTP  No results for input(s): ALB, TP, AML in the last 72 hours. Invalid input(s): TBIL, CBIL, SGOT, GPT, AP, LPSE    Assessment:       A 79 y.o. male with melena and hgb 6.5. Concern for upper GI bleed. Plan:       Agree with blood transfusion- has going in now  EGD tomorrow  PPI  NPO after midnight  If EGD negative might need to consider colonoscopy      Signed By: Luis Daniel Chavez MD     August 4, 2022

## 2022-08-05 NOTE — PROGRESS NOTES
TRANSFER - IN REPORT:    Verbal report received from [facility] on [facility]  being received from 210 for ordered procedure      Report consisted of patient's Situation, Background, Assessment and   Recommendations(SBAR). Information from the following report(s) Nurse Handoff Report was reviewed with the receiving nurse. Opportunity for questions and clarification was provided. Assessment completed upon patient's arrival to unit and care assumed.

## 2022-08-05 NOTE — ANESTHESIA PRE PROCEDURE
Department of Anesthesiology  Preprocedure Note       Name:  Angelique Crandall   Age:  79 y.o.  :  1940                                          MRN:  [id number]         Date:  2022      Surgeon: Hemal Garcia):  Ronan Shepherd MD    Procedure: Procedure(s):  EGD ESOPHAGOGASTRODUODENOSCOPY Rm 525    Medications prior to admission:   Prior to Admission medications    Medication Sig Start Date End Date Taking? Authorizing Provider   potassium chloride (KLOR-CON M) 20 MEQ extended release tablet Take 1 tablet by mouth daily 22   Ciara Reynoso DO   aspirin 81 MG EC tablet Take 81 mg by mouth daily 22   Ar Automatic Reconciliation   atorvastatin (LIPITOR) 80 MG tablet Take 80 mg by mouth 3/3/22   Ar Automatic Reconciliation   cyanocobalamin 1000 MCG tablet Take 1,000 mcg by mouth daily  Patient not taking: No sig reported 22   Ar Automatic Reconciliation   escitalopram (LEXAPRO) 10 MG tablet Take 10 mg by mouth daily  Patient not taking: No sig reported 3/3/22   Ar Automatic Reconciliation   furosemide (LASIX) 40 MG tablet Take 40 mg by mouth daily 3/3/22   Ar Automatic Reconciliation   nitroGLYCERIN (NITROSTAT) 0.4 MG SL tablet Place 1 sl under the tongue q 5 min prn cp, max 3 sl in a 15-min time period.  Call 911 if no relief after the 3rd sl. 20   Ar Automatic Reconciliation   pantoprazole (PROTONIX) 40 MG tablet Take 40 mg by mouth every morning (before breakfast)  Patient not taking: No sig reported 4/3/22   Ar Automatic Reconciliation   rOPINIRole (REQUIP) 0.5 MG tablet 1 nightly, increase to 1 twice a day if needed 3/3/22   Ar Automatic Reconciliation   tamsulosin (FLOMAX) 0.4 MG capsule Take 0.4 mg by mouth daily 22   Ar Automatic Reconciliation       Current medications:    Current Facility-Administered Medications   Medication Dose Route Frequency Provider Last Rate Last Admin    lactated ringers infusion   IntraVENous Continuous Parveen Chua  mL/hr at 22 1105 NoRateChange at 08/05/22 1105    cefTRIAXone (ROCEPHIN) 1,000 mg in sodium chloride 0.9 % 50 mL IVPB mini-bag  1,000 mg IntraVENous Q24H Waynetta Aase, MD        0.9 % sodium chloride infusion   IntraVENous PRN Raven Zavala DO        sodium chloride flush 0.9 % injection 5-40 mL  5-40 mL IntraVENous 2 times per day Sonia Birch MD   10 mL at 08/05/22 0926    sodium chloride flush 0.9 % injection 5-40 mL  5-40 mL IntraVENous PRN Sonia Birch MD        0.9 % sodium chloride infusion   IntraVENous PRN Sonia Birch MD        ondansetron (ZOFRAN-ODT) disintegrating tablet 4 mg  4 mg Oral Q8H PRN Sonia Birch MD        Or    ondansetron [facility]) injection 4 mg  4 mg IntraVENous Q6H PRN Sonia Birch MD        polyethylene glycol [facility]) packet 17 g  17 g Oral Daily PRN Sonia Birch MD        acetaminophen (TYLENOL) tablet 650 mg  650 mg Oral Q6H PRN Sonia Birch MD        Or    acetaminophen (TYLENOL) suppository 650 mg  650 mg Rectal Q6H PRN Sonia Birch MD        pantoprazole (PROTONIX) 40 mg in sodium chloride (PF) 10 mL injection  40 mg IntraVENous Q12H Sonia Birch MD   40 mg at 08/05/22 0504    albuterol (PROVENTIL) nebulizer solution 2.5 mg  2.5 mg Nebulization Q6H PRN Sonia Birch MD   2.5 mg at 08/04/22 2121    temazepam (RESTORIL) capsule 15 mg  15 mg Oral Nightly PRN Alma Pham MD   15 mg at 08/04/22 2231       Allergies:  No Known Allergies    Problem List:    Patient Active Problem List   Diagnosis Code    Paroxysmal atrial fibrillation (HCC) I48.0    Pacemaker Z95.0    HTN (hypertension) I10    Dyspnea R06.00    Mitral valve regurgitation I34.0    Myasthenia gravis ([address] G70.00    Aspiration pneumonia ([address] J69.0    S/P coronary artery stent placement Z95.5    Syncope R55    Major depressive disorder, recurrent, moderate (HCC) F33.1    SSS (sick sinus syndrome) ([facility]) I49.5    Hypotension I95.9    Bilateral carotid artery disease (HCC) I77.9    GERD (gastroesophageal reflux disease) K21.9    Chest pain C24.3    Diastolic CHF, chronic (HCC) I50.32    Major depressive disorder, recurrent, mild (HCC) F33.0    Hypokalemia E87.6    Coronary atherosclerosis of native coronary vessel I25.10    Sepsis (HCC) A41.9    Dyslipidemia E78.5    Major depressive disorder, recurrent, unspecified ([address] F33.9    Atrial fibrillation ([address] I48.91    Acute cholecystitis K81.0    Pancreatitis, gallstone K85.10    Anemia D64.9    Bacteremia R78.81    Urine retention R33.9    Acute blood loss anemia D62    GIB (gastrointestinal bleeding) K92.2       Past Medical History:        Diagnosis Date    Abnormal EKG 4/22/15    Arrhythmia     CAD (coronary artery disease) 5/8/2015    Carotid artery stenosis without cerebral infarction 6/7/2016    US 6/15: <50% bilat ICAs    Coronary atherosclerosis of native coronary vessel 5/8/2015    GERMAIN on brilinta 5/7/15: prox LAD PCI, normal EF     Diastolic CHF, chronic ([address] 3/2/2022    Dyslipidemia 6/7/2016    Dyspnea 5/8/2015    Echo 6/15: EF 60%, mod MR, mod LVH, mild AI     ED (erectile dysfunction)     GERD (gastroesophageal reflux disease)     GERD (gastroesophageal reflux disease)     no medication    HTN (hypertension) 5/8/2015    Hypertension     Hypokalemia     Hypokalemia 6/7/2016    Mitral valve regurgitation 6/7/2016    Morbid obesity ([address]     [facility]     Myasthenia gravis ([facility] 6/17/15    Nocturia     Osteoporosis     PUD (peptic ulcer disease) 25 yrs ago    S/P coronary artery stent placement 5/8/2015    3.0x38 mm Xience CAROL to pLAD 5/7/15     Sleep apnea     Syncope and collapse     Unspecified sleep apnea     no cpap       Past Surgical History:        Procedure Laterality Date    APPENDECTOMY      CARDIAC CATHETERIZATION  05/21/2019    watchman device    CARDIAC CATHETERIZATION  05/27/2015    stent    COLONOSCOPY  2007    ERCP  3/30/2022         HEMORRHOID SURGERY      PACEMAKER  2018   Consuelo Kraus/Giana for sleep apnea and reconstruction for extending jaw    VASCULAR SURGERY Right 07/10/2019     Repair of right radial artery pseudoaneurysm       Social History:    Social History     Tobacco Use    Smoking status: Never    Smokeless tobacco: Never   Substance Use Topics    Alcohol use: No                                Counseling given: Not Answered      Vital Signs (Current):   Vitals:    08/05/22 0325 08/05/22 0745 08/05/22 0830 08/05/22 1012   BP: 126/72 133/74  139/75   Pulse: 72 74 75 66   Resp: 17 24  16   Temp: 98.1 °F (36.7 °C) 98 °F (36.7 °C)  97.9 °F (36.6 °C)   TempSrc: Oral Oral  Oral   SpO2: 98% 95%  97%   Weight:    190 lb (86.2 kg)   Height:    5' 8\" (1.727 m)                                              BP Readings from Last 3 Encounters:   08/05/22 139/75   07/18/22 112/62   06/02/22 134/72       NPO Status: Time of last liquid consumption: 1000                        Time of last solid consumption: 1200                        Date of last liquid consumption: 08/04/22                        Date of last solid food consumption: 08/04/22    BMI:   Wt Readings from Last 3 Encounters:   08/05/22 190 lb (86.2 kg)   07/18/22 207 lb 12.8 oz (94.3 kg)   06/02/22 214 lb (97.1 kg)     Body mass index is 28.89 kg/m².     CBC:   Lab Results   Component Value Date/Time    WBC 10.8 08/05/2022 07:13 AM    RBC 4.08 08/05/2022 07:13 AM    HGB 8.0 08/05/2022 07:13 AM    HCT 29.2 08/05/2022 07:13 AM    MCV 71.6 08/05/2022 07:13 AM    RDW 20.5 08/05/2022 07:13 AM     08/05/2022 07:13 AM       CMP:   Lab Results   Component Value Date/Time     08/05/2022 07:13 AM    K 4.0 08/05/2022 07:13 AM     08/05/2022 07:13 AM    CO2 26 08/05/2022 07:13 AM    BUN 12 08/05/2022 07:13 AM    CREATININE 0.80 08/05/2022 07:13 AM    GFRAA >60 08/05/2022 07:13 AM    AGRATIO 0.7 03/30/2022 06:48 AM    LABGLOM >60 08/05/2022 07:13 AM    GLUCOSE 103 08/05/2022 07:13 AM    PROT 6.4 08/04/2022 03:44 PM    CALCIUM 8.6 08/05/2022 07:13 AM    BILITOT 0.2 08/04/2022 03:44 PM    ALKPHOS 102 08/04/2022 03:44 PM    ALKPHOS 126 03/30/2022 06:48 AM    AST 20 08/04/2022 03:44 PM    ALT 19 08/04/2022 03:44 PM       POC Tests: No results for input(s): POCGLU, POCNA, POCK, POCCL, POCBUN, POCHEMO, POCHCT in the last 72 hours. Coags:   Lab Results   Component Value Date/Time    PROTIME 13.2 05/06/2021 11:53 AM    INR 1.0 05/06/2021 11:53 AM       HCG (If Applicable): No results found for: PREGTESTUR, PREGSERUM, HCG, HCGQUANT     ABGs: No results found for: PHART, PO2ART, OFD7QRO, VYF0ZUN, BEART, Q3XMPVMF     Type & Screen (If Applicable):  No results found for: LABABO, LABRH    Drug/Infectious Status (If Applicable):  No results found for: HIV, HEPCAB    COVID-19 Screening (If Applicable):   Lab Results   Component Value Date/Time    COVID19 Performed 05/03/2021 09:44 AM    COVID19 Not Detected 05/03/2021 09:44 AM           Anesthesia Evaluation  Patient summary reviewed and Nursing notes reviewed  Airway: Mallampati: II  TM distance: >3 FB   Neck ROM: full  Mouth opening: > = 3 FB   Dental:    (+) upper dentures, lower dentures and partials      Pulmonary:Negative Pulmonary ROS breath sounds clear to auscultation  (+) sleep apnea:                             Cardiovascular:Negative CV ROS  Exercise tolerance: good (>4 METS),   (+) hypertension:, valvular problems/murmurs (Moderate AI ): AI, pacemaker (SSS (sick sinus syndrome) ): pacemaker, CAD:, dysrhythmias (Dysrhythmias (paf, SSS) : atrial fibrillation): atrial fibrillation, CHF: diastolic,         Rhythm: regular  Rate: normal                 ROS comment: Dysrhythmias (paf, SSS) : atrial fibrillation  Pacemaker (Pacemaker for SSS), CAD, cardiac stents (2015 - remains on bASA) and hyperlipidemia      Echo 12/2021 -  LA: Left Atrium volume index is 44.4 mL/m2.   · AV: Aortic valve mean gradient is 10 mmHg. · TV: Right Ventricular Arterial Pressure (RVSP) is 48 mmHg. · Mild LVH  · Normal EF  · Moderate aortic insuff       Neuro/Psych:   Negative Neuro/Psych ROS  (+) neuromuscular disease (Myasthenia gravis - no recent symptoms. Not on medication): myasthenia gravis,             GI/Hepatic/Renal: Neg GI/Hepatic/Renal ROS  (+) GERD:, PUD,          ROS comment: Acute blood loss anemia -Hgb 8.0. Endo/Other: Negative Endo/Other ROS                    Abdominal:             Vascular: negative vascular ROS.  + PVD, aortic or cerebral, . Other Findings:           Anesthesia Plan      TIVA     ASA 4       Induction: intravenous. Anesthetic plan and risks discussed with patient. Plan discussed with CRNA.                     Naomi Rodríguez MD   8/5/2022

## 2022-08-05 NOTE — CONSULTS
[facility] Consult Note           Referring Physician:     Consult Date: 8/4/2022    Reason for Consult: Melena, ABLA    History of Present Illness:  Patient is a 79 y.o. male with PMH afib a s/p Watchman and SSS s/p pacemaker who is seen in consultation for melena and ABLA. Hgb has been chronically low but now has fallen to 6.5. Having few days of melena. Had similar episode back in 3/22 when was hospitalized here and had EGD that only found hiatal hernia. He has a repeat EGD scheduled next month. He says his last colonoscopy was 20 yrs ago. He has had some n/v and mild heartburn but no abdominal pain. He is not on anticoagulation. Just on ASA. Also on Protonix 40 qd. [facility] gastric cancer in father.         Past Medical History:   Diagnosis Date    Abnormal EKG 4/22/15    Arrhythmia     CAD (coronary artery disease) 5/8/2015    Carotid artery stenosis without cerebral infarction 6/7/2016    US 6/15: <50% bilat ICAs    Coronary atherosclerosis of native coronary vessel 5/8/2015    GERMAIN on brilinta 5/7/15: prox LAD PCI, normal EF     Diastolic CHF, chronic ([address] 3/2/2022    Dyslipidemia 6/7/2016    Dyspnea 5/8/2015    Echo 6/15: EF 60%, mod MR, mod LVH, mild AI     ED (erectile dysfunction)     GERD (gastroesophageal reflux disease)     GERD (gastroesophageal reflux disease)     no medication    HTN (hypertension) 5/8/2015    Hypertension     Hypokalemia     Hypokalemia 6/7/2016    Mitral valve regurgitation 6/7/2016    Morbid obesity ([address]     Myasthenia gravis ([address]     Myasthenia gravis ([address] 6/17/15    Nocturia     Osteoporosis     PUD (peptic ulcer disease) 25 yrs ago    S/P coronary artery stent placement 5/8/2015    3.0x38 mm Xience CAROL to pLAD 5/7/15     Sleep apnea     Syncope and collapse     Unspecified sleep apnea     no cpap      Past Surgical History:   Procedure Laterality Date    APPENDECTOMY      CARDIAC CATHETERIZATION  05/21/2019    watchman device    CARDIAC CATHETERIZATION 05/27/2015    stent    COLONOSCOPY  2007    ERCP  3/30/2022         HEMORRHOID SURGERY      PACEMAKER  2018    TONSILLECTOMY  1999    Dr. Kraus/Giana for sleep apnea and reconstruction for extending jaw    VASCULAR SURGERY Right 07/10/2019     Repair of right radial artery pseudoaneurysm      Family History   Problem Relation Age of Onset    No Known Problems Brother     Cancer Brother         brain tumor    No Known Problems Sister     Cancer Mother         kidney    Cancer Father         stomach     Social History     Occupational History    Not on file   Tobacco Use    Smoking status: Never    Smokeless tobacco: Never   Substance and Sexual Activity    Alcohol use: No    Drug use: No    Sexual activity: Not on file       Hospital Medications:  Current Facility-Administered Medications   Medication Dose Route Frequency    0.9 % sodium chloride infusion   IntraVENous PRN    sodium chloride flush 0.9 % injection 5-40 mL  5-40 mL IntraVENous 2 times per day    sodium chloride flush 0.9 % injection 5-40 mL  5-40 mL IntraVENous PRN    0.9 % sodium chloride infusion   IntraVENous PRN    ondansetron (ZOFRAN-ODT) disintegrating tablet 4 mg  4 mg Oral Q8H PRN    Or    ondansetron (ZOFRAN) injection 4 mg  4 mg IntraVENous Q6H PRN    polyethylene glycol (GLYCOLAX) packet 17 g  17 g Oral Daily PRN    acetaminophen (TYLENOL) tablet 650 mg  650 mg Oral Q6H PRN    Or    acetaminophen (TYLENOL) suppository 650 mg  650 mg Rectal Q6H PRN    pantoprazole (PROTONIX) 40 mg in sodium chloride (PF) 10 mL injection  40 mg IntraVENous Q12H    albuterol (PROVENTIL) nebulizer solution 2.5 mg  2.5 mg Nebulization Q6H PRN     Current Outpatient Medications   Medication Sig    potassium chloride (KLOR-CON M) 20 MEQ extended release tablet Take 1 tablet by mouth daily    aspirin 81 MG EC tablet Take 81 mg by mouth daily    atorvastatin (LIPITOR) 80 MG tablet Take 80 mg by mouth    cyanocobalamin 1000 MCG tablet Take 1,000 mcg by mouth daily (Patient not taking: Reported on 7/18/2022)    escitalopram (LEXAPRO) 10 MG tablet Take 10 mg by mouth daily (Patient not taking: Reported on 7/18/2022)    furosemide (LASIX) 40 MG tablet Take 40 mg by mouth daily    nitroGLYCERIN (NITROSTAT) 0.4 MG SL tablet Place 1 sl under the tongue q 5 min prn cp, max 3 sl in a 15-min time period. Call 911 if no relief after the 3rd sl.    pantoprazole (PROTONIX) 40 MG tablet Take 40 mg by mouth every morning (before breakfast) (Patient not taking: Reported on 7/18/2022)    rOPINIRole (REQUIP) 0.5 MG tablet 1 nightly, increase to 1 twice a day if needed    tamsulosin (FLOMAX) 0.4 MG capsule Take 0.4 mg by mouth daily       Allergies:  No Known Allergies    Review of Systems:  A comprehensive review of systems was negative except for that written in the History of Present Illness. Objective:     Physical Exam:  Vitals:  Visit Vitals  BP (!) 143/86   Pulse 75   Temp 98.3 °F (36.8 °C)   Resp 20   Ht 5' 8\" (1.727 m)   Wt 198 lb (89.8 kg)   SpO2 98%   BMI 30.11 kg/m²       General: No acute distress. Skin:  Extremities and face reveal no rashes. No cody erythema. No telangiectasias on the chest wall. HEENT: Sclerae anicteric. No oral ulcers. No abnormal pigmentation of the lips. The neck is supple. Cardiovascular: Regular rate and rhythm. No murmurs, gallops, or rubs. Respiratory:  Comfortable breathing  With no accessory muscle use. Clear breath sounds with no wheezes, rales, or rhonchi. GI:  Abdomen nondistended, soft, and nontender. Normal active bowel sounds. No enlargement of the liver or spleen. No masses palpable. Musculoskeletal:  No pitting edema of the lower legs. Extremities have good range of motion. Neurological:  Gross memory appears intact. Patient is alert and oriented. Psychiatric:  Mood appears appropriate with judgement intact. Lymphatic:  No cervical or supraclavicular adenopathy.     Laboratory:    Recent Labs     08/04/22  1544   WBC 12.8* RBC 3.57*   HGB 6.5*   HCT 24.2*         No results for input(s): GLU, NA, K, CL, CO2, BUN, CREA, CA in the last 72 hours. No results for input(s): INR, APTT in the last 72 hours. Invalid input(s): PTP  No results for input(s): ALB, TP, AML in the last 72 hours. Invalid input(s): TBIL, CBIL, SGOT, GPT, AP, LPSE    Assessment:       A 79 y.o. male with melena and hgb 6.5. Concern for upper GI bleed. Plan:       Agree with blood transfusion- has going in now  EGD tomorrow  PPI  NPO after midnight  If EGD negative might need to consider colonoscopy      Signed By: Yves Moe.  MD Scott     August 4, 2022

## 2022-08-05 NOTE — CARE COORDINATION
Chart reviewed by CM. Patient is a 79year old male, admitted with Acute Blood Loss Anemia. CM met with patient to discuss discharge planning. Patient getting ready for EGD. Patient advised CM to speak with son, for remaining information. Demographics verified by Antonia Iniguez [phone]. Patient lives with his spouse Lorrayne Opitz [phone] in a one level home with no steps. At baseline, patient is independent with completing ADL's and requires an assistive device such as a cane. Patient drives occassionally. Patient insured and son denies any challenges with obtaining medications in the community. PCP: Patient to establish care with Lacie Finn. Discharge planning: PT/OT has not been consulted. Patient has a history of Home Health and [place]. No concerns mentioned, with current mobility. Patient to return home with spouse at discharge. Please consult CM if needs arise. CM following. 08/05/22 1046   Service Assessment   Patient Orientation Alert and Oriented   Cognition Alert   History Provided By Patient; Child/Family   Primary Caregiver Self   Support Systems Spouse/Significant Other;Children   Patient's Healthcare Decision Maker is: Legal Next of Kin   PCP Verified by CM Yes   Last Visit to PCP   (Patient transferring to a new PCP Office.)   Prior Functional Level Independent in ADLs/IADLs   Can patient return to prior living arrangement Yes   Ability to make needs known: Good   Social/Functional History   Lives With Spouse   Type of [address] [facility] One level   Home Access Level entry   [address] [facility]  (walking stick.)   ADL Assistance Independent   Active  No   Patient's  Info Family provides transportation. Patient is able to drive.    Mode of Transportation Car   Occupation Retired   Discharge Planning   Type of [address] Prior To Admission None Potential Assistance Needed Home Care   DME Ordered? No   Potential Assistance Purchasing Medications No   Type of Home Care Services None   Services At/After Discharge   Transition of Care Consult (CM Consult) Discharge [address] Provided? No   Mode of Transport at Discharge Other (see comment)  (Family.)   Confirm Follow Up Transport Family   Condition of Participation: Discharge Planning   The Plan for Transition of Care is related to the following treatment goals: Return to prior level of functioning.

## 2022-08-05 NOTE — INTERVAL H&P NOTE
Update History & Physical    The Patient's History and Physical attached below was reviewed with the patient and I examined the patient. There was no change in the plan, or pertinent findings. The surgical site was confirmed with the patient. Plan:  The risk, benefits, expected outcome, and alternative to the recommended procedure have been discussed with the patient. Patient understands and wants to proceed with the procedure.     Electronically signed by Angelina Lopez MD

## 2022-08-06 ENCOUNTER — APPOINTMENT (OUTPATIENT)
Dept: CT IMAGING | Age: 82
DRG: 330 | End: 2022-08-06
Payer: MEDICARE

## 2022-08-06 ENCOUNTER — ANESTHESIA (OUTPATIENT)
Dept: ENDOSCOPY | Age: 82
DRG: 330 | End: 2022-08-06
Payer: MEDICARE

## 2022-08-06 LAB
ALBUMIN SERPL-MCNC: 3.1 G/DL (ref 3.2–4.6)
ALBUMIN/GLOB SERPL: 0.9 {RATIO} (ref 1.2–3.5)
ALP SERPL-CCNC: 114 U/L (ref 50–136)
ALT SERPL-CCNC: 22 U/L (ref 12–65)
ANION GAP SERPL CALC-SCNC: 6 MMOL/L (ref 7–16)
AST SERPL-CCNC: 19 U/L (ref 15–37)
BASOPHILS # BLD: 0.1 K/UL (ref 0–0.2)
BASOPHILS NFR BLD: 1 % (ref 0–2)
BILIRUB SERPL-MCNC: 0.5 MG/DL (ref 0.2–1.1)
BUN SERPL-MCNC: 8 MG/DL (ref 8–23)
CALCIUM SERPL-MCNC: 9 MG/DL (ref 8.3–10.4)
CHLORIDE SERPL-SCNC: 110 MMOL/L (ref 98–107)
CO2 SERPL-SCNC: 25 MMOL/L (ref 21–32)
CREAT SERPL-MCNC: 0.8 MG/DL (ref 0.8–1.5)
CRP SERPL-MCNC: 1 MG/DL (ref 0–0.9)
DIFFERENTIAL METHOD BLD: ABNORMAL
EOSINOPHIL # BLD: 0.4 K/UL (ref 0–0.8)
EOSINOPHIL NFR BLD: 4 % (ref 0.5–7.8)
ERYTHROCYTE [DISTWIDTH] IN BLOOD BY AUTOMATED COUNT: 20.9 % (ref 11.9–14.6)
FERRITIN SERPL-MCNC: 8 NG/ML (ref 8–388)
FOLATE SERPL-MCNC: 28.7 NG/ML (ref 3.1–17.5)
GLOBULIN SER CALC-MCNC: 3.5 G/DL (ref 2.3–3.5)
GLUCOSE SERPL-MCNC: 99 MG/DL (ref 65–100)
HCT VFR BLD AUTO: 29.9 % (ref 41.1–50.3)
HGB BLD-MCNC: 8.2 G/DL (ref 13.6–17.2)
IMM GRANULOCYTES # BLD AUTO: 0.1 K/UL (ref 0–0.5)
IMM GRANULOCYTES NFR BLD AUTO: 1 % (ref 0–5)
LACTATE SERPL-SCNC: 1 MMOL/L (ref 0.4–2)
LACTATE SERPL-SCNC: 1.9 MMOL/L (ref 0.4–2)
LYMPHOCYTES # BLD: 2.7 K/UL (ref 0.5–4.6)
LYMPHOCYTES NFR BLD: 26 % (ref 13–44)
MAGNESIUM SERPL-MCNC: 2.2 MG/DL (ref 1.8–2.4)
MCH RBC QN AUTO: 19.3 PG (ref 26.1–32.9)
MCHC RBC AUTO-ENTMCNC: 27.4 G/DL (ref 31.4–35)
MCV RBC AUTO: 70.4 FL (ref 79.6–97.8)
MONOCYTES # BLD: 0.9 K/UL (ref 0.1–1.3)
MONOCYTES NFR BLD: 8 % (ref 4–12)
NEUTS SEG # BLD: 6.4 K/UL (ref 1.7–8.2)
NEUTS SEG NFR BLD: 61 % (ref 43–78)
NRBC # BLD: 0 K/UL (ref 0–0.2)
PLATELET # BLD AUTO: 266 K/UL (ref 150–450)
PMV BLD AUTO: 9.1 FL (ref 9.4–12.3)
POTASSIUM SERPL-SCNC: 3.5 MMOL/L (ref 3.5–5.1)
PROCALCITONIN SERPL-MCNC: <0.05 NG/ML (ref 0–0.49)
PROT SERPL-MCNC: 6.6 G/DL (ref 6.3–8.2)
RBC # BLD AUTO: 4.25 M/UL (ref 4.23–5.6)
SODIUM SERPL-SCNC: 141 MMOL/L (ref 136–145)
VIT B12 SERPL-MCNC: 466 PG/ML (ref 193–986)
WBC # BLD AUTO: 10.5 K/UL (ref 4.3–11.1)

## 2022-08-06 PROCEDURE — 6360000004 HC RX CONTRAST MEDICATION: Performed by: INTERNAL MEDICINE

## 2022-08-06 PROCEDURE — 3609010600 HC COLONOSCOPY POLYPECTOMY SNARE/COLD BIOPSY: Performed by: INTERNAL MEDICINE

## 2022-08-06 PROCEDURE — A4216 STERILE WATER/SALINE, 10 ML: HCPCS | Performed by: INTERNAL MEDICINE

## 2022-08-06 PROCEDURE — 7100000011 HC PHASE II RECOVERY - ADDTL 15 MIN: Performed by: INTERNAL MEDICINE

## 2022-08-06 PROCEDURE — 80053 COMPREHEN METABOLIC PANEL: CPT

## 2022-08-06 PROCEDURE — 94760 N-INVAS EAR/PLS OXIMETRY 1: CPT

## 2022-08-06 PROCEDURE — 1100000003 HC PRIVATE W/ TELEMETRY

## 2022-08-06 PROCEDURE — 3700000000 HC ANESTHESIA ATTENDED CARE: Performed by: INTERNAL MEDICINE

## 2022-08-06 PROCEDURE — 71260 CT THORAX DX C+: CPT

## 2022-08-06 PROCEDURE — 6370000000 HC RX 637 (ALT 250 FOR IP): Performed by: INTERNAL MEDICINE

## 2022-08-06 PROCEDURE — 2580000003 HC RX 258: Performed by: ANESTHESIOLOGY

## 2022-08-06 PROCEDURE — 83605 ASSAY OF LACTIC ACID: CPT

## 2022-08-06 PROCEDURE — 6360000002 HC RX W HCPCS: Performed by: INTERNAL MEDICINE

## 2022-08-06 PROCEDURE — 2709999900 HC NON-CHARGEABLE SUPPLY: Performed by: INTERNAL MEDICINE

## 2022-08-06 PROCEDURE — 94640 AIRWAY INHALATION TREATMENT: CPT

## 2022-08-06 PROCEDURE — 76937 US GUIDE VASCULAR ACCESS: CPT

## 2022-08-06 PROCEDURE — 2580000003 HC RX 258: Performed by: INTERNAL MEDICINE

## 2022-08-06 PROCEDURE — 0DBK8ZZ EXCISION OF ASCENDING COLON, VIA NATURAL OR ARTIFICIAL OPENING ENDOSCOPIC: ICD-10-PCS | Performed by: INTERNAL MEDICINE

## 2022-08-06 PROCEDURE — 7100000010 HC PHASE II RECOVERY - FIRST 15 MIN: Performed by: INTERNAL MEDICINE

## 2022-08-06 PROCEDURE — 36415 COLL VENOUS BLD VENIPUNCTURE: CPT

## 2022-08-06 PROCEDURE — 85025 COMPLETE CBC W/AUTO DIFF WBC: CPT

## 2022-08-06 PROCEDURE — 84145 PROCALCITONIN (PCT): CPT

## 2022-08-06 PROCEDURE — 88305 TISSUE EXAM BY PATHOLOGIST: CPT

## 2022-08-06 PROCEDURE — 87040 BLOOD CULTURE FOR BACTERIA: CPT

## 2022-08-06 PROCEDURE — 6360000002 HC RX W HCPCS: Performed by: NURSE ANESTHETIST, CERTIFIED REGISTERED

## 2022-08-06 PROCEDURE — C9113 INJ PANTOPRAZOLE SODIUM, VIA: HCPCS | Performed by: INTERNAL MEDICINE

## 2022-08-06 PROCEDURE — 0DBH8ZX EXCISION OF CECUM, VIA NATURAL OR ARTIFICIAL OPENING ENDOSCOPIC, DIAGNOSTIC: ICD-10-PCS | Performed by: INTERNAL MEDICINE

## 2022-08-06 PROCEDURE — 3700000001 HC ADD 15 MINUTES (ANESTHESIA): Performed by: INTERNAL MEDICINE

## 2022-08-06 PROCEDURE — 83735 ASSAY OF MAGNESIUM: CPT

## 2022-08-06 PROCEDURE — 86140 C-REACTIVE PROTEIN: CPT

## 2022-08-06 RX ORDER — SODIUM CHLORIDE 0.9 % (FLUSH) 0.9 %
5-40 SYRINGE (ML) INJECTION PRN
Status: CANCELLED | OUTPATIENT
Start: 2022-08-06

## 2022-08-06 RX ORDER — SODIUM CHLORIDE, SODIUM LACTATE, POTASSIUM CHLORIDE, CALCIUM CHLORIDE 600; 310; 30; 20 MG/100ML; MG/100ML; MG/100ML; MG/100ML
INJECTION, SOLUTION INTRAVENOUS CONTINUOUS
Status: CANCELLED | OUTPATIENT
Start: 2022-08-06

## 2022-08-06 RX ORDER — ASPIRIN 81 MG/1
81 TABLET ORAL DAILY
Status: DISCONTINUED | OUTPATIENT
Start: 2022-08-06 | End: 2022-08-19 | Stop reason: HOSPADM

## 2022-08-06 RX ORDER — LIDOCAINE HYDROCHLORIDE 10 MG/ML
1 INJECTION, SOLUTION INFILTRATION; PERINEURAL
Status: CANCELLED | OUTPATIENT
Start: 2022-08-06 | End: 2022-08-06

## 2022-08-06 RX ORDER — ONDANSETRON 2 MG/ML
4 INJECTION INTRAMUSCULAR; INTRAVENOUS
Status: CANCELLED | OUTPATIENT
Start: 2022-08-06 | End: 2022-08-06

## 2022-08-06 RX ORDER — ATORVASTATIN CALCIUM 80 MG/1
80 TABLET, FILM COATED ORAL NIGHTLY
Status: DISCONTINUED | OUTPATIENT
Start: 2022-08-06 | End: 2022-08-19 | Stop reason: HOSPADM

## 2022-08-06 RX ORDER — SODIUM CHLORIDE 0.9 % (FLUSH) 0.9 %
5-40 SYRINGE (ML) INJECTION EVERY 12 HOURS SCHEDULED
Status: CANCELLED | OUTPATIENT
Start: 2022-08-06

## 2022-08-06 RX ORDER — ROPINIROLE 0.5 MG/1
0.5 TABLET, FILM COATED ORAL NIGHTLY
Status: DISCONTINUED | OUTPATIENT
Start: 2022-08-06 | End: 2022-08-19 | Stop reason: HOSPADM

## 2022-08-06 RX ORDER — PROPOFOL 10 MG/ML
INJECTION, EMULSION INTRAVENOUS CONTINUOUS PRN
Status: DISCONTINUED | OUTPATIENT
Start: 2022-08-06 | End: 2022-08-06 | Stop reason: SDUPTHER

## 2022-08-06 RX ORDER — HALOPERIDOL 5 MG/ML
1 INJECTION INTRAMUSCULAR
Status: CANCELLED | OUTPATIENT
Start: 2022-08-06 | End: 2022-08-06

## 2022-08-06 RX ORDER — FUROSEMIDE 40 MG/1
40 TABLET ORAL DAILY
Status: DISCONTINUED | OUTPATIENT
Start: 2022-08-06 | End: 2022-08-19 | Stop reason: HOSPADM

## 2022-08-06 RX ORDER — SODIUM CHLORIDE 9 MG/ML
INJECTION, SOLUTION INTRAVENOUS PRN
Status: CANCELLED | OUTPATIENT
Start: 2022-08-06

## 2022-08-06 RX ADMIN — IOPAMIDOL 100 ML: 755 INJECTION, SOLUTION INTRAVENOUS at 13:28

## 2022-08-06 RX ADMIN — SODIUM CHLORIDE, PRESERVATIVE FREE 40 MG: 5 INJECTION INTRAVENOUS at 05:34

## 2022-08-06 RX ADMIN — SODIUM CHLORIDE, POTASSIUM CHLORIDE, SODIUM LACTATE AND CALCIUM CHLORIDE: 600; 310; 30; 20 INJECTION, SOLUTION INTRAVENOUS at 05:37

## 2022-08-06 RX ADMIN — ATORVASTATIN CALCIUM 80 MG: 80 TABLET, FILM COATED ORAL at 21:21

## 2022-08-06 RX ADMIN — PROPOFOL 120 MCG/KG/MIN: 10 INJECTION, EMULSION INTRAVENOUS at 10:10

## 2022-08-06 RX ADMIN — SODIUM CHLORIDE, PRESERVATIVE FREE 10 ML: 5 INJECTION INTRAVENOUS at 08:15

## 2022-08-06 RX ADMIN — ALBUTEROL SULFATE 2.5 MG: 2.5 SOLUTION RESPIRATORY (INHALATION) at 09:05

## 2022-08-06 RX ADMIN — SODIUM CHLORIDE, PRESERVATIVE FREE 40 MG: 5 INJECTION INTRAVENOUS at 17:23

## 2022-08-06 RX ADMIN — DIATRIZOATE MEGLUMINE AND DIATRIZOATE SODIUM 15 ML: 660; 100 LIQUID ORAL; RECTAL at 12:13

## 2022-08-06 RX ADMIN — ROPINIROLE HYDROCHLORIDE 0.5 MG: 0.5 TABLET, FILM COATED ORAL at 21:21

## 2022-08-06 RX ADMIN — SODIUM CHLORIDE, PRESERVATIVE FREE 10 ML: 5 INJECTION INTRAVENOUS at 21:22

## 2022-08-06 RX ADMIN — ALBUTEROL SULFATE 2.5 MG: 2.5 SOLUTION RESPIRATORY (INHALATION) at 15:26

## 2022-08-06 RX ADMIN — CEFTRIAXONE 1000 MG: 1 INJECTION, POWDER, FOR SOLUTION INTRAMUSCULAR; INTRAVENOUS at 13:46

## 2022-08-06 ASSESSMENT — PAIN - FUNCTIONAL ASSESSMENT: PAIN_FUNCTIONAL_ASSESSMENT: NONE - DENIES PAIN

## 2022-08-06 ASSESSMENT — ENCOUNTER SYMPTOMS: SHORTNESS OF BREATH: 1

## 2022-08-06 ASSESSMENT — PAIN SCALES - GENERAL: PAINLEVEL_OUTOF10: 0

## 2022-08-06 NOTE — ANESTHESIA PRE PROCEDURE
Provider Last Rate Last Admin    lactated ringers infusion   IntraVENous Continuous Catalina Ramírez  mL/hr at 08/06/22 0537 New Bag at 08/06/22 0537    cefTRIAXone (ROCEPHIN) 1,000 mg in sodium chloride 0.9 % 50 mL IVPB mini-bag  1,000 mg IntraVENous Q24H Julián Saravia MD   Stopped at 08/05/22 1339    0.9 % sodium chloride infusion   IntraVENous PRN Andreas Zavala DO        sodium chloride flush 0.9 % injection 5-40 mL  5-40 mL IntraVENous 2 times per day Cayla Belle MD   10 mL at 08/05/22 2006    sodium chloride flush 0.9 % injection 5-40 mL  5-40 mL IntraVENous PRN Cayla Belle MD        0.9 % sodium chloride infusion   IntraVENous PRN Cayla Belle MD        ondansetron (ZOFRAN-ODT) disintegrating tablet 4 mg  4 mg Oral Q8H PRN Cayla Belle MD        Or    ondansetron [facility]) injection 4 mg  4 mg IntraVENous Q6H PRN Cayla Belle MD        polyethylene glycol [facility]) packet 17 g  17 g Oral Daily PRN Cayla Belle MD        acetaminophen (TYLENOL) tablet 650 mg  650 mg Oral Q6H PRN Cayla Belle MD   650 mg at 08/05/22 2258    Or    acetaminophen (TYLENOL) suppository 650 mg  650 mg Rectal Q6H PRN Cayla Belle MD        pantoprazole (PROTONIX) 40 mg in sodium chloride (PF) 10 mL injection  40 mg IntraVENous Q12H Cayla Belle MD   40 mg at 08/06/22 0534    albuterol (PROVENTIL) nebulizer solution 2.5 mg  2.5 mg Nebulization Q6H PRN Cayla Belle MD   2.5 mg at 08/06/22 8787    temazepam (RESTORIL) capsule 15 mg  15 mg Oral Nightly PRN Robbi Corona MD   15 mg at 08/05/22 2256       Allergies:  No Known Allergies    Problem List:    Patient Active Problem List   Diagnosis Code    Paroxysmal atrial fibrillation (HCC) I48.0    Pacemaker Z95.0    HTN (hypertension) I10    Dyspnea R06.00    Mitral valve regurgitation I34.0    Myasthenia gravis ([address] G70.00    Aspiration pneumonia ([facility] J69.0    S/P coronary artery stent placement Z95.5    Syncope R55    Major depressive disorder, recurrent, moderate (HCC) F33.1    SSS (sick sinus syndrome) (HCC) I49.5    Hypotension I95.9    Bilateral carotid artery disease (HCC) I77.9    GERD (gastroesophageal reflux disease) K21.9    Chest pain Y71.9    Diastolic CHF, chronic (HCC) I50.32    Major depressive disorder, recurrent, mild (HCC) F33.0    Hypokalemia E87.6    Coronary atherosclerosis of native coronary vessel I25.10    Sepsis (HCC) A41.9    Dyslipidemia E78.5    Major depressive disorder, recurrent, unspecified (HCC) F33.9    Atrial fibrillation (HCC) I48.91    Acute cholecystitis K81.0    Pancreatitis, gallstone K85.10    Anemia D64.9    Bacteremia R78.81    Urine retention R33.9    Acute blood loss anemia D62    GIB (gastrointestinal bleeding) K92.2       Past Medical History:        Diagnosis Date    Abnormal EKG 4/22/15    Arrhythmia     CAD (coronary artery disease) 5/8/2015    Carotid artery stenosis without cerebral infarction 6/7/2016    US 6/15: <50% bilat ICAs    Coronary atherosclerosis of native coronary vessel 5/8/2015    GERMAIN on brilinta 5/7/15: prox LAD PCI, normal EF     Diastolic CHF, chronic ([facility] 3/2/2022    Dyslipidemia 6/7/2016    Dyspnea 5/8/2015    Echo 6/15: EF 60%, mod MR, mod LVH, mild AI     ED (erectile dysfunction)     GERD (gastroesophageal reflux disease)     GERD (gastroesophageal reflux disease)     no medication    HTN (hypertension) 5/8/2015    Hypertension     Hypokalemia     Hypokalemia 6/7/2016    Mitral valve regurgitation 6/7/2016    Morbid obesity ([address]     Myasthenia gravis ([facility]     Myasthenia gravis ([facility] 6/17/15    Nocturia     Osteoporosis     PUD (peptic ulcer disease) 25 yrs ago    S/P coronary artery stent placement 5/8/2015    3.0x38 mm Xience CAROL to pLAD 5/7/15     Sleep apnea     Syncope and collapse     Unspecified sleep apnea     no cpap       Past Surgical History: Procedure Laterality Date    APPENDECTOMY      CARDIAC CATHETERIZATION  05/21/2019    watchman device    CARDIAC CATHETERIZATION  05/27/2015    stent    COLONOSCOPY  2007    ERCP  3/30/2022         HEMORRHOID SURGERY      PACEMAKER  2018   Tennille Kraus/Giana for sleep apnea and reconstruction for extending jaw    VASCULAR SURGERY Right 07/10/2019     Repair of right radial artery pseudoaneurysm       Social History:    Social History     Tobacco Use    Smoking status: Never    Smokeless tobacco: Never   Substance Use Topics    Alcohol use: No                                Counseling given: Not Answered      Vital Signs (Current): There were no vitals filed for this visit.                                            BP Readings from Last 3 Encounters:   08/06/22 (!) 174/80   07/18/22 112/62   06/02/22 134/72       NPO Status:                                                                                 BMI:   Wt Readings from Last 3 Encounters:   08/06/22 190 lb (86.2 kg)   07/18/22 207 lb 12.8 oz (94.3 kg)   06/02/22 214 lb (97.1 kg)     There is no height or weight on file to calculate BMI.    CBC:   Lab Results   Component Value Date/Time    WBC 10.5 08/06/2022 05:32 AM    RBC 4.25 08/06/2022 05:32 AM    HGB 8.2 08/06/2022 05:32 AM    HCT 29.9 08/06/2022 05:32 AM    MCV 70.4 08/06/2022 05:32 AM    RDW 20.9 08/06/2022 05:32 AM     08/06/2022 05:32 AM       CMP:   Lab Results   Component Value Date/Time     08/06/2022 05:32 AM    K 3.5 08/06/2022 05:32 AM     08/06/2022 05:32 AM    CO2 25 08/06/2022 05:32 AM    BUN 8 08/06/2022 05:32 AM    CREATININE 0.80 08/06/2022 05:32 AM    GFRAA >60 08/06/2022 05:32 AM    AGRATIO 0.7 03/30/2022 06:48 AM    LABGLOM >60 08/06/2022 05:32 AM    GLUCOSE 99 08/06/2022 05:32 AM    PROT 6.6 08/06/2022 05:32 AM    CALCIUM 9.0 08/06/2022 05:32 AM    BILITOT 0.5 08/06/2022 05:32 AM    ALKPHOS 114 08/06/2022 05:32 AM    ALKPHOS 126 03/30/2022 06:48 AM    AST 19 08/06/2022 05:32 AM    ALT 22 08/06/2022 05:32 AM       POC Tests: No results for input(s): POCGLU, POCNA, POCK, POCCL, POCBUN, POCHEMO, POCHCT in the last 72 hours. Coags:   Lab Results   Component Value Date/Time    PROTIME 13.2 05/06/2021 11:53 AM    INR 1.0 05/06/2021 11:53 AM       HCG (If Applicable): No results found for: PREGTESTUR, PREGSERUM, HCG, HCGQUANT     ABGs: No results found for: PHART, PO2ART, JDE7GQR, WTK6WPL, BEART, L6SCPUPS     Type & Screen (If Applicable):  No results found for: LABABO, LABRH    Drug/Infectious Status (If Applicable):  No results found for: HIV, HEPCAB    COVID-19 Screening (If Applicable):   Lab Results   Component Value Date/Time    COVID19 Performed 05/03/2021 09:44 AM    COVID19 Not Detected 05/03/2021 09:44 AM           Anesthesia Evaluation  Patient summary reviewed and Nursing notes reviewed  Airway: Mallampati: II  TM distance: >3 FB   Neck ROM: full  Mouth opening: > = 3 FB   Dental:    (+) upper dentures, lower dentures and partials      Pulmonary:Negative Pulmonary ROS   (+) shortness of breath:  sleep apnea:  wheezes: scattered                            Cardiovascular:Negative CV ROS  Exercise tolerance: good (>4 METS),   (+) hypertension:, valvular problems/murmurs (Moderate AI ): AI, pacemaker (SSS (sick sinus syndrome) ): pacemaker, CAD:, dysrhythmias (Dysrhythmias (paf, SSS) : atrial fibrillation): atrial fibrillation, CHF: diastolic,         Rhythm: regular  Rate: normal                 ROS comment: Dysrhythmias (paf, SSS) : atrial fibrillation  Pacemaker (Pacemaker for SSS), CAD, cardiac stents (2015 - remains on bASA) and hyperlipidemia      Echo 12/2021 -  LA: Left Atrium volume index is 44.4 mL/m2. · AV: Aortic valve mean gradient is 10 mmHg. · TV: Right Ventricular Arterial Pressure (RVSP) is 48 mmHg.   · Mild LVH  · Normal EF  · Moderate aortic insuff       Neuro/Psych:   Negative Neuro/Psych ROS  (+) neuromuscular disease (Myasthenia gravis - no recent symptoms. Not on medication): myasthenia gravis,             GI/Hepatic/Renal: Neg GI/Hepatic/Renal ROS  (+) GERD:, PUD,          ROS comment: Acute blood loss anemia -Hgb 8.0. Endo/Other: Negative Endo/Other ROS                    Abdominal:             Vascular: negative vascular ROS.  + PVD, aortic or cerebral, . Other Findings:             Anesthesia Plan      TIVA     ASA 3     (Duoneb pre-op)  Induction: intravenous. Anesthetic plan and risks discussed with patient. Plan discussed with CRNA.                     Selene Schneider MD   8/6/2022

## 2022-08-06 NOTE — PROGRESS NOTES
.  [facility] Progress Note             Date: 8/6/2022    GI Problem: Melena, ABLA    History of Present Illness:  Patient is a 79 y.o. male who is seen in consultation for melena and ABLA. Hospital Medications:  Current Facility-Administered Medications   Medication Dose Route Frequency    lactated ringers infusion   IntraVENous Continuous    cefTRIAXone (ROCEPHIN) 1,000 mg in sodium chloride 0.9 % 50 mL IVPB mini-bag  1,000 mg IntraVENous Q24H    0.9 % sodium chloride infusion   IntraVENous PRN    sodium chloride flush 0.9 % injection 5-40 mL  5-40 mL IntraVENous 2 times per day    sodium chloride flush 0.9 % injection 5-40 mL  5-40 mL IntraVENous PRN    0.9 % sodium chloride infusion   IntraVENous PRN    ondansetron (ZOFRAN-ODT) disintegrating tablet 4 mg  4 mg Oral Q8H PRN    Or    ondansetron (ZOFRAN) injection 4 mg  4 mg IntraVENous Q6H PRN    polyethylene glycol (GLYCOLAX) packet 17 g  17 g Oral Daily PRN    acetaminophen (TYLENOL) tablet 650 mg  650 mg Oral Q6H PRN    Or    acetaminophen (TYLENOL) suppository 650 mg  650 mg Rectal Q6H PRN    pantoprazole (PROTONIX) 40 mg in sodium chloride (PF) 10 mL injection  40 mg IntraVENous Q12H    albuterol (PROVENTIL) nebulizer solution 2.5 mg  2.5 mg Nebulization Q6H PRN    temazepam (RESTORIL) capsule 15 mg  15 mg Oral Nightly PRN       Objective:     Physical Exam:  Vitals:  Visit Vitals  BP (!) 174/80   Pulse 75   Temp 98.1 °F (36.7 °C) (Oral)   Resp 18   Ht 5' 8\" (1.727 m)   Wt 190 lb (86.2 kg)   SpO2 98%   BMI 28.89 kg/m²       General: No acute distress. Skin:  Extremities and face reveal no rashes. No cody erythema. No telangiectasias on the chest wall. HEENT: Sclerae anicteric. No oral ulcers. No abnormal pigmentation of the lips. The neck is supple. Cardiovascular: Regular rate and rhythm. No murmurs, gallops, or rubs. Respiratory:  Comfortable breathing  With no accessory muscle use.  Clear breath sounds with no wheezes, rales, or rhonchi. GI:  Abdomen nondistended, soft, and nontender. Normal active bowel sounds. No enlargement of the liver or spleen. No masses palpable. Musculoskeletal:  No pitting edema of the lower legs. Extremities have good range of motion. Neurological:  Gross memory appears intact. Patient is alert and oriented. Psychiatric:  Mood appears appropriate with judgement intact. Lymphatic:  No cervical or supraclavicular adenopathy. Laboratory:    Recent Labs     08/06/22  0532   WBC 10.5   RBC 4.25   HGB 8.2*   HCT 29.9*         Recent Labs     08/06/22  0532      K 3.5   *   CO2 25   BUN 8     No results for input(s): INR, APTT in the last 72 hours. Invalid input(s): PTP  No results for input(s): ALB, TP, AML in the last 72 hours. Invalid input(s): TBIL, CBIL, SGOT, GPT, AP, LPSE    Assessment:       A 79 y.o. male with melena, ABLA, normal EGD. Plan:       Colonoscopy    Signed By: Frank Cerna.  MD Scott     August 6, 2022

## 2022-08-06 NOTE — PROGRESS NOTES
US Guided PIV access-   Skin was cleaned and disinfected prior to IV puncture. Ultrasound was used to find the vein which was compressible and does not have any ultrasound features of an artery or nerve bundle. Under real-time ultrasound guidance peripheral access was obtained in the right forearm using 20 G 1.00\" Peripheral IV catheter . Blood return was present and IV flushed without difficulty with no clinical signs of infiltration. IV dressing applied and there were no immediate complications noted and patient tolerated the procedure well.

## 2022-08-06 NOTE — PROGRESS NOTES
END OF SHIFT SUMMARY:    Significant vitals this shift:  BP @0842 174/80  Significant labs this shift:  Hgb 8.2  Tests performed this shift:  CT abdomen, colonoscopy  Orders to be followed up on:  0  Blood products given this shift:  0  Additional events this shift:   Mass found in cecum. General surgery consult. Albuterol treatment given 2 times this shift for wheezing & shortness of breath; 0905, 1526. I/Os:  +/- this shift:   08/06 0701 - 08/06 1900  In: 850 [P.O.:600;  I.V.:250]  Out: 2   Occurrences this Shift:  Urine 3-4, BM 1, Emesis 0    Leotis Meigs, RN

## 2022-08-06 NOTE — PROGRESS NOTES
Hospitalist Progress Note   Admit Date:  2022  4:05 PM   Name:  Leopoldo Canterbury   Age:  79 y.o. Sex:  male  :  1940   MRN:  [id number]   Room:  [facility]    Presenting Complaint: Abnormal Lab     Reason(s) for Admission: Acute blood loss anemia [D62]  Anemia, unspecified type [D64.9]     Hospital Course & Interval History:     71-year-old male with a past medical history of coronary artery disease, hyperlipidemia, hypertension, GERD, heart failure with preserved ejection fraction, coronary artery disease, that presented in the setting of black stools associated with weakness and low hemoglobin. The patient states that for the past couple of days he has been presented with black stools associated with generalized weakness. He was sent for blood work today and he was called back letting him know his hemoglobin was lower than 7 so he needed to come to the emergency department for possible blood transfusion. He thinks he probably ran out of pantoprazole for the past week or 2. Otherwise he denies any nausea, no vomiting, no diarrhea, no shortness of breath, no cough. In the emergency department patient was found to be hemodynamically stable. Subjective/24hr Events (22): Pt seen and evaluated. Overnight events discussed. Pt states that he is still with some abd discomfort. Colon results noted. Assessment & Plan:     Principal Problem:    Acute blood loss anemia        IMP:    1.) ABLA/Anemia - s/p EGD with no acute findings. [place] with + cecal mass. ? Colon CA - surgery eval.    2.) Cecal Mass - surgery eval, ? CA    3.) Abn Urine - with + GPC's, ? Strep bovis with cecal mass. Start abx.    4.) Chronic LVDD/CHF - compensated    5.) CAD - ASA on hold due to above.  Resume statin    6.) GERD - PPI    7.) PAD with h/o Carotid dz - resume statin and antiplt when able    8.) HTN - resume meds    9.) Myasthenia gravis - resume meds    10.) Osteoporosis    11.) ARIN - ? CPAP, v O2 qhs    12.) Abn Urine - repeat with cx    13.) HL    14.) Debility      Plan:    F/u am cbc  Start abx  Ck BC x 2  Appreciate Dr. Frazier Dy      Discharge Planning:      Home     Diet:  ADULT DIET; Full Liquid  DVT PPx: SCD due to ABLA  Code status: Full Code    Hospital Problems:  Principal Problem:    Acute blood loss anemia  Active Problems:    GIB (gastrointestinal bleeding)    HTN (hypertension)    GERD (gastroesophageal reflux disease)    Diastolic CHF, chronic (HCC)    Dyslipidemia  Resolved Problems:    * No resolved hospital problems. *      Objective:   Patient Vitals for the past 24 hrs:   Temp Pulse Resp BP SpO2   08/06/22 1457 97.8 °F (36.6 °C) 75 20 129/68 100 %   08/06/22 1121 97.5 °F (36.4 °C) 78 18 128/76 98 %   08/06/22 1057 -- 78 16 (!) 156/79 --   08/06/22 1053 -- 75 16 (!) 154/76 --   08/06/22 1045 -- 75 16 (!) 147/67 --   08/06/22 1038 98.6 °F (37 °C) 78 14 -- 95 %   08/06/22 1030 -- 72 18 (!) 162/82 --   08/06/22 0906 -- 75 16 -- 97 %   08/06/22 0842 98.1 °F (36.7 °C) 75 18 (!) 174/80 98 %   08/06/22 0734 97.7 °F (36.5 °C) 74 20 136/69 97 %   08/06/22 0351 97.6 °F (36.4 °C) 80 22 (!) 140/85 99 %   08/05/22 2258 97.4 °F (36.3 °C) 69 20 (!) 143/80 99 %   08/05/22 2000 97.5 °F (36.4 °C) 73 22 (!) 154/87 100 %   08/05/22 1535 97.6 °F (36.4 °C) 76 20 115/73 93 %         Oxygen Therapy  SpO2: 100 %  Pulse via Oximetry: 76 beats per minute  Pulse Oximeter Device Mode: Continuous  Pulse Oximeter Device Location: Right, Finger  O2 Device: None (Room air)  O2 Flow Rate (L/min): 4 L/min    Estimated body mass index is 28.89 kg/m² as calculated from the following:    Height as of this encounter: 5' 8\" (1.727 m). Weight as of this encounter: 190 lb (86.2 kg).     Intake/Output Summary (Last 24 hours) at 8/6/2022 1514  Last data filed at 8/6/2022 1235  Gross per 24 hour   Intake 1068 ml   Output 2 ml   Net 1066 ml           Physical Exam:   Pt seen and evaluated on 8/6/2022    Blood pressure 129/68, pulse 75, temperature 97.8 °F (36.6 °C), temperature source Oral, resp. rate 20, height 5' 8\" (1.727 m), weight 190 lb (86.2 kg), SpO2 100 %. General:    Ill appearing  Head:  Normocephalic, atraumatic, + Pallor  Eyes:  Sclerae appear normal.  Pupils equally round. ENT:  Nares appear normal, no drainage. Moist oral mucosa  Neck:  No restricted ROM. Trachea midline   CV:   RRR. No m/r/g. No jugular venous distension. Lungs:   Diminished. No wheezing, rhonchi, or rales. Symmetric expansion. Abdomen: Bowel sounds present. Soft, nontender, nondistended. Extremities: No cyanosis or clubbing. No edema  Skin:     No rashes and normal coloration. Warm and dry. Neuro:  CN II-XII grossly intact. Sensation intact.   A&Ox3  Psych:  Flat      I have personally reviewed labs and tests showing:  Recent Labs:  Recent Results (from the past 48 hour(s))   CBC with Auto Differential    Collection Time: 08/04/22  3:44 PM   Result Value Ref Range    WBC 12.8 (H) 4.3 - 11.1 K/uL    RBC 3.57 (L) 4.23 - 5.6 M/uL    Hemoglobin 6.5 (LL) 13.6 - 17.2 g/dL    Hematocrit 24.2 (L) 41.1 - 50.3 %    MCV 67.8 (L) 79.6 - 97.8 FL    MCH 18.2 (L) 26.1 - 32.9 PG    MCHC 26.9 (L) 31.4 - 35.0 g/dL    RDW 18.6 (H) 11.9 - 14.6 %    Platelets 514 081 - 576 K/uL    MPV 9.0 (L) 9.4 - 12.3 FL    nRBC 0.00 0.0 - 0.2 K/uL    Differential Type AUTOMATED      Seg Neutrophils 63 43 - 78 %    Lymphocytes 23 13 - 44 %    Monocytes 9 4.0 - 12.0 %    Eosinophils % 3 0.5 - 7.8 %    Basophils 1 0.0 - 2.0 %    Immature Granulocytes 1 0.0 - 5.0 %    Segs Absolute 8.1 1.7 - 8.2 K/UL    Absolute Lymph # 3.0 0.5 - 4.6 K/UL    Absolute Mono # 1.1 0.1 - 1.3 K/UL    Absolute Eos # 0.4 0.0 - 0.8 K/UL    Basophils Absolute 0.1 0.0 - 0.2 K/UL    Absolute Immature Granulocyte 0.1 0.0 - 0.5 K/UL   Comprehensive Metabolic Panel    Collection Time: 08/04/22  3:44 PM   Result Value Ref Range    Sodium 141 136 - 145 mmol/L    Potassium 3.8 3.5 - 5.1 mmol/L    Chloride 110 (H) 98 - 107 mmol/L    CO2 22 21 - 32 mmol/L    Anion Gap 9 7 - 16 mmol/L    Glucose 105 (H) 65 - 100 mg/dL    BUN 16 8 - 23 MG/DL    Creatinine 0.90 0.8 - 1.5 MG/DL    GFR African American >60 >60 ml/min/1.73m2    GFR Non- >60 >60 ml/min/1.73m2    Calcium 8.6 8.3 - 10.4 MG/DL    Total Bilirubin 0.2 0.2 - 1.1 MG/DL    ALT 19 12 - 65 U/L    AST 20 15 - 37 U/L    Alk Phosphatase 102 50 - 136 U/L    Total Protein 6.4 6.3 - 8.2 g/dL    Albumin 3.0 (L) 3.2 - 4.6 g/dL    Globulin 3.4 2.3 - 3.5 g/dL    Albumin/Globulin Ratio 0.9 (L) 1.2 - 3.5     TYPE AND SCREEN    Collection Time: 08/04/22  3:44 PM   Result Value Ref Range    Crossmatch expiration date 08/07/2022,2359     ABO/Rh A POSITIVE     Antibody Screen NEG     Blood Bank Comment        NEENA IN ER NOTIFIED THAT BLOOD IS READY AT 1740 ON [id number] MT    Unit Number [id number]     Product Code Blood Bank RC LR     Unit Divison 00     Dispense Status Blood Bank TRANSFUSED     Crossmatch Result Compatible    PREPARE RBC (CROSSMATCH), 1 Units    Collection Time: 08/04/22  5:15 PM   Result Value Ref Range    History Check Historical check performed    Urinalysis with Reflex to Culture    Collection Time: 08/04/22  6:45 PM    Specimen: Urine   Result Value Ref Range    Color, UA YELLOW/STRAW      Appearance CLOUDY      Specific [place], UA 1.013 1.001 - 1.023      pH, Urine 6.0 5.0 - 9.0      Protein, UA Negative NEG mg/dL    Glucose, UA Negative mg/dL    Ketones, Urine Negative NEG mg/dL    Bilirubin Urine Negative NEG      Blood, Urine Negative NEG      Urobilinogen, Urine 1.0 0.2 - 1.0 EU/dL    Nitrite, Urine Negative NEG      Leukocyte Esterase, Urine MODERATE (A) NEG      WBC, UA >100 0 /hpf    RBC, UA 0-3 0 /hpf    BACTERIA, URINE 4+ (H) 0 /hpf    Urine Culture if Indicated URINE CULTURE ORDERED      Epithelial Cells UA 0 0 /hpf    Casts 0 0 /lpf    Crystals 0 0 /LPF    Mucus, UA 0 0 /lpf    Yeast, UA OCCASIONAL      OTHER OBSERVATIONS RESULTS VERIFIED MANUALLY Culture, Urine    Collection Time: 08/04/22  6:45 PM    Specimen: Urine   Result Value Ref Range    Special Requests NO SPECIAL REQUESTS  Reflexed from [id number]        Culture (A)       >100,000 COLONIES/mL GRAM POSITIVE COCCI SUBCULTURE IN PROGRESS    Culture        10,000 to 50,000 COLONIES/mL NORMAL SKIN LU ISOLATED   Hemoglobin and Hematocrit    Collection Time: 08/05/22  1:08 AM   Result Value Ref Range    Hemoglobin 8.1 (L) 13.6 - 17.2 g/dL    Hematocrit 29.8 (L) 41.1 - 50.3 %   CBC with Auto Differential    Collection Time: 08/05/22  7:13 AM   Result Value Ref Range    WBC 10.8 4.3 - 11.1 K/uL    RBC 4.08 (L) 4.23 - 5.6 M/uL    Hemoglobin 8.0 (L) 13.6 - 17.2 g/dL    Hematocrit 29.2 (L) 41.1 - 50.3 %    MCV 71.6 (L) 79.6 - 97.8 FL    MCH 19.6 (L) 26.1 - 32.9 PG    MCHC 27.4 (L) 31.4 - 35.0 g/dL    RDW 20.5 (H) 11.9 - 14.6 %    Platelets 477 760 - 059 K/uL    MPV 9.1 (L) 9.4 - 12.3 FL    nRBC 0.00 0.0 - 0.2 K/uL    Differential Type AUTOMATED      Seg Neutrophils 64 43 - 78 %    Lymphocytes 22 13 - 44 %    Monocytes 8 4.0 - 12.0 %    Eosinophils % 4 0.5 - 7.8 %    Basophils 1 0.0 - 2.0 %    Immature Granulocytes 1 0.0 - 5.0 %    Segs Absolute 7.0 1.7 - 8.2 K/UL    Absolute Lymph # 2.4 0.5 - 4.6 K/UL    Absolute Mono # 0.9 0.1 - 1.3 K/UL    Absolute Eos # 0.4 0.0 - 0.8 K/UL    Basophils Absolute 0.1 0.0 - 0.2 K/UL    Absolute Immature Granulocyte 0.1 0.0 - 0.5 K/UL   Basic Metabolic Panel    Collection Time: 08/05/22  7:13 AM   Result Value Ref Range    Sodium 142 136 - 145 mmol/L    Potassium 4.0 3.5 - 5.1 mmol/L    Chloride 110 (H) 98 - 107 mmol/L    CO2 26 21 - 32 mmol/L    Anion Gap 6 (L) 7 - 16 mmol/L    Glucose 103 (H) 65 - 100 mg/dL    BUN 12 8 - 23 MG/DL    Creatinine 0.80 0.8 - 1.5 MG/DL    GFR African American >60 >60 ml/min/1.73m2    GFR Non- >60 >60 ml/min/1.73m2    Calcium 8.6 8.3 - 10.4 MG/DL   Reticulocytes    Collection Time: 08/05/22 10:44 PM   Result Value Ref Range Reticulocyte Count,Automated 1.6 0.3 - 2.0 %    Absolute Retic # 0.0727 0.026 - 0.095 M/ul    Immature Retic Fraction 45.5 (H) 2.3 - 13.4 %    Retic Hemoglobin conc. 16 (L) 29 - 35 pg   Ferritin    Collection Time: 08/05/22 10:44 PM   Result Value Ref Range    Ferritin 8 8 - 388 NG/ML   Folate    Collection Time: 08/05/22 10:44 PM   Result Value Ref Range    Folate 28.7 (H) 3.1 - 17.5 ng/mL   Vitamin B12    Collection Time: 08/05/22 10:44 PM   Result Value Ref Range    Vitamin B-12 466 193 - 986 pg/mL   Transferrin Saturation    Collection Time: 08/05/22 10:44 PM   Result Value Ref Range    Iron 24 (L) 35 - 150 ug/dL    TIBC 500 (H) 250 - 450 ug/dL    TRANSFERRIN SATURATION 5 (L) >20 %   Lactic Acid    Collection Time: 08/06/22  5:32 AM   Result Value Ref Range    Lactic Acid, Plasma 1.0 0.4 - 2.0 MMOL/L   CBC with Auto Differential    Collection Time: 08/06/22  5:32 AM   Result Value Ref Range    WBC 10.5 4.3 - 11.1 K/uL    RBC 4.25 4.23 - 5.6 M/uL    Hemoglobin 8.2 (L) 13.6 - 17.2 g/dL    Hematocrit 29.9 (L) 41.1 - 50.3 %    MCV 70.4 (L) 79.6 - 97.8 FL    MCH 19.3 (L) 26.1 - 32.9 PG    MCHC 27.4 (L) 31.4 - 35.0 g/dL    RDW 20.9 (H) 11.9 - 14.6 %    Platelets 694 561 - 926 K/uL    MPV 9.1 (L) 9.4 - 12.3 FL    nRBC 0.00 0.0 - 0.2 K/uL    Differential Type AUTOMATED      Seg Neutrophils 61 43 - 78 %    Lymphocytes 26 13 - 44 %    Monocytes 8 4.0 - 12.0 %    Eosinophils % 4 0.5 - 7.8 %    Basophils 1 0.0 - 2.0 %    Immature Granulocytes 1 0.0 - 5.0 %    Segs Absolute 6.4 1.7 - 8.2 K/UL    Absolute Lymph # 2.7 0.5 - 4.6 K/UL    Absolute Mono # 0.9 0.1 - 1.3 K/UL    Absolute Eos # 0.4 0.0 - 0.8 K/UL    Basophils Absolute 0.1 0.0 - 0.2 K/UL    Absolute Immature Granulocyte 0.1 0.0 - 0.5 K/UL   Comprehensive Metabolic Panel w/ Reflex to MG    Collection Time: 08/06/22  5:32 AM   Result Value Ref Range    Sodium 141 136 - 145 mmol/L    Potassium 3.5 3.5 - 5.1 mmol/L    Chloride 110 (H) 98 - 107 mmol/L    CO2 25 21 - 32 mmol/L    Anion Gap 6 (L) 7 - 16 mmol/L    Glucose 99 65 - 100 mg/dL    BUN 8 8 - 23 MG/DL    Creatinine 0.80 0.8 - 1.5 MG/DL    GFR African American >60 >60 ml/min/1.73m2    GFR Non- >60 >60 ml/min/1.73m2    Calcium 9.0 8.3 - 10.4 MG/DL    Total Bilirubin 0.5 0.2 - 1.1 MG/DL    ALT 22 12 - 65 U/L    AST 19 15 - 37 U/L    Alk Phosphatase 114 50 - 136 U/L    Total Protein 6.6 6.3 - 8.2 g/dL    Albumin 3.1 (L) 3.2 - 4.6 g/dL    Globulin 3.5 2.3 - 3.5 g/dL    Albumin/Globulin Ratio 0.9 (L) 1.2 - 3.5     C-Reactive Protein    Collection Time: 08/06/22  5:32 AM   Result Value Ref Range    CRP 1.0 (H) 0.0 - 0.9 mg/dL   Procalcitonin    Collection Time: 08/06/22  5:32 AM   Result Value Ref Range    Procalcitonin <0.05 0.00 - 0.49 ng/mL   Magnesium    Collection Time: 08/06/22  5:32 AM   Result Value Ref Range    Magnesium 2.2 1.8 - 2.4 mg/dL       I have personally reviewed imaging studies showing: Other Studies:  CT CHEST ABDOMEN PELVIS W CONTRAST   Final Result   1.  3.5 cm x 2.4 cm cecal mass. No clear evidence for metastatic disease is   evident by CT imaging. Pulmonary nodules, and hepatic lesions are seen which   are favored to be benign as described above. This report was made using voice transcription. Despite my best efforts to avoid   any, transcription errors may persist. If there is any question about the   accuracy of the report or need for clarification, then please call [phone], or text me through [email or web address] for clarification or correction. XR CHEST PORTABLE   Final Result   1. No consolidation. 2. Chronic cardiac enlargement, pacemaker.              Current Meds:  Current Facility-Administered Medications   Medication Dose Route Frequency    [Held by provider] aspirin EC tablet 81 mg  81 mg Oral Daily    atorvastatin (LIPITOR) tablet 80 mg  80 mg Oral Nightly    [Held by provider] furosemide (LASIX) tablet 40 mg  40 mg Oral Daily    rOPINIRole (REQUIP) tablet 0.5 mg  0.5 mg Oral Nightly    diatrizoate meglumine-sodium (GASTROGRAFIN) 66-10 % solution 15 mL  15 mL Oral ONCE PRN    lactated ringers infusion   IntraVENous Continuous    cefTRIAXone (ROCEPHIN) 1,000 mg in sodium chloride 0.9 % 50 mL IVPB mini-bag  1,000 mg IntraVENous Q24H    0.9 % sodium chloride infusion   IntraVENous PRN    sodium chloride flush 0.9 % injection 5-40 mL  5-40 mL IntraVENous 2 times per day    sodium chloride flush 0.9 % injection 5-40 mL  5-40 mL IntraVENous PRN    0.9 % sodium chloride infusion   IntraVENous PRN    ondansetron (ZOFRAN-ODT) disintegrating tablet 4 mg  4 mg Oral Q8H PRN    Or    ondansetron (ZOFRAN) injection 4 mg  4 mg IntraVENous Q6H PRN    polyethylene glycol (GLYCOLAX) packet 17 g  17 g Oral Daily PRN    acetaminophen (TYLENOL) tablet 650 mg  650 mg Oral Q6H PRN    Or    acetaminophen (TYLENOL) suppository 650 mg  650 mg Rectal Q6H PRN    pantoprazole (PROTONIX) 40 mg in sodium chloride (PF) 10 mL injection  40 mg IntraVENous Q12H    albuterol (PROVENTIL) nebulizer solution 2.5 mg  2.5 mg Nebulization Q6H PRN    temazepam (RESTORIL) capsule 15 mg  15 mg Oral Nightly PRN       Signed:    Dolores Elizalde. Audie Dance, MD, [facility]  Internal Medicine - Hospitalist      Part of this note may have been written by using a voice dictation software. The note has been proof read but may still contain some grammatical/other typographical errors.

## 2022-08-06 NOTE — PROCEDURES
Colonoscopy Procedure Note    Indications: Melena, ABLA    Anesthesia/Sedation: MAC IV     Pre-Procedure Physical:  Current Facility-Administered Medications   Medication Dose Route Frequency    lactated ringers infusion   IntraVENous Continuous    cefTRIAXone (ROCEPHIN) 1,000 mg in sodium chloride 0.9 % 50 mL IVPB mini-bag  1,000 mg IntraVENous Q24H    0.9 % sodium chloride infusion   IntraVENous PRN    sodium chloride flush 0.9 % injection 5-40 mL  5-40 mL IntraVENous 2 times per day    sodium chloride flush 0.9 % injection 5-40 mL  5-40 mL IntraVENous PRN    0.9 % sodium chloride infusion   IntraVENous PRN    ondansetron (ZOFRAN-ODT) disintegrating tablet 4 mg  4 mg Oral Q8H PRN    Or    ondansetron (ZOFRAN) injection 4 mg  4 mg IntraVENous Q6H PRN    polyethylene glycol (GLYCOLAX) packet 17 g  17 g Oral Daily PRN    acetaminophen (TYLENOL) tablet 650 mg  650 mg Oral Q6H PRN    Or    acetaminophen (TYLENOL) suppository 650 mg  650 mg Rectal Q6H PRN    pantoprazole (PROTONIX) 40 mg in sodium chloride (PF) 10 mL injection  40 mg IntraVENous Q12H    albuterol (PROVENTIL) nebulizer solution 2.5 mg  2.5 mg Nebulization Q6H PRN    temazepam (RESTORIL) capsule 15 mg  15 mg Oral Nightly PRN     Facility-Administered Medications Ordered in Other Encounters   Medication Dose Route Frequency    propofol injection   IntraVENous Continuous PRN      Patient has no known allergies.     Patient Vitals for the past 8 hrs:   BP Temp Temp src Pulse Resp SpO2 Height Weight   08/06/22 0906 -- -- -- 75 16 97 % -- --   08/06/22 0842 (!) 174/80 98.1 °F (36.7 °C) Oral 75 18 98 % 5' 8\" (1.727 m) 190 lb (86.2 kg)   08/06/22 0734 136/69 97.7 °F (36.5 °C) Oral 74 20 97 % -- --   08/06/22 0351 (!) 140/85 97.6 °F (36.4 °C) Oral 80 22 99 % -- --       Exam:    Airway: clear   Heart: normal S1and S2    Lungs: clear bilateral  Abdomen: soft, nontender, bowel sounds present and normal in all quads   Mental Status: awake, alert and oriented to person, place and time        Procedure Details      Informed consent was obtained for the procedure, including sedation. Risks of perforation, hemorrhage, adverse drug reaction and aspiration were discussed. The patient was placed in the left lateral decubitus position. Based on the pre-procedure assessment, including review of the patient's medical history, medications, allergies, and review of systems, he had been deemed to be an appropriate candidate for conscious sedation; he was therefore sedated with the medications listed below. The patient was monitored continuously with ECG tracing, pulse oximetry, blood pressure monitoring, and direct observations. A rectal examination was performed. The colonoscope was inserted into the rectum and advanced under direct vision to the cecum. The quality of the colonic preparation was good. A careful inspection was made as the colonoscope was withdrawn, including a retroflexed view of the rectum; findings and interventions are described below. Appropriate photodocumentation was obtained. Findings:   Cecal mass concerning for colon cancer. Biopsied and tattooed. Two 7-8 mm sessile polyps removed by hot snare from the ascending colon. Specimens: Cecal mass, colon polyps    Estimated Blood Loss: 0 cc           Complications: None; patient tolerated the procedure well. Attending Attestation: I performed the procedure. Impression:    Cecal mass concerning for colon cancer. Biopsied and tattooed. Two 7-8 mm sessile polyps removed by hot snare from the ascending colon.     Recommendations:    Await path results  CT A/P/C  Consult surgery

## 2022-08-06 NOTE — PLAN OF CARE
Problem: Safety - Adult  Goal: Free from fall injury  Outcome: Progressing     Problem: Chronic Conditions and Co-morbidities  Goal: Patient's chronic conditions and co-morbidity symptoms are monitored and maintained or improved  Outcome: Progressing     Problem: Skin/Tissue Integrity  Goal: Absence of new skin breakdown  Description: 1. Monitor for areas of redness and/or skin breakdown  2. Assess vascular access sites hourly  3. Every 4-6 hours minimum:  Change oxygen saturation probe site  4. Every 4-6 hours:  If on nasal continuous positive airway pressure, respiratory therapy assess nares and determine need for appliance change or resting period.   Outcome: Progressing     Problem: Pain  Goal: Verbalizes/displays adequate comfort level or baseline comfort level  Outcome: Progressing

## 2022-08-06 NOTE — PROGRESS NOTES
SBAR called to Chani Ridley RN on this pt s/p colonoscopy. Opportunities for questions and clarification given.

## 2022-08-07 PROBLEM — K63.89 CECUM MASS: Status: ACTIVE | Noted: 2022-08-07

## 2022-08-07 PROBLEM — C18.9 COLON CANCER (HCC): Status: ACTIVE | Noted: 2022-08-07

## 2022-08-07 LAB
ANION GAP SERPL CALC-SCNC: 7 MMOL/L (ref 7–16)
BASOPHILS # BLD: 0.1 K/UL (ref 0–0.2)
BASOPHILS NFR BLD: 1 % (ref 0–2)
BUN SERPL-MCNC: 5 MG/DL (ref 8–23)
CALCIUM SERPL-MCNC: 9.1 MG/DL (ref 8.3–10.4)
CHLORIDE SERPL-SCNC: 109 MMOL/L (ref 98–107)
CO2 SERPL-SCNC: 26 MMOL/L (ref 21–32)
CREAT SERPL-MCNC: 0.82 MG/DL (ref 0.8–1.5)
DIFFERENTIAL METHOD BLD: ABNORMAL
EOSINOPHIL # BLD: 0.5 K/UL (ref 0–0.8)
EOSINOPHIL NFR BLD: 4 % (ref 0.5–7.8)
ERYTHROCYTE [DISTWIDTH] IN BLOOD BY AUTOMATED COUNT: 21.6 % (ref 11.9–14.6)
GLUCOSE SERPL-MCNC: 114 MG/DL (ref 65–100)
HCT VFR BLD AUTO: 29.1 % (ref 41.1–50.3)
HGB BLD-MCNC: 7.9 G/DL (ref 13.6–17.2)
IMM GRANULOCYTES # BLD AUTO: 0.1 K/UL (ref 0–0.5)
IMM GRANULOCYTES NFR BLD AUTO: 1 % (ref 0–5)
LACTATE SERPL-SCNC: 1.5 MMOL/L (ref 0.4–2)
LYMPHOCYTES # BLD: 2.4 K/UL (ref 0.5–4.6)
LYMPHOCYTES NFR BLD: 20 % (ref 13–44)
MCH RBC QN AUTO: 19 PG (ref 26.1–32.9)
MCHC RBC AUTO-ENTMCNC: 27.1 G/DL (ref 31.4–35)
MCV RBC AUTO: 70.1 FL (ref 79.6–97.8)
MONOCYTES # BLD: 1 K/UL (ref 0.1–1.3)
MONOCYTES NFR BLD: 8 % (ref 4–12)
NEUTS SEG # BLD: 8 K/UL (ref 1.7–8.2)
NEUTS SEG NFR BLD: 66 % (ref 43–78)
NRBC # BLD: 0 K/UL (ref 0–0.2)
PLATELET # BLD AUTO: 263 K/UL (ref 150–450)
PMV BLD AUTO: 9.1 FL (ref 9.4–12.3)
POTASSIUM SERPL-SCNC: 3.4 MMOL/L (ref 3.5–5.1)
RBC # BLD AUTO: 4.15 M/UL (ref 4.23–5.6)
SODIUM SERPL-SCNC: 142 MMOL/L (ref 138–145)
WBC # BLD AUTO: 12.1 K/UL (ref 4.3–11.1)

## 2022-08-07 PROCEDURE — 85025 COMPLETE CBC W/AUTO DIFF WBC: CPT

## 2022-08-07 PROCEDURE — 1100000003 HC PRIVATE W/ TELEMETRY

## 2022-08-07 PROCEDURE — 83605 ASSAY OF LACTIC ACID: CPT

## 2022-08-07 PROCEDURE — C9113 INJ PANTOPRAZOLE SODIUM, VIA: HCPCS | Performed by: INTERNAL MEDICINE

## 2022-08-07 PROCEDURE — 6360000002 HC RX W HCPCS: Performed by: INTERNAL MEDICINE

## 2022-08-07 PROCEDURE — 80048 BASIC METABOLIC PNL TOTAL CA: CPT

## 2022-08-07 PROCEDURE — 36415 COLL VENOUS BLD VENIPUNCTURE: CPT

## 2022-08-07 PROCEDURE — 6370000000 HC RX 637 (ALT 250 FOR IP): Performed by: HOSPITALIST

## 2022-08-07 PROCEDURE — 6370000000 HC RX 637 (ALT 250 FOR IP): Performed by: FAMILY MEDICINE

## 2022-08-07 PROCEDURE — 76937 US GUIDE VASCULAR ACCESS: CPT

## 2022-08-07 PROCEDURE — 6370000000 HC RX 637 (ALT 250 FOR IP): Performed by: INTERNAL MEDICINE

## 2022-08-07 PROCEDURE — A4216 STERILE WATER/SALINE, 10 ML: HCPCS | Performed by: INTERNAL MEDICINE

## 2022-08-07 PROCEDURE — 2580000003 HC RX 258: Performed by: INTERNAL MEDICINE

## 2022-08-07 PROCEDURE — 2580000003 HC RX 258: Performed by: HOSPITALIST

## 2022-08-07 RX ORDER — SODIUM CHLORIDE 0.9 % (FLUSH) 0.9 %
5-40 SYRINGE (ML) INJECTION EVERY 12 HOURS SCHEDULED
Status: DISCONTINUED | OUTPATIENT
Start: 2022-08-07 | End: 2022-08-19 | Stop reason: HOSPADM

## 2022-08-07 RX ORDER — METRONIDAZOLE 500 MG/1
500 TABLET ORAL EVERY 4 HOURS
Status: SHIPPED | OUTPATIENT
Start: 2022-08-02 | End: 2022-08-03

## 2022-08-07 RX ORDER — NEOMYCIN SULFATE 500 MG/1
500 TABLET ORAL EVERY 4 HOURS
Status: SHIPPED | OUTPATIENT
Start: 2022-08-02 | End: 2022-08-03

## 2022-08-07 RX ORDER — SODIUM CHLORIDE 0.9 % (FLUSH) 0.9 %
5-40 SYRINGE (ML) INJECTION PRN
Status: DISCONTINUED | OUTPATIENT
Start: 2022-08-07 | End: 2022-08-19 | Stop reason: HOSPADM

## 2022-08-07 RX ORDER — HEPARIN SODIUM (PORCINE) LOCK FLUSH IV SOLN 100 UNIT/ML 100 UNIT/ML
1 SOLUTION INTRAVENOUS EVERY 12 HOURS SCHEDULED
Status: DISCONTINUED | OUTPATIENT
Start: 2022-08-07 | End: 2022-08-10

## 2022-08-07 RX ORDER — POTASSIUM CHLORIDE 20 MEQ/1
40 TABLET, EXTENDED RELEASE ORAL 2 TIMES DAILY WITH MEALS
Status: COMPLETED | OUTPATIENT
Start: 2022-08-07 | End: 2022-08-08

## 2022-08-07 RX ORDER — SODIUM CHLORIDE 9 MG/ML
INJECTION, SOLUTION INTRAVENOUS PRN
Status: DISCONTINUED | OUTPATIENT
Start: 2022-08-07 | End: 2022-08-14

## 2022-08-07 RX ORDER — HEPARIN SODIUM (PORCINE) LOCK FLUSH IV SOLN 100 UNIT/ML 100 UNIT/ML
1 SOLUTION INTRAVENOUS PRN
Status: DISCONTINUED | OUTPATIENT
Start: 2022-08-07 | End: 2022-08-19 | Stop reason: HOSPADM

## 2022-08-07 RX ADMIN — CEFTRIAXONE 2000 MG: 2 INJECTION, POWDER, FOR SOLUTION INTRAMUSCULAR; INTRAVENOUS at 16:33

## 2022-08-07 RX ADMIN — TEMAZEPAM 15 MG: 15 CAPSULE ORAL at 21:10

## 2022-08-07 RX ADMIN — POTASSIUM CHLORIDE 40 MEQ: 20 TABLET, EXTENDED RELEASE ORAL at 08:39

## 2022-08-07 RX ADMIN — ROPINIROLE HYDROCHLORIDE 0.5 MG: 0.5 TABLET, FILM COATED ORAL at 21:10

## 2022-08-07 RX ADMIN — POTASSIUM CHLORIDE 40 MEQ: 20 TABLET, EXTENDED RELEASE ORAL at 16:33

## 2022-08-07 RX ADMIN — SODIUM CHLORIDE, PRESERVATIVE FREE 10 ML: 5 INJECTION INTRAVENOUS at 21:10

## 2022-08-07 RX ADMIN — SODIUM CHLORIDE, PRESERVATIVE FREE 40 MG: 5 INJECTION INTRAVENOUS at 05:39

## 2022-08-07 RX ADMIN — ATORVASTATIN CALCIUM 80 MG: 80 TABLET, FILM COATED ORAL at 21:10

## 2022-08-07 ASSESSMENT — PAIN SCALES - GENERAL: PAINLEVEL_OUTOF10: 0

## 2022-08-07 NOTE — PROGRESS NOTES
Vitals  BP (!) 146/79   Pulse 75   Temp 98.2 °F (36.8 °C) (Oral)   Resp 19   Ht 5' 8\" (1.727 m)   Wt 204 lb 2.3 oz (92.6 kg)   SpO2 100%   BMI 31.04 kg/m²       General: No acute distress. Skin:  Extremities and face reveal no rashes. No cody erythema. No telangiectasias on the chest wall. HEENT: Sclerae anicteric. No oral ulcers. No abnormal pigmentation of the lips. The neck is supple. Cardiovascular: Regular rate and rhythm. No murmurs, gallops, or rubs. Respiratory:  Comfortable breathing  With no accessory muscle use. Clear breath sounds with no wheezes, rales, or rhonchi. GI:  Abdomen nondistended, soft, and nontender. Normal active bowel sounds. No enlargement of the liver or spleen. No masses palpable. Musculoskeletal:  No pitting edema of the lower legs. Extremities have good range of motion. Neurological:  Gross memory appears intact. Patient is alert and oriented. Psychiatric:  Mood appears appropriate with judgement intact. Lymphatic:  No cervical or supraclavicular adenopathy. Laboratory:    Recent Labs     08/07/22  0519   WBC 12.1*   RBC 4.15*   HGB 7.9*   HCT 29.1*         Recent Labs     08/07/22 0519      K 3.4*   *   CO2 26   BUN 5*     No results for input(s): INR, APTT in the last 72 hours. Invalid input(s): PTP  No results for input(s): ALB, TP, AML in the last 72 hours. Invalid input(s): TBIL, CBIL, SGOT, GPT, AP, LPSE    Assessment:       A 79 y.o. male with cecal mass, likely colon cancer. Plan:       Surgery consulted- hopefully they can resect mass and achieve cure      Signed By: Sierra Chavez MD     August 7, 2022

## 2022-08-07 NOTE — PROCEDURES
US Guided PIV access-   Skin was cleaned and disinfected prior to IV puncture. Ultrasound was used to find the vein which was compressible and does not have any ultrasound features of an artery or nerve bundle. Under real-time ultrasound guidance peripheral access was obtained in the left basilic using 20 G 8 CM Extended Dwell Peripheral IV catheter LOT # [id number] . Blood return was present and IV flushed without difficulty with no clinical signs of infiltration. IV dressing applied and there were no immediate complications noted and patient tolerated the procedure well.

## 2022-08-07 NOTE — PROGRESS NOTES
Hospitalist Progress Note   Admit Date:  2022  4:05 PM   Name:  Toma Pineda   Age:  79 y.o. Sex:  male  :  1940   MRN:  [id number]   Room:  [facility]    Presenting Complaint: Abnormal Lab     Reason(s) for Admission: Acute blood loss anemia [D62]  Anemia, unspecified type [D64.9]     Hospital Course & Interval History:     79-year-old male with a past medical history of coronary artery disease, hyperlipidemia, hypertension, GERD, heart failure with preserved ejection fraction, coronary artery disease, that presented in the setting of black stools associated with weakness and low hemoglobin. The patient states that for the past couple of days he has been presented with black stools associated with generalized weakness. He was sent for blood work today and he was called back letting him know his hemoglobin was lower than 7 so he needed to come to the emergency department for possible blood transfusion. He thinks he probably ran out of pantoprazole for the past week or 2. Otherwise he denies any nausea, no vomiting, no diarrhea, no shortness of breath, no cough. In the emergency department patient was found to be hemodynamically stable. Subjective/24hr Events (22): Patient seen at bedside, resting in recliner comfortably. He is alert and awake, reports feeling okay, denies any significant abdominal pain. Denies nausea, vomiting, chest pain, palpitations, hematochezia. Denies any lightheadedness or dizziness. Discussed about CT chest abdomen pelvis negative for any metastatic disease and possible plan for colectomy in next 24 to 48 hours. ROS:  10 point review of system is negative except for what mentioned above. Assessment & Plan:     Principal Problem:    Acute blood loss anemia        IMP:    1.) ABLA/Anemia - s/p EGD with no acute findings. [place] with + cecal mass.  -  CT chest abdomen pelvis negative for any metastatic disease.   Plan for right hemicolectomy 8/8.  Patient n.p.o. after midnight. Pathology pending.    2.) Cecal Mass - surgery eval, ? CA    3.) Abn Urine -   8/7-  Urine culture positive for Enterobacter  Rocephin 2 g daily. Follow-up with ID and sensitivities. 4.) Chronic LVDD/CHF - compensated    5.) CAD - ASA on hold due to above. Resume statin    6.) GERD - PPI    7.) PAD with h/o Carotid dz - resume statin and antiplt when able    8.) HTN - resume meds    9.) Myasthenia gravis - resume meds    10.) Osteoporosis    11.) ARIN - ? CPAP, v O2 qhs    12.) Abn Urine - repeat with cx    13.) HL -Lipitor    14.) Debility -PT OT eval          Discharge Planning:    Pending clinical course after surgery is done. I spent 32 minutes of time caring for this patient on the unit nearby or at bedside, and more than 50 percent was spent on coordination of care activities, and/or patient/family counseling regarding status and plan of care. Diet:  ADULT DIET; Full Liquid  Diet NPO  DVT PPx: SCD due to ABLA  Code status: Full Code    Hospital Problems:  Principal Problem:    Acute blood loss anemia  Active Problems:    GIB (gastrointestinal bleeding)    Cecum mass    Colon cancer (HCC)    HTN (hypertension)    GERD (gastroesophageal reflux disease)    Diastolic CHF, chronic (HCC)    Dyslipidemia  Resolved Problems:    * No resolved hospital problems.  *      Objective:   Patient Vitals for the past 24 hrs:   Temp Pulse Resp BP SpO2   08/07/22 0735 98.2 °F (36.8 °C) 75 19 (!) 146/79 100 %   08/07/22 0215 98.2 °F (36.8 °C) 70 20 131/60 98 %   08/06/22 2343 98.3 °F (36.8 °C) 69 20 117/61 98 %   08/06/22 1845 98.2 °F (36.8 °C) 75 20 (!) 141/75 98 %   08/06/22 1526 -- 77 15 -- 96 %   08/06/22 1457 97.8 °F (36.6 °C) 75 20 129/68 100 %   08/06/22 1121 97.5 °F (36.4 °C) 78 18 128/76 98 %   08/06/22 1057 -- 78 16 (!) 156/79 --   08/06/22 1053 -- 75 16 (!) 154/76 --   08/06/22 1045 -- 75 16 (!) 147/67 --   08/06/22 1038 98.6 °F (37 °C) 78 14 -- 95 %   08/06/22 1030 -- 72 18 (!) 162/82 --   08/06/22 0906 -- 75 16 -- 97 %   08/06/22 0842 98.1 °F (36.7 °C) 75 18 (!) 174/80 98 %         Oxygen Therapy  SpO2: 100 %  Pulse via Oximetry: 76 beats per minute  Pulse Oximeter Device Mode: Intermittent  Pulse Oximeter Device Location: Right, Hand  O2 Device: None (Room air)  O2 Flow Rate (L/min): 4 L/min    Estimated body mass index is 31.04 kg/m² as calculated from the following:    Height as of this encounter: 5' 8\" (1.727 m). Weight as of this encounter: 204 lb 2.3 oz (92.6 kg). Intake/Output Summary (Last 24 hours) at 8/7/2022 0758  Last data filed at 8/6/2022 1819  Gross per 24 hour   Intake 850 ml   Output 2 ml   Net 848 ml           Physical Exam:   Pt seen and evaluated on 8/7/2022    Blood pressure (!) 146/79, pulse 75, temperature 98.2 °F (36.8 °C), temperature source Oral, resp. rate 19, height 5' 8\" (1.727 m), weight 204 lb 2.3 oz (92.6 kg), SpO2 100 %. General:    Awake, lethargic, on room air  HEENT:          head NCAT, PERRLA positive, MMM  Neck:  No restricted ROM. Trachea midline   CV:   RRR. No m/r/g. No jugular venous distension. Lungs:   Clear breath sounds auscultation bilaterally, no wheezing or rhonchi. Abdomen:   Soft, obese, nontender, nondistended, bowel sounds normoactive  Extremities: No cyanosis or clubbing. No edema  Skin:     No rashes and normal coloration. Warm and dry.     Neuro:  GCS 15, cranial nerves intact, no motor or sensory deficit  Psych:  AOx3, flat affect    I have personally reviewed labs and tests showing:  Recent Labs:  Recent Results (from the past 48 hour(s))   Reticulocytes    Collection Time: 08/05/22 10:44 PM   Result Value Ref Range    Reticulocyte Count,Automated 1.6 0.3 - 2.0 %    Absolute Retic # 0.0727 0.026 - 0.095 M/ul    Immature Retic Fraction 45.5 (H) 2.3 - 13.4 %    Retic Hemoglobin conc. 16 (L) 29 - 35 pg   Ferritin    Collection Time: 08/05/22 10:44 PM   Result Value Ref Range    Ferritin 8 8 - 388 NG/ML   Folate ALT 22 12 - 65 U/L    AST 19 15 - 37 U/L    Alk Phosphatase 114 50 - 136 U/L    Total Protein 6.6 6.3 - 8.2 g/dL    Albumin 3.1 (L) 3.2 - 4.6 g/dL    Globulin 3.5 2.3 - 3.5 g/dL    Albumin/Globulin Ratio 0.9 (L) 1.2 - 3.5     C-Reactive Protein    Collection Time: 08/06/22  5:32 AM   Result Value Ref Range    CRP 1.0 (H) 0.0 - 0.9 mg/dL   Procalcitonin    Collection Time: 08/06/22  5:32 AM   Result Value Ref Range    Procalcitonin <0.05 0.00 - 0.49 ng/mL   Magnesium    Collection Time: 08/06/22  5:32 AM   Result Value Ref Range    Magnesium 2.2 1.8 - 2.4 mg/dL   Lactic Acid    Collection Time: 08/06/22  4:13 PM   Result Value Ref Range    Lactic Acid, Plasma 1.9 0.4 - 2.0 MMOL/L   Lactic Acid    Collection Time: 08/07/22  5:19 AM   Result Value Ref Range    Lactic Acid, Plasma 1.5 0.4 - 2.0 MMOL/L   CBC with Auto Differential    Collection Time: 08/07/22  5:19 AM   Result Value Ref Range    WBC 12.1 (H) 4.3 - 11.1 K/uL    RBC 4.15 (L) 4.23 - 5.6 M/uL    Hemoglobin 7.9 (L) 13.6 - 17.2 g/dL    Hematocrit 29.1 (L) 41.1 - 50.3 %    MCV 70.1 (L) 79.6 - 97.8 FL    MCH 19.0 (L) 26.1 - 32.9 PG    MCHC 27.1 (L) 31.4 - 35.0 g/dL    RDW 21.6 (H) 11.9 - 14.6 %    Platelets 382 707 - 557 K/uL    MPV 9.1 (L) 9.4 - 12.3 FL    nRBC 0.00 0.0 - 0.2 K/uL    Differential Type AUTOMATED      Seg Neutrophils 66 43 - 78 %    Lymphocytes 20 13 - 44 %    Monocytes 8 4.0 - 12.0 %    Eosinophils % 4 0.5 - 7.8 %    Basophils 1 0.0 - 2.0 %    Immature Granulocytes 1 0.0 - 5.0 %    Segs Absolute 8.0 1.7 - 8.2 K/UL    Absolute Lymph # 2.4 0.5 - 4.6 K/UL    Absolute Mono # 1.0 0.1 - 1.3 K/UL    Absolute Eos # 0.5 0.0 - 0.8 K/UL    Basophils Absolute 0.1 0.0 - 0.2 K/UL    Absolute Immature Granulocyte 0.1 0.0 - 0.5 K/UL   Basic Metabolic Panel    Collection Time: 08/07/22  5:19 AM   Result Value Ref Range    Sodium 142 138 - 145 mmol/L    Potassium 3.4 (L) 3.5 - 5.1 mmol/L    Chloride 109 (H) 98 - 107 mmol/L    CO2 26 21 - 32 mmol/L    Anion Gap 7 7 - 16 mmol/L    Glucose 114 (H) 65 - 100 mg/dL    BUN 5 (L) 8 - 23 MG/DL    Creatinine 0.82 0.8 - 1.5 MG/DL    GFR African American >60 >60 ml/min/1.73m2    GFR Non- >60 >60 ml/min/1.73m2    Calcium 9.1 8.3 - 10.4 MG/DL       I have personally reviewed imaging studies showing: Other Studies:  CT CHEST ABDOMEN PELVIS W CONTRAST   Final Result   1.  3.5 cm x 2.4 cm cecal mass. No clear evidence for metastatic disease is   evident by CT imaging. Pulmonary nodules, and hepatic lesions are seen which   are favored to be benign as described above. This report was made using voice transcription. Despite my best efforts to avoid   any, transcription errors may persist. If there is any question about the   accuracy of the report or need for clarification, then please call [phone], or text me through [employer] for clarification or correction. XR CHEST PORTABLE   Final Result   1. No consolidation. 2. Chronic cardiac enlargement, pacemaker.              Current Meds:  Current Facility-Administered Medications   Medication Dose Route Frequency    [Held by provider] aspirin EC tablet 81 mg  81 mg Oral Daily    atorvastatin (LIPITOR) tablet 80 mg  80 mg Oral Nightly    [Held by provider] furosemide (LASIX) tablet 40 mg  40 mg Oral Daily    rOPINIRole (REQUIP) tablet 0.5 mg  0.5 mg Oral Nightly    diatrizoate meglumine-sodium (GASTROGRAFIN) 66-10 % solution 15 mL  15 mL Oral ONCE PRN    cefTRIAXone (ROCEPHIN) 2,000 mg in sodium chloride 0.9 % 50 mL IVPB mini-bag  2,000 mg IntraVENous Q24H    lactated ringers infusion   IntraVENous Continuous    0.9 % sodium chloride infusion   IntraVENous PRN    sodium chloride flush 0.9 % injection 5-40 mL  5-40 mL IntraVENous 2 times per day    sodium chloride flush 0.9 % injection 5-40 mL  5-40 mL IntraVENous PRN    0.9 % sodium chloride infusion   IntraVENous PRN    ondansetron (ZOFRAN-ODT) disintegrating tablet 4 mg  4 mg Oral Q8H PRN    Or ondansetron (ZOFRAN) injection 4 mg  4 mg IntraVENous Q6H PRN    polyethylene glycol (GLYCOLAX) packet 17 g  17 g Oral Daily PRN    acetaminophen (TYLENOL) tablet 650 mg  650 mg Oral Q6H PRN    Or    acetaminophen (TYLENOL) suppository 650 mg  650 mg Rectal Q6H PRN    pantoprazole (PROTONIX) 40 mg in sodium chloride (PF) 10 mL injection  40 mg IntraVENous Q12H    albuterol (PROVENTIL) nebulizer solution 2.5 mg  2.5 mg Nebulization Q6H PRN    temazepam (RESTORIL) capsule 15 mg  15 mg Oral Nightly PRN       Signed:    Elisa Ryan MD  8/7/22 - 2:08 PM      Part of this note may have been written by using a voice dictation software. The note has been proof read but may still contain some grammatical/other typographical errors.

## 2022-08-07 NOTE — ANESTHESIA POSTPROCEDURE EVALUATION
Department of Anesthesiology  Postprocedure Note    Patient: Joi Mason  MRN: [id number]  YOB: 1940  Date of evaluation: 8/7/2022      Procedure Summary     Date: 08/06/22 [facility] / [facility]    Anesthesia Start: 1001 Anesthesia Stop: 1039    Procedure: COLONOSCOPY POLYPECTOMY SNARE/COLD BIOPSY (Lower GI Region) Diagnosis:       Anemia, unspecified type      Melena      (Anemia, unspecified type [D64.9])      (Melena [K92.1])    Surgeons: Kishan Bain MD Responsible Provider: Anali Ng MD    Anesthesia Type: TIVA ASA Status: 3          Anesthesia Type: No value filed.     Vale Phase I: Vale Score: 10    Vale Phase II: Vale Score: 10      Anesthesia Post Evaluation    Patient location during evaluation: PACU  Patient participation: complete - patient participated  Level of consciousness: awake and alert  Airway patency: patent  Nausea & Vomiting: no nausea and no vomiting  Complications: no  Cardiovascular status: hemodynamically stable  Respiratory status: acceptable, nonlabored ventilation and spontaneous ventilation  Hydration status: euvolemic  Comments: /70   Pulse 75   Temp 98 °F (36.7 °C) (Oral)   Resp 19   Ht 5' 8\" (1.727 m)   Wt 204 lb 2.3 oz (92.6 kg)   SpO2 99%   BMI 31.04 kg/m²     Multimodal analgesia pain management approach

## 2022-08-07 NOTE — CONSULTS
Subjective:     Patient is a 79 y.o.  male presents with anemia. He had colonoscopy done on 8 5 which showed a lesion in the cecum consistent with a neoplasm. We were asked to evaluate for possible surgical resection. He reports no abdominal pain. Currently he denies nausea vomiting diarrhea constipation hematochezia hematemesis or melena. He has been on full liquids. [place] fevers or chills.       Patient Active Problem List    Diagnosis Date Noted    Acute blood loss anemia 08/04/2022    GIB (gastrointestinal bleeding) 08/04/2022    Urine retention 04/03/2022    Acute cholecystitis 03/28/2022    Pancreatitis, gallstone 03/28/2022    Anemia 03/28/2022    Bacteremia 03/28/2022    Major depressive disorder, recurrent, moderate ([address] 57/78/0991    Diastolic CHF, chronic ([address] 03/02/2022    Major depressive disorder, recurrent, mild ([address] 03/02/2022    Major depressive disorder, recurrent, unspecified ([address] 03/02/2022    Chest pain 11/19/2021    Pacemaker 04/14/2021    Atrial fibrillation ([address] 11/29/2017    GERD (gastroesophageal reflux disease) 10/27/2017    Aspiration pneumonia ([address] 10/26/2017    Hypotension 10/26/2017    Sepsis ([address] 10/26/2017    Syncope 10/24/2017    Paroxysmal atrial fibrillation ([address] 06/30/2017    SSS (sick sinus syndrome) ([address] 06/30/2017    Bilateral carotid artery disease ([address] 06/30/2017    Myasthenia gravis ([address]     Mitral valve regurgitation 06/07/2016    Hypokalemia 06/07/2016    Dyslipidemia 06/07/2016    HTN (hypertension) 05/08/2015    Dyspnea 05/08/2015    S/P coronary artery stent placement 05/08/2015    Coronary atherosclerosis of native coronary vessel 05/08/2015     Past Medical History:   Diagnosis Date    Abnormal EKG 4/22/15    Arrhythmia     CAD (coronary artery disease) 5/8/2015    Carotid artery stenosis without cerebral infarction 6/7/2016    US 6/15: <50% bilat ICAs    Coronary atherosclerosis of native coronary vessel 5/8/2015    GERMAIN on brilinta 5/7/15: prox LAD PCI, normal EF     Diastolic CHF, chronic ([address] 3/2/2022    Dyslipidemia 6/7/2016    Dyspnea 5/8/2015    Echo 6/15: EF 60%, mod MR, mod LVH, mild AI     ED (erectile dysfunction)     GERD (gastroesophageal reflux disease)     GERD (gastroesophageal reflux disease)     no medication    HTN (hypertension) 5/8/2015    Hypertension     Hypokalemia     Hypokalemia 6/7/2016    Mitral valve regurgitation 6/7/2016    Morbid obesity ([address]     Myasthenia gravis ([facility]     Myasthenia gravis ([facility] 6/17/15    Nocturia     Osteoporosis     PUD (peptic ulcer disease) 25 yrs ago    S/P coronary artery stent placement 5/8/2015    3.0x38 mm Xience CAROL to pLAD 5/7/15     Sleep apnea     Syncope and collapse     Unspecified sleep apnea     no cpap      Past Surgical History:   Procedure Laterality Date    APPENDECTOMY      CARDIAC CATHETERIZATION  05/21/2019    watchman device    CARDIAC CATHETERIZATION  05/27/2015    stent    COLONOSCOPY  2007    ERCP  3/30/2022         HEMORRHOID SURGERY      PACEMAKER  2018    Gerry Kraus/Giana for sleep apnea and reconstruction for extending jaw    VASCULAR SURGERY Right 07/10/2019     Repair of right radial artery pseudoaneurysm      Medications Prior to Admission: potassium chloride (KLOR-CON M) 20 MEQ extended release tablet, Take 1 tablet by mouth daily  aspirin 81 MG EC tablet, Take 81 mg by mouth daily  atorvastatin (LIPITOR) 80 MG tablet, Take 80 mg by mouth  cyanocobalamin 1000 MCG tablet, Take 1,000 mcg by mouth daily (Patient not taking: No sig reported)  escitalopram (LEXAPRO) 10 MG tablet, Take 10 mg by mouth daily (Patient not taking: No sig reported)  furosemide (LASIX) 40 MG tablet, Take 40 mg by mouth daily  nitroGLYCERIN (NITROSTAT) 0.4 MG SL tablet, Place 1 sl under the tongue q 5 min prn cp, max 3 sl in a 15-min time period.  Call 911 if no relief after the 3rd sl.  pantoprazole (PROTONIX) 40 MG tablet, Take 40 mg by mouth every morning (before breakfast) (Patient not taking: No sig reported)  rOPINIRole (REQUIP) 0.5 MG tablet, 1 nightly, increase to 1 twice a day if needed  tamsulosin (FLOMAX) 0.4 MG capsule, Take 0.4 mg by mouth daily  No Known Allergies   Social History     Tobacco Use    Smoking status: Never    Smokeless tobacco: Never   Substance Use Topics    Alcohol use: No      Family History   Problem Relation Age of Onset    No Known Problems Brother     Cancer Brother         brain tumor    No Known Problems Sister     Cancer Mother         kidney    Cancer Father         stomach      Review of Systems  Pertinent items are noted in HPI. Objective:     Patient Vitals for the past 8 hrs:   BP Temp Temp src Pulse Resp SpO2   08/07/22 1102 133/70 98 °F (36.7 °C) Oral 75 19 99 %   08/07/22 0735 (!) 146/79 98.2 °F (36.8 °C) Oral 75 19 100 %     I/O last 3 completed shifts: In: 5875 [P.O.:1428; I.V.:250]  Out: 2 [Blood:2]  No intake/output data recorded.         General: well appearing, no acute distress, alert and oriented  Eyes: PERRLA, sclerae white  ENT: External inspection of ears and nose normal, oropharynx normal  Respiratory: normal chest wall expansion, lungs CTA bilaterally  Cardiovascular: RRR, no m, femoral pulses 2+ bilaterally; extremities without edema  Abdomen: Soft, non-tender, non-distended, no masses or hernias  Heme/Lymph: without cervical or inguinal adenopathy  Musculoskeletal: gait normal, digits without clubbing or cyanosis  Integumentary: warm, dry, and pink, with no rash, purpura, or petechia  Neurological: Cranial Nerves II-XII grossly intact, normal sensation and muscle strength bilaterally       Data ReviewCBC:   Lab Results   Component Value Date/Time    WBC 12.1 08/07/2022 05:19 AM    RBC 4.15 08/07/2022 05:19 AM     BMP:   Lab Results   Component Value Date/Time    GLUCOSE 114 08/07/2022 05:19 AM    CO2 26 08/07/2022 05:19 AM    BUN 5 08/07/2022 05:19 AM    CREATININE 0.82 08/07/2022 05:19 AM    CALCIUM 9.1 08/07/2022 05:19 AM     Radiology review:   CT CHEST, ABDOMEN AND PELVIS WITH INTRAVENOUS CONTRAST DATED 8/6/2022. History: Cecal mass concerning for colon cancer. Comparison: CT the cardiac over read 4/5/2019, and CT abdomen and pelvis with   contrast 3/27/2022        Technique:   Multiple contiguous helical CT images reconstructed at 5 mm were   obtained from the base of the neck to the ischial tuberosities following oral   and 100 cc Isovue-370 without acute complication. All CT scans performed at   this facility use one or all of the following: Automated exposure control,   adjustment of the mA and/or kVp according to patient's size, iterative   reconstruction. Findings:   CT Chest:   The base of the neck is unremarkable in appearance. No axillary, mediastinal,   or hilar lymphadenopathy is seen. The thoracic aorta is normal in caliber. The   heart appears moderately enlarged. Evaluation with lung windows demonstrates no suspicious pulmonary lesion. Small   nodules are seen in the bilateral lungs measuring up to 5 mm in size. However,   these are unchanged in size and number when compared to a prior CT Overread   dated 4/5/2019. The lack of significant change over multiple years favors   benign nodules. Mild dependent atelectasis is seen. No pleural effusion is   seen. Lungs are expanded without evidence for pneumothorax. No aggressive   appearing osseous lesion is seen. CT ABDOMEN:     The Liver small scattered low-attenuation left lobe hepatic lesions measuring up   to 9 mm in size. Although incompletely characterized, these are also included   in the field of imaging on the prior CT over read dated 4/5/2019 and are   unchanged in size and number. The appearance suggests benign lesions such as   hepatic cysts. An additional 1.6 cm lesion is seen in the more inferior right   lobe on image 68.   This is unchanged when compared to the more recent CT scan of   the abdomen dated 3/27/2022 and does not demonstrate significant enhancement   suggesting an additional benign hepatic cyst.  The spleen is homogeneous in   attenuation. No contour deforming or enhancing mass lesions are seen of the   pancreas or adrenal glands. The gallbladder has been removed. The kidneys   enhance symmetrically and no evidence of hydronephrosis is seen. Chronic renal   cortical scarring is seen most evident in the upper pole cortex of the left   kidney       The visualized loops of small bowel and colon are normal in caliber. The   ileocecal valve is seen on axial image 98 demonstrating normal fat attenuation. Just proximal to this along the posterior wall of the cecum is an abnormal wall   lesion measuring 3.5 cm x 2.4 cm in size concerning for a colon neoplasm likely   representing the patient's known cecal mass. No obstruction is suggested by   this at this time. No free fluid and no free air is seen in the abdomen. No   adenopathy is seen of the abdomen. The abdominal aorta demonstrates mild to   moderate atherosclerotic changes. No aggressive appearing osseous lesion is   seen. CT PELVIS:   No abnormal pelvic fluid collections are present. No pelvic adenopathy is seen. The urinary bladder is unremarkable. No aggressive appearing osseous lesion is   seen. Impression   1.  3.5 cm x 2.4 cm cecal mass. No clear evidence for metastatic disease is   evident by CT imaging. Pulmonary nodules, and hepatic lesions are seen which   are favored to be benign as described above. Assessment:     Principal Problem:    Acute blood loss anemia  Active Problems:    GIB (gastrointestinal bleeding)    HTN (hypertension)    GERD (gastroesophageal reflux disease)    Diastolic CHF, chronic (HCC)    Dyslipidemia  Resolved Problems:    * No resolved hospital problems.  *      Plan:     Patient has a cecal neoplastic process    Plan right colectomy tomorrow    The procedure, alternatives, benefits, risks of bleeding, infection, recurrence, injury to intra-abdominal structures, possible colostomy were discussed thoroughly with patient in simple terms. He reports understanding explanation and freely consents to the procedure. All questions were asked and answered.

## 2022-08-07 NOTE — PROGRESS NOTES
END OF SHIFT SUMMARY:    Significant vitals this shift:    Significant labs this shift:  Hgb 7.9  Tests performed this shift:  0  Orders to be followed up on:  Right colectomy in the a.m   Blood products given this shift:  0  Additional events this shift:   0    I/Os:  +/- this shift:   08/07 0701 - 08/07 1900  In: 8532 [P.O.:1660]  Out: -   Occurrences this Shift:  Urine 4, BM 0, Emesis 0    Maame Olmstead RN

## 2022-08-08 ENCOUNTER — TELEPHONE (OUTPATIENT)
Dept: CARDIOLOGY CLINIC | Age: 82
End: 2022-08-08

## 2022-08-08 LAB
BACTERIA SPEC CULT: ABNORMAL
BACTERIA SPEC CULT: ABNORMAL
SERVICE CMNT-IMP: ABNORMAL

## 2022-08-08 PROCEDURE — C9113 INJ PANTOPRAZOLE SODIUM, VIA: HCPCS | Performed by: INTERNAL MEDICINE

## 2022-08-08 PROCEDURE — 99232 SBSQ HOSP IP/OBS MODERATE 35: CPT | Performed by: SURGERY

## 2022-08-08 PROCEDURE — A4216 STERILE WATER/SALINE, 10 ML: HCPCS | Performed by: INTERNAL MEDICINE

## 2022-08-08 PROCEDURE — 6370000000 HC RX 637 (ALT 250 FOR IP): Performed by: INTERNAL MEDICINE

## 2022-08-08 PROCEDURE — 6360000002 HC RX W HCPCS: Performed by: HOSPITALIST

## 2022-08-08 PROCEDURE — 6370000000 HC RX 637 (ALT 250 FOR IP): Performed by: FAMILY MEDICINE

## 2022-08-08 PROCEDURE — 6370000000 HC RX 637 (ALT 250 FOR IP): Performed by: HOSPITALIST

## 2022-08-08 PROCEDURE — 6360000002 HC RX W HCPCS: Performed by: INTERNAL MEDICINE

## 2022-08-08 PROCEDURE — 2580000003 HC RX 258: Performed by: INTERNAL MEDICINE

## 2022-08-08 PROCEDURE — 1100000000 HC RM PRIVATE

## 2022-08-08 PROCEDURE — 2580000003 HC RX 258: Performed by: HOSPITALIST

## 2022-08-08 RX ADMIN — AMPICILLIN SODIUM AND SULBACTAM SODIUM 3000 MG: 2; 1 INJECTION, POWDER, FOR SOLUTION INTRAMUSCULAR; INTRAVENOUS at 18:20

## 2022-08-08 RX ADMIN — ATORVASTATIN CALCIUM 80 MG: 80 TABLET, FILM COATED ORAL at 21:42

## 2022-08-08 RX ADMIN — ROPINIROLE HYDROCHLORIDE 0.5 MG: 0.5 TABLET, FILM COATED ORAL at 21:42

## 2022-08-08 RX ADMIN — POTASSIUM CHLORIDE 40 MEQ: 20 TABLET, EXTENDED RELEASE ORAL at 18:19

## 2022-08-08 RX ADMIN — POTASSIUM CHLORIDE 40 MEQ: 20 TABLET, EXTENDED RELEASE ORAL at 10:26

## 2022-08-08 RX ADMIN — SODIUM CHLORIDE, PRESERVATIVE FREE 10 ML: 5 INJECTION INTRAVENOUS at 21:48

## 2022-08-08 RX ADMIN — HEPARIN 100 UNITS: 100 SYRINGE at 10:25

## 2022-08-08 RX ADMIN — SODIUM CHLORIDE, PRESERVATIVE FREE 40 MG: 5 INJECTION INTRAVENOUS at 05:03

## 2022-08-08 RX ADMIN — SODIUM CHLORIDE, PRESERVATIVE FREE 40 MG: 5 INJECTION INTRAVENOUS at 18:20

## 2022-08-08 RX ADMIN — SODIUM CHLORIDE, PRESERVATIVE FREE 10 ML: 5 INJECTION INTRAVENOUS at 10:26

## 2022-08-08 RX ADMIN — AMPICILLIN SODIUM AND SULBACTAM SODIUM 3000 MG: 2; 1 INJECTION, POWDER, FOR SOLUTION INTRAMUSCULAR; INTRAVENOUS at 13:05

## 2022-08-08 RX ADMIN — TEMAZEPAM 15 MG: 15 CAPSULE ORAL at 21:42

## 2022-08-08 ASSESSMENT — PAIN SCALES - GENERAL
PAINLEVEL_OUTOF10: 0

## 2022-08-08 NOTE — PROGRESS NOTES
Subjective:     Patient is a 79 y.o.  male presents with anemia. He had colonoscopy done on 8 5 which showed a lesion in the cecum consistent with a neoplasm. We were asked to evaluate for possible surgical resection. He reports no abdominal pain. Currently he denies nausea vomiting diarrhea constipation hematochezia hematemesis or melena. He has been on full liquids. [place] fevers or chills. 8/8/2022 - Doing well this AM. No bleeding reported. Tolerating liquid diet. No prep received.       Patient Active Problem List    Diagnosis Date Noted    Cecum mass 08/07/2022    Colon cancer ([address] 08/07/2022    Acute blood loss anemia 08/04/2022    GIB (gastrointestinal bleeding) 08/04/2022    Urine retention 04/03/2022    Acute cholecystitis 03/28/2022    Pancreatitis, gallstone 03/28/2022    Anemia 03/28/2022    Bacteremia 03/28/2022    Major depressive disorder, recurrent, moderate ([address] 54/96/7091    Diastolic CHF, chronic (HCC) 03/02/2022    Major depressive disorder, recurrent, mild ([address] 03/02/2022    Major depressive disorder, recurrent, unspecified ([address] 03/02/2022    Chest pain 11/19/2021    Pacemaker 04/14/2021    Atrial fibrillation ([address] 11/29/2017    GERD (gastroesophageal reflux disease) 10/27/2017    Aspiration pneumonia ([address] 10/26/2017    Hypotension 10/26/2017    Sepsis ([address] 10/26/2017    Syncope 10/24/2017    Paroxysmal atrial fibrillation ([address] 06/30/2017    SSS (sick sinus syndrome) ([address] 06/30/2017    Bilateral carotid artery disease ([address] 06/30/2017    Myasthenia gravis ([address]     Mitral valve regurgitation 06/07/2016    Hypokalemia 06/07/2016    Dyslipidemia 06/07/2016    HTN (hypertension) 05/08/2015    Dyspnea 05/08/2015    S/P coronary artery stent placement 05/08/2015    Coronary atherosclerosis of native coronary vessel 05/08/2015     Past Medical History:   Diagnosis Date    Abnormal EKG 4/22/15    Arrhythmia     CAD (coronary artery disease) 5/8/2015    Carotid artery stenosis without cerebral infarction 6/7/2016    US 6/15: <50% bilat ICAs    Coronary atherosclerosis of native coronary vessel 5/8/2015    GERMAIN on brilinta 5/7/15: prox LAD PCI, normal EF     Diastolic CHF, chronic ([address] 3/2/2022    Dyslipidemia 6/7/2016    Dyspnea 5/8/2015    Echo 6/15: EF 60%, mod MR, mod LVH, mild AI     ED (erectile dysfunction)     GERD (gastroesophageal reflux disease)     GERD (gastroesophageal reflux disease)     no medication    HTN (hypertension) 5/8/2015    Hypertension     Hypokalemia     Hypokalemia 6/7/2016    Mitral valve regurgitation 6/7/2016    Morbid obesity ([address]     Myasthenia gravis ([facility]     Myasthenia gravis ([facility] 6/17/15    Nocturia     Osteoporosis     PUD (peptic ulcer disease) 25 yrs ago    S/P coronary artery stent placement 5/8/2015    3.0x38 mm Xience CAROL to pLAD 5/7/15     Sleep apnea     Syncope and collapse     Unspecified sleep apnea     no cpap      Past Surgical History:   Procedure Laterality Date    APPENDECTOMY      CARDIAC CATHETERIZATION  05/21/2019    watchman device    CARDIAC CATHETERIZATION  05/27/2015    stent    COLONOSCOPY  2007    ERCP  3/30/2022         HEMORRHOID SURGERY      PACEMAKER  2018    Latoya Kraus/Giana for sleep apnea and reconstruction for extending jaw    VASCULAR SURGERY Right 07/10/2019     Repair of right radial artery pseudoaneurysm      Medications Prior to Admission: potassium chloride (KLOR-CON M) 20 MEQ extended release tablet, Take 1 tablet by mouth daily  aspirin 81 MG EC tablet, Take 81 mg by mouth daily  atorvastatin (LIPITOR) 80 MG tablet, Take 80 mg by mouth  cyanocobalamin 1000 MCG tablet, Take 1,000 mcg by mouth daily (Patient not taking: No sig reported)  escitalopram (LEXAPRO) 10 MG tablet, Take 10 mg by mouth daily (Patient not taking: No sig reported)  furosemide (LASIX) 40 MG tablet, Take 40 mg by mouth daily  nitroGLYCERIN (NITROSTAT) 0.4 MG SL tablet, Place 1 sl under the tongue q 5 min prn cp, max 3 sl AM    CREATININE 0.82 08/07/2022 05:19 AM    CALCIUM 9.1 08/07/2022 05:19 AM     Radiology review:   CT CHEST, ABDOMEN AND PELVIS WITH INTRAVENOUS CONTRAST DATED 8/6/2022. History: Cecal mass concerning for colon cancer. Comparison: CT the cardiac over read 4/5/2019, and CT abdomen and pelvis with   contrast 3/27/2022        Technique:   Multiple contiguous helical CT images reconstructed at 5 mm were   obtained from the base of the neck to the ischial tuberosities following oral   and 100 cc Isovue-370 without acute complication. All CT scans performed at   this facility use one or all of the following: Automated exposure control,   adjustment of the mA and/or kVp according to patient's size, iterative   reconstruction. Findings:   CT Chest:   The base of the neck is unremarkable in appearance. No axillary, mediastinal,   or hilar lymphadenopathy is seen. The thoracic aorta is normal in caliber. The   heart appears moderately enlarged. Evaluation with lung windows demonstrates no suspicious pulmonary lesion. Small   nodules are seen in the bilateral lungs measuring up to 5 mm in size. However,   these are unchanged in size and number when compared to a prior CT Overread   dated 4/5/2019. The lack of significant change over multiple years favors   benign nodules. Mild dependent atelectasis is seen. No pleural effusion is   seen. Lungs are expanded without evidence for pneumothorax. No aggressive   appearing osseous lesion is seen. CT ABDOMEN:     The Liver small scattered low-attenuation left lobe hepatic lesions measuring up   to 9 mm in size. Although incompletely characterized, these are also included   in the field of imaging on the prior CT over read dated 4/5/2019 and are   unchanged in size and number. The appearance suggests benign lesions such as   hepatic cysts. An additional 1.6 cm lesion is seen in the more inferior right   lobe on image 68.   This is unchanged when compared to the more recent CT scan of   the abdomen dated 3/27/2022 and does not demonstrate significant enhancement   suggesting an additional benign hepatic cyst.  The spleen is homogeneous in   attenuation. No contour deforming or enhancing mass lesions are seen of the   pancreas or adrenal glands. The gallbladder has been removed. The kidneys   enhance symmetrically and no evidence of hydronephrosis is seen. Chronic renal   cortical scarring is seen most evident in the upper pole cortex of the left   kidney       The visualized loops of small bowel and colon are normal in caliber. The   ileocecal valve is seen on axial image 98 demonstrating normal fat attenuation. Just proximal to this along the posterior wall of the cecum is an abnormal wall   lesion measuring 3.5 cm x 2.4 cm in size concerning for a colon neoplasm likely   representing the patient's known cecal mass. No obstruction is suggested by   this at this time. No free fluid and no free air is seen in the abdomen. No   adenopathy is seen of the abdomen. The abdominal aorta demonstrates mild to   moderate atherosclerotic changes. No aggressive appearing osseous lesion is   seen. CT PELVIS:   No abnormal pelvic fluid collections are present. No pelvic adenopathy is seen. The urinary bladder is unremarkable. No aggressive appearing osseous lesion is   seen. Impression   1.  3.5 cm x 2.4 cm cecal mass. No clear evidence for metastatic disease is   evident by CT imaging. Pulmonary nodules, and hepatic lesions are seen which   are favored to be benign as described above. Assessment:     Principal Problem:    Acute blood loss anemia  Active Problems:    GIB (gastrointestinal bleeding)    Cecum mass    Colon cancer (HCC)    HTN (hypertension)    Myasthenia gravis (HCC)    GERD (gastroesophageal reflux disease)    Diastolic CHF, chronic (HCC)    Dyslipidemia  Resolved Problems:    * No resolved hospital problems. *      Plan:     Patient has a cecal mass on colonoscopy and will need right colectomy. We will plan to do mechanical and antibiotic bowel prep tomorrow. CLD after midnight. We will attempt robotic assisted laparoscopic right hemicolectomy on Wednesday if OR schedule permits.        Leanne eHrnandez MD  Bariatric & Minimally Invasive Surgery  [facility]  8/8/2022 7:45 AM

## 2022-08-08 NOTE — PROGRESS NOTES
's visit attempted. Medical staff was at bedside talking to Mr. Munir Ventura. No spiritual needs have been voiced. Chaplains remain available for support.      Mervin Jara 67  Board Certified

## 2022-08-08 NOTE — PROGRESS NOTES
[facility] Progress Note         Admit Date:  8/4/2022    Today's Date:  8/8/2022    CC:  acute on chronic anemia, melena    Subjective:     Patient denies further bleeding and denies abdominal pain. Disappointed that surgery is not being done today. Complains that he wants 2 hotdogs. Hgb stable at 8.2.      Medications:   Current Facility-Administered Medications   Medication Dose Route Frequency    potassium chloride (KLOR-CON M) extended release tablet 40 mEq  40 mEq Oral BID WC    lidocaine 1 % injection 5 mL  5 mL IntraDERmal Once    sodium chloride flush 0.9 % injection 5-40 mL  5-40 mL IntraVENous 2 times per day    sodium chloride flush 0.9 % injection 5-40 mL  5-40 mL IntraVENous PRN    0.9 % sodium chloride infusion   IntraVENous PRN    heparin flush 100 UNIT/ML injection 100 Units  1 mL IntraVENous 2 times per day    heparin flush 100 UNIT/ML injection 100 Units  1 mL IntraCATHeter PRN    [START ON 8/9/2022] polyethylene glycol (GoLYTELY) solution 4,000 mL  4,000 mL Oral Once    [Held by provider] aspirin EC tablet 81 mg  81 mg Oral Daily    atorvastatin (LIPITOR) tablet 80 mg  80 mg Oral Nightly    [Held by provider] furosemide (LASIX) tablet 40 mg  40 mg Oral Daily    rOPINIRole (REQUIP) tablet 0.5 mg  0.5 mg Oral Nightly    diatrizoate meglumine-sodium (GASTROGRAFIN) 66-10 % solution 15 mL  15 mL Oral ONCE PRN    cefTRIAXone (ROCEPHIN) 2,000 mg in sodium chloride 0.9 % 50 mL IVPB mini-bag  2,000 mg IntraVENous Q24H    lactated ringers infusion   IntraVENous Continuous    0.9 % sodium chloride infusion   IntraVENous PRN    sodium chloride flush 0.9 % injection 5-40 mL  5-40 mL IntraVENous 2 times per day    sodium chloride flush 0.9 % injection 5-40 mL  5-40 mL IntraVENous PRN    0.9 % sodium chloride infusion   IntraVENous PRN    ondansetron (ZOFRAN-ODT) disintegrating tablet 4 mg  4 mg Oral Q8H PRN    Or    ondansetron (ZOFRAN) injection 4 mg  4 mg IntraVENous Q6H PRN    polyethylene glycol (GLYCOLAX) packet 17 g  17 g Oral Daily PRN    acetaminophen (TYLENOL) tablet 650 mg  650 mg Oral Q6H PRN    Or    acetaminophen (TYLENOL) suppository 650 mg  650 mg Rectal Q6H PRN    pantoprazole (PROTONIX) 40 mg in sodium chloride (PF) 10 mL injection  40 mg IntraVENous Q12H    albuterol (PROVENTIL) nebulizer solution 2.5 mg  2.5 mg Nebulization Q6H PRN    temazepam (RESTORIL) capsule 15 mg  15 mg Oral Nightly PRN       Review of Systems:  ROS was obtained, with pertinent positives as listed above. No chest pain or SOB. Diet:      Objective:   Vitals:  BP (!) 147/79   Pulse 76   Temp 98 °F (36.7 °C) (Oral)   Resp 20   Ht 5' 8\" (1.727 m)   Wt 206 lb 9.6 oz (93.7 kg)   SpO2 97%   BMI 31.41 kg/m²   Intake/Output:  No intake/output data recorded. 08/06 1901 - 08/08 0700  In: 200 [P.O.:1660]  Out: -   Exam:  General appearance: alert, cooperative, no distress  Lungs: clear to auscultation bilaterally anteriorly  Heart: regular rate and rhythm  Abdomen: soft, non-tender. Bowel sounds normal. No masses, no organomegaly  Extremities: extremities normal, atraumatic, no cyanosis or edema  Neuro:  alert and oriented    Data Review (Labs):    Recent Labs     08/06/22  0532 08/07/22  0519   WBC 10.5 12.1*   HGB 8.2* 7.9*   HCT 29.9* 29.1*    263   MCV 70.4* 70.1*    142   K 3.5 3.4*   * 109*   CO2 25 26   BUN 8 5*   CREATININE 0.80 0.82   CALCIUM 9.0 9.1   MG 2.2  --    GLUCOSE 99 114*   ALKPHOS 114  --    AST 19  --    ALT 22  --    BILITOT 0.5  --    ALBUMIN 0.9*  --    PROT 6.6  --      Radiology review:  CT CHEST, ABDOMEN AND PELVIS WITH INTRAVENOUS CONTRAST DATED 8/6/2022. History: Cecal mass concerning for colon cancer.        Comparison: CT the cardiac over read 4/5/2019, and CT abdomen and pelvis with   contrast 3/27/2022       Technique:   Multiple contiguous helical CT images reconstructed at 5 mm were   obtained from the base of the neck to the ischial tuberosities following oral   and 100 cc Isovue-370 without acute complication. All CT scans performed at   this facility use one or all of the following: Automated exposure control,   adjustment of the mA and/or kVp according to patient's size, iterative   reconstruction. Findings:   CT Chest:   The base of the neck is unremarkable in appearance. No axillary, mediastinal,   or hilar lymphadenopathy is seen. The thoracic aorta is normal in caliber. The   heart appears moderately enlarged. Evaluation with lung windows demonstrates no suspicious pulmonary lesion. Small   nodules are seen in the bilateral lungs measuring up to 5 mm in size. However,   these are unchanged in size and number when compared to a prior CT Overread   dated 4/5/2019. The lack of significant change over multiple years favors   benign nodules. Mild dependent atelectasis is seen. No pleural effusion is   seen. Lungs are expanded without evidence for pneumothorax. No aggressive   appearing osseous lesion is seen. CT ABDOMEN:     The Liver small scattered low-attenuation left lobe hepatic lesions measuring up   to 9 mm in size. Although incompletely characterized, these are also included   in the field of imaging on the prior CT over read dated 4/5/2019 and are   unchanged in size and number. The appearance suggests benign lesions such as   hepatic cysts. An additional 1.6 cm lesion is seen in the more inferior right   lobe on image 68. This is unchanged when compared to the more recent CT scan of   the abdomen dated 3/27/2022 and does not demonstrate significant enhancement   suggesting an additional benign hepatic cyst.  The spleen is homogeneous in   attenuation. No contour deforming or enhancing mass lesions are seen of the   pancreas or adrenal glands. The gallbladder has been removed. The kidneys   enhance symmetrically and no evidence of hydronephrosis is seen.   Chronic renal   cortical scarring is seen most evident in the upper pole cortex of the left   kidney       The visualized loops of small bowel and colon are normal in caliber. The   ileocecal valve is seen on axial image 98 demonstrating normal fat attenuation. Just proximal to this along the posterior wall of the cecum is an abnormal wall   lesion measuring 3.5 cm x 2.4 cm in size concerning for a colon neoplasm likely   representing the patient's known cecal mass. No obstruction is suggested by   this at this time. No free fluid and no free air is seen in the abdomen. No   adenopathy is seen of the abdomen. The abdominal aorta demonstrates mild to   moderate atherosclerotic changes. No aggressive appearing osseous lesion is   seen. CT PELVIS:   No abnormal pelvic fluid collections are present. No pelvic adenopathy is seen. The urinary bladder is unremarkable. No aggressive appearing osseous lesion is   seen. Impression   1.  3.5 cm x 2.4 cm cecal mass. No clear evidence for metastatic disease is   evident by CT imaging. Pulmonary nodules, and hepatic lesions are seen which   are favored to be benign as described above. EGD 8/5/22, Dr. Richar Rogers; Findings:  Esophagus: The proximal and mid esophagus appeared normal.  In the distal esophagus, the Z line is minimally irregular. Stomach: There are 2 small erosions in the proximal stomach. Otherwise, there are no ulcers, erosions, or polyps. No trace of blood present. Duodenum:   Normal     Recommendations: Follow up Hgb results  Avoid NSAIDs  Cont PPI  Clears today  Colonoscopy in AM to r/o lower source of bleeding     Danielle Mckay MD     Colonoscopy Dr. Saad Chatman 8/6/22; Findings:   Cecal mass concerning for colon cancer. Biopsied and tattooed. Two 7-8 mm sessile polyps removed by hot snare from the ascending colon. Specimens: Cecal mass, colon polyps     Estimated Blood Loss: 0 cc           Complications: None; patient tolerated the procedure well.            Attending

## 2022-08-08 NOTE — PROGRESS NOTES
Hospitalist Progress Note   Admit Date:  2022  4:05 PM   Name:  Licha Mattson   Age:  79 y.o. Sex:  male  :  1940   MRN:  [id number]   Room:  [facility]    Presenting Complaint: Abnormal Lab     Reason(s) for Admission: Acute blood loss anemia [D62]  Anemia, unspecified type [D64.9]     Hospital Course & Interval History:     70-year-old male with a past medical history of coronary artery disease, hyperlipidemia, hypertension, GERD, heart failure with preserved ejection fraction, coronary artery disease, that presented in the setting of black stools associated with weakness and low hemoglobin. The patient states that for the past couple of days he has been presented with black stools associated with generalized weakness. He was sent for blood work today and he was called back letting him know his hemoglobin was lower than 7 so he needed to come to the emergency department for possible blood transfusion. He thinks he probably ran out of pantoprazole for the past week or 2. Otherwise he denies any nausea, no vomiting, no diarrhea, no shortness of breath, no cough. In the emergency department patient was found to be hemodynamically stable. Subjective/24hr Events (22): Patient seen at bedside, resting in recliner comfortably. He is alert and awake, denies any acute abdominal pain, nausea vomiting, melena or hematochezia. Patient is informed that hemicolectomy has been postponed till Wednesday. No overnight events including fever chills. ROS:  10 point review of system is negative except for what mentioned above. Assessment & Plan:     Principal Problem:    Acute blood loss anemia        IMP:    1.) ABLA/Anemia - s/p EGD with no acute findings. [place] with + cecal mass.  -  CT chest abdomen pelvis negative for any metastatic disease.   Plan for right hemicolectomy 8/10  Full liquid diet for now, clear liquids from midnight    2.) Cecal Mass - plan for right hemicolectomy on 8/10.    3.) Abn Urine -   8/8-  Urine culture positive for Enterobacter  Switch IV Rocephin to Unasyn on 8/8 x 5 days. 4.) Chronic LVDD/CHF - compensated    5.) CAD - ASA on hold due to above. Resume statin    6.) GERD - PPI    7.) PAD with h/o Carotid dz - resume statin and antiplt when able    8.) HTN - resume meds    9.) Myasthenia gravis - resume meds    10.) Osteoporosis    11.) ARIN - ? CPAP, v O2 qhs    12.) Abn Urine - repeat with cx    13.) HL -Lipitor    14.) Debility -PT OT eval          Discharge Planning:    Pending clinical course after surgery is done. Diet:  ADULT DIET; Full Liquid  ADULT DIET; Clear Liquid  Diet NPO  DVT PPx: SCD due to ABLA  Code status: Full Code    Hospital Problems:  Principal Problem:    Acute blood loss anemia  Active Problems:    GIB (gastrointestinal bleeding)    Cecum mass    Colon cancer (HCC)    HTN (hypertension)    Myasthenia gravis (HCC)    GERD (gastroesophageal reflux disease)    Diastolic CHF, chronic (HCC)    Dyslipidemia  Resolved Problems:    * No resolved hospital problems. *      Objective:   Patient Vitals for the past 24 hrs:   Temp Pulse Resp BP SpO2   08/08/22 0326 98.3 °F (36.8 °C) 70 20 133/65 94 %   08/07/22 2332 97.5 °F (36.4 °C) 70 20 124/78 97 %   08/07/22 1940 98.1 °F (36.7 °C) 73 24 (!) 152/78 100 %   08/07/22 1525 97.7 °F (36.5 °C) 75 19 125/72 100 %   08/07/22 1102 98 °F (36.7 °C) 75 19 133/70 99 %   08/07/22 0735 98.2 °F (36.8 °C) 75 19 (!) 146/79 100 %         Oxygen Therapy  SpO2: 94 %  Pulse via Oximetry: 76 beats per minute  Pulse Oximeter Device Mode: Intermittent  Pulse Oximeter Device Location: Right, Hand  O2 Device: None (Room air)  O2 Flow Rate (L/min): 4 L/min    Estimated body mass index is 31.41 kg/m² as calculated from the following:    Height as of this encounter: 5' 8\" (1.727 m). Weight as of this encounter: 206 lb 9.6 oz (93.7 kg).     Intake/Output Summary (Last 24 hours) at 8/8/2022 3751  Last data filed at 8/7/2022 1820  Gross per 24 hour   Intake 1660 ml   Output --   Net 1660 ml           Physical Exam:   Pt seen and evaluated on 8/8/2022    Blood pressure 133/65, pulse 70, temperature 98.3 °F (36.8 °C), temperature source Oral, resp. rate 20, height 5' 8\" (1.727 m), weight 206 lb 9.6 oz (93.7 kg), SpO2 94 %. General:    Awake, alert, on room air  HEENT:          head NCAT, PERRLA positive, MMM  Neck:  No restricted ROM. Trachea midline   CV:   RRR. No m/r/g. No jugular venous distension. Lungs:   Clear breath sounds auscultation bilaterally, no wheezing or rhonchi. Abdomen:   Soft, obese, nontender, nondistended, bowel sounds normoactive  Extremities: No cyanosis or clubbing. No edema  Skin:     No rashes and normal coloration. Warm and dry.     Neuro:  GCS 15, cranial nerves intact, no motor or sensory deficit  Psych:  AOx3, flat affect    I have personally reviewed labs and tests showing:  Recent Labs:  Recent Results (from the past 48 hour(s))   Culture, Blood 1    Collection Time: 08/06/22  4:13 PM    Specimen: Blood   Result Value Ref Range    Special Requests RIGHT  FOREARM        Culture NO GROWTH AFTER 16 HOURS     Lactic Acid    Collection Time: 08/06/22  4:13 PM   Result Value Ref Range    Lactic Acid, Plasma 1.9 0.4 - 2.0 MMOL/L   Culture, Blood 1    Collection Time: 08/06/22  8:03 PM    Specimen: Blood   Result Value Ref Range    Special Requests LEFT  Antecubital        Culture NO GROWTH AFTER 10 HOURS     Lactic Acid    Collection Time: 08/07/22  5:19 AM   Result Value Ref Range    Lactic Acid, Plasma 1.5 0.4 - 2.0 MMOL/L   CBC with Auto Differential    Collection Time: 08/07/22  5:19 AM   Result Value Ref Range    WBC 12.1 (H) 4.3 - 11.1 K/uL    RBC 4.15 (L) 4.23 - 5.6 M/uL    Hemoglobin 7.9 (L) 13.6 - 17.2 g/dL    Hematocrit 29.1 (L) 41.1 - 50.3 %    MCV 70.1 (L) 79.6 - 97.8 FL    MCH 19.0 (L) 26.1 - 32.9 PG    MCHC 27.1 (L) 31.4 - 35.0 g/dL    RDW 21.6 (H) 11.9 - 14.6 % Platelets 661 203 - 147 K/uL    MPV 9.1 (L) 9.4 - 12.3 FL    nRBC 0.00 0.0 - 0.2 K/uL    Differential Type AUTOMATED      Seg Neutrophils 66 43 - 78 %    Lymphocytes 20 13 - 44 %    Monocytes 8 4.0 - 12.0 %    Eosinophils % 4 0.5 - 7.8 %    Basophils 1 0.0 - 2.0 %    Immature Granulocytes 1 0.0 - 5.0 %    Segs Absolute 8.0 1.7 - 8.2 K/UL    Absolute Lymph # 2.4 0.5 - 4.6 K/UL    Absolute Mono # 1.0 0.1 - 1.3 K/UL    Absolute Eos # 0.5 0.0 - 0.8 K/UL    Basophils Absolute 0.1 0.0 - 0.2 K/UL    Absolute Immature Granulocyte 0.1 0.0 - 0.5 K/UL   Basic Metabolic Panel    Collection Time: 08/07/22  5:19 AM   Result Value Ref Range    Sodium 142 138 - 145 mmol/L    Potassium 3.4 (L) 3.5 - 5.1 mmol/L    Chloride 109 (H) 98 - 107 mmol/L    CO2 26 21 - 32 mmol/L    Anion Gap 7 7 - 16 mmol/L    Glucose 114 (H) 65 - 100 mg/dL    BUN 5 (L) 8 - 23 MG/DL    Creatinine 0.82 0.8 - 1.5 MG/DL    GFR African American >60 >60 ml/min/1.73m2    GFR Non- >60 >60 ml/min/1.73m2    Calcium 9.1 8.3 - 10.4 MG/DL       I have personally reviewed imaging studies showing: Other Studies:  CT CHEST ABDOMEN PELVIS W CONTRAST   Final Result   1.  3.5 cm x 2.4 cm cecal mass. No clear evidence for metastatic disease is   evident by CT imaging. Pulmonary nodules, and hepatic lesions are seen which   are favored to be benign as described above. This report was made using voice transcription. Despite my best efforts to avoid   any, transcription errors may persist. If there is any question about the   accuracy of the report or need for clarification, then please call [phone], or text me through [email or web address] for clarification or correction. XR CHEST PORTABLE   Final Result   1. No consolidation. 2. Chronic cardiac enlargement, pacemaker.              Current Meds:  Current Facility-Administered Medications   Medication Dose Route Frequency    potassium chloride (KLOR-CON M) extended release tablet 40 mEq  40 mEq Oral BID WC    lidocaine 1 % injection 5 mL  5 mL IntraDERmal Once    sodium chloride flush 0.9 % injection 5-40 mL  5-40 mL IntraVENous 2 times per day    sodium chloride flush 0.9 % injection 5-40 mL  5-40 mL IntraVENous PRN    0.9 % sodium chloride infusion   IntraVENous PRN    heparin flush 100 UNIT/ML injection 100 Units  1 mL IntraVENous 2 times per day    heparin flush 100 UNIT/ML injection 100 Units  1 mL IntraCATHeter PRN    [START ON 8/9/2022] polyethylene glycol (GoLYTELY) solution 4,000 mL  4,000 mL Oral Once    [Held by provider] aspirin EC tablet 81 mg  81 mg Oral Daily    atorvastatin (LIPITOR) tablet 80 mg  80 mg Oral Nightly    [Held by provider] furosemide (LASIX) tablet 40 mg  40 mg Oral Daily    rOPINIRole (REQUIP) tablet 0.5 mg  0.5 mg Oral Nightly    diatrizoate meglumine-sodium (GASTROGRAFIN) 66-10 % solution 15 mL  15 mL Oral ONCE PRN    cefTRIAXone (ROCEPHIN) 2,000 mg in sodium chloride 0.9 % 50 mL IVPB mini-bag  2,000 mg IntraVENous Q24H    lactated ringers infusion   IntraVENous Continuous    0.9 % sodium chloride infusion   IntraVENous PRN    sodium chloride flush 0.9 % injection 5-40 mL  5-40 mL IntraVENous 2 times per day    sodium chloride flush 0.9 % injection 5-40 mL  5-40 mL IntraVENous PRN    0.9 % sodium chloride infusion   IntraVENous PRN    ondansetron (ZOFRAN-ODT) disintegrating tablet 4 mg  4 mg Oral Q8H PRN    Or    ondansetron (ZOFRAN) injection 4 mg  4 mg IntraVENous Q6H PRN    polyethylene glycol (GLYCOLAX) packet 17 g  17 g Oral Daily PRN    acetaminophen (TYLENOL) tablet 650 mg  650 mg Oral Q6H PRN    Or    acetaminophen (TYLENOL) suppository 650 mg  650 mg Rectal Q6H PRN    pantoprazole (PROTONIX) 40 mg in sodium chloride (PF) 10 mL injection  40 mg IntraVENous Q12H    albuterol (PROVENTIL) nebulizer solution 2.5 mg  2.5 mg Nebulization Q6H PRN    temazepam (RESTORIL) capsule 15 mg  15 mg Oral Nightly PRN       Signed:    Fabiana Lara MD  8/8/22 - 2:08 PM      Part of this note may have been written by using a voice dictation software. The note has been proof read but may still contain some grammatical/other typographical errors.

## 2022-08-08 NOTE — CARE COORDINATION
Chart reviewed by CM for continued stay. LOS 4 days. Per chart review, patient received Home Health Therapy with [facility] prior to admission. Staff to discharge patient form [facility] due to patient meeting all of his goals. CM will initiate a new referral if patient would like services arranged at discharge. PT/OT has not been consulted at this time. Plan for right hemicolectomy on Wednesday, if OR Schedule permits. CM continues to follow care plan. Please consult CM if needs arise.

## 2022-08-09 ENCOUNTER — ANESTHESIA EVENT (OUTPATIENT)
Dept: SURGERY | Age: 82
DRG: 330 | End: 2022-08-09
Payer: MEDICARE

## 2022-08-09 PROBLEM — I49.5 SSS (SICK SINUS SYNDROME) (HCC): Status: ACTIVE | Noted: 2017-06-30

## 2022-08-09 PROBLEM — J69.0 ASPIRATION PNEUMONIA (HCC): Status: RESOLVED | Noted: 2017-10-26 | Resolved: 2022-08-09

## 2022-08-09 PROBLEM — R07.9 CHEST PAIN: Status: RESOLVED | Noted: 2021-11-19 | Resolved: 2022-08-09

## 2022-08-09 PROBLEM — I48.0 PAROXYSMAL ATRIAL FIBRILLATION (HCC): Status: ACTIVE | Noted: 2017-06-30

## 2022-08-09 PROCEDURE — 6360000002 HC RX W HCPCS: Performed by: INTERNAL MEDICINE

## 2022-08-09 PROCEDURE — 6370000000 HC RX 637 (ALT 250 FOR IP): Performed by: INTERNAL MEDICINE

## 2022-08-09 PROCEDURE — 6370000000 HC RX 637 (ALT 250 FOR IP): Performed by: FAMILY MEDICINE

## 2022-08-09 PROCEDURE — A4216 STERILE WATER/SALINE, 10 ML: HCPCS | Performed by: INTERNAL MEDICINE

## 2022-08-09 PROCEDURE — 1100000000 HC RM PRIVATE

## 2022-08-09 PROCEDURE — 99232 SBSQ HOSP IP/OBS MODERATE 35: CPT | Performed by: SURGERY

## 2022-08-09 PROCEDURE — 6360000002 HC RX W HCPCS: Performed by: HOSPITALIST

## 2022-08-09 PROCEDURE — 2580000003 HC RX 258: Performed by: HOSPITALIST

## 2022-08-09 PROCEDURE — 2580000003 HC RX 258: Performed by: INTERNAL MEDICINE

## 2022-08-09 PROCEDURE — 6370000000 HC RX 637 (ALT 250 FOR IP): Performed by: SURGERY

## 2022-08-09 PROCEDURE — C9113 INJ PANTOPRAZOLE SODIUM, VIA: HCPCS | Performed by: INTERNAL MEDICINE

## 2022-08-09 RX ORDER — POTASSIUM CHLORIDE 20 MEQ/1
40 TABLET, EXTENDED RELEASE ORAL ONCE
Status: COMPLETED | OUTPATIENT
Start: 2022-08-09 | End: 2022-08-09

## 2022-08-09 RX ORDER — POTASSIUM CHLORIDE 7.45 MG/ML
10 INJECTION INTRAVENOUS PRN
Status: DISCONTINUED | OUTPATIENT
Start: 2022-08-09 | End: 2022-08-19 | Stop reason: HOSPADM

## 2022-08-09 RX ORDER — SODIUM CHLORIDE, SODIUM LACTATE, POTASSIUM CHLORIDE, CALCIUM CHLORIDE 600; 310; 30; 20 MG/100ML; MG/100ML; MG/100ML; MG/100ML
INJECTION, SOLUTION INTRAVENOUS CONTINUOUS
Status: ACTIVE | OUTPATIENT
Start: 2022-08-10 | End: 2022-08-10

## 2022-08-09 RX ORDER — POTASSIUM CHLORIDE 20 MEQ/1
40 TABLET, EXTENDED RELEASE ORAL PRN
Status: DISCONTINUED | OUTPATIENT
Start: 2022-08-09 | End: 2022-08-19 | Stop reason: HOSPADM

## 2022-08-09 RX ORDER — MAGNESIUM SULFATE IN WATER 40 MG/ML
2000 INJECTION, SOLUTION INTRAVENOUS PRN
Status: DISCONTINUED | OUTPATIENT
Start: 2022-08-09 | End: 2022-08-19 | Stop reason: HOSPADM

## 2022-08-09 RX ADMIN — ATORVASTATIN CALCIUM 80 MG: 80 TABLET, FILM COATED ORAL at 21:14

## 2022-08-09 RX ADMIN — SODIUM CHLORIDE, PRESERVATIVE FREE 10 ML: 5 INJECTION INTRAVENOUS at 00:13

## 2022-08-09 RX ADMIN — AMPICILLIN SODIUM AND SULBACTAM SODIUM 3000 MG: 2; 1 INJECTION, POWDER, FOR SOLUTION INTRAMUSCULAR; INTRAVENOUS at 11:44

## 2022-08-09 RX ADMIN — SODIUM CHLORIDE, PRESERVATIVE FREE 10 ML: 5 INJECTION INTRAVENOUS at 08:31

## 2022-08-09 RX ADMIN — POTASSIUM CHLORIDE 40 MEQ: 20 TABLET, EXTENDED RELEASE ORAL at 09:39

## 2022-08-09 RX ADMIN — ROPINIROLE HYDROCHLORIDE 0.5 MG: 0.5 TABLET, FILM COATED ORAL at 21:14

## 2022-08-09 RX ADMIN — AMPICILLIN SODIUM AND SULBACTAM SODIUM 3000 MG: 2; 1 INJECTION, POWDER, FOR SOLUTION INTRAMUSCULAR; INTRAVENOUS at 00:14

## 2022-08-09 RX ADMIN — SODIUM CHLORIDE, PRESERVATIVE FREE 40 MG: 5 INJECTION INTRAVENOUS at 17:55

## 2022-08-09 RX ADMIN — AMPICILLIN SODIUM AND SULBACTAM SODIUM 3000 MG: 2; 1 INJECTION, POWDER, FOR SOLUTION INTRAMUSCULAR; INTRAVENOUS at 17:51

## 2022-08-09 RX ADMIN — TEMAZEPAM 15 MG: 15 CAPSULE ORAL at 21:14

## 2022-08-09 RX ADMIN — SODIUM CHLORIDE, PRESERVATIVE FREE 40 MG: 5 INJECTION INTRAVENOUS at 05:49

## 2022-08-09 RX ADMIN — AMPICILLIN SODIUM AND SULBACTAM SODIUM 3000 MG: 2; 1 INJECTION, POWDER, FOR SOLUTION INTRAMUSCULAR; INTRAVENOUS at 05:49

## 2022-08-09 RX ADMIN — POLYETHYLENE GLYCOL 3350, SODIUM SULFATE ANHYDROUS, SODIUM BICARBONATE, SODIUM CHLORIDE, POTASSIUM CHLORIDE 4000 ML: 236; 22.74; 6.74; 5.86; 2.97 POWDER, FOR SOLUTION ORAL at 15:56

## 2022-08-09 ASSESSMENT — PAIN SCALES - GENERAL
PAINLEVEL_OUTOF10: 0
PAINLEVEL_OUTOF10: 0

## 2022-08-09 ASSESSMENT — ENCOUNTER SYMPTOMS: SHORTNESS OF BREATH: 1

## 2022-08-09 NOTE — PROGRESS NOTES
clinical course after surgery is done. Diet:  ADULT DIET; Clear Liquid  Diet NPO  DVT PPx: SCD due to ABLA  Code status: Full Code    Hospital Problems:  Principal Problem:    Acute blood loss anemia  Active Problems:    GIB (gastrointestinal bleeding)    Cecum mass    Colon cancer (HCC)    HTN (hypertension)    Myasthenia gravis (HCC)    GERD (gastroesophageal reflux disease)    Diastolic CHF, chronic (HCC)    Dyslipidemia  Resolved Problems:    * No resolved hospital problems. *      Objective:   Patient Vitals for the past 24 hrs:   Temp Pulse Resp BP SpO2   08/09/22 0733 97.7 °F (36.5 °C) 75 19 (!) 143/78 97 %   08/09/22 0253 97.7 °F (36.5 °C) 73 20 113/68 97 %   08/08/22 2253 97.7 °F (36.5 °C) 69 19 111/71 97 %   08/08/22 1909 97.8 °F (36.6 °C) 75 20 (!) 128/93 97 %   08/08/22 1555 97.9 °F (36.6 °C) 75 20 135/72 97 %   08/08/22 1126 97.5 °F (36.4 °C) 76 19 134/72 98 %         Oxygen Therapy  SpO2: 97 %  Pulse via Oximetry: 76 beats per minute  Pulse Oximeter Device Mode: Intermittent  Pulse Oximeter Device Location: Right, Hand  O2 Device: None (Room air)  O2 Flow Rate (L/min): 4 L/min    Estimated body mass index is 31.41 kg/m² as calculated from the following:    Height as of this encounter: 5' 8\" (1.727 m). Weight as of this encounter: 206 lb 9.6 oz (93.7 kg). Intake/Output Summary (Last 24 hours) at 8/9/2022 0825  Last data filed at 8/8/2022 1802  Gross per 24 hour   Intake 1314 ml   Output --   Net 1314 ml           Physical Exam:   Pt seen and evaluated on 8/9/2022    Blood pressure (!) 143/78, pulse 75, temperature 97.7 °F (36.5 °C), temperature source Oral, resp. rate 19, height 5' 8\" (1.727 m), weight 206 lb 9.6 oz (93.7 kg), SpO2 97 %. General:    Awake, alert, on room air  HEENT:          head NCAT, PERRLA positive, MMM  Neck:  No restricted ROM. Trachea midline   CV:   RRR. No m/r/g. No jugular venous distension.   Lungs:   Clear breath sounds auscultation bilaterally, no wheezing or atorvastatin (LIPITOR) tablet 80 mg  80 mg Oral Nightly    [Held by provider] furosemide (LASIX) tablet 40 mg  40 mg Oral Daily    rOPINIRole (REQUIP) tablet 0.5 mg  0.5 mg Oral Nightly    diatrizoate meglumine-sodium (GASTROGRAFIN) 66-10 % solution 15 mL  15 mL Oral ONCE PRN    lactated ringers infusion   IntraVENous Continuous    0.9 % sodium chloride infusion   IntraVENous PRN    sodium chloride flush 0.9 % injection 5-40 mL  5-40 mL IntraVENous 2 times per day    sodium chloride flush 0.9 % injection 5-40 mL  5-40 mL IntraVENous PRN    0.9 % sodium chloride infusion   IntraVENous PRN    ondansetron (ZOFRAN-ODT) disintegrating tablet 4 mg  4 mg Oral Q8H PRN    Or    ondansetron (ZOFRAN) injection 4 mg  4 mg IntraVENous Q6H PRN    polyethylene glycol (GLYCOLAX) packet 17 g  17 g Oral Daily PRN    acetaminophen (TYLENOL) tablet 650 mg  650 mg Oral Q6H PRN    Or    acetaminophen (TYLENOL) suppository 650 mg  650 mg Rectal Q6H PRN    pantoprazole (PROTONIX) 40 mg in sodium chloride (PF) 10 mL injection  40 mg IntraVENous Q12H    albuterol (PROVENTIL) nebulizer solution 2.5 mg  2.5 mg Nebulization Q6H PRN    temazepam (RESTORIL) capsule 15 mg  15 mg Oral Nightly PRN       Signed:    Elvie Long MD  8/8/22 - 2:08 PM      Part of this note may have been written by using a voice dictation software. The note has been proof read but may still contain some grammatical/other typographical errors.

## 2022-08-09 NOTE — PROGRESS NOTES
[facility] Progress Note         Admit Date:  8/4/2022    Today's Date:  8/9/2022    CC:  Cecal mass    Subjective:     Patient is up in a chair. Reports having a black stool yesterday. Denies any abdominal pain, nausea, or vomiting this morning. Had a clear liquid breakfast. He is anxious for surgery.     Medications:   Current Facility-Administered Medications   Medication Dose Route Frequency    magnesium sulfate 2000 mg in 50 mL IVPB premix  2,000 mg IntraVENous PRN    potassium chloride (KLOR-CON M) extended release tablet 40 mEq  40 mEq Oral PRN    Or    potassium bicarb-citric acid (EFFER-K) effervescent tablet 40 mEq  40 mEq Oral PRN    Or    potassium chloride 10 mEq/100 mL IVPB (Peripheral Line)  10 mEq IntraVENous PRN    potassium chloride (KLOR-CON M) extended release tablet 40 mEq  40 mEq Oral Once    ampicillin-sulbactam (UNASYN) 3000 mg in 100 mL NS IVPB minibag  3,000 mg IntraVENous Q6H    lidocaine 1 % injection 5 mL  5 mL IntraDERmal Once    sodium chloride flush 0.9 % injection 5-40 mL  5-40 mL IntraVENous 2 times per day    sodium chloride flush 0.9 % injection 5-40 mL  5-40 mL IntraVENous PRN    0.9 % sodium chloride infusion   IntraVENous PRN    heparin flush 100 UNIT/ML injection 100 Units  1 mL IntraVENous 2 times per day    heparin flush 100 UNIT/ML injection 100 Units  1 mL IntraCATHeter PRN    polyethylene glycol (GoLYTELY) solution 4,000 mL  4,000 mL Oral Once    [Held by provider] aspirin EC tablet 81 mg  81 mg Oral Daily    atorvastatin (LIPITOR) tablet 80 mg  80 mg Oral Nightly    [Held by provider] furosemide (LASIX) tablet 40 mg  40 mg Oral Daily    rOPINIRole (REQUIP) tablet 0.5 mg  0.5 mg Oral Nightly    diatrizoate meglumine-sodium (GASTROGRAFIN) 66-10 % solution 15 mL  15 mL Oral ONCE PRN    0.9 % sodium chloride infusion   IntraVENous PRN    sodium chloride flush 0.9 % injection 5-40 mL  5-40 mL IntraVENous 2 times per day    sodium chloride flush 0.9 % injection 5-40 mL  5-40 mL IntraVENous PRN    0.9 % sodium chloride infusion   IntraVENous PRN    ondansetron (ZOFRAN-ODT) disintegrating tablet 4 mg  4 mg Oral Q8H PRN    Or    ondansetron (ZOFRAN) injection 4 mg  4 mg IntraVENous Q6H PRN    polyethylene glycol (GLYCOLAX) packet 17 g  17 g Oral Daily PRN    acetaminophen (TYLENOL) tablet 650 mg  650 mg Oral Q6H PRN    Or    acetaminophen (TYLENOL) suppository 650 mg  650 mg Rectal Q6H PRN    pantoprazole (PROTONIX) 40 mg in sodium chloride (PF) 10 mL injection  40 mg IntraVENous Q12H    albuterol (PROVENTIL) nebulizer solution 2.5 mg  2.5 mg Nebulization Q6H PRN    temazepam (RESTORIL) capsule 15 mg  15 mg Oral Nightly PRN       Review of Systems:  ROS was obtained, with pertinent positives as listed above. No chest pain or SOB. Diet:      Objective:   Vitals:  BP (!) 143/78   Pulse 75   Temp 97.7 °F (36.5 °C) (Oral)   Resp 19   Ht 5' 8\" (1.727 m)   Wt 206 lb 9.6 oz (93.7 kg)   SpO2 97%   BMI 31.41 kg/m²   Intake/Output:  No intake/output data recorded. 08/07 1901 - 08/09 0700  In: 3950 [P.O.:1314]  Out: -   Exam:  General appearance: alert, cooperative, no distress  Lungs: clear to auscultation bilaterally anteriorly  Heart: regular rate and rhythm  Abdomen: soft, non-tender. Bowel sounds normal. No masses, no organomegaly  Extremities: extremities normal, atraumatic, no cyanosis or edema  Neuro:  alert and oriented    Data Review (Labs):    Recent Labs     08/07/22  0519   WBC 12.1*   HGB 7.9*   HCT 29.1*      MCV 70.1*      K 3.4*   *   CO2 26   BUN 5*       Assessment:     Principal Problem:    Acute blood loss anemia  Active Problems:    GIB (gastrointestinal bleeding)    Cecum mass    Colon cancer (HCC)    HTN (hypertension)    Myasthenia gravis (HCC)    GERD (gastroesophageal reflux disease)    Diastolic CHF, chronic (HCC)    Dyslipidemia  Resolved Problems:    * No resolved hospital problems.  *      79 y.o. male with PMH afib a s/p Watchman and SSS s/p pacemaker who was seen in consultation for melena and ABLA (hgb 6.5). EGD 8/5 unremarkable. Colonoscopy 8/6 with cecal mass. Surgery has seen and plans for mechanical and antibiotic bowel prep tomorrow with surgical resection Wednesday 8/10. Plan:     Path has not returned. Surgery is planned for Wednesday, 8/10 following mechanical and antibiotic bowel prep. NAOMY Colin    Patient is seen and examined in collaboration with Dr. Lukas Wilburn. Assessment and plan as per Dr. Lukas Wilburn.

## 2022-08-09 NOTE — CARE COORDINATION
Chart reviewed by CM. Hemicolectomy has been postponed till Wednesday 8/10. No CM needs identified at this time. CM continues to follow care plan. Please consult CM if needs arise.

## 2022-08-09 NOTE — ANESTHESIA PRE PROCEDURE
Department of Anesthesiology  Preprocedure Note       Name:  Lore Peña   Age:  79 y.o.  :  1940                                          MRN:  [id number]         Date:  2022      Surgeon: Sharmin Hsu):  Malvin Beal MD    Procedure: Procedure(s):  BOWEL RESECTION HEMICOLECTOMY LAPAROSCOPIC ROBOTIC, Right [place]    Medications prior to admission:   Prior to Admission medications    Medication Sig Start Date End Date Taking? Authorizing Provider   potassium chloride (KLOR-CON M) 20 MEQ extended release tablet Take 1 tablet by mouth daily 22   Latasha Rinaldiore, DO   aspirin 81 MG EC tablet Take 81 mg by mouth daily 22   Ar Automatic Reconciliation   atorvastatin (LIPITOR) 80 MG tablet Take 80 mg by mouth 3/3/22   Ar Automatic Reconciliation   cyanocobalamin 1000 MCG tablet Take 1,000 mcg by mouth daily  Patient not taking: No sig reported 22   Ar Automatic Reconciliation   escitalopram (LEXAPRO) 10 MG tablet Take 10 mg by mouth daily  Patient not taking: No sig reported 3/3/22   Ar Automatic Reconciliation   furosemide (LASIX) 40 MG tablet Take 40 mg by mouth daily 3/3/22   Ar Automatic Reconciliation   nitroGLYCERIN (NITROSTAT) 0.4 MG SL tablet Place 1 sl under the tongue q 5 min prn cp, max 3 sl in a 15-min time period. Call 911 if no relief after the 3rd sl. 20   Ar Automatic Reconciliation   pantoprazole (PROTONIX) 40 MG tablet Take 40 mg by mouth every morning (before breakfast)  Patient not taking: No sig reported 4/3/22   Ar Automatic Reconciliation   rOPINIRole (REQUIP) 0.5 MG tablet 1 nightly, increase to 1 twice a day if needed 3/3/22   Ar Automatic Reconciliation   tamsulosin (FLOMAX) 0.4 MG capsule Take 0.4 mg by mouth daily 22   Ar Automatic Reconciliation       Current medications:    No current facility-administered medications for this visit. No current outpatient medications on file.      Facility-Administered Medications Ordered in Other Visits Medication Dose Route Frequency Provider Last Rate Last Admin    magnesium sulfate 2000 mg in 50 mL IVPB premix  2,000 mg IntraVENous PRN Gayle Waddy, DO        potassium chloride (KLOR-CON M) extended release tablet 40 mEq  40 mEq Oral PRN Gayle Waddy, DO        Or    potassium bicarb-citric acid (EFFER-K) effervescent tablet 40 mEq  40 mEq Oral PRN Gayle Waddy, DO        Or    potassium chloride 10 mEq/100 mL IVPB (Peripheral Line)  10 mEq IntraVENous PRN Gayle Waddy, DO        [START ON 8/10/2022] lactated ringers infusion   IntraVENous Continuous Sinai Cost, APRN - CNP        ampicillin-sulbactam (UNASYN) 3000 mg in 100 mL NS IVPB minibag  3,000 mg IntraVENous Q6H Hanna Kat  mL/hr at 08/09/22 1751 3,000 mg at 08/09/22 1751    lidocaine 1 % injection 5 mL  5 mL IntraDERmal Once Hanna Kat MD        sodium chloride flush 0.9 % injection 5-40 mL  5-40 mL IntraVENous 2 times per day Hanna Kat MD   10 mL at 08/09/22 0831    sodium chloride flush 0.9 % injection 5-40 mL  5-40 mL IntraVENous PRN Hanna Kat MD        0.9 % sodium chloride infusion   IntraVENous PRN Hanna Kat MD        heparin flush 100 UNIT/ML injection 100 Units  1 mL IntraVENous 2 times per day Hanna Kat MD   100 Units at 08/08/22 1025    heparin flush 100 UNIT/ML injection 100 Units  1 mL IntraCATHeter PRN Hanna Kat MD       [facility] by [facility]] aspirin EC tablet 81 mg  81 mg Oral Daily Krista Lindsey MD        atorvastatin (LIPITOR) tablet 80 mg  80 mg Oral Nightly Krista Lindsey MD   80 mg at 08/08/22 2142    [Held by provider] furosemide (LASIX) tablet 40 mg  40 mg Oral Daily Krista Lindsey MD        rOPINIRole (REQUIP) tablet 0.5 mg  0.5 mg Oral Nightly Krista Lindsey MD   0.5 mg at 08/08/22 2142    diatrizoate meglumine-sodium (GASTROGRAFIN) 66-10 % solution 15 mL  15 mL Oral ONCE PRN Veronika Valle MD   15 mL at 08/06/22 1768    0.9 % sodium chloride infusion   IntraVENous PRN Gauri Zavala,         sodium chloride flush 0.9 % injection 5-40 mL  5-40 mL IntraVENous 2 times per day Holger Clayton MD   10 mL at 08/09/22 0013    sodium chloride flush 0.9 % injection 5-40 mL  5-40 mL IntraVENous PRN Holger Clayton MD        0.9 % sodium chloride infusion   IntraVENous PRN Holger Clayton MD        ondansetron (ZOFRAN-ODT) disintegrating tablet 4 mg  4 mg Oral Q8H PRN Holger Clayton MD        Or    ondansetron [facility]) injection 4 mg  4 mg IntraVENous Q6H PRN Holger Clayton MD        polyethylene glycol [facility]) packet 17 g  17 g Oral Daily PRN Holger Clayton MD        acetaminophen (TYLENOL) tablet 650 mg  650 mg Oral Q6H PRN Holger Clayton MD   650 mg at 08/05/22 2258    Or    acetaminophen (TYLENOL) suppository 650 mg  650 mg Rectal Q6H PRN Holger Clayton MD        pantoprazole (PROTONIX) 40 mg in sodium chloride (PF) 10 mL injection  40 mg IntraVENous Q12H Holger Clayton MD   40 mg at 08/09/22 1755    albuterol (PROVENTIL) nebulizer solution 2.5 mg  2.5 mg Nebulization Q6H PRN Holger Clayton MD   2.5 mg at 08/06/22 1526    temazepam (RESTORIL) capsule 15 mg  15 mg Oral Nightly PRN Alva Arevalo MD   15 mg at 08/08/22 2142       Allergies:  No Known Allergies    Problem List:    Patient Active Problem List   Diagnosis Code    Paroxysmal atrial fibrillation (HCC) I48.0    Pacemaker Z95.0    HTN (hypertension) I10    Dyspnea R06.00    Mitral valve regurgitation I34.0    Myasthenia gravis ([facility] G70.00    S/P coronary artery stent placement Z95.5    Syncope R55    Major depressive disorder, recurrent, moderate ([facility]) F33.1    SSS (sick sinus syndrome) ([facility]) I49.5    Hypotension I95.9    Bilateral carotid artery disease ([facility]) I77.9    GERD (gastroesophageal reflux disease) B48.6    Diastolic CHF, chronic ([facility]) I50.32    Major depressive disorder, recurrent, mild (HCC) F33.0    Hypokalemia E87.6    Coronary atherosclerosis of native coronary vessel I25.10    Sepsis (HCC) A41.9    Dyslipidemia E78.5    Major depressive disorder, recurrent, unspecified ([facility] [address] [address] F33.9    Atrial fibrillation ([facility] [address] I48.91    Acute cholecystitis K81.0    Pancreatitis, gallstone K85.10    Anemia D64.9    Bacteremia R78.81    Urine retention R33.9    Acute blood loss anemia D62    GIB (gastrointestinal bleeding) K92.2    Cecum mass K63.89    Colon cancer (HCC) C18.9       Past Medical History:        Diagnosis Date    Abnormal EKG 4/22/15    Arrhythmia     Aspiration pneumonia ([facility] [address] 10/26/2017    CAD (coronary artery disease) 5/8/2015    Carotid artery stenosis without cerebral infarction 6/7/2016    US 6/15: <50% bilat ICAs    Coronary atherosclerosis of native coronary vessel 5/8/2015    GERMAIN on brilinta 5/7/15: prox LAD PCI, normal EF     Diastolic CHF, chronic ([facility] [address] 3/2/2022    Dyslipidemia 6/7/2016    Dyspnea 5/8/2015    Echo 6/15: EF 60%, mod MR, mod LVH, mild AI     ED (erectile dysfunction)     GERD (gastroesophageal reflux disease)     GERD (gastroesophageal reflux disease)     no medication    HTN (hypertension) 5/8/2015    Hypertension     Hypokalemia     Hypokalemia 6/7/2016    Mitral valve regurgitation 6/7/2016    Morbid obesity ([facility] [address]     Myasthenia gravis ([facility] [address]     Myasthenia gravis ([facility] [address] 6/17/15    Nocturia     Osteoporosis     PUD (peptic ulcer disease) 25 yrs ago    S/P coronary artery stent placement 5/8/2015    3.0x38 mm Xience CAROL to pLAD 5/7/15     Sleep apnea     Syncope and collapse     Unspecified sleep apnea     no cpap       Past Surgical History:        Procedure Laterality Date    APPENDECTOMY      CARDIAC CATHETERIZATION  05/21/2019    watchman device    CARDIAC CATHETERIZATION  05/27/2015    stent    COLONOSCOPY  2007    COLONOSCOPY N/A 8/6/2022    COLONOSCOPY POLYPECTOMY SNARE/COLD BIOPSY performed by [employer] MD Scott at [facility] ENDOSCOPY    ERCP  3/30/2022         HEMORRHOID SURGERY      PACEMAKER  2018   Mahsa Kraus/Giana for sleep apnea and reconstruction for extending jaw    UPPER GASTROINTESTINAL ENDOSCOPY N/A 8/5/2022    EGD ESOPHAGOGASTRODUODENOSCOPY Rm 525 performed by Denise Lopez MD at [address] Right 07/10/2019     Repair of right radial artery pseudoaneurysm       Social History:    Social History     Tobacco Use    Smoking status: Never    Smokeless tobacco: Never   Substance Use Topics    Alcohol use: No                                Counseling given: Not Answered      Vital Signs (Current): There were no vitals filed for this visit.                                            BP Readings from Last 3 Encounters:   08/09/22 130/76   07/18/22 112/62   06/02/22 134/72       NPO Status:                                                                                 BMI:   Wt Readings from Last 3 Encounters:   08/07/22 206 lb 9.6 oz (93.7 kg)   07/18/22 207 lb 12.8 oz (94.3 kg)   06/02/22 214 lb (97.1 kg)     There is no height or weight on file to calculate BMI.    CBC:   Lab Results   Component Value Date/Time    WBC 12.1 08/07/2022 05:19 AM    RBC 4.15 08/07/2022 05:19 AM    HGB 7.9 08/07/2022 05:19 AM    HCT 29.1 08/07/2022 05:19 AM    MCV 70.1 08/07/2022 05:19 AM    RDW 21.6 08/07/2022 05:19 AM     08/07/2022 05:19 AM       CMP:   Lab Results   Component Value Date/Time     08/07/2022 05:19 AM    K 3.4 08/07/2022 05:19 AM     08/07/2022 05:19 AM    CO2 26 08/07/2022 05:19 AM    BUN 5 08/07/2022 05:19 AM    CREATININE 0.82 08/07/2022 05:19 AM    GFRAA >60 08/07/2022 05:19 AM    AGRATIO 0.7 03/30/2022 06:48 AM    LABGLOM >60 08/07/2022 05:19 AM    GLUCOSE 114 08/07/2022 05:19 AM    PROT 6.6 08/06/2022 05:32 AM    CALCIUM 9.1 08/07/2022 05:19 AM    BILITOT 0.5 08/06/2022 05:32 AM    ALKPHOS 114 08/06/2022 05:32 AM    ALKPHOS 126 03/30/2022 06:48 AM    AST 19 08/06/2022 05:32 AM    ALT 22 08/06/2022 05:32 AM       POC Tests: No results for input(s): POCGLU, POCNA, POCK, POCCL, POCBUN, POCHEMO, POCHCT in the last 72 hours. Coags:   Lab Results   Component Value Date/Time    PROTIME 13.2 05/06/2021 11:53 AM    INR 1.0 05/06/2021 11:53 AM       HCG (If Applicable): No results found for: PREGTESTUR, PREGSERUM, HCG, HCGQUANT     ABGs: No results found for: PHART, PO2ART, EWO4ZPY, TAT4GHA, BEART, R7EPEDVN     Type & Screen (If Applicable):  No results found for: LABABO, LABRH    Drug/Infectious Status (If Applicable):  No results found for: HIV, HEPCAB    COVID-19 Screening (If Applicable):   Lab Results   Component Value Date/Time    COVID19 Performed 05/03/2021 09:44 AM    COVID19 Not Detected 05/03/2021 09:44 AM           Anesthesia Evaluation  Patient summary reviewed and Nursing notes reviewed  Airway: Mallampati: II  TM distance: >3 FB   Neck ROM: full  Mouth opening: > = 3 FB   Dental:    (+) upper dentures, lower dentures and partials      Pulmonary:Negative Pulmonary ROS breath sounds clear to auscultation  (+) shortness of breath: no interval change,  sleep apnea:                             Cardiovascular:Negative CV ROS  Exercise tolerance: good (>4 METS),   (+) hypertension:, valvular problems/murmurs (Moderate AI ): AI, pacemaker (SSS (sick sinus syndrome) ): pacemaker, CAD (Stent in 2015):, dysrhythmias (Dysrhythmias (paf, SSS) : atrial fibrillation; s/p watchman 2019): atrial fibrillation, CHF: diastolic,         Rhythm: regular  Rate: normal                 ROS comment: Pacemaker (Pacemaker for SSS), CAD, cardiac stents (2015 - remains on bASA)    3/2022 - Ventricular-paced rhythm    Echo 12/2021 -  LA: Left Atrium volume index is 44.4 mL/m2. · AV: Aortic valve mean gradient is 10 mmHg. · TV: Right Ventricular Arterial Pressure (RVSP) is 48 mmHg.   · Mild LVH  · Normal EF  · Moderate aortic insuff         Neuro/Psych:

## 2022-08-09 NOTE — PROGRESS NOTES
Subjective:     Patient is a 79 y.o.  male presents with anemia. He had colonoscopy done on 8 5 which showed a lesion in the cecum consistent with a neoplasm. We were asked to evaluate for possible surgical resection. He reports no abdominal pain. Currently he denies nausea vomiting diarrhea constipation hematochezia hematemesis or melena. He has been on full liquids. [place] fevers or chills. 8/8/2022 - Doing well this AM. No bleeding reported. Tolerating liquid diet. No prep received. 8/9/22: No complaints, no bleeding. Liquid diet and bowel prep today.         Patient Active Problem List    Diagnosis Date Noted    Cecum mass 08/07/2022    Colon cancer ([address] 08/07/2022    Acute blood loss anemia 08/04/2022    GIB (gastrointestinal bleeding) 08/04/2022    Urine retention 04/03/2022    Acute cholecystitis 03/28/2022    Pancreatitis, gallstone 03/28/2022    Anemia 03/28/2022    Bacteremia 03/28/2022    Major depressive disorder, recurrent, moderate ([address] 47/41/2493    Diastolic CHF, chronic (HCC) 03/02/2022    Major depressive disorder, recurrent, mild ([address] 03/02/2022    Major depressive disorder, recurrent, unspecified ([address] 03/02/2022    Chest pain 11/19/2021    Pacemaker 04/14/2021    Atrial fibrillation ([address] 11/29/2017    GERD (gastroesophageal reflux disease) 10/27/2017    Aspiration pneumonia ([address] 10/26/2017    Hypotension 10/26/2017    Sepsis ([address] 10/26/2017    Syncope 10/24/2017    Paroxysmal atrial fibrillation ([address] 06/30/2017    SSS (sick sinus syndrome) ([address] 06/30/2017    Bilateral carotid artery disease ([address] 06/30/2017    Myasthenia gravis ([address]     Mitral valve regurgitation 06/07/2016    Hypokalemia 06/07/2016    Dyslipidemia 06/07/2016    HTN (hypertension) 05/08/2015    Dyspnea 05/08/2015    S/P coronary artery stent placement 05/08/2015    Coronary atherosclerosis of native coronary vessel 05/08/2015     Past Medical History:   Diagnosis Date    Abnormal EKG 4/22/15 Arrhythmia     CAD (coronary artery disease) 5/8/2015    Carotid artery stenosis without cerebral infarction 6/7/2016    US 6/15: <50% bilat ICAs    Coronary atherosclerosis of native coronary vessel 5/8/2015    GERMAIN on brilinta 5/7/15: prox LAD PCI, normal EF     Diastolic CHF, chronic ([address] 3/2/2022    Dyslipidemia 6/7/2016    Dyspnea 5/8/2015    Echo 6/15: EF 60%, mod MR, mod LVH, mild AI     ED (erectile dysfunction)     GERD (gastroesophageal reflux disease)     GERD (gastroesophageal reflux disease)     no medication    HTN (hypertension) 5/8/2015    Hypertension     Hypokalemia     Hypokalemia 6/7/2016    Mitral valve regurgitation 6/7/2016    Morbid obesity ([address]     Myasthenia gravis ([facility] [address]     Myasthenia gravis ([facility] [address] 6/17/15    Nocturia     Osteoporosis     PUD (peptic ulcer disease) 25 yrs ago    S/P coronary artery stent placement 5/8/2015    3.0x38 mm Xience CAROL to pLAD 5/7/15     Sleep apnea     Syncope and collapse     Unspecified sleep apnea     no cpap      Past Surgical History:   Procedure Laterality Date    APPENDECTOMY      CARDIAC CATHETERIZATION  05/21/2019    watchman device    CARDIAC CATHETERIZATION  05/27/2015    stent    COLONOSCOPY  2007    COLONOSCOPY N/A 8/6/2022    COLONOSCOPY POLYPECTOMY SNARE/COLD BIOPSY performed by Nomi Salazar MD at [facility] ENDOSCOPY    ERCP  3/30/2022         Jerelyn Ano      PACEMAKER  2018    Angel Kraus/Giana for sleep apnea and reconstruction for extending jaw    UPPER GASTROINTESTINAL ENDOSCOPY N/A 8/5/2022    EGD ESOPHAGOGASTRODUODENOSCOPY Rm 525 performed by Liam Contreras MD at [address] Right 07/10/2019     Repair of right radial artery pseudoaneurysm      Medications Prior to Admission: potassium chloride (KLOR-CON M) 20 MEQ extended release tablet, Take 1 tablet by mouth daily  aspirin 81 MG EC tablet, Take 81 mg by mouth daily  atorvastatin (LIPITOR) 80 MG tablet, Take 80 mg by petechia  Neurological: Cranial Nerves II-XII grossly intact, normal sensation and muscle strength bilaterally      Data ReviewCBC:   Lab Results   Component Value Date/Time    WBC 12.1 08/07/2022 05:19 AM    RBC 4.15 08/07/2022 05:19 AM     BMP:   Lab Results   Component Value Date/Time    GLUCOSE 114 08/07/2022 05:19 AM    CO2 26 08/07/2022 05:19 AM    BUN 5 08/07/2022 05:19 AM    CREATININE 0.82 08/07/2022 05:19 AM    CALCIUM 9.1 08/07/2022 05:19 AM     Radiology review:   CT CHEST, ABDOMEN AND PELVIS WITH INTRAVENOUS CONTRAST DATED 8/6/2022. History: Cecal mass concerning for colon cancer. Comparison: CT the cardiac over read 4/5/2019, and CT abdomen and pelvis with   contrast 3/27/2022        Technique:   Multiple contiguous helical CT images reconstructed at 5 mm were   obtained from the base of the neck to the ischial tuberosities following oral   and 100 cc Isovue-370 without acute complication. All CT scans performed at   this facility use one or all of the following: Automated exposure control,   adjustment of the mA and/or kVp according to patient's size, iterative   reconstruction. Findings:   CT Chest:   The base of the neck is unremarkable in appearance. No axillary, mediastinal,   or hilar lymphadenopathy is seen. The thoracic aorta is normal in caliber. The   heart appears moderately enlarged. Evaluation with lung windows demonstrates no suspicious pulmonary lesion. Small   nodules are seen in the bilateral lungs measuring up to 5 mm in size. However,   these are unchanged in size and number when compared to a prior CT Overread   dated 4/5/2019. The lack of significant change over multiple years favors   benign nodules. Mild dependent atelectasis is seen. No pleural effusion is   seen. Lungs are expanded without evidence for pneumothorax. No aggressive   appearing osseous lesion is seen.        CT ABDOMEN:     The Liver small scattered low-attenuation left lobe hepatic lesions measuring up   to 9 mm in size. Although incompletely characterized, these are also included   in the field of imaging on the prior CT over read dated 4/5/2019 and are   unchanged in size and number. The appearance suggests benign lesions such as   hepatic cysts. An additional 1.6 cm lesion is seen in the more inferior right   lobe on image 68. This is unchanged when compared to the more recent CT scan of   the abdomen dated 3/27/2022 and does not demonstrate significant enhancement   suggesting an additional benign hepatic cyst.  The spleen is homogeneous in   attenuation. No contour deforming or enhancing mass lesions are seen of the   pancreas or adrenal glands. The gallbladder has been removed. The kidneys   enhance symmetrically and no evidence of hydronephrosis is seen. Chronic renal   cortical scarring is seen most evident in the upper pole cortex of the left   kidney       The visualized loops of small bowel and colon are normal in caliber. The   ileocecal valve is seen on axial image 98 demonstrating normal fat attenuation. Just proximal to this along the posterior wall of the cecum is an abnormal wall   lesion measuring 3.5 cm x 2.4 cm in size concerning for a colon neoplasm likely   representing the patient's known cecal mass. No obstruction is suggested by   this at this time. No free fluid and no free air is seen in the abdomen. No   adenopathy is seen of the abdomen. The abdominal aorta demonstrates mild to   moderate atherosclerotic changes. No aggressive appearing osseous lesion is   seen. CT PELVIS:   No abnormal pelvic fluid collections are present. No pelvic adenopathy is seen. The urinary bladder is unremarkable. No aggressive appearing osseous lesion is   seen. Impression   1.  3.5 cm x 2.4 cm cecal mass. No clear evidence for metastatic disease is   evident by CT imaging.   Pulmonary nodules, and hepatic lesions are seen which   are favored to be benign as described above. Assessment:     Principal Problem:    Acute blood loss anemia  Active Problems:    GIB (gastrointestinal bleeding)    Cecum mass    Colon cancer (HCC)    HTN (hypertension)    Myasthenia gravis (HCC)    GERD (gastroesophageal reflux disease)    Diastolic CHF, chronic (HCC)    Dyslipidemia  Resolved Problems:    * No resolved hospital problems. *      Plan:     Patient has a cecal mass on colonoscopy and will need right colectomy. We will plan to do mechanical and antibiotic bowel prep today. CLD after midnight 8/8/22 in progress, NPO & IVF after MN    We will attempt robotic assisted laparoscopic right hemicolectomy on Wednesday 8/10/22 per Dr Kong Tim, APRN - CNP    8/9/2022 7:25 AM        Surgeon Attestation    I have personally seen and examined this patient. I have reviewed and agreed with the plan of care. In summary, Alan Vasquez is a 79 y.o. male who was admitted with GI bleeding and was found to have a cecal mass. He is doing well and tolerating bowel prep. /76   Pulse 76   Temp 97.8 °F (36.6 °C) (Oral)   Resp 18   Ht 5' 8\" (1.727 m)   Wt 206 lb 9.6 oz (93.7 kg)   SpO2 97%   BMI 31.41 kg/m²      Exam:    AAO in NAD  Abd soft, nontender, nondistended         Plan: continue bowel prep   Antibiotic prep ordered  Will plan for robotic laparoscopic assisted right hemicolectomy with anastomosis tomorrow if bowel prep is adequate.      Clementine Cohen MD  [facility] - Bariatric & Minimally Invasive Surgery  8/9/2022 8:12 PM

## 2022-08-09 NOTE — PROGRESS NOTES
's follow-up visit with Mr. Dionne Varela. Patient appeared more calm compared to yesterday. He is looking for a more restful night. I actively listened to Mr. Dionne Varela share about his emmett, caring family, and beloved dog, and offered spiritual/emotional support. Mr. Dionne Varela expressed gratitude for the visit.       Mervin Camara 67  Board Certified

## 2022-08-10 ENCOUNTER — ANESTHESIA (OUTPATIENT)
Dept: SURGERY | Age: 82
DRG: 330 | End: 2022-08-10
Payer: MEDICARE

## 2022-08-10 LAB
ABO + RH BLD: NORMAL
ANION GAP SERPL CALC-SCNC: 4 MMOL/L (ref 7–16)
BLD PROD TYP BPU: NORMAL
BLOOD BANK DISPENSE STATUS: NORMAL
BLOOD GROUP ANTIBODIES SERPL: NORMAL
BPU ID: NORMAL
BUN SERPL-MCNC: 4 MG/DL (ref 8–23)
CALCIUM SERPL-MCNC: 9.4 MG/DL (ref 8.3–10.4)
CHLORIDE SERPL-SCNC: 107 MMOL/L (ref 98–107)
CO2 SERPL-SCNC: 29 MMOL/L (ref 21–32)
CREAT SERPL-MCNC: 0.8 MG/DL (ref 0.8–1.5)
CROSSMATCH RESULT: NORMAL
ERYTHROCYTE [DISTWIDTH] IN BLOOD BY AUTOMATED COUNT: 22.3 % (ref 11.9–14.6)
GLUCOSE SERPL-MCNC: 96 MG/DL (ref 65–100)
HCT VFR BLD AUTO: 29.2 % (ref 41.1–50.3)
HGB BLD-MCNC: 7.9 G/DL (ref 13.6–17.2)
HISTORY CHECK: NORMAL
HISTORY CHECK: NORMAL
MAGNESIUM SERPL-MCNC: 1.9 MG/DL (ref 1.8–2.4)
MCH RBC QN AUTO: 19.1 PG (ref 26.1–32.9)
MCHC RBC AUTO-ENTMCNC: 27.1 G/DL (ref 31.4–35)
MCV RBC AUTO: 70.5 FL (ref 79.6–97.8)
NRBC # BLD: 0 K/UL (ref 0–0.2)
PLATELET # BLD AUTO: 228 K/UL (ref 150–450)
PMV BLD AUTO: 8.8 FL (ref 9.4–12.3)
POTASSIUM SERPL-SCNC: 4.4 MMOL/L (ref 3.5–5.1)
RBC # BLD AUTO: 4.14 M/UL (ref 4.23–5.6)
SODIUM SERPL-SCNC: 140 MMOL/L (ref 138–145)
SPECIMEN EXP DATE BLD: NORMAL
UNIT DIVISION: 0
WBC # BLD AUTO: 10.7 K/UL (ref 4.3–11.1)

## 2022-08-10 PROCEDURE — A4216 STERILE WATER/SALINE, 10 ML: HCPCS | Performed by: INTERNAL MEDICINE

## 2022-08-10 PROCEDURE — 2500000003 HC RX 250 WO HCPCS: Performed by: NURSE ANESTHETIST, CERTIFIED REGISTERED

## 2022-08-10 PROCEDURE — 6360000002 HC RX W HCPCS: Performed by: INTERNAL MEDICINE

## 2022-08-10 PROCEDURE — 8E0W4CZ ROBOTIC ASSISTED PROCEDURE OF TRUNK REGION, PERCUTANEOUS ENDOSCOPIC APPROACH: ICD-10-PCS | Performed by: SURGERY

## 2022-08-10 PROCEDURE — 86901 BLOOD TYPING SEROLOGIC RH(D): CPT

## 2022-08-10 PROCEDURE — 2500000003 HC RX 250 WO HCPCS: Performed by: REGISTERED NURSE

## 2022-08-10 PROCEDURE — 7100000000 HC PACU RECOVERY - FIRST 15 MIN: Performed by: SURGERY

## 2022-08-10 PROCEDURE — 2500000003 HC RX 250 WO HCPCS: Performed by: SURGERY

## 2022-08-10 PROCEDURE — 83735 ASSAY OF MAGNESIUM: CPT

## 2022-08-10 PROCEDURE — C9113 INJ PANTOPRAZOLE SODIUM, VIA: HCPCS | Performed by: INTERNAL MEDICINE

## 2022-08-10 PROCEDURE — 6360000002 HC RX W HCPCS: Performed by: NURSE ANESTHETIST, CERTIFIED REGISTERED

## 2022-08-10 PROCEDURE — 2580000003 HC RX 258: Performed by: REGISTERED NURSE

## 2022-08-10 PROCEDURE — 6370000000 HC RX 637 (ALT 250 FOR IP): Performed by: ANESTHESIOLOGY

## 2022-08-10 PROCEDURE — 0DTF4ZZ RESECTION OF RIGHT LARGE INTESTINE, PERCUTANEOUS ENDOSCOPIC APPROACH: ICD-10-PCS | Performed by: SURGERY

## 2022-08-10 PROCEDURE — 88309 TISSUE EXAM BY PATHOLOGIST: CPT

## 2022-08-10 PROCEDURE — 94760 N-INVAS EAR/PLS OXIMETRY 1: CPT

## 2022-08-10 PROCEDURE — 99232 SBSQ HOSP IP/OBS MODERATE 35: CPT | Performed by: SURGERY

## 2022-08-10 PROCEDURE — 2580000003 HC RX 258: Performed by: NURSE PRACTITIONER

## 2022-08-10 PROCEDURE — 2580000003 HC RX 258: Performed by: HOSPITALIST

## 2022-08-10 PROCEDURE — 2700000000 HC OXYGEN THERAPY PER DAY

## 2022-08-10 PROCEDURE — 80048 BASIC METABOLIC PNL TOTAL CA: CPT

## 2022-08-10 PROCEDURE — 88361 TUMOR IMMUNOHISTOCHEM/COMPUT: CPT

## 2022-08-10 PROCEDURE — 6360000002 HC RX W HCPCS: Performed by: HOSPITALIST

## 2022-08-10 PROCEDURE — 3600000019 HC SURGERY ROBOT ADDTL 15MIN: Performed by: SURGERY

## 2022-08-10 PROCEDURE — 3700000000 HC ANESTHESIA ATTENDED CARE: Performed by: SURGERY

## 2022-08-10 PROCEDURE — 3700000001 HC ADD 15 MINUTES (ANESTHESIA): Performed by: SURGERY

## 2022-08-10 PROCEDURE — 1100000000 HC RM PRIVATE

## 2022-08-10 PROCEDURE — 94640 AIRWAY INHALATION TREATMENT: CPT

## 2022-08-10 PROCEDURE — 44140 PARTIAL REMOVAL OF COLON: CPT | Performed by: SURGERY

## 2022-08-10 PROCEDURE — 81479 UNLISTED MOLECULAR PATHOLOGY: CPT

## 2022-08-10 PROCEDURE — 2709999900 HC NON-CHARGEABLE SUPPLY: Performed by: SURGERY

## 2022-08-10 PROCEDURE — S2900 ROBOTIC SURGICAL SYSTEM: HCPCS | Performed by: SURGERY

## 2022-08-10 PROCEDURE — 7100000001 HC PACU RECOVERY - ADDTL 15 MIN: Performed by: SURGERY

## 2022-08-10 PROCEDURE — 3600000009 HC SURGERY ROBOT BASE: Performed by: SURGERY

## 2022-08-10 PROCEDURE — 2720000010 HC SURG SUPPLY STERILE: Performed by: SURGERY

## 2022-08-10 PROCEDURE — 2580000003 HC RX 258: Performed by: ANESTHESIOLOGY

## 2022-08-10 PROCEDURE — 36415 COLL VENOUS BLD VENIPUNCTURE: CPT

## 2022-08-10 PROCEDURE — 86923 COMPATIBILITY TEST ELECTRIC: CPT

## 2022-08-10 PROCEDURE — 6360000002 HC RX W HCPCS: Performed by: REGISTERED NURSE

## 2022-08-10 PROCEDURE — 2580000003 HC RX 258: Performed by: INTERNAL MEDICINE

## 2022-08-10 PROCEDURE — 85027 COMPLETE CBC AUTOMATED: CPT

## 2022-08-10 RX ORDER — SODIUM CHLORIDE 9 MG/ML
INJECTION, SOLUTION INTRAVENOUS PRN
Status: DISCONTINUED | OUTPATIENT
Start: 2022-08-10 | End: 2022-08-11

## 2022-08-10 RX ORDER — ACETAMINOPHEN 500 MG
1000 TABLET ORAL ONCE
Status: COMPLETED | OUTPATIENT
Start: 2022-08-10 | End: 2022-08-10

## 2022-08-10 RX ORDER — ONDANSETRON 2 MG/ML
INJECTION INTRAMUSCULAR; INTRAVENOUS PRN
Status: DISCONTINUED | OUTPATIENT
Start: 2022-08-10 | End: 2022-08-10 | Stop reason: SDUPTHER

## 2022-08-10 RX ORDER — ONDANSETRON 2 MG/ML
4 INJECTION INTRAMUSCULAR; INTRAVENOUS
Status: DISCONTINUED | OUTPATIENT
Start: 2022-08-10 | End: 2022-08-10 | Stop reason: HOSPADM

## 2022-08-10 RX ORDER — HYDROMORPHONE HYDROCHLORIDE 2 MG/ML
0.5 INJECTION, SOLUTION INTRAMUSCULAR; INTRAVENOUS; SUBCUTANEOUS EVERY 5 MIN PRN
Status: DISCONTINUED | OUTPATIENT
Start: 2022-08-10 | End: 2022-08-10 | Stop reason: HOSPADM

## 2022-08-10 RX ORDER — SODIUM CHLORIDE 9 MG/ML
INJECTION, SOLUTION INTRAVENOUS PRN
Status: DISCONTINUED | OUTPATIENT
Start: 2022-08-10 | End: 2022-08-10 | Stop reason: HOSPADM

## 2022-08-10 RX ORDER — LABETALOL HYDROCHLORIDE 5 MG/ML
10 INJECTION, SOLUTION INTRAVENOUS
Status: DISCONTINUED | OUTPATIENT
Start: 2022-08-10 | End: 2022-08-10 | Stop reason: HOSPADM

## 2022-08-10 RX ORDER — PROPOFOL 10 MG/ML
INJECTION, EMULSION INTRAVENOUS PRN
Status: DISCONTINUED | OUTPATIENT
Start: 2022-08-10 | End: 2022-08-10 | Stop reason: SDUPTHER

## 2022-08-10 RX ORDER — OXYCODONE HYDROCHLORIDE 5 MG/1
5 TABLET ORAL PRN
Status: DISCONTINUED | OUTPATIENT
Start: 2022-08-10 | End: 2022-08-10 | Stop reason: HOSPADM

## 2022-08-10 RX ORDER — HYDRALAZINE HYDROCHLORIDE 20 MG/ML
INJECTION INTRAMUSCULAR; INTRAVENOUS PRN
Status: DISCONTINUED | OUTPATIENT
Start: 2022-08-10 | End: 2022-08-10 | Stop reason: SDUPTHER

## 2022-08-10 RX ORDER — SODIUM CHLORIDE 9 MG/ML
INJECTION, SOLUTION INTRAVENOUS CONTINUOUS PRN
Status: DISCONTINUED | OUTPATIENT
Start: 2022-08-10 | End: 2022-08-10 | Stop reason: SDUPTHER

## 2022-08-10 RX ORDER — ROCURONIUM BROMIDE 10 MG/ML
INJECTION, SOLUTION INTRAVENOUS PRN
Status: DISCONTINUED | OUTPATIENT
Start: 2022-08-10 | End: 2022-08-10 | Stop reason: SDUPTHER

## 2022-08-10 RX ORDER — OXYCODONE HYDROCHLORIDE 5 MG/1
10 TABLET ORAL PRN
Status: DISCONTINUED | OUTPATIENT
Start: 2022-08-10 | End: 2022-08-10 | Stop reason: HOSPADM

## 2022-08-10 RX ORDER — LIDOCAINE HYDROCHLORIDE 10 MG/ML
1 INJECTION, SOLUTION INFILTRATION; PERINEURAL
Status: DISCONTINUED | OUTPATIENT
Start: 2022-08-10 | End: 2022-08-10 | Stop reason: HOSPADM

## 2022-08-10 RX ORDER — HYDROMORPHONE HYDROCHLORIDE 2 MG/ML
0.25 INJECTION, SOLUTION INTRAMUSCULAR; INTRAVENOUS; SUBCUTANEOUS
Status: DISCONTINUED | OUTPATIENT
Start: 2022-08-10 | End: 2022-08-10 | Stop reason: HOSPADM

## 2022-08-10 RX ORDER — VECURONIUM BROMIDE 1 MG/ML
INJECTION, POWDER, LYOPHILIZED, FOR SOLUTION INTRAVENOUS PRN
Status: DISCONTINUED | OUTPATIENT
Start: 2022-08-10 | End: 2022-08-10 | Stop reason: SDUPTHER

## 2022-08-10 RX ORDER — SODIUM CHLORIDE 9 MG/ML
INJECTION, SOLUTION INTRAVENOUS PRN
Status: DISCONTINUED | OUTPATIENT
Start: 2022-08-10 | End: 2022-08-14

## 2022-08-10 RX ORDER — BUPIVACAINE HYDROCHLORIDE 5 MG/ML
INJECTION, SOLUTION EPIDURAL; INTRACAUDAL PRN
Status: DISCONTINUED | OUTPATIENT
Start: 2022-08-10 | End: 2022-08-10 | Stop reason: ALTCHOICE

## 2022-08-10 RX ORDER — MIDAZOLAM HYDROCHLORIDE 2 MG/2ML
2 INJECTION, SOLUTION INTRAMUSCULAR; INTRAVENOUS
Status: DISCONTINUED | OUTPATIENT
Start: 2022-08-10 | End: 2022-08-10 | Stop reason: HOSPADM

## 2022-08-10 RX ORDER — DEXAMETHASONE SODIUM PHOSPHATE 4 MG/ML
INJECTION, SOLUTION INTRA-ARTICULAR; INTRALESIONAL; INTRAMUSCULAR; INTRAVENOUS; SOFT TISSUE PRN
Status: DISCONTINUED | OUTPATIENT
Start: 2022-08-10 | End: 2022-08-10 | Stop reason: SDUPTHER

## 2022-08-10 RX ORDER — SODIUM CHLORIDE 0.9 % (FLUSH) 0.9 %
5-40 SYRINGE (ML) INJECTION PRN
Status: DISCONTINUED | OUTPATIENT
Start: 2022-08-10 | End: 2022-08-10 | Stop reason: HOSPADM

## 2022-08-10 RX ORDER — HYDROMORPHONE HYDROCHLORIDE 2 MG/ML
0.25 INJECTION, SOLUTION INTRAMUSCULAR; INTRAVENOUS; SUBCUTANEOUS EVERY 5 MIN PRN
Status: DISCONTINUED | OUTPATIENT
Start: 2022-08-10 | End: 2022-08-10 | Stop reason: HOSPADM

## 2022-08-10 RX ORDER — HYDROMORPHONE HCL 110MG/55ML
PATIENT CONTROLLED ANALGESIA SYRINGE INTRAVENOUS PRN
Status: DISCONTINUED | OUTPATIENT
Start: 2022-08-10 | End: 2022-08-10 | Stop reason: SDUPTHER

## 2022-08-10 RX ORDER — DEXTROSE MONOHYDRATE 100 MG/ML
INJECTION, SOLUTION INTRAVENOUS CONTINUOUS PRN
Status: DISCONTINUED | OUTPATIENT
Start: 2022-08-10 | End: 2022-08-10 | Stop reason: HOSPADM

## 2022-08-10 RX ORDER — IPRATROPIUM BROMIDE AND ALBUTEROL SULFATE 2.5; .5 MG/3ML; MG/3ML
1 SOLUTION RESPIRATORY (INHALATION) ONCE
Status: DISCONTINUED | OUTPATIENT
Start: 2022-08-10 | End: 2022-08-10 | Stop reason: HOSPADM

## 2022-08-10 RX ORDER — LIDOCAINE HYDROCHLORIDE 20 MG/ML
INJECTION, SOLUTION EPIDURAL; INFILTRATION; INTRACAUDAL; PERINEURAL PRN
Status: DISCONTINUED | OUTPATIENT
Start: 2022-08-10 | End: 2022-08-10 | Stop reason: SDUPTHER

## 2022-08-10 RX ORDER — HYDROMORPHONE HYDROCHLORIDE 2 MG/ML
0.5 INJECTION, SOLUTION INTRAMUSCULAR; INTRAVENOUS; SUBCUTANEOUS
Status: DISCONTINUED | OUTPATIENT
Start: 2022-08-10 | End: 2022-08-10 | Stop reason: HOSPADM

## 2022-08-10 RX ORDER — INDOCYANINE GREEN AND WATER 25 MG
KIT INJECTION PRN
Status: DISCONTINUED | OUTPATIENT
Start: 2022-08-10 | End: 2022-08-10 | Stop reason: SDUPTHER

## 2022-08-10 RX ORDER — SODIUM CHLORIDE, SODIUM LACTATE, POTASSIUM CHLORIDE, CALCIUM CHLORIDE 600; 310; 30; 20 MG/100ML; MG/100ML; MG/100ML; MG/100ML
INJECTION, SOLUTION INTRAVENOUS CONTINUOUS
Status: DISCONTINUED | OUTPATIENT
Start: 2022-08-10 | End: 2022-08-10 | Stop reason: HOSPADM

## 2022-08-10 RX ORDER — SODIUM CHLORIDE 0.9 % (FLUSH) 0.9 %
5-40 SYRINGE (ML) INJECTION EVERY 12 HOURS SCHEDULED
Status: DISCONTINUED | OUTPATIENT
Start: 2022-08-10 | End: 2022-08-10 | Stop reason: HOSPADM

## 2022-08-10 RX ADMIN — FENTANYL CITRATE 100 MCG: 50 INJECTION INTRAMUSCULAR; INTRAVENOUS at 13:46

## 2022-08-10 RX ADMIN — PROPOFOL 140 MG: 10 INJECTION, EMULSION INTRAVENOUS at 13:46

## 2022-08-10 RX ADMIN — INDOCYANINE GREEN AND WATER 2.5 MG: KIT at 16:20

## 2022-08-10 RX ADMIN — SODIUM CHLORIDE, SODIUM LACTATE, POTASSIUM CHLORIDE, AND CALCIUM CHLORIDE: 600; 310; 30; 20 INJECTION, SOLUTION INTRAVENOUS at 00:53

## 2022-08-10 RX ADMIN — SODIUM CHLORIDE: 900 INJECTION INTRAVENOUS at 16:09

## 2022-08-10 RX ADMIN — HYDRALAZINE HYDROCHLORIDE 5 MG: 20 INJECTION INTRAMUSCULAR; INTRAVENOUS at 14:49

## 2022-08-10 RX ADMIN — VECURONIUM BROMIDE 2 MG: 1 INJECTION, POWDER, LYOPHILIZED, FOR SOLUTION INTRAVENOUS at 16:41

## 2022-08-10 RX ADMIN — FENTANYL CITRATE 50 MCG: 50 INJECTION INTRAMUSCULAR; INTRAVENOUS at 14:33

## 2022-08-10 RX ADMIN — AMPICILLIN SODIUM AND SULBACTAM SODIUM 3000 MG: 2; 1 INJECTION, POWDER, FOR SOLUTION INTRAMUSCULAR; INTRAVENOUS at 16:10

## 2022-08-10 RX ADMIN — SODIUM CHLORIDE: 900 INJECTION INTRAVENOUS at 14:00

## 2022-08-10 RX ADMIN — AMPICILLIN SODIUM AND SULBACTAM SODIUM 3000 MG: 2; 1 INJECTION, POWDER, FOR SOLUTION INTRAMUSCULAR; INTRAVENOUS at 14:00

## 2022-08-10 RX ADMIN — SODIUM CHLORIDE, PRESERVATIVE FREE 10 ML: 5 INJECTION INTRAVENOUS at 08:00

## 2022-08-10 RX ADMIN — AMPICILLIN SODIUM AND SULBACTAM SODIUM 3000 MG: 2; 1 INJECTION, POWDER, FOR SOLUTION INTRAMUSCULAR; INTRAVENOUS at 19:33

## 2022-08-10 RX ADMIN — HYDROMORPHONE HYDROCHLORIDE 0.4 MG: 2 INJECTION INTRAMUSCULAR; INTRAVENOUS; SUBCUTANEOUS at 16:25

## 2022-08-10 RX ADMIN — AMPICILLIN SODIUM AND SULBACTAM SODIUM 3000 MG: 2; 1 INJECTION, POWDER, FOR SOLUTION INTRAMUSCULAR; INTRAVENOUS at 00:53

## 2022-08-10 RX ADMIN — ROCURONIUM BROMIDE 50 MG: 50 INJECTION, SOLUTION INTRAVENOUS at 13:47

## 2022-08-10 RX ADMIN — ONDANSETRON 4 MG: 2 INJECTION INTRAMUSCULAR; INTRAVENOUS at 14:32

## 2022-08-10 RX ADMIN — SODIUM CHLORIDE, PRESERVATIVE FREE 10 ML: 5 INJECTION INTRAVENOUS at 00:52

## 2022-08-10 RX ADMIN — FENTANYL CITRATE 50 MCG: 50 INJECTION INTRAMUSCULAR; INTRAVENOUS at 15:24

## 2022-08-10 RX ADMIN — SODIUM CHLORIDE, PRESERVATIVE FREE 10 ML: 5 INJECTION INTRAVENOUS at 21:30

## 2022-08-10 RX ADMIN — LIDOCAINE HYDROCHLORIDE 60 MG: 20 INJECTION, SOLUTION EPIDURAL; INFILTRATION; INTRACAUDAL; PERINEURAL at 13:46

## 2022-08-10 RX ADMIN — VECURONIUM BROMIDE 2 MG: 1 INJECTION, POWDER, LYOPHILIZED, FOR SOLUTION INTRAVENOUS at 15:37

## 2022-08-10 RX ADMIN — SUGAMMADEX 400 MG: 100 INJECTION, SOLUTION INTRAVENOUS at 17:34

## 2022-08-10 RX ADMIN — DEXAMETHASONE SODIUM PHOSPHATE 4 MG: 4 INJECTION, SOLUTION INTRAMUSCULAR; INTRAVENOUS at 14:32

## 2022-08-10 RX ADMIN — INDOCYANINE GREEN AND WATER 2.5 MG: KIT at 16:06

## 2022-08-10 RX ADMIN — SODIUM CHLORIDE, PRESERVATIVE FREE 40 MG: 5 INJECTION INTRAVENOUS at 06:18

## 2022-08-10 RX ADMIN — SODIUM CHLORIDE, POTASSIUM CHLORIDE, SODIUM LACTATE AND CALCIUM CHLORIDE: 600; 310; 30; 20 INJECTION, SOLUTION INTRAVENOUS at 06:18

## 2022-08-10 RX ADMIN — ACETAMINOPHEN 1000 MG: 500 TABLET, FILM COATED ORAL at 10:40

## 2022-08-10 RX ADMIN — SODIUM CHLORIDE, SODIUM LACTATE, POTASSIUM CHLORIDE, AND CALCIUM CHLORIDE: 600; 310; 30; 20 INJECTION, SOLUTION INTRAVENOUS at 16:09

## 2022-08-10 RX ADMIN — AMPICILLIN SODIUM AND SULBACTAM SODIUM 3000 MG: 2; 1 INJECTION, POWDER, FOR SOLUTION INTRAMUSCULAR; INTRAVENOUS at 06:18

## 2022-08-10 RX ADMIN — ROCURONIUM BROMIDE 10 MG: 50 INJECTION, SOLUTION INTRAVENOUS at 14:30

## 2022-08-10 RX ADMIN — INDOCYANINE GREEN AND WATER 5 MG: KIT at 16:39

## 2022-08-10 RX ADMIN — ALBUTEROL SULFATE 2.5 MG: 2.5 SOLUTION RESPIRATORY (INHALATION) at 11:26

## 2022-08-10 ASSESSMENT — PAIN - FUNCTIONAL ASSESSMENT
PAIN_FUNCTIONAL_ASSESSMENT: 0-10

## 2022-08-10 ASSESSMENT — PAIN SCALES - GENERAL
PAINLEVEL_OUTOF10: 0
PAINLEVEL_OUTOF10: 0

## 2022-08-10 NOTE — PROGRESS NOTES
Subjective:     Patient is a 79 y.o.  male presents with anemia. He had colonoscopy done on 8 5 which showed a lesion in the cecum consistent with a neoplasm. We were asked to evaluate for possible surgical resection. He reports no abdominal pain. Currently he denies nausea vomiting diarrhea constipation hematochezia hematemesis or melena. He has been on full liquids. [place] fevers or chills. 8/8/2022 - Doing well this AM. No bleeding reported. Tolerating liquid diet. No prep received. 8/9/22: No complaints, no bleeding. Liquid diet and bowel prep today. 8/10/2022: doing well. Bowel prep went well and he reports clear Bms.        Patient Active Problem List    Diagnosis Date Noted    Cecum mass 08/07/2022    Colon cancer ([address] 08/07/2022    Acute blood loss anemia 08/04/2022    GIB (gastrointestinal bleeding) 08/04/2022    Urine retention 04/03/2022    Acute cholecystitis 03/28/2022    Pancreatitis, gallstone 03/28/2022    Anemia 03/28/2022    Bacteremia 03/28/2022    Major depressive disorder, recurrent, moderate ([address] 93/23/5940    Diastolic CHF, chronic (HCC) 03/02/2022    Major depressive disorder, recurrent, mild ([address] 03/02/2022    Major depressive disorder, recurrent, unspecified ([address] 03/02/2022    Pacemaker 04/14/2021    Atrial fibrillation ([address] 11/29/2017    GERD (gastroesophageal reflux disease) 10/27/2017    Hypotension 10/26/2017    Sepsis ([address] 10/26/2017    Syncope 10/24/2017    Paroxysmal atrial fibrillation ([address] 06/30/2017    SSS (sick sinus syndrome) ([address] 06/30/2017    Bilateral carotid artery disease ([address] 06/30/2017    Myasthenia gravis ([address]     Mitral valve regurgitation 06/07/2016    Hypokalemia 06/07/2016    Dyslipidemia 06/07/2016    HTN (hypertension) 05/08/2015    Dyspnea 05/08/2015    S/P coronary artery stent placement 05/08/2015    Coronary atherosclerosis of native coronary vessel 05/08/2015     Past Medical History:   Diagnosis Date    Abnormal EKG 4/22/15 Arrhythmia     Aspiration pneumonia ([facility] 10/26/2017    CAD (coronary artery disease) 5/8/2015    Carotid artery stenosis without cerebral infarction 6/7/2016    US 6/15: <50% bilat ICAs    Coronary atherosclerosis of native coronary vessel 5/8/2015    GERMAIN on brilinta 5/7/15: prox LAD PCI, normal EF     Diastolic CHF, chronic ([address] 3/2/2022    Dyslipidemia 6/7/2016    Dyspnea 5/8/2015    Echo 6/15: EF 60%, mod MR, mod LVH, mild AI     ED (erectile dysfunction)     GERD (gastroesophageal reflux disease)     GERD (gastroesophageal reflux disease)     no medication    HTN (hypertension) 5/8/2015    Hypertension     Hypokalemia     Hypokalemia 6/7/2016    Mitral valve regurgitation 6/7/2016    Morbid obesity ([facility]     Myasthenia gravis ([facility]     Myasthenia gravis ([facility] 6/17/15    Nocturia     Osteoporosis     PUD (peptic ulcer disease) 25 yrs ago    S/P coronary artery stent placement 5/8/2015    3.0x38 mm Xience CAROL to pLAD 5/7/15     Sleep apnea     Syncope and collapse     Unspecified sleep apnea     no cpap      Past Surgical History:   Procedure Laterality Date    APPENDECTOMY      CARDIAC CATHETERIZATION  05/21/2019    watchman device    CARDIAC CATHETERIZATION  05/27/2015    stent    COLONOSCOPY  2007    COLONOSCOPY N/A 8/6/2022    COLONOSCOPY POLYPECTOMY SNARE/COLD BIOPSY performed by Johan Yung MD at [facility] ENDOSCOPY    ERCP  3/30/2022         HEMORRHOID SURGERY      PACEMAKER  2018    Perlita Kraus/Giana for sleep apnea and reconstruction for extending jaw    UPPER GASTROINTESTINAL ENDOSCOPY N/A 8/5/2022    EGD ESOPHAGOGASTRODUODENOSCOPY Rm 525 performed by Joni Noble MD at [address] Right 07/10/2019     Repair of right radial artery pseudoaneurysm      Medications Prior to Admission: potassium chloride (KLOR-CON M) 20 MEQ extended release tablet, Take 1 tablet by mouth daily  aspirin 81 MG EC tablet, Take 81 mg by mouth daily  atorvastatin (LIPITOR) 80 MG tablet, Take 80 mg by mouth  cyanocobalamin 1000 MCG tablet, Take 1,000 mcg by mouth daily (Patient not taking: No sig reported)  escitalopram (LEXAPRO) 10 MG tablet, Take 10 mg by mouth daily (Patient not taking: No sig reported)  furosemide (LASIX) 40 MG tablet, Take 40 mg by mouth daily  nitroGLYCERIN (NITROSTAT) 0.4 MG SL tablet, Place 1 sl under the tongue q 5 min prn cp, max 3 sl in a 15-min time period. Call 911 if no relief after the 3rd sl.  pantoprazole (PROTONIX) 40 MG tablet, Take 40 mg by mouth every morning (before breakfast) (Patient not taking: No sig reported)  rOPINIRole (REQUIP) 0.5 MG tablet, 1 nightly, increase to 1 twice a day if needed  tamsulosin (FLOMAX) 0.4 MG capsule, Take 0.4 mg by mouth daily  No Known Allergies   Social History     Tobacco Use    Smoking status: Never    Smokeless tobacco: Never   Substance Use Topics    Alcohol use: No      Family History   Problem Relation Age of Onset    No Known Problems Brother     Cancer Brother         brain tumor    No Known Problems Sister     Cancer Mother         kidney    Cancer Father         stomach      Review of Systems  Pertinent items are noted in HPI. Objective:     Patient Vitals for the past 8 hrs:   BP Temp Temp src Pulse Resp SpO2   08/10/22 1127 -- -- -- 74 18 99 %   08/10/22 1100 (!) 171/81 98.4 °F (36.9 °C) Temporal 77 16 98 %   08/10/22 0742 (!) 159/82 98.2 °F (36.8 °C) Oral 75 16 98 %       I/O last 3 completed shifts: In: 1160 [P.O.:760; I.V.:400]  Out: -   I/O this shift:   In: 400 [I.V.:400]  Out: -         General: well appearing, no acute distress, alert and oriented  Eyes: PERRLA, sclerae white  ENT: External inspection of ears and nose normal, oropharynx normal  Respiratory: normal chest wall expansion, lungs CTA bilaterally  Cardiovascular: RRR, no m, femoral pulses 2+ bilaterally; extremities without edema  Abdomen: Soft, non-tender, non-distended, no masses or hernias  Heme/Lymph: without cervical or inguinal adenopathy  Musculoskeletal: gait normal, digits without clubbing or cyanosis  Integumentary: warm, dry, and pink, with no rash, purpura, or petechia  Neurological: Cranial Nerves II-XII grossly intact, normal sensation and muscle strength bilaterally      Data ReviewCBC:   Lab Results   Component Value Date/Time    WBC 10.7 08/10/2022 11:19 AM    RBC 4.14 08/10/2022 11:19 AM     BMP:   Lab Results   Component Value Date/Time    GLUCOSE 96 08/10/2022 11:19 AM    CO2 29 08/10/2022 11:19 AM    BUN 4 08/10/2022 11:19 AM    CREATININE 0.80 08/10/2022 11:19 AM    CALCIUM 9.4 08/10/2022 11:19 AM     Radiology review:   CT CHEST, ABDOMEN AND PELVIS WITH INTRAVENOUS CONTRAST DATED 8/6/2022. History: Cecal mass concerning for colon cancer. Comparison: CT the cardiac over read 4/5/2019, and CT abdomen and pelvis with   contrast 3/27/2022        Technique:   Multiple contiguous helical CT images reconstructed at 5 mm were   obtained from the base of the neck to the ischial tuberosities following oral   and 100 cc Isovue-370 without acute complication. All CT scans performed at   this facility use one or all of the following: Automated exposure control,   adjustment of the mA and/or kVp according to patient's size, iterative   reconstruction. Findings:   CT Chest:   The base of the neck is unremarkable in appearance. No axillary, mediastinal,   or hilar lymphadenopathy is seen. The thoracic aorta is normal in caliber. The   heart appears moderately enlarged. Evaluation with lung windows demonstrates no suspicious pulmonary lesion. Small   nodules are seen in the bilateral lungs measuring up to 5 mm in size. However,   these are unchanged in size and number when compared to a prior CT Overread   dated 4/5/2019. The lack of significant change over multiple years favors   benign nodules. Mild dependent atelectasis is seen. No pleural effusion is   seen.   Lungs are expanded without evidence for pneumothorax. No aggressive   appearing osseous lesion is seen. CT ABDOMEN:     The Liver small scattered low-attenuation left lobe hepatic lesions measuring up   to 9 mm in size. Although incompletely characterized, these are also included   in the field of imaging on the prior CT over read dated 4/5/2019 and are   unchanged in size and number. The appearance suggests benign lesions such as   hepatic cysts. An additional 1.6 cm lesion is seen in the more inferior right   lobe on image 68. This is unchanged when compared to the more recent CT scan of   the abdomen dated 3/27/2022 and does not demonstrate significant enhancement   suggesting an additional benign hepatic cyst.  The spleen is homogeneous in   attenuation. No contour deforming or enhancing mass lesions are seen of the   pancreas or adrenal glands. The gallbladder has been removed. The kidneys   enhance symmetrically and no evidence of hydronephrosis is seen. Chronic renal   cortical scarring is seen most evident in the upper pole cortex of the left   kidney       The visualized loops of small bowel and colon are normal in caliber. The   ileocecal valve is seen on axial image 98 demonstrating normal fat attenuation. Just proximal to this along the posterior wall of the cecum is an abnormal wall   lesion measuring 3.5 cm x 2.4 cm in size concerning for a colon neoplasm likely   representing the patient's known cecal mass. No obstruction is suggested by   this at this time. No free fluid and no free air is seen in the abdomen. No   adenopathy is seen of the abdomen. The abdominal aorta demonstrates mild to   moderate atherosclerotic changes. No aggressive appearing osseous lesion is   seen. CT PELVIS:   No abnormal pelvic fluid collections are present. No pelvic adenopathy is seen. The urinary bladder is unremarkable. No aggressive appearing osseous lesion is   seen.            Impression   1.  3.5 cm x 2.4 cm cecal mass. No clear evidence for metastatic disease is   evident by CT imaging. Pulmonary nodules, and hepatic lesions are seen which   are favored to be benign as described above. Assessment:     Principal Problem:    Acute blood loss anemia  Active Problems:    GIB (gastrointestinal bleeding)    Cecum mass    Colon cancer (HCC)    Paroxysmal atrial fibrillation (HCC)    HTN (hypertension)    Myasthenia gravis (HCC)    SSS (sick sinus syndrome) (HCC)    GERD (gastroesophageal reflux disease)    Diastolic CHF, chronic (HCC)    Dyslipidemia  Resolved Problems:    * No resolved hospital problems. *      Plan:     Patient has a cecal mass on colonoscopy     Received mechanical and antibiotic bowel prep. Bowel prep is adequate. Plan to proceed with robotic assisted laparoscopic right hemicolectomy today. We discussed risks and benefits of the procedure were discussed and he agreed to proceed.          Tin Aly MD    8/10/2022 1:05 PM

## 2022-08-10 NOTE — ANESTHESIA PROCEDURE NOTES
Airway  Date/Time: 8/10/2022 1:50 PM  Urgency: elective    Airway not difficult    General Information and Staff    Patient location during procedure: OR  Resident/CRNA: NAOMY Bain - CRNA  Performed: resident/CRNA     Indications and Patient Condition  Indications for airway management: anesthesia  Spontaneous Ventilation: absent  Sedation level: deep  Preoxygenated: yes  Patient position: sniffing  Mask difficulty assessment: vent by bag mask + OA or adjuvant +/- NMBA    Final Airway Details  Final airway type: endotracheal airway      Successful airway: ETT  Cuffed: yes   Successful intubation technique: direct laryngoscopy  Facilitating devices/methods: intubating stylet  Endotracheal tube insertion site: oral  Blade: Louann  Blade size: #4  ETT size (mm): 8.0  Cormack-Lehane Classification: grade I - full view of glottis  Placement verified by: chest auscultation and capnometry   Cuff volume (mL): 10  Measured from: lips  ETT to lips (cm): 23  Number of attempts at approach: 1  Ventilation between attempts: bag mask  Number of other approaches attempted: 0    Additional Comments  PreO2 > 5 minutes. Standard IV induction by MDA. Atraumatic insertion. Positive equal and bilateral breath sounds noted with positive ETCO2 present. Lips and dentition unchanged from pre-op.     no

## 2022-08-10 NOTE — PROGRESS NOTES
Patient remains in OR for R hemicolectomy. Not seen today, no charge for chart review. Will follow up tomorrow.     Clovis Lozano MD

## 2022-08-10 NOTE — ANESTHESIA POSTPROCEDURE EVALUATION
Department of Anesthesiology  Postprocedure Note    Patient: Dewitt Councilman  MRN: [id number]  YOB: 1940  Date of evaluation: 8/10/2022      Procedure Summary     Date: 08/10/22 Room / Location: [facility] MAIN OR 11 / [facility] MAIN OR    Anesthesia Start: 1335 Anesthesia Stop: 4860    Procedure: BOWEL RESECTION HEMICOLECTOMY LAPAROSCOPIC ROBOTIC, Right Bulmaro (Right: Abdomen) Diagnosis:       Malignant neoplasm of ascending colon ([address]      (Colon Cancer)    Providers: Marleny Martines MD Responsible Provider: Andrew Abernathy MD    Anesthesia Type: General ASA Status: 3          Anesthesia Type: General    Vale Phase I: Vale Score: 8    Vale Phase II: Vale Score: 10      Anesthesia Post Evaluation    Patient location during evaluation: PACU  Patient participation: complete - patient participated  Level of consciousness: awake and alert  Airway patency: patent  Nausea & Vomiting: no nausea and no vomiting  Complications: no  Cardiovascular status: hemodynamically stable  Respiratory status: acceptable, nonlabored ventilation and spontaneous ventilation  Hydration status: euvolemic  Comments: BP (!) 142/65   Pulse 75   Temp 98 °F (36.7 °C) (Temporal)   Resp 15   Ht 5' 8\" (1.727 m)   Wt 206 lb 9.6 oz (93.7 kg)   SpO2 97%   BMI 31.41 kg/m²     Multimodal analgesia pain management approach

## 2022-08-10 NOTE — OP NOTE
Operative Note      Patient: Isadora Vidal  YOB: 1940  MRN: [id number]    Date of Procedure: 8/10/2022    Pre-Op Diagnosis: Colon Cancer    Post-Op Diagnosis: Same       Procedure(s):  BOWEL RESECTION HEMICOLECTOMY LAPAROSCOPIC ROBOTIC, Right Bulmaro    Surgeon(s):  Christine Schulte MD    Assistant:   * No surgical staff found *    Anesthesia: General    Estimated Blood Loss (mL): Minimal    Complications: None    Specimens:   ID Type Source Tests Collected by Time Destination   A : Right colon Tissue Colon SURGICAL PATHOLOGY Christine Schulte MD 8/10/2022 1720        Implants:  * No implants in log *      Drains:   Urinary Catheter 2 Way (Active)       Findings: cecal mass palpable on gross specimen    Detailed Description of Procedure:   After informed consent was taken, patient was taken to the operating room and placed in supine position. Anesthesia was induced and patient was intubated. Patient received preoperative antibiotics. Patient was placed in supine position with pressure points adequately padded. Patient received a mechanical and antibiotic prep the day prior to the procedure. Rush was placed. Abdomen and perineum were prepped and draped in standard sterile fashion. A timeout was performed with all team members present. A 1 cm vertical incision was made superior to the umbilicus. The fascia was elevated. A Veress needle was inserted. Saline drop test was normal. The abdomen was insufflated with carbon dioxide to a pressure of 15 mmHg and the patient tolerated insufflation well. The abdomen was bluntly accessed with a blunt 8 mm robotic trocar. A laparoscope was inserted and a general survey was performed. There was no evidence of intraabdominal injury from trocar placement. Two additional 8 mm  and 12 mm robotic cannulas were inserted in the left upper quadrant of the abdomen. A 8 mm robotic cannula was placed in the right lower quadrant.  An assistant 5 mm port was placed in the left lower quadrant. The patient was placed in a Trendelenburg position and left side tilt. The robot was docked. The instruments were carefully inserted under direct visualization. A general survey was performed and the right colon was tattooed. There were some adhesions at his previous cholecystectomy site that were carefully taken down. The terminal ileum was identified and elevated. The distal terminal ileum and appendix were mobilized from their natural attachments in the right lower quadrant. Once the terminal ileum was free, the cecum was elevated and mobilized off the retroperitoneum by dissecting the line of Toldt. The lateral attachments were taken down with the cautery. We continue to dissection up and around the hepatic flexure. The right colon was gradually reflected medially off of the retroperitoneum, making sure to not mobilize the right kidney. The duodenum came into view and was protected throughout the dissection. The transverse colon was grasped and elevated, and the omentum taken off of it from the midline out to the right upper quadrant. Once the colon was adequately medialized, the hepatic flexure was further mobilized as well as the proximal transverse colon. I opened the gastrocolic ligament and divided it, to allow the colon to fall away from the stomach. Once I felt that mobilization was adequate the specimen, a point in the distal ileum was identified and a window made in the mesentery. I transected the ileum with a blue load  robotic stapler. I then picked a point in the mid-transverse colon. The mesocolon and omentum were divided. The bowel transection point was chosen. Prior to dividing the ileum and the colon, ICG was given and fluorescence was used to make sure there was good perfusion to the staple line. The mesentery was divided with Vessel Sealer including the ileocolic pedicle. The duodenum was protected.        I assured hemostasis in the right paracolic gutter. The TI and transverse colon were laid side by side. The TI was opened with hook cautery as well as the transverse colon. The two limbs of bowel were laid next to each other and an isoperistaltic anastomosis was done using a robotic 45 mm blue load stapler. The TI lay nicely along the colon without any twisting or kinking. I ensured that the mesentery was not twisted. The stapler was fired creating an anastomosis on the antimesenteric border of the bowel. The common enterostomy was closed with a 3-0 Vicryl in two layers. An omental patch was then placed over the closure. The gutter was checked for hemostasis. the right colon was inserted into a retrieval bag. The robot was undocked. The specimen was retrieved through the left upper quadrant port site. The fascia was closed with running #0 vicryl in two layers. Mastisol and Steri strips were placed. The patient was awakened in the room and extubated and taken to recovery in stable condition. All instrument and lap counts were correct x2 at the completion of procedure. The patient tolerated the procedure well with no immediate complications.       Signed by: Bon Moyer MD  [id number] [facility]  8/10/2022 5:37 PM

## 2022-08-11 LAB
ANION GAP SERPL CALC-SCNC: 6 MMOL/L (ref 7–16)
BACTERIA SPEC CULT: NORMAL
BACTERIA SPEC CULT: NORMAL
BUN SERPL-MCNC: 7 MG/DL (ref 8–23)
CALCIUM SERPL-MCNC: 8.7 MG/DL (ref 8.3–10.4)
CHLORIDE SERPL-SCNC: 108 MMOL/L (ref 98–107)
CO2 SERPL-SCNC: 26 MMOL/L (ref 21–32)
CREAT SERPL-MCNC: 0.8 MG/DL (ref 0.8–1.5)
ERYTHROCYTE [DISTWIDTH] IN BLOOD BY AUTOMATED COUNT: 21.8 % (ref 11.9–14.6)
GLUCOSE SERPL-MCNC: 119 MG/DL (ref 65–100)
HCT VFR BLD AUTO: 25.5 % (ref 41.1–50.3)
HGB BLD-MCNC: 7 G/DL (ref 13.6–17.2)
MCH RBC QN AUTO: 19.3 PG (ref 26.1–32.9)
MCHC RBC AUTO-ENTMCNC: 27.5 G/DL (ref 31.4–35)
MCV RBC AUTO: 70.4 FL (ref 79.6–97.8)
NRBC # BLD: 0 K/UL (ref 0–0.2)
PLATELET # BLD AUTO: 213 K/UL (ref 150–450)
PMV BLD AUTO: 9.3 FL (ref 9.4–12.3)
POTASSIUM SERPL-SCNC: 4.2 MMOL/L (ref 3.5–5.1)
RBC # BLD AUTO: 3.62 M/UL (ref 4.23–5.6)
SERVICE CMNT-IMP: NORMAL
SERVICE CMNT-IMP: NORMAL
SODIUM SERPL-SCNC: 140 MMOL/L (ref 138–145)
WBC # BLD AUTO: 14.8 K/UL (ref 4.3–11.1)

## 2022-08-11 PROCEDURE — 51798 US URINE CAPACITY MEASURE: CPT

## 2022-08-11 PROCEDURE — 6360000002 HC RX W HCPCS: Performed by: INTERNAL MEDICINE

## 2022-08-11 PROCEDURE — 6370000000 HC RX 637 (ALT 250 FOR IP): Performed by: INTERNAL MEDICINE

## 2022-08-11 PROCEDURE — 6370000000 HC RX 637 (ALT 250 FOR IP): Performed by: SURGERY

## 2022-08-11 PROCEDURE — 2580000003 HC RX 258: Performed by: HOSPITALIST

## 2022-08-11 PROCEDURE — 6370000000 HC RX 637 (ALT 250 FOR IP): Performed by: NURSE PRACTITIONER

## 2022-08-11 PROCEDURE — 6360000002 HC RX W HCPCS: Performed by: HOSPITALIST

## 2022-08-11 PROCEDURE — 80048 BASIC METABOLIC PNL TOTAL CA: CPT

## 2022-08-11 PROCEDURE — 36415 COLL VENOUS BLD VENIPUNCTURE: CPT

## 2022-08-11 PROCEDURE — 2700000000 HC OXYGEN THERAPY PER DAY

## 2022-08-11 PROCEDURE — 2580000003 HC RX 258: Performed by: INTERNAL MEDICINE

## 2022-08-11 PROCEDURE — C9113 INJ PANTOPRAZOLE SODIUM, VIA: HCPCS | Performed by: INTERNAL MEDICINE

## 2022-08-11 PROCEDURE — 85027 COMPLETE CBC AUTOMATED: CPT

## 2022-08-11 PROCEDURE — 1100000000 HC RM PRIVATE

## 2022-08-11 RX ORDER — HYDROMORPHONE HYDROCHLORIDE 1 MG/ML
0.5 INJECTION, SOLUTION INTRAMUSCULAR; INTRAVENOUS; SUBCUTANEOUS EVERY 4 HOURS PRN
Status: DISCONTINUED | OUTPATIENT
Start: 2022-08-11 | End: 2022-08-11

## 2022-08-11 RX ORDER — PANTOPRAZOLE SODIUM 40 MG/1
40 TABLET, DELAYED RELEASE ORAL
Status: DISCONTINUED | OUTPATIENT
Start: 2022-08-12 | End: 2022-08-15

## 2022-08-11 RX ORDER — DOCUSATE SODIUM 100 MG/1
100 CAPSULE, LIQUID FILLED ORAL 2 TIMES DAILY
Status: DISCONTINUED | OUTPATIENT
Start: 2022-08-11 | End: 2022-08-19 | Stop reason: HOSPADM

## 2022-08-11 RX ORDER — AMOXICILLIN 500 MG/1
500 CAPSULE ORAL EVERY 8 HOURS SCHEDULED
Status: DISCONTINUED | OUTPATIENT
Start: 2022-08-11 | End: 2022-08-11

## 2022-08-11 RX ORDER — HYDROMORPHONE HYDROCHLORIDE 1 MG/ML
0.5 INJECTION, SOLUTION INTRAMUSCULAR; INTRAVENOUS; SUBCUTANEOUS EVERY 6 HOURS PRN
Status: DISCONTINUED | OUTPATIENT
Start: 2022-08-11 | End: 2022-08-15

## 2022-08-11 RX ORDER — AMOXICILLIN 500 MG/1
500 CAPSULE ORAL EVERY 8 HOURS SCHEDULED
Status: COMPLETED | OUTPATIENT
Start: 2022-08-11 | End: 2022-08-15

## 2022-08-11 RX ORDER — OXYCODONE AND ACETAMINOPHEN 7.5; 325 MG/1; MG/1
1 TABLET ORAL EVERY 4 HOURS PRN
Status: DISCONTINUED | OUTPATIENT
Start: 2022-08-11 | End: 2022-08-15

## 2022-08-11 RX ADMIN — SODIUM CHLORIDE, PRESERVATIVE FREE 10 ML: 5 INJECTION INTRAVENOUS at 22:17

## 2022-08-11 RX ADMIN — ACETAMINOPHEN 650 MG: 325 TABLET, FILM COATED ORAL at 03:40

## 2022-08-11 RX ADMIN — ATORVASTATIN CALCIUM 80 MG: 80 TABLET, FILM COATED ORAL at 22:14

## 2022-08-11 RX ADMIN — ROPINIROLE HYDROCHLORIDE 0.5 MG: 0.5 TABLET, FILM COATED ORAL at 22:14

## 2022-08-11 RX ADMIN — SODIUM CHLORIDE, PRESERVATIVE FREE 40 MG: 5 INJECTION INTRAVENOUS at 07:00

## 2022-08-11 RX ADMIN — OXYCODONE AND ACETAMINOPHEN 1 TABLET: 7.5; 325 TABLET ORAL at 09:29

## 2022-08-11 RX ADMIN — SODIUM CHLORIDE, PRESERVATIVE FREE 10 ML: 5 INJECTION INTRAVENOUS at 22:18

## 2022-08-11 RX ADMIN — AMPICILLIN SODIUM AND SULBACTAM SODIUM 3000 MG: 2; 1 INJECTION, POWDER, FOR SOLUTION INTRAMUSCULAR; INTRAVENOUS at 00:01

## 2022-08-11 RX ADMIN — DOCUSATE SODIUM 100 MG: 100 CAPSULE, LIQUID FILLED ORAL at 22:14

## 2022-08-11 RX ADMIN — AMOXICILLIN 500 MG: 500 CAPSULE ORAL at 13:36

## 2022-08-11 RX ADMIN — SODIUM CHLORIDE, PRESERVATIVE FREE 10 ML: 5 INJECTION INTRAVENOUS at 07:43

## 2022-08-11 RX ADMIN — AMPICILLIN SODIUM AND SULBACTAM SODIUM 3000 MG: 2; 1 INJECTION, POWDER, FOR SOLUTION INTRAMUSCULAR; INTRAVENOUS at 07:00

## 2022-08-11 RX ADMIN — OXYCODONE AND ACETAMINOPHEN 1 TABLET: 7.5; 325 TABLET ORAL at 22:14

## 2022-08-11 RX ADMIN — AMOXICILLIN 500 MG: 500 CAPSULE ORAL at 22:14

## 2022-08-11 RX ADMIN — OXYCODONE AND ACETAMINOPHEN 1 TABLET: 7.5; 325 TABLET ORAL at 15:12

## 2022-08-11 ASSESSMENT — PAIN - FUNCTIONAL ASSESSMENT
PAIN_FUNCTIONAL_ASSESSMENT: PREVENTS OR INTERFERES SOME ACTIVE ACTIVITIES AND ADLS
PAIN_FUNCTIONAL_ASSESSMENT: ACTIVITIES ARE NOT PREVENTED
PAIN_FUNCTIONAL_ASSESSMENT: PREVENTS OR INTERFERES SOME ACTIVE ACTIVITIES AND ADLS

## 2022-08-11 ASSESSMENT — PAIN SCALES - GENERAL
PAINLEVEL_OUTOF10: 3
PAINLEVEL_OUTOF10: 8
PAINLEVEL_OUTOF10: 8
PAINLEVEL_OUTOF10: 0
PAINLEVEL_OUTOF10: 5
PAINLEVEL_OUTOF10: 9

## 2022-08-11 ASSESSMENT — PAIN DESCRIPTION - DESCRIPTORS
DESCRIPTORS: ACHING

## 2022-08-11 ASSESSMENT — PAIN DESCRIPTION - LOCATION
LOCATION: ABDOMEN

## 2022-08-11 ASSESSMENT — PAIN DESCRIPTION - ORIENTATION
ORIENTATION: MID
ORIENTATION: RIGHT
ORIENTATION: MID

## 2022-08-11 NOTE — CARE COORDINATION
Chart reviewed by ILEANA. PT/OT ordered today. CM will await therapy recommendations, to assist further with discharge planning. CM following.

## 2022-08-11 NOTE — PROGRESS NOTES
Comprehensive Nutrition Assessment    Type and Reason for Visit: Initial, RD Nutrition Re-Screen/LOS    Nutrition Recommendations/Plan:   Meals and Snacks:  Diet: Continue current order. Diet progression per surgery  Nutrition Supplement Therapy:  Medical food supplement therapy:  Initiate Ensure Clear three times per day (this provides 240 kcal and 8 grams protein per bottle)  If patient to remain NPO/CLD status for additional 4-5 days, would recommend nutrition support for primary needs. Malnutrition Assessment:  Malnutrition Status: At risk for malnutrition (Comment) (prolonged admission with NPO/CLD/FLD, colon cancer, advanced age)    Nutrition Assessment:  Nutrition History: Patient reports good appetite at home. He states wife prepares meals and he eats 2-3 times per day. He denies any changes to PO intake PTA. He has not had any weight loss that he is aware of. No kodi wasting. Nutrition Background:   Patient with PMH significant for CAD, CHF, HLD, afib s/p Watchman, GERD, HTN, obesity, Myasthenia Gravis. He presented with black stools and weakness due to low Hbg. He is s/p EGD 8/5 with findings of 2 small erosions in proximal stomach. S/p colo 8/6 with findings of cecal mass concerning for cancer. S/p right hemicolectomy 8/10. Nutrition Interval:  Diet progression: 8/4 NPO; 8/5 CLD; 8/6 NPO then FLD; 8/8 NPO; 8/9 CLD; 8/10 NPO then CLD. Patient seen reclined in bed. He states he is just ready for all of this to be over and go home. He states that he has eaten everything that he has been sent when allowed PO. He does state that he is not currently hungry. Discussed starting Ensure Clear until diet can be advanced. He voiced understanding and agreed. Current Nutrition Therapies:  ADULT DIET;  Clear Liquid    Current Intake:   Average Meal Intake: % (CLD inadequate to meet needs) Average Supplements Intake: None Ordered      Anthropometric Measures:  Height: 5' 8\" (172.7 cm)  Current Body Wt: 206 lb 9.1 oz (93.7 kg) (8/7), Weight source: Standing Scale  BMI: 31.4, Obese Class 1 (BMI 30.0-34. 9)  Admission Body Weight: 197 lb 15.6 oz (89.8 kg) (8/4, not specified)  Ideal Body Weight (Kg) (Calculated): 70 kg (154 lbs), 134.1 %  Usual Body Wt: 217 lb (98.4 kg) (office wt 8/2021), Percent weight change: -4.8       Edema:    BMI Category Obese Class 1 (BMI 30.0-34. 9)  Estimated Daily Nutrient Needs:  Energy (kcal/day): 0019-7249 (Kcal/kg (18-23) Weight used: 93.7 kg Current  Protein (g/day):  (20% kcal) Weight Used: (Current)    Fluid (ml/day):   (1 ml/kcal)    Nutrition Diagnosis:   Inadequate oral intake related to altered GI function as evidenced by NPO or clear liquid status due to medical condition (s/p right hemicoloectomy)  Nutrition Interventions:   Food and/or Nutrient Delivery: Continue Current Diet, Start Oral Nutrition Supplement     Coordination of Nutrition Care: Continue to monitor while inpatient       Goals: Active Goal:  (Diet advance to at least FLD within 5 days)       Nutrition Monitoring and Evaluation:      Food/Nutrient Intake Outcomes: Diet Advancement/Tolerance, Food and Nutrient Intake, Supplement Intake  Physical Signs/Symptoms Outcomes: GI Status, Meal Time Behavior, Weight    Discharge Planning:     Too soon to determine    ANGÉLICA JACOBSEN RD

## 2022-08-11 NOTE — PROGRESS NOTES
Hospitalist Progress Note   Admit Date:  2022  4:05 PM   Name:  Isadora Vidal   Age:  79 y.o. Sex:  male  :  1940   MRN:  [id number]   Room:  [facility]/    Reason(s) for Admission: Acute blood loss anemia [D62]  Anemia, unspecified type [D64.9]     Hospital Course & Interval History:   Mr. Bernadette Medina is a very nice 80 y/o WM with a h/o atrial fib s/p Watchman, SSS with PPM, HTN, GERD, HLD, CAD, HFpEF who was admitted to our service on  with ABLA from GI source. Initial Hb 6.5. GI consulted. EGD on  showed 2 small erosions in proximal stomach, otherwise unremarkable. Colonoscopy on  showed a cecal mass and 2 sessile polyps (hot snare removal). General Surgery consulted and he underwent laparoscopic R hemicolectomy on 8/10. Subjective/24hr Events (22):  C/o some lower abdominal pain this AM. Tolerating clears. No N/V, no flatus or BM yet. No chest pain or SOB. Assessment & Plan:   # Acute blood loss anemia 2/2 cecal mass   - EGD/colonoscopy results noted. - S/p R hemicolectomy on 8/10   - Hb 7 this AM .    - Diet/pain control per Surgery    # Acute uncomplicated cystitis 2/2 enterococcus   - Change Unasyn to amoxicillin. Blood cxs neg    # HypoK   - Resolved. # Chronic dCHF // HFpEF   - Compensated. # CAD   - Resume ASA when ok from surgical standpoint. Con't statin. # GERD   - PO PPI    # PAD // CAD // HLD   - ASA as above, statin    # HTN   - Well controlled currently. # Myasthenia gravis? - Noted from prior notes, problem list. No MG meds on PTA list, d/w Pharmacy and couldn't see any outpatient fill history. Patient denies this history? Discharge Planning: Pending. 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 HTN (hypertension) 8/4/2022 Yes    Myasthenia gravis ([facility] [address] 8/7/2022 Yes    SSS (sick sinus syndrome) ([facility] [address] 8/9/2022 Yes    GERD (gastroesophageal reflux disease) 2/5/9511 Yes    Diastolic CHF, chronic ([facility] [address] 8/4/2022 Yes    Dyslipidemia 8/4/2022 Yes       Objective:   Patient Vitals for the past 24 hrs:   Temp Pulse Resp BP SpO2   08/11/22 0743 97.9 °F (36.6 °C) 75 20 130/75 96 %   08/11/22 0320 97.9 °F (36.6 °C) 70 16 (!) 147/75 100 %   08/10/22 2240 98.5 °F (36.9 °C) 70 15 (!) 149/78 98 %   08/10/22 1908 98 °F (36.7 °C) 75 16 (!) 159/79 90 %   08/10/22 1830 98 °F (36.7 °C) 75 15 (!) 142/65 97 %   08/10/22 1824 -- 75 15 (!) 147/71 97 %   08/10/22 1820 -- 75 15 (!) 159/78 99 %   08/10/22 1815 -- 75 15 (!) 151/70 98 %   08/10/22 1810 -- 75 15 (!) 144/62 96 %   08/10/22 1805 -- 75 15 (!) 141/67 99 %   08/10/22 1800 -- 75 15 (!) 151/70 98 %   08/10/22 1755 97.9 °F (36.6 °C) 75 18 (!) 153/73 98 %   08/10/22 1127 -- 74 18 -- 99 %   08/10/22 1100 98.4 °F (36.9 °C) 77 16 (!) 171/81 98 %       Estimated body mass index is 31.41 kg/m² as calculated from the following:    Height as of this encounter: 5' 8\" (1.727 m). Weight as of this encounter: 206 lb 9.6 oz (93.7 kg). Intake/Output Summary (Last 24 hours) at 8/11/2022 0905  Last data filed at 8/11/2022 0646  Gross per 24 hour   Intake 3777 ml   Output 1525 ml   Net 2252 ml         Physical Exam:   Blood pressure 130/75, pulse 75, temperature 97.9 °F (36.6 °C), temperature source Oral, resp. rate 20, height 5' 8\" (1.727 m), weight 206 lb 9.6 oz (93.7 kg), SpO2 96 %. General:    Well nourished. No overt distress. Obese. Pleasant. Head:  Normocephalic, atraumatic  Eyes:  Sclerae appear normal. Pupils equally round. ENT:  Nares appear normal, no drainage. Moist oral mucosa  Neck:  No restricted ROM. Trachea midline. CV:   RRR. No m/r/g. No jugular venous distension. Lungs:   CTAB. No wheezing, rhonchi, or rales. Respirations even, unlabored. Abdomen: Bowel sounds present. Soft, nondistended. Mild generalized ttp. Lap incisions are bandaged, clean and dry. Extremities: No cyanosis or clubbing. Trace edema. Skin:     No rashes and normal coloration. Warm and dry. Neuro:  CN II-XII grossly intact. Sensation intact. A&Ox3  Psych:  Normal mood and affect.       I have reviewed ordered lab tests and independently visualized imaging below:    Recent Labs:  Recent Results (from the past 48 hour(s))   Magnesium    Collection Time: 08/10/22  3:27 AM   Result Value Ref Range    Magnesium 1.9 1.8 - 2.4 mg/dL   PREPARE RBC (CROSSMATCH), 1 Units    Collection Time: 08/10/22  9:00 AM   Result Value Ref Range    History Check Historical check performed    TYPE AND SCREEN    Collection Time: 08/10/22  9:46 AM   Result Value Ref Range    Crossmatch expiration date 08/09/2022,0724     ABO/Rh A POSITIVE     Antibody Screen NEG     Unit Number [id number]     Product Code Blood Bank RC LR     Unit Divison 00     Dispense Status Blood Bank REL FROM [facility]     Crossmatch Result Compatible    PREPARE RBC (CROSSMATCH), 1 Units    Collection Time: 08/10/22 11:15 AM   Result Value Ref Range    History Check Historical check performed    CBC    Collection Time: 08/10/22 11:19 AM   Result Value Ref Range    WBC 10.7 4.3 - 11.1 K/uL    RBC 4.14 (L) 4.23 - 5.6 M/uL    Hemoglobin 7.9 (L) 13.6 - 17.2 g/dL    Hematocrit 29.2 (L) 41.1 - 50.3 %    MCV 70.5 (L) 79.6 - 97.8 FL    MCH 19.1 (L) 26.1 - 32.9 PG    MCHC 27.1 (L) 31.4 - 35.0 g/dL    RDW 22.3 (H) 11.9 - 14.6 %    Platelets 340 658 - 426 K/uL    MPV 8.8 (L) 9.4 - 12.3 FL    nRBC 0.00 0.0 - 0.2 K/uL   Basic Metabolic Panel    Collection Time: 08/10/22 11:19 AM   Result Value Ref Range    Sodium 140 138 - 145 mmol/L    Potassium 4.4 3.5 - 5.1 mmol/L    Chloride 107 98 - 107 mmol/L    CO2 29 21 - 32 mmol/L    Anion Gap 4 (L) 7 - 16 mmol/L    Glucose 96 65 - 100 mg/dL    BUN 4 (L) 8 - 23 MG/DL    Creatinine 0.80 0.8 - 1.5 MG/DL    GFR African American >60 >60 ml/min/1.73m2    GFR Non- >60 >60 ml/min/1.73m2    Calcium 9.4 8.3 - 10.4 MG/DL   TYPE AND SCREEN    Collection Time: 08/10/22 11:35 AM   Result Value Ref Range    Crossmatch expiration date 08/13/2022,4086     ABO/Rh A POSITIVE     Antibody Screen NEG     Unit Number [id number]     Product Code Blood Bank RC LR     Unit Divison 00     Dispense Status Blood Bank ALLOCATED     Crossmatch Result Compatible    CBC    Collection Time: 08/11/22  7:56 AM   Result Value Ref Range    WBC 14.8 (H) 4.3 - 11.1 K/uL    RBC 3.62 (L) 4.23 - 5.6 M/uL    Hemoglobin 7.0 (L) 13.6 - 17.2 g/dL    Hematocrit 25.5 (L) 41.1 - 50.3 %    MCV 70.4 (L) 79.6 - 97.8 FL    MCH 19.3 (L) 26.1 - 32.9 PG    MCHC 27.5 (L) 31.4 - 35.0 g/dL    RDW 21.8 (H) 11.9 - 14.6 %    Platelets 717 033 - 576 K/uL    MPV 9.3 (L) 9.4 - 12.3 FL    nRBC 0.00 0.0 - 0.2 K/uL   Basic Metabolic Panel w/ Reflex to MG    Collection Time: 08/11/22  7:56 AM   Result Value Ref Range    Sodium 140 138 - 145 mmol/L    Potassium 4.2 3.5 - 5.1 mmol/L    Chloride 108 (H) 98 - 107 mmol/L    CO2 26 21 - 32 mmol/L    Anion Gap 6 (L) 7 - 16 mmol/L    Glucose 119 (H) 65 - 100 mg/dL    BUN 7 (L) 8 - 23 MG/DL    Creatinine 0.80 0.8 - 1.5 MG/DL    GFR African American >60 >60 ml/min/1.73m2    GFR Non- >60 >60 ml/min/1.73m2    Calcium 8.7 8.3 - 10.4 MG/DL         Other Studies:  No results found.     Current Meds:  Current Facility-Administered Medications   Medication Dose Route Frequency    HYDROmorphone HCl PF (DILAUDID) injection 0.5 mg  0.5 mg IntraVENous Q4H PRN oxyCODONE-acetaminophen (PERCOCET) 7.5-325 MG per tablet 1 tablet  1 tablet Oral Q4H PRN    amoxicillin (AMOXIL) capsule 500 mg  500 mg Oral 3 times per day    0.9 % sodium chloride infusion   IntraVENous PRN    0.9 % sodium chloride infusion   IntraVENous PRN    magnesium sulfate 2000 mg in 50 mL IVPB premix  2,000 mg IntraVENous PRN    potassium chloride (KLOR-CON M) extended release tablet 40 mEq  40 mEq Oral PRN    Or    potassium bicarb-citric acid (EFFER-K) effervescent tablet 40 mEq  40 mEq Oral PRN    Or    potassium chloride 10 mEq/100 mL IVPB (Peripheral Line)  10 mEq IntraVENous PRN    lidocaine 1 % injection 5 mL  5 mL IntraDERmal Once    sodium chloride flush 0.9 % injection 5-40 mL  5-40 mL IntraVENous 2 times per day    sodium chloride flush 0.9 % injection 5-40 mL  5-40 mL IntraVENous PRN    0.9 % sodium chloride infusion   IntraVENous PRN    heparin flush 100 UNIT/ML injection 100 Units  1 mL IntraCATHeter PRN    [Held by provider] aspirin EC tablet 81 mg  81 mg Oral Daily    atorvastatin (LIPITOR) tablet 80 mg  80 mg Oral Nightly    [Held by provider] furosemide (LASIX) tablet 40 mg  40 mg Oral Daily    rOPINIRole (REQUIP) tablet 0.5 mg  0.5 mg Oral Nightly    diatrizoate meglumine-sodium (GASTROGRAFIN) 66-10 % solution 15 mL  15 mL Oral ONCE PRN    0.9 % sodium chloride infusion   IntraVENous PRN    sodium chloride flush 0.9 % injection 5-40 mL  5-40 mL IntraVENous 2 times per day    sodium chloride flush 0.9 % injection 5-40 mL  5-40 mL IntraVENous PRN    0.9 % sodium chloride infusion   IntraVENous PRN    ondansetron (ZOFRAN-ODT) disintegrating tablet 4 mg  4 mg Oral Q8H PRN    Or    ondansetron (ZOFRAN) injection 4 mg  4 mg IntraVENous Q6H PRN    polyethylene glycol (GLYCOLAX) packet 17 g  17 g Oral Daily PRN    acetaminophen (TYLENOL) tablet 650 mg  650 mg Oral Q6H PRN    Or    acetaminophen (TYLENOL) suppository 650 mg  650 mg Rectal Q6H PRN    albuterol (PROVENTIL) nebulizer solution 2.5 mg 2.5 mg Nebulization Q6H PRN    temazepam (RESTORIL) capsule 15 mg  15 mg Oral Nightly PRN       Signed:  Thao Acosta MD    Part of this note may have been written by using a voice dictation software. The note has been proof read but may still contain some grammatical/other typographical errors.

## 2022-08-11 NOTE — PROGRESS NOTES
END OF SHIFT SUMMARY:    Significant vitals this shift:    Significant labs this shift:    Tests performed this shift:    Orders to be followed up on:    Blood products given this shift:    Additional events this shift:   C/o abd pain pain for meds given. D/C cowan per patient last time he had cowan it took awhi;le to start stream because of prostate. Encourage walking walked the halls today.   Bladder scan of only 35ml encouraged to drink fluids     I/Os:  +/- this shift:   08/11 0701 - 08/11 1900  In: 456 [P.O.:340; I.V.:116]  Out: 250 [Urine:250]  Occurrences this Shift:  Urine , BM , Emesis     Anderson Salcido RN

## 2022-08-12 LAB
BASOPHILS # BLD: 0.1 K/UL (ref 0–0.2)
BASOPHILS NFR BLD: 1 % (ref 0–2)
DIFFERENTIAL METHOD BLD: ABNORMAL
EOSINOPHIL # BLD: 0.9 K/UL (ref 0–0.8)
EOSINOPHIL NFR BLD: 7 % (ref 0.5–7.8)
ERYTHROCYTE [DISTWIDTH] IN BLOOD BY AUTOMATED COUNT: 22 % (ref 11.9–14.6)
HCT VFR BLD AUTO: 28.8 % (ref 41.1–50.3)
HGB BLD-MCNC: 7.8 G/DL (ref 13.6–17.2)
IMM GRANULOCYTES # BLD AUTO: 0.1 K/UL (ref 0–0.5)
IMM GRANULOCYTES NFR BLD AUTO: 0 % (ref 0–5)
LYMPHOCYTES # BLD: 2.6 K/UL (ref 0.5–4.6)
LYMPHOCYTES NFR BLD: 19 % (ref 13–44)
MCH RBC QN AUTO: 19.2 PG (ref 26.1–32.9)
MCHC RBC AUTO-ENTMCNC: 27.1 G/DL (ref 31.4–35)
MCV RBC AUTO: 70.8 FL (ref 79.6–97.8)
MONOCYTES # BLD: 1.1 K/UL (ref 0.1–1.3)
MONOCYTES NFR BLD: 8 % (ref 4–12)
NEUTS SEG # BLD: 9.2 K/UL (ref 1.7–8.2)
NEUTS SEG NFR BLD: 66 % (ref 43–78)
NRBC # BLD: 0 K/UL (ref 0–0.2)
PLATELET # BLD AUTO: 235 K/UL (ref 150–450)
PMV BLD AUTO: 9.1 FL (ref 9.4–12.3)
RBC # BLD AUTO: 4.07 M/UL (ref 4.23–5.6)
WBC # BLD AUTO: 13.9 K/UL (ref 4.3–11.1)

## 2022-08-12 PROCEDURE — 6370000000 HC RX 637 (ALT 250 FOR IP): Performed by: SURGERY

## 2022-08-12 PROCEDURE — 2580000003 HC RX 258: Performed by: HOSPITALIST

## 2022-08-12 PROCEDURE — 6370000000 HC RX 637 (ALT 250 FOR IP): Performed by: NURSE PRACTITIONER

## 2022-08-12 PROCEDURE — 97535 SELF CARE MNGMENT TRAINING: CPT

## 2022-08-12 PROCEDURE — 97161 PT EVAL LOW COMPLEX 20 MIN: CPT

## 2022-08-12 PROCEDURE — 97530 THERAPEUTIC ACTIVITIES: CPT

## 2022-08-12 PROCEDURE — 85025 COMPLETE CBC W/AUTO DIFF WBC: CPT

## 2022-08-12 PROCEDURE — 36415 COLL VENOUS BLD VENIPUNCTURE: CPT

## 2022-08-12 PROCEDURE — 2580000003 HC RX 258: Performed by: INTERNAL MEDICINE

## 2022-08-12 PROCEDURE — 6370000000 HC RX 637 (ALT 250 FOR IP): Performed by: INTERNAL MEDICINE

## 2022-08-12 PROCEDURE — 97165 OT EVAL LOW COMPLEX 30 MIN: CPT

## 2022-08-12 PROCEDURE — 1100000000 HC RM PRIVATE

## 2022-08-12 RX ADMIN — PANTOPRAZOLE SODIUM 40 MG: 40 TABLET, DELAYED RELEASE ORAL at 08:27

## 2022-08-12 RX ADMIN — DOCUSATE SODIUM 100 MG: 100 CAPSULE, LIQUID FILLED ORAL at 08:27

## 2022-08-12 RX ADMIN — OXYCODONE AND ACETAMINOPHEN 1 TABLET: 7.5; 325 TABLET ORAL at 04:35

## 2022-08-12 RX ADMIN — OXYCODONE AND ACETAMINOPHEN 1 TABLET: 7.5; 325 TABLET ORAL at 20:57

## 2022-08-12 RX ADMIN — AMOXICILLIN 500 MG: 500 CAPSULE ORAL at 05:35

## 2022-08-12 RX ADMIN — ROPINIROLE HYDROCHLORIDE 0.5 MG: 0.5 TABLET, FILM COATED ORAL at 21:01

## 2022-08-12 RX ADMIN — DOCUSATE SODIUM 100 MG: 100 CAPSULE, LIQUID FILLED ORAL at 21:01

## 2022-08-12 RX ADMIN — ATORVASTATIN CALCIUM 80 MG: 80 TABLET, FILM COATED ORAL at 21:01

## 2022-08-12 RX ADMIN — AMOXICILLIN 500 MG: 500 CAPSULE ORAL at 15:27

## 2022-08-12 RX ADMIN — AMOXICILLIN 500 MG: 500 CAPSULE ORAL at 21:01

## 2022-08-12 RX ADMIN — SODIUM CHLORIDE, PRESERVATIVE FREE 10 ML: 5 INJECTION INTRAVENOUS at 21:02

## 2022-08-12 RX ADMIN — SODIUM CHLORIDE, PRESERVATIVE FREE 10 ML: 5 INJECTION INTRAVENOUS at 08:27

## 2022-08-12 RX ADMIN — SODIUM CHLORIDE, PRESERVATIVE FREE 10 ML: 5 INJECTION INTRAVENOUS at 08:34

## 2022-08-12 RX ADMIN — OXYCODONE AND ACETAMINOPHEN 1 TABLET: 7.5; 325 TABLET ORAL at 09:11

## 2022-08-12 RX ADMIN — SODIUM CHLORIDE, PRESERVATIVE FREE 10 ML: 5 INJECTION INTRAVENOUS at 21:01

## 2022-08-12 ASSESSMENT — PAIN SCALES - GENERAL
PAINLEVEL_OUTOF10: 7
PAINLEVEL_OUTOF10: 10
PAINLEVEL_OUTOF10: 5
PAINLEVEL_OUTOF10: 9
PAINLEVEL_OUTOF10: 9
PAINLEVEL_OUTOF10: 7

## 2022-08-12 ASSESSMENT — PAIN DESCRIPTION - ORIENTATION
ORIENTATION: RIGHT
ORIENTATION: RIGHT;MID
ORIENTATION: MID
ORIENTATION: RIGHT

## 2022-08-12 ASSESSMENT — PAIN DESCRIPTION - DESCRIPTORS
DESCRIPTORS: ACHING
DESCRIPTORS: ACHING
DESCRIPTORS: ACHING;PRESSURE
DESCRIPTORS: ACHING

## 2022-08-12 ASSESSMENT — PAIN DESCRIPTION - LOCATION
LOCATION: ABDOMEN

## 2022-08-12 ASSESSMENT — PAIN SCALES - WONG BAKER: WONGBAKER_NUMERICALRESPONSE: 2

## 2022-08-12 ASSESSMENT — PAIN - FUNCTIONAL ASSESSMENT: PAIN_FUNCTIONAL_ASSESSMENT: ACTIVITIES ARE NOT PREVENTED

## 2022-08-12 NOTE — PROGRESS NOTES
Hospitalist Progress Note   Admit Date:  2022  4:05 PM   Name:  Morgan Red   Age:  79 y.o. Sex:  male  :  1940   MRN:  [id number]   Room:  [facility]/    Reason(s) for Admission: Acute blood loss anemia [D62]  Anemia, unspecified type [D64.9]     Hospital Course & Interval History:   Mr. Dewayne Rolle is a very nice 80 y/o WM with a h/o atrial fib s/p Watchman, SSS with PPM, HTN, GERD, HLD, CAD, HFpEF who was admitted to our service on  with ABLA from GI source. Initial Hb 6.5. GI consulted. EGD on  showed 2 small erosions in proximal stomach, otherwise unremarkable. Colonoscopy on  showed a cecal mass and 2 sessile polyps (hot snare removal). General Surgery consulted and he underwent laparoscopic R hemicolectomy on 8/10. Subjective/24hr Events (22):  Up to chair, ambulating more, tolerating sips of clears. Abdomen is less painful today, still some distension. Says he had a few watery BMs. No chest pain or SOB, N/V. Assessment & Plan:   # Acute blood loss anemia 2/2 cecal mass              - EGD/colonoscopy results noted. - S/p R hemicolectomy on 8/10              - Labs pending 8/12 AM, follow up Hb              - Diet/pain control per Surgery     # Acute uncomplicated cystitis 2/2 enterococcus              - Amoxicillin, EOT 8/15. Blood cxs neg. # HypoK              - Resolved. # Chronic dCHF // HFpEF              - Compensated. # CAD              - Resume ASA when ok from surgical standpoint. Con't statin. # GERD              - PO PPI     # PAD // CAD // HLD              - ASA as above, statin     # HTN              - Well controlled currently. # Myasthenia gravis? - Noted from problem list. No MG meds on PTA list, d/w Pharmacy  and couldn't see any outpatient fill history. Patient denied this history? Discharge Planning: Likely home when able.   Diet:  Diet NPO Exceptions are: Sips of Clear Liquids, Popsicles, Sips of Water with Meds  DVT PPx: Ambulation, SCD  Code status: Full Code    Hospital Problems             Last Modified POA    * (Principal) Acute blood loss anemia 8/4/2022 Yes    GIB (gastrointestinal bleeding) 8/4/2022 Yes    Cecum mass 8/7/2022 Yes    Colon cancer ([facility] [address] 8/11/2022 Yes    Overview Signed 8/11/2022  7:46 AM by NAOMY Devries - CNP     8/10/22 Jovita Chopra MD) BOWEL RESECTION HEMICOLECTOMY LAPAROSCOPIC ROBOTIC, Right Bulmaro         Paroxysmal atrial fibrillation ([facility] [address] 8/9/2022 Yes    Overview Signed 3/28/2022 12:16 AM by Marisol Romeo MD     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 HTN (hypertension) 8/4/2022 Yes    Myasthenia gravis ([facility] [address] 8/7/2022 Yes    SSS (sick [facility] syndrome) ([facility] 8/9/2022 Yes    GERD (gastroesophageal reflux disease) 3/8/3831 Yes    Diastolic CHF, chronic ([facility] [address] 8/4/2022 Yes    Dyslipidemia 8/4/2022 Yes       Objective:   Patient Vitals for the past 24 hrs:   Temp Pulse Resp BP SpO2   08/12/22 0911 -- -- 17 -- --   08/12/22 0800 97.7 °F (36.5 °C) 74 18 131/75 94 %   08/12/22 0300 97.4 °F (36.3 °C) 70 16 100/67 97 %   08/11/22 2254 97.5 °F (36.4 °C) 97 17 (!) 112/52 95 %   08/11/22 1952 97.4 °F (36.3 °C) 75 16 (!) 141/69 96 %   08/11/22 1610 97.7 °F (36.5 °C) 75 16 126/75 96 %   08/11/22 1114 97.4 °F (36.3 °C) 74 16 106/61 95 %       Estimated body mass index is 31.41 kg/m² as calculated from the following:    Height as of this encounter: 5' 8\" (1.727 m). Weight as of this encounter: 206 lb 9.6 oz (93.7 kg). Intake/Output Summary (Last 24 hours) at 8/12/2022 0943  Last data filed at 8/12/2022 0518  Gross per 24 hour   Intake 220 ml   Output 253 ml   Net -33 ml         Physical Exam:   Blood pressure 131/75, pulse 74, temperature 97.7 °F (36.5 °C), temperature source Oral, resp. rate 17, height 5' 8\" (1.727 m), weight 206 lb 9.6 oz (93.7 kg), SpO2 94 %. General:    Well nourished. No overt distress. Obese.   Head:  Normocephalic, atraumatic  Eyes:  Sclerae appear normal. Pupils equally round. ENT:  Nares appear normal, no drainage. Moist oral mucosa  Neck:  No restricted ROM. Trachea midline. CV:   RRR. No m/r/g. No jugular venous distension. Lungs:   CTAB. No wheezing, rhonchi, or rales. Respirations even, unlabored. Abdomen: Bowel sounds decreased. Abdo distended but non-tender, softer to lower half of abdomen. Extremities: No cyanosis or clubbing. No edema. Skin:     No rashes and normal coloration. Warm and dry. Neuro:  CN II-XII grossly intact. Sensation intact. A&Ox3  Psych:  Normal mood and affect.       I have reviewed ordered lab tests and independently visualized imaging below:    Recent Labs:  Recent Results (from the past 48 hour(s))   TYPE AND SCREEN    Collection Time: 08/10/22  9:46 AM   Result Value Ref Range    Crossmatch expiration date 08/09/2022,2359     ABO/Rh A POSITIVE     Antibody Screen NEG     Unit Number [id number]     Product Code Blood Bank  LR     Unit Divison 00     Dispense Status Blood Bank REL FROM [facility]     Crossmatch Result Compatible    PREPARE RBC (CROSSMATCH), 1 Units    Collection Time: 08/10/22 11:15 AM   Result Value Ref Range    History Check Historical check performed    CBC    Collection Time: 08/10/22 11:19 AM   Result Value Ref Range    WBC 10.7 4.3 - 11.1 K/uL    RBC 4.14 (L) 4.23 - 5.6 M/uL    Hemoglobin 7.9 (L) 13.6 - 17.2 g/dL    Hematocrit 29.2 (L) 41.1 - 50.3 %    MCV 70.5 (L) 79.6 - 97.8 FL    MCH 19.1 (L) 26.1 - 32.9 PG    MCHC 27.1 (L) 31.4 - 35.0 g/dL    RDW 22.3 (H) 11.9 - 14.6 %    Platelets 111 036 - 667 K/uL    MPV 8.8 (L) 9.4 - 12.3 FL    nRBC 0.00 0.0 - 0.2 K/uL   Basic Metabolic Panel    Collection Time: 08/10/22 11:19 AM   Result Value Ref Range    Sodium 140 138 - 145 mmol/L    Potassium 4.4 3.5 - 5.1 mmol/L    Chloride 107 98 - 107 mmol/L    CO2 29 21 - 32 mmol/L    Anion Gap 4 (L) 7 - 16 mmol/L    Glucose 96 65 - 100 mg/dL    BUN 4 (L) 8 - 23 MG/DL Creatinine 0.80 0.8 - 1.5 MG/DL    GFR African American >60 >60 ml/min/1.73m2    GFR Non- >60 >60 ml/min/1.73m2    Calcium 9.4 8.3 - 10.4 MG/DL   TYPE AND SCREEN    Collection Time: 08/10/22 11:35 AM   Result Value Ref Range    Crossmatch expiration date 08/13/2022,2359     ABO/Rh A POSITIVE     Antibody Screen NEG     Unit Number [id number]     Product Code Blood Bank RC LR     Unit Divison 00     Dispense Status Blood Bank ALLOCATED     Crossmatch Result Compatible    CBC    Collection Time: 08/11/22  7:56 AM   Result Value Ref Range    WBC 14.8 (H) 4.3 - 11.1 K/uL    RBC 3.62 (L) 4.23 - 5.6 M/uL    Hemoglobin 7.0 (L) 13.6 - 17.2 g/dL    Hematocrit 25.5 (L) 41.1 - 50.3 %    MCV 70.4 (L) 79.6 - 97.8 FL    MCH 19.3 (L) 26.1 - 32.9 PG    MCHC 27.5 (L) 31.4 - 35.0 g/dL    RDW 21.8 (H) 11.9 - 14.6 %    Platelets 202 356 - 870 K/uL    MPV 9.3 (L) 9.4 - 12.3 FL    nRBC 0.00 0.0 - 0.2 K/uL   Basic Metabolic Panel w/ Reflex to MG    Collection Time: 08/11/22  7:56 AM   Result Value Ref Range    Sodium 140 138 - 145 mmol/L    Potassium 4.2 3.5 - 5.1 mmol/L    Chloride 108 (H) 98 - 107 mmol/L    CO2 26 21 - 32 mmol/L    Anion Gap 6 (L) 7 - 16 mmol/L    Glucose 119 (H) 65 - 100 mg/dL    BUN 7 (L) 8 - 23 MG/DL    Creatinine 0.80 0.8 - 1.5 MG/DL    GFR African American >60 >60 ml/min/1.73m2    GFR Non- >60 >60 ml/min/1.73m2    Calcium 8.7 8.3 - 10.4 MG/DL         Other Studies:  No results found.     Current Meds:  Current Facility-Administered Medications   Medication Dose Route Frequency    oxyCODONE-acetaminophen (PERCOCET) 7.5-325 MG per tablet 1 tablet  1 tablet Oral Q4H PRN    amoxicillin (AMOXIL) capsule 500 mg  500 mg Oral 3 times per day    pantoprazole (PROTONIX) tablet 40 mg  40 mg Oral QAM AC    docusate sodium (COLACE) capsule 100 mg  100 mg Oral BID    HYDROmorphone HCl PF (DILAUDID) injection 0.5 mg  0.5 mg IntraVENous Q6H PRN    0.9 % sodium chloride infusion   IntraVENous PRN magnesium sulfate 2000 mg in 50 mL IVPB premix  2,000 mg IntraVENous PRN    potassium chloride (KLOR-CON M) extended release tablet 40 mEq  40 mEq Oral PRN    Or    potassium bicarb-citric acid (EFFER-K) effervescent tablet 40 mEq  40 mEq Oral PRN    Or    potassium chloride 10 mEq/100 mL IVPB (Peripheral Line)  10 mEq IntraVENous PRN    lidocaine 1 % injection 5 mL  5 mL IntraDERmal Once    sodium chloride flush 0.9 % injection 5-40 mL  5-40 mL IntraVENous 2 times per day    sodium chloride flush 0.9 % injection 5-40 mL  5-40 mL IntraVENous PRN    0.9 % sodium chloride infusion   IntraVENous PRN    heparin flush 100 UNIT/ML injection 100 Units  1 mL IntraCATHeter PRN    [Held by provider] aspirin EC tablet 81 mg  81 mg Oral Daily    atorvastatin (LIPITOR) tablet 80 mg  80 mg Oral Nightly    [Held by provider] furosemide (LASIX) tablet 40 mg  40 mg Oral Daily    rOPINIRole (REQUIP) tablet 0.5 mg  0.5 mg Oral Nightly    diatrizoate meglumine-sodium (GASTROGRAFIN) 66-10 % solution 15 mL  15 mL Oral ONCE PRN    sodium chloride flush 0.9 % injection 5-40 mL  5-40 mL IntraVENous 2 times per day    sodium chloride flush 0.9 % injection 5-40 mL  5-40 mL IntraVENous PRN    0.9 % sodium chloride infusion   IntraVENous PRN    ondansetron (ZOFRAN-ODT) disintegrating tablet 4 mg  4 mg Oral Q8H PRN    Or    ondansetron (ZOFRAN) injection 4 mg  4 mg IntraVENous Q6H PRN    acetaminophen (TYLENOL) tablet 650 mg  650 mg Oral Q6H PRN    Or    acetaminophen (TYLENOL) suppository 650 mg  650 mg Rectal Q6H PRN    albuterol (PROVENTIL) nebulizer solution 2.5 mg  2.5 mg Nebulization Q6H PRN    temazepam (RESTORIL) capsule 15 mg  15 mg Oral Nightly PRN       Signed:  Delmis Soares MD    Part of this note may have been written by using a voice dictation software. The note has been proof read but may still contain some grammatical/other typographical errors.

## 2022-08-12 NOTE — PROGRESS NOTES
OCCUPATIONAL THERAPY Initial Assessment, Daily Note, and AM       OT Visit Days: 1  Acknowledge Orders  Time  OT Charge Capture  Rehab Caseload Tracker      Jermaine Hallman is a 79 y.o. male   PRIMARY DIAGNOSIS: Acute blood loss anemia  Acute blood loss anemia [D62]  Anemia, unspecified type [D64.9]  Procedure(s) (LRB):  BOWEL RESECTION HEMICOLECTOMY LAPAROSCOPIC ROBOTIC, Right Bulmaro (Right)  2 Days Post-Op  Reason for Referral: Generalized Muscle Weakness (M62.81)  Other abnormalities of gait and mobility (R26.89)  History of falling (Z91.81)  Inpatient: Payor: MEDICARE / Plan: MEDICARE PART A AND B / Product Type: *No Product type* /     ASSESSMENT:     REHAB RECOMMENDATIONS:   Recommendation to date pending progress:  Setting:  Home Health Therapy    Equipment:    To Be Determined     ASSESSMENT:  Mr. Harshil Eaton is a 80 y/o M presenting with acute blood loss anemia. Pt seated up in recliner on entry on RA A/O x 4. Applicable PMHx: A fib s/p watchmann, HTN, GERD, HLD. Pt denied light headedness, dizziness or SOB. Pt reports 1 fall(s) in past 6 months. PLOF Mod I. DME present in home; quad cane, RW, SC. Pt currently CGA - SBA with UB ADLs, CGA with LB ADLs, CGA for toileting and CGA for functional t/fs and household mobility for ADLs with quad cane. Pt had 1 LOB while turning to enter bathroom, therapist assisted recovery. Rest breaks utilized as needed throughout session. Pt presents with deficits in ADLs, functional t/fs, household mobility for ADLs and activity tolerance compared to reported PLOF. Pt tolerated session well. Pt presents pleasant and motivated with good rehab potential. Pt would benefit from continued skilled OT services for duration of hospital stay and after t/f to next level of care or until all stated goals met.       [address] [facility] [address] [id number] AM-PAC 6 Clicks Daily Activity Inpatient Short Form:    AM-PAC Daily Activity Inpatient   How much help for putting on and taking off regular lower body clothing?: A Little  How much help for Bathing?: A Little  How much help for Toileting?: A Little  How much help for putting on and taking off regular upper body clothing?: None  How much help for taking care of personal grooming?: A Little  How much help for eating meals?: None  AM-PAC Inpatient Daily Activity Raw Score: 20  AM-PAC Inpatient ADL T-Scale Score : 42.03  ADL Inpatient CMS 0-100% Score: 38.32  ADL Inpatient CMS G-Code Modifier : CJ           SUBJECTIVE:     Mr. Stephanie Stinson states, \"I can walk around out there\"     Social/Functional Lives With: Spouse  Type of Home: House  Home Layout: One level  Home Access: Level entry  Home Equipment: Cane (walking stick.)  ADL Assistance: Independent  Active : No  Patient's  Info: Family provides transportation. Patient is able to drive.   Mode of Transportation: Car  Occupation: Retired    OBJECTIVE:     LINES / Toi Dunk / AIRWAY: NA    RESTRICTIONS/PRECAUTIONS:       PAIN: Ingrid Dominion / O2:   Pre Treatment:   Pain Assessment: None - Denies Pain      Post Treatment: None       Vitals          Oxygen            GROSS EVALUATION: INTACT IMPAIRED   (See Comments)   UE AROM [x] []   UE PROM [] []NT   Strength [] LUE Strength  Gross LUE Strength: WFL  LUE Strength Comment: 4/5  RUE Strength  Gross RUE Strength: WFL  RUE Strength Comment: 4/5     Posture / Balance [] Sitting - Static: Good  Sitting - Dynamic: Fair, +  Standing - Static: Fair, +  Standing - Dynamic: Fair   Sensation [x]     Coordination [x]  BUE WFL     Tone []  N/A     Edema [] N/A   Activity Tolerance [] Patient limited by fatigue, Patient Tolerated treatment well     Hand Dominance R [x] L []      COGNITION/  PERCEPTION: INTACT IMPAIRED   (See Comments)   Orientation [x]     Vision [x]     Hearing [] No hearing aid, Hard of hearing/hearing concerns   Cognition  [x]     Perception []       MOBILITY: I Mod I S SBA CGA Min Mod Max Total  NT x2 Comments:   Bed Mobility    Rolling [] [] [] [] [] [] [] [] [] [x] []    Supine to Sit [] [] [] [] [] [] [] [] [] [x] []    Scooting [] [] [] [] [] [] [] [] [] [x] []    Sit to Supine [] [] [] [] [] [] [] [] [] [x] []    Transfers    Sit to Stand [] [] [] [] [x] [] [] [] [] [] []    Bed to Chair [] [] [] [] [x] [] [] [] [] [] []    Stand to Sit [] [] [] [] [x] [] [] [] [] [] []    Tub/Shower [] [] [] [] [] [] [] [] [] [x] []     Toilet [] [] [] [] [] [] [] [] [] [x] []      [] [] [] [] [] [] [] [] [] [] []    I=Independent, Mod I=Modified Independent, S=Supervision/Setup, SBA=Standby Assistance, CGA=Contact Guard Assistance, Min=Minimal Assistance, Mod=Moderate Assistance, Max=Maximal Assistance, Total=Total Assistance, NT=Not Tested    ACTIVITIES OF DAILY LIVING: I Mod I S SBA CGA Min Mod Max Total NT Comments   BASIC ADLs:              Upper Body Bathing  [] [] [] [x] [] [] [] [] [] []    Lower Body Bathing [] [] [] [] [x] [] [] [] [] []    Toileting [] [] [] [] [x] [] [] [] [] [] Attempted void x 2 standing at commode.  Pt reported urge, unable to produce stream with increased time   Upper Body Dressing [] [] [] [x] [] [] [] [] [] []    Lower Body Dressing [] [] [] [] [x] [] [] [] [] [] Doff/don elastic lace shoes   Feeding [] [x] [] [] [] [] [] [] [] []    Grooming [] [] [] [] [x] [] [] [] [] [] Hand hygiene, oral hygiene, face washing standing at sink   Personal Device Care [] [] [] [] [] [] [] [] [] [x]    Functional Mobility [] [] [] [] [x] [] [] [] [] [] Household mobility for ADLs with quad cane   I=Independent, Mod I=Modified Independent, S=Supervision/Setup, SBA=Standby Assistance, CGA=Contact Guard Assistance, Min=Minimal Assistance, Mod=Moderate Assistance, Max=Maximal Assistance, Total=Total Assistance, NT=Not Tested    PLAN:     [facility] of Care: 3 times/week for duration of hospital stay or until stated goals are met, whichever comes first.    ACUTE OCCUPATIONAL THERAPY GOALS:   (Developed with and agreed upon by patient and/or caregiver.)  1. Patient will complete lower body bathing and dressing with Mod I and adaptive equipment as   needed. 2. Patient will completed upper body bathing and dressing with Mod I and adaptive equipment as needed. 3. Patient will complete grooming standing at sink with Mod I and adaptive equipment as needed. 4. Patient will complete toileting with Mod I and adaptive equipment as needed. 5. Patient will tolerate 30 minutes of OT treatment with no rest breaks to increase activity tolerance for ADLs. 6. Patient will complete functional transfers with Mod I and adaptive equipment as needed. Timeframe: 7 visits         PROBLEM LIST:   (Skilled intervention is medically necessary to address:)  Decreased ADL/Functional Activities  Decreased Activity Tolerance  Decreased Balance  Decreased Gait Ability  Decreased Safety Awareness  Decreased Strength  Decreased Transfer Abilities   INTERVENTIONS PLANNED:  (Benefits and precautions of occupational therapy have been discussed with the patient.)  Self Care Training  Therapeutic Activity  Therapeutic Exercise/HEP  Neuromuscular Re-education  Manual Therapy  Education         TREATMENT:     EVALUATION: LOW COMPLEXITY: (Untimed Charge)    TREATMENT:   Self Care (10 minutes): Patient participated in toileting, lower body dressing, and grooming ADLs in unsupported sitting and standing with no verbal cueing to increase independence, decrease assistance required, and increase activity tolerance. Patient also participated in functional mobility, functional transfer, and energy conservation training to increase independence, decrease assistance required, and increase activity tolerance.      TREATMENT GRID:  N/A    AFTER TREATMENT PRECAUTIONS: Bed/Chair Locked, Call light within reach, Chair, Needs within reach, and RN notified    INTERDISCIPLINARY COLLABORATION:  RN/ PCT and OT/ CORLEY    EDUCATION:  Education Given To: Patient  Education Provided: Role of Therapy;Plan of Care;Energy Conservation  Education Method: Verbal  Barriers to Learning: None  Education Outcome: Verbalized understanding    TOTAL TREATMENT DURATION AND TIME:  Time In: 1052  Time Out: 1108  Minutes: Chung Isabel OT

## 2022-08-12 NOTE — PROGRESS NOTES
Follow-up visit attempted. Patient did not respond to knocking on his door. Chaplains remain available for support.      Mervin Raines 67  Board Certified

## 2022-08-12 NOTE — PROGRESS NOTES
PHYSICAL THERAPY Initial Assessment, Daily Note, and AM  (Link to Caseload Tracking: PT Visit Days : 1  Acknowledge Orders  Time In/Out  PT Charge Capture  Rehab Caseload Tracker    Stan Langley is a 79 y.o. male   PRIMARY DIAGNOSIS: Acute blood loss anemia  Acute blood loss anemia [D62]  Anemia, unspecified type [D64.9]  Procedure(s) (LRB):  BOWEL RESECTION HEMICOLECTOMY LAPAROSCOPIC ROBOTIC, Right Bulmaro (Right)  2 Days Post-Op  Reason for Referral: Generalized Muscle Weakness (M62.81)  Other lack of cordination (R27.8)  Difficulty in walking, Not elsewhere classified (R26.2)  Other abnormalities of gait and mobility (R26.89)  Inpatient: Payor: MEDICARE / Plan: MEDICARE PART A AND B / Product Type: *No Product type* /     ASSESSMENT:     REHAB RECOMMENDATIONS:   Recommendation to date pending progress:  Setting:  Home Health Therapy    Equipment:    None     ASSESSMENT:  Mr. Deniz Patterson presents in bedside chair, agreeable to session. He is [place]. Upon entering, Pnt is agreeable to PT treatment. he reports 7/10 pain in his abdomen at rest. Pnt performed Sit > stand with CGA using RW. Gait x 200 ft with CGA, cues for step length. Gait is noted to be shuffled. Stand > sit with CGA, followed by positioning for comfort. At end of session pt up in bedside chair with all needs within reach, alarm activated for safety, RN notified. Overall, he presents as functioning below his baseline, with deficits in mobility including transfers, gait, balance, and activity tolerance. Pt will continue to benefit from skilled therapy services to address stated deficits to promote return to highest level of function, independence, and safety. Will continue to follow.        [address] [id number] AM-PAC 6 Clicks Basic Mobility Inpatient Short Form  AM-PAC Mobility Inpatient   How much difficulty turning over in bed?: A Little  How much difficulty sitting down on / standing up from a chair with arms?: A Little  How much difficulty moving from lying on back to sitting on side of bed?: A Little  How much help from another person moving to and from a bed to a chair?: A Little  How much help from another person needed to walk in hospital room?: A Little  How much help from another person for climbing 3-5 steps with a railing?: A Little  AM-PAC Inpatient Mobility Raw Score : 18  AM-PAC Inpatient T-Scale Score : 43.63  Mobility Inpatient CMS 0-100% Score: 46.58  Mobility Inpatient CMS G-Code Modifier : CK    SUBJECTIVE:   Mr. Ricci Corral states, \"Nice to meet you\"     Social/Functional Lives With: Spouse  Type of Home: House  Home Layout: One level  Home Access: Level entry  Home Equipment: Cane (walking stick.)  ADL Assistance: Independent  Active : No  Patient's  Info: Family provides transportation. Patient is able to drive.   Mode of Transportation: Car  Occupation: Retired    OBJECTIVE:     PAIN: VITALS / O2: PRECAUTION / Claudia Triplett / DRAINS:   Pre Treatment:   Pain Assessment: 0-10  Pain Level: 7      Post Treatment: 6 Vitals        Oxygen      none    RESTRICTIONS/PRECAUTIONS:                    GROSS EVALUATION: Intact Impaired (Comments):   AROM []  Decreased global extension   PROM []    Strength []  Mild generalized weakness   Balance []  Mild trunk sway   Posture [] Forward Head  Rounded Shoulders   Sensation [x]     Coordination [x]      Tone [x]     Edema [x]    Activity Tolerance []  Fatigues in walking     []      COGNITION/  PERCEPTION: Intact Impaired (Comments):   Orientation [x]     Vision [x]     Hearing []  [place]   Cognition  [x]       MOBILITY: I Mod I S SBA CGA Min Mod Max Total  NT x2 Comments:   Bed Mobility    Rolling [] [] [] [] [] [] [] [] [] [x] []    Supine to Sit [] [] [] [] [] [] [] [] [] [x] []    Scooting [] [] [] [] [] [] [] [] [] [x] []    Sit to Supine [] [] [] [] [] [] [] [] [] [x] []    Transfers    Sit to Stand [] [] [] [] [x] [] [] [] [] [] []    Bed to Chair [] [] [] [] [] [] [] [] [] [x] []    Stand to Activities  Decreased Activity Tolerance  Decreased AROM/PROM  Decreased Balance  Decreased Coordination  Decreased Gait Ability  Decreased Strength  Decreased Transfer Abilities  Increased Pain INTERVENTIONS PLANNED:   (Benefits and precautions of physical therapy have been discussed with the patient.)  Self Care Training  Therapeutic Activity  Therapeutic Exercise/HEP  Neuromuscular Re-education  Gait Training  Manual Therapy  Modalities  Education       TREATMENT:   EVALUATION: LOW COMPLEXITY: (Untimed Charge)    TREATMENT:   Therapeutic Activity (8 Minutes): Therapeutic activity included Ambulation on level ground, Sitting balance , and Standing balance to improve functional Activity tolerance.     TREATMENT GRID:  N/A    AFTER TREATMENT PRECAUTIONS: Call light within reach, Chair, Heels floated, Needs within reach, and RN notified    INTERDISCIPLINARY COLLABORATION:  RN/ PCT and PT/ PTA    EDUCATION: Education Given To: Patient    TIME IN/OUT:  Time In: [address] [facility] [address]  Time Out: [facility]  Minutes: [facility],

## 2022-08-12 NOTE — CARE COORDINATION
CM met with patient to discuss discharge planning. Patient evaluated by PT and recommended HH. Patient received Home Health therapy with [facility] prior to admission. Patient nicely declined services, as [facility]. Patient plans to return home with family support at discharge. No additional discharge needs noted at this time. Patient to potentially discharge over the weekend. CM following.

## 2022-08-12 NOTE — PROGRESS NOTES
Subjective:     Patient is a 79 y.o.  male presents with anemia. He had colonoscopy done on 8 5 which showed a lesion in the cecum consistent with a neoplasm. We were asked to evaluate for possible surgical resection. He reports no abdominal pain. Currently he denies nausea vomiting diarrhea constipation hematochezia hematemesis or melena. He has been on full liquids. [place] fevers or chills. 8/8/2022 - Doing well this AM. No bleeding reported. Tolerating liquid diet. No prep received. 8/9/22: No complaints, no bleeding. Liquid diet and bowel prep today. 8/10/22 BOWEL RESECTION HEMICOLECTOMY LAPAROSCOPIC ROBOTIC, Right Bulmaro    8/11/22 POD1: Comfortable overnight. Pain controlled. AF/VSS. Abd: surgical dsg CDI, abd mildly distended, BS+ with tenderness as expected. -Flatus, -BM   overnight. KVX69.5    8/12/22 POD2: Awake and sitting up. Has been oob/ and ambulating. Pain currently controlled. Abd: surgical dsg CDI, abd mildly distended, BS+ and appropriately ttp  +Flatus, +BM (loose)  Rush out.   TLH85.9      Patient Active Problem List    Diagnosis Date Noted    Cecum mass 08/07/2022    Colon cancer ([address] 08/07/2022    Acute blood loss anemia 08/04/2022    GIB (gastrointestinal bleeding) 08/04/2022    Urine retention 04/03/2022    Acute cholecystitis 03/28/2022    Pancreatitis, gallstone 03/28/2022    Anemia 03/28/2022    Bacteremia 03/28/2022    Major depressive disorder, recurrent, moderate ([address] 74/47/7851    Diastolic CHF, chronic ([address] 03/02/2022    Major depressive disorder, recurrent, mild ([address] 03/02/2022    Major depressive disorder, recurrent, unspecified ([address] 03/02/2022    Pacemaker 04/14/2021    Atrial fibrillation ([address] 11/29/2017    GERD (gastroesophageal reflux disease) 10/27/2017    Hypotension 10/26/2017    Sepsis ([address] 10/26/2017    Syncope 10/24/2017    Paroxysmal atrial fibrillation ([address] 06/30/2017    SSS (sick sinus syndrome) ([facility] 06/30/2017    Bilateral carotid artery disease ([facility] 06/30/2017    Myasthenia gravis ([facility]     Mitral valve regurgitation 06/07/2016    Hypokalemia 06/07/2016    Dyslipidemia 06/07/2016    HTN (hypertension) 05/08/2015    Dyspnea 05/08/2015    S/P coronary artery stent placement 05/08/2015    Coronary atherosclerosis of native coronary vessel 05/08/2015     Past Medical History:   Diagnosis Date    Abnormal EKG 4/22/15    Arrhythmia     Aspiration pneumonia ([address] 10/26/2017    CAD (coronary artery disease) 5/8/2015    Carotid artery stenosis without cerebral infarction 6/7/2016    US 6/15: <50% bilat ICAs    Coronary atherosclerosis of native coronary vessel 5/8/2015    GERMAIN on brilinta 5/7/15: prox LAD PCI, normal EF     Diastolic CHF, chronic ([facility] 3/2/2022    Dyslipidemia 6/7/2016    Dyspnea 5/8/2015    Echo 6/15: EF 60%, mod MR, mod LVH, mild AI     ED (erectile dysfunction)     GERD (gastroesophageal reflux disease)     GERD (gastroesophageal reflux disease)     no medication    HTN (hypertension) 5/8/2015    Hypertension     Hypokalemia     Hypokalemia 6/7/2016    Mitral valve regurgitation 6/7/2016    Morbid obesity ([facility]     Myasthenia gravis ([facility]     Myasthenia gravis ([facility] 6/17/15    Nocturia     Osteoporosis     PUD (peptic ulcer disease) 25 yrs ago    S/P coronary artery stent placement 5/8/2015    3.0x38 mm Xience CAROL to pLAD 5/7/15     Sleep apnea     Syncope and collapse     Unspecified sleep apnea     no cpap      Past Surgical History:   Procedure Laterality Date    APPENDECTOMY      CARDIAC CATHETERIZATION  05/21/2019    watchman device    CARDIAC CATHETERIZATION  05/27/2015    stent    COLONOSCOPY  2007    COLONOSCOPY N/A 8/6/2022    COLONOSCOPY POLYPECTOMY SNARE/COLD BIOPSY performed by Dm Angela MD at [facility] ENDOSCOPY    ERCP  3/30/2022         Ashlyn Mckeon  2018    Jesus Kraus/Giana for sleep apnea and reconstruction for extending jaw    UPPER GASTROINTESTINAL ENDOSCOPY N/A 8/5/2022    EGD ESOPHAGOGASTRODUODENOSCOPY Rm 525 performed by Linette Hassan MD at [address] Right 07/10/2019     Repair of right radial artery pseudoaneurysm      Medications Prior to Admission: potassium chloride (KLOR-CON M) 20 MEQ extended release tablet, Take 1 tablet by mouth daily  aspirin 81 MG EC tablet, Take 81 mg by mouth daily  atorvastatin (LIPITOR) 80 MG tablet, Take 80 mg by mouth  cyanocobalamin 1000 MCG tablet, Take 1,000 mcg by mouth daily (Patient not taking: No sig reported)  escitalopram (LEXAPRO) 10 MG tablet, Take 10 mg by mouth daily (Patient not taking: No sig reported)  furosemide (LASIX) 40 MG tablet, Take 40 mg by mouth daily  nitroGLYCERIN (NITROSTAT) 0.4 MG SL tablet, Place 1 sl under the tongue q 5 min prn cp, max 3 sl in a 15-min time period. Call 911 if no relief after the 3rd sl.  pantoprazole (PROTONIX) 40 MG tablet, Take 40 mg by mouth every morning (before breakfast) (Patient not taking: No sig reported)  rOPINIRole (REQUIP) 0.5 MG tablet, 1 nightly, increase to 1 twice a day if needed  tamsulosin (FLOMAX) 0.4 MG capsule, Take 0.4 mg by mouth daily  No Known Allergies   Social History     Tobacco Use    Smoking status: Never    Smokeless tobacco: Never   Substance Use Topics    Alcohol use: No      Family History   Problem Relation Age of Onset    No Known Problems Brother     Cancer Brother         brain tumor    No Known Problems Sister     Cancer Mother         kidney    Cancer Father         stomach      Review of Systems  Pertinent items are noted in HPI. Objective:     Patient Vitals for the past 8 hrs:   BP Temp Temp src Pulse Resp SpO2   08/12/22 1130 (!) 137/58 97.5 °F (36.4 °C) Oral 75 17 98 %   08/12/22 0941 -- -- -- -- 18 --   08/12/22 0911 -- -- -- -- 17 --   08/12/22 0800 131/75 97.7 °F (36.5 °C) Oral 74 18 94 %     I/O last 3 completed shifts:   In: 8292 [P.O.:460; I.V.:1293]  Out: 36 [Urine:977; Emesis/NG output:1]  No intake/output data recorded. General: well appearing, no acute distress, alert and oriented  Eyes: PERRLA, sclerae white  ENT: External inspection of ears and nose normal, oropharynx normal  Respiratory: normal chest wall expansion, lungs CTA bilaterally  Cardiovascular: RRR, no m, femoral pulses 2+ bilaterally; extremities without edema    Abdomen: mild distension, surgical dsg CDI. TTP as expected. BS+    Heme/Lymph: without cervical or inguinal adenopathy  Musculoskeletal: gait normal, digits without clubbing or cyanosis  Integumentary: warm, dry, and pink, with no rash, purpura, or petechia  Neurological: Cranial Nerves II-XII grossly intact, normal sensation and muscle strength bilaterally      Data ReviewCBC:   Lab Results   Component Value Date/Time    WBC 13.9 08/12/2022 12:23 PM    RBC 4.07 08/12/2022 12:23 PM     BMP:   Lab Results   Component Value Date/Time    GLUCOSE 119 08/11/2022 07:56 AM    CO2 26 08/11/2022 07:56 AM    BUN 7 08/11/2022 07:56 AM    CREATININE 0.80 08/11/2022 07:56 AM    CALCIUM 8.7 08/11/2022 07:56 AM     Radiology review:   CT CHEST, ABDOMEN AND PELVIS WITH INTRAVENOUS CONTRAST DATED 8/6/2022. History: Cecal mass concerning for colon cancer. Comparison: CT the cardiac over read 4/5/2019, and CT abdomen and pelvis with   contrast 3/27/2022        Technique:   Multiple contiguous helical CT images reconstructed at 5 mm were   obtained from the base of the neck to the ischial tuberosities following oral   and 100 cc Isovue-370 without acute complication. All CT scans performed at   this facility use one or all of the following: Automated exposure control,   adjustment of the mA and/or kVp according to patient's size, iterative   reconstruction. Findings:   CT Chest:   The base of the neck is unremarkable in appearance. No axillary, mediastinal,   or hilar lymphadenopathy is seen. The thoracic aorta is normal in caliber.   The   heart appears moderately enlarged. Evaluation with lung windows demonstrates no suspicious pulmonary lesion. Small   nodules are seen in the bilateral lungs measuring up to 5 mm in size. However,   these are unchanged in size and number when compared to a prior CT Overread   dated 4/5/2019. The lack of significant change over multiple years favors   benign nodules. Mild dependent atelectasis is seen. No pleural effusion is   seen. Lungs are expanded without evidence for pneumothorax. No aggressive   appearing osseous lesion is seen. CT ABDOMEN:     The Liver small scattered low-attenuation left lobe hepatic lesions measuring up   to 9 mm in size. Although incompletely characterized, these are also included   in the field of imaging on the prior CT over read dated 4/5/2019 and are   unchanged in size and number. The appearance suggests benign lesions such as   hepatic cysts. An additional 1.6 cm lesion is seen in the more inferior right   lobe on image 68. This is unchanged when compared to the more recent CT scan of   the abdomen dated 3/27/2022 and does not demonstrate significant enhancement   suggesting an additional benign hepatic cyst.  The spleen is homogeneous in   attenuation. No contour deforming or enhancing mass lesions are seen of the   pancreas or adrenal glands. The gallbladder has been removed. The kidneys   enhance symmetrically and no evidence of hydronephrosis is seen. Chronic renal   cortical scarring is seen most evident in the upper pole cortex of the left   kidney       The visualized loops of small bowel and colon are normal in caliber. The   ileocecal valve is seen on axial image 98 demonstrating normal fat attenuation. Just proximal to this along the posterior wall of the cecum is an abnormal wall   lesion measuring 3.5 cm x 2.4 cm in size concerning for a colon neoplasm likely   representing the patient's known cecal mass. No obstruction is suggested by   this at this time.   No free fluid and no free air is seen in the abdomen. No   adenopathy is seen of the abdomen. The abdominal aorta demonstrates mild to   moderate atherosclerotic changes. No aggressive appearing osseous lesion is   seen. CT PELVIS:   No abnormal pelvic fluid collections are present. No pelvic adenopathy is seen. The urinary bladder is unremarkable. No aggressive appearing osseous lesion is   seen. Impression   1.  3.5 cm x 2.4 cm cecal mass. No clear evidence for metastatic disease is   evident by CT imaging. Pulmonary nodules, and hepatic lesions are seen which   are favored to be benign as described above. Assessment:     Principal Problem:    Acute blood loss anemia  Active Problems:    GIB (gastrointestinal bleeding)    Cecum mass    Colon cancer (HCC)    Paroxysmal atrial fibrillation (HCC)    HTN (hypertension)    Myasthenia gravis (HCC)    SSS (sick sinus syndrome) (HCC)    GERD (gastroesophageal reflux disease)    Diastolic CHF, chronic (HCC)    Dyslipidemia  Resolved Problems:    * No resolved hospital problems.  *    8/10/22 S/P BOWEL RESECTION HEMICOLECTOMY LAPAROSCOPIC ROBOTIC, Right Bulmaro  Plan:   > Care Management per Hospitalist  > Rush out 8/11  >OOB for all meals  >Sips of Clear liquids until distention resolves  >Follow electrolytes and replace prn  >follow up pathology  >pain control  >PO/OT      Leela Acevedo NP  8/12/2022 2:20 PM

## 2022-08-13 PROBLEM — C18.0 ADENOCARCINOMA OF CECUM (HCC): Status: ACTIVE | Noted: 2022-08-13

## 2022-08-13 LAB
ERYTHROCYTE [DISTWIDTH] IN BLOOD BY AUTOMATED COUNT: 21.8 % (ref 11.9–14.6)
HCT VFR BLD AUTO: 27.6 % (ref 41.1–50.3)
HGB BLD-MCNC: 7.2 G/DL (ref 13.6–17.2)
MCH RBC QN AUTO: 18.3 PG (ref 26.1–32.9)
MCHC RBC AUTO-ENTMCNC: 26.1 G/DL (ref 31.4–35)
MCV RBC AUTO: 70.1 FL (ref 79.6–97.8)
NRBC # BLD: 0 K/UL (ref 0–0.2)
PLATELET # BLD AUTO: 213 K/UL (ref 150–450)
PMV BLD AUTO: 9 FL (ref 9.4–12.3)
RBC # BLD AUTO: 3.94 M/UL (ref 4.23–5.6)
WBC # BLD AUTO: 11.8 K/UL (ref 4.3–11.1)

## 2022-08-13 PROCEDURE — 1100000000 HC RM PRIVATE

## 2022-08-13 PROCEDURE — 6370000000 HC RX 637 (ALT 250 FOR IP): Performed by: SURGERY

## 2022-08-13 PROCEDURE — 6370000000 HC RX 637 (ALT 250 FOR IP): Performed by: INTERNAL MEDICINE

## 2022-08-13 PROCEDURE — 2580000003 HC RX 258: Performed by: HOSPITALIST

## 2022-08-13 PROCEDURE — 6370000000 HC RX 637 (ALT 250 FOR IP): Performed by: NURSE PRACTITIONER

## 2022-08-13 PROCEDURE — 36415 COLL VENOUS BLD VENIPUNCTURE: CPT

## 2022-08-13 PROCEDURE — 2580000003 HC RX 258: Performed by: INTERNAL MEDICINE

## 2022-08-13 PROCEDURE — 85027 COMPLETE CBC AUTOMATED: CPT

## 2022-08-13 RX ADMIN — ATORVASTATIN CALCIUM 80 MG: 80 TABLET, FILM COATED ORAL at 20:44

## 2022-08-13 RX ADMIN — SODIUM CHLORIDE, PRESERVATIVE FREE 10 ML: 5 INJECTION INTRAVENOUS at 20:45

## 2022-08-13 RX ADMIN — OXYCODONE AND ACETAMINOPHEN 1 TABLET: 7.5; 325 TABLET ORAL at 21:02

## 2022-08-13 RX ADMIN — PANTOPRAZOLE SODIUM 40 MG: 40 TABLET, DELAYED RELEASE ORAL at 06:34

## 2022-08-13 RX ADMIN — ROPINIROLE HYDROCHLORIDE 0.5 MG: 0.5 TABLET, FILM COATED ORAL at 20:44

## 2022-08-13 RX ADMIN — SODIUM CHLORIDE, PRESERVATIVE FREE 10 ML: 5 INJECTION INTRAVENOUS at 09:40

## 2022-08-13 RX ADMIN — DOCUSATE SODIUM 100 MG: 100 CAPSULE, LIQUID FILLED ORAL at 20:44

## 2022-08-13 RX ADMIN — DOCUSATE SODIUM 100 MG: 100 CAPSULE, LIQUID FILLED ORAL at 09:34

## 2022-08-13 RX ADMIN — AMOXICILLIN 500 MG: 500 CAPSULE ORAL at 20:44

## 2022-08-13 RX ADMIN — AMOXICILLIN 500 MG: 500 CAPSULE ORAL at 14:30

## 2022-08-13 RX ADMIN — OXYCODONE AND ACETAMINOPHEN 1 TABLET: 7.5; 325 TABLET ORAL at 00:52

## 2022-08-13 RX ADMIN — AMOXICILLIN 500 MG: 500 CAPSULE ORAL at 06:34

## 2022-08-13 ASSESSMENT — PAIN SCALES - GENERAL
PAINLEVEL_OUTOF10: 9
PAINLEVEL_OUTOF10: 8

## 2022-08-13 ASSESSMENT — PAIN DESCRIPTION - FREQUENCY: FREQUENCY: CONTINUOUS

## 2022-08-13 ASSESSMENT — PAIN DESCRIPTION - ORIENTATION
ORIENTATION: LOWER
ORIENTATION: MID;LOWER

## 2022-08-13 ASSESSMENT — PAIN - FUNCTIONAL ASSESSMENT
PAIN_FUNCTIONAL_ASSESSMENT: ACTIVITIES ARE NOT PREVENTED
PAIN_FUNCTIONAL_ASSESSMENT: ACTIVITIES ARE NOT PREVENTED

## 2022-08-13 ASSESSMENT — PAIN SCALES - WONG BAKER: WONGBAKER_NUMERICALRESPONSE: 2

## 2022-08-13 ASSESSMENT — PAIN DESCRIPTION - DESCRIPTORS
DESCRIPTORS: SHARP
DESCRIPTORS: SHARP

## 2022-08-13 ASSESSMENT — PAIN DESCRIPTION - LOCATION
LOCATION: ABDOMEN
LOCATION: ABDOMEN

## 2022-08-13 ASSESSMENT — PAIN DESCRIPTION - PAIN TYPE: TYPE: ACUTE PAIN

## 2022-08-13 ASSESSMENT — PAIN DESCRIPTION - ONSET: ONSET: ON-GOING

## 2022-08-13 NOTE — PROGRESS NOTES
(Principal) Acute blood loss anemia 8/4/2022 Yes    GIB (gastrointestinal bleeding) 8/4/2022 Yes    Cecum mass 8/7/2022 Yes    Colon cancer ([facility] 8/11/2022 Yes    Overview Signed 8/11/2022  7:46 AM by NAOMY Hutton - CNP     8/10/22 Jorge Choi MD) BOWEL RESECTION HEMICOLECTOMY LAPAROSCOPIC ROBOTIC, Right Bulmaro         Adenocarcinoma of cecum ([facility] 8/13/2022 Yes    Paroxysmal atrial fibrillation (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 HTN (hypertension) 8/4/2022 Yes    Myasthenia gravis ([facility] 8/7/2022 Yes    SSS (sick sinus syndrome) ([facility] 8/9/2022 Yes    GERD (gastroesophageal reflux disease) 5/2/1776 Yes    Diastolic CHF, chronic ([facility] 8/4/2022 Yes    Dyslipidemia 8/4/2022 Yes       Objective:   Patient Vitals for the past 24 hrs:   Temp Pulse Resp BP SpO2   08/13/22 1049 97.6 °F (36.4 °C) 75 20 134/67 98 %   08/13/22 0723 97.7 °F (36.5 °C) 74 20 (!) 145/70 94 %   08/13/22 0306 97.5 °F (36.4 °C) 78 19 (!) 142/65 93 %   08/13/22 0122 -- -- 18 -- --   08/13/22 0052 -- -- 16 -- --   08/13/22 0008 97.5 °F (36.4 °C) 70 18 115/61 97 %   08/12/22 2257 -- -- 19 -- --   08/12/22 2057 -- -- 16 -- --   08/12/22 2019 98.7 °F (37.1 °C) 76 19 128/87 98 %   08/12/22 1530 98 °F (36.7 °C) 75 18 (!) 150/71 97 %   08/12/22 1130 97.5 °F (36.4 °C) 75 17 (!) 137/58 98 %       Estimated body mass index is 31.41 kg/m² as calculated from the following:    Height as of this encounter: 5' 8\" (1.727 m). Weight as of this encounter: 206 lb 9.6 oz (93.7 kg). Intake/Output Summary (Last 24 hours) at 8/13/2022 1125  Last data filed at 8/13/2022 0848  Gross per 24 hour   Intake 360 ml   Output --   Net 360 ml         Physical Exam:   Blood pressure 134/67, pulse 75, temperature 97.6 °F (36.4 °C), temperature source Oral, resp. rate 20, height 5' 8\" (1.727 m), weight 206 lb 9.6 oz (93.7 kg), SpO2 98 %.   General: Well nourished. No overt distress. Obese. Hard of hearing. Head:  Normocephalic, atraumatic. Eyes:  Sclerae appear normal. Pupils equally round. ENT:  Nares appear normal, no drainage. Moist oral mucosa  Neck:  No restricted ROM. Trachea midline. CV:   RRR. No m/r/g. No jugular venous distension. Lungs:   CTAB. No wheezing, rhonchi, or rales. Respirations even, unlabored. Abdomen: Bowel sounds present. Minimally tender, + distended more so above umbilicus, soft below. Lap incisions healing well. Extremities: No cyanosis or clubbing. Trace edema. Skin:     No rashes and normal coloration. Warm and dry. Neuro:  CN II-XII grossly intact. Sensation intact. A&Ox3  Psych:  Normal mood and affect.       I have reviewed ordered lab tests and independently visualized imaging below:    Recent Labs:  Recent Results (from the past 48 hour(s))   CBC with Auto Differential    Collection Time: 08/12/22 12:23 PM   Result Value Ref Range    WBC 13.9 (H) 4.3 - 11.1 K/uL    RBC 4.07 (L) 4.23 - 5.6 M/uL    Hemoglobin 7.8 (L) 13.6 - 17.2 g/dL    Hematocrit 28.8 (L) 41.1 - 50.3 %    MCV 70.8 (L) 79.6 - 97.8 FL    MCH 19.2 (L) 26.1 - 32.9 PG    MCHC 27.1 (L) 31.4 - 35.0 g/dL    RDW 22.0 (H) 11.9 - 14.6 %    Platelets 127 182 - 355 K/uL    MPV 9.1 (L) 9.4 - 12.3 FL    nRBC 0.00 0.0 - 0.2 K/uL    Differential Type AUTOMATED      Seg Neutrophils 66 43 - 78 %    Lymphocytes 19 13 - 44 %    Monocytes 8 4.0 - 12.0 %    Eosinophils % 7 0.5 - 7.8 %    Basophils 1 0.0 - 2.0 %    Immature Granulocytes 0 0.0 - 5.0 %    Segs Absolute 9.2 (H) 1.7 - 8.2 K/UL    Absolute Lymph # 2.6 0.5 - 4.6 K/UL    Absolute Mono # 1.1 0.1 - 1.3 K/UL    Absolute Eos # 0.9 (H) 0.0 - 0.8 K/UL    Basophils Absolute 0.1 0.0 - 0.2 K/UL    Absolute Immature Granulocyte 0.1 0.0 - 0.5 K/UL   CBC    Collection Time: 08/13/22  6:36 AM   Result Value Ref Range    WBC 11.8 (H) 4.3 - 11.1 K/uL    RBC 3.94 (L) 4.23 - 5.6 M/uL    Hemoglobin 7.2 (L) 13.6 - 17.2 g/dL Hematocrit 27.6 (L) 41.1 - 50.3 %    MCV 70.1 (L) 79.6 - 97.8 FL    MCH 18.3 (L) 26.1 - 32.9 PG    MCHC 26.1 (L) 31.4 - 35.0 g/dL    RDW 21.8 (H) 11.9 - 14.6 %    Platelets 309 006 - 139 K/uL    MPV 9.0 (L) 9.4 - 12.3 FL    nRBC 0.00 0.0 - 0.2 K/uL         Other Studies:  No results found.     Current Meds:  Current Facility-Administered Medications   Medication Dose Route Frequency    oxyCODONE-acetaminophen (PERCOCET) 7.5-325 MG per tablet 1 tablet  1 tablet Oral Q4H PRN    amoxicillin (AMOXIL) capsule 500 mg  500 mg Oral 3 times per day    pantoprazole (PROTONIX) tablet 40 mg  40 mg Oral QAM AC    docusate sodium (COLACE) capsule 100 mg  100 mg Oral BID    HYDROmorphone HCl PF (DILAUDID) injection 0.5 mg  0.5 mg IntraVENous Q6H PRN    0.9 % sodium chloride infusion   IntraVENous PRN    magnesium sulfate 2000 mg in 50 mL IVPB premix  2,000 mg IntraVENous PRN    potassium chloride (KLOR-CON M) extended release tablet 40 mEq  40 mEq Oral PRN    Or    potassium bicarb-citric acid (EFFER-K) effervescent tablet 40 mEq  40 mEq Oral PRN    Or    potassium chloride 10 mEq/100 mL IVPB (Peripheral Line)  10 mEq IntraVENous PRN    lidocaine 1 % injection 5 mL  5 mL IntraDERmal Once    sodium chloride flush 0.9 % injection 5-40 mL  5-40 mL IntraVENous 2 times per day    sodium chloride flush 0.9 % injection 5-40 mL  5-40 mL IntraVENous PRN    0.9 % sodium chloride infusion   IntraVENous PRN    heparin flush 100 UNIT/ML injection 100 Units  1 mL IntraCATHeter PRN    aspirin EC tablet 81 mg  81 mg Oral Daily    atorvastatin (LIPITOR) tablet 80 mg  80 mg Oral Nightly    [Held by provider] furosemide (LASIX) tablet 40 mg  40 mg Oral Daily    rOPINIRole (REQUIP) tablet 0.5 mg  0.5 mg Oral Nightly    diatrizoate meglumine-sodium (GASTROGRAFIN) 66-10 % solution 15 mL  15 mL Oral ONCE PRN    sodium chloride flush 0.9 % injection 5-40 mL  5-40 mL IntraVENous 2 times per day    sodium chloride flush 0.9 % injection 5-40 mL  5-40 mL IntraVENous PRN    0.9 % sodium chloride infusion   IntraVENous PRN    ondansetron (ZOFRAN-ODT) disintegrating tablet 4 mg  4 mg Oral Q8H PRN    Or    ondansetron (ZOFRAN) injection 4 mg  4 mg IntraVENous Q6H PRN    acetaminophen (TYLENOL) tablet 650 mg  650 mg Oral Q6H PRN    Or    acetaminophen (TYLENOL) suppository 650 mg  650 mg Rectal Q6H PRN    albuterol (PROVENTIL) nebulizer solution 2.5 mg  2.5 mg Nebulization Q6H PRN    temazepam (RESTORIL) capsule 15 mg  15 mg Oral Nightly PRN       Signed:  Wing Marlene MD    Part of this note may have been written by using a voice dictation software. The note has been proof read but may still contain some grammatical/other typographical errors.

## 2022-08-13 NOTE — PROGRESS NOTES
11/29/2017    GERD (gastroesophageal reflux disease) 10/27/2017    Hypotension 10/26/2017    Sepsis ([facility] 10/26/2017    Syncope 10/24/2017    Paroxysmal atrial fibrillation ([address] 06/30/2017    SSS (sick sinus syndrome) ([facility] [address] 06/30/2017    Bilateral carotid artery disease ([facility] [address] 06/30/2017    Myasthenia gravis ([facility] [address]     Mitral valve regurgitation 06/07/2016    Hypokalemia 06/07/2016    Dyslipidemia 06/07/2016    HTN (hypertension) 05/08/2015    Dyspnea 05/08/2015    S/P coronary artery stent placement 05/08/2015    Coronary atherosclerosis of native coronary vessel 05/08/2015     Past Medical History:   Diagnosis Date    Abnormal EKG 4/22/15    Arrhythmia     Aspiration pneumonia ([facility] 10/26/2017    CAD (coronary artery disease) 5/8/2015    Carotid artery stenosis without cerebral infarction 6/7/2016    US 6/15: <50% bilat ICAs    Coronary atherosclerosis of native coronary vessel 5/8/2015    GERMAIN on brilinta 5/7/15: prox LAD PCI, normal EF     Diastolic CHF, chronic ([facility] 3/2/2022    Dyslipidemia 6/7/2016    Dyspnea 5/8/2015    Echo 6/15: EF 60%, mod MR, mod LVH, mild AI     ED (erectile dysfunction)     GERD (gastroesophageal reflux disease)     GERD (gastroesophageal reflux disease)     no medication    HTN (hypertension) 5/8/2015    Hypertension     Hypokalemia     Hypokalemia 6/7/2016    Mitral valve regurgitation 6/7/2016    Morbid obesity ([facility] [address]     Myasthenia gravis ([facility] [address]     Myasthenia gravis ([facility] [address] 6/17/15    Nocturia     Osteoporosis     PUD (peptic ulcer disease) 25 yrs ago    S/P coronary artery stent placement 5/8/2015    3.0x38 mm Xience CAROL to pLAD 5/7/15     Sleep apnea     Syncope and collapse     Unspecified sleep apnea     no cpap      Past Surgical History:   Procedure Laterality Date    APPENDECTOMY      CARDIAC CATHETERIZATION  05/21/2019    watchman device    CARDIAC CATHETERIZATION  05/27/2015    stent    COLONOSCOPY  2007    COLONOSCOPY N/A 8/6/2022    COLONOSCOPY POLYPECTOMY SNARE/COLD BIOPSY performed by Amanda Whitlock MD at [facility] ENDOSCOPY    ERCP  3/30/2022         HEMICOLECTOMY Right 8/10/2022    BOWEL RESECTION HEMICOLECTOMY LAPAROSCOPIC ROBOTIC, Right Bulmaro performed by Heriberto Storm MD at [facility]  2018    Gerry Kraus/Giana for sleep apnea and reconstruction for extending jaw    UPPER GASTROINTESTINAL ENDOSCOPY N/A 8/5/2022    EGD ESOPHAGOGASTRODUODENOSCOPY Rm 525 performed by Linette Hassan MD at [address] Right 07/10/2019     Repair of right radial artery pseudoaneurysm      Medications Prior to Admission: potassium chloride (KLOR-CON M) 20 MEQ extended release tablet, Take 1 tablet by mouth daily  aspirin 81 MG EC tablet, Take 81 mg by mouth daily  atorvastatin (LIPITOR) 80 MG tablet, Take 80 mg by mouth  cyanocobalamin 1000 MCG tablet, Take 1,000 mcg by mouth daily (Patient not taking: No sig reported)  escitalopram (LEXAPRO) 10 MG tablet, Take 10 mg by mouth daily (Patient not taking: No sig reported)  furosemide (LASIX) 40 MG tablet, Take 40 mg by mouth daily  nitroGLYCERIN (NITROSTAT) 0.4 MG SL tablet, Place 1 sl under the tongue q 5 min prn cp, max 3 sl in a 15-min time period.  Call 911 if no relief after the 3rd sl.  pantoprazole (PROTONIX) 40 MG tablet, Take 40 mg by mouth every morning (before breakfast) (Patient not taking: No sig reported)  rOPINIRole (REQUIP) 0.5 MG tablet, 1 nightly, increase to 1 twice a day if needed  tamsulosin (FLOMAX) 0.4 MG capsule, Take 0.4 mg by mouth daily  No Known Allergies   Social History     Tobacco Use    Smoking status: Never    Smokeless tobacco: Never   Substance Use Topics    Alcohol use: No      Family History   Problem Relation Age of Onset    No Known Problems Brother     Cancer Brother         brain tumor    No Known Problems Sister     Cancer Mother         kidney    Cancer Father         stomach      Review of Systems  Pertinent Automated exposure control,   adjustment of the mA and/or kVp according to patient's size, iterative   reconstruction. Findings:   CT Chest:   The base of the neck is unremarkable in appearance. No axillary, mediastinal,   or hilar lymphadenopathy is seen. The thoracic aorta is normal in caliber. The   heart appears moderately enlarged. Evaluation with lung windows demonstrates no suspicious pulmonary lesion. Small   nodules are seen in the bilateral lungs measuring up to 5 mm in size. However,   these are unchanged in size and number when compared to a prior CT Overread   dated 4/5/2019. The lack of significant change over multiple years favors   benign nodules. Mild dependent atelectasis is seen. No pleural effusion is   seen. Lungs are expanded without evidence for pneumothorax. No aggressive   appearing osseous lesion is seen. CT ABDOMEN:     The Liver small scattered low-attenuation left lobe hepatic lesions measuring up   to 9 mm in size. Although incompletely characterized, these are also included   in the field of imaging on the prior CT over read dated 4/5/2019 and are   unchanged in size and number. The appearance suggests benign lesions such as   hepatic cysts. An additional 1.6 cm lesion is seen in the more inferior right   lobe on image 68. This is unchanged when compared to the more recent CT scan of   the abdomen dated 3/27/2022 and does not demonstrate significant enhancement   suggesting an additional benign hepatic cyst.  The spleen is homogeneous in   attenuation. No contour deforming or enhancing mass lesions are seen of the   pancreas or adrenal glands. The gallbladder has been removed. The kidneys   enhance symmetrically and no evidence of hydronephrosis is seen. Chronic renal   cortical scarring is seen most evident in the upper pole cortex of the left   kidney       The visualized loops of small bowel and colon are normal in caliber.   The   ileocecal valve is seen on axial image 98 demonstrating normal fat attenuation. Just proximal to this along the posterior wall of the cecum is an abnormal wall   lesion measuring 3.5 cm x 2.4 cm in size concerning for a colon neoplasm likely   representing the patient's known cecal mass. No obstruction is suggested by   this at this time. No free fluid and no free air is seen in the abdomen. No   adenopathy is seen of the abdomen. The abdominal aorta demonstrates mild to   moderate atherosclerotic changes. No aggressive appearing osseous lesion is   seen. CT PELVIS:   No abnormal pelvic fluid collections are present. No pelvic adenopathy is seen. The urinary bladder is unremarkable. No aggressive appearing osseous lesion is   seen. Impression   1.  3.5 cm x 2.4 cm cecal mass. No clear evidence for metastatic disease is   evident by CT imaging. Pulmonary nodules, and hepatic lesions are seen which   are favored to be benign as described above. Assessment:     Principal Problem:    Acute blood loss anemia  Active Problems:    GIB (gastrointestinal bleeding)    Cecum mass    Colon cancer (HCC)    Paroxysmal atrial fibrillation (HCC)    HTN (hypertension)    Myasthenia gravis (HCC)    SSS (sick sinus syndrome) (HCC)    GERD (gastroesophageal reflux disease)    Diastolic CHF, chronic (HCC)    Dyslipidemia  Resolved Problems:    * No resolved hospital problems.  *    8/10/22 S/P BOWEL RESECTION HEMICOLECTOMY LAPAROSCOPIC ROBOTIC, Right Bulmaro  Plan:   > Care Management per Hospitalist  > Rush out 8/11  >OOB for all meals  >CLD - per hospitalist  >Follow electrolytes and replace prn  >follow up pathology  >pain control  >PO/OT      Jason Rowe NP  8/13/2022 10:58 AM

## 2022-08-13 NOTE — PROGRESS NOTES
Report received from [facility] at bedside. I agree with his initial assessment. Pt resting quietly in bed with eyes closed. No complaints or interventions required at this time.

## 2022-08-14 ENCOUNTER — APPOINTMENT (OUTPATIENT)
Dept: GENERAL RADIOLOGY | Age: 82
DRG: 330 | End: 2022-08-14
Payer: MEDICARE

## 2022-08-14 PROBLEM — K91.89 ILEUS FOLLOWING GASTROINTESTINAL SURGERY (HCC): Status: ACTIVE | Noted: 2022-08-14

## 2022-08-14 PROBLEM — K56.7 ILEUS FOLLOWING GASTROINTESTINAL SURGERY (HCC): Status: ACTIVE | Noted: 2022-08-14

## 2022-08-14 LAB
ABO + RH BLD: NORMAL
BLD PROD TYP BPU: NORMAL
BLOOD BANK DISPENSE STATUS: NORMAL
BLOOD GROUP ANTIBODIES SERPL: NORMAL
BPU ID: NORMAL
CROSSMATCH RESULT: NORMAL
ERYTHROCYTE [DISTWIDTH] IN BLOOD BY AUTOMATED COUNT: 21.9 % (ref 11.9–14.6)
HCT VFR BLD AUTO: 24.4 % (ref 41.1–50.3)
HGB BLD-MCNC: 6.7 G/DL (ref 13.6–17.2)
HISTORY CHECK: NORMAL
MCH RBC QN AUTO: 19.2 PG (ref 26.1–32.9)
MCHC RBC AUTO-ENTMCNC: 27.5 G/DL (ref 31.4–35)
MCV RBC AUTO: 69.9 FL (ref 79.6–97.8)
NRBC # BLD: 0 K/UL (ref 0–0.2)
PLATELET # BLD AUTO: 204 K/UL (ref 150–450)
PMV BLD AUTO: 9.6 FL (ref 9.4–12.3)
RBC # BLD AUTO: 3.49 M/UL (ref 4.23–5.6)
SPECIMEN EXP DATE BLD: NORMAL
UNIT DIVISION: 0
WBC # BLD AUTO: 9.8 K/UL (ref 4.3–11.1)

## 2022-08-14 PROCEDURE — 6370000000 HC RX 637 (ALT 250 FOR IP): Performed by: SURGERY

## 2022-08-14 PROCEDURE — 86923 COMPATIBILITY TEST ELECTRIC: CPT

## 2022-08-14 PROCEDURE — 6370000000 HC RX 637 (ALT 250 FOR IP): Performed by: INTERNAL MEDICINE

## 2022-08-14 PROCEDURE — 74018 RADEX ABDOMEN 1 VIEW: CPT

## 2022-08-14 PROCEDURE — 36430 TRANSFUSION BLD/BLD COMPNT: CPT

## 2022-08-14 PROCEDURE — P9040 RBC LEUKOREDUCED IRRADIATED: HCPCS

## 2022-08-14 PROCEDURE — 86901 BLOOD TYPING SEROLOGIC RH(D): CPT

## 2022-08-14 PROCEDURE — 1100000000 HC RM PRIVATE

## 2022-08-14 PROCEDURE — 36415 COLL VENOUS BLD VENIPUNCTURE: CPT

## 2022-08-14 PROCEDURE — 2580000003 HC RX 258: Performed by: HOSPITALIST

## 2022-08-14 PROCEDURE — 85027 COMPLETE CBC AUTOMATED: CPT

## 2022-08-14 PROCEDURE — 2580000003 HC RX 258: Performed by: INTERNAL MEDICINE

## 2022-08-14 PROCEDURE — P9016 RBC LEUKOCYTES REDUCED: HCPCS

## 2022-08-14 PROCEDURE — 6370000000 HC RX 637 (ALT 250 FOR IP): Performed by: NURSE PRACTITIONER

## 2022-08-14 RX ORDER — SODIUM CHLORIDE 9 MG/ML
INJECTION, SOLUTION INTRAVENOUS PRN
Status: DISCONTINUED | OUTPATIENT
Start: 2022-08-14 | End: 2022-08-19 | Stop reason: HOSPADM

## 2022-08-14 RX ADMIN — SODIUM CHLORIDE, PRESERVATIVE FREE 10 ML: 5 INJECTION INTRAVENOUS at 11:02

## 2022-08-14 RX ADMIN — SODIUM CHLORIDE, PRESERVATIVE FREE 10 ML: 5 INJECTION INTRAVENOUS at 21:38

## 2022-08-14 RX ADMIN — SODIUM CHLORIDE, PRESERVATIVE FREE 10 ML: 5 INJECTION INTRAVENOUS at 10:55

## 2022-08-14 RX ADMIN — AMOXICILLIN 500 MG: 500 CAPSULE ORAL at 06:32

## 2022-08-14 RX ADMIN — ROPINIROLE HYDROCHLORIDE 0.5 MG: 0.5 TABLET, FILM COATED ORAL at 21:37

## 2022-08-14 RX ADMIN — OXYCODONE AND ACETAMINOPHEN 1 TABLET: 7.5; 325 TABLET ORAL at 19:08

## 2022-08-14 RX ADMIN — AMOXICILLIN 500 MG: 500 CAPSULE ORAL at 21:37

## 2022-08-14 RX ADMIN — AMOXICILLIN 500 MG: 500 CAPSULE ORAL at 13:16

## 2022-08-14 RX ADMIN — PANTOPRAZOLE SODIUM 40 MG: 40 TABLET, DELAYED RELEASE ORAL at 06:32

## 2022-08-14 RX ADMIN — ATORVASTATIN CALCIUM 80 MG: 80 TABLET, FILM COATED ORAL at 21:37

## 2022-08-14 RX ADMIN — DOCUSATE SODIUM 100 MG: 100 CAPSULE, LIQUID FILLED ORAL at 07:54

## 2022-08-14 RX ADMIN — DOCUSATE SODIUM 100 MG: 100 CAPSULE, LIQUID FILLED ORAL at 21:37

## 2022-08-14 ASSESSMENT — PAIN SCALES - GENERAL: PAINLEVEL_OUTOF10: 0

## 2022-08-14 NOTE — PROGRESS NOTES
Hospitalist Progress Note   Admit Date:  2022  4:05 PM   Name:  Isadora Vidal   Age:  79 y.o. Sex:  male  :  1940   MRN:  [id number]   Room:  [facility]    Reason(s) for Admission: Acute blood loss anemia [D62]  Anemia, unspecified type [D64.9]     Hospital Course & Interval History:   Mr. Bernadette Medina is a very nice 78 y/o WM with a h/o atrial fib s/p Watchman, SSS with PPM, HTN, GERD, HLD, CAD, HFpEF who was admitted to our service on  with ABLA from GI source. Initial Hb 6.5. GI consulted. EGD on  showed 2 small erosions in proximal stomach, otherwise unremarkable. Colonoscopy on  showed a cecal mass and 2 sessile polyps (hot snare removal). General Surgery consulted and he underwent laparoscopic R hemicolectomy on 8/10. Transfused 1U RBCs on . Subjective/24hr Events (22): Still bloated and having some discomfort but does not think it's worse than it has been. No BM or gas yesterday. Tolerating clears. Hb 6.7, transfusion pending. No chest pain, N/V. 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 - S/p R hemicolectomy on 8/10, mass pathology is adenocarcinoma. - KUB  with ileus              - Diet/pain control per Surgery     # Acute uncomplicated cystitis 2/2 enterococcus              - Amoxicillin, EOT 8/15. Blood cxs neg. # HypoK              - Resolved. # Chronic dCHF // HFpEF              - Compensated. # CAD              - ASA resumed for today  but never given, now holding again with low Hb. # GERD              - PO PPI     # PAD // CAD // HLD              - ASA as above, statin     # HTN              - Well controlled currently. # Myasthenia gravis? - Noted from problem list. No MG meds on PTA list, d/w Pharmacy  and couldn't see any outpatient fill history. Patient denied this history? Discharge Planning: Pending.   Diet: %.  General:    Well nourished. No overt distress. Obese. Head:  Normocephalic, atraumatic  Eyes:  Sclerae appear normal. Pupils equally round. ENT:  Nares appear normal, no drainage. Moist oral mucosa  Neck:  No restricted ROM. Trachea midline. CV:   RRR. No m/r/g. No jugular venous distension. Lungs:   CTAB. No wheezing, rhonchi, or rales. Respirations even, unlabored. Abdomen: Bowel sounds decreased. Softer, mild ttp to lower part, still distended. Lap incisions appear to be healing well. Extremities: No cyanosis or clubbing. No edema. Skin:     No rashes and normal coloration. Warm and dry. Neuro:  CN II-XII grossly intact. Sensation intact. A&Ox3  Psych:  Normal mood and affect.       I have reviewed ordered lab tests and independently visualized imaging below:    Recent Labs:  Recent Results (from the past 48 hour(s))   CBC with Auto Differential    Collection Time: 08/12/22 12:23 PM   Result Value Ref Range    WBC 13.9 (H) 4.3 - 11.1 K/uL    RBC 4.07 (L) 4.23 - 5.6 M/uL    Hemoglobin 7.8 (L) 13.6 - 17.2 g/dL    Hematocrit 28.8 (L) 41.1 - 50.3 %    MCV 70.8 (L) 79.6 - 97.8 FL    MCH 19.2 (L) 26.1 - 32.9 PG    MCHC 27.1 (L) 31.4 - 35.0 g/dL    RDW 22.0 (H) 11.9 - 14.6 %    Platelets 862 944 - 186 K/uL    MPV 9.1 (L) 9.4 - 12.3 FL    nRBC 0.00 0.0 - 0.2 K/uL    Differential Type AUTOMATED      Seg Neutrophils 66 43 - 78 %    Lymphocytes 19 13 - 44 %    Monocytes 8 4.0 - 12.0 %    Eosinophils % 7 0.5 - 7.8 %    Basophils 1 0.0 - 2.0 %    Immature Granulocytes 0 0.0 - 5.0 %    Segs Absolute 9.2 (H) 1.7 - 8.2 K/UL    Absolute Lymph # 2.6 0.5 - 4.6 K/UL    Absolute Mono # 1.1 0.1 - 1.3 K/UL    Absolute Eos # 0.9 (H) 0.0 - 0.8 K/UL    Basophils Absolute 0.1 0.0 - 0.2 K/UL    Absolute Immature Granulocyte 0.1 0.0 - 0.5 K/UL   CBC    Collection Time: 08/13/22  6:36 AM   Result Value Ref Range    WBC 11.8 (H) 4.3 - 11.1 K/uL    RBC 3.94 (L) 4.23 - 5.6 M/uL    Hemoglobin 7.2 (L) 13.6 - 17.2 g/dL Hematocrit 27.6 (L) 41.1 - 50.3 %    MCV 70.1 (L) 79.6 - 97.8 FL    MCH 18.3 (L) 26.1 - 32.9 PG    MCHC 26.1 (L) 31.4 - 35.0 g/dL    RDW 21.8 (H) 11.9 - 14.6 %    Platelets 461 927 - 809 K/uL    MPV 9.0 (L) 9.4 - 12.3 FL    nRBC 0.00 0.0 - 0.2 K/uL   CBC    Collection Time: 08/14/22  6:14 AM   Result Value Ref Range    WBC 9.8 4.3 - 11.1 K/uL    RBC 3.49 (L) 4.23 - 5.6 M/uL    Hemoglobin 6.7 (LL) 13.6 - 17.2 g/dL    Hematocrit 24.4 (L) 41.1 - 50.3 %    MCV 69.9 (L) 79.6 - 97.8 FL    MCH 19.2 (L) 26.1 - 32.9 PG    MCHC 27.5 (L) 31.4 - 35.0 g/dL    RDW 21.9 (H) 11.9 - 14.6 %    Platelets 099 373 - 322 K/uL    MPV 9.6 9.4 - 12.3 FL    nRBC 0.00 0.0 - 0.2 K/uL   PREPARE RBC (CROSSMATCH), 1 Units    Collection Time: 08/14/22  7:30 AM   Result Value Ref Range    History Check Historical check performed    TYPE AND SCREEN    Collection Time: 08/14/22  7:48 AM   Result Value Ref Range    Crossmatch expiration date 08/17/2022,2351     ABO/Rh A POSITIVE     Antibody Screen NEG     Unit Number [id number]     Product Code Blood Bank  LRIR     Unit Divison 00     Dispense Status Blood Bank ALLOCATED     Crossmatch Result Compatible          Other Studies:  XR ABDOMEN (KUB) (SINGLE AP VIEW)    Result Date: 8/14/2022  KUB CLINICAL INDICATION: Status post colectomy with abdominal distention. FINDINGS: Two supine views of the abdomen and pelvis show mildly prominent loops of small bowel in the central upper and left upper abdominal quadrant. Nondilated loops of bowel noted in the right hemiabdomen. Cholecystectomy clips are present. Nonspecific prominent loops of bowel in the central upper and left upper abdominal quadrant. Ileus suspected.       Current Meds:  Current Facility-Administered Medications   Medication Dose Route Frequency    0.9 % sodium chloride infusion   IntraVENous PRN    oxyCODONE-acetaminophen (PERCOCET) 7.5-325 MG per tablet 1 tablet  1 tablet Oral Q4H PRN    amoxicillin (AMOXIL) capsule 500 mg  500 mg Oral 3 times per day    pantoprazole (PROTONIX) tablet 40 mg  40 mg Oral QAM AC    docusate sodium (COLACE) capsule 100 mg  100 mg Oral BID    HYDROmorphone HCl PF (DILAUDID) injection 0.5 mg  0.5 mg IntraVENous Q6H PRN    0.9 % sodium chloride infusion   IntraVENous PRN    magnesium sulfate 2000 mg in 50 mL IVPB premix  2,000 mg IntraVENous PRN    potassium chloride (KLOR-CON M) extended release tablet 40 mEq  40 mEq Oral PRN    Or    potassium bicarb-citric acid (EFFER-K) effervescent tablet 40 mEq  40 mEq Oral PRN    Or    potassium chloride 10 mEq/100 mL IVPB (Peripheral Line)  10 mEq IntraVENous PRN    lidocaine 1 % injection 5 mL  5 mL IntraDERmal Once    sodium chloride flush 0.9 % injection 5-40 mL  5-40 mL IntraVENous 2 times per day    sodium chloride flush 0.9 % injection 5-40 mL  5-40 mL IntraVENous PRN    0.9 % sodium chloride infusion   IntraVENous PRN    heparin flush 100 UNIT/ML injection 100 Units  1 mL IntraCATHeter PRN    [Held by provider] aspirin EC tablet 81 mg  81 mg Oral Daily    atorvastatin (LIPITOR) tablet 80 mg  80 mg Oral Nightly    [Held by provider] furosemide (LASIX) tablet 40 mg  40 mg Oral Daily    rOPINIRole (REQUIP) tablet 0.5 mg  0.5 mg Oral Nightly    diatrizoate meglumine-sodium (GASTROGRAFIN) 66-10 % solution 15 mL  15 mL Oral ONCE PRN    sodium chloride flush 0.9 % injection 5-40 mL  5-40 mL IntraVENous 2 times per day    sodium chloride flush 0.9 % injection 5-40 mL  5-40 mL IntraVENous PRN    0.9 % sodium chloride infusion   IntraVENous PRN    ondansetron (ZOFRAN-ODT) disintegrating tablet 4 mg  4 mg Oral Q8H PRN    Or    ondansetron (ZOFRAN) injection 4 mg  4 mg IntraVENous Q6H PRN    acetaminophen (TYLENOL) tablet 650 mg  650 mg Oral Q6H PRN    Or    acetaminophen (TYLENOL) suppository 650 mg  650 mg Rectal Q6H PRN    albuterol (PROVENTIL) nebulizer solution 2.5 mg  2.5 mg Nebulization Q6H PRN    temazepam (RESTORIL) capsule 15 mg  15 mg Oral Nightly PRN Signed:  Leann Fleischer, MD    Part of this note may have been written by using a voice dictation software. The note has been proof read but may still contain some grammatical/other typographical errors.

## 2022-08-14 NOTE — PROGRESS NOTES
Subjective:     Patient is a 79 y.o.  male presents with anemia. He had colonoscopy done on 8 5 which showed a lesion in the cecum consistent with a neoplasm. We were asked to evaluate for possible surgical resection. He reports no abdominal pain. Currently he denies nausea vomiting diarrhea constipation hematochezia hematemesis or melena. He has been on full liquids. [place] fevers or chills. 8/8/2022 - Doing well this AM. No bleeding reported. Tolerating liquid diet. No prep received. 8/9/22: No complaints, no bleeding. Liquid diet and bowel prep today. 8/10/22 BOWEL RESECTION HEMICOLECTOMY LAPAROSCOPIC ROBOTIC, Right Bulmaro    8/11/22 POD1: Comfortable overnight. Pain controlled. AF/VSS. Abd: surgical dsg CDI, abd mildly distended, BS+ with tenderness as expected. -Flatus, -BM   overnight. FHB28.9    8/12/22 POD2: Awake and sitting up. Has been oob/ and ambulating. Pain currently controlled. Abd: surgical dsg CDI, abd mildly distended, BS+ and appropriately ttp  +Flatus, +BM (loose)  Rush out. SUG39.6    8/13/22 POD3: Awake and sitting in recliner. Has been oob/ and ambulating. Pain currently controlled. Feels distended, however, denies nausea. Abd: surgical dsg CDI, abd  distended, BS+ and appropriately ttp  +Flatus, +BM (loose) Rush out. YVN04.2    8/14/22 POD4: Awake and up walking in room. Has been oob/ and ambulating. Pain currently controlled. States feeling better but still feels bloated. Abd: surgical dsg CDI, abd  distended, BS+ and appropriately ttp  +Flatus/ +BM.  WBC 9.4, HGB 6.7    Patient Active Problem List    Diagnosis Date Noted    Ileus following gastrointestinal surgery ([facility] 08/14/2022    Adenocarcinoma of cecum ([facility] 08/13/2022    Cecum mass 08/07/2022    Colon cancer ([facility] [address] 08/07/2022    Acute blood loss anemia 08/04/2022    GIB (gastrointestinal bleeding) 08/04/2022    Urine retention 04/03/2022    Acute cholecystitis 03/28/2022    Pancreatitis, gallstone Sleep apnea     Syncope and collapse     Unspecified sleep apnea     no cpap      Past Surgical History:   Procedure Laterality Date    APPENDECTOMY      CARDIAC CATHETERIZATION  05/21/2019    watchman device    CARDIAC CATHETERIZATION  05/27/2015    stent    COLONOSCOPY  2007    COLONOSCOPY N/A 8/6/2022    COLONOSCOPY POLYPECTOMY SNARE/COLD BIOPSY performed by Clem De Luna MD at [facility] ENDOSCOPY    ERCP  3/30/2022         HEMICOLECTOMY Right 8/10/2022    BOWEL RESECTION HEMICOLECTOMY LAPAROSCOPIC ROBOTIC, Right Bulmaro performed by Che Reyez MD at [address] [address]  2018    [facility]    Dr. Kraus/Giana for sleep apnea and reconstruction for extending jaw    UPPER GASTROINTESTINAL ENDOSCOPY N/A 8/5/2022    EGD ESOPHAGOGASTRODUODENOSCOPY Rm 525 performed by Yonathan Rivers MD at [address] Right 07/10/2019     Repair of right radial artery pseudoaneurysm      Medications Prior to Admission: potassium chloride (KLOR-CON M) 20 MEQ extended release tablet, Take 1 tablet by mouth daily  aspirin 81 MG EC tablet, Take 81 mg by mouth daily  atorvastatin (LIPITOR) 80 MG tablet, Take 80 mg by mouth  cyanocobalamin 1000 MCG tablet, Take 1,000 mcg by mouth daily (Patient not taking: No sig reported)  escitalopram (LEXAPRO) 10 MG tablet, Take 10 mg by mouth daily (Patient not taking: No sig reported)  furosemide (LASIX) 40 MG tablet, Take 40 mg by mouth daily  nitroGLYCERIN (NITROSTAT) 0.4 MG SL tablet, Place 1 sl under the tongue q 5 min prn cp, max 3 sl in a 15-min time period.  Call 911 if no relief after the 3rd sl.  pantoprazole (PROTONIX) 40 MG tablet, Take 40 mg by mouth every morning (before breakfast) (Patient not taking: No sig reported)  rOPINIRole (REQUIP) 0.5 MG tablet, 1 nightly, increase to 1 twice a day if needed  tamsulosin (FLOMAX) 0.4 MG capsule, Take 0.4 mg by mouth daily  No Known Allergies   Social History     Tobacco attenuation. No contour deforming or enhancing mass lesions are seen of the   pancreas or adrenal glands. The gallbladder has been removed. The kidneys   enhance symmetrically and no evidence of hydronephrosis is seen. Chronic renal   cortical scarring is seen most evident in the upper pole cortex of the left   kidney       The visualized loops of small bowel and colon are normal in caliber. The   ileocecal valve is seen on axial image 98 demonstrating normal fat attenuation. Just proximal to this along the posterior wall of the cecum is an abnormal wall   lesion measuring 3.5 cm x 2.4 cm in size concerning for a colon neoplasm likely   representing the patient's known cecal mass. No obstruction is suggested by   this at this time. No free fluid and no free air is seen in the abdomen. No   adenopathy is seen of the abdomen. The abdominal aorta demonstrates mild to   moderate atherosclerotic changes. No aggressive appearing osseous lesion is   seen. CT PELVIS:   No abnormal pelvic fluid collections are present. No pelvic adenopathy is seen. The urinary bladder is unremarkable. No aggressive appearing osseous lesion is   seen. Impression   1.  3.5 cm x 2.4 cm cecal mass. No clear evidence for metastatic disease is   evident by CT imaging. Pulmonary nodules, and hepatic lesions are seen which   are favored to be benign as described above. Assessment:     Principal Problem:    Acute blood loss anemia  Active Problems:    GIB (gastrointestinal bleeding)    Cecum mass    Colon cancer (HCC)    Adenocarcinoma of cecum (HCC)    Ileus following gastrointestinal surgery (HCC)    Paroxysmal atrial fibrillation (HCC)    HTN (hypertension)    Myasthenia gravis (HCC)    SSS (sick sinus syndrome) (HCC)    GERD (gastroesophageal reflux disease)    Diastolic CHF, chronic (HCC)    Dyslipidemia  Resolved Problems:    * No resolved hospital problems.  *    8/10/22 S/P BOWEL RESECTION HEMICOLECTOMY LAPAROSCOPIC ROBOTIC, Right Bulmaro  Plan:   > Care Management per Hospitalist      1U PRBC  > Rush out 8/11  >OOB for all meals  >CLD - per hospitalist  >Follow electrolytes and replace prn  >follow up pathology  >pain control  >PO/OT      Tanvi REKHA Romero  8/14/2022 1:00 PM

## 2022-08-15 ENCOUNTER — APPOINTMENT (OUTPATIENT)
Dept: ULTRASOUND IMAGING | Age: 82
DRG: 330 | End: 2022-08-15
Payer: MEDICARE

## 2022-08-15 PROBLEM — I82.A12 ACUTE DEEP VEIN THROMBOSIS (DVT) OF AXILLARY VEIN OF LEFT UPPER EXTREMITY (HCC): Status: ACTIVE | Noted: 2022-08-15

## 2022-08-15 LAB
ABO + RH BLD: NORMAL
BLD PROD TYP BPU: NORMAL
BLOOD BANK DISPENSE STATUS: NORMAL
BLOOD GROUP ANTIBODIES SERPL: NORMAL
BPU ID: NORMAL
CROSSMATCH RESULT: NORMAL
ERYTHROCYTE [DISTWIDTH] IN BLOOD BY AUTOMATED COUNT: 22.7 % (ref 11.9–14.6)
HCT VFR BLD AUTO: 30.1 % (ref 41.1–50.3)
HCT VFR BLD AUTO: 34.5 % (ref 41.1–50.3)
HGB BLD-MCNC: 8.5 G/DL (ref 13.6–17.2)
HGB BLD-MCNC: 9.8 G/DL (ref 13.6–17.2)
MCH RBC QN AUTO: 20.1 PG (ref 26.1–32.9)
MCHC RBC AUTO-ENTMCNC: 28.2 G/DL (ref 31.4–35)
MCV RBC AUTO: 71.3 FL (ref 79.6–97.8)
NRBC # BLD: 0.03 K/UL (ref 0–0.2)
PLATELET # BLD AUTO: 225 K/UL (ref 150–450)
PMV BLD AUTO: 9.2 FL (ref 9.4–12.3)
RBC # BLD AUTO: 4.22 M/UL (ref 4.23–5.6)
SPECIMEN EXP DATE BLD: NORMAL
UNIT DIVISION: 0
WBC # BLD AUTO: 10.8 K/UL (ref 4.3–11.1)

## 2022-08-15 PROCEDURE — 85027 COMPLETE CBC AUTOMATED: CPT

## 2022-08-15 PROCEDURE — 6370000000 HC RX 637 (ALT 250 FOR IP): Performed by: INTERNAL MEDICINE

## 2022-08-15 PROCEDURE — 6360000002 HC RX W HCPCS: Performed by: INTERNAL MEDICINE

## 2022-08-15 PROCEDURE — 6370000000 HC RX 637 (ALT 250 FOR IP): Performed by: SURGERY

## 2022-08-15 PROCEDURE — 85018 HEMOGLOBIN: CPT

## 2022-08-15 PROCEDURE — C9113 INJ PANTOPRAZOLE SODIUM, VIA: HCPCS | Performed by: INTERNAL MEDICINE

## 2022-08-15 PROCEDURE — 1100000000 HC RM PRIVATE

## 2022-08-15 PROCEDURE — A4216 STERILE WATER/SALINE, 10 ML: HCPCS | Performed by: INTERNAL MEDICINE

## 2022-08-15 PROCEDURE — 36415 COLL VENOUS BLD VENIPUNCTURE: CPT

## 2022-08-15 PROCEDURE — 2580000003 HC RX 258: Performed by: INTERNAL MEDICINE

## 2022-08-15 PROCEDURE — 93971 EXTREMITY STUDY: CPT

## 2022-08-15 PROCEDURE — 6370000000 HC RX 637 (ALT 250 FOR IP): Performed by: NURSE PRACTITIONER

## 2022-08-15 PROCEDURE — 2580000003 HC RX 258: Performed by: HOSPITALIST

## 2022-08-15 RX ORDER — OXYCODONE HYDROCHLORIDE AND ACETAMINOPHEN 5; 325 MG/1; MG/1
1 TABLET ORAL EVERY 4 HOURS PRN
Status: DISCONTINUED | OUTPATIENT
Start: 2022-08-15 | End: 2022-08-19 | Stop reason: HOSPADM

## 2022-08-15 RX ADMIN — SODIUM CHLORIDE, PRESERVATIVE FREE 10 ML: 5 INJECTION INTRAVENOUS at 21:30

## 2022-08-15 RX ADMIN — OXYCODONE AND ACETAMINOPHEN 1 TABLET: 7.5; 325 TABLET ORAL at 08:00

## 2022-08-15 RX ADMIN — DOCUSATE SODIUM 100 MG: 100 CAPSULE, LIQUID FILLED ORAL at 08:00

## 2022-08-15 RX ADMIN — SODIUM CHLORIDE, PRESERVATIVE FREE 40 MG: 5 INJECTION INTRAVENOUS at 10:59

## 2022-08-15 RX ADMIN — SODIUM CHLORIDE, PRESERVATIVE FREE 10 ML: 5 INJECTION INTRAVENOUS at 23:00

## 2022-08-15 RX ADMIN — PANTOPRAZOLE SODIUM 40 MG: 40 TABLET, DELAYED RELEASE ORAL at 07:35

## 2022-08-15 RX ADMIN — OXYCODONE AND ACETAMINOPHEN 1 TABLET: 5; 325 TABLET ORAL at 21:57

## 2022-08-15 RX ADMIN — SODIUM CHLORIDE, PRESERVATIVE FREE 40 MG: 5 INJECTION INTRAVENOUS at 21:55

## 2022-08-15 RX ADMIN — DOCUSATE SODIUM 100 MG: 100 CAPSULE, LIQUID FILLED ORAL at 21:55

## 2022-08-15 RX ADMIN — ROPINIROLE HYDROCHLORIDE 0.5 MG: 0.5 TABLET, FILM COATED ORAL at 21:55

## 2022-08-15 RX ADMIN — SODIUM CHLORIDE, PRESERVATIVE FREE 10 ML: 5 INJECTION INTRAVENOUS at 08:45

## 2022-08-15 RX ADMIN — ATORVASTATIN CALCIUM 80 MG: 80 TABLET, FILM COATED ORAL at 21:55

## 2022-08-15 RX ADMIN — AMOXICILLIN 500 MG: 500 CAPSULE ORAL at 06:03

## 2022-08-15 ASSESSMENT — PAIN DESCRIPTION - ORIENTATION: ORIENTATION: LOWER

## 2022-08-15 ASSESSMENT — PAIN SCALES - GENERAL
PAINLEVEL_OUTOF10: 0
PAINLEVEL_OUTOF10: 7
PAINLEVEL_OUTOF10: 8

## 2022-08-15 ASSESSMENT — PAIN - FUNCTIONAL ASSESSMENT: PAIN_FUNCTIONAL_ASSESSMENT: PREVENTS OR INTERFERES SOME ACTIVE ACTIVITIES AND ADLS

## 2022-08-15 ASSESSMENT — PAIN DESCRIPTION - DESCRIPTORS: DESCRIPTORS: DISCOMFORT

## 2022-08-15 ASSESSMENT — PAIN DESCRIPTION - LOCATION: LOCATION: ABDOMEN

## 2022-08-15 ASSESSMENT — PAIN DESCRIPTION - PAIN TYPE: TYPE: ACUTE PAIN

## 2022-08-15 NOTE — PROGRESS NOTES
Comprehensive Nutrition Assessment    Type and Reason for Visit: Reassess, RD Nutrition Re-Screen/LOS, NPO/Clear Liquid    Nutrition Recommendations/Plan:   Meals and Snacks:  Diet: Continue current order. Diet progression per surgery  Patient has had prolonged admission (11 days)  with NPO/CLD with brief FLD status. He is NPO again at a this time due to suspected ileus. May need to consider PN until able to advance diet to at least FLD. Malnutrition Assessment:  Malnutrition Status: At risk for malnutrition (Comment) (prolonged admission with NPO/CLD/FLD, colon cancer, advanced age)    Nutrition Assessment:  Nutrition History: Patient reports good appetite at home. He states wife prepares meals and he eats 2-3 times per day. He denies any changes to PO intake PTA. He has not had any weight loss that he is aware of. No kodi wasting. Nutrition Background:   Patient with PMH significant for CAD, CHF, HLD, afib s/p Watchman, GERD, HTN, obesity, Myasthenia Gravis. He presented with black stools and weakness due to low Hbg. He is s/p EGD 8/5 with findings of 2 small erosions in proximal stomach. S/p colo 8/6 with findings of cecal mass concerning for cancer. S/p right hemicolectomy 8/10. Nutrition Interval:  Diet progression: 8/4 NPO; 8/5 CLD; 8/6 NPO then FLD; 8/8 NPO; 8/9 CLD; 8/10 NPO then CLD. Diet downgraded to NPO 8/15 due to suspected ileus on KUB 8/14. Discussed with Dr. Adam Michele during IDT rounds. MD deferring to surgery for diet/nutrition support. Patient seen up to recliner. He states that he is a little hungry. He states he is passing gas but no BM yet. He denies N/V. He has no questions or needs.     Current Nutrition Therapies:  Diet NPO Exceptions are: Sips of Clear Liquids, Popsicles, Sips of Water with Meds    Current Intake:   Average Meal Intake: NPO Average Supplements Intake: None Ordered      Anthropometric Measures:  Height: 5' 8\" (172.7 cm)  Current Body Wt: 206 lb 9.1 oz (93.7 kg) (8/7), Weight source: Standing Scale  BMI: 31.4, Obese Class 1 (BMI 30.0-34. 9)  Admission Body Weight: 197 lb 15.6 oz (89.8 kg) (8/4, not specified)  Ideal Body Weight (Kg) (Calculated): 70 kg (154 lbs), 134.1 %  Usual Body Wt: 217 lb (98.4 kg) (office wt 8/2021), Percent weight change: -4.8       Edema:    BMI Category Obese Class 1 (BMI 30.0-34. 9)  Estimated Daily Nutrient Needs:  Energy (kcal/day): 6321-9504 (Kcal/kg (18-23) Weight used: 93.7 kg Current  Protein (g/day):  (20% kcal) Weight Used: (Current)    Fluid (ml/day):   (1 ml/kcal)    Nutrition Diagnosis:   Inadequate oral intake related to altered GI function as evidenced by NPO or clear liquid status due to medical condition (s/p right hemicololectomy, now with suspected ileus)  Nutrition Interventions:   Food and/or Nutrient Delivery: Continue NPO     Coordination of Nutrition Care: Continue to monitor while inpatient       Goals:   Previous Goal Met: No Progress toward Goal(s)  Active Goal:  (Diet advance vs initiate nutrition support within 2 days)       Nutrition Monitoring and Evaluation:      Food/Nutrient Intake Outcomes: Diet Advancement/Tolerance  Physical Signs/Symptoms Outcomes: GI Status, Weight    Discharge Planning:     Too soon to determine    ANGÉLICA JACOBSEN RD

## 2022-08-15 NOTE — CONSULTS
[facility] RE-Consult Note       Primary GI Physician: Lukas Pompa MD  Consulting GI Physician: Arvin Person MD  Referring Provider:  Krystle Perez MD    Consult Date:  8/15/2022  Admit Date:  8/4/2022    Chief Complaint:  GI bleeding    Subjective:     History of Present Illness:  Patient is a 79 y.o. male with PMHx of Afib  s/p Watchman and SSS s/p pacemaker, HTN, CAD, CHF, Myasthenia Gravis, and sleep apnea who is seen in consultation at the request of Dr. Pablo Castorena for further evaluation of GI bleeding. GI consulted (Dr. Pedro Lindsey) 8/4 for ABLA, melena. EGD 8/5 by Dr. Randi Mcneal showed small gastric erosions. Colonoscopy 8/6 by Dr. Pedro Lindsey revealed cecal mass c/w adenocarcinoma and 2 TVA colon polyps. Patient underwent right hemicolectomy on 8/10. He developed post-op ileus. KUB yesterday 8/14 c/w ileus. Patient reports having 4-5 loose BM with black flecks that floated to the bottom of the commode. Denies BRBPR or maroon colored stools. Reports some abdominal discomfort. No N/V. No BM today, but passing flatus. Upper extremity US revealed DVT in the axillary vein and superficial vein thrombus in the basilic vein.            PMH:  Past Medical History:   Diagnosis Date    Abnormal EKG 4/22/15    Arrhythmia     Aspiration pneumonia ([facility] [address] 10/26/2017    CAD (coronary artery disease) 5/8/2015    Carotid artery stenosis without cerebral infarction 6/7/2016    US 6/15: <50% bilat ICAs    Coronary atherosclerosis of native coronary vessel 5/8/2015    GERMAIN on brilinta 5/7/15: prox LAD PCI, normal EF     Diastolic CHF, chronic ([address] 3/2/2022    Dyslipidemia 6/7/2016    Dyspnea 5/8/2015    Echo 6/15: EF 60%, mod MR, mod LVH, mild AI     ED (erectile dysfunction)     GERD (gastroesophageal reflux disease)     GERD (gastroesophageal reflux disease)     no medication    HTN (hypertension) 5/8/2015    Hypertension     Hypokalemia     Hypokalemia 6/7/2016    Mitral valve regurgitation 6/7/2016    Morbid obesity (LASIX) 40 MG tablet Take 40 mg by mouth daily 3/3/22   Ar Automatic Reconciliation   nitroGLYCERIN (NITROSTAT) 0.4 MG SL tablet Place 1 sl under the tongue q 5 min prn cp, max 3 sl in a 15-min time period.  Call 911 if no relief after the 3rd sl. 2/13/20   Ar Automatic Reconciliation   pantoprazole (PROTONIX) 40 MG tablet Take 40 mg by mouth every morning (before breakfast)  Patient not taking: No sig reported 4/3/22   Ar Automatic Reconciliation   rOPINIRole (REQUIP) 0.5 MG tablet 1 nightly, increase to 1 twice a day if needed 3/3/22   Ar Automatic Reconciliation   tamsulosin (FLOMAX) 0.4 MG capsule Take 0.4 mg by mouth daily 4/8/22   Ar Automatic Reconciliation       Hospital Medications:  Current Facility-Administered Medications   Medication Dose Route Frequency    oxyCODONE-acetaminophen (PERCOCET) 5-325 MG per tablet 1 tablet  1 tablet Oral Q4H PRN    pantoprazole (PROTONIX) 40 mg in sodium chloride (PF) 10 mL injection  40 mg IntraVENous Q12H    0.9 % sodium chloride infusion   IntraVENous PRN    docusate sodium (COLACE) capsule 100 mg  100 mg Oral BID    magnesium sulfate 2000 mg in 50 mL IVPB premix  2,000 mg IntraVENous PRN    potassium chloride (KLOR-CON M) extended release tablet 40 mEq  40 mEq Oral PRN    Or    potassium bicarb-citric acid (EFFER-K) effervescent tablet 40 mEq  40 mEq Oral PRN    Or    potassium chloride 10 mEq/100 mL IVPB (Peripheral Line)  10 mEq IntraVENous PRN    lidocaine 1 % injection 5 mL  5 mL IntraDERmal Once    sodium chloride flush 0.9 % injection 5-40 mL  5-40 mL IntraVENous 2 times per day    sodium chloride flush 0.9 % injection 5-40 mL  5-40 mL IntraVENous PRN    heparin flush 100 UNIT/ML injection 100 Units  1 mL IntraCATHeter PRN    [Held by provider] aspirin EC tablet 81 mg  81 mg Oral Daily    atorvastatin (LIPITOR) tablet 80 mg  80 mg Oral Nightly    [Held by provider] furosemide (LASIX) tablet 40 mg  40 mg Oral Daily    rOPINIRole (REQUIP) tablet 0.5 mg  0.5 mg Oral Nightly    diatrizoate meglumine-sodium (GASTROGRAFIN) 66-10 % solution 15 mL  15 mL Oral ONCE PRN    sodium chloride flush 0.9 % injection 5-40 mL  5-40 mL IntraVENous 2 times per day    sodium chloride flush 0.9 % injection 5-40 mL  5-40 mL IntraVENous PRN    0.9 % sodium chloride infusion   IntraVENous PRN    ondansetron (ZOFRAN-ODT) disintegrating tablet 4 mg  4 mg Oral Q8H PRN    Or    ondansetron (ZOFRAN) injection 4 mg  4 mg IntraVENous Q6H PRN    acetaminophen (TYLENOL) tablet 650 mg  650 mg Oral Q6H PRN    Or    acetaminophen (TYLENOL) suppository 650 mg  650 mg Rectal Q6H PRN    albuterol (PROVENTIL) nebulizer solution 2.5 mg  2.5 mg Nebulization Q6H PRN    temazepam (RESTORIL) capsule 15 mg  15 mg Oral Nightly PRN       Social History:  Social History     Tobacco Use    Smoking status: Never    Smokeless tobacco: Never   Substance Use Topics    Alcohol use: No       Pt denies any history of drug use, blood transfusions, or tattoos. Family History:  Family History   Problem Relation Age of Onset    No Known Problems Brother     Cancer Brother         brain tumor    No Known Problems Sister     Cancer Mother         kidney    Cancer Father         stomach       Review of Systems:  A detailed 10 system ROS is obtained, with pertinent positives as listed above. All others are negative. Diet:  NPO, sips of clears/ice chips    Objective:     Physical Exam:  Vitals:  BP (!) 160/78   Pulse 75   Temp 97.6 °F (36.4 °C) (Oral)   Resp 18   Ht 5' 8\" (1.727 m)   Wt 206 lb 9.6 oz (93.7 kg)   SpO2 95%   BMI 31.41 kg/m²   Gen:  Pt is alert, cooperative, NAD  Cardiovascular: RRR. No murmurs, gallops, or rubs. Respiratory:  CTAB. GI:  Abdomen distended, mildly TTP diffusely, tinkling bowel sounds. Rectal:  Deferred   Neurological:  Gross memory appears intact. Patient is alert and oriented. Psychiatric:  Mood appears appropriate with judgement intact.       Laboratory:    Recent Labs     08/12/22  1223 08/13/22  0636 08/14/22  0614 08/15/22  0715   WBC 13.9* 11.8* 9.8 10.8   HGB 7.8* 7.2* 6.7* 8.5*   HCT 28.8* 27.6* 24.4* 30.1*    213 204 225   MCV 70.8* 70.1* 69.9* 71.3*          Assessment:     Principal Problem:    Acute blood loss anemia    Active Problems:    GIB (gastrointestinal bleeding)    Cecum mass    Colon cancer (HCC)    Adenocarcinoma of cecum (HCC)    Ileus following gastrointestinal surgery (HCC)    Paroxysmal atrial fibrillation (HCC)    HTN (hypertension)    Myasthenia gravis (HCC)    SSS (sick sinus syndrome) (HCC)    GERD (gastroesophageal reflux disease)    Diastolic CHF, chronic (HCC)    Dyslipidemia    78 y/o M who presented with ABLA, found to have cecal mass on colonoscopy 8/6, path c/w adenocarcinoma, s/p right hemicolectomy 8/10. Ongoing issues with post-op ileus. GI re-consulted 8/15 for GI bleeding, requiring 1u pRBC on 8/14 for Hgb of 6.7, improved to 8.5. Patient described loose stools with black flecks, no kodi hematochezia or maroon colored stools. EGD 8/6 with small gastric erosions. Recent bleed possibly from post surgical inflammation +/- anastomotic ulcer. Plan:     Consider NM bleeding scan vs CTA for active / ongoing GI bleeding. Otherwise, monitor H/H and s/sx of GI bleeding. Transfuse prn. Continue PPI. Upper extremity DVT per primary. Post-op ileus per surgery. Follow. Patient is seen and examined in collaboration with Dr. Kenrick Red. Assessment and plan as per Dr. Liban Medrano PA-C  [facility]

## 2022-08-15 NOTE — PROGRESS NOTES
Subjective:     Patient is a 79 y.o.  male presents with anemia. He had colonoscopy done on 8 5 which showed a lesion in the cecum consistent with a neoplasm. We were asked to evaluate for possible surgical resection. He reports no abdominal pain. Currently he denies nausea vomiting diarrhea constipation hematochezia hematemesis or melena. He has been on full liquids. [place] fevers or chills. 8/8/2022 - Doing well this AM. No bleeding reported. Tolerating liquid diet. No prep received. 8/9/22: No complaints, no bleeding. Liquid diet and bowel prep today. 8/10/22 BOWEL RESECTION HEMICOLECTOMY LAPAROSCOPIC ROBOTIC, Right Bulmaro    8/11/22 POD1: Comfortable overnight. Pain controlled. AF/VSS. Abd: surgical dsg CDI, abd mildly distended, BS+ with tenderness as expected. -Flatus, -BM   overnight. KNZ73.1    8/12/22 POD2: Awake and sitting up. Has been oob/ and ambulating. Pain currently controlled. Abd: surgical dsg CDI, abd mildly distended, BS+ and appropriately ttp  +Flatus, +BM (loose)  Rush out. TGV73.7    8/13/22 POD3: Awake and sitting in recliner. Has been oob/ and ambulating. Pain currently controlled. Feels distended, however, denies nausea. Abd: surgical dsg CDI, abd  distended, BS+ and appropriately ttp  +Flatus, +BM (loose) Rush out. KUA33.7    8/14/22 POD4: Awake and up walking in room. Has been oob/ and ambulating. Pain currently controlled. States feeling better but still feels bloated. Abd: surgical dsg CDI, abd  distended, BS+ and appropriately ttp  +Flatus/ +BM. WBC 9.4, HGB 6.7    8/15/2022 POD5: doing well. Sitting in a chair this AM. Mo flatus or BM. Abd distended. Walking. No nausea or vomiting.      Patient Active Problem List    Diagnosis Date Noted    Ileus following gastrointestinal surgery ([facility] [address] 08/14/2022    Adenocarcinoma of cecum ([facility] [address] 08/13/2022    Cecum mass 08/07/2022    Colon cancer ([facility] [address] 08/07/2022    Acute blood loss anemia 08/04/2022    GIB (gastrointestinal bleeding) 08/04/2022    Urine retention 04/03/2022    Acute cholecystitis 03/28/2022    Pancreatitis, gallstone 03/28/2022    Anemia 03/28/2022    Bacteremia 03/28/2022    Major depressive disorder, recurrent, moderate ([address] 81/63/8660    Diastolic CHF, chronic ([address] 03/02/2022    Major depressive disorder, recurrent, mild ([address] 03/02/2022    Major depressive disorder, recurrent, unspecified ([address] 03/02/2022    Pacemaker 04/14/2021    Atrial fibrillation ([address] 11/29/2017    GERD (gastroesophageal reflux disease) 10/27/2017    Hypotension 10/26/2017    Sepsis ([address] 10/26/2017    Syncope 10/24/2017    Paroxysmal atrial fibrillation ([address] 06/30/2017    SSS (sick sinus syndrome) ([address] 06/30/2017    Bilateral carotid artery disease ([address] 06/30/2017    Myasthenia gravis ([address]     Mitral valve regurgitation 06/07/2016    Hypokalemia 06/07/2016    Dyslipidemia 06/07/2016    HTN (hypertension) 05/08/2015    Dyspnea 05/08/2015    S/P coronary artery stent placement 05/08/2015    Coronary atherosclerosis of native coronary vessel 05/08/2015     Past Medical History:   Diagnosis Date    Abnormal EKG 4/22/15    Arrhythmia     Aspiration pneumonia ([address] 10/26/2017    CAD (coronary artery disease) 5/8/2015    Carotid artery stenosis without cerebral infarction 6/7/2016    US 6/15: <50% bilat ICAs    Coronary atherosclerosis of native coronary vessel 5/8/2015    GERMAIN on brilinta 5/7/15: prox LAD PCI, normal EF     Diastolic CHF, chronic ([address] 3/2/2022    Dyslipidemia 6/7/2016    Dyspnea 5/8/2015    Echo 6/15: EF 60%, mod MR, mod LVH, mild AI     ED (erectile dysfunction)     GERD (gastroesophageal reflux disease)     GERD (gastroesophageal reflux disease)     no medication    HTN (hypertension) 5/8/2015    Hypertension     Hypokalemia     Hypokalemia 6/7/2016    Mitral valve regurgitation 6/7/2016    Morbid obesity ([facility] [address]     Myasthenia gravis ([facility] [address]     Myasthenia gravis ([facility] [address] 6/17/15    Nocturia     Osteoporosis PUD (peptic ulcer disease) 25 yrs ago    S/P coronary artery stent placement 5/8/2015    3.0x38 mm Xience CAROL to pLAD 5/7/15     Sleep apnea     Syncope and collapse     Unspecified sleep apnea     no cpap      Past Surgical History:   Procedure Laterality Date    APPENDECTOMY      CARDIAC CATHETERIZATION  05/21/2019    watchman device    CARDIAC CATHETERIZATION  05/27/2015    stent    COLONOSCOPY  2007    COLONOSCOPY N/A 8/6/2022    COLONOSCOPY POLYPECTOMY SNARE/COLD BIOPSY performed by Nallely Romero MD at [facility] ENDOSCOPY    ERCP  3/30/2022         HEMICOLECTOMY Right 8/10/2022    BOWEL RESECTION HEMICOLECTOMY LAPAROSCOPIC ROBOTIC, Right Bulmaro performed by Clementine Cohen MD at [address] [address]  2018    Mi Mis    Dr. Kraus/Giana for sleep apnea and reconstruction for extending jaw    UPPER GASTROINTESTINAL ENDOSCOPY N/A 8/5/2022    EGD ESOPHAGOGASTRODUODENOSCOPY Rm 525 performed by Destini Bryant MD at [address] Right 07/10/2019     Repair of right radial artery pseudoaneurysm      Medications Prior to Admission: potassium chloride (KLOR-CON M) 20 MEQ extended release tablet, Take 1 tablet by mouth daily  aspirin 81 MG EC tablet, Take 81 mg by mouth daily  atorvastatin (LIPITOR) 80 MG tablet, Take 80 mg by mouth  cyanocobalamin 1000 MCG tablet, Take 1,000 mcg by mouth daily (Patient not taking: No sig reported)  escitalopram (LEXAPRO) 10 MG tablet, Take 10 mg by mouth daily (Patient not taking: No sig reported)  furosemide (LASIX) 40 MG tablet, Take 40 mg by mouth daily  nitroGLYCERIN (NITROSTAT) 0.4 MG SL tablet, Place 1 sl under the tongue q 5 min prn cp, max 3 sl in a 15-min time period.  Call 911 if no relief after the 3rd sl.  pantoprazole (PROTONIX) 40 MG tablet, Take 40 mg by mouth every morning (before breakfast) (Patient not taking: No sig reported)  rOPINIRole (REQUIP) 0.5 MG tablet, 1 nightly, increase to 1 twice a day if needed  tamsulosin (FLOMAX) 0.4 MG capsule, Take 0.4 mg by mouth daily  No Known Allergies   Social History     Tobacco Use    Smoking status: Never    Smokeless tobacco: Never   Substance Use Topics    Alcohol use: No      Family History   Problem Relation Age of Onset    No Known Problems Brother     Cancer Brother         brain tumor    No Known Problems Sister     Cancer Mother         kidney    Cancer Father         stomach      Review of Systems  Pertinent items are noted in HPI. Objective:     Patient Vitals for the past 8 hrs:   BP Temp Temp src Pulse Resp SpO2   08/15/22 0800 (!) 160/78 97.6 °F (36.4 °C) Oral 75 18 95 %   08/15/22 0244 137/69 98.7 °F (37.1 °C) Oral 70 18 (!) 87 %       I/O last 3 completed shifts: In: 1324.1 [P.O.:930; Blood:394.1]  Out: 2 [Urine:2]  No intake/output data recorded. General: well appearing, no acute distress, alert and oriented  Eyes: PERRLA, sclerae white  ENT: External inspection of ears and nose normal, oropharynx normal  Respiratory: normal chest wall expansion, lungs CTA bilaterally  Cardiovascular: RRR, no m, femoral pulses 2+ bilaterally; extremities without edema  Abdomen: distended, surgical dsg CDI. TTP as expected.   BS+  Heme/Lymph: without cervical or inguinal adenopathy  Musculoskeletal: gait normal, digits without clubbing or cyanosis  Integumentary: warm, dry, and pink, with no rash, purpura, or petechia  Neurological: Cranial Nerves II-XII grossly intact, normal sensation and muscle strength bilaterally      Data ReviewCBC:   Lab Results   Component Value Date/Time    WBC 10.8 08/15/2022 07:15 AM    RBC 4.22 08/15/2022 07:15 AM     BMP:   Lab Results   Component Value Date/Time    GLUCOSE 119 08/11/2022 07:56 AM    CO2 26 08/11/2022 07:56 AM    BUN 7 08/11/2022 07:56 AM    CREATININE 0.80 08/11/2022 07:56 AM    CALCIUM 8.7 08/11/2022 07:56 AM     Radiology review:   CT CHEST, ABDOMEN AND PELVIS WITH INTRAVENOUS CONTRAST DATED 8/6/2022. History: Cecal mass concerning for colon cancer. Comparison: CT the cardiac over read 4/5/2019, and CT abdomen and pelvis with   contrast 3/27/2022        Technique:   Multiple contiguous helical CT images reconstructed at 5 mm were   obtained from the base of the neck to the ischial tuberosities following oral   and 100 cc Isovue-370 without acute complication. All CT scans performed at   this facility use one or all of the following: Automated exposure control,   adjustment of the mA and/or kVp according to patient's size, iterative   reconstruction. Findings:   CT Chest:   The base of the neck is unremarkable in appearance. No axillary, mediastinal,   or hilar lymphadenopathy is seen. The thoracic aorta is normal in caliber. The   heart appears moderately enlarged. Evaluation with lung windows demonstrates no suspicious pulmonary lesion. Small   nodules are seen in the bilateral lungs measuring up to 5 mm in size. However,   these are unchanged in size and number when compared to a prior CT Overread   dated 4/5/2019. The lack of significant change over multiple years favors   benign nodules. Mild dependent atelectasis is seen. No pleural effusion is   seen. Lungs are expanded without evidence for pneumothorax. No aggressive   appearing osseous lesion is seen. CT ABDOMEN:     The Liver small scattered low-attenuation left lobe hepatic lesions measuring up   to 9 mm in size. Although incompletely characterized, these are also included   in the field of imaging on the prior CT over read dated 4/5/2019 and are   unchanged in size and number. The appearance suggests benign lesions such as   hepatic cysts. An additional 1.6 cm lesion is seen in the more inferior right   lobe on image 68.   This is unchanged when compared to the more recent CT scan of   the abdomen dated 3/27/2022 and does not demonstrate significant enhancement   suggesting an additional benign hepatic cyst.  The spleen is homogeneous in   attenuation. No contour deforming or enhancing mass lesions are seen of the   pancreas or adrenal glands. The gallbladder has been removed. The kidneys   enhance symmetrically and no evidence of hydronephrosis is seen. Chronic renal   cortical scarring is seen most evident in the upper pole cortex of the left   kidney       The visualized loops of small bowel and colon are normal in caliber. The   ileocecal valve is seen on axial image 98 demonstrating normal fat attenuation. Just proximal to this along the posterior wall of the cecum is an abnormal wall   lesion measuring 3.5 cm x 2.4 cm in size concerning for a colon neoplasm likely   representing the patient's known cecal mass. No obstruction is suggested by   this at this time. No free fluid and no free air is seen in the abdomen. No   adenopathy is seen of the abdomen. The abdominal aorta demonstrates mild to   moderate atherosclerotic changes. No aggressive appearing osseous lesion is   seen. CT PELVIS:   No abnormal pelvic fluid collections are present. No pelvic adenopathy is seen. The urinary bladder is unremarkable. No aggressive appearing osseous lesion is   seen. Impression   1.  3.5 cm x 2.4 cm cecal mass. No clear evidence for metastatic disease is   evident by CT imaging. Pulmonary nodules, and hepatic lesions are seen which   are favored to be benign as described above. Assessment:     Principal Problem:    Acute blood loss anemia  Active Problems:    GIB (gastrointestinal bleeding)    Cecum mass    Colon cancer (HCC)    Adenocarcinoma of cecum (HCC)    Ileus following gastrointestinal surgery (HCC)    Paroxysmal atrial fibrillation (HCC)    HTN (hypertension)    Myasthenia gravis (HCC)    SSS (sick sinus syndrome) (HCC)    GERD (gastroesophageal reflux disease)    Diastolic CHF, chronic (HCC)    Dyslipidemia  Resolved Problems:    * No resolved hospital problems. *    8/10/22 S/P BOWEL RESECTION HEMICOLECTOMY LAPAROSCOPIC ROBOTIC, Right Bulmaro  Plan:   > Care Management per Hospitalist      1U PRBC  > Rush out 8/11  >OOB for all meals  Make NPO until bowel function - he still distended  >Follow electrolytes and replace prn  Pathology - RIGHT COLON\":         ADENOCARCINOMA, MODERATELY DIFFERENTIATED MEASURING APPROXIMATELY 5.2 X 4.3 X 2.9 CM. MICROSCOPICALLY TUMOR INFILTRATES THROUGH MUSCULARIS PROPRIA INTO PERICOLONIC ADIPOSE TISSUE. MARGINS OF RESECTION AND 14 PERICOLONIC LYMPH NODES ARE NEGATIVE FOR MALIGNANT TUMOR.  SEPARATE ULCERATED AREA CONSISTENT WITH PRIOR BIOPSY SITE.    >pain control  >PO/OT      Joshua Honeycutt NP  8/15/2022 9:49 AM

## 2022-08-15 NOTE — PROGRESS NOTES
Hospitalist Progress Note   Admit Date:  2022  4:05 PM   Name:  Molly Galindo   Age:  79 y.o. Sex:  male  :  1940   MRN:  [id number]   Room:  [facility]/    Reason(s) for Admission: Acute blood loss anemia [D62]  Anemia, unspecified type [D64.9]     Hospital Course & Interval History:   Mr. Skyler Case is a very nice 78 y/o WM with a h/o atrial fib s/p Watchman, SSS with PPM, HTN, GERD, HLD, CAD, HFpEF who was admitted to our service on  with ABLA from GI source. Initial Hb 6.5. GI consulted. EGD on  showed 2 small erosions in proximal stomach, otherwise unremarkable. Colonoscopy on  showed a cecal mass and 2 sessile polyps (hot snare removal). General Surgery consulted and he underwent laparoscopic R hemicolectomy on 8/10. Transfused 1U RBCs on . LUE US 8/15 showed axillary DVT. GI re-consulted given anemia with transfusion  and melena. Subjective/24hr Events (08/15/22): Melena yesterday (watery BM with \"flakes). Hb is stable today, no BM yet. Still distended, KUB yesterday showed ileus. US was done overnight for LUE swelling and showed an axillary DVT. He has no chest pain, SOB. Assessment & Plan:   # Acute LUE axillary vein DVT   - Will require anticoagulation, however required blood transfusion yesterday and he has now been having some melena. Hb is stable today. I discussed this with Dr. Chilo Almonte this morning, will change his PPI back to IV, follow H/H q12. Tagged RBC scan vs CTA if significant bleeding. I am hesitant to anticoagulate him until he can at least show a stable Hb trend. I discussed this with the patient as well who understands. 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 - S/p R hemicolectomy on 8/10, mass pathology is adenocarcinoma. - KUB  with ileus. Made NPO again per Surgery 8/15. # Acute uncomplicated cystitis 2/2 enterococcus              - Amoxicillin, EOT 8/15. Blood cxs neg. # HypoK              - Resolved. # Chronic dCHF // HFpEF              - Compensated. # CAD              - ASA on hold with melena. # GERD              - PO PPI     # PAD // CAD // HLD              - ASA as above, statin     # HTN              - Well controlled currently. # Myasthenia gravis? - Noted from problem list. No MG meds on PTA list, d/w Pharmacy 8/11 and couldn't see any outpatient fill history. Patient denied this history? Discharge Planning: Pending. Diet:  Diet NPO Exceptions are: Sips of Clear Liquids, Popsicles, Sips of Water with Meds  Code status: Full Code    Hospital Problems             Last Modified POA    * (Principal) Acute blood loss anemia 8/4/2022 Yes    GIB (gastrointestinal bleeding) 8/4/2022 Yes    Cecum mass 8/7/2022 Yes    Colon cancer ([facility] [address] 8/11/2022 Yes    Overview Signed 8/11/2022  7:46 AM by Amalia Saunders APRN - CNP     8/10/22 Lorena Davidson MD) BOWEL RESECTION HEMICOLECTOMY LAPAROSCOPIC ROBOTIC, Right Bulmaro         Adenocarcinoma of cecum ([address] 8/13/2022 Yes    Ileus following gastrointestinal surgery ([address] 8/14/2022 Yes    Paroxysmal atrial fibrillation ([address] 8/9/2022 Yes    Overview Signed 3/28/2022 12:16 AM by Carlyle Dhillon MD     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             HTN (hypertension) 8/4/2022 Yes    Myasthenia gravis ([address] 8/7/2022 Yes    SSS (sick sinus syndrome) ([address] 8/9/2022 Yes    GERD (gastroesophageal reflux disease) 0/4/7984 Yes    Diastolic CHF, chronic ([address] 8/4/2022 Yes    Dyslipidemia 8/4/2022 Yes       Objective:   Patient Vitals for the past 24 hrs:   Temp Pulse Resp BP SpO2   08/15/22 1201 97.9 °F (36.6 °C) 85 18 (!) 163/90 --   08/15/22 0800 97.6 °F (36.4 °C) 75 18 (!) 160/78 95 %   08/15/22 0244 98.7 °F (37.1 °C) 70 18 137/69 (!) 87 %   08/14/22 2232 98.5 °F (36.9 °C) 77 18 (!) 149/83 97 %   08/14/22 1924 98.5 °F (36.9 °C) 75 18 (!) 159/85 97 %   08/14/22 1644 98.6 °F (37 °C) 74 18 (!) 154/79 96 %   08/14/22 1538 97.3 °F (36.3 °C) 75 18 126/68 95 %   08/14/22 1330 -- -- -- (!) 180/90 --       Estimated body mass index is 31.41 kg/m² as calculated from the following:    Height as of this encounter: 5' 8\" (1.727 m). Weight as of this encounter: 206 lb 9.6 oz (93.7 kg). Intake/Output Summary (Last 24 hours) at 8/15/2022 1316  Last data filed at 8/15/2022 1044  Gross per 24 hour   Intake 634.08 ml   Output 2 ml   Net 632.08 ml         Physical Exam:   Blood pressure (!) 163/90, pulse 85, temperature 97.9 °F (36.6 °C), temperature source Oral, resp. rate 18, height 5' 8\" (1.727 m), weight 206 lb 9.6 oz (93.7 kg), SpO2 95 %. General:    Well nourished. No overt distress. Obese. Appears stated age. Head:  Normocephalic, atraumatic  Eyes:  Sclerae appear normal. Pupils equally round. ENT:  Nares appear normal, no drainage. Moist oral mucosa  Neck:  No restricted ROM. Trachea midline. CV:   RRR. No m/r/g. No jugular venous distension. Lungs:   CTAB. No wheezing, rhonchi, or rales. Respirations even, unlabored. Abdomen: Bowel sounds decreased. Still distended, mild generalized ttp. Surgical incisions healing well. Extremities: No cyanosis or clubbing. No edema. Skin:     No rashes and normal coloration. Warm and dry. Neuro:  CN II-XII grossly intact. Sensation intact. A&Ox3  Psych:  Normal mood and affect.       I have reviewed ordered lab tests and independently visualized imaging below:    Recent Labs:  Recent Results (from the past 48 hour(s))   CBC    Collection Time: 08/14/22  6:14 AM   Result Value Ref Range    WBC 9.8 4.3 - 11.1 K/uL    RBC 3.49 (L) 4.23 - 5.6 M/uL    Hemoglobin 6.7 (LL) 13.6 - 17.2 g/dL    Hematocrit 24.4 (L) 41.1 - 50.3 %    MCV 69.9 (L) 79.6 - 97.8 FL    MCH 19.2 (L) 26.1 - 32.9 PG    MCHC 27.5 (L) 31.4 - 35.0 g/dL    RDW 21.9 (H) 11.9 - 14.6 %    Platelets 905 849 - 257 K/uL    MPV 9.6 9.4 - 12.3 FL    nRBC 0.00 0.0 - 0.2 K/uL   PREPARE RBC (CROSSMATCH), 1 Units    Collection Time: 08/14/22  7:30 AM   Result Value Ref Range    History Check Historical check performed    TYPE AND SCREEN    Collection Time: 08/14/22  7:48 AM   Result Value Ref Range    Crossmatch expiration date 08/17/2022,2359     ABO/Rh A POSITIVE     Antibody Screen NEG     Unit Number [id number]     Product Code Blood Bank [facility] LRIR     Unit Divison 00     Dispense Status Blood Bank TRANSFUSED     Crossmatch Result Compatible    CBC    Collection Time: 08/15/22  7:15 AM   Result Value Ref Range    WBC 10.8 4.3 - 11.1 K/uL    RBC 4.22 (L) 4.23 - 5.6 M/uL    Hemoglobin 8.5 (L) 13.6 - 17.2 g/dL    Hematocrit 30.1 (L) 41.1 - 50.3 %    MCV 71.3 (L) 79.6 - 97.8 FL    MCH 20.1 (L) 26.1 - 32.9 PG    MCHC 28.2 (L) 31.4 - 35.0 g/dL    RDW 22.7 (H) 11.9 - 14.6 %    Platelets 805 402 - 882 K/uL    MPV 9.2 (L) 9.4 - 12.3 FL    nRBC 0.03 0.0 - 0.2 K/uL         Other Studies:  XR ABDOMEN (KUB) (SINGLE AP VIEW)    Result Date: 8/14/2022  KUB CLINICAL INDICATION: Status post colectomy with abdominal distention. FINDINGS: Two supine views of the abdomen and pelvis show mildly prominent loops of small bowel in the central upper and left upper abdominal quadrant. Nondilated loops of bowel noted in the right hemiabdomen. Cholecystectomy clips are present. Nonspecific prominent loops of bowel in the central upper and left upper abdominal quadrant. Ileus suspected. Vascular duplex upper extremity venous left    Result Date: 8/15/2022  Clinical history: Left upper extremity edema. TECHNIQUE: Grayscale and duplex venous ultrasound of the left upper extremity. FINDINGS: The internal jugular, innominate and subclavian veins are patent and demonstrate color Doppler flow. The brachial, radial and ulnar veins are patent and compressible. There is color Doppler flow. The cephalic vein is patent. There is thrombus within the axillary vein. There is significant decrease color Doppler flow.  There is lack of compressibility. There is superficial vein thrombosis in the basilic vein, occlusive. There is absent color Doppler flow. 1. Deep vein thrombosis in the axillary vein. 2. Superficial vein thrombus in the basilic vein.       Current Meds:  Current Facility-Administered Medications   Medication Dose Route Frequency    oxyCODONE-acetaminophen (PERCOCET) 5-325 MG per tablet 1 tablet  1 tablet Oral Q4H PRN    pantoprazole (PROTONIX) 40 mg in sodium chloride (PF) 10 mL injection  40 mg IntraVENous Q12H    0.9 % sodium chloride infusion   IntraVENous PRN    docusate sodium (COLACE) capsule 100 mg  100 mg Oral BID    magnesium sulfate 2000 mg in 50 mL IVPB premix  2,000 mg IntraVENous PRN    potassium chloride (KLOR-CON M) extended release tablet 40 mEq  40 mEq Oral PRN    Or    potassium bicarb-citric acid (EFFER-K) effervescent tablet 40 mEq  40 mEq Oral PRN    Or    potassium chloride 10 mEq/100 mL IVPB (Peripheral Line)  10 mEq IntraVENous PRN    lidocaine 1 % injection 5 mL  5 mL IntraDERmal Once    sodium chloride flush 0.9 % injection 5-40 mL  5-40 mL IntraVENous 2 times per day    sodium chloride flush 0.9 % injection 5-40 mL  5-40 mL IntraVENous PRN    heparin flush 100 UNIT/ML injection 100 Units  1 mL IntraCATHeter PRN    [Held by provider] aspirin EC tablet 81 mg  81 mg Oral Daily    atorvastatin (LIPITOR) tablet 80 mg  80 mg Oral Nightly    [Held by provider] furosemide (LASIX) tablet 40 mg  40 mg Oral Daily    rOPINIRole (REQUIP) tablet 0.5 mg  0.5 mg Oral Nightly    diatrizoate meglumine-sodium (GASTROGRAFIN) 66-10 % solution 15 mL  15 mL Oral ONCE PRN    sodium chloride flush 0.9 % injection 5-40 mL  5-40 mL IntraVENous 2 times per day    sodium chloride flush 0.9 % injection 5-40 mL  5-40 mL IntraVENous PRN    0.9 % sodium chloride infusion   IntraVENous PRN    ondansetron (ZOFRAN-ODT) disintegrating tablet 4 mg  4 mg Oral Q8H PRN    Or    ondansetron (ZOFRAN) injection 4 mg  4 mg IntraVENous Q6H PRN    acetaminophen (TYLENOL) tablet 650 mg  650 mg Oral Q6H PRN    Or    acetaminophen (TYLENOL) suppository 650 mg  650 mg Rectal Q6H PRN    albuterol (PROVENTIL) nebulizer solution 2.5 mg  2.5 mg Nebulization Q6H PRN    temazepam (RESTORIL) capsule 15 mg  15 mg Oral Nightly PRN       Signed:  Delmis Soares MD    Part of this note may have been written by using a voice dictation software. The note has been proof read but may still contain some grammatical/other typographical errors.

## 2022-08-15 NOTE — PROGRESS NOTES
Patient were discussed in [facility] team meeting this AM.  Patient declined PT stating that he is feeling very fatigued and would like to take a nap. CM will continue to follow for any needs or concerns that may occur.

## 2022-08-16 LAB
ANION GAP SERPL CALC-SCNC: ABNORMAL MMOL/L (ref 7–16)
BUN SERPL-MCNC: 6 MG/DL (ref 8–23)
CALCIUM SERPL-MCNC: 9.1 MG/DL (ref 8.3–10.4)
CHLORIDE SERPL-SCNC: 110 MMOL/L (ref 98–107)
CO2 SERPL-SCNC: 26 MMOL/L (ref 21–32)
CREAT SERPL-MCNC: 0.8 MG/DL (ref 0.8–1.5)
GLUCOSE SERPL-MCNC: 119 MG/DL (ref 65–100)
HCT VFR BLD AUTO: 32.2 % (ref 41.1–50.3)
HCT VFR BLD AUTO: 36.5 % (ref 41.1–50.3)
HGB BLD-MCNC: 10.4 G/DL (ref 13.6–17.2)
HGB BLD-MCNC: 8.9 G/DL (ref 13.6–17.2)
MAGNESIUM SERPL-MCNC: 1.9 MG/DL (ref 1.8–2.4)
POTASSIUM SERPL-SCNC: 3.5 MMOL/L (ref 3.5–5.1)
SODIUM SERPL-SCNC: 134 MMOL/L (ref 136–145)
UFH PPP CHRO-ACNC: 0.35 IU/ML (ref 0.3–0.7)

## 2022-08-16 PROCEDURE — C9113 INJ PANTOPRAZOLE SODIUM, VIA: HCPCS | Performed by: INTERNAL MEDICINE

## 2022-08-16 PROCEDURE — 85520 HEPARIN ASSAY: CPT

## 2022-08-16 PROCEDURE — 2580000003 HC RX 258: Performed by: HOSPITALIST

## 2022-08-16 PROCEDURE — 83735 ASSAY OF MAGNESIUM: CPT

## 2022-08-16 PROCEDURE — 6360000002 HC RX W HCPCS: Performed by: INTERNAL MEDICINE

## 2022-08-16 PROCEDURE — A4216 STERILE WATER/SALINE, 10 ML: HCPCS | Performed by: INTERNAL MEDICINE

## 2022-08-16 PROCEDURE — 2580000003 HC RX 258: Performed by: INTERNAL MEDICINE

## 2022-08-16 PROCEDURE — 1100000000 HC RM PRIVATE

## 2022-08-16 PROCEDURE — 80048 BASIC METABOLIC PNL TOTAL CA: CPT

## 2022-08-16 PROCEDURE — 97535 SELF CARE MNGMENT TRAINING: CPT

## 2022-08-16 PROCEDURE — 85014 HEMATOCRIT: CPT

## 2022-08-16 PROCEDURE — 6370000000 HC RX 637 (ALT 250 FOR IP): Performed by: SURGERY

## 2022-08-16 PROCEDURE — 6370000000 HC RX 637 (ALT 250 FOR IP): Performed by: INTERNAL MEDICINE

## 2022-08-16 PROCEDURE — 97116 GAIT TRAINING THERAPY: CPT

## 2022-08-16 PROCEDURE — 97530 THERAPEUTIC ACTIVITIES: CPT

## 2022-08-16 PROCEDURE — 36415 COLL VENOUS BLD VENIPUNCTURE: CPT

## 2022-08-16 RX ORDER — HEPARIN SODIUM 10000 [USP'U]/100ML
5-30 INJECTION, SOLUTION INTRAVENOUS CONTINUOUS
Status: DISCONTINUED | OUTPATIENT
Start: 2022-08-16 | End: 2022-08-18

## 2022-08-16 RX ORDER — HEPARIN SODIUM 1000 [USP'U]/ML
40 INJECTION, SOLUTION INTRAVENOUS; SUBCUTANEOUS PRN
Status: DISCONTINUED | OUTPATIENT
Start: 2022-08-16 | End: 2022-08-18

## 2022-08-16 RX ORDER — HEPARIN SODIUM 1000 [USP'U]/ML
80 INJECTION, SOLUTION INTRAVENOUS; SUBCUTANEOUS PRN
Status: DISCONTINUED | OUTPATIENT
Start: 2022-08-16 | End: 2022-08-18

## 2022-08-16 RX ADMIN — ATORVASTATIN CALCIUM 80 MG: 80 TABLET, FILM COATED ORAL at 20:58

## 2022-08-16 RX ADMIN — HEPARIN SODIUM 18 UNITS/KG/HR: 10000 INJECTION, SOLUTION INTRAVENOUS at 13:12

## 2022-08-16 RX ADMIN — SODIUM CHLORIDE, PRESERVATIVE FREE 40 MG: 5 INJECTION INTRAVENOUS at 11:50

## 2022-08-16 RX ADMIN — SODIUM CHLORIDE, PRESERVATIVE FREE 10 ML: 5 INJECTION INTRAVENOUS at 09:00

## 2022-08-16 RX ADMIN — SODIUM CHLORIDE, PRESERVATIVE FREE 40 MG: 5 INJECTION INTRAVENOUS at 21:03

## 2022-08-16 RX ADMIN — SODIUM CHLORIDE, PRESERVATIVE FREE 10 ML: 5 INJECTION INTRAVENOUS at 20:59

## 2022-08-16 RX ADMIN — SODIUM CHLORIDE, PRESERVATIVE FREE 10 ML: 5 INJECTION INTRAVENOUS at 21:03

## 2022-08-16 RX ADMIN — ROPINIROLE HYDROCHLORIDE 0.5 MG: 0.5 TABLET, FILM COATED ORAL at 20:58

## 2022-08-16 RX ADMIN — OXYCODONE AND ACETAMINOPHEN 1 TABLET: 5; 325 TABLET ORAL at 20:58

## 2022-08-16 ASSESSMENT — PAIN SCALES - GENERAL
PAINLEVEL_OUTOF10: 7
PAINLEVEL_OUTOF10: 0
PAINLEVEL_OUTOF10: 0

## 2022-08-16 NOTE — PROGRESS NOTES
OCCUPATIONAL THERAPY: Daily Note    OT Visit Days: 2   Time  OT Charge Capture  Rehab Caseload Tracker  OT Orders    Emre Chavez is a 79 y.o. male   PRIMARY DIAGNOSIS: Acute blood loss anemia  Acute blood loss anemia [D62]  Anemia, unspecified type [D64.9]  Procedure(s) (LRB):  BOWEL RESECTION HEMICOLECTOMY LAPAROSCOPIC ROBOTIC, Right Bulmaro (Right)  6 Days Post-Op  Inpatient: Payor: MEDICARE / Plan: MEDICARE PART A AND B / Product Type: *No Product type* /     ASSESSMENT:     REHAB RECOMMENDATIONS: CURRENT LEVEL OF FUNCTION:  (Most Recently Demonstrated)   Recommendation to date pending progress:  Setting:  Home Health Therapy    Equipment:    To Be Determined Bathing:  Not Tested  Dressing:  Contact Guard Assist  Feeding/Grooming:  Contact Guard Assist  Toileting:  Not Tested  Functional Mobility:  Contact Guard Assist     ASSESSMENT:  Mr. Juan Trevizo is progressing well towards OT goals. Today, pt was received sitting in the chair. Completed LB dressing, sit>stand, ambulation with RW, and grooming tasks standing at the sink with overall CGA. Pt required encouragement to participate at the beginning of the session but was then very agreeable. Continue to [facility] services. Mr. Juan Trevizo continues to demonstrate overall deficits in strength, balance, activity tolerance, and ADL performance. Continue OT efforts and POC in order to address functional deficits and OT goals stated above. SUBJECTIVE:     Mr. Juan Trevizo states, \"I just dont feel good today. \"     Social/Functional Lives With: Spouse  Type of Home: House  Home Layout: One level  Home Access: Level entry  Home Equipment: Cane (walking stick.)  ADL Assistance: Independent  Active : No  Patient's  Info: Family provides transportation. Patient is able to drive.   Mode of Transportation: Car  Occupation: Retired    OBJECTIVE:     LIANNA / Annabella Hallmark / AIRWAY: NA    RESTRICTIONS/PRECAUTIONS:           PAIN: Teretha Backers / O2:   Pre Treatment:   Pain Assessment: None - Denies Pain          Post Treatment: no change  Vitals          Oxygen            MOBILITY: I Mod I S SBA CGA Min Mod Max Total  NT x2 Comments:   Bed Mobility    Rolling [] [] [] [] [] [] [] [] [] [x] []    Supine to Sit [] [] [] [] [] [] [] [] [] [x] [] Received sitting in the chair    Scooting [] [] [] [] [] [] [] [] [] [x] []    Sit to Supine [] [] [] [] [] [] [] [] [] [x] [] Left sitting in the chair    Transfers    Sit to Stand [] [] [] [] [x] [] [] [] [] [] []    Bed to Chair [] [] [] [] [] [] [] [] [] [] [] CGA for ambulation in room without AD   Stand to Sit [] [] [] [] [x] [] [] [] [] [] []    Tub/Shower [] [] [] [] [] [] [] [] [] [x] []     Toilet [] [] [] [] [] [] [] [] [] [x] []      [] [] [] [] [] [] [] [] [] [] []    I=Independent, Mod I=Modified Independent, S=Supervision/Setup, SBA=Standby Assistance, CGA=Contact Guard Assistance, Min=Minimal Assistance, Mod=Moderate Assistance, Max=Maximal Assistance, Total=Total Assistance, NT=Not Tested    ACTIVITIES OF DAILY LIVING: I Mod I S SBA CGA Min Mod Max Total NT Comments   BASIC ADLs:              Upper Body   Bathing [] [] [] [] [] [] [] [] [] [x]    Lower Body Bathing [] [] [] [] [] [] [] [] [] [x]    Toileting [] [] [] [] [] [] [] [] [] [x]    Upper Body Dressing [] [] [] [] [] [] [] [] [] [x]    Lower Body Dressing [] [] [] [] [x] [] [] [] [] [] Socks    Feeding [] [] [] [] [] [] [] [] [] [x]    Grooming [] [] [] [] [x] [] [] [] [] [] Washed face and brushed teeth standing at the sink    Personal Device Care [] [] [] [] [] [] [] [] [] [x]    Functional Mobility [] [] [] [] [x] [] [] [] [] [] RW   I=Independent, Mod I=Modified Independent, S=Supervision/Setup, SBA=Standby Assistance, CGA=Contact Guard Assistance, Min=Minimal Assistance, Mod=Moderate Assistance, Max=Maximal Assistance, Total=Total Assistance, NT=Not Tested    BALANCE: Good Fair+ Fair Fair- Poor NT Comments   Sitting Static [x] [] [] [] [] [] Sitting Dynamic [] [x] [] [] [] []              Standing Static [] [x] [] [] [] []    Standing Dynamic [] [x] [] [] [] []        PLAN:     FREQUENCY/DURATION   OT Plan of Care: 3 times/week for duration of hospital stay or until stated goals are met, whichever comes first.    ACUTE OCCUPATIONAL THERAPY GOALS:   (Developed with and agreed upon by patient and/or caregiver.)  1. Patient will complete lower body bathing and dressing with Mod I and adaptive equipment as  needed. 2. Patient will completed upper body bathing and dressing with Mod I and adaptive equipment as needed. 3. Patient will complete grooming standing at sink with Mod I and adaptive equipment as needed. 4. Patient will complete toileting with Mod I and adaptive equipment as needed. 5. Patient will tolerate 30 minutes of OT treatment with no rest breaks to increase activity tolerance for ADLs. 6. Patient will complete functional transfers with Mod I and adaptive equipment as needed. Timeframe: 7 visits     TREATMENT:     TREATMENT:   Therapeutic Activity (10 Minutes): Therapeutic activity included Transfer Training, Ambulation on level ground, Sitting balance , and Standing balance to improve functional Activity tolerance, Balance, Coordination, Mobility, and Strength. Self Care (13 minutes): Patient participated in lower body dressing and grooming ADLs in unsupported sitting and standing with no verbal cueing to increase independence, decrease assistance required, increase activity tolerance, and increase safety awareness. Patient also participated in functional mobility and functional transfer training to increase independence, decrease assistance required, increase activity tolerance, and increase safety awareness.      TREATMENT GRID:  N/A    AFTER TREATMENT PRECAUTIONS: Call light within reach, Chair, Needs within reach, and RN notified    INTERDISCIPLINARY COLLABORATION:  RN/ PCT, PT/ PTA, and OT/ CORLEY    EDUCATION:       TOTAL

## 2022-08-16 NOTE — PROGRESS NOTES
PHYSICAL THERAPY: Daily Note AM   (Link to Caseload Tracking: PT Visit Days : 2  Time In/Out PT Charge Capture  Rehab Caseload Tracker  Orders    Parul Rodríguez is a 79 y.o. male   PRIMARY DIAGNOSIS: Acute blood loss anemia  Acute blood loss anemia [D62]  Anemia, unspecified type [D64.9]  Procedure(s) (LRB):  BOWEL RESECTION HEMICOLECTOMY LAPAROSCOPIC ROBOTIC, Right Bulmaro (Right)  6 Days Post-Op  Inpatient: Payor: Gosia Single / Plan: MEDICARE PART A AND B / Product Type: *No Product type* /     ASSESSMENT:     REHAB RECOMMENDATIONS:   Recommendation to date pending progress:  Setting:  Home Health Therapy    Equipment:    None     ASSESSMENT:  Mr. Maria Elena Lucero is seated upright in chair and at first not wanting to participate in PT/OT, however after some encouragement he was agreeable and performed very well. Pt ambulated 250ft with RW and SBA and took a seated rest break then CGA for STS from low surface to ambulate another 250ft back to room with RW and SBA. Pt demonstrated good liza, but slightly shuffled gait with forward trunk lean and needed a few verbal cues for keeping the RW close to him. Pt demonstrated fair + standing balance performing ADLs with OT at sink. SBA for bathroom>chair transfer and left reclined with needs in reach and RN present in room. PT to continue to follow. SUBJECTIVE:   Mr. Maria Elena Lucero states, \"Alright we will just do it now\"     Social/Functional Lives With: Spouse  Type of Home: House  Home Layout: One level  Home Access: Level entry  Home Equipment: Cane (walking stick.)  ADL Assistance: Independent  Active : No  Patient's  Info: Family provides transportation. Patient is able to drive.   Mode of Transportation: Car  Occupation: Retired  OBJECTIVE:     PAIN: VITALS / O2: PRECAUTION / Amita Sours / DRAINS:   Pre Treatment:          Post Treatment: 0 Vitals        Oxygen    None    RESTRICTIONS/PRECAUTIONS:        MOBILITY: I Mod I S SBA CGA Min Mod Max Total  NT x2 Comments: Bed Mobility    Rolling [] [] [] [] [] [] [] [] [] [x] []    Supine to Sit [] [] [] [] [] [] [] [] [] [x] []    Scooting [] [] [] [] [] [] [] [] [] [x] []    Sit to Supine [] [] [] [] [] [] [] [] [] [x] []    Transfers    Sit to Stand [] [] [] [x] [x] [] [] [] [] [] []    Bed to Chair [] [] [] [] [] [] [] [] [] [] []    Stand to Sit [] [] [] [x] [] [] [] [] [] [] []     [] [] [] [] [] [] [] [] [] [] []    I=Independent, Mod I=Modified Independent, S=Supervision, SBA=Standby Assistance, CGA=Contact Guard Assistance,   Min=Minimal Assistance, Mod=Moderate Assistance, Max=Maximal Assistance, Total=Total Assistance, NT=Not Tested    BALANCE: Good Fair+ Fair Fair- Poor NT Comments   Sitting Static [x] [] [] [] [] []    Sitting Dynamic [x] [] [] [] [] []              Standing Static [] [x] [] [] [] []    Standing Dynamic [] [x] [x] [] [] []      GAIT: I Mod I S SBA CGA Min Mod Max Total  NT x2 Comments:   Level of Assistance [] [] [] [x] [] [] [] [] [] [] []    Distance 250 feet x2    DME Rolling Walker    Gait Quality Decreased step clearance, Shuffling , and Trunk flexion    Weightbearing Status      Stairs      I=Independent, Mod I=Modified Independent, S=Supervision, SBA=Standby Assistance, CGA=Contact Guard Assistance,   Min=Minimal Assistance, Mod=Moderate Assistance, Max=Maximal Assistance, Total=Total Assistance, NT=Not Tested    PLAN:   ACUTE PHYSICAL THERAPY GOALS:  (Developed with and agreed upon by patient and/or caregiver. )     LTG:  (1.)Mr. Tee Flynn will move from supine to sit and sit to supine , scoot up and down, and roll side to side in bed with INDEPENDENT within 7 treatment day(s). (2.)Mr. Tee Flynn will transfer from bed to chair and chair to bed with MODIFIED INDEPENDENCE using the least restrictive device within 7 treatment day(s). (3.)Mr. Tee Flynn will ambulate with MODIFIED INDEPENDENCE for 500 feet with the least restrictive device within 7 treatment day(s). (4.)Mr. Tee Flynn will tolerate at least 23 min of dynamic standing activity to assist standing ADLs with the least restrictive device within 7 treatment days. FREQUENCY AND DURATION: 3 times/week for duration of hospital stay or until stated goals are met, whichever comes first.    TREATMENT:   TREATMENT:   Co-Treatment PT/OT necessary due to patient's decreased overall endurance/tolerance levels, as well as need for high level skilled assistance to complete functional transfers/mobility and functional tasks  Gait Training (23 Minutes): Gait training for 250ft x2 feet utilizing [facility]. Patient required Verbal cueing to improve Activity Pacing and Assistive Device Utilization.      TREATMENT GRID:  N/A    AFTER TREATMENT PRECAUTIONS: Bed/Chair Locked, Call light within reach, Chair, Needs within reach, and RN at bedside    INTERDISCIPLINARY COLLABORATION:  RN/ PCT, PT/ PTA, and OT/ CORLEY    EDUCATION:      TIME IN/OUT:  Time In: 1132  Time Out: [facility]  Minutes: [address], PT

## 2022-08-16 NOTE — PROGRESS NOTES
Subjective:     Patient is a 79 y.o.  male presents with anemia. He had colonoscopy done on 8 5 which showed a lesion in the cecum consistent with a neoplasm. We were asked to evaluate for possible surgical resection. He reports no abdominal pain. Currently he denies nausea vomiting diarrhea constipation hematochezia hematemesis or melena. He has been on full liquids. [place] fevers or chills. 8/8/2022 - Doing well this AM. No bleeding reported. Tolerating liquid diet. No prep received. 8/9/22: No complaints, no bleeding. Liquid diet and bowel prep today. 8/10/22 BOWEL RESECTION HEMICOLECTOMY LAPAROSCOPIC ROBOTIC, Right Bulmaro    8/11/22 POD1: Comfortable overnight. Pain controlled. AF/VSS. Abd: surgical dsg CDI, abd mildly distended, BS+ with tenderness as expected. -Flatus, -BM   overnight. NOB63.5    8/12/22 POD2: Awake and sitting up. Has been oob/ and ambulating. Pain currently controlled. Abd: surgical dsg CDI, abd mildly distended, BS+ and appropriately ttp  +Flatus, +BM (loose)  Rush out. YPH60.6    8/13/22 POD3: Awake and sitting in recliner. Has been oob/ and ambulating. Pain currently controlled. Feels distended, however, denies nausea. Abd: surgical dsg CDI, abd  distended, BS+ and appropriately ttp  +Flatus, +BM (loose) Rush out. KPZ51.5    8/14/22 POD4: Awake and up walking in room. Has been oob/ and ambulating. Pain currently controlled. States feeling better but still feels bloated. Abd: surgical dsg CDI, abd  distended, BS+ and appropriately ttp  +Flatus/ +BM. WBC 9.4, HGB 6.7    8/15/2022 POD5: doing well. Sitting in a chair this AM. Mo flatus or BM. Abd distended. Walking. No nausea or vomiting.     8/16/2022 POD6: doing well. Sitting in a chair this AM. +flatus +BM. Abd soft, trochar sites intact. Walking. No nausea or vomiting.      Patient Active Problem List    Diagnosis Date Noted    Acute deep vein thrombosis (DVT) of axillary vein of left upper extremity ([facility] 08/15/2022    Ileus following gastrointestinal surgery ([address] 08/14/2022    Adenocarcinoma of cecum ([address] 08/13/2022    Cecum mass 08/07/2022    Colon cancer ([address] 08/07/2022    Acute blood loss anemia 08/04/2022    GIB (gastrointestinal bleeding) 08/04/2022    Urine retention 04/03/2022    Acute cholecystitis 03/28/2022    Pancreatitis, gallstone 03/28/2022    Anemia 03/28/2022    Bacteremia 03/28/2022    Major depressive disorder, recurrent, moderate ([address] 83/75/1812    Diastolic CHF, chronic (HCC) 03/02/2022    Major depressive disorder, recurrent, mild ([address] 03/02/2022    Major depressive disorder, recurrent, unspecified ([facility] 03/02/2022    Pacemaker 04/14/2021    Atrial fibrillation ([facility] 11/29/2017    GERD (gastroesophageal reflux disease) 10/27/2017    Hypotension 10/26/2017    Sepsis ([address] 10/26/2017    Syncope 10/24/2017    Paroxysmal atrial fibrillation ([address] 06/30/2017    SSS (sick sinus syndrome) ([facility] 06/30/2017    Bilateral carotid artery disease ([facility] 06/30/2017    Myasthenia gravis ([address]     Mitral valve regurgitation 06/07/2016    Hypokalemia 06/07/2016    Dyslipidemia 06/07/2016    HTN (hypertension) 05/08/2015    Dyspnea 05/08/2015    S/P coronary artery stent placement 05/08/2015    Coronary atherosclerosis of native coronary vessel 05/08/2015     Past Medical History:   Diagnosis Date    Abnormal EKG 4/22/15    Arrhythmia     Aspiration pneumonia ([address] 10/26/2017    CAD (coronary artery disease) 5/8/2015    Carotid artery stenosis without cerebral infarction 6/7/2016    US 6/15: <50% bilat ICAs    Coronary atherosclerosis of native coronary vessel 5/8/2015    GERMAIN on brilinta 5/7/15: prox LAD PCI, normal EF     Diastolic CHF, chronic ([address] 3/2/2022    Dyslipidemia 6/7/2016    Dyspnea 5/8/2015    Echo 6/15: EF 60%, mod MR, mod LVH, mild AI     ED (erectile dysfunction)     GERD (gastroesophageal reflux disease)     GERD (gastroesophageal reflux disease)     no medication    HTN (hypertension) 5/8/2015    Hypertension     Hypokalemia     Hypokalemia 6/7/2016    Mitral valve regurgitation 6/7/2016    Morbid obesity ([address]     Myasthenia gravis ([facility]     Myasthenia gravis ([facility] [address] 6/17/15    Nocturia     Osteoporosis     PUD (peptic ulcer disease) 25 yrs ago    S/P coronary artery stent placement 5/8/2015    3.0x38 mm Xience CAROL to pLAD 5/7/15     Sleep apnea     Syncope and collapse     Unspecified sleep apnea     no cpap      Past Surgical History:   Procedure Laterality Date    APPENDECTOMY      CARDIAC CATHETERIZATION  05/21/2019    watchman device    CARDIAC CATHETERIZATION  05/27/2015    stent    COLONOSCOPY  2007    COLONOSCOPY N/A 8/6/2022    COLONOSCOPY POLYPECTOMY SNARE/COLD BIOPSY performed by Adrián Peraza MD at [facility] ENDOSCOPY    ERCP  3/30/2022         HEMICOLECTOMY Right 8/10/2022    BOWEL RESECTION HEMICOLECTOMY LAPAROSCOPIC ROBOTIC, Right Bulmaro performed by Teddy Patel MD at [address] [address]  2018    Beny Shown    Dr. Kraus/Giana for sleep apnea and reconstruction for extending jaw    UPPER GASTROINTESTINAL ENDOSCOPY N/A 8/5/2022    EGD ESOPHAGOGASTRODUODENOSCOPY Rm 525 performed by Mackenzie Hill MD at [address] Right 07/10/2019     Repair of right radial artery pseudoaneurysm      Medications Prior to Admission: potassium chloride (KLOR-CON M) 20 MEQ extended release tablet, Take 1 tablet by mouth daily  aspirin 81 MG EC tablet, Take 81 mg by mouth daily  atorvastatin (LIPITOR) 80 MG tablet, Take 80 mg by mouth  cyanocobalamin 1000 MCG tablet, Take 1,000 mcg by mouth daily (Patient not taking: No sig reported)  escitalopram (LEXAPRO) 10 MG tablet, Take 10 mg by mouth daily (Patient not taking: No sig reported)  furosemide (LASIX) 40 MG tablet, Take 40 mg by mouth daily  nitroGLYCERIN (NITROSTAT) 0.4 MG SL tablet, Place 1 sl under the tongue q 5 min prn cp, max 3 sl in a 15-min time period.  Call 911 if no relief after the 3rd sl.  pantoprazole (PROTONIX) 40 MG tablet, Take 40 mg by mouth every morning (before breakfast) (Patient not taking: No sig reported)  rOPINIRole (REQUIP) 0.5 MG tablet, 1 nightly, increase to 1 twice a day if needed  tamsulosin (FLOMAX) 0.4 MG capsule, Take 0.4 mg by mouth daily  No Known Allergies   Social History     Tobacco Use    Smoking status: Never    Smokeless tobacco: Never   Substance Use Topics    Alcohol use: No      Family History   Problem Relation Age of Onset    No Known Problems Brother     Cancer Brother         brain tumor    No Known Problems Sister     Cancer Mother         kidney    Cancer Father         stomach      Review of Systems  Pertinent items are noted in HPI. Objective:     Patient Vitals for the past 8 hrs:   BP Temp Temp src Pulse Resp SpO2   08/16/22 0750 (!) 153/76 97.8 °F (36.6 °C) Oral 75 20 95 %   08/16/22 0356 (!) 141/72 98.3 °F (36.8 °C) Oral 69 20 93 %     I/O last 3 completed shifts: In: 250 [P.O.:240; I.V.:10]  Out: 2 [Urine:2]  No intake/output data recorded. General: well appearing, no acute distress, alert and oriented  Eyes: PERRLA, sclerae white  ENT: External inspection of ears and nose normal, oropharynx normal  Respiratory: normal chest wall expansion, lungs CTA bilaterally  Cardiovascular: RRR, no m, femoral pulses 2+ bilaterally; extremities without edema  Abdomen:soft, surgical dsg CDI. TTP as expected.   BS+  Heme/Lymph: without cervical or inguinal adenopathy  Musculoskeletal: gait normal, digits without clubbing or cyanosis  Integumentary: warm, dry, and pink, with no rash, purpura, or petechia  Neurological: Cranial Nerves II-XII grossly intact, normal sensation and muscle strength bilaterally      Data ReviewCBC:   Lab Results   Component Value Date/Time    WBC 10.8 08/15/2022 07:15 AM    RBC 4.22 08/15/2022 07:15 AM     BMP:   Lab Results   Component Value Date/Time    GLUCOSE 119 08/16/2022 08:46 AM    CO2 26 08/16/2022 08:46 AM    BUN 6 08/16/2022 08:46 AM    CREATININE 0.80 08/16/2022 08:46 AM    CALCIUM 9.1 08/16/2022 08:46 AM     Radiology review:   CT CHEST, ABDOMEN AND PELVIS WITH INTRAVENOUS CONTRAST DATED 8/6/2022. History: Cecal mass concerning for colon cancer. Comparison: CT the cardiac over read 4/5/2019, and CT abdomen and pelvis with   contrast 3/27/2022        Technique:   Multiple contiguous helical CT images reconstructed at 5 mm were   obtained from the base of the neck to the ischial tuberosities following oral   and 100 cc Isovue-370 without acute complication. All CT scans performed at   this facility use one or all of the following: Automated exposure control,   adjustment of the mA and/or kVp according to patient's size, iterative   reconstruction. Findings:   CT Chest:   The base of the neck is unremarkable in appearance. No axillary, mediastinal,   or hilar lymphadenopathy is seen. The thoracic aorta is normal in caliber. The   heart appears moderately enlarged. Evaluation with lung windows demonstrates no suspicious pulmonary lesion. Small   nodules are seen in the bilateral lungs measuring up to 5 mm in size. However,   these are unchanged in size and number when compared to a prior CT Overread   dated 4/5/2019. The lack of significant change over multiple years favors   benign nodules. Mild dependent atelectasis is seen. No pleural effusion is   seen. Lungs are expanded without evidence for pneumothorax. No aggressive   appearing osseous lesion is seen. CT ABDOMEN:     The Liver small scattered low-attenuation left lobe hepatic lesions measuring up   to 9 mm in size. Although incompletely characterized, these are also included   in the field of imaging on the prior CT over read dated 4/5/2019 and are   unchanged in size and number. The appearance suggests benign lesions such as   hepatic cysts.   An additional 1.6 cm lesion is seen in the more inferior right lobe on image 68. This is unchanged when compared to the more recent CT scan of   the abdomen dated 3/27/2022 and does not demonstrate significant enhancement   suggesting an additional benign hepatic cyst.  The spleen is homogeneous in   attenuation. No contour deforming or enhancing mass lesions are seen of the   pancreas or adrenal glands. The gallbladder has been removed. The kidneys   enhance symmetrically and no evidence of hydronephrosis is seen. Chronic renal   cortical scarring is seen most evident in the upper pole cortex of the left   kidney       The visualized loops of small bowel and colon are normal in caliber. The   ileocecal valve is seen on axial image 98 demonstrating normal fat attenuation. Just proximal to this along the posterior wall of the cecum is an abnormal wall   lesion measuring 3.5 cm x 2.4 cm in size concerning for a colon neoplasm likely   representing the patient's known cecal mass. No obstruction is suggested by   this at this time. No free fluid and no free air is seen in the abdomen. No   adenopathy is seen of the abdomen. The abdominal aorta demonstrates mild to   moderate atherosclerotic changes. No aggressive appearing osseous lesion is   seen. CT PELVIS:   No abnormal pelvic fluid collections are present. No pelvic adenopathy is seen. The urinary bladder is unremarkable. No aggressive appearing osseous lesion is   seen. Impression   1.  3.5 cm x 2.4 cm cecal mass. No clear evidence for metastatic disease is   evident by CT imaging. Pulmonary nodules, and hepatic lesions are seen which   are favored to be benign as described above.        Assessment:     Principal Problem:    Acute blood loss anemia  Active Problems:    GIB (gastrointestinal bleeding)    Cecum mass    Colon cancer (HCC)    Adenocarcinoma of cecum (HCC)    Ileus following gastrointestinal surgery (HCC)    Acute deep vein thrombosis (DVT) of axillary vein of left upper extremity (HCC)    Paroxysmal atrial fibrillation (HCC)    HTN (hypertension)    Myasthenia gravis (HCC)    SSS (sick sinus syndrome) (HCC)    GERD (gastroesophageal reflux disease)    Diastolic CHF, chronic (HCC)    Dyslipidemia  Resolved Problems:    * No resolved hospital problems. *    8/10/22 S/P BOWEL RESECTION HEMICOLECTOMY LAPAROSCOPIC ROBOTIC, Right Bulmaro  Plan:   > Care Management per Hospitalist      1U PRBC  > Rush out 8/11  >OOB for all meals  FLD 8/16 breakfast  >Follow electrolytes and replace prn  Pathology - RIGHT COLON\":         ADENOCARCINOMA, MODERATELY DIFFERENTIATED MEASURING APPROXIMATELY 5.2 X 4.3 X 2.9 CM. MICROSCOPICALLY TUMOR INFILTRATES THROUGH MUSCULARIS PROPRIA INTO PERICOLONIC ADIPOSE TISSUE. MARGINS OF RESECTION AND 14 PERICOLONIC LYMPH NODES ARE NEGATIVE FOR MALIGNANT TUMOR.  SEPARATE ULCERATED AREA CONSISTENT WITH PRIOR BIOPSY SITE.    >pain control  >PO/OT      NAOMY Sarkar - CNP    8/16/2022 11:00 AM

## 2022-08-16 NOTE — PROGRESS NOTES
enterococcus              - Amoxicillin, EOT 8/15. Blood cxs neg. # HypoK              - Resolved. # Chronic dCHF // HFpEF              - Compensated. # CAD              - ASA on hold with melena. # GERD              - PO PPI     # PAD // CAD // HLD              - ASA as above, statin     # HTN              - Well controlled currently. # Myasthenia gravis? - Noted from problem list. No MG meds on PTA list, d/w Pharmacy 8/11 and couldn't see any outpatient fill history. Patient denied this history? Discharge Planning: Likely home when able. Will call and update his son again today 8/16. Diet:  ADULT ORAL NUTRITION SUPPLEMENT; AM Snack, PM Snack; Standard High Calorie/High Protein Oral Supplement  ADULT DIET; Full Liquid  Code status: Full Code    Hospital Problems             Last Modified POA    * (Principal) Acute blood loss anemia 8/4/2022 Yes    GIB (gastrointestinal bleeding) 8/4/2022 Yes    Cecum mass 8/7/2022 Yes    Colon cancer ([address] 8/11/2022 Yes    Overview Signed 8/11/2022  7:46 AM by NAOMY Traylor - CNP     8/10/22 Madhuri Yang MD) BOWEL RESECTION HEMICOLECTOMY LAPAROSCOPIC ROBOTIC, Right Bulmaro         Adenocarcinoma of cecum ([address] 8/13/2022 Yes    Ileus following gastrointestinal surgery ([address] 8/14/2022 Yes    Acute deep vein thrombosis (DVT) of axillary vein of left upper extremity ([address] 8/15/2022 Yes    Paroxysmal atrial fibrillation ([address] 8/9/2022 Yes    Overview Signed 3/28/2022 12:16 AM by Edmar Noriega MD     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             HTN (hypertension) 8/4/2022 Yes    Myasthenia gravis ([address] 8/7/2022 Yes    SSS (sick sinus syndrome) ([address] 8/9/2022 Yes    GERD (gastroesophageal reflux disease) 7/2/4311 Yes    Diastolic CHF, chronic ([address] 8/4/2022 Yes    Dyslipidemia 8/4/2022 Yes       Objective:   Patient Vitals for the past 24 hrs:   Temp Pulse Resp BP SpO2   08/16/22 1115 97.8 °F (36.6 °C) 75 20 127/76 95 %   08/16/22 0750 97.8 °F (36.6 °C) 75 20 (!) 153/76 95 %   08/16/22 0356 98.3 °F (36.8 °C) 69 20 (!) 141/72 93 %   08/15/22 2230 98.2 °F (36.8 °C) 75 20 (!) 163/77 99 %   08/15/22 1955 98.5 °F (36.9 °C) 75 16 (!) 168/79 95 %   08/15/22 1545 97.5 °F (36.4 °C) 78 18 (!) 170/80 95 %       Estimated body mass index is 31.41 kg/m² as calculated from the following:    Height as of this encounter: 5' 8\" (1.727 m). Weight as of this encounter: 206 lb 9.6 oz (93.7 kg). Intake/Output Summary (Last 24 hours) at 8/16/2022 1209  Last data filed at 8/16/2022 1115  Gross per 24 hour   Intake 360 ml   Output --   Net 360 ml         Physical Exam:   Blood pressure 127/76, pulse 75, temperature 97.8 °F (36.6 °C), temperature source Oral, resp. rate 20, height 5' 8\" (1.727 m), weight 206 lb 9.6 oz (93.7 kg), SpO2 95 %. General:    Well nourished. No overt distress. Obese. Appears stated age. Head:  Normocephalic, atraumatic  Eyes:  Sclerae appear normal. Pupils equally round. ENT:  Nares appear normal, no drainage. Moist oral mucosa  Neck:  No restricted ROM. Trachea midline. CV:   RRR. No m/r/g. No jugular venous distension. Lungs:   CTAB. No wheezing, rhonchi, or rales. Respirations even, unlabored. Abdomen: Bowel sounds present. Soft, mildly distended. Lap sites healing well. Extremities: No cyanosis or clubbing. Trace b/l LE edema. Pitting edema to LUE, no appreciable cording. Skin:     No rashes and normal coloration. Warm and dry. Neuro:  CN II-XII grossly intact. Sensation intact. A&Ox3  Psych:  Normal mood and affect.       I have reviewed ordered lab tests and independently visualized imaging below:    Recent Labs:  Recent Results (from the past 48 hour(s))   CBC    Collection Time: 08/15/22  7:15 AM   Result Value Ref Range    WBC 10.8 4.3 - 11.1 K/uL    RBC 4.22 (L) 4.23 - 5.6 M/uL    Hemoglobin 8.5 (L) 13.6 - 17.2 g/dL    Hematocrit 30.1 (L) 41.1 - 50.3 %    MCV 71.3 (L) 79.6 - 97.8 FL MCH 20.1 (L) 26.1 - 32.9 PG    MCHC 28.2 (L) 31.4 - 35.0 g/dL    RDW 22.7 (H) 11.9 - 14.6 %    Platelets 791 736 - 159 K/uL    MPV 9.2 (L) 9.4 - 12.3 FL    nRBC 0.03 0.0 - 0.2 K/uL   Hemoglobin and Hematocrit    Collection Time: 08/15/22  7:19 PM   Result Value Ref Range    Hemoglobin 9.8 (L) 13.6 - 17.2 g/dL    Hematocrit 34.5 (L) 41.1 - 50.3 %   Hemoglobin and Hematocrit    Collection Time: 08/16/22  6:10 AM   Result Value Ref Range    Hemoglobin 10.4 (L) 13.6 - 17.2 g/dL    Hematocrit 36.5 (L) 41.1 - 50.3 %   Basic Metabolic Panel w/ Reflex to MG    Collection Time: 08/16/22  8:46 AM   Result Value Ref Range    Sodium 134 (L) 136 - 145 mmol/L    Potassium 3.5 3.5 - 5.1 mmol/L    Chloride 110 (H) 98 - 107 mmol/L    CO2 26 21 - 32 mmol/L    Anion Gap Cannot be calculated 7 - 16 mmol/L    Glucose 119 (H) 65 - 100 mg/dL    BUN 6 (L) 8 - 23 MG/DL    Creatinine 0.80 0.8 - 1.5 MG/DL    GFR African American >60 >60 ml/min/1.73m2    GFR Non- >60 >60 ml/min/1.73m2    Calcium 9.1 8.3 - 10.4 MG/DL   Magnesium    Collection Time: 08/16/22  8:46 AM   Result Value Ref Range    Magnesium 1.9 1.8 - 2.4 mg/dL         Other Studies:  Vascular duplex upper extremity venous left    Result Date: 8/15/2022  Clinical history: Left upper extremity edema. TECHNIQUE: Grayscale and duplex venous ultrasound of the left upper extremity. FINDINGS: The internal jugular, innominate and subclavian veins are patent and demonstrate color Doppler flow. The brachial, radial and ulnar veins are patent and compressible. There is color Doppler flow. The cephalic vein is patent. There is thrombus within the axillary vein. There is significant decrease color Doppler flow. There is lack of compressibility. There is superficial vein thrombosis in the basilic vein, occlusive. There is absent color Doppler flow. 1. Deep vein thrombosis in the axillary vein. 2. Superficial vein thrombus in the basilic vein.       Current Meds:  Current Facility-Administered Medications   Medication Dose Route Frequency    heparin (porcine) injection 7,500 Units  80 Units/kg IntraVENous PRN    heparin (porcine) injection 3,750 Units  40 Units/kg IntraVENous PRN    heparin 25,000 units in dextrose 5% 250 mL (premix) infusion  5-30 Units/kg/hr IntraVENous Continuous    oxyCODONE-acetaminophen (PERCOCET) 5-325 MG per tablet 1 tablet  1 tablet Oral Q4H PRN    pantoprazole (PROTONIX) 40 mg in sodium chloride (PF) 10 mL injection  40 mg IntraVENous Q12H    0.9 % sodium chloride infusion   IntraVENous PRN    docusate sodium (COLACE) capsule 100 mg  100 mg Oral BID    magnesium sulfate 2000 mg in 50 mL IVPB premix  2,000 mg IntraVENous PRN    potassium chloride (KLOR-CON M) extended release tablet 40 mEq  40 mEq Oral PRN    Or    potassium bicarb-citric acid (EFFER-K) effervescent tablet 40 mEq  40 mEq Oral PRN    Or    potassium chloride 10 mEq/100 mL IVPB (Peripheral Line)  10 mEq IntraVENous PRN    lidocaine 1 % injection 5 mL  5 mL IntraDERmal Once    sodium chloride flush 0.9 % injection 5-40 mL  5-40 mL IntraVENous 2 times per day    sodium chloride flush 0.9 % injection 5-40 mL  5-40 mL IntraVENous PRN    heparin flush 100 UNIT/ML injection 100 Units  1 mL IntraCATHeter PRN    [Held by provider] aspirin EC tablet 81 mg  81 mg Oral Daily    atorvastatin (LIPITOR) tablet 80 mg  80 mg Oral Nightly    [Held by provider] furosemide (LASIX) tablet 40 mg  40 mg Oral Daily    rOPINIRole (REQUIP) tablet 0.5 mg  0.5 mg Oral Nightly    diatrizoate meglumine-sodium (GASTROGRAFIN) 66-10 % solution 15 mL  15 mL Oral ONCE PRN    sodium chloride flush 0.9 % injection 5-40 mL  5-40 mL IntraVENous 2 times per day    sodium chloride flush 0.9 % injection 5-40 mL  5-40 mL IntraVENous PRN    0.9 % sodium chloride infusion   IntraVENous PRN    ondansetron (ZOFRAN-ODT) disintegrating tablet 4 mg  4 mg Oral Q8H PRN    Or    ondansetron (ZOFRAN) injection 4 mg  4 mg IntraVENous Q6H PRN acetaminophen (TYLENOL) tablet 650 mg  650 mg Oral Q6H PRN    Or    acetaminophen (TYLENOL) suppository 650 mg  650 mg Rectal Q6H PRN    albuterol (PROVENTIL) nebulizer solution 2.5 mg  2.5 mg Nebulization Q6H PRN    temazepam (RESTORIL) capsule 15 mg  15 mg Oral Nightly PRN       Signed:  Nestor Aschoff, MD    Part of this note may have been written by using a voice dictation software. The note has been proof read but may still contain some grammatical/other typographical errors.

## 2022-08-16 NOTE — PROGRESS NOTES
Spoke to son, Media Cover, over the phone and gave him an update on diet, Hb and plan for heparin.      Les Soliman MD

## 2022-08-16 NOTE — PROGRESS NOTES
Agree with note. Had some bleeding post colon ca resection that seems now to have subsided. Hgb stable. If rebleeds will get tagged RBC scan. Will sign off for now. Collette Cortes MD      [facility] Progress Note         Admit Date:  8/4/2022  Today's Date:  8/16/2022    CC: Dark stools    Subjective:     Patient sitting in bedside chair, feels well. No N/V. Reports some lower abdominal discomfort yesterday, none this morning. Describes dark loose stools x2 since last night, but not black tarry like.     Medications:   Current Facility-Administered Medications   Medication Dose Route Frequency    oxyCODONE-acetaminophen (PERCOCET) 5-325 MG per tablet 1 tablet  1 tablet Oral Q4H PRN    pantoprazole (PROTONIX) 40 mg in sodium chloride (PF) 10 mL injection  40 mg IntraVENous Q12H    0.9 % sodium chloride infusion   IntraVENous PRN    docusate sodium (COLACE) capsule 100 mg  100 mg Oral BID    magnesium sulfate 2000 mg in 50 mL IVPB premix  2,000 mg IntraVENous PRN    potassium chloride (KLOR-CON M) extended release tablet 40 mEq  40 mEq Oral PRN    Or    potassium bicarb-citric acid (EFFER-K) effervescent tablet 40 mEq  40 mEq Oral PRN    Or    potassium chloride 10 mEq/100 mL IVPB (Peripheral Line)  10 mEq IntraVENous PRN    lidocaine 1 % injection 5 mL  5 mL IntraDERmal Once    sodium chloride flush 0.9 % injection 5-40 mL  5-40 mL IntraVENous 2 times per day    sodium chloride flush 0.9 % injection 5-40 mL  5-40 mL IntraVENous PRN    heparin flush 100 UNIT/ML injection 100 Units  1 mL IntraCATHeter PRN    [Held by provider] aspirin EC tablet 81 mg  81 mg Oral Daily    atorvastatin (LIPITOR) tablet 80 mg  80 mg Oral Nightly    [Held by provider] furosemide (LASIX) tablet 40 mg  40 mg Oral Daily    rOPINIRole (REQUIP) tablet 0.5 mg  0.5 mg Oral Nightly    diatrizoate meglumine-sodium (GASTROGRAFIN) 66-10 % solution 15 mL  15 mL Oral ONCE PRN    sodium chloride flush 0.9 % injection 5-40 mL  5-40 mL IntraVENous 2 times per day    sodium chloride flush 0.9 % injection 5-40 mL  5-40 mL IntraVENous PRN    0.9 % sodium chloride infusion   IntraVENous PRN    ondansetron (ZOFRAN-ODT) disintegrating tablet 4 mg  4 mg Oral Q8H PRN    Or    ondansetron (ZOFRAN) injection 4 mg  4 mg IntraVENous Q6H PRN    acetaminophen (TYLENOL) tablet 650 mg  650 mg Oral Q6H PRN    Or    acetaminophen (TYLENOL) suppository 650 mg  650 mg Rectal Q6H PRN    albuterol (PROVENTIL) nebulizer solution 2.5 mg  2.5 mg Nebulization Q6H PRN    temazepam (RESTORIL) capsule 15 mg  15 mg Oral Nightly PRN       Review of Systems:  ROS was obtained, with pertinent positives as listed above. No chest pain or SOB. Diet:  CLD    Objective:   Vitals:  BP (!) 141/72   Pulse 69   Temp 98.3 °F (36.8 °C) (Oral)   Resp 20   Ht 5' 8\" (1.727 m)   Wt 206 lb 9.6 oz (93.7 kg)   SpO2 93%   BMI 31.41 kg/m²   Intake/Output:  No intake/output data recorded. 08/14 1901 - 08/16 0700  In: 250 [P.O.:240; I.V.:10]  Out: 2 [Urine:2]  Exam:  Gen:  Pt is alert, cooperative, NAD  Cardiovascular: RRR. No murmurs, gallops, or rubs. Respiratory:  CTAB. GI:  Abdomen distended, mildly TTP diffusely, + bowel sounds. Rectal:  Deferred  Neurological:  Gross memory appears intact. Patient A&O  Psychiatric:  Mood appears appropriate with judgement intact.           Data Review (Labs):    Recent Labs     08/14/22  0614 08/15/22  0715 08/15/22  1919 08/16/22  0610   WBC 9.8 10.8  --   --    HGB 6.7* 8.5* 9.8* 10.4*   HCT 24.4* 30.1* 34.5* 36.5*    225  --   --    MCV 69.9* 71.3*  --   --        Assessment:     Principal Problem:    Acute blood loss anemia    Active Problems:    GIB (gastrointestinal bleeding)    Cecum mass    Colon cancer (HCC)    Adenocarcinoma of cecum (HCC)    Ileus following gastrointestinal surgery (HCC)    Acute deep vein thrombosis (DVT) of axillary vein of left upper extremity (HCC)    Paroxysmal atrial fibrillation (HCC)    HTN (hypertension)    Myasthenia gravis (HCC)    SSS (sick sinus syndrome) (HCC)    GERD (gastroesophageal reflux disease)    Diastolic CHF, chronic (HCC)    Dyslipidemia    GI work-up / evaluation earlier this hospitalization for ABLA and melena. . EGD 8/5 was relatively unremarkable, but colonoscopy 8/6 showed a cecal adenocarcinoma, s/p R hemicolectomy 8/10. Hospital course c/b post-op ileus. Also now found to have LUE DVT with need for anticoagulation. We were called back out of concern for possible melena with drop in Hgb, although his reports of melena are not very impressive. Hgb improved. Suspect post-surgical inflammation vs an anastomotic bleed, particularly in light of a recent unremarkable EGD    Plan:     Recommend conservative management for now. Continue PPI and monitor Hgb, transfusing PRN. If his Hgb remains stable, should be okay to anticoagulate for his UE DVT. Should he develop significant signs of bleeding, would consider tagged RBC scan vs CTA to evaluate further. Will sign-off for now. Please call if we can be of any further assistance or if patient demonstrates overt/active bleeding with significant decline in Hgb.        Yajaira Spears PA-C  [facility]

## 2022-08-17 LAB
HCT VFR BLD AUTO: 28.7 % (ref 41.1–50.3)
HGB BLD-MCNC: 8.2 G/DL (ref 13.6–17.2)
UFH PPP CHRO-ACNC: 0.8 IU/ML (ref 0.3–0.7)
UFH PPP CHRO-ACNC: 0.97 IU/ML (ref 0.3–0.7)

## 2022-08-17 PROCEDURE — 6370000000 HC RX 637 (ALT 250 FOR IP): Performed by: FAMILY MEDICINE

## 2022-08-17 PROCEDURE — C9113 INJ PANTOPRAZOLE SODIUM, VIA: HCPCS | Performed by: INTERNAL MEDICINE

## 2022-08-17 PROCEDURE — 85018 HEMOGLOBIN: CPT

## 2022-08-17 PROCEDURE — 85520 HEPARIN ASSAY: CPT

## 2022-08-17 PROCEDURE — A4216 STERILE WATER/SALINE, 10 ML: HCPCS | Performed by: INTERNAL MEDICINE

## 2022-08-17 PROCEDURE — 36415 COLL VENOUS BLD VENIPUNCTURE: CPT

## 2022-08-17 PROCEDURE — 6370000000 HC RX 637 (ALT 250 FOR IP): Performed by: INTERNAL MEDICINE

## 2022-08-17 PROCEDURE — 6370000000 HC RX 637 (ALT 250 FOR IP): Performed by: SURGERY

## 2022-08-17 PROCEDURE — 94760 N-INVAS EAR/PLS OXIMETRY 1: CPT

## 2022-08-17 PROCEDURE — 2580000003 HC RX 258: Performed by: INTERNAL MEDICINE

## 2022-08-17 PROCEDURE — 6360000002 HC RX W HCPCS: Performed by: INTERNAL MEDICINE

## 2022-08-17 PROCEDURE — 2580000003 HC RX 258: Performed by: HOSPITALIST

## 2022-08-17 PROCEDURE — 1100000000 HC RM PRIVATE

## 2022-08-17 RX ADMIN — ATORVASTATIN CALCIUM 80 MG: 80 TABLET, FILM COATED ORAL at 20:36

## 2022-08-17 RX ADMIN — HEPARIN SODIUM 15 UNITS/KG/HR: 10000 INJECTION, SOLUTION INTRAVENOUS at 19:35

## 2022-08-17 RX ADMIN — DOCUSATE SODIUM 100 MG: 100 CAPSULE, LIQUID FILLED ORAL at 09:36

## 2022-08-17 RX ADMIN — SODIUM CHLORIDE, PRESERVATIVE FREE 40 MG: 5 INJECTION INTRAVENOUS at 23:02

## 2022-08-17 RX ADMIN — TEMAZEPAM 15 MG: 15 CAPSULE ORAL at 20:36

## 2022-08-17 RX ADMIN — SODIUM CHLORIDE, PRESERVATIVE FREE 10 ML: 5 INJECTION INTRAVENOUS at 09:41

## 2022-08-17 RX ADMIN — SODIUM CHLORIDE, PRESERVATIVE FREE 10 ML: 5 INJECTION INTRAVENOUS at 20:36

## 2022-08-17 RX ADMIN — DOCUSATE SODIUM 100 MG: 100 CAPSULE, LIQUID FILLED ORAL at 20:36

## 2022-08-17 RX ADMIN — SODIUM CHLORIDE, PRESERVATIVE FREE 40 MG: 5 INJECTION INTRAVENOUS at 09:36

## 2022-08-17 RX ADMIN — ROPINIROLE HYDROCHLORIDE 0.5 MG: 0.5 TABLET, FILM COATED ORAL at 20:36

## 2022-08-17 NOTE — PROGRESS NOTES
Intake:  (Not receiving d/t ordered as AM and PM snack)      Anthropometric Measures:  Height: 5' 8\" (172.7 cm)  Current Body Wt: 205 lb 14.6 oz (93.4 kg) (8/16), Weight source: Bed Scale  BMI: 31.3, Obese Class 1 (BMI 30.0-34. 9)  Admission Body Weight: 197 lb 15.6 oz (89.8 kg) (8/4, not specified)  Ideal Body Weight (Kg) (Calculated): 70 kg (154 lbs), 134.1 %  Usual Body Wt: 217 lb (98.4 kg) (office wt 8/2021), Percent weight change: -4.8       Edema:    BMI Category Obese Class 1 (BMI 30.0-34. 9)  Estimated Daily Nutrient Needs:  Energy (kcal/day): 1089-0178 (Kcal/kg (18-23) Weight used: 93.7 kg Current  Protein (g/day):  (20% kcal) Weight Used: (Current)    Fluid (ml/day):   (1 ml/kcal)    Nutrition Diagnosis:   Inadequate oral intake related to altered GI function as evidenced by  (slow progression of diet s/p colectomy and ileus)  Nutrition Interventions:   Food and/or Nutrient Delivery: Continue Current Diet, Modify Oral Nutrition Supplement     Coordination of Nutrition Care: Continue to monitor while inpatient       Goals:   Previous Goal Met: Goal(s) Achieved  Active Goal: PO intake 50% or greater, by next RD assessment       Nutrition Monitoring and Evaluation:      Food/Nutrient Intake Outcomes: Diet Advancement/Tolerance, Food and Nutrient Intake, Supplement Intake  Physical Signs/Symptoms Outcomes: GI Status, Meal Time Behavior, Weight    Discharge Planning:     Too soon to determine    ANGÉLICA JACOBSEN, BYRON

## 2022-08-17 NOTE — PROGRESS NOTES
Hospitalist Progress Note   Admit Date:  2022  4:05 PM   Name:  Teddie Barthel   Age:  79 y.o. Sex:  male  :  1940   MRN:  [id number]   Room:  [facility]    Presenting Complaint: Abnormal Lab     Reason(s) for Admission: Acute blood loss anemia [D62]  Anemia, unspecified type [D64.9]     Hospital Course & Interval History:   24-year-old male with history of atrial fibrillation status post watchman, hypertension, sick sinus syndrome with PPM, GERD hyperlipidemia, coronary artery disease, heart failure preserved ejection fraction is admitted with acute blood loss anemia from GI source. Initial hemoglobin was 6.5.  GI consulted. EGD on  showed 2 small erosion in the proximal stomach and otherwise unremarkable. Colonoscopy  showed cecal mass and 2 sessile polyps that were removed with hot snare. General surgery consulted and he underwent laparoscopic right hemicolectomy on 8/10. Transfused 1 unit packed red blood cell on  left upper extremity ultrasound on 8/15 showed axillary DVT. GI reconsulted given anemia with transfusion and melena. Hemoglobin improved, heparin drip without initial bolus ordered  after discussing with patient. Subjective/24hr Events (22): Patient was seen and examined at the bedside. No overnight events. Patient sitting in chair comfortably without any major complaints. Patient able to pass gas and had 4 bowel movements. Patient denied any cardiac chest pain, shortness of breath, fever/chills.       Assessment & Plan:   Acute left upper extremity axillary vein DVT  - GI reconsulted on 8/15  - On IV PPI  - Hemoglobin stable at 8.2  - Risks and benefits of heparin versus no heparin discussed with patient and patient's son  - Started on heparin drip without initial bolus  - Continue to monitor hemoglobin and any obvious signs of bleeding  - Patient can have heparin changed to NOAC  if hemoglobin stable and no obvious sign of bleeding    Acute Time: 08/16/22  6:10 AM   Result Value Ref Range    Hemoglobin 10.4 (L) 13.6 - 17.2 g/dL    Hematocrit 36.5 (L) 41.1 - 50.3 %   Basic Metabolic Panel w/ Reflex to MG    Collection Time: 08/16/22  8:46 AM   Result Value Ref Range    Sodium 134 (L) 136 - 145 mmol/L    Potassium 3.5 3.5 - 5.1 mmol/L    Chloride 110 (H) 98 - 107 mmol/L    CO2 26 21 - 32 mmol/L    Anion Gap Cannot be calculated 7 - 16 mmol/L    Glucose 119 (H) 65 - 100 mg/dL    BUN 6 (L) 8 - 23 MG/DL    Creatinine 0.80 0.8 - 1.5 MG/DL    GFR African American >60 >60 ml/min/1.73m2    GFR Non- >60 >60 ml/min/1.73m2    Calcium 9.1 8.3 - 10.4 MG/DL   Magnesium    Collection Time: 08/16/22  8:46 AM   Result Value Ref Range    Magnesium 1.9 1.8 - 2.4 mg/dL   Hemoglobin and Hematocrit    Collection Time: 08/16/22  7:07 PM   Result Value Ref Range    Hemoglobin 8.9 (L) 13.6 - 17.2 g/dL    Hematocrit 32.2 (L) 41.1 - 50.3 %   Anti-Xa, Unfractionated Heparin    Collection Time: 08/16/22  7:07 PM   Result Value Ref Range    Anti-XA Unfrac Heparin 0.35 0.3 - 0.7 IU/mL   Hemoglobin and Hematocrit    Collection Time: 08/17/22  2:26 AM   Result Value Ref Range    Hemoglobin 8.2 (L) 13.6 - 17.2 g/dL    Hematocrit 28.7 (L) 41.1 - 50.3 %   Anti-Xa, Unfractionated Heparin    Collection Time: 08/17/22  2:26 AM   Result Value Ref Range    Anti-XA Unfrac Heparin 0.80 (H) 0.3 - 0.7 IU/mL   Anti-Xa, Unfractionated Heparin    Collection Time: 08/17/22 10:09 AM   Result Value Ref Range    Anti-XA Unfrac Heparin 0.97 (H) 0.3 - 0.7 IU/mL       I have personally reviewed imaging studies showing: Other Studies:  Vascular duplex upper extremity venous left   Final Result      1. Deep vein thrombosis in the axillary vein. 2. Superficial vein thrombus in the basilic vein. XR ABDOMEN (KUB) (SINGLE AP VIEW)   Final Result   Nonspecific prominent loops of bowel in the central upper and left   upper abdominal quadrant. Ileus suspected.       CT CHEST ABDOMEN PELVIS W CONTRAST   Final Result   1.  3.5 cm x 2.4 cm cecal mass. No clear evidence for metastatic disease is   evident by CT imaging. Pulmonary nodules, and hepatic lesions are seen which   are favored to be benign as described above. This report was made using voice transcription. Despite my best efforts to avoid   any, transcription errors may persist. If there is any question about the   accuracy of the report or need for clarification, then please call [phone], or text me through [email or web address] for clarification or correction. XR CHEST PORTABLE   Final Result   1. No consolidation. 2. Chronic cardiac enlargement, pacemaker.              Current Meds:  Current Facility-Administered Medications   Medication Dose Route Frequency    heparin (porcine) injection 7,500 Units  80 Units/kg IntraVENous PRN    heparin (porcine) injection 3,750 Units  40 Units/kg IntraVENous PRN    heparin 25,000 units in dextrose 5% 250 mL (premix) infusion  5-30 Units/kg/hr IntraVENous Continuous    oxyCODONE-acetaminophen (PERCOCET) 5-325 MG per tablet 1 tablet  1 tablet Oral Q4H PRN    pantoprazole (PROTONIX) 40 mg in sodium chloride (PF) 10 mL injection  40 mg IntraVENous Q12H    0.9 % sodium chloride infusion   IntraVENous PRN    docusate sodium (COLACE) capsule 100 mg  100 mg Oral BID    magnesium sulfate 2000 mg in 50 mL IVPB premix  2,000 mg IntraVENous PRN    potassium chloride (KLOR-CON M) extended release tablet 40 mEq  40 mEq Oral PRN    Or    potassium bicarb-citric acid (EFFER-K) effervescent tablet 40 mEq  40 mEq Oral PRN    Or    potassium chloride 10 mEq/100 mL IVPB (Peripheral Line)  10 mEq IntraVENous PRN    lidocaine 1 % injection 5 mL  5 mL IntraDERmal Once    sodium chloride flush 0.9 % injection 5-40 mL  5-40 mL IntraVENous 2 times per day    sodium chloride flush 0.9 % injection 5-40 mL  5-40 mL IntraVENous PRN    heparin flush 100 UNIT/ML injection 100 Units  1 mL IntraCATHeter PRN    [Held by provider] aspirin EC tablet 81 mg  81 mg Oral Daily    atorvastatin (LIPITOR) tablet 80 mg  80 mg Oral Nightly    [Held by provider] furosemide (LASIX) tablet 40 mg  40 mg Oral Daily    rOPINIRole (REQUIP) tablet 0.5 mg  0.5 mg Oral Nightly    diatrizoate meglumine-sodium (GASTROGRAFIN) 66-10 % solution 15 mL  15 mL Oral ONCE PRN    sodium chloride flush 0.9 % injection 5-40 mL  5-40 mL IntraVENous 2 times per day    sodium chloride flush 0.9 % injection 5-40 mL  5-40 mL IntraVENous PRN    0.9 % sodium chloride infusion   IntraVENous PRN    ondansetron (ZOFRAN-ODT) disintegrating tablet 4 mg  4 mg Oral Q8H PRN    Or    ondansetron (ZOFRAN) injection 4 mg  4 mg IntraVENous Q6H PRN    acetaminophen (TYLENOL) tablet 650 mg  650 mg Oral Q6H PRN    Or    acetaminophen (TYLENOL) suppository 650 mg  650 mg Rectal Q6H PRN    albuterol (PROVENTIL) nebulizer solution 2.5 mg  2.5 mg Nebulization Q6H PRN    temazepam (RESTORIL) capsule 15 mg  15 mg Oral Nightly PRN       Signed:  Tayler Shah MD    Part of this note may have been written by using a voice dictation software. The note has been proof read but may still contain some grammatical/other typographical errors.

## 2022-08-17 NOTE — PROGRESS NOTES
Subjective:     Patient is a 79 y.o.  male presents with anemia. He had colonoscopy done on 8 5 which showed a lesion in the cecum consistent with a neoplasm. We were asked to evaluate for possible surgical resection. He reports no abdominal pain. Currently he denies nausea vomiting diarrhea constipation hematochezia hematemesis or melena. He has been on full liquids. [place] fevers or chills. 8/8/2022 - Doing well this AM. No bleeding reported. Tolerating liquid diet. No prep received. 8/9/22: No complaints, no bleeding. Liquid diet and bowel prep today. 8/10/22 BOWEL RESECTION HEMICOLECTOMY LAPAROSCOPIC ROBOTIC, Right Bulmaro    8/11/22 POD1: Comfortable overnight. Pain controlled. AF/VSS. Abd: surgical dsg CDI, abd mildly distended, BS+ with tenderness as expected. -Flatus, -BM   overnight. AJL04.7    8/12/22 POD2: Awake and sitting up. Has been oob/ and ambulating. Pain currently controlled. Abd: surgical dsg CDI, abd mildly distended, BS+ and appropriately ttp  +Flatus, +BM (loose)  Rush out. BFJ93.4    8/13/22 POD3: Awake and sitting in recliner. Has been oob/ and ambulating. Pain currently controlled. Feels distended, however, denies nausea. Abd: surgical dsg CDI, abd  distended, BS+ and appropriately ttp  +Flatus, +BM (loose) Rush out. UCP92.4    8/14/22 POD4: Awake and up walking in room. Has been oob/ and ambulating. Pain currently controlled. States feeling better but still feels bloated. Abd: surgical dsg CDI, abd  distended, BS+ and appropriately ttp  +Flatus/ +BM. WBC 9.4, HGB 6.7    8/15/2022 POD5: doing well. Sitting in a chair this AM. Mo flatus or BM. Abd distended. Walking. No nausea or vomiting.     8/16/2022 POD6: doing well. Sitting in a chair this AM. +flatus +BM. Abd soft, trochar sites intact. Walking. No nausea or vomiting.     8/17/2022 POD7: doing well. Sitting in a chair this AM. +flatus +BM. x4 (Liquid brown yesterday) Abd soft, ttp as expected, trochar sites intact. Walking. No nausea or vomiting.      Patient Active Problem List    Diagnosis Date Noted    Acute deep vein thrombosis (DVT) of axillary vein of left upper extremity (HCC) 08/15/2022    Ileus following gastrointestinal surgery ([address] 08/14/2022    Adenocarcinoma of cecum ([address] 08/13/2022    Cecum mass 08/07/2022    Colon cancer ([address] 08/07/2022    Acute blood loss anemia 08/04/2022    GIB (gastrointestinal bleeding) 08/04/2022    Urine retention 04/03/2022    Acute cholecystitis 03/28/2022    Pancreatitis, gallstone 03/28/2022    Anemia 03/28/2022    Bacteremia 03/28/2022    Major depressive disorder, recurrent, moderate ([address] 31/58/9431    Diastolic CHF, chronic ([facility]) 03/02/2022    Major depressive disorder, recurrent, mild ([address] 03/02/2022    Major depressive disorder, recurrent, unspecified ([address] 03/02/2022    Pacemaker 04/14/2021    Atrial fibrillation ([address] 11/29/2017    GERD (gastroesophageal reflux disease) 10/27/2017    Hypotension 10/26/2017    Sepsis ([address] 10/26/2017    Syncope 10/24/2017    Paroxysmal atrial fibrillation ([address] 06/30/2017    SSS (sick sinus syndrome) ([address] 06/30/2017    Bilateral carotid artery disease ([address] 06/30/2017    Myasthenia gravis ([address]     Mitral valve regurgitation 06/07/2016    Hypokalemia 06/07/2016    Dyslipidemia 06/07/2016    HTN (hypertension) 05/08/2015    Dyspnea 05/08/2015    S/P coronary artery stent placement 05/08/2015    Coronary atherosclerosis of native coronary vessel 05/08/2015     Past Medical History:   Diagnosis Date    Abnormal EKG 4/22/15    Arrhythmia     Aspiration pneumonia ([address] 10/26/2017    CAD (coronary artery disease) 5/8/2015    Carotid artery stenosis without cerebral infarction 6/7/2016    US 6/15: <50% bilat ICAs    Coronary atherosclerosis of native coronary vessel 5/8/2015    GERMAIN on brilinta 5/7/15: prox LAD PCI, normal EF     Diastolic CHF, chronic ([address] 3/2/2022    Dyslipidemia 6/7/2016    Dyspnea 5/8/2015    Echo 6/15: EF 60%, mod MR, mod LVH, mild AI     ED (erectile dysfunction)     GERD (gastroesophageal reflux disease)     GERD (gastroesophageal reflux disease)     no medication    HTN (hypertension) 5/8/2015    Hypertension     Hypokalemia     Hypokalemia 6/7/2016    Mitral valve regurgitation 6/7/2016    Morbid obesity ([address]     Myasthenia gravis ([address]     Myasthenia gravis ([address] 6/17/15    Nocturia     Osteoporosis     PUD (peptic ulcer disease) 25 yrs ago    S/P coronary artery stent placement 5/8/2015    3.0x38 mm Xience CAROL to pLAD 5/7/15     Sleep apnea     Syncope and collapse     Unspecified sleep apnea     no cpap      Past Surgical History:   Procedure Laterality Date    APPENDECTOMY      CARDIAC CATHETERIZATION  05/21/2019    watchman device    CARDIAC CATHETERIZATION  05/27/2015    stent    COLONOSCOPY  2007    COLONOSCOPY N/A 8/6/2022    COLONOSCOPY POLYPECTOMY SNARE/COLD BIOPSY performed by Amanda Whitlock MD at [facility] ENDOSCOPY    ERCP  3/30/2022         HEMICOLECTOMY Right 8/10/2022    BOWEL RESECTION HEMICOLECTOMY LAPAROSCOPIC ROBOTIC, Right Bulmaro performed by Heriberto Storm MD at [facility]  2018    Gerry Kraus/Giana for sleep apnea and reconstruction for extending jaw    UPPER GASTROINTESTINAL ENDOSCOPY N/A 8/5/2022    EGD ESOPHAGOGASTRODUODENOSCOPY Rm 525 performed by Linette Hassan MD at [address] Right 07/10/2019     Repair of right radial artery pseudoaneurysm      Medications Prior to Admission: potassium chloride (KLOR-CON M) 20 MEQ extended release tablet, Take 1 tablet by mouth daily  aspirin 81 MG EC tablet, Take 81 mg by mouth daily  atorvastatin (LIPITOR) 80 MG tablet, Take 80 mg by mouth  cyanocobalamin 1000 MCG tablet, Take 1,000 mcg by mouth daily (Patient not taking: No sig reported)  escitalopram (LEXAPRO) 10 MG tablet, Take 10 mg by mouth daily (Patient not taking: No sig reported)  furosemide (LASIX) 40 MG tablet, Take 40 mg by mouth daily  nitroGLYCERIN (NITROSTAT) 0.4 MG SL tablet, Place 1 sl under the tongue q 5 min prn cp, max 3 sl in a 15-min time period. Call 911 if no relief after the 3rd sl.  pantoprazole (PROTONIX) 40 MG tablet, Take 40 mg by mouth every morning (before breakfast) (Patient not taking: No sig reported)  rOPINIRole (REQUIP) 0.5 MG tablet, 1 nightly, increase to 1 twice a day if needed  tamsulosin (FLOMAX) 0.4 MG capsule, Take 0.4 mg by mouth daily  No Known Allergies   Social History     Tobacco Use    Smoking status: Never    Smokeless tobacco: Never   Substance Use Topics    Alcohol use: No      Family History   Problem Relation Age of Onset    No Known Problems Brother     Cancer Brother         brain tumor    No Known Problems Sister     Cancer Mother         kidney    Cancer Father         stomach      Review of Systems  Pertinent items are noted in HPI. Objective:     Patient Vitals for the past 8 hrs:   BP Temp Temp src Pulse Resp SpO2   08/17/22 0315 (!) 148/77 98.1 °F (36.7 °C) Oral 70 20 96 %     I/O last 3 completed shifts: In: 1214 [P.O.:1080; I.V.:288]  Out: 3 [Urine:3]  No intake/output data recorded. General: well appearing, no acute distress, alert and oriented  Eyes: PERRLA, sclerae white  ENT: External inspection of ears and nose normal, oropharynx normal  Respiratory: normal chest wall expansion, lungs CTA bilaterally  Cardiovascular: RRR, no m, femoral pulses 2+ bilaterally; extremities without edema  Abdomen:soft, surgical dsg CDI. TTP as expected.   BS+  Heme/Lymph: without cervical or inguinal adenopathy  Musculoskeletal: gait normal, digits without clubbing or cyanosis  Integumentary: warm, dry, and pink, with no rash, purpura, or petechia  Neurological: Cranial Nerves II-XII grossly intact, normal sensation and muscle strength bilaterally      Data ReviewCBC:   Lab Results   Component Value Date/Time    WBC 10.8 08/15/2022 07:15 AM    RBC 4.22 08/15/2022 07:15 AM     BMP:   Lab Results   Component Value Date/Time    GLUCOSE 119 08/16/2022 08:46 AM    CO2 26 08/16/2022 08:46 AM    BUN 6 08/16/2022 08:46 AM    CREATININE 0.80 08/16/2022 08:46 AM    CALCIUM 9.1 08/16/2022 08:46 AM     Radiology review:   CT CHEST, ABDOMEN AND PELVIS WITH INTRAVENOUS CONTRAST DATED 8/6/2022. History: Cecal mass concerning for colon cancer. Comparison: CT the cardiac over read 4/5/2019, and CT abdomen and pelvis with   contrast 3/27/2022        Technique:   Multiple contiguous helical CT images reconstructed at 5 mm were   obtained from the base of the neck to the ischial tuberosities following oral   and 100 cc Isovue-370 without acute complication. All CT scans performed at   this facility use one or all of the following: Automated exposure control,   adjustment of the mA and/or kVp according to patient's size, iterative   reconstruction. Findings:   CT Chest:   The base of the neck is unremarkable in appearance. No axillary, mediastinal,   or hilar lymphadenopathy is seen. The thoracic aorta is normal in caliber. The   heart appears moderately enlarged. Evaluation with lung windows demonstrates no suspicious pulmonary lesion. Small   nodules are seen in the bilateral lungs measuring up to 5 mm in size. However,   these are unchanged in size and number when compared to a prior CT Overread   dated 4/5/2019. The lack of significant change over multiple years favors   benign nodules. Mild dependent atelectasis is seen. No pleural effusion is   seen. Lungs are expanded without evidence for pneumothorax. No aggressive   appearing osseous lesion is seen. CT ABDOMEN:     The Liver small scattered low-attenuation left lobe hepatic lesions measuring up   to 9 mm in size.   Although incompletely characterized, these are also included   in the field of imaging on the prior CT over read dated 4/5/2019 and are   unchanged in size and number. The appearance suggests benign lesions such as   hepatic cysts. An additional 1.6 cm lesion is seen in the more inferior right   lobe on image 68. This is unchanged when compared to the more recent CT scan of   the abdomen dated 3/27/2022 and does not demonstrate significant enhancement   suggesting an additional benign hepatic cyst.  The spleen is homogeneous in   attenuation. No contour deforming or enhancing mass lesions are seen of the   pancreas or adrenal glands. The gallbladder has been removed. The kidneys   enhance symmetrically and no evidence of hydronephrosis is seen. Chronic renal   cortical scarring is seen most evident in the upper pole cortex of the left   kidney       The visualized loops of small bowel and colon are normal in caliber. The   ileocecal valve is seen on axial image 98 demonstrating normal fat attenuation. Just proximal to this along the posterior wall of the cecum is an abnormal wall   lesion measuring 3.5 cm x 2.4 cm in size concerning for a colon neoplasm likely   representing the patient's known cecal mass. No obstruction is suggested by   this at this time. No free fluid and no free air is seen in the abdomen. No   adenopathy is seen of the abdomen. The abdominal aorta demonstrates mild to   moderate atherosclerotic changes. No aggressive appearing osseous lesion is   seen. CT PELVIS:   No abnormal pelvic fluid collections are present. No pelvic adenopathy is seen. The urinary bladder is unremarkable. No aggressive appearing osseous lesion is   seen. Impression   1.  3.5 cm x 2.4 cm cecal mass. No clear evidence for metastatic disease is   evident by CT imaging. Pulmonary nodules, and hepatic lesions are seen which   are favored to be benign as described above.        Assessment:     Principal Problem:    Acute blood loss anemia  Active Problems:    GIB (gastrointestinal bleeding)    Cecum mass    Colon cancer (HCC) Adenocarcinoma of cecum ([facility]    Ileus following gastrointestinal surgery ([facility]    Acute deep vein thrombosis (DVT) of axillary vein of left upper extremity (HCC)    Paroxysmal atrial fibrillation (HCC)    HTN (hypertension)    Myasthenia gravis (HCC)    SSS (sick sinus syndrome) (HCC)    GERD (gastroesophageal reflux disease)    Diastolic CHF, chronic (HCC)    Dyslipidemia  Resolved Problems:    * No resolved hospital problems. *    8/10/22 S/P BOWEL RESECTION HEMICOLECTOMY LAPAROSCOPIC ROBOTIC, Right Bulmaro  Plan:   > Care Management per Hospitalist  Trial heparin for DVT started 8/16- H/H stable      1U PRBC 8/14  > Rush out 8/11  >OOB for all meals  >FLD 8/16 breakfast- tolerated  >Follow electrolytes and replace prn    Pathology - RIGHT COLON\":         ADENOCARCINOMA, MODERATELY DIFFERENTIATED MEASURING APPROXIMATELY 5.2 X 4.3 X 2.9 CM. MICROSCOPICALLY TUMOR INFILTRATES THROUGH MUSCULARIS PROPRIA INTO PERICOLONIC ADIPOSE TISSUE. MARGINS OF RESECTION AND 14 PERICOLONIC LYMPH NODES ARE NEGATIVE FOR MALIGNANT TUMOR.  SEPARATE ULCERATED AREA CONSISTENT WITH PRIOR BIOPSY SITE.    >pain control- minimal pain   >PO/OT        Domingo Mckeon, APRN - CNP    8/17/2022 8:10 AM

## 2022-08-17 NOTE — PROGRESS NOTES
END OF SHIFT SUMMARY:    -Pt continues to have small, loose BMs, brown with tinges of red  -Pt up in chair most of day with no complaints  -Heparin verified with Roberto DOS SANTOS    I/Os:  08/17 0701 - 08/17 1900  In: 480 [P.O.:480]  Out: -   Occurrences this Shift:  Urine 3, BM 4    Report given to oncoming nurse Kalyani Pearl, RN

## 2022-08-17 NOTE — PROGRESS NOTES
Patient were discussed in [facility] team meeting this AM.  PT/OT has recommended HH. CM will met with patient to discuss [place] options. CM will continue to monitor.

## 2022-08-17 NOTE — PLAN OF CARE
Problem: Safety - Adult  Goal: Free from fall injury  Outcome: Progressing  Flowsheets (Taken 8/17/2022 0830)  Free From Fall Injury: Instruct family/caregiver on patient safety     Problem: Chronic Conditions and Co-morbidities  Goal: Patient's chronic conditions and co-morbidity symptoms are monitored and maintained or improved  Outcome: Progressing     Problem: Skin/Tissue Integrity  Goal: Absence of new skin breakdown  Description: 1. Monitor for areas of redness and/or skin breakdown  2. Assess vascular access sites hourly  3. Every 4-6 hours minimum:  Change oxygen saturation probe site  4. Every 4-6 hours:  If on nasal continuous positive airway pressure, respiratory therapy assess nares and determine need for appliance change or resting period.   Outcome: Progressing     Problem: Pain  Goal: Verbalizes/displays adequate comfort level or baseline comfort level  Outcome: Progressing     Problem: ABCDS Injury Assessment  Goal: Absence of physical injury  Outcome: Progressing  Flowsheets (Taken 8/17/2022 0830)  Absence of Physical Injury: Implement safety measures based on patient assessment

## 2022-08-18 LAB
ALBUMIN SERPL-MCNC: 2.9 G/DL (ref 3.2–4.6)
ALBUMIN/GLOB SERPL: 1 {RATIO} (ref 1.2–3.5)
ALP SERPL-CCNC: 95 U/L (ref 50–136)
ALT SERPL-CCNC: 19 U/L (ref 12–65)
ANION GAP SERPL CALC-SCNC: 4 MMOL/L (ref 7–16)
AST SERPL-CCNC: 14 U/L (ref 15–37)
BASOPHILS # BLD: 0.1 K/UL (ref 0–0.2)
BASOPHILS NFR BLD: 1 % (ref 0–2)
BILIRUB SERPL-MCNC: 0.6 MG/DL (ref 0.2–1.1)
BUN SERPL-MCNC: 7 MG/DL (ref 8–23)
CALCIUM SERPL-MCNC: 8.8 MG/DL (ref 8.3–10.4)
CHLORIDE SERPL-SCNC: 109 MMOL/L (ref 98–107)
CO2 SERPL-SCNC: 25 MMOL/L (ref 21–32)
CREAT SERPL-MCNC: 0.7 MG/DL (ref 0.8–1.5)
DIFFERENTIAL METHOD BLD: ABNORMAL
EOSINOPHIL # BLD: 0.8 K/UL (ref 0–0.8)
EOSINOPHIL NFR BLD: 7 % (ref 0.5–7.8)
ERYTHROCYTE [DISTWIDTH] IN BLOOD BY AUTOMATED COUNT: 24.9 % (ref 11.9–14.6)
GLOBULIN SER CALC-MCNC: 2.8 G/DL (ref 2.3–3.5)
GLUCOSE SERPL-MCNC: 95 MG/DL (ref 65–100)
HCT VFR BLD AUTO: 31.2 % (ref 41.1–50.3)
HGB BLD-MCNC: 8.8 G/DL (ref 13.6–17.2)
IMM GRANULOCYTES # BLD AUTO: 0.1 K/UL (ref 0–0.5)
IMM GRANULOCYTES NFR BLD AUTO: 1 % (ref 0–5)
LYMPHOCYTES # BLD: 3.3 K/UL (ref 0.5–4.6)
LYMPHOCYTES NFR BLD: 29 % (ref 13–44)
MCH RBC QN AUTO: 20.3 PG (ref 26.1–32.9)
MCHC RBC AUTO-ENTMCNC: 28.2 G/DL (ref 31.4–35)
MCV RBC AUTO: 72.1 FL (ref 79.6–97.8)
MONOCYTES # BLD: 1 K/UL (ref 0.1–1.3)
MONOCYTES NFR BLD: 9 % (ref 4–12)
NEUTS SEG # BLD: 6.3 K/UL (ref 1.7–8.2)
NEUTS SEG NFR BLD: 55 % (ref 43–78)
NRBC # BLD: 0 K/UL (ref 0–0.2)
PLATELET # BLD AUTO: 214 K/UL (ref 150–450)
PMV BLD AUTO: 8.9 FL (ref 9.4–12.3)
POTASSIUM SERPL-SCNC: 3.6 MMOL/L (ref 3.5–5.1)
PROT SERPL-MCNC: 5.7 G/DL (ref 6.3–8.2)
RBC # BLD AUTO: 4.33 M/UL (ref 4.23–5.6)
SODIUM SERPL-SCNC: 138 MMOL/L (ref 136–145)
UFH PPP CHRO-ACNC: 0.67 IU/ML (ref 0.3–0.7)
UFH PPP CHRO-ACNC: 0.86 IU/ML (ref 0.3–0.7)
WBC # BLD AUTO: 11.4 K/UL (ref 4.3–11.1)

## 2022-08-18 PROCEDURE — 36415 COLL VENOUS BLD VENIPUNCTURE: CPT

## 2022-08-18 PROCEDURE — 85025 COMPLETE CBC W/AUTO DIFF WBC: CPT

## 2022-08-18 PROCEDURE — 97110 THERAPEUTIC EXERCISES: CPT

## 2022-08-18 PROCEDURE — 97530 THERAPEUTIC ACTIVITIES: CPT

## 2022-08-18 PROCEDURE — 6370000000 HC RX 637 (ALT 250 FOR IP): Performed by: FAMILY MEDICINE

## 2022-08-18 PROCEDURE — 6370000000 HC RX 637 (ALT 250 FOR IP): Performed by: STUDENT IN AN ORGANIZED HEALTH CARE EDUCATION/TRAINING PROGRAM

## 2022-08-18 PROCEDURE — 6370000000 HC RX 637 (ALT 250 FOR IP): Performed by: SURGERY

## 2022-08-18 PROCEDURE — 6370000000 HC RX 637 (ALT 250 FOR IP): Performed by: INTERNAL MEDICINE

## 2022-08-18 PROCEDURE — 1100000000 HC RM PRIVATE

## 2022-08-18 PROCEDURE — 2580000003 HC RX 258: Performed by: INTERNAL MEDICINE

## 2022-08-18 PROCEDURE — C9113 INJ PANTOPRAZOLE SODIUM, VIA: HCPCS | Performed by: INTERNAL MEDICINE

## 2022-08-18 PROCEDURE — 80053 COMPREHEN METABOLIC PANEL: CPT

## 2022-08-18 PROCEDURE — A4216 STERILE WATER/SALINE, 10 ML: HCPCS | Performed by: INTERNAL MEDICINE

## 2022-08-18 PROCEDURE — 2580000003 HC RX 258: Performed by: HOSPITALIST

## 2022-08-18 PROCEDURE — 85520 HEPARIN ASSAY: CPT

## 2022-08-18 PROCEDURE — 6360000002 HC RX W HCPCS: Performed by: INTERNAL MEDICINE

## 2022-08-18 RX ADMIN — DOCUSATE SODIUM 100 MG: 100 CAPSULE, LIQUID FILLED ORAL at 10:02

## 2022-08-18 RX ADMIN — SODIUM CHLORIDE, PRESERVATIVE FREE 10 ML: 5 INJECTION INTRAVENOUS at 20:32

## 2022-08-18 RX ADMIN — DOCUSATE SODIUM 100 MG: 100 CAPSULE, LIQUID FILLED ORAL at 20:31

## 2022-08-18 RX ADMIN — ATORVASTATIN CALCIUM 80 MG: 80 TABLET, FILM COATED ORAL at 20:31

## 2022-08-18 RX ADMIN — APIXABAN 10 MG: 5 TABLET, FILM COATED ORAL at 20:31

## 2022-08-18 RX ADMIN — ROPINIROLE HYDROCHLORIDE 0.5 MG: 0.5 TABLET, FILM COATED ORAL at 20:31

## 2022-08-18 RX ADMIN — SODIUM CHLORIDE, PRESERVATIVE FREE 10 ML: 5 INJECTION INTRAVENOUS at 20:31

## 2022-08-18 RX ADMIN — APIXABAN 10 MG: 5 TABLET, FILM COATED ORAL at 12:52

## 2022-08-18 RX ADMIN — SODIUM CHLORIDE, PRESERVATIVE FREE 10 ML: 5 INJECTION INTRAVENOUS at 10:30

## 2022-08-18 RX ADMIN — SODIUM CHLORIDE, PRESERVATIVE FREE 40 MG: 5 INJECTION INTRAVENOUS at 10:02

## 2022-08-18 RX ADMIN — SODIUM CHLORIDE, PRESERVATIVE FREE 40 MG: 5 INJECTION INTRAVENOUS at 22:44

## 2022-08-18 RX ADMIN — OXYCODONE AND ACETAMINOPHEN 1 TABLET: 5; 325 TABLET ORAL at 20:37

## 2022-08-18 RX ADMIN — TEMAZEPAM 15 MG: 15 CAPSULE ORAL at 20:37

## 2022-08-18 ASSESSMENT — PAIN DESCRIPTION - ORIENTATION: ORIENTATION: MID

## 2022-08-18 ASSESSMENT — PAIN SCALES - GENERAL
PAINLEVEL_OUTOF10: 8
PAINLEVEL_OUTOF10: 0

## 2022-08-18 ASSESSMENT — PAIN SCALES - WONG BAKER: WONGBAKER_NUMERICALRESPONSE: 2

## 2022-08-18 ASSESSMENT — PAIN DESCRIPTION - LOCATION: LOCATION: ABDOMEN

## 2022-08-18 ASSESSMENT — PAIN DESCRIPTION - DESCRIPTORS: DESCRIPTORS: ACHING

## 2022-08-18 NOTE — PROGRESS NOTES
PHYSICAL THERAPY: Daily Note AM   (Link to Caseload Tracking: PT Visit Days : 3  Time In/Out PT Charge Capture  Rehab Caseload Tracker  Orders    Molly Galindo is a 79 y.o. male   PRIMARY DIAGNOSIS: Acute blood loss anemia  Acute blood loss anemia [D62]  Anemia, unspecified type [D64.9]  Procedure(s) (LRB):  BOWEL RESECTION HEMICOLECTOMY LAPAROSCOPIC ROBOTIC, Right Bulmaro (Right)  8 Days Post-Op  Inpatient: Payor: Chase Gonzalez / Plan: MEDICARE PART A AND B / Product Type: *No Product type* /     ASSESSMENT:     REHAB RECOMMENDATIONS:   Recommendation to date pending progress:  Setting:  Home Health Therapy    Equipment:    None     ASSESSMENT:  Mr. Skyler Case was sitting up in chair on arrival. He is agreeable to PT and plans to go home with HHPT. Ambulated 225' and 200' with RW and SBA. Required long sitting rest break. Exercises performed while up in chair. Pt left in chair with needs in reach. SUBJECTIVE:   Mr. Skyler Case states, \"I think I'm done now\"     Social/Functional Lives With: Spouse  Type of Home: House  Home Layout: One level  Home Access: Level entry  Home Equipment: Cane (walking stick.)  ADL Assistance: Independent  Active : No  Patient's  Info: Family provides transportation. Patient is able to drive.   Mode of Transportation: Car  Occupation: Retired  OBJECTIVE:     PAIN: VITALS / O2: PRECAUTION / Elridge Nilay / Annabella Remak:   Pre Treatment: none         Post Treatment: none Vitals        Oxygen    None    RESTRICTIONS/PRECAUTIONS:        MOBILITY: I Mod I S SBA CGA Min Mod Max Total  NT x2 Comments:   Bed Mobility    Rolling [] [] [] [] [] [] [] [] [] [x] []    Supine to Sit [] [] [] [] [] [] [] [] [] [x] []    Scooting [] [] [] [] [] [] [] [] [] [x] []    Sit to Supine [] [] [] [] [] [] [] [] [] [x] []    Transfers    Sit to Stand [] [] [] [x] [] [] [] [] [] [] []    Bed to Chair [] [] [] [] [] [] [] [] [] [] []    Stand to Sit [] [] [] [x] [] [] [] [] [] [] []     [] [] [] [] [] [] [] [] [] [] []    I=Independent, Mod I=Modified Independent, S=Supervision, SBA=Standby Assistance, CGA=Contact Guard Assistance,   Min=Minimal Assistance, Mod=Moderate Assistance, Max=Maximal Assistance, Total=Total Assistance, NT=Not Tested    BALANCE: Good Fair+ Fair Fair- Poor NT Comments   Sitting Static [x] [] [] [] [] []    Sitting Dynamic [x] [] [] [] [] []              Standing Static [] [x] [] [] [] []    Standing Dynamic [] [x] [] [] [] []      GAIT: I Mod I S SBA CGA Min Mod Max Total  NT x2 Comments:   Level of Assistance [] [] [] [x] [] [] [] [] [] [] []    Distance 225 (additional 200') feet     DME Rolling Walker    Gait Quality Decreased step clearance, Shuffling , and Trunk flexion    Weightbearing Status      Stairs      I=Independent, Mod I=Modified Independent, S=Supervision, SBA=Standby Assistance, CGA=Contact Guard Assistance,   Min=Minimal Assistance, Mod=Moderate Assistance, Max=Maximal Assistance, Total=Total Assistance, NT=Not Tested    PLAN:   ACUTE PHYSICAL THERAPY GOALS:  (Developed with and agreed upon by patient and/or caregiver. )     LTG:  (1.)Mr. Carolyn Aschoff will move from supine to sit and sit to supine , scoot up and down, and roll side to side in bed with INDEPENDENT within 7 treatment day(s). (2.)Mr. Carolyn Aschoff will transfer from bed to chair and chair to bed with MODIFIED INDEPENDENCE using the least restrictive device within 7 treatment day(s). (3.)Mr. Carolyn Aschoff will ambulate with MODIFIED INDEPENDENCE for 500 feet with the least restrictive device within 7 treatment day(s). (4.)Mr. Carolyn Aschoff will tolerate at least 23 min of dynamic standing activity to assist standing ADLs with the least restrictive device within 7 treatment days. FREQUENCY AND DURATION: 3 times/week for duration of hospital stay or until stated goals are met, whichever comes first.    TREATMENT:   TREATMENT:   Therapeutic Activity (20 Minutes):  Therapeutic activity included Ambulation on level ground, Sitting balance , and Standing balance to improve functional Activity tolerance, Balance, Mobility, and Strength. Therapeutic Exercise (10 Minutes): Therapeutic exercises noted below to improve functional activity tolerance, strength, and mobility.      TREATMENT GRID:  N/A    AFTER TREATMENT PRECAUTIONS: Bed/Chair Locked, Call light within reach, Chair, Needs within reach, and RN at bedside    INTERDISCIPLINARY COLLABORATION:  RN/ PCT and PT/ PTA    EDUCATION:      TIME IN/OUT:  Time In: 1045  Time Out: 1115  Minutes: [address] PTA

## 2022-08-18 NOTE — PROGRESS NOTES
OCCUPATIONAL THERAPY: Daily Note    OT Visit Days: 3   Time  OT Charge Capture  Rehab Caseload Tracker  OT Orders    Anel Garcia is a 79 y.o. male   PRIMARY DIAGNOSIS: Acute blood loss anemia  Acute blood loss anemia [D62]  Anemia, unspecified type [D64.9]  Procedure(s) (LRB):  BOWEL RESECTION HEMICOLECTOMY LAPAROSCOPIC ROBOTIC, Right Bulmaro (Right)  8 Days Post-Op  Inpatient: Payor: MEDICARE / Plan: MEDICARE PART A AND B / Product Type: *No Product type* /     ASSESSMENT:     REHAB RECOMMENDATIONS: CURRENT LEVEL OF FUNCTION:  (Most Recently Demonstrated)   Recommendation to date pending progress:  Setting:  Home Health Therapy    Equipment:    To Be Determined Bathing:  Not Tested  Dressing:  Not Tested  Feeding/Grooming:  Not Tested  Toileting:  Not Tested  Functional Mobility:  Not Tested     ASSESSMENT:  Mr. Karoline Bell is doing well at this time. Pt instructed in UE exercises and energy conservation education. Pt did well with exercises and verbalized understanding of education. HEP issued as well today. Pt left in chair with all needs in reach. Minimal progress made towards goals due to having already performed ADLs. Will continue to benefit from skilled OT during stay. SUBJECTIVE:     Mr. Karoline Bell states, \"I still work\"     Social/Functional Lives With: Spouse  Type of Home: House  Home Layout: One level  Home Access: Level entry  Home Equipment: Cane (walking stick.)  ADL Assistance: Independent  Active : No  Patient's  Info: Family provides transportation. Patient is able to drive.   Mode of Transportation: Car  Occupation: Retired    OBJECTIVE:     LIANNA / Bryant Thomas / AIRWAY: NA    RESTRICTIONS/PRECAUTIONS:           PAIN: Guinevere Gilbert / O2:   Pre Treatment:              Post Treatment: no change  Vitals          Oxygen            MOBILITY: I Mod I S SBA CGA Min Mod Max Total  NT x2 Comments:   Bed Mobility    Rolling [] [] [] [] [] [] [] [] [] [x] []    Supine to Sit [] [] [] [] [] [] [] [] [] [x] [] Received sitting in the chair    Scooting [] [] [] [] [] [] [] [] [] [x] []    Sit to Supine [] [] [] [] [] [] [] [] [] [x] [] Left sitting in the chair    Transfers    Sit to Stand [] [] [] [] [] [] [] [] [] [x] []    Bed to Chair [] [] [] [] [] [] [] [] [] [x] []    Stand to Sit [] [] [] [] [] [] [] [] [] [x] []    Tub/Shower [] [] [] [] [] [] [] [] [] [x] []     Toilet [] [] [] [] [] [] [] [] [] [x] []      [] [] [] [] [] [] [] [] [] [] []    I=Independent, Mod I=Modified Independent, S=Supervision/Setup, SBA=Standby Assistance, CGA=Contact Guard Assistance, Min=Minimal Assistance, Mod=Moderate Assistance, Max=Maximal Assistance, Total=Total Assistance, NT=Not Tested    ACTIVITIES OF DAILY LIVING: I Mod I S SBA CGA Min Mod Max Total NT Comments   BASIC ADLs:              Upper Body   Bathing [] [] [] [] [] [] [] [] [] [x]    Lower Body Bathing [] [] [] [] [] [] [] [] [] [x]    Toileting [] [] [] [] [] [] [] [] [] [x]    Upper Body Dressing [] [] [] [] [] [] [] [] [] [x]    Lower Body Dressing [] [] [] [] [] [] [] [] [] [x]    Feeding [] [] [] [] [] [] [] [] [] [x]    Grooming [] [] [] [] [] [] [] [] [] [x]    Personal Device Care [] [] [] [] [] [] [] [] [] [x]    Functional Mobility [] [] [] [] [] [] [] [] [] [x]    I=Independent, Mod I=Modified Independent, S=Supervision/Setup, SBA=Standby Assistance, CGA=Contact Guard Assistance, Min=Minimal Assistance, Mod=Moderate Assistance, Max=Maximal Assistance, Total=Total Assistance, NT=Not Tested    BALANCE: Good Fair+ Fair Fair- Poor NT Comments   Sitting Static [x] [] [] [] [] []    Sitting Dynamic [] [x] [] [] [] []              Standing Static [] [] [] [] [] [x]    Standing Dynamic [] [] [] [] [] [x]        PLAN:     FREQUENCY/DURATION   OT Plan of Care: 3 times/week for duration of hospital stay or until stated goals are met, whichever comes first.    ACUTE OCCUPATIONAL THERAPY GOALS:   (Developed with and agreed upon by patient and/or caregiver.)  1. Patient will complete lower body bathing and dressing with Mod I and adaptive equipment as  needed. 2. Patient will completed upper body bathing and dressing with Mod I and adaptive equipment as needed. 3. Patient will complete grooming standing at sink with Mod I and adaptive equipment as needed. 4. Patient will complete toileting with Mod I and adaptive equipment as needed. 5. Patient will tolerate 30 minutes of OT treatment with no rest breaks to increase activity tolerance for ADLs. 6. Patient will complete functional transfers with Mod I and adaptive equipment as needed. Timeframe: 7 visits     TREATMENT:     TREATMENT:   Therapeutic Exercise (15 Minutes): Therapeutic exercises noted below to improve functional activity tolerance, strength, and mobility.      TREATMENT GRID:   Date:  8/18/22 Date:   Date:     Activity/Exercise Parameters Parameters Parameters   Shoulder Abd/Adduction 10 reps     Shoulder Shrugs 10 reps     Elbow Flexion 10 reps     Punches  10 reps     Arm Circles 10 reps                   AFTER TREATMENT PRECAUTIONS: Call light within reach, Chair, Needs within reach, and RN notified    INTERDISCIPLINARY COLLABORATION:  RN/ PCT and OT/ CORLEY    EDUCATION:       TOTAL TREATMENT DURATION AND TIME:  Time In: 1005  Time Out: 16499 Dinesh Armando  Minutes: [address], [place]

## 2022-08-18 NOTE — PROGRESS NOTES
Subjective:     Patient is a 79 y.o.  male presents with anemia. He had colonoscopy done on 8 5 which showed a lesion in the cecum consistent with a neoplasm. We were asked to evaluate for possible surgical resection. He reports no abdominal pain. Currently he denies nausea vomiting diarrhea constipation hematochezia hematemesis or melena. He has been on full liquids. [place] fevers or chills. 8/8/2022 - Doing well this AM. No bleeding reported. Tolerating liquid diet. No prep received. 8/9/22: No complaints, no bleeding. Liquid diet and bowel prep today. 8/10/22 BOWEL RESECTION HEMICOLECTOMY LAPAROSCOPIC ROBOTIC, Right Bulmaro    8/11/22 POD1: Comfortable overnight. Pain controlled. AF/VSS. Abd: surgical dsg CDI, abd mildly distended, BS+ with tenderness as expected. -Flatus, -BM   overnight. MZY49.4    8/12/22 POD2: Awake and sitting up. Has been oob/ and ambulating. Pain currently controlled. Abd: surgical dsg CDI, abd mildly distended, BS+ and appropriately ttp  +Flatus, +BM (loose)  Rush out. YTJ06.0    8/13/22 POD3: Awake and sitting in recliner. Has been oob/ and ambulating. Pain currently controlled. Feels distended, however, denies nausea. Abd: surgical dsg CDI, abd  distended, BS+ and appropriately ttp  +Flatus, +BM (loose) Rush out. HTY42.8    8/14/22 POD4: Awake and up walking in room. Has been oob/ and ambulating. Pain currently controlled. States feeling better but still feels bloated. Abd: surgical dsg CDI, abd  distended, BS+ and appropriately ttp  +Flatus/ +BM. WBC 9.4, HGB 6.7    8/15/2022 POD5: doing well. Sitting in a chair this AM. Mo flatus or BM. Abd distended. Walking. No nausea or vomiting.     8/16/2022 POD6: doing well. Sitting in a chair this AM. +flatus +BM. Abd soft, trochar sites intact. Walking. No nausea or vomiting.     8/17/2022 POD7: doing well. Sitting in a chair this AM. +flatus +BM. x4 (Liquid brown yesterday) Abd soft, ttp as expected, trochar sites intact. Walking. No nausea or vomiting.     8/18/2022 POD8: doing well. Sitting in a chair this AM. +flatus +BM. x4 (Liquid brown yesterday with red streaks) Abd soft, ttp as expected, trochar sites intact. Walking. No nausea or vomiting. Heparin gtt in progress H/H stable.          Patient Active Problem List    Diagnosis Date Noted    Acute deep vein thrombosis (DVT) of axillary vein of left upper extremity (HCC) 08/15/2022    Ileus following gastrointestinal surgery ([address] 08/14/2022    Adenocarcinoma of cecum ([address] 08/13/2022    Cecum mass 08/07/2022    Colon cancer ([address] 08/07/2022    Acute blood loss anemia 08/04/2022    GIB (gastrointestinal bleeding) 08/04/2022    Urine retention 04/03/2022    Acute cholecystitis 03/28/2022    Pancreatitis, gallstone 03/28/2022    Anemia 03/28/2022    Bacteremia 03/28/2022    Major depressive disorder, recurrent, moderate ([address] 71/70/0333    Diastolic CHF, chronic ([facility]) 03/02/2022    Major depressive disorder, recurrent, mild ([address] 03/02/2022    Major depressive disorder, recurrent, unspecified ([address] 03/02/2022    Pacemaker 04/14/2021    Atrial fibrillation ([address] 11/29/2017    GERD (gastroesophageal reflux disease) 10/27/2017    Hypotension 10/26/2017    Sepsis ([address] 10/26/2017    Syncope 10/24/2017    Paroxysmal atrial fibrillation ([address] 06/30/2017    SSS (sick sinus syndrome) ([address] 06/30/2017    Bilateral carotid artery disease ([address] 06/30/2017    Myasthenia gravis ([address]     Mitral valve regurgitation 06/07/2016    Hypokalemia 06/07/2016    Dyslipidemia 06/07/2016    HTN (hypertension) 05/08/2015    Dyspnea 05/08/2015    S/P coronary artery stent placement 05/08/2015    Coronary atherosclerosis of native coronary vessel 05/08/2015     Past Medical History:   Diagnosis Date    Abnormal EKG 4/22/15    Arrhythmia     Aspiration pneumonia ([facility] 10/26/2017    CAD (coronary artery disease) 5/8/2015    Carotid artery stenosis without cerebral infarction 6/7/2016     6/15: <50% bilat ICAs    Coronary atherosclerosis of native coronary vessel 5/8/2015    GERMAIN on brilinta 5/7/15: prox LAD PCI, normal EF     Diastolic CHF, chronic ([address] 3/2/2022    Dyslipidemia 6/7/2016    Dyspnea 5/8/2015    Echo 6/15: EF 60%, mod MR, mod LVH, mild AI     ED (erectile dysfunction)     GERD (gastroesophageal reflux disease)     GERD (gastroesophageal reflux disease)     no medication    HTN (hypertension) 5/8/2015    Hypertension     Hypokalemia     Hypokalemia 6/7/2016    Mitral valve regurgitation 6/7/2016    Morbid obesity ([address]     Myasthenia gravis ([facility]     Myasthenia gravis ([address] 6/17/15    Nocturia     Osteoporosis     PUD (peptic ulcer disease) 25 yrs ago    S/P coronary artery stent placement 5/8/2015    3.0x38 mm Xience CAROL to pLAD 5/7/15     Sleep apnea     Syncope and collapse     Unspecified sleep apnea     no cpap      Past Surgical History:   Procedure Laterality Date    APPENDECTOMY      CARDIAC CATHETERIZATION  05/21/2019    watchman device    CARDIAC CATHETERIZATION  05/27/2015    stent    COLONOSCOPY  2007    COLONOSCOPY N/A 8/6/2022    COLONOSCOPY POLYPECTOMY SNARE/COLD BIOPSY performed by Geoff Rondon MD at [facility] ENDOSCOPY    ERCP  3/30/2022         HEMICOLECTOMY Right 8/10/2022    BOWEL RESECTION HEMICOLECTOMY LAPAROSCOPIC ROBOTIC, Right Bulmaro performed by Marilin Loera MD at [address]    Dr. Kraus/Giana for sleep apnea and reconstruction for extending jaw    UPPER GASTROINTESTINAL ENDOSCOPY N/A 8/5/2022    EGD ESOPHAGOGASTRODUODENOSCOPY Rm 525 performed by Serafin Alvarado MD at [address] Right 07/10/2019     Repair of right radial artery pseudoaneurysm      Medications Prior to Admission: potassium chloride (KLOR-CON M) 20 MEQ extended release tablet, Take 1 tablet by mouth daily  aspirin 81 MG EC tablet, Take 81 mg by mouth daily  atorvastatin (LIPITOR) 80 MG tablet, Take 80 mg by mouth  cyanocobalamin 1000 MCG tablet, Take 1,000 mcg by mouth daily (Patient not taking: No sig reported)  escitalopram (LEXAPRO) 10 MG tablet, Take 10 mg by mouth daily (Patient not taking: No sig reported)  furosemide (LASIX) 40 MG tablet, Take 40 mg by mouth daily  nitroGLYCERIN (NITROSTAT) 0.4 MG SL tablet, Place 1 sl under the tongue q 5 min prn cp, max 3 sl in a 15-min time period. Call 911 if no relief after the 3rd sl.  pantoprazole (PROTONIX) 40 MG tablet, Take 40 mg by mouth every morning (before breakfast) (Patient not taking: No sig reported)  rOPINIRole (REQUIP) 0.5 MG tablet, 1 nightly, increase to 1 twice a day if needed  tamsulosin (FLOMAX) 0.4 MG capsule, Take 0.4 mg by mouth daily  No Known Allergies   Social History     Tobacco Use    Smoking status: Never    Smokeless tobacco: Never   Substance Use Topics    Alcohol use: No      Family History   Problem Relation Age of Onset    No Known Problems Brother     Cancer Brother         brain tumor    No Known Problems Sister     Cancer Mother         kidney    Cancer Father         stomach      Review of Systems  Pertinent items are noted in HPI. Objective:     Patient Vitals for the past 8 hrs:   BP Temp Temp src Pulse Resp SpO2   08/18/22 0743 (!) 153/83 97.4 °F (36.3 °C) Oral 75 17 98 %   08/18/22 0230 (!) 144/72 98.4 °F (36.9 °C) Oral 77 17 97 %     I/O last 3 completed shifts: In: 758 [P.O.:480; I.V.:278]  Out: -   No intake/output data recorded. General: well appearing, no acute distress, alert and oriented  Eyes: PERRLA, sclerae white  ENT: External inspection of ears and nose normal, oropharynx normal  Respiratory: normal chest wall expansion, lungs CTA bilaterally  Cardiovascular: RRR, no m, femoral pulses 2+ bilaterally; extremities without edema  Abdomen:soft, surgical dsg CDI. TTP as expected.   BS+  Heme/Lymph: without cervical or inguinal adenopathy  Musculoskeletal: gait normal, digits without clubbing or cyanosis  Integumentary: warm, dry, and pink, with no rash, purpura, or petechia  Neurological: Cranial Nerves II-XII grossly intact, normal sensation and muscle strength bilaterally      Data ReviewCBC:   Lab Results   Component Value Date/Time    WBC 11.4 08/18/2022 06:53 AM    RBC 4.33 08/18/2022 06:53 AM     BMP:   Lab Results   Component Value Date/Time    GLUCOSE 119 08/16/2022 08:46 AM    CO2 26 08/16/2022 08:46 AM    BUN 6 08/16/2022 08:46 AM    CREATININE 0.80 08/16/2022 08:46 AM    CALCIUM 9.1 08/16/2022 08:46 AM     Radiology review:   CT CHEST, ABDOMEN AND PELVIS WITH INTRAVENOUS CONTRAST DATED 8/6/2022. History: Cecal mass concerning for colon cancer. Comparison: CT the cardiac over read 4/5/2019, and CT abdomen and pelvis with   contrast 3/27/2022        Technique:   Multiple contiguous helical CT images reconstructed at 5 mm were   obtained from the base of the neck to the ischial tuberosities following oral   and 100 cc Isovue-370 without acute complication. All CT scans performed at   this facility use one or all of the following: Automated exposure control,   adjustment of the mA and/or kVp according to patient's size, iterative   reconstruction. Findings:   CT Chest:   The base of the neck is unremarkable in appearance. No axillary, mediastinal,   or hilar lymphadenopathy is seen. The thoracic aorta is normal in caliber. The   heart appears moderately enlarged. Evaluation with lung windows demonstrates no suspicious pulmonary lesion. Small   nodules are seen in the bilateral lungs measuring up to 5 mm in size. However,   these are unchanged in size and number when compared to a prior CT Overread   dated 4/5/2019. The lack of significant change over multiple years favors   benign nodules. Mild dependent atelectasis is seen. No pleural effusion is   seen. Lungs are expanded without evidence for pneumothorax. No aggressive   appearing osseous lesion is seen. Pulmonary nodules, and hepatic lesions are seen which   are favored to be benign as described above. Assessment:     Principal Problem:    Acute blood loss anemia  Active Problems:    GIB (gastrointestinal bleeding)    Cecum mass    Colon cancer (HCC)    Adenocarcinoma of cecum (HCC)    Ileus following gastrointestinal surgery (HCC)    Acute deep vein thrombosis (DVT) of axillary vein of left upper extremity (HCC)    Paroxysmal atrial fibrillation (HCC)    HTN (hypertension)    Myasthenia gravis (HCC)    SSS (sick sinus syndrome) (HCC)    GERD (gastroesophageal reflux disease)    Diastolic CHF, chronic (HCC)    Dyslipidemia  Resolved Problems:    * No resolved hospital problems. *    8/10/22 S/P BOWEL RESECTION HEMICOLECTOMY LAPAROSCOPIC ROBOTIC, Right Bulmaro  Plan:   > Care Management per Hospitalist  Trial heparin for DVT started 8/16- H/H stable- waiting to transition to [address] [place] [address]       1U PRBC 8/14  > Rush out 8/11  >OOB for all meals  >FLD 8/16 breakfast- tolerated>> soft this AM  >Follow electrolytes and replace prn  >pain control- minimal pain   >PO/OT  >Follow up with Dr Johnny Miller when ready for dc in 10 days. >Oncology consulted today    Pathology - RIGHT COLON\":         ADENOCARCINOMA, MODERATELY DIFFERENTIATED MEASURING APPROXIMATELY 5.2 X 4.3 X 2.9 CM. MICROSCOPICALLY TUMOR INFILTRATES THROUGH MUSCULARIS PROPRIA INTO PERICOLONIC ADIPOSE TISSUE. MARGINS OF RESECTION AND 14 PERICOLONIC LYMPH NODES ARE NEGATIVE FOR MALIGNANT TUMOR. SEPARATE ULCERATED AREA CONSISTENT WITH PRIOR BIOPSY SITE.             Candance Seashore, NAOMY - CNP    8/18/2022 7:49 AM

## 2022-08-18 NOTE — PROGRESS NOTES
Hospitalist Progress Note   Admit Date:  2022  4:05 PM   Name:  Parul Rodríguez   Age:  79 y.o. Sex:  male  :  1940   MRN:  [id number]   Room:  [facility]    Presenting Complaint: Abnormal Lab     Reason(s) for Admission: Acute blood loss anemia [D62]  Anemia, unspecified type [D64.9]     Hospital Course & Interval History:   65-year-old male with history of atrial fibrillation status post watchman, hypertension, sick sinus syndrome with PPM, GERD hyperlipidemia, coronary artery disease, heart failure preserved ejection fraction is admitted with acute blood loss anemia from GI source. Initial hemoglobin was 6.5.  GI consulted. EGD on  showed 2 small erosion in the proximal stomach and otherwise unremarkable. Colonoscopy  showed cecal mass and 2 sessile polyps that were removed with hot snare. General surgery consulted and he underwent laparoscopic right hemicolectomy on 8/10. Transfused 1 unit packed red blood cell on  left upper extremity ultrasound on 8/15 showed axillary DVT. GI reconsulted given anemia with transfusion and melena. Hemoglobin improved, heparin drip without initial bolus ordered  after discussing with patient. Subjective/24hr Events (22): Patient was seen and examined at the bedside. No overnight events. Patient sitting in chair comfortably without any major complaints. Tolerating diet. Patient denied any cardiac chest pain, shortness of breath, fever/chills.       Assessment & Plan:   Acute left upper extremity axillary vein DVT  - GI reconsulted on 8/15  - On IV PPI  - Hemoglobin stable at 8.2  - Risks and benefits of heparin versus no heparin discussed with patient and patient's son  - Started on heparin drip without initial bolus  - Continue to monitor hemoglobin and any obvious signs of bleeding  - Patient can have heparin changed to NOAC  if hemoglobin stable and no obvious sign of bleeding  -  patient transition to Eliquis 10 mg Reflux Management:    - [email or web address].com Dr. Holcomb.  - Alginate Therapy: [employer] (Gaviscon Advance) and Reflux Gourmet  - avoid menthol lozenges and mints. Consider using Mendez's Breezers (fruit flavoured lozenges) or Xylimelts ([employer], [employer]) instead.     twice daily for next 7 days. 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 Kylee Orr in 10 days status post discharge    Acute uncomplicated cystitis secondary to Enterococcus  - Amoxicillin, end of treatment 8/15  - Blood cultures nothing grown to date    Hypokalemia  - Resolved  - Replete as needed    Chronic diastolic congestive heart failure/heart failure preserved ejection fraction  - Compensated    Coronary artery disease  - Aspirin on hold with melena    GERD  - P.o. PPI    Peripheral artery disease/coronary artery disease, hyperlipidemia  - Continue aspirin  - Continue statin    Hypertension  - Blood pressure currently controlled      Discharge Planning:    Dispo pending clinical course. Follow-up oncology recommendations. Possible discharge next 24 hours. Diet:  ADULT ORAL NUTRITION SUPPLEMENT; Breakfast, Lunch, Dinner; Standard High Calorie/High Protein Oral Supplement  ADULT DIET;  Dysphagia - Soft and Bite Sized  DVT PPx: Eliquis  Code status: Full Code    Hospital Problems:  Principal Problem:    Acute blood loss anemia  Active Problems:    GIB (gastrointestinal bleeding)    Cecum mass    Colon cancer (HCC)    Adenocarcinoma of cecum (HCC)    Ileus following gastrointestinal surgery (HCC)    Acute deep vein thrombosis (DVT) of axillary vein of left upper extremity (HCC)    Paroxysmal atrial fibrillation (HCC)    HTN (hypertension)    Myasthenia gravis (HCC)    SSS (sick sinus syndrome) (HCC)    GERD (gastroesophageal reflux disease)    Diastolic CHF, chronic (HCC)    Dyslipidemia  Resolved Problems:    * No resolved hospital problems. *      Objective:   Patient Vitals for the past 24 hrs:   Temp Pulse Resp BP SpO2   08/18/22 1052 97.7 °F (36.5 °C) 75 17 (!) 153/80 99 %   08/18/22 0743 97.4 °F (36.3 °C) 75 17 (!) 153/83 98 %   08/18/22 0230 98.4 °F (36.9 °C) 77 17 (!) 144/72 97 %   08/17/22 2305 98.9 °F (37.2 °C) 70 17 (!) 150/78 97 %   08/17/22 1927 99.1 °F (37.3 °C) 75 17 (!) 154/81 96 %   08/17/22 1540 98.1 °F (36.7 °C) 74 16 (!) 148/74 99 %       Oxygen Therapy  SpO2: 99 %  Pulse via Oximetry: 75 beats per minute  Pulse Oximeter Device Mode: Continuous  Pulse Oximeter Device Location: Left, Finger  O2 Device: None (Room air)  O2 Flow Rate (L/min): 2 L/min    Estimated body mass index is 31.29 kg/m² as calculated from the following:    Height as of this encounter: 5' 8\" (1.727 m). Weight as of this encounter: 205 lb 12.8 oz (93.4 kg). Intake/Output Summary (Last 24 hours) at 8/18/2022 1440  Last data filed at 8/18/2022 1254  Gross per 24 hour   Intake 720 ml   Output --   Net 720 ml         Physical Exam:   Blood pressure (!) 153/80, pulse 75, temperature 97.7 °F (36.5 °C), temperature source Oral, resp. rate 17, height 5' 8\" (1.727 m), weight 205 lb 12.8 oz (93.4 kg), SpO2 99 %. General:    Well nourished. Head:  Normocephalic, atraumatic  Eyes:  Sclerae appear normal.  Pupils equally round. ENT:  Nares appear normal, no drainage. Moist oral mucosa  Neck:  No restricted ROM. Trachea midline   CV:   RRR. No m/r/g. No jugular venous distension. Lungs:   CTAB. No wheezing, rhonchi, or rales. Symmetric expansion. Abdomen: Bowel sounds present. Soft, nontender, nondistended. Laparotomy sites healing well without any drainage. Extremities: No cyanosis or clubbing. Trace bilateral lower extremity edema. Pitting edema to left upper extremity. Skin:     No rashes and normal coloration. Warm and dry. Neuro:  CN II-XII grossly intact. Sensation intact.   A&Ox3  Psych:  Normal mood and affect.       I have personally reviewed labs and tests showing:  Recent Labs:  Recent Results (from the past 48 hour(s))   Hemoglobin and Hematocrit    Collection Time: 08/16/22  7:07 PM   Result Value Ref Range    Hemoglobin 8.9 (L) 13.6 - 17.2 g/dL    Hematocrit 32.2 (L) 41.1 - 50.3 %   Anti-Xa, Unfractionated Heparin    Collection Time: 08/16/22  7:07 PM   Result Value Ref Range    Anti-XA Unfrac Heparin 0.35 0.3 - 0.7 IU/mL   Hemoglobin and Hematocrit    Collection Time: 08/17/22  2:26 AM   Result Value Ref Range    Hemoglobin 8.2 (L) 13.6 - 17.2 g/dL    Hematocrit 28.7 (L) 41.1 - 50.3 %   Anti-Xa, Unfractionated Heparin    Collection Time: 08/17/22  2:26 AM   Result Value Ref Range    Anti-XA Unfrac Heparin 0.80 (H) 0.3 - 0.7 IU/mL   Anti-Xa, Unfractionated Heparin    Collection Time: 08/17/22 10:09 AM   Result Value Ref Range    Anti-XA Unfrac Heparin 0.97 (H) 0.3 - 0.7 IU/mL   Anti-Xa, Unfractionated Heparin    Collection Time: 08/17/22 10:25 PM   Result Value Ref Range    Anti-XA Unfrac Heparin 0.67 0.3 - 0.7 IU/mL   Comprehensive Metabolic Panel w/ Reflex to MG    Collection Time: 08/18/22  6:53 AM   Result Value Ref Range    Sodium 138 136 - 145 mmol/L    Potassium 3.6 3.5 - 5.1 mmol/L    Chloride 109 (H) 98 - 107 mmol/L    CO2 25 21 - 32 mmol/L    Anion Gap 4 (L) 7 - 16 mmol/L    Glucose 95 65 - 100 mg/dL    BUN 7 (L) 8 - 23 MG/DL    Creatinine 0.70 (L) 0.8 - 1.5 MG/DL    GFR African American >60 >60 ml/min/1.73m2    GFR Non- >60 >60 ml/min/1.73m2    Calcium 8.8 8.3 - 10.4 MG/DL    Total Bilirubin 0.6 0.2 - 1.1 MG/DL    ALT 19 12 - 65 U/L    AST 14 (L) 15 - 37 U/L    Alk Phosphatase 95 50 - 136 U/L    Total Protein 5.7 (L) 6.3 - 8.2 g/dL    Albumin 2.9 (L) 3.2 - 4.6 g/dL    Globulin 2.8 2.3 - 3.5 g/dL    Albumin/Globulin Ratio 1.0 (L) 1.2 - 3.5     CBC with Auto Differential    Collection Time: 08/18/22  6:53 AM   Result Value Ref Range    WBC 11.4 (H) 4.3 - 11.1 K/uL    RBC 4.33 4.23 - 5.6 M/uL    Hemoglobin 8.8 (L) 13.6 - 17.2 g/dL    Hematocrit 31.2 (L) 41.1 - 50.3 %    MCV 72.1 (L) 79.6 - 97.8 FL    MCH 20.3 (L) 26.1 - 32.9 PG    MCHC 28.2 (L) 31.4 - 35.0 g/dL    RDW 24.9 (H) 11.9 - 14.6 %    Platelets 379 091 - 680 K/uL    MPV 8.9 (L) 9.4 - 12.3 FL    nRBC 0.00 0.0 - 0.2 K/uL    Differential Type AUTOMATED      Seg Neutrophils 55 43 - 78 %    Lymphocytes 29 13 - 44 %    Monocytes 9 4.0 - 12.0 %    Eosinophils % 7 0.5 - 7.8 %    Basophils 1 0.0 - 2.0 %    Immature Granulocytes 1 0.0 - 5.0 %    Segs Absolute 6.3 1.7 - 8.2 K/UL    Absolute Lymph # 3.3 0.5 - 4.6 K/UL    Absolute Mono # 1.0 0.1 - 1.3 K/UL    Absolute Eos # 0.8 0.0 - 0.8 K/UL    Basophils Absolute 0.1 0.0 - 0.2 K/UL    Absolute Immature Granulocyte 0.1 0.0 - 0.5 K/UL   Anti-Xa, Unfractionated Heparin    Collection Time: 08/18/22  6:53 AM   Result Value Ref Range    Anti-XA Unfrac Heparin 0.86 (H) 0.3 - 0.7 IU/mL       I have personally reviewed imaging studies showing: Other Studies:  Vascular duplex upper extremity venous left   Final Result      1. Deep vein thrombosis in the axillary vein. 2. Superficial vein thrombus in the basilic vein. XR ABDOMEN (KUB) (SINGLE AP VIEW)   Final Result   Nonspecific prominent loops of bowel in the central upper and left   upper abdominal quadrant. Ileus suspected. CT CHEST ABDOMEN PELVIS W CONTRAST   Final Result   1.  3.5 cm x 2.4 cm cecal mass. No clear evidence for metastatic disease is   evident by CT imaging. Pulmonary nodules, and hepatic lesions are seen which   are favored to be benign as described above. This report was made using voice transcription. Despite my best efforts to avoid   any, transcription errors may persist. If there is any question about the   accuracy of the report or need for clarification, then please call [phone], or text me through [email or web address] for clarification or correction. XR CHEST PORTABLE   Final Result   1.  No consolidation. 2. Chronic cardiac enlargement, pacemaker.              Current Meds:  Current Facility-Administered Medications   Medication Dose Route Frequency    apixaban (ELIQUIS) tablet 10 mg  10 mg Oral BID    oxyCODONE-acetaminophen (PERCOCET) 5-325 MG per tablet 1 tablet  1 tablet Oral Q4H PRN    pantoprazole (PROTONIX) 40 mg in sodium chloride (PF) 10 mL injection  40 mg IntraVENous Q12H    0.9 % sodium chloride infusion   IntraVENous PRN    docusate sodium (COLACE) capsule 100 mg  100 mg Oral BID    magnesium sulfate 2000 mg in 50 mL IVPB premix  2,000 mg IntraVENous PRN    potassium chloride (KLOR-CON M) extended release tablet 40 mEq  40 mEq Oral PRN    Or    potassium bicarb-citric acid (EFFER-K) effervescent tablet 40 mEq  40 mEq Oral PRN    Or    potassium chloride 10 mEq/100 mL IVPB (Peripheral Line)  10 mEq IntraVENous PRN    lidocaine 1 % injection 5 mL  5 mL IntraDERmal Once    sodium chloride flush 0.9 % injection 5-40 mL  5-40 mL IntraVENous 2 times per day    sodium chloride flush 0.9 % injection 5-40 mL  5-40 mL IntraVENous PRN    heparin flush 100 UNIT/ML injection 100 Units  1 mL IntraCATHeter PRN    [Held by provider] aspirin EC tablet 81 mg  81 mg Oral Daily    atorvastatin (LIPITOR) tablet 80 mg  80 mg Oral Nightly    [Held by provider] furosemide (LASIX) tablet 40 mg  40 mg Oral Daily    rOPINIRole (REQUIP) tablet 0.5 mg  0.5 mg Oral Nightly    diatrizoate meglumine-sodium (GASTROGRAFIN) 66-10 % solution 15 mL  15 mL Oral ONCE PRN    sodium chloride flush 0.9 % injection 5-40 mL  5-40 mL IntraVENous 2 times per day    sodium chloride flush 0.9 % injection 5-40 mL  5-40 mL IntraVENous PRN    0.9 % sodium chloride infusion   IntraVENous PRN    ondansetron (ZOFRAN-ODT) disintegrating tablet 4 mg  4 mg Oral Q8H PRN    Or    ondansetron (ZOFRAN) injection 4 mg  4 mg IntraVENous Q6H PRN    acetaminophen (TYLENOL) tablet 650 mg  650 mg Oral Q6H PRN    Or    acetaminophen (TYLENOL) suppository

## 2022-08-19 ENCOUNTER — TELEPHONE (OUTPATIENT)
Dept: CARDIOLOGY CLINIC | Age: 82
End: 2022-08-19

## 2022-08-19 ENCOUNTER — HOME HEALTH ADMISSION (OUTPATIENT)
Dept: HOME HEALTH SERVICES | Facility: HOME HEALTH | Age: 82
End: 2022-08-19

## 2022-08-19 VITALS
HEIGHT: 68 IN | OXYGEN SATURATION: 97 % | DIASTOLIC BLOOD PRESSURE: 87 MMHG | TEMPERATURE: 97.5 F | WEIGHT: 205.8 LBS | BODY MASS INDEX: 31.19 KG/M2 | SYSTOLIC BLOOD PRESSURE: 156 MMHG | RESPIRATION RATE: 20 BRPM | HEART RATE: 73 BPM

## 2022-08-19 LAB
ALBUMIN SERPL-MCNC: 2.9 G/DL (ref 3.2–4.6)
ALBUMIN/GLOB SERPL: 0.9 {RATIO} (ref 1.2–3.5)
ALP SERPL-CCNC: 89 U/L (ref 50–136)
ALT SERPL-CCNC: 17 U/L (ref 12–65)
ANION GAP SERPL CALC-SCNC: 4 MMOL/L (ref 7–16)
AST SERPL-CCNC: 12 U/L (ref 15–37)
BASOPHILS # BLD: 0.1 K/UL (ref 0–0.2)
BASOPHILS NFR BLD: 1 % (ref 0–2)
BILIRUB SERPL-MCNC: 0.5 MG/DL (ref 0.2–1.1)
BUN SERPL-MCNC: 10 MG/DL (ref 8–23)
CALCIUM SERPL-MCNC: 9.4 MG/DL (ref 8.3–10.4)
CHLORIDE SERPL-SCNC: 109 MMOL/L (ref 98–107)
CO2 SERPL-SCNC: 28 MMOL/L (ref 21–32)
CREAT SERPL-MCNC: 0.7 MG/DL (ref 0.8–1.5)
DIFFERENTIAL METHOD BLD: ABNORMAL
EOSINOPHIL # BLD: 0.6 K/UL (ref 0–0.8)
EOSINOPHIL NFR BLD: 6 % (ref 0.5–7.8)
ERYTHROCYTE [DISTWIDTH] IN BLOOD BY AUTOMATED COUNT: 25.3 % (ref 11.9–14.6)
GLOBULIN SER CALC-MCNC: 3.4 G/DL (ref 2.3–3.5)
GLUCOSE SERPL-MCNC: 100 MG/DL (ref 65–100)
HCT VFR BLD AUTO: 33.6 % (ref 41.1–50.3)
HGB BLD-MCNC: 9.4 G/DL (ref 13.6–17.2)
IMM GRANULOCYTES # BLD AUTO: 0.1 K/UL (ref 0–0.5)
IMM GRANULOCYTES NFR BLD AUTO: 1 % (ref 0–5)
LYMPHOCYTES # BLD: 2.3 K/UL (ref 0.5–4.6)
LYMPHOCYTES NFR BLD: 23 % (ref 13–44)
MCH RBC QN AUTO: 20.8 PG (ref 26.1–32.9)
MCHC RBC AUTO-ENTMCNC: 28 G/DL (ref 31.4–35)
MCV RBC AUTO: 74.3 FL (ref 79.6–97.8)
MONOCYTES # BLD: 1.2 K/UL (ref 0.1–1.3)
MONOCYTES NFR BLD: 11 % (ref 4–12)
NEUTS SEG # BLD: 6.1 K/UL (ref 1.7–8.2)
NEUTS SEG NFR BLD: 59 % (ref 43–78)
NRBC # BLD: 0 K/UL (ref 0–0.2)
PLATELET # BLD AUTO: 202 K/UL (ref 150–450)
PMV BLD AUTO: 9.3 FL (ref 9.4–12.3)
POTASSIUM SERPL-SCNC: 4 MMOL/L (ref 3.5–5.1)
PROT SERPL-MCNC: 6.3 G/DL (ref 6.3–8.2)
RBC # BLD AUTO: 4.52 M/UL (ref 4.23–5.6)
SODIUM SERPL-SCNC: 141 MMOL/L (ref 138–145)
WBC # BLD AUTO: 10.3 K/UL (ref 4.3–11.1)

## 2022-08-19 PROCEDURE — 36415 COLL VENOUS BLD VENIPUNCTURE: CPT

## 2022-08-19 PROCEDURE — APPSS45 APP SPLIT SHARED TIME 31-45 MINUTES: Performed by: NURSE PRACTITIONER

## 2022-08-19 PROCEDURE — 6370000000 HC RX 637 (ALT 250 FOR IP): Performed by: STUDENT IN AN ORGANIZED HEALTH CARE EDUCATION/TRAINING PROGRAM

## 2022-08-19 PROCEDURE — 97530 THERAPEUTIC ACTIVITIES: CPT

## 2022-08-19 PROCEDURE — 85025 COMPLETE CBC W/AUTO DIFF WBC: CPT

## 2022-08-19 PROCEDURE — 2580000003 HC RX 258: Performed by: INTERNAL MEDICINE

## 2022-08-19 PROCEDURE — 2580000003 HC RX 258: Performed by: HOSPITALIST

## 2022-08-19 PROCEDURE — 99222 1ST HOSP IP/OBS MODERATE 55: CPT | Performed by: INTERNAL MEDICINE

## 2022-08-19 PROCEDURE — 6360000002 HC RX W HCPCS: Performed by: INTERNAL MEDICINE

## 2022-08-19 PROCEDURE — 80053 COMPREHEN METABOLIC PANEL: CPT

## 2022-08-19 PROCEDURE — A4216 STERILE WATER/SALINE, 10 ML: HCPCS | Performed by: INTERNAL MEDICINE

## 2022-08-19 PROCEDURE — 6370000000 HC RX 637 (ALT 250 FOR IP): Performed by: SURGERY

## 2022-08-19 PROCEDURE — C9113 INJ PANTOPRAZOLE SODIUM, VIA: HCPCS | Performed by: INTERNAL MEDICINE

## 2022-08-19 RX ORDER — PSEUDOEPHEDRINE HCL 30 MG
100 TABLET ORAL 2 TIMES DAILY
Qty: 28 CAPSULE | Refills: 0 | Status: SHIPPED | OUTPATIENT
Start: 2022-08-19 | End: 2022-09-02

## 2022-08-19 RX ORDER — OXYCODONE HYDROCHLORIDE AND ACETAMINOPHEN 5; 325 MG/1; MG/1
1 TABLET ORAL EVERY 6 HOURS PRN
Qty: 12 TABLET | Refills: 0 | Status: SHIPPED | OUTPATIENT
Start: 2022-08-19 | End: 2022-08-22

## 2022-08-19 RX ADMIN — DOCUSATE SODIUM 100 MG: 100 CAPSULE, LIQUID FILLED ORAL at 09:31

## 2022-08-19 RX ADMIN — SODIUM CHLORIDE, PRESERVATIVE FREE 10 ML: 5 INJECTION INTRAVENOUS at 09:31

## 2022-08-19 RX ADMIN — SODIUM CHLORIDE, PRESERVATIVE FREE 40 MG: 5 INJECTION INTRAVENOUS at 11:36

## 2022-08-19 RX ADMIN — SODIUM CHLORIDE 125 MG: 9 INJECTION, SOLUTION INTRAVENOUS at 12:12

## 2022-08-19 RX ADMIN — SODIUM CHLORIDE, PRESERVATIVE FREE 10 ML: 5 INJECTION INTRAVENOUS at 11:36

## 2022-08-19 RX ADMIN — APIXABAN 10 MG: 5 TABLET, FILM COATED ORAL at 09:31

## 2022-08-19 ASSESSMENT — PAIN SCALES - GENERAL: PAINLEVEL_OUTOF10: 0

## 2022-08-19 NOTE — PROGRESS NOTES
Discharge instructions & prescription information given to pt & his son. Verbalized understanding. IV removed. Tip intact. Opportunity for questions provided. Instructed pt to call when ready to be taken down.

## 2022-08-19 NOTE — CARE COORDINATION
Patient has d/c orders for today. PT/OT has recommended HH and patient / spouse were agreeable to [facility]. Referral completed. Son will transport patient home. Patient has met all treatment goals / milestones. CM will continue to monitor and remain available for any needs or concerns that may occur. 08/05/22 1046   Service Assessment   Patient Orientation Alert and Oriented   Cognition Alert   History Provided By Patient; Child/Family   Primary Caregiver Self   Support Systems Spouse/Significant Other;Children   Patient's Healthcare Decision Maker is: Legal Next of Kin   PCP Verified by CM Yes   Last Visit to PCP   (Patient transferring to a new PCP Office.)   Prior Functional Level Independent in ADLs/IADLs   Can patient return to prior living arrangement Yes   Ability to make needs known: Good   Social/Functional History   Lives With Spouse   Type of [address] [facility] [address] One level   Home Access Level entry   [address] [facility]  (walking stick.)   ADL Assistance Independent   Active  No   Patient's  Info Family provides transportation. Patient is able to drive. Mode of Transportation Car   Occupation Retired   Discharge Planning   Type of [address] Prior To Admission None   [address]   DME Ordered? No   Potential Assistance Purchasing Medications No   Type of Home Care Services None   Services At/After Discharge   Transition of Care Consult (CM Consult) Discharge [address] Provided? No   Mode of Transport at Discharge Other (see comment)  (Family.)   Confirm Follow Up Transport Family   Condition of Participation: Discharge Planning   The Plan for Transition of Care is related to the following treatment goals: Return to prior level of functioning.

## 2022-08-19 NOTE — DISCHARGE SUMMARY
Hospitalist Discharge Summary   Admit Date:  2022  4:05 PM   DC Note date: 2022  Name:  Toma Pineda   Age:  79 y.o. Sex:  male  :  1940   MRN:  [id number]   Room:  [facility]  PCP:  None Provider    Presenting Complaint: Abnormal Lab     Initial Admission Diagnosis: Acute blood loss anemia [D62]  Anemia, unspecified type [D64.9]     Problem List for this Hospitalization (present on admission):    Principal Problem:    Acute blood loss anemia  Active Problems:    GIB (gastrointestinal bleeding)    Cecum mass    Colon cancer (HCC)    Adenocarcinoma of cecum (HCC)    Ileus following gastrointestinal surgery ([facility] [address]    Acute deep vein thrombosis (DVT) of axillary vein of left upper extremity (HCC)    Paroxysmal atrial fibrillation (HCC)    HTN (hypertension)    Myasthenia gravis (HCC)    SSS (sick sinus syndrome) (HCC)    GERD (gastroesophageal reflux disease)    Diastolic CHF, chronic (HCC)    Dyslipidemia  Resolved Problems:    * No resolved hospital problems. *      Hospital Course:  65-year-old male with history of atrial fibrillation status post watchman, hypertension, sick sinus syndrome with PPM, GERD hyperlipidemia, coronary artery disease, heart failure preserved ejection fraction is admitted with acute blood loss anemia from GI source. Initial hemoglobin was 6.5.  GI consulted. EGD on  showed 2 small erosion in the proximal stomach and otherwise unremarkable. Colonoscopy  showed cecal mass and 2 sessile polyps that were removed with hot snare. General surgery consulted and he underwent laparoscopic right hemicolectomy on 8/10. Transfused 1 unit packed red blood cell on  left upper extremity ultrasound on 8/15 showed axillary DVT. GI reconsulted given anemia with transfusion and melena. Hemoglobin improved, heparin drip without initial bolus ordered  after discussing with patient. Patient's acute left upper extremity axillary vein DVT hemoglobin remained stable.   Risk and benefits of heparin versus no heparin discussed with patient and patient's son. Patient was started on heparin drip without initial bolus. Patient was continued to have hemoglobin checked with no obvious signs of bleeding. Patient's heparin changed to NOAC on 8/18 with hemoglobin remaining stable. Patient transition to Eliquis 10 mg twice a day for the next 7 days and will also need to be on 5 mg twice a day for minimum of 3 months at discharge. Patient was advised that he needs to follow-up with his primary care provider to discuss this medication and any refills in the future. For patient's acute blood loss anemia secondary to cecal adenocarcinoma patient status post hemicolectomy on 8/10. Mass pathology is adenocarcinoma. Oncology consulted and recommended iron infusion and to follow-up with them within 1 week outpatient. Patient will also need to follow-up with Dr. Lindsay Gallegos, general surgery within 1 week status post discharge. For patient's acute uncomplicated cystitis secondary to Enterococcus patient was given on amoxicillin with end of treatment on 8/15. Blood cultures grew nothing to date. For patient's other comorbid conditions she was restarted back on his home medications. Was advised that he needs to follow-up with his primary care provider within the next 3 to 5 days to discuss recent hospitalization any changes made to his medications. Patient will need to follow-up with general surgery within 1 week. Patient will also need to follow-up with oncology within 1 week. All questions answered. Patient denied any cardiac chest pain, shortness of breath, abdominal pain, fever/chills on day of discharge. Disposition: Discharge home with home health  Diet: ADULT ORAL NUTRITION SUPPLEMENT; Breakfast, Lunch, Dinner; Standard High Calorie/High Protein Oral Supplement  ADULT DIET;  Dysphagia - Soft and Bite Sized  Code Status: Full Code    Follow Ups:  PCP next 3 to 5 days  Follow-up with oncology within 1 week  Follow-up with general surgery within 1 week     Follow-up Information     Suzanne Mancera MD Follow up. Specialty: General Surgery  Why: post op [employer] 8/10  Contact information:  [address] [facility] [phone]  [phone]                       Time spent in patient discharge and coordination 50 minutes. Follow up labs/diagnostics (ultimately defer to outpatient provider):  Defer to outpatient provider    Plan was discussed with patient. All questions answered. Patient was stable at time of discharge. Instructions given to call a physician or return if any concerns. Current Discharge Medication List        START taking these medications    Details   oxyCODONE-acetaminophen (PERCOCET) 5-325 MG per tablet Take 1 tablet by mouth every 6 hours as needed for Pain for up to 3 days. Qty: 12 tablet, Refills: 0    Comments: Reduce doses taken as pain becomes manageable  Associated Diagnoses: Adenocarcinoma of cecum ([facility] [address]      ! ! apixaban (ELIQUIS) 5 MG TABS tablet Take 2 tablets by mouth 2 times daily for 11 doses  Qty: 22 tablet, Refills: 0      !! apixaban (ELIQUIS) 5 MG TABS tablet Take 1 tablet by mouth 2 times daily  Qty: 60 tablet, Refills: 2      docusate sodium (COLACE, DULCOLAX) 100 MG CAPS Take 100 mg by mouth 2 times daily for 14 days  Qty: 28 capsule, Refills: 0       !! - Potential duplicate medications found. Please discuss with provider. CONTINUE these medications which have NOT CHANGED    Details   potassium chloride (KLOR-CON M) 20 MEQ extended release tablet Take 1 tablet by mouth daily  Qty: 30 tablet, Refills: 11      atorvastatin (LIPITOR) 80 MG tablet Take 80 mg by mouth      nitroGLYCERIN (NITROSTAT) 0.4 MG SL tablet Place 1 sl under the tongue q 5 min prn cp, max 3 sl in a 15-min time period.  Call 911 if no relief after the 3rd sl.      pantoprazole (PROTONIX) 40 MG tablet Take 40 mg by mouth every morning as described above. This report was made using voice transcription. Despite my best efforts to avoid any, transcription errors may persist. If there is any question about the accuracy of the report or need for clarification, then please call [phone], or text me through perfectserv for clarification or correction. Vascular duplex upper extremity venous left    Result Date: 8/15/2022  1. Deep vein thrombosis in the axillary vein. 2. Superficial vein thrombus in the basilic vein.        Recent Labs     08/06/22 2003 08/06/22  1613 08/04/22  1845   CULTURE NO GROWTH 5 DAYS NO GROWTH 5 DAYS >100,000 COLONIES/mL ENTEROCOCCUS FAECALIS GROUP D*  10,000 to 50,000 COLONIES/mL NORMAL SKIN LU ISOLATED       Labs: Results:       BMP, Mg, Phos Recent Labs     08/18/22 0653 08/19/22  0730    141   K 3.6 4.0   * 109*   CO2 25 28   ANIONGAP 4* 4*   BUN 7* 10   CREATININE 0.70* 0.70*   LABGLOM >60 >60   GFRAA >60 >60   CALCIUM 8.8 9.4   GLUCOSE 95 100      CBC Recent Labs     08/17/22 0226 08/18/22 0653 08/19/22  0730   WBC  --  11.4* 10.3   RBC  --  4.33 4.52   HGB 8.2* 8.8* 9.4*   HCT 28.7* 31.2* 33.6*   MCV  --  72.1* 74.3*   MCH  --  20.3* 20.8*   MCHC  --  28.2* 28.0*   RDW  --  24.9* 25.3*   PLT  --  214 202   MPV  --  8.9* 9.3*   NRBC  --  0.00 0.00   SEGS  --  55 59   LYMPHOPCT  --  29 23   EOSRELPCT  --  7 6   MONOPCT  --  9 11   BASOPCT  --  1 1   IMMGRAN  --  1 1   SEGSABS  --  6.3 6.1   LYMPHSABS  --  3.3 2.3   EOSABS  --  0.8 0.6   MONOSABS  --  1.0 1.2   BASOSABS  --  0.1 0.1   ABSIMMGRAN  --  0.1 0.1      LFT Recent Labs     08/18/22 0653 08/19/22  0730   BILITOT 0.6 0.5   ALKPHOS 95 89   AST 14* 12*   ALT 19 17   PROT 5.7* 6.3   LABALBU 2.9* 2.9*   GLOB 2.8 3.4      Cardiac  Lab Results   Component Value Date/Time    NTPROBNP 857 06/02/2022 11:29 AM    TROPHS 36.5 03/27/2022 07:58 PM    TROPHS 46.2 11/19/2021 08:21 PM    TROPHS 52.1 11/19/2021 10:26 AM      Coags Lab Results   Component Value Date/Time    PROTIME 13.2 05/06/2021 11:53 AM    PROTIME 12.6 03/17/2021 09:35 AM    PROTIME 13.6 06/27/2019 08:08 AM    INR 1.0 05/06/2021 11:53 AM    INR 0.9 03/17/2021 09:35 AM    INR 1.1 06/27/2019 08:08 AM      A1c No results found for: LABA1C, EAG   Lipids Lab Results   Component Value Date/Time    CHOL 96 05/03/2021 10:33 AM    LDLCALC 40.2 05/03/2021 10:33 AM    LABVLDL 19.8 05/03/2021 10:33 AM    HDL 36 05/03/2021 10:33 AM    CHOLHDLRATIO 2.7 05/03/2021 10:33 AM    TRIG 99 05/03/2021 10:33 AM      Thyroid  Lab Results   Component Value Date/Time    ABZ9JUT 2.510 06/02/2022 11:29 AM        Most Recent UA Lab Results   Component Value Date/Time    COLORU YELLOW/STRAW 08/04/2022 06:45 PM    APPEARANCE CLOUDY 08/04/2022 06:45 PM    SPECGRAV 1.013 08/04/2022 06:45 PM    LABPH 6.0 08/04/2022 06:45 PM    PROTEINU Negative 08/04/2022 06:45 PM    GLUCOSEU Negative 08/04/2022 06:45 PM    KETUA Negative 08/04/2022 06:45 PM    BILIRUBINUR Negative 08/04/2022 06:45 PM    BILIRUBINUR Negative 04/02/2022 06:11 PM    BLOODU Negative 08/04/2022 06:45 PM    UROBILINOGEN 1.0 08/04/2022 06:45 PM    NITRU Negative 08/04/2022 06:45 PM    LEUKOCYTESUR MODERATE 08/04/2022 06:45 PM    WBCUA >100 08/04/2022 06:45 PM    RBCUA 0-3 08/04/2022 06:45 PM    EPITHUA 0 08/04/2022 06:45 PM    BACTERIA 4+ 08/04/2022 06:45 PM    LABCAST 0 08/04/2022 06:45 PM    MUCUS 0 08/04/2022 06:45 PM          All Labs from Last 24 Hrs:  Recent Results (from the past 24 hour(s))   Comprehensive Metabolic Panel w/ Reflex to MG    Collection Time: 08/19/22  7:30 AM   Result Value Ref Range    Sodium 141 138 - 145 mmol/L    Potassium 4.0 3.5 - 5.1 mmol/L    Chloride 109 (H) 98 - 107 mmol/L    CO2 28 21 - 32 mmol/L    Anion Gap 4 (L) 7 - 16 mmol/L    Glucose 100 65 - 100 mg/dL    BUN 10 8 - 23 MG/DL    Creatinine 0.70 (L) 0.8 - 1.5 MG/DL    GFR African American >60 >60 ml/min/1.73m2    GFR Non- >60 >60 ml/min/1.73m2    Calcium 9.4 8.3 - 10.4 MG/DL    Total Bilirubin 0.5 0.2 - 1.1 MG/DL    ALT 17 12 - 65 U/L    AST 12 (L) 15 - 37 U/L    Alk Phosphatase 89 50 - 136 U/L    Total Protein 6.3 6.3 - 8.2 g/dL    Albumin 2.9 (L) 3.2 - 4.6 g/dL    Globulin 3.4 2.3 - 3.5 g/dL    Albumin/Globulin Ratio 0.9 (L) 1.2 - 3.5     CBC with Auto Differential    Collection Time: 08/19/22  7:30 AM   Result Value Ref Range    WBC 10.3 4.3 - 11.1 K/uL    RBC 4.52 4.23 - 5.6 M/uL    Hemoglobin 9.4 (L) 13.6 - 17.2 g/dL    Hematocrit 33.6 (L) 41.1 - 50.3 %    MCV 74.3 (L) 79.6 - 97.8 FL    MCH 20.8 (L) 26.1 - 32.9 PG    MCHC 28.0 (L) 31.4 - 35.0 g/dL    RDW 25.3 (H) 11.9 - 14.6 %    Platelets 888 766 - 658 K/uL    MPV 9.3 (L) 9.4 - 12.3 FL    nRBC 0.00 0.0 - 0.2 K/uL    Differential Type AUTOMATED      Seg Neutrophils 59 43 - 78 %    Lymphocytes 23 13 - 44 %    Monocytes 11 4.0 - 12.0 %    Eosinophils % 6 0.5 - 7.8 %    Basophils 1 0.0 - 2.0 %    Immature Granulocytes 1 0.0 - 5.0 %    Segs Absolute 6.1 1.7 - 8.2 K/UL    Absolute Lymph # 2.3 0.5 - 4.6 K/UL    Absolute Mono # 1.2 0.1 - 1.3 K/UL    Absolute Eos # 0.6 0.0 - 0.8 K/UL    Basophils Absolute 0.1 0.0 - 0.2 K/UL    Absolute Immature Granulocyte 0.1 0.0 - 0.5 K/UL       No Known Allergies  Immunization History   Administered Date(s) Administered    COVID-19, PFIZER PURPLE top, DILUTE for use, (age 15 y+), 30mcg/0.3mL 02/02/2021, 02/23/2021    PPD Test 03/29/2022    Pneumococcal Polysaccharide ([id number]) 05/08/2015    Tdap (Boostrix, Adacel) 03/03/2022       Recent Vital Data:  Patient Vitals for the past 24 hrs:   Temp Pulse Resp BP SpO2   08/19/22 1105 97.5 °F (36.4 °C) 73 20 (!) 156/87 97 %   08/19/22 0741 97.9 °F (36.6 °C) 75 18 (!) 152/87 99 %   08/19/22 0455 98.1 °F (36.7 °C) 80 17 (!) 141/72 98 %   08/18/22 2107 -- -- 14 -- --   08/18/22 2037 -- -- 17 -- --   08/18/22 1949 98.6 °F (37 °C) 75 18 131/70 95 %   08/18/22 1547 97.5 °F (36.4 °C) 75 17 (!) 140/75 98 %       Oxygen Therapy  SpO2: 97 %  Pulse via Oximetry: 75 beats per minute  Pulse Oximeter Device Mode: Continuous  Pulse Oximeter Device Location: Left, Finger  O2 Device: None (Room air)  O2 Flow Rate (L/min): 2 L/min    Estimated body mass index is 31.29 kg/m² as calculated from the following:    Height as of this encounter: 5' 8\" (1.727 m). Weight as of this encounter: 205 lb 12.8 oz (93.4 kg). Intake/Output Summary (Last 24 hours) at 8/19/2022 1418  Last data filed at 8/19/2022 0941  Gross per 24 hour   Intake 940 ml   Output --   Net 940 ml         Physical Exam:  General:    Well nourished. No overt distress  Head:  Normocephalic, atraumatic  Eyes:  Sclerae appear normal.  Pupils equally round. HENT:  Nares appear normal, no drainage. Moist mucous membranes  Neck:  No restricted ROM. Trachea midline  CV:   RRR. No m/r/g. No JVD  Lungs:   CTAB. No wheezing, rhonchi, or rales. Respirations even, unlabored  Abdomen:   Soft, nontender, nondistended. Laparotomy site healing well without any drainage  Extremities: Warm and dry. No cyanosis or clubbing. No edema. Skin:     No rashes. Normal coloration  Neuro:  CN II-XII grossly intact. Psych:  Normal mood and affect. Signed:  Tayler Shah MD    Part of this note may have been written by using a voice dictation software. The note has been proof read but may still contain some grammatical/other typographical errors.

## 2022-08-19 NOTE — PLAN OF CARE
Problem: Safety - Adult  Goal: Free from fall injury  8/19/2022 1521 by Pao Pugh RN  Outcome: Completed  8/19/2022 1047 by Pao Pugh RN  Outcome: Progressing     Problem: Chronic Conditions and Co-morbidities  Goal: Patient's chronic conditions and co-morbidity symptoms are monitored and maintained or improved  8/19/2022 1521 by Pao Pugh RN  Outcome: Completed  8/19/2022 1047 by Pao Pugh RN  Outcome: Progressing     Problem: Skin/Tissue Integrity  Goal: Absence of new skin breakdown  Description: 1. Monitor for areas of redness and/or skin breakdown  2. Assess vascular access sites hourly  3. Every 4-6 hours minimum:  Change oxygen saturation probe site  4. Every 4-6 hours:  If on nasal continuous positive airway pressure, respiratory therapy assess nares and determine need for appliance change or resting period.   8/19/2022 1521 by Pao Pugh RN  Outcome: Completed  8/19/2022 1047 by Pao Pugh RN  Outcome: Progressing     Problem: Pain  Goal: Verbalizes/displays adequate comfort level or baseline comfort level  8/19/2022 1521 by Pao Pugh RN  Outcome: Completed  8/19/2022 1047 by Pao Pugh RN  Outcome: Progressing     Problem: ABCDS Injury Assessment  Goal: Absence of physical injury  8/19/2022 1521 by Pao Pugh RN  Outcome: Completed  8/19/2022 1047 by Pao Pugh RN  Outcome: Progressing

## 2022-08-19 NOTE — TELEPHONE ENCOUNTER
Son-Justin called and wants a call back about his dad/patient medication. Patient has been discharged from [facility] and some of patients meds have been changed and 1 discontinued. Please call Dessie Skiff at [phone].

## 2022-08-19 NOTE — PLAN OF CARE
Problem: Safety - Adult  Goal: Free from fall injury  Outcome: Progressing     Problem: Chronic Conditions and Co-morbidities  Goal: Patient's chronic conditions and co-morbidity symptoms are monitored and maintained or improved  Outcome: Progressing     Problem: Skin/Tissue Integrity  Goal: Absence of new skin breakdown  Description: 1. Monitor for areas of redness and/or skin breakdown  2. Assess vascular access sites hourly  3. Every 4-6 hours minimum:  Change oxygen saturation probe site  4. Every 4-6 hours:  If on nasal continuous positive airway pressure, respiratory therapy assess nares and determine need for appliance change or resting period.   Outcome: Progressing     Problem: Pain  Goal: Verbalizes/displays adequate comfort level or baseline comfort level  Outcome: Progressing     Problem: ABCDS Injury Assessment  Goal: Absence of physical injury  Outcome: Progressing

## 2022-08-19 NOTE — CONSULTS
Inpatient Hematology / Oncology Consult Note    Reason for Consult:  Acute blood loss anemia [D62]  Anemia, unspecified type [D64.9]  Referring Physician:  Unknown MD Yusra    History of Present Illness:  Mr. Nelson Johnson is a 79 y.o. male admitted on 8/4/2022 with a primary diagnosis of The primary encounter diagnosis was Anemia, unspecified type. A diagnosis of Cecum mass was also pertinent to this visit. .      Colon cancer, left arm DVT and colon cancer      Review of Systems:  Constitutional Denies fever, chills, weight loss, appetite changes, fatigue, night sweats. HEENT Denies trauma, blurry vision, hearing loss, ear pain, nosebleeds, sore throat, neck pain and ear discharge. Skin Denies lesions or rashes. Lungs Denies dyspnea, cough, sputum production or hemoptysis. Cardiovascular Denies chest pain, palpitations, or lower extremity edema. Gastrointestinal Denies nausea, vomiting, changes in bowel habits, bloody or black stools, abdominal pain.  Denies dysuria, frequency or hesitancy of urination. Neuro Denies headaches, visual changes or ataxia. Denies dizziness, tingling, tremors, sensory change, speech change, focal weakness or headaches. Hematology Denies easy bruising or bleeding, denies gingival bleeding or epistaxis. Endo Denies heat/cold intolerance, denies diabetes or thyroid abnormalities. MSK Denies back pain, arthralgias, myalgias or frequent falls. Psychiatric/Behavioral Denies depression and substance abuse. The patient is not nervous/anxious.          No Known Allergies  Past Medical History:   Diagnosis Date    Abnormal EKG 4/22/15    Arrhythmia     Aspiration pneumonia ([facility] [address] 10/26/2017    CAD (coronary artery disease) 5/8/2015    Carotid artery stenosis without cerebral infarction 6/7/2016    US 6/15: <50% bilat ICAs    Coronary atherosclerosis of native coronary vessel 5/8/2015    GERMAIN on brilinta 5/7/15: prox LAD PCI, normal EF     Diastolic CHF, chronic (HCC) 3/2/2022    Dyslipidemia 6/7/2016    Dyspnea 5/8/2015    Echo 6/15: EF 60%, mod MR, mod LVH, mild AI     ED (erectile dysfunction)     GERD (gastroesophageal reflux disease)     GERD (gastroesophageal reflux disease)     no medication    HTN (hypertension) 5/8/2015    Hypertension     Hypokalemia     Hypokalemia 6/7/2016    Mitral valve regurgitation 6/7/2016    Morbid obesity ([address]     Myasthenia gravis ([address]     Myasthenia gravis ([address] 6/17/15    Nocturia     Osteoporosis     PUD (peptic ulcer disease) 25 yrs ago    S/P coronary artery stent placement 5/8/2015    3.0x38 mm Xience CAROL to pLAD 5/7/15     Sleep apnea     Syncope and collapse     Unspecified sleep apnea     no cpap     Past Surgical History:   Procedure Laterality Date    APPENDECTOMY      CARDIAC CATHETERIZATION  05/21/2019    watchman device    CARDIAC CATHETERIZATION  05/27/2015    stent    COLONOSCOPY  2007    COLONOSCOPY N/A 8/6/2022    COLONOSCOPY POLYPECTOMY SNARE/COLD BIOPSY performed by Nancy Julian MD at [facility] ENDOSCOPY    ERCP  3/30/2022         HEMICOLECTOMY Right 8/10/2022    BOWEL RESECTION HEMICOLECTOMY LAPAROSCOPIC ROBOTIC, Right Bulmaro performed by Dot Bustillo MD at [facility] [address] [facility]  2018    Marny Zeynep Kraus/Giana for sleep apnea and reconstruction for extending jaw    UPPER GASTROINTESTINAL ENDOSCOPY N/A 8/5/2022    EGD ESOPHAGOGASTRODUODENOSCOPY Rm 525 performed by Veronika Valle MD at [address] Right 07/10/2019     Repair of right radial artery pseudoaneurysm     Family History   Problem Relation Age of Onset    No Known Problems Brother     Cancer Brother         brain tumor    No Known Problems Sister     Cancer Mother         kidney    Cancer Father         stomach     Social History     Socioeconomic History    Marital status:      Spouse name: Not on file    Number of children: Not on file    Years of education: Not on file    Highest education level: Not on file   Occupational History    Not on file   Tobacco Use    Smoking status: Never    Smokeless tobacco: Never   Substance and Sexual Activity    Alcohol use: No    Drug use: No    Sexual activity: Not on file   Other Topics Concern    Not on file   Social History Narrative    Not on file     Social Determinants of Health     Financial Resource Strain: Not on file   Food Insecurity: Not on file   Transportation Needs: Not on file   Physical Activity: Not on file   Stress: Not on file   Social Connections: Not on file   Intimate Partner Violence: Not on file   Housing Stability: Not on file     Current Facility-Administered Medications   Medication Dose Route Frequency Provider Last Rate Last Admin    ferric gluconate (FERRLECIT) 125 mg in sodium chloride 0.9 % 100 mL IVPB  125 mg IntraVENous Daily Kellie Deluca MD        apixaban Sherolyn Stare) tablet 10 mg  10 mg Oral BID Jennifer Rendon MD   10 mg at 08/19/22 0931    oxyCODONE-acetaminophen (PERCOCET) 5-325 MG per tablet 1 tablet  1 tablet Oral Q4H PRN Tracey Rocha MD   1 tablet at 08/18/22 2037    pantoprazole (PROTONIX) 40 mg in sodium chloride (PF) 10 mL injection  40 mg IntraVENous Q12H Liza Hanson MD   40 mg at 08/19/22 1136    0.9 % sodium chloride infusion   IntraVENous PRN Liza Hanson MD        docusate sodium (COLACE) capsule 100 mg  100 mg Oral BID Tracey Rocha MD   100 mg at 08/19/22 0931    magnesium sulfate 2000 mg in 50 mL IVPB premix  2,000 mg IntraVENous PRN Floreen Alfreda, DO        potassium chloride (KLOR-CON M) extended release tablet 40 mEq  40 mEq Oral PRN Floreen Alfreda, DO        Or    potassium bicarb-citric acid (EFFER-K) effervescent tablet 40 mEq  40 mEq Oral PRN Floreen Alfreda, DO        Or    potassium chloride 10 mEq/100 mL IVPB (Peripheral Line)  10 mEq IntraVENous PRN Floreen Alfreda, DO        lidocaine 1 % injection 5 mL  5 mL IntraDERmal Once [facility] [employer] MD        sodium chloride flush 0.9 % injection 5-40 mL  5-40 mL IntraVENous 2 times per day Eriberto Pham MD   10 mL at 08/19/22 0931    sodium chloride flush 0.9 % injection 5-40 mL  5-40 mL IntraVENous PRN Eriberto Pham MD        heparin flush 100 UNIT/ML injection 100 Units  1 mL IntraCATHeter PRN Eriberto Pham MD        [facility]] aspirin EC tablet 81 mg  81 mg Oral Daily Murphy Becker MD        atorvastatin (LIPITOR) tablet 80 mg  80 mg Oral Nightly Murphy Becker MD   80 mg at 08/18/22 2031    [Held by provider] furosemide (LASIX) tablet 40 mg  40 mg Oral Daily Murphy Becker MD        rOPINIRole (REQUIP) tablet 0.5 mg  0.5 mg Oral Nightly Murphy Becker MD   0.5 mg at 08/18/22 2031    diatrizoate meglumine-sodium (GASTROGRAFIN) 66-10 % solution 15 mL  15 mL Oral ONCE PRN Eddie Rayo MD   15 mL at 08/06/22 1213    sodium chloride flush 0.9 % injection 5-40 mL  5-40 mL IntraVENous 2 times per day Murphy Becker MD   10 mL at 08/19/22 1136    sodium chloride flush 0.9 % injection 5-40 mL  5-40 mL IntraVENous PRN Murphy Becker MD        0.9 % sodium chloride infusion   IntraVENous PRN Murphy Becker MD        ondansetron (ZOFRAN-ODT) disintegrating tablet 4 mg  4 mg Oral Q8H PRN Murphy Becker MD        Or    ondansetron [facility]) injection 4 mg  4 mg IntraVENous Q6H PRN Murphy Becker MD        acetaminophen (TYLENOL) tablet 650 mg  650 mg Oral Q6H PRN Murphy Becker MD   650 mg at 08/11/22 0340    Or    acetaminophen (TYLENOL) suppository 650 mg  650 mg Rectal Q6H PRN Murphy Becker MD        albuterol (PROVENTIL) nebulizer solution 2.5 mg  2.5 mg Nebulization Q6H PRN Murphy Becker MD   2.5 mg at 08/10/22 1126    temazepam (RESTORIL) capsule 15 mg  15 mg Oral Nightly PRN Clint Mcneal MD   15 mg at 08/18/22 2037       OBJECTIVE:  Patient Vitals for the past 8 hrs:   BP Temp Temp src Pulse Resp SpO2 22 1105 (!) 156/87 97.5 °F (36.4 °C) Oral 73 20 97 %   22 0741 (!) 152/87 97.9 °F (36.6 °C) Oral 75 18 99 %   22 0455 (!) 141/72 98.1 °F (36.7 °C) Oral 80 17 98 %     Temp (24hrs), Av.9 °F (36.6 °C), Min:97.5 °F (36.4 °C), Max:98.6 °F (37 °C)     07 - 1900  In: 940 [P.O.:940]  Out: -     Physical Exam:  Constitutional: Well developed, well nourished male in no acute distress, sitting comfortably in the hospital bed. HEENT: Normocephalic and atraumatic. Oropharynx is clear, mucous membranes are moist.  Pupils are equal, round, and reactive to light. Extraocular muscles are intact. Sclerae anicteric. Neck supple without JVD. No thyromegaly present. Lymph node   No palpable submandibular, cervical, supraclavicular, axillary or inguinal lymph nodes. Skin Warm and dry. No bruising and no rash noted. No erythema. No pallor. Respiratory Lungs are clear to auscultation bilaterally without wheezes, rales or rhonchi, normal air exchange without accessory muscle use. CVS Normal rate, regular rhythm and normal S1 and S2. No murmurs, gallops, or rubs. Abdomen Soft, nontender and nondistended, normoactive bowel sounds. No palpable mass. No hepatosplenomegaly. Neuro Grossly nonfocal with no obvious sensory or motor deficits. MSK Normal range of motion in general.  No edema and no tenderness. Psych Appropriate mood and affect.         Labs:    Recent Results (from the past 24 hour(s))   Comprehensive Metabolic Panel w/ Reflex to MG    Collection Time: 22  7:30 AM   Result Value Ref Range    Sodium 141 138 - 145 mmol/L    Potassium 4.0 3.5 - 5.1 mmol/L    Chloride 109 (H) 98 - 107 mmol/L    CO2 28 21 - 32 mmol/L    Anion Gap 4 (L) 7 - 16 mmol/L    Glucose 100 65 - 100 mg/dL    BUN 10 8 - 23 MG/DL    Creatinine 0.70 (L) 0.8 - 1.5 MG/DL    GFR African American >60 >60 ml/min/1.73m2    GFR Non- >60 >60 ml/min/1.73m2    Calcium 9.4 8.3 - 10.4 MG/DL Total Bilirubin 0.5 0.2 - 1.1 MG/DL    ALT 17 12 - 65 U/L    AST 12 (L) 15 - 37 U/L    Alk Phosphatase 89 50 - 136 U/L    Total Protein 6.3 6.3 - 8.2 g/dL    Albumin 2.9 (L) 3.2 - 4.6 g/dL    Globulin 3.4 2.3 - 3.5 g/dL    Albumin/Globulin Ratio 0.9 (L) 1.2 - 3.5     CBC with Auto Differential    Collection Time: 08/19/22  7:30 AM   Result Value Ref Range    WBC 10.3 4.3 - 11.1 K/uL    RBC 4.52 4.23 - 5.6 M/uL    Hemoglobin 9.4 (L) 13.6 - 17.2 g/dL    Hematocrit 33.6 (L) 41.1 - 50.3 %    MCV 74.3 (L) 79.6 - 97.8 FL    MCH 20.8 (L) 26.1 - 32.9 PG    MCHC 28.0 (L) 31.4 - 35.0 g/dL    RDW 25.3 (H) 11.9 - 14.6 %    Platelets 946 710 - 289 K/uL    MPV 9.3 (L) 9.4 - 12.3 FL    nRBC 0.00 0.0 - 0.2 K/uL    Differential Type AUTOMATED      Seg Neutrophils 59 43 - 78 %    Lymphocytes 23 13 - 44 %    Monocytes 11 4.0 - 12.0 %    Eosinophils % 6 0.5 - 7.8 %    Basophils 1 0.0 - 2.0 %    Immature Granulocytes 1 0.0 - 5.0 %    Segs Absolute 6.1 1.7 - 8.2 K/UL    Absolute Lymph # 2.3 0.5 - 4.6 K/UL    Absolute Mono # 1.2 0.1 - 1.3 K/UL    Absolute Eos # 0.6 0.0 - 0.8 K/UL    Basophils Absolute 0.1 0.0 - 0.2 K/UL    Absolute Immature Granulocyte 0.1 0.0 - 0.5 K/UL       Imaging:  [unfilled]    ASSESSMENT:  Colon cancer, left arm DVT and Iron Deficiency Anemia      RECOMMENDATIONS:  Outpatient PET scan  IV Venofer  Follow-up in clinic with Dr. Melvi Erwin    Lab studies and imaging studies were personally reviewed. Pertinent old records were reviewed from [facility]. Thank you for allowing us to participate in the care of Mr. Sheriec Torres.               Earnstine Severs, MD  [address]  Office : [phone]  Fax : [phone]

## 2022-08-19 NOTE — DISCHARGE INSTRUCTIONS
DISCHARGE SUMMARY from Nurse    PATIENT INSTRUCTIONS:    After general anesthesia or intravenous sedation, for 24 hours or while taking prescription Narcotics:  Limit your activities  Do not drive and operate hazardous machinery  Do not make important personal or business decisions  Do  not drink alcoholic beverages  If you have not urinated within 8 hours after discharge, please contact your surgeon on call. Report the following to your surgeon:  Excessive pain, swelling, redness or odor of or around the surgical area  Temperature over 100.5  Nausea and vomiting lasting longer than 4 hours or if unable to take medications  Any signs of decreased circulation or nerve impairment to extremity: change in color, persistent  numbness, tingling, coldness or increase pain  Any questions    What to do at Home:  Recommended activity: activity as tolerated. If you experience any of the following symptoms nausea, vomiting, chest pain, shortness of breath; please follow up with MD.    *  Please give a list of your current medications to your Primary Care Provider. *  Please update this list whenever your medications are discontinued, doses are      changed, or new medications (including over-the-counter products) are added. *  Please carry medication information at all times in case of emergency situations. These are general instructions for a healthy lifestyle:    No smoking/ No tobacco products/ Avoid exposure to second hand smoke  Surgeon General's Warning:  Quitting smoking now greatly reduces serious risk to your health.     Obesity, smoking, and sedentary lifestyle greatly increases your risk for illness    A healthy diet, regular physical exercise & weight monitoring are important for maintaining a healthy lifestyle    You may be retaining fluid if you have a history of heart failure or if you experience any of the following symptoms:  Weight gain of 3 pounds or more overnight or 5 pounds in a week,

## 2022-08-19 NOTE — TELEPHONE ENCOUNTER
Called pt's son states wanting to know if needing to stay off lasix or if needing to continue taking?  Was d/c in hospital

## 2022-08-19 NOTE — PROGRESS NOTES
[facility] [place] Hematology & Oncology        Inpatient Hematology / Oncology Plan of Care    Reason for Consult:  Acute blood loss anemia [D62]  Anemia, unspecified type [D64.9]  Referring Physician:  Kala Leon MD    History of Present Illness:  Mr. Erna blackman is a 79 y.o. male admitted on 8/4/2022. The primary encounter diagnosis was Anemia, unspecified type. Diagnoses of Cecum mass and Adenocarcinoma of cecum ([address] were also pertinent to this visit. .      Mr Erna blackman has a PMH of A-fib (Watchman device), Htn, SSS, CAD, CHF (echo 12/21 with normal EF). He presented to hospital on day of admission with anemia and Hgb 6.5. He underwent EGD on 8/522 that showed proximal stomach erosion. He then underwent colonoscopy 8/6/22 that showed cecal mass. He proceeded with laparoscopic R hemicolectomy and resection of mass 8/10/22. Pathology report noted moderately differentiated adenocarcinoma 5.2 x 4.3 x2.4 cm with invasion through muscularis into adipose tissue. Negative margins and 14/14 lymph nodes negative for metastasis. Final stating pT3N0. He underwent CT CAP that showed multiple small (<5mm) BL lung nodules that were favored to be benign as they were unchanged from previous CT scan in 2019. He was also noted to have scattered liver lesions up to 9mm that appeared cystic in nature as well as 1.6cm R hepatic lobe lesion that was unchanged from previous imaging. He was noted to have L axillary vein DVT and he was started on Eliquis 8/18/22 after being transitioned from heparin. Fe stores noted a Tsat of 5% and ferritin of 8. His Hgb has now improved to 9.4 as he required PRBCs on 8/4/22 and last transfusion on 8/14/22. Oncology consulted for recommendations regarding malignancy. Review of Systems:  Constitutional Denies fever, chills, weight loss, appetite changes, fatigue, night sweats. HEENT Denies trauma, blurry vision, hearing loss, ear pain, nosebleeds, sore throat, neck pain and ear discharge.     Skin Denies lesions or rashes. Lungs Denies dyspnea, cough, sputum production or hemoptysis. Cardiovascular Denies chest pain, palpitations, or lower extremity edema. Gastrointestinal Denies nausea, vomiting, changes in bowel habits, bloody or black stools, abdominal pain.  Denies dysuria, frequency or hesitancy of urination. Neuro Denies headaches, visual changes or ataxia. Denies dizziness, tingling, tremors, sensory change, speech change, focal weakness or headaches. Hematology Denies easy bruising or bleeding, denies gingival bleeding or epistaxis. Endo Denies heat/cold intolerance, denies diabetes or thyroid abnormalities. MSK Denies back pain, arthralgias, myalgias or frequent falls. Psychiatric/Behavioral Denies depression and substance abuse. The patient is not nervous/anxious.          No Known Allergies  Past Medical History:   Diagnosis Date    Abnormal EKG 4/22/15    Arrhythmia     Aspiration pneumonia ([address] 10/26/2017    CAD (coronary artery disease) 5/8/2015    Carotid artery stenosis without cerebral infarction 6/7/2016    US 6/15: <50% bilat ICAs    Coronary atherosclerosis of native coronary vessel 5/8/2015    GERMAIN on brilinta 5/7/15: prox LAD PCI, normal EF     Diastolic CHF, chronic ([address] 3/2/2022    Dyslipidemia 6/7/2016    Dyspnea 5/8/2015    Echo 6/15: EF 60%, mod MR, mod LVH, mild AI     ED (erectile dysfunction)     GERD (gastroesophageal reflux disease)     GERD (gastroesophageal reflux disease)     no medication    HTN (hypertension) 5/8/2015    Hypertension     Hypokalemia     Hypokalemia 6/7/2016    Mitral valve regurgitation 6/7/2016    Morbid obesity ([address]     Myasthenia gravis ([address]     Myasthenia gravis ([address] 6/17/15    Nocturia     Osteoporosis     PUD (peptic ulcer disease) 25 yrs ago    S/P coronary artery stent placement 5/8/2015    3.0x38 mm Xience CAROL to pLAD 5/7/15     Sleep apnea     Syncope and collapse     Unspecified sleep apnea     no cpap     Past Surgical History:   Procedure Laterality Date    APPENDECTOMY      CARDIAC CATHETERIZATION  05/21/2019    watchman device    CARDIAC CATHETERIZATION  05/27/2015    stent    COLONOSCOPY  2007    COLONOSCOPY N/A 8/6/2022    COLONOSCOPY POLYPECTOMY SNARE/COLD BIOPSY performed by Johan Yung MD at [facility] ENDOSCOPY    ERCP  3/30/2022         HEMICOLECTOMY Right 8/10/2022    BOWEL RESECTION HEMICOLECTOMY LAPAROSCOPIC ROBOTIC, Right Bulmaro performed by Rula Wright MD at [place] [address]    Perlita Kraus/Giana for sleep apnea and reconstruction for extending jaw    UPPER GASTROINTESTINAL ENDOSCOPY N/A 8/5/2022    EGD ESOPHAGOGASTRODUODENOSCOPY Rm 525 performed by Joni Noble MD at [address] Right 07/10/2019     Repair of right radial artery pseudoaneurysm     Family History   Problem Relation Age of Onset    No Known Problems Brother     Cancer Brother         brain tumor    No Known Problems Sister     Cancer Mother         kidney    Cancer Father         stomach     Social History     Socioeconomic History    Marital status:      Spouse name: Not on file    Number of children: Not on file    Years of education: Not on file    Highest education level: Not on file   Occupational History    Not on file   Tobacco Use    Smoking status: Never    Smokeless tobacco: Never   Substance and Sexual Activity    Alcohol use: No    Drug use: No    Sexual activity: Not on file   Other Topics Concern    Not on file   Social History Narrative    Not on file     Social Determinants of Health     Financial Resource Strain: Not on file   Food Insecurity: Not on file   Transportation Needs: Not on file   Physical Activity: Not on file   Stress: Not on file   Social Connections: Not on file   Intimate Partner Violence: Not on file   Housing Stability: Not on file     Current Facility-Administered Medications   Medication Dose Route Frequency Provider Last Rate Last Admin    ferric gluconate (FERRLECIT) 125 mg in sodium chloride 0.9 % 100 mL IVPB  125 mg IntraVENous Daily Barbra Hernández  mL/hr at 08/19/22 1212 125 mg at 08/19/22 1212    apixaban (ELIQUIS) tablet 10 mg  10 mg Oral BID Shahnaz Galeas MD   10 mg at 08/19/22 0931    oxyCODONE-acetaminophen (PERCOCET) 5-325 MG per tablet 1 tablet  1 tablet Oral Q4H PRN Shailesh Madrigal MD   1 tablet at 08/18/22 2037    pantoprazole (PROTONIX) 40 mg in sodium chloride (PF) 10 mL injection  40 mg IntraVENous Q12H Delmis Soares MD   40 mg at 08/19/22 1136    0.9 % sodium chloride infusion   IntraVENous PRN Delmis Soares MD        docusate sodium (COLACE) capsule 100 mg  100 mg Oral BID Shailesh Madrigal MD   100 mg at 08/19/22 0931    magnesium sulfate 2000 mg in 50 mL IVPB premix  2,000 mg IntraVENous PRN Lucy Ponds, DO        potassium chloride (KLOR-CON M) extended release tablet 40 mEq  40 mEq Oral PRN Lucy Ponds, DO        Or    potassium bicarb-citric acid (EFFER-K) effervescent tablet 40 mEq  40 mEq Oral PRN Lucy Ponds, DO        Or    potassium chloride 10 mEq/100 mL IVPB (Peripheral Line)  10 mEq IntraVENous PRN Lucy Ponds, DO        lidocaine 1 % injection 5 mL  5 mL IntraDERmal Once Rachel Reyes MD        sodium chloride flush 0.9 % injection 5-40 mL  5-40 mL IntraVENous 2 times per day Rachel Reyes MD   10 mL at 08/19/22 0931    sodium chloride flush 0.9 % injection 5-40 mL  5-40 mL IntraVENous PRN Rachel Reyes MD        heparin flush 100 UNIT/ML injection 100 Units  1 mL IntraCATHeter PRN Rachel Reyes MD        [facility] by provider] aspirin EC tablet 81 mg  81 mg Oral Daily Jose Odonnell MD        atorvastatin (LIPITOR) tablet 80 mg  80 mg Oral Nightly Jose Odonnell MD   80 mg at 08/18/22 2031    [Held by provider] furosemide (LASIX) tablet 40 mg  40 mg Oral Daily Jose Odonnell MD        rOPINIRole (REQUIP) tablet 0.5 mg  0.5 mg Oral Nightly Virginie Garcia MD   0.5 mg at 22    diatrizoate meglumine-sodium (GASTROGRAFIN) 66-10 % solution 15 mL  15 mL Oral ONCE PRN Barbara Gipson MD   15 mL at 22 1213    sodium chloride flush 0.9 % injection 5-40 mL  5-40 mL IntraVENous 2 times per day Virginie Garcia MD   10 mL at 22 1136    sodium chloride flush 0.9 % injection 5-40 mL  5-40 mL IntraVENous PRN Virginie Garcia MD        0.9 % sodium chloride infusion   IntraVENous PRN Virginie Garcia MD        ondansetron (ZOFRAN-ODT) disintegrating tablet 4 mg  4 mg Oral Q8H PRN Virginie Garcia MD        Or    ondansetron [facility]) injection 4 mg  4 mg IntraVENous Q6H PRN Virginie Garcia MD        acetaminophen (TYLENOL) tablet 650 mg  650 mg Oral Q6H PRN Virginie Garcia MD   650 mg at 22 0340    Or    acetaminophen (TYLENOL) suppository 650 mg  650 mg Rectal Q6H PRN Virginie Garcia MD        albuterol (PROVENTIL) nebulizer solution 2.5 mg  2.5 mg Nebulization Q6H PRN Virginie Garcia MD   2.5 mg at 08/10/22 1126    temazepam (RESTORIL) capsule 15 mg  15 mg Oral Nightly PRN Uzma Costello MD   15 mg at 22       OBJECTIVE:  Patient Vitals for the past 8 hrs:   BP Temp Temp src Pulse Resp SpO2   22 1105 (!) 156/87 97.5 °F (36.4 °C) Oral 73 20 97 %   22 0741 (!) 152/87 97.9 °F (36.6 °C) Oral 75 18 99 %     Temp (24hrs), Av.9 °F (36.6 °C), Min:97.5 °F (36.4 °C), Max:98.6 °F (37 °C)     07 -  1900  In: 56 [P.O.:940]  Out: -     Physical Exam:  Constitutional: Well developed, well nourished elderly  male in no acute distress, sitting comfortably in the bedside chair. HEENT: Normocephalic and atraumatic. Oropharynx is clear, mucous membranes are moist. Extraocular muscles are intact. Sclerae anicteric. Neck supple without JVD. No thyromegaly present.     Lymph node   No palpable submandibular, cervical, supraclavicular, axillary or inguinal lymph nodes. Skin Warm and dry. No bruising and no rash noted. No erythema. No pallor. Respiratory Lungs are clear to auscultation bilaterally without wheezes, rales or rhonchi, normal air exchange without accessory muscle use. CVS Normal rate, regular rhythm and normal S1 and S2. No murmurs, gallops, or rubs. Abdomen Soft, nontender and nondistended, normoactive bowel sounds. No palpable mass. No hepatosplenomegaly. Neuro Grossly nonfocal with no obvious sensory or motor deficits. MSK Normal range of motion in general.  No edema and no tenderness. Psych Appropriate mood and affect.         Labs:    Recent Results (from the past 24 hour(s))   Comprehensive Metabolic Panel w/ Reflex to MG    Collection Time: 08/19/22  7:30 AM   Result Value Ref Range    Sodium 141 138 - 145 mmol/L    Potassium 4.0 3.5 - 5.1 mmol/L    Chloride 109 (H) 98 - 107 mmol/L    CO2 28 21 - 32 mmol/L    Anion Gap 4 (L) 7 - 16 mmol/L    Glucose 100 65 - 100 mg/dL    BUN 10 8 - 23 MG/DL    Creatinine 0.70 (L) 0.8 - 1.5 MG/DL    GFR African American >60 >60 ml/min/1.73m2    GFR Non- >60 >60 ml/min/1.73m2    Calcium 9.4 8.3 - 10.4 MG/DL    Total Bilirubin 0.5 0.2 - 1.1 MG/DL    ALT 17 12 - 65 U/L    AST 12 (L) 15 - 37 U/L    Alk Phosphatase 89 50 - 136 U/L    Total Protein 6.3 6.3 - 8.2 g/dL    Albumin 2.9 (L) 3.2 - 4.6 g/dL    Globulin 3.4 2.3 - 3.5 g/dL    Albumin/Globulin Ratio 0.9 (L) 1.2 - 3.5     CBC with Auto Differential    Collection Time: 08/19/22  7:30 AM   Result Value Ref Range    WBC 10.3 4.3 - 11.1 K/uL    RBC 4.52 4.23 - 5.6 M/uL    Hemoglobin 9.4 (L) 13.6 - 17.2 g/dL    Hematocrit 33.6 (L) 41.1 - 50.3 %    MCV 74.3 (L) 79.6 - 97.8 FL    MCH 20.8 (L) 26.1 - 32.9 PG    MCHC 28.0 (L) 31.4 - 35.0 g/dL    RDW 25.3 (H) 11.9 - 14.6 %    Platelets 313 479 - 115 K/uL    MPV 9.3 (L) 9.4 - 12.3 FL    nRBC 0.00 0.0 - 0.2 K/uL    Differential Type AUTOMATED      Seg Neutrophils 59 43 - 78 %    Lymphocytes 23 13 - 44 %    Monocytes 11 4.0 - 12.0 %    Eosinophils % 6 0.5 - 7.8 %    Basophils 1 0.0 - 2.0 %    Immature Granulocytes 1 0.0 - 5.0 %    Segs Absolute 6.1 1.7 - 8.2 K/UL    Absolute Lymph # 2.3 0.5 - 4.6 K/UL    Absolute Mono # 1.2 0.1 - 1.3 K/UL    Absolute Eos # 0.6 0.0 - 0.8 K/UL    Basophils Absolute 0.1 0.0 - 0.2 K/UL    Absolute Immature Granulocyte 0.1 0.0 - 0.5 K/UL       Imaging:    CT Result (most recent):  CT CHEST ABDOMEN PELVIS W CONTRAST 08/06/2022    Narrative  CT CHEST, ABDOMEN AND PELVIS WITH INTRAVENOUS CONTRAST DATED 8/6/2022. History: Cecal mass concerning for colon cancer. Comparison: CT the cardiac over read 4/5/2019, and CT abdomen and pelvis with  contrast 3/27/2022    Technique:   Multiple contiguous helical CT images reconstructed at 5 mm were  obtained from the base of the neck to the ischial tuberosities following oral  and 100 cc Isovue-370 without acute complication. All CT scans performed at  this facility use one or all of the following: Automated exposure control,  adjustment of the mA and/or kVp according to patient's size, iterative  reconstruction. Findings:  CT Chest:  The base of the neck is unremarkable in appearance. No axillary, mediastinal,  or hilar lymphadenopathy is seen. The thoracic aorta is normal in caliber. The  heart appears moderately enlarged. Evaluation with lung windows demonstrates no suspicious pulmonary lesion. Small  nodules are seen in the bilateral lungs measuring up to 5 mm in size. However,  these are unchanged in size and number when compared to a prior CT Overread  dated 4/5/2019. The lack of significant change over multiple years favors  benign nodules. Mild dependent atelectasis is seen. No pleural effusion is  seen. Lungs are expanded without evidence for pneumothorax. No aggressive  appearing osseous lesion is seen.     CT ABDOMEN:  The Liver small scattered low-attenuation left lobe hepatic lesions measuring up  to 9 mm in size. Although incompletely characterized, these are also included  in the field of imaging on the prior CT over read dated 4/5/2019 and are  unchanged in size and number. The appearance suggests benign lesions such as  hepatic cysts. An additional 1.6 cm lesion is seen in the more inferior right  lobe on image 68. This is unchanged when compared to the more recent CT scan of  the abdomen dated 3/27/2022 and does not demonstrate significant enhancement  suggesting an additional benign hepatic cyst.  The spleen is homogeneous in  attenuation. No contour deforming or enhancing mass lesions are seen of the  pancreas or adrenal glands. The gallbladder has been removed. The kidneys  enhance symmetrically and no evidence of hydronephrosis is seen. Chronic renal  cortical scarring is seen most evident in the upper pole cortex of the left  kidney    The visualized loops of small bowel and colon are normal in caliber. The  ileocecal valve is seen on axial image 98 demonstrating normal fat attenuation. Just proximal to this along the posterior wall of the cecum is an abnormal wall  lesion measuring 3.5 cm x 2.4 cm in size concerning for a colon neoplasm likely  representing the patient's known cecal mass. No obstruction is suggested by  this at this time. No free fluid and no free air is seen in the abdomen. No  adenopathy is seen of the abdomen. The abdominal aorta demonstrates mild to  moderate atherosclerotic changes. No aggressive appearing osseous lesion is  seen. CT PELVIS:  No abnormal pelvic fluid collections are present. No pelvic adenopathy is seen. The urinary bladder is unremarkable. No aggressive appearing osseous lesion is  seen. Impression  1.  3.5 cm x 2.4 cm cecal mass. No clear evidence for metastatic disease is  evident by CT imaging.   Pulmonary nodules, and hepatic lesions are seen which  are favored to be benign as described above.      This report was made using voice transcription. Despite my best efforts to avoid  any, transcription errors may persist. If there is any question about the  accuracy of the report or need for clarification, then please call [phone], or text me through perfectserv for clarification or correction. ASSESSMENT:      ICD-10-CM    1. Anemia, unspecified type  D64.9       2. Cecum mass  K63.89 Vascular duplex upper extremity venous left     Vascular duplex upper extremity venous left     CANCELED: Vascular duplex upper extremity arteries left     CANCELED: Vascular duplex upper extremity arteries left      3. Adenocarcinoma of cecum (HCC)  C18.0 oxyCODONE-acetaminophen (PERCOCET) 5-325 MG per tablet              RECOMMENDATIONS:    Adenocarcinoma of the colon  - pT3N0 s/p R hemicolectomy  - no evidence of metastasis on CT  - Will need to f/u with Dr Reese Gonzalez in clinic (appt requested - patient will be notified early next week)    Acute blood loss / iron deficiency anemia  - Ferrlecit x1 given (patient being Dc'd home today - will notify of need for IV infusion at infusion center)    DVT, L axillary vein  - Tolerated AC to date, was on heparin gtt and transitioned to Eliquis 8/18 and Hgb stable    Lab studies and imaging studies (CT) were personally reviewed. Pertinent old records were reviewed from [address] [place] [address] Rd. Thank you for allowing us to participate in the care of Mr. Maria Elena Cantu. Formal consult note by Dr. Bora Smith to follow. We will arrange for patient to follow-up with Dr Reese Gonzalez in clinic (scheduling aware - will reach out to patient early next week for appt date/time).           NAOMY Linares - [facility] Hematology & Oncology  [id number] [place] [address] Avenue  Office : [phone]  Fax : [phone]

## 2022-08-19 NOTE — PROGRESS NOTES
PHYSICAL THERAPY: Daily Note AM   (Link to Caseload Tracking: PT Visit Days : 4  Time In/Out PT Charge Capture  Rehab Caseload Tracker  Orders    Loren Rubio is a 79 y.o. male   PRIMARY DIAGNOSIS: Acute blood loss anemia  Acute blood loss anemia [D62]  Anemia, unspecified type [D64.9]  Procedure(s) (LRB):  BOWEL RESECTION HEMICOLECTOMY LAPAROSCOPIC ROBOTIC, Right Bulmaro (Right)  9 Days Post-Op  Inpatient: Payor: MEDICARE / Plan: MEDICARE PART A AND B / Product Type: *No Product type* /     ASSESSMENT:     REHAB RECOMMENDATIONS:   Recommendation to date pending progress:  Setting:  Home Health Therapy    Equipment:    None     ASSESSMENT:  Mr. Fuad Goodwin was sitting up in chair on arrival. He is agreeable to PT and is hoping to go home today. Ambulated 225' x2 with RW and SBA. He denied exercises. Pt left sitting up in chair with needs in reach. SUBJECTIVE:   Mr. Fuad Goodwin states, \"I hope to leave today\"     Social/Functional Lives With: Spouse  Type of Home: House  Home Layout: One level  Home Access: Level entry  Home Equipment: Cane (walking stick.)  ADL Assistance: Independent  Active : No  Patient's  Info: Family provides transportation. Patient is able to drive.   Mode of Transportation: Car  Occupation: Retired  OBJECTIVE:     PAIN: VITALS / O2: PRECAUTION / [facility] [place] [employer]:   Pre Treatment: none         Post Treatment: none Vitals        Oxygen    None    RESTRICTIONS/PRECAUTIONS:        MOBILITY: I Mod I S SBA CGA Min Mod Max Total  NT x2 Comments:   Bed Mobility    Rolling [] [] [] [] [] [] [] [] [] [x] []    Supine to Sit [] [] [] [] [] [] [] [] [] [x] []    Scooting [] [] [] [] [] [] [] [] [] [x] []    Sit to Supine [] [] [] [] [] [] [] [] [] [x] []    Transfers    Sit to Stand [] [] [] [x] [] [] [] [] [] [] []    Bed to Chair [] [] [] [] [] [] [] [] [] [] []    Stand to Sit [] [] [] [x] [] [] [] [] [] [] []     [] [] [] [] [] [] [] [] [] [] []    I=Independent, Mod I=Modified Independent, S=Supervision, SBA=Standby Assistance, CGA=Contact Guard Assistance,   Min=Minimal Assistance, Mod=Moderate Assistance, Max=Maximal Assistance, Total=Total Assistance, NT=Not Tested    BALANCE: Good Fair+ Fair Fair- Poor NT Comments   Sitting Static [x] [] [] [] [] []    Sitting Dynamic [x] [] [] [] [] []              Standing Static [] [x] [] [] [] []    Standing Dynamic [] [x] [] [] [] []      GAIT: I Mod I S SBA CGA Min Mod Max Total  NT x2 Comments:   Level of Assistance [] [] [] [x] [] [] [] [] [] [] []    Distance 225 (x2) feet     DME Rolling Walker    Gait Quality Decreased step clearance, Shuffling , and Trunk flexion    Weightbearing Status      Stairs      I=Independent, Mod I=Modified Independent, S=Supervision, SBA=Standby Assistance, CGA=Contact Guard Assistance,   Min=Minimal Assistance, Mod=Moderate Assistance, Max=Maximal Assistance, Total=Total Assistance, NT=Not Tested    PLAN:   ACUTE PHYSICAL THERAPY GOALS:  (Developed with and agreed upon by patient and/or caregiver. )     LTG:  (1.)Mr. Andrew Christie will move from supine to sit and sit to supine , scoot up and down, and roll side to side in bed with INDEPENDENT within 7 treatment day(s). (2.)Mr. Andrew Christie will transfer from bed to chair and chair to bed with MODIFIED INDEPENDENCE using the least restrictive device within 7 treatment day(s). (3.)Mr. Andrew Christie will ambulate with MODIFIED INDEPENDENCE for 500 feet with the least restrictive device within 7 treatment day(s). (4.)Mr. Andrew Christie will tolerate at least 23 min of dynamic standing activity to assist standing ADLs with the least restrictive device within 7 treatment days. FREQUENCY AND DURATION: 3 times/week for duration of hospital stay or until stated goals are met, whichever comes first.    TREATMENT:   TREATMENT:   Therapeutic Activity (25 Minutes):  Therapeutic activity included Ambulation on level ground, Sitting balance , and Standing balance to improve functional Activity tolerance, Balance, Mobility, and Strength.     TREATMENT GRID:  N/A    AFTER TREATMENT PRECAUTIONS: Bed/Chair Locked, Call light within reach, Chair, Needs within reach, and RN at bedside    INTERDISCIPLINARY COLLABORATION:  RN/ PCT and PT/ PTA    EDUCATION:      TIME IN/OUT:  Time In: 1030  Time Out: 1055  Minutes: [address] PTA

## 2022-08-21 NOTE — TELEPHONE ENCOUNTER
I would only take lasix as needed for edema, weight gain of 2 pds. Follow daily AM weights, take lasix as needed. See me in several weeks, move up follow up.    Thanks

## 2022-08-22 ENCOUNTER — CARE COORDINATION (OUTPATIENT)
Dept: CARE COORDINATION | Facility: CLINIC | Age: 82
End: 2022-08-22

## 2022-08-22 ENCOUNTER — TELEPHONE (OUTPATIENT)
Dept: INTERNAL MEDICINE CLINIC | Facility: CLINIC | Age: 82
End: 2022-08-22

## 2022-08-22 NOTE — CARE COORDINATION
[address] Transitions Initial Follow Up Call    Call within 2 business days of discharge: Yes    Patient: Teddie Barthel Patient : 1940   MRN: [id number]  Reason for Admission: Acute blood loss anemia  Discharge Date: 22 RARS: Readmission Risk Score: 18      Last Discharge [facility]       Date Complaint Diagnosis Description Type Department Provider    22 Abnormal Lab Anemia, unspecified type . .. ED to Hosp-Admission (Discharged) (ADMITTED) [id number] Jennifer Rendon MD; Beverley Gutiérrez. .. Transitions of Care Initial Call    Was this an external facility discharge? No Discharge Facility: [facility]    Challenges to be reviewed by the provider   Additional needs identified to be addressed with provider: No  none             Method of communication with provider : none    Advance Care Planning:   Does patient have an Advance Directive:  decision maker verified . LPN Care Coordinator contacted the family by telephone to perform post hospital discharge assessment. Verified name and  with family as identifiers. Provided introduction to self, and explanation of the LPN CC role. LPN CC reviewed discharge instructions, medical action plan and red flags with family who verbalized understanding. Family given an opportunity to ask questions and does not have any further questions or concerns at this time. Were discharge instructions available to patient? Yes. Reviewed appropriate site of care based on symptoms and resources available to patient including: PCP  Specialist  Home health. The family agrees to contact the PCP office for questions related to their healthcare. Medication review of discharge medication was performed with family, who verbalizes understanding of administration of home medications. Patient's son indicated they are waiting on call from [facility].    Was patient discharged with a pulse oximeter? no    LPN CC provided contact information.  Plan for

## 2022-08-22 NOTE — TELEPHONE ENCOUNTER
Pt's son called back advised of Dr. Birmingham Anchors response was given a verbal understanding. Pt scheduled for 9/21/22.

## 2022-08-22 NOTE — TELEPHONE ENCOUNTER
Called patient to schedule TCV appt following discharge from [facility] 08/19/2022. Dx  Anemia/ Cecum Mass    Patient to follow up with Hematolgy/Oncology.     L/M for patient to call

## 2022-08-22 NOTE — PROGRESS NOTES
[facility] Hematology and Oncology: Office Visit New Patient H & P    Reason for visit:  1-Moderately differentiated, right-sided colonic adenocarcinoma, pMMR  SNVs/Indels:  KRAS G12D   Pertinent Negatives:  NO abnormalities detected in the following genes:   BRAF, HRAS, NRAS. 2- LUE VTE (DVT in axillary vein, SVT in basilic vein, Dx 7/53/75  3. Liver cysts  4. Sub centimeter lung nodules    Cancer Staging  Colon cancer [facility])  Staging form: Colon And Rectum, AJCC 8th Edition  - Pathologic stage from 8/22/2022: Stage IIA (pT3, pN0, cM0) - Signed by Maxi Combs MD on 8/23/2022    History of Present Illness:  Mr. Diane Avitia is a pleasant 79years old male patient with history of paroxysmal atrial fibrillation, myasthenia gravis, GERD, chronic diastolic heart failure, dyslipidemia who presents to discuss postoperative management of recently diagnosed right-sided colon cancer. 8/4/2022: Admitted with acute blood loss anemia from GI source. Hemoglobin was 6.5. EGD on 8/5/2022 showed 2 small erosions in the proximal stomach, otherwise unremarkable. Colonoscopy on 8/6/2022 showed cecal mass and 2 sessile polyps that were removed with hot snare. Cecal mass biopsy returned as adenocarcinoma; colon polyps were felt to be tubulovillous adenomas. Patient received PRBCs during this admission. 8/10/2022: He underwent laparoscopic right hemicolectomy. Pathology findings noted below. 8/15/2022: Left upper extremity ultrasound left axillary vein DVT. Given improvement in hemoglobin, patient was initiated on heparin infusion without bolus. On 8/18/2022 he was transitioned to Eliquis. On evaluation today, patient reports that he is recovering well from surgery. Reports only mild to moderate pain which is controlled with as needed Percocet tablets. He feels that his appetite and energy is gradually recovering.   Stool consistency is also slowly returning to normal.  Denies fever, chills, chest pain, nausea, mucous membranes, normal oropharynx  Cardiovascular:RRR, S1, S2 present, no m/r/g   Lungs: Clear to auscultation, no rales or wheezing, no accessory muscle use  Abdomen: Soft, and non-tender on palpation, no organomegaly, bowel sounds audible  Extremities: No peripheral edema, no joint swelling  Neurological: patient can follow commands and move all extremities    Labs:  Lab Results   Component Value Date    WBC 10.3 08/19/2022    HGB 9.4 (L) 08/19/2022    HCT 33.6 (L) 08/19/2022    MCV 74.3 (L) 08/19/2022     08/19/2022       Lab Results   Component Value Date    NEUTROABS 10.0 (H) 04/06/2022    LYMPHOPCT 23 08/19/2022    LYMPHSABS 2.3 08/19/2022    MONOPCT 11 08/19/2022    MONOSABS 1.2 08/19/2022    EOSABS 0.6 08/19/2022    BASOPCT 1 08/19/2022       Lab Results   Component Value Date     08/19/2022    K 4.0 08/19/2022     (H) 08/19/2022    CO2 28 08/19/2022    BUN 10 08/19/2022    CREATININE 0.70 (L) 08/19/2022    GLUCOSE 100 08/19/2022    CALCIUM 9.4 08/19/2022    PROT 6.3 08/19/2022    LABALBU 2.9 (L) 08/19/2022    BILITOT 0.5 08/19/2022    ALKPHOS 89 08/19/2022    AST 12 (L) 08/19/2022    ALT 17 08/19/2022    LABGLOM >60 08/19/2022    GFRAA >60 08/19/2022    AGRATIO 0.7 (L) 03/30/2022    GLOB 3.4 08/19/2022     Surgical pathology 8/10/22:  Microscopic Description   ANATOMIC SITE, PROCEDURE: right colon, colectomy   HISTOLOGIC TYPE (WHO CLASSIFICATION):  adenocarcinoma   LOCATION OF TUMOR:  right colon   SIZE:  grossly approximately 5.2 x 4.3 x 2.9 cm   MACROSCOPIC TUMOR PERFORATION:  not identified   MESORECTUM INTACTNESS:  does not apply   GRADE:  moderately differentiated   LYMPHOVASCULAR SPACE INVASION:  not definitely identified   PERINEURAL INVASION:  not definitely identified   PRESENCE OF MESENTERIC DEPOSITS:  not identified   DEPTH OF INVASION:  microscopically through muscularis propria into   pericolonic adipose tissue   OTHER FINDINGS:  focal area of ulceration consistent with mm were obtained from the base of the neck to the ischial tuberosities following oral and 100 cc Isovue-370 without acute complication. All CT scans performed at this facility use one or all of the following: Automated exposure control, adjustment of the mA and/or kVp according to patient's size, iterative reconstruction. Findings: CT Chest: The base of the neck is unremarkable in appearance. No axillary, mediastinal, or hilar lymphadenopathy is seen. The thoracic aorta is normal in caliber. The heart appears moderately enlarged. Evaluation with lung windows demonstrates no suspicious pulmonary lesion. Small nodules are seen in the bilateral lungs measuring up to 5 mm in size. However, these are unchanged in size and number when compared to a prior CT Overread dated 4/5/2019. The lack of significant change over multiple years favors benign nodules. Mild dependent atelectasis is seen. No pleural effusion is seen. Lungs are expanded without evidence for pneumothorax. No aggressive appearing osseous lesion is seen. CT ABDOMEN:  The Liver small scattered low-attenuation left lobe hepatic lesions measuring up to 9 mm in size. Although incompletely characterized, these are also included in the field of imaging on the prior CT over read dated 4/5/2019 and are unchanged in size and number. The appearance suggests benign lesions such as hepatic cysts. An additional 1.6 cm lesion is seen in the more inferior right lobe on image 68. This is unchanged when compared to the more recent CT scan of the abdomen dated 3/27/2022 and does not demonstrate significant enhancement suggesting an additional benign hepatic cyst.  The spleen is homogeneous in attenuation. No contour deforming or enhancing mass lesions are seen of the pancreas or adrenal glands. The gallbladder has been removed. The kidneys enhance symmetrically and no evidence of hydronephrosis is seen.   Chronic renal cortical scarring is seen most evident in the upper pole cortex of the left kidney The visualized loops of small bowel and colon are normal in caliber. The ileocecal valve is seen on axial image 98 demonstrating normal fat attenuation. Just proximal to this along the posterior wall of the cecum is an abnormal wall lesion measuring 3.5 cm x 2.4 cm in size concerning for a colon neoplasm likely representing the patient's known cecal mass. No obstruction is suggested by this at this time. No free fluid and no free air is seen in the abdomen. No adenopathy is seen of the abdomen. The abdominal aorta demonstrates mild to moderate atherosclerotic changes. No aggressive appearing osseous lesion is seen. CT PELVIS: No abnormal pelvic fluid collections are present. No pelvic adenopathy is seen. The urinary bladder is unremarkable. No aggressive appearing osseous lesion is seen. 1.  3.5 cm x 2.4 cm cecal mass. No clear evidence for metastatic disease is evident by CT imaging. Pulmonary nodules, and hepatic lesions are seen which are favored to be benign as described above. This report was made using voice transcription. Despite my best efforts to avoid any, transcription errors may persist. If there is any question about the accuracy of the report or need for clarification, then please call [phone], or text me through perfectserv for clarification or correction. Vascular duplex upper extremity venous left    Result Date: 8/15/2022  Clinical history: Left upper extremity edema. TECHNIQUE: Grayscale and duplex venous ultrasound of the left upper extremity. FINDINGS: The internal jugular, innominate and subclavian veins are patent and demonstrate color Doppler flow. The brachial, radial and ulnar veins are patent and compressible. There is color Doppler flow. The cephalic vein is patent. There is thrombus within the axillary vein. There is significant decrease color Doppler flow. There is lack of compressibility.  There is superficial vein thrombosis in the basilic vein, occlusive. There is absent color Doppler flow. 1. Deep vein thrombosis in the axillary vein. 2. Superficial vein thrombus in the basilic vein. Problems:  1. Adenocarcinoma of cecum ([address]    2. Atrial fibrillation, unspecified type ([address]    3. Acute blood loss anemia    4. Acute deep vein thrombosis (DVT) of axillary vein of left upper extremity (HCC)    5. Diastolic CHF, chronic (HCC)    6. Lung nodule    7. Liver cyst          PLAN:  I had an extensive discussion with the patient, his wife and his son (on phone). We reviewed the diagnosis and prognosis in reference to most recent clinical, radiologic and pathology findings. Discussed that Mr. Karyna Greenwood has stage IIA disease and that treatment intent is curative  Reviewed different treatment options (adjuvant treatment with Xeloda versus observation) and discussed rationale/logistics/risk/benefit/alternatives to each approach. After a thorough discussion, plan is to proceed with adjuvant capecitabine  Reviewed side effects associated with the agent. I reviewed NCCN guidelines for postoperative surveillance for colon cancer. I recommend 3 months of anticoagulation for left upper extremity DVT. Subcentimeter lung nodule and liver cysts will be monitored on surveillance scans. ROV will be scheduled. All questions were answered to the best of my abilities. Angella Sneed MD  [facility]  [address]  Office : [phone]  Fax : [phone]    Elements of this note have been dictated using speech recognition software. As a result, errors of speech recognition may have occurred. Addendum:   Will discuss the opportunity for Oncotype DX colon cancer testing.     Angella Sneed MD  [address] [place] [address],[address]  Hematology Oncology  [address] [place] [address]  Office : [phone]  Fax : [phone]  7:15 AM

## 2022-08-23 ENCOUNTER — OFFICE VISIT (OUTPATIENT)
Dept: ONCOLOGY | Age: 82
End: 2022-08-23
Payer: MEDICARE

## 2022-08-23 VITALS
RESPIRATION RATE: 16 BRPM | DIASTOLIC BLOOD PRESSURE: 69 MMHG | HEART RATE: 76 BPM | SYSTOLIC BLOOD PRESSURE: 121 MMHG | TEMPERATURE: 98.1 F | BODY MASS INDEX: 33.15 KG/M2 | OXYGEN SATURATION: 97 % | WEIGHT: 199 LBS | HEIGHT: 65 IN

## 2022-08-23 DIAGNOSIS — I50.32 DIASTOLIC CHF, CHRONIC (HCC): ICD-10-CM

## 2022-08-23 DIAGNOSIS — I48.91 ATRIAL FIBRILLATION, UNSPECIFIED TYPE (HCC): ICD-10-CM

## 2022-08-23 DIAGNOSIS — R91.1 LUNG NODULE: ICD-10-CM

## 2022-08-23 DIAGNOSIS — I82.A12 ACUTE DEEP VEIN THROMBOSIS (DVT) OF AXILLARY VEIN OF LEFT UPPER EXTREMITY (HCC): ICD-10-CM

## 2022-08-23 DIAGNOSIS — C18.0 ADENOCARCINOMA OF CECUM (HCC): Primary | ICD-10-CM

## 2022-08-23 DIAGNOSIS — D62 ACUTE BLOOD LOSS ANEMIA: ICD-10-CM

## 2022-08-23 DIAGNOSIS — K76.89 LIVER CYST: ICD-10-CM

## 2022-08-23 PROCEDURE — 99204 OFFICE O/P NEW MOD 45 MIN: CPT | Performed by: INTERNAL MEDICINE

## 2022-08-23 PROCEDURE — 1123F ACP DISCUSS/DSCN MKR DOCD: CPT | Performed by: INTERNAL MEDICINE

## 2022-08-23 ASSESSMENT — PATIENT HEALTH QUESTIONNAIRE - PHQ9
SUM OF ALL RESPONSES TO PHQ QUESTIONS 1-9: 0
2. FEELING DOWN, DEPRESSED OR HOPELESS: 0
SUM OF ALL RESPONSES TO PHQ QUESTIONS 1-9: 0

## 2022-08-23 NOTE — PATIENT INSTRUCTIONS
Patient Instructions from Today's Visit    Reason for Visit:  New patient-colorectal cancer    Diagnosis Information:  https://www.[employer]/. net/about-us/asco-answers-patient-education-materials/okmp-sgizxut-blsz-sheets  ASCO ANSWERS is a collection of oncologist-approved patient education materials developed by the [employer] of Clinical Oncology (ASCO) for people with cancer and their caregivers. Colorectal Cancer    ILLUSTRATION BY Alphonso. © 2004 AMERICAN SOCIETY OF CLINICAL ONCOLOGY. What is colorectal cancer? Colorectal cancer is a disease in which healthy cells in the lining of the colon or rectum change and grow out of control. This cell growth can form a noncancerous polyp that could become a cancerous tumor. Most colon and rectal cancers are a type of tumor called adenocarcinoma. Colorectal cancer is the fourth most common type of cancer diagnosed in the [place]. What is the function of the colon and rectum? The colon and rectum make up the large intestine, which plays an important role in the bodys ability to process waste. The large intestine turns food digested by the stomach and small intestine into fecal waste, or stool, that leaves the body through the anus. What does stage mean? The stage is a way of describing where the cancer is located, if or where it has spread, and whether it is affecting other parts of the body. There are 5 stages for colorectal cancer: stage 0 (zero) and stages I through IV (1 though 4). Find more descriptions and illustrations of these stages at www.cancer. net/colorectal.     How is colorectal cancer treated? The treatment of colorectal cancer depends on the location and extent of the tumor, whether the cancer has spread, and the persons overall health. For cancers that start in the colon, surgery is typically the first treatment.  For cancers that start in the rectum (the last 4 to 5 inches of the large intestine), surgery may be the first treatment or chemotherapy and/or radiation may be given before surgery. Additional treatment may be given to lower the risk of the cancer returning or to treat cancer that has spread. This may include radiation therapy and chemotherapy. If the cancer has spread outside the colon and rectum, then chemotherapy or targeted therapy will be used. Occasionally, surgery may also be used to remove cancer that has spread past the colon and rectum. When making treatment decisions, people may also consider a clinical trial; talk with your doctor about all treatment options. The side effects of colorectal cancer treatment can often be prevented or managed with the help of your health care team. This is called palliative care and is an important part of the overall treatment plan. How can I cope with colorectal cancer? Absorbing the news of a cancer diagnosis and communicating with your health care team are key parts of the coping process. Seeking support, organizing your health information, making sure all of your questions are answered, and participating in the decision-making process are other steps. Talk with your health care team about any concerns. Understanding your emotions and those of people close to you can be helpful in managing the diagnosis, treatment, and healing process. MADE AVAILABLE [facility] OF CLINICAL ONCOLOGY   [place], [address] [place] [address], [address] [facility]  Toll Free: [phone]  Phone: [phone] www. Abhinav Odiloelham. [employer] ([employer])  www.conquer. org © 2017 American Society of Clinical Oncology. For permissions information, contact [email or web address].     Questions to ask the health care team   Regular communication is important in making informed decisions about your health care. Consider asking your health care team the following questions:   What type of colon or rectal cancer do I have? Where exactly is the cancer located?    Can you explain my pathology report (laboratory test results) to me? What stage is the colon or rectal cancer? What does this mean? Has my tumor been tested for microsatellite instability and other molecular features? Would you explain my treatment options? What clinical trials are available for me? Where are they located, and how do I  find out more about them? What treatment plan do you recommend? Why? What is the goal of each treatment? Is it to eliminate the cancer, help me feel  better, or both? Who will be part of my treatment team, and what does each member do? Do  they have experience treating colorectal cancer? How will this treatment affect my daily life? Will I be able to work, exercise, and  perform my usual activities? Will this treatment affect my ability to become pregnant or have children? What long-term side effects may be associated with my cancer treatment? If Im worried about managing the costs of cancer care, who can help me? Where can I find emotional support for me and my family? Whom should I call with questions or problems? Find more questions to ask the health care team at [address] [employer] net/colorectal. For a digital list of questions, download Cancer. Nets free mobile hema at www.cancer. net/hema. The ideas and opinions expressed here do not necessarily reflect the opinions of the American Society of Clinical Oncology (ASCO) or [employer]. The information in this fact sheet is not intended as medical or legal advice, or as a substitute for consultation with a physician or other licensed health care provider. Patients with health care-related questions should call or see their physician or other health care provider promptly and should not disregard professional medical advice, or delay seeking it, because of information encountered here. The mention of any product, service, or treatment in this fact sheet should not be construed as an ASCO endorsement. ASC is not responsible for any injury or damage to persons or property arising out of or related to any use of [employer]s patient education materials, or to any errors or omissions. To order more printed copies, please call [phone] or visit www.cancer. net/estbrandi. WORDS TO KNOW     Adenoma: A specific type of polyp that is likely to become cancerous     Benign: A tumor that is not cancerous     Biopsy: Removal of a tissue sample that is then examined under a microscope to check for cancer cells     Chemotherapy: The use of drugs to destroy cancer cells     Colonoscopy: A test that allows doctors to look inside the colon and rectum for polyps or cancer using a colonoscope (lighted tube)     Lymph node: A tiny, bean-shaped organ that fights infection     Malignant: A tumor that is cancerous     Metastasis: The spread of cancer from where it began to another part of the body     Oncologist: A doctor who specializes in treating cancer     Polyp: A growth in the colon or rectum that is a risk factor for colorectal cancer     Prognosis: Chance of recovery     Radiation therapy: The use of high-energy x-rays to destroy cancer cells     Targeted therapy: Treatment that targets the cancers specific genes, proteins, or the tissue environment that contributes to cancer growth and survival   [id number]      Plan:  -This is a stage 2 cancer  -70%-75% survival rate in 5 years without adjuvant chemotherapy  -80-85% survival with chemotherapy. Xeloda pills would be recommended. These are taken 2 out of 3 weeks for 6 months  -You will be scheduled in 3 months for surveillance labs if you decide not to take chemotherapy however we will go ahead and schedule you for chemo education since you may decide to receive Xeloda    Follow Up:  As scheduled    Recent Lab Results:  No visits with results within 3 Day(s) from this visit. Latest known visit with results is:   No results displayed because visit has over 200 results. Treatment Summary has been discussed and given to patient: n/a        -------------------------------------------------------------------------------------------------------------------  Please call our office at [phone] if you have any  of the following symptoms:   Fever of 100.5 or greater  Chills  Shortness of breath  Swelling or pain in one leg    After office hours an answering service is available and will contact a provider for emergencies or if you are experiencing any of the above symptoms. Patient does express an interest in My Chart. My Chart log in information explained on the after visit summary printout at the [facility] desk.     Bruce Brown RN    Your Chemotherapy Plan      Diagnosis: Colon cancer    Goal of Therapy: _     x Curative         Name of Chemotherapy medications: Capcitebine    Number of Cycles Planed: 6-8               Length of Cycle:  21 days      Chemotherapy plan is subject to change at your provider's discretion    A copy of this plan has been given to you by Bruce Brown RN  Nurse Navigator  [address] [place] [address] [phone]

## 2022-08-23 NOTE — TELEPHONE ENCOUNTER
2nd attempt to contact    Patient states he will call back with the name of his PCP he will be establishing care with.   Patient did not have questions or concerns

## 2022-08-25 ENCOUNTER — CLINICAL DOCUMENTATION (OUTPATIENT)
Dept: CASE MANAGEMENT | Age: 82
End: 2022-08-25

## 2022-08-25 ENCOUNTER — HOSPITAL ENCOUNTER (INPATIENT)
Age: 82
LOS: 2 days | Discharge: HOME | DRG: 178 | End: 2022-08-27
Attending: EMERGENCY MEDICINE | Admitting: FAMILY MEDICINE
Payer: MEDICARE

## 2022-08-25 ENCOUNTER — TELEPHONE (OUTPATIENT)
Dept: ONCOLOGY | Age: 82
End: 2022-08-25

## 2022-08-25 DIAGNOSIS — C18.0 ADENOCARCINOMA OF CECUM (HCC): Primary | ICD-10-CM

## 2022-08-25 DIAGNOSIS — U07.1 COVID-19: Primary | ICD-10-CM

## 2022-08-25 PROBLEM — Z95.0 PACEMAKER: Status: ACTIVE | Noted: 2021-04-14

## 2022-08-25 PROBLEM — F33.0 MAJOR DEPRESSIVE DISORDER, RECURRENT, MILD (HCC): Status: ACTIVE | Noted: 2022-03-02

## 2022-08-25 LAB
ALBUMIN SERPL-MCNC: 3.1 G/DL (ref 3.2–4.6)
ALBUMIN/GLOB SERPL: 0.8 {RATIO} (ref 1.2–3.5)
ALP SERPL-CCNC: 87 U/L (ref 50–136)
ALT SERPL-CCNC: 32 U/L (ref 12–65)
ANION GAP SERPL CALC-SCNC: 6 MMOL/L (ref 7–16)
AST SERPL-CCNC: 23 U/L (ref 15–37)
BASOPHILS # BLD: 0.1 K/UL (ref 0–0.2)
BASOPHILS NFR BLD: 1 % (ref 0–2)
BILIRUB SERPL-MCNC: 0.4 MG/DL (ref 0.2–1.1)
BUN SERPL-MCNC: 17 MG/DL (ref 8–23)
CALCIUM SERPL-MCNC: 9.4 MG/DL (ref 8.3–10.4)
CHLORIDE SERPL-SCNC: 107 MMOL/L (ref 98–107)
CO2 SERPL-SCNC: 25 MMOL/L (ref 21–32)
CREAT SERPL-MCNC: 0.9 MG/DL (ref 0.8–1.5)
CRP SERPL-MCNC: <0.3 MG/DL (ref 0–0.9)
DIFFERENTIAL METHOD BLD: ABNORMAL
EOSINOPHIL # BLD: 0.5 K/UL (ref 0–0.8)
EOSINOPHIL NFR BLD: 5 % (ref 0.5–7.8)
ERYTHROCYTE [DISTWIDTH] IN BLOOD BY AUTOMATED COUNT: 26.5 % (ref 11.9–14.6)
GLOBULIN SER CALC-MCNC: 3.7 G/DL (ref 2.3–3.5)
GLUCOSE SERPL-MCNC: 119 MG/DL (ref 65–100)
HCT VFR BLD AUTO: 31.8 % (ref 41.1–50.3)
HCT VFR BLD AUTO: 31.9 % (ref 41.1–50.3)
HGB BLD-MCNC: 8.8 G/DL (ref 13.6–17.2)
HGB BLD-MCNC: 9 G/DL (ref 13.6–17.2)
IMM GRANULOCYTES # BLD AUTO: 0 K/UL (ref 0–0.5)
IMM GRANULOCYTES NFR BLD AUTO: 0 % (ref 0–5)
INR PPP: 1.4
LYMPHOCYTES # BLD: 2.9 K/UL (ref 0.5–4.6)
LYMPHOCYTES NFR BLD: 28 % (ref 13–44)
MCH RBC QN AUTO: 20.5 PG (ref 26.1–32.9)
MCHC RBC AUTO-ENTMCNC: 28.2 G/DL (ref 31.4–35)
MCV RBC AUTO: 72.5 FL (ref 79.6–97.8)
MONOCYTES # BLD: 0.8 K/UL (ref 0.1–1.3)
MONOCYTES NFR BLD: 8 % (ref 4–12)
NEUTS SEG # BLD: 6.1 K/UL (ref 1.7–8.2)
NEUTS SEG NFR BLD: 58 % (ref 43–78)
NRBC # BLD: 0 K/UL (ref 0–0.2)
PLATELET # BLD AUTO: 269 K/UL (ref 150–450)
PMV BLD AUTO: 9.6 FL (ref 9.4–12.3)
POTASSIUM SERPL-SCNC: 4 MMOL/L (ref 3.5–5.1)
PROCALCITONIN SERPL-MCNC: <0.05 NG/ML (ref 0–0.49)
PROT SERPL-MCNC: 6.8 G/DL (ref 6.3–8.2)
PROTHROMBIN TIME: 17.8 SEC (ref 12.6–14.5)
RBC # BLD AUTO: 4.4 M/UL (ref 4.23–5.6)
SARS-COV-2 RDRP RESP QL NAA+PROBE: DETECTED
SODIUM SERPL-SCNC: 138 MMOL/L (ref 138–145)
SOURCE: ABNORMAL
TROPONIN I SERPL HS-MCNC: 17.7 PG/ML (ref 0–14)
WBC # BLD AUTO: 10.4 K/UL (ref 4.3–11.1)

## 2022-08-25 PROCEDURE — 6370000000 HC RX 637 (ALT 250 FOR IP): Performed by: FAMILY MEDICINE

## 2022-08-25 PROCEDURE — 99285 EMERGENCY DEPT VISIT HI MDM: CPT

## 2022-08-25 PROCEDURE — 6360000002 HC RX W HCPCS

## 2022-08-25 PROCEDURE — XW033H6 INTRODUCTION OF OTHER NEW TECHNOLOGY MONOCLONAL ANTIBODY INTO PERIPHERAL VEIN, PERCUTANEOUS APPROACH, NEW TECHNOLOGY GROUP 6: ICD-10-PCS | Performed by: HOSPITALIST

## 2022-08-25 PROCEDURE — 86140 C-REACTIVE PROTEIN: CPT

## 2022-08-25 PROCEDURE — 96375 TX/PRO/DX INJ NEW DRUG ADDON: CPT

## 2022-08-25 PROCEDURE — 84145 PROCALCITONIN (PCT): CPT

## 2022-08-25 PROCEDURE — 80053 COMPREHEN METABOLIC PANEL: CPT

## 2022-08-25 PROCEDURE — 85014 HEMATOCRIT: CPT

## 2022-08-25 PROCEDURE — 96374 THER/PROPH/DIAG INJ IV PUSH: CPT

## 2022-08-25 PROCEDURE — 94762 N-INVAS EAR/PLS OXIMTRY CONT: CPT

## 2022-08-25 PROCEDURE — 1100000000 HC RM PRIVATE

## 2022-08-25 PROCEDURE — M0222 HC BEBTELOVIMAB INJECTION: HCPCS

## 2022-08-25 PROCEDURE — 85610 PROTHROMBIN TIME: CPT

## 2022-08-25 PROCEDURE — 85025 COMPLETE CBC W/AUTO DIFF WBC: CPT

## 2022-08-25 PROCEDURE — 6360000002 HC RX W HCPCS: Performed by: EMERGENCY MEDICINE

## 2022-08-25 PROCEDURE — 87635 SARS-COV-2 COVID-19 AMP PRB: CPT

## 2022-08-25 PROCEDURE — 84484 ASSAY OF TROPONIN QUANT: CPT

## 2022-08-25 PROCEDURE — 2580000003 HC RX 258: Performed by: EMERGENCY MEDICINE

## 2022-08-25 RX ORDER — FAMOTIDINE 20 MG/1
10 TABLET, FILM COATED ORAL 2 TIMES DAILY PRN
Status: DISCONTINUED | OUTPATIENT
Start: 2022-08-25 | End: 2022-08-27 | Stop reason: HOSPADM

## 2022-08-25 RX ORDER — SODIUM CHLORIDE 0.9 % (FLUSH) 0.9 %
5-40 SYRINGE (ML) INJECTION PRN
Status: DISCONTINUED | OUTPATIENT
Start: 2022-08-25 | End: 2022-08-27 | Stop reason: HOSPADM

## 2022-08-25 RX ORDER — ONDANSETRON 2 MG/ML
4 INJECTION INTRAMUSCULAR; INTRAVENOUS
Status: COMPLETED | OUTPATIENT
Start: 2022-08-25 | End: 2022-08-25

## 2022-08-25 RX ORDER — BEBTELOVIMAB 87.5 MG/ML
175 INJECTION, SOLUTION INTRAVENOUS ONCE
Status: COMPLETED | OUTPATIENT
Start: 2022-08-25 | End: 2022-08-25

## 2022-08-25 RX ORDER — SODIUM CHLORIDE 0.9 % (FLUSH) 0.9 %
5-40 SYRINGE (ML) INJECTION EVERY 12 HOURS SCHEDULED
Status: DISCONTINUED | OUTPATIENT
Start: 2022-08-25 | End: 2022-08-27 | Stop reason: HOSPADM

## 2022-08-25 RX ORDER — SODIUM CHLORIDE 9 MG/ML
INJECTION, SOLUTION INTRAVENOUS PRN
Status: DISCONTINUED | OUTPATIENT
Start: 2022-08-25 | End: 2022-08-27 | Stop reason: HOSPADM

## 2022-08-25 RX ORDER — ALBUTEROL SULFATE 90 UG/1
2 AEROSOL, METERED RESPIRATORY (INHALATION) EVERY 4 HOURS PRN
Status: DISCONTINUED | OUTPATIENT
Start: 2022-08-25 | End: 2022-08-27 | Stop reason: HOSPADM

## 2022-08-25 RX ORDER — ONDANSETRON 4 MG/1
4 TABLET, ORALLY DISINTEGRATING ORAL EVERY 8 HOURS PRN
Status: DISCONTINUED | OUTPATIENT
Start: 2022-08-25 | End: 2022-08-27 | Stop reason: HOSPADM

## 2022-08-25 RX ORDER — DIPHENHYDRAMINE HYDROCHLORIDE 50 MG/ML
INJECTION INTRAMUSCULAR; INTRAVENOUS
Status: COMPLETED
Start: 2022-08-25 | End: 2022-08-25

## 2022-08-25 RX ORDER — POLYETHYLENE GLYCOL 3350 17 G/17G
17 POWDER, FOR SOLUTION ORAL DAILY PRN
Status: DISCONTINUED | OUTPATIENT
Start: 2022-08-25 | End: 2022-08-27 | Stop reason: HOSPADM

## 2022-08-25 RX ORDER — DEXAMETHASONE 4 MG/1
6 TABLET ORAL DAILY
Status: DISCONTINUED | OUTPATIENT
Start: 2022-08-26 | End: 2022-08-27 | Stop reason: HOSPADM

## 2022-08-25 RX ORDER — GUAIFENESIN/DEXTROMETHORPHAN 100-10MG/5
10 SYRUP ORAL EVERY 4 HOURS PRN
Status: DISCONTINUED | OUTPATIENT
Start: 2022-08-25 | End: 2022-08-27 | Stop reason: HOSPADM

## 2022-08-25 RX ORDER — ACETAMINOPHEN 325 MG/1
650 TABLET ORAL EVERY 6 HOURS PRN
Status: DISCONTINUED | OUTPATIENT
Start: 2022-08-25 | End: 2022-08-27 | Stop reason: HOSPADM

## 2022-08-25 RX ORDER — SODIUM CHLORIDE 0.9 % (FLUSH) 0.9 %
5-40 SYRINGE (ML) INJECTION ONCE
Status: COMPLETED | OUTPATIENT
Start: 2022-08-25 | End: 2022-08-25

## 2022-08-25 RX ORDER — ONDANSETRON 2 MG/ML
4 INJECTION INTRAMUSCULAR; INTRAVENOUS EVERY 6 HOURS PRN
Status: DISCONTINUED | OUTPATIENT
Start: 2022-08-25 | End: 2022-08-27 | Stop reason: HOSPADM

## 2022-08-25 RX ORDER — BISACODYL 10 MG
10 SUPPOSITORY, RECTAL RECTAL DAILY PRN
Status: DISCONTINUED | OUTPATIENT
Start: 2022-08-25 | End: 2022-08-27 | Stop reason: HOSPADM

## 2022-08-25 RX ORDER — ACETAMINOPHEN 650 MG/1
650 SUPPOSITORY RECTAL EVERY 6 HOURS PRN
Status: DISCONTINUED | OUTPATIENT
Start: 2022-08-25 | End: 2022-08-27 | Stop reason: HOSPADM

## 2022-08-25 RX ORDER — PANTOPRAZOLE SODIUM 40 MG/1
40 TABLET, DELAYED RELEASE ORAL
Status: DISCONTINUED | OUTPATIENT
Start: 2022-08-26 | End: 2022-08-27 | Stop reason: HOSPADM

## 2022-08-25 RX ORDER — 0.9 % SODIUM CHLORIDE 0.9 %
1000 INTRAVENOUS SOLUTION INTRAVENOUS
Status: COMPLETED | OUTPATIENT
Start: 2022-08-25 | End: 2022-08-25

## 2022-08-25 RX ORDER — DIPHENHYDRAMINE HYDROCHLORIDE 50 MG/ML
25 INJECTION INTRAMUSCULAR; INTRAVENOUS EVERY 6 HOURS PRN
Status: DISCONTINUED | OUTPATIENT
Start: 2022-08-25 | End: 2022-08-27 | Stop reason: HOSPADM

## 2022-08-25 RX ORDER — TAMSULOSIN HYDROCHLORIDE 0.4 MG/1
0.4 CAPSULE ORAL DAILY
Status: DISCONTINUED | OUTPATIENT
Start: 2022-08-26 | End: 2022-08-27 | Stop reason: HOSPADM

## 2022-08-25 RX ORDER — METHYLPREDNISOLONE SODIUM SUCCINATE 125 MG/2ML
INJECTION, POWDER, LYOPHILIZED, FOR SOLUTION INTRAMUSCULAR; INTRAVENOUS
Status: COMPLETED
Start: 2022-08-25 | End: 2022-08-25

## 2022-08-25 RX ORDER — MAGNESIUM HYDROXIDE/ALUMINUM HYDROXICE/SIMETHICONE 120; 1200; 1200 MG/30ML; MG/30ML; MG/30ML
30 SUSPENSION ORAL EVERY 6 HOURS PRN
Status: DISCONTINUED | OUTPATIENT
Start: 2022-08-25 | End: 2022-08-27 | Stop reason: HOSPADM

## 2022-08-25 RX ORDER — ROPINIROLE 0.5 MG/1
0.5 TABLET, FILM COATED ORAL NIGHTLY
Status: DISCONTINUED | OUTPATIENT
Start: 2022-08-25 | End: 2022-08-26

## 2022-08-25 RX ORDER — ATORVASTATIN CALCIUM 80 MG/1
80 TABLET, FILM COATED ORAL NIGHTLY
Status: DISCONTINUED | OUTPATIENT
Start: 2022-08-25 | End: 2022-08-27 | Stop reason: HOSPADM

## 2022-08-25 RX ORDER — ONDANSETRON 2 MG/ML
INJECTION INTRAMUSCULAR; INTRAVENOUS
Status: COMPLETED
Start: 2022-08-25 | End: 2022-08-25

## 2022-08-25 RX ADMIN — APIXABAN 5 MG: 5 TABLET, FILM COATED ORAL at 23:51

## 2022-08-25 RX ADMIN — METHYLPREDNISOLONE SODIUM SUCCINATE 125 MG: 125 INJECTION, POWDER, FOR SOLUTION INTRAMUSCULAR; INTRAVENOUS at 22:36

## 2022-08-25 RX ADMIN — SODIUM CHLORIDE 1000 ML: 9 INJECTION, SOLUTION INTRAVENOUS at 22:36

## 2022-08-25 RX ADMIN — DIPHENHYDRAMINE HYDROCHLORIDE 25 MG: 50 INJECTION, SOLUTION INTRAMUSCULAR; INTRAVENOUS at 22:15

## 2022-08-25 RX ADMIN — BEBTELOVIMAB 175 MG: 87.5 INJECTION, SOLUTION INTRAVENOUS at 21:56

## 2022-08-25 RX ADMIN — ONDANSETRON 4 MG: 2 INJECTION INTRAMUSCULAR; INTRAVENOUS at 22:36

## 2022-08-25 RX ADMIN — ATORVASTATIN CALCIUM 80 MG: 80 TABLET, FILM COATED ORAL at 23:51

## 2022-08-25 RX ADMIN — DIPHENHYDRAMINE HYDROCHLORIDE 25 MG: 50 INJECTION INTRAMUSCULAR; INTRAVENOUS at 22:15

## 2022-08-25 RX ADMIN — SODIUM CHLORIDE 1000 ML: 900 INJECTION, SOLUTION INTRAVENOUS at 22:16

## 2022-08-25 RX ADMIN — SODIUM CHLORIDE, PRESERVATIVE FREE 10 ML: 5 INJECTION INTRAVENOUS at 21:57

## 2022-08-25 ASSESSMENT — ENCOUNTER SYMPTOMS
CHEST TIGHTNESS: 0
COUGH: 0
CHOKING: 0
SHORTNESS OF BREATH: 0

## 2022-08-25 NOTE — ED TRIAGE NOTES
Patient to triage with mask in place. Patient reports fatigue over the past few days. Pt reports when he was in the hospital his hgb was running low.

## 2022-08-25 NOTE — PROGRESS NOTES
Called pt and notified him of changed appt to 9-13-22 with Dr Sherry Murphy and new change of appt to 9-15-22 for chemo ed.

## 2022-08-25 NOTE — PROGRESS NOTES
I met pt on 8-23-22 with Dr Irina Hinton and patient's wife. Pt is hard of hearing and much had to be repeated with wife as  in room for pt understanding. Pt had surgery approx 2 weeks ago and colon cancer is stage 2. We discussed plan of care and possible options of surveillance or Xeloda therapy for 6 months if well tolerated. Pt verbalized he wanted to take chemotherapy if this would increase his chances of longer survival. Oncotype DX was ordered and we will bring pt back after results are available and discuss possible Xeloda and arrange chemo education if needed after appt with Dr Irina Hinton.  Nurse navigation will be following

## 2022-08-25 NOTE — ED TRIAGE NOTES
79-year-old male patient presents today complaining of weakness for the past week that began after getting out of the hospital 1 week ago today. He states he was in the hospital where he had partial colectomy for removal of mass. He states he needed multiple blood transfusions due to low hemoglobin. He states he continues to feel weak and just \"not feeling good. \"  He reports his stools have been darker but not black. Denies bright red blood per rectum. Denies nausea, vomiting, abdominal pain, dyspnea, chest pain. Physical exam reveals elderly gentleman with mild diffuse tenderness of abdomen. Patient evaluated initially in triage. Rapid Medical Evaluation was conducted and necessary orders have been placed. I have performed a medical screening exam.  Care will now be transferred to the provider in the back of the emergency department.   LILY Buchanan 3:36 PM

## 2022-08-25 NOTE — PROGRESS NOTES
Pt's wife called and stated her  did not feel well and was more lethargisc today. She asked if he could get labs to make sure Hgb is okay.  Lab only appt placed for 8-26-22

## 2022-08-26 ENCOUNTER — CARE COORDINATION (OUTPATIENT)
Dept: CARE COORDINATION | Facility: CLINIC | Age: 82
End: 2022-08-26

## 2022-08-26 PROBLEM — R41.89 UNRESPONSIVE: Status: ACTIVE | Noted: 2022-08-26

## 2022-08-26 PROBLEM — I95.9 HYPOTENSION: Status: ACTIVE | Noted: 2017-10-26

## 2022-08-26 LAB
ALBUMIN SERPL-MCNC: 3.1 G/DL (ref 3.2–4.6)
ALBUMIN/GLOB SERPL: 0.9 {RATIO} (ref 1.2–3.5)
ALP SERPL-CCNC: 84 U/L (ref 50–136)
ALT SERPL-CCNC: 29 U/L (ref 12–65)
ANION GAP SERPL CALC-SCNC: 4 MMOL/L (ref 7–16)
AST SERPL-CCNC: 21 U/L (ref 15–37)
BASOPHILS # BLD: 0 K/UL (ref 0–0.2)
BASOPHILS NFR BLD: 0 % (ref 0–2)
BILIRUB SERPL-MCNC: 0.4 MG/DL (ref 0.2–1.1)
BUN SERPL-MCNC: 14 MG/DL (ref 8–23)
CALCIUM SERPL-MCNC: 8.6 MG/DL (ref 8.3–10.4)
CHLORIDE SERPL-SCNC: 111 MMOL/L (ref 98–107)
CO2 SERPL-SCNC: 22 MMOL/L (ref 21–32)
CREAT SERPL-MCNC: 0.9 MG/DL (ref 0.8–1.5)
DIFFERENTIAL METHOD BLD: ABNORMAL
EOSINOPHIL # BLD: 0 K/UL (ref 0–0.8)
EOSINOPHIL NFR BLD: 0 % (ref 0.5–7.8)
ERYTHROCYTE [DISTWIDTH] IN BLOOD BY AUTOMATED COUNT: 25.9 % (ref 11.9–14.6)
GLOBULIN SER CALC-MCNC: 3.3 G/DL (ref 2.3–3.5)
GLUCOSE SERPL-MCNC: 152 MG/DL (ref 65–100)
HCT VFR BLD AUTO: 32.6 % (ref 41.1–50.3)
HGB BLD-MCNC: 9 G/DL (ref 13.6–17.2)
IMM GRANULOCYTES # BLD AUTO: 0.1 K/UL (ref 0–0.5)
IMM GRANULOCYTES NFR BLD AUTO: 1 % (ref 0–5)
LYMPHOCYTES # BLD: 0.9 K/UL (ref 0.5–4.6)
LYMPHOCYTES NFR BLD: 6 % (ref 13–44)
MCH RBC QN AUTO: 20.5 PG (ref 26.1–32.9)
MCHC RBC AUTO-ENTMCNC: 27.6 G/DL (ref 31.4–35)
MCV RBC AUTO: 74.1 FL (ref 79.6–97.8)
MONOCYTES # BLD: 0.2 K/UL (ref 0.1–1.3)
MONOCYTES NFR BLD: 1 % (ref 4–12)
NEUTS SEG # BLD: 15.1 K/UL (ref 1.7–8.2)
NEUTS SEG NFR BLD: 92 % (ref 43–78)
NRBC # BLD: 0 K/UL (ref 0–0.2)
PLATELET # BLD AUTO: 251 K/UL (ref 150–450)
PMV BLD AUTO: 9.7 FL (ref 9.4–12.3)
POTASSIUM SERPL-SCNC: 4.4 MMOL/L (ref 3.5–5.1)
PROT SERPL-MCNC: 6.4 G/DL (ref 6.3–8.2)
RBC # BLD AUTO: 4.4 M/UL (ref 4.23–5.6)
SODIUM SERPL-SCNC: 137 MMOL/L (ref 136–145)
WBC # BLD AUTO: 16.3 K/UL (ref 4.3–11.1)

## 2022-08-26 PROCEDURE — 6370000000 HC RX 637 (ALT 250 FOR IP): Performed by: FAMILY MEDICINE

## 2022-08-26 PROCEDURE — 2580000003 HC RX 258: Performed by: FAMILY MEDICINE

## 2022-08-26 PROCEDURE — 97530 THERAPEUTIC ACTIVITIES: CPT

## 2022-08-26 PROCEDURE — 85025 COMPLETE CBC W/AUTO DIFF WBC: CPT

## 2022-08-26 PROCEDURE — 6360000002 HC RX W HCPCS: Performed by: FAMILY MEDICINE

## 2022-08-26 PROCEDURE — 97161 PT EVAL LOW COMPLEX 20 MIN: CPT

## 2022-08-26 PROCEDURE — 1100000000 HC RM PRIVATE

## 2022-08-26 PROCEDURE — 80053 COMPREHEN METABOLIC PANEL: CPT

## 2022-08-26 PROCEDURE — 97165 OT EVAL LOW COMPLEX 30 MIN: CPT

## 2022-08-26 PROCEDURE — 2500000003 HC RX 250 WO HCPCS: Performed by: FAMILY MEDICINE

## 2022-08-26 RX ORDER — TEMAZEPAM 15 MG/1
15 CAPSULE ORAL NIGHTLY PRN
Status: DISCONTINUED | OUTPATIENT
Start: 2022-08-26 | End: 2022-08-27 | Stop reason: HOSPADM

## 2022-08-26 RX ADMIN — SODIUM CHLORIDE, PRESERVATIVE FREE 10 ML: 5 INJECTION INTRAVENOUS at 21:34

## 2022-08-26 RX ADMIN — APIXABAN 5 MG: 5 TABLET, FILM COATED ORAL at 21:34

## 2022-08-26 RX ADMIN — DEXAMETHASONE 6 MG: 4 TABLET ORAL at 08:42

## 2022-08-26 RX ADMIN — TAMSULOSIN HYDROCHLORIDE 0.4 MG: 0.4 CAPSULE ORAL at 08:43

## 2022-08-26 RX ADMIN — TEMAZEPAM 15 MG: 15 CAPSULE ORAL at 21:35

## 2022-08-26 RX ADMIN — TUBERCULIN PURIFIED PROTEIN DERIVATIVE 5 UNITS: 5 INJECTION, SOLUTION INTRADERMAL at 01:13

## 2022-08-26 RX ADMIN — APIXABAN 5 MG: 5 TABLET, FILM COATED ORAL at 08:43

## 2022-08-26 RX ADMIN — SODIUM CHLORIDE, PRESERVATIVE FREE 10 ML: 5 INJECTION INTRAVENOUS at 01:13

## 2022-08-26 RX ADMIN — ROPINIROLE HYDROCHLORIDE 0.5 MG: 0.5 TABLET, FILM COATED ORAL at 00:23

## 2022-08-26 RX ADMIN — ATORVASTATIN CALCIUM 80 MG: 80 TABLET, FILM COATED ORAL at 21:34

## 2022-08-26 RX ADMIN — PANTOPRAZOLE SODIUM 40 MG: 40 TABLET, DELAYED RELEASE ORAL at 06:02

## 2022-08-26 ASSESSMENT — PAIN SCALES - GENERAL
PAINLEVEL_OUTOF10: 0

## 2022-08-26 NOTE — ED NOTES
RN pushed bebtelovimab over 1 minute per orders, pt was aox4. RN left room and wife called out. Upon walking into room, Pt was laying back on bed responsive to verbal stimuli. Placed onto MD RUDDY immediately at bedside to assess. Verbal orders were received. See MEAGAN Acosta RN  08/25/22 [id number]

## 2022-08-26 NOTE — PROGRESS NOTES
PHYSICAL THERAPY Initial Assessment and AM  (Link to Caseload Tracking: PT Visit Days : 1  Acknowledge Orders  Time In/Out  PT Charge Capture  Rehab Caseload Tracker    Marjan Duron is a 79 y.o. male   PRIMARY DIAGNOSIS: [address] [U07.1]       Reason for Referral: Difficulty in walking, Not elsewhere classified (R26.2)  Inpatient: Payor: Janneth Alert / Plan: MEDICARE PART A AND B / Product Type: *No Product type* /     ASSESSMENT:     REHAB RECOMMENDATIONS:   Recommendation to date pending progress:  Setting:  Home Health Therapy    Equipment:    To Be Determined     ASSESSMENT:  Mr. Mica Victor presents to hospital on 8/25/22 with general weakness, [place], found to be COVID +. Pt received medication, became briefly unresponsive, hypotensive, now reports feeling improved and at baseline. Pt spouse present during session. Pt with SBA for bed mobility, SBA/CGA transfers and ambulation in room with use of quad cane as needed. Pt tends to hold cane up in air rather than actually use it for support. Pt shuffled but appears steady with overall activity. PT to follow 1-2 more sessions to continue to assess transfers and ambulation. Pt O2 stats >90% with activity on RA. Pt close to baseline, anticipate to return home with HHPT at discharge.       [address] [facility] [address] [address] AM-PAC 6 Clicks Basic Mobility Inpatient Short Form  AM-PAC Mobility Inpatient   How much difficulty turning over in bed?: None  How much difficulty sitting down on / standing up from a chair with arms?: A Little  How much difficulty moving from lying on back to sitting on side of bed?: A Little  How much help from another person moving to and from a bed to a chair?: A Little  How much help from another person needed to walk in hospital room?: A Little  How much help from another person for climbing 3-5 steps with a railing?: A Little  AM-[facility] Inpatient Mobility Raw Score : 19  AM-PAC Inpatient T-Scale Score : 45.44  Mobility Inpatient CMS 0-100% Score: 41.77  Mobility Inpatient CMS G-Code Modifier : CK    SUBJECTIVE:   Mr. Fabby Oscar states, \"I am ready to go home if they will let me\"     Social/Functional Lives With: Spouse  Type of Home: House  Home Layout: One level  Home Access: Stairs to enter without rails  Entrance Stairs - Number of Steps: 1  Home Equipment: Cane, quad    OBJECTIVE:     PAIN: Laury Sprout / O2: Ioana Corral / Wade Stewart / Brayden Jurist:   Pre Treatment:   Pain Assessment: None - Denies Pain      Post Treatment: 0/10 Vitals        Oxygen  O2 Therapy: Room air   None    RESTRICTIONS/PRECAUTIONS:  Restrictions/Precautions: Contact Precautions                 GROSS EVALUATION: Intact Impaired (Comments):   AROM [x]     PROM [x]    Strength [x]     Balance [] Standing - Static: Fair, +  Standing - Dynamic: Fair, +   Posture [] Rounded Shoulders   Sensation [x]     Coordination [x]      Tone [x]     Edema [x]    Activity Tolerance []  General fatigue    []      COGNITION/  PERCEPTION: Intact Impaired (Comments):   Orientation [x]     Vision [x]     Hearing []  [place]   Cognition  [x]       MOBILITY: I Mod I S SBA CGA Min Mod Max Total  NT x2 Comments:   Bed Mobility    Rolling [] [] [] [x] [x] [] [] [] [] [] []    Supine to Sit [] [] [] [x] [x] [] [] [] [] [] []    Scooting [] [] [] [x] [] [] [] [] [] [] []    Sit to Supine [] [] [] [] [] [] [] [] [] [] [] Left sitting EOB   Transfers    Sit to Stand [] [] [] [x] [x] [] [] [] [] [] []    Bed to Chair [] [] [] [] [] [] [] [] [] [x] []    Stand to Sit [] [] [] [x] [] [] [] [] [] [] []     [] [] [] [] [] [] [] [] [] [] []    I=Independent, Mod I=Modified Independent, S=Supervision, SBA=Standby Assistance, CGA=Contact Guard Assistance,   Min=Minimal Assistance, Mod=Moderate Assistance, Max=Maximal Assistance, Total=Total Assistance, NT=Not Tested    GAIT: I Mod I S SBA CGA Min Mod Max Total  NT x2 Comments:   Level of Assistance [] [] [] [x] [x] [] [] [] [] [] []    Distance 50 feet    DME Quad Cane    Gait Quality Decreased step clearance, Decreased step length, and Shuffling     Weightbearing Status Restrictions/Precautions  Restrictions/Precautions: Contact Precautions    Stairs      I=Independent, Mod I=Modified Independent, S=Supervision, SBA=Standby Assistance, CGA=Contact Guard Assistance,   Min=Minimal Assistance, Mod=Moderate Assistance, Max=Maximal Assistance, Total=Total Assistance, NT=Not Tested    PLAN:   ACUTE PHYSICAL THERAPY GOALS:   (Developed with and agreed upon by patient and/or caregiver. )  LTG:  (1.)Mr. Geraldine Griffith will move from supine to sit and sit to supine , scoot up and down, and roll side to side in bed with INDEPENDENT within 7 treatment day(s). (2.)Mr. Geraldine Griffith will transfer from bed to chair and chair to bed with MODIFIED INDEPENDENCE using the least restrictive device within 7 treatment day(s). (3.)Mr. Geraldine Griffith will ambulate with MODIFIED INDEPENDENCE for 500+ feet with the least restrictive device within 7 treatment day(s). ________________________________________________________________________________________________       FREQUENCY AND DURATION: 3 times/week for duration of hospital stay or until stated goals are met, whichever comes first.    THERAPY PROGNOSIS: Good    PROBLEM LIST:   (Skilled intervention is medically necessary to address:)  Decreased ADL/Functional Activities  Decreased Activity Tolerance  Decreased Balance  Decreased Gait Ability  Decreased Transfer Abilities INTERVENTIONS PLANNED:   (Benefits and precautions of physical therapy have been discussed with the patient.)  Therapeutic Activity  Therapeutic Exercise/HEP  Neuromuscular Re-education  Gait Training  Education       TREATMENT:   EVALUATION: LOW COMPLEXITY: (Untimed Charge)    TREATMENT:   Therapeutic Activity (10 Minutes):  Therapeutic activity included Supine to Sit, Scooting, Transfer Training, Ambulation on level ground, Sitting balance , and Standing balance to improve functional Activity tolerance, Balance, Coordination, Mobility, and Strength.     TREATMENT GRID:  N/A    AFTER TREATMENT PRECAUTIONS: Call light within reach, Needs within reach, RN notified, Visitors at bedside, and sitting EOB, MD present    INTERDISCIPLINARY COLLABORATION:  RN/ PCT and PT/ PTA    EDUCATION: Education Given To: Patient  Education Provided: Role of Therapy  Education Method: Verbal  Barriers to Learning: None  Education Outcome: Verbalized understanding    TIME IN/OUT:  Time In: 1134  Time Out: Chinmay Small 105  Minutes: Oliverio Macedo PT

## 2022-08-26 NOTE — CARE COORDINATION
Attempted outreach follow up, noted per chart review patient returned to hospital and is currently admitted for care. No further outreach is indicated at this time. Patient will be reassigned pending discharge disposition.

## 2022-08-26 NOTE — PROGRESS NOTES
Patient received to room 23-14-20-09 via wheelchair from the ED. Patient in stable condition and able to transfer to chair with no incident noted. Dual skin assessment completed with Yelitza Harper RN. No pressure ulcers noted. Lap sites noted from previous admission. Will continue to monitor.

## 2022-08-26 NOTE — PROGRESS NOTES
Hospitalist Progress Note   Admit Date:  2022  7:39 PM   Name:  Mickey Sheridan   Age:  79 y.o. Sex:  male  :  1940   MRN:  [id number]   Room:  [facility]/    Presenting Complaint: Fatigue     Reason(s) for Admission: COVID [U07.1]  COVID-19 [U07.1]     Hospital Course:   Patient is a 79 y.o. male who presented to the ED for cc weakness since his last discharge. 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 Follows Dr. Lloyd Anders. Was given bebtelovimab and then suddenly became unresponsive. No wheezing, hives. Now satting well on RA and much more alert. Subjective & 24hr Events (22): Pt awake,alert  Going to work with PT  Says weakness better  No more unresponsive spells  Elevated wbc      Assessment & Plan:     Covid  S/p Bebtevelovimab    Unresponsive and hypotensive   After Bebtevelovimab  ? Vagal  Presently normal bp and heart rate  Cont telemetry for 24 hrs  EKG paced rhythm. (Has pacemaker)    Elevated wbc  Prob secondary to solumedrol he got last night and decadron this am  Will hold decadron  Ordered UA    Recent admission for GI bleed  Hb stab'e    Left axillary DVT  Cont eliquis    H/O Afib with watchman procedure    H/o sick sinus syndrome with pacemaker    Generalized weakness  PT/OT     GERD  Protonix    Prob dc tomorrow  Dvt- eliquis        Hospital Problems:  Principal Problem:    COVID  Active Problems:    Cecum mass    Acute deep vein thrombosis (DVT) of axillary vein of left upper extremity (HCC)    Unresponsive    Paroxysmal atrial fibrillation (HCC)    Pacemaker    HTN (hypertension)    Myasthenia gravis (HCC)    Hypotension    Major depressive disorder, recurrent, mild (HCC)  Resolved Problems:    * No resolved hospital problems.  *      Objective:   Patient Vitals for the past 24 hrs:   Temp Pulse Resp BP SpO2   08/26/22 1646 98.2 °F (36.8 °C) 74 19 134/71 99 %   08/26/22 0945 -- 75 -- -- 98 %   08/26/22 0900 -- 75 -- 123/68 99 %   08/26/22 0800 -- 75 18 126/60 92 %   08/26/22 0715 -- 75 22 -- 97 %   08/26/22 0602 -- 70 19 (!) 142/77 94 %   08/26/22 0430 97.7 °F (36.5 °C) 70 14 129/66 95 %   08/26/22 0400 -- 71 24 132/60 95 %   08/26/22 0330 -- 74 22 121/79 91 %   08/26/22 0300 -- 71 22 (!) 123/110 95 %   08/26/22 0245 -- 70 23 (!) 157/79 91 %   08/26/22 0230 -- 76 11 (!) 147/79 --   08/26/22 0115 -- 70 16 (!) 140/71 91 %   08/26/22 0112 98.2 °F (36.8 °C) 72 12 138/72 97 %   08/25/22 2235 -- 76 17 133/68 97 %   08/25/22 2233 -- 76 29 126/70 99 %   08/25/22 2225 -- 76 23 98/65 98 %   08/25/22 2220 -- 79 20 (!) 86/46 98 %   08/25/22 2217 -- 79 29 (!) 90/58 98 %   08/25/22 2215 -- 83 24 (!) 79/56 --   08/25/22 2212 -- 76 19 (!) 81/52 96 %   08/25/22 2208 -- (!) 108 15 -- 96 %   08/25/22 2206 -- -- -- (!) 58/37 --   08/25/22 2200 -- -- -- (!) 158/82 98 %   08/25/22 2129 -- -- -- (!) 155/78 98 %   08/25/22 2114 -- -- -- (!) 153/81 98 %   08/25/22 2059 -- -- -- (!) 151/78 98 %   08/25/22 2044 -- -- -- (!) 158/83 99 %   08/25/22 2029 -- -- -- (!) 144/77 98 %   08/25/22 2014 -- -- -- (!) 147/113 98 %   08/25/22 2004 -- -- -- (!) 162/82 97 %   08/25/22 1944 -- -- -- (!) 156/74 95 %       Oxygen Therapy  SpO2: 99 %  Pulse via Oximetry: 75 beats per minute  Pulse Oximeter Device Mode: Continuous  Pulse Oximeter Device Location: Left, Finger  O2 Device: None (Room air)  O2 Flow Rate (L/min): 0 L/min    Estimated body mass index is 31.32 kg/m² as calculated from the following:    Height as of this encounter: 5' 7\" (1.702 m). Weight as of this encounter: 200 lb (90.7 kg).     Intake/Output Summary (Last 24 hours) at 8/26/2022 1741  Last data filed at 8/26/2022 0617  Gross per 24 hour   Intake 350 ml   Output --   Net 350 ml         Physical Exam:     Blood pressure 134/71, pulse 74, temperature 98.2 °F (36.8 °C), temperature source Oral, resp. rate 19, height 5' 7\" (1.702 m), weight 200 lb (90.7 kg), SpO2 99 %. General:    Well nourished. Head:  Normocephalic, atraumatic  Eyes:  Sclerae appear normal.  Pupils equally round. ENT:  Nares appear normal, no drainage. Moist oral mucosa  Neck:  No restricted ROM. Trachea midline   CV:   RRR. No m/r/g. No jugular venous distension. Lungs:   Mild coarse breath sounds. Abdomen: Bowel sounds present. Soft, nontender, nondistended. Extremities: No cyanosis or clubbing. No edema  Skin:     No rashes and normal coloration. Warm and dry. Neuro:  CN II-XII grossly intact. Sensation intact. A&Ox3  Psych:  Normal mood and affect.       I have personally reviewed labs and tests showing:  Recent Labs:  Recent Results (from the past 48 hour(s))   CBC with Auto Differential    Collection Time: 08/25/22  3:38 PM   Result Value Ref Range    WBC 10.4 4.3 - 11.1 K/uL    RBC 4.40 4.23 - 5.6 M/uL    Hemoglobin 9.0 (L) 13.6 - 17.2 g/dL    Hematocrit 31.9 (L) 41.1 - 50.3 %    MCV 72.5 (L) 79.6 - 97.8 FL    MCH 20.5 (L) 26.1 - 32.9 PG    MCHC 28.2 (L) 31.4 - 35.0 g/dL    RDW 26.5 (H) 11.9 - 14.6 %    Platelets 574 240 - 254 K/uL    MPV 9.6 9.4 - 12.3 FL    nRBC 0.00 0.0 - 0.2 K/uL    Differential Type AUTOMATED      Seg Neutrophils 58 43 - 78 %    Lymphocytes 28 13 - 44 %    Monocytes 8 4.0 - 12.0 %    Eosinophils % 5 0.5 - 7.8 %    Basophils 1 0.0 - 2.0 %    Immature Granulocytes 0 0.0 - 5.0 %    Segs Absolute 6.1 1.7 - 8.2 K/UL    Absolute Lymph # 2.9 0.5 - 4.6 K/UL    Absolute Mono # 0.8 0.1 - 1.3 K/UL    Absolute Eos # 0.5 0.0 - 0.8 K/UL    Basophils Absolute 0.1 0.0 - 0.2 K/UL    Absolute Immature Granulocyte 0.0 0.0 - 0.5 K/UL   Comprehensive Metabolic Panel    Collection Time: 08/25/22  3:38 PM   Result Value Ref Range    Sodium 138 138 - 145 mmol/L    Potassium 4.0 3.5 - 5.1 mmol/L    Chloride 107 98 - 107 mmol/L    CO2 25 21 - 32 mmol/L Basophils 0 0.0 - 2.0 %    Immature Granulocytes 1 0.0 - 5.0 %    Segs Absolute 15.1 (H) 1.7 - 8.2 K/UL    Absolute Lymph # 0.9 0.5 - 4.6 K/UL    Absolute Mono # 0.2 0.1 - 1.3 K/UL    Absolute Eos # 0.0 0.0 - 0.8 K/UL    Basophils Absolute 0.0 0.0 - 0.2 K/UL    Absolute Immature Granulocyte 0.1 0.0 - 0.5 K/UL   Comprehensive Metabolic Panel w/ Reflex to MG    Collection Time: 08/26/22  4:10 AM   Result Value Ref Range    Sodium 137 136 - 145 mmol/L    Potassium 4.4 3.5 - 5.1 mmol/L    Chloride 111 (H) 98 - 107 mmol/L    CO2 22 21 - 32 mmol/L    Anion Gap 4 (L) 7 - 16 mmol/L    Glucose 152 (H) 65 - 100 mg/dL    BUN 14 8 - 23 MG/DL    Creatinine 0.90 0.8 - 1.5 MG/DL    GFR African American >60 >60 ml/min/1.73m2    GFR Non- >60 >60 ml/min/1.73m2    Calcium 8.6 8.3 - 10.4 MG/DL    Total Bilirubin 0.4 0.2 - 1.1 MG/DL    ALT 29 12 - 65 U/L    AST 21 15 - 37 U/L    Alk Phosphatase 84 50 - 136 U/L    Total Protein 6.4 6.3 - 8.2 g/dL    Albumin 3.1 (L) 3.2 - 4.6 g/dL    Globulin 3.3 2.3 - 3.5 g/dL    Albumin/Globulin Ratio 0.9 (L) 1.2 - 3.5         I have personally reviewed imaging studies showing:   Other Studies:  No orders to display       Current Meds:  Current Facility-Administered Medications   Medication Dose Route Frequency    temazepam (RESTORIL) capsule 15 mg  15 mg Oral Nightly PRN    diphenhydrAMINE (BENADRYL) injection 25 mg  25 mg IntraVENous Q6H PRN    apixaban (ELIQUIS) tablet 5 mg  5 mg Oral BID    atorvastatin (LIPITOR) tablet 80 mg  80 mg Oral Nightly    pantoprazole (PROTONIX) tablet 40 mg  40 mg Oral QAM AC    tamsulosin (FLOMAX) capsule 0.4 mg  0.4 mg Oral Daily    sodium chloride flush 0.9 % injection 5-40 mL  5-40 mL IntraVENous 2 times per day    sodium chloride flush 0.9 % injection 5-40 mL  5-40 mL IntraVENous PRN    0.9 % sodium chloride infusion   IntraVENous PRN    famotidine (PEPCID) tablet 10 mg  10 mg Oral BID PRN    aluminum & magnesium hydroxide-simethicone (MAALOX) 634-538-98 MG/5ML suspension 30 mL  30 mL Oral Q6H PRN    ondansetron (ZOFRAN-ODT) disintegrating tablet 4 mg  4 mg Oral Q8H PRN    Or    ondansetron (ZOFRAN) injection 4 mg  4 mg IntraVENous Q6H PRN    polyethylene glycol (GLYCOLAX) packet 17 g  17 g Oral Daily PRN    bisacodyl (DULCOLAX) suppository 10 mg  10 mg Rectal Daily PRN    acetaminophen (TYLENOL) tablet 650 mg  650 mg Oral Q6H PRN    Or    acetaminophen (TYLENOL) suppository 650 mg  650 mg Rectal Q6H PRN    guaiFENesin-dextromethorphan (ROBITUSSIN DM) 100-10 MG/5ML syrup 10 mL  10 mL Oral Q4H PRN    [Held by provider] dexamethasone (DECADRON) tablet 6 mg  6 mg Oral Daily    albuterol sulfate HFA (PROVENTIL;VENTOLIN;PROAIR) 108 (90 Base) MCG/ACT inhaler 2 puff  2 puff Inhalation Q4H PRN    tuberculin injection 5 Units  5 Units IntraDERmal Once       Signed:  Nina Lamar MD

## 2022-08-26 NOTE — ED PROVIDER NOTES
[facility] Emergency Department Provider Note                   PCP:                None Provider               Age: 79 y.o. Sex: male     No diagnosis found. DISPOSITION          MDM  Number of Diagnoses or Management Options  COVID-19  Diagnosis management comments: Shortly after receiving IV injection of monoclonal antibodies ordered positive COVID test the patient had an apparent syncopal episode or reaction to the injection. He became unresponsive and hypotensive. He was immediately made supine, and was noted to be incontinent of urine. Legs were elevated IV access established and fluid bolus initiated. He was given 50 mg of Benadryl 125 mg of Solu-Medrol and 4 mg of Zofran and. Blood was drawn for H&H to make sure he was not having hemorrhage recurrence. EKG shows a paced left bundle branch block. Blood pressure gradually responded to the fluid bolus and the patient's level of consciousness improved. Case to be discussed with hospitalist for admission for observation for possible medication reaction. Amount and/or Complexity of Data Reviewed  Clinical lab tests: ordered and reviewed  Independent visualization of images, tracings, or specimens: yes    Risk of Complications, Morbidity, and/or Mortality  Presenting problems: moderate  Diagnostic procedures: moderate  Management options: moderate    Patient Progress  Patient progress: stable       Orders Placed This Encounter   Procedures    COVID-19, Rapid    CBC with Auto Differential    Comprehensive Metabolic Panel    Protime-INR        Chery Adhikari is a 79 y.o. male who presents to the Emergency Department with chief complaint of    Chief Complaint   Patient presents with    Fatigue      Patient presents to the emergency room with complaint of generalized weakness and fatigue that began today. Patient was recently hospitalized and discovered to have colon cancer after requiring transfusion for occult GI bleeding.   He had been feeling well up until today. He states he was told by his doctor that if he began to feel fatigued or weak again that he should come to get checked. He denies any fever or chills, vomiting or diarrhea. He denies any presyncopal symptoms. Patient does have a history of myasthenia gravis but has not been on medication or had any attacks for 20 years. And he states the symptoms are dissimilar to any symptoms he had with myasthenia gravis. The history is provided by the patient and medical records. Review of Systems   Constitutional:  Positive for fatigue. Negative for chills and fever. Respiratory:  Negative for cough, choking, chest tightness and shortness of breath. Cardiovascular:  Positive for leg swelling. Negative for chest pain and palpitations. All other systems reviewed and are negative.     Past Medical History:   Diagnosis Date    Abnormal EKG 4/22/15    Arrhythmia     Aspiration pneumonia ([address] 10/26/2017    CAD (coronary artery disease) 5/8/2015    Carotid artery stenosis without cerebral infarction 6/7/2016    US 6/15: <50% bilat ICAs    Coronary atherosclerosis of native coronary vessel 5/8/2015    GERMAIN on brilinta 5/7/15: prox LAD PCI, normal EF     Diastolic CHF, chronic ([address] 3/2/2022    Dyslipidemia 6/7/2016    Dyspnea 5/8/2015    Echo 6/15: EF 60%, mod MR, mod LVH, mild AI     ED (erectile dysfunction)     GERD (gastroesophageal reflux disease)     GERD (gastroesophageal reflux disease)     no medication    HTN (hypertension) 5/8/2015    Hypertension     Hypokalemia     Hypokalemia 6/7/2016    Mitral valve regurgitation 6/7/2016    Morbid obesity ([address]     Myasthenia gravis ([address]     Myasthenia gravis ([address] 6/17/15    Nocturia     Osteoporosis     PUD (peptic ulcer disease) 25 yrs ago    S/P coronary artery stent placement 5/8/2015    3.0x38 mm Xience CAROL to pLAD 5/7/15     Sleep apnea     Syncope and collapse     Unspecified sleep apnea     no cpap        Past Surgical History: Procedure Laterality Date    APPENDECTOMY      CARDIAC CATHETERIZATION  05/21/2019    watchman device    CARDIAC CATHETERIZATION  05/27/2015    stent    COLONOSCOPY  2007    COLONOSCOPY N/A 8/6/2022    COLONOSCOPY POLYPECTOMY SNARE/COLD BIOPSY performed by James Mix MD at [facility] ENDOSCOPY    ERCP  3/30/2022         HEMICOLECTOMY Right 8/10/2022    BOWEL RESECTION HEMICOLECTOMY LAPAROSCOPIC ROBOTIC, Right Bulmaro performed by Bard Jeremy MD at [facility] Janeice Poisson  2018    [place] Dr. Smith/Giana for sleep apnea and reconstruction for extending jaw    UPPER GASTROINTESTINAL ENDOSCOPY N/A 8/5/2022    EGD ESOPHAGOGASTRODUODENOSCOPY Rm 525 performed by Aba Don MD at [address] Right 07/10/2019     Repair of right radial artery pseudoaneurysm        Family History   Problem Relation Age of Onset    No Known Problems Brother     Cancer Brother         brain tumor    No Known Problems Sister     Cancer Mother         kidney    Cancer Father         stomach        Social History     Socioeconomic History    Marital status:    Tobacco Use    Smoking status: Never    Smokeless tobacco: Never   Substance and Sexual Activity    Alcohol use: No    Drug use: No         Patient has no known allergies. Previous Medications    APIXABAN (ELIQUIS) 5 MG TABS TABLET    Take 2 tablets by mouth 2 times daily for 11 doses    APIXABAN (ELIQUIS) 5 MG TABS TABLET    Take 1 tablet by mouth 2 times daily    ATORVASTATIN (LIPITOR) 80 MG TABLET    Take 80 mg by mouth    DOCUSATE SODIUM (COLACE, DULCOLAX) 100 MG CAPS    Take 100 mg by mouth 2 times daily for 14 days    NITROGLYCERIN (NITROSTAT) 0.4 MG SL TABLET    Place 1 sl under the tongue q 5 min prn cp, max 3 sl in a 15-min time period. Call 911 if no relief after the 3rd sl.     PANTOPRAZOLE (PROTONIX) 40 MG TABLET    Take 40 mg by mouth every morning (before breakfast) POTASSIUM CHLORIDE (KLOR-CON M) 20 MEQ EXTENDED RELEASE TABLET    Take 1 tablet by mouth daily    ROPINIROLE (REQUIP) 0.5 MG TABLET    1 nightly, increase to 1 twice a day if needed    TAMSULOSIN (FLOMAX) 0.4 MG CAPSULE    Take 0.4 mg by mouth daily        Vitals signs and nursing note reviewed. No data found. Physical Exam  Vitals and nursing note reviewed. Constitutional:       General: He is not in acute distress. Appearance: Normal appearance. He is not ill-appearing, toxic-appearing or diaphoretic. HENT:      Head: Normocephalic and atraumatic. Nose: Nose normal. No congestion or rhinorrhea. Mouth/Throat:      Mouth: Mucous membranes are moist.      Pharynx: Oropharynx is clear. Eyes:      Extraocular Movements: Extraocular movements intact. Conjunctiva/sclera: Conjunctivae normal.      Pupils: Pupils are equal, round, and reactive to light. Cardiovascular:      Rate and Rhythm: Normal rate and regular rhythm. Pulses: Normal pulses. Pulmonary:      Effort: Pulmonary effort is normal.      Breath sounds: Normal breath sounds. Abdominal:      General: There is no distension. Palpations: Abdomen is soft. Tenderness: There is no abdominal tenderness. There is no right CVA tenderness, left CVA tenderness or guarding. Musculoskeletal:         General: Normal range of motion. Cervical back: Normal range of motion and neck supple. Skin:     General: Skin is warm and dry. Findings: No rash. Neurological:      General: No focal deficit present. Mental Status: He is alert and oriented to person, place, and time. Mental status is at baseline. Cranial Nerves: No cranial nerve deficit. Psychiatric:         Mood and Affect: Mood normal.         Behavior: Behavior normal.         Thought Content:  Thought content normal.        Procedures      Labs Reviewed   CBC WITH AUTO DIFFERENTIAL - Abnormal; Notable for the following components:       Result Value    Hemoglobin 9.0 (*)     Hematocrit 31.9 (*)     MCV 72.5 (*)     MCH 20.5 (*)     MCHC 28.2 (*)     RDW 26.5 (*)     All other components within normal limits   COMPREHENSIVE METABOLIC PANEL - Abnormal; Notable for the following components:    Anion Gap 6 (*)     Glucose 119 (*)     Albumin 3.1 (*)     Globulin 3.7 (*)     Albumin/Globulin Ratio 0.8 (*)     All other components within normal limits   PROTIME-INR - Abnormal; Notable for the following components:    Protime 17.8 (*)     All other components within normal limits   COVID-19, RAPID        No orders to display                          Voice dictation software was used during the making of this note. This software is not perfect and grammatical and other typographical errors may be present. This note has not been completely proofread for errors.      Marii Lindsey DO  08/25/22 2014       Marii Lindsey DO  08/25/22 [id number]

## 2022-08-26 NOTE — PROGRESS NOTES
OCCUPATIONAL THERAPY Initial Assessment, Discharge, and PM       OT Visit Days: 1  Acknowledge Orders  Time  OT Charge Capture  Rehab Caseload Tracker      Pilar Samayoa is a 79 y.o. male   PRIMARY DIAGNOSIS: [address] [U07.1]       Reason for Referral: Generalized Muscle Weakness (M62.81)  Other abnormalities of gait and mobility (R26.89)  Inpatient: Payor: MEDICARE / Plan: MEDICARE PART A AND B / Product Type: *No Product type* /     ASSESSMENT:     REHAB RECOMMENDATIONS:   Recommendation to date pending progress:  Setting:  No further skilled therapy after discharge from hospital    Equipment:    None     ASSESSMENT:  Mr. Karri Barboza is a 80 y/o M presenting with Covid. Pt seated upright on bed on entry on RA, A/O x 4. Pt denied light headedness, dizziness or SOB. Pt reports no fall(s) in past 6 months. PLOF Mod I. DME present in home; BSC, SC, QC(present in room). Pt currently Mod I with UB ADLs, Mod I with LB ADLs, Mod I for toileting and SBA for functional t/fs and household mobility for ADLs with no AD. Pt presenting near/at baseline at present time. Pt denies any questions concerns at present time. No further skilled OT needs indicated currently, d/c from caseload. Will re-assess as appropriate with new orders.       [facility] [address] [address] [facility]licks Daily Activity Inpatient Short Form:    AM-PAC Daily Activity Inpatient   How much help for putting on and taking off regular lower body clothing?: None  How much help for Bathing?: None  How much help for Toileting?: None  How much help for putting on and taking off regular upper body clothing?: None  How much help for taking care of personal grooming?: None  How much help for eating meals?: None  AM-PAC Inpatient Daily Activity Raw Score: 24  AM-PAC Inpatient ADL T-Scale Score : 57.54  ADL Inpatient CMS 0-100% Score: 0  ADL Inpatient CMS G-Code Modifier : CH           SUBJECTIVE:     Mr. Karri Barboza states, \"I'm just ready to get my butt out of here\" Social/Functional Lives With: Spouse  Type of Home: House  Home Layout: One level  Home Access: Stairs to enter without rails  Entrance Stairs - Number of Steps: 1  Home Equipment: Cane, quad    OBJECTIVE:     Jeramie Vo / Scarlett Potash / Arvil Dock: None    RESTRICTIONS/PRECAUTIONS:  Restrictions/Precautions: Contact Precautions    PAIN: VITALS / O2:   Pre Treatment:   Pain Assessment: None - Denies Pain      Post Treatment: None       Vitals          Oxygen            GROSS EVALUATION: INTACT IMPAIRED   (See Comments)   UE AROM [x] []   UE PROM [] []NT   Strength [x]       Posture / Balance [x]     Sensation [x]     Coordination []  BUE WFL     Tone []  N/A     Edema [] N/A   Activity Tolerance [x]       Hand Dominance R [x] L []      COGNITION/  PERCEPTION: INTACT IMPAIRED   (See Comments)   Orientation [x]     Vision [x]     Hearing [x]  [employer], has hearing aids, not present in room   Cognition  [x]     Perception []       MOBILITY: I Mod I S SBA CGA Min Mod Max Total  NT x2 Comments:   Bed Mobility    Rolling [] [] [] [] [] [] [] [] [] [x] []    Supine to Sit [] [] [] [] [] [] [] [] [] [x] []    Scooting [] [] [] [] [] [] [] [] [] [x] []    Sit to Supine [] [] [] [] [] [] [] [] [] [x] []    Transfers    Sit to Stand [] [x] [] [] [] [] [] [] [] [] []    Bed to Chair [] [] [] [] [] [] [] [] [] [x] []    Stand to Sit [] [x] [] [] [] [] [] [] [] [] []    Tub/Shower [] [] [] [] [] [] [] [] [] [x] []     Toilet [] [] [] [] [] [] [] [] [] [x] []      [] [] [] [] [] [] [] [] [] [] []    I=Independent, Mod I=Modified Independent, S=Supervision/Setup, SBA=Standby Assistance, CGA=Contact Guard Assistance, Min=Minimal Assistance, Mod=Moderate Assistance, Max=Maximal Assistance, Total=Total Assistance, NT=Not Tested    ACTIVITIES OF DAILY LIVING: I Mod I S SBA CGA Min Mod Max Total NT Comments   BASIC ADLs:              Upper Body Bathing  [] [x] [] [] [] [] [] [] [] [] BUE washing standing at sink no AD   Lower Body Bathing [] [x] [] [] [] [] [] [] [] []    Toileting [] [x] [] [] [] [] [] [] [] []    Upper Body Dressing [] [x] [] [] [] [] [] [] [] []    Lower Body Dressing [] [x] [] [] [] [] [] [] [] [] Doff/don socks seated EOB   Feeding [] [x] [] [] [] [] [] [] [] []    Grooming [] [x] [] [] [] [] [] [] [] [] Hand hygiene and face washing standing at sink no AD   Personal Device Care [] [] [] [] [] [] [] [] [] [x]    Functional Mobility [] [] [] [x] [] [] [] [] [] [] Mobility in room no AD   I=Independent, Mod I=Modified Independent, S=Supervision/Setup, SBA=Standby Assistance, CGA=Contact Guard Assistance, Min=Minimal Assistance, Mod=Moderate Assistance, Max=Maximal Assistance, Total=Total Assistance, NT=Not Tested    PLAN:     FREQUENCY/DURATION   OT Plan of Care:  (D/c no OT needs indicated at present time) for duration of hospital stay or until stated goals are met, whichever comes first.    ACUTE OCCUPATIONAL THERAPY GOALS:   (Developed with and agreed upon by patient and/or caregiver.)  1. Patient will complete lower body bathing and dressing with Mod I and adaptive equipment as   needed. 2. Patient will completed upper body bathing and dressing with Mod I and adaptive equipment as needed. 3. Patient will complete grooming standing at sink with Mod I and adaptive equipment as needed. 4. Patient will complete toileting with Mod I and adaptive equipment as needed. 5. Patient will complete functional transfers with Sup-V and adaptive equipment as needed.      Goals Met      PROBLEM LIST:   (Skilled intervention is medically necessary to address:)  N/A   INTERVENTIONS PLANNED:  (Benefits and precautions of occupational therapy have been discussed with the patient.)  N/A         TREATMENT:     EVALUATION: LOW COMPLEXITY: (Untimed Charge)    TREATMENT:   Eval only    TREATMENT GRID:  N/A    AFTER TREATMENT PRECAUTIONS: Bed/Chair Locked, Call light within reach, Chair, Needs within reach, and RN notified    INTERDISCIPLINARY COLLABORATION:  RN/ PCT and OT/ CORLEY    EDUCATION:  Education Given To: Patient  Education Provided: Role of Therapy;Plan of Care;Energy Conservation  Education Method: Verbal  Barriers to Learning: None  Education Outcome: Verbalized understanding    TOTAL TREATMENT DURATION AND TIME:  Time In: 1315  Time Out: [address]  Minutes: [address] [facility], OT

## 2022-08-26 NOTE — DISCHARGE INSTRUCTIONS
TAKE THE FOLLOWING SUPPLEMENT TO SPEED YOUR RECOVERY:    VITAMIN C     500-1000mg TWICE DAILY  VITAMIN D3  2940-8976 UNITS/DAY  ZINC               30-40mg/DAY  ASPIRIN         325mg/DAY      Learning How to Care for Someone Who Has COVID-19  What do you need to know? Most people who get COVID-19 will recover with time and home care. Here aresome things to know if you're caring for someone who's sick. Treat the symptoms. Common symptoms include a fever, coughing, and feeling short of breath. Urge the person to get extra rest and drink plenty of fluids to replace fluids lostfrom fever. To reduce a fever, offer acetaminophen (Tylenol) or ibuprofen (Advil, Motrin). It may also help with muscle aches. Read and follow all instructions on thelabel. Watch for signs that the illness is getting worse. The person may need medical care if they're getting sicker (for example, if it's hard to breathe). But call the doctor's office before you go. They cantell you what to do. Call 911 or emergency services if the person has any of these symptoms:  Severe trouble breathing or shortness of breath  Constant pain or pressure in their chest  Confusion, or trouble thinking clearly  Pale, gray, or blue-colored skin or lips  Some people are more likely to get very sick and need medical care. Call the doctor as soon as symptoms start or the person tests positive for COVID-19. This is especially important if the person you're caring for is not up to date on their COVID-19 vaccines, is over 72, smokes, or has a serious health problem like asthma, heart disease, diabetes, or an immune system problem. They mayneed medicine to prevent serious illness. How can you protect yourself and others? When you're caring for someone with COVID-19, keep the sick person away from others as much as you can. The virus spreads easily from person to person, so take extra care to avoid catching or spreading the infection.  Here are someways to protect yourself and others. Have the person stay in one room. If you can, give them their own bathroom to use. Have only one person take care of them. Keep other people--and pets--out of the sickroom. Have the person wear a well-fitting mask around other people. This includes when anyone is in the room with them or if they leave their room(for example, to go to the bathroom). Don't share personal items. These include dishes, cups, towels, and bedding. Wash your hands often and well. Use soap and water, and scrub for at least 20 seconds. This is especially important after you've been around the sick person or touched things they'vetouched. If soap and water aren't handy, use an alcohol-based hand . Wear a well-fitting mask when caring for someone who is sick. And wear a mask when you're around other people after you've cared for someonewho's sick. Avoid touching your mouth, nose, and eyes. Take care with the person's laundry. It's okay to wash the sick person's laundry with yours. If you have them, wear disposable gloves when handling their dirty laundry, and wash your hands well after you touch it. Wash items in the warmest water allowed for the fabrictype, and dry them completely. Clean high-touch items every day and anytime the sick person touches them. These include doorknobs, light switches, toilets, counters, and remote controls. Use a household disinfectant or a homemade bleach solution. (Follow the directions on the label.) If the sick person has their own room, have themdisinfect it every day. Avoid having visitors. If you have to have visitors, everyone needs to wear a mask and stay at least 6 feet (2 meters) away from you. And keep the visit as short as possible. To helpprotect family and friends, stay in touch with them only by phone or computer.   If you haven't tested positive for COVID-19 in the last 3 months or aren't up to date on your COVID-19 vaccines, you need to quarantine. This means you need to stay home and away from other people. If you have questions, go to the [facility] website at cdc.gov to check the Quarantine and Isolation Calculator. Where can you learn more? Go to https://chpepiceweb.[email or web address] org and sign in to your [employer] account. Enter J569 in the [employer] box to learn more about \"Learning How to Care for Someone Who Has COVID-19. \"     If you do not have an account, please click on the \"Sign Up Now\" link. Current as of: May 28, 2022               Content Version: 13.3  © 2801-6742 [employer]wise, Incorporated. Care instructions adapted under license by [facility] ([facility]). If you have questions about a medical condition or this instruction, always ask your healthcare professional. [address] any warranty or liability for your use of this information.

## 2022-08-26 NOTE — ACP (ADVANCE CARE PLANNING)
[facility] Hospitalist Service  At the heart of better care     Advance Care Planning   Admit Date:  2022  7:39 PM   Name:  Pilar Samayoa   Age:  79 y.o. Sex:  male  :  1940   MRN:  [id number]   Room:  [place] [address] is able to make his own decisions:   Yes    Patient / surrogate decision-maker directed code status:  FULL    Patient or surrogate consented to discussion of the current conditions, workup, management plans, prognosis, and the risk for further deterioration. Time spent: 17 minutes in direct discussion.       Signed:  Nubia Gibbs DO

## 2022-08-26 NOTE — PROGRESS NOTES
TRANSFER - IN REPORT:    Verbal report received from Aline DOS SANTOS on Tiajuana Paling  being received from ER for routine progression of patient care      Report consisted of patient's Situation, Background, Assessment and   Recommendations(SBAR). Information from the following report(s) Nurse Handoff Report was reviewed with the receiving nurse. Opportunity for questions and clarification was provided. Assessment completed upon patient's arrival to unit and care assumed.

## 2022-08-26 NOTE — ED NOTES
TRANSFER - OUT REPORT:    Verbal report given to Rivera Rosales RN on [facility]  being transferred to 8th floor for routine progression of patient care       Report consisted of patient's Situation, Background, Assessment and   Recommendations(SBAR). Information from the following report(s) ED Encounter Summary was reviewed with the receiving nurse. Lines:   Peripheral IV 08/25/22 Distal;Right Antecubital (Active)   Site Assessment Clean, dry & intact 08/26/22 0430   Line Status Flushed;Capped 08/26/22 0430   Line Care Connections checked and tightened;Ports disinfected 08/26/22 0430   Phlebitis Assessment No symptoms 08/26/22 0430   Infiltration Assessment 0 08/26/22 0430   Alcohol Cap Used Yes 08/26/22 0430   Dressing Status Clean, dry & intact 08/26/22 0430   Dressing Type Transparent 08/26/22 0430       Peripheral IV 08/25/22 Right Hand (Active)   Site Assessment Clean, dry & intact 08/26/22 0430   Line Status Flushed;Capped 08/26/22 0430   Line Care Connections checked and tightened;Ports disinfected 08/26/22 0430   Phlebitis Assessment No symptoms 08/26/22 0430   Infiltration Assessment 0 08/26/22 0430   Alcohol Cap Used Yes 08/26/22 0430   Dressing Status Clean, dry & intact 08/26/22 0430   Dressing Type Transparent 08/26/22 0430       Peripheral IV 08/25/22 Left;Proximal Cephalic (Active)   Site Assessment Clean, dry & intact 08/26/22 0430   Line Status Flushed;Capped 08/26/22 0430   Line Care Connections checked and tightened;Ports disinfected 08/26/22 0430   Phlebitis Assessment No symptoms 08/26/22 0430   Infiltration Assessment 0 08/26/22 0430   Alcohol Cap Used Yes 08/26/22 0430   Dressing Status Clean, dry & intact 08/26/22 0430   Dressing Type Transparent 08/26/22 0430        Opportunity for questions and clarification was provided.       Patient transported with:       Daylin Levy RN  08/26/22 2716

## 2022-08-27 ENCOUNTER — HOME HEALTH ADMISSION (OUTPATIENT)
Dept: HOME HEALTH SERVICES | Facility: HOME HEALTH | Age: 82
End: 2022-08-27
Payer: MEDICARE

## 2022-08-27 VITALS
OXYGEN SATURATION: 100 % | BODY MASS INDEX: 31.39 KG/M2 | TEMPERATURE: 97.5 F | WEIGHT: 200 LBS | HEART RATE: 74 BPM | RESPIRATION RATE: 20 BRPM | HEIGHT: 67 IN | DIASTOLIC BLOOD PRESSURE: 61 MMHG | SYSTOLIC BLOOD PRESSURE: 113 MMHG

## 2022-08-27 LAB
ANION GAP SERPL CALC-SCNC: 5 MMOL/L (ref 7–16)
BASOPHILS # BLD: 0 K/UL (ref 0–0.2)
BASOPHILS NFR BLD: 0 % (ref 0–2)
BUN SERPL-MCNC: 16 MG/DL (ref 8–23)
CALCIUM SERPL-MCNC: 9.1 MG/DL (ref 8.3–10.4)
CHLORIDE SERPL-SCNC: 110 MMOL/L (ref 98–107)
CO2 SERPL-SCNC: 24 MMOL/L (ref 21–32)
CREAT SERPL-MCNC: 0.8 MG/DL (ref 0.8–1.5)
DIFFERENTIAL METHOD BLD: ABNORMAL
EOSINOPHIL # BLD: 0 K/UL (ref 0–0.8)
EOSINOPHIL NFR BLD: 0 % (ref 0.5–7.8)
ERYTHROCYTE [DISTWIDTH] IN BLOOD BY AUTOMATED COUNT: 26 % (ref 11.9–14.6)
GLUCOSE SERPL-MCNC: 118 MG/DL (ref 65–100)
HCT VFR BLD AUTO: 31.9 % (ref 41.1–50.3)
HGB BLD-MCNC: 8.9 G/DL (ref 13.6–17.2)
IMM GRANULOCYTES # BLD AUTO: 0.1 K/UL (ref 0–0.5)
IMM GRANULOCYTES NFR BLD AUTO: 0 % (ref 0–5)
LYMPHOCYTES # BLD: 1.7 K/UL (ref 0.5–4.6)
LYMPHOCYTES NFR BLD: 10 % (ref 13–44)
MCH RBC QN AUTO: 20.7 PG (ref 26.1–32.9)
MCHC RBC AUTO-ENTMCNC: 27.9 G/DL (ref 31.4–35)
MCV RBC AUTO: 74.2 FL (ref 79.6–97.8)
MM INDURATION, POC: 0 MM (ref 0–5)
MONOCYTES # BLD: 1.4 K/UL (ref 0.1–1.3)
MONOCYTES NFR BLD: 8 % (ref 4–12)
NEUTS SEG # BLD: 13.5 K/UL (ref 1.7–8.2)
NEUTS SEG NFR BLD: 81 % (ref 43–78)
NRBC # BLD: 0 K/UL (ref 0–0.2)
PLATELET # BLD AUTO: 303 K/UL (ref 150–450)
PMV BLD AUTO: 9.9 FL (ref 9.4–12.3)
POTASSIUM SERPL-SCNC: 4 MMOL/L (ref 3.5–5.1)
PPD, POC: NEGATIVE
RBC # BLD AUTO: 4.3 M/UL (ref 4.23–5.6)
SODIUM SERPL-SCNC: 139 MMOL/L (ref 136–145)
WBC # BLD AUTO: 16.6 K/UL (ref 4.3–11.1)

## 2022-08-27 PROCEDURE — 6370000000 HC RX 637 (ALT 250 FOR IP): Performed by: FAMILY MEDICINE

## 2022-08-27 PROCEDURE — 36415 COLL VENOUS BLD VENIPUNCTURE: CPT

## 2022-08-27 PROCEDURE — 85025 COMPLETE CBC W/AUTO DIFF WBC: CPT

## 2022-08-27 PROCEDURE — 80048 BASIC METABOLIC PNL TOTAL CA: CPT

## 2022-08-27 PROCEDURE — 2580000003 HC RX 258: Performed by: FAMILY MEDICINE

## 2022-08-27 RX ADMIN — PANTOPRAZOLE SODIUM 40 MG: 40 TABLET, DELAYED RELEASE ORAL at 06:04

## 2022-08-27 RX ADMIN — SODIUM CHLORIDE, PRESERVATIVE FREE 10 ML: 5 INJECTION INTRAVENOUS at 08:39

## 2022-08-27 RX ADMIN — TAMSULOSIN HYDROCHLORIDE 0.4 MG: 0.4 CAPSULE ORAL at 08:39

## 2022-08-27 RX ADMIN — APIXABAN 5 MG: 5 TABLET, FILM COATED ORAL at 08:39

## 2022-08-27 ASSESSMENT — PAIN SCALES - GENERAL
PAINLEVEL_OUTOF10: 0
PAINLEVEL_OUTOF10: 0

## 2022-08-27 NOTE — DISCHARGE SUMMARY
Hospitalist Discharge Summary   Admit Date:  2022  7:39 PM   DC Note date: 2022  Name:  Jose Christensen   Age:  79 y.o. Sex:  male  :  1940   MRN:  [id number]   Room:  [facility]  PCP:  None Provider    Presenting Complaint: Fatigue     Initial Admission Diagnosis: COVID [U07.1]  COVID-19 [U07.1]     Problem List for this Hospitalization (present on admission):    Principal Problem:    COVID  Active Problems:    Cecum mass    Acute deep vein thrombosis (DVT) of axillary vein of left upper extremity (HCC)    Unresponsive    Paroxysmal atrial fibrillation (HCC)    Pacemaker    HTN (hypertension)    Myasthenia gravis ([address]    Hypotension    Major depressive disorder, recurrent, mild ([address]  Resolved Problems:    * No resolved hospital problems. [facility] Course:  Patient is a 79 y.o. male who presented to the ED for cc weakness since his last discharge. 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 Follows Dr. Christy Brenner. Pt had a positive rapid covid in the ER; was pushed bebtelovimab and became unresponsive to verbal stimulus. Pt was placed on 2L via NC and came to thereafter without further issues; he had no wheezing, hives. Pt was also administered solumedrol on night of admission and decadron the following morning. Mr. Ave Mirza was significantly improved the following day but given elevated WBC, pt was monitored for another 24hrs. He remained stable despite leukocytosis (remained on RA, denied CP, cough, afebrile) on discharge day; he is quite anxious to be discharged home this AM.  Pt is medically stable for dc home today; he has been educated on s/sx for infectious process (fever, chills, dysuria, discolored sputum, worsening hypoxia / dyspnea) and to return to ER if symptomatic.   Pt further instructed to f/u with PCP one week post dc. Disposition: home  Diet: No diet orders on file  Code Status: Prior    Follow Ups:   Follow-up Information     Your Primary Care Provider In 1 week. Why: this department sooner if worse                     Time spent in patient discharge and coordination 45 minutes. Plan was discussed with patient, RN, case mgmt. All questions answered. Patient was stable at time of discharge. Instructions given to call a physician or return if any concerns. Discharge Medication List as of 8/27/2022 10:49 AM        CONTINUE these medications which have NOT CHANGED    Details   apixaban (ELIQUIS) 5 MG TABS tablet Take 1 tablet by mouth 2 times daily, Disp-60 tablet, R-2Normal      docusate sodium (COLACE, DULCOLAX) 100 MG CAPS Take 100 mg by mouth 2 times daily for 14 days, Disp-28 capsule, R-0Normal      atorvastatin (LIPITOR) 80 MG tablet Take 80 mg by mouthHistorical Med      nitroGLYCERIN (NITROSTAT) 0.4 MG SL tablet Place 1 sl under the tongue q 5 min prn cp, max 3 sl in a 15-min time period. Call 911 if no relief after the 3rd sl. Historical Med      pantoprazole (PROTONIX) 40 MG tablet Take 40 mg by mouth every morning (before breakfast)Historical Med      rOPINIRole (REQUIP) 0.5 MG tablet 1 nightly, increase to 1 twice a day if neededHistorical Med      tamsulosin (FLOMAX) 0.4 MG capsule Take 0.4 mg by mouth dailyHistorical Med           STOP taking these medications       potassium chloride (KLOR-CON M) 20 MEQ extended release tablet Comments:   Reason for Stopping:         aspirin 81 MG EC tablet Comments:   Reason for Stopping:         cyanocobalamin 1000 MCG tablet Comments:   Reason for Stopping:         escitalopram (LEXAPRO) 10 MG tablet Comments:   Reason for Stopping:         furosemide (LASIX) 40 MG tablet Comments:   Reason for Stopping:               Procedures done this admission:  * No surgery found *    Consults this admission:  IP CONSULT TO CASE MANAGEMENT  IP CONSULT HOME HEALTH    Echocardiogram results:  12/10/21    TRANSTHORACIC ECHOCARDIOGRAM (TTE) COMPLETE (CONTRAST/BUBBLE/3D PRN) 12/13/2021  7:48 AM (Final)    Narrative  This is a summary report. The complete report is available in the patient's medical record. If you cannot access the medical record, please contact the sending organization for a detailed fax or copy. · LA: Left Atrium volume index is 44.4 mL/m2. · AV: Aortic valve mean gradient is 10 mmHg. · TV: Right Ventricular Arterial Pressure (RVSP) is 48 mmHg. · Mild LVH  · Normal EF  · Moderate aortic insuff    Signed by: Nicki Mata MD on 12/13/2021  7:48 AM      Diagnostic Imaging/Tests:   XR ABDOMEN (KUB) (SINGLE AP VIEW)    Result Date: 8/14/2022  Nonspecific prominent loops of bowel in the central upper and left upper abdominal quadrant. Ileus suspected. XR CHEST PORTABLE    Result Date: 8/4/2022  1. No consolidation. 2. Chronic cardiac enlargement, pacemaker. CT CHEST ABDOMEN PELVIS W CONTRAST    Result Date: 8/6/2022  1.  3.5 cm x 2.4 cm cecal mass. No clear evidence for metastatic disease is evident by CT imaging. Pulmonary nodules, and hepatic lesions are seen which are favored to be benign as described above. This report was made using voice transcription. Despite my best efforts to avoid any, transcription errors may persist. If there is any question about the accuracy of the report or need for clarification, then please call [phone], or text me through perfectserv for clarification or correction. Vascular duplex upper extremity venous left    Result Date: 8/15/2022  1. Deep vein thrombosis in the axillary vein. 2. Superficial vein thrombus in the basilic vein.        Labs: Results:       BMP, Mg, Phos Recent Labs     08/25/22  1538 08/26/22  0410 08/27/22  0358    137 139   K 4.0 4.4 4.0    111* 110*   CO2 25 22 24   ANIONGAP 6* 4* 5*   BUN 17 14 16   CREATININE 0.90 0.90 0.80 LABGLOM >60 >60 >60   GFRAA >60 >60 >60   CALCIUM 9.4 8.6 9.1   GLUCOSE 119* 152* 118*      CBC Recent Labs     08/25/22  1538 08/25/22  2233 08/26/22  0410 08/27/22  0358   WBC 10.4  --  16.3* 16.6*   RBC 4.40  --  4.40 4.30   HGB 9.0* 8.8* 9.0* 8.9*   HCT 31.9* 31.8* 32.6* 31.9*   MCV 72.5*  --  74.1* 74.2*   MCH 20.5*  --  20.5* 20.7*   MCHC 28.2*  --  27.6* 27.9*   RDW 26.5*  --  25.9* 26.0*     --  251 303   MPV 9.6  --  9.7 9.9   NRBC 0.00  --  0.00 0.00   SEGS 58  --  92* 81*   LYMPHOPCT 28  --  6* 10*   EOSRELPCT 5  --  0* 0*   MONOPCT 8  --  1* 8   BASOPCT 1  --  0 0   IMMGRAN 0  --  1 0   SEGSABS 6.1  --  15.1* 13.5*   LYMPHSABS 2.9  --  0.9 1.7   EOSABS 0.5  --  0.0 0.0   MONOSABS 0.8  --  0.2 1.4*   BASOSABS 0.1  --  0.0 0.0   ABSIMMGRAN 0.0  --  0.1 0.1      LFT Recent Labs     08/25/22  1538 08/26/22 0410   BILITOT 0.4 0.4   ALKPHOS 87 84   AST 23 21   ALT 32 29   PROT 6.8 6.4   LABALBU 3.1* 3.1*   GLOB 3.7* 3.3      Cardiac  Lab Results   Component Value Date/Time    NTPROBNP 857 06/02/2022 11:29 AM    TROPHS 17.7 08/25/2022 10:33 PM    TROPHS 36.5 03/27/2022 07:58 PM    TROPHS 46.2 11/19/2021 08:21 PM      Coags Lab Results   Component Value Date/Time    PROTIME 17.8 08/25/2022 03:38 PM    PROTIME 13.2 05/06/2021 11:53 AM    PROTIME 12.6 03/17/2021 09:35 AM    INR 1.4 08/25/2022 03:38 PM    INR 1.0 05/06/2021 11:53 AM    INR 0.9 03/17/2021 09:35 AM      A1c No results found for: LABA1C, EAG   Lipids Lab Results   Component Value Date/Time    CHOL 96 05/03/2021 10:33 AM    LDLCALC 40.2 05/03/2021 10:33 AM    LABVLDL 19.8 05/03/2021 10:33 AM    HDL 36 05/03/2021 10:33 AM    CHOLHDLRATIO 2.7 05/03/2021 10:33 AM    TRIG 99 05/03/2021 10:33 AM      Thyroid  Lab Results   Component Value Date/Time    HIL0GMP 2.510 06/02/2022 11:29 AM        Most Recent UA Lab Results   Component Value Date/Time    COLORU YELLOW/STRAW 08/04/2022 06:45 PM    APPEARANCE CLOUDY 08/04/2022 06:45 PM    SPECGRAV 1.013 08/04/2022 06:45 PM    LABPH 6.0 08/04/2022 06:45 PM    PROTEINU Negative 08/04/2022 06:45 PM    GLUCOSEU Negative 08/04/2022 06:45 PM    KETUA Negative 08/04/2022 06:45 PM    BILIRUBINUR Negative 08/04/2022 06:45 PM    BILIRUBINUR Negative 04/02/2022 06:11 PM    BLOODU Negative 08/04/2022 06:45 PM    UROBILINOGEN 1.0 08/04/2022 06:45 PM    NITRU Negative 08/04/2022 06:45 PM    LEUKOCYTESUR MODERATE 08/04/2022 06:45 PM    WBCUA >100 08/04/2022 06:45 PM    RBCUA 0-3 08/04/2022 06:45 PM    EPITHUA 0 08/04/2022 06:45 PM    BACTERIA 4+ 08/04/2022 06:45 PM    LABCAST 0 08/04/2022 06:45 PM    MUCUS 0 08/04/2022 06:45 PM        Recent Labs     08/06/22 2003 08/06/22  1613 08/04/22  1845   CULTURE NO GROWTH 5 DAYS NO GROWTH 5 DAYS >100,000 COLONIES/mL ENTEROCOCCUS FAECALIS GROUP D*  10,000 to 50,000 COLONIES/mL NORMAL SKIN LU ISOLATED       All Labs from Last 24 Hrs:  Recent Results (from the past 24 hour(s))   PLEASE READ & DOCUMENT PPD TEST IN 24 HRS    Collection Time: 08/27/22  1:00 AM   Result Value Ref Range    PPD, (POC) Negative Negative    mm Induration 0 0 - 5 mm   CBC with Auto Differential    Collection Time: 08/27/22  3:58 AM   Result Value Ref Range    WBC 16.6 (H) 4.3 - 11.1 K/uL    RBC 4.30 4.23 - 5.6 M/uL    Hemoglobin 8.9 (L) 13.6 - 17.2 g/dL    Hematocrit 31.9 (L) 41.1 - 50.3 %    MCV 74.2 (L) 79.6 - 97.8 FL    MCH 20.7 (L) 26.1 - 32.9 PG    MCHC 27.9 (L) 31.4 - 35.0 g/dL    RDW 26.0 (H) 11.9 - 14.6 %    Platelets 086 288 - 914 K/uL    MPV 9.9 9.4 - 12.3 FL    nRBC 0.00 0.0 - 0.2 K/uL    Differential Type AUTOMATED      Seg Neutrophils 81 (H) 43 - 78 %    Lymphocytes 10 (L) 13 - 44 %    Monocytes 8 4.0 - 12.0 %    Eosinophils % 0 (L) 0.5 - 7.8 %    Basophils 0 0.0 - 2.0 %    Immature Granulocytes 0 0.0 - 5.0 %    Segs Absolute 13.5 (H) 1.7 - 8.2 K/UL    Absolute Lymph # 1.7 0.5 - 4.6 K/UL    Absolute Mono # 1.4 (H) 0.1 - 1.3 K/UL    Absolute Eos # 0.0 0.0 - 0.8 K/UL    Basophils Absolute 0.0 0.0 - 0.2 K/UL    Absolute Immature Granulocyte 0.1 0.0 - 0.5 K/UL   Basic Metabolic Panel w/ Reflex to MG    Collection Time: 08/27/22  3:58 AM   Result Value Ref Range    Sodium 139 136 - 145 mmol/L    Potassium 4.0 3.5 - 5.1 mmol/L    Chloride 110 (H) 98 - 107 mmol/L    CO2 24 21 - 32 mmol/L    Anion Gap 5 (L) 7 - 16 mmol/L    Glucose 118 (H) 65 - 100 mg/dL    BUN 16 8 - 23 MG/DL    Creatinine 0.80 0.8 - 1.5 MG/DL    GFR African American >60 >60 ml/min/1.73m2    GFR Non- >60 >60 ml/min/1.73m2    Calcium 9.1 8.3 - 10.4 MG/DL       Allergies   Allergen Reactions    Bebtelovimab Other (See Comments)     Syncope and hypotension     Immunization History   Administered Date(s) Administered    COVID-19, PFIZER PURPLE top, DILUTE for use, (age 15 y+), 30mcg/0.3mL 02/02/2021, 02/23/2021    PPD Test 03/29/2022, 08/26/2022    Pneumococcal Polysaccharide ([id number]) 05/08/2015    Tdap (Boostrix, Adacel) 03/03/2022       Recent Vital Data:  Patient Vitals for the past 24 hrs:   Temp Pulse Resp BP SpO2   08/27/22 0733 97.5 °F (36.4 °C) 74 20 113/61 100 %   08/27/22 0336 97.5 °F (36.4 °C) 73 18 118/67 99 %   08/26/22 2233 98.1 °F (36.7 °C) 75 16 125/73 100 %   08/26/22 1924 98.4 °F (36.9 °C) 77 16 129/63 100 %   08/26/22 1646 98.2 °F (36.8 °C) 74 19 134/71 99 %       Oxygen Therapy  SpO2: 100 %  Pulse via Oximetry: 75 beats per minute  Pulse Oximeter Device Mode: Continuous  Pulse Oximeter Device Location: Left, Finger  O2 Device: None (Room air)  O2 Flow Rate (L/min): 0 L/min    Estimated body mass index is 31.32 kg/m² as calculated from the following:    Height as of this encounter: 5' 7\" (1.702 m). Weight as of this encounter: 200 lb (90.7 kg). Intake/Output Summary (Last 24 hours) at 8/27/2022 1533  Last data filed at 8/27/2022 0953  Gross per 24 hour   Intake 300 ml   Output --   Net 300 ml         Physical Exam:    General:    Well nourished.   No overt distress  Head:  Normocephalic, atraumatic  Eyes:  Sclerae appear normal.  Pupils equally round. HENT:  Nares appear normal, no drainage. Moist mucous membranes  Neck:  No restricted ROM. Trachea midline  CV:   RRR. No m/r/g. No JVD  Lungs:   CTAB. No wheezing, rhonchi, or rales. Respirations even, unlabored  Abdomen:   Soft, nontender, nondistended. Extremities: Warm and dry. No cyanosis or clubbing. No edema. Skin:     No rashes. Normal coloration  Neuro:  CN II-XII grossly intact. Psych:  Normal mood and affect. Signed:  Hubert Guillen    Part of this note may have been written by using a voice dictation software. The note has been proof read but may still contain some grammatical/other typographical errors.

## 2022-08-27 NOTE — CARE COORDINATION
Patient were discussed in [facility] team meeting this AM.  Patient has d/c orders for today. CM made contact with patient via phone. Patient states he lives with is spouse in a two level home with one step to enter into the home. Patient states he's independent with his ADL's and has no DME's in the home. CM informed patient that PT/OT has recommended HH. Patient agreeable to [facility] and requested [facility]. Referral completed. Family will transport patient home. Patient has met all treatment goals / milestones. CM will continue to monitor and remain available for any needs or concerns that may occur. 08/27/22 1117   Service Assessment   Patient Orientation Alert and Oriented;Person;Place;Situation;Self   Cognition Alert   History Provided By Patient   Primary Caregiver Self   Support Systems Spouse/Significant Other   Patient's Healthcare Decision Maker is: Legal Next of Kin   PCP Verified by CM Yes   Prior Functional Level Independent in ADLs/IADLs   Current Functional Level Independent in ADLs/IADLs   Can patient return to prior living arrangement Yes   Ability to make needs known: Good   Family able to assist with home care needs: Yes   Social/Functional History   Lives With Spouse   Type of [facility] One level   Home Access Stairs to enter without rails   Entrance Stairs - Number of Steps 1   Bathroom Shower/Tub Tub/Shower unit; Walk-in shower   [facility]   ADL Assistance Independent   Ambulation Assistance Independent   Transfer Assistance Independent   Active  No   Patient's  Info Family provides transportation. Patient is able to drive. Mode of Transportation Car   Occupation Retired   Discharge Planning   DME Ordered? No   Potential Assistance Purchasing Medications No   Type of Home Care Services None   Patient expects to be discharged to: House   One/Two Story Residence One story   History of falls?  [address] [place] [address] Discharge [employer] Information Provided? No   Confirm Follow Up Transport Family   Condition of Participation: Discharge Planning   The Plan for Transition of Care is related to the following treatment goals: Return to prior level of functioning.

## 2022-08-27 NOTE — PROGRESS NOTES
Discharge instructions reviewed with patient wife Vikas at this time. IV's removed, cath tip intact. Wife Vikas given opportunity for questions. Pt taken to vehicle on room air for discharge.

## 2022-08-29 ENCOUNTER — TELEPHONE (OUTPATIENT)
Dept: INTERNAL MEDICINE CLINIC | Facility: CLINIC | Age: 82
End: 2022-08-29

## 2022-08-29 ENCOUNTER — CARE COORDINATION (OUTPATIENT)
Dept: OTHER | Facility: CLINIC | Age: 82
End: 2022-08-29

## 2022-08-29 ENCOUNTER — TELEPHONE (OUTPATIENT)
Dept: CARDIOLOGY CLINIC | Age: 82
End: 2022-08-29

## 2022-08-29 NOTE — TELEPHONE ENCOUNTER
Called Chip with [facility] advised of Dr. Morenita Martinez response was given a verbal understanding.

## 2022-08-29 NOTE — CARE COORDINATION
[address] Transitions Initial Follow Up Call    Call within 2 business days of discharge: Yes    Patient: Dewitt Councilman Patient : 1940   MRN: [id number]  Reason for Admission: Covid 19   Discharge Date: 22 RARS: Readmission Risk Score: 24.4      Last Discharge [facility]       Date Complaint Diagnosis Description Type Department Provider    22 Fatigue COVID-19 ED to Hosp-Admission (Discharged) (ADMITTED) [id number] Peggy Kilpatrick MD; Chilo Mclaughlin. .. Transitions of Care Initial Call    Was this an external facility discharge? No Discharge Facility: [facility]    Challenges to be reviewed by the provider   Additional needs identified to be addressed with provider: No  none             Method of communication with provider : none    Advance Care Planning:   Does patient have an Advance Directive: not on file; education provided    Care Transition Nurse contacted the patient by telephone to perform post hospital discharge assessment. Verified name and  with patient as identifiers. Provided introduction to self, and explanation of the CTN role. CTN reviewed discharge instructions, medical action plan and red flags with patient who verbalized understanding. Patient given an opportunity to ask questions and does not have any further questions or concerns at this time. Were discharge instructions available to patient? Yes. Reviewed appropriate site of care based on symptoms and resources available to patient including: PCP  Specialist  Home health  When to call [address]  Condition related references. The patient agrees to contact the PCP office for questions related to their healthcare. Medication reconciliation was performed with patient, who verbalizes understanding of administration of home medications. Advised obtaining a 90-day supply of all daily and as-needed medications.      Was patient discharged with a pulse oximeter? no  Non-face-to-face services provided:  Scheduled appointment with PCP-Dr. Jose G Garcia on 9/7/22 at 2pm  Scheduled appointment with Specialist-Oncology/Hematology on 9/13/22  Obtained and reviewed discharge summary and/or continuity of care documents  Education of patient/family/caregiver/guardian to support self-management-of medical conditions. CTN reinforced the importance of taking all medications as directed, quarantine, supportive care, symptoms management, daily weights and when to seek care, and follow up appointments. Care Transitions 24 Hour Call    Schedule Follow Up Appointment with PCP: Completed  Do you have a copy of your discharge instructions?: Yes  Do you have all of your prescriptions and are they filled?: Yes  Have you been contacted by a [address]?: No  Have you scheduled your follow up appointment?: Yes  How are you going to get to your appointment?: Car - family or friend to transport  Do you have support at home?: Partner/Spouse/SO  Do you feel like you have everything you need to keep you well at home?: Yes  Care Transitions Interventions     Other Services: Completed (Comment: Kevin  PARIS)           Goals Addressed                   This Visit's Progress     Conditions and Symptoms        I will schedule office visits, as directed by my provider. I will keep my appointment or reschedule if I have to cancel. I will notify my provider of any symptoms that indicate a worsening of my condition. Patient agrees to monitor and record weights daily. Reports increase if 2 lbs. overnight or 5 lbs.  in a week to MD    Barriers: none  Plan for overcoming my barriers: N/A  Confidence: 7/10  Anticipated Goal Completion Date: 9/27/22                 Follow Up  Future Appointments   Date Time Provider Sri Falk   8/30/2022 10:30 AM MD DANTE Calles [facility]   9/7/2022  2:00 PM MD YOJANA Mcgowan [facility] AM[facility]   9/13/2022  1:40 PM [address] [place] [address] OUTREACH INSURANCE [address]   9/13/2022  2:00 PM [facility] Neda Duong MD [facility]   9/14/2022 11:10 AM [facility] REMOTE AICD 62 UCDG [facility] AMB   9/15/2022  1:00 PM NAOMY Russell - REKHA [facility]   9/15/2022  2:00 PM [facility] FINANCIAL COUNSELOR [facility] AMB   9/21/2022  8:00 AM Candance Snipes, DO [facility] AM[facility]   10/31/2022  8:15 AM [facility] DEVICE 39 UCDG [facility] AMB     CTN provided contact information. Plan for follow up next week based on severity of symptoms and risk factors.   Plan for next call: self/family management of medical conditions, follow up appointments    Jesús Stein RN

## 2022-08-30 ENCOUNTER — HOME CARE VISIT (OUTPATIENT)
Dept: SCHEDULING | Facility: HOME HEALTH | Age: 82
End: 2022-08-30
Payer: MEDICARE

## 2022-08-30 ENCOUNTER — HOME CARE VISIT (OUTPATIENT)
Dept: HOME HEALTH SERVICES | Facility: HOME HEALTH | Age: 82
End: 2022-08-30
Payer: MEDICARE

## 2022-08-30 VITALS
OXYGEN SATURATION: 97 % | HEART RATE: 74 BPM | DIASTOLIC BLOOD PRESSURE: 66 MMHG | SYSTOLIC BLOOD PRESSURE: 124 MMHG | RESPIRATION RATE: 16 BRPM | TEMPERATURE: 98.6 F

## 2022-08-30 PROCEDURE — G0151 HHCP-SERV OF PT,EA 15 MIN: HCPCS

## 2022-08-30 PROCEDURE — 0221000100 HH NO PAY CLAIM PROCEDURE

## 2022-08-30 PROCEDURE — 1090000001 HH PPS REVENUE CREDIT

## 2022-08-30 PROCEDURE — 1090000003 HH PPS REV ADJ

## 2022-08-30 PROCEDURE — 400013 HH SOC

## 2022-08-30 PROCEDURE — 1090000002 HH PPS REVENUE DEBIT

## 2022-08-30 ASSESSMENT — ENCOUNTER SYMPTOMS: DYSPNEA ACTIVITY LEVEL: AFTER AMBULATING MORE THAN 20 FT

## 2022-08-30 NOTE — Clinical Note
Situation/Background: 79year old male patient with PMH of arrhythmia, aspiration pneumonia, CAD, carotid artery stenosis without cerebral infarction, coronary atherosclerosis of native coronary vessel, chronic diastolic CHF, dyslipidemia, dyspnea, GERD, HTN, mitral valve regurgitation, morbid obesity, myasthenia gravis, nocturia, osteoporosis, PUD, ARIN no CPAP, syncope and collapse. Lives with wife in single level home with several steps to enter. Patient is s/p right hemicolectomy on 8/10/22. Rehospitalized 8/25/22 to 8/27/22 with COVID19. Positive test on 8/25/22. Prior to hospitalization patient was independent with mobility and ADLs with intermittent use of QC. Assessment: Patient reports being at his baseline for function. He is ambulating independently in and around his home with QC intermittently and he is independent with household transfers and ADLs. PT issued seated BLE HEP for BLE strength maintenance and patient demonstrated understanding  Recommendation: PT admission/eval only. No further HHPT indicated at this time. Patient declines SN and OT.

## 2022-09-06 ENCOUNTER — CARE COORDINATION (OUTPATIENT)
Dept: OTHER | Facility: CLINIC | Age: 82
End: 2022-09-06

## 2022-09-06 NOTE — CARE COORDINATION
CTN attempted outreach to patient for AVERY follow up call. Unable to reach patient at this time. Will attempt outreach again.

## 2022-09-07 ENCOUNTER — OFFICE VISIT (OUTPATIENT)
Dept: PRIMARY CARE CLINIC | Facility: CLINIC | Age: 82
End: 2022-09-07
Payer: MEDICARE

## 2022-09-07 VITALS
TEMPERATURE: 97.7 F | DIASTOLIC BLOOD PRESSURE: 66 MMHG | RESPIRATION RATE: 15 BRPM | HEIGHT: 67 IN | WEIGHT: 201 LBS | OXYGEN SATURATION: 96 % | SYSTOLIC BLOOD PRESSURE: 131 MMHG | BODY MASS INDEX: 31.55 KG/M2 | HEART RATE: 75 BPM

## 2022-09-07 DIAGNOSIS — R73.03 PRE-DIABETES: ICD-10-CM

## 2022-09-07 DIAGNOSIS — C18.7 MALIGNANT NEOPLASM OF SIGMOID COLON (HCC): ICD-10-CM

## 2022-09-07 DIAGNOSIS — E78.5 DYSLIPIDEMIA: ICD-10-CM

## 2022-09-07 DIAGNOSIS — Z86.79 HISTORY OF CONGESTIVE HEART FAILURE: ICD-10-CM

## 2022-09-07 DIAGNOSIS — R06.02 SOB (SHORTNESS OF BREATH): ICD-10-CM

## 2022-09-07 DIAGNOSIS — F33.1 MAJOR DEPRESSIVE DISORDER, RECURRENT, MODERATE (HCC): ICD-10-CM

## 2022-09-07 DIAGNOSIS — N40.1 BENIGN PROSTATIC HYPERPLASIA WITH URINARY HESITANCY: ICD-10-CM

## 2022-09-07 DIAGNOSIS — Z71.89 ACP (ADVANCE CARE PLANNING): ICD-10-CM

## 2022-09-07 DIAGNOSIS — I50.22 CHRONIC SYSTOLIC (CONGESTIVE) HEART FAILURE (HCC): ICD-10-CM

## 2022-09-07 DIAGNOSIS — Z95.5 S/P CORONARY ARTERY STENT PLACEMENT: ICD-10-CM

## 2022-09-07 DIAGNOSIS — E66.09 CLASS 1 OBESITY DUE TO EXCESS CALORIES WITH SERIOUS COMORBIDITY AND BODY MASS INDEX (BMI) OF 31.0 TO 31.9 IN ADULT: ICD-10-CM

## 2022-09-07 DIAGNOSIS — R39.11 BENIGN PROSTATIC HYPERPLASIA WITH URINARY HESITANCY: ICD-10-CM

## 2022-09-07 DIAGNOSIS — D50.9 IRON DEFICIENCY ANEMIA, UNSPECIFIED IRON DEFICIENCY ANEMIA TYPE: ICD-10-CM

## 2022-09-07 DIAGNOSIS — I10 HYPERTENSION COMPLICATIONS: ICD-10-CM

## 2022-09-07 DIAGNOSIS — Z79.899 MEDICATION MANAGEMENT: ICD-10-CM

## 2022-09-07 DIAGNOSIS — I10 HYPERTENSION COMPLICATIONS: Primary | ICD-10-CM

## 2022-09-07 PROBLEM — I50.32 DIASTOLIC CHF, CHRONIC (HCC): Status: RESOLVED | Noted: 2022-03-02 | Resolved: 2022-09-07

## 2022-09-07 PROBLEM — K92.2 GIB (GASTROINTESTINAL BLEEDING): Status: RESOLVED | Noted: 2022-08-04 | Resolved: 2022-09-07

## 2022-09-07 PROBLEM — E66.811 CLASS 1 OBESITY DUE TO EXCESS CALORIES WITH SERIOUS COMORBIDITY AND BODY MASS INDEX (BMI) OF 31.0 TO 31.9 IN ADULT: Status: ACTIVE | Noted: 2022-09-07

## 2022-09-07 PROBLEM — K56.7 ILEUS FOLLOWING GASTROINTESTINAL SURGERY (HCC): Status: RESOLVED | Noted: 2022-08-14 | Resolved: 2022-09-07

## 2022-09-07 PROBLEM — K91.89 ILEUS FOLLOWING GASTROINTESTINAL SURGERY (HCC): Status: RESOLVED | Noted: 2022-08-14 | Resolved: 2022-09-07

## 2022-09-07 PROBLEM — K21.9 GERD (GASTROESOPHAGEAL REFLUX DISEASE): Status: RESOLVED | Noted: 2017-10-27 | Resolved: 2022-09-07

## 2022-09-07 PROBLEM — K63.89 CECUM MASS: Status: RESOLVED | Noted: 2022-08-07 | Resolved: 2022-09-07

## 2022-09-07 LAB
ALBUMIN SERPL-MCNC: 3.2 G/DL (ref 3.2–4.6)
ALBUMIN/GLOB SERPL: 0.9 {RATIO} (ref 1.2–3.5)
ALP SERPL-CCNC: 96 U/L (ref 50–136)
ALT SERPL-CCNC: 33 U/L (ref 12–65)
ANION GAP SERPL CALC-SCNC: 7 MMOL/L (ref 4–13)
AST SERPL-CCNC: 13 U/L (ref 15–37)
BASOPHILS # BLD: 0.1 K/UL (ref 0–0.2)
BASOPHILS NFR BLD: 1 % (ref 0–2)
BILIRUB SERPL-MCNC: 0.3 MG/DL (ref 0.2–1.1)
BUN SERPL-MCNC: 13 MG/DL (ref 8–23)
CALCIUM SERPL-MCNC: 9.3 MG/DL (ref 8.3–10.4)
CHLORIDE SERPL-SCNC: 109 MMOL/L (ref 101–110)
CO2 SERPL-SCNC: 26 MMOL/L (ref 21–32)
CREAT SERPL-MCNC: 0.8 MG/DL (ref 0.8–1.5)
DIFFERENTIAL METHOD BLD: ABNORMAL
EOSINOPHIL # BLD: 0.5 K/UL (ref 0–0.8)
EOSINOPHIL NFR BLD: 4 % (ref 0.5–7.8)
ERYTHROCYTE [DISTWIDTH] IN BLOOD BY AUTOMATED COUNT: 26.1 % (ref 11.9–14.6)
FERRITIN SERPL-MCNC: 8 NG/ML (ref 8–388)
GLOBULIN SER CALC-MCNC: 3.4 G/DL (ref 2.3–3.5)
GLUCOSE SERPL-MCNC: 111 MG/DL (ref 65–100)
HCT VFR BLD AUTO: 33.7 % (ref 41.1–50.3)
HGB BLD-MCNC: 9.4 G/DL (ref 13.6–17.2)
IMM GRANULOCYTES # BLD AUTO: 0.1 K/UL (ref 0–0.5)
IMM GRANULOCYTES NFR BLD AUTO: 1 % (ref 0–5)
LDLC SERPL DIRECT ASSAY-MCNC: 43 MG/DL
LYMPHOCYTES # BLD: 2.7 K/UL (ref 0.5–4.6)
LYMPHOCYTES NFR BLD: 25 % (ref 13–44)
MCH RBC QN AUTO: 20.8 PG (ref 26.1–32.9)
MCHC RBC AUTO-ENTMCNC: 27.9 G/DL (ref 31.4–35)
MCV RBC AUTO: 74.6 FL (ref 79.6–97.8)
MONOCYTES # BLD: 0.9 K/UL (ref 0.1–1.3)
MONOCYTES NFR BLD: 8 % (ref 4–12)
NEUTS SEG # BLD: 6.6 K/UL (ref 1.7–8.2)
NEUTS SEG NFR BLD: 62 % (ref 43–78)
NRBC # BLD: 0 K/UL (ref 0–0.2)
NT PRO BNP: 635 PG/ML
PLATELET # BLD AUTO: 298 K/UL (ref 150–450)
PMV BLD AUTO: 10 FL (ref 9.4–12.3)
POTASSIUM SERPL-SCNC: 3.8 MMOL/L (ref 3.5–5.1)
PROT SERPL-MCNC: 6.6 G/DL (ref 6.3–8.2)
RBC # BLD AUTO: 4.52 M/UL (ref 4.23–5.6)
SODIUM SERPL-SCNC: 142 MMOL/L (ref 138–145)
TSH, 3RD GENERATION: 2.57 UIU/ML (ref 0.36–3.74)
WBC # BLD AUTO: 10.8 K/UL (ref 4.3–11.1)

## 2022-09-07 PROCEDURE — G8427 DOCREV CUR MEDS BY ELIG CLIN: HCPCS | Performed by: FAMILY MEDICINE

## 2022-09-07 PROCEDURE — 1111F DSCHRG MED/CURRENT MED MERGE: CPT | Performed by: FAMILY MEDICINE

## 2022-09-07 PROCEDURE — G8417 CALC BMI ABV UP PARAM F/U: HCPCS | Performed by: FAMILY MEDICINE

## 2022-09-07 PROCEDURE — 1123F ACP DISCUSS/DSCN MKR DOCD: CPT | Performed by: FAMILY MEDICINE

## 2022-09-07 PROCEDURE — 99204 OFFICE O/P NEW MOD 45 MIN: CPT | Performed by: FAMILY MEDICINE

## 2022-09-07 PROCEDURE — 1036F TOBACCO NON-USER: CPT | Performed by: FAMILY MEDICINE

## 2022-09-07 RX ORDER — MULTIVIT-MIN/IRON FUM/FOLIC AC 7.5 MG-4
TABLET ORAL
Qty: 100 TABLET | Refills: 3 | Status: SHIPPED | OUTPATIENT
Start: 2022-09-07

## 2022-09-07 RX ORDER — LOSARTAN POTASSIUM 50 MG/1
50 TABLET ORAL DAILY
Qty: 90 TABLET | Refills: 5 | Status: SHIPPED | OUTPATIENT
Start: 2022-09-07

## 2022-09-07 RX ORDER — FINASTERIDE 5 MG/1
TABLET, FILM COATED ORAL
COMMUNITY
End: 2022-09-07 | Stop reason: SDUPTHER

## 2022-09-07 RX ORDER — METOPROLOL SUCCINATE 25 MG/1
TABLET, EXTENDED RELEASE ORAL
COMMUNITY
End: 2022-09-07 | Stop reason: SDUPTHER

## 2022-09-07 RX ORDER — ATORVASTATIN CALCIUM 80 MG/1
80 TABLET, FILM COATED ORAL DAILY
Qty: 90 TABLET | Refills: 5 | Status: SHIPPED | OUTPATIENT
Start: 2022-09-07

## 2022-09-07 RX ORDER — FINASTERIDE 5 MG/1
TABLET, FILM COATED ORAL
Qty: 90 TABLET | Refills: 5 | Status: SHIPPED | OUTPATIENT
Start: 2022-09-07

## 2022-09-07 RX ORDER — METOPROLOL SUCCINATE 25 MG/1
TABLET, EXTENDED RELEASE ORAL
Qty: 90 TABLET | Refills: 5 | Status: SHIPPED | OUTPATIENT
Start: 2022-09-07

## 2022-09-07 SDOH — ECONOMIC STABILITY: FOOD INSECURITY: WITHIN THE PAST 12 MONTHS, YOU WORRIED THAT YOUR FOOD WOULD RUN OUT BEFORE YOU GOT MONEY TO BUY MORE.: NEVER TRUE

## 2022-09-07 SDOH — ECONOMIC STABILITY: FOOD INSECURITY: WITHIN THE PAST 12 MONTHS, THE FOOD YOU BOUGHT JUST DIDN'T LAST AND YOU DIDN'T HAVE MONEY TO GET MORE.: NEVER TRUE

## 2022-09-07 ASSESSMENT — ENCOUNTER SYMPTOMS
TROUBLE SWALLOWING: 0
PHOTOPHOBIA: 0
CHOKING: 0
VOICE CHANGE: 0
EYE DISCHARGE: 0
CHEST TIGHTNESS: 0
SINUS PAIN: 0
DIARRHEA: 0
ABDOMINAL DISTENTION: 0
COLOR CHANGE: 0
EYE REDNESS: 0
CONSTIPATION: 0
BACK PAIN: 0
BLOOD IN STOOL: 0
WHEEZING: 0
VOMITING: 0
COUGH: 0
SHORTNESS OF BREATH: 0
ABDOMINAL PAIN: 0
RHINORRHEA: 0
EYE PAIN: 0
NAUSEA: 0
SORE THROAT: 0
SINUS PRESSURE: 0

## 2022-09-07 ASSESSMENT — SOCIAL DETERMINANTS OF HEALTH (SDOH): HOW HARD IS IT FOR YOU TO PAY FOR THE VERY BASICS LIKE FOOD, HOUSING, MEDICAL CARE, AND HEATING?: NOT HARD AT ALL

## 2022-09-07 ASSESSMENT — PATIENT HEALTH QUESTIONNAIRE - PHQ9
5. POOR APPETITE OR OVEREATING: 0
3. TROUBLE FALLING OR STAYING ASLEEP: 0
SUM OF ALL RESPONSES TO PHQ QUESTIONS 1-9: 0
SUM OF ALL RESPONSES TO PHQ9 QUESTIONS 1 & 2: 0
1. LITTLE INTEREST OR PLEASURE IN DOING THINGS: 0
9. THOUGHTS THAT YOU WOULD BE BETTER OFF DEAD, OR OF HURTING YOURSELF: 0
8. MOVING OR SPEAKING SO SLOWLY THAT OTHER PEOPLE COULD HAVE NOTICED. OR THE OPPOSITE, BEING SO FIGETY OR RESTLESS THAT YOU HAVE BEEN MOVING AROUND A LOT MORE THAN USUAL: 0
6. FEELING BAD ABOUT YOURSELF - OR THAT YOU ARE A FAILURE OR HAVE LET YOURSELF OR YOUR FAMILY DOWN: 0
4. FEELING TIRED OR HAVING LITTLE ENERGY: 0
SUM OF ALL RESPONSES TO PHQ QUESTIONS 1-9: 0
7. TROUBLE CONCENTRATING ON THINGS, SUCH AS READING THE NEWSPAPER OR WATCHING TELEVISION: 0
2. FEELING DOWN, DEPRESSED OR HOPELESS: 0
SUM OF ALL RESPONSES TO PHQ QUESTIONS 1-9: 0
10. IF YOU CHECKED OFF ANY PROBLEMS, HOW DIFFICULT HAVE THESE PROBLEMS MADE IT FOR YOU TO DO YOUR WORK, TAKE CARE OF THINGS AT HOME, OR GET ALONG WITH OTHER PEOPLE: 0
SUM OF ALL RESPONSES TO PHQ QUESTIONS 1-9: 0

## 2022-09-07 NOTE — PROGRESS NOTES
Follow-up to establish primary care. Status post hospitalization. 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 He has anemia iron deficiency had work-up. And seen hematology. Coronary artery disease angioplasty hypertension shortness of breath. Recent COVID. He lives with his wife  for 58 years and has 1 child son 59-year-old no grandchildren. He has decreased hearing some difficulty communicating due to hearing loss. Some mobility problems related to his age 58-year-old geriatric however no acute pains. He is on blood thinners for history pacemaker placement abnormal heart rhythm last EKG shows paced rhythm had seen cardiologist recently. His last echocardiogram showed normal ejection fraction LVH. Has follow-up with hematology monitoring blood thinners history clot upper extremity. Multiple skin lesions actinic keratosis may benefit from follow-up with dermatology. I could not see his last LDL target LDL less than 100 statins recommended due to history coronary artery disease continue refills given he may benefit from ARB start losartan consider low-dose Aldactone if tolerated due to history congestive heart failure continue beta-blocker refills given recheck after lab test ferritin and hematology follow-up. He is independent for activities of daily living and needs some assistance with instrumental activities of daily living. No smoking alcohol or drug abuse. History pancreatitis had been evaluated by gastroenterology in the past    Review of Systems   Constitutional:  Positive for fatigue. Negative for activity change, appetite change, chills, diaphoresis, fever and unexpected weight change. HENT:  Positive for hearing loss. Negative for congestion, ear pain, nosebleeds, rhinorrhea, sinus pressure, sinus pain, sore throat, trouble swallowing and voice change. Eyes:  Negative for photophobia, pain, discharge, redness and visual disturbance.    Respiratory:  Negative for cough, choking, chest tightness, shortness of breath and wheezing. Cardiovascular:  Negative for chest pain, palpitations and leg swelling. Gastrointestinal:  Negative for abdominal distention, abdominal pain, blood in stool, constipation, diarrhea, nausea and vomiting. Endocrine: Negative for cold intolerance, heat intolerance, polydipsia, polyphagia and polyuria. Genitourinary:  Negative for difficulty urinating, dysuria, frequency, genital sores, hematuria, penile discharge, penile pain and urgency. History decreased urinary flow benign prostate hypertrophy has been on Flomax finasteride continue   Musculoskeletal:  Negative for arthralgias, back pain, gait problem, joint swelling, myalgias and neck pain. Skin:  Positive for pallor. Negative for color change and rash. Allergic/Immunologic: Negative for environmental allergies and food allergies. Neurological:  Negative for dizziness, tremors, seizures, syncope, facial asymmetry, speech difficulty, weakness, light-headedness, numbness and headaches. Hematological:  Negative for adenopathy. Does not bruise/bleed easily. Psychiatric/Behavioral:  Negative for behavioral problems, confusion, decreased concentration, dysphoric mood, hallucinations, self-injury, sleep disturbance and suicidal ideas. The patient is not nervous/anxious and is not hyperactive. Physical Exam  Vitals and nursing note reviewed. Constitutional:       General: He is not in acute distress. Appearance: Normal appearance. He is not ill-appearing, toxic-appearing or diaphoretic. HENT:      Head: Atraumatic. Right Ear: External ear normal.      Left Ear: External ear normal.      Nose: Nose normal. No congestion or rhinorrhea. Mouth/Throat:      Mouth: Mucous membranes are moist.      Pharynx: No oropharyngeal exudate or posterior oropharyngeal erythema. Eyes:      General: No scleral icterus. Right eye: No discharge.          Left eye: No cardiology     - LDL Cholesterol, Direct; Future  - atorvastatin (LIPITOR) 80 MG tablet; Take 1 tablet by mouth daily  Dispense: 90 tablet; Refill: 5    6. Iron deficiency anemia, unspecified iron deficiency anemia type  Hematology oncology follow-up for work-up  - CBC with Auto Differential; Future  - Ferritin; Future    7. Pre-diabetes  Several episodes of elevated blood sugar check hemoglobin A1c baseline  - Hemoglobin A1C; Future    8. SOB (shortness of breath)  History COVID some shortness of breath no orthopnea paroxysmal nocturnal dyspnea vital signs are stable previous x-rays CT scan reviewed  - Brain Natriuretic Peptide; Future  - XR CHEST PA LAT (2 VIEWS); Future    9. History of congestive heart failure  Optimize medical management cardiology follow-up  - Comprehensive Metabolic Panel; Future  - Brain Natriuretic Peptide; Future    10. Medication management    - Comprehensive Metabolic Panel; Future  - TSH; Future    11. Class 1 obesity due to excess calories with serious comorbidity and body mass index (BMI) of 31.0 to 31.9 in adult  Diet exercise weight management as tolerated    12. Chronic systolic (congestive) heart failure  Normal ejection fraction    13. Benign prostatic hyperplasia with urinary hesitancy  Continue Flomax finasteride may benefit urology consultation if unable to void  - finasteride (PROSCAR) 5 MG tablet; finasteride 5 mg tablet   Take 1 tablet every day by oral route. Dispense: 90 tablet; Refill: 5    14.  ACP (advance care planning)  Healthcare directives advance planning discussed  [facility] - Referral to Della Bueno MD

## 2022-09-07 NOTE — PROGRESS NOTES
Che Reyez MD   Bariatric & Advanced Laparoscopic Surgery & Endoscopy  [address] [place] [address] [facility]  Phone [phone]   Fax [phone]      poDate: 2022    Name: Toma Pineda      MRN: [id number]       : 1940       Age: 79 y.o. Sex: male        Muna Patel MD     CC:  No chief complaint on file. HPI:  The patient presents for a post-op visit s/p robotic right hemicolectomy on 8/10/2022. he is doing well and has no major complaints. Patient is tolerating diet and having bowel movements. ROS:   Review of Systems   Constitutional:  Negative for chills and fever. Respiratory:  Negative for shortness of breath. Gastrointestinal:  Negative for abdominal pain, constipation, diarrhea, nausea and vomiting. Physical Exam:     There were no vitals taken for this visit. General: Alert, oriented, cooperative and in no acute distress. Lap Incisions well healed. DIAGNOSIS        A:  \" RIGHT COLON\":         ADENOCARCINOMA, MODERATELY DIFFERENTIATED MEASURING APPROXIMATELY   5.2 X 4.3 X 2.9 CM. MICROSCOPICALLY TUMOR INFILTRATES THROUGH MUSCULARIS   [place] INTO PERICOLONIC ADIPOSE TISSUE. MARGINS OF RESECTION AND 14   PERICOLONIC LYMPH NODES ARE NEGATIVE FOR MALIGNANT TUMOR. SEPARATE   ULCERATED AREA CONSISTENT WITH PRIOR BIOPSY SITE. SEE COMMENT           Comment   Special studies for mutations of mismatch repair proteins and extended Adam   will be performed and results will follow under separate cover.     jtl/[email or web address]   Electronically Signed Out         Sign Out Date: 2022  LAKESHIA Burns M.D. Procedures/Addenda                     STF -MISCELLANEOUS PROCEDURE                                                                                                                                         Status:  Signed Out    Edilberto Milligan MD on 2022                                 Interpretation [employer] Mismatch Repair (MMR) IHC Panel     MLH1:  NO LOSS OF EXPRESSION   MSH2:  NO LOSS OF EXPRESSION   MSH6:  NO LOSS OF EXPRESSION   PMS2:  NO LOSS OF EXPRESSION       See full report in Epic as errors can occur when results are imbedded into   this report. STF - BRAF                                                                                          Status:  Signed Out    Edilberto Quiñonez MD on 8/22/2022     Assessment/Plan:  Carlota Duncan is a 79 y.o. male who is s/p robotic right hemicolectomy. Copy of path given and discussed with the patient. 1. Follow-up prn. He is following up with Oncology next week. 2. Return to full activity    3.  Regular diet    Signed: Shanelle Martinez MD  [facility] - Bariatric & Minimally Invasive Surgery  9/7/2022 4:28 PM

## 2022-09-07 NOTE — PATIENT INSTRUCTIONS
Statins,  cholesterol lowering agents, simvastatin Lipitor pravastatin, has unequivocal evidence of decreased heart attack strokes, long-term benefits,  with very little risks,  side effects, in spite of all the  the negative publicity, strongly recommended, can reduce dose to half pill , not stop. If diabetic and CKD benefit of taking statins are profound, irrespective of baseline LDL , even if less than 70. High intensity statin therapy is recommended inpatient with stable coronary artery disease history, irrespective of LDL level by American heart association [employer] of cardiology      . Increase fruits vegetables, fiber, whole grains, beans, exercise, tree nuts, will decrease risk of heart attacks and strokes, may reduce cancer risks     once a day multivitamin such as Centrum silver store brand, due to benefit of folic acid vitamin D, has already mineral vitamin is recommended doses. Multiple different vitamins not recommended may carry increased risk, including vitamin E, take foods rich in omega 3 and fibre, pills are not recommended, including omega 3 in high doses, may have increased risks     ARB losartan or similar medication recommended after angioplasty along with high-dose statins, possible  1 baby aspirin a day, continue metoprolol and cardiology follow-up    Diabetic teaching, diet, increase vegetables- at least 5 portions a day, roughly half plate, beans, whole grains, grilled or baked white meats , dairy products, exercise at least an hr - brisk walk aerobic of choice, No sugar/ suggary drinks including juice/ fats/ fried foods.  starch- bread/ potato/ rice.

## 2022-09-08 ENCOUNTER — CARE COORDINATION (OUTPATIENT)
Dept: OTHER | Facility: CLINIC | Age: 82
End: 2022-09-08

## 2022-09-08 ENCOUNTER — OFFICE VISIT (OUTPATIENT)
Dept: SURGERY | Age: 82
End: 2022-09-08

## 2022-09-08 ENCOUNTER — CLINICAL DOCUMENTATION (OUTPATIENT)
Dept: CASE MANAGEMENT | Age: 82
End: 2022-09-08

## 2022-09-08 DIAGNOSIS — Z48.89 ENCOUNTER FOR POST SURGICAL WOUND CHECK: Primary | ICD-10-CM

## 2022-09-08 LAB
EST. AVERAGE GLUCOSE BLD GHB EST-MCNC: 128 MG/DL
HBA1C MFR BLD: 6.1 % (ref 4.8–5.6)

## 2022-09-08 PROCEDURE — 99024 POSTOP FOLLOW-UP VISIT: CPT | Performed by: SURGERY

## 2022-09-08 ASSESSMENT — ENCOUNTER SYMPTOMS
SHORTNESS OF BREATH: 0
CONSTIPATION: 0
ABDOMINAL PAIN: 0
DIARRHEA: 0
NAUSEA: 0
VOMITING: 0

## 2022-09-08 NOTE — CARE COORDINATION
[facility] Transitions Follow Up Call    2022    Patient: Pernell Timo  Patient : 1940   MRN: [id number]  Reason for Admission: Covid 19  Discharge Date: 22 RARS: Readmission Risk Score: 24.4    Care Transitions Follow Up Call    Needs to be reviewed by the provider   Additional needs identified to be addressed with provider: No  none             Method of communication with provider : none      Care Transition Nurse contacted the patient by telephone to follow up after admission on 22. Verified name and  with patient as identifiers. Addressed changes since last contact:  Patient reports he is doing well. Patient completed follow up with his PCP and Surgery. Patient states he has no new or worsening symptoms. Patient states he is eating, drinking fluids, and having good bowel movements. Patient endorses compliance with his medications. Patient reports he continues to monitor daily weights. Discussed follow-up appointments. If no appointment was previously scheduled, appointment scheduling offered: Yes. Is follow up appointment scheduled within 7 days of discharge? Yes. CTN reviewed discharge instructions, medical action plan and red flags with patient and discussed any barriers to care and/or understanding of plan of care after discharge. Discussed appropriate site of care based on symptoms and resources available to patient including: PCP  Specialist  Home health  When to call 911  Condition related references. The patient agrees to contact the PCP office for questions related to their healthcare. Patients top risk factors for readmission: medical condition-Covid 19 virus, Cecum mass, S/P partial colectomy, Depression, HTN, Myasthenia Gravis, HF, HLD.   Interventions to address risk factors: Obtained and reviewed discharge summary and/or continuity of care documents and Education of patient/family/caregiver/guardian to support self-management-of medications management, management of medical conditions, and follow up appointments. CTN encouraged patient to continued compliance with medications as directed,  and monitor daily weights and we discussed when to seek care. Patient is aware of upcoming appointments. Patient given an opportunity to ask questions and does not have any further questions or concerns at this time. The patient agrees to contact the PCP/Specialist office for questions related to their healthcare. CTN provided contact information for questions, concerns, and future reference. I advised patient to contact the PCP/Specialist office if his condition worsens, changes or fails to improve as anticipated. He expressed understanding. Care Transitions Subsequent and Final Call    Schedule Follow Up Appointment with PCP: Completed  Subsequent and Final Calls  Have your medications changed?: No  Do you have any needs or concerns that I can assist you with?: No  Identified Barriers: None  Care Transitions Interventions     Other Services: Completed (Comment: Kevin TOLEDO)   Other Interventions: Follow Up  Future Appointments   Date Time Provider Sri RUST   9/13/2022  1:40 PM Greg[facility]   9/13/2022  2:00 PM Miguel Scott MD [facility]   9/14/2022 11:10 AM [facility] REMOTE AICD 62 [facility] AMB   9/15/2022  1:00 PM NAOMY Hager - NP [facility] AM[facility]   9/15/2022  2:00 PM [facility] FINANCIAL COUNSELOR [facility] AMB   9/21/2022  8:00 AM Bennett Vázquez DO [facility] AMB   10/6/2022  2:00 PM Ashok NurseMD DIAL [facility] AMB   10/31/2022  8:15 AM [facility] DEVICE 44 [facility] AMB     Non-[facility] follow up appointment(s): no    CTN provided contact information for future needs. Plan for follow-up call in 10-14 days based on severity of symptoms and risk factors. Plan for next call: self management-of medical conditions.     Anabella Pickett RN

## 2022-09-08 NOTE — ACP (ADVANCE CARE PLANNING)
Advance Care Planning   Ambulatory ACP Specialist Patient Outreach    Date:  9/8/2022, 9/14/22 9/23/22      ACP Specialist:  Christian Appiah LPN    Outreach call to patient in follow-up to ACP Specialist referral from: Tien Sanchez MD    [x] PCP  [] Provider   [] Ambulatory Care Management [] Other for Reason:    [x] Advance Directive Assistance  [x] Code Status Discussion  [] Complete Portable DNR Order  [x] Discuss Goals of Care  [] Complete POST/MOST  [x] Early ACP Decision-Making  [] Other    Date Referral Received: 9/7/22    Today's Outreach:  [x] First   [x] Second  [x] Third                               Third outreach made by []  phone  [] email []   [place]     Intervention:  [] Spoke with Patient  [x] Left VM requesting return call      Outcome: Thank you for this referral. The first attempt was made to contact pt regarding the ACP referral. No answer on mobile phone. VM left requesting a return call. Will attempt second outreach within one week. Next Step:   [x] ACP scheduled conversation  [x] Outreach again in one week               [x] Email / Mail ACP Info Sheets  [] Email / Mail Advance Directive            [] Close Referral. Routing closure to referring provider/staff and to ACP Specialist . [] Closure Letter mailed to Patient with Invitation to Contact ACP Specialist if/when ready. Thank you for this referral.    9/14/22  Spoke with the patient who wished to review ACP documents before scheduling an appointment. Will e-mail documents to the patient and follow up within one week. 9/23/22  LPN spoke with the pt who wishes to move forward in having an ACP conversation with an ACP specialist. Pt is alert and oriented x4. Appointment scheduled for 10/6/22 at 11 am. ACP information has been mailed to the pt for review prior to the appointment.

## 2022-09-09 ENCOUNTER — TELEPHONE (OUTPATIENT)
Dept: PRIMARY CARE CLINIC | Facility: CLINIC | Age: 82
End: 2022-09-09

## 2022-09-09 NOTE — TELEPHONE ENCOUNTER
----- Message from Pallavi Shannon MD sent at 9/8/2022  8:51 AM EDT -----  Keep follow-up in all medications for appropriate management of congestive heart failure multiple medical problems

## 2022-09-13 ENCOUNTER — CLINICAL DOCUMENTATION (OUTPATIENT)
Dept: CASE MANAGEMENT | Age: 82
End: 2022-09-13

## 2022-09-13 ENCOUNTER — OFFICE VISIT (OUTPATIENT)
Dept: ONCOLOGY | Age: 82
End: 2022-09-13
Payer: MEDICARE

## 2022-09-13 ENCOUNTER — HOSPITAL ENCOUNTER (OUTPATIENT)
Dept: LAB | Age: 82
Discharge: HOME | End: 2022-09-16
Payer: MEDICARE

## 2022-09-13 VITALS
WEIGHT: 204.5 LBS | HEART RATE: 76 BPM | BODY MASS INDEX: 34.07 KG/M2 | OXYGEN SATURATION: 96 % | HEIGHT: 65 IN | SYSTOLIC BLOOD PRESSURE: 159 MMHG | TEMPERATURE: 97.9 F | DIASTOLIC BLOOD PRESSURE: 89 MMHG | RESPIRATION RATE: 16 BRPM

## 2022-09-13 DIAGNOSIS — K76.89 LIVER CYST: ICD-10-CM

## 2022-09-13 DIAGNOSIS — C18.0 ADENOCARCINOMA OF CECUM (HCC): Primary | ICD-10-CM

## 2022-09-13 DIAGNOSIS — I82.A12 ACUTE DEEP VEIN THROMBOSIS (DVT) OF AXILLARY VEIN OF LEFT UPPER EXTREMITY (HCC): ICD-10-CM

## 2022-09-13 DIAGNOSIS — D50.9 IRON DEFICIENCY ANEMIA, UNSPECIFIED IRON DEFICIENCY ANEMIA TYPE: ICD-10-CM

## 2022-09-13 DIAGNOSIS — C18.0 ADENOCARCINOMA OF CECUM (HCC): ICD-10-CM

## 2022-09-13 LAB
ALBUMIN SERPL-MCNC: 3.1 G/DL (ref 3.2–4.6)
ALBUMIN/GLOB SERPL: 0.9 {RATIO} (ref 1.2–3.5)
ALP SERPL-CCNC: 92 U/L (ref 50–136)
ALT SERPL-CCNC: 28 U/L (ref 12–65)
ANION GAP SERPL CALC-SCNC: 5 MMOL/L (ref 4–13)
AST SERPL-CCNC: 15 U/L (ref 15–37)
BASOPHILS # BLD: 0 K/UL (ref 0–0.2)
BASOPHILS NFR BLD: 0 % (ref 0–2)
BILIRUB SERPL-MCNC: 0.4 MG/DL (ref 0.2–1.1)
BUN SERPL-MCNC: 13 MG/DL (ref 8–23)
CALCIUM SERPL-MCNC: 9 MG/DL (ref 8.3–10.4)
CEA SERPL-MCNC: 2.3 NG/ML (ref 0–3)
CHLORIDE SERPL-SCNC: 110 MMOL/L (ref 101–110)
CO2 SERPL-SCNC: 27 MMOL/L (ref 21–32)
CREAT SERPL-MCNC: 0.8 MG/DL (ref 0.8–1.5)
DIFFERENTIAL METHOD BLD: ABNORMAL
EOSINOPHIL # BLD: 0.4 K/UL (ref 0–0.8)
EOSINOPHIL NFR BLD: 4 % (ref 0.5–7.8)
ERYTHROCYTE [DISTWIDTH] IN BLOOD BY AUTOMATED COUNT: 25.2 % (ref 11.9–14.6)
FERRITIN SERPL-MCNC: 7 NG/ML (ref 8–388)
GLOBULIN SER CALC-MCNC: 3.4 G/DL (ref 2.3–3.5)
GLUCOSE SERPL-MCNC: 101 MG/DL (ref 65–100)
HCT VFR BLD AUTO: 31.7 %
HGB BLD-MCNC: 9.1 G/DL (ref 13.6–17.2)
HGB RETIC QN AUTO: 21 PG (ref 29–35)
IMM GRANULOCYTES # BLD AUTO: 0 K/UL (ref 0–0.5)
IMM GRANULOCYTES NFR BLD AUTO: 0 % (ref 0–5)
IMM RETICS NFR: 35.6 % (ref 2.3–13.4)
IRON SATN MFR SERPL: 6 %
IRON SERPL-MCNC: 25 UG/DL (ref 35–150)
LYMPHOCYTES # BLD: 2.8 K/UL (ref 0.5–4.6)
LYMPHOCYTES NFR BLD: 28 % (ref 13–44)
MCH RBC QN AUTO: 21 PG (ref 26.1–32.9)
MCHC RBC AUTO-ENTMCNC: 28.7 G/DL (ref 31.4–35)
MCV RBC AUTO: 73 FL (ref 79.6–97.8)
MONOCYTES # BLD: 1 K/UL (ref 0.1–1.3)
MONOCYTES NFR BLD: 10 % (ref 4–12)
NEUTS SEG # BLD: 5.8 K/UL (ref 1.7–8.2)
NEUTS SEG NFR BLD: 58 % (ref 43–78)
NRBC # BLD: 0 K/UL (ref 0–0.2)
PLATELET # BLD AUTO: 218 K/UL (ref 150–450)
PLATELET COMMENT: ADEQUATE
PMV BLD AUTO: 9.1 FL (ref 9.4–12.3)
POTASSIUM SERPL-SCNC: 3.6 MMOL/L (ref 3.5–5.1)
PROT SERPL-MCNC: 6.5 G/DL (ref 6.3–8.2)
RBC # BLD AUTO: 4.34 M/UL (ref 4.23–5.6)
RBC MORPH BLD: ABNORMAL
RETICS # AUTO: 0.05 M/UL (ref 0.03–0.1)
RETICS/RBC NFR AUTO: 1.1 % (ref 0.3–2)
SODIUM SERPL-SCNC: 142 MMOL/L (ref 136–145)
TIBC SERPL-MCNC: 413 UG/DL (ref 250–450)
WBC # BLD AUTO: 10 K/UL (ref 4.3–11.1)
WBC MORPH BLD: ABNORMAL

## 2022-09-13 PROCEDURE — 80053 COMPREHEN METABOLIC PANEL: CPT

## 2022-09-13 PROCEDURE — 85025 COMPLETE CBC W/AUTO DIFF WBC: CPT

## 2022-09-13 PROCEDURE — 83540 ASSAY OF IRON: CPT

## 2022-09-13 PROCEDURE — 99214 OFFICE O/P EST MOD 30 MIN: CPT | Performed by: INTERNAL MEDICINE

## 2022-09-13 PROCEDURE — 82378 CARCINOEMBRYONIC ANTIGEN: CPT

## 2022-09-13 PROCEDURE — 82728 ASSAY OF FERRITIN: CPT

## 2022-09-13 PROCEDURE — 36415 COLL VENOUS BLD VENIPUNCTURE: CPT

## 2022-09-13 PROCEDURE — 1123F ACP DISCUSS/DSCN MKR DOCD: CPT | Performed by: INTERNAL MEDICINE

## 2022-09-13 PROCEDURE — 85046 RETICYTE/HGB CONCENTRATE: CPT

## 2022-09-13 ASSESSMENT — PATIENT HEALTH QUESTIONNAIRE - PHQ9
SUM OF ALL RESPONSES TO PHQ QUESTIONS 1-9: 0
SUM OF ALL RESPONSES TO PHQ QUESTIONS 1-9: 0
SUM OF ALL RESPONSES TO PHQ9 QUESTIONS 1 & 2: 0
SUM OF ALL RESPONSES TO PHQ QUESTIONS 1-9: 0
SUM OF ALL RESPONSES TO PHQ QUESTIONS 1-9: 0
1. LITTLE INTEREST OR PLEASURE IN DOING THINGS: 0
2. FEELING DOWN, DEPRESSED OR HOPELESS: 0

## 2022-09-13 NOTE — PROGRESS NOTES
[facility] Hematology and Oncology: Office Visit Established Patient    Reason for follow up:  1-Moderately differentiated, right-sided colonic adenocarcinoma, pMMR  SNVs/Indels:  KRAS G12D   Cancer Staging  Colon cancer [facility])  Staging form: Colon And Rectum, AJCC 8th Edition  - Pathologic stage from 8/22/2022: Stage IIA (pT3, pN0, cM0) - Signed by Giovanny Cagle MD on 8/23/2022    Pertinent Negatives:  NO abnormalities detected in the following genes:   BRAF, HRAS, NRAS. 2- LUE VTE (DVT in axillary vein, SVT in basilic vein, Dx 3/27/60)  3. Liver cysts  4. Sub centimeter lung nodules    Interval history:  8/25/22-8/27/2022: Admitted with COVID. Bebtelovimab was initiated but then aborted as pt became unresponsive to verbal stimuli. Improved over the ensuing 24 hrs and was discharged home. On evaluation today, patient feels well. He feels that his appetite and energy level are improving. Denies abdominal pain/distention, nausea, vomiting, cough, chest pain, shortness of breath. Denies bloody/black stools. Review of Systems:  14 point ROS was negative except as per HPI     Reviewed and updated this visit by provider:  Tobacco  Allergies  Meds  Problems  Med Hx  Surg Hx  Fam Hx         ECOG PERFORMANCE STATUS - 0-Fully active, able to carry on all pre-disease performance without restriction. Pain - /10. None/Minimal pain - not affecting QOL     Fatigue - No flowsheet data found. Distress - No flowsheet data found. Reviewed and updated this visit by provider:  Tobacco  Allergies  Meds  Problems  Med Hx  Surg Hx  Fam Hx          Current Outpatient Medications   Medication Sig Dispense Refill    atorvastatin (LIPITOR) 80 MG tablet Take 1 tablet by mouth daily 90 tablet 5    metoprolol succinate (TOPROL XL) 25 MG extended release tablet metoprolol succinate ER 25 mg tablet,extended release 24 hr   Take 1 tablet every day by oral route.  90 tablet 5    finasteride (PROSCAR) 5 MG tablet finasteride 5 mg tablet   Take 1 tablet every day by oral route. 90 tablet 5    losartan (COZAAR) 50 MG tablet Take 1 tablet by mouth daily 90 tablet 5    Multiple Vitamins-Minerals (MULTIVITAMIN WITH MINERALS) tablet Once daily  tablet 3    Ascorbic Acid (VITAMIN C PO) Take 1 tablet by mouth daily. ZINC PO Take 1 capsule by mouth daily. Cholecalciferol (VITAMIN D3 PO) Take 1 tablet by mouth daily. acetaminophen (TYLENOL) 500 MG tablet Take 1,000 mg by mouth every 6 hours as needed for Pain. apixaban (ELIQUIS) 5 MG TABS tablet Take 1 tablet by mouth 2 times daily 60 tablet 2    nitroGLYCERIN (NITROSTAT) 0.4 MG SL tablet Place 1 sl under the tongue q 5 min prn cp, max 3 sl in a 15-min time period. Call 911 if no relief after the 3rd sl.      pantoprazole (PROTONIX) 40 MG tablet Take 40 mg by mouth every morning (before breakfast)      rOPINIRole (REQUIP) 0.5 MG tablet take 1 tablet by mouth nightly, increase to 1 tab twice a day if needed for restless leg syndrome       tamsulosin (FLOMAX) 0.4 MG capsule Take 0.4 mg by mouth daily       No current facility-administered medications for this visit.         OBJECTIVE:  BP (!) 159/89 (Site: Left Upper Arm, Position: Standing, Cuff Size: Large Adult)   Pulse 76   Temp 97.9 °F (36.6 °C) (Oral)   Resp 16   Ht 5' 5\" (1.651 m)   Wt 204 lb 8 oz (92.8 kg)   SpO2 96%   BMI 34.03 kg/m²     Physical Exam:  Patient alert and oriented x 3, in no acute distress  Integumentary: No Pallor, no icterus  Cardiovascular:RRR, S1, S2 present, no m/r/g   Lungs: Clear to auscultation, no rales or wheezing, no accessory muscle use  Abdomen: Soft, and non-tender on palpation, no organomegaly, bowel sounds audible  Extremities: No peripheral edema, no joint swelling  Neurological: patient can follow commands and move all extremities    Labs:  Lab Results   Component Value Date    WBC 10.0 09/13/2022    HGB 9.1 (L) 09/13/2022    HCT 31.7 09/13/2022    MCV 73.0 (L) 09/13/2022     09/13/2022     Lab Results   Component Value Date    NEUTROABS 10.0 (H) 04/06/2022    LYMPHOPCT 28 09/13/2022    LYMPHSABS 2.8 09/13/2022    MONOPCT 10 09/13/2022    MONOSABS 1.0 09/13/2022    EOSABS 0.4 09/13/2022    BASOPCT 0 09/13/2022     Lab Results   Component Value Date     09/13/2022    K 3.6 09/13/2022     09/13/2022    CO2 27 09/13/2022    BUN 13 09/13/2022    CREATININE 0.80 09/13/2022    GLUCOSE 101 (H) 09/13/2022    CALCIUM 9.0 09/13/2022    PROT 6.5 09/13/2022    LABALBU 3.1 (L) 09/13/2022    BILITOT 0.4 09/13/2022    ALKPHOS 92 09/13/2022    AST 15 09/13/2022    ALT 28 09/13/2022    LABGLOM >60 09/13/2022    GFRAA >60 09/13/2022    AGRATIO 0.7 (L) 03/30/2022    GLOB 3.4 09/13/2022     Lab Results   Component Value Date    IRON 25 (L) 09/13/2022    TIBC 413 09/13/2022    FERRITIN 7 (L) 09/13/2022               Colon recurrence score: 17  Risk of recurrence within 3 years of surgery alone 13%, within 5 years of adjuvant 5FU/LV 8%      Imaging:  XR CHEST PA LAT (2 VIEWS)    Result Date: 9/7/2022  No evidence of acute cardiopulmonary disease. Vascular duplex upper extremity venous left    Result Date: 8/15/2022  1. Deep vein thrombosis in the axillary vein. 2. Superficial vein thrombus in the basilic vein. Problems:  1. Adenocarcinoma of cecum ([address]    2. Atrial fibrillation, unspecified type ([address]    3. Acute blood loss anemia    4. Acute deep vein thrombosis (DVT) of axillary vein of left upper extremity (HCC)    5. Diastolic CHF, chronic (HCC)    6. Lung nodule    7. Liver cyst      We reviewed the diagnosis and prognosis in reference to most recent clinical, radiologic and pathology findings. Patient had early stage lymph node negative cecal adenocarcinoma with no LVI, PNI, localized perforation. Surgical margins were negative. There was no evidence of bowel obstruction clinically. Discussed that treatment intent is curative.   Reviewed different treatment options (adjuvant 5FU- based treatment versus surveillance) and discussed rationale/logistics/risk/benefit/alternatives to each approach. Reviewed results of Oncotype DX colon -discussed clinical implications of his score (17-low)  After a thorough discussion and through mutual decision making, patient decided to proceed with close monitoring. Reviewed NCCN guidelines for postoperative surveillance in CRC. PLAN:  -Return office visit in 3 months with labs including tumor marker and CT chest abdomen pelvis prior.  -Patient will be scheduled for IV iron-ferritin was 7  -Continue with apixaban to complete 3 months of treatment.  -Subcentimeter liver lung lesion will be followed on subsequent imaging. All questions were answered to the best of my abilities. Fernando Romero MD  [facility] Hematology and Oncology  [address] [place] [address]  Office : [phone]  Fax : [phone]    Elements of this note have been dictated using speech recognition software. As a result, errors of speech recognition may have occurred.

## 2022-09-13 NOTE — PATIENT INSTRUCTIONS
Patient Instructions from Today's Visit    Reason for Visit:  Follow up colon cancer   Oncotype Dx score was 17, low   Chemo risk recurrence is about 15% without additional treatment, with treatment would decrease to about 8%. Plan:  Discussed treatment options - 1. Oral xeloda and 2.  Monitor closely   Repeat colonoscopy at 1 year from diagnosis    Follow Up:  3 months with labs and imaging     Recent Lab Results:  Hospital Outpatient Visit on 09/13/2022   Component Date Value Ref Range Status    WBC 09/13/2022 10.0  4.3 - 11.1 K/uL Final    Comment: RESULTS CHECKED X 2  PERIPHERAL REVIEW TO FOLLOW      RBC 09/13/2022 4.34  4.23 - 5.6 M/uL Final    Hemoglobin 09/13/2022 9.1 (A) 13.6 - 17.2 g/dL Final    Hematocrit 09/13/2022 31.7  % Final    MCV 09/13/2022 73.0 (A) 79.6 - 97.8 FL Final    MCH 09/13/2022 21.0 (A) 26.1 - 32.9 PG Final    MCHC 09/13/2022 28.7 (A) 31.4 - 35.0 g/dL Final    RDW 09/13/2022 25.2 (A) 11.9 - 14.6 % Final    Platelets 65/27/6066 218  150 - 450 K/uL Final    MPV 09/13/2022 9.1 (A) 9.4 - 12.3 FL Final    nRBC 09/13/2022 0.00  0.0 - 0.2 K/uL Final    **Note: Absolute NRBC parameter is now reported with Hemogram**    Differential Type 09/13/2022 PENDING   Incomplete    Reticulocyte Count,Automated 09/13/2022 1.1  0.3 - 2.0 % Final    Absolute Retic # 09/13/2022 [id number]  0.026 - 0.095 M/ul Final    Immature Retic Fraction 09/13/2022 35.6 (A) 2.3 - 13.4 % Final    Retic Hemoglobin conc. 09/13/2022 21 (A) 29 - 35 pg Final         Treatment Summary has been discussed and given to patient: no        -------------------------------------------------------------------------------------------------------------------  Please call our office at [phone] if you have any  of the following symptoms:   Fever of 100.5 or greater  Chills  Shortness of breath  Swelling or pain in one leg    After office hours an answering service is available and will contact a provider for emergencies or if you are experiencing any of the above symptoms. Patient did express an interest in My Chart. My Chart log in information explained on the after visit summary printout at the [facility] desk.     Jeimy Shell RN

## 2022-09-13 NOTE — PROGRESS NOTES
9/13/22 saw pt today with Dr. Nicko Jack for follow up cecum cancer. He is feeling well. PO intake is good. Oncotype Dx score 17, low. Discussed at length risk recurrence statistics and treatment options. Patient decided to proceed with close monitoring. Will arrange IV iron next week. Encouraged to call with any concerns. Navigation will sign off.

## 2022-09-14 ENCOUNTER — TELEPHONE (OUTPATIENT)
Dept: ONCOLOGY | Age: 82
End: 2022-09-14

## 2022-09-14 ENCOUNTER — CLINICAL DOCUMENTATION (OUTPATIENT)
Dept: CASE MANAGEMENT | Age: 82
End: 2022-09-14

## 2022-09-14 NOTE — TELEPHONE ENCOUNTER
Spoke with patient regarding upcoming infusion appointments. Clarified that those appointments are for IV Iron, not Chemotherapy. After reviewing patients scheduled appointments, patient understands that he will be receiving IV Iron and then having a CT scan before next visit with . Patient will call office back with any other questions or concerns.

## 2022-09-20 ENCOUNTER — HOSPITAL ENCOUNTER (OUTPATIENT)
Dept: INFUSION THERAPY | Age: 82
Discharge: HOME | End: 2022-09-20
Payer: MEDICARE

## 2022-09-20 VITALS
TEMPERATURE: 98 F | SYSTOLIC BLOOD PRESSURE: 147 MMHG | HEART RATE: 75 BPM | DIASTOLIC BLOOD PRESSURE: 69 MMHG | RESPIRATION RATE: 18 BRPM | OXYGEN SATURATION: 96 %

## 2022-09-20 DIAGNOSIS — D50.9 IRON DEFICIENCY ANEMIA, UNSPECIFIED IRON DEFICIENCY ANEMIA TYPE: Primary | ICD-10-CM

## 2022-09-20 PROCEDURE — 96365 THER/PROPH/DIAG IV INF INIT: CPT

## 2022-09-20 PROCEDURE — 6360000002 HC RX W HCPCS: Performed by: INTERNAL MEDICINE

## 2022-09-20 PROCEDURE — 2580000003 HC RX 258: Performed by: INTERNAL MEDICINE

## 2022-09-20 RX ORDER — SODIUM CHLORIDE 9 MG/ML
5-250 INJECTION, SOLUTION INTRAVENOUS PRN
Status: CANCELLED | OUTPATIENT
Start: 2022-09-27

## 2022-09-20 RX ORDER — SODIUM CHLORIDE 0.9 % (FLUSH) 0.9 %
5-40 SYRINGE (ML) INJECTION PRN
Status: CANCELLED | OUTPATIENT
Start: 2022-09-27

## 2022-09-20 RX ORDER — ALBUTEROL SULFATE 90 UG/1
4 AEROSOL, METERED RESPIRATORY (INHALATION) PRN
Status: CANCELLED | OUTPATIENT
Start: 2022-09-20

## 2022-09-20 RX ORDER — EPINEPHRINE 1 MG/ML
0.3 INJECTION, SOLUTION, CONCENTRATE INTRAVENOUS PRN
OUTPATIENT
Start: 2022-09-27

## 2022-09-20 RX ORDER — ALBUTEROL SULFATE 90 UG/1
4 AEROSOL, METERED RESPIRATORY (INHALATION) PRN
OUTPATIENT
Start: 2022-09-27

## 2022-09-20 RX ORDER — SODIUM CHLORIDE 9 MG/ML
5-250 INJECTION, SOLUTION INTRAVENOUS PRN
Status: CANCELLED | OUTPATIENT
Start: 2022-09-20

## 2022-09-20 RX ORDER — ACETAMINOPHEN 325 MG/1
650 TABLET ORAL
Status: CANCELLED | OUTPATIENT
Start: 2022-09-20

## 2022-09-20 RX ORDER — ONDANSETRON 2 MG/ML
8 INJECTION INTRAMUSCULAR; INTRAVENOUS
OUTPATIENT
Start: 2022-09-27

## 2022-09-20 RX ORDER — SODIUM CHLORIDE 9 MG/ML
INJECTION, SOLUTION INTRAVENOUS CONTINUOUS
OUTPATIENT
Start: 2022-09-27

## 2022-09-20 RX ORDER — DIPHENHYDRAMINE HYDROCHLORIDE 50 MG/ML
50 INJECTION INTRAMUSCULAR; INTRAVENOUS
Status: CANCELLED | OUTPATIENT
Start: 2022-09-20

## 2022-09-20 RX ORDER — DIPHENHYDRAMINE HYDROCHLORIDE 50 MG/ML
50 INJECTION INTRAMUSCULAR; INTRAVENOUS
OUTPATIENT
Start: 2022-09-27

## 2022-09-20 RX ORDER — ONDANSETRON 2 MG/ML
8 INJECTION INTRAMUSCULAR; INTRAVENOUS
Status: CANCELLED | OUTPATIENT
Start: 2022-09-20

## 2022-09-20 RX ORDER — HEPARIN SODIUM (PORCINE) LOCK FLUSH IV SOLN 100 UNIT/ML 100 UNIT/ML
500 SOLUTION INTRAVENOUS PRN
Status: CANCELLED | OUTPATIENT
Start: 2022-09-20

## 2022-09-20 RX ORDER — SODIUM CHLORIDE 0.9 % (FLUSH) 0.9 %
5-40 SYRINGE (ML) INJECTION PRN
Status: CANCELLED | OUTPATIENT
Start: 2022-09-20

## 2022-09-20 RX ORDER — HEPARIN SODIUM (PORCINE) LOCK FLUSH IV SOLN 100 UNIT/ML 100 UNIT/ML
500 SOLUTION INTRAVENOUS PRN
OUTPATIENT
Start: 2022-09-27

## 2022-09-20 RX ORDER — EPINEPHRINE 1 MG/ML
0.3 INJECTION, SOLUTION, CONCENTRATE INTRAVENOUS PRN
Status: CANCELLED | OUTPATIENT
Start: 2022-09-20

## 2022-09-20 RX ORDER — SODIUM CHLORIDE 9 MG/ML
5-250 INJECTION, SOLUTION INTRAVENOUS PRN
Status: DISCONTINUED | OUTPATIENT
Start: 2022-09-20 | End: 2022-09-21 | Stop reason: HOSPADM

## 2022-09-20 RX ORDER — SODIUM CHLORIDE 9 MG/ML
INJECTION, SOLUTION INTRAVENOUS CONTINUOUS
Status: CANCELLED | OUTPATIENT
Start: 2022-09-20

## 2022-09-20 RX ORDER — ACETAMINOPHEN 325 MG/1
650 TABLET ORAL
OUTPATIENT
Start: 2022-09-27

## 2022-09-20 RX ORDER — SODIUM CHLORIDE 9 MG/ML
5-250 INJECTION, SOLUTION INTRAVENOUS PRN
OUTPATIENT
Start: 2022-09-27

## 2022-09-20 RX ADMIN — SODIUM CHLORIDE 150 ML/HR: 9 INJECTION, SOLUTION INTRAVENOUS at 15:48

## 2022-09-20 RX ADMIN — FERRIC CARBOXYMALTOSE INJECTION 750 MG: 50 INJECTION, SOLUTION INTRAVENOUS at 15:52

## 2022-09-20 NOTE — PROGRESS NOTES
Arrived to the [facility]. Injectafer infusion completed. Patient tolerated well. Any issues or concerns during appointment: No.  Discharged home in stable condition.

## 2022-09-21 ENCOUNTER — OFFICE VISIT (OUTPATIENT)
Dept: CARDIOLOGY CLINIC | Age: 82
End: 2022-09-21
Payer: MEDICARE

## 2022-09-21 ENCOUNTER — CARE COORDINATION (OUTPATIENT)
Dept: OTHER | Facility: CLINIC | Age: 82
End: 2022-09-21

## 2022-09-21 VITALS
BODY MASS INDEX: 34.29 KG/M2 | SYSTOLIC BLOOD PRESSURE: 140 MMHG | HEART RATE: 76 BPM | HEIGHT: 65 IN | WEIGHT: 205.8 LBS | DIASTOLIC BLOOD PRESSURE: 80 MMHG

## 2022-09-21 DIAGNOSIS — I25.10 ATHEROSCLEROSIS OF NATIVE CORONARY ARTERY OF NATIVE HEART WITHOUT ANGINA PECTORIS: ICD-10-CM

## 2022-09-21 DIAGNOSIS — I48.0 PAROXYSMAL ATRIAL FIBRILLATION (HCC): ICD-10-CM

## 2022-09-21 DIAGNOSIS — I49.5 SSS (SICK SINUS SYNDROME) (HCC): ICD-10-CM

## 2022-09-21 DIAGNOSIS — I34.0 NONRHEUMATIC MITRAL VALVE REGURGITATION: ICD-10-CM

## 2022-09-21 DIAGNOSIS — I50.22 CHRONIC SYSTOLIC (CONGESTIVE) HEART FAILURE (HCC): Primary | ICD-10-CM

## 2022-09-21 DIAGNOSIS — I10 PRIMARY HYPERTENSION: ICD-10-CM

## 2022-09-21 DIAGNOSIS — E78.5 DYSLIPIDEMIA: ICD-10-CM

## 2022-09-21 PROCEDURE — 1123F ACP DISCUSS/DSCN MKR DOCD: CPT | Performed by: INTERNAL MEDICINE

## 2022-09-21 PROCEDURE — G8417 CALC BMI ABV UP PARAM F/U: HCPCS | Performed by: INTERNAL MEDICINE

## 2022-09-21 PROCEDURE — G8428 CUR MEDS NOT DOCUMENT: HCPCS | Performed by: INTERNAL MEDICINE

## 2022-09-21 PROCEDURE — 1111F DSCHRG MED/CURRENT MED MERGE: CPT | Performed by: INTERNAL MEDICINE

## 2022-09-21 PROCEDURE — 1036F TOBACCO NON-USER: CPT | Performed by: INTERNAL MEDICINE

## 2022-09-21 PROCEDURE — 99214 OFFICE O/P EST MOD 30 MIN: CPT | Performed by: INTERNAL MEDICINE

## 2022-09-21 NOTE — PROGRESS NOTES
[address], [address] [facility], [address] [place] [address]  PHONE: [phone]     22    NAME:  Joi Mason  : 1940  MRN: [id number]       SUBJECTIVE:   Joi Mason is a 79 y.o. male seen for a follow up visit regarding the following:     Chief Complaint   Patient presents with    Atrial Fibrillation     F/u       HPI: Here for CAD, Afib   prox LAD PCI 2015;   (GERMAIN on brilinta). 10/17: PPM placement for SSS.   2019: watchman device placement   [facility] 3/19/2021: small vessel CAD, no PCI. 2021: AVN ablation   Echo 2021: normal EF, mod AI, mod pHTN     3/2022: GIB s/p 3u PRBC, ERCP  2022: left axillary vein DVT on Eliquis, and newly diagnosed cecal mass showing adenocarcinoma s/p right hemicolectomy on 8/10     Recovering from surgery, no plan for chemo he says. On iron for anemia. Stopped working at "[employer]. No CP, pressure. Some anxiety issues. Patient denies recent history of orthopnea, PND, excessive dizziness and/or syncope. Has myasthenia gravis, this has been well controlled. Past Medical History, Past Surgical History, Family history, Social History, and Medications were all reviewed with the patient today and updated as necessary. Current Outpatient Medications   Medication Sig Dispense Refill    atorvastatin (LIPITOR) 80 MG tablet Take 1 tablet by mouth daily 90 tablet 5    metoprolol succinate (TOPROL XL) 25 MG extended release tablet metoprolol succinate ER 25 mg tablet,extended release 24 hr   Take 1 tablet every day by oral route. 90 tablet 5    finasteride (PROSCAR) 5 MG tablet finasteride 5 mg tablet   Take 1 tablet every day by oral route. 90 tablet 5    losartan (COZAAR) 50 MG tablet Take 1 tablet by mouth daily 90 tablet 5    Multiple Vitamins-Minerals (MULTIVITAMIN WITH MINERALS) tablet Once daily  tablet 3    Ascorbic Acid (VITAMIN C PO) Take 1 tablet by mouth daily. ZINC PO Take 1 capsule by mouth daily. Cholecalciferol (VITAMIN D3 PO) Take 1 tablet by mouth daily. acetaminophen (TYLENOL) 500 MG tablet Take 1,000 mg by mouth every 6 hours as needed for Pain. apixaban (ELIQUIS) 5 MG TABS tablet Take 1 tablet by mouth 2 times daily 60 tablet 2    nitroGLYCERIN (NITROSTAT) 0.4 MG SL tablet Place 1 sl under the tongue q 5 min prn cp, max 3 sl in a 15-min time period. Call 911 if no relief after the 3rd sl.      pantoprazole (PROTONIX) 40 MG tablet Take 40 mg by mouth every morning (before breakfast)      rOPINIRole (REQUIP) 0.5 MG tablet take 1 tablet by mouth nightly, increase to 1 tab twice a day if needed for restless leg syndrome       tamsulosin (FLOMAX) 0.4 MG capsule Take 0.4 mg by mouth daily       No current facility-administered medications for this visit.         Allergies   Allergen Reactions    Bebtelovimab Other (See Comments)     Syncope and hypotension     Patient Active Problem List    Diagnosis Date Noted    ZARA (iron deficiency anemia) 09/13/2022     Priority: Medium    Liver cyst 09/13/2022     Priority: Medium    Chronic systolic (congestive) heart failure 09/07/2022     Priority: Medium    Pre-diabetes 09/07/2022     Priority: Medium    Class 1 obesity due to excess calories with serious comorbidity and body mass index (BMI) of 31.0 to 31.9 in adult 09/07/2022     Priority: Medium    Medication management 09/07/2022     Priority: Medium    Unresponsive 08/26/2022     Priority: Medium    COVID 08/25/2022     Priority: Medium    Acute deep vein thrombosis (DVT) of axillary vein of left upper extremity ([facility] [address] 08/15/2022     Priority: Medium    Adenocarcinoma of cecum ([address] 08/13/2022     Priority: Medium    Colon cancer ([address] 08/07/2022     Priority: Medium     8/10/22 Candance Snipes MD) BOWEL RESECTION HEMICOLECTOMY LAPAROSCOPIC ROBOTIC, Right Bulmaro      Acute blood loss anemia 08/04/2022     Priority: Medium    Urine retention 04/03/2022    Acute cholecystitis 03/28/2022    Pancreatitis, gallstone 03/28/2022    Anemia 03/28/2022    Bacteremia 03/28/2022    Major depressive disorder, recurrent, moderate ([address] 03/02/2022    Major depressive disorder, recurrent, mild ([address] 03/02/2022    Major depressive disorder, recurrent, unspecified ([address] 03/02/2022    Pacemaker 04/14/2021    Atrial fibrillation ([address] 11/29/2017    Hypotension 10/26/2017    Sepsis ([address] 10/26/2017    Syncope 10/24/2017    Paroxysmal atrial fibrillation ([address] 06/30/2017     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          SSS (sick sinus syndrome) ([address] 06/30/2017    Bilateral carotid artery disease ([address] 06/30/2017    Myasthenia gravis ([address]     Mitral valve regurgitation 06/07/2016    Hypokalemia 06/07/2016    Dyslipidemia 06/07/2016    HTN (hypertension) 05/08/2015    Dyspnea 05/08/2015     Echo 6/15: EF 60%, mod MR, mod LVH, mild AI        S/P coronary artery stent placement 05/08/2015     3.0x38 mm Xience CAROL to pLAD 5/7/15        Coronary atherosclerosis of native coronary vessel 05/08/2015     GERMAIN on brilinta  5/7/15: prox LAD PCI, normal EF          Past Surgical History:   Procedure Laterality Date    APPENDECTOMY      CARDIAC CATHETERIZATION  05/21/2019    watchman device    CARDIAC CATHETERIZATION  05/27/2015    stent    COLONOSCOPY  2007    COLONOSCOPY N/A 8/6/2022    COLONOSCOPY POLYPECTOMY SNARE/COLD BIOPSY performed by Sarah Styles MD at [facility] ENDOSCOPY    ERCP  3/30/2022         HEMICOLECTOMY Right 8/10/2022    BOWEL RESECTION HEMICOLECTOMY LAPAROSCOPIC ROBOTIC, Right Bulmaro performed by Nimco Suazo MD at [place] [address]  2018    Mariam Kraus/Giana for sleep apnea and reconstruction for extending jaw    UPPER GASTROINTESTINAL ENDOSCOPY N/A 8/5/2022    EGD ESOPHAGOGASTRODUODENOSCOPY Rm 525 performed by Bernarda Bullock MD at [address] Right 07/10/2019     Repair of right radial artery pseudoaneurysm     Family History   Problem Relation Age of Onset    No Known Problems Brother     Cancer Brother         brain tumor    No Known Problems Sister     Cancer Mother         kidney    Cancer Father         stomach     Social History     Tobacco Use    Smoking status: Never    Smokeless tobacco: Never   Substance Use Topics    Alcohol use: No         ROS:    No obvious pertinent positives on review of systems except for what was outlined in the HPI today.       PHYSICAL EXAM:     BP (!) 140/80   Pulse 76   Ht 5' 5\" (1.651 m)   Wt 205 lb 12.8 oz (93.4 kg)   BMI 34.25 kg/m²    General/Constitutional:   Alert and oriented x 3, no acute distress  HEENT:   normocephalic, atraumatic, moist mucous membranes  Neck:   No JVD or carotid bruits bilaterally  Cardiovascular:   regular rate and rhythm, no murmur/rub/gallop appreciated  Pulmonary:   clear to auscultation bilaterally, no respiratory distress  Abdomen:   soft, non-tender, non-distended  Ext:   No sig LE edema bilaterally  Skin:  warm and dry, no obvious rashes seen  Neuro:   no obvious sensory or motor deficits  Psychiatric:   normal mood and affect      Lab Results   Component Value Date/Time     09/13/2022 01:43 PM    K 3.6 09/13/2022 01:43 PM     09/13/2022 01:43 PM    CO2 27 09/13/2022 01:43 PM    BUN 13 09/13/2022 01:43 PM    CREATININE 0.80 09/13/2022 01:43 PM    GLUCOSE 101 09/13/2022 01:43 PM    CALCIUM 9.0 09/13/2022 01:43 PM        Lab Results   Component Value Date    WBC 10.0 09/13/2022    HGB 9.1 (L) 09/13/2022    HCT 31.7 09/13/2022    MCV 73.0 (L) 09/13/2022     09/13/2022       Lab Results   Component Value Date    TSH 1.870 02/28/2022       Lab Results   Component Value Date    LABA1C 6.1 (H) 09/07/2022     Lab Results   Component Value Date     09/07/2022       Lab Results   Component Value Date    CHOL 96 05/03/2021    CHOL 169 08/20/2020     Lab Results   Component Value Date    TRIG 99 05/03/2021    TRIG 165 (H) 08/20/2020     Lab Results   Component Value Date    HDL 36 (L) 05/03/2021    HDL 33 (L) 08/20/2020     Lab Results   Component Value Date    LDLCALC 40.2 05/03/2021    LDLCALC 103 (H) 08/20/2020     Lab Results   Component Value Date    LABVLDL 19.8 05/03/2021    LABVLDL 33 (H) 08/20/2020     Lab Results   Component Value Date    CHOLHDLRATIO 2.7 05/03/2021    CHOLHDLRATIO 5.1 08/20/2020           I have Independently reviewed prior care notes, any ER records available, cardiac testing, labs and results with the patient and before seeing the patient today. Also independently reviewed outside records when available. ASSESSMENT:    Robin Cornelius was seen today for atrial fibrillation. Diagnoses and all orders for this visit:    Chronic systolic (congestive) heart failure    Atherosclerosis of native coronary artery of native heart without angina pectoris    Primary hypertension    Paroxysmal atrial fibrillation (HCC)    SSS (sick sinus syndrome) (HCC)    Nonrheumatic mitral valve regurgitation    Dyslipidemia        PLAN:      1. CAD:  Chronic stable angina. Follow for more angina, call PRN. Home PT, needs more rehab. 2. DHF:  Better on lasix daily, Need to follow K and Cr. Get back on K.      3. PAF:  On ASA, s/p watchman. S/p AVN ablation. on NOAC for DVT now. 4. HPL:  Remain on lipitor. 5. HTN:  on ARB, follow Bps.       6. Device: follow RV pacing 99%. LV lead off due to fx.    7. Anemia: on iron. Follow Hb. Patient has been instructed and agrees to call our office with any issues or other concerns related to their cardiac condition(s) and/or complaint(s). Return in about 2 months (around 11/21/2022).        JOSE DAI DO  9/21/2022

## 2022-09-21 NOTE — CARE COORDINATION
[address] [facility] Transitions Follow Up Call    2022    Patient: Parul Rodríguez  Patient : 1940   MRN: [id number]  Reason for Admission: Covid 19  Discharge Date: 22 RARS: Readmission Risk Score: 24.4    Care Transitions Follow Up Call    Needs to be reviewed by the provider   Additional needs identified to be addressed with provider: No  none             Method of communication with provider : none      Care Transition Nurse contacted the patient by telephone to follow up after admission on 2022. Verified name and  with patient as identifiers. Addressed changes since last contact:  Patient reports he is doing okay and was discharged from home health. Patient states he has wheezing at times. Patient denies any shortness of breath, swelling, or chest pain at this time. Patient reports he continues to monitor his weight daily. Patient reports he completed follow up with Cardiology as scheduled this morning. Patient also received Iron infusion on 22. Patient is requesting a new provider closer to his home for convenience. Discussed follow-up appointments. If no appointment was previously scheduled, appointment scheduling offered: Yes. Is follow up appointment scheduled within 7 days of discharge? Yes. CTN reviewed discharge instructions, medical action plan and red flags with patient and discussed any barriers to care and/or understanding of plan of care after discharge. Discussed appropriate site of care based on symptoms and resources available to patient including: PCP  Specialist  When to call 911  Condition related references. The patient agrees to contact the PCP office for questions related to their healthcare. Patients top risk factors for readmission: medical condition-Covid 19 virus, Cecum mass, S/P partial colectomy, Depression, HTN, Myasthenia Gravis, HF, HLD.    Interventions to address risk factors: Scheduled appointment with PCP-new provider appointment scheduled for patient with [facility]. Patient and CTN 3 way called and patient took appointment for tomorrow with Dr. Mirza Beard and reviewed discharge summary and/or continuity of care documents, Education of patient/family/caregiver/guardian to support self-management-of medical conditions, and Assessment and support for treatment adherence and medication management-of medications. Patient given an opportunity to ask questions and does not have any further questions or concerns at this time. The patient agrees to contact the PCP/Specialist office for questions related to their healthcare. CTN provided contact information for questions, concerns, and future reference. I advised patient to contact the PCP/Specialist office if his condition worsens, changes or fails to improve as anticipated. He expressed understanding. Care Transitions Subsequent and Final Call    Schedule Follow Up Appointment with PCP: Completed  Subsequent and Final Calls  Do you have any ongoing symptoms?: No  Have your medications changed?: No  Do you have any questions related to your medications?: No  Do you currently have any active services?: Yes  Are you currently active with any services?: Home Health  Do you have any needs or concerns that I can assist you with?: No  Identified Barriers: None  Care Transitions Interventions     Other Services: Completed (Comment: Kevin TOLEDO)   Other Interventions:              Follow Up  Future Appointments   Date Time Provider Sri Falk   2022  1:40 PM Richard Vaughan MD [facility] AMB   2022  2:00 PM [facility] [address] [place] [address]   10/31/2022  8:15 AM [facility] [place] [facility]ICE [facility] GVL AMB   2022  8:45 AM Bennett Vázquez DO [facility] AMB   2022 55:69 AM SFO CT 64 SLICE UNIT 1 SFORCT SFO   2022 12:40 PM [address] [place] [address]   2022  1:00 PM Miguel Scott MD [facility] AMB     Non-[facility] follow up appointment(s): no    CTN provided contact information for future needs. Plan for follow-up call in 7-10 days based on severity of symptoms and risk factors. Plan for next call: self management-of medical conditions, medications, and follow up.     Mercedes Sumner RN

## 2022-09-22 ENCOUNTER — OFFICE VISIT (OUTPATIENT)
Dept: INTERNAL MEDICINE CLINIC | Facility: CLINIC | Age: 82
End: 2022-09-22
Payer: MEDICARE

## 2022-09-22 VITALS
HEIGHT: 65 IN | TEMPERATURE: 97.7 F | HEART RATE: 86 BPM | SYSTOLIC BLOOD PRESSURE: 135 MMHG | DIASTOLIC BLOOD PRESSURE: 61 MMHG | BODY MASS INDEX: 34.39 KG/M2 | WEIGHT: 206.4 LBS | OXYGEN SATURATION: 95 %

## 2022-09-22 DIAGNOSIS — N40.1 BENIGN PROSTATIC HYPERPLASIA WITH LOWER URINARY TRACT SYMPTOMS, SYMPTOM DETAILS UNSPECIFIED: ICD-10-CM

## 2022-09-22 DIAGNOSIS — K21.9 GASTROESOPHAGEAL REFLUX DISEASE WITHOUT ESOPHAGITIS: ICD-10-CM

## 2022-09-22 DIAGNOSIS — Z95.5 S/P CORONARY ARTERY STENT PLACEMENT: ICD-10-CM

## 2022-09-22 DIAGNOSIS — I10 PRIMARY HYPERTENSION: ICD-10-CM

## 2022-09-22 DIAGNOSIS — I50.9 CONGESTIVE HEART FAILURE, UNSPECIFIED HF CHRONICITY, UNSPECIFIED HEART FAILURE TYPE (HCC): ICD-10-CM

## 2022-09-22 DIAGNOSIS — I48.0 PAROXYSMAL ATRIAL FIBRILLATION (HCC): Primary | ICD-10-CM

## 2022-09-22 DIAGNOSIS — I25.10 ATHEROSCLEROSIS OF NATIVE CORONARY ARTERY OF NATIVE HEART WITHOUT ANGINA PECTORIS: ICD-10-CM

## 2022-09-22 DIAGNOSIS — I49.5 SSS (SICK SINUS SYNDROME) (HCC): ICD-10-CM

## 2022-09-22 DIAGNOSIS — E78.5 DYSLIPIDEMIA: ICD-10-CM

## 2022-09-22 DIAGNOSIS — Z23 FLU VACCINE NEED: ICD-10-CM

## 2022-09-22 DIAGNOSIS — C18.9 MALIGNANT NEOPLASM OF COLON, UNSPECIFIED PART OF COLON (HCC): ICD-10-CM

## 2022-09-22 DIAGNOSIS — I82.A12 ACUTE DEEP VEIN THROMBOSIS (DVT) OF AXILLARY VEIN OF LEFT UPPER EXTREMITY (HCC): ICD-10-CM

## 2022-09-22 PROCEDURE — 99214 OFFICE O/P EST MOD 30 MIN: CPT | Performed by: FAMILY MEDICINE

## 2022-09-22 PROCEDURE — G8427 DOCREV CUR MEDS BY ELIG CLIN: HCPCS | Performed by: FAMILY MEDICINE

## 2022-09-22 PROCEDURE — 1123F ACP DISCUSS/DSCN MKR DOCD: CPT | Performed by: FAMILY MEDICINE

## 2022-09-22 PROCEDURE — 1036F TOBACCO NON-USER: CPT | Performed by: FAMILY MEDICINE

## 2022-09-22 PROCEDURE — 1111F DSCHRG MED/CURRENT MED MERGE: CPT | Performed by: FAMILY MEDICINE

## 2022-09-22 PROCEDURE — G8417 CALC BMI ABV UP PARAM F/U: HCPCS | Performed by: FAMILY MEDICINE

## 2022-09-22 PROCEDURE — 90694 VACC AIIV4 NO PRSRV 0.5ML IM: CPT | Performed by: FAMILY MEDICINE

## 2022-09-22 PROCEDURE — G0008 ADMIN INFLUENZA VIRUS VAC: HCPCS | Performed by: FAMILY MEDICINE

## 2022-09-22 ASSESSMENT — ENCOUNTER SYMPTOMS
SHORTNESS OF BREATH: 0
BLOOD IN STOOL: 0
WHEEZING: 1
ABDOMINAL PAIN: 1

## 2022-09-22 NOTE — PROGRESS NOTES
Joe Flores (:  1940) is a 79 y.o. male,New patient, here for evaluation of the following chief complaint(s):  Establish Care (NP)         ASSESSMENT/PLAN:  1. Paroxysmal atrial fibrillation (HCC)  2. Atherosclerosis of native coronary artery of native heart without angina pectoris  3. S/P coronary artery stent placement  4. Acute deep vein thrombosis (DVT) of axillary vein of left upper extremity (HCC)  5. Primary hypertension  6. Congestive heart failure, unspecified HF chronicity, unspecified heart failure type ([facility] [address]  7. Benign prostatic hyperplasia with lower urinary tract symptoms, symptom details unspecified  8. Gastroesophageal reflux disease without esophagitis  9. Dyslipidemia  10. SSS (sick sinus syndrome) ([facility]  11. Flu vaccine need  -     Influenza, FLUAD, (age 72 y+), IM, Preservative Free, 0.5 mL  12. Malignant neoplasm of colon, unspecified part of colon [facility])    Physical examination regular rate and rhythm of heart. Currently on Eliquis and metoprolol. Has watchman pacemaker. Follows with [facility] Cardiology. Patient has a stent in LAD. Has nitroglycerin but does not take it. Also on atorvastatin 80 mg. Patient has history of DVT. Currently on Eliquis. BP today 135/61. Currently on losartan and metoprolol. Follow-up with [facility] Cardiology. Patient with well-controlled CHF. Not currently on any diuretic. Follows with [facility] Cardiology. BPH symptoms overall controlled with finasteride and tamsulosin. GERD symptoms well controlled with Protonix. History of dyslipidemia. Currently on atorvastatin 80 mg. Sick sinus syndrome being managed by [facility] Cardiology. Patient received flu vaccine in office today. 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 Currently refused chemotherapy. At this time being followed by [facility] Hematology and Oncology and Gastroenterology.   Patient has had labs done by fall specialist.  We will hold off doing Scheduled to receive IV Fe. Most recent Hgb 9.1. Review of Systems   Constitutional:  Negative for chills and fever. Respiratory:  Positive for wheezing. Negative for shortness of breath. Cardiovascular:  Negative for chest pain. Gastrointestinal:  Positive for abdominal pain (suprapubic pain). Negative for blood in stool. Genitourinary:  Negative for dysuria and hematuria. Neurological:  Negative for dizziness, syncope, weakness and light-headedness. Objective   Physical Exam  Vitals reviewed. Constitutional:       General: He is awake. Cardiovascular:      Rate and Rhythm: Normal rate and regular rhythm. Heart sounds: Normal heart sounds. Comments: 2+ pitting edema bilaterally in distal ankles. Pulmonary:      Effort: Pulmonary effort is normal.      Breath sounds: Normal breath sounds and air entry. Abdominal:      General: Abdomen is flat. Palpations: Abdomen is soft. Tenderness: There is no abdominal tenderness. Musculoskeletal:      Right lower le+ Pitting Edema present. Left lower le+ Pitting Edema present. Neurological:      Mental Status: He is alert. Psychiatric:         Behavior: Behavior is cooperative. An electronic signature was used to authenticate this note.     --Jorgito Lynn MD

## 2022-09-23 ENCOUNTER — CLINICAL DOCUMENTATION (OUTPATIENT)
Dept: CASE MANAGEMENT | Age: 82
End: 2022-09-23

## 2022-09-27 ENCOUNTER — HOSPITAL ENCOUNTER (OUTPATIENT)
Dept: INFUSION THERAPY | Age: 82
Discharge: HOME | End: 2022-09-27
Payer: MEDICARE

## 2022-09-27 VITALS
DIASTOLIC BLOOD PRESSURE: 78 MMHG | TEMPERATURE: 97.9 F | RESPIRATION RATE: 18 BRPM | OXYGEN SATURATION: 98 % | SYSTOLIC BLOOD PRESSURE: 148 MMHG | HEART RATE: 78 BPM

## 2022-09-27 DIAGNOSIS — D50.9 IRON DEFICIENCY ANEMIA, UNSPECIFIED IRON DEFICIENCY ANEMIA TYPE: Primary | ICD-10-CM

## 2022-09-27 PROCEDURE — 6360000002 HC RX W HCPCS: Performed by: INTERNAL MEDICINE

## 2022-09-27 PROCEDURE — 96365 THER/PROPH/DIAG IV INF INIT: CPT

## 2022-09-27 PROCEDURE — 2580000003 HC RX 258: Performed by: INTERNAL MEDICINE

## 2022-09-27 RX ORDER — SODIUM CHLORIDE 9 MG/ML
5-250 INJECTION, SOLUTION INTRAVENOUS PRN
Status: DISCONTINUED | OUTPATIENT
Start: 2022-09-27 | End: 2022-09-28 | Stop reason: HOSPADM

## 2022-09-27 RX ORDER — DIPHENHYDRAMINE HYDROCHLORIDE 50 MG/ML
50 INJECTION INTRAMUSCULAR; INTRAVENOUS
OUTPATIENT
Start: 2022-09-27

## 2022-09-27 RX ORDER — SODIUM CHLORIDE 9 MG/ML
5-250 INJECTION, SOLUTION INTRAVENOUS PRN
Status: CANCELLED | OUTPATIENT
Start: 2022-09-27

## 2022-09-27 RX ORDER — ALBUTEROL SULFATE 90 UG/1
4 AEROSOL, METERED RESPIRATORY (INHALATION) PRN
OUTPATIENT
Start: 2022-09-27

## 2022-09-27 RX ORDER — HEPARIN SODIUM (PORCINE) LOCK FLUSH IV SOLN 100 UNIT/ML 100 UNIT/ML
500 SOLUTION INTRAVENOUS PRN
OUTPATIENT
Start: 2022-09-27

## 2022-09-27 RX ORDER — EPINEPHRINE 1 MG/ML
0.3 INJECTION, SOLUTION, CONCENTRATE INTRAVENOUS PRN
OUTPATIENT
Start: 2022-09-27

## 2022-09-27 RX ORDER — SODIUM CHLORIDE 9 MG/ML
INJECTION, SOLUTION INTRAVENOUS CONTINUOUS
OUTPATIENT
Start: 2022-09-27

## 2022-09-27 RX ORDER — SODIUM CHLORIDE 0.9 % (FLUSH) 0.9 %
5-40 SYRINGE (ML) INJECTION PRN
OUTPATIENT
Start: 2022-09-27

## 2022-09-27 RX ORDER — SODIUM CHLORIDE 0.9 % (FLUSH) 0.9 %
5-40 SYRINGE (ML) INJECTION PRN
Status: DISCONTINUED | OUTPATIENT
Start: 2022-09-27 | End: 2022-09-28 | Stop reason: HOSPADM

## 2022-09-27 RX ORDER — ONDANSETRON 2 MG/ML
8 INJECTION INTRAMUSCULAR; INTRAVENOUS
OUTPATIENT
Start: 2022-09-27

## 2022-09-27 RX ORDER — SODIUM CHLORIDE 9 MG/ML
5-250 INJECTION, SOLUTION INTRAVENOUS PRN
OUTPATIENT
Start: 2022-09-27

## 2022-09-27 RX ORDER — ACETAMINOPHEN 325 MG/1
650 TABLET ORAL
OUTPATIENT
Start: 2022-09-27

## 2022-09-27 RX ADMIN — FERRIC CARBOXYMALTOSE INJECTION 750 MG: 50 INJECTION, SOLUTION INTRAVENOUS at 14:25

## 2022-09-27 RX ADMIN — SODIUM CHLORIDE 25 ML/HR: 9 INJECTION, SOLUTION INTRAVENOUS at 14:15

## 2022-09-27 RX ADMIN — SODIUM CHLORIDE, PRESERVATIVE FREE 10 ML: 5 INJECTION INTRAVENOUS at 14:15

## 2022-09-27 NOTE — PROGRESS NOTES
Arrived to the [facility]. Sofia completed. Patient tolerated well. Any issues or concerns during appointment: None. Patient instructed to call provider with temperature of 100.4 or greater or nausea/vomiting/ diarrhea or pain not controlled by medications  Discharged ambulatory.

## 2022-09-28 ENCOUNTER — CARE COORDINATION (OUTPATIENT)
Dept: OTHER | Facility: CLINIC | Age: 82
End: 2022-09-28

## 2022-09-28 NOTE — CARE COORDINATION
Patient has graduated from the Care Transitions program on 9/28/22. Patient/family has the ability to self-manage at this time. Patient has no further care management needs, no referral to the [facility] team for further management. Patient completed follow up appointments. Patient given an opportunity to ask questions and does not have any further questions or concerns at this time. The patient agrees to contact the PCP/Specialist office for questions related to their healthcare. CTN provided contact information for questions, concerns, and future reference. Goals Addressed                   This Visit's Progress     Conditions and Symptoms   On track     I will schedule office visits, as directed by my provider. I will keep my appointment or reschedule if I have to cancel. I will notify my provider of any symptoms that indicate a worsening of my condition. Patient agrees to monitor and record weights daily. Reports increase if 2 lbs. overnight or 5 lbs. in a week to MD    Barriers: none  Plan for overcoming my barriers: N/A  Confidence: 7/10  Anticipated Goal Completion Date: 9/27/22               Patients upcoming visits:    Future Appointments   Date Time Provider Sri Falk   10/31/2022  8:15 AM [facility] DEVICE 39 [facility]VL AMB   11/21/2022  8:45 AM Maryland Ink, DO [facility] AMB   12/5/2022 77:65 AM SFO CT 64 SLICE UNIT 1 SFORCT SFO   12/6/2022 12:40 PM [address] [facility]   12/6/2022  1:00 PM Rik Luarent MD [facility] G[facility] AMB   12/28/2022 10:00 AM [facility] GVL AMB   1/3/2023 10:00 AM Orlando Mccauley MD [facility] GVL AMB    Patient has Care Transition Nurse's contact information for any further questions, concerns, or needs.

## 2022-10-06 ENCOUNTER — CLINICAL DOCUMENTATION (OUTPATIENT)
Dept: CASE MANAGEMENT | Age: 82
End: 2022-10-06

## 2022-10-06 NOTE — PROGRESS NOTES
Advance Care Planning   Ambulatory ACP Specialist Patient Outreach    Date:  10/6/2022  ACP Specialist:  Thomas Gann    Outreach call to patient in follow-up to ACP Specialist referral from: Dr. Poncho Henao     [x] PCP  [] Provider   [] Ambulatory Care Management [] Other for Reason:    [x] Advance Directive Assistance  [x] Code Status Discussion  [] Complete Portable DNR Order  [x] Discuss Goals of Care  [] Complete POST/MOST  [x] Early ACP Decision-Making  [] Other    Date Referral Received:9/7/2022    Today's Outreach:  [x] First   [] Second  [] Third                               Third outreach made by []  phone  [] email []   MyChart     Intervention:  [x] Spoke with Patient  [] Left VM requesting return call      Outcome:SW called pt for scheduled appt. However, pt stated that he no longer wanted to move forward with an ACP conversation. Pt stated that his wife would be who he'd want to speak on his behalf if he couldn't speak for himself with his health. [facility] updated will close referral     Advance Care Planning   Healthcare Decision Maker:    Primary Decision Maker: Hamida Titus - Spouse - [phone]      Today we documented Decision Maker(s) consistent with Legal Next of Kin hierarchy. Next Step:   [] ACP scheduled conversation  [] Outreach again in one week               [] Email / Mail ACP Info Sheets  [] Email / Mail Advance Directive            [x] Close Referral. Routing closure to referring provider/staff and to ACP Specialist . [] Closure Letter mailed to Patient with Invitation to Contact ACP Specialist if/when ready.     Thank you for this referral.

## 2022-10-11 DIAGNOSIS — Z95.0 CARDIAC PACEMAKER IN SITU: Primary | ICD-10-CM

## 2022-10-11 DIAGNOSIS — I49.5 SSS (SICK SINUS SYNDROME) (HCC): ICD-10-CM

## 2022-10-11 DIAGNOSIS — I48.0 PAROXYSMAL ATRIAL FIBRILLATION (HCC): ICD-10-CM

## 2022-11-15 ENCOUNTER — HOSPITAL ENCOUNTER (EMERGENCY)
Age: 82
Discharge: HOME | End: 2022-11-15
Attending: EMERGENCY MEDICINE
Payer: MEDICARE

## 2022-11-15 ENCOUNTER — TELEPHONE (OUTPATIENT)
Dept: ONCOLOGY | Age: 82
End: 2022-11-15

## 2022-11-15 VITALS
HEART RATE: 75 BPM | RESPIRATION RATE: 18 BRPM | BODY MASS INDEX: 32.78 KG/M2 | SYSTOLIC BLOOD PRESSURE: 183 MMHG | OXYGEN SATURATION: 97 % | WEIGHT: 197 LBS | DIASTOLIC BLOOD PRESSURE: 85 MMHG | TEMPERATURE: 98.2 F

## 2022-11-15 DIAGNOSIS — R53.1 GENERALIZED WEAKNESS: ICD-10-CM

## 2022-11-15 DIAGNOSIS — R42 INTERMITTENT LIGHTHEADEDNESS: ICD-10-CM

## 2022-11-15 DIAGNOSIS — N39.0 URINARY TRACT INFECTION WITHOUT HEMATURIA, SITE UNSPECIFIED: Primary | ICD-10-CM

## 2022-11-15 LAB
ABO + RH BLD: NORMAL
ALBUMIN SERPL-MCNC: 3.5 G/DL (ref 3.2–4.6)
ALBUMIN/GLOB SERPL: 1.1 {RATIO} (ref 0.4–1.6)
ALP SERPL-CCNC: 91 U/L (ref 50–136)
ALT SERPL-CCNC: 52 U/L (ref 12–65)
AMORPH CRY URNS QL MICRO: ABNORMAL
ANION GAP SERPL CALC-SCNC: 5 MMOL/L (ref 2–11)
APPEARANCE UR: CLEAR
AST SERPL-CCNC: 27 U/L (ref 15–37)
BACTERIA URNS QL MICRO: ABNORMAL /HPF
BASOPHILS # BLD: 0.1 K/UL (ref 0–0.2)
BASOPHILS NFR BLD: 1 % (ref 0–2)
BILIRUB SERPL-MCNC: 0.3 MG/DL (ref 0.2–1.1)
BILIRUB UR QL: NEGATIVE
BLOOD GROUP ANTIBODIES SERPL: NORMAL
BUN SERPL-MCNC: 12 MG/DL (ref 8–23)
CALCIUM SERPL-MCNC: 10 MG/DL (ref 8.3–10.4)
CHLORIDE SERPL-SCNC: 109 MMOL/L (ref 101–110)
CO2 SERPL-SCNC: 25 MMOL/L (ref 21–32)
COLOR UR: YELLOW
CREAT SERPL-MCNC: 0.7 MG/DL (ref 0.8–1.5)
DIFFERENTIAL METHOD BLD: ABNORMAL
EOSINOPHIL # BLD: 0.4 K/UL (ref 0–0.8)
EOSINOPHIL NFR BLD: 4 % (ref 0.5–7.8)
EPI CELLS #/AREA URNS HPF: ABNORMAL /HPF
ERYTHROCYTE [DISTWIDTH] IN BLOOD BY AUTOMATED COUNT: 23.8 % (ref 11.9–14.6)
GLOBULIN SER CALC-MCNC: 3.3 G/DL (ref 2.8–4.5)
GLUCOSE SERPL-MCNC: 112 MG/DL (ref 65–100)
GLUCOSE UR STRIP.AUTO-MCNC: NEGATIVE MG/DL
HCT VFR BLD AUTO: 46.8 % (ref 41.1–50.3)
HGB BLD-MCNC: 14.7 G/DL (ref 13.6–17.2)
HGB UR QL STRIP: ABNORMAL
IMM GRANULOCYTES # BLD AUTO: 0 K/UL (ref 0–0.5)
IMM GRANULOCYTES NFR BLD AUTO: 0 % (ref 0–5)
KETONES UR QL STRIP.AUTO: NEGATIVE MG/DL
LEUKOCYTE ESTERASE UR QL STRIP.AUTO: ABNORMAL
LYMPHOCYTES # BLD: 2.8 K/UL (ref 0.5–4.6)
LYMPHOCYTES NFR BLD: 28 % (ref 13–44)
MAGNESIUM SERPL-MCNC: 2.1 MG/DL (ref 1.8–2.4)
MCH RBC QN AUTO: 27.1 PG (ref 26.1–32.9)
MCHC RBC AUTO-ENTMCNC: 31.4 G/DL (ref 31.4–35)
MCV RBC AUTO: 86.2 FL (ref 82–102)
MONOCYTES # BLD: 0.8 K/UL (ref 0.1–1.3)
MONOCYTES NFR BLD: 8 % (ref 4–12)
NEUTS SEG # BLD: 6.1 K/UL (ref 1.7–8.2)
NEUTS SEG NFR BLD: 60 % (ref 43–78)
NITRITE UR QL STRIP.AUTO: NEGATIVE
NRBC # BLD: 0 K/UL (ref 0–0.2)
PH UR STRIP: 6 [PH] (ref 5–9)
PLATELET # BLD AUTO: 194 K/UL (ref 150–450)
PMV BLD AUTO: 9.4 FL (ref 9.4–12.3)
POTASSIUM SERPL-SCNC: 4 MMOL/L (ref 3.5–5.1)
PROT SERPL-MCNC: 6.8 G/DL (ref 6.3–8.2)
PROT UR STRIP-MCNC: NEGATIVE MG/DL
RBC # BLD AUTO: 5.43 M/UL (ref 4.23–5.6)
RBC #/AREA URNS HPF: ABNORMAL /HPF
SODIUM SERPL-SCNC: 139 MMOL/L (ref 133–143)
SP GR UR REFRACTOMETRY: 1.02 (ref 1–1.02)
SPECIMEN EXP DATE BLD: NORMAL
UROBILINOGEN UR QL STRIP.AUTO: 0.2 EU/DL (ref 0.2–1)
WBC # BLD AUTO: 10.1 K/UL (ref 4.3–11.1)
WBC URNS QL MICRO: ABNORMAL /HPF

## 2022-11-15 PROCEDURE — 87086 URINE CULTURE/COLONY COUNT: CPT

## 2022-11-15 PROCEDURE — 81003 URINALYSIS AUTO W/O SCOPE: CPT

## 2022-11-15 PROCEDURE — 85025 COMPLETE CBC W/AUTO DIFF WBC: CPT

## 2022-11-15 PROCEDURE — 2580000003 HC RX 258: Performed by: EMERGENCY MEDICINE

## 2022-11-15 PROCEDURE — 99284 EMERGENCY DEPT VISIT MOD MDM: CPT

## 2022-11-15 PROCEDURE — 86901 BLOOD TYPING SEROLOGIC RH(D): CPT

## 2022-11-15 PROCEDURE — 87106 FUNGI IDENTIFICATION YEAST: CPT

## 2022-11-15 PROCEDURE — 83735 ASSAY OF MAGNESIUM: CPT

## 2022-11-15 PROCEDURE — 80053 COMPREHEN METABOLIC PANEL: CPT

## 2022-11-15 PROCEDURE — 96374 THER/PROPH/DIAG INJ IV PUSH: CPT

## 2022-11-15 PROCEDURE — 6360000002 HC RX W HCPCS: Performed by: EMERGENCY MEDICINE

## 2022-11-15 RX ORDER — CEFDINIR 300 MG/1
300 CAPSULE ORAL 2 TIMES DAILY
Qty: 20 CAPSULE | Refills: 0 | Status: SHIPPED | OUTPATIENT
Start: 2022-11-15 | End: 2022-11-25

## 2022-11-15 RX ADMIN — CEFTRIAXONE 1000 MG: 1 INJECTION, POWDER, FOR SOLUTION INTRAMUSCULAR; INTRAVENOUS at 21:06

## 2022-11-15 ASSESSMENT — ENCOUNTER SYMPTOMS
ABDOMINAL PAIN: 0
NAUSEA: 0
CONSTIPATION: 0
VOMITING: 0
DIARRHEA: 0

## 2022-11-15 NOTE — TELEPHONE ENCOUNTER
PT charley Nesbitt called on behalf of PT/states PT is reporting weakness after Bms and has is describing Bms as looking like \"black coffee\"/states PT said that has been going on for about a month

## 2022-11-15 NOTE — ED PROVIDER NOTES
Emergency Department Provider Note                   PCP:                Daniel Rubio MD               Age: 79 y.o. Sex: male       ICD-10-CM    1. Urinary tract infection without hematuria, site unspecified  N39.0       2. Intermittent lightheadedness  R42       3. Generalized weakness  R53.1           DISPOSITION Decision To Discharge 11/15/2022 08:48:42 PM        MDM  Number of Diagnoses or Management Options  Generalized weakness: new, needed workup  Intermittent lightheadedness: new, needed workup  Urinary tract infection without hematuria, site unspecified: new, needed workup     Amount and/or Complexity of Data Reviewed  Clinical lab tests: ordered and reviewed  Tests in the medicine section of CPT®: ordered and reviewed (ECG interpretation for ECG dated 11/15/2022 at 8:34 PM: ECG reveals a ventricular placed rhythm at a rate of 75 bpm.  Left axis deviation. No further interpretation due to paced rhythm. Abnormal ECG.   Treasure Francisco MD  )  Review and summarize past medical records: yes    Risk of Complications, Morbidity, and/or Mortality  Presenting problems: moderate  Diagnostic procedures: moderate  Management options: moderate    Patient Progress  Patient progress: improved             Orders Placed This Encounter   Procedures    Culture, Urine    CBC with Auto Differential    Comprehensive Metabolic Panel    Urinalysis    Magnesium    Orthostatic blood pressure and pulse    EKG 12 Lead    TYPE AND SCREEN    Insert peripheral IV        Medications   cefTRIAXone (ROCEPHIN) 1,000 mg in sodium chloride 0.9 % 50 mL IVPB mini-bag (has no administration in time range)       New Prescriptions    CEFDINIR (OMNICEF) 300 MG CAPSULE    Take 1 capsule by mouth 2 times daily for 10 days        Dorsie Schwab is a 79 y.o. 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 fib, HTN, and CAD presents to the emergency department complaining of lightheadedness with standing and very dark stools as well as increased weakness and fatigue with every bowel movement. Patient states his bowel movements have been very dark, and has had a history of low hemoglobin in the past due to rectal bleeding. He denies any abdominal pain, nausea or vomiting. 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 He denies any fever or chills or UTI symptoms. He denies any URI symptoms. Patient was scheduled to follow-up with his family doctor tomorrow, but after discussing his symptoms with his son it was recommended he come into the ER for further evaluation. The history is provided by the patient and the spouse. Review of Systems   Constitutional:  Negative for chills and fever. Cardiovascular:  Negative for palpitations and leg swelling. Gastrointestinal:  Negative for abdominal pain, constipation, diarrhea, nausea and vomiting. Genitourinary:  Negative for dysuria and hematuria. Neurological:  Positive for weakness (Generalized) and light-headedness. All other systems reviewed and are negative.     Past Medical History:   Diagnosis Date    Abnormal EKG 4/22/15    Arrhythmia     Aspiration pneumonia ([address] 10/26/2017    CAD (coronary artery disease) 5/8/2015    Carotid artery stenosis without cerebral infarction 6/7/2016    US 6/15: <50% bilat ICAs    Coronary atherosclerosis of native coronary vessel 5/8/2015    GERMAIN on brilinta 5/7/15: prox LAD PCI, normal EF     Diastolic CHF, chronic ([address] 3/2/2022    Dyslipidemia 6/7/2016    Dyspnea 5/8/2015    Echo 6/15: EF 60%, mod MR, mod LVH, mild AI     ED (erectile dysfunction)     GERD (gastroesophageal reflux disease)     GERD (gastroesophageal reflux disease)     no medication    HTN (hypertension) 5/8/2015 Hypertension     Hypokalemia     Hypokalemia 6/7/2016    Mitral valve regurgitation 6/7/2016    Morbid obesity ([address]     Myasthenia gravis ([address]     Myasthenia gravis ([address] 6/17/15    Nocturia     Osteoporosis     PUD (peptic ulcer disease) 25 yrs ago    S/P coronary artery stent placement 5/8/2015    3.0x38 mm Xience CAROL to pLAD 5/7/15     Sleep apnea     Syncope and collapse     Unspecified sleep apnea     no cpap        Past Surgical History:   Procedure Laterality Date    APPENDECTOMY      CARDIAC CATHETERIZATION  05/21/2019    watchman device    CARDIAC CATHETERIZATION  05/27/2015    stent    COLONOSCOPY  2007    COLONOSCOPY N/A 8/6/2022    COLONOSCOPY POLYPECTOMY SNARE/COLD BIOPSY performed by Shaun Cazares MD at [facility] ENDOSCOPY    ERCP  3/30/2022         HEMICOLECTOMY Right 8/10/2022    BOWEL RESECTION HEMICOLECTOMY LAPAROSCOPIC ROBOTIC, Right [place] performed by Katy Montenegro MD at [place] [address]    Stephanie Kraus/Giana for sleep apnea and reconstruction for extending jaw    UPPER GASTROINTESTINAL ENDOSCOPY N/A 8/5/2022    EGD ESOPHAGOGASTRODUODENOSCOPY Rm 525 performed by Renae Pham MD at [address] Right 07/10/2019     Repair of right radial artery pseudoaneurysm        Family History   Problem Relation Age of Onset    No Known Problems Brother     Cancer Brother         brain tumor    No Known Problems Sister     Cancer Mother         kidney    Cancer Father         stomach        Social History     Socioeconomic History    Marital status:    Tobacco Use    Smoking status: Never    Smokeless tobacco: Never   Substance and Sexual Activity    Alcohol use: No    Drug use: No     Social Determinants of Health     Financial Resource Strain: Low Risk     Difficulty of Paying Living Expenses: Not hard at all   Food Insecurity: No Food Insecurity    Worried About [address] [employer] in the Last Year: Never true    Ran Out of Food in the Last Year: Never true         Bebtelovimab     Previous Medications    ACETAMINOPHEN (TYLENOL) 500 MG TABLET    Take 1,000 mg by mouth every 6 hours as needed for Pain. APIXABAN (ELIQUIS) 5 MG TABS TABLET    Take 1 tablet by mouth 2 times daily    ASCORBIC ACID (VITAMIN C PO)    Take 1 tablet by mouth daily. ATORVASTATIN (LIPITOR) 80 MG TABLET    Take 1 tablet by mouth daily    CHOLECALCIFEROL (VITAMIN D3 PO)    Take 1 tablet by mouth daily. FINASTERIDE (PROSCAR) 5 MG TABLET    finasteride 5 mg tablet   Take 1 tablet every day by oral route. LOSARTAN (COZAAR) 50 MG TABLET    Take 1 tablet by mouth daily    METOPROLOL SUCCINATE (TOPROL XL) 25 MG EXTENDED RELEASE TABLET    metoprolol succinate ER 25 mg tablet,extended release 24 hr   Take 1 tablet every day by oral route. MULTIPLE VITAMINS-MINERALS (MULTIVITAMIN WITH MINERALS) TABLET    Once daily OTC    NITROGLYCERIN (NITROSTAT) 0.4 MG SL TABLET    Place 1 sl under the tongue q 5 min prn cp, max 3 sl in a 15-min time period. Call 911 if no relief after the 3rd sl. PANTOPRAZOLE (PROTONIX) 40 MG TABLET    Take 40 mg by mouth every morning (before breakfast)    ROPINIROLE (REQUIP) 0.5 MG TABLET    take 1 tablet by mouth nightly, increase to 1 tab twice a day if needed for restless leg syndrome     TAMSULOSIN (FLOMAX) 0.4 MG CAPSULE    Take 0.4 mg by mouth daily    ZINC PO    Take 1 capsule by mouth daily. Vitals signs and nursing note reviewed. Patient Vitals for the past 4 hrs:   Temp Pulse Resp BP SpO2   11/15/22 1831 98.2 °F (36.8 °C) 75 18 (!) 183/85 97 %          Physical Exam  Vitals and nursing note reviewed. Constitutional:       General: He is not in acute distress. HENT:      Head: Normocephalic and atraumatic.       Right Ear: External ear normal.      Left Ear: External ear normal.      Nose: Nose normal.      Mouth/Throat:      Mouth: Mucous membranes are moist.   Eyes:      Extraocular Movements: Extraocular movements intact. Conjunctiva/sclera: Conjunctivae normal.      Pupils: Pupils are equal, round, and reactive to light. Cardiovascular:      Rate and Rhythm: Normal rate and regular rhythm. Heart sounds: No murmur heard. Pulmonary:      Effort: Pulmonary effort is normal.      Breath sounds: Normal breath sounds. Abdominal:      General: Bowel sounds are normal.      Palpations: Abdomen is soft. Tenderness: There is no abdominal tenderness. There is no right CVA tenderness or left CVA tenderness. Musculoskeletal:         General: Normal range of motion. Cervical back: Normal range of motion and neck supple. Right lower leg: No edema. Left lower leg: No edema. Skin:     General: Skin is warm and dry. Neurological:      General: No focal deficit present. Mental Status: He is alert and oriented to person, place, and time. Psychiatric:         Mood and Affect: Mood normal.         Behavior: Behavior normal.        Procedures    The patient was observed in the ED. Patient was treated with IV Rocephin here in the emergency department and urine was sent for culture. Patient be discharged home on Omnicef and instructed follow-up with his family doctor in 2 to 3 days if not improving.     Results Reviewed:      Recent Results (from the past 24 hour(s))   CBC with Auto Differential    Collection Time: 11/15/22  6:52 PM   Result Value Ref Range    WBC 10.1 4.3 - 11.1 K/uL    RBC 5.43 4.23 - 5.6 M/uL    Hemoglobin 14.7 13.6 - 17.2 g/dL    Hematocrit 46.8 41.1 - 50.3 %    MCV 86.2 82 - 102 FL    MCH 27.1 26.1 - 32.9 PG    MCHC 31.4 31.4 - 35.0 g/dL    RDW 23.8 (H) 11.9 - 14.6 %    Platelets 475 818 - 262 K/uL    MPV 9.4 9.4 - 12.3 FL    nRBC 0.00 0.0 - 0.2 K/uL    Differential Type AUTOMATED      Seg Neutrophils 60 43 - 78 %    Lymphocytes 28 13 - 44 %    Monocytes 8 4.0 - 12.0 %    Eosinophils % 4 0.5 - 7.8 %    Basophils 1 0.0 - 2.0 %    Immature Granulocytes 0 0.0 - 5.0 %    Segs Absolute 6.1 1.7 - 8.2 K/UL    Absolute Lymph # 2.8 0.5 - 4.6 K/UL    Absolute Mono # 0.8 0.1 - 1.3 K/UL    Absolute Eos # 0.4 0.0 - 0.8 K/UL    Basophils Absolute 0.1 0.0 - 0.2 K/UL    Absolute Immature Granulocyte 0.0 0.0 - 0.5 K/UL   Comprehensive Metabolic Panel    Collection Time: 11/15/22  6:52 PM   Result Value Ref Range    Sodium 139 133 - 143 mmol/L    Potassium 4.0 3.5 - 5.1 mmol/L    Chloride 109 101 - 110 mmol/L    CO2 25 21 - 32 mmol/L    Anion Gap 5 2 - 11 mmol/L    Glucose 112 (H) 65 - 100 mg/dL    BUN 12 8 - 23 MG/DL    Creatinine 0.70 (L) 0.8 - 1.5 MG/DL    Est, Glom Filt Rate >60 >60 ml/min/1.73m2    Calcium 10.0 8.3 - 10.4 MG/DL    Total Bilirubin 0.3 0.2 - 1.1 MG/DL    ALT 52 12 - 65 U/L    AST 27 15 - 37 U/L    Alk Phosphatase 91 50 - 136 U/L    Total Protein 6.8 6.3 - 8.2 g/dL    Albumin 3.5 3.2 - 4.6 g/dL    Globulin 3.3 2.8 - 4.5 g/dL    Albumin/Globulin Ratio 1.1 0.4 - 1.6     Urinalysis    Collection Time: 11/15/22  6:52 PM   Result Value Ref Range    Color, UA YELLOW      Appearance CLEAR      Specific Gravity, UA 1.025 (H) 1.001 - 1.023      pH, Urine 6.0 5.0 - 9.0      Protein, UA Negative NEG mg/dL    Glucose, UA Negative NEG mg/dL    Ketones, Urine Negative NEG mg/dL    Bilirubin Urine Negative NEG      Blood, Urine TRACE (A) NEG      Urobilinogen, Urine 0.2 0.2 - 1.0 EU/dL    Nitrite, Urine Negative NEG      Leukocyte Esterase, Urine MODERATE (A) NEG      WBC, UA  0 /hpf    RBC, UA 10-20 0 /hpf    Epithelial Cells UA 0-3 0 /hpf    BACTERIA, URINE 2+ (H) 0 /hpf    Amorphous Crystal 1+ (H) 0   Magnesium    Collection Time: 11/15/22  6:52 PM   Result Value Ref Range    Magnesium 2.1 1.8 - 2.4 mg/dL       I discussed the results of all labs, procedures, radiographs, and treatments with the patient and available family. Treatment plan is agreed upon and the patient is ready for discharge.   All voiced understanding of the discharge plan and medication instructions or changes as appropriate. Questions about treatment in the ED were answered. All were encouraged to return should symptoms worsen or new problems develop. Voice dictation software was used during the making of this note. This software is not perfect and grammatical and other typographical errors may be present. This note has not been completely proofread for errors.      Socrates Hobson MD  11/15/22 1616       Socrates Hobson MD  11/15/22 5208

## 2022-11-15 NOTE — ED TRIAGE NOTES
Pt arrives ambulatory stating weakness x couple months. Pt states hx of blood transfusions. Pt states coffee like stool x 1 month. Pt was sent for evaluation for low hemoglobin. Pt states blood thinners at home for blood cloths.  Pt has an appt with his PCP tomorrow

## 2022-11-15 NOTE — TELEPHONE ENCOUNTER
Call to SONDRA BLACKWELL who is on the [facility] and he is to call [facility] today. He Verbalizes understanding of instructions. I will report to the NP's for further orders. No further orders received. He is to call the [facility].

## 2022-11-16 ENCOUNTER — CARE COORDINATION (OUTPATIENT)
Dept: CARE COORDINATION | Facility: CLINIC | Age: 82
End: 2022-11-16

## 2022-11-16 NOTE — CARE COORDINATION
Ambulatory Care Coordination Note  11/16/2022    ACC: Matthew Gandhi RN    See assessment below. Ccm outreached to patient. Patient agreeable to ccm services. Ccm reviewed discharge summary with patient. Patient verbalized understanding. Patient is taking abt as directed. Patient is eating and drinking well. Patient states no questions or concerns. [facility] discussed that I would outreach next week. Patient agreeable. Offered patient enrollment in the Remote Patient Monitoring (RPM) program for in-home monitoring: NA. Ambulatory Care Coordination Assessment    Care Coordination Protocol  Referral from Primary Care Provider: No  Week 1 - Initial Assessment     Do you have all of your prescriptions and are they filled?: Yes  Barriers to medication adherence: None  Are you able to afford your medications?: Yes  How often do you have trouble taking your medications the way you have been told to take them?: I always take them as prescribed. Do you have Home O2 Therapy?: No      Ability to seek help/take action for Emergent Urgent situations i.e. fire, crime, inclement weather or health crisis. : Independent  Ability to ambulate to restroom: Independent  Ability handle personal hygeine needs (bathing/dressing/grooming): Independent  Ability to manage Medications: Independent  Ability to prepare Food Preparation: Independent  Ability to maintain home (clean home, laundry): Independent  Ability to drive and/or has transportation: Independent  Ability to do shopping: Independent  Ability to manage finances:  Independent  Is patient able to live independently?: Yes     Current Housing: Private Residence        Per the Fall Risk Screening, did the patient have 2 or more falls or 1 fall with injury in the past year?: No     Frequent urination at night?: No  Do you use rails/bars?: No  Do you have a non-slip tub mat?: Yes     Are you experiencing loss of meaning?: No  Are you experiencing loss of hope and peace?: No Thinking about your patient's physical health needs, are there any symptoms or problems (risk indicators) you are unsure about that require further investigation?: No identified areas of uncertainly or problems already being investigated   Are the patients physical health problems impacting on their mental well-being?: No identified areas of concern   Are there any problems with your patients lifestyle behaviors (alcohol, drugs, diet, exercise) that are impacting on physical or mental well-being?: No identified areas of concern   Do you have any other concerns about your patients mental well-being? How would you rate their severity and impact on the patient?: No identified areas of concern   How would you rate their home environment in terms of safety and stability (including domestic violence, insecure housing, neighbor harassment)?: Consistently safe, supportive, stable, no identified problems   How do daily activities impact on the patient's well-being? (include current or anticipated unemployment, work, caregiving, access to transportation or other): No identified problems or perceived positive benefits   How would you rate their social network (family, work, friends)?: Good participation with social networks   How would you rate their financial resources (including ability to afford all required medical care)?: Financially secure, resources adequate, no identified problems   How wells does the patient now understand their health and well-being (symptoms, signs or risk factors) and what they need to do to manage their health?: Reasonable to good understanding and already engages in managing health or is willing to undertake better management   How well do you think your patient can engage in healthcare discussions?  (Barriers include language, deafness, aphasia, alcohol or drug problems, learning difficulties, concentration): Clear and open communication, no identified barriers   Do other services need to be involved to help this patient?: Other care/services not required at this time   Are current services involved with this patient well-coordinated? (Include coordination with other services you are now recommendation): All required care/services in place and well-coordinated   Suggested Interventions and Community Resources         Schedule an appointment with the patient's PCP, Set up/Review Goals, Set up/Review an Education Plan              Prior to Admission medications    Medication Sig Start Date End Date Taking? Authorizing Provider   cefdinir (OMNICEF) 300 MG capsule Take 1 capsule by mouth 2 times daily for 10 days 11/15/22 11/25/22  Dank Cunningham MD   atorvastatin (LIPITOR) 80 MG tablet Take 1 tablet by mouth daily 9/7/22   Vic Pineda MD   metoprolol succinate (TOPROL XL) 25 MG extended release tablet metoprolol succinate ER 25 mg tablet,extended release 24 hr   Take 1 tablet every day by oral route. 9/7/22   Vic Pineda MD   finasteride (PROSCAR) 5 MG tablet finasteride 5 mg tablet   Take 1 tablet every day by oral route. 9/7/22   Vic Pineda MD   losartan (COZAAR) 50 MG tablet Take 1 tablet by mouth daily 9/7/22   Vic Pineda MD   Multiple Vitamins-Minerals (MULTIVITAMIN WITH MINERALS) tablet Once daily OTC 9/7/22   Vic Pineda MD   Ascorbic Acid (VITAMIN C PO) Take 1 tablet by mouth daily. Historical Provider, MD   ZINC PO Take 1 capsule by mouth daily. Historical Provider, MD   Cholecalciferol (VITAMIN D3 PO) Take 1 tablet by mouth daily. Historical Provider, MD   acetaminophen (TYLENOL) 500 MG tablet Take 1,000 mg by mouth every 6 hours as needed for Pain.     Historical Provider, MD   apixaban (ELIQUIS) 5 MG TABS tablet Take 1 tablet by mouth 2 times daily 8/19/22 11/17/22  Amee Montemayor MD   potassium chloride (KLOR-CON M) 20 MEQ extended release tablet Take 1 tablet by mouth daily 6/2/22 8/27/22  Bill Gandhi DO   nitroGLYCERIN (NITROSTAT) 0.4 MG SL tablet Place 1 sl under the tongue q 5 min prn cp, max 3 sl in a 15-min time period.  Call 911 if no relief after the 3rd sl. 2/13/20   Ar Automatic Reconciliation   pantoprazole (PROTONIX) 40 MG tablet Take 40 mg by mouth every morning (before breakfast) 4/3/22   Ar Automatic Reconciliation   rOPINIRole (REQUIP) 0.5 MG tablet take 1 tablet by mouth nightly, increase to 1 tab twice a day if needed for restless leg syndrome  3/3/22   Ar Automatic Reconciliation   tamsulosin (FLOMAX) 0.4 MG capsule Take 0.4 mg by mouth daily 4/8/22   Ar Automatic Reconciliation   aspirin 81 MG EC tablet Take 81 mg by mouth daily 4/8/22 8/19/22  Ar Automatic Reconciliation   cyanocobalamin 1000 MCG tablet Take 1,000 mcg by mouth daily  Patient not taking: No sig reported 4/2/22 8/19/22  Ar Automatic Reconciliation   escitalopram (LEXAPRO) 10 MG tablet Take 10 mg by mouth daily  Patient not taking: No sig reported 3/3/22 8/19/22  Ar Automatic Reconciliation   furosemide (LASIX) 40 MG tablet Take 40 mg by mouth daily 3/3/22 8/19/22  Ar Automatic Reconciliation       Future Appointments   Date Time Provider Sri Falk   11/21/2022  8:45 AM Philip Bruno,  [facility]   12/5/2022 78:28 AM [facility] [address] SLICE UN[address] 1 S[facility] O   12/6/2022 12:40 PM [facility]   12/6/2022  1:00 PM Aman Mckeon MD [facility]   12/28/2022 10:00 AM [facility]   1/3/2023 10:00 AM Lasha Matthews MD [facility]

## 2022-11-16 NOTE — ED NOTES
I have reviewed discharge instructions with the patient and spouse. The patient and spouse verbalized understanding. Patient left ED via Discharge Method: ambulatory to Home with (self). Opportunity for questions and clarification provided. Patient given  scripts. 1      To continue your aftercare when you leave the hospital, you may receive an automated call from our care team to check in on how you are doing. This is a free service and part of our promise to provide the best care and service to meet your aftercare needs.  If you have questions, or wish to unsubscribe from this service please call [phone]. Thank you for Choosing our [employer] Emergency Department.        Agapito Duke RN  11/15/22 [id number]

## 2022-11-19 LAB
BACTERIA SPEC CULT: ABNORMAL
BACTERIA SPEC CULT: ABNORMAL
SERVICE CMNT-IMP: ABNORMAL

## 2022-11-21 ENCOUNTER — OFFICE VISIT (OUTPATIENT)
Dept: CARDIOLOGY CLINIC | Age: 82
End: 2022-11-21
Payer: MEDICARE

## 2022-11-21 VITALS
BODY MASS INDEX: 35.49 KG/M2 | HEIGHT: 65 IN | SYSTOLIC BLOOD PRESSURE: 138 MMHG | WEIGHT: 213 LBS | DIASTOLIC BLOOD PRESSURE: 84 MMHG | HEART RATE: 76 BPM

## 2022-11-21 DIAGNOSIS — I49.5 SSS (SICK SINUS SYNDROME) (HCC): ICD-10-CM

## 2022-11-21 DIAGNOSIS — I10 PRIMARY HYPERTENSION: ICD-10-CM

## 2022-11-21 DIAGNOSIS — Z95.0 PACEMAKER: ICD-10-CM

## 2022-11-21 DIAGNOSIS — I48.0 PAROXYSMAL ATRIAL FIBRILLATION (HCC): ICD-10-CM

## 2022-11-21 DIAGNOSIS — I50.22 CHRONIC SYSTOLIC (CONGESTIVE) HEART FAILURE (HCC): Primary | ICD-10-CM

## 2022-11-21 DIAGNOSIS — I25.10 ATHEROSCLEROSIS OF NATIVE CORONARY ARTERY OF NATIVE HEART WITHOUT ANGINA PECTORIS: ICD-10-CM

## 2022-11-21 PROCEDURE — 99214 OFFICE O/P EST MOD 30 MIN: CPT | Performed by: INTERNAL MEDICINE

## 2022-11-21 PROCEDURE — G8484 FLU IMMUNIZE NO ADMIN: HCPCS | Performed by: INTERNAL MEDICINE

## 2022-11-21 PROCEDURE — G8427 DOCREV CUR MEDS BY ELIG CLIN: HCPCS | Performed by: INTERNAL MEDICINE

## 2022-11-21 PROCEDURE — 3078F DIAST BP <80 MM HG: CPT | Performed by: INTERNAL MEDICINE

## 2022-11-21 PROCEDURE — G8417 CALC BMI ABV UP PARAM F/U: HCPCS | Performed by: INTERNAL MEDICINE

## 2022-11-21 PROCEDURE — 1123F ACP DISCUSS/DSCN MKR DOCD: CPT | Performed by: INTERNAL MEDICINE

## 2022-11-21 PROCEDURE — 1036F TOBACCO NON-USER: CPT | Performed by: INTERNAL MEDICINE

## 2022-11-21 PROCEDURE — 3074F SYST BP LT 130 MM HG: CPT | Performed by: INTERNAL MEDICINE

## 2022-11-21 RX ORDER — MONTELUKAST SODIUM 4 MG/1
TABLET, CHEWABLE ORAL
COMMUNITY
Start: 2022-11-16

## 2022-11-21 RX ORDER — SILDENAFIL 100 MG/1
100 TABLET, FILM COATED ORAL PRN
Qty: 10 TABLET | Refills: 3 | Status: SHIPPED | OUTPATIENT
Start: 2022-11-21

## 2022-11-21 RX ORDER — FAMOTIDINE 20 MG/1
TABLET, FILM COATED ORAL
COMMUNITY
Start: 2022-11-16

## 2022-11-21 NOTE — PROGRESS NOTES
[address], [address], [address] [place] [address]  PHONE: [phone]     22    NAME:  Schuyler Patel  : 1940  MRN: [id number]       SUBJECTIVE:   Schuyler Patel is a 79 y.o. male seen for a follow up visit regarding the following:     Chief Complaint   Patient presents with    Atrial Fibrillation    Congestive Heart Failure       HPI: Here for CAD, Afib   prox LAD PCI 2015;   (GERMAIN on brilinta). 10/17: PPM placement for SSS.   2019: watchman device placement   C 3/19/2021: small vessel CAD, no PCI. 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 Following cancer now. No CP, pressure. No new GERMAIN, SOB. Some anxiety issues. Patient denies recent history of orthopnea, PND, excessive dizziness and/or syncope. Has myasthenia gravis, this has been well controlled. Past Medical History, Past Surgical History, Family history, Social History, and Medications were all reviewed with the patient today and updated as necessary. Current Outpatient Medications   Medication Sig Dispense Refill    cefdinir (OMNICEF) 300 MG capsule Take 1 capsule by mouth 2 times daily for 10 days 20 capsule 0    Multiple Vitamins-Minerals (MULTIVITAMIN WITH MINERALS) tablet Once daily  tablet 3    Ascorbic Acid (VITAMIN C PO) Take 1 tablet by mouth daily. Cholecalciferol (VITAMIN D3 PO) Take 1 tablet by mouth daily.       acetaminophen (TYLENOL) 500 MG tablet Take 1,000 mg by mouth every 6 hours as needed for Pain.      tamsulosin (FLOMAX) 0.4 MG capsule Take 0.4 mg by mouth daily      colestipol (COLESTID) 1 g tablet       famotidine (PEPCID) 20 MG tablet       atorvastatin (LIPITOR) 80 MG tablet Take 1 tablet by mouth daily 90 tablet 5 metoprolol succinate (TOPROL XL) 25 MG extended release tablet metoprolol succinate ER 25 mg tablet,extended release 24 hr   Take 1 tablet every day by oral route. 90 tablet 5    finasteride (PROSCAR) 5 MG tablet finasteride 5 mg tablet   Take 1 tablet every day by oral route. 90 tablet 5    losartan (COZAAR) 50 MG tablet Take 1 tablet by mouth daily 90 tablet 5    ZINC PO Take 1 capsule by mouth daily. apixaban (ELIQUIS) 5 MG TABS tablet Take 1 tablet by mouth 2 times daily 60 tablet 2    nitroGLYCERIN (NITROSTAT) 0.4 MG SL tablet Place 1 sl under the tongue q 5 min prn cp, max 3 sl in a 15-min time period. Call 911 if no relief after the 3rd sl.      pantoprazole (PROTONIX) 40 MG tablet Take 40 mg by mouth every morning (before breakfast)      rOPINIRole (REQUIP) 0.5 MG tablet take 1 tablet by mouth nightly, increase to 1 tab twice a day if needed for restless leg syndrome        No current facility-administered medications for this visit.         Allergies   Allergen Reactions    Bebtelovimab Other (See Comments)     Syncope and hypotension     Patient Active Problem List    Diagnosis Date Noted    ZARA (iron deficiency anemia) 09/13/2022     Priority: Medium    Liver cyst 09/13/2022     Priority: Medium    Chronic systolic (congestive) heart failure 09/07/2022     Priority: Medium    Pre-diabetes 09/07/2022     Priority: Medium    Class 1 obesity due to excess calories with serious comorbidity and body mass index (BMI) of 31.0 to 31.9 in adult 09/07/2022     Priority: Medium    Medication management 09/07/2022     Priority: Medium    Unresponsive 08/26/2022     Priority: Medium    COVID 08/25/2022     Priority: Medium    Acute deep vein thrombosis (DVT) of axillary vein of left upper extremity ([facility] [address] 08/15/2022     Priority: Medium    Adenocarcinoma of cecum ([facility] [address] 08/13/2022     Priority: Medium    Colon cancer ([facility] [address] 08/07/2022     Priority: Medium     8/10/22 Amaya Corral MD) [address] LAPAROSCOPIC ROBOTIC, Right Bulmaro      Acute blood loss anemia 08/04/2022     Priority: Medium    Urine retention 04/03/2022    Acute cholecystitis 03/28/2022    Pancreatitis, gallstone 03/28/2022    Anemia 03/28/2022    Bacteremia 03/28/2022    Major depressive disorder, recurrent, moderate ([address] 03/02/2022    Major depressive disorder, recurrent, mild ([address] 03/02/2022    Major depressive disorder, recurrent, unspecified ([address] 03/02/2022    Pacemaker 04/14/2021    Atrial fibrillation ([address] 11/29/2017    Hypotension 10/26/2017    Sepsis ([address] 10/26/2017    Syncope 10/24/2017    Paroxysmal atrial fibrillation ([address] 06/30/2017     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          SSS (sick sinus syndrome) ([address] 06/30/2017    Bilateral carotid artery disease ([address] 06/30/2017    Myasthenia gravis ([address]     Mitral valve regurgitation 06/07/2016    Hypokalemia 06/07/2016    Dyslipidemia 06/07/2016    HTN (hypertension) 05/08/2015    Dyspnea 05/08/2015     Echo 6/15: EF 60%, mod MR, mod LVH, mild AI        S/P coronary artery stent placement 05/08/2015     3.0x38 mm Xience CAROL to pLAD 5/7/15        Coronary atherosclerosis of native coronary vessel 05/08/2015     GERMAIN on brilinta  5/7/15: prox LAD PCI, normal EF          Past Surgical History:   Procedure Laterality Date    APPENDECTOMY      CARDIAC CATHETERIZATION  05/21/2019    watchman device    CARDIAC CATHETERIZATION  05/27/2015    stent    COLONOSCOPY  2007    COLONOSCOPY N/A 8/6/2022    COLONOSCOPY POLYPECTOMY SNARE/COLD BIOPSY performed by Hussein Cornelius MD at [facility] ENDOSCOPY    ERCP  3/30/2022         HEMICOLECTOMY Right 8/10/2022    BOWEL RESECTION HEMICOLECTOMY LAPAROSCOPIC ROBOTIC, Right Bulmaro performed by Sujit Cameron MD at [place] [address]  2018    [facility] [place]    Dr. Kraus/Giana for sleep apnea and reconstruction for extending jaw    UPPER GASTROINTESTINAL ENDOSCOPY N/A 8/5/2022 EGD ESOPHAGOGASTRODUODENOSCOPY Rm 525 performed by Zen Hinton MD at [facility] ENDOSCOPY    VASCULAR SURGERY Right 07/10/2019     Repair of right radial artery pseudoaneurysm     Family History   Problem Relation Age of Onset    No Known Problems Brother     Cancer Brother         brain tumor    No Known Problems Sister     Cancer Mother         kidney    Cancer Father         stomach     Social History     Tobacco Use    Smoking status: Never    Smokeless tobacco: Never   Substance Use Topics    Alcohol use: No         ROS:    No obvious pertinent positives on review of systems except for what was outlined in the HPI today.       PHYSICAL EXAM:     /84   Pulse 76   Ht 5' 5\" (1.651 m)   Wt 213 lb (96.6 kg)   BMI 35.45 kg/m²    General/Constitutional:   Alert and oriented x 3, no acute distress  HEENT:   normocephalic, atraumatic, moist mucous membranes  Neck:   No JVD or carotid bruits bilaterally  Cardiovascular:   regular rate and rhythm, no murmur/rub/gallop appreciated  Pulmonary:   clear to auscultation bilaterally, no respiratory distress  Abdomen:   soft, non-tender, non-distended  Ext:   No sig LE edema bilaterally  Skin:  warm and dry, no obvious rashes seen  Neuro:   no obvious sensory or motor deficits  Psychiatric:   normal mood and affect      Lab Results   Component Value Date/Time     11/15/2022 06:52 PM    K 4.0 11/15/2022 06:52 PM     11/15/2022 06:52 PM    CO2 25 11/15/2022 06:52 PM    BUN 12 11/15/2022 06:52 PM    CREATININE 0.70 11/15/2022 06:52 PM    GLUCOSE 112 11/15/2022 06:52 PM    CALCIUM 10.0 11/15/2022 06:52 PM        Lab Results   Component Value Date    WBC 10.1 11/15/2022    HGB 14.7 11/15/2022    HCT 46.8 11/15/2022    MCV 86.2 11/15/2022     11/15/2022       Lab Results   Component Value Date    TSH 1.870 02/28/2022       Lab Results   Component Value Date    LABA1C 6.1 (H) 09/07/2022     Lab Results   Component Value Date     09/07/2022 Lab Results   Component Value Date    CHOL 96 05/03/2021    CHOL 169 08/20/2020     Lab Results   Component Value Date    TRIG 99 05/03/2021    TRIG 165 (H) 08/20/2020     Lab Results   Component Value Date    HDL 36 (L) 05/03/2021    HDL 33 (L) 08/20/2020     Lab Results   Component Value Date    LDLCALC 40.2 05/03/2021    LDLCALC 103 (H) 08/20/2020     Lab Results   Component Value Date    LABVLDL 19.8 05/03/2021    LABVLDL 33 (H) 08/20/2020     Lab Results   Component Value Date    CHOLHDLRATIO 2.7 05/03/2021    CHOLHDLRATIO 5.1 08/20/2020           I have Independently reviewed prior care notes, any ER records available, cardiac testing, labs and results with the patient and before seeing the patient today. Also independently reviewed outside records when available. ASSESSMENT:    Rosina Paige was seen today for atrial fibrillation and congestive heart failure. Diagnoses and all orders for this visit:    Chronic systolic (congestive) heart failure    Atherosclerosis of native coronary artery of native heart without angina pectoris    Primary hypertension    Paroxysmal atrial fibrillation (HCC)    SSS (sick sinus syndrome) ([facility] [address] [address]    Pacemaker        PLAN:         1. CAD:  Chronic stable angina. Follow for more angina, call PRN. The patient has been instructed to call with any angina or equivalent as reviewed today. All questions were answered with the patient voicing complete understanding. 2. DHF:  Better on lasix daily, Need to follow K and Cr. Get back on K.      3. PAF:  On ASA, s/p watchman. S/p AVN ablation. on NOAC for DVT now. 4. HPL:  Remain on lipitor. 5. HTN:  on ARB, follow Bps.       6. Device:  LV lead off due to fx.     7. Anemia: on iron. Follow Hb. Follow on OAC>    Hb normal now. Patient has been instructed and agrees to call our office with any issues or other concerns related to their cardiac condition(s) and/or complaint(s).         Return in about 3 months (around 2/21/2023).        JOSE DAI DO  11/21/2022

## 2022-11-22 ENCOUNTER — CARE COORDINATION (OUTPATIENT)
Dept: CARE COORDINATION | Facility: CLINIC | Age: 82
End: 2022-11-22

## 2022-11-22 NOTE — CARE COORDINATION
Ambulatory Care Coordination Note  11/22/2022    ACC: Ann Bejarano, RAYA    Ccm outreached to patient. Patient states he is doing well at this time. Patient states he went to cardiology on 11/21/22. Patient states no questions or concerns. Ccm discussed that I would outreach next week. Patient agreeable. Offered patient enrollment in the Remote Patient Monitoring (RPM) program for in-home monitoring: NA. Lab Results       None            Care Coordination Interventions    Referral from Primary Care Provider: No  Suggested Interventions and Community Resources          Goals Addressed                   This Visit's Progress     Conditions and Symptoms   On track     I will schedule office visits, as directed by my provider. I will keep my appointment or reschedule if I have to cancel. I will notify my provider of any symptoms that indicate a worsening of my condition. Patient agrees to monitor and record weights daily. Reports increase if 2 lbs. overnight or 5 lbs. in a week to MD    Barriers: none  Plan for overcoming my barriers: N/A  Confidence: 7/10  Anticipated Goal Completion Date: 9/27/22                Prior to Admission medications    Medication Sig Start Date End Date Taking? Authorizing Provider   colestipol (COLESTID) 1 g tablet  11/16/22   Historical Provider, MD   famotidine (PEPCID) 20 MG tablet  11/16/22   Historical Provider, MD   sildenafil (VIAGRA) 100 MG tablet Take 1 tablet by mouth as needed for Erectile Dysfunction 11/21/22   Miguel Angel Moyer DO   cefdinir (OMNICEF) 300 MG capsule Take 1 capsule by mouth 2 times daily for 10 days 11/15/22 11/25/22  Socrates Hobson MD   atorvastatin (LIPITOR) 80 MG tablet Take 1 tablet by mouth daily 9/7/22   Sulaiman Bello MD   metoprolol succinate (TOPROL XL) 25 MG extended release tablet metoprolol succinate ER 25 mg tablet,extended release 24 hr   Take 1 tablet every day by oral route.  9/7/22   Sulaiman Bello MD   finasteride (PROSCAR) 5 MG tablet finasteride 5 mg tablet   Take 1 tablet every day by oral route. 9/7/22   Angelique Ambriz MD   losartan (COZAAR) 50 MG tablet Take 1 tablet by mouth daily 9/7/22   Angelique Ambriz MD   Multiple Vitamins-Minerals (MULTIVITAMIN WITH MINERALS) tablet Once daily OTC 9/7/22   Angelique Ambriz MD   Ascorbic Acid (VITAMIN C PO) Take 1 tablet by mouth daily. Historical Provider, MD   ZINC PO Take 1 capsule by mouth daily. Historical Provider, MD   Cholecalciferol (VITAMIN D3 PO) Take 1 tablet by mouth daily. Historical Provider, MD   acetaminophen (TYLENOL) 500 MG tablet Take 1,000 mg by mouth every 6 hours as needed for Pain. Historical Provider, MD   apixaban (ELIQUIS) 5 MG TABS tablet Take 1 tablet by mouth 2 times daily 8/19/22 11/17/22  Mari Null MD   potassium chloride (KLOR-CON M) 20 MEQ extended release tablet Take 1 tablet by mouth daily 6/2/22 8/27/22  Farshad Resendiz DO   nitroGLYCERIN (NITROSTAT) 0.4 MG SL tablet Place 1 sl under the tongue q 5 min prn cp, max 3 sl in a 15-min time period.  Call 911 if no relief after the 3rd sl. 2/13/20   Ar Automatic Reconciliation   pantoprazole (PROTONIX) 40 MG tablet Take 40 mg by mouth every morning (before breakfast) 4/3/22   Ar Automatic Reconciliation   rOPINIRole (REQUIP) 0.5 MG tablet take 1 tablet by mouth nightly, increase to 1 tab twice a day if needed for restless leg syndrome  3/3/22   Ar Automatic Reconciliation   tamsulosin (FLOMAX) 0.4 MG capsule Take 0.4 mg by mouth daily 4/8/22   Ar Automatic Reconciliation   aspirin 81 MG EC tablet Take 81 mg by mouth daily 4/8/22 8/19/22  Ar Automatic Reconciliation   cyanocobalamin 1000 MCG tablet Take 1,000 mcg by mouth daily  Patient not taking: No sig reported 4/2/22 8/19/22  Ar Automatic Reconciliation   escitalopram (LEXAPRO) 10 MG tablet Take 10 mg by mouth daily  Patient not taking: No sig reported 3/3/22 8/19/22  Ar Automatic Reconciliation   furosemide (LASIX) 40 MG tablet Take 40 mg by mouth daily 3/3/22 8/19/22  Ar Automatic Reconciliation       Future Appointments   Date Time Provider Sri Falk   12/5/2022 04:05 AM SFO CT 64 SLICE UNIT 1 SFORCT SFO   12/6/2022 12:40 PM [address] [facility]   12/6/2022  1:00 PM Daphney Rendon MD [facility]   12/28/2022 10:00 AM [facility] AMB   1/3/2023 10:00 AM Ruben Avendaño MD [facility] AMB   2/27/2023  8:30 AM Vielka Saleh DO [facility] AMB

## 2022-11-29 ENCOUNTER — CARE COORDINATION (OUTPATIENT)
Dept: CARE COORDINATION | Facility: CLINIC | Age: 82
End: 2022-11-29

## 2022-11-29 NOTE — CARE COORDINATION
Ambulatory Care Coordination Note  11/29/2022    ACC: Nelly Mariee, RAYA    Ccm outreached to patient. Patient states overall he is doing well at this time. Patient states no questions or concerns. Ccm dicussed that I would outreach next week. Patient agreeable. Offered patient enrollment in the Remote Patient Monitoring (RPM) program for in-home monitoring: NA. Lab Results       None            Care Coordination Interventions    Referral from Primary Care Provider: No  Suggested Interventions and Community Resources          Goals Addressed                      This Visit's Progress      Conditions and Symptoms   On track      I will schedule office visits, as directed by my provider. I will keep my appointment or reschedule if I have to cancel. I will notify my provider of any symptoms that indicate a worsening of my condition. Patient agrees to monitor and record weights daily. Reports increase if 2 lbs. overnight or 5 lbs. in a week to MD    Barriers: none  Plan for overcoming my barriers: N/A  Confidence: 7/10  Anticipated Goal Completion Date: 9/27/22          Patient Stated (pt-stated)   On track      Patient will schedule and attend follow up              Prior to Admission medications    Medication Sig Start Date End Date Taking? Authorizing Provider   colestipol (COLESTID) 1 g tablet  11/16/22   Historical Provider, MD   famotidine (PEPCID) 20 MG tablet  11/16/22   Historical Provider, MD   sildenafil (VIAGRA) 100 MG tablet Take 1 tablet by mouth as needed for Erectile Dysfunction 11/21/22   Rossy Okeefe DO   atorvastatin (LIPITOR) 80 MG tablet Take 1 tablet by mouth daily 9/7/22   Margot Foss MD   metoprolol succinate (TOPROL XL) 25 MG extended release tablet metoprolol succinate ER 25 mg tablet,extended release 24 hr   Take 1 tablet every day by oral route.  9/7/22   Margot Foss MD   finasteride (PROSCAR) 5 MG tablet finasteride 5 mg tablet   Take 1 tablet every day by oral route. 9/7/22   Luis Canada MD   losartan (COZAAR) 50 MG tablet Take 1 tablet by mouth daily 9/7/22   Luis Canada MD   Multiple Vitamins-Minerals (MULTIVITAMIN WITH MINERALS) tablet Once daily OTC 9/7/22   Luis Canada MD   Ascorbic Acid (VITAMIN C PO) Take 1 tablet by mouth daily. Historical Provider, MD   ZINC PO Take 1 capsule by mouth daily. Historical Provider, MD   Cholecalciferol (VITAMIN D3 PO) Take 1 tablet by mouth daily. Historical Provider, MD   acetaminophen (TYLENOL) 500 MG tablet Take 1,000 mg by mouth every 6 hours as needed for Pain. Historical Provider, MD   apixaban (ELIQUIS) 5 MG TABS tablet Take 1 tablet by mouth 2 times daily 8/19/22 11/17/22  Jaguar Cheney MD   potassium chloride (KLOR-CON M) 20 MEQ extended release tablet Take 1 tablet by mouth daily 6/2/22 8/27/22  Leticia Smith DO   nitroGLYCERIN (NITROSTAT) 0.4 MG SL tablet Place 1 sl under the tongue q 5 min prn cp, max 3 sl in a 15-min time period.  Call 911 if no relief after the 3rd sl. 2/13/20   Ar Automatic Reconciliation   pantoprazole (PROTONIX) 40 MG tablet Take 40 mg by mouth every morning (before breakfast) 4/3/22   Ar Automatic Reconciliation   rOPINIRole (REQUIP) 0.5 MG tablet take 1 tablet by mouth nightly, increase to 1 tab twice a day if needed for restless leg syndrome  3/3/22   Ar Automatic Reconciliation   tamsulosin (FLOMAX) 0.4 MG capsule Take 0.4 mg by mouth daily 4/8/22   Ar Automatic Reconciliation   aspirin 81 MG EC tablet Take 81 mg by mouth daily 4/8/22 8/19/22  Ar Automatic Reconciliation   cyanocobalamin 1000 MCG tablet Take 1,000 mcg by mouth daily  Patient not taking: No sig reported 4/2/22 8/19/22  Ar Automatic Reconciliation   escitalopram (LEXAPRO) 10 MG tablet Take 10 mg by mouth daily  Patient not taking: No sig reported 3/3/22 8/19/22  Ar Automatic Reconciliation   furosemide (LASIX) 40 MG tablet Take 40 mg by mouth daily 3/3/22 8/19/22  Ar Automatic Reconciliation Future Appointments   Date Time Provider Sri Falk   12/5/2022 19:68 AM SFO CT 64 SLICE UNIT 1 SFORCT SFO   12/6/2022 12:40 PM Gregorio [address] [place] [address]   12/6/2022  1:00 PM Teresita Silverio MD [facility] AMB   12/28/2022 10:00 AM [facility] AMB   1/3/2023 10:00 AM Ruben Yang MD [facility] AMB   2/27/2023  8:30 AM Abelardo Juares DO [facility] AMB

## 2022-12-05 ENCOUNTER — HOSPITAL ENCOUNTER (OUTPATIENT)
Dept: CT IMAGING | Age: 82
Discharge: HOME | End: 2022-12-08

## 2022-12-05 DIAGNOSIS — C18.0 ADENOCARCINOMA OF CECUM (HCC): ICD-10-CM

## 2022-12-06 ENCOUNTER — CARE COORDINATION (OUTPATIENT)
Dept: CARE COORDINATION | Facility: CLINIC | Age: 82
End: 2022-12-06

## 2022-12-06 DIAGNOSIS — Z95.0 PACEMAKER: Primary | ICD-10-CM

## 2022-12-06 DIAGNOSIS — I49.5 SSS (SICK SINUS SYNDROME) (HCC): ICD-10-CM

## 2022-12-06 PROCEDURE — 93294 REM INTERROG EVL PM/LDLS PM: CPT | Performed by: INTERNAL MEDICINE

## 2022-12-06 PROCEDURE — 93296 REM INTERROG EVL PM/IDS: CPT | Performed by: INTERNAL MEDICINE

## 2022-12-06 NOTE — CARE COORDINATION
Ambulatory Care Coordination Note  12/6/2022    ACC: Melania Lane RN    Ccm outreached to patient. Patient states he is doing well. Patient states no questions or concerns. Ccm discussed that I would outreach next week. Patient agreeable. Offered patient enrollment in the Remote Patient Monitoring (RPM) program for in-home monitoring: NA. Lab Results       None            Care Coordination Interventions    Referral from Primary Care Provider: No  Suggested Interventions and Community Resources          Goals Addressed                      This Visit's Progress      Conditions and Symptoms   On track      I will schedule office visits, as directed by my provider. I will keep my appointment or reschedule if I have to cancel. I will notify my provider of any symptoms that indicate a worsening of my condition. Patient agrees to monitor and record weights daily. Reports increase if 2 lbs. overnight or 5 lbs. in a week to MD    Barriers: none  Plan for overcoming my barriers: N/A  Confidence: 7/10  Anticipated Goal Completion Date: 9/27/22          Patient Stated (pt-stated)   On track      Patient will schedule and attend follow up              Prior to Admission medications    Medication Sig Start Date End Date Taking? Authorizing Provider   colestipol (COLESTID) 1 g tablet  11/16/22   Historical Provider, MD   famotidine (PEPCID) 20 MG tablet  11/16/22   Historical Provider, MD   sildenafil (VIAGRA) 100 MG tablet Take 1 tablet by mouth as needed for Erectile Dysfunction 11/21/22   [facility],    atorvastatin (LIPITOR) 80 MG tablet Take 1 tablet by mouth daily 9/7/22   Sushila Kang MD   metoprolol succinate (TOPROL XL) 25 MG extended release tablet metoprolol succinate ER 25 mg tablet,extended release 24 hr   Take 1 tablet every day by oral route. 9/7/22   Sushila Kang MD   finasteride (PROSCAR) 5 MG tablet finasteride 5 mg tablet   Take 1 tablet every day by oral route.  9/7/22   Tejinder DOLL MD Jeanette   losartan (COZAAR) 50 MG tablet Take 1 tablet by mouth daily 9/7/22   Manuela Suarez MD   Multiple Vitamins-Minerals (MULTIVITAMIN WITH MINERALS) tablet Once daily OTC 9/7/22   Manuela Suarez MD   Ascorbic Acid (VITAMIN C PO) Take 1 tablet by mouth daily. Historical Provider, MD   ZINC PO Take 1 capsule by mouth daily. Historical Provider, MD   Cholecalciferol (VITAMIN D3 PO) Take 1 tablet by mouth daily. Historical Provider, MD   acetaminophen (TYLENOL) 500 MG tablet Take 1,000 mg by mouth every 6 hours as needed for Pain. Historical Provider, MD   apixaban (ELIQUIS) 5 MG TABS tablet Take 1 tablet by mouth 2 times daily 8/19/22 11/17/22  Shira Hernandez MD   potassium chloride (KLOR-CON M) 20 MEQ extended release tablet Take 1 tablet by mouth daily 6/2/22 8/27/22  Jevon Ashton,    nitroGLYCERIN (NITROSTAT) 0.4 MG SL tablet Place 1 sl under the tongue q 5 min prn cp, max 3 sl in a 15-min time period.  Call 911 if no relief after the 3rd sl. 2/13/20   Ar Automatic Reconciliation   pantoprazole (PROTONIX) 40 MG tablet Take 40 mg by mouth every morning (before breakfast) 4/3/22   Ar Automatic Reconciliation   rOPINIRole (REQUIP) 0.5 MG tablet take 1 tablet by mouth nightly, increase to 1 tab twice a day if needed for restless leg syndrome  3/3/22   Ar Automatic Reconciliation   tamsulosin (FLOMAX) 0.4 MG capsule Take 0.4 mg by mouth daily 4/8/22   Ar Automatic Reconciliation   aspirin 81 MG EC tablet Take 81 mg by mouth daily 4/8/22 8/19/22  Ar Automatic Reconciliation   cyanocobalamin 1000 MCG tablet Take 1,000 mcg by mouth daily  Patient not taking: No sig reported 4/2/22 8/19/22  Ar Automatic Reconciliation   escitalopram (LEXAPRO) 10 MG tablet Take 10 mg by mouth daily  Patient not taking: No sig reported 3/3/22 8/19/22  Ar Automatic Reconciliation   furosemide (LASIX) 40 MG tablet Take 40 mg by mouth daily 3/3/22 8/19/22  Ar Automatic Reconciliation       Future Appointments   Date Time Provider Sri Ceeisti   12/6/2022 12:40 PM Julia[address] [facility]   12/6/2022  1:00 PM Bill Ndiaye MD [facility] AM[facility]   12/28/2022 10:00 AM [facility] AMB   1/3/2023 10:00 AM Ruben Esquivel MD [facility] AMB   2/27/2023  8:30 AM Miguel Angel Moyer DO [facility] AMB

## 2022-12-13 ENCOUNTER — CARE COORDINATION (OUTPATIENT)
Dept: CARE COORDINATION | Facility: CLINIC | Age: 82
End: 2022-12-13

## 2022-12-13 NOTE — CARE COORDINATION
Ambulatory Care Coordination Note  12/13/2022    ACC: Tracey Orr RN    Ccm outreached to patient. Patient states he is doing well at this time. Patient states no questions or concerns. Ccm discussed that I would outreach next week. Patient agreeable. Offered patient enrollment in the Remote Patient Monitoring (RPM) program for in-home monitoring: NA. Lab Results       None            Care Coordination Interventions    Referral from Primary Care Provider: No  Suggested Interventions and Community Resources          Goals Addressed                      This Visit's Progress      Conditions and Symptoms   On track      I will schedule office visits, as directed by my provider. I will keep my appointment or reschedule if I have to cancel. I will notify my provider of any symptoms that indicate a worsening of my condition. Patient agrees to monitor and record weights daily. Reports increase if 2 lbs. overnight or 5 lbs. in a week to MD    Barriers: none  Plan for overcoming my barriers: N/A  Confidence: 7/10  Anticipated Goal Completion Date: 9/27/22          Patient Stated (pt-stated)   On track      Patient will schedule and attend follow up              Prior to Admission medications    Medication Sig Start Date End Date Taking? Authorizing Provider   colestipol (COLESTID) 1 g tablet  11/16/22   Historical Provider, MD   famotidine (PEPCID) 20 MG tablet  11/16/22   Historical Provider, MD   sildenafil (VIAGRA) 100 MG tablet Take 1 tablet by mouth as needed for Erectile Dysfunction 11/21/22   Bill Gandhi DO   atorvastatin (LIPITOR) 80 MG tablet Take 1 tablet by mouth daily 9/7/22   Vic Pineda MD   metoprolol succinate (TOPROL XL) 25 MG extended release tablet metoprolol succinate ER 25 mg tablet,extended release 24 hr   Take 1 tablet every day by oral route. 9/7/22   Vic Pineda MD   finasteride (PROSCAR) 5 MG tablet finasteride 5 mg tablet   Take 1 tablet every day by oral route. 9/7/22   Mariam Ward MD   losartan (COZAAR) 50 MG tablet Take 1 tablet by mouth daily 9/7/22   Mariam Ward MD   Multiple Vitamins-Minerals (MULTIVITAMIN WITH MINERALS) tablet Once daily OTC 9/7/22   Mariam Ward MD   Ascorbic Acid (VITAMIN C PO) Take 1 tablet by mouth daily. Historical Provider, MD   ZINC PO Take 1 capsule by mouth daily. Historical Provider, MD   Cholecalciferol (VITAMIN D3 PO) Take 1 tablet by mouth daily. Historical Provider, MD   acetaminophen (TYLENOL) 500 MG tablet Take 1,000 mg by mouth every 6 hours as needed for Pain. Historical Provider, MD   apixaban (ELIQUIS) 5 MG TABS tablet Take 1 tablet by mouth 2 times daily 8/19/22 11/17/22  Sherri Summers MD   potassium chloride (KLOR-CON M) 20 MEQ extended release tablet Take 1 tablet by mouth daily 6/2/22 8/27/22  Savanah Frazier,    nitroGLYCERIN (NITROSTAT) 0.4 MG SL tablet Place 1 sl under the tongue q 5 min prn cp, max 3 sl in a 15-min time period.  Call 911 if no relief after the 3rd sl. 2/13/20   Ar Automatic Reconciliation   pantoprazole (PROTONIX) 40 MG tablet Take 40 mg by mouth every morning (before breakfast) 4/3/22   Ar Automatic Reconciliation   rOPINIRole (REQUIP) 0.5 MG tablet take 1 tablet by mouth nightly, increase to 1 tab twice a day if needed for restless leg syndrome  3/3/22   Ar Automatic Reconciliation   tamsulosin (FLOMAX) 0.4 MG capsule Take 0.4 mg by mouth daily 4/8/22   Ar Automatic Reconciliation   aspirin 81 MG EC tablet Take 81 mg by mouth daily 4/8/22 8/19/22  Ar Automatic Reconciliation   cyanocobalamin 1000 MCG tablet Take 1,000 mcg by mouth daily  Patient not taking: No sig reported 4/2/22 8/19/22  Ar Automatic Reconciliation   escitalopram (LEXAPRO) 10 MG tablet Take 10 mg by mouth daily  Patient not taking: No sig reported 3/3/22 8/19/22  Ar Automatic Reconciliation   furosemide (LASIX) 40 MG tablet Take 40 mg by mouth daily 3/3/22 8/19/22  Ar Automatic Reconciliation       Future Appointments   Date Time Provider Sri Falk   12/28/2022 10:00 AM [facility] LAB [facility] AMB   1/3/2023 10:00 AM Ruben Deal MD [facility] AMB   2/27/2023  8:30 AM Albertina Irvin DO [facility] AMB

## 2022-12-20 ENCOUNTER — CARE COORDINATION (OUTPATIENT)
Dept: CARE COORDINATION | Facility: CLINIC | Age: 82
End: 2022-12-20

## 2022-12-20 NOTE — PROGRESS NOTES
Medicare Annual Wellness Visit    Anel Garcia is here for Medicare AWV    Assessment & Plan   Medicare annual wellness visit, subsequent  Congestive heart failure, unspecified HF chronicity, unspecified heart failure type ([facility]  -     CBC with Auto Differential; Future  Severe obesity (BMI 35.0-39. 9) with comorbidity ([facility]  Myasthenia gravis ([facility]  Major depressive disorder, recurrent, moderate (HCC)  Malignant neoplasm of colon, unspecified part of colon ([facility]  Acute deep vein thrombosis (DVT) of axillary vein of left upper extremity (HCC)  Paroxysmal atrial fibrillation (HCC)  Atherosclerotic heart disease of native coronary artery with other forms of angina pectoris ([facility]  -     CBC with Auto Differential; Future  -     Comprehensive Metabolic Panel; Future  -     Lipid Panel; Future  Iron deficiency anemia due to chronic blood loss  Elevated hemoglobin A1c  -     Comprehensive Metabolic Panel; Future  -     Hemoglobin A1C; Future  Dyslipidemia  -     Comprehensive Metabolic Panel; Future  -     Lipid Panel; Future     1. Annual wellness visit. Advised to get his COVID boosters. 2.  Congestive heart failure. Continue follow-up with cardiologist.  Carmelo Pickard to be stable. 3.  Obesity. Advised diet and weight loss. 4.  Paroxysmal atrial fibrillation. Well-controlled with watchman procedure. 5.  Myasthenia gravis stable. 6.  Depression not taking any medications at the present time. 7.  DVT continue Eliquis. 8.  Coronary artery disease. Continue follow-up with cardiologist.  9.  Chronic anemia. Hemoglobin in the normal range. 8.  Elevated hemoglobin A1c. Now up to 6.5. We will follow for now. Advised low sugar and low carbohydrate diet. 10.  Hyperlipidemia well-controlled. Continue atorvastatin. 11.  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 Advised to give them a call. Follow-up with us if unable to get appointment.    Recommendations for Preventive Services Due: see orders and patient instructions/AVS.  Recommended screening schedule for the next 5-10 years is provided to the patient in written form: see Patient Instructions/AVS.     Return in about 3 months (around 4/3/2023) for ffup with labs prior to visit. Subjective   The following acute and/or chronic problems were also addressed today:  65-year-old male comes in for awv. Used to be in [place] for PCP. Stopped being established patient for while. Recently tried to reestablish at the office in [place] and wait was backed up until January 2023. Would like to return to that office once he can make appointment. Medical illnesses include:  1. History of DVT. Patient on Eliquis. 2.  Congestive heart failure. 2016. Patient on metoprolol. [facility]. 3.  Atrial fibrillation. Patient on Eliquis. Has pacemaker (Watchman). Due to have pacemaker checked. 4.  Hypertension. /61 today. Patient on losartan and metoprolol. 5.  Sick sinus syndrome status post pacemaker placement. 6.  Coronary artery disease. Patient on nitroglycerin, metoprolol, and atorvastatin. 7.  Colon cancer. Stage IIA (pT3, pN0, cM0) of right colon. Saw Dr. Liliam Cazares at [facility]. Refused chemotherapy. Also sees Dr. Megan Madden, gastroenterologst and has endoscopy on 09/30/2022.   8.  Myasthenia gravis. Not on any medications. No flare-ups in years. 9.  Hyperlipidemia. Patient is on atorvastatin. 10. BPH. Patient on finasteride and tamsulosin. 11. GERD. Patient on pantoprazole. 12. RLS. Takes Requip 0.5 mg prn. Overall well-controlled. 13. Anemia. Currently on oral Fe. Scheduled to receive IV Fe. Most recent Hgb 9.1. Present hemoglobin 14.1  16.  Lab work done 12/28/22 .   Iron normal hemoglobin A1c 6.5 cholesterol normal glucose 117 GFR normal LFTs normal CBC normal    Orders Only on 12/28/2022   Component Date Value Ref Range Status    NT Pro-BNP 12/28/2022 722 (A)  <450 PG/ML Final TIBC 12/28/2022 346  250 - 450 ug/dL Final    Ferritin 12/28/2022 41  8 - 388 NG/ML Final    Hemoglobin A1C 12/28/2022 6.5 (A)  4.8 - 5.6 % Final    eAG 12/28/2022 140  mg/dL Final    Comment: Reference Range  Normal: 4.8-5.6  Diabetic >=6.5%  Normal       <5.7%      Cholesterol, Total 12/28/2022 95  <200 MG/DL Final    Comment: Borderline High: 200-239 mg/dL  High: Greater than or equal to 240 mg/dL      Triglycerides 12/28/2022 85  35 - 150 MG/DL Final    Comment: Borderline High: 150-199 mg/dL, High: 200-499 mg/dL  Very High: Greater than or equal to 500 mg/dL      HDL 12/28/2022 43  40 - 60 MG/DL Final    LDL Calculated 12/28/2022 35  <100 MG/DL Final    Comment: Near Optimal: 100-129 mg/dL  Borderline High: 130-159, High: 160-189 mg/dL  Very High: Greater than or equal to 190 mg/dL      VLDL Cholesterol Calculated 12/28/2022 17  6.0 - 23.0 MG/DL Final    Chol/HDL Ratio 12/28/2022 2.2    Final    Sodium 12/28/2022 142  133 - 143 mmol/L Final    Potassium 12/28/2022 4.3  3.5 - 5.1 mmol/L Final    Chloride 12/28/2022 108  101 - 110 mmol/L Final    CO2 12/28/2022 28  21 - 32 mmol/L Final    Anion Gap 12/28/2022 6  2 - 11 mmol/L Final    Glucose 12/28/2022 117 (A)  65 - 100 mg/dL Final    BUN 12/28/2022 16  8 - 23 MG/DL Final    Creatinine 12/28/2022 0.80  0.8 - 1.5 MG/DL Final    Est, Glom Filt Rate 12/28/2022 >60  >60 ml/min/1.73m2 Final    Comment:   Pediatric calculator link: Bar.[employer]. org/professionals/kdoqi/gfr_calculatorped    These results are not intended for use in patients <25years of age. eGFR results are calculated without a race factor using  the 2021 CKD-EPI equation. Careful clinical correlation is recommended, particularly when comparing to results calculated using previous equations.   The CKD-EPI equation is less accurate in patients with extremes of muscle mass, extra-renal metabolism of creatinine, excessive creatine ingestion, or following therapy that affects renal tubular secretion. Calcium 12/28/2022 9.6  8.3 - 10.4 MG/DL Final    Total Bilirubin 12/28/2022 0.5  0.2 - 1.1 MG/DL Final    ALT 12/28/2022 28  12 - 65 U/L Final    AST 12/28/2022 15  15 - 37 U/L Final    Alk Phosphatase 12/28/2022 93  50 - 136 U/L Final    Total Protein 12/28/2022 6.4  6.3 - 8.2 g/dL Final    Albumin 12/28/2022 3.5  3.2 - 4.6 g/dL Final    Globulin 12/28/2022 2.9  2.8 - 4.5 g/dL Final    Albumin/Globulin Ratio 12/28/2022 1.2  0.4 - 1.6   Final    WBC 12/28/2022 10.1  4.3 - 11.1 K/uL Final    RBC 12/28/2022 5.06  4.23 - 5.6 M/uL Final    Hemoglobin 12/28/2022 14.6  13.6 - 17.2 g/dL Final    Hematocrit 12/28/2022 46.2  41.1 - 50.3 % Final    MCV 12/28/2022 91.3  82 - 102 FL Final    MCH 12/28/2022 28.9  26.1 - 32.9 PG Final    MCHC 12/28/2022 31.6  31.4 - 35.0 g/dL Final    RDW 12/28/2022 17.2 (A)  11.9 - 14.6 % Final    Platelets 82/33/2393 188  150 - 450 K/uL Final    MPV 12/28/2022 10.4  9.4 - 12.3 FL Final    nRBC 12/28/2022 0.00  0.0 - 0.2 K/uL Final    **Note: Absolute NRBC parameter is now reported with Hemogram**    Differential Type 12/28/2022 AUTOMATED    Final    Seg Neutrophils 12/28/2022 65  43 - 78 % Final    Lymphocytes 12/28/2022 25  13 - 44 % Final    Monocytes 12/28/2022 7  4.0 - 12.0 % Final    Eosinophils % 12/28/2022 3  0.5 - 7.8 % Final    Basophils 12/28/2022 0  0.0 - 2.0 % Final    Immature Granulocytes 12/28/2022 0  0.0 - 5.0 % Final    Segs Absolute 12/28/2022 6.4  1.7 - 8.2 K/UL Final    Absolute Lymph # 12/28/2022 2.6  0.5 - 4.6 K/UL Final    Absolute Mono # 12/28/2022 0.7  0.1 - 1.3 K/UL Final    Absolute Eos # 12/28/2022 0.3  0.0 - 0.8 K/UL Final    Basophils Absolute 12/28/2022 0.0  0.0 - 0.2 K/UL Final    Absolute Immature Granulocyte 12/28/2022 0.0  0.0 - 0.5 K/UL Final     Patient's complete Health Risk Assessment and screening values have been reviewed and are found in Flowsheets.  The following problems were reviewed today and where indicated follow up appointments were made and/or referrals ordered. Positive Risk Factor Screenings with Interventions:                Social and Emotional Support:  Do you get the social and emotional support that you need?: (!) No  Interventions:  Denies problems with emotional support    Weight and Activity:  Physical Activity: Insufficiently Active    Days of Exercise per Week: 3 days    Minutes of Exercise per Session: 10 min     On average, how many days per week do you engage in moderate to strenuous exercise (like a brisk walk)?: 3 days  Have you lost any weight without trying in the past 3 months?: No  Body mass index: (!) 35.91    Obesity Interventions:  Advised low sugar, low carb diet. Dentist Screen:  Have you seen the dentist within the past year?: (!) No    Intervention:  Advised to schedule with their dentist    Hearing Screen:  Do you or your family notice any trouble with your hearing that hasn't been managed with hearing aids?: (!) Yes    Interventions:  Referred to Audiology     Safety:  Do all of your stairways have a railing or banister?: (!) No  Interventions:  No stairs at home. Advanced Directives:  Do you have a Living Will?: (!) No    Intervention:  Will talk to nephew who is a . Objective   Vitals:    01/03/23 0951   BP: 131/69   Pulse: 73   SpO2: 96%   Weight: 215 lb 12.8 oz (97.9 kg)   Height: 5' 5\" (1.651 m)      Body mass index is 35.91 kg/m².         General Appearance: alert and oriented to person, place and time, well-developed and well-nourished, in no acute distress  Head: normocephalic and atraumatic  Pulmonary/Chest: clear to auscultation bilaterally- no wheezes, rales or rhonchi, normal air movement, no respiratory distress  Cardiovascular: normal rate, normal S1 and S2, and no carotid bruits  Abdomen: soft, non-tender, non-distended, normal bowel sounds, no masses or organomegaly  Extremities:  + edema-  bilateral   Neurologic: speech normal       Allergies   Allergen Reactions    Bebtelovimab Other (See Comments)     Syncope and hypotension     Prior to Visit Medications    Medication Sig Taking? Authorizing Provider   colestipol (COLESTID) 1 g tablet  Yes Historical Provider, MD   famotidine (PEPCID) 20 MG tablet  Yes Historical Provider, MD   sildenafil (VIAGRA) 100 MG tablet Take 1 tablet by mouth as needed for Erectile Dysfunction Yes Varun Koenig,    atorvastatin (LIPITOR) 80 MG tablet Take 1 tablet by mouth daily Yes Tejinder Reid MD   metoprolol succinate (TOPROL XL) 25 MG extended release tablet metoprolol succinate ER 25 mg tablet,extended release 24 hr   Take 1 tablet every day by oral route. Yes Tejinder Reid MD   finasteride (PROSCAR) 5 MG tablet finasteride 5 mg tablet   Take 1 tablet every day by oral route. Yes Tejinder Reid MD   losartan (COZAAR) 50 MG tablet Take 1 tablet by mouth daily Yes Tejinder Reid MD   Multiple Vitamins-Minerals (MULTIVITAMIN WITH MINERALS) tablet Once daily OTC Yes Tejinder Reid MD   Ascorbic Acid (VITAMIN C PO) Take 1 tablet by mouth daily. Yes Historical Provider, MD   ZINC PO Take 1 capsule by mouth daily. Yes Historical Provider, MD   Cholecalciferol (VITAMIN D3 PO) Take 1 tablet by mouth daily. Yes Historical Provider, MD   acetaminophen (TYLENOL) 500 MG tablet Take 1,000 mg by mouth every 6 hours as needed for Pain. Yes Historical Provider, MD   apixaban (ELIQUIS) 5 MG TABS tablet Take 1 tablet by mouth 2 times daily Yes Geoffrey Lucero MD   pantoprazole (PROTONIX) 40 MG tablet Take 40 mg by mouth every morning (before breakfast) Yes Ar Automatic Reconciliation   rOPINIRole (REQUIP) 0.5 MG tablet take 1 tablet by mouth nightly, increase to 1 tab twice a day if needed for restless leg syndrome  Yes Ar Automatic Reconciliation   tamsulosin (FLOMAX) 0.4 MG capsule Take 0.4 mg by mouth daily Yes Ar Automatic Reconciliation   potassium chloride (KLOR-CON M) 20 MEQ extended release tablet Take 1 tablet by  mouth daily  Keisha Riley DO   nitroGLYCERIN (NITROSTAT) 0.4 MG SL tablet Place 1 sl under the tongue q 5 min prn cp, max 3 sl in a 15-min time period. Call 911 if no relief after the 3rd sl.   Patient not taking: Reported on 1/3/2023  Ar Automatic Reconciliation   aspirin 81 MG EC tablet Take 81 mg by mouth daily  Ar Automatic Reconciliation   cyanocobalamin 1000 MCG tablet Take 1,000 mcg by mouth daily  Patient not taking: No sig reported  Ar Automatic Reconciliation   escitalopram (LEXAPRO) 10 MG tablet Take 10 mg by mouth daily  Patient not taking: No sig reported  Ar Automatic Reconciliation   furosemide (LASIX) 40 MG tablet Take 40 mg by mouth daily  Ar Automatic Reconciliation       CareTeam (Including outside providers/suppliers regularly involved in providing care):   Patient Care Team:  David Lorenzana MD as PCP - General (Family Medicine)  David Lorenzana MD as [facility]  Keisha Riley DO as Consulting Physician (Internal Medicine Cardiovascular Disease)  Juaquin Valadez MD as Hematology/Oncology (Hematology and Oncology)  Osmani Chavez RN as Ambulatory Care Manager     Reviewed and updated this visit:  Tobacco  Allergies  Meds  Problems  Med Hx  Surg Hx  Soc Hx  Fam Hx

## 2022-12-20 NOTE — CARE COORDINATION
Ambulatory Care Coordination Note  12/20/2022    ACC: Nelly Mariee, RAYA    Ccm outreached to patient. Patient states he is doing well at this time. Patient states no questions or concerns. Ccm discussed that I would outreach next week. Offered patient enrollment in the Remote Patient Monitoring (RPM) program for in-home monitoring: NA. Lab Results       None            Care Coordination Interventions    Referral from Primary Care Provider: No  Suggested Interventions and Community Resources          Goals Addressed                      This Visit's Progress      Conditions and Symptoms   On track      I will schedule office visits, as directed by my provider. I will keep my appointment or reschedule if I have to cancel. I will notify my provider of any symptoms that indicate a worsening of my condition. Patient agrees to monitor and record weights daily. Reports increase if 2 lbs. overnight or 5 lbs. in a week to MD    Barriers: none  Plan for overcoming my barriers: N/A  Confidence: 7/10  Anticipated Goal Completion Date: 9/27/22          Patient Stated (pt-stated)   On track      Patient will schedule and attend follow up              Prior to Admission medications    Medication Sig Start Date End Date Taking? Authorizing Provider   colestipol (COLESTID) 1 g tablet  11/16/22   Historical Provider, MD   famotidine (PEPCID) 20 MG tablet  11/16/22   Historical Provider, MD   sildenafil (VIAGRA) 100 MG tablet Take 1 tablet by mouth as needed for Erectile Dysfunction 11/21/22   Rossy Okeefe DO   atorvastatin (LIPITOR) 80 MG tablet Take 1 tablet by mouth daily 9/7/22   Margot Foss MD   metoprolol succinate (TOPROL XL) 25 MG extended release tablet metoprolol succinate ER 25 mg tablet,extended release 24 hr   Take 1 tablet every day by oral route. 9/7/22   Margot Foss MD   finasteride (PROSCAR) 5 MG tablet finasteride 5 mg tablet   Take 1 tablet every day by oral route.  9/7/22   Tejinder DOLL MD Jeanette   losartan (COZAAR) 50 MG tablet Take 1 tablet by mouth daily 9/7/22   Lorelei Fontenot MD   Multiple Vitamins-Minerals (MULTIVITAMIN WITH MINERALS) tablet Once daily OTC 9/7/22   Lorelei Fontenot MD   Ascorbic Acid (VITAMIN C PO) Take 1 tablet by mouth daily. Historical Provider, MD   ZINC PO Take 1 capsule by mouth daily. Historical Provider, MD   Cholecalciferol (VITAMIN D3 PO) Take 1 tablet by mouth daily. Historical Provider, MD   acetaminophen (TYLENOL) 500 MG tablet Take 1,000 mg by mouth every 6 hours as needed for Pain. Historical Provider, MD   apixaban (ELIQUIS) 5 MG TABS tablet Take 1 tablet by mouth 2 times daily 8/19/22 11/17/22  Lali Logan MD   potassium chloride (KLOR-CON M) 20 MEQ extended release tablet Take 1 tablet by mouth daily 6/2/22 8/27/22  Vielka Saleh DO   nitroGLYCERIN (NITROSTAT) 0.4 MG SL tablet Place 1 sl under the tongue q 5 min prn cp, max 3 sl in a 15-min time period.  Call 911 if no relief after the 3rd sl. 2/13/20   Ar Automatic Reconciliation   pantoprazole (PROTONIX) 40 MG tablet Take 40 mg by mouth every morning (before breakfast) 4/3/22   Ar Automatic Reconciliation   rOPINIRole (REQUIP) 0.5 MG tablet take 1 tablet by mouth nightly, increase to 1 tab twice a day if needed for restless leg syndrome  3/3/22   Ar Automatic Reconciliation   tamsulosin (FLOMAX) 0.4 MG capsule Take 0.4 mg by mouth daily 4/8/22   Ar Automatic Reconciliation   aspirin 81 MG EC tablet Take 81 mg by mouth daily 4/8/22 8/19/22  Ar Automatic Reconciliation   cyanocobalamin 1000 MCG tablet Take 1,000 mcg by mouth daily  Patient not taking: No sig reported 4/2/22 8/19/22  Ar Automatic Reconciliation   escitalopram (LEXAPRO) 10 MG tablet Take 10 mg by mouth daily  Patient not taking: No sig reported 3/3/22 8/19/22  Ar Automatic Reconciliation   furosemide (LASIX) 40 MG tablet Take 40 mg by mouth daily 3/3/22 8/19/22  Ar Automatic Reconciliation       Future Appointments   Date Time Provider Sri Falk   12/28/2022 10:00 AM [facility] LAB [facility] AMB   1/3/2023 10:00 AM Ruben Hodges MD [facility] AMB   2/27/2023  8:30 AM Jessica Vasques DO [facility] AMB

## 2022-12-27 ENCOUNTER — CARE COORDINATION (OUTPATIENT)
Dept: CARE COORDINATION | Facility: CLINIC | Age: 82
End: 2022-12-27

## 2022-12-27 ENCOUNTER — TELEPHONE (OUTPATIENT)
Dept: INTERNAL MEDICINE CLINIC | Facility: CLINIC | Age: 82
End: 2022-12-27

## 2022-12-27 DIAGNOSIS — I48.0 PAROXYSMAL ATRIAL FIBRILLATION (HCC): Primary | ICD-10-CM

## 2022-12-27 DIAGNOSIS — I50.9 CONGESTIVE HEART FAILURE, UNSPECIFIED HF CHRONICITY, UNSPECIFIED HEART FAILURE TYPE (HCC): ICD-10-CM

## 2022-12-27 DIAGNOSIS — I10 PRIMARY HYPERTENSION: ICD-10-CM

## 2022-12-27 DIAGNOSIS — I25.10 ATHEROSCLEROSIS OF NATIVE CORONARY ARTERY OF NATIVE HEART WITHOUT ANGINA PECTORIS: ICD-10-CM

## 2022-12-27 DIAGNOSIS — D50.0 IRON DEFICIENCY ANEMIA DUE TO CHRONIC BLOOD LOSS: ICD-10-CM

## 2022-12-27 DIAGNOSIS — E78.5 DYSLIPIDEMIA: ICD-10-CM

## 2022-12-27 DIAGNOSIS — R73.09 ELEVATED HEMOGLOBIN A1C: ICD-10-CM

## 2022-12-27 NOTE — CARE COORDINATION
Ambulatory Care Coordination Note  12/27/2022    ACC: Dat Ellis, RAYA    Ccm outreached to patient. Patient state she is doing well at this time. Patient states no questions or concerns. Ccm discussed that I would outreach next week. Patient agreeable. Offered patient enrollment in the Remote Patient Monitoring (RPM) program for in-home monitoring: NA. Lab Results       None            Care Coordination Interventions    Referral from Primary Care Provider: No  Suggested Interventions and Community Resources          Goals Addressed                      This Visit's Progress      Conditions and Symptoms   On track      I will schedule office visits, as directed by my provider. I will keep my appointment or reschedule if I have to cancel. I will notify my provider of any symptoms that indicate a worsening of my condition. Patient agrees to monitor and record weights daily. Reports increase if 2 lbs. overnight or 5 lbs. in a week to MD    Barriers: none  Plan for overcoming my barriers: N/A  Confidence: 7/10  Anticipated Goal Completion Date: 9/27/22          Patient Stated (pt-stated)   On track      Patient will schedule and attend follow up              Prior to Admission medications    Medication Sig Start Date End Date Taking? Authorizing Provider   colestipol (COLESTID) 1 g tablet  11/16/22   Historical Provider, MD   famotidine (PEPCID) 20 MG tablet  11/16/22   Historical Provider, MD   sildenafil (VIAGRA) 100 MG tablet Take 1 tablet by mouth as needed for Erectile Dysfunction 11/21/22   Welford Dates, DO   atorvastatin (LIPITOR) 80 MG tablet Take 1 tablet by mouth daily 9/7/22   Yessi Ruvalcaba MD   metoprolol succinate (TOPROL XL) 25 MG extended release tablet metoprolol succinate ER 25 mg tablet,extended release 24 hr   Take 1 tablet every day by oral route. 9/7/22   Yessi Ruvalcaba MD   finasteride (PROSCAR) 5 MG tablet finasteride 5 mg tablet   Take 1 tablet every day by oral route.  9/7/22 Gabi Kaur MD   losartan (COZAAR) 50 MG tablet Take 1 tablet by mouth daily 9/7/22   Gabi Kaur MD   Multiple Vitamins-Minerals (MULTIVITAMIN WITH MINERALS) tablet Once daily OTC 9/7/22   Gabi Kaur MD   Ascorbic Acid (VITAMIN C PO) Take 1 tablet by mouth daily. Historical Provider, MD   ZINC PO Take 1 capsule by mouth daily. Historical Provider, MD   Cholecalciferol (VITAMIN D3 PO) Take 1 tablet by mouth daily. Historical Provider, MD   acetaminophen (TYLENOL) 500 MG tablet Take 1,000 mg by mouth every 6 hours as needed for Pain. Historical Provider, MD   apixaban (ELIQUIS) 5 MG TABS tablet Take 1 tablet by mouth 2 times daily 8/19/22 11/17/22  Sameera Jeong MD   potassium chloride (KLOR-CON M) 20 MEQ extended release tablet Take 1 tablet by mouth daily 6/2/22 8/27/22  Art Stairs, DO   nitroGLYCERIN (NITROSTAT) 0.4 MG SL tablet Place 1 sl under the tongue q 5 min prn cp, max 3 sl in a 15-min time period.  Call 911 if no relief after the 3rd sl. 2/13/20   Ar Automatic Reconciliation   pantoprazole (PROTONIX) 40 MG tablet Take 40 mg by mouth every morning (before breakfast) 4/3/22   Ar Automatic Reconciliation   rOPINIRole (REQUIP) 0.5 MG tablet take 1 tablet by mouth nightly, increase to 1 tab twice a day if needed for restless leg syndrome  3/3/22   Ar Automatic Reconciliation   tamsulosin (FLOMAX) 0.4 MG capsule Take 0.4 mg by mouth daily 4/8/22   Ar Automatic Reconciliation   aspirin 81 MG EC tablet Take 81 mg by mouth daily 4/8/22 8/19/22  Ar Automatic Reconciliation   cyanocobalamin 1000 MCG tablet Take 1,000 mcg by mouth daily  Patient not taking: No sig reported 4/2/22 8/19/22  Ar Automatic Reconciliation   escitalopram (LEXAPRO) 10 MG tablet Take 10 mg by mouth daily  Patient not taking: No sig reported 3/3/22 8/19/22  Ar Automatic Reconciliation   furosemide (LASIX) 40 MG tablet Take 40 mg by mouth daily 3/3/22 8/19/22  Ar Automatic Reconciliation       Future Appointments   Date Time Provider Sri Deya   12/28/2022 10:00 AM [facility] AM[facility]   1/3/2023 10:00 AM Ruben Caballero MD [facility] AMB   2/27/2023  8:30 AM Amaya Corral DO [facility] AMB

## 2022-12-28 DIAGNOSIS — I10 PRIMARY HYPERTENSION: ICD-10-CM

## 2022-12-28 DIAGNOSIS — I25.10 ATHEROSCLEROSIS OF NATIVE CORONARY ARTERY OF NATIVE HEART WITHOUT ANGINA PECTORIS: ICD-10-CM

## 2022-12-28 DIAGNOSIS — I50.9 CONGESTIVE HEART FAILURE, UNSPECIFIED HF CHRONICITY, UNSPECIFIED HEART FAILURE TYPE (HCC): ICD-10-CM

## 2022-12-28 DIAGNOSIS — E78.5 DYSLIPIDEMIA: ICD-10-CM

## 2022-12-28 DIAGNOSIS — D50.0 IRON DEFICIENCY ANEMIA DUE TO CHRONIC BLOOD LOSS: ICD-10-CM

## 2022-12-28 DIAGNOSIS — R73.09 ELEVATED HEMOGLOBIN A1C: ICD-10-CM

## 2022-12-28 LAB
ALBUMIN SERPL-MCNC: 3.5 G/DL (ref 3.2–4.6)
ALBUMIN/GLOB SERPL: 1.2 (ref 0.4–1.6)
ALP SERPL-CCNC: 93 U/L (ref 50–136)
ALT SERPL-CCNC: 28 U/L (ref 12–65)
ANION GAP SERPL CALC-SCNC: 6 MMOL/L (ref 2–11)
AST SERPL-CCNC: 15 U/L (ref 15–37)
BASOPHILS # BLD: 0 K/UL (ref 0–0.2)
BASOPHILS NFR BLD: 0 % (ref 0–2)
BILIRUB SERPL-MCNC: 0.5 MG/DL (ref 0.2–1.1)
BUN SERPL-MCNC: 16 MG/DL (ref 8–23)
CALCIUM SERPL-MCNC: 9.6 MG/DL (ref 8.3–10.4)
CHLORIDE SERPL-SCNC: 108 MMOL/L (ref 101–110)
CHOLEST SERPL-MCNC: 95 MG/DL
CO2 SERPL-SCNC: 28 MMOL/L (ref 21–32)
CREAT SERPL-MCNC: 0.8 MG/DL (ref 0.8–1.5)
DIFFERENTIAL METHOD BLD: ABNORMAL
EOSINOPHIL # BLD: 0.3 K/UL (ref 0–0.8)
EOSINOPHIL NFR BLD: 3 % (ref 0.5–7.8)
ERYTHROCYTE [DISTWIDTH] IN BLOOD BY AUTOMATED COUNT: 17.2 % (ref 11.9–14.6)
FERRITIN SERPL-MCNC: 41 NG/ML (ref 8–388)
GLOBULIN SER CALC-MCNC: 2.9 G/DL (ref 2.8–4.5)
GLUCOSE SERPL-MCNC: 117 MG/DL (ref 65–100)
HCT VFR BLD AUTO: 46.2 % (ref 41.1–50.3)
HDLC SERPL-MCNC: 43 MG/DL (ref 40–60)
HDLC SERPL: 2.2
HGB BLD-MCNC: 14.6 G/DL (ref 13.6–17.2)
IMM GRANULOCYTES # BLD AUTO: 0 K/UL (ref 0–0.5)
IMM GRANULOCYTES NFR BLD AUTO: 0 % (ref 0–5)
LDLC SERPL CALC-MCNC: 35 MG/DL
LYMPHOCYTES # BLD: 2.6 K/UL (ref 0.5–4.6)
LYMPHOCYTES NFR BLD: 25 % (ref 13–44)
MCH RBC QN AUTO: 28.9 PG (ref 26.1–32.9)
MCHC RBC AUTO-ENTMCNC: 31.6 G/DL (ref 31.4–35)
MCV RBC AUTO: 91.3 FL (ref 82–102)
MONOCYTES # BLD: 0.7 K/UL (ref 0.1–1.3)
MONOCYTES NFR BLD: 7 % (ref 4–12)
NEUTS SEG # BLD: 6.4 K/UL (ref 1.7–8.2)
NEUTS SEG NFR BLD: 65 % (ref 43–78)
NRBC # BLD: 0 K/UL (ref 0–0.2)
NT PRO BNP: 722 PG/ML
PLATELET # BLD AUTO: 188 K/UL (ref 150–450)
PMV BLD AUTO: 10.4 FL (ref 9.4–12.3)
POTASSIUM SERPL-SCNC: 4.3 MMOL/L (ref 3.5–5.1)
PROT SERPL-MCNC: 6.4 G/DL (ref 6.3–8.2)
RBC # BLD AUTO: 5.06 M/UL (ref 4.23–5.6)
SODIUM SERPL-SCNC: 142 MMOL/L (ref 133–143)
TIBC SERPL-MCNC: 346 UG/DL (ref 250–450)
TRIGL SERPL-MCNC: 85 MG/DL (ref 35–150)
VLDLC SERPL CALC-MCNC: 17 MG/DL (ref 6–23)
WBC # BLD AUTO: 10.1 K/UL (ref 4.3–11.1)

## 2022-12-29 LAB
EST. AVERAGE GLUCOSE BLD GHB EST-MCNC: 140 MG/DL
HBA1C MFR BLD: 6.5 % (ref 4.8–5.6)

## 2023-01-03 ENCOUNTER — OFFICE VISIT (OUTPATIENT)
Dept: INTERNAL MEDICINE CLINIC | Facility: CLINIC | Age: 83
End: 2023-01-03
Payer: MEDICARE

## 2023-01-03 ENCOUNTER — CARE COORDINATION (OUTPATIENT)
Dept: CARE COORDINATION | Facility: CLINIC | Age: 83
End: 2023-01-03

## 2023-01-03 VITALS
SYSTOLIC BLOOD PRESSURE: 131 MMHG | DIASTOLIC BLOOD PRESSURE: 69 MMHG | HEIGHT: 65 IN | BODY MASS INDEX: 35.96 KG/M2 | WEIGHT: 215.8 LBS | HEART RATE: 73 BPM | OXYGEN SATURATION: 96 %

## 2023-01-03 DIAGNOSIS — R73.09 ELEVATED HEMOGLOBIN A1C: ICD-10-CM

## 2023-01-03 DIAGNOSIS — Z00.00 MEDICARE ANNUAL WELLNESS VISIT, SUBSEQUENT: Primary | ICD-10-CM

## 2023-01-03 DIAGNOSIS — E66.01 SEVERE OBESITY (BMI 35.0-39.9) WITH COMORBIDITY (HCC): ICD-10-CM

## 2023-01-03 DIAGNOSIS — E78.5 DYSLIPIDEMIA: ICD-10-CM

## 2023-01-03 DIAGNOSIS — G70.00 MYASTHENIA GRAVIS (HCC): ICD-10-CM

## 2023-01-03 DIAGNOSIS — C18.9 MALIGNANT NEOPLASM OF COLON, UNSPECIFIED PART OF COLON (HCC): ICD-10-CM

## 2023-01-03 DIAGNOSIS — I50.9 CONGESTIVE HEART FAILURE, UNSPECIFIED HF CHRONICITY, UNSPECIFIED HEART FAILURE TYPE (HCC): ICD-10-CM

## 2023-01-03 DIAGNOSIS — I82.A12 ACUTE DEEP VEIN THROMBOSIS (DVT) OF AXILLARY VEIN OF LEFT UPPER EXTREMITY (HCC): ICD-10-CM

## 2023-01-03 DIAGNOSIS — F33.1 MAJOR DEPRESSIVE DISORDER, RECURRENT, MODERATE (HCC): ICD-10-CM

## 2023-01-03 DIAGNOSIS — I25.118 ATHEROSCLEROTIC HEART DISEASE OF NATIVE CORONARY ARTERY WITH OTHER FORMS OF ANGINA PECTORIS (HCC): ICD-10-CM

## 2023-01-03 DIAGNOSIS — D50.0 IRON DEFICIENCY ANEMIA DUE TO CHRONIC BLOOD LOSS: ICD-10-CM

## 2023-01-03 DIAGNOSIS — I48.0 PAROXYSMAL ATRIAL FIBRILLATION (HCC): ICD-10-CM

## 2023-01-03 PROCEDURE — 3075F SYST BP GE 130 - 139MM HG: CPT | Performed by: FAMILY MEDICINE

## 2023-01-03 PROCEDURE — G8484 FLU IMMUNIZE NO ADMIN: HCPCS | Performed by: FAMILY MEDICINE

## 2023-01-03 PROCEDURE — G0439 PPPS, SUBSEQ VISIT: HCPCS | Performed by: FAMILY MEDICINE

## 2023-01-03 PROCEDURE — 1123F ACP DISCUSS/DSCN MKR DOCD: CPT | Performed by: FAMILY MEDICINE

## 2023-01-03 PROCEDURE — 3078F DIAST BP <80 MM HG: CPT | Performed by: FAMILY MEDICINE

## 2023-01-03 ASSESSMENT — LIFESTYLE VARIABLES
HOW MANY STANDARD DRINKS CONTAINING ALCOHOL DO YOU HAVE ON A TYPICAL DAY: PATIENT DOES NOT DRINK
HOW OFTEN DO YOU HAVE A DRINK CONTAINING ALCOHOL: NEVER

## 2023-01-03 NOTE — PATIENT INSTRUCTIONS
For more information on your local Area Agency on Aging or [place] on Aging please visit the appropriate web site below:    [place]: MobileCycles.pl    [facility]: https://aging. [place].gov/    [place]: https://aging.[place].gov/    [place]: InsuranceSquad.es           [facility]  Dental care for older adults: Overview  Dental care for older people is much the same as for younger adults. But older adults do have concerns that younger adults do not. Older adults may have problems with gum disease and decay on the roots of their teeth. They may need missing teeth replaced or broken fillings fixed. Or they may have dentures that need to be cared for. Some older adults may have trouble holding a toothbrush. You can help remind the person you are caring for to brush and floss their teeth or to clean their dentures. In some cases, you may need to do the brushing and other dental care tasks. People who have trouble using their hands or who have dementia may need this extra help. How can you help with dental care? Normal dental care  To keep the teeth and gums healthy:  Brush the teeth with fluoride toothpaste twice a day--in the morning and at night--and floss at least once a day. Plaque can quickly build up on the teeth of older adults. Watch for the signs of gum disease. These signs include gums that bleed after brushing or after eating hard foods, such as apples. See a dentist regularly. Many experts recommend checkups every 6 months. Keep the dentist up to date on any new medications the person is taking. Encourage a balanced diet that includes whole grains, vegetables, and fruits, and that is low in saturated fat and sodium. Encourage the person you're caring for not to use tobacco products. They can affect dental and general health. Many older adults have a fixed income and feel that they can't afford dental care.  But most [facility] have programs in which dentists help older adults by lowering fees. Contact your area's public health offices or  for information about dental care in your area. Using a toothbrush  Older adults with arthritis sometimes have trouble brushing their teeth because they can't easily hold the toothbrush. Their hands and fingers may be stiff, painful, or weak. If this is the case, you can: Offer an electric toothbrush. Enlarge the handle of a non-electric toothbrush by wrapping a sponge, an elastic bandage, or adhesive tape around it. Push the toothbrush handle through a ball made of rubber or soft foam.  Make the handle longer and thicker by taping Popsicle sticks or tongue depressors to it. You may also be able to buy special toothbrushes, toothpaste dispensers, and floss holders. Your doctor may recommend a soft-bristle toothbrush if the person you care for bleeds easily. Bleeding can happen because of a health problem or from certain medicines. A toothpaste for sensitive teeth may help if the person you care for has sensitive teeth. How do you brush and floss someone's teeth? If the person you are caring for has a hard time cleaning their teeth on their own, you may need to brush and floss their teeth for them. It may be easiest to have the person sit and face away from you, and to sit or stand behind them. That way you can steady their head against your arm as you reach around to floss and brush their teeth. Choose a place that has good lighting and is comfortable for both of you. Before you begin, gather your supplies. You will need gloves, floss, a toothbrush, and a container to hold water if you are not near a sink. Wash and dry your hands well and put on gloves. Start by flossing:  Gently work a piece of floss between each of the teeth toward the gums. A plastic flossing tool may make this easier, and they are available at most [facility].   Curve the floss around each tooth into a U-shape and gently slide it under the gum line. Move the floss firmly up and down several times to scrape off the plaque. After you've finished flossing, throw away the used floss and begin brushing:  Wet the brush and apply toothpaste. Place the brush at a 45-degree angle where the teeth meet the gums. Press firmly, and move the brush in small circles over the surface of the teeth. Be careful not to brush too hard. Vigorous brushing can make the gums pull away from the teeth and can scratch the tooth enamel. Brush all surfaces of the teeth, on the tongue side and on the cheek side. Pay special attention to the front teeth and all surfaces of the back teeth. Brush chewing surfaces with short back-and-forth strokes. After you've finished, help the person rinse the remaining toothpaste from their mouth. Where can you learn more? Go to http://www.woods.com/ and enter F944 to learn more about \"Learning About Dental Care for Older Adults. \"  Current as of: June 16, 2022               Content Version: 13.5  © 1113-5413 [employer]look. Care instructions adapted under license by [facility] ([facility]). If you have questions about a medical condition or this instruction, always ask your healthcare professional. [place] [address] any warranty or liability for your use of this information. Advance Directives: Care Instructions  Overview  An advance directive is a legal way to state your wishes at the end of your life. It tells your family and your doctor what to do if you can't say what you want. There are two main types of advance directives. You can change them any time your wishes change. Living will. This form tells your family and your doctor your wishes about life support and other treatment. The form is also called a declaration. Medical power of . This form lets you name a person to make treatment decisions for you when you can't speak for yourself.  This person is called a health care agent (health care proxy, health care surrogate). The form is also called a durable power of  for health care. If you do not have an advance directive, decisions about your medical care may be made by a family member, or by a doctor or a  who doesn't know you. It may help to think of an advance directive as a gift to the people who care for you. If you have one, they won't have to make tough decisions by themselves. For more information, including forms for your state, see the [address] website (www.caringinfo.org/planning/advance-directives/). Follow-up care is a key part of your treatment and safety. Be sure to make and go to all appointments, and call your doctor if you are having problems. It's also a good idea to know your test results and keep a list of the medicines you take. What should you include in an advance directive? Many states have a unique advance directive form. (It may ask you to address specific issues.) Or you might use a universal form that's approved by many states. If your form doesn't tell you what to address, it may be hard to know what to include in your advance directive. Use the questions below to help you get started. Who do you want to make decisions about your medical care if you are not able to? What life-support measures do you want if you have a serious illness that gets worse over time or can't be cured? What are you most afraid of that might happen? (Maybe you're afraid of having pain, losing your independence, or being kept alive by machines.)  Where would you prefer to die? (Your home? A hospital? A nursing home?)  Do you want to donate your organs when you die? Do you want certain [employer] practices performed before you die? When should you call for help? Be sure to contact your doctor if you have any questions. Where can you learn more?   Go to http://www.[employer].com/ and enter R264 to learn more about \"Advance Directives: Care Instructions. \"  Current as of: June 16, 2022               Content Version: 13.5  © 2006-2022 Healthwise, [employer]. Care instructions adapted under license by [facility] ([facility]). If you have questions about a medical condition or this instruction, always ask your healthcare professional. [address] any warranty or liability for your use of this information. Personalized Preventive Plan for Td Sanches - 1/3/2023  Medicare offers a range of preventive health benefits. Some of the tests and screenings are paid in full while other may be subject to a deductible, co-insurance, and/or copay. Some of these benefits include a comprehensive review of your medical history including lifestyle, illnesses that may run in your family, and various assessments and screenings as appropriate. After reviewing your medical record and screening and assessments performed today your provider may have ordered immunizations, labs, imaging, and/or referrals for you. A list of these orders (if applicable) as well as your Preventive Care list are included within your After Visit Summary for your review. Other Preventive Recommendations:    A preventive eye exam performed by an eye specialist is recommended every 1-2 years to screen for glaucoma; cataracts, macular degeneration, and other eye disorders. A preventive dental visit is recommended every 6 months. Try to get at least 150 minutes of exercise per week or 10,000 steps per day on a pedometer . Order or download the FREE \"Exercise & Physical Activity: Your Everyday Guide\" from [employer]. Call 5-149.238.5286 or search [employer] online. You need 8166-6870 mg of calcium and [phone] IU of vitamin D per day.  It is possible to meet your calcium requirement with diet alone, but a vitamin D supplement is usually necessary to meet this goal.  When exposed to the sun, use a sunscreen that protects against both UVA and UVB radiation with an SPF of 30 or greater. Reapply every 2 to 3 hours or after sweating, drying off with a towel, or swimming. Always wear a seat belt when traveling in a car. Always wear a helmet when riding a bicycle or motorcycle.

## 2023-01-03 NOTE — CARE COORDINATION
Ambulatory Care Coordination Note  1/3/2023    ACC: Charly Marina RN    Ccm outreached to patient. Patient state she is doing well at this time. Patient had pcp appointment today. Patient states no questions or concerns. Ccm discussed that I would outreach next week. Patient agreeable. Offered patient enrollment in the Remote Patient Monitoring (RPM) program for in-home monitoring: NA. Lab Results       None            Care Coordination Interventions    Referral from Primary Care Provider: No  Suggested Interventions and Community Resources          Goals Addressed                      This Visit's Progress      Conditions and Symptoms   On track      I will schedule office visits, as directed by my provider. I will keep my appointment or reschedule if I have to cancel. I will notify my provider of any symptoms that indicate a worsening of my condition. Patient agrees to monitor and record weights daily. Reports increase if 2 lbs. overnight or 5 lbs. in a week to MD    Barriers: none  Plan for overcoming my barriers: N/A  Confidence: 7/10  Anticipated Goal Completion Date: 9/27/22          Patient Stated (pt-stated)   On track      Patient will schedule and attend follow up              Prior to Admission medications    Medication Sig Start Date End Date Taking? Authorizing Provider   colestipol (COLESTID) 1 g tablet  11/16/22   Historical Provider, MD   famotidine (PEPCID) 20 MG tablet  11/16/22   Historical Provider, MD   sildenafil (VIAGRA) 100 MG tablet Take 1 tablet by mouth as needed for Erectile Dysfunction 11/21/22   Nell Cast DO   atorvastatin (LIPITOR) 80 MG tablet Take 1 tablet by mouth daily 9/7/22   Keith Rivers MD   metoprolol succinate (TOPROL XL) 25 MG extended release tablet metoprolol succinate ER 25 mg tablet,extended release 24 hr   Take 1 tablet every day by oral route.  9/7/22   Keith Rivers MD   finasteride (PROSCAR) 5 MG tablet finasteride 5 mg tablet   Take 1 tablet every day by oral route. 9/7/22   Le Cano MD   losartan (COZAAR) 50 MG tablet Take 1 tablet by mouth daily 9/7/22   Le Cano MD   Multiple Vitamins-Minerals (MULTIVITAMIN WITH MINERALS) tablet Once daily OTC 9/7/22   Le Cano MD   Ascorbic Acid (VITAMIN C PO) Take 1 tablet by mouth daily. Historical Provider, MD   ZINC PO Take 1 capsule by mouth daily. Historical Provider, MD   Cholecalciferol (VITAMIN D3 PO) Take 1 tablet by mouth daily. Historical Provider, MD   acetaminophen (TYLENOL) 500 MG tablet Take 1,000 mg by mouth every 6 hours as needed for Pain. Historical Provider, MD   apixaban (ELIQUIS) 5 MG TABS tablet Take 1 tablet by mouth 2 times daily 8/19/22 1/3/23  Kaelyn Villegas MD   potassium chloride (KLOR-CON M) 20 MEQ extended release tablet Take 1 tablet by mouth daily 6/2/22 8/27/22  Yasemin Fuentes DO   nitroGLYCERIN (NITROSTAT) 0.4 MG SL tablet Place 1 sl under the tongue q 5 min prn cp, max 3 sl in a 15-min time period. Call 911 if no relief after the 3rd sl.   Patient not taking: Reported on 1/3/2023 2/13/20   Ar Automatic Reconciliation   pantoprazole (PROTONIX) 40 MG tablet Take 40 mg by mouth every morning (before breakfast) 4/3/22   Ar Automatic Reconciliation   rOPINIRole (REQUIP) 0.5 MG tablet take 1 tablet by mouth nightly, increase to 1 tab twice a day if needed for restless leg syndrome  3/3/22   Ar Automatic Reconciliation   tamsulosin (FLOMAX) 0.4 MG capsule Take 0.4 mg by mouth daily 4/8/22   Ar Automatic Reconciliation   aspirin 81 MG EC tablet Take 81 mg by mouth daily 4/8/22 8/19/22  Ar Automatic Reconciliation   cyanocobalamin 1000 MCG tablet Take 1,000 mcg by mouth daily  Patient not taking: No sig reported 4/2/22 8/19/22  Ar Automatic Reconciliation   escitalopram (LEXAPRO) 10 MG tablet Take 10 mg by mouth daily  Patient not taking: No sig reported 3/3/22 8/19/22  Ar Automatic Reconciliation   furosemide (LASIX) 40 MG tablet Take 40 mg by mouth daily 3/3/22 8/19/22  Ar Automatic Reconciliation       Future Appointments   Date Time Provider Sri Deya   2/27/2023  8:30 AM DO MIGUELINA Moon [facility] AMB   4/3/2023  8:45 AM [facility] AMB   4/10/2023  8:20 AM Juan David Ferrer MD [facility] AMB

## 2023-01-10 ENCOUNTER — CARE COORDINATION (OUTPATIENT)
Dept: CARE COORDINATION | Facility: CLINIC | Age: 83
End: 2023-01-10

## 2023-01-10 NOTE — CARE COORDINATION
Ambulatory Care Coordination Note  1/10/2023    ACC: Tana Pacheco RN    Ccm outreached to patient. Patient states he is doing well at this time. Patient states no questions or concerns. Ccm discussed that I would outreach next week. Patient agreeable. Offered patient enrollment in the Remote Patient Monitoring (RPM) program for in-home monitoring: NA. Lab Results       None            Care Coordination Interventions    Referral from Primary Care Provider: No  Suggested Interventions and Community Resources          Goals Addressed                      This Visit's Progress      Conditions and Symptoms   On track      I will schedule office visits, as directed by my provider. I will keep my appointment or reschedule if I have to cancel. I will notify my provider of any symptoms that indicate a worsening of my condition. Patient agrees to monitor and record weights daily. Reports increase if 2 lbs. overnight or 5 lbs. in a week to MD    Barriers: none  Plan for overcoming my barriers: N/A  Confidence: 7/10  Anticipated Goal Completion Date: 9/27/22          Patient Stated (pt-stated)   On track      Patient will schedule and attend follow up              Prior to Admission medications    Medication Sig Start Date End Date Taking? Authorizing Provider   colestipol (COLESTID) 1 g tablet  11/16/22   Historical Provider, MD   famotidine (PEPCID) 20 MG tablet  11/16/22   Historical Provider, MD   sildenafil (VIAGRA) 100 MG tablet Take 1 tablet by mouth as needed for Erectile Dysfunction 11/21/22   Jorge Del Valle DO   atorvastatin (LIPITOR) 80 MG tablet Take 1 tablet by mouth daily 9/7/22   Roel Victor MD   metoprolol succinate (TOPROL XL) 25 MG extended release tablet metoprolol succinate ER 25 mg tablet,extended release 24 hr   Take 1 tablet every day by oral route. 9/7/22   Roel Victor MD   finasteride (PROSCAR) 5 MG tablet finasteride 5 mg tablet   Take 1 tablet every day by oral route.  9/7/22 Sushila Kang MD   losartan (COZAAR) 50 MG tablet Take 1 tablet by mouth daily 9/7/22   Sushila Kang MD   Multiple Vitamins-Minerals (MULTIVITAMIN WITH MINERALS) tablet Once daily OTC 9/7/22   Sushila Kang MD   Ascorbic Acid (VITAMIN C PO) Take 1 tablet by mouth daily. Historical Provider, MD   ZINC PO Take 1 capsule by mouth daily. Historical Provider, MD   Cholecalciferol (VITAMIN D3 PO) Take 1 tablet by mouth daily. Historical Provider, MD   acetaminophen (TYLENOL) 500 MG tablet Take 1,000 mg by mouth every 6 hours as needed for Pain. Historical Provider, MD   apixaban (ELIQUIS) 5 MG TABS tablet Take 1 tablet by mouth 2 times daily 8/19/22 1/3/23  Mehreen Escobedo MD   potassium chloride (KLOR-CON M) 20 MEQ extended release tablet Take 1 tablet by mouth daily 6/2/22 8/27/22  Abelardo Juares,    nitroGLYCERIN (NITROSTAT) 0.4 MG SL tablet Place 1 sl under the tongue q 5 min prn cp, max 3 sl in a 15-min time period. Call 911 if no relief after the 3rd sl.   Patient not taking: Reported on 1/3/2023 2/13/20   Ar Automatic Reconciliation   pantoprazole (PROTONIX) 40 MG tablet Take 40 mg by mouth every morning (before breakfast) 4/3/22   Ar Automatic Reconciliation   rOPINIRole (REQUIP) 0.5 MG tablet take 1 tablet by mouth nightly, increase to 1 tab twice a day if needed for restless leg syndrome  3/3/22   Ar Automatic Reconciliation   tamsulosin (FLOMAX) 0.4 MG capsule Take 0.4 mg by mouth daily 4/8/22   Ar Automatic Reconciliation   aspirin 81 MG EC tablet Take 81 mg by mouth daily 4/8/22 8/19/22  Ar Automatic Reconciliation   cyanocobalamin 1000 MCG tablet Take 1,000 mcg by mouth daily  Patient not taking: No sig reported 4/2/22 8/19/22  Ar Automatic Reconciliation   escitalopram (LEXAPRO) 10 MG tablet Take 10 mg by mouth daily  Patient not taking: No sig reported 3/3/22 8/19/22  Ar Automatic Reconciliation   furosemide (LASIX) 40 MG tablet Take 40 mg by mouth daily 3/3/22 8/19/22  Ar Automatic Reconciliation       Future Appointments   Date Time Provider Sri Deya   2/27/2023  8:30 AM DO MIGUELINA Ken [facility] AMB   4/3/2023  8[facility]45 [facility]   4/10/2023  8:20 AM Lasha Matthews MD [facility] AMB

## 2023-01-13 DIAGNOSIS — Z95.0 CARDIAC PACEMAKER IN SITU: ICD-10-CM

## 2023-01-13 DIAGNOSIS — I49.5 SSS (SICK SINUS SYNDROME) (HCC): ICD-10-CM

## 2023-01-13 DIAGNOSIS — I48.0 PAROXYSMAL ATRIAL FIBRILLATION (HCC): ICD-10-CM

## 2023-01-17 ENCOUNTER — CARE COORDINATION (OUTPATIENT)
Dept: CARE COORDINATION | Facility: CLINIC | Age: 83
End: 2023-01-17

## 2023-01-17 NOTE — CARE COORDINATION
Ambulatory Care Coordination Note  1/17/2023    ACC: Felix Bowman, RAYA    Ccm outreached to patient. Patient states he is doing well at this time. Patient states no questions or concerns. Ccm discussed that I would outreach next week. Patient agreeable. Offered patient enrollment in the Remote Patient Monitoring (RPM) program for in-home monitoring: NA. Lab Results       None            Care Coordination Interventions    Referral from Primary Care Provider: No  Suggested Interventions and Community Resources          Goals Addressed                      This Visit's Progress      Conditions and Symptoms   On track      I will schedule office visits, as directed by my provider. I will keep my appointment or reschedule if I have to cancel. I will notify my provider of any symptoms that indicate a worsening of my condition. Patient agrees to monitor and record weights daily. Reports increase if 2 lbs. overnight or 5 lbs. in a week to MD    Barriers: none  Plan for overcoming my barriers: N/A  Confidence: 7/10  Anticipated Goal Completion Date: 9/27/22          Patient Stated (pt-stated)   On track      Patient will schedule and attend follow up              Prior to Admission medications    Medication Sig Start Date End Date Taking? Authorizing Provider   colestipol (COLESTID) 1 g tablet  11/16/22   Historical Provider, MD   famotidine (PEPCID) 20 MG tablet  11/16/22   Historical Provider, MD   sildenafil (VIAGRA) 100 MG tablet Take 1 tablet by mouth as needed for Erectile Dysfunction 11/21/22   Benjie Query,    atorvastatin (LIPITOR) 80 MG tablet Take 1 tablet by mouth daily 9/7/22   Marjan Richardson MD   metoprolol succinate (TOPROL XL) 25 MG extended release tablet metoprolol succinate ER 25 mg tablet,extended release 24 hr   Take 1 tablet every day by oral route. 9/7/22   Marjan Richardson MD   finasteride (PROSCAR) 5 MG tablet finasteride 5 mg tablet   Take 1 tablet every day by oral route.  9/7/22 Patti Khanna MD   losartan (COZAAR) 50 MG tablet Take 1 tablet by mouth daily 9/7/22   Patti Khanna MD   Multiple Vitamins-Minerals (MULTIVITAMIN WITH MINERALS) tablet Once daily OTC 9/7/22   Patti Khanna MD   Ascorbic Acid (VITAMIN C PO) Take 1 tablet by mouth daily. Historical Provider, MD   ZINC PO Take 1 capsule by mouth daily. Historical Provider, MD   Cholecalciferol (VITAMIN D3 PO) Take 1 tablet by mouth daily. Historical Provider, MD   acetaminophen (TYLENOL) 500 MG tablet Take 1,000 mg by mouth every 6 hours as needed for Pain. Historical Provider, MD   apixaban (ELIQUIS) 5 MG TABS tablet Take 1 tablet by mouth 2 times daily 8/19/22 1/3/23  Norma Singer MD   potassium chloride (KLOR-CON M) 20 MEQ extended release tablet Take 1 tablet by mouth daily 6/2/22 8/27/22  Jennifer Meter, DO   nitroGLYCERIN (NITROSTAT) 0.4 MG SL tablet Place 1 sl under the tongue q 5 min prn cp, max 3 sl in a 15-min time period. Call 911 if no relief after the 3rd sl.   Patient not taking: Reported on 1/3/2023 2/13/20   Ar Automatic Reconciliation   pantoprazole (PROTONIX) 40 MG tablet Take 40 mg by mouth every morning (before breakfast) 4/3/22   Ar Automatic Reconciliation   rOPINIRole (REQUIP) 0.5 MG tablet take 1 tablet by mouth nightly, increase to 1 tab twice a day if needed for restless leg syndrome  3/3/22   Ar Automatic Reconciliation   tamsulosin (FLOMAX) 0.4 MG capsule Take 0.4 mg by mouth daily 4/8/22   Ar Automatic Reconciliation   aspirin 81 MG EC tablet Take 81 mg by mouth daily 4/8/22 8/19/22  Ar Automatic Reconciliation   cyanocobalamin 1000 MCG tablet Take 1,000 mcg by mouth daily  Patient not taking: No sig reported 4/2/22 8/19/22  Ar Automatic Reconciliation   escitalopram (LEXAPRO) 10 MG tablet Take 10 mg by mouth daily  Patient not taking: No sig reported 3/3/22 8/19/22  Ar Automatic Reconciliation   furosemide (LASIX) 40 MG tablet Take 40 mg by mouth daily 3/3/22 8/19/22  Ar Automatic Reconciliation       Future Appointments   Date Time Provider Sri Falk   2/27/2023  8:[facility]   [facility]23  [facility]   4/10/2023  8:20 AM Oswald Castañeda MD [facility] AMB

## 2023-01-31 ENCOUNTER — CARE COORDINATION (OUTPATIENT)
Dept: CARE COORDINATION | Facility: CLINIC | Age: 83
End: 2023-01-31

## 2023-01-31 NOTE — CARE COORDINATION
Ambulatory Care Coordination Note  1/31/2023    ACC: Luis Kamara, RN    Ccm outreached to patient. Patient states he is doing well at this time. Patient states no questions or concerns. Ccm discussed that I would outreach next week. Patient agreeable. Offered patient enrollment in the Remote Patient Monitoring (RPM) program for in-home monitoring: NA. Lab Results       None            Care Coordination Interventions    Referral from Primary Care Provider: No  Suggested Interventions and Community Resources          Goals Addressed                      This Visit's Progress      Conditions and Symptoms   On track      I will schedule office visits, as directed by my provider. I will keep my appointment or reschedule if I have to cancel. I will notify my provider of any symptoms that indicate a worsening of my condition. Patient agrees to monitor and record weights daily. Reports increase if 2 lbs. overnight or 5 lbs. in a week to MD    Barriers: none  Plan for overcoming my barriers: N/A  Confidence: 7/10  Anticipated Goal Completion Date: 9/27/22          Patient Stated (pt-stated)   On track      Patient will schedule and attend follow up              Prior to Admission medications    Medication Sig Start Date End Date Taking? Authorizing Provider   colestipol (COLESTID) 1 g tablet  11/16/22   Historical Provider, MD   famotidine (PEPCID) 20 MG tablet  11/16/22   Historical Provider, MD   sildenafil (VIAGRA) 100 MG tablet Take 1 tablet by mouth as needed for Erectile Dysfunction 11/21/22   Brittani Vaughn DO   atorvastatin (LIPITOR) 80 MG tablet Take 1 tablet by mouth daily 9/7/22   Jeremi Rose MD   metoprolol succinate (TOPROL XL) 25 MG extended release tablet metoprolol succinate ER 25 mg tablet,extended release 24 hr   Take 1 tablet every day by oral route. 9/7/22   Jeremi Rose MD   finasteride (PROSCAR) 5 MG tablet finasteride 5 mg tablet   Take 1 tablet every day by oral route.  9/7/22 Liyah Kim MD   losartan (COZAAR) 50 MG tablet Take 1 tablet by mouth daily 9/7/22   Liyah Kim MD   Multiple Vitamins-Minerals (MULTIVITAMIN WITH MINERALS) tablet Once daily OTC 9/7/22   Liyah Kim MD   Ascorbic Acid (VITAMIN C PO) Take 1 tablet by mouth daily. Historical Provider, MD   ZINC PO Take 1 capsule by mouth daily. Historical Provider, MD   Cholecalciferol (VITAMIN D3 PO) Take 1 tablet by mouth daily. Historical Provider, MD   acetaminophen (TYLENOL) 500 MG tablet Take 1,000 mg by mouth every 6 hours as needed for Pain. Historical Provider, MD   apixaban (ELIQUIS) 5 MG TABS tablet Take 1 tablet by mouth 2 times daily 8/19/22 1/3/23  Beatriz Arguello MD   potassium chloride (KLOR-CON M) 20 MEQ extended release tablet Take 1 tablet by mouth daily 6/2/22 8/27/22  Venessa Moses DO   nitroGLYCERIN (NITROSTAT) 0.4 MG SL tablet Place 1 sl under the tongue q 5 min prn cp, max 3 sl in a 15-min time period. Call 911 if no relief after the 3rd sl.   Patient not taking: Reported on 1/3/2023 2/13/20   Ar Automatic Reconciliation   pantoprazole (PROTONIX) 40 MG tablet Take 40 mg by mouth every morning (before breakfast) 4/3/22   Ar Automatic Reconciliation   rOPINIRole (REQUIP) 0.5 MG tablet take 1 tablet by mouth nightly, increase to 1 tab twice a day if needed for restless leg syndrome  3/3/22   Ar Automatic Reconciliation   tamsulosin (FLOMAX) 0.4 MG capsule Take 0.4 mg by mouth daily 4/8/22   Ar Automatic Reconciliation   aspirin 81 MG EC tablet Take 81 mg by mouth daily 4/8/22 8/19/22  Ar Automatic Reconciliation   cyanocobalamin 1000 MCG tablet Take 1,000 mcg by mouth daily  Patient not taking: No sig reported 4/2/22 8/19/22  Ar Automatic Reconciliation   escitalopram (LEXAPRO) 10 MG tablet Take 10 mg by mouth daily  Patient not taking: No sig reported 3/3/22 8/19/22  Ar Automatic Reconciliation   furosemide (LASIX) 40 MG tablet Take 40 mg by mouth daily 3/3/22 8/19/22  Ar Automatic Reconciliation       Future Appointments   Date Time Provider Sri Falk   2/27/2023  8:30 AM Rudy Cutler DO [facility] AMB   4/3/2023  8:45 AM [facility] AM[facility]   4/10/2023  8:20 AM Desirae Feliz MD [facility] AMB

## 2023-02-02 ENCOUNTER — OFFICE VISIT (OUTPATIENT)
Dept: INTERNAL MEDICINE CLINIC | Facility: CLINIC | Age: 83
End: 2023-02-02
Payer: MEDICARE

## 2023-02-02 VITALS
BODY MASS INDEX: 36.32 KG/M2 | SYSTOLIC BLOOD PRESSURE: 130 MMHG | OXYGEN SATURATION: 98 % | DIASTOLIC BLOOD PRESSURE: 70 MMHG | HEART RATE: 75 BPM | WEIGHT: 218 LBS | RESPIRATION RATE: 14 BRPM | HEIGHT: 65 IN

## 2023-02-02 DIAGNOSIS — R30.0 DYSURIA: ICD-10-CM

## 2023-02-02 DIAGNOSIS — R10.30 LOWER ABDOMINAL PAIN: ICD-10-CM

## 2023-02-02 DIAGNOSIS — R10.31 BILATERAL GROIN PAIN: Primary | ICD-10-CM

## 2023-02-02 DIAGNOSIS — R10.32 BILATERAL GROIN PAIN: Primary | ICD-10-CM

## 2023-02-02 LAB
APPEARANCE UR: CLEAR
BACTERIA URNS QL MICRO: 0 /HPF
BILIRUB UR QL: NEGATIVE
BILIRUBIN, URINE, POC: NEGATIVE
BLOOD URINE, POC: ABNORMAL
CASTS URNS QL MICRO: 0 /LPF
COLOR UR: ABNORMAL
CRYSTALS URNS QL MICRO: 0 /LPF
EPI CELLS #/AREA URNS HPF: ABNORMAL /HPF
GLUCOSE UR STRIP.AUTO-MCNC: NEGATIVE MG/DL
GLUCOSE URINE, POC: NEGATIVE
HGB UR QL STRIP: NEGATIVE
KETONES UR QL STRIP.AUTO: NEGATIVE MG/DL
KETONES, URINE, POC: NEGATIVE
LEUKOCYTE ESTERASE UR QL STRIP.AUTO: ABNORMAL
LEUKOCYTE ESTERASE, URINE, POC: ABNORMAL
MUCOUS THREADS URNS QL MICRO: ABNORMAL /LPF
NITRITE UR QL STRIP.AUTO: NEGATIVE
NITRITE, URINE, POC: ABNORMAL
OTHER OBSERVATIONS: ABNORMAL
PH UR STRIP: 5 (ref 5–9)
PH, URINE, POC: 6 (ref 4.6–8)
PROT UR STRIP-MCNC: NEGATIVE MG/DL
PROTEIN,URINE, POC: NEGATIVE
RBC #/AREA URNS HPF: 0 /HPF
SP GR UR REFRACTOMETRY: 1.02 (ref 1–1.02)
SPECIFIC GRAVITY, URINE, POC: >=1.03 (ref 1–1.03)
URINALYSIS CLARITY, POC: CLEAR
URINALYSIS COLOR, POC: YELLOW
URINE CULTURE IF INDICATED: ABNORMAL
UROBILINOGEN UR QL STRIP.AUTO: 0.2 EU/DL (ref 0.2–1)
UROBILINOGEN, POC: 0.2
WBC URNS QL MICRO: ABNORMAL /HPF
YEAST URNS QL MICRO: ABNORMAL

## 2023-02-02 PROCEDURE — 3075F SYST BP GE 130 - 139MM HG: CPT | Performed by: FAMILY MEDICINE

## 2023-02-02 PROCEDURE — 1036F TOBACCO NON-USER: CPT | Performed by: FAMILY MEDICINE

## 2023-02-02 PROCEDURE — 1123F ACP DISCUSS/DSCN MKR DOCD: CPT | Performed by: FAMILY MEDICINE

## 2023-02-02 PROCEDURE — 99214 OFFICE O/P EST MOD 30 MIN: CPT | Performed by: FAMILY MEDICINE

## 2023-02-02 PROCEDURE — G8484 FLU IMMUNIZE NO ADMIN: HCPCS | Performed by: FAMILY MEDICINE

## 2023-02-02 PROCEDURE — G8427 DOCREV CUR MEDS BY ELIG CLIN: HCPCS | Performed by: FAMILY MEDICINE

## 2023-02-02 PROCEDURE — G8417 CALC BMI ABV UP PARAM F/U: HCPCS | Performed by: FAMILY MEDICINE

## 2023-02-02 PROCEDURE — 3078F DIAST BP <80 MM HG: CPT | Performed by: FAMILY MEDICINE

## 2023-02-02 PROCEDURE — 81003 URINALYSIS AUTO W/O SCOPE: CPT | Performed by: FAMILY MEDICINE

## 2023-02-02 SDOH — ECONOMIC STABILITY: INCOME INSECURITY: HOW HARD IS IT FOR YOU TO PAY FOR THE VERY BASICS LIKE FOOD, HOUSING, MEDICAL CARE, AND HEATING?: NOT VERY HARD

## 2023-02-02 SDOH — ECONOMIC STABILITY: FOOD INSECURITY: WITHIN THE PAST 12 MONTHS, THE FOOD YOU BOUGHT JUST DIDN'T LAST AND YOU DIDN'T HAVE MONEY TO GET MORE.: NEVER TRUE

## 2023-02-02 SDOH — ECONOMIC STABILITY: HOUSING INSECURITY
IN THE LAST 12 MONTHS, WAS THERE A TIME WHEN YOU DID NOT HAVE A STEADY PLACE TO SLEEP OR SLEPT IN A SHELTER (INCLUDING NOW)?: NO

## 2023-02-02 SDOH — ECONOMIC STABILITY: FOOD INSECURITY: WITHIN THE PAST 12 MONTHS, YOU WORRIED THAT YOUR FOOD WOULD RUN OUT BEFORE YOU GOT MONEY TO BUY MORE.: NEVER TRUE

## 2023-02-02 ASSESSMENT — PATIENT HEALTH QUESTIONNAIRE - PHQ9
3. TROUBLE FALLING OR STAYING ASLEEP: 0
SUM OF ALL RESPONSES TO PHQ QUESTIONS 1-9: 0
2. FEELING DOWN, DEPRESSED OR HOPELESS: 0
1. LITTLE INTEREST OR PLEASURE IN DOING THINGS: 0
SUM OF ALL RESPONSES TO PHQ9 QUESTIONS 1 & 2: 0
5. POOR APPETITE OR OVEREATING: 0
SUM OF ALL RESPONSES TO PHQ QUESTIONS 1-9: 0
6. FEELING BAD ABOUT YOURSELF - OR THAT YOU ARE A FAILURE OR HAVE LET YOURSELF OR YOUR FAMILY DOWN: 0
9. THOUGHTS THAT YOU WOULD BE BETTER OFF DEAD, OR OF HURTING YOURSELF: 0
10. IF YOU CHECKED OFF ANY PROBLEMS, HOW DIFFICULT HAVE THESE PROBLEMS MADE IT FOR YOU TO DO YOUR WORK, TAKE CARE OF THINGS AT HOME, OR GET ALONG WITH OTHER PEOPLE: 0
SUM OF ALL RESPONSES TO PHQ QUESTIONS 1-9: 0
7. TROUBLE CONCENTRATING ON THINGS, SUCH AS READING THE NEWSPAPER OR WATCHING TELEVISION: 0
4. FEELING TIRED OR HAVING LITTLE ENERGY: 0
SUM OF ALL RESPONSES TO PHQ QUESTIONS 1-9: 0
8. MOVING OR SPEAKING SO SLOWLY THAT OTHER PEOPLE COULD HAVE NOTICED. OR THE OPPOSITE, BEING SO FIGETY OR RESTLESS THAT YOU HAVE BEEN MOVING AROUND A LOT MORE THAN USUAL: 0

## 2023-02-02 ASSESSMENT — ENCOUNTER SYMPTOMS
BLOOD IN STOOL: 0
SHORTNESS OF BREATH: 0
ABDOMINAL PAIN: 0

## 2023-02-02 NOTE — PROGRESS NOTES
2/2/2023     Subjective:     Chief Complaint   Patient presents with    Pelvic Pain     Lower pelvic pain for about a week. He mentioned that he have a history of colon cancer and he had gallbladder surgery  back in march 2022    Groin Pain     For about a week       HPI:     March 2022 GB surgery, July 2022 colon cancer surgery    Lower pelvic pain and groin pain - going on for about a week. Random episodes. Intermittent, feels achy. Bms normal, some mild burning occasional. Sometimes pain will radiate to upper stomach area. Has not noticed any bulge in his groin. Interim History: none     Review of Systems   Constitutional:  Negative for fatigue. HENT:  Negative for congestion. Respiratory:  Negative for shortness of breath. Cardiovascular:  Negative for chest pain. Gastrointestinal:  Negative for abdominal pain and blood in stool. Genitourinary:  Positive for dysuria, frequency and testicular pain. Negative for difficulty urinating, penile discharge, penile pain and penile swelling. Skin:  Negative for rash. Neurological:  Negative for headaches.        Past Medical History:   Diagnosis Date    Abnormal EKG 4/22/15    Arrhythmia     Aspiration pneumonia ([address] 10/26/2017    CAD (coronary artery disease) 5/8/2015    Carotid artery stenosis without cerebral infarction 6/7/2016    US 6/15: <50% bilat ICAs    Coronary atherosclerosis of native coronary vessel 5/8/2015    GERMAIN on brilinta 5/7/15: prox LAD PCI, normal EF     Diastolic CHF, chronic ([address] 3/2/2022    Dyslipidemia 6/7/2016    Dyspnea 5/8/2015    Echo 6/15: EF 60%, mod MR, mod LVH, mild AI     ED (erectile dysfunction)     GERD (gastroesophageal reflux disease)     GERD (gastroesophageal reflux disease)     no medication    HTN (hypertension) 5/8/2015    Hypertension     Hypokalemia     Hypokalemia 6/7/2016    Mitral valve regurgitation 6/7/2016    Morbid obesity ([address]     Myasthenia gravis ([address]     Myasthenia gravis ([address] 6/17/15 Nocturia     Osteoporosis     PUD (peptic ulcer disease) 25 yrs ago    S/P coronary artery stent placement 5/8/2015    3.0x38 mm Xience CAROL to pLAD 5/7/15     Sleep apnea     Syncope and collapse     Unspecified sleep apnea     no cpap     Past Surgical History:   Procedure Laterality Date    APPENDECTOMY      CARDIAC CATHETERIZATION  05/21/2019    watchman device    CARDIAC CATHETERIZATION  05/27/2015    stent    COLONOSCOPY  2007    COLONOSCOPY N/A 8/6/2022    COLONOSCOPY POLYPECTOMY SNARE/COLD BIOPSY performed by Rosy Perdomo MD at [facility] ENDOSCOPY    ERCP  3/30/2022         HEMICOLECTOMY Right 8/10/2022    BOWEL RESECTION HEMICOLECTOMY LAPAROSCOPIC ROBOTIC, Right Bulmaro performed by Turner Melgar MD at [facility]  [facility]    Shirlene Kraus/Giana for sleep apnea and reconstruction for extending jaw    UPPER GASTROINTESTINAL ENDOSCOPY N/A 8/5/2022    EGD ESOPHAGOGASTRODUODENOSCOPY Rm 525 performed by Benoit Watkins MD at [facility] Right 07/10/2019     Repair of right radial artery pseudoaneurysm     Family History   Problem Relation Age of Onset    No Known Problems Brother     Cancer Brother         brain tumor    No Known Problems Sister     Cancer Mother         kidney    Cancer Father         stomach     Social History     Socioeconomic History    Marital status:      Spouse name: None    Number of children: None    Years of education: None    Highest education level: None   Tobacco Use    Smoking status: Never    Smokeless tobacco: Never   Substance and Sexual Activity    Alcohol use: No    Drug use: No     Social Determinants of Health     Financial Resource Strain: Low Risk     Difficulty of Paying Living Expenses: Not very hard   Food Insecurity: No Food Insecurity    Worried About Running Out of Food in the Last Year: Never true    Ran Out of Food in the Last Year: Never true   Transportation Needs: Unknown    Lack of Transportation (Non-Medical): No   Physical Activity: Insufficiently Active    Days of Exercise per Week: 3 days    Minutes of Exercise per Session: 10 min   Housing Stability: Unknown    Unstable Housing in the Last Year: No      Current Outpatient Medications   Medication Sig Dispense Refill    colestipol (COLESTID) 1 g tablet       famotidine (PEPCID) 20 MG tablet       atorvastatin (LIPITOR) 80 MG tablet Take 1 tablet by mouth daily 90 tablet 5    metoprolol succinate (TOPROL XL) 25 MG extended release tablet metoprolol succinate ER 25 mg tablet,extended release 24 hr   Take 1 tablet every day by oral route. 90 tablet 5    finasteride (PROSCAR) 5 MG tablet finasteride 5 mg tablet   Take 1 tablet every day by oral route. 90 tablet 5    losartan (COZAAR) 50 MG tablet Take 1 tablet by mouth daily 90 tablet 5    Multiple Vitamins-Minerals (MULTIVITAMIN WITH MINERALS) tablet Once daily  tablet 3    Ascorbic Acid (VITAMIN C PO) Take 1 tablet by mouth daily. Cholecalciferol (VITAMIN D3 PO) Take 1 tablet by mouth daily. nitroGLYCERIN (NITROSTAT) 0.4 MG SL tablet Place 1 sl under the tongue q 5 min prn cp, max 3 sl in a 15-min time period. Call 911 if no relief after the 3rd sl.      sildenafil (VIAGRA) 100 MG tablet Take 1 tablet by mouth as needed for Erectile Dysfunction 10 tablet 3    ZINC PO Take 1 capsule by mouth daily. acetaminophen (TYLENOL) 500 MG tablet Take 1,000 mg by mouth every 6 hours as needed for Pain.  (Patient not taking: Reported on 2/2/2023)      apixaban (ELIQUIS) 5 MG TABS tablet Take 1 tablet by mouth 2 times daily 60 tablet 2    pantoprazole (PROTONIX) 40 MG tablet Take 40 mg by mouth every morning (before breakfast)      rOPINIRole (REQUIP) 0.5 MG tablet take 1 tablet by mouth nightly, increase to 1 tab twice a day if needed for restless leg syndrome       tamsulosin (FLOMAX) 0.4 MG capsule Take 0.4 mg by mouth daily       No current facility-administered medications for this visit. Allergies   Allergen Reactions    Bebtelovimab Other (See Comments)     Syncope and hypotension       Objective:   /70   Pulse 75   Resp 14   Ht 5' 5\" (1.651 m)   Wt 218 lb (98.9 kg)   SpO2 98%   BMI 36.28 kg/m²     Physical Exam  Vitals and nursing note reviewed. Constitutional:       General: He is not in acute distress. Appearance: Normal appearance. He is normal weight. He is not ill-appearing, toxic-appearing or diaphoretic. HENT:      Head: Normocephalic. Nose: Nose normal.   Eyes:      Extraocular Movements: Extraocular movements intact. Cardiovascular:      Rate and Rhythm: Normal rate and regular rhythm. Heart sounds: No murmur heard. Pulmonary:      Effort: Pulmonary effort is normal.      Breath sounds: Normal breath sounds. Abdominal:      General: Abdomen is protuberant. Palpations: Abdomen is soft. There is no mass. Tenderness: There is abdominal tenderness in the suprapubic area. There is no guarding or rebound. Hernia: No hernia is present. There is no hernia in the left inguinal area or right inguinal area. Genitourinary:     Pubic Area: Rash present. Penis: Circumcised. Testes:         Right: Mass not present. Left: Tenderness present. Mass not present. Epididymis:      Right: Normal.      Left: Normal.      Comments: No hernia noted on exam but tender in testicular area and groin. Musculoskeletal:         General: No deformity. Skin:     General: Skin is warm. Neurological:      General: No focal deficit present. Mental Status: He is alert. Psychiatric:         Mood and Affect: Mood normal.       Assessment and Plan:      Diagnosis Orders   1. Bilateral groin pain  US ABDOMEN COMPLETE    US SCROTUM AND TESTICLES    AMB POC URINALYSIS DIP STICK AUTO W/O MICRO      2.  Lower abdominal pain  US ABDOMEN COMPLETE    Urinalysis with Reflex to Culture    AMB POC URINALYSIS DIP STICK AUTO W/O MICRO      3. Dysuria  Urinalysis with Reflex to Culture    AMB POC URINALYSIS DIP STICK AUTO W/O MICRO        Data reviewed:  Previous notes, labs and Specialists notes, if any, reviewed and discussed with patient. Lower abdominal pain and groin pain. UA shows tr leuks, will send for culture. Concern for possible hernia due to history recent abdominal surgery or also consider adhesions. Will do US of abdomen and groin today. Ok to take tylenol for discomfort. He is on Eliquis for DVT. May need referral to specialist depending on results. Early f/u with Dr Abram Kahn next week. Opportunity to ask questions was offered and were answered to the best of my ability. To ER if worse. Patient scheduled to have his [address] 2/3/2023 at 1am.      Over 50% of today's office visit was spent in face to face time reviewing test results, prognosis, importance of compliance, education about disease process, benefits of medications, instructions for management of disease and follow up plans. Total face to face time spent with patient was at least 25 minutes      There are no Patient Instructions on file for this visit. No follow-up provider specified.     Ashlyn Hardin MD

## 2023-02-03 ENCOUNTER — HOSPITAL ENCOUNTER (OUTPATIENT)
Dept: ULTRASOUND IMAGING | Age: 83
End: 2023-02-03
Payer: MEDICARE

## 2023-02-03 ENCOUNTER — HOSPITAL ENCOUNTER (OUTPATIENT)
Dept: ULTRASOUND IMAGING | Age: 83
Discharge: HOME | End: 2023-02-03
Payer: MEDICARE

## 2023-02-03 DIAGNOSIS — R10.32 BILATERAL GROIN PAIN: ICD-10-CM

## 2023-02-03 DIAGNOSIS — R10.30 LOWER ABDOMINAL PAIN: ICD-10-CM

## 2023-02-03 DIAGNOSIS — R10.31 BILATERAL GROIN PAIN: ICD-10-CM

## 2023-02-03 PROCEDURE — 76700 US EXAM ABDOM COMPLETE: CPT

## 2023-02-03 PROCEDURE — 76870 US EXAM SCROTUM: CPT

## 2023-02-05 LAB
BACTERIA SPEC CULT: NORMAL
SERVICE CMNT-IMP: NORMAL

## 2023-02-06 ENCOUNTER — TELEPHONE (OUTPATIENT)
Dept: CARDIOLOGY CLINIC | Age: 83
End: 2023-02-06

## 2023-02-06 NOTE — TELEPHONE ENCOUNTER
Needs his handicapped placard renewed before 02/23  Please fill out our part and he will pick it up and fill out his part and take to motor vehicle dept  Please call

## 2023-02-07 ENCOUNTER — CARE COORDINATION (OUTPATIENT)
Dept: CARE COORDINATION | Facility: CLINIC | Age: 83
End: 2023-02-07

## 2023-02-07 NOTE — CARE COORDINATION
Ambulatory Care Coordination Note  2/7/2023    ACC: Jonah Bolaños RN    Ccm outreached to patient. Patient had appointment with pcp on 2/2/23 for pelvic pain. Patient states on Friday he had ultrasound done and will see pcp again tomorrow. Patient states pain comes and goes. Patient states no questions or concerns. [facility] discussed that I would outreach next week. Patient agreeable. Offered patient enrollment in the Remote Patient Monitoring (RPM) program for in-home monitoring: NA. Lab Results       None            Care Coordination Interventions    Referral from Primary Care Provider: No  Suggested Interventions and Community Resources          Goals Addressed                      This Visit's Progress      Conditions and Symptoms   On track      I will schedule office visits, as directed by my provider. I will keep my appointment or reschedule if I have to cancel. I will notify my provider of any symptoms that indicate a worsening of my condition. Patient agrees to monitor and record weights daily. Reports increase if 2 lbs. overnight or 5 lbs. in a week to MD    Barriers: none  Plan for overcoming my barriers: N/A  Confidence: 7/10  Anticipated Goal Completion Date: 9/27/22          Patient Stated (pt-stated)   On track      Patient will schedule and attend follow up              Prior to Admission medications    Medication Sig Start Date End Date Taking? Authorizing Provider   colestipol (COLESTID) 1 g tablet  11/16/22   Historical Provider, MD   famotidine (PEPCID) 20 MG tablet  11/16/22   Historical Provider, MD   sildenafil (VIAGRA) 100 MG tablet Take 1 tablet by mouth as needed for Erectile Dysfunction 11/21/22   Tomas Hong DO   atorvastatin (LIPITOR) 80 MG tablet Take 1 tablet by mouth daily 9/7/22   Ravi Orosco MD   metoprolol succinate (TOPROL XL) 25 MG extended release tablet metoprolol succinate ER 25 mg tablet,extended release 24 hr   Take 1 tablet every day by oral route.  9/7/22 Josh Lane MD   finasteride (PROSCAR) 5 MG tablet finasteride 5 mg tablet   Take 1 tablet every day by oral route. 9/7/22   Josh Lane MD   losartan (COZAAR) 50 MG tablet Take 1 tablet by mouth daily 9/7/22   Josh Lane MD   Multiple Vitamins-Minerals (MULTIVITAMIN WITH MINERALS) tablet Once daily OTC 9/7/22   Josh Lane MD   Ascorbic Acid (VITAMIN C PO) Take 1 tablet by mouth daily. Historical Provider, MD   ZINC PO Take 1 capsule by mouth daily. Historical Provider, MD   Cholecalciferol (VITAMIN D3 PO) Take 1 tablet by mouth daily. Historical Provider, MD   acetaminophen (TYLENOL) 500 MG tablet Take 1,000 mg by mouth every 6 hours as needed for Pain. Patient not taking: Reported on 2/2/2023    Historical Provider, MD   apixaban (ELIQUIS) 5 MG TABS tablet Take 1 tablet by mouth 2 times daily 8/19/22 1/3/23  Mason Dawkins MD   potassium chloride (KLOR-CON M) 20 MEQ extended release tablet Take 1 tablet by mouth daily 6/2/22 8/27/22  Jennifer Sadlre DO   nitroGLYCERIN (NITROSTAT) 0.4 MG SL tablet Place 1 sl under the tongue q 5 min prn cp, max 3 sl in a 15-min time period.  Call 911 if no relief after the 3rd sl. 2/13/20   Ar Automatic Reconciliation   pantoprazole (PROTONIX) 40 MG tablet Take 40 mg by mouth every morning (before breakfast) 4/3/22   Ar Automatic Reconciliation   rOPINIRole (REQUIP) 0.5 MG tablet take 1 tablet by mouth nightly, increase to 1 tab twice a day if needed for restless leg syndrome  3/3/22   Ar Automatic Reconciliation   tamsulosin (FLOMAX) 0.4 MG capsule Take 0.4 mg by mouth daily 4/8/22   Ar Automatic Reconciliation   aspirin 81 MG EC tablet Take 81 mg by mouth daily 4/8/22 8/19/22  Ar Automatic Reconciliation   cyanocobalamin 1000 MCG tablet Take 1,000 mcg by mouth daily  Patient not taking: No sig reported 4/2/22 8/19/22  Ar Automatic Reconciliation   escitalopram (LEXAPRO) 10 MG tablet Take 10 mg by mouth daily  Patient not taking: No sig reported 3/3/22 8/19/22  Ar Automatic Reconciliation   furosemide (LASIX) 40 MG tablet Take 40 mg by mouth daily 3/3/22 8/19/22  Ar Automatic Reconciliation       Future Appointments   Date Time Provider Sri Falk   2/8/2023  8:00 AM Lb Quintanilla MD [facility] AMB   2/27/2023  8:30 AM Ender Vo DO [facility] AMB   4/3/2023  8:45 AM [facility] AMB   4/10/2023  8:20 AM Lb Quintanilla MD [facility] AMB

## 2023-02-08 ENCOUNTER — OFFICE VISIT (OUTPATIENT)
Dept: INTERNAL MEDICINE CLINIC | Facility: CLINIC | Age: 83
End: 2023-02-08
Payer: MEDICARE

## 2023-02-08 VITALS
OXYGEN SATURATION: 94 % | SYSTOLIC BLOOD PRESSURE: 120 MMHG | HEART RATE: 87 BPM | DIASTOLIC BLOOD PRESSURE: 70 MMHG | WEIGHT: 217 LBS | HEIGHT: 65 IN | BODY MASS INDEX: 36.15 KG/M2

## 2023-02-08 DIAGNOSIS — N43.3 HYDROCELE, UNSPECIFIED HYDROCELE TYPE: ICD-10-CM

## 2023-02-08 DIAGNOSIS — N41.0 ACUTE PROSTATITIS: Primary | ICD-10-CM

## 2023-02-08 DIAGNOSIS — N40.1 BENIGN PROSTATIC HYPERPLASIA WITH LOWER URINARY TRACT SYMPTOMS, SYMPTOM DETAILS UNSPECIFIED: ICD-10-CM

## 2023-02-08 PROCEDURE — 1036F TOBACCO NON-USER: CPT | Performed by: FAMILY MEDICINE

## 2023-02-08 PROCEDURE — G8427 DOCREV CUR MEDS BY ELIG CLIN: HCPCS | Performed by: FAMILY MEDICINE

## 2023-02-08 PROCEDURE — G8417 CALC BMI ABV UP PARAM F/U: HCPCS | Performed by: FAMILY MEDICINE

## 2023-02-08 PROCEDURE — 3074F SYST BP LT 130 MM HG: CPT | Performed by: FAMILY MEDICINE

## 2023-02-08 PROCEDURE — 3078F DIAST BP <80 MM HG: CPT | Performed by: FAMILY MEDICINE

## 2023-02-08 PROCEDURE — 99213 OFFICE O/P EST LOW 20 MIN: CPT | Performed by: FAMILY MEDICINE

## 2023-02-08 PROCEDURE — 1123F ACP DISCUSS/DSCN MKR DOCD: CPT | Performed by: FAMILY MEDICINE

## 2023-02-08 PROCEDURE — G8484 FLU IMMUNIZE NO ADMIN: HCPCS | Performed by: FAMILY MEDICINE

## 2023-02-08 RX ORDER — SULFAMETHOXAZOLE AND TRIMETHOPRIM 800; 160 MG/1; MG/1
1 TABLET ORAL 2 TIMES DAILY
Qty: 28 TABLET | Refills: 0 | Status: SHIPPED | OUTPATIENT
Start: 2023-02-08 | End: 2023-02-22

## 2023-02-08 RX ORDER — PHENAZOPYRIDINE HYDROCHLORIDE 200 MG/1
200 TABLET, FILM COATED ORAL 3 TIMES DAILY PRN
Qty: 9 TABLET | Refills: 0 | Status: SHIPPED | OUTPATIENT
Start: 2023-02-08 | End: 2023-02-11

## 2023-02-08 ASSESSMENT — ENCOUNTER SYMPTOMS
DIARRHEA: 0
NAUSEA: 0
SORE THROAT: 1
SHORTNESS OF BREATH: 1
CONSTIPATION: 0
ABDOMINAL PAIN: 1
COUGH: 1

## 2023-02-08 NOTE — PROGRESS NOTES
79-year-old male comes in for follow-up of ongoing pain. Positive WBC on urinalysis. Urine culture inconclusive. Ultrasound of abdomen was normal.  Ultrasound of testis showed moderate bilateral hydroceles.

## 2023-02-08 NOTE — PROGRESS NOTES
Brandy Granados (:  1940) is a 79 y.o. male,Established patient, here for evaluation of the following chief complaint(s):  Follow-up (6 day follow up. Review Results. Still having some pain. )         ASSESSMENT/PLAN:  1. Acute prostatitis  -     sulfamethoxazole-trimethoprim (BACTRIM DS;SEPTRA DS) 800-160 MG per tablet; Take 1 tablet by mouth 2 times daily for 14 days, Disp-28 tablet, R-0Normal  -     phenazopyridine (PYRIDIUM) 200 MG tablet; Take 1 tablet by mouth 3 times daily as needed for Pain, Disp-9 tablet, R-0Normal  2. Benign prostatic hyperplasia with lower urinary tract symptoms, symptom details unspecified  3. Hydrocele, unspecified hydrocele type    Return in about 1 week (around 2/15/2023). 1) patient placed on TMP-SMX for 14 days and pyridium for dysuria. Will follow up with patient in one week to check effectiveness of regimen. Patient instructed to take home temperatures and to go to ER if he starts to have a fever or cannot urinate. Considering urology referral if symptoms do not improve  2) Patient prostate was enlarged on exam, and most likely contributory to patient disease presentation. Considering urology referral if symptoms do not improve. 3) Patient was briefed on US results and educated on the condition. If this worsens will refer to urology. Patient seen a examined with medical student. Assesment and plan d/w medical student and patient. Subjective   SUBJECTIVE/OBJECTIVE:  79-year-old male comes in for follow-up of ongoing pain. Positive WBC on urinalysis. Urine culture inconclusive. Ultrasound of abdomen was normal.  Ultrasound of testis showed moderate bilateral hydroceles. Hurts even walking around and during urination; pain radiates from groin to back    Groin Pain  The patient's primary symptoms include pelvic pain, penile pain, scrotal swelling and testicular pain. This is a recurrent problem. The current episode started in the past 7 days.  The problem occurs constantly. The problem has been unchanged. The pain is severe. Associated symptoms include abdominal pain, coughing, discolored urine, dysuria, flank pain, frequency, hesitancy, a rash, shortness of breath, a sore throat and urgency. Pertinent negatives include no anorexia, chest pain, chills, constipation, diarrhea, fever, headaches, hematuria, nausea or urinary retention. Associated symptoms comments: Darker yellow color to urine. The testicular pain affects both testicles. There is swelling in both testicles. The color of the testicles is Normal. The treatment provided no relief. There is no history of a femoral hernia, an inguinal hernia, kidney stones or prostatitis. Dysuria   This is a recurrent problem. The current episode started in the past 7 days. The problem occurs every urination. The problem has been unchanged. The quality of the pain is described as burning. The pain is at a severity of 7/10. The pain is moderate. There has been no fever. There is A history of pyelonephritis. Associated symptoms include flank pain, frequency, hesitancy and urgency. Pertinent negatives include no chills or nausea. He has tried nothing for the symptoms. His past medical history is significant for catheterization and recurrent UTIs. There is no history of kidney stones or a urological procedure. Urine has an odor to it too. Review of Systems   Constitutional:  Negative for chills and fever. HENT:  Positive for sore throat. Respiratory:  Positive for cough and shortness of breath. Cardiovascular:  Negative for chest pain. Gastrointestinal:  Positive for abdominal pain. Negative for anorexia, constipation, diarrhea and nausea. Genitourinary:  Positive for dysuria, flank pain, frequency, hesitancy, pelvic pain, penile pain, scrotal swelling, testicular pain and urgency. Skin:  Positive for rash. Neurological:  Negative for headaches.         Objective   Physical Exam  Constitutional: Appearance: Normal appearance. He is normal weight. HENT:      Head: Normocephalic and atraumatic. Nose: Nose normal.      Mouth/Throat:      Mouth: Mucous membranes are moist.   Eyes:      Extraocular Movements: Extraocular movements intact. Conjunctiva/sclera: Conjunctivae normal.      Pupils: Pupils are equal, round, and reactive to light. Cardiovascular:      Rate and Rhythm: Normal rate and regular rhythm. Pulses: Normal pulses. Heart sounds: Normal heart sounds. Pulmonary:      Effort: Pulmonary effort is normal.      Breath sounds: Normal breath sounds. Abdominal:      Palpations: Abdomen is soft. Genitourinary:     Comments: Prostate was enlarged on rectal exam.  Tender in suprapubic region. Musculoskeletal:         General: Tenderness present. Normal range of motion. Cervical back: Normal range of motion. Comments: Bilateral flank tenderness   Skin:     General: Skin is warm and dry. Neurological:      General: No focal deficit present. Mental Status: He is alert and oriented to person, place, and time. Mental status is at baseline. Psychiatric:         Mood and Affect: Mood normal.         Behavior: Behavior normal.         Thought Content: Thought content normal.         Judgment: Judgment normal.                An electronic signature was used to authenticate this note.     --Mehnaz Sommers MD

## 2023-02-08 NOTE — PROGRESS NOTES
41-year-old male comes in for follow-up of ongoing pain. Positive WBC on urinalysis. Urine culture inconclusive. Ultrasound of abdomen was normal.  Ultrasound of testis showed moderate bilateral hydroceles.

## 2023-02-14 ENCOUNTER — CARE COORDINATION (OUTPATIENT)
Dept: CARE COORDINATION | Facility: CLINIC | Age: 83
End: 2023-02-14

## 2023-02-21 ENCOUNTER — HOSPITAL ENCOUNTER (INPATIENT)
Age: 83
LOS: 5 days | Discharge: HOME HEALTH | DRG: 872 | End: 2023-02-26
Attending: EMERGENCY MEDICINE | Admitting: INTERNAL MEDICINE
Payer: MEDICARE

## 2023-02-21 ENCOUNTER — CARE COORDINATION (OUTPATIENT)
Dept: CARE COORDINATION | Facility: CLINIC | Age: 83
End: 2023-02-21

## 2023-02-21 ENCOUNTER — APPOINTMENT (OUTPATIENT)
Dept: GENERAL RADIOLOGY | Age: 83
DRG: 872 | End: 2023-02-21
Payer: MEDICARE

## 2023-02-21 DIAGNOSIS — Z09 HOSPITAL DISCHARGE FOLLOW-UP: Primary | ICD-10-CM

## 2023-02-21 DIAGNOSIS — J11.1 INFLUENZA WITH RESPIRATORY MANIFESTATION OTHER THAN PNEUMONIA: ICD-10-CM

## 2023-02-21 DIAGNOSIS — J12.9 VIRAL PNEUMONIA: ICD-10-CM

## 2023-02-21 DIAGNOSIS — J98.01 COUGH DUE TO BRONCHOSPASM: ICD-10-CM

## 2023-02-21 DIAGNOSIS — I50.9 CONGESTIVE HEART FAILURE, UNSPECIFIED HF CHRONICITY, UNSPECIFIED HEART FAILURE TYPE (HCC): ICD-10-CM

## 2023-02-21 PROBLEM — I48.91 ATRIAL FIBRILLATION (HCC): Status: ACTIVE | Noted: 2017-11-29

## 2023-02-21 LAB
ALBUMIN SERPL-MCNC: 3.5 G/DL (ref 3.2–4.6)
ALBUMIN/GLOB SERPL: 0.9 (ref 0.4–1.6)
ALP SERPL-CCNC: 95 U/L (ref 50–136)
ALT SERPL-CCNC: 33 U/L (ref 12–65)
ANION GAP SERPL CALC-SCNC: 5 MMOL/L (ref 2–11)
AST SERPL-CCNC: 29 U/L (ref 15–37)
BASOPHILS # BLD: 0 K/UL (ref 0–0.2)
BASOPHILS NFR BLD: 0 % (ref 0–2)
BILIRUB SERPL-MCNC: 0.6 MG/DL (ref 0.2–1.1)
BUN SERPL-MCNC: 16 MG/DL (ref 8–23)
CALCIUM SERPL-MCNC: 9.3 MG/DL (ref 8.3–10.4)
CHLORIDE SERPL-SCNC: 106 MMOL/L (ref 101–110)
CO2 SERPL-SCNC: 24 MMOL/L (ref 21–32)
CREAT SERPL-MCNC: 1.1 MG/DL (ref 0.8–1.5)
DIFFERENTIAL METHOD BLD: ABNORMAL
EOSINOPHIL # BLD: 0.1 K/UL (ref 0–0.8)
EOSINOPHIL NFR BLD: 1 % (ref 0.5–7.8)
ERYTHROCYTE [DISTWIDTH] IN BLOOD BY AUTOMATED COUNT: 13.8 % (ref 11.9–14.6)
FLUAV RNA SPEC QL NAA+PROBE: DETECTED
FLUBV RNA SPEC QL NAA+PROBE: NOT DETECTED
GLOBULIN SER CALC-MCNC: 3.7 G/DL (ref 2.8–4.5)
GLUCOSE SERPL-MCNC: 119 MG/DL (ref 65–100)
HCT VFR BLD AUTO: 44.9 % (ref 41.1–50.3)
HGB BLD-MCNC: 14.4 G/DL (ref 13.6–17.2)
IMM GRANULOCYTES # BLD AUTO: 0.1 K/UL (ref 0–0.5)
IMM GRANULOCYTES NFR BLD AUTO: 1 % (ref 0–5)
LACTATE SERPL-SCNC: 2 MMOL/L (ref 0.4–2)
LYMPHOCYTES # BLD: 1.2 K/UL (ref 0.5–4.6)
LYMPHOCYTES NFR BLD: 11 % (ref 13–44)
MCH RBC QN AUTO: 30.6 PG (ref 26.1–32.9)
MCHC RBC AUTO-ENTMCNC: 32.1 G/DL (ref 31.4–35)
MCV RBC AUTO: 95.3 FL (ref 82–102)
MONOCYTES # BLD: 1.2 K/UL (ref 0.1–1.3)
MONOCYTES NFR BLD: 11 % (ref 4–12)
NEUTS SEG # BLD: 8 K/UL (ref 1.7–8.2)
NEUTS SEG NFR BLD: 76 % (ref 43–78)
NRBC # BLD: 0 K/UL (ref 0–0.2)
NT PRO BNP: 1080 PG/ML
PLATELET # BLD AUTO: 169 K/UL (ref 150–450)
PMV BLD AUTO: 9.1 FL (ref 9.4–12.3)
POTASSIUM SERPL-SCNC: 4.1 MMOL/L (ref 3.5–5.1)
PROCALCITONIN SERPL-MCNC: 0.06 NG/ML (ref 0–0.49)
PROT SERPL-MCNC: 7.2 G/DL (ref 6.3–8.2)
RBC # BLD AUTO: 4.71 M/UL (ref 4.23–5.6)
SARS-COV-2 RDRP RESP QL NAA+PROBE: NOT DETECTED
SODIUM SERPL-SCNC: 135 MMOL/L (ref 133–143)
SOURCE: NORMAL
WBC # BLD AUTO: 10.5 K/UL (ref 4.3–11.1)

## 2023-02-21 PROCEDURE — 81001 URINALYSIS AUTO W/SCOPE: CPT

## 2023-02-21 PROCEDURE — 6370000000 HC RX 637 (ALT 250 FOR IP): Performed by: EMERGENCY MEDICINE

## 2023-02-21 PROCEDURE — 1100000000 HC RM PRIVATE

## 2023-02-21 PROCEDURE — 87040 BLOOD CULTURE FOR BACTERIA: CPT

## 2023-02-21 PROCEDURE — 96365 THER/PROPH/DIAG IV INF INIT: CPT

## 2023-02-21 PROCEDURE — 87502 INFLUENZA DNA AMP PROBE: CPT

## 2023-02-21 PROCEDURE — 87205 SMEAR GRAM STAIN: CPT

## 2023-02-21 PROCEDURE — 2580000003 HC RX 258: Performed by: EMERGENCY MEDICINE

## 2023-02-21 PROCEDURE — 94640 AIRWAY INHALATION TREATMENT: CPT

## 2023-02-21 PROCEDURE — 80053 COMPREHEN METABOLIC PANEL: CPT

## 2023-02-21 PROCEDURE — 87635 SARS-COV-2 COVID-19 AMP PRB: CPT

## 2023-02-21 PROCEDURE — 71045 X-RAY EXAM CHEST 1 VIEW: CPT

## 2023-02-21 PROCEDURE — 99285 EMERGENCY DEPT VISIT HI MDM: CPT

## 2023-02-21 PROCEDURE — 84145 PROCALCITONIN (PCT): CPT

## 2023-02-21 PROCEDURE — 93005 ELECTROCARDIOGRAM TRACING: CPT | Performed by: EMERGENCY MEDICINE

## 2023-02-21 PROCEDURE — 96374 THER/PROPH/DIAG INJ IV PUSH: CPT

## 2023-02-21 PROCEDURE — 87150 DNA/RNA AMPLIFIED PROBE: CPT

## 2023-02-21 PROCEDURE — 6360000002 HC RX W HCPCS: Performed by: EMERGENCY MEDICINE

## 2023-02-21 PROCEDURE — 83605 ASSAY OF LACTIC ACID: CPT

## 2023-02-21 PROCEDURE — 83880 ASSAY OF NATRIURETIC PEPTIDE: CPT

## 2023-02-21 PROCEDURE — 85025 COMPLETE CBC W/AUTO DIFF WBC: CPT

## 2023-02-21 RX ORDER — ACETAMINOPHEN 325 MG/1
650 TABLET ORAL
Status: COMPLETED | OUTPATIENT
Start: 2023-02-21 | End: 2023-02-21

## 2023-02-21 RX ORDER — SODIUM CHLORIDE, SODIUM LACTATE, POTASSIUM CHLORIDE, AND CALCIUM CHLORIDE .6; .31; .03; .02 G/100ML; G/100ML; G/100ML; G/100ML
500 INJECTION, SOLUTION INTRAVENOUS ONCE
Status: COMPLETED | OUTPATIENT
Start: 2023-02-21 | End: 2023-02-21

## 2023-02-21 RX ORDER — IPRATROPIUM BROMIDE AND ALBUTEROL SULFATE 2.5; .5 MG/3ML; MG/3ML
1 SOLUTION RESPIRATORY (INHALATION)
Status: COMPLETED | OUTPATIENT
Start: 2023-02-21 | End: 2023-02-21

## 2023-02-21 RX ORDER — METHYLPREDNISOLONE SODIUM SUCCINATE 125 MG/2ML
125 INJECTION, POWDER, LYOPHILIZED, FOR SOLUTION INTRAMUSCULAR; INTRAVENOUS
Status: COMPLETED | OUTPATIENT
Start: 2023-02-21 | End: 2023-02-21

## 2023-02-21 RX ADMIN — IPRATROPIUM BROMIDE AND ALBUTEROL SULFATE 1 AMPULE: .5; 3 SOLUTION RESPIRATORY (INHALATION) at 20:31

## 2023-02-21 RX ADMIN — SODIUM CHLORIDE, POTASSIUM CHLORIDE, SODIUM LACTATE AND CALCIUM CHLORIDE 500 ML: 600; 310; 30; 20 INJECTION, SOLUTION INTRAVENOUS at 19:13

## 2023-02-21 RX ADMIN — CEFTRIAXONE 1000 MG: 1 INJECTION, POWDER, FOR SOLUTION INTRAMUSCULAR; INTRAVENOUS at 18:56

## 2023-02-21 RX ADMIN — ACETAMINOPHEN 650 MG: 325 TABLET ORAL at 18:55

## 2023-02-21 RX ADMIN — IPRATROPIUM BROMIDE AND ALBUTEROL SULFATE 1 AMPULE: 2.5; .5 SOLUTION RESPIRATORY (INHALATION) at 18:15

## 2023-02-21 RX ADMIN — METHYLPREDNISOLONE SODIUM SUCCINATE 125 MG: 125 INJECTION, POWDER, FOR SOLUTION INTRAMUSCULAR; INTRAVENOUS at 20:43

## 2023-02-21 ASSESSMENT — PAIN - FUNCTIONAL ASSESSMENT: PAIN_FUNCTIONAL_ASSESSMENT: NONE - DENIES PAIN

## 2023-02-21 NOTE — ED PROVIDER NOTES
Emergency Department Provider Note                   PCP:                Tevin Borrero MD               Age: 79 y.o. Sex: male       ICD-10-CM    1. Influenza with respiratory manifestation other than pneumonia  J11.1           DISPOSITION Decision To Admit 02/21/2023 07:37:28 PM        Medical Decision Making  Patient presenting from home with collapse with a fever of 102 and cough. Suspect probable pneumonia and possible SIRS and sepsis. Likely admission. Amount and/or Complexity of Data Reviewed  Labs: ordered. Decision-making details documented in ED Course. Radiology: ordered and independent interpretation performed. ECG/medicine tests: ordered and independent interpretation performed. Details: EKG at 1809: Is a normal rate of 75 bpm with left bundle branch block. Nondiagnostic. Risk  OTC drugs. Prescription drug management. Decision regarding hospitalization. Complexity of Problem: 1 acute illness with systemic symptoms. (4)    I have conducted an independent ordering and review of Labs. I have conducted an independent ordering and review of EKG. I have conducted an independent ordering and review of X-rays. I discussed disposition with another provider. Patient was admitted I have communication with admitting physician.          Orders Placed This Encounter   Procedures    Blood Culture 1    COVID-19, Rapid    Influenza A/B, Molecular    Culture, Blood 2    XR CHEST PORTABLE    Comprehensive Metabolic Panel    CBC with Auto Differential    Lactate, Sepsis    Procalcitonin    Urinalysis    Brain Natriuretic Peptide    Neurologic Status Assessment    Strict intake and output    Vital Signs Per Unit Routine    EKG 12 Lead    Saline lock IV        Medications   lactated ringers bolus (500 mLs IntraVENous New Bag 2/21/23 1913)   ipratropium-albuterol (DUONEB) nebulizer solution 1 ampule (1 ampule Inhalation Given 2/21/23 1815)   acetaminophen (TYLENOL) tablet 650 mg (650 mg Oral Given 2/21/23 1855)   cefTRIAXone (ROCEPHIN) 1,000 mg in sodium chloride 0.9 % 50 mL IVPB (mini-bag) (0 mg IntraVENous Stopped 2/21/23 1915)       New Prescriptions    No medications on file        Zahra Corrigan is a 79 y.o. male who presents to the Emergency Department with chief complaint of  No chief complaint on file. Patient arrived by EMS from home after a collapse. This was witnessed by his wife who helped him to the floor after going to the bathroom. There was no loss of consciousness reported. And the patient denies any injuries. He has had a cough for an unspecified period of time possibly a day or 2. Patient was also noted to be febrile and states he was unaware that he had a fever. Cough has been nonproductive. And is poor historian and hard of hearing. The history is provided by the patient and medical records. Review of Systems   Constitutional:  Positive for fever. Negative for chills. Neurological:  Positive for dizziness and weakness. All other systems reviewed and are negative.     Past Medical History:   Diagnosis Date    Abnormal EKG 4/22/15    Arrhythmia     Aspiration pneumonia ([address] 10/26/2017    CAD (coronary artery disease) 5/8/2015    Carotid artery stenosis without cerebral infarction 6/7/2016    US 6/15: <50% bilat ICAs    Coronary atherosclerosis of native coronary vessel 5/8/2015    GERMAIN on brilinta 5/7/15: prox LAD PCI, normal EF     Diastolic CHF, chronic ([address] 3/2/2022    Dyslipidemia 6/7/2016    Dyspnea 5/8/2015    Echo 6/15: EF 60%, mod MR, mod LVH, mild AI     ED (erectile dysfunction)     GERD (gastroesophageal reflux disease)     GERD (gastroesophageal reflux disease)     no medication    HTN (hypertension) 5/8/2015    Hypertension     Hypokalemia     Hypokalemia 6/7/2016    Mitral valve regurgitation 6/7/2016    Morbid obesity ([address]     Myasthenia gravis ([address]     Myasthenia gravis ([address] 6/17/15    Nocturia     Osteoporosis     PUD (peptic ulcer disease) 25 yrs ago    S/P coronary artery stent placement 5/8/2015    3.0x38 mm Xience CAROL to pLAD 5/7/15     Sleep apnea     Syncope and collapse     Unspecified sleep apnea     no cpap        Past Surgical History:   Procedure Laterality Date    APPENDECTOMY      CARDIAC CATHETERIZATION  05/21/2019    watchman device    CARDIAC CATHETERIZATION  05/27/2015    stent    COLONOSCOPY  2007    COLONOSCOPY N/A 8/6/2022    COLONOSCOPY POLYPECTOMY SNARE/COLD BIOPSY performed by Sam Regan MD at [facility] ENDOSCOPY    ERCP  3/30/2022         HEMICOLECTOMY Right 8/10/2022    BOWEL RESECTION HEMICOLECTOMY LAPAROSCOPIC ROBOTIC, Right [place] performed by Tana Roy MD at [place] [address]    Derrick Mistry    Dr. Kraus/Giana for sleep apnea and reconstruction for extending jaw    UPPER GASTROINTESTINAL ENDOSCOPY N/A 8/5/2022    EGD ESOPHAGOGASTRODUODENOSCOPY Rm 525 performed by Kenny Melissa MD at [address] Right 07/10/2019     Repair of right radial artery pseudoaneurysm        Family History   Problem Relation Age of Onset    No Known Problems Brother     Cancer Brother         brain tumor    No Known Problems Sister     Cancer Mother         kidney    Cancer Father         stomach        Social History     Socioeconomic History    Marital status:    Tobacco Use    Smoking status: Never    Smokeless tobacco: Never   Substance and Sexual Activity    Alcohol use: No    Drug use: No     Social Determinants of Health     Financial Resource Strain: Low Risk     Difficulty of Paying Living Expenses: Not very hard   Food Insecurity: No Food Insecurity    Worried About Running Out of Food in the Last Year: Never true    Ran Out of Food in the Last Year: Never true   Transportation Needs: Unknown    Lack of Transportation (Non-Medical):  No   Physical Activity: Insufficiently Active    Days of Exercise per Week: 3 days    Minutes of Exercise per Session: 10 min   Housing Stability: Unknown    Unstable Housing in the Last Year: No         Bebtelovimab     Previous Medications    ACETAMINOPHEN (TYLENOL) 500 MG TABLET    Take 1,000 mg by mouth every 6 hours as needed for Pain    APIXABAN (ELIQUIS) 5 MG TABS TABLET    Take 1 tablet by mouth 2 times daily    ASCORBIC ACID (VITAMIN C PO)    Take 1 tablet by mouth daily. ATORVASTATIN (LIPITOR) 80 MG TABLET    Take 1 tablet by mouth daily    CHOLECALCIFEROL (VITAMIN D3 PO)    Take 1 tablet by mouth daily. COLESTIPOL (COLESTID) 1 G TABLET        FAMOTIDINE (PEPCID) 20 MG TABLET        FINASTERIDE (PROSCAR) 5 MG TABLET    finasteride 5 mg tablet   Take 1 tablet every day by oral route. LOSARTAN (COZAAR) 50 MG TABLET    Take 1 tablet by mouth daily    METOPROLOL SUCCINATE (TOPROL XL) 25 MG EXTENDED RELEASE TABLET    metoprolol succinate ER 25 mg tablet,extended release 24 hr   Take 1 tablet every day by oral route. MULTIPLE VITAMINS-MINERALS (MULTIVITAMIN WITH MINERALS) TABLET    Once daily OTC    NITROGLYCERIN (NITROSTAT) 0.4 MG SL TABLET    Place 1 sl under the tongue q 5 min prn cp, max 3 sl in a 15-min time period. Call 911 if no relief after the 3rd sl. PANTOPRAZOLE (PROTONIX) 40 MG TABLET    Take 40 mg by mouth every morning (before breakfast)    ROPINIROLE (REQUIP) 0.5 MG TABLET    take 1 tablet by mouth nightly, increase to 1 tab twice a day if needed for restless leg syndrome     SILDENAFIL (VIAGRA) 100 MG TABLET    Take 1 tablet by mouth as needed for Erectile Dysfunction    SULFAMETHOXAZOLE-TRIMETHOPRIM (BACTRIM DS;SEPTRA DS) 800-160 MG PER TABLET    Take 1 tablet by mouth 2 times daily for 14 days    TAMSULOSIN (FLOMAX) 0.4 MG CAPSULE    Take 0.4 mg by mouth daily    ZINC PO    Take 1 capsule by mouth daily. Vitals signs and nursing note reviewed.    Patient Vitals for the past 4 hrs:   Temp Pulse Resp BP SpO2   02/21/23 1815 -- 75 20 -- 94 %   02/21/23 1802 (!) 102.6 °F (39.2 °C) 79 30 139/74 94 %          Physical Exam  Vitals and nursing note reviewed. Constitutional:       General: He is not in acute distress. Appearance: Normal appearance. He is not ill-appearing, toxic-appearing or diaphoretic. HENT:      Head: Normocephalic and atraumatic. Mouth/Throat:      Mouth: Mucous membranes are moist.      Pharynx: Oropharynx is clear. No oropharyngeal exudate or posterior oropharyngeal erythema. Eyes:      Extraocular Movements: Extraocular movements intact. Conjunctiva/sclera: Conjunctivae normal.      Pupils: Pupils are equal, round, and reactive to light. Cardiovascular:      Rate and Rhythm: Normal rate and regular rhythm. Pulses: Normal pulses. Heart sounds: Normal heart sounds. Pulmonary:      Breath sounds: Wheezing present. Comments: Tachypnea and cough are present. Chest:      Chest wall: No tenderness. Abdominal:      Palpations: Abdomen is soft. Tenderness: There is no abdominal tenderness. There is no right CVA tenderness, left CVA tenderness or guarding. Musculoskeletal:         General: Normal range of motion. Cervical back: Normal range of motion and neck supple. No rigidity or tenderness. Lymphadenopathy:      Cervical: No cervical adenopathy. Skin:     General: Skin is warm and dry. Capillary Refill: Capillary refill takes less than 2 seconds. Neurological:      General: No focal deficit present. Mental Status: He is alert and oriented to person, place, and time. Mental status is at baseline. Psychiatric:         Mood and Affect: Mood normal.         Behavior: Behavior normal.         Thought Content:  Thought content normal.        Procedures    Results for orders placed or performed during the hospital encounter of 02/21/23   Blood Culture 1    Specimen: Blood   Result Value Ref Range    Special Requests RIGHT      Culture PENDING    COVID-19, Rapid    Specimen: Nasopharyngeal   Result Value Ref Range    Source NASAL      SARS-CoV-2, Rapid Not detected NOTD     Influenza A/B, Molecular    Specimen: Not Specified   Result Value Ref Range    Influenza A, ROMANA Detected (A) NOTD      Influenza B, ROMANA Not detected NOTD     XR CHEST PORTABLE    Narrative    EXAMINATION: XR CHEST PORTABLE      DATE: 2/21/2023 6:35 PM    INDICATIONS: Sepsis    COMPARISON: September 7, 2022    FINDINGS:    Stable mild cardiomegaly. Stable triple lead defibrillator/pacemaker. Expiratory   technique. Mild thickening of the left lateral pleural stripe is again   identified. Stable aorta and mediastinum. Stable osseous structures. Impression    1. Stable mild cardiomegaly. 2. Stable triple lead defibrillator/pacemaker. 3. Expiratory technique. No acute pulmonary process is identified.      Liyah Olmstead M.D.   2/21/2023 6:50:00 PM   Comprehensive Metabolic Panel   Result Value Ref Range    Sodium 135 133 - 143 mmol/L    Potassium 4.1 3.5 - 5.1 mmol/L    Chloride 106 101 - 110 mmol/L    CO2 24 21 - 32 mmol/L    Anion Gap 5 2 - 11 mmol/L    Glucose 119 (H) 65 - 100 mg/dL    BUN 16 8 - 23 MG/DL    Creatinine 1.10 0.8 - 1.5 MG/DL    Est, Glom Filt Rate >60 >60 ml/min/1.73m2    Calcium 9.3 8.3 - 10.4 MG/DL    Total Bilirubin 0.6 0.2 - 1.1 MG/DL    ALT 33 12 - 65 U/L    AST 29 15 - 37 U/L    Alk Phosphatase 95 50 - 136 U/L    Total Protein 7.2 6.3 - 8.2 g/dL    Albumin 3.5 3.2 - 4.6 g/dL    Globulin 3.7 2.8 - 4.5 g/dL    Albumin/Globulin Ratio 0.9 0.4 - 1.6     CBC with Auto Differential   Result Value Ref Range    WBC 10.5 4.3 - 11.1 K/uL    RBC 4.71 4.23 - 5.6 M/uL    Hemoglobin 14.4 13.6 - 17.2 g/dL    Hematocrit 44.9 41.1 - 50.3 %    MCV 95.3 82 - 102 FL    MCH 30.6 26.1 - 32.9 PG    MCHC 32.1 31.4 - 35.0 g/dL    RDW 13.8 11.9 - 14.6 %    Platelets 114 691 - 866 K/uL    MPV 9.1 (L) 9.4 - 12.3 FL    nRBC 0.00 0.0 - 0.2 K/uL    Differential Type AUTOMATED      Seg Neutrophils 76 43 - 78 %    Lymphocytes 11 (L) 13 - 44 %    Monocytes 11 4.0 - 12.0 %    Eosinophils % 1 0.5 - 7.8 %    Basophils 0 0.0 - 2.0 %    Immature Granulocytes 1 0.0 - 5.0 %    Segs Absolute 8.0 1.7 - 8.2 K/UL    Absolute Lymph # 1.2 0.5 - 4.6 K/UL    Absolute Mono # 1.2 0.1 - 1.3 K/UL    Absolute Eos # 0.1 0.0 - 0.8 K/UL    Basophils Absolute 0.0 0.0 - 0.2 K/UL    Absolute Immature Granulocyte 0.1 0.0 - 0.5 K/UL   Lactate, Sepsis   Result Value Ref Range    Lactic Acid, Sepsis 2.0 0.4 - 2.0 MMOL/L   Procalcitonin   Result Value Ref Range    Procalcitonin 0.06 0.00 - 0.49 ng/mL   Brain Natriuretic Peptide   Result Value Ref Range    NT Pro-BNP 1,080 (H) <450 PG/ML        XR CHEST PORTABLE   Final Result      1. Stable mild cardiomegaly. 2. Stable triple lead defibrillator/pacemaker. 3. Expiratory technique. No acute pulmonary process is identified. Laury Carlin M.D.    2/21/2023 6:50:00 PM                          Voice dictation software was used during the making of this note. This software is not perfect and grammatical and other typographical errors may be present. This note has not been completely proofread for errors.      Sarabjit Culp,   02/1940

## 2023-02-21 NOTE — CARE COORDINATION
Ambulatory Care Management Note        Date/Time:  2/21/2023 9:49 AM    Ccm outreached to patient. No answer at this time, message left. Awaiting response. If no response, ccm will outreach next week.

## 2023-02-21 NOTE — ED TRIAGE NOTES
Patient arrives to ED via EMS from home. Patient fell walking to the bathroom. Patient denies pain or discomfort. Denies hitting head. Denies blood thinners.

## 2023-02-22 ENCOUNTER — APPOINTMENT (OUTPATIENT)
Dept: GENERAL RADIOLOGY | Age: 83
DRG: 872 | End: 2023-02-22
Payer: MEDICARE

## 2023-02-22 LAB
ACCESSION NUMBER, LLC1M: ABNORMAL
ACINETOBACTER CALCOAC BAUMANNII COMPLEX BY PCR: NOT DETECTED
APPEARANCE UR: ABNORMAL
B FRAGILIS DNA BLD POS QL NAA+NON-PROBE: NOT DETECTED
BACTERIA URNS QL MICRO: 0 /HPF
BILIRUB UR QL: NEGATIVE
BIOFIRE TEST COMMENT: ABNORMAL
C ALBICANS DNA BLD POS QL NAA+NON-PROBE: NOT DETECTED
C AURIS DNA BLD POS QL NAA+NON-PROBE: NOT DETECTED
C GATTII+NEOFOR DNA BLD POS QL NAA+N-PRB: NOT DETECTED
C GLABRATA DNA BLD POS QL NAA+NON-PROBE: NOT DETECTED
C KRUSEI DNA BLD POS QL NAA+NON-PROBE: NOT DETECTED
C PARAP DNA BLD POS QL NAA+NON-PROBE: NOT DETECTED
C TROPICLS DNA BLD POS QL NAA+NON-PROBE: NOT DETECTED
COLOR UR: ABNORMAL
E CLOAC COMP DNA BLD POS NAA+NON-PROBE: NOT DETECTED
E COLI DNA BLD POS QL NAA+NON-PROBE: NOT DETECTED
E FAECALIS DNA BLD POS QL NAA+NON-PROBE: NOT DETECTED
E FAECIUM DNA BLD POS QL NAA+NON-PROBE: NOT DETECTED
EKG ATRIAL RATE: 0 BPM
EKG DIAGNOSIS: NORMAL
EKG P AXIS: 233 DEGREES
EKG P-R INTERVAL: 179 MS
EKG Q-T INTERVAL: 430 MS
EKG QRS DURATION: 184 MS
EKG QTC CALCULATION (BAZETT): 481 MS
EKG R AXIS: -69 DEGREES
EKG T AXIS: 103 DEGREES
EKG VENTRICULAR RATE: 75 BPM
ENTEROBACTERALES DNA BLD POS NAA+N-PRB: NOT DETECTED
EPI CELLS #/AREA URNS HPF: ABNORMAL /HPF
GLUCOSE UR STRIP.AUTO-MCNC: >1000 MG/DL
GP B STREP DNA BLD POS QL NAA+NON-PROBE: NOT DETECTED
HAEM INFLU DNA BLD POS QL NAA+NON-PROBE: NOT DETECTED
HGB UR QL STRIP: ABNORMAL
K OXYTOCA DNA BLD POS QL NAA+NON-PROBE: NOT DETECTED
KETONES UR QL STRIP.AUTO: NEGATIVE MG/DL
KLEBSIELLA SP DNA BLD POS QL NAA+NON-PRB: NOT DETECTED
KLEBSIELLA SP DNA BLD POS QL NAA+NON-PRB: NOT DETECTED
L MONOCYTOG DNA BLD POS QL NAA+NON-PROBE: NOT DETECTED
LACTATE SERPL-SCNC: 2.3 MMOL/L (ref 0.4–2)
LACTATE SERPL-SCNC: 2.8 MMOL/L (ref 0.4–2)
LACTATE SERPL-SCNC: 2.8 MMOL/L (ref 0.4–2)
LACTATE SERPL-SCNC: 4.3 MMOL/L (ref 0.4–2)
LEUKOCYTE ESTERASE UR QL STRIP.AUTO: ABNORMAL
MECA+MECC ISLT/SPM QL: DETECTED
N MEN DNA BLD POS QL NAA+NON-PROBE: NOT DETECTED
NITRITE UR QL STRIP.AUTO: NEGATIVE
P AERUGINOSA DNA BLD POS NAA+NON-PROBE: NOT DETECTED
PH UR STRIP: 5.5 (ref 5–9)
PROCALCITONIN SERPL-MCNC: <0.05 NG/ML (ref 0–0.49)
PROT UR STRIP-MCNC: ABNORMAL MG/DL
PROTEUS SP DNA BLD POS QL NAA+NON-PROBE: NOT DETECTED
RBC #/AREA URNS HPF: ABNORMAL /HPF
RESISTANT GENE TARGETS: ABNORMAL
S AUREUS DNA BLD POS QL NAA+NON-PROBE: NOT DETECTED
S AUREUS+CONS DNA BLD POS NAA+NON-PROBE: DETECTED
S EPIDERMIDIS DNA BLD POS QL NAA+NON-PRB: DETECTED
S LUGDUNENSIS DNA BLD POS QL NAA+NON-PRB: NOT DETECTED
S MALTOPHILIA DNA BLD POS QL NAA+NON-PRB: NOT DETECTED
S MARCESCENS DNA BLD POS NAA+NON-PROBE: NOT DETECTED
S PNEUM DNA BLD POS QL NAA+NON-PROBE: NOT DETECTED
S PYO DNA BLD POS QL NAA+NON-PROBE: NOT DETECTED
SALMONELLA DNA BLD POS QL NAA+NON-PROBE: NOT DETECTED
SP GR UR REFRACTOMETRY: 1.02 (ref 1–1.02)
STREPTOCOCCUS DNA BLD POS NAA+NON-PROBE: NOT DETECTED
UROBILINOGEN UR QL STRIP.AUTO: 0.2 EU/DL (ref 0.2–1)
WBC URNS QL MICRO: ABNORMAL /HPF
YEAST URNS QL MICRO: ABNORMAL

## 2023-02-22 PROCEDURE — 2580000003 HC RX 258: Performed by: INTERNAL MEDICINE

## 2023-02-22 PROCEDURE — 1100000003 HC PRIVATE W/ TELEMETRY

## 2023-02-22 PROCEDURE — 6370000000 HC RX 637 (ALT 250 FOR IP): Performed by: INTERNAL MEDICINE

## 2023-02-22 PROCEDURE — 83605 ASSAY OF LACTIC ACID: CPT

## 2023-02-22 PROCEDURE — 36415 COLL VENOUS BLD VENIPUNCTURE: CPT

## 2023-02-22 PROCEDURE — 6360000002 HC RX W HCPCS: Performed by: INTERNAL MEDICINE

## 2023-02-22 PROCEDURE — 94640 AIRWAY INHALATION TREATMENT: CPT

## 2023-02-22 PROCEDURE — 84145 PROCALCITONIN (PCT): CPT

## 2023-02-22 PROCEDURE — 71045 X-RAY EXAM CHEST 1 VIEW: CPT

## 2023-02-22 PROCEDURE — 2700000000 HC OXYGEN THERAPY PER DAY

## 2023-02-22 RX ORDER — LOSARTAN POTASSIUM 50 MG/1
50 TABLET ORAL DAILY
Status: DISCONTINUED | OUTPATIENT
Start: 2023-02-22 | End: 2023-02-26 | Stop reason: HOSPADM

## 2023-02-22 RX ORDER — SODIUM CHLORIDE 9 MG/ML
INJECTION, SOLUTION INTRAVENOUS CONTINUOUS
Status: ACTIVE | OUTPATIENT
Start: 2023-02-22 | End: 2023-02-23

## 2023-02-22 RX ORDER — SODIUM CHLORIDE 9 MG/ML
INJECTION, SOLUTION INTRAVENOUS PRN
Status: DISCONTINUED | OUTPATIENT
Start: 2023-02-22 | End: 2023-02-26 | Stop reason: HOSPADM

## 2023-02-22 RX ORDER — IPRATROPIUM BROMIDE AND ALBUTEROL SULFATE 2.5; .5 MG/3ML; MG/3ML
1 SOLUTION RESPIRATORY (INHALATION)
Status: DISPENSED | OUTPATIENT
Start: 2023-02-22 | End: 2023-02-24

## 2023-02-22 RX ORDER — PANTOPRAZOLE SODIUM 40 MG/1
40 TABLET, DELAYED RELEASE ORAL
Status: DISCONTINUED | OUTPATIENT
Start: 2023-02-22 | End: 2023-02-26 | Stop reason: HOSPADM

## 2023-02-22 RX ORDER — ONDANSETRON 2 MG/ML
4 INJECTION INTRAMUSCULAR; INTRAVENOUS EVERY 6 HOURS PRN
Status: DISCONTINUED | OUTPATIENT
Start: 2023-02-22 | End: 2023-02-26 | Stop reason: HOSPADM

## 2023-02-22 RX ORDER — FINASTERIDE 5 MG/1
5 TABLET, FILM COATED ORAL DAILY
Status: DISCONTINUED | OUTPATIENT
Start: 2023-02-22 | End: 2023-02-26 | Stop reason: HOSPADM

## 2023-02-22 RX ORDER — SODIUM CHLORIDE 0.9 % (FLUSH) 0.9 %
5-40 SYRINGE (ML) INJECTION EVERY 12 HOURS SCHEDULED
Status: DISCONTINUED | OUTPATIENT
Start: 2023-02-22 | End: 2023-02-26 | Stop reason: HOSPADM

## 2023-02-22 RX ORDER — TAMSULOSIN HYDROCHLORIDE 0.4 MG/1
0.4 CAPSULE ORAL DAILY
Status: DISCONTINUED | OUTPATIENT
Start: 2023-02-22 | End: 2023-02-26 | Stop reason: HOSPADM

## 2023-02-22 RX ORDER — SODIUM CHLORIDE 9 MG/ML
INJECTION, SOLUTION INTRAVENOUS CONTINUOUS
Status: ACTIVE | OUTPATIENT
Start: 2023-02-22 | End: 2023-02-22

## 2023-02-22 RX ORDER — SODIUM CHLORIDE 9 MG/ML
INJECTION, SOLUTION INTRAVENOUS CONTINUOUS
Status: DISCONTINUED | OUTPATIENT
Start: 2023-02-22 | End: 2023-02-22

## 2023-02-22 RX ORDER — ATORVASTATIN CALCIUM 80 MG/1
80 TABLET, FILM COATED ORAL DAILY
Status: DISCONTINUED | OUTPATIENT
Start: 2023-02-22 | End: 2023-02-26 | Stop reason: HOSPADM

## 2023-02-22 RX ORDER — ROPINIROLE 0.5 MG/1
0.5 TABLET, FILM COATED ORAL NIGHTLY
Status: DISCONTINUED | OUTPATIENT
Start: 2023-02-22 | End: 2023-02-26 | Stop reason: HOSPADM

## 2023-02-22 RX ORDER — POLYETHYLENE GLYCOL 3350 17 G/17G
17 POWDER, FOR SOLUTION ORAL DAILY PRN
Status: DISCONTINUED | OUTPATIENT
Start: 2023-02-22 | End: 2023-02-26 | Stop reason: HOSPADM

## 2023-02-22 RX ORDER — METOPROLOL SUCCINATE 25 MG/1
25 TABLET, EXTENDED RELEASE ORAL DAILY
Status: DISCONTINUED | OUTPATIENT
Start: 2023-02-22 | End: 2023-02-26 | Stop reason: HOSPADM

## 2023-02-22 RX ORDER — ACETAMINOPHEN 325 MG/1
650 TABLET ORAL EVERY 6 HOURS PRN
Status: DISCONTINUED | OUTPATIENT
Start: 2023-02-22 | End: 2023-02-26 | Stop reason: HOSPADM

## 2023-02-22 RX ORDER — ONDANSETRON 4 MG/1
4 TABLET, ORALLY DISINTEGRATING ORAL EVERY 8 HOURS PRN
Status: DISCONTINUED | OUTPATIENT
Start: 2023-02-22 | End: 2023-02-26 | Stop reason: HOSPADM

## 2023-02-22 RX ORDER — SODIUM CHLORIDE 0.9 % (FLUSH) 0.9 %
5-40 SYRINGE (ML) INJECTION PRN
Status: DISCONTINUED | OUTPATIENT
Start: 2023-02-22 | End: 2023-02-26 | Stop reason: HOSPADM

## 2023-02-22 RX ORDER — FAMOTIDINE 20 MG/1
20 TABLET, FILM COATED ORAL DAILY
Status: DISCONTINUED | OUTPATIENT
Start: 2023-02-22 | End: 2023-02-26 | Stop reason: HOSPADM

## 2023-02-22 RX ORDER — METHYLPREDNISOLONE SODIUM SUCCINATE 40 MG/ML
40 INJECTION, POWDER, LYOPHILIZED, FOR SOLUTION INTRAMUSCULAR; INTRAVENOUS EVERY 8 HOURS
Status: DISCONTINUED | OUTPATIENT
Start: 2023-02-22 | End: 2023-02-24

## 2023-02-22 RX ORDER — OSELTAMIVIR PHOSPHATE 30 MG/1
30 CAPSULE ORAL 2 TIMES DAILY
Status: DISCONTINUED | OUTPATIENT
Start: 2023-02-22 | End: 2023-02-23

## 2023-02-22 RX ADMIN — TAMSULOSIN HYDROCHLORIDE 0.4 MG: 0.4 CAPSULE ORAL at 09:12

## 2023-02-22 RX ADMIN — METOPROLOL SUCCINATE 25 MG: 25 TABLET, EXTENDED RELEASE ORAL at 16:19

## 2023-02-22 RX ADMIN — APIXABAN 5 MG: 5 TABLET, FILM COATED ORAL at 20:31

## 2023-02-22 RX ADMIN — METHYLPREDNISOLONE SODIUM SUCCINATE 40 MG: 40 INJECTION, POWDER, FOR SOLUTION INTRAMUSCULAR; INTRAVENOUS at 20:27

## 2023-02-22 RX ADMIN — APIXABAN 5 MG: 5 TABLET, FILM COATED ORAL at 09:12

## 2023-02-22 RX ADMIN — OSELTAMIVIR PHOSPHATE 30 MG: 30 CAPSULE ORAL at 09:12

## 2023-02-22 RX ADMIN — SODIUM CHLORIDE: 9 INJECTION, SOLUTION INTRAVENOUS at 11:43

## 2023-02-22 RX ADMIN — IPRATROPIUM BROMIDE AND ALBUTEROL SULFATE 1 AMPULE: 2.5; .5 SOLUTION RESPIRATORY (INHALATION) at 21:08

## 2023-02-22 RX ADMIN — PANTOPRAZOLE SODIUM 40 MG: 40 TABLET, DELAYED RELEASE ORAL at 05:06

## 2023-02-22 RX ADMIN — APIXABAN 5 MG: 5 TABLET, FILM COATED ORAL at 01:49

## 2023-02-22 RX ADMIN — METHYLPREDNISOLONE SODIUM SUCCINATE 40 MG: 40 INJECTION, POWDER, FOR SOLUTION INTRAMUSCULAR; INTRAVENOUS at 11:43

## 2023-02-22 RX ADMIN — FINASTERIDE 5 MG: 5 TABLET, FILM COATED ORAL at 16:19

## 2023-02-22 RX ADMIN — OSELTAMIVIR PHOSPHATE 30 MG: 30 CAPSULE ORAL at 01:49

## 2023-02-22 RX ADMIN — FAMOTIDINE 20 MG: 20 TABLET ORAL at 20:27

## 2023-02-22 RX ADMIN — OSELTAMIVIR PHOSPHATE 30 MG: 30 CAPSULE ORAL at 20:31

## 2023-02-22 RX ADMIN — ROPINIROLE HYDROCHLORIDE 0.5 MG: 0.5 TABLET, FILM COATED ORAL at 01:49

## 2023-02-22 RX ADMIN — ROPINIROLE HYDROCHLORIDE 0.5 MG: 0.5 TABLET, FILM COATED ORAL at 20:31

## 2023-02-22 RX ADMIN — SODIUM CHLORIDE: 9 INJECTION, SOLUTION INTRAVENOUS at 20:26

## 2023-02-22 RX ADMIN — ATORVASTATIN CALCIUM 80 MG: 80 TABLET, FILM COATED ORAL at 01:49

## 2023-02-22 RX ADMIN — LOSARTAN POTASSIUM 50 MG: 50 TABLET, FILM COATED ORAL at 09:12

## 2023-02-22 RX ADMIN — SODIUM CHLORIDE, PRESERVATIVE FREE 10 ML: 5 INJECTION INTRAVENOUS at 09:12

## 2023-02-22 RX ADMIN — METHYLPREDNISOLONE SODIUM SUCCINATE 40 MG: 40 INJECTION, POWDER, FOR SOLUTION INTRAMUSCULAR; INTRAVENOUS at 05:05

## 2023-02-22 RX ADMIN — ATORVASTATIN CALCIUM 80 MG: 80 TABLET, FILM COATED ORAL at 20:27

## 2023-02-22 RX ADMIN — CEFTRIAXONE 1000 MG: 1 INJECTION, POWDER, FOR SOLUTION INTRAMUSCULAR; INTRAVENOUS at 17:43

## 2023-02-22 RX ADMIN — SODIUM CHLORIDE, PRESERVATIVE FREE 10 ML: 5 INJECTION INTRAVENOUS at 21:00

## 2023-02-22 RX ADMIN — FAMOTIDINE 20 MG: 20 TABLET ORAL at 01:49

## 2023-02-22 ASSESSMENT — PAIN SCALES - GENERAL
PAINLEVEL_OUTOF10: 0

## 2023-02-22 NOTE — CARE COORDINATION
Patient admitted with sepsis; flu/pneumonia. Patient on 2 LPM O2 currently. IV fluid. CM reviewed clinical record then met with patient for assessment in PPE for contact isolation. Patient is alert and oriented. Patient verfiied demographic, insurance,  PCP. Patient reports he lives with his spouse in a 1 level home. Pt states that he was continuing to work at [employer] as a  prior to 2 surgeries in 2022. Patient reports he is independent of ADLs and drives. Patient denies any home O2/CPAP. Pt reports he does not use any assistive devices to ambulate. Patient reports STR placement last year but doesn't remember the name of the facility. No history of home health. CM will follow for discharge needs. 02/22/23 1248   Service Assessment   Patient Orientation Alert and Oriented   Cognition Alert   History Provided By Patient   Primary [address] [place] [id number] is: Legal Next of Kin   PCP Verified by CM Yes  (Dr Talia Duncan)   Last Visit to PCP Within last 3 months   Prior Functional Level Independent in ADLs/IADLs   Current Functional Level Independent in ADLs/IADLs   Can patient return to prior living arrangement Yes   Ability to make needs known: Good   Family able to assist with home care needs: Yes   Would you like for me to discuss the discharge plan with any other family members/significant others, and if so, who? 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    Discharge Planning   Type of Residence House   Living Arrangements Spouse/Significant Other   Current Services Prior To Admission None   Potential Assistance Needed Home Care   DME Ordered? No   Potential Assistance Purchasing Medications No   Patient expects to be discharged to: [address] [address] Discharge   Transition of Care Consult (CM Consult) Discharge [address] Discharge None   [facility] Information Provided? No   Mode of Transport at Discharge   (Car)   Confirm Follow Up Transport Family   Condition of Participation: Discharge Planning   The Plan for Transition of Care is related to the following treatment goals:  To be determined by hospital course

## 2023-02-22 NOTE — H&P
Hospitalist History and Physical   Admit Date:  2023  6:01 PM   Name:  Jin Mccullough   Age:  79 y.o. Sex:  male  :  1940   MRN:  [id number]   Room:  ER39/    Presenting Complaint: No chief complaint on file. Reason(s) for Admission: No admission diagnoses are documented for this encounter. History of Present Illness:   Jin Mccullough is a 79 y.o. 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 Who presented with complaint of near syncope, fever of 102, cough, dyspnea and wheezing. Patient wife reported to EMS that he collapsed after returning from the bathroom but did not have loss of consciousness. Patient denies chest pain, nausea, vomiting,melena, hematochezia. ER work-up notable for  BMP-benign  proBNP 1080  CBC-WBC 10K, hemoglobin 14, platelets 455  Influenza A+        1. Stable mild cardiomegaly. 2. Stable triple lead defibrillator/pacemaker. 3. Expiratory technique. No acute pulmonary process is identified. Assessment & Plan:     # Sepsis (meets criteria with temperature 102.6, respirations 30)  - Suspect secondary to influenza A  - tamiflu  - supportive care - antiemetics, antipyretics, antitussives. - given Rocephin and steroids in ER for audible wheezing, continue both. - check blood and sputum cultures  - defer further fluid with hx of CHF. # near syncope  - suspect from above  - orthostatics  - monitor on telemetry    # Pafib  - cont eliquis  - rate controlled with toprol    # Myasthenia Gravis  - aware  - CAREFUL WITH NEW MEDICATIONS, including steroids and antibiotics  - continue steroids although this has been implicated in MG crisis in some patients.  Patient informed to notify us for increased dyspnea/ weakness/ increased work of breathing/ ptosis/ blurry vision or other s/s. # HTN  - resume toprol/ cozaar    # GERD  - PPI    # Chronic systolic CHF  - cont toprol, losartan   - euvolemic on exam    # SSS s/p PPM    # HLP  - statin    # BPH   - controlled on proscar          PT/OT evals and PPD needed/ordered? Yes    Diet: No diet orders on file  VTE prophylaxis: Already on anticoagulation  Code status: Prior    Hospital Problems:  Active Problems:    * No active hospital problems. *  Resolved Problems:    * No resolved hospital problems.  *       Past History:     Past Medical History:   Diagnosis Date    Abnormal EKG 4/22/15    Arrhythmia     Aspiration pneumonia ([address] 10/26/2017    CAD (coronary artery disease) 5/8/2015    Carotid artery stenosis without cerebral infarction 6/7/2016    US 6/15: <50% bilat ICAs    Coronary atherosclerosis of native coronary vessel 5/8/2015    GERMAIN on brilinta 5/7/15: prox LAD PCI, normal EF     Diastolic CHF, chronic ([facility] 3/2/2022    Dyslipidemia 6/7/2016    Dyspnea 5/8/2015    Echo 6/15: EF 60%, mod MR, mod LVH, mild AI     ED (erectile dysfunction)     GERD (gastroesophageal reflux disease)     GERD (gastroesophageal reflux disease)     no medication    HTN (hypertension) 5/8/2015    Hypertension     Hypokalemia     Hypokalemia 6/7/2016    Mitral valve regurgitation 6/7/2016    Morbid obesity ([address]     Myasthenia gravis ([facility]     Myasthenia gravis ([facility] 6/17/15    Nocturia     Osteoporosis     PUD (peptic ulcer disease) 25 yrs ago    S/P coronary artery stent placement 5/8/2015    3.0x38 mm Xience CAROL to pLAD 5/7/15     Sleep apnea     Syncope and collapse     Unspecified sleep apnea     no cpap       Past Surgical History:   Procedure Laterality Date    APPENDECTOMY      CARDIAC CATHETERIZATION  05/21/2019    watchman device    CARDIAC CATHETERIZATION  05/27/2015    stent    COLONOSCOPY  2007    COLONOSCOPY N/A 8/6/2022    COLONOSCOPY POLYPECTOMY SNARE/COLD BIOPSY performed by Abbie Cardona MD at [facility] ENDOSCOPY    ERCP  3/30/2022         HEMICOLECTOMY Right 8/10/2022    BOWEL RESECTION HEMICOLECTOMY LAPAROSCOPIC ROBOTIC, Right Bulmaro performed by Bernard Landaverde MD at [facility]  2018    Vivian Kraus/Giana for sleep apnea and reconstruction for extending jaw    UPPER GASTROINTESTINAL ENDOSCOPY N/A 8/5/2022    EGD ESOPHAGOGASTRODUODENOSCOPY Rm 525 performed by Yanna Boss MD at [facility] Right 07/10/2019     Repair of right radial artery pseudoaneurysm        Social History     Tobacco Use    Smoking status: Never    Smokeless tobacco: Never   Substance Use Topics    Alcohol use: No      Social History     Substance and Sexual Activity   Drug Use No       Family History   Problem Relation Age of Onset    No Known Problems Brother     Cancer Brother         brain tumor    No Known Problems Sister     Cancer Mother         kidney    Cancer Father         stomach        Immunization History   Administered Date(s) Administered    COVID-19, PFIZER PURPLE top, DILUTE for use, (age 15 y+), 30mcg/0.3mL 02/02/2021, 02/23/2021    Influenza Virus Vaccine 10/18/2016    Influenza, FLUAD, (age 72 y+), Adjuvanted, 0.5mL 09/22/2022    Influenza, High Dose (Fluzone 65 yrs and older) 10/01/2019, 09/28/2020    PPD Test 03/29/2022, 08/26/2022    Pneumococcal Conjugate 13-valent ([id number]) 09/28/2020    Pneumococcal Polysaccharide ([id number]) 05/08/2015    Tdap (Boostrix, Adacel) 03/03/2022     Allergies   Allergen Reactions    Bebtelovimab Other (See Comments)     Syncope and hypotension     Prior to Admit Medications:  Current Outpatient Medications   Medication Instructions    acetaminophen (TYLENOL) 1,000 mg, Oral, EVERY 6 HOURS PRN    apixaban (ELIQUIS) 5 mg, Oral, 2 TIMES DAILY    Ascorbic Acid (VITAMIN C PO) 1 tablet, Oral, DAILY    atorvastatin (LIPITOR) 80 mg, Oral, DAILY    Cholecalciferol (VITAMIN D3 PO) 1 tablet, Oral, DAILY    colestipol (COLESTID) 1 g tablet No dose, route, or frequency recorded. famotidine (PEPCID) 20 MG tablet No dose, route, or frequency recorded. finasteride (PROSCAR) 5 MG tablet finasteride 5 mg tablet<BR> Take 1 tablet every day by oral route. losartan (COZAAR) 50 mg, Oral, DAILY    metoprolol succinate (TOPROL XL) 25 MG extended release tablet metoprolol succinate ER 25 mg tablet,extended release 24 hr<BR> Take 1 tablet every day by oral route. Multiple Vitamins-Minerals (MULTIVITAMIN WITH MINERALS) tablet Once daily OTC    nitroGLYCERIN (NITROSTAT) 0.4 MG SL tablet Place 1 sl under the tongue q 5 min prn cp, max 3 sl in a 15-min time period. Call 911 if no relief after the 3rd sl.    pantoprazole (PROTONIX) 40 mg, Oral, DAILY BEFORE BREAKFAST    rOPINIRole (REQUIP) 0.5 MG tablet take 1 tablet by mouth nightly, increase to 1 tab twice a day if needed for restless leg syndrome     sildenafil (VIAGRA) 100 mg, Oral, PRN    sulfamethoxazole-trimethoprim (BACTRIM DS;SEPTRA DS) 800-160 MG per tablet 1 tablet, Oral, 2 TIMES DAILY    tamsulosin (FLOMAX) 0.4 mg, Oral, DAILY    ZINC PO 1 capsule, Oral, DAILY         Objective:   Patient Vitals for the past 24 hrs:   Temp Pulse Resp BP SpO2   02/21/23 2032 -- -- -- -- 96 %   02/21/23 1943 98.8 °F (37.1 °C) -- -- -- --   02/21/23 1815 -- 75 20 -- 94 %   02/21/23 1802 (!) 102.6 °F (39.2 °C) 79 30 139/74 94 %       Oxygen Therapy  SpO2: 96 %  O2 Device: Nasal cannula  O2 Flow Rate (L/min): 2 L/min    Estimated body mass index is 36.11 kg/m² as calculated from the following:    Height as of 2/8/23: 5' 5\" (1.651 m). Weight as of 2/8/23: 217 lb (98.4 kg). No intake or output data in the 24 hours ending 02/21/23 2043      Physical Exam:    Blood pressure 139/74, pulse 75, temperature 98.8 °F (37.1 °C), temperature source Oral, resp. rate 20, SpO2 96 %. General:    Well nourished. Converstaion and pleasant.  Fatigued appearing  Head:  Normocephalic, atraumatic  Eyes:  Sclerae appear normal.  Pupils equally round. ENT:  Nares appear normal.  Moist oral mucosa  Neck:  No restricted ROM. Trachea midline   CV:   RRR. No m/r/g. No jugular venous distension. Lungs:   Slightly diminished  diffuse exp wheezing and rhonchi  Symmetric expansion. Abdomen:   Soft, nontender, nondistended. Extremities: No cyanosis or clubbing. No edema  Skin:     No rashes and normal coloration. Warm and dry. Neuro:  CN II-XII grossly intact. Sensation intact. Psych:  Normal mood and affect.       I have personally reviewed labs and tests:  Recent Labs:  Recent Results (from the past 24 hour(s))   EKG 12 Lead    Collection Time: 02/21/23  6:09 PM   Result Value Ref Range    Ventricular Rate 75 BPM    Atrial Rate 0 BPM    P-R Interval 179 ms    QRS Duration 184 ms    Q-T Interval 430 ms    QTc Calculation (Bazett) 481 ms    P Axis 233 degrees    R Axis -69 degrees    T Axis 103 degrees    Diagnosis Sinus or ectopic atrial rhythm    Comprehensive Metabolic Panel    Collection Time: 02/21/23  6:18 PM   Result Value Ref Range    Sodium 135 133 - 143 mmol/L    Potassium 4.1 3.5 - 5.1 mmol/L    Chloride 106 101 - 110 mmol/L    CO2 24 21 - 32 mmol/L    Anion Gap 5 2 - 11 mmol/L    Glucose 119 (H) 65 - 100 mg/dL    BUN 16 8 - 23 MG/DL    Creatinine 1.10 0.8 - 1.5 MG/DL    Est, Glom Filt Rate >60 >60 ml/min/1.73m2    Calcium 9.3 8.3 - 10.4 MG/DL    Total Bilirubin 0.6 0.2 - 1.1 MG/DL    ALT 33 12 - 65 U/L    AST 29 15 - 37 U/L    Alk Phosphatase 95 50 - 136 U/L    Total Protein 7.2 6.3 - 8.2 g/dL    Albumin 3.5 3.2 - 4.6 g/dL    Globulin 3.7 2.8 - 4.5 g/dL    Albumin/Globulin Ratio 0.9 0.4 - 1.6     Blood Culture 1    Collection Time: 02/21/23  6:18 PM    Specimen: Blood   Result Value Ref Range    Special Requests RIGHT      Culture PENDING    CBC with Auto Differential    Collection Time: 02/21/23  6:18 PM   Result Value Ref Range    WBC 10.5 4.3 - 11.1 K/uL    RBC 4.71 4.23 - 5.6 M/uL    Hemoglobin 14.4 13.6 - 17.2 g/dL    Hematocrit 44.9 41.1 - 50.3 %    MCV 95.3 82 - 102 FL    MCH 30.6 26.1 - 32.9 PG    MCHC 32.1 31.4 - 35.0 g/dL    RDW 13.8 11.9 - 14.6 %    Platelets 721 106 - 591 K/uL    MPV 9.1 (L) 9.4 - 12.3 FL    nRBC 0.00 0.0 - 0.2 K/uL    Differential Type AUTOMATED      Seg Neutrophils 76 43 - 78 %    Lymphocytes 11 (L) 13 - 44 %    Monocytes 11 4.0 - 12.0 %    Eosinophils % 1 0.5 - 7.8 %    Basophils 0 0.0 - 2.0 %    Immature Granulocytes 1 0.0 - 5.0 %    Segs Absolute 8.0 1.7 - 8.2 K/UL    Absolute Lymph # 1.2 0.5 - 4.6 K/UL    Absolute Mono # 1.2 0.1 - 1.3 K/UL    Absolute Eos # 0.1 0.0 - 0.8 K/UL    Basophils Absolute 0.0 0.0 - 0.2 K/UL    Absolute Immature Granulocyte 0.1 0.0 - 0.5 K/UL   Lactate, Sepsis    Collection Time: 02/21/23  6:18 PM   Result Value Ref Range    Lactic Acid, Sepsis 2.0 0.4 - 2.0 MMOL/L   Procalcitonin    Collection Time: 02/21/23  6:18 PM   Result Value Ref Range    Procalcitonin 0.06 0.00 - 0.49 ng/mL   COVID-19, Rapid    Collection Time: 02/21/23  6:18 PM    Specimen: Nasopharyngeal   Result Value Ref Range    Source NASAL      SARS-CoV-2, Rapid Not detected NOTD     Brain Natriuretic Peptide    Collection Time: 02/21/23  6:18 PM   Result Value Ref Range    NT Pro-BNP 1,080 (H) <450 PG/ML   Influenza A/B, Molecular    Collection Time: 02/21/23  6:18 PM    Specimen: Not Specified   Result Value Ref Range    Influenza A, ROMANA Detected (A) NOTD      Influenza B, ROMANA Not detected NOTD     Culture, Blood 2    Collection Time: 02/21/23  7:16 PM    Specimen: Blood   Result Value Ref Range    Special Requests LEFT  FOREARM        Culture PENDING        I have personally reviewed imaging studies:  XR CHEST PORTABLE    Result Date: 2/21/2023  EXAMINATION: XR CHEST PORTABLE  DATE: 2/21/2023 6:35 PM INDICATIONS: Sepsis COMPARISON: September 7, 2022 FINDINGS: Stable mild cardiomegaly. Stable triple lead defibrillator/pacemaker. Expiratory  technique.  Mild thickening of the left lateral pleural stripe is again identified. Stable aorta and mediastinum. Stable osseous structures. 1. Stable mild cardiomegaly. 2. Stable triple lead defibrillator/pacemaker. 3. Expiratory technique. No acute pulmonary process is identified. Juan Kendall M.D. 2/21/2023 6:50:00 PM      Echocardiogram:  12/10/21    TRANSTHORACIC ECHOCARDIOGRAM (TTE) COMPLETE (CONTRAST/BUBBLE/3D PRN) 12/13/2021  7:48 AM (Final)    Narrative  This is a summary report. The complete report is available in the patient's medical record. If you cannot access the medical record, please contact the sending organization for a detailed fax or copy. · LA: Left Atrium volume index is 44.4 mL/m2. · AV: Aortic valve mean gradient is 10 mmHg. · TV: Right Ventricular Arterial Pressure (RVSP) is 48 mmHg. · Mild LVH  · Normal EF  · Moderate aortic insuff    Signed by: Ricardo Majano MD on 12/13/2021  7:48 AM        Orders Placed This Encounter   Medications    lactated ringers bolus    ipratropium-albuterol (DUONEB) nebulizer solution 1 ampule     Order Specific Question:   Initiate RT Bronchodilator Protocol     Answer:   Yes - ED Protocol    acetaminophen (TYLENOL) tablet 650 mg    cefTRIAXone (ROCEPHIN) 1,000 mg in sodium chloride 0.9 % 50 mL IVPB (mini-bag)     Order Specific Question:   Antimicrobial Indications     Answer:   Sepsis of Unknown Etiology    methylPREDNISolone sodium (SOLU-MEDROL) injection 125 mg    ipratropium-albuterol (DUONEB) nebulizer solution 1 ampule     Order Specific Question:   Initiate RT Bronchodilator Protocol     Answer:   Yes - ED Protocol         Signed:  Merari Munguia DO    Part of this note may have been written by using a voice dictation software. The note has been proof read but may still contain some grammatical/other typographical errors.

## 2023-02-22 NOTE — PROGRESS NOTES
TRANSFER - IN REPORT:    Verbal report received from [place], [address] [facility] on [facility]  being received from ED for routine progression of patient care      Report consisted of patient's Situation, Background, Assessment and   Recommendations(SBAR). Information from the following report(s) ED SBAR was reviewed with the receiving nurse. Opportunity for questions and clarification was provided. Assessment completed upon patient's arrival to unit and care assumed. Dual skin assessment completed with Tho Santacruz RN. Skin is warm, dry, fragile, and flaky. Pt has scattered scabs, scars and bruising. Pt has BLE 2-3+ pitting edema. Pt's heels are flaky but blanchable. Pt's sacrum is intact. No pressure injuries noted at this time.

## 2023-02-22 NOTE — PLAN OF CARE
Problem: Discharge Planning  Goal: Discharge to home or other facility with appropriate resources  Outcome: Progressing  Flowsheets (Taken 2/22/2023 6856)  Discharge to home or other facility with appropriate resources:   Identify barriers to discharge with patient and caregiver   Arrange for needed discharge resources and transportation as appropriate   Identify discharge learning needs (meds, wound care, etc)   Arrange for interpreters to assist at discharge as needed   Refer to discharge planning if patient needs post-hospital services based on physician order or complex needs related to functional status, cognitive ability or social support system     Problem: Skin/Tissue Integrity  Goal: Absence of new skin breakdown  Description: 1. Monitor for areas of redness and/or skin breakdown  2. Assess vascular access sites hourly  3. Every 4-6 hours minimum:  Change oxygen saturation probe site  4. Every 4-6 hours:  If on nasal continuous positive airway pressure, respiratory therapy assess nares and determine need for appliance change or resting period.   Outcome: Progressing     Problem: Safety - Adult  Goal: Free from fall injury  Outcome: Progressing  Flowsheets (Taken 2/22/2023 9765)  Free From Fall Injury: Instruct family/caregiver on patient safety     Problem: Pain  Goal: Verbalizes/displays adequate comfort level or baseline comfort level  Outcome: Progressing

## 2023-02-23 ENCOUNTER — APPOINTMENT (OUTPATIENT)
Dept: CT IMAGING | Age: 83
DRG: 872 | End: 2023-02-23
Payer: MEDICARE

## 2023-02-23 ENCOUNTER — APPOINTMENT (OUTPATIENT)
Dept: ULTRASOUND IMAGING | Age: 83
DRG: 872 | End: 2023-02-23
Payer: MEDICARE

## 2023-02-23 LAB
ALBUMIN SERPL-MCNC: 2.9 G/DL (ref 3.2–4.6)
ALBUMIN/GLOB SERPL: 0.9 (ref 0.4–1.6)
ALP SERPL-CCNC: 72 U/L (ref 50–136)
ALT SERPL-CCNC: 29 U/L (ref 12–65)
ANION GAP SERPL CALC-SCNC: 4 MMOL/L (ref 2–11)
AST SERPL-CCNC: 27 U/L (ref 15–37)
BACTERIA SPEC CULT: ABNORMAL
BACTERIA SPEC CULT: ABNORMAL
BASOPHILS # BLD: 0 K/UL (ref 0–0.2)
BASOPHILS NFR BLD: 0 % (ref 0–2)
BILIRUB SERPL-MCNC: 0.4 MG/DL (ref 0.2–1.1)
BUN SERPL-MCNC: 18 MG/DL (ref 8–23)
CALCIUM SERPL-MCNC: 8.5 MG/DL (ref 8.3–10.4)
CHLORIDE SERPL-SCNC: 108 MMOL/L (ref 101–110)
CO2 SERPL-SCNC: 26 MMOL/L (ref 21–32)
CREAT SERPL-MCNC: 0.8 MG/DL (ref 0.8–1.5)
DIFFERENTIAL METHOD BLD: ABNORMAL
EOSINOPHIL # BLD: 0 K/UL (ref 0–0.8)
EOSINOPHIL NFR BLD: 0 % (ref 0.5–7.8)
ERYTHROCYTE [DISTWIDTH] IN BLOOD BY AUTOMATED COUNT: 13.8 % (ref 11.9–14.6)
GLOBULIN SER CALC-MCNC: 3.4 G/DL (ref 2.8–4.5)
GLUCOSE SERPL-MCNC: 182 MG/DL (ref 65–100)
GRAM STN SPEC: ABNORMAL
HCT VFR BLD AUTO: 40.5 % (ref 41.1–50.3)
HGB BLD-MCNC: 12.8 G/DL (ref 13.6–17.2)
IMM GRANULOCYTES # BLD AUTO: 0.1 K/UL (ref 0–0.5)
IMM GRANULOCYTES NFR BLD AUTO: 1 % (ref 0–5)
LACTATE SERPL-SCNC: 2.4 MMOL/L (ref 0.4–2)
LACTATE SERPL-SCNC: 2.4 MMOL/L (ref 0.4–2)
LACTATE SERPL-SCNC: 3.1 MMOL/L (ref 0.4–2)
LACTATE SERPL-SCNC: 4 MMOL/L (ref 0.4–2)
LYMPHOCYTES # BLD: 1 K/UL (ref 0.5–4.6)
LYMPHOCYTES NFR BLD: 7 % (ref 13–44)
MCH RBC QN AUTO: 30.1 PG (ref 26.1–32.9)
MCHC RBC AUTO-ENTMCNC: 31.6 G/DL (ref 31.4–35)
MCV RBC AUTO: 95.3 FL (ref 82–102)
MONOCYTES # BLD: 0.9 K/UL (ref 0.1–1.3)
MONOCYTES NFR BLD: 6 % (ref 4–12)
NEUTS SEG # BLD: 12.8 K/UL (ref 1.7–8.2)
NEUTS SEG NFR BLD: 86 % (ref 43–78)
NRBC # BLD: 0 K/UL (ref 0–0.2)
PLATELET # BLD AUTO: 160 K/UL (ref 150–450)
PMV BLD AUTO: 9 FL (ref 9.4–12.3)
POTASSIUM SERPL-SCNC: 3.9 MMOL/L (ref 3.5–5.1)
PROT SERPL-MCNC: 6.3 G/DL (ref 6.3–8.2)
RBC # BLD AUTO: 4.25 M/UL (ref 4.23–5.6)
SERVICE CMNT-IMP: ABNORMAL
SODIUM SERPL-SCNC: 138 MMOL/L (ref 133–143)
WBC # BLD AUTO: 14.8 K/UL (ref 4.3–11.1)

## 2023-02-23 PROCEDURE — 6370000000 HC RX 637 (ALT 250 FOR IP): Performed by: INTERNAL MEDICINE

## 2023-02-23 PROCEDURE — 6360000004 HC RX CONTRAST MEDICATION: Performed by: INTERNAL MEDICINE

## 2023-02-23 PROCEDURE — 2700000000 HC OXYGEN THERAPY PER DAY

## 2023-02-23 PROCEDURE — 36415 COLL VENOUS BLD VENIPUNCTURE: CPT

## 2023-02-23 PROCEDURE — 1100000003 HC PRIVATE W/ TELEMETRY

## 2023-02-23 PROCEDURE — 2500000003 HC RX 250 WO HCPCS: Performed by: INTERNAL MEDICINE

## 2023-02-23 PROCEDURE — 80053 COMPREHEN METABOLIC PANEL: CPT

## 2023-02-23 PROCEDURE — 94640 AIRWAY INHALATION TREATMENT: CPT

## 2023-02-23 PROCEDURE — 83605 ASSAY OF LACTIC ACID: CPT

## 2023-02-23 PROCEDURE — 70491 CT SOFT TISSUE NECK W/DYE: CPT

## 2023-02-23 PROCEDURE — 87040 BLOOD CULTURE FOR BACTERIA: CPT

## 2023-02-23 PROCEDURE — 85025 COMPLETE CBC W/AUTO DIFF WBC: CPT

## 2023-02-23 PROCEDURE — 94761 N-INVAS EAR/PLS OXIMETRY MLT: CPT

## 2023-02-23 PROCEDURE — 92610 EVALUATE SWALLOWING FUNCTION: CPT

## 2023-02-23 PROCEDURE — 6360000002 HC RX W HCPCS: Performed by: INTERNAL MEDICINE

## 2023-02-23 PROCEDURE — 2580000003 HC RX 258: Performed by: INTERNAL MEDICINE

## 2023-02-23 RX ORDER — SODIUM CHLORIDE 9 MG/ML
INJECTION, SOLUTION INTRAVENOUS CONTINUOUS
Status: DISCONTINUED | OUTPATIENT
Start: 2023-02-23 | End: 2023-02-23

## 2023-02-23 RX ORDER — SODIUM CHLORIDE 9 MG/ML
INJECTION, SOLUTION INTRAVENOUS CONTINUOUS
Status: ACTIVE | OUTPATIENT
Start: 2023-02-23 | End: 2023-02-23

## 2023-02-23 RX ORDER — OSELTAMIVIR PHOSPHATE 75 MG/1
75 CAPSULE ORAL 2 TIMES DAILY
Status: COMPLETED | OUTPATIENT
Start: 2023-02-23 | End: 2023-02-26

## 2023-02-23 RX ORDER — SODIUM CHLORIDE 0.9 % (FLUSH) 0.9 %
10 SYRINGE (ML) INJECTION
Status: COMPLETED | OUTPATIENT
Start: 2023-02-23 | End: 2023-02-23

## 2023-02-23 RX ORDER — 0.9 % SODIUM CHLORIDE 0.9 %
100 INTRAVENOUS SOLUTION INTRAVENOUS
Status: COMPLETED | OUTPATIENT
Start: 2023-02-23 | End: 2023-02-23

## 2023-02-23 RX ADMIN — OSELTAMIVIR PHOSPHATE 30 MG: 30 CAPSULE ORAL at 08:51

## 2023-02-23 RX ADMIN — LOSARTAN POTASSIUM 50 MG: 50 TABLET, FILM COATED ORAL at 08:50

## 2023-02-23 RX ADMIN — SODIUM CHLORIDE 100 ML: 9 INJECTION, SOLUTION INTRAVENOUS at 09:07

## 2023-02-23 RX ADMIN — SODIUM CHLORIDE: 9 INJECTION, SOLUTION INTRAVENOUS at 10:11

## 2023-02-23 RX ADMIN — FAMOTIDINE 20 MG: 20 TABLET ORAL at 21:56

## 2023-02-23 RX ADMIN — TAMSULOSIN HYDROCHLORIDE 0.4 MG: 0.4 CAPSULE ORAL at 08:51

## 2023-02-23 RX ADMIN — IPRATROPIUM BROMIDE AND ALBUTEROL SULFATE 1 AMPULE: 2.5; .5 SOLUTION RESPIRATORY (INHALATION) at 20:44

## 2023-02-23 RX ADMIN — SODIUM CHLORIDE, PRESERVATIVE FREE 10 ML: 5 INJECTION INTRAVENOUS at 09:07

## 2023-02-23 RX ADMIN — FINASTERIDE 5 MG: 5 TABLET, FILM COATED ORAL at 08:50

## 2023-02-23 RX ADMIN — APIXABAN 5 MG: 5 TABLET, FILM COATED ORAL at 21:56

## 2023-02-23 RX ADMIN — METHYLPREDNISOLONE SODIUM SUCCINATE 40 MG: 40 INJECTION, POWDER, FOR SOLUTION INTRAMUSCULAR; INTRAVENOUS at 05:19

## 2023-02-23 RX ADMIN — ATORVASTATIN CALCIUM 80 MG: 80 TABLET, FILM COATED ORAL at 21:56

## 2023-02-23 RX ADMIN — ROPINIROLE HYDROCHLORIDE 0.5 MG: 0.5 TABLET, FILM COATED ORAL at 21:56

## 2023-02-23 RX ADMIN — METHYLPREDNISOLONE SODIUM SUCCINATE 40 MG: 40 INJECTION, POWDER, FOR SOLUTION INTRAMUSCULAR; INTRAVENOUS at 12:05

## 2023-02-23 RX ADMIN — OSELTAMIVIR PHOSPHATE 75 MG: 75 CAPSULE ORAL at 22:02

## 2023-02-23 RX ADMIN — SODIUM CHLORIDE: 9 INJECTION, SOLUTION INTRAVENOUS at 12:06

## 2023-02-23 RX ADMIN — IPRATROPIUM BROMIDE AND ALBUTEROL SULFATE 1 AMPULE: 2.5; .5 SOLUTION RESPIRATORY (INHALATION) at 15:00

## 2023-02-23 RX ADMIN — PANTOPRAZOLE SODIUM 40 MG: 40 TABLET, DELAYED RELEASE ORAL at 05:20

## 2023-02-23 RX ADMIN — APIXABAN 5 MG: 5 TABLET, FILM COATED ORAL at 08:50

## 2023-02-23 RX ADMIN — CEFTRIAXONE 1000 MG: 1 INJECTION, POWDER, FOR SOLUTION INTRAMUSCULAR; INTRAVENOUS at 18:17

## 2023-02-23 RX ADMIN — SODIUM CHLORIDE, PRESERVATIVE FREE 10 ML: 5 INJECTION INTRAVENOUS at 08:51

## 2023-02-23 RX ADMIN — IOPAMIDOL 80 ML: 755 INJECTION, SOLUTION INTRAVENOUS at 09:07

## 2023-02-23 RX ADMIN — SODIUM CHLORIDE, PRESERVATIVE FREE 5 ML: 5 INJECTION INTRAVENOUS at 21:58

## 2023-02-23 RX ADMIN — IPRATROPIUM BROMIDE AND ALBUTEROL SULFATE 1 AMPULE: 2.5; .5 SOLUTION RESPIRATORY (INHALATION) at 07:46

## 2023-02-23 RX ADMIN — TUBERCULIN PURIFIED PROTEIN DERIVATIVE 5 UNITS: 5 INJECTION, SOLUTION INTRADERMAL at 18:18

## 2023-02-23 RX ADMIN — METHYLPREDNISOLONE SODIUM SUCCINATE 40 MG: 40 INJECTION, POWDER, FOR SOLUTION INTRAMUSCULAR; INTRAVENOUS at 21:56

## 2023-02-23 RX ADMIN — METOPROLOL SUCCINATE 25 MG: 25 TABLET, EXTENDED RELEASE ORAL at 08:51

## 2023-02-23 ASSESSMENT — PAIN SCALES - GENERAL
PAINLEVEL_OUTOF10: 0

## 2023-02-23 NOTE — PROGRESS NOTES
SPEECH LANGUAGE PATHOLOGY: DYSPHAGIA  Initial Assessment and Discharge    NAME: Tyesha Little  : 1940  MRN: [id number]    ADMISSION DATE: 2023  PRIMARY DIAGNOSIS: Sepsis ([facility]  Influenza with respiratory manifestation other than pneumonia [J11.1]  Sepsis ([facility] [A41.9]    ICD-10: Treatment Diagnosis: R13.13 Dysphagia, Pharyngeal Phase    RECOMMENDATIONS   Diet:  Diet Solids Recommendation: Regular  Liquid Consistency Recommendation: Thin    Medications:       Recommendations:  (No dysphagia treatment indicated)     Compensatory Swallowing Strategies: Alternate solids and liquids;Upright as possible for all oral intake     Therapeutic Intervention:Patient/Family education     Patient continues to require skilled intervention: No  D/C Recommendations: No follow up therapy recommended post discharge       ASSESSMENT    Dysphagia Diagnosis: Swallow function appears WFL  Dysphagia Impression : Patient presents with oropharyngeal swallow function that is within normal limits. No dysphagia identified during evaluation. He endorses isolated dysphagia with meats- especially when eating steak. Encouraged him to avoid troublesome foods and/or cut pieces into small pieces. He verbalizes understanding of recommendations. GENERAL    Subjective: Patient upright in chair. Cooperative with evaluation. Endorses occasional dysphagia with meats. States it will \"slow down and needs a sip was liquids to wash it down\". Behavior/Cognition: Alert; Cooperative;Pleasant mood  Patient Position: Up in chair  Communication Observation: Functional  Follows Directions: Complex                 History of Present Injury/Illness: Mr. Keegan Hurtado  has a past medical history of Abnormal EKG, Arrhythmia, Aspiration pneumonia ([facility] CAD (coronary artery disease), Carotid artery stenosis without cerebral infarction, Coronary atherosclerosis of native coronary vessel, Diastolic CHF, chronic ([facility] Dyslipidemia, Dyspnea, ED (erectile dysfunction), GERD (gastroesophageal reflux disease), GERD (gastroesophageal reflux disease), HTN (hypertension), Hypertension, Hypokalemia, Hypokalemia, Mitral valve regurgitation, Morbid obesity ([address] Myasthenia gravis ([address] Myasthenia gravis ([address] Nocturia, Osteoporosis, PUD (peptic ulcer disease), S/P coronary artery stent placement, Sleep apnea, Syncope and collapse, and Unspecified sleep apnea. . He also  has a past surgical history that includes Hemorrhoid surgery; ERCP (3/30/2022); vascular surgery (Right, 07/10/2019); Cardiac catheterization (05/21/2019); Cardiac catheterization (05/27/2015); Tonsillectomy (1999); Appendectomy; pacemaker (2018); Colonoscopy (2007); Upper gastrointestinal endoscopy (N/A, 8/5/2022); Colonoscopy (N/A, 8/6/2022); and hemicolectomy (Right, 8/10/2022). Prior Dysphagia History: Endorses meats sticking on occasion. Most complaints with steak. No concerns with liquids or softer solids     Current Diet : Regular  Current Liquid Diet : Thin    Pain:   Patient does not c/o pain                                          OBJECTIVE    Oral Motor Function:  Patient Positioning: Upright in chair   Oral Motor   Labial: Left droop (Baseline per patient's report)  Dentition: Intact  Oral Hygiene: Moist;Clean  Lingual: No impairment  Mandible: No impairment  Dentition: Adequate     Volitional Cough: Strong  Baseline Vocal Quality: Hoarse (Reports voice has been hoarse since getting the flu)    Oropharyngeal Phase:     Assessment Method(s): Observation;Palpation  Patient Position: Up in chair  Vocal Quality: Hoarse  Consistency Presented: Regular;Mixed consistency; Thin;Pureed  How Presented: Self-fed/presented;Spoon;Straw;Successive Swallows  Bolus Acceptance: No impairment  Bolus Formation/Control: No impairment  Propulsion: No impairment  Oral Residue: None  Initiation of Swallow: No impairment  Laryngeal Elevation: Functional  Aspiration Signs/Symptoms: None  Pharyngeal Phase Characteristics: No impairment, issues, or problems  Comments: Patient stating \"you aren't going to see any problems with thin. It only happens with steak\". Oropharygneal swallow unremarkable during evaluation. PLAN    Duration/Frequency: No treatment indicated at this time    Dysphagia Outcome and Severity Scale (CLEM)  Dysphagia Outcome Severity Scale: Level 7: Normal in all situations  Interpretation of Tool: The Dysphagia Outcome and Severity Scale (CLEM) is a simple, easy-to-use, 7-point scale developed to systematically rate the functional severity of dysphagia based on objective assessment and make recommendations for diet level, independence level, and type of nutrition.   Normal(7), Functional(6), Mild(5), Mild-Moderate(4), Moderate(3), Moderate-Severe(2), Severe(1)    Speech Therapy Prognosis  Prognosis: Excellent  Prognosis Considerations: Participation Level;Previous Level of Function    Education: Patient, RN    Role of SLP, Dysphagia concerns, Evaluation results, Recommended diet, SLP Plan  Patient Education Response: Verbalizes understanding    PRECAUTIONS/ALLERGIES: Bebtelovimab   Safety Devices in place: Yes  Type of devices: Nurse notified, Call light within reach, Left in chair    Therapy Time  SLP Individual Minutes  Time In: 1301  Time Out: Aasa 43  Minutes: [place] 60 FRANCHESCA Martines  2/23/2023 1:29 PM

## 2023-02-23 NOTE — PROGRESS NOTES
Physician Progress Note      PATIENT:               SONAM JOSHI  CSN #:                  [id number]  :                       1940  ADMIT DATE:       2023 6:01 PM  DISCH DATE:  RESPONDING  PROVIDER #:        Shelly Bañuelos MD          QUERY TEXT:    Patient admitted with sepsis, noted to have paroxysmal atrial fibrillation and   is maintained on eliquis. If possible, please document in progress notes and   discharge summary if you are evaluating and/or treating any of the following:    The medical record reflects the following:  Risk Factors: 83 y.o. male with medical history of paroxysmal A-fib,   myasthenia gravis, GERD, chronic systolic CHF, sick sinus syndrome status post   pacer, history of DVT on Eliquis  Clinical Indicators: 83 y.o., CHF, HTN, A-Fib  Treatment: Eliquis    Thank you,  Griselda Fernandes RN, BSN, CDI  [email or web address]  .  Options provided:  -- Secondary hypercoagulable state in a patient with atrial fibrillation  -- Other - I will add my own diagnosis  -- Disagree - Not applicable / Not valid  -- Disagree - Clinically unable to determine / Unknown  -- Refer to Clinical Documentation Reviewer    PROVIDER RESPONSE TEXT:    This patient has secondary hypercoagulable state in a patient with atrial   fibrillation.    Query created by: Griselda Fernandes on 2023 12:01 PM      Electronically signed by:  Shelly Bañuelos MD 2023 3:40 PM

## 2023-02-23 NOTE — PROGRESS NOTES
Hospitalist Progress Note   Admit Date:  2023  6:01 PM   Name:  An Isidro   Age:  79 y.o. Sex:  male  :  1940   MRN:  [id number]   Room:  319/01    Presenting Complaint: No chief complaint on file. Reason(s) for Admission: Influenza with respiratory manifestation other than pneumonia [J11.1]  Sepsis [facility]) [A41.9]     Hospital Course: As per Prior Documentation:  \"  An Isidro is a 79 y.o. 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 Who presented with complaint of near syncope, fever of 102, cough, dyspnea and wheezing. Patient wife reported to EMS that he collapsed after returning from the bathroom but did not have loss of consciousness. Patient denies chest pain, nausea, vomiting,melena, hematochezia. ER work-up notable for  BMP-benign  proBNP 1080  CBC-WBC 10K, hemoglobin 14, platelets 699  Influenza A+         1. Stable mild cardiomegaly. 2. Stable triple lead defibrillator/pacemaker. 3. Expiratory technique. No acute pulmonary process is identified. \"    Subjective & 24hr Events (23): Pt seen and evaluated. Feels Better  Afebrile  Denies  n/v/f/chills  Not wheezing today    Assessment & Plan:      # Sepsis (meets criteria with temperature 102.6, respirations 30)  - Suspect secondary to influenza A  - continue tamiflu  - supportive care - antiemetics, antipyretics, antitussives.    - continue Rocephin and steroids started in ER for audible wheezing/stridor;   - ct neck shows no obstruction - did show a thyroid nodule-ultrasound advise  - blood and sputum cultures-NGTD  - continue -limited 2decho for ef is pending  - He is not on metformin based on the home med rec-  -However the persistently elevated lactic acid is concerning for this -we will ask the pharmacy to check his records    # near syncope  - suspect from above  - orthostatics  - monitor on telemetry     # Pafib  - cont eliquis  - rate controlled with toprol     # Myasthenia Gravis  - aware  - CAREFUL WITH NEW MEDICATIONS, including steroids and antibiotics  - continue steroids and continue to wean as tolerated -  stridor is chronic- per him - appears upper airway  -steroids have been implicated in MG crisis in some patients. Patient informed to notify us for increased dyspnea/ weakness/ increased work of breathing/ ptosis/ blurry vision or other s/s. # HTN  - resume toprol/ cozaar     # GERD  - PPI     # Chronic systolic CHF  - cont toprol, losartan   - euvolemic on exam     # SSS s/p PPM     # HLP  - statin     # BPH   - controlled on proscar           PT/OT evals and PPD needed/ordered? Yes    Diet:  ADULT DIET; Regular; Low Sodium (2 gm)  VTE prophylaxis:  Eliquis  Code status: Full Code    I spent 55 min in time, evaluating the patient, reviewing charts and labs and other results, discussions with caregivers, including RN and other providers and organizing his care. Home in the next 48 hours if stable    Hospital Problems:  Principal Problem:    Sepsis ([address]  Active Problems:    Colon cancer ([facility] [address]    Chronic systolic (congestive) heart failure    HTN (hypertension)    Myasthenia gravis ([address]    Dyslipidemia    Atrial fibrillation ([facility]  Resolved Problems:    * No resolved hospital problems.  *      Objective:   Patient Vitals for the past 24 hrs:   Temp Pulse Resp BP SpO2   02/23/23 1632 98.1 °F (36.7 °C) 75 18 (!) 145/78 96 %   02/23/23 1500 -- 75 18 -- 97 %   02/23/23 1204 97.5 °F (36.4 °C) 74 18 (!) 156/84 96 %   02/23/23 0747 97.7 °F (36.5 °C) 75 18 (!) 147/80 95 %   02/23/23 0746 -- 76 18 -- 94 %   02/23/23 0401 98.1 °F (36.7 °C) 69 19 138/80 94 %   02/23/23 0009 97.5 °F (36.4 °C) 70 20 (!) 149/85 94 %   02/22/23 2029 98.1 °F (36.7 °C) 75 20 139/79 91 %         Oxygen Therapy  SpO2: 96 %  Pulse via Oximetry: 70 beats per minute  Pulse Oximeter Device Mode: Intermittent  O2 Device: None (Room air)  Skin Assessment: Clean, dry, & intact  Skin Protection for O2 Device: No  O2 Flow Rate (L/min): 2 L/min    Estimated body mass index is 35.63 kg/m² as calculated from the following:    Height as of this encounter: 5' 5\" (1.651 m). Weight as of this encounter: 214 lb 1.6 oz (97.1 kg). Intake/Output Summary (Last 24 hours) at 2/23/2023 1730  Last data filed at 2/23/2023 1210  Gross per 24 hour   Intake 1720 ml   Output 200 ml   Net 1520 ml           Physical Exam:     Blood pressure (!) 145/78, pulse 75, temperature 98.1 °F (36.7 °C), temperature source Oral, resp. rate 18, height 5' 5\" (1.651 m), weight 214 lb 1.6 oz (97.1 kg), SpO2 96 %. General:    Well nourished. Audibly wheezing- upper airway- radiating into the lung fields  Head:  Normocephalic, atraumatic  Eyes:  Sclerae appear normal.  Pupils equally round. ENT:  Nares appear normal.  Moist oral mucosa  Neck:  No restricted ROM. Trachea midline   CV:   RRR. No m/r/g. No jugular venous distension. Lungs:   A/e =  bl with wheezing as above, no rhonchi, or rales. Symmetric expansion. Abdomen:   Soft, nontender, nondistended. Extremities: No cyanosis or clubbing. Mild pitting edema  Skin:     No rashes and normal coloration. Warm and dry. Neuro:  CN II-XII grossly intact. Psych:  Normal mood and affect.       I have personally reviewed labs and tests:  Recent Labs:  Recent Results (from the past 48 hour(s))   EKG 12 Lead    Collection Time: 02/21/23  6:09 PM   Result Value Ref Range    Ventricular Rate 75 BPM    Atrial Rate 0 BPM    P-R Interval 179 ms    QRS Duration 184 ms    Q-T Interval 430 ms    QTc Calculation (Bazett) 481 ms    P Axis 233 degrees    R Axis -69 degrees    T Axis 103 degrees    Diagnosis Ventricular-paced rhythm    Comprehensive Metabolic Panel    Collection Time: 02/21/23  6:18 PM Result Value Ref Range    Sodium 135 133 - 143 mmol/L    Potassium 4.1 3.5 - 5.1 mmol/L    Chloride 106 101 - 110 mmol/L    CO2 24 21 - 32 mmol/L    Anion Gap 5 2 - 11 mmol/L    Glucose 119 (H) 65 - 100 mg/dL    BUN 16 8 - 23 MG/DL    Creatinine 1.10 0.8 - 1.5 MG/DL    Est, Glom Filt Rate >60 >60 ml/min/1.73m2    Calcium 9.3 8.3 - 10.4 MG/DL    Total Bilirubin 0.6 0.2 - 1.1 MG/DL    ALT 33 12 - 65 U/L    AST 29 15 - 37 U/L    Alk Phosphatase 95 50 - 136 U/L    Total Protein 7.2 6.3 - 8.2 g/dL    Albumin 3.5 3.2 - 4.6 g/dL    Globulin 3.7 2.8 - 4.5 g/dL    Albumin/Globulin Ratio 0.9 0.4 - 1.6     Blood Culture 1    Collection Time: 02/21/23  6:18 PM    Specimen: Blood   Result Value Ref Range    Special Requests RIGHT  ARM        Gram stain Gram positive cocci      Gram stain ANAEROBIC BOTTLE POSITIVE      Gram stain        RESULTS VERIFIED, PHONED TO AND READ BACK BY NADER APONTE RN @5839 2.22.23 [facility]    Culture (A)       STAPHYLOCOCCUS SPECIES, COAGULASE NEGATIVE This organism may be indicative of culture contamination. Clinical correlation needs to be evaluated as each case is unique.     Culture        Refer to Blood Culture ID Panel Accession [id number]   CBC with Auto Differential    Collection Time: 02/21/23  6:18 PM   Result Value Ref Range    WBC 10.5 4.3 - 11.1 K/uL    RBC 4.71 4.23 - 5.6 M/uL    Hemoglobin 14.4 13.6 - 17.2 g/dL    Hematocrit 44.9 41.1 - 50.3 %    MCV 95.3 82 - 102 FL    MCH 30.6 26.1 - 32.9 PG    MCHC 32.1 31.4 - 35.0 g/dL    RDW 13.8 11.9 - 14.6 %    Platelets 165 640 - 971 K/uL    MPV 9.1 (L) 9.4 - 12.3 FL    nRBC 0.00 0.0 - 0.2 K/uL    Differential Type AUTOMATED      Seg Neutrophils 76 43 - 78 %    Lymphocytes 11 (L) 13 - 44 %    Monocytes 11 4.0 - 12.0 %    Eosinophils % 1 0.5 - 7.8 %    Basophils 0 0.0 - 2.0 %    Immature Granulocytes 1 0.0 - 5.0 %    Segs Absolute 8.0 1.7 - 8.2 K/UL    Absolute Lymph # 1.2 0.5 - 4.6 K/UL    Absolute Mono # 1.2 0.1 - 1.3 K/UL    Absolute Eos # 0.1 0.0 - 0.8 K/UL    Basophils Absolute 0.0 0.0 - 0.2 K/UL    Absolute Immature Granulocyte 0.1 0.0 - 0.5 K/UL   Lactate, Sepsis    Collection Time: 02/21/23  6:18 PM   Result Value Ref Range    Lactic Acid, Sepsis 2.0 0.4 - 2.0 MMOL/L   Procalcitonin    Collection Time: 02/21/23  6:18 PM   Result Value Ref Range    Procalcitonin 0.06 0.00 - 0.49 ng/mL   COVID-19, Rapid    Collection Time: 02/21/23  6:18 PM    Specimen: Nasopharyngeal   Result Value Ref Range    Source NASAL      SARS-CoV-2, Rapid Not detected NOTD     Brain Natriuretic Peptide    Collection Time: 02/21/23  6:18 PM   Result Value Ref Range    NT Pro-BNP 1,080 (H) <450 PG/ML   Influenza A/B, Molecular    Collection Time: 02/21/23  6:18 PM    Specimen: Not Specified   Result Value Ref Range    Influenza A, ROMANA Detected (A) NOTD      Influenza B, ROMANA Not detected NOTD     Culture, Blood, PCR ID Panel    Collection Time: 02/21/23  6:18 PM    Specimen: Blood   Result Value Ref Range    Accession Number [id number]     Enterococcus faecalis by PCR NOT DETECTED NOTDET      Enterococcus faecium by PCR NOT DETECTED NOTDET      Listeria monocytogenes by PCR NOT DETECTED NOTDET      STAPHYLOCOCCUS Detected (A) NOTDET      Staphylococcus Aureus NOT DETECTED NOTDET      Staphylococcus epidermidis by PCR Detected (A) NOTDET      Staphylococcus lugdunensis by PCR NOT DETECTED NOTDET      STREPTOCOCCUS NOT DETECTED NOTDET      Streptococcus agalactiae (Group B) NOT DETECTED NOTDET      Strep pneumoniae NOT DETECTED NOTDET      Strep pyogenes,(Grp. A) NOT DETECTED NOTDET      Acinetobacter calcoac baumannii complex by PCR NOT DETECTED NOTDET      Bacteroides fragilis by PCR NOT DETECTED NOTDET      Enterobacteriaceae by PCR NOT DETECTED NOTDET      Enterobacter cloacae complex by PCR NOT DETECTED NOTDET      Escherichia Coli NOT DETECTED NOTDET      Klebsiella aerogenes by PCR NOT DETECTED NOTDET      Klebsiella oxytoca by PCR NOT DETECTED NOTDET      Klebsiella pneumoniae group by PCR NOT DETECTED NOTDET      Proteus by PCR NOT DETECTED NOTDET      Salmonella species by PCR NOT DETECTED NOTDET      Serratia marcescens by PCR NOT DETECTED NOTDET      Haemophilus Influenzae by PCR NOT DETECTED NOTDET      Neisseria meningitidis by PCR NOT DETECTED NOTDET      Pseudomonas aeruginosa NOT DETECTED NOTDET      Stenotrophomonas maltophilia by PCR NOT DETECTED NOTDET      Candida albicans by PCR NOT DETECTED NOTDET      Candida auris by PCR NOT DETECTED NOTDET      Candida glabrata NOT DETECTED NOTDET      Candida krusei by PCR NOT DETECTED NOTDET      Candida parapsilosis by PCR NOT DETECTED NOTDET      Candida tropicalis by PCR NOT DETECTED NOTDET      Cryptococcus neoformans/gattii by PCR NOT DETECTED NOTDET      Resistant gene targets          Methicillin Resistance mecA/C  by PCR Detected (A) NOTDET      [employer] test comment        False positive results may rarely occur. Correlate with clinical,epidemiologic, and other laboratory findings   Culture, Blood 2    Collection Time: 02/21/23  7:16 PM    Specimen: Blood   Result Value Ref Range    Special Requests LEFT  FOREARM        Culture NO GROWTH 2 DAYS     Lactate, Sepsis    Collection Time: 02/22/23  1:28 AM   Result Value Ref Range    Lactic Acid, Sepsis 2.3 (H) 0.4 - 2.0 MMOL/L   Procalcitonin    Collection Time: 02/22/23  1:28 AM   Result Value Ref Range    Procalcitonin <0.05 0.00 - 0.49 ng/mL   Lactic Acid    Collection Time: 02/22/23  9:44 AM   Result Value Ref Range    Lactic Acid, Plasma 4.3 (HH) 0.4 - 2.0 MMOL/L   Lactic Acid    Collection Time: 02/22/23  4:48 PM   Result Value Ref Range    Lactic Acid, Plasma 2.8 (H) 0.4 - 2.0 MMOL/L   Lactic Acid    Collection Time: 02/22/23  7:23 PM   Result Value Ref Range    Lactic Acid, Plasma 2.8 (H) 0.4 - 2.0 MMOL/L   Comprehensive Metabolic Panel w/ Reflex to MG    Collection Time: 02/23/23  2:18 AM   Result Value Ref Range    Sodium 138 133 - 143 mmol/L    Potassium 3.9 3.5 - 5.1  mmol/L    Chloride 108 101 - 110 mmol/L    CO2 26 21 - 32 mmol/L    Anion Gap 4 2 - 11 mmol/L    Glucose 182 (H) 65 - 100 mg/dL    BUN 18 8 - 23 MG/DL    Creatinine 0.80 0.8 - 1.5 MG/DL    Est, Glom Filt Rate >60 >60 ml/min/1.73m2    Calcium 8.5 8.3 - 10.4 MG/DL    Total Bilirubin 0.4 0.2 - 1.1 MG/DL    ALT 29 12 - 65 U/L    AST 27 15 - 37 U/L    Alk Phosphatase 72 50 - 136 U/L    Total Protein 6.3 6.3 - 8.2 g/dL    Albumin 2.9 (L) 3.2 - 4.6 g/dL    Globulin 3.4 2.8 - 4.5 g/dL    Albumin/Globulin Ratio 0.9 0.4 - 1.6     CBC with Auto Differential    Collection Time: 02/23/23  2:18 AM   Result Value Ref Range    WBC 14.8 (H) 4.3 - 11.1 K/uL    RBC 4.25 4.23 - 5.6 M/uL    Hemoglobin 12.8 (L) 13.6 - 17.2 g/dL    Hematocrit 40.5 (L) 41.1 - 50.3 %    MCV 95.3 82 - 102 FL    MCH 30.1 26.1 - 32.9 PG    MCHC 31.6 31.4 - 35.0 g/dL    RDW 13.8 11.9 - 14.6 %    Platelets 932 515 - 840 K/uL    MPV 9.0 (L) 9.4 - 12.3 FL    nRBC 0.00 0.0 - 0.2 K/uL    Differential Type AUTOMATED      Seg Neutrophils 86 (H) 43 - 78 %    Lymphocytes 7 (L) 13 - 44 %    Monocytes 6 4.0 - 12.0 %    Eosinophils % 0 (L) 0.5 - 7.8 %    Basophils 0 0.0 - 2.0 %    Immature Granulocytes 1 0.0 - 5.0 %    Segs Absolute 12.8 (H) 1.7 - 8.2 K/UL    Absolute Lymph # 1.0 0.5 - 4.6 K/UL    Absolute Mono # 0.9 0.1 - 1.3 K/UL    Absolute Eos # 0.0 0.0 - 0.8 K/UL    Basophils Absolute 0.0 0.0 - 0.2 K/UL    Absolute Immature Granulocyte 0.1 0.0 - 0.5 K/UL   Lactic Acid    Collection Time: 02/23/23  2:18 AM   Result Value Ref Range    Lactic Acid, Plasma 2.4 (H) 0.4 - 2.0 MMOL/L   Lactic Acid    Collection Time: 02/23/23  7:37 AM   Result Value Ref Range    Lactic Acid, Plasma 2.4 (H) 0.4 - 2.0 MMOL/L   Lactic Acid    Collection Time: 02/23/23  4:49 PM   Result Value Ref Range    Lactic Acid, Plasma 3.1 (H) 0.4 - 2.0 MMOL/L       I have personally reviewed imaging studies:  Other Studies:  CT SOFT TISSUE NECK W CONTRAST   Final Result   1.  The airway is patent and midline. No findings to suggest airway obstruction. 2. 1.7 cm heterogeneous thyroid nodule. This could be more completely evaluated   with thyroid ultrasound. XR CHEST PORTABLE   Final Result      1. Mild atelectasis without any additional acute abnormality in the chest.      2.  Stable cardiomegaly. This exam was interpreted by a board-certified, body-imaging fellowship trained    radiologist from [address] [place] [address]. If there are any questions regarding    this examination please feel free to contact a radiologist at [phone]. Slot 15       Dareen Record    2/22/2023 10:11:00 PM      XR CHEST PORTABLE   Final Result      1. Stable mild cardiomegaly. 2. Stable triple lead defibrillator/pacemaker. 3. Expiratory technique. No acute pulmonary process is identified.        Glen Bernard M.D.    2/21/2023 6:50:00 PM      US THYROID    (Results Pending)       Current Meds:  Current Facility-Administered Medications   Medication Dose Route Frequency    0.9 % sodium chloride infusion   IntraVENous Continuous    oseltamivir (TAMIFLU) capsule 75 mg  75 mg Oral BID    tuberculin injection 5 Units  5 Units IntraDERmal Once    0.9 % sodium chloride infusion   IntraVENous Continuous    apixaban (ELIQUIS) tablet 5 mg  5 mg Oral BID    atorvastatin (LIPITOR) tablet 80 mg  80 mg Oral Daily    famotidine (PEPCID) tablet 20 mg  20 mg Oral Daily    finasteride (PROSCAR) tablet 5 mg  5 mg Oral Daily    losartan (COZAAR) tablet 50 mg  50 mg Oral Daily    pantoprazole (PROTONIX) tablet 40 mg  40 mg Oral QAM AC    rOPINIRole (REQUIP) tablet 0.5 mg  0.5 mg Oral Nightly    tamsulosin (FLOMAX) capsule 0.4 mg  0.4 mg Oral Daily    metoprolol succinate (TOPROL XL) extended release tablet 25 mg  25 mg Oral Daily    methylPREDNISolone sodium (SOLU-MEDROL) injection 40 mg  40 mg IntraVENous Q8H    cefTRIAXone (ROCEPHIN) 1,000 mg in sodium chloride 0.9 % 50 mL IVPB (mini-bag)  1,000 mg IntraVENous Q24H    sodium chloride flush 0.9 % injection 5-40 mL  5-40 mL IntraVENous 2 times per day    sodium chloride flush 0.9 % injection 5-40 mL  5-40 mL IntraVENous PRN    0.9 % sodium chloride infusion   IntraVENous PRN    ondansetron (ZOFRAN-ODT) disintegrating tablet 4 mg  4 mg Oral Q8H PRN    Or    ondansetron (ZOFRAN) injection 4 mg  4 mg IntraVENous Q6H PRN    polyethylene glycol (GLYCOLAX) packet 17 g  17 g Oral Daily PRN    acetaminophen (TYLENOL) tablet 650 mg  650 mg Oral Q6H PRN    Or    acetaminophen (TYLENOL) suppository 650 mg  650 mg Rectal Q6H PRN    ipratropium-albuterol (DUONEB) nebulizer solution 1 ampule  1 ampule Inhalation Q6H WA       Signed:  Uri Barton MD    Part of this note may have been written by using a voice dictation software. The note has been proof read but may still contain some grammatical/other typographical errors.

## 2023-02-23 NOTE — PROGRESS NOTES
Patient's son, Joselyn Saldana, called requesting update on patients most recent condition and plan of care. Security code verified and update given. No further questions at this time.

## 2023-02-23 NOTE — PROGRESS NOTES
Hospitalist Progress Note   Admit Date:  2023  6:01 PM   Name:  Ashok Mcginnis   Age:  79 y.o. Sex:  male  :  1940   MRN:  [id number]   Room:  319/01    Presenting Complaint: No chief complaint on file. Reason(s) for Admission: Influenza with respiratory manifestation other than pneumonia [J11.1]  Sepsis [facility]) [A41.9]     Hospital Course: As per Prior Documentation:  \"  Ashok Mcginnis is a 79 y.o. 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 Who presented with complaint of near syncope, fever of 102, cough, dyspnea and wheezing. Patient wife reported to EMS that he collapsed after returning from the bathroom but did not have loss of consciousness. Patient denies chest pain, nausea, vomiting,melena, hematochezia. ER work-up notable for  BMP-benign  proBNP 1080  CBC-WBC 10K, hemoglobin 14, platelets 511  Influenza A+         1. Stable mild cardiomegaly. 2. Stable triple lead defibrillator/pacemaker. 3. Expiratory technique. No acute pulmonary process is identified. \"    Subjective & 24hr Events (23): Pt seen and evaluated. Feels Better  Afebrile  Denies  n/v/f/chills  Audibly wheezing - that is his baseline for over 1 year per him- sounds upper airway and transmitted to the lower lung fields;previously had surgery to the upper airway- for os  and it had resolved but now its back. Assessment & Plan:      # Sepsis (meets criteria with temperature 102.6, respirations 30)  - Suspect secondary to influenza A  - continue tamiflu  - supportive care - antiemetics, antipyretics, antitussives.    - continue Rocephin and steroids in ER for audible wheezing/stridor; will also get a ct neck to look for obstruction   - blood and sputum cultures-NGTD  - needs gentle bolus as LA has trended up > 4.   -limited 2decho for ef  - He is not on metformin at home     # near syncope  - suspect from above  - orthostatics  - monitor on telemetry     # Pafib  - cont eliquis  - rate controlled with toprol     # Myasthenia Gravis  - aware  - CAREFUL WITH NEW MEDICATIONS, including steroids and antibiotics  - continue steroids  but will start decreasing -  stridor is chronic- per him - appears upper airway  -steroids have been implicated in MG crisis in some patients. Patient informed to notify us for increased dyspnea/ weakness/ increased work of breathing/ ptosis/ blurry vision or other s/s. # HTN  - resume toprol/ cozaar     # GERD  - PPI     # Chronic systolic CHF  - cont toprol, losartan   - euvolemic on exam     # SSS s/p PPM     # HLP  - statin     # BPH   - controlled on proscar           PT/OT evals and PPD needed/ordered? Yes    Diet:  ADULT DIET; Regular; Low Sodium (2 gm)  VTE prophylaxis:  Eliquis  Code status: Full Code    I spent 55 min in time, evaluating the patient, reviewing charts and labs and other results, discussions with caregivers, including RN and other providers and organizing his care. Hospital Problems:  Principal Problem:    Sepsis ([facility] [address]  Active Problems:    Colon cancer ([facility] [address]    Chronic systolic (congestive) heart failure    HTN (hypertension)    Myasthenia gravis ([facility] [address]    Dyslipidemia    Atrial fibrillation ([facility] [address]  Resolved Problems:    * No resolved hospital problems.  *      Objective:   Patient Vitals for the past 24 hrs:   Temp Pulse Resp BP SpO2   02/22/23 1621 98.1 °F (36.7 °C) 74 20 137/69 96 %   02/22/23 1156 98.6 °F (37 °C) 74 20 (!) 143/74 94 %   02/22/23 0805 -- 75 -- -- 95 %   02/22/23 0741 97.7 °F (36.5 °C) 75 18 129/70 95 %   02/22/23 0400 97.7 °F (36.5 °C) 69 18 134/70 96 %   02/22/23 0040 97.8 °F (36.6 °C) 86 20 (!) 152/74 98 %   02/21/23 2330 -- 70 17 (!) 145/69 96 %   02/21/23 2200 -- 75 15 (!) 113/59 94 %   02/21/23 2130 -- 75 17 125/75 --   02/21/23 2100 -- 75 26 132/61 94 %   02/21/23 2032 -- -- -- -- 96 %       Oxygen Therapy  SpO2: 96 %  Pulse via Oximetry: 70 beats per minute  O2 Device: Nasal cannula  O2 Flow Rate (L/min): 2 L/min    Estimated body mass index is 35.68 kg/m² as calculated from the following:    Height as of this encounter: 5' 5\" (1.651 m). Weight as of this encounter: 214 lb 6.4 oz (97.3 kg). Intake/Output Summary (Last 24 hours) at 2/22/2023 2028  Last data filed at 2/22/2023 1606  Gross per 24 hour   Intake 1010 ml   Output 275 ml   Net 735 ml         Physical Exam:     Blood pressure 137/69, pulse 74, temperature 98.1 °F (36.7 °C), temperature source Oral, resp. rate 20, height 5' 5\" (1.651 m), weight 214 lb 6.4 oz (97.3 kg), SpO2 96 %. General:    Well nourished. Audibly wheezing- upper airway- radiating into the lung fields  Head:  Normocephalic, atraumatic  Eyes:  Sclerae appear normal.  Pupils equally round. ENT:  Nares appear normal.  Moist oral mucosa  Neck:  No restricted ROM. Trachea midline   CV:   RRR. No m/r/g. No jugular venous distension. Lungs:   A/e =  bl with wheezing as above, no rhonchi, or rales. Symmetric expansion. Abdomen:   Soft, nontender, nondistended. Extremities: No cyanosis or clubbing. Mild pitting edema  Skin:     No rashes and normal coloration. Warm and dry. Neuro:  CN II-XII grossly intact. Psych:  Normal mood and affect.       I have personally reviewed labs and tests:  Recent Labs:  Recent Results (from the past 48 hour(s))   Urinalysis    Collection Time: 02/21/23  5:15 AM   Result Value Ref Range    Color, UA YELLOW/STRAW      Appearance CLOUDY      Specific [place], UA 1.021 1.001 - 1.023      pH, Urine 5.5 5.0 - 9.0      Protein, UA TRACE (A) NEG mg/dL    Glucose, UA >1000 mg/dL    Ketones, Urine Negative NEG mg/dL    Bilirubin Urine Negative NEG      Blood, Urine TRACE (A) NEG      Urobilinogen, Urine 0.2 0.2 - 1.0 EU/dL    Nitrite, Urine Negative NEG      Leukocyte Esterase, Urine MODERATE (A) NEG      WBC, UA 5-10 0 /hpf    RBC, UA 5-10 0 /hpf    Epithelial Cells UA 0-3 0 /hpf    BACTERIA, URINE 0 0 /hpf    Yeast, UA MODERATE     EKG 12 Lead    Collection Time: 02/21/23  6:09 PM   Result Value Ref Range    Ventricular Rate 75 BPM    Atrial Rate 0 BPM    P-R Interval 179 ms    QRS Duration 184 ms    Q-T Interval 430 ms    QTc Calculation (Bazett) 481 ms    P Axis 233 degrees    R Axis -69 degrees    T Axis 103 degrees    Diagnosis Ventricular-paced rhythm    Comprehensive Metabolic Panel    Collection Time: 02/21/23  6:18 PM   Result Value Ref Range    Sodium 135 133 - 143 mmol/L    Potassium 4.1 3.5 - 5.1 mmol/L    Chloride 106 101 - 110 mmol/L    CO2 24 21 - 32 mmol/L    Anion Gap 5 2 - 11 mmol/L    Glucose 119 (H) 65 - 100 mg/dL    BUN 16 8 - 23 MG/DL    Creatinine 1.10 0.8 - 1.5 MG/DL    Est, Glom Filt Rate >60 >60 ml/min/1.73m2    Calcium 9.3 8.3 - 10.4 MG/DL    Total Bilirubin 0.6 0.2 - 1.1 MG/DL    ALT 33 12 - 65 U/L    AST 29 15 - 37 U/L    Alk Phosphatase 95 50 - 136 U/L    Total Protein 7.2 6.3 - 8.2 g/dL    Albumin 3.5 3.2 - 4.6 g/dL    Globulin 3.7 2.8 - 4.5 g/dL    Albumin/Globulin Ratio 0.9 0.4 - 1.6     Blood Culture 1    Collection Time: 02/21/23  6:18 PM    Specimen: Blood   Result Value Ref Range    Special Requests RIGHT  ARM        Culture NO GROWTH AFTER 14 HOURS     CBC with Auto Differential    Collection Time: 02/21/23  6:18 PM   Result Value Ref Range    WBC 10.5 4.3 - 11.1 K/uL    RBC 4.71 4.23 - 5.6 M/uL    Hemoglobin 14.4 13.6 - 17.2 g/dL    Hematocrit 44.9 41.1 - 50.3 %    MCV 95.3 82 - 102 FL    MCH 30.6 26.1 - 32.9 PG    MCHC 32.1 31.4 - 35.0 g/dL    RDW 13.8 11.9 - 14.6 %    Platelets 871 692 - 308 K/uL    MPV 9.1 (L) 9.4 - 12.3 FL    nRBC 0.00 0.0 - 0.2 K/uL    Differential Type AUTOMATED      Seg Neutrophils 76 43 - 78 %    Lymphocytes 11 (L) 13 - 44 %    Monocytes 11 4.0 - 12.0 %    Eosinophils % 1 0.5 - 7.8 %    Basophils 0 0.0 - 2.0 %    Immature Granulocytes 1 0.0 - 5.0 %    Segs Absolute 8.0 1.7 - 8.2 K/UL    Absolute Lymph # 1.2 0.5 - 4.6 K/UL    Absolute Mono # 1.2 0.1 - 1.3 K/UL    Absolute Eos # 0.1 0.0 - 0.8 K/UL    Basophils Absolute 0.0 0.0 - 0.2 K/UL    Absolute Immature Granulocyte 0.1 0.0 - 0.5 K/UL   Lactate, Sepsis    Collection Time: 02/21/23  6:18 PM   Result Value Ref Range    Lactic Acid, Sepsis 2.0 0.4 - 2.0 MMOL/L   Procalcitonin    Collection Time: 02/21/23  6:18 PM   Result Value Ref Range    Procalcitonin 0.06 0.00 - 0.49 ng/mL   COVID-19, Rapid    Collection Time: 02/21/23  6:18 PM    Specimen: Nasopharyngeal   Result Value Ref Range    Source NASAL      SARS-CoV-2, Rapid Not detected NOTD     Brain Natriuretic Peptide    Collection Time: 02/21/23  6:18 PM   Result Value Ref Range    NT Pro-BNP 1,080 (H) <450 PG/ML   Influenza A/B, Molecular    Collection Time: 02/21/23  6:18 PM    Specimen: Not Specified   Result Value Ref Range    Influenza A, ROMANA Detected (A) NOTD      Influenza B, ROMANA Not detected NOTD     Culture, Blood, PCR ID Panel    Collection Time: 02/21/23  6:18 PM    Specimen: Blood   Result Value Ref Range    Accession Number [id number]     Enterococcus faecalis by PCR NOT DETECTED NOTDET      Enterococcus faecium by PCR NOT DETECTED NOTDET      Listeria monocytogenes by PCR NOT DETECTED NOTDET      STAPHYLOCOCCUS Detected (A) NOTDET      Staphylococcus Aureus NOT DETECTED NOTDET      Staphylococcus epidermidis by PCR Detected (A) NOTDET      Staphylococcus lugdunensis by PCR NOT DETECTED NOTDET      STREPTOCOCCUS NOT DETECTED NOTDET      Streptococcus agalactiae (Group B) NOT DETECTED NOTDET      Strep pneumoniae NOT DETECTED NOTDET      Strep pyogenes,(Grp. A) NOT DETECTED NOTDET      Acinetobacter calcoac baumannii complex by PCR NOT DETECTED NOTDET      Bacteroides fragilis by PCR NOT DETECTED NOTDET      Enterobacteriaceae by PCR NOT DETECTED NOTDET      Enterobacter cloacae complex by PCR NOT DETECTED NOTDET      Escherichia Coli NOT DETECTED NOTDET      Klebsiella aerogenes by PCR NOT DETECTED NOTDET      Klebsiella oxytoca by PCR NOT DETECTED NOTDET      Klebsiella pneumoniae group by PCR NOT DETECTED NOTDET      Proteus by PCR NOT DETECTED NOTDET      Salmonella species by PCR NOT DETECTED NOTDET      Serratia marcescens by PCR NOT DETECTED NOTDET      Haemophilus Influenzae by PCR NOT DETECTED NOTDET      Neisseria meningitidis by PCR NOT DETECTED NOTDET      Pseudomonas aeruginosa NOT DETECTED NOTDET      Stenotrophomonas maltophilia by PCR NOT DETECTED NOTDET      Candida albicans by PCR NOT DETECTED NOTDET      Candida auris by PCR NOT DETECTED NOTDET      Candida glabrata NOT DETECTED NOTDET      Candida krusei by PCR NOT DETECTED NOTDET      Candida parapsilosis by PCR NOT DETECTED NOTDET      Candida tropicalis by PCR NOT DETECTED NOTDET      Cryptococcus neoformans/gattii by PCR NOT DETECTED NOTDET      Resistant gene targets          Methicillin Resistance mecA/C  by PCR Detected (A) NOTDET      [employer] test comment        False positive results may rarely occur.  Correlate with clinical,epidemiologic, and other laboratory findings   Culture, Blood 2    Collection Time: 02/21/23  7:16 PM    Specimen: Blood   Result Value Ref Range    Special Requests LEFT  FOREARM        Culture NO GROWTH AFTER 13 HOURS     Lactate, Sepsis    Collection Time: 02/22/23  1:28 AM   Result Value Ref Range    Lactic Acid, Sepsis 2.3 (H) 0.4 - 2.0 MMOL/L   Procalcitonin    Collection Time: 02/22/23  1:28 AM   Result Value Ref Range    Procalcitonin <0.05 0.00 - 0.49 ng/mL   Lactic Acid    Collection Time: 02/22/23  9:44 AM   Result Value Ref Range    Lactic Acid, Plasma 4.3 (HH) 0.4 - 2.0 MMOL/L   Lactic Acid    Collection Time: 02/22/23  4:48 PM   Result Value Ref Range    Lactic Acid, Plasma 2.8 (H) 0.4 - 2.0 MMOL/L       I have personally reviewed imaging studies:  Other Studies:  XR CHEST PORTABLE   Final Result      1. Stable mild cardiomegaly. 2. Stable triple lead defibrillator/pacemaker. 3. Expiratory technique. No acute pulmonary process is identified.        Dilia Rubio M.D.    2/21/2023 6:50:00 PM      XR CHEST PORTABLE    (Results Pending)   CT SOFT TISSUE NECK W CONTRAST    (Results Pending)       Current Meds:  Current Facility-Administered Medications   Medication Dose Route Frequency    apixaban (ELIQUIS) tablet 5 mg  5 mg Oral BID    atorvastatin (LIPITOR) tablet 80 mg  80 mg Oral Daily    oseltamivir (TAMIFLU) capsule 30 mg  30 mg Oral BID    famotidine (PEPCID) tablet 20 mg  20 mg Oral Daily    finasteride (PROSCAR) tablet 5 mg  5 mg Oral Daily    losartan (COZAAR) tablet 50 mg  50 mg Oral Daily    pantoprazole (PROTONIX) tablet 40 mg  40 mg Oral QAM AC    rOPINIRole (REQUIP) tablet 0.5 mg  0.5 mg Oral Nightly    tamsulosin (FLOMAX) capsule 0.4 mg  0.4 mg Oral Daily    metoprolol succinate (TOPROL XL) extended release tablet 25 mg  25 mg Oral Daily    methylPREDNISolone sodium (SOLU-MEDROL) injection 40 mg  40 mg IntraVENous Q8H    cefTRIAXone (ROCEPHIN) 1,000 mg in sodium chloride 0.9 % 50 mL IVPB (mini-bag)  1,000 mg IntraVENous Q24H    sodium chloride flush 0.9 % injection 5-40 mL  5-40 mL IntraVENous 2 times per day    sodium chloride flush 0.9 % injection 5-40 mL  5-40 mL IntraVENous PRN    0.9 % sodium chloride infusion   IntraVENous PRN    ondansetron (ZOFRAN-ODT) disintegrating tablet 4 mg  4 mg Oral Q8H PRN    Or    ondansetron (ZOFRAN) injection 4 mg  4 mg IntraVENous Q6H PRN    polyethylene glycol (GLYCOLAX) packet 17 g  17 g Oral Daily PRN    acetaminophen (TYLENOL) tablet 650 mg  650 mg Oral Q6H PRN    Or    acetaminophen (TYLENOL) suppository 650 mg  650 mg Rectal Q6H PRN    0.9 % sodium chloride infusion   IntraVENous Continuous    ipratropium-albuterol (DUONEB) nebulizer solution 1 ampule  1 ampule Inhalation Q6H WA       Signed:  Darius Francois MD    Part of this note may have been written by using a voice dictation software. The note has been proof read but may still contain some grammatical/other typographical errors.

## 2023-02-23 NOTE — PLAN OF CARE
Problem: Respiratory - Adult  Goal: Achieves optimal ventilation and oxygenation  Outcome: Progressing  Flowsheets (Taken 2/23/2023 9542)  Achieves optimal ventilation and oxygenation:   Assess for changes in respiratory status   Assess for changes in mentation and behavior   Position to facilitate oxygenation and minimize respiratory effort   Encourage broncho-pulmonary hygiene including cough, deep breathe, incentive spirometry   Assess and instruct to report shortness of breath or any respiratory difficulty   Respiratory therapy support as indicated

## 2023-02-23 NOTE — PLAN OF CARE
Problem: Discharge Planning  Goal: Discharge to home or other facility with appropriate resources  Outcome: Progressing  Flowsheets (Taken 2/23/2023 0857)  Discharge to home or other facility with appropriate resources:   Identify barriers to discharge with patient and caregiver   Arrange for needed discharge resources and transportation as appropriate   Identify discharge learning needs (meds, wound care, etc)   Arrange for interpreters to assist at discharge as needed   Refer to discharge planning if patient needs post-hospital services based on physician order or complex needs related to functional status, cognitive ability or social support system     Problem: Skin/Tissue Integrity  Goal: Absence of new skin breakdown  Description: 1. Monitor for areas of redness and/or skin breakdown  2. Assess vascular access sites hourly  3. Every 4-6 hours minimum:  Change oxygen saturation probe site  4. Every 4-6 hours:  If on nasal continuous positive airway pressure, respiratory therapy assess nares and determine need for appliance change or resting period.   Outcome: Progressing     Problem: Safety - Adult  Goal: Free from fall injury  Outcome: Progressing  Flowsheets  Taken 2/23/2023 0857 by Marcia Beyer RN  Free From Fall Injury: Instruct family/caregiver on patient safety  Taken 2/23/2023 0422 by Odalis Frederick RN  Free From Fall Injury: Instruct family/caregiver on patient safety     Problem: Pain  Goal: Verbalizes/displays adequate comfort level or baseline comfort level  Outcome: Progressing  Flowsheets  Taken 2/23/2023 0420 by Odalis Frederick RN  Verbalizes/displays adequate comfort level or baseline comfort level:   Encourage patient to monitor pain and request assistance   Assess pain using appropriate pain scale   Administer analgesics based on type and severity of pain and evaluate response   Implement non-pharmacological measures as appropriate and evaluate response   Consider cultural and social influences on pain and pain management   Notify Licensed Independent Practitioner if interventions unsuccessful or patient reports new pain  Taken 2/23/2023 0011 by Romero Mcclure RN  Verbalizes/displays adequate comfort level or baseline comfort level:   Encourage patient to monitor pain and request assistance   Assess pain using appropriate pain scale   Administer analgesics based on type and severity of pain and evaluate response   Implement non-pharmacological measures as appropriate and evaluate response   Consider cultural and social influences on pain and pain management   Notify Licensed Independent Practitioner if interventions unsuccessful or patient reports new pain     Problem: Respiratory - Adult  Goal: Achieves optimal ventilation and oxygenation  2/23/2023 1059 by Kiki Stringer RN  Outcome: Progressing  2/23/2023 0842 by Best Rodriguez RCP  Outcome: Progressing  Flowsheets (Taken 2/23/2023 5814)  Achieves optimal ventilation and oxygenation:   Assess for changes in respiratory status   Assess for changes in mentation and behavior   Position to facilitate oxygenation and minimize respiratory effort   Encourage broncho-pulmonary hygiene including cough, deep breathe, incentive spirometry   Assess and instruct to report shortness of breath or any respiratory difficulty   Respiratory therapy support as indicated     Problem: ABCDS Injury Assessment  Goal: Absence of physical injury  Outcome: Progressing

## 2023-02-24 ENCOUNTER — APPOINTMENT (OUTPATIENT)
Dept: ULTRASOUND IMAGING | Age: 83
DRG: 872 | End: 2023-02-24
Payer: MEDICARE

## 2023-02-24 ENCOUNTER — APPOINTMENT (OUTPATIENT)
Dept: NON INVASIVE DIAGNOSTICS | Age: 83
DRG: 872 | End: 2023-02-24
Payer: MEDICARE

## 2023-02-24 LAB
ECHO AO ROOT DIAM: 4 CM
ECHO AO ROOT INDEX: 1.97 CM/M2
ECHO AR MAX VEL PISA: 4.9 M/S
ECHO AV AREA PEAK VELOCITY: 2.5 CM2
ECHO AV AREA VTI: 2.5 CM2
ECHO AV AREA/BSA PEAK VELOCITY: 1.2 CM2/M2
ECHO AV AREA/BSA VTI: 1.2 CM2/M2
ECHO AV MEAN GRADIENT: 7 MMHG
ECHO AV MEAN VELOCITY: 1.2 M/S
ECHO AV PEAK GRADIENT: 14 MMHG
ECHO AV PEAK VELOCITY: 1.9 M/S
ECHO AV REGURGITANT PHT: 386 MS
ECHO AV VELOCITY RATIO: 0.79
ECHO AV VTI: 36.7 CM
ECHO BSA: 2.11 M2
ECHO IVC PROX: 2.4 CM
ECHO LA DIAMETER INDEX: 2.91 CM/M2
ECHO LA DIAMETER: 5.9 CM
ECHO LA TO AORTIC ROOT RATIO: 1.48
ECHO LV E' LATERAL VELOCITY: 9 CM/S
ECHO LV E' SEPTAL VELOCITY: 7 CM/S
ECHO LV EDV A2C: 183 ML
ECHO LV EDV A4C: 204 ML
ECHO LV EDV INDEX A4C: 100 ML/M2
ECHO LV EDV NDEX A2C: 90 ML/M2
ECHO LV EJECTION FRACTION A2C: 58 %
ECHO LV EJECTION FRACTION A4C: 51 %
ECHO LV EJECTION FRACTION BIPLANE: 53 % (ref 55–100)
ECHO LV ESV A2C: 77 ML
ECHO LV ESV A4C: 101 ML
ECHO LV ESV INDEX A2C: 38 ML/M2
ECHO LV ESV INDEX A4C: 50 ML/M2
ECHO LV FRACTIONAL SHORTENING: 25 % (ref 28–44)
ECHO LV INTERNAL DIMENSION DIASTOLE INDEX: 2.51 CM/M2
ECHO LV INTERNAL DIMENSION DIASTOLIC: 5.1 CM (ref 4.2–5.9)
ECHO LV INTERNAL DIMENSION SYSTOLIC INDEX: 1.87 CM/M2
ECHO LV INTERNAL DIMENSION SYSTOLIC: 3.8 CM
ECHO LV IVSD: 1.4 CM (ref 0.6–1)
ECHO LV MASS 2D: 300.4 G (ref 88–224)
ECHO LV MASS INDEX 2D: 148 G/M2 (ref 49–115)
ECHO LV POSTERIOR WALL DIASTOLIC: 1.4 CM (ref 0.6–1)
ECHO LV RELATIVE WALL THICKNESS RATIO: 0.55
ECHO LVOT AREA: 3.1 CM2
ECHO LVOT AV VTI INDEX: 0.78
ECHO LVOT DIAM: 2 CM
ECHO LVOT MEAN GRADIENT: 4 MMHG
ECHO LVOT PEAK GRADIENT: 9 MMHG
ECHO LVOT PEAK VELOCITY: 1.5 M/S
ECHO LVOT STROKE VOLUME INDEX: 44.5 ML/M2
ECHO LVOT SV: 90.4 ML
ECHO LVOT VTI: 28.8 CM
ECHO MV A VELOCITY: 0.34 M/S
ECHO MV E DECELERATION TIME (DT): 194 MS
ECHO MV E VELOCITY: 1.38 M/S
ECHO MV E/A RATIO: 4.06
ECHO MV E/E' LATERAL: 15.33
ECHO MV E/E' RATIO (AVERAGED): 17.52
ECHO MV E/E' SEPTAL: 19.71
ECHO RV FREE WALL PEAK S': 12 CM/S
ECHO RV INTERNAL DIMENSION: 5.2 CM
ECHO TV REGURGITANT MAX VELOCITY: 2.02 M/S
ECHO TV REGURGITANT PEAK GRADIENT: 16 MMHG
LACTATE SERPL-SCNC: 2.6 MMOL/L (ref 0.4–2)
LV EF: 53 %
LVEF MODALITY: ABNORMAL
MM INDURATION, POC: 0 MM (ref 0–5)
PPD, POC: NEGATIVE

## 2023-02-24 PROCEDURE — C8924 2D TTE W OR W/O FOL W/CON,FU: HCPCS

## 2023-02-24 PROCEDURE — 76536 US EXAM OF HEAD AND NECK: CPT

## 2023-02-24 PROCEDURE — 6360000004 HC RX CONTRAST MEDICATION: Performed by: INTERNAL MEDICINE

## 2023-02-24 PROCEDURE — 93325 DOPPLER ECHO COLOR FLOW MAPG: CPT | Performed by: INTERNAL MEDICINE

## 2023-02-24 PROCEDURE — 94640 AIRWAY INHALATION TREATMENT: CPT

## 2023-02-24 PROCEDURE — 97116 GAIT TRAINING THERAPY: CPT

## 2023-02-24 PROCEDURE — 93321 DOPPLER ECHO F-UP/LMTD STD: CPT | Performed by: INTERNAL MEDICINE

## 2023-02-24 PROCEDURE — 6360000002 HC RX W HCPCS: Performed by: INTERNAL MEDICINE

## 2023-02-24 PROCEDURE — 93308 TTE F-UP OR LMTD: CPT | Performed by: INTERNAL MEDICINE

## 2023-02-24 PROCEDURE — 2580000003 HC RX 258: Performed by: INTERNAL MEDICINE

## 2023-02-24 PROCEDURE — 6370000000 HC RX 637 (ALT 250 FOR IP): Performed by: INTERNAL MEDICINE

## 2023-02-24 PROCEDURE — 83605 ASSAY OF LACTIC ACID: CPT

## 2023-02-24 PROCEDURE — 1100000003 HC PRIVATE W/ TELEMETRY

## 2023-02-24 PROCEDURE — 97161 PT EVAL LOW COMPLEX 20 MIN: CPT

## 2023-02-24 RX ORDER — LANOLIN ALCOHOL/MO/W.PET/CERES
3 CREAM (GRAM) TOPICAL NIGHTLY PRN
Status: DISCONTINUED | OUTPATIENT
Start: 2023-02-24 | End: 2023-02-26 | Stop reason: HOSPADM

## 2023-02-24 RX ORDER — METHYLPREDNISOLONE SODIUM SUCCINATE 40 MG/ML
40 INJECTION, POWDER, LYOPHILIZED, FOR SOLUTION INTRAMUSCULAR; INTRAVENOUS EVERY 24 HOURS
Status: DISCONTINUED | OUTPATIENT
Start: 2023-02-25 | End: 2023-02-26 | Stop reason: HOSPADM

## 2023-02-24 RX ADMIN — PANTOPRAZOLE SODIUM 40 MG: 40 TABLET, DELAYED RELEASE ORAL at 05:26

## 2023-02-24 RX ADMIN — APIXABAN 5 MG: 5 TABLET, FILM COATED ORAL at 20:13

## 2023-02-24 RX ADMIN — METOPROLOL SUCCINATE 25 MG: 25 TABLET, EXTENDED RELEASE ORAL at 08:56

## 2023-02-24 RX ADMIN — FAMOTIDINE 20 MG: 20 TABLET ORAL at 20:13

## 2023-02-24 RX ADMIN — SODIUM CHLORIDE, PRESERVATIVE FREE 0.45 ML: 5 INJECTION INTRAVENOUS at 15:32

## 2023-02-24 RX ADMIN — ATORVASTATIN CALCIUM 80 MG: 80 TABLET, FILM COATED ORAL at 20:13

## 2023-02-24 RX ADMIN — APIXABAN 5 MG: 5 TABLET, FILM COATED ORAL at 08:56

## 2023-02-24 RX ADMIN — IPRATROPIUM BROMIDE AND ALBUTEROL SULFATE 1 AMPULE: 2.5; .5 SOLUTION RESPIRATORY (INHALATION) at 15:09

## 2023-02-24 RX ADMIN — OSELTAMIVIR PHOSPHATE 75 MG: 75 CAPSULE ORAL at 08:56

## 2023-02-24 RX ADMIN — LOSARTAN POTASSIUM 50 MG: 50 TABLET, FILM COATED ORAL at 08:56

## 2023-02-24 RX ADMIN — OSELTAMIVIR PHOSPHATE 75 MG: 75 CAPSULE ORAL at 20:13

## 2023-02-24 RX ADMIN — SODIUM CHLORIDE, PRESERVATIVE FREE 10 ML: 5 INJECTION INTRAVENOUS at 20:13

## 2023-02-24 RX ADMIN — CEFTRIAXONE 1000 MG: 1 INJECTION, POWDER, FOR SOLUTION INTRAMUSCULAR; INTRAVENOUS at 17:18

## 2023-02-24 RX ADMIN — SODIUM CHLORIDE, PRESERVATIVE FREE 10 ML: 5 INJECTION INTRAVENOUS at 08:56

## 2023-02-24 RX ADMIN — FINASTERIDE 5 MG: 5 TABLET, FILM COATED ORAL at 08:56

## 2023-02-24 RX ADMIN — METHYLPREDNISOLONE SODIUM SUCCINATE 40 MG: 40 INJECTION, POWDER, FOR SOLUTION INTRAMUSCULAR; INTRAVENOUS at 05:26

## 2023-02-24 RX ADMIN — TAMSULOSIN HYDROCHLORIDE 0.4 MG: 0.4 CAPSULE ORAL at 08:56

## 2023-02-24 RX ADMIN — ROPINIROLE HYDROCHLORIDE 0.5 MG: 0.5 TABLET, FILM COATED ORAL at 20:13

## 2023-02-24 ASSESSMENT — PAIN SCALES - GENERAL
PAINLEVEL_OUTOF10: 0

## 2023-02-24 NOTE — PROGRESS NOTES
Hospitalist Progress Note   Admit Date:  2023  6:01 PM   Name:  Sheri Contreras   Age:  79 y.o. Sex:  male  :  1940   MRN:  [id number]   Room:  319/01    Presenting Complaint: No chief complaint on file. Reason(s) for Admission: Influenza with respiratory manifestation other than pneumonia [J11.1]  Sepsis [facility]) [A41.9]     Hospital Course: As per Prior Documentation:  \"  Sheri Contreras is a 79 y.o. 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 Who presented with complaint of near syncope, fever of 102, cough, dyspnea and wheezing. Patient wife reported to EMS that he collapsed after returning from the bathroom but did not have loss of consciousness. Patient denies chest pain, nausea, vomiting,melena, hematochezia. ER work-up notable for  BMP-benign  proBNP 1080  CBC-WBC 10K, hemoglobin 14, platelets 341  Influenza A+         1. Stable mild cardiomegaly. 2. Stable triple lead defibrillator/pacemaker. 3. Expiratory technique. No acute pulmonary process is identified. \"    Subjective & 24hr Events (23): Pt seen and evaluated. Feels Better  Afebrile  Denies  n/v/f/chills  Not wheezing today    Assessment & Plan:      # Sepsis (meets criteria with temperature 102.6, respirations 30)  - Suspect secondary to influenza A  - continue tamiflu  - supportive care - antiemetics, antipyretics, antitussives.    - continue Rocephin and steroids started in ER for audible wheezing/stridor;   - ct neck shows no obstruction - did show a thyroid nodule-ultrasound advise  - blood and sputum cultures-NGTD  - continue -limited 2decho for ef shows a normal EF  The- He is not on metformin based on the home med rec-  -However the persistently elevated lactic acid is concerning for this-pharmacy checked his records and no metformin on board      # near syncope  - suspect from above  - orthostatics  - monitor on telemetry     # Pafib  - cont eliquis  - rate controlled with toprol     # Myasthenia Gravis  - aware  - CAREFUL WITH NEW MEDICATIONS, including steroids and antibiotics  - continue steroids and continue to wean as tolerated -  stridor is chronic- per him - appears upper airway  -steroids have been implicated in MG crisis in some patients. Patient informed to notify us for increased dyspnea/ weakness/ increased work of breathing/ ptosis/ blurry vision or other s/s. # HTN  - resume toprol/ cozaar     # GERD  - PPI     # Chronic systolic CHF  - cont toprol, losartan   - euvolemic on exam     # SSS s/p PPM     # HLP  - statin     # BPH   - controlled on proscar           PT/OT evals and PPD needed/ordered? Yes    Diet:  ADULT DIET; Regular; Low Sodium (2 gm)  VTE prophylaxis:  Eliquis  Code status: Full Code    I spent 41 min in time, evaluating the patient, reviewing charts and labs and other results, discussions with caregivers, including RN and other providers and organizing his care. Home in the next 24 hours if stable    Hospital Problems:  Principal Problem:    Sepsis ([facility] [address]  Active Problems:    Colon cancer ([facility] [address]    Chronic systolic (congestive) heart failure    HTN (hypertension)    Myasthenia gravis ([address]    Dyslipidemia    Atrial fibrillation ([facility] [address]  Resolved Problems:    * No resolved hospital problems.  *      Objective:   Patient Vitals for the past 24 hrs:   Temp Pulse Resp BP SpO2   02/24/23 0834 98.2 °F (36.8 °C) 75 18 (!) 151/85 95 %   02/24/23 0433 97.3 °F (36.3 °C) 70 18 139/72 93 %   02/24/23 0008 97.2 °F (36.2 °C) 70 18 118/63 93 %   02/23/23 2045 -- 75 18 -- 96 %   02/23/23 2028 97.3 °F (36.3 °C) 79 18 (!) 144/80 96 %   02/23/23 1632 98.1 °F (36.7 °C) 75 18 (!) 145/78 96 %   02/23/23 1500 -- 75 18 -- 97 %   02/23/23 1204 97.5 °F (36.4 °C) 74 18 (!) 156/84 96 % Oxygen Therapy  SpO2: 95 %  Pulse via Oximetry: 70 beats per minute  Pulse Oximeter Device Mode: Intermittent  O2 Device: None (Room air)  Skin Assessment: Clean, dry, & intact  Skin Protection for O2 Device: No  O2 Flow Rate (L/min): 2 L/min    Estimated body mass index is 35.53 kg/m² as calculated from the following:    Height as of this encounter: 5' 5\" (1.651 m). Weight as of this encounter: 213 lb 8 oz (96.8 kg). Intake/Output Summary (Last 24 hours) at 2/24/2023 0938  Last data filed at 2/24/2023 0853  Gross per 24 hour   Intake 1120 ml   Output 0 ml   Net 1120 ml           Physical Exam:     Blood pressure (!) 151/85, pulse 75, temperature 98.2 °F (36.8 °C), temperature source Oral, resp. rate 18, height 5' 5\" (1.651 m), weight 213 lb 8 oz (96.8 kg), SpO2 95 %. General:    Well nourished. Audibly wheezing- upper airway- radiating into the lung fields  Head:  Normocephalic, atraumatic  Eyes:  Sclerae appear normal.  Pupils equally round. ENT:  Nares appear normal.  Moist oral mucosa  Neck:  No restricted ROM. Trachea midline   CV:   RRR. No m/r/g. No jugular venous distension. Lungs:   A/e =  bl with wheezing as above, no rhonchi, or rales. Symmetric expansion. Abdomen:   Soft, nontender, nondistended. Extremities: No cyanosis or clubbing. Mild pitting edema  Skin:     No rashes and normal coloration. Warm and dry. Neuro:  CN II-XII grossly intact. Psych:  Normal mood and affect.       I have personally reviewed labs and tests:  Recent Labs:  Recent Results (from the past 48 hour(s))   Lactic Acid    Collection Time: 02/22/23  9:44 AM   Result Value Ref Range    Lactic Acid, Plasma 4.3 (HH) 0.4 - 2.0 MMOL/L   Lactic Acid    Collection Time: 02/22/23  4:48 PM   Result Value Ref Range    Lactic Acid, Plasma 2.8 (H) 0.4 - 2.0 MMOL/L   Lactic Acid    Collection Time: 02/22/23  7:23 PM   Result Value Ref Range    Lactic Acid, Plasma 2.8 (H) 0.4 - 2.0 MMOL/L   Comprehensive Metabolic Panel w/ Reflex to MG    Collection Time: 02/23/23  2:18 AM   Result Value Ref Range    Sodium 138 133 - 143 mmol/L    Potassium 3.9 3.5 - 5.1 mmol/L    Chloride 108 101 - 110 mmol/L    CO2 26 21 - 32 mmol/L    Anion Gap 4 2 - 11 mmol/L    Glucose 182 (H) 65 - 100 mg/dL    BUN 18 8 - 23 MG/DL    Creatinine 0.80 0.8 - 1.5 MG/DL    Est, Glom Filt Rate >60 >60 ml/min/1.73m2    Calcium 8.5 8.3 - 10.4 MG/DL    Total Bilirubin 0.4 0.2 - 1.1 MG/DL    ALT 29 12 - 65 U/L    AST 27 15 - 37 U/L    Alk Phosphatase 72 50 - 136 U/L    Total Protein 6.3 6.3 - 8.2 g/dL    Albumin 2.9 (L) 3.2 - 4.6 g/dL    Globulin 3.4 2.8 - 4.5 g/dL    Albumin/Globulin Ratio 0.9 0.4 - 1.6     CBC with Auto Differential    Collection Time: 02/23/23  2:18 AM   Result Value Ref Range    WBC 14.8 (H) 4.3 - 11.1 K/uL    RBC 4.25 4.23 - 5.6 M/uL    Hemoglobin 12.8 (L) 13.6 - 17.2 g/dL    Hematocrit 40.5 (L) 41.1 - 50.3 %    MCV 95.3 82 - 102 FL    MCH 30.1 26.1 - 32.9 PG    MCHC 31.6 31.4 - 35.0 g/dL    RDW 13.8 11.9 - 14.6 %    Platelets 615 048 - 364 K/uL    MPV 9.0 (L) 9.4 - 12.3 FL    nRBC 0.00 0.0 - 0.2 K/uL    Differential Type AUTOMATED      Seg Neutrophils 86 (H) 43 - 78 %    Lymphocytes 7 (L) 13 - 44 %    Monocytes 6 4.0 - 12.0 %    Eosinophils % 0 (L) 0.5 - 7.8 %    Basophils 0 0.0 - 2.0 %    Immature Granulocytes 1 0.0 - 5.0 %    Segs Absolute 12.8 (H) 1.7 - 8.2 K/UL    Absolute Lymph # 1.0 0.5 - 4.6 K/UL    Absolute Mono # 0.9 0.1 - 1.3 K/UL    Absolute Eos # 0.0 0.0 - 0.8 K/UL    Basophils Absolute 0.0 0.0 - 0.2 K/UL    Absolute Immature Granulocyte 0.1 0.0 - 0.5 K/UL   Lactic Acid    Collection Time: 02/23/23  2:18 AM   Result Value Ref Range    Lactic Acid, Plasma 2.4 (H) 0.4 - 2.0 MMOL/L   Culture, Blood 1    Collection Time: 02/23/23  7:37 AM    Specimen: Blood   Result Value Ref Range    Special Requests RIGHT  HAND        Culture NO GROWTH AFTER 23 HOURS     Lactic Acid    Collection Time: 02/23/23  7:37 AM   Result Value Ref Range Lactic Acid, Plasma 2.4 (H) 0.4 - 2.0 MMOL/L   Culture, Blood 1    Collection Time: 02/23/23  7:43 AM    Specimen: Blood   Result Value Ref Range    Special Requests LEFT  HAND        Culture NO GROWTH AFTER 23 HOURS     Lactic Acid    Collection Time: 02/23/23  4:49 PM   Result Value Ref Range    Lactic Acid, Plasma 3.1 (H) 0.4 - 2.0 MMOL/L   Lactic Acid    Collection Time: 02/23/23  9:39 PM   Result Value Ref Range    Lactic Acid, Plasma 4.0 (HH) 0.4 - 2.0 MMOL/L   Lactic Acid    Collection Time: 02/24/23  6:04 AM   Result Value Ref Range    Lactic Acid, Plasma 2.6 (H) 0.4 - 2.0 MMOL/L       I have personally reviewed imaging studies:  Other Studies:  CT SOFT TISSUE NECK W CONTRAST   Final Result   1. The airway is patent and midline. No findings to suggest airway obstruction. 2. 1.7 cm heterogeneous thyroid nodule. This could be more completely evaluated   with thyroid ultrasound. XR CHEST PORTABLE   Final Result      1. Mild atelectasis without any additional acute abnormality in the chest.      2.  Stable cardiomegaly. This exam was interpreted by a board-certified, body-imaging fellowship trained    radiologist from [address] [place] [address]. If there are any questions regarding    this examination please feel free to contact a radiologist at [phone]. Slot 15       Ric Byers    2/22/2023 10:11:00 PM      XR CHEST PORTABLE   Final Result      1. Stable mild cardiomegaly. 2. Stable triple lead defibrillator/pacemaker. 3. Expiratory technique. No acute pulmonary process is identified.        Jami Felix M.D.    2/21/2023 6:50:00 PM      US THYROID    (Results Pending)       Current Meds:  Current Facility-Administered Medications   Medication Dose Route Frequency    [START ON 2/25/2023] methylPREDNISolone sodium (SOLU-MEDROL) injection 40 mg  40 mg IntraVENous Q24H    melatonin tablet 3 mg  3 mg Oral Nightly PRN    oseltamivir (TAMIFLU) capsule 75 mg  75 mg Oral BID    tuberculin injection 5 Units  5 Units IntraDERmal Once    apixaban (ELIQUIS) tablet 5 mg  5 mg Oral BID    atorvastatin (LIPITOR) tablet 80 mg  80 mg Oral Daily    famotidine (PEPCID) tablet 20 mg  20 mg Oral Daily    finasteride (PROSCAR) tablet 5 mg  5 mg Oral Daily    losartan (COZAAR) tablet 50 mg  50 mg Oral Daily    pantoprazole (PROTONIX) tablet 40 mg  40 mg Oral QAM AC    rOPINIRole (REQUIP) tablet 0.5 mg  0.5 mg Oral Nightly    tamsulosin (FLOMAX) capsule 0.4 mg  0.4 mg Oral Daily    metoprolol succinate (TOPROL XL) extended release tablet 25 mg  25 mg Oral Daily    cefTRIAXone (ROCEPHIN) 1,000 mg in sodium chloride 0.9 % 50 mL IVPB (mini-bag)  1,000 mg IntraVENous Q24H    sodium chloride flush 0.9 % injection 5-40 mL  5-40 mL IntraVENous 2 times per day    sodium chloride flush 0.9 % injection 5-40 mL  5-40 mL IntraVENous PRN    0.9 % sodium chloride infusion   IntraVENous PRN    ondansetron (ZOFRAN-ODT) disintegrating tablet 4 mg  4 mg Oral Q8H PRN    Or    ondansetron (ZOFRAN) injection 4 mg  4 mg IntraVENous Q6H PRN    polyethylene glycol (GLYCOLAX) packet 17 g  17 g Oral Daily PRN    acetaminophen (TYLENOL) tablet 650 mg  650 mg Oral Q6H PRN    Or    acetaminophen (TYLENOL) suppository 650 mg  650 mg Rectal Q6H PRN    ipratropium-albuterol (DUONEB) nebulizer solution 1 ampule  1 ampule Inhalation Q6H WA       Signed:  Efra Clarke MD    Part of this note may have been written by using a voice dictation software. The note has been proof read but may still contain some grammatical/other typographical errors.

## 2023-02-24 NOTE — PLAN OF CARE
Attempted to put chair alarm under patient in chair, patient refused. Explained that if patient gets up and falls that we will not be responsible. Patient did come in after a fall. Patient is alert and oriented and able to make his own decisions. Told patient to call if he needs to get up and go to the bathroom and patient agreed. Call light in reach.

## 2023-02-24 NOTE — PROGRESS NOTES
ACUTE PHYSICAL THERAPY GOALS:   (Developed with and agreed upon by patient and/or caregiver.)       (1.) Gurpreet Velasquez will transfer from bed to chair and chair to bed with INDEPENDENT using the least restrictive device within 7 treatment day(s). (2.) Gurpreet Velasquez will ambulate with INDEPENDENT for 750 feet with the least restrictive device within 7 treatment day(s). (3.) Gurpreet Velasquez will perform standing static and dynamic balance activities x 20 minutes with INDEPENDENT to improve safety within 7 treatment day(s). (4.) Gurpreet Velasquez will perform bilateral lower extremity exercises x 20 min for HEP with INDEPENDENT to improve strength, endurance, and functional mobility within 7 treatment day(s). PHYSICAL THERAPY Initial Assessment, Daily Note, and AM  (Link to Caseload Tracking: PT Visit Days : 1  Acknowledge Orders  Time In/Out  PT Charge Capture  Rehab Caseload Tracker    Gurpreet Velasquez is a 79 y.o. male   PRIMARY DIAGNOSIS: Sepsis ([facility] [address]  Influenza with respiratory manifestation other than pneumonia [J11.1]  Sepsis ([facility] [address] [A41.9]       Reason for Referral: Generalized Muscle Weakness (M62.81)  Inpatient: Payor: Ibeth Logan / Plan: MEDICARE PART A AND B / Product Type: *No Product type* /     ASSESSMENT:     REHAB RECOMMENDATIONS:   Recommendation to date pending progress:  Setting:  Home Health Therapy pending medical course    Equipment:    None     ASSESSMENT:  Mr. Waldo Way is a 79year old male who presents sitting up in chair, agreeable to PT. This date pt performs mobility including ambulation without ' with CGA for safety, decreased endurance. Pt able to perform sit <> stand from chair with CGA for safety. Pt presents as functioning below his baseline, with deficits in mobility including transfers, gait, balance, and activity tolerance.  Pt will benefit from skilled therapy services to address stated deficits to promote return to highest level of function, independence, and safety. Will continue to follow. [facility] AM-PAC 6 Clicks Basic Mobility Inpatient Short Form  AM-PAC Basic Mobility - Inpatient   How much help is needed turning from your back to your side while in a flat bed without using bedrails?: None  How much help is needed moving from lying on your back to sitting on the side of a flat bed without using bedrails?: A Little  How much help is needed moving to and from a bed to a chair?: A Little  How much help is needed standing up from a chair using your arms?: A Little  How much help is needed walking in hospital room?: A Little  How much help is needed climbing 3-5 steps with a railing?: A Little  AM-Merged with [facility] Inpatient Mobility Raw Score : 19  AM-PAC Inpatient T-Scale Score : 45.44  Mobility Inpatient CMS 0-100% Score: 41.77  Mobility Inpatient CMS G-Code Modifier : CK    SUBJECTIVE:   Mr. 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 OBJECTIVE:     PAIN: VITALS / O2: PRECAUTION / Lacie Ramp / DRAINS:   Pre Treatment:   Pain Assessment: None - Denies Pain  Pain Level: 0      Post Treatment: 0/10 Vitals        Oxygen   RA 97% pre and post treatment   None    RESTRICTIONS/PRECAUTIONS:  Restrictions/Precautions:  Other (comment), Fall Risk (Droplet precautions)                 GROSS EVALUATION: Intact Impaired (Comments):   AROM []     PROM []    Strength []  [facility]   Balance [] Sitting - Static: Fair, +  Sitting - Dynamic: Fair, +  Standing - Static: Fair  Standing - Dynamic: Fair   Posture [] N/A   Sensation []     Coordination []      Tone []     Edema []    Activity Tolerance [] Patient limited by fatigue    []      COGNITION/  PERCEPTION: Intact Impaired (Comments):   Orientation [x]     Vision [x]     Hearing [x]     Cognition  [x]       MOBILITY: I Mod I S SBA CGA Min Mod Max Total  NT x2 Comments:   Bed Mobility    Rolling [] [] [] [] [] [] [] [] [] [x] []    Supine to Sit [] [] [] [] [] [] [] [] [] [x] []    Scooting [] [] [] [] [] [] [] [] [] [x] []    Sit to Supine [] [] [] [] [] [] [] [] [] [x] []    Transfers    Sit to Stand [] [] [] [] [x] [] [] [] [] [] []    Bed to Chair [] [] [] [] [] [] [] [] [] [x] []    Stand to Sit [] [] [] [] [x] [] [] [] [] [] []     [] [] [] [] [] [] [] [] [] [] []    I=Independent, Mod I=Modified Independent, S=Supervision, SBA=Standby Assistance, CGA=Contact Guard Assistance,   Min=Minimal Assistance, Mod=Moderate Assistance, Max=Maximal Assistance, Total=Total Assistance, NT=Not Tested    GAIT: I Mod I S SBA CGA Min Mod Max Total  NT x2 Comments:   Level of Assistance [] [] [] [] [x] [] [] [] [] [] []    Distance 250 feet    DME None    Gait Quality Decreased step length    Weightbearing Status Restrictions/Precautions  Restrictions/Precautions:  Other (comment), Fall Risk (Droplet precautions)    Stairs      I=Independent, Mod I=Modified Independent, S=Supervision, SBA=Standby Assistance, CGA=Contact Guard Assistance,   Min=Minimal Assistance, Mod=Moderate Assistance, Max=Maximal Assistance, Total=Total Assistance, NT=Not Tested    PLAN:   FREQUENCY AND DURATION: 3 times/week for duration of hospital stay or until stated goals are met, whichever comes first.    THERAPY PROGNOSIS: Good    PROBLEM LIST:   (Skilled intervention is medically necessary to address:)  Decreased ADL/Functional Activities  Decreased Activity Tolerance  Decreased Balance  Decreased Gait Ability  Decreased Transfer Abilities INTERVENTIONS PLANNED:   (Benefits and precautions of physical therapy have been discussed with the patient.)  Self Care Training  Therapeutic Activity  Therapeutic Exercise/HEP  Neuromuscular Re-education  Gait Training  Education       TREATMENT:   EVALUATION: LOW COMPLEXITY: (Untimed Charge)    TREATMENT:   Gait Training (10 Minutes): Gait training for 250 feet utilizing None. Patient required Manual and Verbal cueing to improve Dynamic Standing Balance and Gait Mechanics. TREATMENT GRID:  N/A    AFTER TREATMENT PRECAUTIONS: Bed/Chair Locked, Call light within reach, Chair, Needs within reach, and RN notified    INTERDISCIPLINARY COLLABORATION:  RN/ PCT    EDUCATION: Education Given To: Patient  Education Provided: Role of Therapy;Plan of Care;Precautions;Transfer Training; Fall Prevention Strategies; Equipment  Education Method: Verbal  Barriers to Learning: None  Education Outcome: Verbalized understanding    TIME IN/OUT:  Time In: 1414  Time Out: 1430  Minutes: DELIO Matthews, PT

## 2023-02-24 NOTE — PROGRESS NOTES
Occupational Therapy Note:    Attempted to see patient this AM for occupational therapy evaluation session. Patient SUPRIYA in [address] [facility]. Will follow and re-attempt as schedule permits/patient available.     Thank you,    Astrid Patterson, OT    Rehab Caseload Tracker

## 2023-02-24 NOTE — CARE COORDINATION
LOS 3D  CM reviewed clinical record and patient's status discussed in IDR. Patient seen making his own bed this AM. Patient on room air. US Thyroid completed today after recommendation from CT. Needle aspiration of thyroid nodule recommended. PT initial visit with patient recommending home health therapy pending medical course. OT unable to see patient today as patient off the floor. CM following for discharge needs.

## 2023-02-25 LAB
ANION GAP SERPL CALC-SCNC: 0 MMOL/L (ref 2–11)
BASOPHILS # BLD: 0 K/UL (ref 0–0.2)
BASOPHILS NFR BLD: 0 % (ref 0–2)
BUN SERPL-MCNC: 18 MG/DL (ref 8–23)
CALCIUM SERPL-MCNC: 8.8 MG/DL (ref 8.3–10.4)
CHLORIDE SERPL-SCNC: 108 MMOL/L (ref 101–110)
CO2 SERPL-SCNC: 28 MMOL/L (ref 21–32)
CREAT SERPL-MCNC: 0.7 MG/DL (ref 0.8–1.5)
DIFFERENTIAL METHOD BLD: ABNORMAL
EOSINOPHIL # BLD: 0 K/UL (ref 0–0.8)
EOSINOPHIL NFR BLD: 0 % (ref 0.5–7.8)
ERYTHROCYTE [DISTWIDTH] IN BLOOD BY AUTOMATED COUNT: 13.9 % (ref 11.9–14.6)
GLUCOSE SERPL-MCNC: 123 MG/DL (ref 65–100)
HCT VFR BLD AUTO: 40.2 % (ref 41.1–50.3)
HGB BLD-MCNC: 12.9 G/DL (ref 13.6–17.2)
IMM GRANULOCYTES # BLD AUTO: 0.1 K/UL (ref 0–0.5)
IMM GRANULOCYTES NFR BLD AUTO: 1 % (ref 0–5)
LYMPHOCYTES # BLD: 2.3 K/UL (ref 0.5–4.6)
LYMPHOCYTES NFR BLD: 23 % (ref 13–44)
MCH RBC QN AUTO: 30.5 PG (ref 26.1–32.9)
MCHC RBC AUTO-ENTMCNC: 32.1 G/DL (ref 31.4–35)
MCV RBC AUTO: 95 FL (ref 82–102)
MM INDURATION, POC: 0 MM (ref 0–5)
MONOCYTES # BLD: 0.8 K/UL (ref 0.1–1.3)
MONOCYTES NFR BLD: 8 % (ref 4–12)
NEUTS SEG # BLD: 6.8 K/UL (ref 1.7–8.2)
NEUTS SEG NFR BLD: 68 % (ref 43–78)
NRBC # BLD: 0 K/UL (ref 0–0.2)
NT PRO BNP: 998 PG/ML
PLATELET # BLD AUTO: 172 K/UL (ref 150–450)
PMV BLD AUTO: 9.4 FL (ref 9.4–12.3)
POTASSIUM SERPL-SCNC: 5 MMOL/L (ref 3.5–5.1)
PPD, POC: NEGATIVE
RBC # BLD AUTO: 4.23 M/UL (ref 4.23–5.6)
SODIUM SERPL-SCNC: 136 MMOL/L (ref 133–143)
WBC # BLD AUTO: 10 K/UL (ref 4.3–11.1)

## 2023-02-25 PROCEDURE — 6370000000 HC RX 637 (ALT 250 FOR IP): Performed by: INTERNAL MEDICINE

## 2023-02-25 PROCEDURE — 6360000002 HC RX W HCPCS: Performed by: INTERNAL MEDICINE

## 2023-02-25 PROCEDURE — 83880 ASSAY OF NATRIURETIC PEPTIDE: CPT

## 2023-02-25 PROCEDURE — 85025 COMPLETE CBC W/AUTO DIFF WBC: CPT

## 2023-02-25 PROCEDURE — 1100000003 HC PRIVATE W/ TELEMETRY

## 2023-02-25 PROCEDURE — 2580000003 HC RX 258: Performed by: INTERNAL MEDICINE

## 2023-02-25 PROCEDURE — 36415 COLL VENOUS BLD VENIPUNCTURE: CPT

## 2023-02-25 PROCEDURE — 80048 BASIC METABOLIC PNL TOTAL CA: CPT

## 2023-02-25 RX ORDER — OSELTAMIVIR PHOSPHATE 75 MG/1
75 CAPSULE ORAL 2 TIMES DAILY
Qty: 2 CAPSULE | Refills: 0 | Status: CANCELLED | OUTPATIENT
Start: 2023-02-25 | End: 2023-02-26

## 2023-02-25 RX ORDER — HYDROCODONE BITARTRATE AND HOMATROPINE METHYLBROMIDE ORAL SOLUTION 5; 1.5 MG/5ML; MG/5ML
5 LIQUID ORAL EVERY 4 HOURS PRN
Status: DISCONTINUED | OUTPATIENT
Start: 2023-02-25 | End: 2023-02-26 | Stop reason: HOSPADM

## 2023-02-25 RX ADMIN — ATORVASTATIN CALCIUM 80 MG: 80 TABLET, FILM COATED ORAL at 20:50

## 2023-02-25 RX ADMIN — METHYLPREDNISOLONE SODIUM SUCCINATE 40 MG: 40 INJECTION, POWDER, FOR SOLUTION INTRAMUSCULAR; INTRAVENOUS at 05:15

## 2023-02-25 RX ADMIN — HYDROCODONE BITARTRATE AND HOMATROPINE METHYLBROMIDE 5 ML: 5; 1.5 SOLUTION ORAL at 17:10

## 2023-02-25 RX ADMIN — HYDROCODONE BITARTRATE AND HOMATROPINE METHYLBROMIDE 5 ML: 5; 1.5 SOLUTION ORAL at 20:50

## 2023-02-25 RX ADMIN — APIXABAN 5 MG: 5 TABLET, FILM COATED ORAL at 20:50

## 2023-02-25 RX ADMIN — SODIUM CHLORIDE, PRESERVATIVE FREE 10 ML: 5 INJECTION INTRAVENOUS at 08:09

## 2023-02-25 RX ADMIN — METOPROLOL SUCCINATE 25 MG: 25 TABLET, EXTENDED RELEASE ORAL at 08:09

## 2023-02-25 RX ADMIN — FAMOTIDINE 20 MG: 20 TABLET ORAL at 20:50

## 2023-02-25 RX ADMIN — HYDROCODONE BITARTRATE AND HOMATROPINE METHYLBROMIDE 5 ML: 5; 1.5 SOLUTION ORAL at 11:53

## 2023-02-25 RX ADMIN — APIXABAN 5 MG: 5 TABLET, FILM COATED ORAL at 08:08

## 2023-02-25 RX ADMIN — PANTOPRAZOLE SODIUM 40 MG: 40 TABLET, DELAYED RELEASE ORAL at 05:15

## 2023-02-25 RX ADMIN — OSELTAMIVIR PHOSPHATE 75 MG: 75 CAPSULE ORAL at 20:50

## 2023-02-25 RX ADMIN — CEFTRIAXONE 1000 MG: 1 INJECTION, POWDER, FOR SOLUTION INTRAMUSCULAR; INTRAVENOUS at 17:04

## 2023-02-25 RX ADMIN — SODIUM CHLORIDE, PRESERVATIVE FREE 10 ML: 5 INJECTION INTRAVENOUS at 20:50

## 2023-02-25 RX ADMIN — LOSARTAN POTASSIUM 50 MG: 50 TABLET, FILM COATED ORAL at 08:09

## 2023-02-25 RX ADMIN — OSELTAMIVIR PHOSPHATE 75 MG: 75 CAPSULE ORAL at 08:09

## 2023-02-25 RX ADMIN — ROPINIROLE HYDROCHLORIDE 0.5 MG: 0.5 TABLET, FILM COATED ORAL at 20:50

## 2023-02-25 RX ADMIN — FINASTERIDE 5 MG: 5 TABLET, FILM COATED ORAL at 08:08

## 2023-02-25 RX ADMIN — TAMSULOSIN HYDROCHLORIDE 0.4 MG: 0.4 CAPSULE ORAL at 08:09

## 2023-02-25 ASSESSMENT — PAIN SCALES - GENERAL
PAINLEVEL_OUTOF10: 0

## 2023-02-25 NOTE — PROGRESS NOTES
Reviewed notes for new spiritual concerns      Will continue to assess how we can best serve this family        [place].       Per notes:       Sikh    BIRTHDAY  02[birth year]21    LOCAL    WIFE RENETTA    HIGH RISK FALLS    LOS  4  DAYS

## 2023-02-25 NOTE — PROGRESS NOTES
Hospitalist Progress Note   Admit Date:  2023  6:01 PM   Name:  Sheri Contreras   Age:  79 y.o. Sex:  male  :  1940   MRN:  [id number]   Room:  319/01    Presenting Complaint: No chief complaint on file. Reason(s) for Admission: Influenza with respiratory manifestation other than pneumonia [J11.1]  Sepsis [facility]) [A41.9]     Hospital Course: As per Prior Documentation:  \"  Sheri Contreras is a 79 y.o. 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 Who presented with complaint of near syncope, fever of 102, cough, dyspnea and wheezing. Patient wife reported to EMS that he collapsed after returning from the bathroom but did not have loss of consciousness. Patient denies chest pain, nausea, vomiting,melena, hematochezia. ER work-up notable for  BMP-benign  proBNP 1080  CBC-WBC 10K, hemoglobin 14, platelets 488  Influenza A+         1. Stable mild cardiomegaly. 2. Stable triple lead defibrillator/pacemaker. 3. Expiratory technique. No acute pulmonary process is identified. \"    Subjective & 24hr Events (23): Pt seen and evaluated. Complains of cough and feeling weak  Afebrile  Denies  n/v/f/chills  Not wheezing today-his lungs are clear with good air entry bilaterally    Assessment & Plan:      # Sepsis (meets criteria with temperature 102.6, respirations 30)  - Suspect secondary to influenza A  - continue tamiflu  - supportive care - antiemetics, antipyretics, antitussives.    - continue Rocephin and steroids started in ER for audible wheezing/stridor -currently resolved  - ct neck shows no obstruction - did show a thyroid nodule-ultrasound advise  - blood and sputum cultures-NGTD  - continue -limited 2decho for ef shows a normal EF  - He is not on metformin based on his home med rec-  -Also discussed with pharmacy and he is not on this medication.  -Differential diagnosis would be his previously diagnosed solid cecal cancer-discussed with hematology oncology today and they will get him in for early follow-up with Dr. Vandana Kinsey. # near syncope  - suspect from above  - orthostatics  - monitor on telemetry     # Pafib  - cont eliquis  - rate controlled with toprol     # Myasthenia Gravis  - aware  - CAREFUL WITH NEW MEDICATIONS, including steroids and antibiotics  - continue steroids and continue to wean as tolerated -  stridor is chronic- per him - appears upper airway-at this time is currently resolved  -steroids have been implicated in MG crisis in some patients. Patient informed to notify us for increased dyspnea/ weakness/ increased work of breathing/ ptosis/ blurry vision or other s/s. -Given his complaints of weakness with the cough-we will start checking his NIF aka MIP       # HTN  - resume toprol/ cozaar     # GERD  - PPI     # Chronic systolic CHF  - cont toprol, losartan   - euvolemic on exam     # SSS s/p PPM     # HLP  - statin     # BPH   - controlled on proscar           PT/OT evals and PPD needed/ordered? Yes    Diet:  ADULT DIET; Regular; Low Sodium (2 gm)  VTE prophylaxis:  Eliquis  Code status: Full Code    I spent 35 min in time, evaluating the patient, reviewing charts and labs and other results, discussions with caregivers, including RN and other providers and organizing his care. Home in the next 24 hours if stable    Hospital Problems:  Principal Problem:    Sepsis ([address]  Active Problems:    Colon cancer ([address]    Chronic systolic (congestive) heart failure    HTN (hypertension)    Myasthenia gravis ([address]    Dyslipidemia    Atrial fibrillation ([address]  Resolved Problems:    * No resolved hospital problems.  *      Objective:   Patient Vitals for the past 24 hrs:   Temp Pulse Resp BP SpO2   02/25/23 1121 98.4 °F (36.9 °C) 75 14 (!) 147/86 94 % 02/25/23 0734 98.1 °F (36.7 °C) 75 18 (!) 144/82 94 %   02/25/23 0517 97.4 °F (36.3 °C) 70 17 (!) 143/81 96 %   02/25/23 0003 97.4 °F (36.3 °C) 69 17 131/71 96 %   02/24/23 2010 97.3 °F (36.3 °C) 75 17 (!) 143/86 95 %   02/24/23 1613 98.2 °F (36.8 °C) 75 17 (!) 146/82 95 %   02/24/23 1510 -- 80 18 -- 93 %         Oxygen Therapy  SpO2: 94 %  Pulse via Oximetry: 70 beats per minute  Pulse Oximeter Device Mode: Intermittent  O2 Device: None (Room air)  Skin Assessment: Clean, dry, & intact  Skin Protection for O2 Device: No  O2 Flow Rate (L/min): 2 L/min    Estimated body mass index is 35.53 kg/m² as calculated from the following:    Height as of this encounter: 5' 5\" (1.651 m). Weight as of this encounter: 213 lb 8 oz (96.8 kg). Intake/Output Summary (Last 24 hours) at 2/25/2023 1508  Last data filed at 2/25/2023 1210  Gross per 24 hour   Intake 960 ml   Output 0 ml   Net 960 ml           Physical Exam:     Blood pressure (!) 147/86, pulse 75, temperature 98.4 °F (36.9 °C), temperature source Oral, resp. rate 14, height 5' 5\" (1.651 m), weight 213 lb 8 oz (96.8 kg), SpO2 94 %. General:    Well nourished. Audibly wheezing- upper airway- radiating into the lung fields  Head:  Normocephalic, atraumatic  Eyes:  Sclerae appear normal.  Pupils equally round. ENT:  Nares appear normal.  Moist oral mucosa  Neck:  No restricted ROM. Trachea midline   CV:   RRR. No m/r/g. No jugular venous distension. Lungs:   A/e =  bl with wheezing as above, no rhonchi, or rales. Symmetric expansion. Abdomen:   Soft, nontender, nondistended. Extremities: No cyanosis or clubbing. Mild pitting edema  Skin:     No rashes and normal coloration. Warm and dry. Neuro:  CN II-XII grossly intact. Psych:  Normal mood and affect.       I have personally reviewed labs and tests:  Recent Labs:  Recent Results (from the past 48 hour(s))   Lactic Acid    Collection Time: 02/23/23  4:49 PM   Result Value Ref Range    Lactic Acid, Plasma 3.1 (H) 0.4 - 2.0 MMOL/L   Lactic Acid    Collection Time: 02/23/23  9:39 PM   Result Value Ref Range    Lactic Acid, Plasma 4.0 (HH) 0.4 - 2.0 MMOL/L   PLEASE READ & DOCUMENT PPD TEST IN 24 HRS    Collection Time: 02/24/23 12:00 AM   Result Value Ref Range    PPD, (POC) Negative Negative    mm Induration 0 0 - 5 mm   Lactic Acid    Collection Time: 02/24/23  6:04 AM   Result Value Ref Range    Lactic Acid, Plasma 2.6 (H) 0.4 - 2.0 MMOL/L   Transthoracic echocardiogram (TTE) limited with contrast, bubble, strain, and 3D PRN    Collection Time: 02/24/23  3:38 PM   Result Value Ref Range    LV EDV A2C 183 mL    LV EDV A4C 204 mL    LV ESV A2C 77 mL    LV ESV A4C 101 mL    IVSd 1.4 (A) 0.6 - 1.0 cm    LVIDd 5.1 4.2 - 5.9 cm    LVIDs 3.8 cm    LVOT Diameter 2.0 cm    LVOT Mean Gradient 4 mmHg    LVOT VTI 28.8 cm    LVOT Peak Velocity 1.5 m/s    LVOT Peak Gradient 9 mmHg    LVPWd 1.4 (A) 0.6 - 1.0 cm    LV E' Lateral Velocity 9 cm/s    LV E' Septal Velocity 7 cm/s    LV Ejection Fraction A2C 58 %    LV Ejection Fraction A4C 51 %    EF BP 53 (A) 55 - 100 %    LVOT Area 3.1 cm2    LVOT SV 90.4 ml    AR .0 ms    AR Max Velocity PISA 4.9 m/s    AV Peak Velocity 1.9 m/s    AV Peak Gradient 14 mmHg    AV Mean Velocity 1.2 m/s    AV Mean Gradient 7 mmHg    AV VTI 36.7 cm    AV Area by VTI 2.5 cm2    AV Area by Peak Velocity 2.5 cm2    Aortic Root 4.0 cm    IVC Proxmal 2.4 cm    LA Diameter 5.9 cm    MV E Wave Deceleration Time 194.0 ms    MV A Velocity 0.34 m/s    MV E Velocity 1.38 m/s    RVIDd 5.2 cm    RV Free Wall Peak S' 12 cm/s    TR Max Velocity 2.02 m/s    TR Peak Gradient 16 mmHg    Body Surface Area 2.11 m2    Fractional Shortening 2D 25 28 - 44 %    LV ESV Index A4C 50 mL/m2    LV EDV Index A4C 100 mL/m2    LV ESV Index A2C 38 mL/m2    LV EDV Index A2C 90 mL/m2    LVIDd Index 2.51 cm/m2    LVIDs Index 1.87 cm/m2    LV RWT Ratio 0.55     LV Mass 2D 300.4 (A) 88 - 224 g    LV Mass 2D Index 148.0 (A) 49 - 115 g/m2    MV E/A 4.06     E/E' Ratio (Averaged) 17.52     E/E' Lateral 15.33     E/E' Septal 19.71     LVOT Stroke Volume Index 44.5 mL/m2    LA Size Index 2.91 cm/m2    LA/AO Root Ratio 1.48     Ao Root Index 1.97 cm/m2    AV Velocity Ratio 0.79     LVOT:AV VTI Index 0.78     TRAM/BSA VTI 1.2 cm2/m2    TRAM/BSA Peak Velocity 1.2 cm2/m2    Left Ventricular Ejection Fraction 53     LVEF MODALITY ECHO    CBC with Auto Differential    Collection Time: 02/25/23  5:11 AM   Result Value Ref Range    WBC 10.0 4.3 - 11.1 K/uL    RBC 4.23 4.23 - 5.6 M/uL    Hemoglobin 12.9 (L) 13.6 - 17.2 g/dL    Hematocrit 40.2 (L) 41.1 - 50.3 %    MCV 95.0 82 - 102 FL    MCH 30.5 26.1 - 32.9 PG    MCHC 32.1 31.4 - 35.0 g/dL    RDW 13.9 11.9 - 14.6 %    Platelets 219 182 - 815 K/uL    MPV 9.4 9.4 - 12.3 FL    nRBC 0.00 0.0 - 0.2 K/uL    Differential Type AUTOMATED      Seg Neutrophils 68 43 - 78 %    Lymphocytes 23 13 - 44 %    Monocytes 8 4.0 - 12.0 %    Eosinophils % 0 (L) 0.5 - 7.8 %    Basophils 0 0.0 - 2.0 %    Immature Granulocytes 1 0.0 - 5.0 %    Segs Absolute 6.8 1.7 - 8.2 K/UL    Absolute Lymph # 2.3 0.5 - 4.6 K/UL    Absolute Mono # 0.8 0.1 - 1.3 K/UL    Absolute Eos # 0.0 0.0 - 0.8 K/UL    Basophils Absolute 0.0 0.0 - 0.2 K/UL    Absolute Immature Granulocyte 0.1 0.0 - 0.5 K/UL   Basic Metabolic Panel w/ Reflex to MG    Collection Time: 02/25/23  5:11 AM   Result Value Ref Range    Sodium 136 133 - 143 mmol/L    Potassium 5.0 3.5 - 5.1 mmol/L    Chloride 108 101 - 110 mmol/L    CO2 28 21 - 32 mmol/L    Anion Gap 0 (L) 2 - 11 mmol/L    Glucose 123 (H) 65 - 100 mg/dL    BUN 18 8 - 23 MG/DL    Creatinine 0.70 (L) 0.8 - 1.5 MG/DL    Est, Glom Filt Rate >60 >60 ml/min/1.73m2    Calcium 8.8 8.3 - 10.4 MG/DL   Brain Natriuretic Peptide    Collection Time: 02/25/23  5:11 AM   Result Value Ref Range    NT Pro- (H) <450 PG/ML       I have personally reviewed imaging studies:  Other Studies:  US THYROID   Final Result   1. 2.2 cm TR four nodule along the central isthmus. Recommend further evaluation   with fine-needle aspiration. DC4   The significant findings in this report have been referred to the Imaging   Navigator in order to communicate to the referring provider or his/her designee   as outlined in Section II.C.2.a.ii-iii of the ACR   Practice Guideline for Communication of Diagnostic Imaging Findings. CT SOFT TISSUE NECK W CONTRAST   Final Result   1. The airway is patent and midline. No findings to suggest airway obstruction. 2. 1.7 cm heterogeneous thyroid nodule. This could be more completely evaluated   with thyroid ultrasound. XR CHEST PORTABLE   Final Result      1. Mild atelectasis without any additional acute abnormality in the chest.      2.  Stable cardiomegaly. This exam was interpreted by a board-certified, body-imaging fellowship trained    radiologist from [address] [place] [address]. If there are any questions regarding    this examination please feel free to contact a radiologist at [phone]. Slot 15       Ethelene Fothergill    2/22/2023 10:11:00 PM      XR CHEST PORTABLE   Final Result      1. Stable mild cardiomegaly. 2. Stable triple lead defibrillator/pacemaker. 3. Expiratory technique. No acute pulmonary process is identified.        Juan Kendall M.D.    2/21/2023 6:50:00 PM          Current Meds:  Current Facility-Administered Medications   Medication Dose Route Frequency    HYDROcodone homatropine (HYCODAN) 5-1.5 MG/5ML solution 5 mL  5 mL Oral Q4H PRN    methylPREDNISolone sodium (SOLU-MEDROL) injection 40 mg  40 mg IntraVENous Q24H    melatonin tablet 3 mg  3 mg Oral Nightly PRN    oseltamivir (TAMIFLU) capsule 75 mg  75 mg Oral BID    apixaban (ELIQUIS) tablet 5 mg  5 mg Oral BID    atorvastatin (LIPITOR) tablet 80 mg  80 mg Oral Daily    famotidine (PEPCID) tablet 20 mg  20 mg Oral Daily    finasteride (PROSCAR) tablet 5 mg  5 mg Oral Daily    losartan (COZAAR) tablet 50 mg  50 mg Oral Daily    pantoprazole (PROTONIX) tablet 40 mg  40 mg Oral QAM AC    rOPINIRole (REQUIP) tablet 0.5 mg  0.5 mg Oral Nightly    tamsulosin (FLOMAX) capsule 0.4 mg  0.4 mg Oral Daily    metoprolol succinate (TOPROL XL) extended release tablet 25 mg  25 mg Oral Daily    cefTRIAXone (ROCEPHIN) 1,000 mg in sodium chloride 0.9 % 50 mL IVPB (mini-bag)  1,000 mg IntraVENous Q24H    sodium chloride flush 0.9 % injection 5-40 mL  5-40 mL IntraVENous 2 times per day    sodium chloride flush 0.9 % injection 5-40 mL  5-40 mL IntraVENous PRN    0.9 % sodium chloride infusion   IntraVENous PRN    ondansetron (ZOFRAN-ODT) disintegrating tablet 4 mg  4 mg Oral Q8H PRN    Or    ondansetron (ZOFRAN) injection 4 mg  4 mg IntraVENous Q6H PRN    polyethylene glycol (GLYCOLAX) packet 17 g  17 g Oral Daily PRN    acetaminophen (TYLENOL) tablet 650 mg  650 mg Oral Q6H PRN    Or    acetaminophen (TYLENOL) suppository 650 mg  650 mg Rectal Q6H PRN       Signed:  Davey Penny MD    Part of this note may have been written by using a voice dictation software. The note has been proof read but may still contain some grammatical/other typographical errors.

## 2023-02-26 VITALS
DIASTOLIC BLOOD PRESSURE: 71 MMHG | RESPIRATION RATE: 18 BRPM | BODY MASS INDEX: 35.57 KG/M2 | HEIGHT: 65 IN | TEMPERATURE: 98 F | SYSTOLIC BLOOD PRESSURE: 137 MMHG | OXYGEN SATURATION: 95 % | WEIGHT: 213.5 LBS | HEART RATE: 75 BPM

## 2023-02-26 LAB
ANION GAP SERPL CALC-SCNC: 1 MMOL/L (ref 2–11)
BACTERIA SPEC CULT: NORMAL
BASOPHILS # BLD: 0 K/UL (ref 0–0.2)
BASOPHILS NFR BLD: 0 % (ref 0–2)
BUN SERPL-MCNC: 17 MG/DL (ref 8–23)
CALCIUM SERPL-MCNC: 8.6 MG/DL (ref 8.3–10.4)
CHLORIDE SERPL-SCNC: 106 MMOL/L (ref 101–110)
CO2 SERPL-SCNC: 32 MMOL/L (ref 21–32)
CREAT SERPL-MCNC: 0.7 MG/DL (ref 0.8–1.5)
DIFFERENTIAL METHOD BLD: ABNORMAL
EOSINOPHIL # BLD: 0 K/UL (ref 0–0.8)
EOSINOPHIL NFR BLD: 0 % (ref 0.5–7.8)
ERYTHROCYTE [DISTWIDTH] IN BLOOD BY AUTOMATED COUNT: 13.6 % (ref 11.9–14.6)
GLUCOSE SERPL-MCNC: 114 MG/DL (ref 65–100)
HCT VFR BLD AUTO: 40.4 % (ref 41.1–50.3)
HGB BLD-MCNC: 13.1 G/DL (ref 13.6–17.2)
IMM GRANULOCYTES # BLD AUTO: 0.2 K/UL (ref 0–0.5)
IMM GRANULOCYTES NFR BLD AUTO: 2 % (ref 0–5)
LYMPHOCYTES # BLD: 2.8 K/UL (ref 0.5–4.6)
LYMPHOCYTES NFR BLD: 28 % (ref 13–44)
MCH RBC QN AUTO: 30.6 PG (ref 26.1–32.9)
MCHC RBC AUTO-ENTMCNC: 32.4 G/DL (ref 31.4–35)
MCV RBC AUTO: 94.4 FL (ref 82–102)
MONOCYTES # BLD: 0.7 K/UL (ref 0.1–1.3)
MONOCYTES NFR BLD: 7 % (ref 4–12)
NEUTS SEG # BLD: 6.4 K/UL (ref 1.7–8.2)
NEUTS SEG NFR BLD: 63 % (ref 43–78)
NRBC # BLD: 0 K/UL (ref 0–0.2)
PLATELET # BLD AUTO: 187 K/UL (ref 150–450)
PMV BLD AUTO: 9.7 FL (ref 9.4–12.3)
POTASSIUM SERPL-SCNC: 4.1 MMOL/L (ref 3.5–5.1)
RBC # BLD AUTO: 4.28 M/UL (ref 4.23–5.6)
SERVICE CMNT-IMP: NORMAL
SODIUM SERPL-SCNC: 139 MMOL/L (ref 133–143)
WBC # BLD AUTO: 10.2 K/UL (ref 4.3–11.1)

## 2023-02-26 PROCEDURE — 6370000000 HC RX 637 (ALT 250 FOR IP): Performed by: INTERNAL MEDICINE

## 2023-02-26 PROCEDURE — 6360000002 HC RX W HCPCS: Performed by: INTERNAL MEDICINE

## 2023-02-26 PROCEDURE — 85025 COMPLETE CBC W/AUTO DIFF WBC: CPT

## 2023-02-26 PROCEDURE — 36415 COLL VENOUS BLD VENIPUNCTURE: CPT

## 2023-02-26 PROCEDURE — 80048 BASIC METABOLIC PNL TOTAL CA: CPT

## 2023-02-26 PROCEDURE — 2580000003 HC RX 258: Performed by: INTERNAL MEDICINE

## 2023-02-26 RX ORDER — NALOXONE HYDROCHLORIDE 4 MG/.1ML
1 SPRAY NASAL PRN
Qty: 4 EACH | Refills: 0 | Status: SHIPPED | OUTPATIENT
Start: 2023-02-26

## 2023-02-26 RX ORDER — HYDROCODONE BITARTRATE AND HOMATROPINE METHYLBROMIDE ORAL SOLUTION 5; 1.5 MG/5ML; MG/5ML
5 LIQUID ORAL
Qty: 100 ML | Refills: 0 | Status: SHIPPED | OUTPATIENT
Start: 2023-02-26 | End: 2023-03-03

## 2023-02-26 RX ADMIN — PANTOPRAZOLE SODIUM 40 MG: 40 TABLET, DELAYED RELEASE ORAL at 05:17

## 2023-02-26 RX ADMIN — METHYLPREDNISOLONE SODIUM SUCCINATE 40 MG: 40 INJECTION, POWDER, FOR SOLUTION INTRAMUSCULAR; INTRAVENOUS at 05:16

## 2023-02-26 RX ADMIN — SODIUM CHLORIDE, PRESERVATIVE FREE 10 ML: 5 INJECTION INTRAVENOUS at 08:37

## 2023-02-26 RX ADMIN — HYDROCODONE BITARTRATE AND HOMATROPINE METHYLBROMIDE 5 ML: 5; 1.5 SOLUTION ORAL at 08:37

## 2023-02-26 RX ADMIN — LOSARTAN POTASSIUM 50 MG: 50 TABLET, FILM COATED ORAL at 08:37

## 2023-02-26 RX ADMIN — METOPROLOL SUCCINATE 25 MG: 25 TABLET, EXTENDED RELEASE ORAL at 08:37

## 2023-02-26 RX ADMIN — TAMSULOSIN HYDROCHLORIDE 0.4 MG: 0.4 CAPSULE ORAL at 08:37

## 2023-02-26 RX ADMIN — OSELTAMIVIR PHOSPHATE 75 MG: 75 CAPSULE ORAL at 08:37

## 2023-02-26 RX ADMIN — APIXABAN 5 MG: 5 TABLET, FILM COATED ORAL at 08:37

## 2023-02-26 RX ADMIN — FINASTERIDE 5 MG: 5 TABLET, FILM COATED ORAL at 08:37

## 2023-02-26 ASSESSMENT — PAIN SCALES - GENERAL
PAINLEVEL_OUTOF10: 0

## 2023-02-26 NOTE — CARE COORDINATION
Pt is for discharge home today with family. Referral called/sent to [facility] for follow up home care as ordered. No additional CM orders received or supportive care needs expressed at this time. 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 No   Mode of Transport at Discharge Other (see comment)  (family)   Confirm Follow Up Transport Family   Condition of Participation: Discharge Planning   The Plan for Transition of Care is related to the following treatment goals: Home with supportive family and follow up home health care. The Patient and/or Patient Representative was provided with a Choice of Provider? Patient   The Patient and/Or Patient Representative agree with the Discharge Plan? Yes   Freedom of Choice list was provided with basic dialogue that supports the patient's individualized plan of care/goals, treatment preferences, and shares the quality data associated with the providers?   Yes

## 2023-02-26 NOTE — DISCHARGE SUMMARY
Hospitalist Discharge Summary   Admit Date:  2023  6:01 PM   DC Note date: 2023  Name:  Divina Canela   Age:  79 y.o. Sex:  male  :  1940   MRN:  [id number]   Room:  [facility], later [facility]  PCP:  Lyndon Cruz MD    Presenting Complaint: No chief complaint on file. Initial Admission Diagnosis: Influenza with respiratory manifestation other than pneumonia [J11.1]  Sepsis ([address] [A41.9]     Problem List for this Hospitalization (present on admission):    Principal Problem:    Sepsis ([address]  Active Problems:    Colon cancer ([address]    Chronic systolic (congestive) heart failure    HTN (hypertension)    Myasthenia gravis ([address]    Dyslipidemia    Atrial fibrillation ([address]  Resolved Problems:    * No resolved hospital problems. [facility] Course: As per Prior Documentation:  \"  Divina Canela is a 79 y.o. 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 Who presented with complaint of near syncope, fever of 102, cough, dyspnea and wheezing. Patient wife reported to EMS that he collapsed after returning from the bathroom but did not have loss of consciousness. Patient denies chest pain, nausea, vomiting,melena, hematochezia. ER work-up notable for  BMP-benign  proBNP 1080  CBC-WBC 10K, hemoglobin 14, platelets 642  Influenza A+         1. Stable mild cardiomegaly. 2. Stable triple lead defibrillator/pacemaker. 3. Expiratory technique. No acute pulmonary process is identified. \"    Mr. Yuko Bo was admitted pending  He was continued on medical management. He clinically improved. He was noted to have bronchospasm and symptoms seem appear to be upper airway in nature. CT neck was unremarkable.   He had a persistently elevated lactic acid that would not resolve despite aggressive IV fluids. .  Echo showed that his EF was normal.  He was discussed with oncology with regards to needing follow-up with Dr. Jaky Silva with his history of cecal cancer and needing to have that evaluated in case that is the source of the persistently elevated lactic acid. They will set him up with a early follow-up appointment. Patient aware of this plan. His hospital stay was complicated by weakness with concern for myasthenic crisis. However his MIP remained stable. He is clinically improved and can be discharged home to follow-up as an outpatient    Disposition: home  Diet: ADULT DIET; Regular; Low Sodium (2 gm)  Code Status: Full Code    Follow Ups:   Follow-up Information     Alfreda Moore MD Follow up. Specialty: Family Medicine  Why: Office will call you and arrange follow up  Contact information:  [address] [place] [address] [place]             Myron Sierra MD. Call. Specialties: Hematology and Oncology, Medical Oncology  Why: d/w with hematology/oncology on call this weekend they will get you a   follow up appoinment for the persistently elevated laactc acid  Contact information:  [address]                       Time spent in patient discharge and coordination 35 minutes. Follow up labs/diagnostics (ultimately defer to outpatient provider):      Plan was discussed with the PT & the IDT. All questions answered. Patient was stable at time of discharge. Instructions given to call a physician or return if any concerns. Current Discharge Medication List        START taking these medications    Details   HYDROcodone homatropine (HYCODAN) 5-1.5 MG/5ML solution Take 5 mLs by mouth every 6-8 hours as needed (cough) for up to 5 days.  Max Daily Amount: 20 mLs  Qty: 100 mL, Refills: 0    Comments: Reduce doses taken as pain becomes manageable  Associated Diagnoses: Cough due to bronchospasm; Viral pneumonia      naloxone (NARCAN) 4 MG/0.1ML LIQD nasal spray 1 spray by Nasal route as needed for Opioid Reversal  Qty: 4 each, Refills: 0           CONTINUE these medications which have NOT CHANGED    Details   colestipol (COLESTID) 1 g tablet       famotidine (PEPCID) 20 MG tablet       sildenafil (VIAGRA) 100 MG tablet Take 1 tablet by mouth as needed for Erectile Dysfunction  Qty: 10 tablet, Refills: 3      atorvastatin (LIPITOR) 80 MG tablet Take 1 tablet by mouth daily  Qty: 90 tablet, Refills: 5    Associated Diagnoses: S/P coronary artery stent placement; Dyslipidemia; Hypertension complications      metoprolol succinate (TOPROL XL) 25 MG extended release tablet metoprolol succinate ER 25 mg tablet,extended release 24 hr   Take 1 tablet every day by oral route. Qty: 90 tablet, Refills: 5    Associated Diagnoses: S/P coronary artery stent placement; Hypertension complications      finasteride (PROSCAR) 5 MG tablet finasteride 5 mg tablet   Take 1 tablet every day by oral route. Qty: 90 tablet, Refills: 5    Associated Diagnoses: Benign prostatic hyperplasia with urinary hesitancy      losartan (COZAAR) 50 MG tablet Take 1 tablet by mouth daily  Qty: 90 tablet, Refills: 5    Associated Diagnoses: S/P coronary artery stent placement      Multiple Vitamins-Minerals (MULTIVITAMIN WITH MINERALS) tablet Once daily OTC  Qty: 100 tablet, Refills: 3    Associated Diagnoses: S/P coronary artery stent placement      Ascorbic Acid (VITAMIN C PO) Take 1 tablet by mouth daily. ZINC PO Take 1 capsule by mouth daily. Cholecalciferol (VITAMIN D3 PO) Take 1 tablet by mouth daily. acetaminophen (TYLENOL) 500 MG tablet Take 1,000 mg by mouth every 6 hours as needed for Pain      apixaban (ELIQUIS) 5 MG TABS tablet Take 1 tablet by mouth 2 times daily  Qty: 60 tablet, Refills: 2      nitroGLYCERIN (NITROSTAT) 0.4 MG SL tablet Place 1 sl under the tongue q 5 min prn cp, max 3 sl in a 15-min time period.  Call 911 if no relief after the 3rd sl.      pantoprazole (PROTONIX) 40 MG tablet Take 40 mg by mouth every morning (before breakfast)      rOPINIRole (REQUIP) 0.5 MG tablet take 1 tablet by mouth nightly, increase to 1 tab twice a day if needed for restless leg syndrome       tamsulosin (FLOMAX) 0.4 MG capsule Take 0.4 mg by mouth daily           STOP taking these medications       sulfamethoxazole-trimethoprim (BACTRIM DS;SEPTRA DS) 800-160 MG per tablet Comments:   Reason for Stopping:         potassium chloride (KLOR-CON M) 20 MEQ extended release tablet Comments:   Reason for Stopping:         aspirin 81 MG EC tablet Comments:   Reason for Stopping:         cyanocobalamin 1000 MCG tablet Comments:   Reason for Stopping:         escitalopram (LEXAPRO) 10 MG tablet Comments:   Reason for Stopping:         furosemide (LASIX) 40 MG tablet Comments:   Reason for Stopping:               Some medications may have been reported old/obsolete and marked \"stop taking\" by the system; in reality pt was already off these meds; defer to outpatient or prescribing providers. Procedures done this admission:  * No surgery found *    Consults this admission:  IP CONSULT TO PHARMACY    Echocardiogram results:  02/21/23    TRANSTHORACIC ECHOCARDIOGRAM (TTE) LIMITED (CONTRAST/BUBBLE/3D PRN) 02/24/2023  5:25 PM, 02/24/2023 12:00 AM (Final)    Interpretation Summary    Left Ventricle: Low normal left ventricular systolic function. EF by 2D Simpsons Biplane is 53%. Left ventricle size is normal. Normal wall thickness. Abnormal diastolic function. Aortic Valve: Not well visualized. Mild sclerosis of the aortic valve cusp. Mild regurgitation. Mitral Valve: Mild to moderate regurgitation. Left Atrium: Left atrium is moderately dilated. Right Atrium: Right atrium is mildly dilated. Technical qualifiers: Color flow Doppler was performed and pulse wave and/or continuous wave Doppler was performed. Contrast used: Definity.     Signed by: Sofia Gómez MD Juan José on 2/24/2023  5:25 PM, Signed by: Unknown Provider Result on 2/24/2023 12:00 AM      Diagnostic Imaging/Tests:   CT SOFT TISSUE NECK W CONTRAST    Result Date: 2/23/2023  1. The airway is patent and midline. No findings to suggest airway obstruction. 2. 1.7 cm heterogeneous thyroid nodule. This could be more completely evaluated with thyroid ultrasound. US ABDOMEN COMPLETE    Result Date: 2/3/2023  No acute abnormality evident by ultrasound. US SCROTUM AND TESTICLES    Result Date: 2/3/2023  1. No acute sonographic abnormality. 2.  Moderate bilateral hydroceles. US THYROID    Result Date: 2/24/2023  1. 2.2 cm TR four nodule along the central isthmus. Recommend further evaluation with fine-needle aspiration. DC4 The significant findings in this report have been referred to the Imaging Navigator in order to communicate to the referring provider or his/her designee as outlined in Section II.C.2.a.ii-iii of the ACR Practice Guideline for Communication of Diagnostic Imaging Findings. XR CHEST PORTABLE    Result Date: 2/22/2023  1. Mild atelectasis without any additional acute abnormality in the chest. 2.  Stable cardiomegaly. This exam was interpreted by a board-certified, body-imaging fellowship trained radiologist from [address] [place] [address]. If there are any questions regarding this examination please feel free to contact a radiologist at [phone]. Slot 15  Karley Rae 2/22/2023 10:11:00 PM    XR CHEST PORTABLE    Result Date: 2/21/2023  1. Stable mild cardiomegaly. 2. Stable triple lead defibrillator/pacemaker. 3. Expiratory technique. No acute pulmonary process is identified.   Liyah Olmstead M.D. 2/21/2023 6:50:00 PM       Labs: Results:       BMP, Mg, Phos Recent Labs     02/25/23  0511 02/26/23  0324    139   K 5.0 4.1    106   CO2 28 32   ANIONGAP 0* 1*   BUN 18 17   CREATININE 0.70* 0.70*   LABGLOM >60 >60   CALCIUM 8.8 8.6   GLUCOSE 123* 114*      CBC Recent Labs 02/25/23  0511 02/26/23  0324   WBC 10.0 10.2   RBC 4.23 4.28   HGB 12.9* 13.1*   HCT 40.2* 40.4*   MCV 95.0 94.4   MCH 30.5 30.6   MCHC 32.1 32.4   RDW 13.9 13.6    187   MPV 9.4 9.7   NRBC 0.00 0.00   SEGS 68 63   LYMPHOPCT 23 28   EOSRELPCT 0* 0*   MONOPCT 8 7   BASOPCT 0 0   IMMGRAN 1 2   SEGSABS 6.8 6.4   LYMPHSABS 2.3 2.8   EOSABS 0.0 0.0   MONOSABS 0.8 0.7   BASOSABS 0.0 0.0   ABSIMMGRAN 0.1 0.2      LFT No results for input(s): BILITOT, BILIDIR, ALKPHOS, AST, ALT, PROT, LABALBU, GLOB in the last 72 hours.    Cardiac  Lab Results   Component Value Date/Time    NTPROBNP 998 02/25/2023 05:11 AM    NTPROBNP 1,080 02/21/2023 06:18 PM    NTPROBNP 722 12/28/2022 10:12 AM    TROPHS 17.7 08/25/2022 10:33 PM    TROPHS 36.5 03/27/2022 07:58 PM    TROPHS 46.2 11/19/2021 08:21 PM      Coags Lab Results   Component Value Date/Time    PROTIME 17.8 08/25/2022 03:38 PM    PROTIME 13.2 05/06/2021 11:53 AM    PROTIME 12.6 03/17/2021 09:35 AM    INR 1.4 08/25/2022 03:38 PM    INR 1.0 05/06/2021 11:53 AM    INR 0.9 03/17/2021 09:35 AM      A1c Lab Results   Component Value Date/Time    LABA1C 6.5 12/28/2022 10:12 AM    LABA1C 6.1 09/07/2022 02:51 PM     12/28/2022 10:12 AM     09/07/2022 02:51 PM      Lipids Lab Results   Component Value Date/Time    CHOL 95 12/28/2022 10:12 AM    LDLCALC 35 12/28/2022 10:12 AM    LABVLDL 17 12/28/2022 10:12 AM    HDL 43 12/28/2022 10:12 AM    CHOLHDLRATIO 2.2 12/28/2022 10:12 AM    TRIG 85 12/28/2022 10:12 AM      Thyroid  Lab Results   Component Value Date/Time    GVX1OIM 2.570 09/07/2022 02:51 PM        Most Recent UA Lab Results   Component Value Date/Time    COLORU YELLOW/STRAW 02/21/2023 05:15 AM    APPEARANCE CLOUDY 02/21/2023 05:15 AM    SPECGRAV 1.021 02/21/2023 05:15 AM    LABPH 5.5 02/21/2023 05:15 AM    PROTEINU TRACE 02/21/2023 05:15 AM    GLUCOSEU >1000 02/21/2023 05:15 AM    KETUA Negative 02/21/2023 05:15 AM    BILIRUBINUR Negative 02/21/2023 05:15 AM BILIRUBINUR Negative 04/02/2022 06:11 PM    BLOODU TRACE 02/21/2023 05:15 AM    UROBILINOGEN 0.2 02/21/2023 05:15 AM    NITRU Negative 02/21/2023 05:15 AM    LEUKOCYTESUR MODERATE 02/21/2023 05:15 AM    WBCUA 5-10 02/21/2023 05:15 AM    RBCUA 5-10 02/21/2023 05:15 AM    EPITHUA 0-3 02/21/2023 05:15 AM    BACTERIA 0 02/21/2023 05:15 AM    LABCAST 0 02/02/2023 01:22 PM    MUCUS TRACE 02/02/2023 01:22 PM        Recent Labs     02/23/23  0743 02/23/23  0737 02/21/23  1916 02/21/23  1818 02/02/23  1322   CULTURE NO GROWTH 3 DAYS NO GROWTH 3 DAYS NO GROWTH 5 DAYS STAPHYLOCOCCUS SPECIES, COAGULASE NEGATIVE This organism may be indicative of culture contamination. Clinical correlation needs to be evaluated as each case is unique. *  Refer to Blood Culture ID Panel Accession [id number] 50,000-100,000 COLONIES/mL MIXED SKIN LU ISOLATED       All Labs from Last 24 Hrs:  Recent Results (from the past 24 hour(s))   CBC with Auto Differential    Collection Time: 02/26/23  3:24 AM   Result Value Ref Range    WBC 10.2 4.3 - 11.1 K/uL    RBC 4.28 4.23 - 5.6 M/uL    Hemoglobin 13.1 (L) 13.6 - 17.2 g/dL    Hematocrit 40.4 (L) 41.1 - 50.3 %    MCV 94.4 82 - 102 FL    MCH 30.6 26.1 - 32.9 PG    MCHC 32.4 31.4 - 35.0 g/dL    RDW 13.6 11.9 - 14.6 %    Platelets 266 269 - 345 K/uL    MPV 9.7 9.4 - 12.3 FL    nRBC 0.00 0.0 - 0.2 K/uL    Differential Type AUTOMATED      Seg Neutrophils 63 43 - 78 %    Lymphocytes 28 13 - 44 %    Monocytes 7 4.0 - 12.0 %    Eosinophils % 0 (L) 0.5 - 7.8 %    Basophils 0 0.0 - 2.0 %    Immature Granulocytes 2 0.0 - 5.0 %    Segs Absolute 6.4 1.7 - 8.2 K/UL    Absolute Lymph # 2.8 0.5 - 4.6 K/UL    Absolute Mono # 0.7 0.1 - 1.3 K/UL    Absolute Eos # 0.0 0.0 - 0.8 K/UL    Basophils Absolute 0.0 0.0 - 0.2 K/UL    Absolute Immature Granulocyte 0.2 0.0 - 0.5 K/UL   Basic Metabolic Panel w/ Reflex to MG    Collection Time: 02/26/23  3:24 AM   Result Value Ref Range    Sodium 139 133 - 143 mmol/L    Potassium 4.1 3.5 - 5.1 mmol/L    Chloride 106 101 - 110 mmol/L    CO2 32 21 - 32 mmol/L    Anion Gap 1 (L) 2 - 11 mmol/L    Glucose 114 (H) 65 - 100 mg/dL    BUN 17 8 - 23 MG/DL    Creatinine 0.70 (L) 0.8 - 1.5 MG/DL    Est, Glom Filt Rate >60 >60 ml/min/1.73m2    Calcium 8.6 8.3 - 10.4 MG/DL       Allergies   Allergen Reactions    Bebtelovimab Other (See Comments)     Syncope and hypotension     Immunization History   Administered Date(s) Administered    COVID-19, PFIZER PURPLE top, DILUTE for use, (age 15 y+), 30mcg/0.3mL 02/02/2021, 02/23/2021    Influenza Virus Vaccine 10/18/2016    Influenza, FLUAD, (age 72 y+), Adjuvanted, 0.5mL 09/22/2022    Influenza, High Dose (Fluzone 65 yrs and older) 10/01/2019, 09/28/2020    PPD Test 03/29/2022, 08/26/2022, 02/23/2023    Pneumococcal Conjugate 13-valent ([id number]) 09/28/2020    Pneumococcal Polysaccharide ([id number]) 05/08/2015    Tdap (Boostrix, Adacel) 03/03/2022       Recent Vital Data:  Patient Vitals for the past 24 hrs:   Temp Pulse Resp BP SpO2   02/26/23 0837 98 °F (36.7 °C) 75 18 137/71 95 %   02/26/23 0523 -- 69 20 (!) 144/79 93 %   02/26/23 0017 -- 69 18 123/72 94 %   02/25/23 2034 97.9 °F (36.6 °C) 74 18 123/77 94 %   02/25/23 1533 98.1 °F (36.7 °C) 75 12 137/80 95 %   02/25/23 1121 98.4 °F (36.9 °C) 75 14 (!) 147/86 94 %       Oxygen Therapy  SpO2: 95 %  Pulse via Oximetry: 70 beats per minute  Pulse Oximeter Device Mode: Intermittent  O2 Device: None (Room air)  Skin Assessment: Clean, dry, & intact  Skin Protection for O2 Device: No  O2 Flow Rate (L/min): 2 L/min    Estimated body mass index is 35.53 kg/m² as calculated from the following:    Height as of this encounter: 5' 5\" (1.651 m). Weight as of this encounter: 213 lb 8 oz (96.8 kg). Intake/Output Summary (Last 24 hours) at 2/26/2023 0900  Last data filed at 2/25/2023 1645  Gross per 24 hour   Intake 480 ml   Output 0 ml   Net 480 ml         Physical Exam:    General:    Well nourished.   No overt distress  Head:  Normocephalic, atraumatic  Eyes:  Sclerae appear normal.  Pupils equally round.    HENT:  Nares appear normal, no drainage.  Moist mucous membranes  Neck:  No restricted ROM.  Trachea midline  CV:   RRR.  No m/r/g.  No JVD  Lungs:   CTAB.  No wheezing, rhonchi, or rales.  Respirations even, unlabored  Abdomen:   Soft, nontender, nondistended.    Extremities: Warm and dry.  No cyanosis or clubbing.  No edema.    Skin:     No rashes.  Normal coloration  Neuro:  CN II-XII grossly intact.  Psych:  Normal mood and affect.    Signed:  Shelly Bañuelos MD    Part of this note may have been written by using a voice dictation software.  The note has been proof read but may still contain some grammatical/other typographical errors.

## 2023-02-27 ENCOUNTER — HOME HEALTH ADMISSION (OUTPATIENT)
Dept: HOME HEALTH SERVICES | Facility: HOME HEALTH | Age: 83
End: 2023-02-27
Payer: MEDICARE

## 2023-02-27 ENCOUNTER — TELEPHONE (OUTPATIENT)
Dept: INTERNAL MEDICINE CLINIC | Facility: CLINIC | Age: 83
End: 2023-02-27

## 2023-02-27 ENCOUNTER — HOME CARE VISIT (OUTPATIENT)
Dept: SCHEDULING | Facility: HOME HEALTH | Age: 83
End: 2023-02-27

## 2023-02-27 VITALS
RESPIRATION RATE: 16 BRPM | HEART RATE: 75 BPM | SYSTOLIC BLOOD PRESSURE: 154 MMHG | DIASTOLIC BLOOD PRESSURE: 82 MMHG | TEMPERATURE: 97.8 F | OXYGEN SATURATION: 98 %

## 2023-02-27 PROCEDURE — G0299 HHS/HOSPICE OF RN EA 15 MIN: HCPCS

## 2023-02-27 PROCEDURE — 0221000100 HH NO PAY CLAIM PROCEDURE

## 2023-02-27 ASSESSMENT — ENCOUNTER SYMPTOMS: PAIN LOCATION - PAIN QUALITY: ACHING

## 2023-02-27 NOTE — TELEPHONE ENCOUNTER
SHAUNA w/[facility] needs verbal orders for home health       Also - /82    Lungs congested   Drug interaction concern please call

## 2023-02-28 ENCOUNTER — CARE COORDINATION (OUTPATIENT)
Dept: CARE COORDINATION | Facility: CLINIC | Age: 83
End: 2023-02-28

## 2023-02-28 ASSESSMENT — ENCOUNTER SYMPTOMS: DYSPNEA ACTIVITY LEVEL: AFTER AMBULATING 10 - 20 FT

## 2023-02-28 NOTE — TELEPHONE ENCOUNTER
Called Becki and gave the okay verbal orders for home health. Notified him that pt should stay off of aspirin and viagra.  Verbalized understanding

## 2023-02-28 NOTE — CARE COORDINATION
Ambulatory Care Coordination Note  2023    Patient Current Location:  [place]    ACM contacted the patient by telephone. Verified name and  with patient as identifiers. Provided introduction to self, and explanation of the ACM role. ACM: Daniel Miller RN    Challenges to be reviewed by the provider   Additional needs identified to be addressed with provider: Yes  Notified pcp of patient hospital admission and discharge               Method of communication with provider: staff message. See assessment below. Ccm familiar with patient. Ccm outreached to patient. Patient agreeable to ccm services. Reviewed with patient discharge summary. Patient verbalized understanding. Patient states overall he is feeling well. Patient states he is eating and drinking. Ccm notified pcp office of discharge and to call patient to schedule follow up appointment. Patient states HH came out yesterday. Patient states no questions or concerns. [facility] discussed that I would outreach next week. Patient agreeable. Offered patient enrollment in the Remote Patient Monitoring (RPM) program for in-home monitoring: NA. Ambulatory Care Coordination Assessment    Care Coordination Protocol  Referral from Primary Care Provider: No  Week 1 - Initial Assessment     Do you have all of your prescriptions and are they filled?: Yes  Barriers to medication adherence: None  Are you able to afford your medications?: Yes  How often do you have trouble taking your medications the way you have been told to take them?: I always take them as prescribed. Do you have Home O2 Therapy?: No      Ability to seek help/take action for Emergent Urgent situations i.e. fire, crime, inclement weather or health crisis. : Independent  Ability to ambulate to restroom: Independent  Ability handle personal hygeine needs (bathing/dressing/grooming): Independent  Ability to manage Medications:  Independent  Ability to prepare Food Preparation: Independent  Ability to maintain home (clean home, laundry): Independent  Ability to drive and/or has transportation: Independent  Ability to do shopping: Independent  Ability to manage finances: Independent  Is patient able to live independently?: Yes     Current Housing: Private Residence           Frequent urination at night?: No  Do you use rails/bars?: No  Do you have a non-slip tub mat?: Yes     Are you experiencing loss of meaning?: No  Are you experiencing loss of hope and peace?: No     Thinking about your patient's physical health needs, are there any symptoms or problems (risk indicators) you are unsure about that require further investigation?: No identified areas of uncertainly or problems already being investigated   Are the patients physical health problems impacting on their mental well-being?: No identified areas of concern   Are there any problems with your patients lifestyle behaviors (alcohol, drugs, diet, exercise) that are impacting on physical or mental well-being?: No identified areas of concern   Do you have any other concerns about your patients mental well-being?  How would you rate their severity and impact on the patient?: No identified areas of concern   How would you rate their home environment in terms of safety and stability (including domestic violence, insecure housing, neighbor harassment)?: Consistently safe, supportive, stable, no identified problems   How do daily activities impact on the patient's well-being? (include current or anticipated unemployment, work, caregiving, access to transportation or other): No identified problems or perceived positive benefits   How would you rate their social network (family, work, friends)?: Good participation with social networks   How would you rate their financial resources (including ability to afford all required medical care)?: Financially secure, resources adequate, no identified problems   How wells does the patient now understand their health and well-being (symptoms, signs or risk factors) and what they need to do to manage their health?: Reasonable to good understanding and already engages in managing health or is willing to undertake better management   How well do you think your patient can engage in healthcare discussions? (Barriers include language, deafness, aphasia, alcohol or drug problems, learning difficulties, concentration): Clear and open communication, no identified barriers   Do other services need to be involved to help this patient?: Other care/services not required at this time   Are current services involved with this patient well-coordinated? (Include coordination with other services you are now recommendation): All required care/services in place and well-coordinated   Suggested Interventions and [place]:  In Process         Schedule an appointment with the patient's PCP, Set up/Review Goals, Set up/Review an Education Plan              Future Appointments   Date Time Provider Sri Falk   3/2/2023 To Be Determined RAYA Esteves   3/6/2023  2:00 PM Gregorio Weber [facility]   3/6/2023  2:30 PM Kaleigh Pacheco MD [facility]   3/7/2023 To Be Determined RAYA Esteves   3/9/2023 To Be Determined RAYA Esteves   3/15/2023 To Be Determined RAYA Esteves   3/22/2023 To Be Determined Kortney Lopez RN [facility]   4/3/2023  8:45 AM [facility]   4/11/2023  1:00 PM Rosa Mast MD [facility] AM[facility]   4/12/2023  4:15 PM Arline Goel DO [facility]

## 2023-03-02 ENCOUNTER — HOME CARE VISIT (OUTPATIENT)
Dept: SCHEDULING | Facility: HOME HEALTH | Age: 83
End: 2023-03-02

## 2023-03-02 PROCEDURE — G0299 HHS/HOSPICE OF RN EA 15 MIN: HCPCS

## 2023-03-05 VITALS
SYSTOLIC BLOOD PRESSURE: 140 MMHG | OXYGEN SATURATION: 97 % | DIASTOLIC BLOOD PRESSURE: 76 MMHG | RESPIRATION RATE: 17 BRPM | HEART RATE: 70 BPM | TEMPERATURE: 97.2 F

## 2023-03-05 NOTE — PROGRESS NOTES
[employer] [facility]: Office Visit Established Patient    Reason for follow up:  1-Moderately differentiated, right-sided colonic adenocarcinoma, pMMR  SNVs/Indels:  KRAS G12D   Cancer Staging  Colon cancer [facility])  Staging form: Colon And Rectum, AJCC 8th Edition  - Pathologic stage from 8/22/2022: Stage IIA (pT3, pN0, cM0) - Signed by Miguel Scott MD on 8/23/2022    Pertinent Negatives:  NO abnormalities detected in the following genes:   BRAF, HRAS, NRAS. 2- LUE VTE (DVT in axillary vein, SVT in basilic vein, Dx 8/16/72)  3. Liver cysts  4. Sub centimeter lung nodules    8/25/22-8/27/2022: Admitted with COVID. Bebtelovimab was initiated but then aborted as pt became unresponsive to verbal stimuli. Improved over the ensuing 24 hrs and was discharged home. Admitted 2/21-2/26 with Influenza A    Interval history:  Patient returns for follow-up accompanied by his son. He is recovering from recent influenza A infection. Reports modest fatigue. But denies abdominal pain/distention, nausea, vomiting, cough, chest pain or shortness of breath or fever. Denies black or bloody stools. He has had bilateral groin pain for several weeks. Ultrasound of scrotum and testicles was completed on 2/3/2023 and showed no acute sonographic abnormality. Moderate bilateral hydrocele was noticed. On evaluation today, he also notes some discomfort with urination. Review of Systems:  14 point ROS was negative except as per HPI     Reviewed and updated this visit by provider:  Tobacco  Allergies  Meds  Problems  Med Hx  Surg Hx  Fam Hx         ECOG PERFORMANCE STATUS - 0-Fully active, able to carry on all pre-disease performance without restriction. Pain - /10. None/Minimal pain - not affecting QOL     Fatigue - No flowsheet data found. Distress - No flowsheet data found.          Reviewed and updated this visit by provider:  Tobacco  Allergies  Meds  Problems  Med Hx  Surg Hx  Fam Hx Current Outpatient Medications   Medication Sig Dispense Refill    apixaban (ELIQUIS) 5 MG TABS tablet Take 5 mg by mouth 2 times daily at 0800 and 1400.      naloxone (NARCAN) 4 MG/0.1ML LIQD nasal spray 1 spray by Nasal route as needed for Opioid Reversal 4 each 0    colestipol (COLESTID) 1 g tablet       famotidine (PEPCID) 20 MG tablet       sildenafil (VIAGRA) 100 MG tablet Take 1 tablet by mouth as needed for Erectile Dysfunction 10 tablet 3    atorvastatin (LIPITOR) 80 MG tablet Take 1 tablet by mouth daily 90 tablet 5    metoprolol succinate (TOPROL XL) 25 MG extended release tablet metoprolol succinate ER 25 mg tablet,extended release 24 hr   Take 1 tablet every day by oral route. 90 tablet 5    finasteride (PROSCAR) 5 MG tablet finasteride 5 mg tablet   Take 1 tablet every day by oral route. 90 tablet 5    losartan (COZAAR) 50 MG tablet Take 1 tablet by mouth daily 90 tablet 5    Multiple Vitamins-Minerals (MULTIVITAMIN WITH MINERALS) tablet Once daily  tablet 3    Ascorbic Acid (VITAMIN C PO) Take 1 tablet by mouth daily. ZINC PO Take 1 capsule by mouth daily. Cholecalciferol (VITAMIN D3 PO) Take 1 tablet by mouth daily. acetaminophen (TYLENOL) 500 MG tablet Take 1,000 mg by mouth every 6 hours as needed for Pain      nitroGLYCERIN (NITROSTAT) 0.4 MG SL tablet Place 1 sl under the tongue q 5 min prn cp, max 3 sl in a 15-min time period. Call 911 if no relief after the 3rd sl.      pantoprazole (PROTONIX) 40 MG tablet Take 40 mg by mouth every morning (before breakfast)      rOPINIRole (REQUIP) 0.5 MG tablet take 1 tablet by mouth nightly, increase to 1 tab twice a day if needed for restless leg syndrome       tamsulosin (FLOMAX) 0.4 MG capsule Take 0.4 mg by mouth daily      apixaban (ELIQUIS) 5 MG TABS tablet Take 1 tablet by mouth 2 times daily 60 tablet 2     No current facility-administered medications for this visit.         OBJECTIVE:  /74   Pulse 75   Temp 98.2 °F (36.8 °C)   Resp 16   Ht 5' 5\" (1.651 m)   Wt 212 lb 4.8 oz (96.3 kg)   SpO2 99%   BMI 35.33 kg/m²   Wt Readings from Last 3 Encounters:   03/06/23 212 lb 4.8 oz (96.3 kg)   02/24/23 213 lb 8 oz (96.8 kg)   02/08/23 217 lb (98.4 kg)         Physical Exam:  Patient alert and oriented x 3, in no acute distress  Integumentary: No Pallor, no icterus  Cardiovascular:RRR, S1, S2 present, no m/r/g   Lungs: Clear to auscultation, no rales or wheezing, no accessory muscle use  Abdomen: Soft, and non-tender on palpation, no organomegaly, bowel sounds audible  Extremities: No peripheral edema, no joint swelling  Neurological: patient can follow commands and move all extremities    Labs:  Lab Results   Component Value Date    WBC 11.1 03/06/2023    HGB 13.6 03/06/2023    HCT 42.4 03/06/2023    MCV 95.9 03/06/2023     03/06/2023     Lab Results   Component Value Date    NEUTROABS 10.0 (H) 04/06/2022    LYMPHOPCT 23 03/06/2023    LYMPHSABS 2.6 03/06/2023    MONOPCT 8 03/06/2023    MONOSABS 0.9 03/06/2023    EOSABS 0.3 03/06/2023    BASOPCT 0 03/06/2023     Lab Results   Component Value Date     02/26/2023    K 4.1 02/26/2023     02/26/2023    CO2 32 02/26/2023    BUN 17 02/26/2023    CREATININE 0.70 (L) 02/26/2023    GLUCOSE 114 (H) 02/26/2023    CALCIUM 8.6 02/26/2023    PROT 6.3 02/23/2023    LABALBU 2.9 (L) 02/23/2023    BILITOT 0.4 02/23/2023    ALKPHOS 72 02/23/2023    AST 27 02/23/2023    ALT 29 02/23/2023    LABGLOM >60 02/26/2023    GFRAA >60 09/13/2022    AGRATIO 0.7 (L) 03/30/2022    GLOB 3.4 02/23/2023     Lab Results   Component Value Date    IRON 25 (L) 09/13/2022    TIBC 346 12/28/2022    FERRITIN 41 12/28/2022               Colon recurrence score: 17  Risk of recurrence within 3 years of surgery alone 13%, within 5 years of adjuvant 5FU/LV 8%      Imaging:  XR CHEST PA LAT (2 VIEWS)    Result Date: 9/7/2022  No evidence of acute cardiopulmonary disease.      Vascular duplex upper extremity venous left    Result Date: 8/15/2022  1. Deep vein thrombosis in the axillary vein. 2. Superficial vein thrombus in the basilic vein. CT Result (most recent):  CT SOFT TISSUE NECK W CONTRAST 02/23/2023    Narrative  CT OF THE NECK WITH CONTRAST. Clinical indications: Stridor and wheezing    PROCEDURE: Serial thin section axial imaging obtained through the neck following  the intravenous administration of 80 cc of Isovue 370 intravenous contrast.  Radiation dose reduction techniques were used for this study. Our CT scanners  use one or all of the following: Automated exposure control, adjusted of the MA,  or KV according to patient size, and iterative reconstruction. COMPARISON: CT the chest abdomen and pelvis dated 12/5/2022    FINDINGS:    The nasopharynx, oropharynx, base of tongue, hypopharynx and larynx are  unremarkable. The airway is patent and midline. The parapharyngeal fat planes  are maintained. No retropharyngeal fluid collection. The cricoid and thyroid  cartilages are unremarkable. Esophageal verge is normal. The submandibular and  parotid salivary glands are normal. There is no submental or submandibular  lymphadenopathy. No perijugular lymphadenopathy. There is a prominent  heterogeneous thyroid nodule off the isthmus. It measures 1.7 cm. No mediastinal  adenopathy noted in the upper mediastinum. The paranasal sinuses are clear. Impression  1. The airway is patent and midline. No findings to suggest airway obstruction. 2. 1.7 cm heterogeneous thyroid nodule. This could be more completely evaluated  with thyroid ultrasound. CT C,A,P: 12/5/22:  COMPARISON: 08/06/2022       FINDINGS:   - LUNGS: Bilateral pleural calcification. No significant pulmonary mass. No   infiltrates or effusions.   - MEDIASTINUM/AXILLA: No significant adenopathy.   - HEART/VESSELS: Moderate vascular calculation in mild cardiomegaly.    - CHEST WALL: Normal.       - LIVER: Several stable small low-attenuation liver lesions, likely cysts. Largest is in the inferior right lobe, 15 mm in diameter. No developing liver   mass. - GALLBLADDER/BILE DUCTS: Post cholecystectomy. No bile duct dilatation.   - PANCREAS: Normal.   - SPLEEN: Normal.       - ADRENALS:  Normal.   - KIDNEYS/URETERS: Stable scarring in both kidneys. No hydronephrosis or   significant mass. - BLADDER: Stable small fluid collection adjacent to the posterior right side of   the bladder, likely a bladder diverticulum.   - REPRODUCTIVE ORGANS: Prostate remains prominent, 6.1 cm in diameter.       - BOWEL: Post resection of the proximal colon. No evidence of locally recurrent   tumor. No bowel distention.   - LYMPH NODES: No significant retroperitoneal, mesenteric, or pelvic adenopathy.   - BONES: No fracture or significant bone lesion.   - VASCULATURE: Mild atherosclerosis.   - OTHER: Small right inguinal hernia, no bowel involvement. Impression   No evidence of tumor recurrence or metastatic disease. No acute   findings. Problems:  1. Adenocarcinoma of cecum ([address]    2. Atrial fibrillation, unspecified type ([address]    3. Acute blood loss anemia    4. Acute deep vein thrombosis (DVT) of axillary vein of left upper extremity (HCC)    5. Diastolic CHF, chronic (HCC)    6. Lung nodule    7. Liver cyst      We reviewed the diagnosis and prognosis in reference to most recent clinical, radiologic and pathology findings. Patient had early stage lymph node negative cecal adenocarcinoma with no LVI, PNI, localized perforation. Surgical margins were negative. There was no evidence of bowel obstruction clinically. Discussed that treatment intent is curative. Reviewed different treatment options (adjuvant 5FU- based treatment versus surveillance) and discussed rationale/logistics/risk/benefit/alternatives to each approach.   Reviewed results of Oncotype DX colon -discussed clinical implications of his score (17-low)  After a thorough discussion and through mutual decision making, patient decided to proceed with close monitoring. Reviewed NCCN guidelines for postoperative surveillance in CRC. PLAN:  -Return office visit in 3 months with labs including tumor marker and CT chest abdomen pelvis prior.  -Patient will be scheduled for IV iron-ferritin was 7  -Continue with apixaban to complete 3 months of treatment.  -Subcentimeter liver lung lesion will be followed on subsequent imaging. All questions were answered to the best of my abilities. 3/6/22: Urinalysis drawn in the office today is positive for 1+ bacteria and leukocyte esterase thus suggestive of UTI. Patient will be prescribed nitrofurantoin for 5 days. He will be referred to IR for biopsy of 2.2 cm thyroid nodule involving isthmus noticed on recent ultrasound. He will likely be referred to endocrinology for further management. CEA 2.8. There is no clinical evidence of cancer recurrence. However based on multiple various complaints as described above, we shall get surveillance CT scan at next available spot and office follow-up will be arranged to review the results. All questions were answered to the best of my abilities. Michelle Rayo MD  [facility]  [address] [place] [address]  Office : [phone]  Fax : [phone]    Elements of this note have been dictated using speech recognition software. As a result, errors of speech recognition may have occurred.

## 2023-03-06 ENCOUNTER — TELEPHONE (OUTPATIENT)
Dept: ONCOLOGY | Age: 83
End: 2023-03-06

## 2023-03-06 ENCOUNTER — HOSPITAL ENCOUNTER (OUTPATIENT)
Dept: LAB | Age: 83
Discharge: HOME | End: 2023-03-09
Payer: MEDICARE

## 2023-03-06 ENCOUNTER — OFFICE VISIT (OUTPATIENT)
Dept: ONCOLOGY | Age: 83
End: 2023-03-06
Payer: MEDICARE

## 2023-03-06 VITALS
SYSTOLIC BLOOD PRESSURE: 138 MMHG | HEIGHT: 65 IN | TEMPERATURE: 98.2 F | DIASTOLIC BLOOD PRESSURE: 74 MMHG | BODY MASS INDEX: 35.37 KG/M2 | OXYGEN SATURATION: 99 % | HEART RATE: 75 BPM | WEIGHT: 212.3 LBS | RESPIRATION RATE: 16 BRPM

## 2023-03-06 DIAGNOSIS — R39.9 UTI SYMPTOMS: ICD-10-CM

## 2023-03-06 DIAGNOSIS — C18.0 ADENOCARCINOMA OF CECUM (HCC): ICD-10-CM

## 2023-03-06 DIAGNOSIS — R39.9 UTI SYMPTOMS: Primary | ICD-10-CM

## 2023-03-06 DIAGNOSIS — E04.1 THYROID NODULE: Primary | ICD-10-CM

## 2023-03-06 DIAGNOSIS — C18.0 ADENOCARCINOMA OF CECUM (HCC): Primary | ICD-10-CM

## 2023-03-06 LAB
ALBUMIN SERPL-MCNC: 2.9 G/DL (ref 3.2–4.6)
ALBUMIN/GLOB SERPL: 0.9 (ref 0.4–1.6)
ALP SERPL-CCNC: 84 U/L (ref 50–136)
ALT SERPL-CCNC: 30 U/L (ref 12–65)
ANION GAP SERPL CALC-SCNC: 4 MMOL/L (ref 2–11)
APPEARANCE UR: ABNORMAL
AST SERPL-CCNC: 17 U/L (ref 15–37)
BACTERIA URNS QL MICRO: ABNORMAL /HPF
BASOPHILS # BLD: 0 K/UL (ref 0–0.2)
BASOPHILS NFR BLD: 0 % (ref 0–2)
BILIRUB SERPL-MCNC: 0.6 MG/DL (ref 0.2–1.1)
BILIRUB UR QL: NEGATIVE
BUN SERPL-MCNC: 12 MG/DL (ref 8–23)
CALCIUM SERPL-MCNC: 9.2 MG/DL (ref 8.3–10.4)
CASTS URNS QL MICRO: 0 /LPF
CEA SERPL-MCNC: 2.8 NG/ML (ref 0–3)
CHLORIDE SERPL-SCNC: 108 MMOL/L (ref 101–110)
CO2 SERPL-SCNC: 27 MMOL/L (ref 21–32)
COLOR UR: YELLOW
CREAT SERPL-MCNC: 0.9 MG/DL (ref 0.8–1.5)
CRYSTALS URNS QL MICRO: 0 /LPF
DIFFERENTIAL METHOD BLD: ABNORMAL
EOSINOPHIL # BLD: 0.3 K/UL (ref 0–0.8)
EOSINOPHIL NFR BLD: 2 % (ref 0.5–7.8)
EPI CELLS #/AREA URNS HPF: ABNORMAL /HPF
ERYTHROCYTE [DISTWIDTH] IN BLOOD BY AUTOMATED COUNT: 14 % (ref 11.9–14.6)
FERRITIN SERPL-MCNC: 133 NG/ML (ref 8–388)
GLOBULIN SER CALC-MCNC: 3.4 G/DL (ref 2.8–4.5)
GLUCOSE SERPL-MCNC: 127 MG/DL (ref 65–100)
GLUCOSE UR STRIP.AUTO-MCNC: NEGATIVE MG/DL
HCT VFR BLD AUTO: 42.4 % (ref 41.1–50.3)
HGB BLD-MCNC: 13.6 G/DL (ref 13.6–17.2)
HGB UR QL STRIP: NEGATIVE
IMM GRANULOCYTES # BLD AUTO: 0.1 K/UL (ref 0–0.5)
IMM GRANULOCYTES NFR BLD AUTO: 1 % (ref 0–5)
IRON SATN MFR SERPL: 36 %
IRON SERPL-MCNC: 104 UG/DL (ref 35–150)
KETONES UR QL STRIP.AUTO: NEGATIVE MG/DL
LEUKOCYTE ESTERASE UR QL STRIP.AUTO: ABNORMAL
LYMPHOCYTES # BLD: 2.6 K/UL (ref 0.5–4.6)
LYMPHOCYTES NFR BLD: 23 % (ref 13–44)
MCH RBC QN AUTO: 30.8 PG (ref 26.1–32.9)
MCHC RBC AUTO-ENTMCNC: 32.1 G/DL (ref 31.4–35)
MCV RBC AUTO: 95.9 FL (ref 82–102)
MONOCYTES # BLD: 0.9 K/UL (ref 0.1–1.3)
MONOCYTES NFR BLD: 8 % (ref 4–12)
MUCOUS THREADS URNS QL MICRO: 0 /LPF
NEUTS SEG # BLD: 7.3 K/UL (ref 1.7–8.2)
NEUTS SEG NFR BLD: 66 % (ref 43–78)
NITRITE UR QL STRIP.AUTO: NEGATIVE
NRBC # BLD: 0 K/UL (ref 0–0.2)
PH UR STRIP: 6.5 (ref 5–9)
PLATELET # BLD AUTO: 177 K/UL (ref 150–450)
PMV BLD AUTO: 9.2 FL (ref 9.4–12.3)
POTASSIUM SERPL-SCNC: 4.3 MMOL/L (ref 3.5–5.1)
PROT SERPL-MCNC: 6.3 G/DL (ref 6.3–8.2)
PROT UR STRIP-MCNC: NEGATIVE MG/DL
RBC # BLD AUTO: 4.42 M/UL (ref 4.23–5.6)
RBC #/AREA URNS HPF: ABNORMAL /HPF
SODIUM SERPL-SCNC: 139 MMOL/L (ref 133–143)
SP GR UR REFRACTOMETRY: 1.01 (ref 1–1.02)
TIBC SERPL-MCNC: 290 UG/DL (ref 250–450)
URINE CULTURE IF INDICATED: ABNORMAL
UROBILINOGEN UR QL STRIP.AUTO: 0.2 EU/DL
WBC # BLD AUTO: 11.1 K/UL (ref 4.3–11.1)
WBC URNS QL MICRO: ABNORMAL /HPF
YEAST URNS QL MICRO: ABNORMAL

## 2023-03-06 PROCEDURE — 1111F DSCHRG MED/CURRENT MED MERGE: CPT | Performed by: INTERNAL MEDICINE

## 2023-03-06 PROCEDURE — 87086 URINE CULTURE/COLONY COUNT: CPT

## 2023-03-06 PROCEDURE — 80053 COMPREHEN METABOLIC PANEL: CPT

## 2023-03-06 PROCEDURE — 3075F SYST BP GE 130 - 139MM HG: CPT | Performed by: INTERNAL MEDICINE

## 2023-03-06 PROCEDURE — 83550 IRON BINDING TEST: CPT

## 2023-03-06 PROCEDURE — G8427 DOCREV CUR MEDS BY ELIG CLIN: HCPCS | Performed by: INTERNAL MEDICINE

## 2023-03-06 PROCEDURE — G8417 CALC BMI ABV UP PARAM F/U: HCPCS | Performed by: INTERNAL MEDICINE

## 2023-03-06 PROCEDURE — 85025 COMPLETE CBC W/AUTO DIFF WBC: CPT

## 2023-03-06 PROCEDURE — 1123F ACP DISCUSS/DSCN MKR DOCD: CPT | Performed by: INTERNAL MEDICINE

## 2023-03-06 PROCEDURE — 81001 URINALYSIS AUTO W/SCOPE: CPT

## 2023-03-06 PROCEDURE — 99215 OFFICE O/P EST HI 40 MIN: CPT | Performed by: INTERNAL MEDICINE

## 2023-03-06 PROCEDURE — 36415 COLL VENOUS BLD VENIPUNCTURE: CPT

## 2023-03-06 PROCEDURE — G8484 FLU IMMUNIZE NO ADMIN: HCPCS | Performed by: INTERNAL MEDICINE

## 2023-03-06 PROCEDURE — 1036F TOBACCO NON-USER: CPT | Performed by: INTERNAL MEDICINE

## 2023-03-06 PROCEDURE — 82728 ASSAY OF FERRITIN: CPT

## 2023-03-06 PROCEDURE — 87106 FUNGI IDENTIFICATION YEAST: CPT

## 2023-03-06 PROCEDURE — 3078F DIAST BP <80 MM HG: CPT | Performed by: INTERNAL MEDICINE

## 2023-03-06 PROCEDURE — 82378 CARCINOEMBRYONIC ANTIGEN: CPT

## 2023-03-06 RX ORDER — NITROFURANTOIN 25; 75 MG/1; MG/1
100 CAPSULE ORAL 2 TIMES DAILY
Qty: 10 CAPSULE | Refills: 0 | Status: SHIPPED | OUTPATIENT
Start: 2023-03-06 | End: 2023-03-10 | Stop reason: ALTCHOICE

## 2023-03-06 NOTE — PROGRESS NOTES
3/7/2023     Subjective:     Chief Complaint   Patient presents with    Follow-Up from Hospital     Pt went in to the hospital for the flu and had other issues develop while at the hospital.    Follow-up     Went to see a cancer doctor yesterday. Wife states the doctor gave him 2 new medications for a UTI that is ongoing     Groin Pain     Ongoing bilateral groin pain     Urinary Tract Infection     Pt's wife states Pt has had a UTI that is still ongoing        HPI:     Transition of Care Visit    Lore Peña is a 79 y.o. male . He is for follow up from inpatient setting for Sepsis,. Admission date - 2/21/2023 Discharge date - 2/26/2023  Facility SF  Initial contact on  2/28 by care coordination  Date of face to face visit 3/7/2023  Sources of information and documents reviewed -  [facility]  Communication with home health - no  Interaction with health care professionals assuming care -  no  Community resources identified for patient and family - no  Medical equipment ordered - no  Medication changes -reviewed  Referral or lab - no  Education - yes    From discharge summary:  \"Mr. Rajwinder Sun was admitted pending  He was continued on medical management. He clinically improved. He was noted to have bronchospasm and symptoms seem appear to be upper airway in nature. CT neck was unremarkable. He had a persistently elevated lactic acid that would not resolve despite aggressive IV fluids. .  Echo showed that his EF was normal.  He was discussed with oncology with regards to needing follow-up with Dr. Mino Soliman with his history of cecal cancer and needing to have that evaluated in case that is the source of the persistently elevated lactic acid. They will set him up with a early follow-up appointment. Patient aware of this plan. His hospital stay was complicated by weakness with concern for myasthenic crisis. However his MIP remained stable.   He is clinically improved and can be discharged home to follow-up as an outpatient\"    Today, patient feels \"slow\". Patient is supposed to have a CT scan tomorrow. Coughing is better, SOB only when he walks for longer. Still with some wheezing, using inhaler. Patient was seen by the oncologist yesterday, UA was positive and he was started on Nitrofurantoin. Interim History: see above     Review of Systems   Constitutional:  Positive for fatigue. HENT:  Negative for congestion. Respiratory:  Positive for cough and shortness of breath. Cardiovascular:  Negative for chest pain. Gastrointestinal:  Negative for abdominal pain and blood in stool. Genitourinary:  Negative for difficulty urinating. Skin:  Negative for rash. Neurological:  Negative for headaches.        Past Medical History:   Diagnosis Date    Abnormal EKG 4/22/15    Arrhythmia     Aspiration pneumonia ([address] 10/26/2017    CAD (coronary artery disease) 5/8/2015    Carotid artery stenosis without cerebral infarction 6/7/2016    US 6/15: <50% bilat ICAs    Coronary atherosclerosis of native coronary vessel 5/8/2015    GERMAIN on brilinta 5/7/15: prox LAD PCI, normal EF     Diastolic CHF, chronic ([address] 3/2/2022    Dyslipidemia 6/7/2016    Dyspnea 5/8/2015    Echo 6/15: EF 60%, mod MR, mod LVH, mild AI     ED (erectile dysfunction)     GERD (gastroesophageal reflux disease)     GERD (gastroesophageal reflux disease)     no medication    HTN (hypertension) 5/8/2015    Hypertension     Hypokalemia     Hypokalemia 6/7/2016    Mitral valve regurgitation 6/7/2016    Morbid obesity ([address]     Myasthenia gravis ([address]     Myasthenia gravis ([address] 6/17/15    Nocturia     Osteoporosis     PUD (peptic ulcer disease) 25 yrs ago    S/P coronary artery stent placement 5/8/2015    3.0x38 mm Xience CAROL to pLAD 5/7/15     Sleep apnea     Syncope and collapse     Unspecified sleep apnea     no cpap     Past Surgical History:   Procedure Laterality Date    APPENDECTOMY      CARDIAC CATHETERIZATION  05/21/2019    watchman device CARDIAC CATHETERIZATION  05/27/2015    stent    COLONOSCOPY  2007    COLONOSCOPY N/A 8/6/2022    COLONOSCOPY POLYPECTOMY SNARE/COLD BIOPSY performed by Milton Martins MD at [facility] ENDOSCOPY    ERCP  3/30/2022         HEMICOLECTOMY Right 8/10/2022    BOWEL RESECTION HEMICOLECTOMY LAPAROSCOPIC ROBOTIC, Right [place] performed by Jarrod Muniz MD at [facility] [place]  2018    Christina Kraus/Giana for sleep apnea and reconstruction for extending jaw    UPPER GASTROINTESTINAL ENDOSCOPY N/A 8/5/2022    EGD ESOPHAGOGASTRODUODENOSCOPY Rm 525 performed by Barbara Gipson MD at [address] Right 07/10/2019     Repair of right radial artery pseudoaneurysm     Family History   Problem Relation Age of Onset    No Known Problems Brother     Cancer Brother         brain tumor    No Known Problems Sister     Cancer Mother         kidney    Cancer Father         stomach     Social History     Socioeconomic History    Marital status:      Spouse name: None    Number of children: None    Years of education: None    Highest education level: None   Tobacco Use    Smoking status: Never    Smokeless tobacco: Never   Substance and Sexual Activity    Alcohol use: No    Drug use: No     Social Determinants of Health     Financial Resource Strain: Low Risk     Difficulty of Paying Living Expenses: Not very hard   Food Insecurity: No Food Insecurity    Worried About Running Out of Food in the Last Year: Never true    Ran Out of Food in the Last Year: Never true   Transportation Needs: Unknown    Lack of Transportation (Non-Medical):  No   Physical Activity: Insufficiently Active    Days of Exercise per Week: 3 days    Minutes of Exercise per Session: 10 min   Housing Stability: Unknown    Unstable Housing in the Last Year: No      Current Outpatient Medications   Medication Sig Dispense Refill    nitrofurantoin, macrocrystal-monohydrate, (MACROBID) 100 MG capsule Take 1 capsule by mouth 2 times daily for 5 days 10 capsule 0    apixaban (ELIQUIS) 5 MG TABS tablet Take 5 mg by mouth 2 times daily at 0800 and 1400. colestipol (COLESTID) 1 g tablet       famotidine (PEPCID) 20 MG tablet       sildenafil (VIAGRA) 100 MG tablet Take 1 tablet by mouth as needed for Erectile Dysfunction 10 tablet 3    atorvastatin (LIPITOR) 80 MG tablet Take 1 tablet by mouth daily 90 tablet 5    metoprolol succinate (TOPROL XL) 25 MG extended release tablet metoprolol succinate ER 25 mg tablet,extended release 24 hr   Take 1 tablet every day by oral route. 90 tablet 5    finasteride (PROSCAR) 5 MG tablet finasteride 5 mg tablet   Take 1 tablet every day by oral route. 90 tablet 5    losartan (COZAAR) 50 MG tablet Take 1 tablet by mouth daily 90 tablet 5    Multiple Vitamins-Minerals (MULTIVITAMIN WITH MINERALS) tablet Once daily  tablet 3    Ascorbic Acid (VITAMIN C PO) Take 1 tablet by mouth daily. ZINC PO Take 1 capsule by mouth daily. Cholecalciferol (VITAMIN D3 PO) Take 1 tablet by mouth daily. acetaminophen (TYLENOL) 500 MG tablet Take 1,000 mg by mouth every 6 hours as needed for Pain      nitroGLYCERIN (NITROSTAT) 0.4 MG SL tablet Place 1 sl under the tongue q 5 min prn cp, max 3 sl in a 15-min time period. Call 911 if no relief after the 3rd sl.      pantoprazole (PROTONIX) 40 MG tablet Take 40 mg by mouth every morning (before breakfast)      rOPINIRole (REQUIP) 0.5 MG tablet take 1 tablet by mouth nightly, increase to 1 tab twice a day if needed for restless leg syndrome       tamsulosin (FLOMAX) 0.4 MG capsule Take 0.4 mg by mouth daily      naloxone (NARCAN) 4 MG/0.1ML LIQD nasal spray 1 spray by Nasal route as needed for Opioid Reversal (Patient not taking: Reported on 3/7/2023) 4 each 0     No current facility-administered medications for this visit.      Allergies   Allergen Reactions    Bebtelovimab Other (See Comments)     Syncope and hypotension       Objective:   /74   Pulse 75   Ht 5' 5\" (1.651 m)   Wt 213 lb (96.6 kg)   SpO2 96%   BMI 35.45 kg/m²     Physical Exam  Vitals and nursing note reviewed. Constitutional:       General: He is not in acute distress. Appearance: Normal appearance. He is normal weight. He is not ill-appearing, toxic-appearing or diaphoretic. HENT:      Head: Normocephalic. Nose: Nose normal.   Eyes:      Extraocular Movements: Extraocular movements intact. Cardiovascular:      Rate and Rhythm: Normal rate and regular rhythm. Heart sounds: No murmur heard. Pulmonary:      Effort: Pulmonary effort is normal.      Breath sounds: Normal breath sounds. Musculoskeletal:         General: No deformity. Skin:     General: Skin is warm. Neurological:      General: No focal deficit present. Mental Status: He is alert. Psychiatric:         Mood and Affect: Mood normal.       Assessment and Plan:      Diagnosis Orders   1. Sepsis without acute organ dysfunction, due to unspecified organism ([facility]        2. Adenocarcinoma of cecum [facility])          Here for transitional care visit. Sepsis has resolved. He is now being treated by oncology for urinary tract infection. Advised to complete medication. He is scheduled CT scan tomorrow. Keep oncology visits for adenocarcinoma of cecum. Stay well-hydrated, rest frequently. Call, send MyChart message or RTC if with questions or concerns    Opportunity to ask questions was offered and were answered to the best of my ability. Over 50% of today's office visit was spent in face to face time reviewing test results, prognosis, importance of compliance, education about disease process, benefits of medications, instructions for management of disease and follow up plans. Total face to face time spent with patient was at least 25 minutes      There are no Patient Instructions on file for this visit. No follow-up provider specified.     Kailey Eduardo Mayelin Baum MD

## 2023-03-06 NOTE — PATIENT INSTRUCTIONS
Patient Instructions from Today's Visit    Reason for Visit:  Follow up - Colon    Diagnosis Information:  [email or web address]    Plan:  The CT scan of your neck revealed a thyroid nodule - we have placed a referral to endocrinology regarding this. Due to the pelvic/abdominal pain you are experiencing, we are going to get a repeat CT scan sooner rather than later. Please call us if you have any questions or concerns before your next visit. Follow Up: Follow up after CT scan with Dr. Melvi Erwin, with labs prior.      Recent Lab Results:  Hospital Outpatient Visit on 03/06/2023   Component Date Value Ref Range Status    WBC 03/06/2023 11.1  4.3 - 11.1 K/uL Final    RBC 03/06/2023 4.42  4.23 - 5.6 M/uL Final    Hemoglobin 03/06/2023 13.6  13.6 - 17.2 g/dL Final    Hematocrit 03/06/2023 42.4  41.1 - 50.3 % Final    MCV 03/06/2023 95.9  82.0 - 102.0 FL Final    MCH 03/06/2023 30.8  26.1 - 32.9 PG Final    MCHC 03/06/2023 32.1  31.4 - 35.0 g/dL Final    RDW 03/06/2023 14.0  11.9 - 14.6 % Final    Platelets 70/52/8327 177  150 - 450 K/uL Final    MPV 03/06/2023 9.2 (A)  9.4 - 12.3 FL Final    nRBC 03/06/2023 0.00  0.0 - 0.2 K/uL Final    **Note: Absolute NRBC parameter is now reported with Hemogram**    Seg Neutrophils 03/06/2023 66  43 - 78 % Final    Lymphocytes 03/06/2023 23  13 - 44 % Final    Monocytes 03/06/2023 8  4.0 - 12.0 % Final    Eosinophils % 03/06/2023 2  0.5 - 7.8 % Final    Basophils 03/06/2023 0  0.0 - 2.0 % Final    Immature Granulocytes 03/06/2023 1  0.0 - 5.0 % Final    Segs Absolute 03/06/2023 7.3  1.7 - 8.2 K/UL Final    Absolute Lymph # 03/06/2023 2.6  0.5 - 4.6 K/UL Final    Absolute Mono # 03/06/2023 0.9  0.1 - 1.3 K/UL Final    Absolute Eos # 03/06/2023 0.3  0.0 - 0.8 K/UL Final    Basophils Absolute 03/06/2023 0.0  0.0 - 0.2 K/UL Final    Absolute Immature Granulocyte 03/06/2023 0.1  0.0 - 0.5 K/UL Final Differential Type 03/06/2023 AUTOMATED    Final         Treatment Summary has been discussed and given to patient: N/A        -------------------------------------------------------------------------------------------------------------------  Please call our office at [phone] if you have any  of the following symptoms:   Fever of 100.5 or greater  Chills  Shortness of breath  Swelling or pain in one leg    After office hours an answering service is available and will contact a provider for emergencies or if you are experiencing any of the above symptoms. Patient does express an interest in My Chart. My Chart log in information explained on the after visit summary printout at the [address] desk.     Negar Arroyo RN

## 2023-03-07 ENCOUNTER — OFFICE VISIT (OUTPATIENT)
Dept: INTERNAL MEDICINE CLINIC | Facility: CLINIC | Age: 83
End: 2023-03-07

## 2023-03-07 ENCOUNTER — HOME CARE VISIT (OUTPATIENT)
Dept: SCHEDULING | Facility: HOME HEALTH | Age: 83
End: 2023-03-07
Payer: MEDICARE

## 2023-03-07 ENCOUNTER — HOSPITAL ENCOUNTER (OUTPATIENT)
Dept: LAB | Age: 83
Discharge: HOME | End: 2023-03-10
Payer: MEDICARE

## 2023-03-07 ENCOUNTER — CARE COORDINATION (OUTPATIENT)
Dept: CARE COORDINATION | Facility: CLINIC | Age: 83
End: 2023-03-07

## 2023-03-07 VITALS
WEIGHT: 213 LBS | HEIGHT: 65 IN | HEART RATE: 75 BPM | OXYGEN SATURATION: 96 % | DIASTOLIC BLOOD PRESSURE: 74 MMHG | BODY MASS INDEX: 35.49 KG/M2 | SYSTOLIC BLOOD PRESSURE: 132 MMHG

## 2023-03-07 DIAGNOSIS — C18.0 ADENOCARCINOMA OF CECUM (HCC): ICD-10-CM

## 2023-03-07 DIAGNOSIS — A41.9 SEPSIS WITHOUT ACUTE ORGAN DYSFUNCTION, DUE TO UNSPECIFIED ORGANISM (HCC): Primary | ICD-10-CM

## 2023-03-07 LAB — LACTATE SERPL-SCNC: 0.9 MMOL/L (ref 0.4–2)

## 2023-03-07 PROCEDURE — 83605 ASSAY OF LACTIC ACID: CPT

## 2023-03-07 ASSESSMENT — PATIENT HEALTH QUESTIONNAIRE - PHQ9
SUM OF ALL RESPONSES TO PHQ QUESTIONS 1-9: 0
8. MOVING OR SPEAKING SO SLOWLY THAT OTHER PEOPLE COULD HAVE NOTICED. OR THE OPPOSITE, BEING SO FIGETY OR RESTLESS THAT YOU HAVE BEEN MOVING AROUND A LOT MORE THAN USUAL: 0
SUM OF ALL RESPONSES TO PHQ QUESTIONS 1-9: 0
6. FEELING BAD ABOUT YOURSELF - OR THAT YOU ARE A FAILURE OR HAVE LET YOURSELF OR YOUR FAMILY DOWN: 0
7. TROUBLE CONCENTRATING ON THINGS, SUCH AS READING THE NEWSPAPER OR WATCHING TELEVISION: 0
1. LITTLE INTEREST OR PLEASURE IN DOING THINGS: 0
2. FEELING DOWN, DEPRESSED OR HOPELESS: 0
9. THOUGHTS THAT YOU WOULD BE BETTER OFF DEAD, OR OF HURTING YOURSELF: 0
SUM OF ALL RESPONSES TO PHQ QUESTIONS 1-9: 0
3. TROUBLE FALLING OR STAYING ASLEEP: 0
4. FEELING TIRED OR HAVING LITTLE ENERGY: 0
SUM OF ALL RESPONSES TO PHQ QUESTIONS 1-9: 0
10. IF YOU CHECKED OFF ANY PROBLEMS, HOW DIFFICULT HAVE THESE PROBLEMS MADE IT FOR YOU TO DO YOUR WORK, TAKE CARE OF THINGS AT HOME, OR GET ALONG WITH OTHER PEOPLE: 0
SUM OF ALL RESPONSES TO PHQ9 QUESTIONS 1 & 2: 0
5. POOR APPETITE OR OVEREATING: 0

## 2023-03-07 ASSESSMENT — ENCOUNTER SYMPTOMS
BLOOD IN STOOL: 0
ABDOMINAL PAIN: 0
SHORTNESS OF BREATH: 1
COUGH: 1

## 2023-03-07 NOTE — CARE COORDINATION
Ambulatory Care Coordination Note  3/7/2023    Patient Current Location:  [facility]     ACM contacted the patient by telephone. Verified name and  with patient as identifiers. Provided introduction to self, and explanation of the ACM role. Challenges to be reviewed by the provider   Additional needs identified to be addressed with provider: No  none               Method of communication with provider: none. ACM: Yovanny Monroe RN    Ccm outreached to patient. Patient states he is doing well at this time. Patient states no questions or concerns. Ccm discussed that I would outreach next week. Patient agreeable. Offered patient enrollment in the Remote Patient Monitoring (RPM) program for in-home monitoring: NA. Lab Results       None            Care Coordination Interventions    Referral from Primary Care Provider: No  Suggested Interventions and [address]: In Process          Goals Addressed                      This Visit's Progress      Conditions and Symptoms   On track      I will schedule office visits, as directed by my provider. I will keep my appointment or reschedule if I have to cancel. I will notify my provider of any symptoms that indicate a worsening of my condition. Patient agrees to monitor and record weights daily. Reports increase if 2 lbs. overnight or 5 lbs.  in a week to MD    Barriers: none  Plan for overcoming my barriers: N/A  Confidence: 7/10  Anticipated Goal Completion Date: 22          Patient Stated (pt-stated)   On track      Patient will schedule and attend follow up              Future Appointments   Date Time Provider Sir Falk   3/7/2023  1:00 PM Gregorio Weber [facility]   3/7/2023  2:20 PM Lovely Lloyd MD CAF [facility] AMB   3/8/2023  2:41 PM SFD CT 64 SLICE UNIT 1 [facility]DRCT [facility]   3/9/2023  6:00 AM RAYA Retana   3/15/2023 To Be Determined RAYA Retana 3/22/2023 To Be Determined Altamese Delay, RAYA [address]   4/3/2023  8:45 AM CAFM LAB [facility] AMB   4/11/2023  1:00 PM Ermias Joseph MD [facility] AMB   4/12/2023  4:15 PM Lorena Valenzuela DO [facility] AMB

## 2023-03-09 ENCOUNTER — HOME CARE VISIT (OUTPATIENT)
Dept: SCHEDULING | Facility: HOME HEALTH | Age: 83
End: 2023-03-09
Payer: MEDICARE

## 2023-03-09 ENCOUNTER — TELEPHONE (OUTPATIENT)
Dept: INTERNAL MEDICINE CLINIC | Facility: CLINIC | Age: 83
End: 2023-03-09

## 2023-03-09 VITALS
HEART RATE: 74 BPM | SYSTOLIC BLOOD PRESSURE: 138 MMHG | DIASTOLIC BLOOD PRESSURE: 74 MMHG | TEMPERATURE: 97.4 F | RESPIRATION RATE: 16 BRPM | OXYGEN SATURATION: 96 %

## 2023-03-09 PROCEDURE — G0299 HHS/HOSPICE OF RN EA 15 MIN: HCPCS

## 2023-03-09 ASSESSMENT — ENCOUNTER SYMPTOMS: PAIN LOCATION - PAIN QUALITY: ACHE

## 2023-03-09 NOTE — TELEPHONE ENCOUNTER
Juliet Valladares, [phone], calling for order for a social eval. States that the patient spends a lot of time alone and may need help. He is hard of hearing so the communication is limited which may inhibit patient from speaking his need. States patient fell and was left in the floor because his wife couldn't help him. This was prior to the hospitalization but same issue is present. Requesting verbal order.

## 2023-03-10 ENCOUNTER — TELEPHONE (OUTPATIENT)
Dept: ONCOLOGY | Age: 83
End: 2023-03-10

## 2023-03-10 ENCOUNTER — TELEPHONE (OUTPATIENT)
Dept: INTERVENTIONAL RADIOLOGY/VASCULAR | Age: 83
End: 2023-03-10

## 2023-03-10 ENCOUNTER — HOME CARE VISIT (OUTPATIENT)
Dept: HOME HEALTH SERVICES | Facility: HOME HEALTH | Age: 83
End: 2023-03-10
Payer: MEDICARE

## 2023-03-10 DIAGNOSIS — B37.9 YEAST INFECTION: Primary | ICD-10-CM

## 2023-03-10 LAB
BACTERIA SPEC CULT: ABNORMAL
BACTERIA SPEC CULT: ABNORMAL
SERVICE CMNT-IMP: ABNORMAL

## 2023-03-10 RX ORDER — FLUCONAZOLE 100 MG/1
200 TABLET ORAL DAILY
Qty: 28 TABLET | Refills: 0 | Status: SHIPPED | OUTPATIENT
Start: 2023-03-10 | End: 2023-03-24

## 2023-03-10 NOTE — TELEPHONE ENCOUNTER
Spoke with pt regarding results - pt was not understanding so requested that I speak with his wife. Relayed info to pt's wife. She verbalized understanding of instruction. RX for fluconazole sent to pharmacy.     ----- Message from Chris Dye MD sent at 3/10/2023  1:19 PM EST -----  Urine with candida +, DC macrobid. Start fluonazole 200 mg PO daily x 14 days.

## 2023-03-13 ENCOUNTER — HOSPITAL ENCOUNTER (OUTPATIENT)
Dept: ULTRASOUND IMAGING | Age: 83
Discharge: HOME | End: 2023-03-16
Payer: MEDICARE

## 2023-03-13 VITALS
OXYGEN SATURATION: 94 % | HEIGHT: 65 IN | WEIGHT: 213 LBS | SYSTOLIC BLOOD PRESSURE: 164 MMHG | BODY MASS INDEX: 35.49 KG/M2 | RESPIRATION RATE: 16 BRPM | DIASTOLIC BLOOD PRESSURE: 86 MMHG | HEART RATE: 75 BPM | TEMPERATURE: 98.2 F

## 2023-03-13 DIAGNOSIS — E04.1 THYROID NODULE: ICD-10-CM

## 2023-03-13 PROCEDURE — 88173 CYTOPATH EVAL FNA REPORT: CPT

## 2023-03-13 PROCEDURE — 2500000003 HC RX 250 WO HCPCS: Performed by: PHYSICIAN ASSISTANT

## 2023-03-13 PROCEDURE — 10005 FNA BX W/US GDN 1ST LES: CPT

## 2023-03-13 RX ORDER — LIDOCAINE HYDROCHLORIDE 20 MG/ML
INJECTION, SOLUTION INFILTRATION; PERINEURAL
Status: COMPLETED | OUTPATIENT
Start: 2023-03-13 | End: 2023-03-13

## 2023-03-13 RX ADMIN — LIDOCAINE HYDROCHLORIDE 5 ML: 20 INJECTION, SOLUTION INFILTRATION; PERINEURAL at 13:59

## 2023-03-13 ASSESSMENT — PAIN - FUNCTIONAL ASSESSMENT: PAIN_FUNCTIONAL_ASSESSMENT: NONE - DENIES PAIN

## 2023-03-13 NOTE — DISCHARGE INSTRUCTIONS
If you have any questions about your procedure, please call the Interventional Radiology department at [phone]. After business hours (5pm) and weekends, call the answering service at [phone] and ask for the Radiologist on call to be paged. Si tiene Preguntas acerca del procedimiento, por favor llame al departamento de Radiología Intervencional al [phone]. Después de horas de oficina (5 pm) y los fines de [place], llamar al Tyrel Sidhu al [phone] y pregunte por el Radiologo de [facility]. Interventional Radiology General Nurse Discharge    After general anesthesia or intravenous sedation, for 24 hours or while taking prescription Narcotics:  Limit your activities  Do not drive and operate hazardous machinery  Do not make important personal or business decisions  Do  not drink alcoholic beverages  If you have not urinated within 8 hours after discharge, please contact your surgeon on call. * Please give a list of your current medications to your Primary Care Provider. * Please update this list whenever your medications are discontinued, doses are     changed, or new medications (including over-the-counter products) are added. * Please carry medication information at all times in case of emergency situations. These are general instructions for a healthy lifestyle:    No smoking/ No tobacco products/ Avoid exposure to second hand smoke  Surgeon General's Warning:  Quitting smoking now greatly reduces serious risk to your health.     Obesity, smoking, and sedentary lifestyle greatly increases your risk for illness  A healthy diet, regular physical exercise & weight monitoring are important for maintaining a healthy lifestyle    You may be retaining fluid if you have a history of heart failure or if you experience any of the following symptoms:  Weight gain of 3 pounds or more overnight or 5 pounds in a week, increased swelling in our hands or feet or shortness of breath while lying flat in bed. Please call your doctor as soon as you notice any of these symptoms; do not wait until your next office visit. Recognize signs and symptoms of STROKE:  F-face looks uneven    A-arms unable to move or move unevenly    S-speech slurred or non-existent    T-time-call 911 as soon as signs and symptoms begin-DO NOT go       Back to bed or wait to see if you get better-TIME IS BRAIN.

## 2023-03-13 NOTE — TELEPHONE ENCOUNTER
Requested Prescriptions     Pending Prescriptions Disp Refills    apixaban (ELIQUIS) 5 MG TABS tablet 120 tablet 0     Sig: Take 1 tablet by mouth 2 times daily at 0800 and 1400

## 2023-03-13 NOTE — TELEPHONE ENCOUNTER
Pt's son called on behalf of the pt to check whether or not the pt is supposed to be taking Eliquis. States that the pt has been taken off of the Rx and put back on the Rx by the hospital multiple times. If the pt is supposed to be taking Eliquis he will need a refill. MEDICATION REFILL REQUEST      Name of Medication:  Eliquis  Dose:  5 mg  Frequency:  Take 5 mg by mouth 2 times daily at 0800 and 1400  Quantity:  ?  Days' supply:  ?       Pharmacy Name/Location:  [facility] on [address] [place] [address]

## 2023-03-14 ENCOUNTER — TELEPHONE (OUTPATIENT)
Dept: CARDIOLOGY CLINIC | Age: 83
End: 2023-03-14

## 2023-03-14 ENCOUNTER — CARE COORDINATION (OUTPATIENT)
Dept: CARE COORDINATION | Facility: CLINIC | Age: 83
End: 2023-03-14

## 2023-03-14 LAB
CYTOLOGY-NON GYN: NORMAL
SPECIMEN SOURCE: NORMAL

## 2023-03-14 NOTE — CARE COORDINATION
Ambulatory Care Coordination Note  3/14/2023    Patient Current Location:  [address] [place] [address]     ACM contacted the patient by telephone. Verified name and  with patient as identifiers. Provided introduction to self, and explanation of the ACM role. Challenges to be reviewed by the provider   Additional needs identified to be addressed with provider: No  none               Method of communication with provider: none. ACM: Maria Elena Gómez RN    Ccm outreached to patient. Patient states he is doing well at this time. Patient had follow up with pcp on 3/7/23. Patient states no questions or concerns. Ccm discussed that I would outreach next week. Patient agreeable. Offered patient enrollment in the Remote Patient Monitoring (RPM) program for in-home monitoring: NA. Lab Results       None            Care Coordination Interventions    Referral from Primary Care Provider: No  Suggested Interventions and [address]: In Process          Goals Addressed                      This Visit's Progress      Conditions and Symptoms   On track      I will schedule office visits, as directed by my provider. I will keep my appointment or reschedule if I have to cancel. I will notify my provider of any symptoms that indicate a worsening of my condition. Patient agrees to monitor and record weights daily. Reports increase if 2 lbs. overnight or 5 lbs.  in a week to MD    Barriers: none  Plan for overcoming my barriers: N/A  Confidence: 7/10  Anticipated Goal Completion Date: 22          Patient Stated (pt-stated)   On track      Patient will schedule and attend follow up              Future Appointments   Date Time Provider Sri Falk   3/15/2023  4:00 AM RAYA Carmona   3/20/2023  2:41 PM SFD CT 64 SLICE UNIT 1 SFDRCT SFD   3/22/2023 To Be Determined RAYA Carmona   2023  4:15 PM Jolanta Owens DO [facility] AMB   2023 11:00 AM Mike Head MD CAFM [facility] AMB

## 2023-03-14 NOTE — TELEPHONE ENCOUNTER
Patient can not afford a $500.00 payment on Eliquis. Please call son to discuss asap. Patient has not taken any med. In 2 weeks. . Give samples to be picked up at [address] [place] if available.

## 2023-03-15 ENCOUNTER — HOME CARE VISIT (OUTPATIENT)
Dept: SCHEDULING | Facility: HOME HEALTH | Age: 83
End: 2023-03-15
Payer: MEDICARE

## 2023-03-15 VITALS
DIASTOLIC BLOOD PRESSURE: 80 MMHG | TEMPERATURE: 97.4 F | RESPIRATION RATE: 18 BRPM | SYSTOLIC BLOOD PRESSURE: 148 MMHG | OXYGEN SATURATION: 97 % | HEART RATE: 89 BPM

## 2023-03-15 PROCEDURE — G0299 HHS/HOSPICE OF RN EA 15 MIN: HCPCS

## 2023-03-15 ASSESSMENT — ENCOUNTER SYMPTOMS: DYSPNEA ACTIVITY LEVEL: AFTER AMBULATING MORE THAN 20 FT

## 2023-03-17 NOTE — TELEPHONE ENCOUNTER
Called pt's son advised can provide with samples and discussed [facility] option and to call back if decided on [facility]). Pt's son gave a verbal understanding and states will call back to let us know about if decides on going with a [facility] L[place]

## 2023-03-20 ENCOUNTER — TELEPHONE (OUTPATIENT)
Dept: ONCOLOGY | Age: 83
End: 2023-03-20

## 2023-03-20 ENCOUNTER — HOSPITAL ENCOUNTER (OUTPATIENT)
Dept: CT IMAGING | Age: 83
Discharge: HOME | End: 2023-03-23
Payer: MEDICARE

## 2023-03-20 DIAGNOSIS — C18.0 ADENOCARCINOMA OF CECUM (HCC): ICD-10-CM

## 2023-03-20 PROCEDURE — 6360000004 HC RX CONTRAST MEDICATION: Performed by: INTERNAL MEDICINE

## 2023-03-20 PROCEDURE — 74177 CT ABD & PELVIS W/CONTRAST: CPT

## 2023-03-20 PROCEDURE — 2580000003 HC RX 258: Performed by: INTERNAL MEDICINE

## 2023-03-20 RX ORDER — SODIUM CHLORIDE 0.9 % (FLUSH) 0.9 %
10 SYRINGE (ML) INJECTION
Status: COMPLETED | OUTPATIENT
Start: 2023-03-20 | End: 2023-03-20

## 2023-03-20 RX ORDER — 0.9 % SODIUM CHLORIDE 0.9 %
100 INTRAVENOUS SOLUTION INTRAVENOUS ONCE
Status: COMPLETED | OUTPATIENT
Start: 2023-03-20 | End: 2023-03-20

## 2023-03-20 RX ADMIN — IOPAMIDOL 100 ML: 755 INJECTION, SOLUTION INTRAVENOUS at 14:58

## 2023-03-20 RX ADMIN — SODIUM CHLORIDE, PRESERVATIVE FREE 10 ML: 5 INJECTION INTRAVENOUS at 14:58

## 2023-03-20 RX ADMIN — SODIUM CHLORIDE 100 ML: 9 INJECTION, SOLUTION INTRAVENOUS at 14:58

## 2023-03-20 RX ADMIN — DIATRIZOATE MEGLUMINE AND DIATRIZOATE SODIUM 15 ML: 660; 100 LIQUID ORAL; RECTAL at 14:58

## 2023-03-20 NOTE — TELEPHONE ENCOUNTER
Pt notified of results. Referral to endocrinology sent during last OV. Instructed pt to keep apt for CT scan today. Pt verbalized understanding.     ----- Message from Nela Chavez MD sent at 3/17/2023  6:42 PM EDT -----  Thyroid biopsy consistent with benign nodule.  Follow up with Endocrinology

## 2023-03-21 ENCOUNTER — TELEPHONE (OUTPATIENT)
Dept: ONCOLOGY | Age: 83
End: 2023-03-21

## 2023-03-21 ENCOUNTER — HOME CARE VISIT (OUTPATIENT)
Dept: SCHEDULING | Facility: HOME HEALTH | Age: 83
End: 2023-03-21
Payer: MEDICARE

## 2023-03-21 DIAGNOSIS — N40.0 ENLARGED PROSTATE: Primary | ICD-10-CM

## 2023-03-21 PROCEDURE — G0299 HHS/HOSPICE OF RN EA 15 MIN: HCPCS

## 2023-03-21 NOTE — TELEPHONE ENCOUNTER
Pt notified of results. Lab apt scheduled for tomorrow @ 10am for PSA check. Pt aware of apt time. Msg sent to  to arrange 3 mo FU with Dr. Eleonora Esquivel.     ----- Message from Adolfo Beyer MD sent at 3/20/2023  6:12 PM EDT -----  No evidence of recurrent cancer. Prostate is enlarged. Check PSA.  Follow up with me in 3 months

## 2023-03-22 ENCOUNTER — TELEPHONE (OUTPATIENT)
Dept: ONCOLOGY | Age: 83
End: 2023-03-22

## 2023-03-22 ENCOUNTER — HOSPITAL ENCOUNTER (OUTPATIENT)
Dept: LAB | Age: 83
Discharge: HOME | End: 2023-03-25
Payer: MEDICARE

## 2023-03-22 DIAGNOSIS — N40.0 ENLARGED PROSTATE: ICD-10-CM

## 2023-03-22 LAB — PSA SERPL-MCNC: 1.6 NG/ML

## 2023-03-22 PROCEDURE — 36415 COLL VENOUS BLD VENIPUNCTURE: CPT

## 2023-03-22 PROCEDURE — 84153 ASSAY OF PSA TOTAL: CPT

## 2023-03-22 NOTE — TELEPHONE ENCOUNTER
Pt notified and verbalized understanding.   Follow up as scheduled - pt aware of apt date/time.     ----- Message from Sg Keller MD sent at 3/22/2023 12:53 PM EDT -----  PSA is in normal range

## 2023-03-23 ENCOUNTER — TELEPHONE (OUTPATIENT)
Dept: INTERNAL MEDICINE CLINIC | Facility: CLINIC | Age: 83
End: 2023-03-23

## 2023-03-23 NOTE — TELEPHONE ENCOUNTER
Rosalind Isabel from [facility] is calling and would like verbal orders/okay to extend Pt's nursing visits for 4 weeks. She would like to continue seeing patient once a week for the next 4 weeks. Please advise.

## 2023-03-25 DIAGNOSIS — G25.81 RESTLESS LEGS SYNDROME: ICD-10-CM

## 2023-03-27 VITALS
RESPIRATION RATE: 18 BRPM | OXYGEN SATURATION: 97 % | HEART RATE: 78 BPM | TEMPERATURE: 97.8 F | DIASTOLIC BLOOD PRESSURE: 80 MMHG | SYSTOLIC BLOOD PRESSURE: 130 MMHG

## 2023-03-27 RX ORDER — ROPINIROLE 0.5 MG/1
TABLET, FILM COATED ORAL
Qty: 90 TABLET | Refills: 3 | OUTPATIENT
Start: 2023-03-27

## 2023-03-27 NOTE — TELEPHONE ENCOUNTER
[facility] for another 4 weeks

## 2023-03-28 ENCOUNTER — TELEPHONE (OUTPATIENT)
Dept: CARDIOLOGY CLINIC | Age: 83
End: 2023-03-28

## 2023-03-28 NOTE — TELEPHONE ENCOUNTER
Beulah Santamaria, DO Clarice Wilkinson, RAYA; Jessenia Portillo, RN  He should be taking metoprolol daily, and only taking lasix with K AS NEEDED for edema, 2-3 pd weight gain ONLY. Thanks     Pt.notified.

## 2023-03-29 ENCOUNTER — HOME CARE VISIT (OUTPATIENT)
Dept: SCHEDULING | Facility: HOME HEALTH | Age: 83
End: 2023-03-29
Payer: MEDICARE

## 2023-03-29 ENCOUNTER — TELEPHONE (OUTPATIENT)
Dept: INTERNAL MEDICINE CLINIC | Facility: CLINIC | Age: 83
End: 2023-03-29

## 2023-03-29 PROCEDURE — G0299 HHS/HOSPICE OF RN EA 15 MIN: HCPCS

## 2023-03-30 ENCOUNTER — HOSPITAL ENCOUNTER (OUTPATIENT)
Dept: LAB | Age: 83
Discharge: HOME | End: 2023-03-30
Payer: MEDICARE

## 2023-03-30 ENCOUNTER — HOME CARE VISIT (OUTPATIENT)
Dept: SCHEDULING | Facility: HOME HEALTH | Age: 83
End: 2023-03-30
Payer: MEDICARE

## 2023-03-30 VITALS
OXYGEN SATURATION: 96 % | SYSTOLIC BLOOD PRESSURE: 138 MMHG | RESPIRATION RATE: 18 BRPM | HEART RATE: 76 BPM | DIASTOLIC BLOOD PRESSURE: 84 MMHG | TEMPERATURE: 97.9 F

## 2023-03-30 LAB
APPEARANCE UR: CLEAR
BACTERIA URNS QL MICRO: NEGATIVE /HPF
BILIRUB UR QL: NEGATIVE
CASTS URNS QL MICRO: ABNORMAL /LPF
COLOR UR: ABNORMAL
EPI CELLS #/AREA URNS HPF: ABNORMAL /HPF
GLUCOSE UR STRIP.AUTO-MCNC: 500 MG/DL
HGB UR QL STRIP: NEGATIVE
KETONES UR QL STRIP.AUTO: NEGATIVE MG/DL
LEUKOCYTE ESTERASE UR QL STRIP.AUTO: ABNORMAL
NITRITE UR QL STRIP.AUTO: NEGATIVE
PH UR STRIP: 6 (ref 5–9)
PROT UR STRIP-MCNC: NEGATIVE MG/DL
RBC #/AREA URNS HPF: ABNORMAL /HPF
SP GR UR REFRACTOMETRY: 1.02 (ref 1–1.02)
UROBILINOGEN UR QL STRIP.AUTO: 0.2 EU/DL (ref 0.2–1)
WBC URNS QL MICRO: >100 /HPF

## 2023-03-30 PROCEDURE — 81001 URINALYSIS AUTO W/SCOPE: CPT

## 2023-03-30 PROCEDURE — G0299 HHS/HOSPICE OF RN EA 15 MIN: HCPCS

## 2023-04-03 ENCOUNTER — CARE COORDINATION (OUTPATIENT)
Dept: CARE COORDINATION | Facility: CLINIC | Age: 83
End: 2023-04-03

## 2023-04-03 VITALS
OXYGEN SATURATION: 95 % | HEART RATE: 75 BPM | TEMPERATURE: 97.7 F | DIASTOLIC BLOOD PRESSURE: 80 MMHG | RESPIRATION RATE: 18 BRPM | SYSTOLIC BLOOD PRESSURE: 140 MMHG

## 2023-04-03 NOTE — CARE COORDINATION
Patient has graduated from the Complex Case Management  program on 4/3/23. Patient/family has the ability to self-manage at this time. Care management goals have been completed. No further Ambulatory Care Manager follow up scheduled. Goals Addressed                      This Visit's Progress      COMPLETED: Conditions and Symptoms         I will schedule office visits, as directed by my provider. I will keep my appointment or reschedule if I have to cancel. I will notify my provider of any symptoms that indicate a worsening of my condition. Patient agrees to monitor and record weights daily. Reports increase if 2 lbs. overnight or 5 lbs. in a week to MD    Barriers: none  Plan for overcoming my barriers: N/A  Confidence: 7/10  Anticipated Goal Completion Date: 9/27/22          COMPLETED: Patient Stated (pt-stated)         Patient will schedule and attend follow up              Patient has Ambulatory Care Manager's contact information for any further questions, concerns, or needs.   Patients upcoming visits:    Future Appointments   Date Time Provider Sri Falk   4/5/2023 To Be Determined RAYA Carmona   4/12/2023  4:15 PM DO MIGUELINA Lees [facility] AMB   4/13/2023 To Be Determined RAYA Carmona   4/19/2023 To Be Determined RAYA Carmona   6/21/2023 11:20 AM Gregorio [address] [place] [address]   6/21/2023 11:45 AM Derrick Narvaez MD [facility] AMB   7/7/2023 11:00 AM Katalina Wood MD [facility] G[facility] AMB

## 2023-04-05 ENCOUNTER — HOME CARE VISIT (OUTPATIENT)
Dept: SCHEDULING | Facility: HOME HEALTH | Age: 83
End: 2023-04-05
Payer: MEDICARE

## 2023-04-05 VITALS
TEMPERATURE: 98.1 F | RESPIRATION RATE: 17 BRPM | OXYGEN SATURATION: 97 % | HEART RATE: 79 BPM | SYSTOLIC BLOOD PRESSURE: 138 MMHG | DIASTOLIC BLOOD PRESSURE: 78 MMHG

## 2023-04-05 PROCEDURE — G0299 HHS/HOSPICE OF RN EA 15 MIN: HCPCS

## 2023-04-05 ASSESSMENT — ENCOUNTER SYMPTOMS
DYSPNEA ACTIVITY LEVEL: AFTER AMBULATING 10 - 20 FT
PAIN LOCATION - PAIN QUALITY: SORE

## 2023-04-19 ENCOUNTER — HOME CARE VISIT (OUTPATIENT)
Dept: SCHEDULING | Facility: HOME HEALTH | Age: 83
End: 2023-04-19
Payer: MEDICARE

## 2023-04-19 PROCEDURE — G0299 HHS/HOSPICE OF RN EA 15 MIN: HCPCS

## 2023-04-20 VITALS
DIASTOLIC BLOOD PRESSURE: 64 MMHG | RESPIRATION RATE: 18 BRPM | OXYGEN SATURATION: 96 % | SYSTOLIC BLOOD PRESSURE: 112 MMHG | HEART RATE: 78 BPM | TEMPERATURE: 98.4 F

## 2023-04-25 ENCOUNTER — HOSPITAL ENCOUNTER (EMERGENCY)
Age: 83
Discharge: HOME | End: 2023-04-25
Attending: EMERGENCY MEDICINE
Payer: MEDICARE

## 2023-04-25 ENCOUNTER — APPOINTMENT (OUTPATIENT)
Dept: GENERAL RADIOLOGY | Age: 83
End: 2023-04-25
Payer: MEDICARE

## 2023-04-25 ENCOUNTER — TELEPHONE (OUTPATIENT)
Dept: CARDIOLOGY CLINIC | Age: 83
End: 2023-04-25

## 2023-04-25 VITALS
SYSTOLIC BLOOD PRESSURE: 118 MMHG | OXYGEN SATURATION: 95 % | BODY MASS INDEX: 33.27 KG/M2 | HEART RATE: 75 BPM | RESPIRATION RATE: 20 BRPM | HEIGHT: 66 IN | WEIGHT: 207 LBS | DIASTOLIC BLOOD PRESSURE: 67 MMHG | TEMPERATURE: 97.4 F

## 2023-04-25 DIAGNOSIS — R55 NEAR SYNCOPE: Primary | ICD-10-CM

## 2023-04-25 LAB
ALBUMIN SERPL-MCNC: 3.3 G/DL (ref 3.2–4.6)
ALBUMIN/GLOB SERPL: 0.9 (ref 0.4–1.6)
ALP SERPL-CCNC: 94 U/L (ref 50–136)
ALT SERPL-CCNC: 30 U/L (ref 12–65)
ANION GAP SERPL CALC-SCNC: 6 MMOL/L (ref 2–11)
AST SERPL-CCNC: 22 U/L (ref 15–37)
BASOPHILS # BLD: 0 K/UL (ref 0–0.2)
BASOPHILS NFR BLD: 0 % (ref 0–2)
BILIRUB SERPL-MCNC: 0.3 MG/DL (ref 0.2–1.1)
BUN SERPL-MCNC: 15 MG/DL (ref 8–23)
CALCIUM SERPL-MCNC: 9.4 MG/DL (ref 8.3–10.4)
CHLORIDE SERPL-SCNC: 109 MMOL/L (ref 101–110)
CO2 SERPL-SCNC: 23 MMOL/L (ref 21–32)
CREAT SERPL-MCNC: 1.3 MG/DL (ref 0.8–1.5)
DIFFERENTIAL METHOD BLD: NORMAL
EKG ATRIAL RATE: 0 BPM
EKG DIAGNOSIS: NORMAL
EKG P AXIS: 0 DEGREES
EKG P-R INTERVAL: 56 MS
EKG Q-T INTERVAL: 448 MS
EKG QRS DURATION: 181 MS
EKG QTC CALCULATION (BAZETT): 501 MS
EKG R AXIS: -74 DEGREES
EKG T AXIS: 91 DEGREES
EKG VENTRICULAR RATE: 75 BPM
EOSINOPHIL # BLD: 0.2 K/UL (ref 0–0.8)
EOSINOPHIL NFR BLD: 2 % (ref 0.5–7.8)
ERYTHROCYTE [DISTWIDTH] IN BLOOD BY AUTOMATED COUNT: 13.7 % (ref 11.9–14.6)
GLOBULIN SER CALC-MCNC: 3.5 G/DL (ref 2.8–4.5)
GLUCOSE SERPL-MCNC: 146 MG/DL (ref 65–100)
HCT VFR BLD AUTO: 43.6 % (ref 41.1–50.3)
HGB BLD-MCNC: 14.5 G/DL (ref 13.6–17.2)
IMM GRANULOCYTES # BLD AUTO: 0 K/UL (ref 0–0.5)
IMM GRANULOCYTES NFR BLD AUTO: 0 % (ref 0–5)
LYMPHOCYTES # BLD: 2.1 K/UL (ref 0.5–4.6)
LYMPHOCYTES NFR BLD: 22 % (ref 13–44)
MCH RBC QN AUTO: 31.2 PG (ref 26.1–32.9)
MCHC RBC AUTO-ENTMCNC: 33.3 G/DL (ref 31.4–35)
MCV RBC AUTO: 93.8 FL (ref 82–102)
MONOCYTES # BLD: 0.6 K/UL (ref 0.1–1.3)
MONOCYTES NFR BLD: 6 % (ref 4–12)
NEUTS SEG # BLD: 6.6 K/UL (ref 1.7–8.2)
NEUTS SEG NFR BLD: 70 % (ref 43–78)
NRBC # BLD: 0 K/UL (ref 0–0.2)
NT PRO BNP: 227 PG/ML
PLATELET # BLD AUTO: 175 K/UL (ref 150–450)
PMV BLD AUTO: 9.4 FL (ref 9.4–12.3)
POTASSIUM SERPL-SCNC: 3.5 MMOL/L (ref 3.5–5.1)
PROT SERPL-MCNC: 6.8 G/DL (ref 6.3–8.2)
RBC # BLD AUTO: 4.65 M/UL (ref 4.23–5.6)
SODIUM SERPL-SCNC: 138 MMOL/L (ref 133–143)
TROPONIN I SERPL HS-MCNC: 30.9 PG/ML (ref 0–57)
WBC # BLD AUTO: 9.6 K/UL (ref 4.3–11.1)

## 2023-04-25 PROCEDURE — 99285 EMERGENCY DEPT VISIT HI MDM: CPT

## 2023-04-25 PROCEDURE — 83880 ASSAY OF NATRIURETIC PEPTIDE: CPT

## 2023-04-25 PROCEDURE — 84484 ASSAY OF TROPONIN QUANT: CPT

## 2023-04-25 PROCEDURE — 81001 URINALYSIS AUTO W/SCOPE: CPT

## 2023-04-25 PROCEDURE — 71045 X-RAY EXAM CHEST 1 VIEW: CPT

## 2023-04-25 PROCEDURE — 93005 ELECTROCARDIOGRAM TRACING: CPT | Performed by: EMERGENCY MEDICINE

## 2023-04-25 PROCEDURE — 2580000003 HC RX 258: Performed by: EMERGENCY MEDICINE

## 2023-04-25 PROCEDURE — 85025 COMPLETE CBC W/AUTO DIFF WBC: CPT

## 2023-04-25 PROCEDURE — 80053 COMPREHEN METABOLIC PANEL: CPT

## 2023-04-25 RX ORDER — SODIUM CHLORIDE, SODIUM LACTATE, POTASSIUM CHLORIDE, AND CALCIUM CHLORIDE .6; .31; .03; .02 G/100ML; G/100ML; G/100ML; G/100ML
500 INJECTION, SOLUTION INTRAVENOUS ONCE
Status: COMPLETED | OUTPATIENT
Start: 2023-04-25 | End: 2023-04-25

## 2023-04-25 RX ADMIN — SODIUM CHLORIDE, POTASSIUM CHLORIDE, SODIUM LACTATE AND CALCIUM CHLORIDE 500 ML: 600; 310; 30; 20 INJECTION, SOLUTION INTRAVENOUS at 13:54

## 2023-04-25 ASSESSMENT — PAIN - FUNCTIONAL ASSESSMENT: PAIN_FUNCTIONAL_ASSESSMENT: NONE - DENIES PAIN

## 2023-04-25 ASSESSMENT — LIFESTYLE VARIABLES
HOW OFTEN DO YOU HAVE A DRINK CONTAINING ALCOHOL: NEVER
HOW MANY STANDARD DRINKS CONTAINING ALCOHOL DO YOU HAVE ON A TYPICAL DAY: PATIENT DOES NOT DRINK

## 2023-04-25 NOTE — ED NOTES
I have reviewed discharge instructions with the patient. The patient verbalized understanding. Patient left ED via Discharge Method: ambulatory to Home with wife. Opportunity for questions and clarification provided. Patient given 0 scripts. To continue your aftercare when you leave the hospital, you may receive an automated call from our care team to check in on how you are doing. This is a free service and part of our promise to provide the best care and service to meet your aftercare needs.  If you have questions, or wish to unsubscribe from this service please call [phone]. Thank you for Choosing our [facility] Emergency Department.        Denver Figueroa RN  04/25/23 7763

## 2023-04-25 NOTE — TELEPHONE ENCOUNTER
Patient's [facility] called [facility]. Patient was experiencing low blood pressures and feeling bad and weak. Son said patient had been taking 2 lisinipril daily but in the last week cut back to 1 per day. Son wants to make sure to keep Dr. Melecio Pickett in the loop.

## 2023-04-25 NOTE — ED TRIAGE NOTES
Pt arrives via EMS from home. Pt was working in the garage and was feeling weak suddenly. Pt checked BP and got 76/60 with home cuff. Pt has decreased BP meds in half without doctors meds. Pt reports SOB.   /60 - 118/70 in route, , , 02 95 RA    Pt has cardiac hx - ventricular pacemaker, stents, and a watchman

## 2023-04-25 NOTE — TELEPHONE ENCOUNTER
Pt.son wants  aware was seen in [facility] ER today for low BP. Pacemaker was firing and was near syncope. Told to nely brito/. Note sent to  to review records and advise.

## 2023-04-26 LAB
APPEARANCE UR: CLEAR
BACTERIA URNS QL MICRO: NEGATIVE /HPF
BILIRUB UR QL: NEGATIVE
CASTS URNS QL MICRO: NORMAL /LPF
COLOR UR: NORMAL
EPI CELLS #/AREA URNS HPF: NORMAL /HPF
GLUCOSE UR STRIP.AUTO-MCNC: NEGATIVE MG/DL
HGB UR QL STRIP: NEGATIVE
KETONES UR QL STRIP.AUTO: NEGATIVE MG/DL
LEUKOCYTE ESTERASE UR QL STRIP.AUTO: NEGATIVE
MUCOUS THREADS URNS QL MICRO: 0 /LPF
NITRITE UR QL STRIP.AUTO: NEGATIVE
PH UR STRIP: 6 (ref 5–9)
PROT UR STRIP-MCNC: NEGATIVE MG/DL
RBC #/AREA URNS HPF: NORMAL /HPF
SP GR UR REFRACTOMETRY: 1.01 (ref 1–1.02)
URINE CULTURE IF INDICATED: NORMAL
UROBILINOGEN UR QL STRIP.AUTO: 0.2 EU/DL (ref 0.2–1)
WBC URNS QL MICRO: NORMAL /HPF

## 2023-04-26 NOTE — TELEPHONE ENCOUNTER
,  No openings this week. Looks like your next available is end of July. He is scheduled May 11th. See below device check.

## 2023-04-26 NOTE — TELEPHONE ENCOUNTER
Bio remote showing RV pacing 100% w/ underlying paced. Leads stable. Patient has LV lead off due to chronic LV stim. NO high rates were noted. Device data stable.

## 2023-04-28 ENCOUNTER — OFFICE VISIT (OUTPATIENT)
Dept: CARDIOLOGY CLINIC | Age: 83
End: 2023-04-28

## 2023-04-28 ENCOUNTER — TELEPHONE (OUTPATIENT)
Dept: CARDIOLOGY CLINIC | Age: 83
End: 2023-04-28

## 2023-04-28 VITALS
HEART RATE: 84 BPM | DIASTOLIC BLOOD PRESSURE: 70 MMHG | BODY MASS INDEX: 34.39 KG/M2 | SYSTOLIC BLOOD PRESSURE: 108 MMHG | HEIGHT: 66 IN | WEIGHT: 214 LBS

## 2023-04-28 DIAGNOSIS — I10 PRIMARY HYPERTENSION: ICD-10-CM

## 2023-04-28 DIAGNOSIS — I48.0 PAROXYSMAL ATRIAL FIBRILLATION (HCC): ICD-10-CM

## 2023-04-28 DIAGNOSIS — I34.0 NONRHEUMATIC MITRAL VALVE REGURGITATION: ICD-10-CM

## 2023-04-28 DIAGNOSIS — I50.9 CONGESTIVE HEART FAILURE, UNSPECIFIED HF CHRONICITY, UNSPECIFIED HEART FAILURE TYPE (HCC): ICD-10-CM

## 2023-04-28 DIAGNOSIS — I49.5 SSS (SICK SINUS SYNDROME) (HCC): ICD-10-CM

## 2023-04-28 DIAGNOSIS — E78.5 DYSLIPIDEMIA: ICD-10-CM

## 2023-04-28 DIAGNOSIS — Z09 HOSPITAL DISCHARGE FOLLOW-UP: ICD-10-CM

## 2023-04-28 DIAGNOSIS — I50.32 CHRONIC DIASTOLIC CONGESTIVE HEART FAILURE (HCC): Primary | ICD-10-CM

## 2023-04-28 DIAGNOSIS — I25.118 ATHEROSCLEROTIC HEART DISEASE OF NATIVE CORONARY ARTERY WITH OTHER FORMS OF ANGINA PECTORIS (HCC): ICD-10-CM

## 2023-04-28 DIAGNOSIS — I65.23 BILATERAL CAROTID ARTERY STENOSIS: ICD-10-CM

## 2023-04-28 DIAGNOSIS — Z95.0 CARDIAC PACEMAKER IN SITU: ICD-10-CM

## 2023-04-28 DIAGNOSIS — R73.09 ELEVATED HEMOGLOBIN A1C: ICD-10-CM

## 2023-04-28 LAB
ALBUMIN SERPL-MCNC: 3.8 G/DL (ref 3.2–4.6)
ALBUMIN/GLOB SERPL: 1.4 (ref 0.4–1.6)
ALP SERPL-CCNC: 90 U/L (ref 50–136)
ALT SERPL-CCNC: 31 U/L (ref 12–65)
ANION GAP SERPL CALC-SCNC: 0 MMOL/L (ref 2–11)
AST SERPL-CCNC: 20 U/L (ref 15–37)
BASOPHILS # BLD: 0 K/UL (ref 0–0.2)
BASOPHILS NFR BLD: 0 % (ref 0–2)
BILIRUB SERPL-MCNC: 0.4 MG/DL (ref 0.2–1.1)
BUN SERPL-MCNC: 14 MG/DL (ref 8–23)
CALCIUM SERPL-MCNC: 9.3 MG/DL (ref 8.3–10.4)
CHLORIDE SERPL-SCNC: 109 MMOL/L (ref 101–110)
CHOLEST SERPL-MCNC: 109 MG/DL
CO2 SERPL-SCNC: 23 MMOL/L (ref 21–32)
CREAT SERPL-MCNC: 0.9 MG/DL (ref 0.8–1.5)
DIFFERENTIAL METHOD BLD: NORMAL
EOSINOPHIL # BLD: 0.3 K/UL (ref 0–0.8)
EOSINOPHIL NFR BLD: 3 % (ref 0.5–7.8)
ERYTHROCYTE [DISTWIDTH] IN BLOOD BY AUTOMATED COUNT: 13.4 % (ref 11.9–14.6)
EST. AVERAGE GLUCOSE BLD GHB EST-MCNC: 128 MG/DL
GLOBULIN SER CALC-MCNC: 2.7 G/DL (ref 2.8–4.5)
GLUCOSE SERPL-MCNC: 128 MG/DL (ref 65–100)
HBA1C MFR BLD: 6.1 % (ref 4.8–5.6)
HCT VFR BLD AUTO: 45.8 % (ref 41.1–50.3)
HDLC SERPL-MCNC: 42 MG/DL (ref 40–60)
HDLC SERPL: 2.6
HGB BLD-MCNC: 14.9 G/DL (ref 13.6–17.2)
IMM GRANULOCYTES # BLD AUTO: 0 K/UL (ref 0–0.5)
IMM GRANULOCYTES NFR BLD AUTO: 0 % (ref 0–5)
LDLC SERPL CALC-MCNC: 42.2 MG/DL
LYMPHOCYTES # BLD: 3 K/UL (ref 0.5–4.6)
LYMPHOCYTES NFR BLD: 30 % (ref 13–44)
MCH RBC QN AUTO: 30.9 PG (ref 26.1–32.9)
MCHC RBC AUTO-ENTMCNC: 32.5 G/DL (ref 31.4–35)
MCV RBC AUTO: 95 FL (ref 82–102)
MONOCYTES # BLD: 0.7 K/UL (ref 0.1–1.3)
MONOCYTES NFR BLD: 7 % (ref 4–12)
NEUTS SEG # BLD: 5.9 K/UL (ref 1.7–8.2)
NEUTS SEG NFR BLD: 60 % (ref 43–78)
NRBC # BLD: 0 K/UL (ref 0–0.2)
PLATELET # BLD AUTO: 207 K/UL (ref 150–450)
PMV BLD AUTO: 9.9 FL (ref 9.4–12.3)
POTASSIUM SERPL-SCNC: 3.8 MMOL/L (ref 3.5–5.1)
PROT SERPL-MCNC: 6.5 G/DL (ref 6.3–8.2)
RBC # BLD AUTO: 4.82 M/UL (ref 4.23–5.6)
SODIUM SERPL-SCNC: 132 MMOL/L (ref 133–143)
TRIGL SERPL-MCNC: 124 MG/DL (ref 35–150)
VLDLC SERPL CALC-MCNC: 24.8 MG/DL (ref 6–23)
WBC # BLD AUTO: 10 K/UL (ref 4.3–11.1)

## 2023-04-28 NOTE — TELEPHONE ENCOUNTER
Patients son called stating his dad saw Dr Benjy Cortes earlier today. Pts son states he was instructed to call if his dads BP dropped. BP=74/40 then QS=663/60 latest readings. Pts son states he is lethargic and tired.

## 2023-04-28 NOTE — TELEPHONE ENCOUNTER
Pt.seen in office today and Jocelyn Huff told him to call if BP dropped again. Son calls said BP dropped down to 60/40 something. Pt.daughter-in-law who is a nurse rechecked and it was 74/40 and then a little later 100/60 and just now 98/60. He is off all his BP meds.

## 2023-04-28 NOTE — PROGRESS NOTES
[address], [address]  PHONE: [phone]     23    NAME:  Vaibhav Anaya  : 1940  MRN: [id number]       SUBJECTIVE:   Vaibhav Anaya is a 79 y.o. male seen for a follow up visit regarding the following:     Chief Complaint   Patient presents with    Follow-Up from Hospital       HPI: Here for CAD, Afib   prox LAD PCI 2015;   (GERMAIN on brilinta). 10/17: PPM placement for SSS.   2019: watchman device placement   C 3/19/2021: small vessel CAD, no PCI. 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 Been off ARB and BB. No CP, pressure. Some lack of energy. More tired in the PM.  Some GERMAIN, SOB. Patient denies recent history of orthopnea, PND, excessive dizziness and/or syncope. Has myasthenia gravis, this has been well controlled. Past Medical History, Past Surgical History, Family history, Social History, and Medications were all reviewed with the patient today and updated as necessary. Current Outpatient Medications   Medication Sig Dispense Refill    apixaban (ELIQUIS) 5 MG TABS tablet Take 1 tablet by mouth 2 times daily at 0800 and 1400 120 tablet 0    naloxone (NARCAN) 4 MG/0.1ML LIQD nasal spray 1 spray by Nasal route as needed for Opioid Reversal 4 each 0    colestipol (COLESTID) 1 g tablet       famotidine (PEPCID) 20 MG tablet       sildenafil (VIAGRA) 100 MG tablet Take 1 tablet by mouth as needed for Erectile Dysfunction 10 tablet 3    atorvastatin (LIPITOR) 80 MG tablet Take 1 tablet by mouth daily 90 tablet 5    finasteride (PROSCAR) 5 MG tablet finasteride 5 mg tablet   Take 1 tablet every day by oral route.  90 tablet 5    Multiple Vitamins-Minerals (MULTIVITAMIN WITH MINERALS) tablet Once daily

## 2023-04-28 NOTE — TELEPHONE ENCOUNTER
Per :Take him off Lasix daily and change to PRN and take Potassium only with Lasix. Daily weights and hydration. I notified pt.son of 's response and he v/u.

## 2023-05-01 ENCOUNTER — TELEPHONE (OUTPATIENT)
Dept: CARDIOLOGY CLINIC | Age: 83
End: 2023-05-01

## 2023-05-01 NOTE — TELEPHONE ENCOUNTER
Wants to talk to nurse about the eliquis for his dad that they are getting from [place] ([facility]) , They need a few things from us.  Please call

## 2023-05-03 NOTE — TELEPHONE ENCOUNTER
Pt's son called back states wanting to get Eliquis through online [place] ([facility]). Advised will send in Rx to preferred pharmacy. Advised pt's son to call back with any questions.

## 2023-05-08 ENCOUNTER — TELEPHONE (OUTPATIENT)
Age: 83
End: 2023-05-08

## 2023-05-08 RX ORDER — LOSARTAN POTASSIUM 50 MG/1
50 TABLET ORAL DAILY
Qty: 30 TABLET | Refills: 11 | Status: SHIPPED | OUTPATIENT
Start: 2023-05-08

## 2023-05-08 NOTE — TELEPHONE ENCOUNTER
Pt son Annita Sanderson is calling about  saying  his dads blood pressure has been running high for about 3 days ,and  Needs to see what Dr Radha Marvin wants to do ,he was not sure if he wanted to change meds ETC , Please call [phone]

## 2023-05-08 NOTE — TELEPHONE ENCOUNTER
I called and informed son of 's response nad escribed med as below  Requested Prescriptions     Signed Prescriptions Disp Refills    losartan (COZAAR) 50 MG tablet 30 tablet 11     Sig: Take 1 tablet by mouth daily     Authorizing Provider: Parrish Herzog     Ordering User: Yanique Hinton

## 2023-05-08 NOTE — TELEPHONE ENCOUNTER
4/28 called w/low BP 74/40,98/60 Lasix changed to PRN only. For the past few days /80's. He is currently not on any BP meds. He has no wt.gain or edema. Any meds needed to be added or just monitor for now?

## 2023-05-16 ASSESSMENT — ENCOUNTER SYMPTOMS
ABDOMINAL PAIN: 0
EYE DISCHARGE: 0
COUGH: 0
VOMITING: 0
BACK PAIN: 0
NAUSEA: 0
CHEST TIGHTNESS: 0
DIARRHEA: 0
SHORTNESS OF BREATH: 0

## 2023-05-16 NOTE — ED PROVIDER NOTES
Patient was left to me by Dr. Juno Reyes. Had a near syncopal episode in the garage. Denies any headache or blurry vision. No chest pain or shortness of breath. Was not orthostatic here. Feeling well. Will discharge with cardiology follow-up.      Magdiel Fitch III, MD  04/25/23 2064
oriented to person, place, and time. Mental status is at baseline. Comments: GCS 15, ANO x3 to person place and time, cranial nerves II through XII intact, motor 5 out of 5 in upper and lower extremities, sensation 5 out of 5 in the upper and lower extremities, DTR 2+ bilaterally,  normal heel-to-shin, normal finger-nose. Normal gait.     Psychiatric:         Behavior: Behavior normal.        Procedures     Procedures    Orders Placed This Encounter   Procedures    XR CHEST PORTABLE    CBC with Auto Differential    CMP    Troponin    Brain Natriuretic Peptide    Urinalysis with Reflex to Culture    Cardiac Monitor - ED Only    Orthostatic blood pressure and pulse    EKG 12 Lead        Medications   lactated ringers bolus (0 mLs IntraVENous Stopped 4/25/23 9711)       Discharge Medication List as of 4/25/2023  4:06 PM           Past Medical History:   Diagnosis Date    Abnormal EKG 4/22/15    Arrhythmia     Aspiration pneumonia ([address] 10/26/2017    CAD (coronary artery disease) 5/8/2015    Carotid artery stenosis without cerebral infarction 6/7/2016    US 6/15: <50% bilat ICAs    Coronary atherosclerosis of native coronary vessel 5/8/2015    GERMAIN on brilinta 5/7/15: prox LAD PCI, normal EF     Diastolic CHF, chronic ([address] 3/2/2022    Dyslipidemia 6/7/2016    Dyspnea 5/8/2015    Echo 6/15: EF 60%, mod MR, mod LVH, mild AI     ED (erectile dysfunction)     GERD (gastroesophageal reflux disease)     GERD (gastroesophageal reflux disease)     no medication    HTN (hypertension) 5/8/2015    Hypertension     Hypokalemia     Hypokalemia 6/7/2016    Mitral valve regurgitation 6/7/2016    Morbid obesity ([address]     Myasthenia gravis ([address]     Myasthenia gravis ([address] 6/17/15    Nocturia     Osteoporosis     PUD (peptic ulcer disease) 25 yrs ago    S/P coronary artery stent placement 5/8/2015    3.0x38 mm Xience CAROL to pLAD 5/7/15     Sleep apnea     Syncope and collapse     Unspecified sleep apnea     no cpap        Past

## 2023-05-18 ENCOUNTER — TELEPHONE (OUTPATIENT)
Age: 83
End: 2023-05-18

## 2023-05-18 NOTE — TELEPHONE ENCOUNTER
Wants samples of Eliquis and  in [place] office His [employer] will not be here for 4 weeks Please call

## 2023-05-19 NOTE — TELEPHONE ENCOUNTER
Called pt's son no answer left message advising samples available for  in [place] office and to call back with any questions.

## 2023-05-31 ENCOUNTER — OFFICE VISIT (OUTPATIENT)
Age: 83
End: 2023-05-31
Payer: MEDICARE

## 2023-05-31 VITALS
BODY MASS INDEX: 34.87 KG/M2 | HEART RATE: 80 BPM | HEIGHT: 66 IN | WEIGHT: 217 LBS | SYSTOLIC BLOOD PRESSURE: 134 MMHG | DIASTOLIC BLOOD PRESSURE: 70 MMHG

## 2023-05-31 DIAGNOSIS — I65.23 BILATERAL CAROTID ARTERY STENOSIS: ICD-10-CM

## 2023-05-31 DIAGNOSIS — I50.22 CHRONIC SYSTOLIC (CONGESTIVE) HEART FAILURE (HCC): Primary | ICD-10-CM

## 2023-05-31 DIAGNOSIS — Z95.0 PACEMAKER: ICD-10-CM

## 2023-05-31 DIAGNOSIS — I34.0 NONRHEUMATIC MITRAL VALVE REGURGITATION: ICD-10-CM

## 2023-05-31 DIAGNOSIS — I48.0 PAROXYSMAL ATRIAL FIBRILLATION (HCC): ICD-10-CM

## 2023-05-31 DIAGNOSIS — I49.5 SSS (SICK SINUS SYNDROME) (HCC): ICD-10-CM

## 2023-05-31 DIAGNOSIS — I10 PRIMARY HYPERTENSION: ICD-10-CM

## 2023-05-31 PROCEDURE — 3075F SYST BP GE 130 - 139MM HG: CPT | Performed by: INTERNAL MEDICINE

## 2023-05-31 PROCEDURE — 99214 OFFICE O/P EST MOD 30 MIN: CPT | Performed by: INTERNAL MEDICINE

## 2023-05-31 PROCEDURE — G8428 CUR MEDS NOT DOCUMENT: HCPCS | Performed by: INTERNAL MEDICINE

## 2023-05-31 PROCEDURE — 3078F DIAST BP <80 MM HG: CPT | Performed by: INTERNAL MEDICINE

## 2023-05-31 PROCEDURE — 1123F ACP DISCUSS/DSCN MKR DOCD: CPT | Performed by: INTERNAL MEDICINE

## 2023-05-31 PROCEDURE — G8417 CALC BMI ABV UP PARAM F/U: HCPCS | Performed by: INTERNAL MEDICINE

## 2023-05-31 PROCEDURE — 1036F TOBACCO NON-USER: CPT | Performed by: INTERNAL MEDICINE

## 2023-05-31 NOTE — PROGRESS NOTES
[address], [address] [employer] [facility], [address] [place] [address]  PHONE: [phone]     23    NAME:  Justine Nunez  : 1940  MRN: [id number]       SUBJECTIVE:   Justine Nunez is a 79 y.o. male seen for a follow up visit regarding the following:     Chief Complaint   Patient presents with    Congestive Heart Failure       HPI:  Here for CAD, Afib   prox LAD PCI 2015;   (GERMAIN on brilinta). 10/17: PPM placement for SSS.   2019: watchman device placement   [facility] 3/19/2021: small vessel CAD, no PCI. 2021: AVN ablation  3/2022: GIB s/p 3u PRBC, ERCP  2022: left axillary vein DVT on Eliquis, and newly diagnosed cecal mass showing adenocarcinoma s/p right hemicolectomy on 8/10  Echo 2023: EF 53%, mild to mod AI and MR     Back on ARB. Drinking more water. No CP, pressure. Some lack of energy. More tired in the PM.  Some GERMAIN, SOB. Patient denies recent history of orthopnea, PND, excessive dizziness and/or syncope. Has myasthenia gravis, this has been well controlled. Past Medical History, Past Surgical History, Family history, Social History, and Medications were all reviewed with the patient today and updated as necessary. Current Outpatient Medications   Medication Sig Dispense Refill    losartan (COZAAR) 50 MG tablet Take 1 tablet by mouth daily 30 tablet 11    apixaban (ELIQUIS) 5 MG TABS tablet Take 1 tablet by mouth 2 times daily at 0800 and 1400 180 tablet 3    naloxone (NARCAN) 4 MG/0.1ML LIQD nasal spray 1 spray by Nasal route as needed for Opioid Reversal 4 each 0    colestipol (COLESTID) 1 g tablet       famotidine (PEPCID) 20 MG tablet       sildenafil (VIAGRA) 100 MG tablet Take 1 tablet by mouth as needed for Erectile Dysfunction 10 tablet 3    atorvastatin (LIPITOR) 80 MG tablet Take 1 tablet by mouth daily 90 tablet 5    finasteride (PROSCAR) 5 MG tablet finasteride 5 mg tablet   Take 1 tablet every day by oral route.  90 tablet 5    Multiple

## 2023-06-10 RX ORDER — ROPINIROLE 0.5 MG/1
TABLET, FILM COATED ORAL
Qty: 90 TABLET | Refills: 3 | Status: SHIPPED | OUTPATIENT
Start: 2023-06-10

## 2023-06-20 DIAGNOSIS — N40.0 ENLARGED PROSTATE: ICD-10-CM

## 2023-06-20 DIAGNOSIS — C18.0 ADENOCARCINOMA OF CECUM (HCC): Primary | ICD-10-CM

## 2023-06-20 DIAGNOSIS — D50.9 IRON DEFICIENCY ANEMIA, UNSPECIFIED IRON DEFICIENCY ANEMIA TYPE: ICD-10-CM

## 2023-06-20 NOTE — PROGRESS NOTES
with no LVI, PNI, localized perforation. Surgical margins were negative. There was no evidence of bowel obstruction clinically. Discussed that treatment intent is curative. Reviewed different treatment options (adjuvant 5FU- based treatment versus surveillance) and discussed rationale/logistics/risk/benefit/alternatives to each approach. Reviewed results of Oncotype DX colon -discussed clinical implications of his score (17-low)  After a thorough discussion and through mutual decision making, patient decided to proceed with close monitoring. Reviewed NCCN guidelines for postoperative surveillance in CRC. PLAN:  -Return office visit in 3 months with labs including tumor marker and CT chest abdomen pelvis prior.  -Patient will be scheduled for IV iron-ferritin was 7  -Continue with apixaban to complete 3 months of treatment.  -Subcentimeter liver lung lesion will be followed on subsequent imaging. All questions were answered to the best of my abilities. 3/6/22: Urinalysis drawn in the office today is positive for 1+ bacteria and leukocyte esterase thus suggestive of UTI. Patient will be prescribed nitrofurantoin for 5 days. He will be referred to IR for biopsy of 2.2 cm thyroid nodule involving isthmus noticed on recent ultrasound. He will likely be referred to endocrinology for further management. CEA 2.8. There is no clinical evidence of cancer recurrence. However based on multiple various complaints as described above, we shall get surveillance CT scan at next available spot and office follow-up will be arranged to review the results. All questions were answered to the best of my abilities. 6/21/23: I reviewed the results of most recent labs and imaging findings with the patient. He has no signs or symptoms concerning for disease recurrence based on today's evaluation. Follow up in 3 months with labs and CT chest abdomen and pelvis prior.  Total visit time 25 minutes                Trudi Reeves

## 2023-06-21 ENCOUNTER — HOSPITAL ENCOUNTER (OUTPATIENT)
Dept: LAB | Age: 83
Discharge: HOME | End: 2023-06-24
Payer: MEDICARE

## 2023-06-21 ENCOUNTER — OFFICE VISIT (OUTPATIENT)
Dept: ONCOLOGY | Age: 83
End: 2023-06-21
Payer: MEDICARE

## 2023-06-21 VITALS
SYSTOLIC BLOOD PRESSURE: 125 MMHG | HEART RATE: 76 BPM | WEIGHT: 213.1 LBS | DIASTOLIC BLOOD PRESSURE: 72 MMHG | HEIGHT: 65 IN | RESPIRATION RATE: 22 BRPM | TEMPERATURE: 98.1 F | BODY MASS INDEX: 35.5 KG/M2 | OXYGEN SATURATION: 95 %

## 2023-06-21 DIAGNOSIS — N40.0 ENLARGED PROSTATE: ICD-10-CM

## 2023-06-21 DIAGNOSIS — C18.0 ADENOCARCINOMA OF CECUM (HCC): Primary | ICD-10-CM

## 2023-06-21 DIAGNOSIS — D50.9 IRON DEFICIENCY ANEMIA, UNSPECIFIED IRON DEFICIENCY ANEMIA TYPE: ICD-10-CM

## 2023-06-21 DIAGNOSIS — C18.0 ADENOCARCINOMA OF CECUM (HCC): ICD-10-CM

## 2023-06-21 LAB
ALBUMIN SERPL-MCNC: 3.5 G/DL (ref 3.2–4.6)
ALBUMIN/GLOB SERPL: 1.1 (ref 0.4–1.6)
ALP SERPL-CCNC: 94 U/L (ref 50–136)
ALT SERPL-CCNC: 32 U/L (ref 12–65)
ANION GAP SERPL CALC-SCNC: 5 MMOL/L (ref 2–11)
AST SERPL-CCNC: 19 U/L (ref 15–37)
BASOPHILS # BLD: 0 K/UL (ref 0–0.2)
BASOPHILS NFR BLD: 0 % (ref 0–2)
BILIRUB SERPL-MCNC: 0.5 MG/DL (ref 0.2–1.1)
BUN SERPL-MCNC: 16 MG/DL (ref 8–23)
CALCIUM SERPL-MCNC: 9.5 MG/DL (ref 8.3–10.4)
CEA SERPL-MCNC: 2.1 NG/ML (ref 0–3)
CHLORIDE SERPL-SCNC: 108 MMOL/L (ref 101–110)
CO2 SERPL-SCNC: 27 MMOL/L (ref 21–32)
CREAT SERPL-MCNC: 0.9 MG/DL (ref 0.8–1.5)
DIFFERENTIAL METHOD BLD: NORMAL
EOSINOPHIL # BLD: 0.2 K/UL (ref 0–0.8)
EOSINOPHIL NFR BLD: 2 % (ref 0.5–7.8)
ERYTHROCYTE [DISTWIDTH] IN BLOOD BY AUTOMATED COUNT: 12.8 % (ref 11.9–14.6)
FERRITIN SERPL-MCNC: 65 NG/ML (ref 8–388)
GLOBULIN SER CALC-MCNC: 3.2 G/DL (ref 2.8–4.5)
GLUCOSE SERPL-MCNC: 121 MG/DL (ref 65–100)
HCT VFR BLD AUTO: 45.4 % (ref 41.1–50.3)
HGB BLD-MCNC: 14.6 G/DL (ref 13.6–17.2)
IMM GRANULOCYTES # BLD AUTO: 0 K/UL (ref 0–0.5)
IMM GRANULOCYTES NFR BLD AUTO: 0 % (ref 0–5)
IRON SATN MFR SERPL: 31 %
IRON SERPL-MCNC: 105 UG/DL (ref 35–150)
LYMPHOCYTES # BLD: 3 K/UL (ref 0.5–4.6)
LYMPHOCYTES NFR BLD: 29 % (ref 13–44)
MCH RBC QN AUTO: 30.1 PG (ref 26.1–32.9)
MCHC RBC AUTO-ENTMCNC: 32.2 G/DL (ref 31.4–35)
MCV RBC AUTO: 93.6 FL (ref 82–102)
MONOCYTES # BLD: 0.8 K/UL (ref 0.1–1.3)
MONOCYTES NFR BLD: 8 % (ref 4–12)
NEUTS SEG # BLD: 6.1 K/UL (ref 1.7–8.2)
NEUTS SEG NFR BLD: 61 % (ref 43–78)
NRBC # BLD: 0 K/UL (ref 0–0.2)
PLATELET # BLD AUTO: 187 K/UL (ref 150–450)
PMV BLD AUTO: 9.4 FL (ref 9.4–12.3)
POTASSIUM SERPL-SCNC: 3.8 MMOL/L (ref 3.5–5.1)
PROT SERPL-MCNC: 6.7 G/DL (ref 6.3–8.2)
PSA SERPL-MCNC: 1.8 NG/ML
RBC # BLD AUTO: 4.85 M/UL (ref 4.23–5.6)
SODIUM SERPL-SCNC: 140 MMOL/L (ref 133–143)
TIBC SERPL-MCNC: 335 UG/DL (ref 250–450)
WBC # BLD AUTO: 10.1 K/UL (ref 4.3–11.1)

## 2023-06-21 PROCEDURE — 82728 ASSAY OF FERRITIN: CPT

## 2023-06-21 PROCEDURE — 36415 COLL VENOUS BLD VENIPUNCTURE: CPT

## 2023-06-21 PROCEDURE — 3074F SYST BP LT 130 MM HG: CPT | Performed by: INTERNAL MEDICINE

## 2023-06-21 PROCEDURE — 85025 COMPLETE CBC W/AUTO DIFF WBC: CPT

## 2023-06-21 PROCEDURE — G8427 DOCREV CUR MEDS BY ELIG CLIN: HCPCS | Performed by: INTERNAL MEDICINE

## 2023-06-21 PROCEDURE — 1036F TOBACCO NON-USER: CPT | Performed by: INTERNAL MEDICINE

## 2023-06-21 PROCEDURE — 1123F ACP DISCUSS/DSCN MKR DOCD: CPT | Performed by: INTERNAL MEDICINE

## 2023-06-21 PROCEDURE — 3078F DIAST BP <80 MM HG: CPT | Performed by: INTERNAL MEDICINE

## 2023-06-21 PROCEDURE — 82378 CARCINOEMBRYONIC ANTIGEN: CPT

## 2023-06-21 PROCEDURE — 80053 COMPREHEN METABOLIC PANEL: CPT

## 2023-06-21 PROCEDURE — 99213 OFFICE O/P EST LOW 20 MIN: CPT | Performed by: INTERNAL MEDICINE

## 2023-06-21 PROCEDURE — G8417 CALC BMI ABV UP PARAM F/U: HCPCS | Performed by: INTERNAL MEDICINE

## 2023-06-21 PROCEDURE — 83550 IRON BINDING TEST: CPT

## 2023-06-21 PROCEDURE — 83540 ASSAY OF IRON: CPT

## 2023-06-21 PROCEDURE — 84153 ASSAY OF PSA TOTAL: CPT

## 2023-06-21 ASSESSMENT — PATIENT HEALTH QUESTIONNAIRE - PHQ9
SUM OF ALL RESPONSES TO PHQ QUESTIONS 1-9: 0
SUM OF ALL RESPONSES TO PHQ9 QUESTIONS 1 & 2: 0
2. FEELING DOWN, DEPRESSED OR HOPELESS: 0
1. LITTLE INTEREST OR PLEASURE IN DOING THINGS: 0

## 2023-06-21 NOTE — PATIENT INSTRUCTIONS
Patient Instructions from Today's Visit    Reason for Visit:  Follow up-Colon    Diagnosis Information:  [email or web address] [email or web address]-us/[email or web address]-answers-patient-education-[email or web address]-[email or web address]-[email or web address]       Plan:   We will need to repeat a CT scan in 3 months  We will refer you back to gastroenterology for another colonoscopy  Follow Up:  Dr Ilan Camara in 3 months     Recent Lab Results:  Hospital Outpatient Visit on 06/21/2023   Component Date Value Ref Range Status    WBC 06/21/2023 10.1  4.3 - 11.1 K/uL Final    RBC 06/21/2023 4.85  4.23 - 5.6 M/uL Final    Hemoglobin 06/21/2023 14.6  13.6 - 17.2 g/dL Final    Hematocrit 06/21/2023 45.4  41.1 - 50.3 % Final    MCV 06/21/2023 93.6  82.0 - 102.0 FL Final    MCH 06/21/2023 30.1  26.1 - 32.9 PG Final    MCHC 06/21/2023 32.2  31.4 - 35.0 g/dL Final    RDW 06/21/2023 12.8  11.9 - 14.6 % Final    Platelets 76/72/3998 187  150 - 450 K/uL Final    MPV 06/21/2023 9.4  9.4 - 12.3 FL Final    nRBC 06/21/2023 0.00  0.0 - 0.2 K/uL Final    **Note: Absolute NRBC parameter is now reported with Hemogram**    Neutrophils % 06/21/2023 61  43 - 78 % Final    Lymphocytes % 06/21/2023 29  13 - 44 % Final    Monocytes % 06/21/2023 8  4.0 - 12.0 % Final    Eosinophils % 06/21/2023 2  0.5 - 7.8 % Final    Basophils % 06/21/2023 0  0.0 - 2.0 % Final    Immature Granulocytes 06/21/2023 0  0.0 - 5.0 % Final    Neutrophils Absolute 06/21/2023 6.1  1.7 - 8.2 K/UL Final    Lymphocytes Absolute 06/21/2023 3.0  0.5 - 4.6 K/UL Final    Monocytes Absolute 06/21/2023 0.8  0.1 - 1.3 K/UL Final    Eosinophils Absolute 06/21/2023 0.2  0.0 - 0.8 K/UL Final    Basophils Absolute 06/21/2023 0.0  0.0 - 0.2 K/UL Final    Absolute Immature Granulocyte 06/21/2023 0.0  0.0 - 0.5 K/UL Final    Differential Type 06/21/2023 AUTOMATED    Final         Treatment Summary has been discussed and given to patient:

## 2023-06-24 PROCEDURE — 93294 REM INTERROG EVL PM/LDLS PM: CPT | Performed by: INTERNAL MEDICINE

## 2023-06-24 PROCEDURE — 93296 REM INTERROG EVL PM/IDS: CPT | Performed by: INTERNAL MEDICINE

## 2023-07-07 ENCOUNTER — OFFICE VISIT (OUTPATIENT)
Dept: INTERNAL MEDICINE CLINIC | Facility: CLINIC | Age: 83
End: 2023-07-07
Payer: MEDICARE

## 2023-07-07 VITALS
DIASTOLIC BLOOD PRESSURE: 60 MMHG | BODY MASS INDEX: 36.32 KG/M2 | OXYGEN SATURATION: 97 % | SYSTOLIC BLOOD PRESSURE: 124 MMHG | HEART RATE: 77 BPM | HEIGHT: 65 IN | WEIGHT: 218 LBS

## 2023-07-07 DIAGNOSIS — I10 PRIMARY HYPERTENSION: ICD-10-CM

## 2023-07-07 DIAGNOSIS — G70.00 MYASTHENIA GRAVIS (HCC): ICD-10-CM

## 2023-07-07 DIAGNOSIS — I82.729 CHRONIC DEEP VEIN THROMBOSIS (DVT) OF UPPER EXTREMITY, UNSPECIFIED LATERALITY, UNSPECIFIED VEIN (HCC): ICD-10-CM

## 2023-07-07 DIAGNOSIS — E55.9 VITAMIN D DEFICIENCY: ICD-10-CM

## 2023-07-07 DIAGNOSIS — G25.81 RESTLESS LEG: ICD-10-CM

## 2023-07-07 DIAGNOSIS — I48.0 PAROXYSMAL ATRIAL FIBRILLATION (HCC): ICD-10-CM

## 2023-07-07 DIAGNOSIS — C18.0 ADENOCARCINOMA OF CECUM (HCC): Primary | ICD-10-CM

## 2023-07-07 DIAGNOSIS — E78.5 DYSLIPIDEMIA: ICD-10-CM

## 2023-07-07 DIAGNOSIS — E11.9 TYPE 2 DIABETES MELLITUS WITHOUT COMPLICATION, WITHOUT LONG-TERM CURRENT USE OF INSULIN (HCC): ICD-10-CM

## 2023-07-07 PROBLEM — R91.1 LUNG NODULE: Status: ACTIVE | Noted: 2023-07-07

## 2023-07-07 PROBLEM — I82.4Z9 DEEP VEIN THROMBOSIS (DVT) OF DISTAL VEIN OF LOWER EXTREMITY (HCC): Status: ACTIVE | Noted: 2023-07-07

## 2023-07-07 PROBLEM — K85.90 ACUTE PANCREATITIS: Status: ACTIVE | Noted: 2022-04-29

## 2023-07-07 PROBLEM — I50.9 CHF (CONGESTIVE HEART FAILURE) (HCC): Status: ACTIVE | Noted: 2023-07-07

## 2023-07-07 PROBLEM — I49.5 SICK SINUS SYNDROME (HCC): Status: ACTIVE | Noted: 2022-04-29

## 2023-07-07 PROCEDURE — G8427 DOCREV CUR MEDS BY ELIG CLIN: HCPCS | Performed by: FAMILY MEDICINE

## 2023-07-07 PROCEDURE — 99214 OFFICE O/P EST MOD 30 MIN: CPT | Performed by: FAMILY MEDICINE

## 2023-07-07 PROCEDURE — 3074F SYST BP LT 130 MM HG: CPT | Performed by: FAMILY MEDICINE

## 2023-07-07 PROCEDURE — 3044F HG A1C LEVEL LT 7.0%: CPT | Performed by: FAMILY MEDICINE

## 2023-07-07 PROCEDURE — G8417 CALC BMI ABV UP PARAM F/U: HCPCS | Performed by: FAMILY MEDICINE

## 2023-07-07 PROCEDURE — 1036F TOBACCO NON-USER: CPT | Performed by: FAMILY MEDICINE

## 2023-07-07 PROCEDURE — 1123F ACP DISCUSS/DSCN MKR DOCD: CPT | Performed by: FAMILY MEDICINE

## 2023-07-07 PROCEDURE — 3078F DIAST BP <80 MM HG: CPT | Performed by: FAMILY MEDICINE

## 2023-07-07 ASSESSMENT — ENCOUNTER SYMPTOMS
BLOOD IN STOOL: 0
ABDOMINAL PAIN: 0
SHORTNESS OF BREATH: 0

## 2023-07-07 NOTE — PROGRESS NOTES
7/7/2023     Subjective:     Chief Complaint   Patient presents with    Follow-up     On existing conditions. Labs done in April        HPI:     Hypertension   He is not exercising and is adherent to low salt diet. Cardiac symptoms: none. Cardiovascular risk factors: dyslipidemia and hypertension. Blood pressure is not measured at home. Hyperlipidemia  The patient is not following a low fat diet and is not exercising regularly. He is tolerating current treatment well and  is compliant. Patient is not having associated symptoms of shortness of breath, chest pain, rapid heart rate, irregular heart rate, and dizziness    Patient labs are YOUR LAST LIPID PROFILE:   Lab Results   Component Value Date    CHOL 109 04/28/2023    CHOL 95 12/28/2022    CHOL 96 05/03/2021     Lab Results   Component Value Date    TRIG 124 04/28/2023    TRIG 85 12/28/2022    TRIG 99 05/03/2021     Lab Results   Component Value Date    HDL 42 04/28/2023    HDL 43 12/28/2022    HDL 36 (L) 05/03/2021     Lab Results   Component Value Date    LDLCALC 42.2 04/28/2023    LDLCALC 35 12/28/2022    LDLCALC 40.2 05/03/2021     Lab Results   Component Value Date    LABVLDL 24.8 (H) 04/28/2023    LABVLDL 17 12/28/2022    LABVLDL 19.8 05/03/2021     Lab Results   Component Value Date    CHOLHDLRATIO 2.6 04/28/2023    CHOLHDLRATIO 2.2 12/28/2022    CHOLHDLRATIO 2.7 05/03/2021     Diabetes  The patient is a 79 y.o. male who is seen for follow up of diabetes which is well controlled. Current symptoms: none. Patient denies: foot ulcerations, increase appetite, nausea, paresthesia of the feet. Home sugars: patient does not check sugars. Treatment to date: no recent interventions. Last HbA1C:    Lab Results   Component Value Date    LABA1C 6.1 (H) 04/28/2023     Lab Results   Component Value Date     04/28/2023            Restless leg  Taking requip    DVT in right arm - currently on Eliquis.      CHF - seeing cardiology has appt next

## 2023-07-21 ENCOUNTER — TELEPHONE (OUTPATIENT)
Age: 83
End: 2023-07-21

## 2023-07-21 NOTE — TELEPHONE ENCOUNTER
Patients son called stating he would like dosage clarification on the following medications :    (ELIQUIS) 5 MG TABS tablet    And the following two that appear to be no longer active on the formulary list :    Potassium chloride (KLOR-CON) 20 MEQ    Furosemide (LASIX) 40 mg

## 2023-07-24 RX ORDER — POTASSIUM CHLORIDE 20 MEQ/1
20 TABLET, EXTENDED RELEASE ORAL DAILY
COMMUNITY

## 2023-07-24 RX ORDER — FUROSEMIDE 40 MG/1
40 TABLET ORAL DAILY
COMMUNITY

## 2023-07-24 NOTE — TELEPHONE ENCOUNTER
Called pt's son verified all 3 medications dosage and if should be taking them. Pt's son gave a verbal understanding.

## 2023-08-24 RX ORDER — TAMSULOSIN HYDROCHLORIDE 0.4 MG/1
CAPSULE ORAL
Qty: 90 CAPSULE | Refills: 3 | Status: SHIPPED | OUTPATIENT
Start: 2023-08-24

## 2023-09-01 ENCOUNTER — OFFICE VISIT (OUTPATIENT)
Age: 83
End: 2023-09-01
Payer: MEDICARE

## 2023-09-01 VITALS
HEIGHT: 65 IN | SYSTOLIC BLOOD PRESSURE: 128 MMHG | WEIGHT: 221 LBS | BODY MASS INDEX: 36.82 KG/M2 | HEART RATE: 76 BPM | DIASTOLIC BLOOD PRESSURE: 72 MMHG

## 2023-09-01 DIAGNOSIS — I34.0 NONRHEUMATIC MITRAL VALVE REGURGITATION: ICD-10-CM

## 2023-09-01 DIAGNOSIS — I48.0 PAROXYSMAL ATRIAL FIBRILLATION (HCC): ICD-10-CM

## 2023-09-01 DIAGNOSIS — I10 PRIMARY HYPERTENSION: Primary | ICD-10-CM

## 2023-09-01 DIAGNOSIS — I49.5 SICK SINUS SYNDROME (HCC): ICD-10-CM

## 2023-09-01 PROCEDURE — G8428 CUR MEDS NOT DOCUMENT: HCPCS | Performed by: INTERNAL MEDICINE

## 2023-09-01 PROCEDURE — 3078F DIAST BP <80 MM HG: CPT | Performed by: INTERNAL MEDICINE

## 2023-09-01 PROCEDURE — G8417 CALC BMI ABV UP PARAM F/U: HCPCS | Performed by: INTERNAL MEDICINE

## 2023-09-01 PROCEDURE — 3074F SYST BP LT 130 MM HG: CPT | Performed by: INTERNAL MEDICINE

## 2023-09-01 PROCEDURE — 1123F ACP DISCUSS/DSCN MKR DOCD: CPT | Performed by: INTERNAL MEDICINE

## 2023-09-01 PROCEDURE — 99214 OFFICE O/P EST MOD 30 MIN: CPT | Performed by: INTERNAL MEDICINE

## 2023-09-01 PROCEDURE — 1036F TOBACCO NON-USER: CPT | Performed by: INTERNAL MEDICINE

## 2023-09-01 NOTE — PROGRESS NOTES
[address] [facility], [address]  PHONE: [phone]     23    NAME:  Chiara Ramos  : 1940  MRN: [id number]       SUBJECTIVE:   Chiara Ramos is a 79 y.o. male seen for a follow up visit regarding the following:     Chief Complaint   Patient presents with    Congestive Heart Failure       HPI: Here for CAD, Afib   prox LAD PCI 2015;   (GERMAIN on brilinta). 10/17: PPM placement for SSS.   2019: watchman device placement   [facility] 3/19/2021: small vessel CAD, no PCI. 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 No CP, pressure. Some lack of energy. GERMAIN ok now. Patient denies recent history of orthopnea, PND, excessive dizziness and/or syncope. Has myasthenia gravis, this has been well controlled. Past Medical History, Past Surgical History, Family history, Social History, and Medications were all reviewed with the patient today and updated as necessary.      Current Outpatient Medications   Medication Sig Dispense Refill    tamsulosin (FLOMAX) 0.4 MG capsule TAKE 1 CAPSULE BY MOUTH EVERY DAY 90 capsule 3    furosemide (LASIX) 40 MG tablet Take 1 tablet by mouth daily      potassium chloride (KLOR-CON M) 20 MEQ extended release tablet Take 1 tablet by mouth daily      rOPINIRole (REQUIP) 0.5 MG tablet TAKE 1 TABLET BY MOUTH NIGHTLY 90 tablet 3    losartan (COZAAR) 50 MG tablet Take 1 tablet by mouth daily 30 tablet 11    apixaban (ELIQUIS) 5 MG TABS tablet Take 1 tablet by mouth 2 times daily at 0800 and 1400 180 tablet 3    naloxone (NARCAN) 4 MG/0.1ML LIQD nasal spray 1 spray by Nasal route as needed for Opioid Reversal 4 each 0    colestipol (COLESTID) 1 g tablet       famotidine (PEPCID) 20 MG tablet       sildenafil (VIAGRA) 100 MG

## 2023-09-03 ENCOUNTER — APPOINTMENT (OUTPATIENT)
Dept: GENERAL RADIOLOGY | Age: 83
End: 2023-09-03
Payer: MEDICARE

## 2023-09-03 ENCOUNTER — HOSPITAL ENCOUNTER (EMERGENCY)
Age: 83
Discharge: HOME | End: 2023-09-03
Attending: EMERGENCY MEDICINE
Payer: MEDICARE

## 2023-09-03 VITALS
TEMPERATURE: 98.2 F | HEART RATE: 75 BPM | DIASTOLIC BLOOD PRESSURE: 73 MMHG | OXYGEN SATURATION: 96 % | RESPIRATION RATE: 20 BRPM | SYSTOLIC BLOOD PRESSURE: 112 MMHG

## 2023-09-03 DIAGNOSIS — J40 BRONCHITIS: Primary | ICD-10-CM

## 2023-09-03 DIAGNOSIS — R53.83 OTHER FATIGUE: ICD-10-CM

## 2023-09-03 LAB
ALBUMIN SERPL-MCNC: 3.5 G/DL (ref 3.2–4.6)
ALBUMIN/GLOB SERPL: 1.1 (ref 0.4–1.6)
ALP SERPL-CCNC: 96 U/L (ref 50–136)
ALT SERPL-CCNC: 28 U/L (ref 12–65)
ANION GAP SERPL CALC-SCNC: 5 MMOL/L (ref 2–11)
AST SERPL-CCNC: 19 U/L (ref 15–37)
BASOPHILS # BLD: 0.1 K/UL (ref 0–0.2)
BASOPHILS NFR BLD: 1 % (ref 0–2)
BILIRUB SERPL-MCNC: 0.5 MG/DL (ref 0.2–1.1)
BUN SERPL-MCNC: 17 MG/DL (ref 8–23)
CALCIUM SERPL-MCNC: 9.3 MG/DL (ref 8.3–10.4)
CHLORIDE SERPL-SCNC: 109 MMOL/L (ref 101–110)
CO2 SERPL-SCNC: 29 MMOL/L (ref 21–32)
CREAT SERPL-MCNC: 0.9 MG/DL (ref 0.8–1.5)
DIFFERENTIAL METHOD BLD: ABNORMAL
EOSINOPHIL # BLD: 0.3 K/UL (ref 0–0.8)
EOSINOPHIL NFR BLD: 3 % (ref 0.5–7.8)
ERYTHROCYTE [DISTWIDTH] IN BLOOD BY AUTOMATED COUNT: 13.2 % (ref 11.9–14.6)
FLUAV RNA SPEC QL NAA+PROBE: NOT DETECTED
FLUBV RNA SPEC QL NAA+PROBE: NOT DETECTED
GLOBULIN SER CALC-MCNC: 3.1 G/DL (ref 2.8–4.5)
GLUCOSE SERPL-MCNC: 129 MG/DL (ref 65–100)
HCT VFR BLD AUTO: 42.8 % (ref 41.1–50.3)
HGB BLD-MCNC: 13.9 G/DL (ref 13.6–17.2)
IMM GRANULOCYTES # BLD AUTO: 0.1 K/UL (ref 0–0.5)
IMM GRANULOCYTES NFR BLD AUTO: 1 % (ref 0–5)
LACTATE SERPL-SCNC: 1.3 MMOL/L (ref 0.4–2)
LYMPHOCYTES # BLD: 3.1 K/UL (ref 0.5–4.6)
LYMPHOCYTES NFR BLD: 27 % (ref 13–44)
MAGNESIUM SERPL-MCNC: 1.9 MG/DL (ref 1.8–2.4)
MCH RBC QN AUTO: 30.9 PG (ref 26.1–32.9)
MCHC RBC AUTO-ENTMCNC: 32.5 G/DL (ref 31.4–35)
MCV RBC AUTO: 95.1 FL (ref 82–102)
MONOCYTES # BLD: 0.9 K/UL (ref 0.1–1.3)
MONOCYTES NFR BLD: 8 % (ref 4–12)
NEUTS SEG # BLD: 7.1 K/UL (ref 1.7–8.2)
NEUTS SEG NFR BLD: 60 % (ref 43–78)
NRBC # BLD: 0 K/UL (ref 0–0.2)
NT PRO BNP: 214 PG/ML
PLATELET # BLD AUTO: 182 K/UL (ref 150–450)
PMV BLD AUTO: 9.9 FL (ref 9.4–12.3)
POTASSIUM SERPL-SCNC: 3.7 MMOL/L (ref 3.5–5.1)
PROT SERPL-MCNC: 6.6 G/DL (ref 6.3–8.2)
RBC # BLD AUTO: 4.5 M/UL (ref 4.23–5.6)
SARS-COV-2 RDRP RESP QL NAA+PROBE: NOT DETECTED
SODIUM SERPL-SCNC: 143 MMOL/L (ref 133–143)
SOURCE: NORMAL
TROPONIN I SERPL HS-MCNC: 38 PG/ML (ref 0–14)
WBC # BLD AUTO: 11.5 K/UL (ref 4.3–11.1)

## 2023-09-03 PROCEDURE — 83735 ASSAY OF MAGNESIUM: CPT

## 2023-09-03 PROCEDURE — 87635 SARS-COV-2 COVID-19 AMP PRB: CPT

## 2023-09-03 PROCEDURE — 93005 ELECTROCARDIOGRAM TRACING: CPT | Performed by: EMERGENCY MEDICINE

## 2023-09-03 PROCEDURE — 2580000003 HC RX 258: Performed by: EMERGENCY MEDICINE

## 2023-09-03 PROCEDURE — 80053 COMPREHEN METABOLIC PANEL: CPT

## 2023-09-03 PROCEDURE — 84484 ASSAY OF TROPONIN QUANT: CPT

## 2023-09-03 PROCEDURE — 96360 HYDRATION IV INFUSION INIT: CPT

## 2023-09-03 PROCEDURE — 83605 ASSAY OF LACTIC ACID: CPT

## 2023-09-03 PROCEDURE — 87040 BLOOD CULTURE FOR BACTERIA: CPT

## 2023-09-03 PROCEDURE — 87502 INFLUENZA DNA AMP PROBE: CPT

## 2023-09-03 PROCEDURE — 83880 ASSAY OF NATRIURETIC PEPTIDE: CPT

## 2023-09-03 PROCEDURE — 99285 EMERGENCY DEPT VISIT HI MDM: CPT

## 2023-09-03 PROCEDURE — 85025 COMPLETE CBC W/AUTO DIFF WBC: CPT

## 2023-09-03 PROCEDURE — 94761 N-INVAS EAR/PLS OXIMETRY MLT: CPT

## 2023-09-03 PROCEDURE — 71045 X-RAY EXAM CHEST 1 VIEW: CPT

## 2023-09-03 RX ORDER — SODIUM CHLORIDE 0.9 % (FLUSH) 0.9 %
5 SYRINGE (ML) INJECTION AS NEEDED
Status: DISCONTINUED | OUTPATIENT
Start: 2023-09-03 | End: 2023-09-03 | Stop reason: HOSPADM

## 2023-09-03 RX ORDER — DOXYCYCLINE HYCLATE 100 MG
100 TABLET ORAL 2 TIMES DAILY
Qty: 14 TABLET | Refills: 0 | Status: SHIPPED | OUTPATIENT
Start: 2023-09-03 | End: 2023-09-10

## 2023-09-03 RX ORDER — 0.9 % SODIUM CHLORIDE 0.9 %
500 INTRAVENOUS SOLUTION INTRAVENOUS ONCE
Status: COMPLETED | OUTPATIENT
Start: 2023-09-03 | End: 2023-09-03

## 2023-09-03 RX ORDER — SODIUM CHLORIDE 0.9 % (FLUSH) 0.9 %
5 SYRINGE (ML) INJECTION EVERY 8 HOURS
Status: DISCONTINUED | OUTPATIENT
Start: 2023-09-03 | End: 2023-09-03 | Stop reason: HOSPADM

## 2023-09-03 RX ORDER — BENZONATATE 100 MG/1
100 CAPSULE ORAL 3 TIMES DAILY PRN
Qty: 30 CAPSULE | Refills: 0 | Status: SHIPPED | OUTPATIENT
Start: 2023-09-03 | End: 2023-09-13

## 2023-09-03 RX ORDER — ALBUTEROL SULFATE 90 UG/1
2 AEROSOL, METERED RESPIRATORY (INHALATION) 4 TIMES DAILY PRN
Qty: 54 G | Refills: 0 | Status: SHIPPED | OUTPATIENT
Start: 2023-09-03

## 2023-09-03 RX ADMIN — SODIUM CHLORIDE 500 ML: 9 INJECTION, SOLUTION INTRAVENOUS at 16:36

## 2023-09-03 ASSESSMENT — ENCOUNTER SYMPTOMS
BACK PAIN: 0
DIARRHEA: 1
ABDOMINAL PAIN: 0
NAUSEA: 0
SHORTNESS OF BREATH: 1
VOMITING: 0

## 2023-09-03 NOTE — DISCHARGE INSTRUCTIONS
Complete course antibiotic. Use albuterol inhaler as needed for wheezing. Take cough medication as needed. Follow-up with your doctor for checkup.

## 2023-09-03 NOTE — ED TRIAGE NOTES
Pt arrives via [facility] from home for weakness and SOB made worse by exertion. Pt with a RA sat of 89% for EMS. Pt confirms diarrhea today.

## 2023-09-03 NOTE — ED PROVIDER NOTES
(FLOMAX) 0.4 MG CAPSULE    TAKE 1 CAPSULE BY MOUTH EVERY DAY    ZINC PO    Take 1 capsule by mouth daily. Results for orders placed or performed during the hospital encounter of 09/03/23   COVID-19, Rapid    Specimen: Nasopharyngeal   Result Value Ref Range    Source NASAL      SARS-CoV-2, Rapid Not detected NOTD     Influenza A/B, Molecular    Specimen: Not Specified   Result Value Ref Range    Influenza A, ROMANA Not detected NOTD      Influenza B, ROMANA Not detected NOTD     Culture, Blood 1    Specimen: Blood   Result Value Ref Range    Special Requests RIGHT ANTECUBITAL      Culture PENDING    XR CHEST PORTABLE    Narrative    Portable chest x-ray    CLINICAL INDICATION: Shortness of breath    FINDINGS: Single AP view the chest compared to a similar exam dated 4/25/2023  show the lungs to be expanded and clear. No pleural effusion or pneumothorax. The cardiac silhouette and mediastinum are unremarkable. A pacer device is in  place. Impression    No acute abnormality.    CBC with Auto Differential   Result Value Ref Range    WBC 11.5 (H) 4.3 - 11.1 K/uL    RBC 4.50 4.23 - 5.6 M/uL    Hemoglobin 13.9 13.6 - 17.2 g/dL    Hematocrit 42.8 41.1 - 50.3 %    MCV 95.1 82 - 102 FL    MCH 30.9 26.1 - 32.9 PG    MCHC 32.5 31.4 - 35.0 g/dL    RDW 13.2 11.9 - 14.6 %    Platelets 962 841 - 961 K/uL    MPV 9.9 9.4 - 12.3 FL    nRBC 0.00 0.0 - 0.2 K/uL    Differential Type AUTOMATED      Neutrophils % 60 43 - 78 %    Lymphocytes % 27 13 - 44 %    Monocytes % 8 4.0 - 12.0 %    Eosinophils % 3 0.5 - 7.8 %    Basophils % 1 0.0 - 2.0 %    Immature Granulocytes 1 0.0 - 5.0 %    Neutrophils Absolute 7.1 1.7 - 8.2 K/UL    Lymphocytes Absolute 3.1 0.5 - 4.6 K/UL    Monocytes Absolute 0.9 0.1 - 1.3 K/UL    Eosinophils Absolute 0.3 0.0 - 0.8 K/UL    Basophils Absolute 0.1 0.0 - 0.2 K/UL    Absolute Immature Granulocyte 0.1 0.0 - 0.5 K/UL   Comprehensive Metabolic Panel   Result Value Ref Range    Sodium 143 133 - 143 mmol/L

## 2023-09-04 LAB
EKG ATRIAL RATE: 0 BPM
EKG DIAGNOSIS: NORMAL
EKG Q-T INTERVAL: 459 MS
EKG QRS DURATION: 194 MS
EKG QTC CALCULATION (BAZETT): 513 MS
EKG R AXIS: -85 DEGREES
EKG T AXIS: 83 DEGREES
EKG VENTRICULAR RATE: 75 BPM

## 2023-09-04 PROCEDURE — 93010 ELECTROCARDIOGRAM REPORT: CPT | Performed by: INTERNAL MEDICINE

## 2023-09-08 LAB
BACTERIA SPEC CULT: NORMAL
SERVICE CMNT-IMP: NORMAL

## 2023-09-11 ENCOUNTER — TELEPHONE (OUTPATIENT)
Age: 83
End: 2023-09-11

## 2023-09-11 NOTE — TELEPHONE ENCOUNTER
Pt.is scheduled for a colonoscopy tomorrow. Last week BP dropped after a bowel movement. Was seen in ER they suspected an infection was given an antibiotic. Pt.to start a bowel prep today son is concerned about dehydration and BP dropping w/the GI prep,concerned about potassium dropping w/pre,concerned about pt.undergoing anesthesia and the fact that he has held his Eliquis for 7 days. Current /90's. This is a fu after cancer found a year ago. Pt.son wants to know 's advice. Should the procedure be cancelled until pt.can see ?

## 2023-09-11 NOTE — TELEPHONE ENCOUNTER
I would try it, take the prep,have someone there with him at all times to monitor for dizziness and low BP. Follow at home.     Thanks

## 2023-09-11 NOTE — TELEPHONE ENCOUNTER
Patient's son called and said his dad is suppose to have a colonoscopy tomorrow. Patient is suppose to start his prep at 5:00 pm today. Ingrid Leyva said doctors doing the colonoscopy has had the patient off of his blood thinner since last Tuesday 09/05/2023. He said his dad is having some shortness of breath & feels run down. Please call son to advise. Ingrid Leyva said if he doesn't answer please call Renay on patient's contacts.

## 2023-09-17 ENCOUNTER — HOSPITAL ENCOUNTER (OUTPATIENT)
Age: 83
Setting detail: OBSERVATION
Discharge: HOME | End: 2023-09-18
Attending: EMERGENCY MEDICINE | Admitting: HOSPITALIST
Payer: MEDICARE

## 2023-09-17 ENCOUNTER — APPOINTMENT (OUTPATIENT)
Dept: GENERAL RADIOLOGY | Age: 83
End: 2023-09-17
Payer: MEDICARE

## 2023-09-17 DIAGNOSIS — E86.0 DEHYDRATION: ICD-10-CM

## 2023-09-17 DIAGNOSIS — N17.9 AKI (ACUTE KIDNEY INJURY) (HCC): ICD-10-CM

## 2023-09-17 DIAGNOSIS — E83.42 HYPOMAGNESEMIA: Primary | ICD-10-CM

## 2023-09-17 DIAGNOSIS — R79.89 ELEVATED LACTIC ACID LEVEL: ICD-10-CM

## 2023-09-17 DIAGNOSIS — R19.7 DIARRHEA, UNSPECIFIED TYPE: ICD-10-CM

## 2023-09-17 LAB
ALBUMIN SERPL-MCNC: 3.2 G/DL (ref 3.2–4.6)
ALBUMIN/GLOB SERPL: 1.1 (ref 0.4–1.6)
ALP SERPL-CCNC: 95 U/L (ref 50–136)
ALT SERPL-CCNC: 25 U/L (ref 12–65)
ANION GAP SERPL CALC-SCNC: 6 MMOL/L (ref 2–11)
APPEARANCE UR: CLEAR
AST SERPL-CCNC: 25 U/L (ref 15–37)
BASOPHILS # BLD: 0 K/UL (ref 0–0.2)
BASOPHILS NFR BLD: 0 % (ref 0–2)
BILIRUB SERPL-MCNC: 0.5 MG/DL (ref 0.2–1.1)
BILIRUB UR QL: NEGATIVE
BUN SERPL-MCNC: 16 MG/DL (ref 8–23)
CALCIUM SERPL-MCNC: 8.9 MG/DL (ref 8.3–10.4)
CHLORIDE SERPL-SCNC: 109 MMOL/L (ref 101–110)
CO2 SERPL-SCNC: 25 MMOL/L (ref 21–32)
COLOR UR: ABNORMAL
CREAT SERPL-MCNC: 1.6 MG/DL (ref 0.8–1.5)
DIFFERENTIAL METHOD BLD: ABNORMAL
EKG ATRIAL RATE: 0 BPM
EKG DIAGNOSIS: NORMAL
EKG Q-T INTERVAL: 451 MS
EKG QRS DURATION: 183 MS
EKG QTC CALCULATION (BAZETT): 504 MS
EKG R AXIS: -87 DEGREES
EKG T AXIS: 80 DEGREES
EKG VENTRICULAR RATE: 75 BPM
EOSINOPHIL # BLD: 0.3 K/UL (ref 0–0.8)
EOSINOPHIL NFR BLD: 3 % (ref 0.5–7.8)
ERYTHROCYTE [DISTWIDTH] IN BLOOD BY AUTOMATED COUNT: 13.4 % (ref 11.9–14.6)
GLOBULIN SER CALC-MCNC: 3 G/DL (ref 2.8–4.5)
GLUCOSE SERPL-MCNC: 139 MG/DL (ref 65–100)
GLUCOSE UR STRIP.AUTO-MCNC: 250 MG/DL
HCT VFR BLD AUTO: 41 % (ref 41.1–50.3)
HGB BLD-MCNC: 13.5 G/DL (ref 13.6–17.2)
HGB UR QL STRIP: NEGATIVE
IMM GRANULOCYTES # BLD AUTO: 0 K/UL (ref 0–0.5)
IMM GRANULOCYTES NFR BLD AUTO: 0 % (ref 0–5)
KETONES UR QL STRIP.AUTO: ABNORMAL MG/DL
LACTATE SERPL-SCNC: 1.9 MMOL/L (ref 0.4–2)
LACTATE SERPL-SCNC: 2.4 MMOL/L (ref 0.4–2)
LEUKOCYTE ESTERASE UR QL STRIP.AUTO: NEGATIVE
LYMPHOCYTES # BLD: 3.2 K/UL (ref 0.5–4.6)
LYMPHOCYTES NFR BLD: 28 % (ref 13–44)
MAGNESIUM SERPL-MCNC: 1.7 MG/DL (ref 1.8–2.4)
MCH RBC QN AUTO: 31.7 PG (ref 26.1–32.9)
MCHC RBC AUTO-ENTMCNC: 32.9 G/DL (ref 31.4–35)
MCV RBC AUTO: 96.2 FL (ref 82–102)
MONOCYTES # BLD: 0.9 K/UL (ref 0.1–1.3)
MONOCYTES NFR BLD: 8 % (ref 4–12)
NEUTS SEG # BLD: 6.8 K/UL (ref 1.7–8.2)
NEUTS SEG NFR BLD: 61 % (ref 43–78)
NITRITE UR QL STRIP.AUTO: NEGATIVE
NRBC # BLD: 0 K/UL (ref 0–0.2)
PH UR STRIP: 5.5 (ref 5–9)
PLATELET # BLD AUTO: 193 K/UL (ref 150–450)
PMV BLD AUTO: 9.3 FL (ref 9.4–12.3)
POTASSIUM SERPL-SCNC: 4.2 MMOL/L (ref 3.5–5.1)
PROCALCITONIN SERPL-MCNC: <0.05 NG/ML (ref 0–0.49)
PROT SERPL-MCNC: 6.2 G/DL (ref 6.3–8.2)
PROT UR STRIP-MCNC: NEGATIVE MG/DL
RBC # BLD AUTO: 4.26 M/UL (ref 4.23–5.6)
SODIUM SERPL-SCNC: 140 MMOL/L (ref 133–143)
SP GR UR REFRACTOMETRY: 1.01 (ref 1–1.02)
UROBILINOGEN UR QL STRIP.AUTO: 0.2 EU/DL (ref 0.2–1)
WBC # BLD AUTO: 11.3 K/UL (ref 4.3–11.1)

## 2023-09-17 PROCEDURE — 36415 COLL VENOUS BLD VENIPUNCTURE: CPT

## 2023-09-17 PROCEDURE — 96361 HYDRATE IV INFUSION ADD-ON: CPT

## 2023-09-17 PROCEDURE — 96365 THER/PROPH/DIAG IV INF INIT: CPT

## 2023-09-17 PROCEDURE — G0378 HOSPITAL OBSERVATION PER HR: HCPCS

## 2023-09-17 PROCEDURE — 87040 BLOOD CULTURE FOR BACTERIA: CPT

## 2023-09-17 PROCEDURE — 83605 ASSAY OF LACTIC ACID: CPT

## 2023-09-17 PROCEDURE — 2580000003 HC RX 258: Performed by: HOSPITALIST

## 2023-09-17 PROCEDURE — 6370000000 HC RX 637 (ALT 250 FOR IP): Performed by: HOSPITALIST

## 2023-09-17 PROCEDURE — 96366 THER/PROPH/DIAG IV INF ADDON: CPT

## 2023-09-17 PROCEDURE — 87324 CLOSTRIDIUM AG IA: CPT

## 2023-09-17 PROCEDURE — 84145 PROCALCITONIN (PCT): CPT

## 2023-09-17 PROCEDURE — 93005 ELECTROCARDIOGRAM TRACING: CPT | Performed by: EMERGENCY MEDICINE

## 2023-09-17 PROCEDURE — 2580000003 HC RX 258: Performed by: EMERGENCY MEDICINE

## 2023-09-17 PROCEDURE — 71045 X-RAY EXAM CHEST 1 VIEW: CPT

## 2023-09-17 PROCEDURE — 93010 ELECTROCARDIOGRAM REPORT: CPT | Performed by: INTERNAL MEDICINE

## 2023-09-17 PROCEDURE — 85025 COMPLETE CBC W/AUTO DIFF WBC: CPT

## 2023-09-17 PROCEDURE — 80053 COMPREHEN METABOLIC PANEL: CPT

## 2023-09-17 PROCEDURE — 99285 EMERGENCY DEPT VISIT HI MDM: CPT

## 2023-09-17 PROCEDURE — 87449 NOS EACH ORGANISM AG IA: CPT

## 2023-09-17 PROCEDURE — 81003 URINALYSIS AUTO W/O SCOPE: CPT

## 2023-09-17 PROCEDURE — 83735 ASSAY OF MAGNESIUM: CPT

## 2023-09-17 PROCEDURE — 6360000002 HC RX W HCPCS: Performed by: EMERGENCY MEDICINE

## 2023-09-17 RX ORDER — ONDANSETRON 4 MG/1
4 TABLET, ORALLY DISINTEGRATING ORAL EVERY 8 HOURS PRN
Status: DISCONTINUED | OUTPATIENT
Start: 2023-09-17 | End: 2023-09-18 | Stop reason: HOSPADM

## 2023-09-17 RX ORDER — FAMOTIDINE 20 MG/1
20 TABLET, FILM COATED ORAL DAILY
Status: DISCONTINUED | OUTPATIENT
Start: 2023-09-18 | End: 2023-09-18 | Stop reason: HOSPADM

## 2023-09-17 RX ORDER — MULTIVITAMIN WITH IRON
1 TABLET ORAL DAILY
Status: DISCONTINUED | OUTPATIENT
Start: 2023-09-18 | End: 2023-09-18 | Stop reason: HOSPADM

## 2023-09-17 RX ORDER — SODIUM CHLORIDE, SODIUM LACTATE, POTASSIUM CHLORIDE, AND CALCIUM CHLORIDE .6; .31; .03; .02 G/100ML; G/100ML; G/100ML; G/100ML
30 INJECTION, SOLUTION INTRAVENOUS ONCE
Status: COMPLETED | OUTPATIENT
Start: 2023-09-17 | End: 2023-09-17

## 2023-09-17 RX ORDER — TAMSULOSIN HYDROCHLORIDE 0.4 MG/1
0.4 CAPSULE ORAL DAILY
Status: DISCONTINUED | OUTPATIENT
Start: 2023-09-17 | End: 2023-09-18 | Stop reason: HOSPADM

## 2023-09-17 RX ORDER — VITAMIN B COMPLEX
1000 TABLET ORAL DAILY
Status: DISCONTINUED | OUTPATIENT
Start: 2023-09-18 | End: 2023-09-18 | Stop reason: HOSPADM

## 2023-09-17 RX ORDER — CHOLESTYRAMINE LIGHT 4 G/5.7G
4 POWDER, FOR SUSPENSION ORAL DAILY
Status: DISCONTINUED | OUTPATIENT
Start: 2023-09-18 | End: 2023-09-18 | Stop reason: HOSPADM

## 2023-09-17 RX ORDER — SODIUM CHLORIDE 9 MG/ML
INJECTION, SOLUTION INTRAVENOUS PRN
Status: DISCONTINUED | OUTPATIENT
Start: 2023-09-17 | End: 2023-09-18 | Stop reason: HOSPADM

## 2023-09-17 RX ORDER — MAGNESIUM SULFATE IN WATER 40 MG/ML
2000 INJECTION, SOLUTION INTRAVENOUS ONCE
Status: COMPLETED | OUTPATIENT
Start: 2023-09-17 | End: 2023-09-17

## 2023-09-17 RX ORDER — ATORVASTATIN CALCIUM 40 MG/1
80 TABLET, FILM COATED ORAL NIGHTLY
Status: DISCONTINUED | OUTPATIENT
Start: 2023-09-17 | End: 2023-09-18 | Stop reason: HOSPADM

## 2023-09-17 RX ORDER — MAGNESIUM SULFATE IN WATER 40 MG/ML
2000 INJECTION, SOLUTION INTRAVENOUS PRN
Status: DISCONTINUED | OUTPATIENT
Start: 2023-09-17 | End: 2023-09-18

## 2023-09-17 RX ORDER — PANTOPRAZOLE SODIUM 40 MG/1
40 TABLET, DELAYED RELEASE ORAL
Status: DISCONTINUED | OUTPATIENT
Start: 2023-09-18 | End: 2023-09-18 | Stop reason: HOSPADM

## 2023-09-17 RX ORDER — ALBUTEROL SULFATE 2.5 MG/3ML
2.5 SOLUTION RESPIRATORY (INHALATION) 4 TIMES DAILY PRN
Status: DISCONTINUED | OUTPATIENT
Start: 2023-09-17 | End: 2023-09-18 | Stop reason: HOSPADM

## 2023-09-17 RX ORDER — POTASSIUM CHLORIDE 20 MEQ/1
40 TABLET, EXTENDED RELEASE ORAL PRN
Status: DISCONTINUED | OUTPATIENT
Start: 2023-09-17 | End: 2023-09-18

## 2023-09-17 RX ORDER — ACETAMINOPHEN 650 MG/1
650 SUPPOSITORY RECTAL EVERY 6 HOURS PRN
Status: DISCONTINUED | OUTPATIENT
Start: 2023-09-17 | End: 2023-09-18 | Stop reason: HOSPADM

## 2023-09-17 RX ORDER — FINASTERIDE 5 MG/1
5 TABLET, FILM COATED ORAL DAILY
Status: DISCONTINUED | OUTPATIENT
Start: 2023-09-18 | End: 2023-09-18 | Stop reason: HOSPADM

## 2023-09-17 RX ORDER — ROPINIROLE 1 MG/1
0.5 TABLET, FILM COATED ORAL NIGHTLY
Status: DISCONTINUED | OUTPATIENT
Start: 2023-09-17 | End: 2023-09-18 | Stop reason: HOSPADM

## 2023-09-17 RX ORDER — POLYETHYLENE GLYCOL 3350 17 G/17G
17 POWDER, FOR SOLUTION ORAL DAILY PRN
Status: DISCONTINUED | OUTPATIENT
Start: 2023-09-17 | End: 2023-09-18 | Stop reason: HOSPADM

## 2023-09-17 RX ORDER — ONDANSETRON 2 MG/ML
4 INJECTION INTRAMUSCULAR; INTRAVENOUS EVERY 6 HOURS PRN
Status: DISCONTINUED | OUTPATIENT
Start: 2023-09-17 | End: 2023-09-18 | Stop reason: HOSPADM

## 2023-09-17 RX ORDER — SODIUM CHLORIDE, SODIUM LACTATE, POTASSIUM CHLORIDE, CALCIUM CHLORIDE 600; 310; 30; 20 MG/100ML; MG/100ML; MG/100ML; MG/100ML
INJECTION, SOLUTION INTRAVENOUS CONTINUOUS
Status: DISCONTINUED | OUTPATIENT
Start: 2023-09-17 | End: 2023-09-17

## 2023-09-17 RX ORDER — SODIUM CHLORIDE 0.9 % (FLUSH) 0.9 %
5-40 SYRINGE (ML) INJECTION PRN
Status: DISCONTINUED | OUTPATIENT
Start: 2023-09-17 | End: 2023-09-18 | Stop reason: HOSPADM

## 2023-09-17 RX ORDER — POTASSIUM CHLORIDE 7.45 MG/ML
10 INJECTION INTRAVENOUS PRN
Status: DISCONTINUED | OUTPATIENT
Start: 2023-09-17 | End: 2023-09-18

## 2023-09-17 RX ORDER — SODIUM CHLORIDE 0.9 % (FLUSH) 0.9 %
5-40 SYRINGE (ML) INJECTION EVERY 12 HOURS SCHEDULED
Status: DISCONTINUED | OUTPATIENT
Start: 2023-09-17 | End: 2023-09-18 | Stop reason: HOSPADM

## 2023-09-17 RX ORDER — SODIUM CHLORIDE, SODIUM LACTATE, POTASSIUM CHLORIDE, CALCIUM CHLORIDE 600; 310; 30; 20 MG/100ML; MG/100ML; MG/100ML; MG/100ML
INJECTION, SOLUTION INTRAVENOUS CONTINUOUS
Status: DISCONTINUED | OUTPATIENT
Start: 2023-09-17 | End: 2023-09-18 | Stop reason: HOSPADM

## 2023-09-17 RX ORDER — ACETAMINOPHEN 325 MG/1
650 TABLET ORAL EVERY 6 HOURS PRN
Status: DISCONTINUED | OUTPATIENT
Start: 2023-09-17 | End: 2023-09-18 | Stop reason: HOSPADM

## 2023-09-17 RX ADMIN — TAMSULOSIN HYDROCHLORIDE 0.4 MG: 0.4 CAPSULE ORAL at 21:09

## 2023-09-17 RX ADMIN — SODIUM CHLORIDE, POTASSIUM CHLORIDE, SODIUM LACTATE AND CALCIUM CHLORIDE: 600; 310; 30; 20 INJECTION, SOLUTION INTRAVENOUS at 20:30

## 2023-09-17 RX ADMIN — ROPINIROLE HYDROCHLORIDE 0.5 MG: 1 TABLET, FILM COATED ORAL at 21:09

## 2023-09-17 RX ADMIN — MAGNESIUM SULFATE HEPTAHYDRATE 2000 MG: 40 INJECTION, SOLUTION INTRAVENOUS at 17:40

## 2023-09-17 RX ADMIN — SODIUM CHLORIDE, PRESERVATIVE FREE 10 ML: 5 INJECTION INTRAVENOUS at 20:33

## 2023-09-17 RX ADMIN — SODIUM CHLORIDE, POTASSIUM CHLORIDE, SODIUM LACTATE AND CALCIUM CHLORIDE 1845 ML: 600; 310; 30; 20 INJECTION, SOLUTION INTRAVENOUS at 16:14

## 2023-09-17 RX ADMIN — ATORVASTATIN CALCIUM 80 MG: 80 TABLET, FILM COATED ORAL at 21:09

## 2023-09-17 NOTE — ED TRIAGE NOTES
Pt arrives with c/o \"blood pressure going down\". States his BP was 67/43 at home approximately one hour ago. States he has episode of lightheadedness since he his cholecystectomy. States he has colonoscopy this week with several polyps removed. Endorses shortness of breath for the past few months. Endorses episode of vomiting this morning. Denies chest pain/discomfort, abdominal pain, rectal bleeding or dark colored stools. Denies any recent mediations changes.

## 2023-09-17 NOTE — ED NOTES
TRANSFER - OUT REPORT:    Verbal report given to Orlando Delaney on Vero Angry  being transferred to [address] [place] [address] for routine progression of patient care       Report consisted of patient's Situation, Background, Assessment and   Recommendations(SBAR). Information from the following report(s) Nurse Handoff Report was reviewed with the receiving nurse. [place] Fall Assessment:    Presents to emergency department  because of falls (Syncope, seizure, or loss of consciousness): No  Age > 70: No  Altered Mental Status, Intoxication with alcohol or substance confusion (Disorientation, impaired judgment, poor safety awaremess, or inability to follow instructions): No  Impaired Mobility: Ambulates or transfers with assistive devices or assistance; Unable to ambulate or transer.: No  Nursing Judgement: No          Lines:   Peripheral IV 09/17/23 Left Antecubital (Active)   Site Assessment Clean, dry & intact 09/17/23 1556   Line Status Blood return noted 09/17/23 1556        Opportunity for questions and clarification was provided. Patient transported with:  Transport.            Viri West RN  09/17/23 6780

## 2023-09-17 NOTE — H&P
Hospitalist History and Physical   Admit Date:  2023  3:13 PM   Name:  Jacinto Orlando   Age:  79 y.o. Sex:  male  :  1940   MRN:  [id number]   Room:  ER29/29    Presenting/Chief Complaint: Fatigue     Reason(s) for Admission: FRANKY (acute kidney injury) ([address] [facility]) [N17.9]     History of Present Illness:   Jacinto Orlando is a 79 y.o. male with medical history of coronary artery disease, hypertension, diastolic congestive heart failure, paroxysmal atrial fibrillation status post Watchman device, dyslipidemia, presented to the ER because of loose stools and feeling weak and tired. Patient states that he had colonoscopy done on 2023, had some polyps with biopsy results pending. He has been having chronic diarrhea with since he had cholecystectomy. However over the last few days he has been feeling weak and tired. Bloody bowel movements. Had about 3-5 loose stools yesterday. Also noted his blood pressure was low at home. 61/43 at home. Denies feeling dizzy or lightheaded. Denies any chest pain or palpitations. He had 1 episode of vomiting this morning which is nonbloody and nonbilious. Denies any abdominal pain. ER course    Afebrile. Hemodynamically stable. Blood pressure 108/65. Oxygen saturation 96% on room air. BMP remarkable for potassium of 4.2, creatinine 1.6, magnesium 1.7. Lactic acid 2.4. CBC remarkable for white count of 11.3. Chest x-ray was reviewed by me and negative for acute cardiopulmonary abnormality. Patient admitted for further evaluation and management of acute kidney injury. Assessment & Plan: This is a 79-year-old male with    Acute kidney injury likely prerenal  Creatinine on admission is 1.6. Baseline creatinine is 1. Likely from dehydration from GI losses, Lasix. Hold Lasix and other nephrotoxic medications. Continue hydration with IV fluids.   Repeat BMP in AM.    Chronic diarrhea ever since cholecystectomy  Continue

## 2023-09-17 NOTE — ED PROVIDER NOTES
3      furosemide (LASIX) 40 MG tablet Take 1 tablet by mouth daily      potassium chloride (KLOR-CON M) 20 MEQ extended release tablet Take 1 tablet by mouth daily      rOPINIRole (REQUIP) 0.5 MG tablet TAKE 1 TABLET BY MOUTH NIGHTLY  Qty: 90 tablet, Refills: 3      losartan (COZAAR) 50 MG tablet Take 1 tablet by mouth daily  Qty: 30 tablet, Refills: 11      apixaban (ELIQUIS) 5 MG TABS tablet Take 1 tablet by mouth 2 times daily at 0800 and 1400  Qty: 180 tablet, Refills: 3      naloxone (NARCAN) 4 MG/0.1ML LIQD nasal spray 1 spray by Nasal route as needed for Opioid Reversal  Qty: 4 each, Refills: 0      colestipol (COLESTID) 1 g tablet       famotidine (PEPCID) 20 MG tablet       sildenafil (VIAGRA) 100 MG tablet Take 1 tablet by mouth as needed for Erectile Dysfunction  Qty: 10 tablet, Refills: 3      atorvastatin (LIPITOR) 80 MG tablet Take 1 tablet by mouth daily  Qty: 90 tablet, Refills: 5    Associated Diagnoses: S/P coronary artery stent placement; Dyslipidemia; Hypertension complications      finasteride (PROSCAR) 5 MG tablet finasteride 5 mg tablet   Take 1 tablet every day by oral route. Qty: 90 tablet, Refills: 5    Associated Diagnoses: Benign prostatic hyperplasia with urinary hesitancy      Multiple Vitamins-Minerals (MULTIVITAMIN WITH MINERALS) tablet Once daily OTC  Qty: 100 tablet, Refills: 3    Associated Diagnoses: S/P coronary artery stent placement      Ascorbic Acid (VITAMIN C PO) Take 1 tablet by mouth daily. ZINC PO Take 1 capsule by mouth daily. Cholecalciferol (VITAMIN D3 PO) Take 1 tablet by mouth daily. acetaminophen (TYLENOL) 500 MG tablet Take 2 tablets by mouth every 6 hours as needed for Pain      nitroGLYCERIN (NITROSTAT) 0.4 MG SL tablet Place 1 sl under the tongue q 5 min prn cp, max 3 sl in a 15-min time period.  Call 911 if no relief after the 3rd sl.      pantoprazole (PROTONIX) 40 MG tablet Take 1 tablet by mouth every morning (before breakfast)

## 2023-09-18 ENCOUNTER — HOME HEALTH ADMISSION (OUTPATIENT)
Dept: HOME HEALTH SERVICES | Facility: HOME HEALTH | Age: 83
End: 2023-09-18
Payer: MEDICARE

## 2023-09-18 ENCOUNTER — TELEPHONE (OUTPATIENT)
Dept: INTERNAL MEDICINE CLINIC | Facility: CLINIC | Age: 83
End: 2023-09-18

## 2023-09-18 VITALS
WEIGHT: 218 LBS | HEIGHT: 65 IN | HEART RATE: 74 BPM | SYSTOLIC BLOOD PRESSURE: 139 MMHG | OXYGEN SATURATION: 93 % | RESPIRATION RATE: 18 BRPM | DIASTOLIC BLOOD PRESSURE: 76 MMHG | TEMPERATURE: 97.9 F | BODY MASS INDEX: 36.32 KG/M2

## 2023-09-18 LAB
ANION GAP SERPL CALC-SCNC: 5 MMOL/L (ref 2–11)
BASOPHILS # BLD: 0 K/UL (ref 0–0.2)
BASOPHILS NFR BLD: 0 % (ref 0–2)
BUN SERPL-MCNC: 14 MG/DL (ref 8–23)
C DIFF GDH STL QL: NORMAL
C DIFF TOX A+B STL QL IA: NORMAL
C DIFF TOXIN INTERPRETATION: NORMAL
CALCIUM SERPL-MCNC: 8.7 MG/DL (ref 8.3–10.4)
CHLORIDE SERPL-SCNC: 111 MMOL/L (ref 101–110)
CLINICAL CONSIDERATION: NORMAL
CO2 SERPL-SCNC: 26 MMOL/L (ref 21–32)
CREAT SERPL-MCNC: 0.8 MG/DL (ref 0.8–1.5)
DIFFERENTIAL METHOD BLD: ABNORMAL
EOSINOPHIL # BLD: 0.3 K/UL (ref 0–0.8)
EOSINOPHIL NFR BLD: 3 % (ref 0.5–7.8)
ERYTHROCYTE [DISTWIDTH] IN BLOOD BY AUTOMATED COUNT: 13.2 % (ref 11.9–14.6)
GLUCOSE SERPL-MCNC: 127 MG/DL (ref 65–100)
HCT VFR BLD AUTO: 40.9 % (ref 41.1–50.3)
HGB BLD-MCNC: 12.9 G/DL (ref 13.6–17.2)
IMM GRANULOCYTES # BLD AUTO: 0.1 K/UL (ref 0–0.5)
IMM GRANULOCYTES NFR BLD AUTO: 1 % (ref 0–5)
LYMPHOCYTES # BLD: 2.6 K/UL (ref 0.5–4.6)
LYMPHOCYTES NFR BLD: 30 % (ref 13–44)
MAGNESIUM SERPL-MCNC: 2.1 MG/DL (ref 1.8–2.4)
MCH RBC QN AUTO: 30.8 PG (ref 26.1–32.9)
MCHC RBC AUTO-ENTMCNC: 31.5 G/DL (ref 31.4–35)
MCV RBC AUTO: 97.6 FL (ref 82–102)
MONOCYTES # BLD: 0.7 K/UL (ref 0.1–1.3)
MONOCYTES NFR BLD: 8 % (ref 4–12)
NEUTS SEG # BLD: 5.2 K/UL (ref 1.7–8.2)
NEUTS SEG NFR BLD: 58 % (ref 43–78)
NRBC # BLD: 0 K/UL (ref 0–0.2)
PLATELET # BLD AUTO: 166 K/UL (ref 150–450)
PMV BLD AUTO: 9.6 FL (ref 9.4–12.3)
POTASSIUM SERPL-SCNC: 4.2 MMOL/L (ref 3.5–5.1)
RBC # BLD AUTO: 4.19 M/UL (ref 4.23–5.6)
REFLEX: NORMAL
SODIUM SERPL-SCNC: 142 MMOL/L (ref 133–143)
WBC # BLD AUTO: 8.8 K/UL (ref 4.3–11.1)

## 2023-09-18 PROCEDURE — G0378 HOSPITAL OBSERVATION PER HR: HCPCS

## 2023-09-18 PROCEDURE — 96361 HYDRATE IV INFUSION ADD-ON: CPT

## 2023-09-18 PROCEDURE — 6370000000 HC RX 637 (ALT 250 FOR IP): Performed by: HOSPITALIST

## 2023-09-18 PROCEDURE — 80048 BASIC METABOLIC PNL TOTAL CA: CPT

## 2023-09-18 PROCEDURE — 83735 ASSAY OF MAGNESIUM: CPT

## 2023-09-18 PROCEDURE — 85025 COMPLETE CBC W/AUTO DIFF WBC: CPT

## 2023-09-18 PROCEDURE — 36415 COLL VENOUS BLD VENIPUNCTURE: CPT

## 2023-09-18 PROCEDURE — 97161 PT EVAL LOW COMPLEX 20 MIN: CPT

## 2023-09-18 PROCEDURE — 2580000003 HC RX 258: Performed by: HOSPITALIST

## 2023-09-18 PROCEDURE — 97116 GAIT TRAINING THERAPY: CPT

## 2023-09-18 PROCEDURE — 97165 OT EVAL LOW COMPLEX 30 MIN: CPT

## 2023-09-18 PROCEDURE — 97530 THERAPEUTIC ACTIVITIES: CPT

## 2023-09-18 RX ADMIN — APIXABAN 5 MG: 5 TABLET, FILM COATED ORAL at 08:38

## 2023-09-18 RX ADMIN — B-COMPLEX W/ C & FOLIC ACID TAB 1 TABLET: TAB at 08:38

## 2023-09-18 RX ADMIN — VITAMIN D, TAB 1000IU (100/BT) 1000 UNITS: 25 TAB at 08:38

## 2023-09-18 RX ADMIN — CHOLESTYRAMINE 4 G: 4 POWDER, FOR SUSPENSION ORAL at 08:36

## 2023-09-18 RX ADMIN — TAMSULOSIN HYDROCHLORIDE 0.4 MG: 0.4 CAPSULE ORAL at 08:38

## 2023-09-18 RX ADMIN — SODIUM CHLORIDE, POTASSIUM CHLORIDE, SODIUM LACTATE AND CALCIUM CHLORIDE: 600; 310; 30; 20 INJECTION, SOLUTION INTRAVENOUS at 05:08

## 2023-09-18 RX ADMIN — SODIUM CHLORIDE, PRESERVATIVE FREE 10 ML: 5 INJECTION INTRAVENOUS at 08:41

## 2023-09-18 RX ADMIN — FINASTERIDE 5 MG: 5 TABLET, FILM COATED ORAL at 08:38

## 2023-09-18 RX ADMIN — PANTOPRAZOLE SODIUM 40 MG: 40 TABLET, DELAYED RELEASE ORAL at 05:06

## 2023-09-18 RX ADMIN — FAMOTIDINE 20 MG: 20 TABLET, FILM COATED ORAL at 08:39

## 2023-09-18 NOTE — CARE COORDINATION
Pt is known to CM staff from [facility]. Met with pt and spouse prior to his discharge home today. Pt is for discharge home today with spouse and has a recommendation for home health follow up care. Pt/spouse has requested a referral back to [facility] [place], [facility]. Referral called/sent to [facility] [place] for follow up home care as ordered. No additional CM orders received or supportive care needs expressed at this time. 09/18/23 1421   Service Assessment   Patient Orientation Alert and Oriented   Cognition Alert   History Provided By Patient;Spouse;Medical Record   Primary Caregiver Spouse   Accompanied By/Relationship spouse   Support Systems Spouse/Significant Other   Patient's Healthcare Decision Maker is: Legal Next of Kin   PCP Verified by CM Yes   Last Visit to PCP Within last 3 months   Prior Functional Level Independent in ADLs/IADLs   Current Functional Level Assistance with the following:;Mobility   Can patient return to prior living arrangement Yes   Ability to make needs known: Good   Family able to assist with home care needs: Yes   Would you like for me to discuss the discharge plan with any other family members/significant others, and if so, who? No   Financial Resources Medicare   Community Resources None   Social/Functional History   Lives With Spouse   Type of [address] [place] [address] One level   Home Access Stairs to enter without rails   Entrance Stairs - Number of Steps 1   Bathroom Shower/Tub Tub/Shower unit  (wife states pt has placed 4WRW in shower and will sit on it while bathing)   Bathroom Toilet Standard   Bathroom Equipment Grab bars around toilet   racheal [place];Cane, quad   Occupation Retired   Discharge Planning   Type of [address] [place] [address] Prior To Admission None   [address]   DME Ordered?  No   Potential Assistance Purchasing Medications No   Type of Home Care Services PT;OT;Nursing Services

## 2023-09-18 NOTE — PROGRESS NOTES
ACUTE OCCUPATIONAL THERAPY GOALS:   (Developed with and agreed upon by patient and/or caregiver.)  1. Patient will complete lower body bathing and dressing with modified independence and adaptive equipment as needed. 2. Patient will complete toileting with independence. 3. Patient will tolerate 25 minutes of OT treatment with 1-2 rest breaks to increase activity tolerance for ADLs. 4. Patient will complete functional transfers with independence and appropriate safety awareness. 5. Patient will complete functional mobility with independence and appropriate safety awareness. Timeframe: 7 visits       OCCUPATIONAL THERAPY Initial Assessment, Daily Note, and AM       OT Visit Days: 1  Acknowledge Orders  Time  OT Charge Capture  Rehab Caseload Tracker      Yuval Addison is a 79 y.o. male   PRIMARY DIAGNOSIS: FRANKY (acute kidney injury) ([address])  Dehydration [E86.0]  Hypomagnesemia [E83.42]  FRANKY (acute kidney injury) ([address]) [N17.9]  Elevated lactic acid level [R79.89]  Diarrhea, unspecified type [R19.7]       Reason for Referral: Generalized Muscle Weakness (M62.81)  Other lack of cordination (R27.8)  History of falling (Z91.81)  Observation: Payor: MEDICARE / Plan: MEDICARE PART A AND B / Product Type: *No Product type* /     ASSESSMENT:     REHAB RECOMMENDATIONS:   Recommendation to date pending progress:  Setting:  Home Health Therapy    Equipment:    To Be Determined     ASSESSMENT:  Mr. Luli Duran presents to the hospital with FRANKY, dehydration, hypomagnesemia, elevated lactic acid, and diarrhea. Pt lives at home with his wife and uses a quad cane as needed. Pt also has a RW and rollator and uses the rollator in the shower per wife. Pt is modified independent with ADL. He had one fall a few weeks ago. Pt has no major complaints this session and feels he is doing fairly well. Pt was able to stand and complete functional mobility in the room and hallway with SBA.  Pt did appear to fatigue fairly quickly with
FULL CODE    NO DIRECTIVES    MY CHART PENDING    LOCAL
PAIN: VITALS / O2: PRECAUTION / Miki Serve / DRAINS:   Pre Treatment:   Pain Assessment: None - Denies Pain      Post Treatment: none stated Vitals        Oxygen      IV    RESTRICTIONS/PRECAUTIONS:  Restrictions/Precautions: Isolation, Fall Risk (C-Diff)                 GROSS EVALUATION:  Intact Impaired (Comments):   AROM []     PROM []    Strength []     Balance [] Sitting - Static: Good  Sitting - Dynamic: Good  Standing - Static: Fair, +  Standing - Dynamic: Fair, +   Posture [] Forward Head  Rounded Shoulders   Sensation []     Coordination []      Tone []     Edema []    Activity Tolerance [] Patient Tolerated treatment well, Patient limited by fatigue    []      COGNITION/  PERCEPTION: Intact Impaired (Comments):   Orientation []     Vision []     Hearing []     Cognition  []       MOBILITY: I Mod I S SBA CGA Min Mod Max Total  NT x2 Comments:   Bed Mobility    Rolling [] [] [] [] [] [] [] [] [] [] []    Supine to Sit [] [] [] [] [] [] [] [] [] [] []    Scooting [] [] [] [] [] [] [] [] [] [] []    Sit to Supine [] [] [] [] [] [] [] [] [] [] []    Transfers    Sit to Stand [] [] [] [x] [] [] [] [] [] [] []    Bed to Chair [] [] [] [] [] [] [] [] [] [] []    Stand to Sit [] [] [] [x] [] [] [] [] [] [] []     [] [] [] [] [] [] [] [] [] [] []    I=Independent, Mod I=Modified Independent, S=Supervision, SBA=Standby Assistance, CGA=Contact Guard Assistance,   Min=Minimal Assistance, Mod=Moderate Assistance, Max=Maximal Assistance, Total=Total Assistance, NT=Not Tested    GAIT: I Mod I S SBA CGA Min Mod Max Total  NT x2 Comments:   Level of Assistance [] [] [] [x] [x] [] [] [] [] [] []    Distance 180  feet    DME None    Gait Quality Decreased liza , Decreased step length, and Trunk flexion    Weightbearing Status Restrictions/Precautions  Restrictions/Precautions: Isolation, Fall Risk (C-Diff)    Stairs      I=Independent, Mod I=Modified Independent, S=Supervision, SBA=Standby Assistance, CGA=Contact Guard

## 2023-09-19 ENCOUNTER — TELEPHONE (OUTPATIENT)
Dept: INTERNAL MEDICINE CLINIC | Facility: CLINIC | Age: 83
End: 2023-09-19

## 2023-09-19 ENCOUNTER — HOME CARE VISIT (OUTPATIENT)
Dept: SCHEDULING | Facility: HOME HEALTH | Age: 83
End: 2023-09-19

## 2023-09-19 VITALS
SYSTOLIC BLOOD PRESSURE: 110 MMHG | HEART RATE: 76 BPM | RESPIRATION RATE: 16 BRPM | DIASTOLIC BLOOD PRESSURE: 60 MMHG | OXYGEN SATURATION: 93 %

## 2023-09-19 VITALS
SYSTOLIC BLOOD PRESSURE: 138 MMHG | RESPIRATION RATE: 16 BRPM | OXYGEN SATURATION: 97 % | DIASTOLIC BLOOD PRESSURE: 82 MMHG | HEART RATE: 76 BPM | TEMPERATURE: 97.6 F

## 2023-09-19 PROCEDURE — 0221000100 HH NO PAY CLAIM PROCEDURE

## 2023-09-19 PROCEDURE — G0151 HHCP-SERV OF PT,EA 15 MIN: HCPCS

## 2023-09-19 PROCEDURE — G0299 HHS/HOSPICE OF RN EA 15 MIN: HCPCS

## 2023-09-19 ASSESSMENT — ENCOUNTER SYMPTOMS
DIARRHEA: 1
PAIN LOCATION - PAIN QUALITY: ACHING
DYSPNEA ACTIVITY LEVEL: AFTER AMBULATING 10 - 20 FT
STOOL DESCRIPTION: LOOSE

## 2023-09-19 NOTE — TELEPHONE ENCOUNTER
PLEASE CALL [facility],[address] [facility] PT     Pt evaluation completed - NEED VERBAL ORDER -    Need order for PT 2X WK FOR 2 WKS

## 2023-09-19 NOTE — TELEPHONE ENCOUNTER
Please call Mac with [facility] health   Regarding follow-up orders and drug interactions

## 2023-09-19 NOTE — TELEPHONE ENCOUNTER
Mac called to ask if you would follow patient/sign HH orders. Gave him verbal ok. He also called to report interaction between nitro and viagra however patient states he never started taking viagra. Also interaction between eliquis and asa but states asa was d/c by hospital so he is now longer taking.

## 2023-09-20 ENCOUNTER — OFFICE VISIT (OUTPATIENT)
Dept: INTERNAL MEDICINE CLINIC | Facility: CLINIC | Age: 83
End: 2023-09-20
Payer: MEDICARE

## 2023-09-20 ENCOUNTER — HOME CARE VISIT (OUTPATIENT)
Dept: SCHEDULING | Facility: HOME HEALTH | Age: 83
End: 2023-09-20

## 2023-09-20 VITALS
TEMPERATURE: 97.6 F | RESPIRATION RATE: 18 BRPM | BODY MASS INDEX: 37.07 KG/M2 | HEART RATE: 75 BPM | HEIGHT: 65 IN | WEIGHT: 222.5 LBS | DIASTOLIC BLOOD PRESSURE: 71 MMHG | SYSTOLIC BLOOD PRESSURE: 122 MMHG | OXYGEN SATURATION: 96 %

## 2023-09-20 DIAGNOSIS — E83.42 HYPOMAGNESEMIA: ICD-10-CM

## 2023-09-20 DIAGNOSIS — K52.9 CHRONIC DIARRHEA: ICD-10-CM

## 2023-09-20 DIAGNOSIS — Z09 HOSPITAL DISCHARGE FOLLOW-UP: Primary | ICD-10-CM

## 2023-09-20 DIAGNOSIS — N17.9 AKI (ACUTE KIDNEY INJURY) (HCC): ICD-10-CM

## 2023-09-20 DIAGNOSIS — R06.02 SHORTNESS OF BREATH: ICD-10-CM

## 2023-09-20 PROCEDURE — 99214 OFFICE O/P EST MOD 30 MIN: CPT | Performed by: FAMILY MEDICINE

## 2023-09-20 PROCEDURE — G8417 CALC BMI ABV UP PARAM F/U: HCPCS | Performed by: FAMILY MEDICINE

## 2023-09-20 PROCEDURE — 3078F DIAST BP <80 MM HG: CPT | Performed by: FAMILY MEDICINE

## 2023-09-20 PROCEDURE — 1123F ACP DISCUSS/DSCN MKR DOCD: CPT | Performed by: FAMILY MEDICINE

## 2023-09-20 PROCEDURE — 1036F TOBACCO NON-USER: CPT | Performed by: FAMILY MEDICINE

## 2023-09-20 PROCEDURE — G0152 HHCP-SERV OF OT,EA 15 MIN: HCPCS

## 2023-09-20 PROCEDURE — 3074F SYST BP LT 130 MM HG: CPT | Performed by: FAMILY MEDICINE

## 2023-09-20 PROCEDURE — G8427 DOCREV CUR MEDS BY ELIG CLIN: HCPCS | Performed by: FAMILY MEDICINE

## 2023-09-20 RX ORDER — POTASSIUM CHLORIDE 20 MEQ/1
TABLET, EXTENDED RELEASE ORAL
COMMUNITY
End: 2023-09-20 | Stop reason: SDUPTHER

## 2023-09-20 ASSESSMENT — ENCOUNTER SYMPTOMS
SHORTNESS OF BREATH: 1
BLOOD IN STOOL: 0
DIARRHEA: 1
ABDOMINAL PAIN: 0

## 2023-09-20 NOTE — PROGRESS NOTES
9/20/2023     Subjective:     Chief Complaint   Patient presents with    Follow-up     Recent hospitalization due to drop in BP; states multiple bowel movements that causes his BP to drop       HPI:   Transition of Care Visit    Chiara Ramos is a 79 y.o. male . He is for follow up from inpatient setting for FRANKY, paroxysmal Afib, Chronic CHF, ASCVD, hypomagnesemia. Admission date - 9/17/2023 Discharge date - 9/18/2023  Facility SF  Initial contact on  none  Date of face to face visit 9/20/2023  Sources of information and documents reviewed -  connect care   Communication with home health - yes  Interaction with health care professionals assuming care -  no  Community resources identified for patient and family - no  Medical equipment ordered - no  Medication changes -reviewed  Referral or lab - no  Education - yes    From discharge summary:  \"  Hospital Course:  Chiara Ramos is an 79 y.o. CM w/ a PMH of CAD, HTN, diastolic heart failure, paroxysmal atrial fibrillation s/p Watchman device, dyslipidemia who presented to the ER because of loose stools and feeling weak and tired. Patient states that he had colonoscopy on 9/12/2023, had some polyps with biopsy results pending. He has been having chronic diarrhea since he had cholecystectomy. Had about 3-5 loose stools the day PTA. Also noted his blood pressure was low at home. Denies feeling dizzy or lightheaded. Denies any chest pain or palpitations. He had 1 episode of vomiting before arrival that was nonbloody and nonbilious. Denies any abdominal pain. ER course: Afebrile. Hemodynamically stable. Blood pressure 108/65. Oxygen saturation 96% on room air. Potassium of 4.2, creatinine 1.6, magnesium 1.7. Lactic acid 2.4. WBC 11.3. CXR negative for acute cardiopulmonary abnormality. Patient admitted for further evaluation and management of acute kidney injury.   The patient's renal function has returned to baseline after receiving

## 2023-09-21 ENCOUNTER — HOSPITAL ENCOUNTER (OUTPATIENT)
Dept: CT IMAGING | Age: 83
Discharge: HOME | End: 2023-09-21
Attending: INTERNAL MEDICINE
Payer: MEDICARE

## 2023-09-21 ENCOUNTER — HOME CARE VISIT (OUTPATIENT)
Dept: SCHEDULING | Facility: HOME HEALTH | Age: 83
End: 2023-09-21

## 2023-09-21 VITALS
OXYGEN SATURATION: 98 % | SYSTOLIC BLOOD PRESSURE: 128 MMHG | TEMPERATURE: 98.4 F | HEART RATE: 75 BPM | RESPIRATION RATE: 16 BRPM | DIASTOLIC BLOOD PRESSURE: 78 MMHG

## 2023-09-21 DIAGNOSIS — C18.0 ADENOCARCINOMA OF CECUM (HCC): ICD-10-CM

## 2023-09-21 PROCEDURE — 6360000004 HC RX CONTRAST MEDICATION: Performed by: INTERNAL MEDICINE

## 2023-09-21 PROCEDURE — 2580000003 HC RX 258: Performed by: INTERNAL MEDICINE

## 2023-09-21 PROCEDURE — 71260 CT THORAX DX C+: CPT

## 2023-09-21 RX ORDER — SODIUM CHLORIDE 0.9 % (FLUSH) 0.9 %
10 SYRINGE (ML) INJECTION
Status: COMPLETED | OUTPATIENT
Start: 2023-09-21 | End: 2023-09-21

## 2023-09-21 RX ORDER — 0.9 % SODIUM CHLORIDE 0.9 %
100 INTRAVENOUS SOLUTION INTRAVENOUS
Status: COMPLETED | OUTPATIENT
Start: 2023-09-21 | End: 2023-09-21

## 2023-09-21 RX ADMIN — DIATRIZOATE MEGLUMINE AND DIATRIZOATE SODIUM 15 ML: 660; 100 LIQUID ORAL; RECTAL at 10:08

## 2023-09-21 RX ADMIN — IOPAMIDOL 100 ML: 755 INJECTION, SOLUTION INTRAVENOUS at 10:07

## 2023-09-21 RX ADMIN — SODIUM CHLORIDE 100 ML: 9 INJECTION, SOLUTION INTRAVENOUS at 10:09

## 2023-09-21 RX ADMIN — SODIUM CHLORIDE, PRESERVATIVE FREE 10 ML: 5 INJECTION INTRAVENOUS at 10:09

## 2023-09-21 ASSESSMENT — ENCOUNTER SYMPTOMS
DYSPNEA ACTIVITY LEVEL: AFTER AMBULATING MORE THAN 20 FT
DIARRHEA: 1

## 2023-09-22 ENCOUNTER — HOME CARE VISIT (OUTPATIENT)
Dept: SCHEDULING | Facility: HOME HEALTH | Age: 83
End: 2023-09-22

## 2023-09-22 VITALS
DIASTOLIC BLOOD PRESSURE: 62 MMHG | TEMPERATURE: 97.5 F | SYSTOLIC BLOOD PRESSURE: 102 MMHG | HEART RATE: 78 BPM | RESPIRATION RATE: 16 BRPM | OXYGEN SATURATION: 95 %

## 2023-09-22 PROCEDURE — G0299 HHS/HOSPICE OF RN EA 15 MIN: HCPCS

## 2023-09-22 ASSESSMENT — ENCOUNTER SYMPTOMS: STOOL DESCRIPTION: SOFT FORMED

## 2023-09-25 DIAGNOSIS — C18.0 ADENOCARCINOMA OF CECUM (HCC): Primary | ICD-10-CM

## 2023-09-25 DIAGNOSIS — D64.9 ANEMIA, UNSPECIFIED TYPE: ICD-10-CM

## 2023-09-25 NOTE — PROGRESS NOTES
endocrinology for further management. CEA 2.8. There is no clinical evidence of cancer recurrence. However based on multiple various complaints as described above, we shall get surveillance CT scan at next available spot and office follow-up will be arranged to review the results. All questions were answered to the best of my abilities. 6/21/23: I reviewed the results of most recent labs and imaging findings with the patient. He has no signs or symptoms concerning for disease recurrence based on today's evaluation. Follow up in 3 months with labs and CT chest abdomen and pelvis prior. Total visit time 25 minutes    9/27/2023: Patient has no signs or symptoms concerning for cancer recurrence. Recent CT scan of the chest abdomen and pelvis showed no evidence of recurrent or metastatic disease. He underwent colonoscopy on 9/12/2023 by Dr. Radha Rouse. It showed normal colonoscopy to the level of terminal ileum, no evidence of cancer recurrence, small polyp was removed by snare, pathology returned as tubular adenoma. I shall plan to see him back in about 3 months for history and physical and repeat surveillance CT scan in 6 months. Total visit time 30 minutes. Taryn Ingram MD  [facility]  [address]  Office : [phone]  Fax : [phone]    Elements of this note have been dictated using speech recognition software. As a result, errors of speech recognition may have occurred.

## 2023-09-26 ENCOUNTER — HOME CARE VISIT (OUTPATIENT)
Dept: SCHEDULING | Facility: HOME HEALTH | Age: 83
End: 2023-09-26
Payer: MEDICARE

## 2023-09-26 VITALS
HEART RATE: 78 BPM | RESPIRATION RATE: 17 BRPM | DIASTOLIC BLOOD PRESSURE: 70 MMHG | OXYGEN SATURATION: 98 % | TEMPERATURE: 97.8 F | SYSTOLIC BLOOD PRESSURE: 116 MMHG

## 2023-09-26 VITALS
WEIGHT: 215 LBS | OXYGEN SATURATION: 97 % | SYSTOLIC BLOOD PRESSURE: 126 MMHG | BODY MASS INDEX: 35.78 KG/M2 | RESPIRATION RATE: 18 BRPM | TEMPERATURE: 98.9 F | HEART RATE: 72 BPM | DIASTOLIC BLOOD PRESSURE: 62 MMHG

## 2023-09-26 PROCEDURE — G0157 HHC PT ASSISTANT EA 15: HCPCS

## 2023-09-26 PROCEDURE — G0299 HHS/HOSPICE OF RN EA 15 MIN: HCPCS

## 2023-09-26 ASSESSMENT — ENCOUNTER SYMPTOMS: DIARRHEA: 1

## 2023-09-27 ENCOUNTER — HOSPITAL ENCOUNTER (OUTPATIENT)
Dept: LAB | Age: 83
Discharge: HOME | End: 2023-09-30
Payer: MEDICARE

## 2023-09-27 ENCOUNTER — OFFICE VISIT (OUTPATIENT)
Dept: ONCOLOGY | Age: 83
End: 2023-09-27
Payer: MEDICARE

## 2023-09-27 VITALS
SYSTOLIC BLOOD PRESSURE: 133 MMHG | OXYGEN SATURATION: 96 % | HEART RATE: 75 BPM | DIASTOLIC BLOOD PRESSURE: 76 MMHG | HEIGHT: 65 IN | RESPIRATION RATE: 14 BRPM | BODY MASS INDEX: 37.04 KG/M2 | TEMPERATURE: 97.6 F | WEIGHT: 222.3 LBS

## 2023-09-27 DIAGNOSIS — C18.0 ADENOCARCINOMA OF CECUM (HCC): ICD-10-CM

## 2023-09-27 DIAGNOSIS — D64.9 ANEMIA, UNSPECIFIED TYPE: ICD-10-CM

## 2023-09-27 DIAGNOSIS — C18.0 ADENOCARCINOMA OF CECUM (HCC): Primary | ICD-10-CM

## 2023-09-27 LAB
ALBUMIN SERPL-MCNC: 3.5 G/DL (ref 3.2–4.6)
ALBUMIN/GLOB SERPL: 1.1 (ref 0.4–1.6)
ALP SERPL-CCNC: 85 U/L (ref 50–136)
ALT SERPL-CCNC: 34 U/L (ref 12–65)
ANION GAP SERPL CALC-SCNC: 5 MMOL/L (ref 2–11)
AST SERPL-CCNC: 22 U/L (ref 15–37)
BASOPHILS # BLD: 0 K/UL (ref 0–0.2)
BASOPHILS NFR BLD: 0 % (ref 0–2)
BILIRUB SERPL-MCNC: 0.6 MG/DL (ref 0.2–1.1)
BUN SERPL-MCNC: 17 MG/DL (ref 8–23)
CALCIUM SERPL-MCNC: 9.1 MG/DL (ref 8.3–10.4)
CEA SERPL-MCNC: 2.3 NG/ML (ref 0–3)
CHLORIDE SERPL-SCNC: 108 MMOL/L (ref 101–110)
CO2 SERPL-SCNC: 27 MMOL/L (ref 21–32)
CREAT SERPL-MCNC: 1 MG/DL (ref 0.8–1.5)
DIFFERENTIAL METHOD BLD: NORMAL
EOSINOPHIL # BLD: 0.3 K/UL (ref 0–0.8)
EOSINOPHIL NFR BLD: 3 % (ref 0.5–7.8)
ERYTHROCYTE [DISTWIDTH] IN BLOOD BY AUTOMATED COUNT: 13.4 % (ref 11.9–14.6)
FERRITIN SERPL-MCNC: 63 NG/ML (ref 8–388)
GLOBULIN SER CALC-MCNC: 3.3 G/DL (ref 2.8–4.5)
GLUCOSE SERPL-MCNC: 123 MG/DL (ref 65–100)
HCT VFR BLD AUTO: 43.2 % (ref 41.1–50.3)
HGB BLD-MCNC: 13.7 G/DL (ref 13.6–17.2)
IMM GRANULOCYTES # BLD AUTO: 0 K/UL (ref 0–0.5)
IMM GRANULOCYTES NFR BLD AUTO: 0 % (ref 0–5)
IRON SATN MFR SERPL: 25 %
IRON SERPL-MCNC: 83 UG/DL (ref 35–150)
LYMPHOCYTES # BLD: 2.9 K/UL (ref 0.5–4.6)
LYMPHOCYTES NFR BLD: 28 % (ref 13–44)
MCH RBC QN AUTO: 30.5 PG (ref 26.1–32.9)
MCHC RBC AUTO-ENTMCNC: 31.7 G/DL (ref 31.4–35)
MCV RBC AUTO: 96.2 FL (ref 82–102)
MONOCYTES # BLD: 0.7 K/UL (ref 0.1–1.3)
MONOCYTES NFR BLD: 7 % (ref 4–12)
NEUTS SEG # BLD: 6.2 K/UL (ref 1.7–8.2)
NEUTS SEG NFR BLD: 61 % (ref 43–78)
NRBC # BLD: 0 K/UL (ref 0–0.2)
PLATELET # BLD AUTO: 165 K/UL (ref 150–450)
PMV BLD AUTO: 9.5 FL (ref 9.4–12.3)
POTASSIUM SERPL-SCNC: 4.2 MMOL/L (ref 3.5–5.1)
PROT SERPL-MCNC: 6.8 G/DL (ref 6.3–8.2)
RBC # BLD AUTO: 4.49 M/UL (ref 4.23–5.6)
SODIUM SERPL-SCNC: 140 MMOL/L (ref 133–143)
TIBC SERPL-MCNC: 338 UG/DL (ref 250–450)
WBC # BLD AUTO: 10.2 K/UL (ref 4.3–11.1)

## 2023-09-27 PROCEDURE — G8427 DOCREV CUR MEDS BY ELIG CLIN: HCPCS | Performed by: INTERNAL MEDICINE

## 2023-09-27 PROCEDURE — 82378 CARCINOEMBRYONIC ANTIGEN: CPT

## 2023-09-27 PROCEDURE — 3075F SYST BP GE 130 - 139MM HG: CPT | Performed by: INTERNAL MEDICINE

## 2023-09-27 PROCEDURE — 83550 IRON BINDING TEST: CPT

## 2023-09-27 PROCEDURE — 83540 ASSAY OF IRON: CPT

## 2023-09-27 PROCEDURE — 80053 COMPREHEN METABOLIC PANEL: CPT

## 2023-09-27 PROCEDURE — 99214 OFFICE O/P EST MOD 30 MIN: CPT | Performed by: INTERNAL MEDICINE

## 2023-09-27 PROCEDURE — 1036F TOBACCO NON-USER: CPT | Performed by: INTERNAL MEDICINE

## 2023-09-27 PROCEDURE — G8417 CALC BMI ABV UP PARAM F/U: HCPCS | Performed by: INTERNAL MEDICINE

## 2023-09-27 PROCEDURE — 82728 ASSAY OF FERRITIN: CPT

## 2023-09-27 PROCEDURE — 1123F ACP DISCUSS/DSCN MKR DOCD: CPT | Performed by: INTERNAL MEDICINE

## 2023-09-27 PROCEDURE — 3078F DIAST BP <80 MM HG: CPT | Performed by: INTERNAL MEDICINE

## 2023-09-27 PROCEDURE — 85025 COMPLETE CBC W/AUTO DIFF WBC: CPT

## 2023-09-27 PROCEDURE — 36415 COLL VENOUS BLD VENIPUNCTURE: CPT

## 2023-09-27 ASSESSMENT — PATIENT HEALTH QUESTIONNAIRE - PHQ9
SUM OF ALL RESPONSES TO PHQ QUESTIONS 1-9: 0
SUM OF ALL RESPONSES TO PHQ QUESTIONS 1-9: 0
1. LITTLE INTEREST OR PLEASURE IN DOING THINGS: 0
2. FEELING DOWN, DEPRESSED OR HOPELESS: 0
SUM OF ALL RESPONSES TO PHQ QUESTIONS 1-9: 0
SUM OF ALL RESPONSES TO PHQ9 QUESTIONS 1 & 2: 0
SUM OF ALL RESPONSES TO PHQ QUESTIONS 1-9: 0

## 2023-09-27 NOTE — PATIENT INSTRUCTIONS
Patient Instructions from Today's Visit    Reason for Visit:  Follow up - Colon    Diagnosis Information:  [email or web address]    Plan:  Colonoscopy report reviewed. CT scan reviewed - will repeat in 6 months. Please call us if you have any questions or concerns before your next visit. Follow Up:  3 months after scan, with labs prior.      Recent Lab Results:  Hospital Outpatient Visit on 09/27/2023   Component Date Value Ref Range Status    WBC 09/27/2023 10.2  4.3 - 11.1 K/uL Final    RBC 09/27/2023 4.49  4.23 - 5.6 M/uL Final    Hemoglobin 09/27/2023 13.7  13.6 - 17.2 g/dL Final    Hematocrit 09/27/2023 43.2  41.1 - 50.3 % Final    MCV 09/27/2023 96.2  82.0 - 102.0 FL Final    MCH 09/27/2023 30.5  26.1 - 32.9 PG Final    MCHC 09/27/2023 31.7  31.4 - 35.0 g/dL Final    RDW 09/27/2023 13.4  11.9 - 14.6 % Final    Platelets 29/92/3699 165  150 - 450 K/uL Final    MPV 09/27/2023 9.5  9.4 - 12.3 FL Final    nRBC 09/27/2023 0.00  0.0 - 0.2 K/uL Final    **Note: Absolute NRBC parameter is now reported with Hemogram**    Differential Type 09/27/2023 AUTOMATED    Final    Neutrophils % 09/27/2023 61  43 - 78 % Final    Lymphocytes % 09/27/2023 28  13 - 44 % Final    Monocytes % 09/27/2023 7  4.0 - 12.0 % Final    Eosinophils % 09/27/2023 3  0.5 - 7.8 % Final    Basophils % 09/27/2023 0  0.0 - 2.0 % Final    Immature Granulocytes 09/27/2023 0  0.0 - 5.0 % Final    Neutrophils Absolute 09/27/2023 6.2  1.7 - 8.2 K/UL Final    Lymphocytes Absolute 09/27/2023 2.9  0.5 - 4.6 K/UL Final    Monocytes Absolute 09/27/2023 0.7  0.1 - 1.3 K/UL Final    Eosinophils Absolute 09/27/2023 0.3  0.0 - 0.8 K/UL Final    Basophils Absolute 09/27/2023 0.0  0.0 - 0.2 K/UL Final    Absolute Immature Granulocyte 09/27/2023 0.0  0.0 - 0.5 K/UL Final    Sodium 09/27/2023 140  133 - 143 mmol/L Final    Potassium 09/27/2023 4.2  3.5 - 5.1 mmol/L Final    Chloride

## 2023-09-28 ENCOUNTER — HOME CARE VISIT (OUTPATIENT)
Dept: SCHEDULING | Facility: HOME HEALTH | Age: 83
End: 2023-09-28
Payer: MEDICARE

## 2023-09-28 VITALS
DIASTOLIC BLOOD PRESSURE: 72 MMHG | HEART RATE: 72 BPM | OXYGEN SATURATION: 98 % | TEMPERATURE: 97.2 F | SYSTOLIC BLOOD PRESSURE: 122 MMHG | RESPIRATION RATE: 18 BRPM

## 2023-09-28 PROCEDURE — G0299 HHS/HOSPICE OF RN EA 15 MIN: HCPCS

## 2023-09-28 PROCEDURE — G0151 HHCP-SERV OF PT,EA 15 MIN: HCPCS

## 2023-10-02 VITALS
HEART RATE: 75 BPM | SYSTOLIC BLOOD PRESSURE: 138 MMHG | RESPIRATION RATE: 18 BRPM | TEMPERATURE: 97.6 F | OXYGEN SATURATION: 95 % | DIASTOLIC BLOOD PRESSURE: 80 MMHG

## 2023-10-03 ENCOUNTER — TELEPHONE (OUTPATIENT)
Dept: INTERNAL MEDICINE CLINIC | Facility: CLINIC | Age: 83
End: 2023-10-03

## 2023-10-03 ENCOUNTER — HOME CARE VISIT (OUTPATIENT)
Dept: SCHEDULING | Facility: HOME HEALTH | Age: 83
End: 2023-10-03
Payer: MEDICARE

## 2023-10-03 DIAGNOSIS — I48.0 PAROXYSMAL ATRIAL FIBRILLATION (HCC): Primary | ICD-10-CM

## 2023-10-03 PROCEDURE — G0299 HHS/HOSPICE OF RN EA 15 MIN: HCPCS

## 2023-10-03 RX ORDER — POTASSIUM CHLORIDE 20 MEQ/1
20 TABLET, EXTENDED RELEASE ORAL DAILY
Qty: 60 TABLET | Refills: 1 | Status: SHIPPED | OUTPATIENT
Start: 2023-10-03

## 2023-10-04 ENCOUNTER — HOME CARE VISIT (OUTPATIENT)
Dept: SCHEDULING | Facility: HOME HEALTH | Age: 83
End: 2023-10-04
Payer: MEDICARE

## 2023-10-04 VITALS
TEMPERATURE: 97.8 F | RESPIRATION RATE: 16 BRPM | OXYGEN SATURATION: 98 % | SYSTOLIC BLOOD PRESSURE: 104 MMHG | DIASTOLIC BLOOD PRESSURE: 60 MMHG | HEART RATE: 78 BPM

## 2023-10-04 PROCEDURE — G0157 HHC PT ASSISTANT EA 15: HCPCS

## 2023-10-05 VITALS
RESPIRATION RATE: 18 BRPM | OXYGEN SATURATION: 95 % | SYSTOLIC BLOOD PRESSURE: 110 MMHG | DIASTOLIC BLOOD PRESSURE: 60 MMHG | TEMPERATURE: 98.2 F | HEART RATE: 75 BPM

## 2023-10-06 ENCOUNTER — HOME CARE VISIT (OUTPATIENT)
Dept: SCHEDULING | Facility: HOME HEALTH | Age: 83
End: 2023-10-06
Payer: MEDICARE

## 2023-10-06 PROCEDURE — G0299 HHS/HOSPICE OF RN EA 15 MIN: HCPCS

## 2023-10-07 VITALS
TEMPERATURE: 98 F | HEART RATE: 75 BPM | OXYGEN SATURATION: 96 % | SYSTOLIC BLOOD PRESSURE: 108 MMHG | RESPIRATION RATE: 18 BRPM | DIASTOLIC BLOOD PRESSURE: 62 MMHG

## 2023-10-07 ASSESSMENT — ENCOUNTER SYMPTOMS
DYSPNEA ACTIVITY LEVEL: AFTER AMBULATING 10 - 20 FT
PAIN LOCATION - PAIN QUALITY: SHARP

## 2023-10-09 ENCOUNTER — HOME CARE VISIT (OUTPATIENT)
Dept: SCHEDULING | Facility: HOME HEALTH | Age: 83
End: 2023-10-09
Payer: MEDICARE

## 2023-10-09 VITALS
DIASTOLIC BLOOD PRESSURE: 72 MMHG | OXYGEN SATURATION: 98 % | TEMPERATURE: 98.2 F | HEART RATE: 78 BPM | SYSTOLIC BLOOD PRESSURE: 128 MMHG | RESPIRATION RATE: 17 BRPM

## 2023-10-09 PROCEDURE — G0157 HHC PT ASSISTANT EA 15: HCPCS

## 2023-10-10 ENCOUNTER — HOME CARE VISIT (OUTPATIENT)
Dept: SCHEDULING | Facility: HOME HEALTH | Age: 83
End: 2023-10-10
Payer: MEDICARE

## 2023-10-10 VITALS
HEART RATE: 78 BPM | RESPIRATION RATE: 16 BRPM | OXYGEN SATURATION: 98 % | WEIGHT: 215 LBS | BODY MASS INDEX: 35.78 KG/M2 | DIASTOLIC BLOOD PRESSURE: 88 MMHG | SYSTOLIC BLOOD PRESSURE: 136 MMHG | TEMPERATURE: 96.8 F

## 2023-10-10 PROCEDURE — G0299 HHS/HOSPICE OF RN EA 15 MIN: HCPCS

## 2023-10-10 ASSESSMENT — ENCOUNTER SYMPTOMS: STOOL DESCRIPTION: SOFT FORMED

## 2023-10-11 ENCOUNTER — HOME CARE VISIT (OUTPATIENT)
Dept: SCHEDULING | Facility: HOME HEALTH | Age: 83
End: 2023-10-11
Payer: MEDICARE

## 2023-10-11 VITALS
RESPIRATION RATE: 18 BRPM | OXYGEN SATURATION: 98 % | DIASTOLIC BLOOD PRESSURE: 72 MMHG | TEMPERATURE: 97.2 F | HEART RATE: 72 BPM | SYSTOLIC BLOOD PRESSURE: 128 MMHG

## 2023-10-11 PROCEDURE — G0151 HHCP-SERV OF PT,EA 15 MIN: HCPCS

## 2023-10-17 ENCOUNTER — HOME CARE VISIT (OUTPATIENT)
Dept: SCHEDULING | Facility: HOME HEALTH | Age: 83
End: 2023-10-17
Payer: MEDICARE

## 2023-10-17 PROCEDURE — G0299 HHS/HOSPICE OF RN EA 15 MIN: HCPCS

## 2023-10-18 VITALS
SYSTOLIC BLOOD PRESSURE: 130 MMHG | OXYGEN SATURATION: 97 % | DIASTOLIC BLOOD PRESSURE: 78 MMHG | WEIGHT: 214 LBS | RESPIRATION RATE: 18 BRPM | BODY MASS INDEX: 35.61 KG/M2 | HEART RATE: 81 BPM | TEMPERATURE: 97.6 F

## 2023-10-18 ASSESSMENT — ENCOUNTER SYMPTOMS
STOOL DESCRIPTION: PARTIALLY FORMED
DYSPNEA ACTIVITY LEVEL: AFTER AMBULATING MORE THAN 20 FT

## 2023-10-25 ENCOUNTER — HOME CARE VISIT (OUTPATIENT)
Dept: SCHEDULING | Facility: HOME HEALTH | Age: 83
End: 2023-10-25
Payer: MEDICARE

## 2023-10-25 VITALS
OXYGEN SATURATION: 96 % | SYSTOLIC BLOOD PRESSURE: 132 MMHG | TEMPERATURE: 97.7 F | DIASTOLIC BLOOD PRESSURE: 74 MMHG | HEART RATE: 75 BPM | RESPIRATION RATE: 18 BRPM

## 2023-10-25 PROCEDURE — G0299 HHS/HOSPICE OF RN EA 15 MIN: HCPCS

## 2023-10-25 ASSESSMENT — ENCOUNTER SYMPTOMS: STOOL DESCRIPTION: LOOSE

## 2023-10-26 DIAGNOSIS — E78.5 DYSLIPIDEMIA: ICD-10-CM

## 2023-10-26 DIAGNOSIS — I10 HYPERTENSION COMPLICATIONS: ICD-10-CM

## 2023-10-26 DIAGNOSIS — Z95.5 S/P CORONARY ARTERY STENT PLACEMENT: ICD-10-CM

## 2023-10-26 RX ORDER — ATORVASTATIN CALCIUM 80 MG/1
80 TABLET, FILM COATED ORAL DAILY
Qty: 90 TABLET | Refills: 5 | OUTPATIENT
Start: 2023-10-26

## 2023-10-31 ENCOUNTER — HOME CARE VISIT (OUTPATIENT)
Dept: SCHEDULING | Facility: HOME HEALTH | Age: 83
End: 2023-10-31
Payer: MEDICARE

## 2023-10-31 PROCEDURE — G0299 HHS/HOSPICE OF RN EA 15 MIN: HCPCS

## 2023-11-03 VITALS
TEMPERATURE: 98 F | OXYGEN SATURATION: 97 % | HEART RATE: 75 BPM | DIASTOLIC BLOOD PRESSURE: 82 MMHG | SYSTOLIC BLOOD PRESSURE: 138 MMHG | BODY MASS INDEX: 35.61 KG/M2 | RESPIRATION RATE: 18 BRPM | WEIGHT: 214 LBS

## 2023-11-07 ENCOUNTER — HOME CARE VISIT (OUTPATIENT)
Dept: SCHEDULING | Facility: HOME HEALTH | Age: 83
End: 2023-11-07
Payer: MEDICARE

## 2023-11-07 VITALS
HEART RATE: 61 BPM | OXYGEN SATURATION: 96 % | DIASTOLIC BLOOD PRESSURE: 60 MMHG | SYSTOLIC BLOOD PRESSURE: 130 MMHG | TEMPERATURE: 98.8 F | RESPIRATION RATE: 18 BRPM

## 2023-11-07 PROCEDURE — G0299 HHS/HOSPICE OF RN EA 15 MIN: HCPCS

## 2023-11-12 DIAGNOSIS — Z95.5 S/P CORONARY ARTERY STENT PLACEMENT: ICD-10-CM

## 2023-11-12 DIAGNOSIS — I10 HYPERTENSION COMPLICATIONS: ICD-10-CM

## 2023-11-12 DIAGNOSIS — E78.5 DYSLIPIDEMIA: ICD-10-CM

## 2023-11-13 RX ORDER — ATORVASTATIN CALCIUM 80 MG/1
80 TABLET, FILM COATED ORAL DAILY
Qty: 90 TABLET | Refills: 5 | OUTPATIENT
Start: 2023-11-13

## 2023-11-15 ENCOUNTER — HOME CARE VISIT (OUTPATIENT)
Dept: SCHEDULING | Facility: HOME HEALTH | Age: 83
End: 2023-11-15
Payer: MEDICARE

## 2023-11-15 PROCEDURE — G0299 HHS/HOSPICE OF RN EA 15 MIN: HCPCS

## 2023-11-18 VITALS
DIASTOLIC BLOOD PRESSURE: 70 MMHG | SYSTOLIC BLOOD PRESSURE: 112 MMHG | TEMPERATURE: 97.3 F | OXYGEN SATURATION: 95 % | HEART RATE: 75 BPM | RESPIRATION RATE: 18 BRPM

## 2023-11-18 ASSESSMENT — ENCOUNTER SYMPTOMS: DYSPNEA ACTIVITY LEVEL: AFTER AMBULATING MORE THAN 20 FT

## 2023-11-30 DIAGNOSIS — I48.0 PAROXYSMAL ATRIAL FIBRILLATION (HCC): ICD-10-CM

## 2023-11-30 DIAGNOSIS — E78.5 DYSLIPIDEMIA: ICD-10-CM

## 2023-11-30 DIAGNOSIS — N30.00 ACUTE CYSTITIS WITHOUT HEMATURIA: ICD-10-CM

## 2023-11-30 DIAGNOSIS — I10 PRIMARY HYPERTENSION: ICD-10-CM

## 2023-11-30 DIAGNOSIS — E55.9 VITAMIN D DEFICIENCY: ICD-10-CM

## 2023-11-30 DIAGNOSIS — E11.9 TYPE 2 DIABETES MELLITUS WITHOUT COMPLICATION, WITHOUT LONG-TERM CURRENT USE OF INSULIN (HCC): ICD-10-CM

## 2023-11-30 LAB
25(OH)D3 SERPL-MCNC: 7.3 NG/ML (ref 30–100)
ALBUMIN SERPL-MCNC: 3.7 G/DL (ref 3.2–4.6)
ALBUMIN/GLOB SERPL: 1.2 (ref 0.4–1.6)
ALP SERPL-CCNC: 97 U/L (ref 50–136)
ALT SERPL-CCNC: 30 U/L (ref 12–65)
ANION GAP SERPL CALC-SCNC: 4 MMOL/L (ref 2–11)
APPEARANCE UR: CLEAR
AST SERPL-CCNC: 21 U/L (ref 15–37)
BACTERIA URNS QL MICRO: NEGATIVE /HPF
BILIRUB SERPL-MCNC: 0.5 MG/DL (ref 0.2–1.1)
BILIRUB UR QL: NEGATIVE
BUN SERPL-MCNC: 11 MG/DL (ref 8–23)
CALCIUM SERPL-MCNC: 9.6 MG/DL (ref 8.3–10.4)
CASTS URNS QL MICRO: ABNORMAL /LPF (ref 0–2)
CHLORIDE SERPL-SCNC: 109 MMOL/L (ref 101–110)
CHOLEST SERPL-MCNC: 138 MG/DL
CO2 SERPL-SCNC: 26 MMOL/L (ref 21–32)
COLOR UR: ABNORMAL
CREAT SERPL-MCNC: 0.9 MG/DL (ref 0.8–1.5)
EPI CELLS #/AREA URNS HPF: ABNORMAL /HPF (ref 0–5)
ERYTHROCYTE [DISTWIDTH] IN BLOOD BY AUTOMATED COUNT: 12.9 % (ref 11.9–14.6)
EST. AVERAGE GLUCOSE BLD GHB EST-MCNC: 134 MG/DL
GLOBULIN SER CALC-MCNC: 3.1 G/DL (ref 2.8–4.5)
GLUCOSE SERPL-MCNC: 139 MG/DL (ref 65–100)
GLUCOSE UR STRIP.AUTO-MCNC: 250 MG/DL
HBA1C MFR BLD: 6.3 % (ref 4.8–5.6)
HCT VFR BLD AUTO: 44.3 % (ref 41.1–50.3)
HDLC SERPL-MCNC: 41 MG/DL (ref 40–60)
HDLC SERPL: 3.4
HGB BLD-MCNC: 14.3 G/DL (ref 13.6–17.2)
HGB UR QL STRIP: NEGATIVE
KETONES UR QL STRIP.AUTO: NEGATIVE MG/DL
LDLC SERPL CALC-MCNC: 71 MG/DL
LEUKOCYTE ESTERASE UR QL STRIP.AUTO: NEGATIVE
MCH RBC QN AUTO: 31.2 PG (ref 26.1–32.9)
MCHC RBC AUTO-ENTMCNC: 32.3 G/DL (ref 31.4–35)
MCV RBC AUTO: 96.5 FL (ref 82–102)
MUCOUS THREADS URNS QL MICRO: 0 /LPF
NITRITE UR QL STRIP.AUTO: NEGATIVE
NRBC # BLD: 0 K/UL (ref 0–0.2)
PH UR STRIP: 5.5 (ref 5–9)
PLATELET # BLD AUTO: 224 K/UL (ref 150–450)
PMV BLD AUTO: 10.3 FL (ref 9.4–12.3)
POTASSIUM SERPL-SCNC: 4.4 MMOL/L (ref 3.5–5.1)
PROT SERPL-MCNC: 6.8 G/DL (ref 6.3–8.2)
PROT UR STRIP-MCNC: NEGATIVE MG/DL
RBC # BLD AUTO: 4.59 M/UL (ref 4.23–5.6)
RBC #/AREA URNS HPF: ABNORMAL /HPF (ref 0–5)
SODIUM SERPL-SCNC: 139 MMOL/L (ref 133–143)
SP GR UR REFRACTOMETRY: 1.02 (ref 1–1.02)
TRIGL SERPL-MCNC: 130 MG/DL (ref 35–150)
TSH, 3RD GENERATION: 2.33 UIU/ML (ref 0.36–3.74)
URINE CULTURE IF INDICATED: ABNORMAL
UROBILINOGEN UR QL STRIP.AUTO: 0.2 EU/DL (ref 0.2–1)
VLDLC SERPL CALC-MCNC: 26 MG/DL (ref 6–23)
WBC # BLD AUTO: 10.3 K/UL (ref 4.3–11.1)
WBC URNS QL MICRO: ABNORMAL /HPF (ref 0–4)

## 2023-12-06 ENCOUNTER — NURSE ONLY (OUTPATIENT)
Age: 83
End: 2023-12-06

## 2023-12-06 ENCOUNTER — OFFICE VISIT (OUTPATIENT)
Age: 83
End: 2023-12-06
Payer: MEDICARE

## 2023-12-06 VITALS
WEIGHT: 225 LBS | SYSTOLIC BLOOD PRESSURE: 132 MMHG | DIASTOLIC BLOOD PRESSURE: 74 MMHG | HEART RATE: 88 BPM | BODY MASS INDEX: 37.49 KG/M2 | HEIGHT: 65 IN

## 2023-12-06 DIAGNOSIS — I50.32 CHRONIC DIASTOLIC CHF (CONGESTIVE HEART FAILURE) (HCC): ICD-10-CM

## 2023-12-06 DIAGNOSIS — I48.91 ATRIAL FIBRILLATION (HCC): ICD-10-CM

## 2023-12-06 DIAGNOSIS — I50.32 DIASTOLIC CHF, CHRONIC (HCC): ICD-10-CM

## 2023-12-06 DIAGNOSIS — I49.5 SICK SINUS SYNDROME (HCC): ICD-10-CM

## 2023-12-06 DIAGNOSIS — I34.0 NONRHEUMATIC MITRAL VALVE REGURGITATION: ICD-10-CM

## 2023-12-06 DIAGNOSIS — I10 PRIMARY HYPERTENSION: ICD-10-CM

## 2023-12-06 DIAGNOSIS — I65.23 BILATERAL CAROTID ARTERY STENOSIS: Primary | ICD-10-CM

## 2023-12-06 DIAGNOSIS — E78.5 DYSLIPIDEMIA: ICD-10-CM

## 2023-12-06 DIAGNOSIS — R73.03 PRE-DIABETES: ICD-10-CM

## 2023-12-06 DIAGNOSIS — I50.22 CHRONIC SYSTOLIC (CONGESTIVE) HEART FAILURE (HCC): Primary | ICD-10-CM

## 2023-12-06 DIAGNOSIS — I48.0 PAROXYSMAL ATRIAL FIBRILLATION (HCC): ICD-10-CM

## 2023-12-06 DIAGNOSIS — Z95.0 PACEMAKER: ICD-10-CM

## 2023-12-06 PROCEDURE — 3075F SYST BP GE 130 - 139MM HG: CPT | Performed by: INTERNAL MEDICINE

## 2023-12-06 PROCEDURE — 99214 OFFICE O/P EST MOD 30 MIN: CPT | Performed by: INTERNAL MEDICINE

## 2023-12-06 PROCEDURE — G8484 FLU IMMUNIZE NO ADMIN: HCPCS | Performed by: INTERNAL MEDICINE

## 2023-12-06 PROCEDURE — G8417 CALC BMI ABV UP PARAM F/U: HCPCS | Performed by: INTERNAL MEDICINE

## 2023-12-06 PROCEDURE — G8428 CUR MEDS NOT DOCUMENT: HCPCS | Performed by: INTERNAL MEDICINE

## 2023-12-06 PROCEDURE — 3078F DIAST BP <80 MM HG: CPT | Performed by: INTERNAL MEDICINE

## 2023-12-06 PROCEDURE — 1123F ACP DISCUSS/DSCN MKR DOCD: CPT | Performed by: INTERNAL MEDICINE

## 2023-12-06 PROCEDURE — 1036F TOBACCO NON-USER: CPT | Performed by: INTERNAL MEDICINE

## 2023-12-06 NOTE — PROGRESS NOTES
[address] [facility], [facility], [address]  PHONE: [phone]     23    NAME:  Daphney Dias  : 1940  MRN: [id number]       SUBJECTIVE:   Daphney Dias is a 79 y.o. male seen for a follow up visit regarding the following:     Chief Complaint   Patient presents with    Congestive Heart Failure       HPI: Here for CAD, Afib   prox LAD PCI 2015;   (GERMAIN on brilinta). 10/17: PPM placement for SSS.   2019: watchman device placement   LHC 3/19/2021: small vessel CAD, no PCI. 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 No CP, pressure. Some lack of energy. GERMAIN ok now. Wants off [address] No angina. No new edema. Patient denies recent history of orthopnea, PND, excessive dizziness and/or syncope. Has myasthenia gravis, this has been well controlled. Past Medical History, Past Surgical History, Family history, Social History, and Medications were all reviewed with the patient today and updated as necessary.      Current Outpatient Medications   Medication Sig Dispense Refill    potassium chloride (KLOR-CON M) 20 MEQ extended release tablet Take 1 tablet by mouth daily 60 tablet 1    loperamide (IMODIUM) 1 MG/7.5ML LIQD solution Take 15 mLs by mouth daily as needed for Diarrhea      albuterol sulfate HFA (VENTOLIN HFA) 108 (90 Base) MCG/ACT inhaler Inhale 2 puffs into the lungs 4 times daily as needed for Wheezing or Shortness of Breath 54 g 0    tamsulosin (FLOMAX) 0.4 MG capsule TAKE 1 CAPSULE BY MOUTH EVERY DAY 90 capsule 3    furosemide (LASIX) 40 MG tablet Take 1 tablet by mouth daily      rOPINIRole (REQUIP) 0.5 MG tablet TAKE 1 TABLET BY MOUTH NIGHTLY 90 tablet 3    losartan (COZAAR) 50 MG tablet Take 1 tablet by mouth daily 30 tablet 11

## 2023-12-07 ENCOUNTER — OFFICE VISIT (OUTPATIENT)
Dept: INTERNAL MEDICINE CLINIC | Facility: CLINIC | Age: 83
End: 2023-12-07
Payer: MEDICARE

## 2023-12-07 VITALS
HEIGHT: 65 IN | DIASTOLIC BLOOD PRESSURE: 80 MMHG | WEIGHT: 220 LBS | OXYGEN SATURATION: 95 % | SYSTOLIC BLOOD PRESSURE: 127 MMHG | BODY MASS INDEX: 36.65 KG/M2 | HEART RATE: 72 BPM

## 2023-12-07 DIAGNOSIS — L57.0 ACTINIC KERATOSIS: ICD-10-CM

## 2023-12-07 DIAGNOSIS — E11.9 TYPE 2 DIABETES MELLITUS WITHOUT COMPLICATION, WITHOUT LONG-TERM CURRENT USE OF INSULIN (HCC): ICD-10-CM

## 2023-12-07 DIAGNOSIS — C18.0 ADENOCARCINOMA OF CECUM (HCC): ICD-10-CM

## 2023-12-07 DIAGNOSIS — Z00.00 MEDICARE ANNUAL WELLNESS VISIT, SUBSEQUENT: Primary | ICD-10-CM

## 2023-12-07 DIAGNOSIS — I48.0 PAROXYSMAL ATRIAL FIBRILLATION (HCC): ICD-10-CM

## 2023-12-07 DIAGNOSIS — E55.9 VITAMIN D DEFICIENCY: ICD-10-CM

## 2023-12-07 DIAGNOSIS — I10 PRIMARY HYPERTENSION: ICD-10-CM

## 2023-12-07 DIAGNOSIS — G70.00 MYASTHENIA GRAVIS (HCC): ICD-10-CM

## 2023-12-07 DIAGNOSIS — G25.81 RESTLESS LEG: ICD-10-CM

## 2023-12-07 DIAGNOSIS — I82.729 CHRONIC DEEP VEIN THROMBOSIS (DVT) OF UPPER EXTREMITY, UNSPECIFIED LATERALITY, UNSPECIFIED VEIN (HCC): ICD-10-CM

## 2023-12-07 DIAGNOSIS — E78.5 DYSLIPIDEMIA: ICD-10-CM

## 2023-12-07 PROCEDURE — 99214 OFFICE O/P EST MOD 30 MIN: CPT | Performed by: FAMILY MEDICINE

## 2023-12-07 PROCEDURE — 1123F ACP DISCUSS/DSCN MKR DOCD: CPT | Performed by: FAMILY MEDICINE

## 2023-12-07 PROCEDURE — 3079F DIAST BP 80-89 MM HG: CPT | Performed by: FAMILY MEDICINE

## 2023-12-07 PROCEDURE — 3074F SYST BP LT 130 MM HG: CPT | Performed by: FAMILY MEDICINE

## 2023-12-07 PROCEDURE — 1036F TOBACCO NON-USER: CPT | Performed by: FAMILY MEDICINE

## 2023-12-07 PROCEDURE — 3044F HG A1C LEVEL LT 7.0%: CPT | Performed by: FAMILY MEDICINE

## 2023-12-07 PROCEDURE — G0439 PPPS, SUBSEQ VISIT: HCPCS | Performed by: FAMILY MEDICINE

## 2023-12-07 PROCEDURE — G8427 DOCREV CUR MEDS BY ELIG CLIN: HCPCS | Performed by: FAMILY MEDICINE

## 2023-12-07 PROCEDURE — G8417 CALC BMI ABV UP PARAM F/U: HCPCS | Performed by: FAMILY MEDICINE

## 2023-12-07 PROCEDURE — G8484 FLU IMMUNIZE NO ADMIN: HCPCS | Performed by: FAMILY MEDICINE

## 2023-12-07 RX ORDER — MONTELUKAST SODIUM 4 MG/1
TABLET, CHEWABLE ORAL
COMMUNITY
Start: 2022-11-16

## 2023-12-07 ASSESSMENT — LIFESTYLE VARIABLES: HOW OFTEN DO YOU HAVE A DRINK CONTAINING ALCOHOL: NEVER

## 2023-12-13 PROCEDURE — 93296 REM INTERROG EVL PM/IDS: CPT | Performed by: INTERNAL MEDICINE

## 2023-12-13 PROCEDURE — 93294 REM INTERROG EVL PM/LDLS PM: CPT | Performed by: INTERNAL MEDICINE

## 2023-12-15 DIAGNOSIS — D50.9 IRON DEFICIENCY ANEMIA, UNSPECIFIED IRON DEFICIENCY ANEMIA TYPE: ICD-10-CM

## 2023-12-15 DIAGNOSIS — C18.0 ADENOCARCINOMA OF CECUM (HCC): Primary | ICD-10-CM

## 2023-12-15 DIAGNOSIS — D64.9 ANEMIA, UNSPECIFIED TYPE: ICD-10-CM

## 2023-12-28 ENCOUNTER — TELEPHONE (OUTPATIENT)
Dept: INTERNAL MEDICINE CLINIC | Facility: CLINIC | Age: 83
End: 2023-12-28

## 2023-12-28 NOTE — TELEPHONE ENCOUNTER
----- Message from Una Thakkar MA sent at 12/28/2023  2:06 PM EST -----  Subject: Message to Provider    QUESTIONS  Information for Provider? Patient is wanting know if he needs to keep his   appt in January since he was just seen in December. Please advise   ---------------------------------------------------------------------------  --------------  Kelly Chaudhary INFO  [phone]; OK to leave message on voicemail  ---------------------------------------------------------------------------  --------------  SCRIPT ANSWERS  Relationship to Patient?  Self

## 2024-01-23 ENCOUNTER — APPOINTMENT (RX ONLY)
Dept: URBAN - METROPOLITAN AREA CLINIC 329 | Facility: CLINIC | Age: 84
Setting detail: DERMATOLOGY
End: 2024-01-23

## 2024-01-23 DIAGNOSIS — L57.8 OTHER SKIN CHANGES DUE TO CHRONIC EXPOSURE TO NONIONIZING RADIATION: ICD-10-CM

## 2024-01-23 DIAGNOSIS — D485 NEOPLASM OF UNCERTAIN BEHAVIOR OF SKIN: ICD-10-CM

## 2024-01-23 DIAGNOSIS — D22 MELANOCYTIC NEVI: ICD-10-CM

## 2024-01-23 PROBLEM — D22.5 MELANOCYTIC NEVI OF TRUNK: Status: ACTIVE | Noted: 2024-01-23

## 2024-01-23 PROBLEM — D48.5 NEOPLASM OF UNCERTAIN BEHAVIOR OF SKIN: Status: ACTIVE | Noted: 2024-01-23

## 2024-01-23 PROCEDURE — 11102 TANGNTL BX SKIN SINGLE LES: CPT

## 2024-01-23 PROCEDURE — ? COUNSELING

## 2024-01-23 PROCEDURE — ? SUNSCREEN RECOMMENDATIONS

## 2024-01-23 PROCEDURE — ? FULL BODY SKIN EXAM - DECLINED

## 2024-01-23 PROCEDURE — 99203 OFFICE O/P NEW LOW 30 MIN: CPT | Mod: 25

## 2024-01-23 PROCEDURE — 11103 TANGNTL BX SKIN EA SEP/ADDL: CPT

## 2024-01-23 PROCEDURE — ? BIOPSY BY SHAVE METHOD

## 2024-01-23 ASSESSMENT — LOCATION DETAILED DESCRIPTION DERM
LOCATION DETAILED: LEFT SUPERIOR CENTRAL BUCCAL CHEEK
LOCATION DETAILED: RIGHT LATERAL SUPERIOR CHEST
LOCATION DETAILED: LEFT MEDIAL INFERIOR CHEST
LOCATION DETAILED: RIGHT SUPERIOR MEDIAL MALAR CHEEK
LOCATION DETAILED: LEFT LATERAL SUBMANDIBULAR CHEEK
LOCATION DETAILED: SUPERIOR MID FOREHEAD
LOCATION DETAILED: LEFT LATERAL SUPERIOR CHEST
LOCATION DETAILED: LEFT CLAVICULAR SKIN

## 2024-01-23 ASSESSMENT — LOCATION SIMPLE DESCRIPTION DERM
LOCATION SIMPLE: LEFT CLAVICULAR SKIN
LOCATION SIMPLE: LEFT CHEEK
LOCATION SIMPLE: CHEST
LOCATION SIMPLE: SUPERIOR FOREHEAD
LOCATION SIMPLE: CHEST
LOCATION SIMPLE: RIGHT CHEEK

## 2024-01-23 ASSESSMENT — LOCATION ZONE DERM
LOCATION ZONE: TRUNK
LOCATION ZONE: FACE
LOCATION ZONE: TRUNK

## 2024-01-23 NOTE — PROCEDURE: BIOPSY BY SHAVE METHOD
Detail Level: Detailed
Depth Of Biopsy: dermis
Was A Bandage Applied: Yes
Size Of Lesion In Cm: 0.8
X Size Of Lesion In Cm: 0
Biopsy Type: H and E
Biopsy Method: Dermablade
Anesthesia Type: 1% lidocaine with epinephrine
Anesthesia Volume In Cc: 0.5
Hemostasis: Drysol
Wound Care: Petrolatum
Dressing: bandage
Destruction After The Procedure: No
Type Of Destruction Used: Curettage
Curettage Text: The wound bed was treated with curettage after the biopsy was performed.
Cryotherapy Text: The wound bed was treated with cryotherapy after the biopsy was performed.
Electrodesiccation Text: The wound bed was treated with electrodesiccation after the biopsy was performed.
Electrodesiccation And Curettage Text: The wound bed was treated with electrodesiccation and curettage after the biopsy was performed.
Silver Nitrate Text: The wound bed was treated with silver nitrate after the biopsy was performed.
Lab: 6
Lab Facility: [id number]
Consent: Written consent was obtained and risks were reviewed including but not limited to scarring, infection, bleeding, scabbing, incomplete removal, nerve damage and allergy to anesthesia.
Post-Care Instructions: I reviewed with the patient in detail post-care instructions. Patient is to keep the biopsy site dry overnight, and then apply bacitracin twice daily until healed. Patient may apply hydrogen peroxide soaks to remove any crusting.
Notification Instructions: Patient will be notified of biopsy results. However, patient instructed to call the office if not contacted within 2 weeks.
Billing Type: Third-Party Bill
Information: Selecting Yes will display possible errors in your note based on the variables you have selected. This validation is only offered as a suggestion for you. PLEASE NOTE THAT THE VALIDATION TEXT WILL BE REMOVED WHEN YOU FINALIZE YOUR NOTE. IF YOU WANT TO FAX A PRELIMINARY NOTE YOU WILL NEED TO TOGGLE THIS TO 'NO' IF YOU DO NOT WANT IT IN YOUR FAXED NOTE.
Size Of Lesion In Cm: 1.2
Size Of Lesion In Cm: 1.3

## 2024-02-06 ENCOUNTER — APPOINTMENT (RX ONLY)
Dept: URBAN - METROPOLITAN AREA CLINIC 330 | Facility: CLINIC | Age: 84
Setting detail: DERMATOLOGY
End: 2024-02-06

## 2024-02-06 VITALS — HEART RATE: 75 BPM | SYSTOLIC BLOOD PRESSURE: 147 MMHG | OXYGEN SATURATION: 97 % | DIASTOLIC BLOOD PRESSURE: 81 MMHG

## 2024-02-06 PROBLEM — C44.319 BASAL CELL CARCINOMA OF SKIN OF OTHER PARTS OF FACE: Status: ACTIVE | Noted: 2024-02-06

## 2024-02-06 PROCEDURE — 12054 INTMD RPR FACE/MM 7.6-12.5CM: CPT

## 2024-02-06 PROCEDURE — 17311 MOHS 1 STAGE H/N/HF/G: CPT

## 2024-02-06 PROCEDURE — ? MOHS SURGERY

## 2024-02-06 NOTE — PROCEDURE: MOHS SURGERY
Mohs Case Number: [id number]
Date Of Previous Biopsy (Optional): 1/23/24
Previous Accession (Optional): [id number]
Biopsy Photograph Reviewed: Yes
Consent Type: Consent 1 (Standard)
Eye Shield Used: No
Initial Size Of Lesion: 3
X Size Of Lesion In Cm (Optional): 1
Primary Defect Length In Cm (Final Defect Size - Required For Flaps/Grafts): 4.1
Repair Type: Intermediate Layered Repair
Which Eyelid Repair Cpt Are You Using?: 17000
Oculoplastic Surgeon Procedure Text (A): After obtaining clear surgical margins the patient was sent to oculoplastics for surgical repair.  The patient understands they will receive post-surgical care and follow-up from the referring physician's office.
Otolaryngologist Procedure Text (A): After obtaining clear surgical margins the patient was sent to otolaryngology for surgical repair.  The patient understands they will receive post-surgical care and follow-up from the referring physician's office.
Plastic Surgeon Procedure Text (A): After obtaining clear surgical margins the patient was sent to plastics for surgical repair.  The patient understands they will receive post-surgical care and follow-up from the referring physician's office.
Mid-Level Procedure Text (A): After obtaining clear surgical margins the patient was sent to a mid-level provider for surgical repair.  The patient understands they will receive post-surgical care and follow-up from the mid-level provider.
Provider Procedure Text (A): After obtaining clear surgical margins the defect was repaired by another provider.
Asc Procedure Text (A): After obtaining clear surgical margins the patient was sent to an ASC for surgical repair.  The patient understands they will receive post-surgical care and follow-up from the ASC physician.
Simple / Intermediate / Complex Repair - Final Wound Length In Cm: 8.8
Suturegard Retention Suture: 2-0 Nylon
Retention Suture Bite Size: 3 mm
Length To Time In Minutes Device Was In Place: 10
Undermining Type: Entire Wound
Debridement Text: The wound edges were debrided prior to proceeding with the closure to facilitate wound healing.
Helical Rim Text: The closure involved the helical rim.
Vermilion Border Text: The closure involved the vermilion border.
Nostril Rim Text: The closure involved the nostril rim.
Retention Suture Text: Retention sutures were placed to support the closure and prevent dehiscence.
Secondary Defect Length In Cm (Required For Flaps): 0
Area H Indication Text: Tumors in this location are included in Area H (eyelids, eyebrows, nose, lips, chin, ear, pre-auricular, post-auricular, temple, genitalia, hands, feet, ankles and areola).  Tissue conservation is critical in these anatomic locations.
Area M Indication Text: Tumors in this location are included in Area M (cheek, forehead, scalp, neck, jawline and pretibial skin).  Mohs surgery is indicated for tumors in these anatomic locations.
Area L Indication Text: Tumors in this location are included in Area L (trunk and extremities).  Mohs surgery is indicated for larger tumors, or tumors with aggressive histologic features, in these anatomic locations.
Depth Of Tumor Invasion (For Histology): tumor not visualized
Perineural Invasion (For Histology - Be Specific If Possible): absent
Special Stains Stage 1 - Results: Base On Clearance Noted Above
Stage 2: Additional Anesthesia Type: 1% lidocaine with epinephrine
Staging Info: By selecting yes to the question above you will include information on AJCC 8 tumor staging in your Mohs note. Information on tumor staging will be automatically added for SCCs on the head and neck. AJCC 8 includes tumor size, tumor depth, perineural involvement and bone invasion.
Tumor Depth: Less than 6mm from granular layer and no invasion beyond the subcutaneous fat
Was The Patient On Physician Recommended Anticoagulation Therapy?: Please Select the Appropriate Response
Medical Necessity Statement: Based on my medical judgement, Mohs surgery is the most appropriate treatment for this cancer compared to other treatments.
Alternatives Discussed Intro (Do Not Add Period): I discussed alternative treatments to Mohs surgery and specifically discussed the risks and benefits of
Consent 1/Introductory Paragraph: The rationale for Mohs was explained to the patient and consent was obtained. The risks, benefits and alternatives to therapy were discussed in detail. Specifically, the risks of infection, scarring, bleeding, prolonged wound healing, incomplete removal, allergy to anesthesia, nerve injury and recurrence were addressed. Prior to the procedure, the treatment site was clearly identified and confirmed by the patient. All components of Universal Protocol/PAUSE Rule completed.
Consent 2/Introductory Paragraph: Mohs surgery was explained to the patient and consent was obtained. The risks, benefits and alternatives to therapy were discussed in detail. Specifically, the risks of infection, scarring, bleeding, prolonged wound healing, incomplete removal, allergy to anesthesia, nerve injury and recurrence were addressed. Prior to the procedure, the treatment site was clearly identified and confirmed by the patient. All components of Universal Protocol/PAUSE Rule completed.
Consent 3/Introductory Paragraph: I gave the patient a chance to ask questions they had about the procedure.  Following this I explained the Mohs procedure and consent was obtained. The risks, benefits and alternatives to therapy were discussed in detail. Specifically, the risks of infection, scarring, bleeding, prolonged wound healing, incomplete removal, allergy to anesthesia, nerve injury and recurrence were addressed. Prior to the procedure, the treatment site was clearly identified and confirmed by the patient. All components of Universal Protocol/PAUSE Rule completed.
Consent (Temporal Branch)/Introductory Paragraph: The rationale for Mohs was explained to the patient and consent was obtained. The risks, benefits and alternatives to therapy were discussed in detail. Specifically, the risks of damage to the temporal branch of the facial nerve, infection, scarring, bleeding, prolonged wound healing, incomplete removal, allergy to anesthesia, and recurrence were addressed. Prior to the procedure, the treatment site was clearly identified and confirmed by the patient. All components of Universal Protocol/PAUSE Rule completed.
Consent (Marginal Mandibular)/Introductory Paragraph: The rationale for Mohs was explained to the patient and consent was obtained. The risks, benefits and alternatives to therapy were discussed in detail. Specifically, the risks of damage to the marginal mandibular branch of the facial nerve, infection, scarring, bleeding, prolonged wound healing, incomplete removal, allergy to anesthesia, and recurrence were addressed. Prior to the procedure, the treatment site was clearly identified and confirmed by the patient. All components of Universal Protocol/PAUSE Rule completed.
Consent (Spinal Accessory)/Introductory Paragraph: The rationale for Mohs was explained to the patient and consent was obtained. The risks, benefits and alternatives to therapy were discussed in detail. Specifically, the risks of damage to the spinal accessory nerve, infection, scarring, bleeding, prolonged wound healing, incomplete removal, allergy to anesthesia, and recurrence were addressed. Prior to the procedure, the treatment site was clearly identified and confirmed by the patient. All components of Universal Protocol/PAUSE Rule completed.
Consent (Near Eyelid Margin)/Introductory Paragraph: The rationale for Mohs was explained to the patient and consent was obtained. The risks, benefits and alternatives to therapy were discussed in detail. Specifically, the risks of ectropion or eyelid deformity, infection, scarring, bleeding, prolonged wound healing, incomplete removal, allergy to anesthesia, nerve injury and recurrence were addressed. Prior to the procedure, the treatment site was clearly identified and confirmed by the patient. All components of Universal Protocol/PAUSE Rule completed.
Consent (Ear)/Introductory Paragraph: The rationale for Mohs was explained to the patient and consent was obtained. The risks, benefits and alternatives to therapy were discussed in detail. Specifically, the risks of ear deformity, infection, scarring, bleeding, prolonged wound healing, incomplete removal, allergy to anesthesia, nerve injury and recurrence were addressed. Prior to the procedure, the treatment site was clearly identified and confirmed by the patient. All components of Universal Protocol/PAUSE Rule completed.
Consent (Nose)/Introductory Paragraph: The rationale for Mohs was explained to the patient and consent was obtained. The risks, benefits and alternatives to therapy were discussed in detail. Specifically, the risks of nasal deformity, changes in the flow of air through the nose, infection, scarring, bleeding, prolonged wound healing, incomplete removal, allergy to anesthesia, nerve injury and recurrence were addressed. Prior to the procedure, the treatment site was clearly identified and confirmed by the patient. All components of Universal Protocol/PAUSE Rule completed.
Consent (Lip)/Introductory Paragraph: The rationale for Mohs was explained to the patient and consent was obtained. The risks, benefits and alternatives to therapy were discussed in detail. Specifically, the risks of lip deformity, changes in the oral aperture, infection, scarring, bleeding, prolonged wound healing, incomplete removal, allergy to anesthesia, nerve injury and recurrence were addressed. Prior to the procedure, the treatment site was clearly identified and confirmed by the patient. All components of Universal Protocol/PAUSE Rule completed.
Consent (Scalp)/Introductory Paragraph: The rationale for Mohs was explained to the patient and consent was obtained. The risks, benefits and alternatives to therapy were discussed in detail. Specifically, the risks of changes in hair growth pattern secondary to repair, infection, scarring, bleeding, prolonged wound healing, incomplete removal, allergy to anesthesia, nerve injury and recurrence were addressed. Prior to the procedure, the treatment site was clearly identified and confirmed by the patient. All components of Universal Protocol/PAUSE Rule completed.
Detail Level: Detailed
Postop Diagnosis: same
Surgeon: Dai Ochoa MD
Anesthesia Volume In Cc: 6
Hemostasis: Electrocautery
Estimated Blood Loss (Cc): minimal
Brow Lift Text: A midfrontal incision was made medially to the defect to allow access to the tissues just superior to the left eyebrow. Following careful dissection inferiorly in a supraperiosteal plane to the level of the left eyebrow, several 3-0 monocryl sutures were used to resuspend the eyebrow orbicularis oculi muscular unit to the superior frontal bone periosteum. This resulted in an appropriate reapproximation of static eyebrow symmetry and correction of the left brow ptosis.
Deep Sutures: 4-0 Monocryl
Epidermal Sutures: 5-0 Fast Absorbing Gut
Epidermal Closure: running
Suturegard Intro: Intraoperative tissue expansion was performed, utilizing the SUTUREGARD device, in order to reduce wound tension.
Suturegard Body: The suture ends were repeatedly re-tightened and re-clamped to achieve the desired tissue expansion.
Hemigard Intro: Due to skin fragility and wound tension, it was decided to use HEMIGARD adhesive retention suture devices to permit a linear closure. The skin was cleaned and dried for a 6cm distance away from the wound. Excessive hair, if present, was removed to allow for adhesion.
Hemigard Postcare Instructions: The HEMIGARD strips are to remain completely dry for at least 5-7 days.
Donor Site Anesthesia Type: same as repair anesthesia
Epidermal Closure Graft Donor Site (Optional): simple interrupted
Graft Donor Site Bandage (Optional-Leave Blank If You Don't Want In Note): Steri-strips and a pressure bandage were applied to the donor site.
Closure 2 Information: This tab is for additional flaps and grafts, including complex repair and grafts and complex repair and flaps. You can also specify a different location for the additional defect, if the location is the same you do not need to select a new one. We will insert the automated text for the repair you select below just as we do for solitary flaps and grafts. Please note that at this time if you select a location with a different insurance zone you will need to override the ICD10 and CPT if appropriate.
Closure 3 Information: This tab is for additional flaps and grafts above and beyond our usual structured repairs.  Please note if you enter information here it will not currently bill and you will need to add the billing information manually.
Wound Care: Petrolatum
Dressing: pressure dressing
Dressing (No Sutures): dry sterile dressing
Unna Boot Text: An Unna boot was placed to help immobilize the limb and facilitate more rapid healing.
Home Suture Removal Text: Patient was provided instructions on removing sutures and will remove their sutures at home.  If they have any questions or difficulties they will call the office.
Post-Care Instructions: I reviewed with the patient in detail post-care instructions. Patient is not to engage in any heavy lifting, exercise, or swimming for the next 14 days. Should the patient develop any fevers, chills, bleeding, severe pain patient will contact the office immediately.
Pain Refusal Text: I offered to prescribe pain medication but the patient refused to take this medication.
Mauc Instructions: By selecting yes to the question below the MAUC number will be added into the note.  This will be calculated automatically based on the diagnosis chosen, the size entered, the body zone selected (H,M,L) and the specific indications you chose. You will also have the option to override the Mohs AUC if you disagree with the automatically calculated number and this option is found in the Case Summary tab.
Where Do You Want The Question To Include Opioid Counseling Located?: Case Summary Tab
Eye Protection Verbiage: Before proceeding with the stage, a plastic scleral shield was inserted. The globe was anesthetized with a few drops of proparacaine hydrochloride ophthalmic solution, USP 0.5%. Then, an appropriate sized scleral shield was chosen and coated with lacrilube ointment. The shield was gently inserted and left in place for the duration of each stage. After the stage was completed, the shield was gently removed.
Mohs Method Verbiage: An incision at a 90 degree angle following the standard Mohs approach was done and the specimen was harvested as a microscopic controlled layer.
Surgeon/Pathologist Verbiage (Will Incorporate Name Of Surgeon From Intro If Not Blank): operated in two distinct and integrated capacities as the surgeon and pathologist.
Mohs Histo Method Verbiage: Each section was then chromacoded and processed in the Mohs lab using the Mohs protocol and submitted for tissue processing
Subsequent Stages Histo Method Verbiage: Using a similar technique to that described above, a thin layer of tissue was removed from all areas where tumor was visible on the previous stage.  The tissue was again oriented, mapped, dyed, and processed as above.
Mohs Rapid Report Verbiage: The area of clinically evident tumor was marked with skin marking ink and appropriately hatched.  The initial incision was made following the Mohs approach through the skin.  The specimen was taken to the lab, divided into the necessary number of pieces, chromacoded and processed according to the Mohs protocol.  This was repeated in successive stages until a tumor free defect was achieved.
Complex Repair Preamble Text (Leave Blank If You Do Not Want): Extensive wide undermining was performed.
Intermediate Repair Preamble Text (Leave Blank If You Do Not Want): Undermining was performed with blunt dissection.
Non-Graft Cartilage Fenestration Text: The cartilage was fenestrated with a 2mm punch biopsy to help facilitate healing.
Graft Cartilage Fenestration Text: The cartilage was fenestrated with a 2mm punch biopsy to help facilitate graft survival and healing.
Secondary Intention Text (Leave Blank If You Do Not Want): The defect will heal with secondary intention.
No Repair - Repaired With Adjacent Surgical Defect Text (Leave Blank If You Do Not Want): After obtaining clear surgical margins the defect was repaired concurrently with another surgical defect which was in close approximation.
Unique Flap 1 Name:  Pedicled Propellar Flap
Unique Flap 1 Text: A decision was made to reconstruct the defect utilizing an  pedicled propellar flap.  The donor pedicle which included the  was injected with anesthesia.  The flap was excised through the skin and subcutaneous tissue down to the layer of the underlying musculature.  The flap was carefully excised within this deep plane to maintain its blood supply.  The edges of the donor site were undermined.   The donor site was closed in a primary fashion.  The flap was then rotated into position and sutured and the pedicle was tucked underneath the skin surface.  Once the flap was sutured into place, adequate blood supply was confirmed with blanching and refill.
Adjacent Tissue Transfer Text: The defect edges were debeveled with a #15 scalpel blade. Given the location of the defect and the proximity to free margins an adjacent tissue transfer was deemed most appropriate. Using a sterile surgical marker, an appropriate flap was drawn incorporating the defect and placing the expected incisions within the relaxed skin tension lines where possible. The area thus outlined was incised deep to adipose tissue with a #15 scalpel blade. The skin margins were undermined to an appropriate distance in all directions utilizing iris scissors.
Advancement Flap (Single) Text: The defect edges were debeveled with a #15 scalpel blade. Given the location of the defect and the proximity to free margins a single advancement flap was deemed most appropriate. Using a sterile surgical marker, an appropriate advancement flap was drawn incorporating the defect and placing the expected incisions within the relaxed skin tension lines where possible. The area thus outlined was incised deep to adipose tissue with a #15 scalpel blade. The skin margins were undermined to an appropriate distance in all directions utilizing iris scissors. Following this, the designed flap was advanced into the primary defect and sutured into place.
Advancement Flap (Double) Text: The defect edges were debeveled with a #15 scalpel blade. Given the location of the defect and the proximity to free margins a double advancement flap was deemed most appropriate. Using a sterile surgical marker, the appropriate advancement flaps were drawn incorporating the defect and placing the expected incisions within the relaxed skin tension lines where possible. The area thus outlined was incised deep to adipose tissue with a #15 scalpel blade. The skin margins were undermined to an appropriate distance in all directions utilizing iris scissors. Following this, the designed flaps were advanced into the primary defect and sutured into place.
Burow's Advancement Flap Text: The defect edges were debeveled with a #15 scalpel blade. Given the location of the defect and the proximity to free margins a Burow's advancement flap was deemed most appropriate. Using a sterile surgical marker, the appropriate advancement flap was drawn incorporating the defect and placing the expected incisions within the relaxed skin tension lines where possible. The area thus outlined was incised deep to adipose tissue with a #15 scalpel blade. The skin margins were undermined to an appropriate distance in all directions utilizing iris scissors. Following this, the designed flap was advanced into the primary defect and sutured into place.
Chonodrocutaneous Helical Advancement Flap Text: The defect edges were debeveled with a #15 scalpel blade. Given the location of the defect and the proximity to free margins a chondrocutaneous helical advancement flap was deemed most appropriate. Using a sterile surgical marker, the appropriate advancement flap was drawn incorporating the defect and placing the expected incisions within the relaxed skin tension lines where possible. The area thus outlined was incised deep to adipose tissue with a #15 scalpel blade. The skin margins were undermined to an appropriate distance in all directions utilizing iris scissors. Following this, the designed flap was advanced into the primary defect and sutured into place.
Crescentic Advancement Flap Text: The defect edges were debeveled with a #15 scalpel blade. Given the location of the defect and the proximity to free margins a crescentic advancement flap was deemed most appropriate. Using a sterile surgical marker, the appropriate advancement flap was drawn incorporating the defect and placing the expected incisions within the relaxed skin tension lines where possible. The area thus outlined was incised deep to adipose tissue with a #15 scalpel blade. The skin margins were undermined to an appropriate distance in all directions utilizing iris scissors. Following this, the designed flap was advanced into the primary defect and sutured into place.
A-T Advancement Flap Text: The defect edges were debeveled with a #15 scalpel blade. Given the location of the defect, shape of the defect and the proximity to free margins an A-T advancement flap was deemed most appropriate. Using a sterile surgical marker, an appropriate advancement flap was drawn incorporating the defect and placing the expected incisions within the relaxed skin tension lines where possible. The area thus outlined was incised deep to adipose tissue with a #15 scalpel blade. The skin margins were undermined to an appropriate distance in all directions utilizing iris scissors. Following this, the designed flap was advanced into the primary defect and sutured into place.
O-T Advancement Flap Text: The defect edges were debeveled with a #15 scalpel blade. Given the location of the defect, shape of the defect and the proximity to free margins an O-T advancement flap was deemed most appropriate. Using a sterile surgical marker, an appropriate advancement flap was drawn incorporating the defect and placing the expected incisions within the relaxed skin tension lines where possible. The area thus outlined was incised deep to adipose tissue with a #15 scalpel blade. The skin margins were undermined to an appropriate distance in all directions utilizing iris scissors. Following this, the designed flap was advanced into the primary defect and sutured into place.
O-L Flap Text: The defect edges were debeveled with a #15 scalpel blade. Given the location of the defect, shape of the defect and the proximity to free margins an O-L flap was deemed most appropriate. Using a sterile surgical marker, an appropriate advancement flap was drawn incorporating the defect and placing the expected incisions within the relaxed skin tension lines where possible. The area thus outlined was incised deep to adipose tissue with a #15 scalpel blade. The skin margins were undermined to an appropriate distance in all directions utilizing iris scissors. Following this, the designed flap was advanced into the primary defect and sutured into place.
O-Z Flap Text: The defect edges were debeveled with a #15 scalpel blade. Given the location of the defect, shape of the defect and the proximity to free margins an O-Z flap was deemed most appropriate. Using a sterile surgical marker, an appropriate transposition flap was drawn incorporating the defect and placing the expected incisions within the relaxed skin tension lines where possible. The area thus outlined was incised deep to adipose tissue with a #15 scalpel blade. The skin margins were undermined to an appropriate distance in all directions utilizing iris scissors. Following this, the designed flap was placed into the primary defect and sutured into place.
Double O-Z Flap Text: The defect edges were debeveled with a #15 scalpel blade. Given the location of the defect, shape of the defect and the proximity to free margins a Double O-Z flap was deemed most appropriate. Using a sterile surgical marker, an appropriate transposition flap was drawn incorporating the defect and placing the expected incisions within the relaxed skin tension lines where possible. The area thus outlined was incised deep to adipose tissue with a #15 scalpel blade. The skin margins were undermined to an appropriate distance in all directions utilizing iris scissors. Following this, the designed flap was placed into the primary defect and sutured into place.
V-Y Flap Text: The defect edges were debeveled with a #15 scalpel blade. Given the location of the defect, shape of the defect and the proximity to free margins a V-Y flap was deemed most appropriate. Using a sterile surgical marker, an appropriate advancement flap was drawn incorporating the defect and placing the expected incisions within the relaxed skin tension lines where possible. The area thus outlined was incised deep to adipose tissue with a #15 scalpel blade. The skin margins were undermined to an appropriate distance in all directions utilizing iris scissors. Following this, the designed flap was advanced into the primary defect and sutured into place.
Advancement-Rotation Flap Text: The defect edges were debeveled with a #15 scalpel blade. Given the location of the defect, shape of the defect and the proximity to free margins an advancement-rotation flap was deemed most appropriate. Using a sterile surgical marker, an appropriate flap was drawn incorporating the defect and placing the expected incisions within the relaxed skin tension lines where possible. The area thus outlined was incised deep to adipose tissue with a #15 scalpel blade. The skin margins were undermined to an appropriate distance in all directions utilizing iris scissors. Following this, the designed flap was placed into the primary defect and sutured into place.
Mercedes Flap Text: The defect edges were debeveled with a #15 scalpel blade. Given the location of the defect, shape of the defect and the proximity to free margins a Mercedes flap was deemed most appropriate. Using a sterile surgical marker, an appropriate advancement flap was drawn incorporating the defect and placing the expected incisions within the relaxed skin tension lines where possible. The area thus outlined was incised deep to adipose tissue with a #15 scalpel blade. The skin margins were undermined to an appropriate distance in all directions utilizing iris scissors. Following this, the designed flap was advanced into the primary defect and sutured into place.
Modified Advancement Flap Text: The defect edges were debeveled with a #15 scalpel blade. Given the location of the defect, shape of the defect and the proximity to free margins a modified advancement flap was deemed most appropriate. Using a sterile surgical marker, an appropriate advancement flap was drawn incorporating the defect and placing the expected incisions within the relaxed skin tension lines where possible. The area thus outlined was incised deep to adipose tissue with a #15 scalpel blade. The skin margins were undermined to an appropriate distance in all directions utilizing iris scissors. Following this, the designed flap was advanced into the primary defect and sutured into place.
Mucosal Advancement Flap Text: Given the location of the defect, shape of the defect and the proximity to free margins a mucosal advancement flap was deemed most appropriate. Incisions were made with a 15 blade scalpel in the appropriate fashion along the cutaneous vermilion border and the mucosal lip. The remaining actinically damaged mucosal tissue was excised.  The mucosal advancement flap was then elevated to the gingival sulcus with care taken to preserve the neurovascular structures and advanced into the primary defect. Care was taken to ensure that precise realignment of the vermilion border was achieved.
Peng Advancement Flap Text: The defect edges were debeveled with a #15 scalpel blade. Given the location of the defect, shape of the defect and the proximity to free margins a Peng advancement flap was deemed most appropriate. Using a sterile surgical marker, an appropriate advancement flap was drawn incorporating the defect and placing the expected incisions within the relaxed skin tension lines where possible. The area thus outlined was incised deep to adipose tissue with a #15 scalpel blade. The skin margins were undermined to an appropriate distance in all directions utilizing iris scissors. Following this, the designed flap was advanced into the primary defect and sutured into place.
Hatchet Flap Text: The defect edges were debeveled with a #15 scalpel blade. Given the location of the defect, shape of the defect and the proximity to free margins a hatchet flap was deemed most appropriate. Using a sterile surgical marker, an appropriate hatchet flap was drawn incorporating the defect and placing the expected incisions within the relaxed skin tension lines where possible. The area thus outlined was incised deep to adipose tissue with a #15 scalpel blade. The skin margins were undermined to an appropriate distance in all directions utilizing iris scissors. Following this, the designed flap was placed into the primary defect and sutured into place.
Rotation Flap Text: The defect edges were debeveled with a #15 scalpel blade. Given the location of the defect, shape of the defect and the proximity to free margins a rotation flap was deemed most appropriate. Using a sterile surgical marker, an appropriate rotation flap was drawn incorporating the defect and placing the expected incisions within the relaxed skin tension lines where possible. The area thus outlined was incised deep to adipose tissue with a #15 scalpel blade. The skin margins were undermined to an appropriate distance in all directions utilizing iris scissors. Following this, the designed flap was placed into the primary defect and sutured into place.
Bilateral Rotation Flap Text: The defect edges were debeveled with a #15 scalpel blade. Given the location of the defect, shape of the defect and the proximity to free margins a bilateral rotation flap was deemed most appropriate. Using a sterile surgical marker, an appropriate rotation flap was drawn incorporating the defect and placing the expected incisions within the relaxed skin tension lines where possible. The area thus outlined was incised deep to adipose tissue with a #15 scalpel blade. The skin margins were undermined to an appropriate distance in all directions utilizing iris scissors. Following this, the designed flap was carried over into the primary defect and sutured into place.
Spiral Flap Text: The defect edges were debeveled with a #15 scalpel blade. Given the location of the defect, shape of the defect and the proximity to free margins a spiral flap was deemed most appropriate. Using a sterile surgical marker, an appropriate rotation flap was drawn incorporating the defect and placing the expected incisions within the relaxed skin tension lines where possible. The area thus outlined was incised deep to adipose tissue with a #15 scalpel blade. The skin margins were undermined to an appropriate distance in all directions utilizing iris scissors. Following this, the designed flap was placed into the primary defect and sutured into place.
Staged Advancement Flap Text: The defect edges were debeveled with a #15 scalpel blade. Given the location of the defect, shape of the defect and the proximity to free margins a staged advancement flap was deemed most appropriate. Using a sterile surgical marker, an appropriate advancement flap was drawn incorporating the defect and placing the expected incisions within the relaxed skin tension lines where possible. The area thus outlined was incised deep to adipose tissue with a #15 scalpel blade. The skin margins were undermined to an appropriate distance in all directions utilizing iris scissors. Following this, the designed flap was placed into the primary defect and sutured into place.
Star Wedge Flap Text: The defect edges were debeveled with a #15 scalpel blade. Given the location of the defect, shape of the defect and the proximity to free margins a star wedge flap was deemed most appropriate. Using a sterile surgical marker, an appropriate rotation flap was drawn incorporating the defect and placing the expected incisions within the relaxed skin tension lines where possible. The area thus outlined was incised deep to adipose tissue with a #15 scalpel blade. The skin margins were undermined to an appropriate distance in all directions utilizing iris scissors. Following this, the designed flap was placed into the primary defect and sutured into place.
Transposition Flap Text: The defect edges were debeveled with a #15 scalpel blade. Given the location of the defect and the proximity to free margins a transposition flap was deemed most appropriate. Using a sterile surgical marker, an appropriate transposition flap was drawn incorporating the defect. The area thus outlined was incised deep to adipose tissue with a #15 scalpel blade. The skin margins were undermined to an appropriate distance in all directions utilizing iris scissors. Following this, the designed flap was placed into the primary defect and sutured into place.
Muscle Hinge Flap Text: The defect edges were debeveled with a #15 scalpel blade.  Given the size, depth and location of the defect and the proximity to free margins a muscle hinge flap was deemed most appropriate. Using a sterile surgical marker, an appropriate hinge flap was drawn incorporating the defect. The area thus outlined was incised with a #15 scalpel blade. The skin margins were undermined to an appropriate distance in all directions utilizing iris scissors. Following this, the designed flap was placed in the primary defect and sutured into place.
Mustarde Flap Text: The defect edges were debeveled with a #15 scalpel blade.  Given the size, depth and location of the defect and the proximity to free margins a Mustarde flap was deemed most appropriate. Using a sterile surgical marker, an appropriate flap was drawn incorporating the defect. The area thus outlined was incised with a #15 scalpel blade. The skin margins were undermined to an appropriate distance in all directions utilizing iris scissors. Following this, the designed flap was placed in the primary defect and sutured into place.
Nasal Turnover Hinge Flap Text: The defect edges were debeveled with a #15 scalpel blade.  Given the size, depth, location of the defect and the defect being full thickness a nasal turnover hinge flap was deemed most appropriate. Using a sterile surgical marker, an appropriate hinge flap was drawn incorporating the defect. The area thus outlined was incised with a #15 scalpel blade. The flap was designed to recreate the nasal mucosal lining and the alar rim. The skin margins were undermined to an appropriate distance in all directions utilizing iris scissors. Following this, the designed flap was placed into the primary defect and sutured into place
Nasalis-Muscle-Based Myocutaneous Island Pedicle Flap Text: Using a #15 blade, an incision was made around the donor flap to the level of the nasalis muscle. Wide lateral undermining was then performed in both the subcutaneous plane above the nasalis muscle, and in a submuscular plane just above periosteum. This allowed the formation of a free nasalis muscle axial pedicle (based on the angular artery) which was still attached to the actual cutaneous flap, increasing its mobility and vascular viability. Hemostasis was obtained with pinpoint electrocoagulation. The flap was mobilized into position and the pivotal anchor points positioned and stabilized with buried interrupted sutures. Subcutaneous and dermal tissues were closed in a multilayered fashion with sutures. Tissue redundancies were excised, and the epidermal edges were apposed without significant tension and sutured with sutures.
Orbicularis Oris Muscle Flap Text: The defect edges were debeveled with a #15 scalpel blade.  Given that the defect affected the competency of the oral sphincter an orbicularis oris muscle flap was deemed most appropriate to restore this competency and normal muscle function.  Using a sterile surgical marker, an appropriate flap was drawn incorporating the defect. The area thus outlined was incised with a #15 scalpel blade. Following this, the designed flap was placed into the primary defect and sutured into place.
Melolabial Transposition Flap Text: The defect edges were debeveled with a #15 scalpel blade. Given the location of the defect and the proximity to free margins a melolabial flap was deemed most appropriate. Using a sterile surgical marker, an appropriate melolabial transposition flap was drawn incorporating the defect. The area thus outlined was incised deep to adipose tissue with a #15 scalpel blade. The skin margins were undermined to an appropriate distance in all directions utilizing iris scissors. Following this, the designed flap was placed into the primary defect and sutured into place.
Rhombic Flap Text: The defect edges were debeveled with a #15 scalpel blade. Given the location of the defect and the proximity to free margins a rhombic flap was deemed most appropriate. Using a sterile surgical marker, an appropriate rhombic flap was drawn incorporating the defect. The area thus outlined was incised deep to adipose tissue with a #15 scalpel blade. The skin margins were undermined to an appropriate distance in all directions utilizing iris scissors. Following this, the designed flap was placed into the primary defect and sutured into place.
Rhomboid Transposition Flap Text: The defect edges were debeveled with a #15 scalpel blade. Given the location of the defect and the proximity to free margins a rhomboid transposition flap was deemed most appropriate. Using a sterile surgical marker, an appropriate rhomboid flap was drawn incorporating the defect. The area thus outlined was incised deep to adipose tissue with a #15 scalpel blade. The skin margins were undermined to an appropriate distance in all directions utilizing iris scissors. Following this, the designed flap was placed into the primary defect and sutured into place.
Bi-Rhombic Flap Text: The defect edges were debeveled with a #15 scalpel blade. Given the location of the defect and the proximity to free margins a bi-rhombic flap was deemed most appropriate. Using a sterile surgical marker, an appropriate rhombic flap was drawn incorporating the defect. The area thus outlined was incised deep to adipose tissue with a #15 scalpel blade. The skin margins were undermined to an appropriate distance in all directions utilizing iris scissors. Following this, the designed flap was placed into the primary defect and sutured into place.
Helical Rim Advancement Flap Text: The defect edges were debeveled with a #15 blade scalpel.  Given the location of the defect and the proximity to free margins (helical rim) a double helical rim advancement flap was deemed most appropriate. Using a sterile surgical marker, the appropriate advancement flaps were drawn incorporating the defect and placing the expected incisions between the helical rim and antihelix where possible.  The area thus outlined was incised through and through with a #15 scalpel blade.  With a skin hook and iris scissors, the flaps were gently and sharply undermined and freed up. Folllowing this, the designed flaps were placed into the primary defect and sutured into place.
Bilateral Helical Rim Advancement Flap Text: The defect edges were debeveled with a #15 blade scalpel.  Given the location of the defect and the proximity to free margins (helical rim) a bilateral helical rim advancement flap was deemed most appropriate. Using a sterile surgical marker, the appropriate advancement flaps were drawn incorporating the defect and placing the expected incisions between the helical rim and antihelix where possible.  The area thus outlined was incised through and through with a #15 scalpel blade.  With a skin hook and iris scissors, the flaps were gently and sharply undermined and freed up. Following this, the designed flaps were placed into the primary defect and sutured into place.
Ear Star Wedge Flap Text: The defect edges were debeveled with a #15 blade scalpel.  Given the location of the defect and the proximity to free margins (helical rim) an ear star wedge flap was deemed most appropriate. Using a sterile surgical marker, the appropriate flap was drawn incorporating the defect and placing the expected incisions between the helical rim and antihelix where possible.  The area thus outlined was incised through and through with a #15 scalpel blade. Following this, the designed flap was placed in the primary defect and sutured into place.
Banner Transposition Flap Text: The defect edges were debeveled with a #15 scalpel blade. Given the location of the defect and the proximity to free margins a Banner transposition flap was deemed most appropriate. Using a sterile surgical marker, an appropriate flap was drawn around the defect. The area thus outlined was incised deep to adipose tissue with a #15 scalpel blade. The skin margins were undermined to an appropriate distance in all directions utilizing iris scissors. Following this, the designed flap was placed in the primary defect and sutured into place.
Bilobed Flap Text: The defect edges were debeveled with a #15 scalpel blade. Given the location of the defect and the proximity to free margins a bilobe flap was deemed most appropriate. Using a sterile surgical marker, an appropriate bilobe flap drawn around the defect. The area thus outlined was incised deep to adipose tissue with a #15 scalpel blade. The skin margins were undermined to an appropriate distance in all directions utilizing iris scissors.  Following this, the designed flap was placed into the primary defect and sutured into place.
Bilobed Transposition Flap Text: The defect edges were debeveled with a #15 scalpel blade. Given the location of the defect and the proximity to free margins a bilobed transposition flap was deemed most appropriate. Using a sterile surgical marker, an appropriate bilobe flap drawn around the defect. The area thus outlined was incised deep to adipose tissue with a #15 scalpel blade. The skin margins were undermined to an appropriate distance in all directions utilizing iris scissors.  Following this, the designed flap was placed into the primary defect and sutured into place.
Trilobed Flap Text: The defect edges were debeveled with a #15 scalpel blade. Given the location of the defect and the proximity to free margins a trilobed flap was deemed most appropriate. Using a sterile surgical marker, an appropriate trilobed flap was drawn around the defect. The area thus outlined was incised deep to adipose tissue with a #15 scalpel blade. The skin margins were undermined to an appropriate distance in all directions utilizing iris scissors. Following this, the designed flap was placed in the primary defect and sutured into place.
Dorsal Nasal Flap Text: The defect edges were debeveled with a #15 scalpel blade. Given the location of the defect and the proximity to free margins a dorsal nasal flap was deemed most appropriate. Using a sterile surgical marker, an appropriate dorsal nasal flap was drawn around the defect. The area thus outlined was incised deep to adipose tissue with a #15 scalpel blade. The skin margins were undermined to an appropriate distance in all directions utilizing iris scissors. Following this, the designed flap was placed in the primary defect and sutured into place.
Island Pedicle Flap Text: The defect edges were debeveled with a #15 scalpel blade. Given the location of the defect, shape of the defect and the proximity to free margins an island pedicle advancement flap was deemed most appropriate. Using a sterile surgical marker, an appropriate advancement flap was drawn incorporating the defect, outlining the appropriate donor tissue and placing the expected incisions within the relaxed skin tension lines where possible. The area thus outlined was incised deep to adipose tissue with a #15 scalpel blade. The skin margins were undermined to an appropriate distance in all directions around the primary defect and laterally outward around the island pedicle utilizing iris scissors.  There was minimal undermining beneath the pedicle flap. Following this, the flap was placed into the primary defect and sutured into place.
Island Pedicle Flap With Canthal Suspension Text: The defect edges were debeveled with a #15 scalpel blade. Given the location of the defect, shape of the defect and the proximity to free margins an island pedicle advancement flap was deemed most appropriate. Using a sterile surgical marker, an appropriate advancement flap was drawn incorporating the defect, outlining the appropriate donor tissue and placing the expected incisions within the relaxed skin tension lines where possible. The area thus outlined was incised deep to adipose tissue with a #15 scalpel blade. The skin margins were undermined to an appropriate distance in all directions around the primary defect and laterally outward around the island pedicle utilizing iris scissors.  There was minimal undermining beneath the pedicle flap. A suspension suture was placed in the canthal tendon to prevent tension and prevent ectropion. Following this, the designed flap was placed into the primary defect and sutured into place.
Alar Island Pedicle Flap Text: The defect edges were debeveled with a #15 scalpel blade. Given the location of the defect, shape of the defect and the proximity to the alar rim an island pedicle advancement flap was deemed most appropriate. Using a sterile surgical marker, an appropriate advancement flap was drawn incorporating the defect, outlining the appropriate donor tissue and placing the expected incisions within the nasal ala running parallel to the alar rim. The area thus outlined was incised with a #15 scalpel blade. The skin margins were undermined minimally to an appropriate distance in all directions around the primary defect and laterally outward around the island pedicle utilizing iris scissors.  There was minimal undermining beneath the pedicle flap. Following this, the designed flap was placed in the primary defect and sutured into place.
Double Island Pedicle Flap Text: The defect edges were debeveled with a #15 scalpel blade. Given the location of the defect, shape of the defect and the proximity to free margins a double island pedicle advancement flap was deemed most appropriate. Using a sterile surgical marker, an appropriate advancement flap was drawn incorporating the defect, outlining the appropriate donor tissue and placing the expected incisions within the relaxed skin tension lines where possible. The area thus outlined was incised deep to adipose tissue with a #15 scalpel blade. The skin margins were undermined to an appropriate distance in all directions around the primary defect and laterally outward around the island pedicle utilizing iris scissors.  There was minimal undermining beneath the pedicle flap. Following this, the flap was placed into the primary defect and sutured into place.
Island Pedicle Flap-Requiring Vessel Identification Text: The defect edges were debeveled with a #15 scalpel blade. Given the location of the defect, shape of the defect and the proximity to free margins an island pedicle advancement flap was deemed most appropriate. Using a sterile surgical marker, an appropriate advancement flap was drawn, based on the axial vessel mentioned above, incorporating the defect, outlining the appropriate donor tissue and placing the expected incisions within the relaxed skin tension lines where possible. The area thus outlined was incised deep to adipose tissue with a #15 scalpel blade. The skin margins were undermined to an appropriate distance in all directions around the primary defect and laterally outward around the island pedicle utilizing iris scissors.  There was minimal undermining beneath the pedicle flap. Following this, the designed flap was placed in the primary defect and sutured into place.
Keystone Flap Text: The defect edges were debeveled with a #15 scalpel blade. Given the location of the defect, shape of the defect a keystone flap was deemed most appropriate. Using a sterile surgical marker, an appropriate keystone flap was drawn incorporating the defect, outlining the appropriate donor tissue and placing the expected incisions within the relaxed skin tension lines where possible. The area thus outlined was incised deep to adipose tissue with a #15 scalpel blade. The skin margins were undermined to an appropriate distance in all directions around the primary defect and laterally outward around the flap utilizing iris scissors. Following this, the designed flap was placed in the primary defect and sutured into place.
O-T Plasty Text: The defect edges were debeveled with a #15 scalpel blade. Given the location of the defect, shape of the defect and the proximity to free margins an O-T plasty was deemed most appropriate. Using a sterile surgical marker, an appropriate O-T plasty was drawn incorporating the defect and placing the expected incisions within the relaxed skin tension lines where possible. The area thus outlined was incised deep to adipose tissue with a #15 scalpel blade. The skin margins were undermined to an appropriate distance in all directions utilizing iris scissors. Following this, the designed flap was placed into the primary defect and sutured into place.
O-Z Plasty Text: The defect edges were debeveled with a #15 scalpel blade. Given the location of the defect, shape of the defect and the proximity to free margins an O-Z plasty (double transposition flap) was deemed most appropriate. Using a sterile surgical marker, the appropriate transposition flaps were drawn incorporating the defect and placing the expected incisions within the relaxed skin tension lines where possible. The area thus outlined was incised deep to adipose tissue with a #15 scalpel blade. The skin margins were undermined to an appropriate distance in all directions utilizing iris scissors.  Hemostasis was achieved with electrocautery.  The flaps were then transposed into place, one clockwise and the other counterclockwise, and anchored with interrupted buried subcutaneous sutures.
Double O-Z Plasty Text: The defect edges were debeveled with a #15 scalpel blade. Given the location of the defect, shape of the defect and the proximity to free margins a Double O-Z plasty (double transposition flap) was deemed most appropriate. Using a sterile surgical marker, the appropriate transposition flaps were drawn incorporating the defect and placing the expected incisions within the relaxed skin tension lines where possible. The area thus outlined was incised deep to adipose tissue with a #15 scalpel blade. The skin margins were undermined to an appropriate distance in all directions utilizing iris scissors.  Hemostasis was achieved with electrocautery.  The flaps were then transposed into place, one clockwise and the other counterclockwise, and anchored with interrupted buried subcutaneous sutures.
V-Y Plasty Text: The defect edges were debeveled with a #15 scalpel blade. Given the location of the defect, shape of the defect and the proximity to free margins an V-Y advancement flap was deemed most appropriate. Using a sterile surgical marker, an appropriate advancement flap was drawn incorporating the defect and placing the expected incisions within the relaxed skin tension lines where possible. The area thus outlined was incised deep to adipose tissue with a #15 scalpel blade. The skin margins were undermined to an appropriate distance in all directions utilizing iris scissors. Following this, the designed flap was advanced into the primary defect and sutured into place.
H Plasty Text: Given the location of the defect, shape of the defect and the proximity to free margins a H-plasty was deemed most appropriate for repair. Using a sterile surgical marker, the appropriate advancement arms of the H-plasty were drawn incorporating the defect and placing the expected incisions within the relaxed skin tension lines where possible. The area thus outlined was incised deep to adipose tissue with a #15 scalpel blade. The skin margins were undermined to an appropriate distance in all directions utilizing iris scissors.  The opposing advancement arms were then advanced into place in opposite direction and anchored with interrupted buried subcutaneous sutures.
W Plasty Text: The lesion was extirpated to the level of the fat with a #15 scalpel blade. Given the location of the defect, shape of the defect and the proximity to free margins a W-plasty was deemed most appropriate for repair. Using a sterile surgical marker, the appropriate transposition arms of the W-plasty were drawn incorporating the defect and placing the expected incisions within the relaxed skin tension lines where possible. The area thus outlined was incised deep to adipose tissue with a #15 scalpel blade. The skin margins were undermined to an appropriate distance in all directions utilizing iris scissors.  The opposing transposition arms were then transposed into place in opposite direction and anchored with interrupted buried subcutaneous sutures.
Z Plasty Text: The lesion was extirpated to the level of the fat with a #15 scalpel blade. Given the location of the defect, shape of the defect and the proximity to free margins a Z-plasty was deemed most appropriate for repair. Using a sterile surgical marker, the appropriate transposition arms of the Z-plasty were drawn incorporating the defect and placing the expected incisions within the relaxed skin tension lines where possible. The area thus outlined was incised deep to adipose tissue with a #15 scalpel blade. The skin margins were undermined to an appropriate distance in all directions utilizing iris scissors.  The opposing transposition arms were then transposed into place in opposite direction and anchored with interrupted buried subcutaneous sutures.
Double Z Plasty Text: The lesion was extirpated to the level of the fat with a #15 scalpel blade. Given the location of the defect, shape of the defect and the proximity to free margins a double Z-plasty was deemed most appropriate for repair. Using a sterile surgical marker, the appropriate transposition arms of the double Z-plasty were drawn incorporating the defect and placing the expected incisions within the relaxed skin tension lines where possible. The area thus outlined was incised deep to adipose tissue with a #15 scalpel blade. The skin margins were undermined to an appropriate distance in all directions utilizing iris scissors. The opposing transposition arms were then transposed and carried over into place in opposite direction and anchored with interrupted buried subcutaneous sutures.
Zygomaticofacial Flap Text: Given the location of the defect, shape of the defect and the proximity to free margins a zygomaticofacial flap was deemed most appropriate for repair. Using a sterile surgical marker, the appropriate flap was drawn incorporating the defect and placing the expected incisions within the relaxed skin tension lines where possible. The area thus outlined was incised deep to adipose tissue with a #15 scalpel blade with preservation of a vascular pedicle.  The skin margins were undermined to an appropriate distance in all directions utilizing iris scissors.  The flap was then placed into the defect and anchored with interrupted buried subcutaneous sutures.
Cheek Interpolation Flap Text: A decision was made to reconstruct the defect utilizing an interpolation axial flap and a staged reconstruction.  A telfa template was made of the defect.  This telfa template was then used to outline the Cheek Interpolation flap.  The donor area for the pedicle flap was then injected with anesthesia.  The flap was excised through the skin and subcutaneous tissue down to the layer of the underlying musculature.  The interpolation flap was carefully excised within this deep plane to maintain its blood supply.  The edges of the donor site were undermined.   The donor site was closed in a primary fashion.  The pedicle was then rotated into position and sutured.  Once the tube was sutured into place, adequate blood supply was confirmed with blanching and refill.  The pedicle was then wrapped with xeroform gauze and dressed appropriately with a telfa and gauze bandage to ensure continued blood supply and protect the attached pedicle.
Cheek-To-Nose Interpolation Flap Text: A decision was made to reconstruct the defect utilizing an interpolation axial flap and a staged reconstruction.  A telfa template was made of the defect.  This telfa template was then used to outline the Cheek-To-Nose Interpolation flap.  The donor area for the pedicle flap was then injected with anesthesia.  The flap was excised through the skin and subcutaneous tissue down to the layer of the underlying musculature.  The interpolation flap was carefully excised within this deep plane to maintain its blood supply.  The edges of the donor site were undermined.   The donor site was closed in a primary fashion.  The pedicle was then rotated into position and sutured.  Once the tube was sutured into place, adequate blood supply was confirmed with blanching and refill.  The pedicle was then wrapped with xeroform gauze and dressed appropriately with a telfa and gauze bandage to ensure continued blood supply and protect the attached pedicle.
Interpolation Flap Text: A decision was made to reconstruct the defect utilizing an interpolation axial flap and a staged reconstruction.  A telfa template was made of the defect.  This telfa template was then used to outline the interpolation flap.  The donor area for the pedicle flap was then injected with anesthesia.  The flap was excised through the skin and subcutaneous tissue down to the layer of the underlying musculature.  The interpolation flap was carefully excised within this deep plane to maintain its blood supply.  The edges of the donor site were undermined.   The donor site was closed in a primary fashion.  The pedicle was then rotated into position and sutured.  Once the tube was sutured into place, adequate blood supply was confirmed with blanching and refill.  The pedicle was then wrapped with xeroform gauze and dressed appropriately with a telfa and gauze bandage to ensure continued blood supply and protect the attached pedicle.
Melolabial Interpolation Flap Text: A decision was made to reconstruct the defect utilizing an interpolation axial flap and a staged reconstruction.  A telfa template was made of the defect.  This telfa template was then used to outline the melolabial interpolation flap.  The donor area for the pedicle flap was then injected with anesthesia.  The flap was excised through the skin and subcutaneous tissue down to the layer of the underlying musculature.  The pedicle flap was carefully excised within this deep plane to maintain its blood supply.  The edges of the donor site were undermined.   The donor site was closed in a primary fashion.  The pedicle was then rotated into position and sutured.  Once the tube was sutured into place, adequate blood supply was confirmed with blanching and refill.  The pedicle was then wrapped with xeroform gauze and dressed appropriately with a telfa and gauze bandage to ensure continued blood supply and protect the attached pedicle.
Mastoid Interpolation Flap Text: A decision was made to reconstruct the defect utilizing an interpolation axial flap and a staged reconstruction.  A telfa template was made of the defect.  This telfa template was then used to outline the mastoid interpolation flap.  The donor area for the pedicle flap was then injected with anesthesia.  The flap was excised through the skin and subcutaneous tissue down to the layer of the underlying musculature.  The pedicle flap was carefully excised within this deep plane to maintain its blood supply.  The edges of the donor site were undermined.   The donor site was closed in a primary fashion.  The pedicle was then rotated into position and sutured.  Once the tube was sutured into place, adequate blood supply was confirmed with blanching and refill.  The pedicle was then wrapped with xeroform gauze and dressed appropriately with a telfa and gauze bandage to ensure continued blood supply and protect the attached pedicle.
Posterior Auricular Interpolation Flap Text: A decision was made to reconstruct the defect utilizing an interpolation axial flap and a staged reconstruction.  A telfa template was made of the defect.  This telfa template was then used to outline the posterior auricular interpolation flap.  The donor area for the pedicle flap was then injected with anesthesia.  The flap was excised through the skin and subcutaneous tissue down to the layer of the underlying musculature.  The pedicle flap was carefully excised within this deep plane to maintain its blood supply.  The edges of the donor site were undermined.   The donor site was closed in a primary fashion.  The pedicle was then rotated into position and sutured.  Once the tube was sutured into place, adequate blood supply was confirmed with blanching and refill.  The pedicle was then wrapped with xeroform gauze and dressed appropriately with a telfa and gauze bandage to ensure continued blood supply and protect the attached pedicle.
Paramedian Forehead Flap Text: A decision was made to reconstruct the defect utilizing an interpolation axial flap and a staged reconstruction.  A telfa template was made of the defect.  This telfa template was then used to outline the paramedian forehead pedicle flap.  The donor area for the pedicle flap was then injected with anesthesia.  The flap was excised through the skin and subcutaneous tissue down to the layer of the underlying musculature.  The pedicle flap was carefully excised within this deep plane to maintain its blood supply.  The edges of the donor site were undermined.   The donor site was closed in a primary fashion.  The pedicle was then rotated into position and sutured.  Once the tube was sutured into place, adequate blood supply was confirmed with blanching and refill.  The pedicle was then wrapped with xeroform gauze and dressed appropriately with a telfa and gauze bandage to ensure continued blood supply and protect the attached pedicle.
Abbe Flap (Upper To Lower Lip) Text: The defect of the lower lip was assessed and measured.  Given the location and size of the defect, an Abbe flap was deemed most appropriate. Using a sterile surgical marker, an appropriate Abbe flap was measured and drawn on the upper lip. Local anesthesia was then infiltrated.  A scalpel was then used to incise the upper lip through and through the skin, vermilion, muscle and mucosa, leaving the flap pedicled on the opposite side.  The flap was then rotated and transferred to the lower lip defect.  The flap was then sutured into place with a three layer technique, closing the orbicularis oris muscle layer with subcutaneous buried sutures, followed by a mucosal layer and an epidermal layer.
Abbe Flap (Lower To Upper Lip) Text: The defect of the upper lip was assessed and measured.  Given the location and size of the defect, an Abbe flap was deemed most appropriate. Using a sterile surgical marker, an appropriate Abbe flap was measured and drawn on the lower lip. Local anesthesia was then infiltrated. A scalpel was then used to incise the upper lip through and through the skin, vermilion, muscle and mucosa, leaving the flap pedicled on the opposite side.  The flap was then rotated and transferred to the lower lip defect.  The flap was then sutured into place with a three layer technique, closing the orbicularis oris muscle layer with subcutaneous buried sutures, followed by a mucosal layer and an epidermal layer.
Estlander Flap (Upper To Lower Lip) Text: The defect of the lower lip was assessed and measured.  Given the location and size of the defect, an Estlander flap was deemed most appropriate. Using a sterile surgical marker, an appropriate Estlander flap was measured and drawn on the upper lip. Local anesthesia was then infiltrated. A scalpel was then used to incise the lateral aspect of the flap, through skin, muscle and mucosa, leaving the flap pedicled medially.  The flap was then rotated and positioned to fill the lower lip defect.  The flap was then sutured into place with a three layer technique, closing the orbicularis oris muscle layer with subcutaneous buried sutures, followed by a mucosal layer and an epidermal layer.
Cheiloplasty (Less Than 50%) Text: A decision was made to reconstruct the defect with a  cheiloplasty.  The defect was undermined extensively.  Additional orbicularis oris muscle was excised with a 15 blade scalpel.  The defect was converted into a full thickness wedge, of less than 50% of the vertical height of the lip, to facilite a better cosmetic result.  Small vessels were then tied off with 5-0 monocyrl. The orbicularis oris, superficial fascia, adipose and dermis were then reapproximated.  After the deeper layers were approximated the epidermis was reapproximated with particular care given to realign the vermilion border.
Cheiloplasty (Complex) Text: A decision was made to reconstruct the defect with a  cheiloplasty.  The defect was undermined extensively.  Additional orbicularis oris muscle was excised with a 15 blade scalpel.  The defect was converted into a full thickness wedge to facilite a better cosmetic result.  Small vessels were then tied off with 5-0 monocyrl. The orbicularis oris, superficial fascia, adipose and dermis were then reapproximated.  After the deeper layers were approximated the epidermis was reapproximated with particular care given to realign the vermilion border.
Ear Wedge Repair Text: A wedge excision was completed by carrying down an excision through the full thickness of the ear and cartilage with an inward facing Burow's triangle. The wound was then closed in a layered fashion.
Full Thickness Lip Wedge Repair (Flap) Text: Given the location of the defect and the proximity to free margins a full thickness wedge repair was deemed most appropriate. Using a sterile surgical marker, the appropriate repair was drawn incorporating the defect and placing the expected incisions perpendicular to the vermilion border.  The vermilion border was also meticulously outlined to ensure appropriate reapproximation during the repair.  The area thus outlined was incised through and through with a #15 scalpel blade.  The muscularis and dermis were reaproximated with deep sutures following hemostasis. Care was taken to realign the vermilion border before proceeding with the superficial closure.  Once the vermilion was realigned the superfical and mucosal closure was finished.
Ftsg Text: The defect edges were debeveled with a #15 scalpel blade. Given the location of the defect, shape of the defect and the proximity to free margins a full thickness skin graft was deemed most appropriate. Using a sterile surgical marker, the primary defect shape was transferred to the donor site. The area thus outlined was incised deep to adipose tissue with a #15 scalpel blade.  The harvested graft was then trimmed of adipose tissue until only dermis and epidermis was left.  The skin margins of the secondary defect were undermined to an appropriate distance in all directions utilizing iris scissors.  The secondary defect was closed with interrupted buried subcutaneous sutures.  The skin edges were then re-apposed with running  sutures.  The skin graft was then placed in the primary defect and oriented appropriately.
Split-Thickness Skin Graft Text: The defect edges were debeveled with a #15 scalpel blade. Given the location of the defect, shape of the defect and the proximity to free margins a split thickness skin graft was deemed most appropriate. Using a sterile surgical marker, the primary defect shape was transferred to the donor site. The split thickness graft was then harvested.  The skin graft was then placed in the primary defect and oriented appropriately.
Pinch Graft Text: The defect edges were debeveled with a #15 scalpel blade. Given the location of the defect, shape of the defect and the proximity to free margins a pinch graft was deemed most appropriate. Using a sterile surgical marker, the primary defect shape was transferred to the donor site. The area thus outlined was incised deep to adipose tissue with a #15 scalpel blade.  The harvested graft was then trimmed of adipose tissue until only dermis and epidermis was left. The skin margins of the secondary defect were undermined to an appropriate distance in all directions utilizing iris scissors.  The secondary defect was closed with interrupted buried subcutaneous sutures.  The skin edges were then re-apposed with running  sutures.  The skin graft was then placed in the primary defect and oriented appropriately.
Burow's Graft Text: The defect edges were debeveled with a #15 scalpel blade. Given the location of the defect, shape of the defect, the proximity to free margins and the presence of a standing cone deformity a Burow's skin graft was deemed most appropriate. The standing cone was removed and this tissue was then trimmed to the shape of the primary defect. The adipose tissue was also removed until only dermis and epidermis were left.  The skin margins of the secondary defect were undermined to an appropriate distance in all directions utilizing iris scissors.  The secondary defect was closed with interrupted buried subcutaneous sutures.  The skin edges were then re-apposed with running  sutures.  The skin graft was then placed in the primary defect and oriented appropriately.
Cartilage Graft Text: The defect edges were debeveled with a #15 scalpel blade. Given the location of the defect, shape of the defect, the fact the defect involved a full thickness cartilage defect a cartilage graft was deemed most appropriate.  An appropriate donor site was identified, cleansed, and anesthetized. The cartilage graft was then harvested and transferred to the recipient site, oriented appropriately and then sutured into place.  The secondary defect was then repaired using a primary closure.
Composite Graft Text: The defect edges were debeveled with a #15 scalpel blade. Given the location of the defect, shape of the defect, the proximity to free margins and the fact the defect was full thickness a composite graft was deemed most appropriate.  The defect was outline and then transferred to the donor site.  A full thickness graft was then excised from the donor site. The graft was then placed in the primary defect, oriented appropriately and then sutured into place.  The secondary defect was then repaired using a primary closure.
Epidermal Autograft Text: The defect edges were debeveled with a #15 scalpel blade. Given the location of the defect, shape of the defect and the proximity to free margins an epidermal autograft was deemed most appropriate. Using a sterile surgical marker, the primary defect shape was transferred to the donor site. The epidermal graft was then harvested.  The skin graft was then placed in the primary defect and oriented appropriately.
Dermal Autograft Text: The defect edges were debeveled with a #15 scalpel blade. Given the location of the defect, shape of the defect and the proximity to free margins a dermal autograft was deemed most appropriate. Using a sterile surgical marker, the primary defect shape was transferred to the donor site. The area thus outlined was incised deep to adipose tissue with a #15 scalpel blade.  The harvested graft was then trimmed of adipose and epidermal tissue until only dermis was left.  The skin graft was then placed in the primary defect and oriented appropriately.
Skin Substitute Text: The defect edges were debeveled with a #15 scalpel blade. Given the location of the defect, shape of the defect and the proximity to free margins a skin substitute graft was deemed most appropriate.  The graft material was trimmed to fit the size of the defect. The graft was then placed in the primary defect and oriented appropriately.
Tissue Cultured Epidermal Autograft Text: The defect edges were debeveled with a #15 scalpel blade. Given the location of the defect, shape of the defect and the proximity to free margins a tissue cultured epidermal autograft was deemed most appropriate.  The graft was then trimmed to fit the size of the defect.  The graft was then placed in the primary defect and oriented appropriately.
Xenograft Text: The defect edges were debeveled with a #15 scalpel blade. Given the location of the defect, shape of the defect and the proximity to free margins a xenograft was deemed most appropriate.  The graft was then trimmed to fit the size of the defect.  The graft was then placed in the primary defect and oriented appropriately.
Purse String (Simple) Text: Given the location of the defect and the characteristics of the surrounding skin a purse string closure was deemed most appropriate.  Undermining was performed circumferentially around the surgical defect.  A purse string suture was then placed and tightened.
Purse String (Intermediate) Text: Given the location of the defect and the characteristics of the surrounding skin a purse string intermediate closure was deemed most appropriate.  Undermining was performed circumferentially around the surgical defect.  A purse string suture was then placed and tightened.
Partial Purse String (Simple) Text: Given the location of the defect and the characteristics of the surrounding skin a simple purse string closure was deemed most appropriate.  Undermining was performed circumferentially around the surgical defect.  A purse string suture was then placed and tightened. Wound tension only allowed a partial closure of the circular defect.
Partial Purse String (Intermediate) Text: Given the location of the defect and the characteristics of the surrounding skin an intermediate purse string closure was deemed most appropriate.  Undermining was performed circumferentially around the surgical defect.  A purse string suture was then placed and tightened. Wound tension only allowed a partial closure of the circular defect.
Localized Dermabrasion With Wire Brush Text: The patient was draped in routine manner.  Localized dermabrasion using 3 x 17 mm wire brush was performed in routine manner to papillary dermis. This spot dermabrasion is being performed to complete skin cancer reconstruction. It also will eliminate the other sun damaged precancerous cells that are known to be part of the regional effect of a lifetime's worth of sun exposure. This localized dermabrasion is therapeutic and should not be considered cosmetic in any regard.
Tarsorrhaphy Text: A tarsorrhaphy was performed using Frost sutures.
Intermediate Repair And Flap Additional Text (Will Appearing After The Standard Complex Repair Text): The intermediate repair was not sufficient to completely close the primary defect. The remaining additional defect was repaired with the flap mentioned below.
Intermediate Repair And Graft Additional Text (Will Appearing After The Standard Complex Repair Text): The intermediate repair was not sufficient to completely close the primary defect. The remaining additional defect was repaired with the graft mentioned below.
Complex Repair And Flap Additional Text (Will Appearing After The Standard Complex Repair Text): The complex repair was not sufficient to completely close the primary defect. The remaining additional defect was repaired with the flap mentioned below.
Complex Repair And Graft Additional Text (Will Appearing After The Standard Complex Repair Text): The complex repair was not sufficient to completely close the primary defect. The remaining additional defect was repaired with the graft mentioned below.
Eyelid Full Thickness Repair - 54760: The eyelid defect was full thickness which required a wedge repair of the eyelid. Special care was taken to ensure that the eyelid margin was realligned when placing sutures.
Eyelid Partial Thickness Repair - 42323: The eyelid defect was partial thickness which required a wedge repair of the eyelid. Special care was taken to ensure that the eyelid margin was realligned when placing sutures.
Manual Repair Warning Statement: We plan on removing the manually selected variable below in favor of our much easier automatic structured text blocks found in the previous tab. We decided to do this to help make the flow better and give you the full power of structured data. Manual selection is never going to be ideal in our platform and I would encourage you to avoid using manual selection from this point on, especially since I will be sunsetting this feature. It is important that you do one of two things with the customized text below. First, you can save all of the text in a word file so you can have it for future reference. Second, transfer the text to the appropriate area in the Library tab. Lastly, if there is a flap or graft type which we do not have you need to let us know right away so I can add it in before the variable is hidden. No need to panic, we plan to give you roughly 6 months to make the change.
Same Histology In Subsequent Stages Text: The pattern and morphology of the tumor is as described in the first stage.
No Residual Tumor Seen Histology Text: There were no malignant cells seen in the sections examined.
Inflammation Suggestive Of Cancer Camouflage Histology Text: There was a dense lymphocytic infiltrate which prevented adequate histologic evaluation of adjacent structures.
Bcc Histology Text: There were numerous aggregates of basaloid cells.
Bcc Infiltrative Histology Text: There were numerous aggregates of basaloid cells demonstrating an infiltrative pattern.
Mart-1 - Positive Histology Text: MART-1 staining demonstrates areas of higher density and clustering of melanocytes with Pagetoid spread upwards within the epidermis. The surgical margins are positive for tumor cells.
Mart-1 - Negative Histology Text: MART-1 staining demonstrates a normal density and pattern of melanocytes along the dermal-epidermal junction. The surgical margins are negative for tumor cells.
Information: Selecting Yes will display possible errors in your note based on the variables you have selected. This validation is only offered as a suggestion for you. PLEASE NOTE THAT THE VALIDATION TEXT WILL BE REMOVED WHEN YOU FINALIZE YOUR NOTE. IF YOU WANT TO FAX A PRELIMINARY NOTE YOU WILL NEED TO TOGGLE THIS TO 'NO' IF YOU DO NOT WANT IT IN YOUR FAXED NOTE.

## 2024-02-14 ENCOUNTER — APPOINTMENT (RX ONLY)
Dept: URBAN - METROPOLITAN AREA CLINIC 329 | Facility: CLINIC | Age: 84
Setting detail: DERMATOLOGY
End: 2024-02-14

## 2024-02-14 DIAGNOSIS — L57.0 ACTINIC KERATOSIS: ICD-10-CM

## 2024-02-14 PROCEDURE — ? LIQUID NITROGEN

## 2024-02-14 PROCEDURE — ? FULL BODY SKIN EXAM - DECLINED

## 2024-02-14 PROCEDURE — 17003 DESTRUCT PREMALG LES 2-14: CPT

## 2024-02-14 PROCEDURE — ? COUNSELING

## 2024-02-14 PROCEDURE — 17000 DESTRUCT PREMALG LESION: CPT

## 2024-02-14 ASSESSMENT — LOCATION DETAILED DESCRIPTION DERM
LOCATION DETAILED: RIGHT SUPERIOR MEDIAL FOREHEAD
LOCATION DETAILED: LEFT SUPERIOR FOREHEAD

## 2024-02-14 ASSESSMENT — LOCATION ZONE DERM: LOCATION ZONE: FACE

## 2024-02-14 ASSESSMENT — LOCATION SIMPLE DESCRIPTION DERM
LOCATION SIMPLE: RIGHT FOREHEAD
LOCATION SIMPLE: LEFT FOREHEAD

## 2024-03-07 ENCOUNTER — OFFICE VISIT (OUTPATIENT)
Age: 84
End: 2024-03-07
Payer: MEDICARE

## 2024-03-07 VITALS
HEART RATE: 76 BPM | SYSTOLIC BLOOD PRESSURE: 130 MMHG | WEIGHT: 223.5 LBS | BODY MASS INDEX: 37.24 KG/M2 | DIASTOLIC BLOOD PRESSURE: 80 MMHG | HEIGHT: 65 IN

## 2024-03-07 DIAGNOSIS — E78.5 DYSLIPIDEMIA: ICD-10-CM

## 2024-03-07 DIAGNOSIS — I49.5 SICK SINUS SYNDROME (HCC): ICD-10-CM

## 2024-03-07 DIAGNOSIS — Z95.0 PACEMAKER: ICD-10-CM

## 2024-03-07 DIAGNOSIS — I65.23 BILATERAL CAROTID ARTERY STENOSIS: ICD-10-CM

## 2024-03-07 DIAGNOSIS — I50.32 CHRONIC DIASTOLIC CHF (CONGESTIVE HEART FAILURE) (HCC): ICD-10-CM

## 2024-03-07 DIAGNOSIS — E66.01 SEVERE OBESITY (BMI 35.0-39.9) WITH COMORBIDITY (HCC): ICD-10-CM

## 2024-03-07 DIAGNOSIS — I48.0 PAROXYSMAL ATRIAL FIBRILLATION (HCC): Primary | ICD-10-CM

## 2024-03-07 PROCEDURE — G8417 CALC BMI ABV UP PARAM F/U: HCPCS | Performed by: INTERNAL MEDICINE

## 2024-03-07 PROCEDURE — G8484 FLU IMMUNIZE NO ADMIN: HCPCS | Performed by: INTERNAL MEDICINE

## 2024-03-07 PROCEDURE — G8428 CUR MEDS NOT DOCUMENT: HCPCS | Performed by: INTERNAL MEDICINE

## 2024-03-07 PROCEDURE — 99214 OFFICE O/P EST MOD 30 MIN: CPT | Performed by: INTERNAL MEDICINE

## 2024-03-07 PROCEDURE — 3079F DIAST BP 80-89 MM HG: CPT | Performed by: INTERNAL MEDICINE

## 2024-03-07 PROCEDURE — 1036F TOBACCO NON-USER: CPT | Performed by: INTERNAL MEDICINE

## 2024-03-07 PROCEDURE — 1123F ACP DISCUSS/DSCN MKR DOCD: CPT | Performed by: INTERNAL MEDICINE

## 2024-03-07 PROCEDURE — 3075F SYST BP GE 130 - 139MM HG: CPT | Performed by: INTERNAL MEDICINE

## 2024-03-07 NOTE — PROGRESS NOTES
Results   Component Value Date/Time     11/30/2023 09:04 AM    K 4.4 11/30/2023 09:04 AM     11/30/2023 09:04 AM    CO2 26 11/30/2023 09:04 AM    BUN 11 11/30/2023 09:04 AM    CREATININE 0.90 11/30/2023 09:04 AM    GLUCOSE 139 11/30/2023 09:04 AM    CALCIUM 9.6 11/30/2023 09:04 AM        Lab Results   Component Value Date    WBC 10.3 11/30/2023    HGB 14.3 11/30/2023    HCT 44.3 11/30/2023    MCV 96.5 11/30/2023     11/30/2023       Lab Results   Component Value Date    TSH 1.870 02/28/2022       Lab Results   Component Value Date    LABA1C 6.3 (H) 11/30/2023     Lab Results   Component Value Date     11/30/2023       Lab Results   Component Value Date    CHOL 138 11/30/2023    CHOL 109 04/28/2023    CHOL 95 12/28/2022     Lab Results   Component Value Date    TRIG 130 11/30/2023    TRIG 124 04/28/2023    TRIG 85 12/28/2022     Lab Results   Component Value Date    HDL 41 11/30/2023    HDL 42 04/28/2023    HDL 43 12/28/2022     Lab Results   Component Value Date    LDLCALC 71 11/30/2023    LDLCALC 42.2 04/28/2023    LDLCALC 35 12/28/2022     No results found for: \"VLDL\"  Lab Results   Component Value Date    CHOLHDLRATIO 3.4 11/30/2023    CHOLHDLRATIO 2.6 04/28/2023    CHOLHDLRATIO 2.2 12/28/2022             I have Independently reviewed prior care notes, any ER records available, cardiac testing, labs and results with the patient and before seeing the patient today.  Also independently reviewed outside records when available.       ASSESSMENT:    Aguilar was seen today for congestive heart failure.    Diagnoses and all orders for this visit:    Paroxysmal atrial fibrillation (HCC)    Severe obesity (BMI 35.0-39.9) with comorbidity (HCC)    Chronic diastolic CHF (congestive heart failure) (HCC)    Pacemaker    Sick sinus syndrome (HCC)    Bilateral carotid artery stenosis    Dyslipidemia          PLAN:     1. CAD:  Chronic stable angina.  Follow for more angina, call PRN.  KEEP

## 2024-03-27 ENCOUNTER — HOSPITAL ENCOUNTER (OUTPATIENT)
Dept: CT IMAGING | Age: 84
Discharge: HOME | End: 2024-03-30
Attending: INTERNAL MEDICINE

## 2024-03-27 DIAGNOSIS — C18.0 ADENOCARCINOMA OF CECUM (HCC): ICD-10-CM

## 2024-03-28 ENCOUNTER — HOSPITAL ENCOUNTER (OUTPATIENT)
Dept: LAB | Age: 84
Discharge: HOME | End: 2024-03-28
Payer: MEDICARE

## 2024-03-28 ENCOUNTER — OFFICE VISIT (OUTPATIENT)
Dept: ONCOLOGY | Age: 84
End: 2024-03-28
Payer: MEDICARE

## 2024-03-28 VITALS
HEIGHT: 65 IN | RESPIRATION RATE: 16 BRPM | SYSTOLIC BLOOD PRESSURE: 163 MMHG | HEART RATE: 76 BPM | TEMPERATURE: 97.6 F | DIASTOLIC BLOOD PRESSURE: 87 MMHG | WEIGHT: 220.9 LBS | BODY MASS INDEX: 36.8 KG/M2

## 2024-03-28 DIAGNOSIS — C18.0 ADENOCARCINOMA OF CECUM (HCC): ICD-10-CM

## 2024-03-28 DIAGNOSIS — D64.9 ANEMIA, UNSPECIFIED TYPE: ICD-10-CM

## 2024-03-28 DIAGNOSIS — D50.9 IRON DEFICIENCY ANEMIA, UNSPECIFIED IRON DEFICIENCY ANEMIA TYPE: ICD-10-CM

## 2024-03-28 DIAGNOSIS — C18.0 ADENOCARCINOMA OF CECUM (HCC): Primary | ICD-10-CM

## 2024-03-28 LAB
ALBUMIN SERPL-MCNC: 3.4 G/DL (ref 3.2–4.6)
ALBUMIN/GLOB SERPL: 0.9 (ref 0.4–1.6)
ALP SERPL-CCNC: 89 U/L (ref 50–136)
ALT SERPL-CCNC: 22 U/L (ref 12–65)
ANION GAP SERPL CALC-SCNC: 5 MMOL/L (ref 2–11)
AST SERPL-CCNC: 17 U/L (ref 15–37)
BASOPHILS # BLD: 0.1 K/UL (ref 0–0.2)
BASOPHILS NFR BLD: 1 % (ref 0–2)
BILIRUB SERPL-MCNC: 0.8 MG/DL (ref 0.2–1.1)
BUN SERPL-MCNC: 10 MG/DL (ref 8–23)
CALCIUM SERPL-MCNC: 9.3 MG/DL (ref 8.3–10.4)
CEA SERPL-MCNC: 3.7 NG/ML (ref 0–3)
CHLORIDE SERPL-SCNC: 109 MMOL/L (ref 103–113)
CO2 SERPL-SCNC: 27 MMOL/L (ref 21–32)
CREAT SERPL-MCNC: 1 MG/DL (ref 0.8–1.5)
DIFFERENTIAL METHOD BLD: ABNORMAL
EOSINOPHIL # BLD: 0.4 K/UL (ref 0–0.8)
EOSINOPHIL NFR BLD: 4 % (ref 0.5–7.8)
ERYTHROCYTE [DISTWIDTH] IN BLOOD BY AUTOMATED COUNT: 14.3 % (ref 11.9–14.6)
FERRITIN SERPL-MCNC: 36 NG/ML (ref 8–388)
GLOBULIN SER CALC-MCNC: 3.6 G/DL (ref 2.8–4.5)
GLUCOSE SERPL-MCNC: 131 MG/DL (ref 65–100)
HCT VFR BLD AUTO: 45.1 % (ref 41.1–50.3)
HGB BLD-MCNC: 14.4 G/DL (ref 13.6–17.2)
IMM GRANULOCYTES # BLD AUTO: 0 K/UL (ref 0–0.5)
IMM GRANULOCYTES NFR BLD AUTO: 0 % (ref 0–5)
IRON SATN MFR SERPL: 24 %
IRON SERPL-MCNC: 92 UG/DL (ref 35–150)
LYMPHOCYTES # BLD: 2.9 K/UL (ref 0.5–4.6)
LYMPHOCYTES NFR BLD: 31 % (ref 13–44)
MCH RBC QN AUTO: 30.3 PG (ref 26.1–32.9)
MCHC RBC AUTO-ENTMCNC: 31.9 G/DL (ref 31.4–35)
MCV RBC AUTO: 94.9 FL (ref 82–102)
MONOCYTES # BLD: 0.7 K/UL (ref 0.1–1.3)
MONOCYTES NFR BLD: 8 % (ref 4–12)
NEUTS SEG # BLD: 5.4 K/UL (ref 1.7–8.2)
NEUTS SEG NFR BLD: 57 % (ref 43–78)
NRBC # BLD: 0 K/UL (ref 0–0.2)
PLATELET # BLD AUTO: 193 K/UL (ref 150–450)
PMV BLD AUTO: 9.3 FL (ref 9.4–12.3)
POTASSIUM SERPL-SCNC: 4.1 MMOL/L (ref 3.5–5.1)
PROT SERPL-MCNC: 7 G/DL (ref 6.3–8.2)
RBC # BLD AUTO: 4.75 M/UL (ref 4.23–5.6)
SODIUM SERPL-SCNC: 141 MMOL/L (ref 136–146)
TIBC SERPL-MCNC: 381 UG/DL (ref 250–450)
WBC # BLD AUTO: 9.4 K/UL (ref 4.3–11.1)

## 2024-03-28 PROCEDURE — G8484 FLU IMMUNIZE NO ADMIN: HCPCS | Performed by: INTERNAL MEDICINE

## 2024-03-28 PROCEDURE — 82378 CARCINOEMBRYONIC ANTIGEN: CPT

## 2024-03-28 PROCEDURE — 99214 OFFICE O/P EST MOD 30 MIN: CPT | Performed by: INTERNAL MEDICINE

## 2024-03-28 PROCEDURE — 3077F SYST BP >= 140 MM HG: CPT | Performed by: INTERNAL MEDICINE

## 2024-03-28 PROCEDURE — 80053 COMPREHEN METABOLIC PANEL: CPT

## 2024-03-28 PROCEDURE — 83540 ASSAY OF IRON: CPT

## 2024-03-28 PROCEDURE — 83550 IRON BINDING TEST: CPT

## 2024-03-28 PROCEDURE — 82728 ASSAY OF FERRITIN: CPT

## 2024-03-28 PROCEDURE — 85025 COMPLETE CBC W/AUTO DIFF WBC: CPT

## 2024-03-28 PROCEDURE — G8417 CALC BMI ABV UP PARAM F/U: HCPCS | Performed by: INTERNAL MEDICINE

## 2024-03-28 PROCEDURE — 1036F TOBACCO NON-USER: CPT | Performed by: INTERNAL MEDICINE

## 2024-03-28 PROCEDURE — 36415 COLL VENOUS BLD VENIPUNCTURE: CPT

## 2024-03-28 PROCEDURE — 3079F DIAST BP 80-89 MM HG: CPT | Performed by: INTERNAL MEDICINE

## 2024-03-28 PROCEDURE — G8427 DOCREV CUR MEDS BY ELIG CLIN: HCPCS | Performed by: INTERNAL MEDICINE

## 2024-03-28 PROCEDURE — 1123F ACP DISCUSS/DSCN MKR DOCD: CPT | Performed by: INTERNAL MEDICINE

## 2024-03-28 NOTE — PATIENT INSTRUCTIONS
resulting from precipitation of  fibrinogen in the serum of patients treated  with anticoagulants (e.g. hemodialysis  patients) may cause spuriously elevated  iron results.\"      TIBC 03/28/2024 381  250 - 450 ug/dL Final    TRANSFERRIN SATURATION 03/28/2024 24  >20 % Final         Treatment Summary has been discussed and given to patient:N/A    Follow Up:   4 months with Dr. Loazno, labs prior    Please refer to After Visit Summary or [employer] for upcoming appointment information. If you have any questions regarding your upcoming schedule please reach out to your care team through [employer] or call [phone].          -------------------------------------------------------------------------------------------------------------------  Please call our office at [phone] if you have any  of the following symptoms:   Fever of 100.5 or greater  Chills  Shortness of breath  Swelling or pain in one leg    After office hours an answering service is available and will contact a provider for emergencies or if you are experiencing any of the above symptoms.    Patient does express an interest in My Chart.  My Chart log in information explained on the after visit summary printout at the check-out desk.    Sonal Crespo RN

## 2024-04-30 DIAGNOSIS — I48.0 PAROXYSMAL ATRIAL FIBRILLATION (HCC): ICD-10-CM

## 2024-04-30 DIAGNOSIS — Z95.5 S/P CORONARY ARTERY STENT PLACEMENT: ICD-10-CM

## 2024-04-30 DIAGNOSIS — E78.5 DYSLIPIDEMIA: ICD-10-CM

## 2024-04-30 DIAGNOSIS — I10 HYPERTENSION COMPLICATIONS: ICD-10-CM

## 2024-04-30 NOTE — TELEPHONE ENCOUNTER
PT IS CALLING WANTING NURSE TO CALL HIS SON-EVITA [phone]  HIS SON DOES HIS MEDS AND HE SAID HIS SON DOES NOT HAVE IT RIGHT SO HE NEEDS TNURSE TO CALL HIM AND GO OVER MED PLEASE

## 2024-05-01 RX ORDER — POTASSIUM CHLORIDE 20 MEQ/1
20 TABLET, EXTENDED RELEASE ORAL DAILY
Qty: 90 TABLET | Refills: 3 | Status: SHIPPED | OUTPATIENT
Start: 2024-05-01

## 2024-05-01 RX ORDER — NITROGLYCERIN 0.4 MG/1
TABLET SUBLINGUAL
Qty: 25 TABLET | Refills: 5 | Status: SHIPPED | OUTPATIENT
Start: 2024-05-01

## 2024-05-01 RX ORDER — TAMSULOSIN HYDROCHLORIDE 0.4 MG/1
0.4 CAPSULE ORAL DAILY
Qty: 90 CAPSULE | Refills: 3 | Status: SHIPPED | OUTPATIENT
Start: 2024-05-01

## 2024-05-01 RX ORDER — ROPINIROLE 0.5 MG/1
0.5 TABLET, FILM COATED ORAL NIGHTLY
Qty: 90 TABLET | Refills: 3 | Status: SHIPPED | OUTPATIENT
Start: 2024-05-01

## 2024-05-01 RX ORDER — LOSARTAN POTASSIUM 50 MG/1
50 TABLET ORAL DAILY
Qty: 90 TABLET | Refills: 3 | Status: SHIPPED | OUTPATIENT
Start: 2024-05-01

## 2024-05-01 RX ORDER — ATORVASTATIN CALCIUM 80 MG/1
80 TABLET, FILM COATED ORAL DAILY
Qty: 90 TABLET | Refills: 5 | Status: SHIPPED | OUTPATIENT
Start: 2024-05-01

## 2024-05-01 RX ORDER — FUROSEMIDE 40 MG/1
40 TABLET ORAL DAILY
Qty: 90 TABLET | Refills: 3 | Status: SHIPPED | OUTPATIENT
Start: 2024-05-01

## 2024-05-30 ENCOUNTER — APPOINTMENT (OUTPATIENT)
Dept: CT IMAGING | Age: 84
End: 2024-05-30
Payer: MEDICARE

## 2024-05-30 ENCOUNTER — HOSPITAL ENCOUNTER (EMERGENCY)
Age: 84
Discharge: HOME | End: 2024-05-30
Attending: GENERAL PRACTICE
Payer: MEDICARE

## 2024-05-30 VITALS
TEMPERATURE: 98 F | DIASTOLIC BLOOD PRESSURE: 92 MMHG | OXYGEN SATURATION: 96 % | WEIGHT: 220 LBS | BODY MASS INDEX: 36.65 KG/M2 | HEART RATE: 76 BPM | SYSTOLIC BLOOD PRESSURE: 154 MMHG | RESPIRATION RATE: 17 BRPM | HEIGHT: 65 IN

## 2024-05-30 DIAGNOSIS — R42 LIGHTHEADEDNESS: Primary | ICD-10-CM

## 2024-05-30 DIAGNOSIS — R42 DIZZINESS: ICD-10-CM

## 2024-05-30 LAB
ALBUMIN SERPL-MCNC: 3.8 G/DL (ref 3.2–4.6)
ALBUMIN/GLOB SERPL: 1.4 (ref 1–1.9)
ALP SERPL-CCNC: 94 U/L (ref 40–129)
ALT SERPL-CCNC: 19 U/L (ref 12–65)
ANION GAP SERPL CALC-SCNC: 10 MMOL/L (ref 9–18)
AST SERPL-CCNC: 25 U/L (ref 15–37)
BASOPHILS # BLD: 0.1 K/UL (ref 0–0.2)
BASOPHILS NFR BLD: 1 % (ref 0–2)
BILIRUB SERPL-MCNC: 0.7 MG/DL (ref 0–1.2)
BUN SERPL-MCNC: 10 MG/DL (ref 8–23)
CALCIUM SERPL-MCNC: 9.5 MG/DL (ref 8.8–10.2)
CHLORIDE SERPL-SCNC: 104 MMOL/L (ref 98–107)
CO2 SERPL-SCNC: 29 MMOL/L (ref 20–28)
CREAT SERPL-MCNC: 0.72 MG/DL (ref 0.8–1.3)
DIFFERENTIAL METHOD BLD: NORMAL
EKG ATRIAL RATE: 0 BPM
EKG DIAGNOSIS: NORMAL
EKG P AXIS: 0 DEGREES
EKG P-R INTERVAL: 73 MS
EKG Q-T INTERVAL: 441 MS
EKG QRS DURATION: 188 MS
EKG QTC CALCULATION (BAZETT): 493 MS
EKG R AXIS: -79 DEGREES
EKG T AXIS: 90 DEGREES
EKG VENTRICULAR RATE: 75 BPM
EOSINOPHIL # BLD: 0.2 K/UL (ref 0–0.8)
EOSINOPHIL NFR BLD: 2 % (ref 0.5–7.8)
ERYTHROCYTE [DISTWIDTH] IN BLOOD BY AUTOMATED COUNT: 13.5 % (ref 11.9–14.6)
GLOBULIN SER CALC-MCNC: 2.8 G/DL (ref 2.3–3.5)
GLUCOSE SERPL-MCNC: 123 MG/DL (ref 70–99)
HCT VFR BLD AUTO: 47.3 % (ref 41.1–50.3)
HGB BLD-MCNC: 15.4 G/DL (ref 13.6–17.2)
IMM GRANULOCYTES # BLD AUTO: 0.1 K/UL (ref 0–0.5)
IMM GRANULOCYTES NFR BLD AUTO: 1 % (ref 0–5)
LYMPHOCYTES # BLD: 2.2 K/UL (ref 0.5–4.6)
LYMPHOCYTES NFR BLD: 22 % (ref 13–44)
MCH RBC QN AUTO: 30.4 PG (ref 26.1–32.9)
MCHC RBC AUTO-ENTMCNC: 32.6 G/DL (ref 31.4–35)
MCV RBC AUTO: 93.5 FL (ref 82–102)
MONOCYTES # BLD: 0.7 K/UL (ref 0.1–1.3)
MONOCYTES NFR BLD: 7 % (ref 4–12)
NEUTS SEG # BLD: 6.8 K/UL (ref 1.7–8.2)
NEUTS SEG NFR BLD: 67 % (ref 43–78)
NRBC # BLD: 0 K/UL (ref 0–0.2)
PLATELET # BLD AUTO: 184 K/UL (ref 150–450)
PMV BLD AUTO: 9.5 FL (ref 9.4–12.3)
POTASSIUM SERPL-SCNC: 3.7 MMOL/L (ref 3.5–5.1)
PROT SERPL-MCNC: 6.5 G/DL (ref 6.3–8.2)
RBC # BLD AUTO: 5.06 M/UL (ref 4.23–5.6)
SODIUM SERPL-SCNC: 143 MMOL/L (ref 136–145)
WBC # BLD AUTO: 9.9 K/UL (ref 4.3–11.1)

## 2024-05-30 PROCEDURE — 93010 ELECTROCARDIOGRAM REPORT: CPT | Performed by: INTERNAL MEDICINE

## 2024-05-30 PROCEDURE — 2580000003 HC RX 258: Performed by: GENERAL PRACTICE

## 2024-05-30 PROCEDURE — 99284 EMERGENCY DEPT VISIT MOD MDM: CPT

## 2024-05-30 PROCEDURE — 70450 CT HEAD/BRAIN W/O DYE: CPT

## 2024-05-30 PROCEDURE — 85025 COMPLETE CBC W/AUTO DIFF WBC: CPT

## 2024-05-30 PROCEDURE — 93005 ELECTROCARDIOGRAM TRACING: CPT | Performed by: GENERAL PRACTICE

## 2024-05-30 PROCEDURE — 96360 HYDRATION IV INFUSION INIT: CPT

## 2024-05-30 PROCEDURE — 80053 COMPREHEN METABOLIC PANEL: CPT

## 2024-05-30 RX ORDER — 0.9 % SODIUM CHLORIDE 0.9 %
500 INTRAVENOUS SOLUTION INTRAVENOUS ONCE
Status: COMPLETED | OUTPATIENT
Start: 2024-05-30 | End: 2024-05-30

## 2024-05-30 RX ADMIN — SODIUM CHLORIDE 500 ML: 9 INJECTION, SOLUTION INTRAVENOUS at 12:51

## 2024-05-30 ASSESSMENT — PAIN - FUNCTIONAL ASSESSMENT: PAIN_FUNCTIONAL_ASSESSMENT: NONE - DENIES PAIN

## 2024-05-30 NOTE — ED TRIAGE NOTES
Pt arrives via EMS from home, reports standing up from chair, becoming severely dizzy and falling. No apparent injuries from the fall, did not hit head. Denies current blood thinners. Patient was able to get up from ground but continued to feel dizzy and have nausea, no emesis.    HTN with EMS BP 170s/90s  HR 90s    -110  02 95% on RA

## 2024-05-30 NOTE — ED NOTES
Patient mobility status  with difficulty, uses a cane. Provider aware     I have reviewed discharge instructions with the patient and spouse.  The patient and spouse verbalized understanding.    Patient left ED via Discharge Method: ambulatory to Home with Spouse.    Opportunity for questions and clarification provided.     Patient given 0 scripts.           Irene Esparza, RN  05/30/24 3913

## 2024-05-31 NOTE — ED PROVIDER NOTES
Erectile Dysfunction, Disp-10 tablet, R-3Normal      Multiple Vitamins-Minerals (MULTIVITAMIN WITH MINERALS) tablet Once daily OTC, Disp-100 tablet, R-3Normal      Ascorbic Acid (VITAMIN C PO) Take 1 tablet by mouth daily.Historical Med      ZINC PO Take 1 capsule by mouth daily.Historical Med      Cholecalciferol (VITAMIN D3 PO) Take 1 tablet by mouth daily.Historical Med      acetaminophen (TYLENOL) 500 MG tablet Take 2 tablets by mouth every 6 hours as needed for PainHistorical Med              Results for orders placed or performed during the hospital encounter of 05/30/24   CT Head W/O Contrast    Narrative    Head CT    INDICATION: Dizziness    TECHNIQUE: Multiple 2D axial images obtained through the brain without  intravenous contrast.  Radiation dose reduction techniques were used for this  study:  All CT scans performed at this facility use one or all of the following:  Automated exposure control, adjustment of the mA and/or kVp according to  patient's size, iterative reconstruction.    COMPARISON: None    FINDINGS: No areas of abnormal attenuation are seen in the brain. There is no CT  evidence of acute hemorrhage or infarction. The ventricles are normal in size.  There are no extra-axial fluid collections. No masses are seen. The sinuses are  clear. There are no bony lesions.      Impression    No CT evidence of acute intracranial abnormality.   CBC with Auto Differential   Result Value Ref Range    WBC 9.9 4.3 - 11.1 K/uL    RBC 5.06 4.23 - 5.6 M/uL    Hemoglobin 15.4 13.6 - 17.2 g/dL    Hematocrit 47.3 41.1 - 50.3 %    MCV 93.5 82 - 102 FL    MCH 30.4 26.1 - 32.9 PG    MCHC 32.6 31.4 - 35.0 g/dL    RDW 13.5 11.9 - 14.6 %    Platelets 184 150 - 450 K/uL    MPV 9.5 9.4 - 12.3 FL    nRBC 0.00 0.0 - 0.2 K/uL    Differential Type AUTOMATED      Neutrophils % 67 43 - 78 %    Lymphocytes % 22 13 - 44 %    Monocytes % 7 4.0 - 12.0 %    Eosinophils % 2 0.5 - 7.8 %    Basophils % 1 0.0 - 2.0 %    Immature

## 2024-06-06 ENCOUNTER — NURSE ONLY (OUTPATIENT)
Dept: INTERNAL MEDICINE CLINIC | Facility: CLINIC | Age: 84
End: 2024-06-06

## 2024-06-06 DIAGNOSIS — E11.9 TYPE 2 DIABETES MELLITUS WITHOUT COMPLICATION, WITHOUT LONG-TERM CURRENT USE OF INSULIN (HCC): ICD-10-CM

## 2024-06-06 DIAGNOSIS — I48.0 PAROXYSMAL ATRIAL FIBRILLATION (HCC): ICD-10-CM

## 2024-06-06 DIAGNOSIS — E55.9 VITAMIN D DEFICIENCY: ICD-10-CM

## 2024-06-06 DIAGNOSIS — E78.5 DYSLIPIDEMIA: ICD-10-CM

## 2024-06-06 LAB
25(OH)D3 SERPL-MCNC: 12.3 NG/ML (ref 30–100)
ALBUMIN SERPL-MCNC: 3.4 G/DL (ref 3.2–4.6)
ALBUMIN/GLOB SERPL: 1.3 (ref 1–1.9)
ALP SERPL-CCNC: 87 U/L (ref 40–129)
ALT SERPL-CCNC: 17 U/L (ref 12–65)
ANION GAP SERPL CALC-SCNC: 11 MMOL/L (ref 9–18)
AST SERPL-CCNC: 24 U/L (ref 15–37)
BILIRUB SERPL-MCNC: 0.6 MG/DL (ref 0–1.2)
BUN SERPL-MCNC: 11 MG/DL (ref 8–23)
CALCIUM SERPL-MCNC: 9.3 MG/DL (ref 8.8–10.2)
CHLORIDE SERPL-SCNC: 104 MMOL/L (ref 98–107)
CHOLEST SERPL-MCNC: 151 MG/DL (ref 0–200)
CO2 SERPL-SCNC: 25 MMOL/L (ref 20–28)
CREAT SERPL-MCNC: 0.8 MG/DL (ref 0.8–1.3)
ERYTHROCYTE [DISTWIDTH] IN BLOOD BY AUTOMATED COUNT: 13.2 % (ref 11.9–14.6)
EST. AVERAGE GLUCOSE BLD GHB EST-MCNC: 152 MG/DL
GLOBULIN SER CALC-MCNC: 2.7 G/DL (ref 2.3–3.5)
GLUCOSE SERPL-MCNC: 130 MG/DL (ref 70–99)
HBA1C MFR BLD: 6.9 % (ref 0–5.6)
HCT VFR BLD AUTO: 44.9 % (ref 41.1–50.3)
HDLC SERPL-MCNC: 40 MG/DL (ref 40–60)
HDLC SERPL: 3.8 (ref 0–5)
HGB BLD-MCNC: 14.4 G/DL (ref 13.6–17.2)
LDLC SERPL CALC-MCNC: 83 MG/DL (ref 0–100)
MCH RBC QN AUTO: 29.9 PG (ref 26.1–32.9)
MCHC RBC AUTO-ENTMCNC: 32.1 G/DL (ref 31.4–35)
MCV RBC AUTO: 93.3 FL (ref 82–102)
NRBC # BLD: 0 K/UL (ref 0–0.2)
PLATELET # BLD AUTO: 202 K/UL (ref 150–450)
PMV BLD AUTO: 9.8 FL (ref 9.4–12.3)
POTASSIUM SERPL-SCNC: 3.6 MMOL/L (ref 3.5–5.1)
PROT SERPL-MCNC: 6.1 G/DL (ref 6.3–8.2)
RBC # BLD AUTO: 4.81 M/UL (ref 4.23–5.6)
SODIUM SERPL-SCNC: 141 MMOL/L (ref 136–145)
TRIGL SERPL-MCNC: 141 MG/DL (ref 0–150)
TSH, 3RD GENERATION: 1.87 UIU/ML (ref 0.27–4.2)
VLDLC SERPL CALC-MCNC: 28 MG/DL (ref 6–23)
WBC # BLD AUTO: 10.4 K/UL (ref 4.3–11.1)

## 2024-06-20 ENCOUNTER — OFFICE VISIT (OUTPATIENT)
Dept: INTERNAL MEDICINE CLINIC | Facility: CLINIC | Age: 84
End: 2024-06-20

## 2024-06-20 VITALS
OXYGEN SATURATION: 95 % | SYSTOLIC BLOOD PRESSURE: 154 MMHG | BODY MASS INDEX: 36.82 KG/M2 | WEIGHT: 221 LBS | HEART RATE: 77 BPM | DIASTOLIC BLOOD PRESSURE: 77 MMHG | TEMPERATURE: 98.1 F | HEIGHT: 65 IN

## 2024-06-20 DIAGNOSIS — I25.118 ATHEROSCLEROTIC HEART DISEASE OF NATIVE CORONARY ARTERY WITH OTHER FORMS OF ANGINA PECTORIS (HCC): ICD-10-CM

## 2024-06-20 DIAGNOSIS — E55.9 VITAMIN D DEFICIENCY: ICD-10-CM

## 2024-06-20 DIAGNOSIS — I82.729 CHRONIC DEEP VEIN THROMBOSIS (DVT) OF UPPER EXTREMITY, UNSPECIFIED LATERALITY, UNSPECIFIED VEIN (HCC): ICD-10-CM

## 2024-06-20 DIAGNOSIS — E11.9 TYPE 2 DIABETES MELLITUS WITHOUT COMPLICATION, WITHOUT LONG-TERM CURRENT USE OF INSULIN (HCC): Primary | ICD-10-CM

## 2024-06-20 DIAGNOSIS — I10 PRIMARY HYPERTENSION: ICD-10-CM

## 2024-06-20 DIAGNOSIS — F33.1 MAJOR DEPRESSIVE DISORDER, RECURRENT, MODERATE (HCC): ICD-10-CM

## 2024-06-20 DIAGNOSIS — I48.0 PAROXYSMAL ATRIAL FIBRILLATION (HCC): ICD-10-CM

## 2024-06-20 DIAGNOSIS — G70.00 MYASTHENIA GRAVIS (HCC): ICD-10-CM

## 2024-06-20 DIAGNOSIS — E78.5 DYSLIPIDEMIA: ICD-10-CM

## 2024-06-20 RX ORDER — ERGOCALCIFEROL 1.25 MG/1
50000 CAPSULE ORAL WEEKLY
Qty: 12 CAPSULE | Refills: 1 | Status: SHIPPED | OUTPATIENT
Start: 2024-06-20

## 2024-06-20 RX ORDER — METFORMIN HYDROCHLORIDE 500 MG/1
500 TABLET, EXTENDED RELEASE ORAL
Qty: 90 TABLET | Refills: 1 | Status: SHIPPED | OUTPATIENT
Start: 2024-06-20

## 2024-06-20 SDOH — ECONOMIC STABILITY: FOOD INSECURITY: WITHIN THE PAST 12 MONTHS, THE FOOD YOU BOUGHT JUST DIDN'T LAST AND YOU DIDN'T HAVE MONEY TO GET MORE.: NEVER TRUE

## 2024-06-20 SDOH — ECONOMIC STABILITY: FOOD INSECURITY: WITHIN THE PAST 12 MONTHS, YOU WORRIED THAT YOUR FOOD WOULD RUN OUT BEFORE YOU GOT MONEY TO BUY MORE.: NEVER TRUE

## 2024-06-20 SDOH — ECONOMIC STABILITY: INCOME INSECURITY: HOW HARD IS IT FOR YOU TO PAY FOR THE VERY BASICS LIKE FOOD, HOUSING, MEDICAL CARE, AND HEATING?: NOT HARD AT ALL

## 2024-06-20 ASSESSMENT — PATIENT HEALTH QUESTIONNAIRE - PHQ9
4. FEELING TIRED OR HAVING LITTLE ENERGY: SEVERAL DAYS
7. TROUBLE CONCENTRATING ON THINGS, SUCH AS READING THE NEWSPAPER OR WATCHING TELEVISION: NOT AT ALL
3. TROUBLE FALLING OR STAYING ASLEEP: SEVERAL DAYS
2. FEELING DOWN, DEPRESSED OR HOPELESS: NOT AT ALL
SUM OF ALL RESPONSES TO PHQ QUESTIONS 1-9: 2
SUM OF ALL RESPONSES TO PHQ QUESTIONS 1-9: 2
8. MOVING OR SPEAKING SO SLOWLY THAT OTHER PEOPLE COULD HAVE NOTICED. OR THE OPPOSITE, BEING SO FIGETY OR RESTLESS THAT YOU HAVE BEEN MOVING AROUND A LOT MORE THAN USUAL: NOT AT ALL
SUM OF ALL RESPONSES TO PHQ QUESTIONS 1-9: 2
1. LITTLE INTEREST OR PLEASURE IN DOING THINGS: NOT AT ALL
5. POOR APPETITE OR OVEREATING: NOT AT ALL
10. IF YOU CHECKED OFF ANY PROBLEMS, HOW DIFFICULT HAVE THESE PROBLEMS MADE IT FOR YOU TO DO YOUR WORK, TAKE CARE OF THINGS AT HOME, OR GET ALONG WITH OTHER PEOPLE: SOMEWHAT DIFFICULT
9. THOUGHTS THAT YOU WOULD BE BETTER OFF DEAD, OR OF HURTING YOURSELF: NOT AT ALL
SUM OF ALL RESPONSES TO PHQ9 QUESTIONS 1 & 2: 0
6. FEELING BAD ABOUT YOURSELF - OR THAT YOU ARE A FAILURE OR HAVE LET YOURSELF OR YOUR FAMILY DOWN: NOT AT ALL
SUM OF ALL RESPONSES TO PHQ QUESTIONS 1-9: 2

## 2024-06-20 NOTE — PROGRESS NOTES
Nelsy Stovall,   [facility] [place] [address]  Phone [phone]  Fax [phone]      Aguilar Titus (: 1940) is a 84 y.o. male, here for evaluation of the following chief complaint(s):  Established New Doctor (Last AWV 23, colonoscope  23 and eye exam done yearly ) and Hypertension       ASSESSMENT/PLAN:  1. Type 2 diabetes mellitus without complication, without long-term current use of insulin (HCC)  Overview:  Started on metformin. Reviewed side effects, interactions, and safety precautions.   Diabetic Medications       Biguanides       metFORMIN (GLUCOPHAGE-XR) 500 MG extended release tablet Take 1 tablet by mouth daily (with breakfast)        .  A1c levels reviewed--will recheck. Encourage routine foot care. Recommend routine eye exams.  Encourage diet and exercise and medication adherence.   Orders:  -     metFORMIN (GLUCOPHAGE-XR) 500 MG extended release tablet; Take 1 tablet by mouth daily (with breakfast), Disp-90 tablet, R-1Normal  -     CBC with Auto Differential; Future  -     Comprehensive Metabolic Panel; Future  -     Hemoglobin A1C; Future  2. Primary hypertension  Overview:  Tolerating medication well for HTN. Achieving desired therapeutic response with   Hypertension Medications       Loop Diuretics       furosemide (LASIX) 40 MG tablet Take 1 tablet by mouth daily       Angiotensin II Receptor Antagonists       losartan (COZAAR) 50 MG tablet Take 1 tablet by mouth daily         --will continue. Will periodically review and adjust if needed.  Encourage home monitoring.   Orders:  -     CBC with Auto Differential; Future  -     Comprehensive Metabolic Panel; Future  3. Dyslipidemia  Overview:  Statin: yes.  Tolerating medication well and achieving desired therapeutic response. Will continue with lipitor. Will recheck lipid levels. Encourage healthy eating and exercise as able.    Orders:  -     CBC with Auto Differential;

## 2024-06-22 PROCEDURE — 93294 REM INTERROG EVL PM/LDLS PM: CPT | Performed by: INTERNAL MEDICINE

## 2024-06-22 PROCEDURE — 93296 REM INTERROG EVL PM/IDS: CPT | Performed by: INTERNAL MEDICINE

## 2024-06-29 PROBLEM — E55.9 VITAMIN D DEFICIENCY: Status: ACTIVE | Noted: 2024-06-29

## 2024-06-29 PROBLEM — E11.9 TYPE 2 DIABETES MELLITUS WITHOUT COMPLICATION, WITHOUT LONG-TERM CURRENT USE OF INSULIN (HCC): Status: ACTIVE | Noted: 2024-06-29

## 2024-07-17 ENCOUNTER — APPOINTMENT (OUTPATIENT)
Dept: GENERAL RADIOLOGY | Age: 84
End: 2024-07-17
Payer: MEDICARE

## 2024-07-17 ENCOUNTER — APPOINTMENT (OUTPATIENT)
Dept: CT IMAGING | Age: 84
End: 2024-07-17
Payer: MEDICARE

## 2024-07-17 ENCOUNTER — HOSPITAL ENCOUNTER (EMERGENCY)
Age: 84
Discharge: HOME | End: 2024-07-18
Attending: EMERGENCY MEDICINE
Payer: MEDICARE

## 2024-07-17 ENCOUNTER — OFFICE VISIT (OUTPATIENT)
Age: 84
End: 2024-07-17
Payer: MEDICARE

## 2024-07-17 VITALS
WEIGHT: 218.6 LBS | SYSTOLIC BLOOD PRESSURE: 128 MMHG | HEART RATE: 77 BPM | HEIGHT: 65 IN | DIASTOLIC BLOOD PRESSURE: 70 MMHG | BODY MASS INDEX: 36.42 KG/M2

## 2024-07-17 DIAGNOSIS — I48.0 PAROXYSMAL ATRIAL FIBRILLATION (HCC): Primary | ICD-10-CM

## 2024-07-17 DIAGNOSIS — I50.32 CHRONIC DIASTOLIC CHF (CONGESTIVE HEART FAILURE) (HCC): ICD-10-CM

## 2024-07-17 DIAGNOSIS — I34.0 NONRHEUMATIC MITRAL VALVE REGURGITATION: ICD-10-CM

## 2024-07-17 DIAGNOSIS — I25.118 ATHEROSCLEROTIC HEART DISEASE OF NATIVE CORONARY ARTERY WITH OTHER FORMS OF ANGINA PECTORIS (HCC): ICD-10-CM

## 2024-07-17 DIAGNOSIS — Z95.0 PACEMAKER: ICD-10-CM

## 2024-07-17 DIAGNOSIS — R53.1 GENERAL WEAKNESS: Primary | ICD-10-CM

## 2024-07-17 DIAGNOSIS — R06.02 SHORTNESS OF BREATH: ICD-10-CM

## 2024-07-17 PROBLEM — G20.A1 PARKINSON'S DISEASE (HCC): Status: ACTIVE | Noted: 2024-07-17

## 2024-07-17 LAB
ALBUMIN SERPL-MCNC: 3.1 G/DL (ref 3.2–4.6)
ALBUMIN/GLOB SERPL: 1.2 (ref 1–1.9)
ALP SERPL-CCNC: 84 U/L (ref 40–129)
ALT SERPL-CCNC: 14 U/L (ref 12–65)
ANION GAP SERPL CALC-SCNC: 12 MMOL/L (ref 9–18)
AST SERPL-CCNC: 28 U/L (ref 15–37)
BASOPHILS # BLD: 0.1 K/UL (ref 0–0.2)
BASOPHILS NFR BLD: 0 % (ref 0–2)
BILIRUB SERPL-MCNC: 0.5 MG/DL (ref 0–1.2)
BUN SERPL-MCNC: 9 MG/DL (ref 8–23)
CALCIUM SERPL-MCNC: 8.9 MG/DL (ref 8.8–10.2)
CHLORIDE SERPL-SCNC: 103 MMOL/L (ref 98–107)
CO2 SERPL-SCNC: 24 MMOL/L (ref 20–28)
CREAT SERPL-MCNC: 0.83 MG/DL (ref 0.8–1.3)
DIFFERENTIAL METHOD BLD: ABNORMAL
EOSINOPHIL # BLD: 0.4 K/UL (ref 0–0.8)
EOSINOPHIL NFR BLD: 3 % (ref 0.5–7.8)
ERYTHROCYTE [DISTWIDTH] IN BLOOD BY AUTOMATED COUNT: 13.8 % (ref 11.9–14.6)
GLOBULIN SER CALC-MCNC: 2.6 G/DL (ref 2.3–3.5)
GLUCOSE SERPL-MCNC: 162 MG/DL (ref 70–99)
HCT VFR BLD AUTO: 41.3 % (ref 41.1–50.3)
HGB BLD-MCNC: 13.5 G/DL (ref 13.6–17.2)
IMM GRANULOCYTES # BLD AUTO: 0.1 K/UL (ref 0–0.5)
IMM GRANULOCYTES NFR BLD AUTO: 1 % (ref 0–5)
LIPASE SERPL-CCNC: 28 U/L (ref 13–60)
LYMPHOCYTES # BLD: 2.9 K/UL (ref 0.5–4.6)
LYMPHOCYTES NFR BLD: 25 % (ref 13–44)
MAGNESIUM SERPL-MCNC: 1.7 MG/DL (ref 1.8–2.4)
MCH RBC QN AUTO: 30.5 PG (ref 26.1–32.9)
MCHC RBC AUTO-ENTMCNC: 32.7 G/DL (ref 31.4–35)
MCV RBC AUTO: 93.4 FL (ref 82–102)
MONOCYTES # BLD: 0.9 K/UL (ref 0.1–1.3)
MONOCYTES NFR BLD: 7 % (ref 4–12)
NEUTS SEG # BLD: 7.7 K/UL (ref 1.7–8.2)
NEUTS SEG NFR BLD: 64 % (ref 43–78)
NRBC # BLD: 0 K/UL (ref 0–0.2)
NT PRO BNP: 1112 PG/ML (ref 0–450)
PLATELET # BLD AUTO: 160 K/UL (ref 150–450)
PMV BLD AUTO: 9.3 FL (ref 9.4–12.3)
POTASSIUM SERPL-SCNC: 3.4 MMOL/L (ref 3.5–5.1)
PROT SERPL-MCNC: 5.7 G/DL (ref 6.3–8.2)
RBC # BLD AUTO: 4.42 M/UL (ref 4.23–5.6)
SODIUM SERPL-SCNC: 140 MMOL/L (ref 136–145)
TROPONIN T SERPL HS-MCNC: 34 NG/L (ref 0–22)
WBC # BLD AUTO: 12 K/UL (ref 4.3–11.1)

## 2024-07-17 PROCEDURE — 3078F DIAST BP <80 MM HG: CPT | Performed by: INTERNAL MEDICINE

## 2024-07-17 PROCEDURE — 70450 CT HEAD/BRAIN W/O DYE: CPT

## 2024-07-17 PROCEDURE — 85025 COMPLETE CBC W/AUTO DIFF WBC: CPT

## 2024-07-17 PROCEDURE — 3074F SYST BP LT 130 MM HG: CPT | Performed by: INTERNAL MEDICINE

## 2024-07-17 PROCEDURE — 99285 EMERGENCY DEPT VISIT HI MDM: CPT

## 2024-07-17 PROCEDURE — 71045 X-RAY EXAM CHEST 1 VIEW: CPT

## 2024-07-17 PROCEDURE — 1123F ACP DISCUSS/DSCN MKR DOCD: CPT | Performed by: INTERNAL MEDICINE

## 2024-07-17 PROCEDURE — 93005 ELECTROCARDIOGRAM TRACING: CPT | Performed by: EMERGENCY MEDICINE

## 2024-07-17 PROCEDURE — 80053 COMPREHEN METABOLIC PANEL: CPT

## 2024-07-17 PROCEDURE — 96374 THER/PROPH/DIAG INJ IV PUSH: CPT

## 2024-07-17 PROCEDURE — 99214 OFFICE O/P EST MOD 30 MIN: CPT | Performed by: INTERNAL MEDICINE

## 2024-07-17 PROCEDURE — 84484 ASSAY OF TROPONIN QUANT: CPT

## 2024-07-17 PROCEDURE — 83735 ASSAY OF MAGNESIUM: CPT

## 2024-07-17 PROCEDURE — 83880 ASSAY OF NATRIURETIC PEPTIDE: CPT

## 2024-07-17 PROCEDURE — 83690 ASSAY OF LIPASE: CPT

## 2024-07-17 PROCEDURE — 6360000002 HC RX W HCPCS: Performed by: EMERGENCY MEDICINE

## 2024-07-17 RX ORDER — FUROSEMIDE 10 MG/ML
40 INJECTION INTRAMUSCULAR; INTRAVENOUS ONCE
Status: COMPLETED | OUTPATIENT
Start: 2024-07-17 | End: 2024-07-17

## 2024-07-17 RX ADMIN — FUROSEMIDE 40 MG: 10 INJECTION, SOLUTION INTRAMUSCULAR; INTRAVENOUS at 23:31

## 2024-07-17 ASSESSMENT — PAIN - FUNCTIONAL ASSESSMENT: PAIN_FUNCTIONAL_ASSESSMENT: NONE - DENIES PAIN

## 2024-07-17 NOTE — PROGRESS NOTES
[address] [facility], [address] [place] [address]  PHONE: [phone]     24    NAME:  Aguilar Titus  : 1940  MRN: [id number]       SUBJECTIVE:   Aguilar Titus is a 84 y.o. male seen for a follow up visit regarding the following:     Chief Complaint   Patient presents with    Congestive Heart Failure    Follow-up       HPI:   Here for CAD, Afib   prox LAD PCI 2015;   (GERMAIN on brilinta).     10/17: PPM placement for SSS.   2019: watchman device placement   [facility] 3/19/2021: small vessel CAD, no PCI.   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  Son helping with meds.  NO angina, CP, GERMAIN.    More tired now, energy not good now.    Walking some now.  Some lack of energy.    On daily lasix. No new edema.     Patient denies recent history of orthopnea, PND, excessive dizziness and/or syncope.      Has myasthenia gravis, this has been well controlled.          Past Medical History, Past Surgical History, Family history, Social History, and Medications were all reviewed with the patient today and updated as necessary.     Current Outpatient Medications   Medication Sig Dispense Refill    vitamin D (ERGOCALCIFEROL) 1.25 MG (86281 UT) CAPS capsule Take 1 capsule by mouth once a week 12 capsule 1    metFORMIN (GLUCOPHAGE-XR) 500 MG extended release tablet Take 1 tablet by mouth daily (with breakfast) 90 tablet 1    apixaban (ELIQUIS) 5 MG TABS tablet Take 1 tablet by mouth 2 times daily at 0800 and 1400 180 tablet 3    atorvastatin (LIPITOR) 80 MG tablet Take 1 tablet by mouth daily 90 tablet 5    furosemide (LASIX) 40 MG tablet Take 1 tablet by mouth daily 90 tablet 3    losartan (COZAAR) 50 MG tablet Take 1 tablet by mouth daily 90 tablet 3    nitroGLYCERIN (NITROSTAT) 0.4 MG SL tablet Place 1 sl

## 2024-07-18 VITALS
WEIGHT: 228 LBS | HEART RATE: 71 BPM | HEIGHT: 68 IN | RESPIRATION RATE: 21 BRPM | DIASTOLIC BLOOD PRESSURE: 87 MMHG | OXYGEN SATURATION: 93 % | TEMPERATURE: 98.8 F | BODY MASS INDEX: 34.56 KG/M2 | SYSTOLIC BLOOD PRESSURE: 159 MMHG

## 2024-07-18 LAB
APPEARANCE UR: CLEAR
BILIRUB UR QL: NEGATIVE
COLOR UR: NORMAL
EKG ATRIAL RATE: 84 BPM
EKG DIAGNOSIS: NORMAL
EKG P AXIS: 0 DEGREES
EKG P-R INTERVAL: 76 MS
EKG Q-T INTERVAL: 440 MS
EKG QRS DURATION: 186 MS
EKG QTC CALCULATION (BAZETT): 492 MS
EKG R AXIS: -74 DEGREES
EKG T AXIS: 95 DEGREES
EKG VENTRICULAR RATE: 75 BPM
GLUCOSE UR STRIP.AUTO-MCNC: 100 MG/DL
HGB UR QL STRIP: NEGATIVE
KETONES UR QL STRIP.AUTO: NEGATIVE MG/DL
LEUKOCYTE ESTERASE UR QL STRIP.AUTO: NEGATIVE
NITRITE UR QL STRIP.AUTO: NEGATIVE
PH UR STRIP: 6.5 (ref 5–9)
PROT UR STRIP-MCNC: NEGATIVE MG/DL
SP GR UR REFRACTOMETRY: 1.01 (ref 1–1.02)
TROPONIN T SERPL HS-MCNC: 39 NG/L (ref 0–22)
UROBILINOGEN UR QL STRIP.AUTO: 0.2 EU/DL (ref 0.2–1)

## 2024-07-18 PROCEDURE — 84484 ASSAY OF TROPONIN QUANT: CPT

## 2024-07-18 PROCEDURE — 81003 URINALYSIS AUTO W/O SCOPE: CPT

## 2024-07-18 PROCEDURE — 93010 ELECTROCARDIOGRAM REPORT: CPT | Performed by: INTERNAL MEDICINE

## 2024-07-18 NOTE — ED PROVIDER NOTES
Procedures    Orders Placed This Encounter   Procedures    CT Head W/O Contrast    XR CHEST 1 VIEW    CMP    CBC with Auto Differential    Urinalysis    Troponin    Magnesium    Lipase    Brain Natriuretic Peptide    Troponin    PACEMAKER CARE    EKG 12 Lead Repeat        Medications given during this emergency department visit:  Medications   furosemide (LASIX) injection 40 mg (40 mg IntraVENous Given 7/17/24 2331)       New Prescriptions    No medications on file        Past Medical History:   Diagnosis Date    Abnormal EKG 4/22/15    FRANKY (acute kidney injury) ([facility]) 9/17/2023    Arrhythmia     Aspiration pneumonia ([facility]) 10/26/2017    CAD (coronary artery disease) 5/8/2015    Carotid artery stenosis without cerebral infarction 6/7/2016    US 6/15: <50% bilat ICAs    Coronary atherosclerosis of native coronary vessel 5/8/2015    GERMAIN on brilinta 5/7/15: prox LAD PCI, normal EF     Diastolic CHF, chronic ([facility]) 3/2/2022    Dyslipidemia 6/7/2016    Dyspnea 5/8/2015    Echo 6/15: EF 60%, mod MR, mod LVH, mild AI     ED (erectile dysfunction)     GERD (gastroesophageal reflux disease)     GERD (gastroesophageal reflux disease)     no medication    HTN (hypertension) 5/8/2015    Hypertension     Hypokalemia     Hypokalemia 6/7/2016    Mitral valve regurgitation 6/7/2016    Morbid obesity ([facility])     Myasthenia gravis ([facility])     Myasthenia gravis ([facility]) 6/17/15    Nocturia     Osteoporosis     PUD (peptic ulcer disease) 25 yrs ago    S/P coronary artery stent placement 5/8/2015    3.0x38 mm Xience CAROL to pLAD 5/7/15     Sleep apnea     Syncope and collapse     Unspecified sleep apnea     no cpap        Past Surgical History:   Procedure Laterality Date    APPENDECTOMY      CARDIAC CATHETERIZATION  05/21/2019    watchman device    CARDIAC CATHETERIZATION  05/27/2015    stent    COLONOSCOPY  2007    COLONOSCOPY N/A 8/6/2022    COLONOSCOPY POLYPECTOMY SNARE/COLD BIOPSY performed by Eric Chavez MD at  ENDOSCOPY

## 2024-07-18 NOTE — ED NOTES
Patient ambulated and oxygen remained above 95%. Lung signs clear.      Dayron Catherine, RN  07/18/24 0237

## 2024-07-18 NOTE — DISCHARGE INSTRUCTIONS
If you have chest pain, difficulty breathing, pass out, or if you have any other concering symptoms, please return to the ER immediately.

## 2024-07-18 NOTE — ED NOTES
Patient mobility status  with no difficulty. Provider aware     I have reviewed discharge instructions with the patient.  The patient verbalized understanding.    Patient left ED via Discharge Method: ambulatory to Home with Significant Other.    Opportunity for questions and clarification provided.     Patient given 0 scripts.            Dayron Catherine RN  07/18/24 0232

## 2024-07-18 NOTE — ED TRIAGE NOTES
Patient became dizzy after stepping out of car and stumbled (did not hit floor).    Per son patient did not lose consciousness.   Patient has pacemaker.   Patient visited Heart doctor today and reports no positive test results.    Alert/oriented x4.

## 2024-07-19 ENCOUNTER — CARE COORDINATION (OUTPATIENT)
Dept: CARE COORDINATION | Facility: CLINIC | Age: 84
End: 2024-07-19

## 2024-07-19 NOTE — CARE COORDINATION
Ambulatory Care Coordination Note     2024 8:38 AM     Patient Current Location:  [place]     This patient was received as a referral from Ambulatory Care Manager .    ACM contacted the patient by telephone. Verified name and  with patient as identifiers. Provided introduction to self, and explanation of the ACM role.   Patient declined care management services at this time.

## 2024-07-30 ENCOUNTER — TELEPHONE (OUTPATIENT)
Age: 84
End: 2024-07-30

## 2024-07-30 NOTE — TELEPHONE ENCOUNTER
Called pt's son advised of Dr. Koenig's response and about appt scheduled was given a verbal understanding.

## 2024-07-30 NOTE — TELEPHONE ENCOUNTER
----- Message from Varun Koenig DO sent at 7/30/2024  2:45 PM EDT -----  See me Friday 9am, cancel the NST, abnormal echo.   Thanks

## 2024-07-30 NOTE — TELEPHONE ENCOUNTER
Called pt advised of Dr. Koenig's response. Pt gave a verbal understanding. Pt scheduled for 8/2/24

## 2024-07-31 ENCOUNTER — OFFICE VISIT (OUTPATIENT)
Age: 84
End: 2024-07-31
Payer: MEDICARE

## 2024-07-31 VITALS
HEIGHT: 68 IN | DIASTOLIC BLOOD PRESSURE: 78 MMHG | WEIGHT: 218 LBS | BODY MASS INDEX: 33.04 KG/M2 | SYSTOLIC BLOOD PRESSURE: 138 MMHG | HEART RATE: 68 BPM

## 2024-07-31 DIAGNOSIS — I25.119 ATHEROSCLEROSIS OF NATIVE CORONARY ARTERY OF NATIVE HEART WITH ANGINA PECTORIS (HCC): ICD-10-CM

## 2024-07-31 DIAGNOSIS — I10 HYPERTENSION COMPLICATIONS: ICD-10-CM

## 2024-07-31 DIAGNOSIS — I48.0 PAROXYSMAL ATRIAL FIBRILLATION (HCC): ICD-10-CM

## 2024-07-31 DIAGNOSIS — I25.118 ATHEROSCLEROTIC HEART DISEASE OF NATIVE CORONARY ARTERY WITH OTHER FORMS OF ANGINA PECTORIS (HCC): ICD-10-CM

## 2024-07-31 DIAGNOSIS — E78.5 DYSLIPIDEMIA: ICD-10-CM

## 2024-07-31 DIAGNOSIS — I34.0 NONRHEUMATIC MITRAL VALVE REGURGITATION: Primary | ICD-10-CM

## 2024-07-31 DIAGNOSIS — I50.22 CHRONIC SYSTOLIC (CONGESTIVE) HEART FAILURE (HCC): ICD-10-CM

## 2024-07-31 DIAGNOSIS — I50.32 CHRONIC DIASTOLIC CHF (CONGESTIVE HEART FAILURE) (HCC): ICD-10-CM

## 2024-07-31 DIAGNOSIS — Z95.5 S/P CORONARY ARTERY STENT PLACEMENT: ICD-10-CM

## 2024-07-31 PROCEDURE — 1123F ACP DISCUSS/DSCN MKR DOCD: CPT | Performed by: INTERNAL MEDICINE

## 2024-07-31 PROCEDURE — 3075F SYST BP GE 130 - 139MM HG: CPT | Performed by: INTERNAL MEDICINE

## 2024-07-31 PROCEDURE — 99215 OFFICE O/P EST HI 40 MIN: CPT | Performed by: INTERNAL MEDICINE

## 2024-07-31 PROCEDURE — 3078F DIAST BP <80 MM HG: CPT | Performed by: INTERNAL MEDICINE

## 2024-07-31 RX ORDER — ATORVASTATIN CALCIUM 80 MG/1
80 TABLET, FILM COATED ORAL DAILY
Qty: 90 TABLET | Refills: 3 | Status: SHIPPED | OUTPATIENT
Start: 2024-07-31

## 2024-07-31 RX ORDER — TAMSULOSIN HYDROCHLORIDE 0.4 MG/1
0.4 CAPSULE ORAL DAILY
Qty: 90 CAPSULE | Refills: 3 | Status: SHIPPED | OUTPATIENT
Start: 2024-07-31

## 2024-07-31 RX ORDER — SODIUM CHLORIDE 9 MG/ML
INJECTION, SOLUTION INTRAVENOUS PRN
OUTPATIENT
Start: 2024-07-31

## 2024-07-31 RX ORDER — ROPINIROLE 0.5 MG/1
0.5 TABLET, FILM COATED ORAL NIGHTLY
Qty: 90 TABLET | Refills: 3 | Status: SHIPPED | OUTPATIENT
Start: 2024-07-31

## 2024-07-31 RX ORDER — SODIUM CHLORIDE 0.9 % (FLUSH) 0.9 %
5-40 SYRINGE (ML) INJECTION PRN
OUTPATIENT
Start: 2024-07-31

## 2024-07-31 RX ORDER — LOSARTAN POTASSIUM 50 MG/1
50 TABLET ORAL DAILY
Qty: 90 TABLET | Refills: 3 | Status: SHIPPED | OUTPATIENT
Start: 2024-07-31

## 2024-07-31 RX ORDER — SODIUM CHLORIDE 9 MG/ML
INJECTION, SOLUTION INTRAVENOUS CONTINUOUS
OUTPATIENT
Start: 2024-07-31

## 2024-07-31 RX ORDER — POTASSIUM CHLORIDE 20 MEQ/1
20 TABLET, EXTENDED RELEASE ORAL DAILY
Qty: 90 TABLET | Refills: 3 | Status: SHIPPED | OUTPATIENT
Start: 2024-07-31

## 2024-07-31 RX ORDER — ASPIRIN 325 MG
325 TABLET ORAL ONCE
OUTPATIENT
Start: 2024-07-31 | End: 2024-07-31

## 2024-07-31 RX ORDER — FUROSEMIDE 40 MG/1
40 TABLET ORAL DAILY
Qty: 90 TABLET | Refills: 3 | Status: SHIPPED | OUTPATIENT
Start: 2024-07-31

## 2024-07-31 RX ORDER — SODIUM CHLORIDE 0.9 % (FLUSH) 0.9 %
5-40 SYRINGE (ML) INJECTION EVERY 12 HOURS SCHEDULED
OUTPATIENT
Start: 2024-07-31

## 2024-07-31 RX ORDER — LORAZEPAM 0.5 MG/1
0.5 TABLET ORAL
OUTPATIENT
Start: 2024-07-31 | End: 2024-08-01

## 2024-07-31 RX ORDER — NITROGLYCERIN 0.4 MG/1
TABLET SUBLINGUAL
Qty: 25 TABLET | Refills: 5 | Status: SHIPPED | OUTPATIENT
Start: 2024-07-31

## 2024-07-31 NOTE — PROGRESS NOTES
2 [facility], [address] [place] [address]  PHONE: [phone]     24    NAME:  Aguilar Titus  : 1940  MRN: [id number]       SUBJECTIVE:   Aguilar Titus is a 84 y.o. male seen for a follow up visit regarding the following:     Chief Complaint   Patient presents with    Atrial Fibrillation       HPI:   Here for CAD, Afib   prox LAD PCI 2015;   (GERMAIN on brilinta).     10/17: PPM placement for SSS.   2019: watchman device placement   [facility] 3/19/2021: small vessel CAD, no PCI.   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  Son helping with meds.  Some GERMAIN, SOB.  Stamina not good.  No CP, pressure.   Some lack of energy.    On daily lasix. No new edema.     Patient denies recent history of orthopnea, PND, excessive dizziness and/or syncope.      Has myasthenia gravis, this has been well controlled.            Past Medical History, Past Surgical History, Family history, Social History, and Medications were all reviewed with the patient today and updated as necessary.     Current Outpatient Medications   Medication Sig Dispense Refill    apixaban (ELIQUIS) 5 MG TABS tablet Take 1 tablet by mouth 2 times daily at 0800 and 1400 180 tablet 3    atorvastatin (LIPITOR) 80 MG tablet Take 1 tablet by mouth daily 90 tablet 3    furosemide (LASIX) 40 MG tablet Take 1 tablet by mouth daily 90 tablet 3    losartan (COZAAR) 50 MG tablet Take 1 tablet by mouth daily 90 tablet 3    nitroGLYCERIN (NITROSTAT) 0.4 MG SL tablet Place 1 sl under the tongue q 5 min prn cp, max 3 sl in a 15-min time period. Call 911 if no relief after the 3rd sl. 25 tablet 5    potassium chloride (KLOR-CON M) 20 MEQ extended release tablet Take 1 tablet by mouth daily 90

## 2024-08-05 ENCOUNTER — HOSPITAL ENCOUNTER (EMERGENCY)
Age: 84
Discharge: HOME | End: 2024-08-05

## 2024-08-05 VITALS
OXYGEN SATURATION: 92 % | HEART RATE: 75 BPM | SYSTOLIC BLOOD PRESSURE: 171 MMHG | TEMPERATURE: 97.5 F | DIASTOLIC BLOOD PRESSURE: 90 MMHG | RESPIRATION RATE: 16 BRPM

## 2024-08-05 DIAGNOSIS — I50.22 CHRONIC SYSTOLIC (CONGESTIVE) HEART FAILURE (HCC): ICD-10-CM

## 2024-08-05 DIAGNOSIS — I25.118 ATHEROSCLEROTIC HEART DISEASE OF NATIVE CORONARY ARTERY WITH OTHER FORMS OF ANGINA PECTORIS (HCC): ICD-10-CM

## 2024-08-05 DIAGNOSIS — I50.32 CHRONIC DIASTOLIC CHF (CONGESTIVE HEART FAILURE) (HCC): ICD-10-CM

## 2024-08-05 LAB
ANION GAP SERPL CALC-SCNC: 12 MMOL/L (ref 9–18)
BASOPHILS # BLD: 0.1 K/UL (ref 0–0.2)
BASOPHILS NFR BLD: 1 % (ref 0–2)
BUN SERPL-MCNC: 10 MG/DL (ref 8–23)
CALCIUM SERPL-MCNC: 9.6 MG/DL (ref 8.8–10.2)
CHLORIDE SERPL-SCNC: 103 MMOL/L (ref 98–107)
CO2 SERPL-SCNC: 28 MMOL/L (ref 20–28)
CREAT SERPL-MCNC: 0.81 MG/DL (ref 0.8–1.3)
DIFFERENTIAL METHOD BLD: NORMAL
EOSINOPHIL # BLD: 0.4 K/UL (ref 0–0.8)
EOSINOPHIL NFR BLD: 4 % (ref 0.5–7.8)
ERYTHROCYTE [DISTWIDTH] IN BLOOD BY AUTOMATED COUNT: 13.5 % (ref 11.9–14.6)
GLUCOSE SERPL-MCNC: 129 MG/DL (ref 70–99)
HCT VFR BLD AUTO: 45.1 % (ref 41.1–50.3)
HGB BLD-MCNC: 14.2 G/DL (ref 13.6–17.2)
IMM GRANULOCYTES # BLD AUTO: 0.1 K/UL (ref 0–0.5)
IMM GRANULOCYTES NFR BLD AUTO: 1 % (ref 0–5)
LYMPHOCYTES # BLD: 2.8 K/UL (ref 0.5–4.6)
LYMPHOCYTES NFR BLD: 28 % (ref 13–44)
MCH RBC QN AUTO: 30.4 PG (ref 26.1–32.9)
MCHC RBC AUTO-ENTMCNC: 31.5 G/DL (ref 31.4–35)
MCV RBC AUTO: 96.6 FL (ref 82–102)
MONOCYTES # BLD: 0.8 K/UL (ref 0.1–1.3)
MONOCYTES NFR BLD: 8 % (ref 4–12)
NEUTS SEG # BLD: 6.1 K/UL (ref 1.7–8.2)
NEUTS SEG NFR BLD: 58 % (ref 43–78)
NRBC # BLD: 0 K/UL (ref 0–0.2)
PLATELET # BLD AUTO: 200 K/UL (ref 150–450)
PMV BLD AUTO: 10.4 FL (ref 9.4–12.3)
POTASSIUM SERPL-SCNC: 4.1 MMOL/L (ref 3.5–5.1)
RBC # BLD AUTO: 4.67 M/UL (ref 4.23–5.6)
SODIUM SERPL-SCNC: 142 MMOL/L (ref 136–145)
WBC # BLD AUTO: 10.1 K/UL (ref 4.3–11.1)

## 2024-08-05 NOTE — ED TRIAGE NOTES
Pt ambulatory to ER from home, reports having a heart test on Wednesday and was told to have blood work completed prior to the scan.

## 2024-08-06 NOTE — PROGRESS NOTES
Patient pre-assessment complete for [facility] scheduled for Dr Fisher, arrival time 100. Patient verified using . NPO status reinforced. Patient informed to take a full dose aspirin 325mg  or 81 mg x 4 on the day of procedure. Patient instructed to HOLD Eliquis, Lasix, & Metformin. Instructed they can take all other medications excluding vitamins & supplements. Patient verbalizes understanding of all instructions & denies any questions at this time.

## 2024-08-07 ENCOUNTER — HOSPITAL ENCOUNTER (OUTPATIENT)
Age: 84
Setting detail: OUTPATIENT SURGERY
Discharge: HOME | End: 2024-08-07
Attending: INTERNAL MEDICINE | Admitting: INTERNAL MEDICINE
Payer: MEDICARE

## 2024-08-07 VITALS
TEMPERATURE: 97.8 F | RESPIRATION RATE: 22 BRPM | SYSTOLIC BLOOD PRESSURE: 157 MMHG | HEART RATE: 75 BPM | OXYGEN SATURATION: 93 % | DIASTOLIC BLOOD PRESSURE: 87 MMHG

## 2024-08-07 DIAGNOSIS — C18.0 ADENOCARCINOMA OF CECUM (HCC): Primary | ICD-10-CM

## 2024-08-07 DIAGNOSIS — D50.9 IRON DEFICIENCY ANEMIA, UNSPECIFIED IRON DEFICIENCY ANEMIA TYPE: ICD-10-CM

## 2024-08-07 DIAGNOSIS — I25.119 ATHEROSCLEROSIS OF NATIVE CORONARY ARTERY OF NATIVE HEART WITH ANGINA PECTORIS (HCC): ICD-10-CM

## 2024-08-07 LAB
EKG ATRIAL RATE: 84 BPM
EKG DIAGNOSIS: NORMAL
EKG Q-T INTERVAL: 452 MS
EKG QRS DURATION: 196 MS
EKG QTC CALCULATION (BAZETT): 504 MS
EKG R AXIS: -70 DEGREES
EKG T AXIS: 93 DEGREES
EKG VENTRICULAR RATE: 75 BPM

## 2024-08-07 PROCEDURE — 93010 ELECTROCARDIOGRAM REPORT: CPT | Performed by: INTERNAL MEDICINE

## 2024-08-07 PROCEDURE — 6360000004 HC RX CONTRAST MEDICATION: Performed by: INTERNAL MEDICINE

## 2024-08-07 PROCEDURE — 93458 L HRT ARTERY/VENTRICLE ANGIO: CPT | Performed by: INTERNAL MEDICINE

## 2024-08-07 PROCEDURE — 99153 MOD SED SAME PHYS/QHP EA: CPT | Performed by: INTERNAL MEDICINE

## 2024-08-07 PROCEDURE — 2500000003 HC RX 250 WO HCPCS: Performed by: INTERNAL MEDICINE

## 2024-08-07 PROCEDURE — 6360000002 HC RX W HCPCS: Performed by: INTERNAL MEDICINE

## 2024-08-07 PROCEDURE — 93571 IV DOP VEL&/PRESS C FLO 1ST: CPT | Performed by: INTERNAL MEDICINE

## 2024-08-07 PROCEDURE — 93005 ELECTROCARDIOGRAM TRACING: CPT | Performed by: INTERNAL MEDICINE

## 2024-08-07 PROCEDURE — 2709999900 HC NON-CHARGEABLE SUPPLY: Performed by: INTERNAL MEDICINE

## 2024-08-07 PROCEDURE — C1769 GUIDE WIRE: HCPCS | Performed by: INTERNAL MEDICINE

## 2024-08-07 PROCEDURE — C1887 CATHETER, GUIDING: HCPCS | Performed by: INTERNAL MEDICINE

## 2024-08-07 PROCEDURE — C1894 INTRO/SHEATH, NON-LASER: HCPCS | Performed by: INTERNAL MEDICINE

## 2024-08-07 PROCEDURE — 99152 MOD SED SAME PHYS/QHP 5/>YRS: CPT | Performed by: INTERNAL MEDICINE

## 2024-08-07 RX ORDER — SODIUM CHLORIDE 9 MG/ML
INJECTION, SOLUTION INTRAVENOUS CONTINUOUS
Status: DISCONTINUED | OUTPATIENT
Start: 2024-08-07 | End: 2024-08-07 | Stop reason: HOSPADM

## 2024-08-07 RX ORDER — HEPARIN SODIUM 10000 [USP'U]/ML
INJECTION, SOLUTION INTRAVENOUS; SUBCUTANEOUS PRN
Status: DISCONTINUED | OUTPATIENT
Start: 2024-08-07 | End: 2024-08-07 | Stop reason: HOSPADM

## 2024-08-07 RX ORDER — HEPARIN SODIUM 200 [USP'U]/100ML
INJECTION, SOLUTION INTRAVENOUS CONTINUOUS PRN
Status: DISCONTINUED | OUTPATIENT
Start: 2024-08-07 | End: 2024-08-07 | Stop reason: HOSPADM

## 2024-08-07 RX ORDER — ASPIRIN 81 MG/1
324 TABLET, CHEWABLE ORAL ONCE
Status: CANCELLED | OUTPATIENT
Start: 2024-08-07 | End: 2024-08-07

## 2024-08-07 RX ORDER — LIDOCAINE HYDROCHLORIDE 10 MG/ML
INJECTION, SOLUTION INFILTRATION; PERINEURAL PRN
Status: DISCONTINUED | OUTPATIENT
Start: 2024-08-07 | End: 2024-08-07 | Stop reason: HOSPADM

## 2024-08-07 RX ORDER — NITROGLYCERIN 20 MG/100ML
INJECTION INTRAVENOUS PRN
Status: DISCONTINUED | OUTPATIENT
Start: 2024-08-07 | End: 2024-08-07 | Stop reason: HOSPADM

## 2024-08-07 NOTE — PROGRESS NOTES
Patient received to CPRU room # 17  Ambulatory from [facility]. Patient scheduled for LHC today with Dr Fisher. Procedure reviewed & questions answered, voiced good understanding consent obtained & placed on chart. All medications and medical history reviewed. Will prep patient per orders. Patient & family updated on plan of care.      The patient has a fraility score of 4-VULNERABLE, based on ambulation.    Patient took Aspirin 324 today at 0900 prior to arrival.

## 2024-08-07 NOTE — PROGRESS NOTES
TRANSFER - OUT REPORT:    Verbal report given to RAYA Crisostomo on Aguilar Titus  being transferred to CPRU for routine post-op       Report consisted of patient’s Situation, Background, Assessment and Recommendations(SBAR).     Information from the following report(s) SBAR, Procedure Summary, MAR, and Cardiac Rhythm VPaced  was reviewed with the receiving nurse.    Opportunity for questions and clarification was provided.      C by Dr. Fisher  Left snuff radial access  No interventions  Left wrist snuff TR band with 8mls air.  NO sedation  10,000u Heparin  Access site soft. Clean, dry, and intact; with no signs of bleeding or hematoma  Pt. Tolerated procedure

## 2024-08-07 NOTE — PROGRESS NOTES
TRANSFER - IN REPORT:    Verbal report received from RAYA Hernandez on Aguilar Titus being received from [facility] for routine progression of patient care      Report consisted of patient’s Situation, Background, Assessment and Recommendations(SBAR).     Information from the following report(s) SBAR, Kardex, Procedure Summary, MAR, and Recent Results was reviewed with the receiving nurse.    Opportunity for questions and clarification was provided.      Assessment completed upon patient’s arrival to unit and care assumed.

## 2024-08-08 ENCOUNTER — OFFICE VISIT (OUTPATIENT)
Dept: ONCOLOGY | Age: 84
End: 2024-08-08
Payer: MEDICARE

## 2024-08-08 ENCOUNTER — HOSPITAL ENCOUNTER (OUTPATIENT)
Dept: LAB | Age: 84
Discharge: HOME | End: 2024-08-08
Payer: MEDICARE

## 2024-08-08 VITALS
OXYGEN SATURATION: 95 % | DIASTOLIC BLOOD PRESSURE: 92 MMHG | BODY MASS INDEX: 36.14 KG/M2 | WEIGHT: 216.9 LBS | TEMPERATURE: 97.6 F | SYSTOLIC BLOOD PRESSURE: 165 MMHG | HEIGHT: 65 IN | RESPIRATION RATE: 16 BRPM | HEART RATE: 75 BPM

## 2024-08-08 DIAGNOSIS — D50.9 IRON DEFICIENCY ANEMIA, UNSPECIFIED IRON DEFICIENCY ANEMIA TYPE: ICD-10-CM

## 2024-08-08 DIAGNOSIS — C18.0 ADENOCARCINOMA OF CECUM (HCC): ICD-10-CM

## 2024-08-08 DIAGNOSIS — C18.0 ADENOCARCINOMA OF CECUM (HCC): Primary | ICD-10-CM

## 2024-08-08 LAB
ALBUMIN SERPL-MCNC: 3.4 G/DL (ref 3.2–4.6)
ALBUMIN/GLOB SERPL: 1.2 (ref 1–1.9)
ALP SERPL-CCNC: 92 U/L (ref 40–129)
ALT SERPL-CCNC: 15 U/L (ref 12–65)
ANION GAP SERPL CALC-SCNC: 11 MMOL/L (ref 9–18)
AST SERPL-CCNC: 21 U/L (ref 15–37)
BASOPHILS # BLD: 0.1 K/UL (ref 0–0.2)
BASOPHILS NFR BLD: 1 % (ref 0–2)
BILIRUB SERPL-MCNC: 0.7 MG/DL (ref 0–1.2)
BUN SERPL-MCNC: 10 MG/DL (ref 8–23)
CALCIUM SERPL-MCNC: 9.3 MG/DL (ref 8.8–10.2)
CEA SERPL-MCNC: 3.1 NG/ML (ref 0–3.8)
CHLORIDE SERPL-SCNC: 106 MMOL/L (ref 98–107)
CO2 SERPL-SCNC: 25 MMOL/L (ref 20–28)
CREAT SERPL-MCNC: 0.76 MG/DL (ref 0.8–1.3)
DIFFERENTIAL METHOD BLD: ABNORMAL
EOSINOPHIL # BLD: 0.3 K/UL (ref 0–0.8)
EOSINOPHIL NFR BLD: 3 % (ref 0.5–7.8)
ERYTHROCYTE [DISTWIDTH] IN BLOOD BY AUTOMATED COUNT: 13.5 % (ref 11.9–14.6)
FERRITIN SERPL-MCNC: 102 NG/ML (ref 8–388)
GLOBULIN SER CALC-MCNC: 2.8 G/DL (ref 2.3–3.5)
GLUCOSE SERPL-MCNC: 121 MG/DL (ref 70–99)
HCT VFR BLD AUTO: 41.7 % (ref 41.1–50.3)
HGB BLD-MCNC: 13.5 G/DL (ref 13.6–17.2)
IMM GRANULOCYTES # BLD AUTO: 0 K/UL (ref 0–0.5)
IMM GRANULOCYTES NFR BLD AUTO: 0 % (ref 0–5)
LYMPHOCYTES # BLD: 2.6 K/UL (ref 0.5–4.6)
LYMPHOCYTES NFR BLD: 25 % (ref 13–44)
MCH RBC QN AUTO: 30.7 PG (ref 26.1–32.9)
MCHC RBC AUTO-ENTMCNC: 32.4 G/DL (ref 31.4–35)
MCV RBC AUTO: 94.8 FL (ref 82–102)
MONOCYTES # BLD: 0.8 K/UL (ref 0.1–1.3)
MONOCYTES NFR BLD: 7 % (ref 4–12)
NEUTS SEG # BLD: 6.7 K/UL (ref 1.7–8.2)
NEUTS SEG NFR BLD: 64 % (ref 43–78)
NRBC # BLD: 0 K/UL (ref 0–0.2)
PLATELET # BLD AUTO: 194 K/UL (ref 150–450)
PMV BLD AUTO: 9.2 FL (ref 9.4–12.3)
POTASSIUM SERPL-SCNC: 3.8 MMOL/L (ref 3.5–5.1)
PROT SERPL-MCNC: 6.2 G/DL (ref 6.3–8.2)
RBC # BLD AUTO: 4.4 M/UL (ref 4.23–5.6)
SODIUM SERPL-SCNC: 142 MMOL/L (ref 136–145)
TRANSFERRIN SERPL-MCNC: 262 MG/DL (ref 200–360)
WBC # BLD AUTO: 10.4 K/UL (ref 4.3–11.1)

## 2024-08-08 PROCEDURE — 3077F SYST BP >= 140 MM HG: CPT | Performed by: NURSE PRACTITIONER

## 2024-08-08 PROCEDURE — 82378 CARCINOEMBRYONIC ANTIGEN: CPT

## 2024-08-08 PROCEDURE — 99214 OFFICE O/P EST MOD 30 MIN: CPT | Performed by: NURSE PRACTITIONER

## 2024-08-08 PROCEDURE — 3080F DIAST BP >= 90 MM HG: CPT | Performed by: NURSE PRACTITIONER

## 2024-08-08 PROCEDURE — 84466 ASSAY OF TRANSFERRIN: CPT

## 2024-08-08 PROCEDURE — 80053 COMPREHEN METABOLIC PANEL: CPT

## 2024-08-08 PROCEDURE — 82728 ASSAY OF FERRITIN: CPT

## 2024-08-08 PROCEDURE — 85025 COMPLETE CBC W/AUTO DIFF WBC: CPT

## 2024-08-08 PROCEDURE — 1123F ACP DISCUSS/DSCN MKR DOCD: CPT | Performed by: NURSE PRACTITIONER

## 2024-08-08 PROCEDURE — 36415 COLL VENOUS BLD VENIPUNCTURE: CPT

## 2024-08-08 ASSESSMENT — PATIENT HEALTH QUESTIONNAIRE - PHQ9
SUM OF ALL RESPONSES TO PHQ QUESTIONS 1-9: 0
2. FEELING DOWN, DEPRESSED OR HOPELESS: NOT AT ALL
SUM OF ALL RESPONSES TO PHQ QUESTIONS 1-9: 0
1. LITTLE INTEREST OR PLEASURE IN DOING THINGS: NOT AT ALL
SUM OF ALL RESPONSES TO PHQ9 QUESTIONS 1 & 2: 0
SUM OF ALL RESPONSES TO PHQ QUESTIONS 1-9: 0
SUM OF ALL RESPONSES TO PHQ QUESTIONS 1-9: 0

## 2024-08-08 NOTE — PROGRESS NOTES
[facility]: Office Visit Established Patient    Reason for follow up:  1-Moderately differentiated, right-sided colonic adenocarcinoma, pMMR  SNVs/Indels:  KRAS G12D   Cancer Staging  Colon cancer (HCC)  Staging form: Colon And Rectum, AJCC 8th Edition  - Pathologic stage from 8/22/2022: Stage IIA (pT3, pN0, cM0) - Signed by Roderick Lozano MD on 8/23/2022    Pertinent Negatives:  NO abnormalities detected in the following genes:   BRAF, HRAS, NRAS.   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 Liver cysts  4. Sub centimeter lung nodules    8/25/22-8/27/2022: Admitted with COVID.  Bebtelovimab was initiated but then aborted as pt became unresponsive to verbal stimuli. Improved over the ensuing 24 hrs and was discharged home.    Admitted 2/21-2/26 with Influenza A    Interval history:  8/8/2024:  He is here today for follow up.  Since last seen, he has had several ER visits d/t dizziness and fatigue.  He follows with cardiology and had a heart cath done yesterday, not able to see note from this procedure yet but pt states that they found no blockages and he did not have any stents placed.  He will see cards soon for FU.  He denies any issue with eating/drinking.  He does have fatigue.  He denies any bleeding or blood in stools/black stools.  He denies any abdominal pain.  He denies any fevers or other infectious symptoms.  Pt due for CT scan (planned for June/July d/t rise in CEA), will arrange for sooner follow up and schedule scan for pt in about 4 weeks.      Review of Systems:  14 point ROS was negative except as per HPI     Reviewed and updated this visit by provider:         ECOG PERFORMANCE STATUS - 0-Fully active, able to carry on all pre-disease performance without restriction.    Pain - Pain Score:   0 - No pain (fatigue 0)/10. None/Minimal pain - not affecting QOL          Reviewed and updated this visit by provider:          Current Outpatient Medications

## 2024-08-22 ENCOUNTER — TELEPHONE (OUTPATIENT)
Age: 84
End: 2024-08-22

## 2024-08-22 NOTE — TELEPHONE ENCOUNTER
Wants the nurse to call. He thinks Dr Koenig said cancel sNuc because he had heart cath done Please call

## 2024-08-22 NOTE — TELEPHONE ENCOUNTER
Called pt advised of Dr. Koenig's response. Pt gave a verbal understanding.   Advised will cancel appt.

## 2024-08-26 ENCOUNTER — TELEPHONE (OUTPATIENT)
Dept: INTERNAL MEDICINE CLINIC | Facility: CLINIC | Age: 84
End: 2024-08-26

## 2024-08-28 ENCOUNTER — HOSPITAL ENCOUNTER (OUTPATIENT)
Dept: CT IMAGING | Age: 84
Discharge: HOME | End: 2024-08-31
Attending: INTERNAL MEDICINE
Payer: MEDICARE

## 2024-08-28 DIAGNOSIS — C18.0 ADENOCARCINOMA OF CECUM (HCC): ICD-10-CM

## 2024-08-28 PROCEDURE — 6360000004 HC RX CONTRAST MEDICATION: Performed by: INTERNAL MEDICINE

## 2024-08-28 PROCEDURE — 74177 CT ABD & PELVIS W/CONTRAST: CPT

## 2024-08-28 PROCEDURE — 71260 CT THORAX DX C+: CPT

## 2024-08-28 RX ORDER — IOPAMIDOL 755 MG/ML
100 INJECTION, SOLUTION INTRAVASCULAR
Status: COMPLETED | OUTPATIENT
Start: 2024-08-28 | End: 2024-08-28

## 2024-08-28 RX ADMIN — DIATRIZOATE MEGLUMINE AND DIATRIZOATE SODIUM 15 ML: 660; 100 LIQUID ORAL; RECTAL at 10:47

## 2024-08-28 RX ADMIN — IOPAMIDOL 100 ML: 755 INJECTION, SOLUTION INTRAVENOUS at 10:47

## 2024-08-29 DIAGNOSIS — C18.0 ADENOCARCINOMA OF CECUM (HCC): Primary | ICD-10-CM

## 2024-09-03 NOTE — PROGRESS NOTES
[facility]: Office Visit Established Patient    Reason for follow up:  1-Moderately differentiated, right-sided colonic adenocarcinoma, pMMR  SNVs/Indels:  KRAS G12D   Cancer Staging  Colon cancer (HCC)  Staging form: Colon And Rectum, AJCC 8th Edition  - Pathologic stage from 8/22/2022: Stage IIA (pT3, pN0, cM0) - Signed by Roderick Lozano MD on 8/23/2022    Pertinent Negatives:  NO abnormalities detected in the following genes:   BRAF, HRAS, NRAS.   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 Liver cysts  4. Sub centimeter lung nodules    8/25/22-8/27/2022: Admitted with COVID.  Bebtelovimab was initiated but then aborted as pt became unresponsive to verbal stimuli. Improved over the ensuing 24 hrs and was discharged home.    Admitted 2/21-2/26 with Influenza A    Interval history:  Interval history updated in the assessment and plan.      Review of Systems:  14 point ROS was negative except as per HPI     Reviewed and updated this visit by provider:  Tobacco  Allergies  Meds  Problems  Med Hx  Surg Hx  Fam Hx         ECOG PERFORMANCE STATUS - 0-Fully active, able to carry on all pre-disease performance without restriction.    Pain - Pain Score:   0 - No pain (fatigue 0)/10. None/Minimal pain - not affecting QOL          Reviewed and updated this visit by provider:  Tobacco  Allergies  Meds  Problems  Med Hx  Surg Hx  Fam Hx          Current Outpatient Medications   Medication Sig Dispense Refill    famotidine (PEPCID) 20 MG tablet Take 1 tablet by mouth daily      atorvastatin (LIPITOR) 80 MG tablet Take 1 tablet by mouth daily 90 tablet 3    furosemide (LASIX) 40 MG tablet Take 1 tablet by mouth daily 90 tablet 3    losartan (COZAAR) 50 MG tablet Take 1 tablet by mouth daily 90 tablet 3    potassium chloride (KLOR-CON M) 20 MEQ extended release tablet Take 1 tablet by mouth daily 90 tablet 3    rOPINIRole (REQUIP) 0.5 MG tablet Take 1 tablet by mouth

## 2024-09-04 ENCOUNTER — OFFICE VISIT (OUTPATIENT)
Dept: ONCOLOGY | Age: 84
End: 2024-09-04
Payer: MEDICARE

## 2024-09-04 ENCOUNTER — HOSPITAL ENCOUNTER (OUTPATIENT)
Dept: LAB | Age: 84
Discharge: HOME | End: 2024-09-07
Payer: MEDICARE

## 2024-09-04 ENCOUNTER — TELEPHONE (OUTPATIENT)
Dept: ONCOLOGY | Age: 84
End: 2024-09-04

## 2024-09-04 VITALS
HEIGHT: 65 IN | TEMPERATURE: 97.6 F | BODY MASS INDEX: 36.3 KG/M2 | DIASTOLIC BLOOD PRESSURE: 82 MMHG | OXYGEN SATURATION: 94 % | WEIGHT: 217.9 LBS | SYSTOLIC BLOOD PRESSURE: 148 MMHG | RESPIRATION RATE: 16 BRPM | HEART RATE: 76 BPM

## 2024-09-04 DIAGNOSIS — N40.0 ENLARGED PROSTATE: ICD-10-CM

## 2024-09-04 DIAGNOSIS — C18.0 ADENOCARCINOMA OF CECUM (HCC): ICD-10-CM

## 2024-09-04 DIAGNOSIS — E04.1 THYROID NODULE: ICD-10-CM

## 2024-09-04 DIAGNOSIS — C18.0 ADENOCARCINOMA OF CECUM (HCC): Primary | ICD-10-CM

## 2024-09-04 LAB
ALBUMIN SERPL-MCNC: 3.4 G/DL (ref 3.2–4.6)
ALBUMIN/GLOB SERPL: 1.3 (ref 1–1.9)
ALP SERPL-CCNC: 88 U/L (ref 40–129)
ALT SERPL-CCNC: 15 U/L (ref 12–65)
ANION GAP SERPL CALC-SCNC: 13 MMOL/L (ref 9–18)
AST SERPL-CCNC: 17 U/L (ref 15–37)
BASOPHILS # BLD: 0.1 K/UL (ref 0–0.2)
BASOPHILS NFR BLD: 1 % (ref 0–2)
BILIRUB SERPL-MCNC: 0.6 MG/DL (ref 0–1.2)
BUN SERPL-MCNC: 11 MG/DL (ref 8–23)
CALCIUM SERPL-MCNC: 9.4 MG/DL (ref 8.8–10.2)
CEA SERPL-MCNC: 3.3 NG/ML (ref 0–3.8)
CHLORIDE SERPL-SCNC: 104 MMOL/L (ref 98–107)
CO2 SERPL-SCNC: 25 MMOL/L (ref 20–28)
CREAT SERPL-MCNC: 0.76 MG/DL (ref 0.8–1.3)
DIFFERENTIAL METHOD BLD: ABNORMAL
EOSINOPHIL # BLD: 0.3 K/UL (ref 0–0.8)
EOSINOPHIL NFR BLD: 3 % (ref 0.5–7.8)
ERYTHROCYTE [DISTWIDTH] IN BLOOD BY AUTOMATED COUNT: 13.5 % (ref 11.9–14.6)
GLOBULIN SER CALC-MCNC: 2.7 G/DL (ref 2.3–3.5)
GLUCOSE SERPL-MCNC: 133 MG/DL (ref 70–99)
HCT VFR BLD AUTO: 41.8 % (ref 41.1–50.3)
HGB BLD-MCNC: 14 G/DL (ref 13.6–17.2)
IMM GRANULOCYTES # BLD AUTO: 0 K/UL (ref 0–0.5)
IMM GRANULOCYTES NFR BLD AUTO: 0 % (ref 0–5)
LYMPHOCYTES # BLD: 2.6 K/UL (ref 0.5–4.6)
LYMPHOCYTES NFR BLD: 24 % (ref 13–44)
MCH RBC QN AUTO: 31.2 PG (ref 26.1–32.9)
MCHC RBC AUTO-ENTMCNC: 33.5 G/DL (ref 31.4–35)
MCV RBC AUTO: 93.1 FL (ref 82–102)
MONOCYTES # BLD: 0.7 K/UL (ref 0.1–1.3)
MONOCYTES NFR BLD: 7 % (ref 4–12)
NEUTS SEG # BLD: 7.2 K/UL (ref 1.7–8.2)
NEUTS SEG NFR BLD: 66 % (ref 43–78)
NRBC # BLD: 0 K/UL (ref 0–0.2)
PLATELET # BLD AUTO: 185 K/UL (ref 150–450)
PMV BLD AUTO: 8.9 FL (ref 9.4–12.3)
POTASSIUM SERPL-SCNC: 3.6 MMOL/L (ref 3.5–5.1)
PROT SERPL-MCNC: 6.1 G/DL (ref 6.3–8.2)
PSA SERPL-MCNC: 1.7 NG/ML (ref 0–4)
RBC # BLD AUTO: 4.49 M/UL (ref 4.23–5.6)
SODIUM SERPL-SCNC: 142 MMOL/L (ref 136–145)
WBC # BLD AUTO: 10.9 K/UL (ref 4.3–11.1)

## 2024-09-04 PROCEDURE — 1123F ACP DISCUSS/DSCN MKR DOCD: CPT | Performed by: INTERNAL MEDICINE

## 2024-09-04 PROCEDURE — 99214 OFFICE O/P EST MOD 30 MIN: CPT | Performed by: INTERNAL MEDICINE

## 2024-09-04 PROCEDURE — 85025 COMPLETE CBC W/AUTO DIFF WBC: CPT

## 2024-09-04 PROCEDURE — 3077F SYST BP >= 140 MM HG: CPT | Performed by: INTERNAL MEDICINE

## 2024-09-04 PROCEDURE — 3079F DIAST BP 80-89 MM HG: CPT | Performed by: INTERNAL MEDICINE

## 2024-09-04 PROCEDURE — 80053 COMPREHEN METABOLIC PANEL: CPT

## 2024-09-04 PROCEDURE — 36415 COLL VENOUS BLD VENIPUNCTURE: CPT

## 2024-09-04 PROCEDURE — 82378 CARCINOEMBRYONIC ANTIGEN: CPT

## 2024-09-04 PROCEDURE — 84153 ASSAY OF PSA TOTAL: CPT

## 2024-09-04 RX ORDER — FAMOTIDINE 20 MG/1
20 TABLET, FILM COATED ORAL DAILY
COMMUNITY
Start: 2024-06-28

## 2024-09-04 ASSESSMENT — PATIENT HEALTH QUESTIONNAIRE - PHQ9
SUM OF ALL RESPONSES TO PHQ QUESTIONS 1-9: 0
2. FEELING DOWN, DEPRESSED OR HOPELESS: NOT AT ALL
SUM OF ALL RESPONSES TO PHQ QUESTIONS 1-9: 0
SUM OF ALL RESPONSES TO PHQ QUESTIONS 1-9: 0
1. LITTLE INTEREST OR PLEASURE IN DOING THINGS: NOT AT ALL
SUM OF ALL RESPONSES TO PHQ QUESTIONS 1-9: 0
SUM OF ALL RESPONSES TO PHQ9 QUESTIONS 1 & 2: 0

## 2024-09-04 NOTE — PATIENT INSTRUCTIONS
Patient Information from Today's Visit    The members of your Oncology Medical Home are listed below:    Physician Provider: Roderick Lozano Medical Oncologist  Advanced Practice Clinician: Teresa Santiago NP  Registered Nurse: Sonal FRASER RN  Navigator: Desiree KOCH RN  Medical Assistant: Frank LUNDY MA  : Dana LI   Supportive Care Services: Felicia CYR LMSW    Diagnosis: Colon      Follow Up Instructions:   - Labs reviewed   - Discussed CT scan results; discussed liver abnormalities and enlarged prostate  - Will check PSA today (we will add this to your labs that you have already had drawn today).  - Referral has been placed to endocrinology regarding thyroid.  - Recommend MRI liver to further evaluate; please schedule this for the next available appointment  Please call radiology scheduling to schedule scan.   Their number is: [phone]  Please make sure scans are scheduled at least 48 hours prior to the follow up visit for imaging review with us.  ** if imaging is not completed prior to your follow up appointment with us, this may result in your follow up appointment being rescheduled **    Follow up in 1 month with labs prior    Treatment Summary has been discussed and given to patient:No      Current Labs:   Hospital Outpatient Visit on 09/04/2024   Component Date Value Ref Range Status    CEA 09/04/2024 3.3  0.0 - 3.8 ng/mL Final    Comment: Nonsmoker: <3.9 ng/mL  Smoker: <5.6 ng/mL  Roche ECLIA methodology  Patient's results of tumor marker testing may not be comparable to labs using different manufacturers/methods.      WBC 09/04/2024 10.9  4.3 - 11.1 K/uL Final    RBC 09/04/2024 4.49  4.23 - 5.6 M/uL Final    Hemoglobin 09/04/2024 14.0  13.6 - 17.2 g/dL Final    Hematocrit 09/04/2024 41.8  41.1 - 50.3 % Final    MCV 09/04/2024 93.1  82.0 - 102.0 FL Final    MCH 09/04/2024 31.2  26.1 - 32.9 PG Final    MCHC 09/04/2024 33.5  31.4 - 35.0 g/dL Final    RDW 09/04/2024 13.5  11.9 - 14.6 % Final

## 2024-09-04 NOTE — TELEPHONE ENCOUNTER
This RN was out of office 8/31 - 9/3.   Pt to be seen today for CT scan review.  Will discuss results and need for MRI liver during visit today.  MD aware.  ----- Message from Dr. Roderick Lozano MD sent at 8/31/2024 10:38 AM EDT -----  Please inform the patient that CT chest abdomen and pelvis shows mostly stable findings with the exception of an abnormal area in the liver. Please schedule MRI liver for further evaluation and move out appt until after the scan.

## 2024-09-05 ENCOUNTER — OFFICE VISIT (OUTPATIENT)
Dept: ENDOCRINOLOGY | Age: 84
End: 2024-09-05
Payer: MEDICARE

## 2024-09-05 VITALS
WEIGHT: 221 LBS | BODY MASS INDEX: 36.82 KG/M2 | SYSTOLIC BLOOD PRESSURE: 164 MMHG | DIASTOLIC BLOOD PRESSURE: 82 MMHG | RESPIRATION RATE: 16 BRPM | OXYGEN SATURATION: 98 % | HEIGHT: 65 IN | HEART RATE: 76 BPM

## 2024-09-05 DIAGNOSIS — E04.2 MULTIPLE THYROID NODULES: Primary | ICD-10-CM

## 2024-09-05 PROCEDURE — 3079F DIAST BP 80-89 MM HG: CPT | Performed by: INTERNAL MEDICINE

## 2024-09-05 PROCEDURE — 76536 US EXAM OF HEAD AND NECK: CPT | Performed by: INTERNAL MEDICINE

## 2024-09-05 PROCEDURE — 1123F ACP DISCUSS/DSCN MKR DOCD: CPT | Performed by: INTERNAL MEDICINE

## 2024-09-05 PROCEDURE — 3077F SYST BP >= 140 MM HG: CPT | Performed by: INTERNAL MEDICINE

## 2024-09-05 PROCEDURE — 99203 OFFICE O/P NEW LOW 30 MIN: CPT | Performed by: INTERNAL MEDICINE

## 2024-09-05 ASSESSMENT — ENCOUNTER SYMPTOMS
CONSTIPATION: 0
DIARRHEA: 0

## 2024-09-05 NOTE — PROGRESS NOTES
Clive Sal MD, [facility]  [address] [place] [address], [address]  [place] [address]  Phone [phone]  Facsimile [phone]          Aguilar Titus is a 84 y.o. male seen 9/5/2024 at the request of Dr. Lozano for the evaluation of thyroid nodule        ASSESSMENT AND PLAN:    1. Multiple thyroid nodules  Status post benign FNA biopsy of the isthmic nodule 3/2023.  Thyroid ultrasound performed today revealed that the nodule was stable in size and appearance.  Given his advanced age and multiple medical comorbidities, I would not recommend further imaging surveillance.  He was recently biochemically euthyroid 6/2024.  He has no compressive symptoms at this point which would warrant referral for thyroidectomy.            Procedures:    Thyroid ultrasound 9/5/2024: Right lobe 1.75 x 1.62 x 3.28 cm.  The superior right lobe anteriorly there is a hypoechoic nodule measuring 0.61 x 0.83 x 0.91 cm without microcalcifications (TR 4).  Just posteriorly is a complex isoechoic nodule measuring 0.44 x 0.61 x 0.83 cm (TR 2).  Isthmus measures 1.51 cm.  There is a complex heterogeneous nodule in the isthmus measuring 1.33 x 2.12 x 1.67 cm containing macrocalcifications (TR 4).  Left lobe 1.35 x 1.53 x 3.74 cm.  In the mid to inferior left lobe there is a colloid cyst measuring 0.40 cm (TR 1).      Follow-up and Dispositions    Return As needed.         HISTORY OF PRESENT ILLNESS:    THYROID NODULE / MULTINODULAR GOITER    Presentation: Incidentally noted on neck CT.    Thyroid Cancer Risk Factors:  There is no family history of thyroid cancer.  There is no history of radiation to the head/neck.     Symptoms:  Denies anterior neck enlargement/pain/pressure. He reports occasional dysphagia.  Denies positional shortness of breath, hoarseness.    Imaging:  Thyroid ultrasound 2/24/2023: Right lobe 3.6 x 2.7 x 1.9 cm, homogeneous echotexture.  In the mid right lobe there is a

## 2024-09-20 LAB — ACT BLD: 324 SECS (ref 79–138)

## 2024-09-21 PROCEDURE — 93294 REM INTERROG EVL PM/LDLS PM: CPT | Performed by: INTERNAL MEDICINE

## 2024-09-21 PROCEDURE — 93296 REM INTERROG EVL PM/IDS: CPT | Performed by: INTERNAL MEDICINE

## 2024-09-23 DIAGNOSIS — E11.9 TYPE 2 DIABETES MELLITUS WITHOUT COMPLICATION, WITHOUT LONG-TERM CURRENT USE OF INSULIN (HCC): ICD-10-CM

## 2024-09-24 RX ORDER — METFORMIN HYDROCHLORIDE 500 MG/1
500 TABLET, EXTENDED RELEASE ORAL
Qty: 90 TABLET | Refills: 1 | OUTPATIENT
Start: 2024-09-24

## 2024-10-01 ENCOUNTER — HOSPITAL ENCOUNTER (OUTPATIENT)
Dept: MRI IMAGING | Age: 84
Discharge: HOME | End: 2024-10-04
Attending: INTERNAL MEDICINE
Payer: MEDICARE

## 2024-10-01 DIAGNOSIS — C18.0 ADENOCARCINOMA OF CECUM (HCC): ICD-10-CM

## 2024-10-01 DIAGNOSIS — N40.0 ENLARGED PROSTATE: ICD-10-CM

## 2024-10-01 PROCEDURE — A9579 GAD-BASE MR CONTRAST NOS,1ML: HCPCS | Performed by: INTERNAL MEDICINE

## 2024-10-01 PROCEDURE — 74183 MRI ABD W/O CNTR FLWD CNTR: CPT

## 2024-10-01 PROCEDURE — 6360000004 HC RX CONTRAST MEDICATION: Performed by: INTERNAL MEDICINE

## 2024-10-01 RX ADMIN — GADOTERIDOL 20 ML: 279.3 INJECTION, SOLUTION INTRAVENOUS at 09:47

## 2024-10-06 NOTE — PROGRESS NOTES
history of cecal carcinoma.    COMPARISON:  27 March 2024 CT chest abdomen pelvis  21 September 2023 CT abdomen pelvis.  Ultrasound thyroid 24 February 2023      FINDINGS:    :  The  demonstrates a nonobstructive bowel gas pattern. Clip is seen  overlying the right side of the heart as well as the upper quadrant.    CT CHEST:  Lungs:  Normal.    Pleura:  No  pneumothorax. No pneumomediastinum. No effusion.    Heart:  Enlarged with postop changes seen.    Aorta:  Normal.    Pulmonary artery:  Normal allowing for phase of bolus contrast.    Lymph Nodes:  Normal.    Osseous:  Osseous structures are within normal limits for patient's age.    Chest Wall:  Normal.    Thyroid:  Isthmus nodule is demonstrated measuring 24 x 27 mm. (Prior ultrasound thyroid  24 February 2023: 22 mm. )      CT ABDOMEN:  Free Air:  None.    Liver:  The liver demonstrates some fatty infiltration.  There are some focal areas of decreased attenuation seen within the liver most  likely representing stable hepatic cyst.  On axial image 66 of series 2 there is an area of increased enhancement which in  comparison to prior exam from 27 March 2024 is stable. This measures  approximately 16 mm x 12 mm. This most likely represents a focal area of  transient hepatic attenuation differentiation. However, it is bigger than on  prior exam from September 2023.    Biliary:  Gallbladder:  Clips are seen in the right upper quadrant    Common Bile Duct:  Normal.    Spleen:  Normal.    Adrenal Glands:  Right: Normal.  Left: Normal.    Pancreas:  Normal.    Kidneys/Ureters:  Right Kidney: Normal.  Right Ureter: Normal.  Left Kidney: The left kidney demonstrates cortical scarring  Left Ureter: Normal.    Bowel:  Esophagus:  within normal limits for the amount of distention.  Stomach: Decompressed  Small Bowel: Normal.  Large Bowel: Postop changes seen in the right lower quadrant.  Appendix: No secondary signs for acute appendicitis with suture material

## 2024-10-07 ENCOUNTER — OFFICE VISIT (OUTPATIENT)
Dept: ONCOLOGY | Age: 84
End: 2024-10-07
Payer: MEDICARE

## 2024-10-07 ENCOUNTER — HOSPITAL ENCOUNTER (OUTPATIENT)
Dept: LAB | Age: 84
Discharge: HOME | End: 2024-10-07
Payer: MEDICARE

## 2024-10-07 VITALS
RESPIRATION RATE: 16 BRPM | BODY MASS INDEX: 35.92 KG/M2 | OXYGEN SATURATION: 94 % | TEMPERATURE: 98.2 F | DIASTOLIC BLOOD PRESSURE: 80 MMHG | WEIGHT: 215.6 LBS | HEART RATE: 76 BPM | SYSTOLIC BLOOD PRESSURE: 149 MMHG | HEIGHT: 65 IN

## 2024-10-07 DIAGNOSIS — C18.0 ADENOCARCINOMA OF CECUM (HCC): ICD-10-CM

## 2024-10-07 DIAGNOSIS — C18.0 ADENOCARCINOMA OF CECUM (HCC): Primary | ICD-10-CM

## 2024-10-07 LAB
ALBUMIN SERPL-MCNC: 3.7 G/DL (ref 3.2–4.6)
ALBUMIN/GLOB SERPL: 1.2 (ref 1–1.9)
ALP SERPL-CCNC: 99 U/L (ref 40–129)
ALT SERPL-CCNC: 14 U/L (ref 8–55)
ANION GAP SERPL CALC-SCNC: 11 MMOL/L (ref 9–18)
AST SERPL-CCNC: 22 U/L (ref 15–37)
BASOPHILS # BLD: 0 K/UL (ref 0–0.2)
BASOPHILS NFR BLD: 0 % (ref 0–2)
BILIRUB SERPL-MCNC: 0.9 MG/DL (ref 0–1.2)
BUN SERPL-MCNC: 9 MG/DL (ref 8–23)
CALCIUM SERPL-MCNC: 9.7 MG/DL (ref 8.8–10.2)
CHLORIDE SERPL-SCNC: 102 MMOL/L (ref 98–107)
CO2 SERPL-SCNC: 27 MMOL/L (ref 20–28)
CREAT SERPL-MCNC: 0.83 MG/DL (ref 0.8–1.3)
DIFFERENTIAL METHOD BLD: ABNORMAL
EOSINOPHIL # BLD: 0.2 K/UL (ref 0–0.8)
EOSINOPHIL NFR BLD: 2 % (ref 0.5–7.8)
ERYTHROCYTE [DISTWIDTH] IN BLOOD BY AUTOMATED COUNT: 13.2 % (ref 11.9–14.6)
GLOBULIN SER CALC-MCNC: 3.1 G/DL (ref 2.3–3.5)
GLUCOSE SERPL-MCNC: 109 MG/DL (ref 70–99)
HCT VFR BLD AUTO: 43.5 % (ref 41.1–50.3)
HGB BLD-MCNC: 14.3 G/DL (ref 13.6–17.2)
IMM GRANULOCYTES # BLD AUTO: 0 K/UL (ref 0–0.5)
IMM GRANULOCYTES NFR BLD AUTO: 0 % (ref 0–5)
LYMPHOCYTES # BLD: 2.6 K/UL (ref 0.5–4.6)
LYMPHOCYTES NFR BLD: 26 % (ref 13–44)
MCH RBC QN AUTO: 31.1 PG (ref 26.1–32.9)
MCHC RBC AUTO-ENTMCNC: 32.9 G/DL (ref 31.4–35)
MCV RBC AUTO: 94.6 FL (ref 82–102)
MONOCYTES # BLD: 0.8 K/UL (ref 0.1–1.3)
MONOCYTES NFR BLD: 8 % (ref 4–12)
NEUTS SEG # BLD: 6.3 K/UL (ref 1.7–8.2)
NEUTS SEG NFR BLD: 64 % (ref 43–78)
NRBC # BLD: 0 K/UL (ref 0–0.2)
PLATELET # BLD AUTO: 184 K/UL (ref 150–450)
PMV BLD AUTO: 9.2 FL (ref 9.4–12.3)
POTASSIUM SERPL-SCNC: 4.2 MMOL/L (ref 3.5–5.1)
PROT SERPL-MCNC: 6.9 G/DL (ref 6.3–8.2)
RBC # BLD AUTO: 4.6 M/UL (ref 4.23–5.6)
SODIUM SERPL-SCNC: 140 MMOL/L (ref 136–145)
WBC # BLD AUTO: 9.9 K/UL (ref 4.3–11.1)

## 2024-10-07 PROCEDURE — 1123F ACP DISCUSS/DSCN MKR DOCD: CPT | Performed by: INTERNAL MEDICINE

## 2024-10-07 PROCEDURE — 99213 OFFICE O/P EST LOW 20 MIN: CPT | Performed by: INTERNAL MEDICINE

## 2024-10-07 PROCEDURE — 3077F SYST BP >= 140 MM HG: CPT | Performed by: INTERNAL MEDICINE

## 2024-10-07 PROCEDURE — 36415 COLL VENOUS BLD VENIPUNCTURE: CPT

## 2024-10-07 PROCEDURE — 85025 COMPLETE CBC W/AUTO DIFF WBC: CPT

## 2024-10-07 PROCEDURE — 3079F DIAST BP 80-89 MM HG: CPT | Performed by: INTERNAL MEDICINE

## 2024-10-07 PROCEDURE — 80053 COMPREHEN METABOLIC PANEL: CPT

## 2024-10-07 NOTE — PATIENT INSTRUCTIONS
Patient Information from Today's Visit    The members of your Oncology Medical Home are listed below:    Physician Provider: Roderick Lozano, Medical Oncologist  Advanced Practice Clinician: Teresa Santiago NP  Registered Nurse: Sonal FRASER RN  Navigator: Desiree KOCH RN  Medical Assistant: Frank LUNDY MA  : Laurita MCGUIRE   Supportive Care Services: Felicia CYR LMSW    Diagnosis: Colon      Follow Up Instructions:   - Labs reviewed  - Discussed MRI results   - Repeat CT scan in 6 months   Please call radiology scheduling to schedule scan.   Their number is: [phone]  Please make sure scans are scheduled at least 48 hours prior to the follow up visit for imaging review with us.  ** if imaging is not completed prior to your follow up appointment with us, this may result in your follow up appointment being rescheduled **    Follow up in 6 months with labs prior    Just want to remind you that you will need to have your lab work drawn at one of our outreach lab locations 24-72 hours prior to your follow up appointment scheduled at [facility].      You may visit any of the 4 outreach lab locations, during hours of operation, at a time that is convenient for you as long as it is within the '24-72 hour prior' window OR you can have the labs drawn at one of the outreach locations while you are there for your next scan! (No appointment needed)     1)   [facility]  [address]  Phone: [phone]  Mon - Fri 730am - 430pm     2)   [facility]  [address], [address]  Phone: [phone]  Mon - Fri 730am - 430pm     3)   [facility]  [address]  Phone: [phone]  Mon - Fri 700am - 430pm     4)   [facility]   [address], [address]  Phone: [phone]  Mon - Fri 730am - 430pm     Thanks you!      Treatment Summary has been discussed and given to patient:No      Current Labs:

## 2024-10-28 ENCOUNTER — APPOINTMENT (OUTPATIENT)
Dept: GENERAL RADIOLOGY | Age: 84
End: 2024-10-28
Payer: MEDICARE

## 2024-10-28 ENCOUNTER — TELEPHONE (OUTPATIENT)
Dept: INTERNAL MEDICINE CLINIC | Facility: CLINIC | Age: 84
End: 2024-10-28

## 2024-10-28 ENCOUNTER — HOSPITAL ENCOUNTER (EMERGENCY)
Age: 84
Discharge: HOME | End: 2024-10-28
Attending: EMERGENCY MEDICINE
Payer: MEDICARE

## 2024-10-28 VITALS
WEIGHT: 213 LBS | DIASTOLIC BLOOD PRESSURE: 86 MMHG | HEART RATE: 62 BPM | OXYGEN SATURATION: 99 % | BODY MASS INDEX: 33.43 KG/M2 | SYSTOLIC BLOOD PRESSURE: 162 MMHG | RESPIRATION RATE: 19 BRPM | HEIGHT: 67 IN | TEMPERATURE: 97.8 F

## 2024-10-28 DIAGNOSIS — I95.1 ORTHOSTASIS: ICD-10-CM

## 2024-10-28 DIAGNOSIS — E86.0 DEHYDRATION: Primary | ICD-10-CM

## 2024-10-28 LAB
ALBUMIN SERPL-MCNC: 3.7 G/DL (ref 3.2–4.6)
ALBUMIN/GLOB SERPL: 1.1 (ref 1–1.9)
ALP SERPL-CCNC: 106 U/L (ref 40–129)
ALT SERPL-CCNC: 14 U/L (ref 8–55)
ANION GAP SERPL CALC-SCNC: 12 MMOL/L (ref 9–18)
AST SERPL-CCNC: 23 U/L (ref 15–37)
BASOPHILS # BLD: 0.1 K/UL (ref 0–0.2)
BASOPHILS NFR BLD: 1 % (ref 0–2)
BILIRUB SERPL-MCNC: 0.6 MG/DL (ref 0–1.2)
BILIRUB UR QL: NEGATIVE
BUN SERPL-MCNC: 10 MG/DL (ref 8–23)
CALCIUM SERPL-MCNC: 9.5 MG/DL (ref 8.8–10.2)
CHLORIDE SERPL-SCNC: 99 MMOL/L (ref 98–107)
CO2 SERPL-SCNC: 27 MMOL/L (ref 20–28)
CREAT SERPL-MCNC: 0.85 MG/DL (ref 0.8–1.3)
DIFFERENTIAL METHOD BLD: NORMAL
EKG ATRIAL RATE: 70 BPM
EKG DIAGNOSIS: NORMAL
EKG Q-T INTERVAL: 460 MS
EKG QRS DURATION: 194 MS
EKG QTC CALCULATION (BAZETT): 524 MS
EKG R AXIS: -88 DEGREES
EKG T AXIS: 70 DEGREES
EKG VENTRICULAR RATE: 78 BPM
EOSINOPHIL # BLD: 0.2 K/UL (ref 0–0.8)
EOSINOPHIL NFR BLD: 2 % (ref 0.5–7.8)
ERYTHROCYTE [DISTWIDTH] IN BLOOD BY AUTOMATED COUNT: 13.1 % (ref 11.9–14.6)
GLOBULIN SER CALC-MCNC: 3.4 G/DL (ref 2.3–3.5)
GLUCOSE SERPL-MCNC: 138 MG/DL (ref 70–99)
GLUCOSE UR QL STRIP.AUTO: NEGATIVE MG/DL
HCT VFR BLD AUTO: 45.6 % (ref 41.1–50.3)
HGB BLD-MCNC: 15 G/DL (ref 13.6–17.2)
IMM GRANULOCYTES # BLD AUTO: 0 K/UL (ref 0–0.5)
IMM GRANULOCYTES NFR BLD AUTO: 0 % (ref 0–5)
KETONES UR-MCNC: NEGATIVE MG/DL
LEUKOCYTE ESTERASE UR QL STRIP: NEGATIVE
LYMPHOCYTES # BLD: 2 K/UL (ref 0.5–4.6)
LYMPHOCYTES NFR BLD: 20 % (ref 13–44)
MAGNESIUM SERPL-MCNC: 1.8 MG/DL (ref 1.8–2.4)
MCH RBC QN AUTO: 30.5 PG (ref 26.1–32.9)
MCHC RBC AUTO-ENTMCNC: 32.9 G/DL (ref 31.4–35)
MCV RBC AUTO: 92.7 FL (ref 82–102)
MONOCYTES # BLD: 0.6 K/UL (ref 0.1–1.3)
MONOCYTES NFR BLD: 6 % (ref 4–12)
NEUTS SEG # BLD: 7.2 K/UL (ref 1.7–8.2)
NEUTS SEG NFR BLD: 71 % (ref 43–78)
NITRITE UR QL: NEGATIVE
NRBC # BLD: 0 K/UL (ref 0–0.2)
NT PRO BNP: 770 PG/ML (ref 0–450)
PH UR: 6 (ref 5–9)
PLATELET # BLD AUTO: 211 K/UL (ref 150–450)
PMV BLD AUTO: 9.5 FL (ref 9.4–12.3)
POTASSIUM SERPL-SCNC: 3.9 MMOL/L (ref 3.5–5.1)
PROT SERPL-MCNC: 7.1 G/DL (ref 6.3–8.2)
PROT UR QL: NEGATIVE MG/DL
RBC # BLD AUTO: 4.92 M/UL (ref 4.23–5.6)
RBC # UR STRIP: NEGATIVE
SERVICE CMNT-IMP: NORMAL
SODIUM SERPL-SCNC: 137 MMOL/L (ref 136–145)
SP GR UR: 1.01 (ref 1–1.02)
UROBILINOGEN UR QL: 0.2 EU/DL (ref 0.2–1)
WBC # BLD AUTO: 10.2 K/UL (ref 4.3–11.1)

## 2024-10-28 PROCEDURE — 83735 ASSAY OF MAGNESIUM: CPT

## 2024-10-28 PROCEDURE — 80053 COMPREHEN METABOLIC PANEL: CPT

## 2024-10-28 PROCEDURE — 93005 ELECTROCARDIOGRAM TRACING: CPT | Performed by: EMERGENCY MEDICINE

## 2024-10-28 PROCEDURE — 2580000003 HC RX 258: Performed by: EMERGENCY MEDICINE

## 2024-10-28 PROCEDURE — 93010 ELECTROCARDIOGRAM REPORT: CPT | Performed by: INTERNAL MEDICINE

## 2024-10-28 PROCEDURE — 85025 COMPLETE CBC W/AUTO DIFF WBC: CPT

## 2024-10-28 PROCEDURE — 99285 EMERGENCY DEPT VISIT HI MDM: CPT

## 2024-10-28 PROCEDURE — 71046 X-RAY EXAM CHEST 2 VIEWS: CPT

## 2024-10-28 PROCEDURE — 81003 URINALYSIS AUTO W/O SCOPE: CPT

## 2024-10-28 PROCEDURE — 96360 HYDRATION IV INFUSION INIT: CPT

## 2024-10-28 PROCEDURE — 83880 ASSAY OF NATRIURETIC PEPTIDE: CPT

## 2024-10-28 RX ORDER — 0.9 % SODIUM CHLORIDE 0.9 %
1000 INTRAVENOUS SOLUTION INTRAVENOUS
Status: COMPLETED | OUTPATIENT
Start: 2024-10-28 | End: 2024-10-28

## 2024-10-28 RX ADMIN — SODIUM CHLORIDE 1000 ML: 9 INJECTION, SOLUTION INTRAVENOUS at 14:04

## 2024-10-28 NOTE — ED PROVIDER NOTES
Dyspnea 5/8/2015    Echo 6/15: EF 60%, mod MR, mod LVH, mild AI     ED (erectile dysfunction)     GERD (gastroesophageal reflux disease)     GERD (gastroesophageal reflux disease)     no medication    HTN (hypertension) 5/8/2015    Hypertension     Hypokalemia     Hypokalemia 6/7/2016    Mitral valve regurgitation 6/7/2016    Morbid obesity     Myasthenia gravis (HCC)     Myasthenia gravis (HCC) 6/17/15    Nocturia     Osteoporosis     PUD (peptic ulcer disease) 25 yrs ago    S/P coronary artery stent placement 5/8/2015    3.0x38 mm Xience CAROL to pLAD 5/7/15     Sleep apnea     Syncope and collapse     Unspecified sleep apnea     no cpap        Past Surgical History:   Procedure Laterality Date    APPENDECTOMY      CARDIAC CATHETERIZATION  05/21/2019    watchman device    CARDIAC CATHETERIZATION  05/27/2015    stent    CARDIAC PROCEDURE N/A 8/7/2024    Left heart cath / coronary angiography performed by Tristan Fisher MD at [facility] CARDIAC CATH LAB    CARDIAC PROCEDURE N/A 8/7/2024    Instantaneous wave-free ratio (iFR) performed by Tristan Fisher MD at [facility] CARDIAC CATH LAB    COLONOSCOPY  2007    COLONOSCOPY N/A 8/6/2022    COLONOSCOPY POLYPECTOMY SNARE/COLD BIOPSY performed by Eric Chavez MD at [facility] ENDOSCOPY    ERCP  3/30/2022         HEMICOLECTOMY Right 8/10/2022    BOWEL RESECTION HEMICOLECTOMY LAPAROSCOPIC ROBOTIC, Right Bulmaro performed by Stacy Gonzalez MD at [facility] MAIN OR    HEMORRHOID SURGERY      PACEMAKER  2018    TONSILLECTOMY  1999    Dr. Kraus/Giana for sleep apnea and reconstruction for extending jaw    UPPER GASTROINTESTINAL ENDOSCOPY N/A 8/5/2022    EGD ESOPHAGOGASTRODUODENOSCOPY Rm 525 performed by Wei Briggs MD at [facility] ENDOSCOPY    VASCULAR SURGERY Right 07/10/2019     Repair of right radial artery pseudoaneurysm        Social History     Socioeconomic History    Marital status:    Tobacco Use    Smoking status: Never     Passive exposure: Never    Smokeless tobacco:

## 2024-10-28 NOTE — ED TRIAGE NOTES
Pt ambulatory with cane to triage. Pt complains of SOB and dizziness onset this AM. Describes dizziness as the room spinning around him. Denies CP. Denies n/v.

## 2024-10-28 NOTE — DISCHARGE INSTRUCTIONS
As we discussed, I think that your symptoms are likely due to dehydration.  This can cause you to feel lightheaded when you go from sitting to standing, or from laying to sitting.  This should be improved with the administration of fluids we have given you in the emergency department.  However it still very important that when you are at home you are very careful and slow when you go from sitting to standing to ensure that you do not get dizzy.  If you begin to feel lightheaded, it is important to lay down flat to help prevent any risk of potentially passing out.    Please follow-up with your primary care doctor this week for reevaluation to ensure that you are improving.

## 2024-10-28 NOTE — ED NOTES
Patient mobility status  with mild difficulty. Provider aware     I have reviewed discharge instructions with the patient.  The patient verbalized understanding.    Patient left ED via Discharge Method: ambulatory to Home with Spouse.    Opportunity for questions and clarification provided.     Patient given 0 scripts.           Karyna Holley, RN  10/28/24 [id number]

## 2024-10-28 NOTE — TELEPHONE ENCOUNTER
Pt requesting appt today for dizzy spells started the morning, he can not walk and he had a fall as well. Please advise asap  Message sent to Katherine as well:  [phone] called for acute appt for today, i just hung up with him; Dr. PORTILLO requested no more pt be added today-booked, Dr. MONET & Dr. Will booked. I offered to send high priority message to see if we could get him worked in today or [facility] however pt called me stupid and said he could not believe that he could not be seen by his pcp.      attempted to tell him  will ask you to call him.      [phone]

## 2024-10-29 ENCOUNTER — CARE COORDINATION (OUTPATIENT)
Dept: CARE COORDINATION | Facility: CLINIC | Age: 84
End: 2024-10-29

## 2024-10-29 NOTE — CARE COORDINATION
Ambulatory Care Coordination Note     10/29/2024 8:37 AM     Patient Current Location:  [place]     This patient was received as a referral from Ambulatory Care Manager .    ACM contacted the patient by telephone. Verified name and  with patient as identifiers. Provided introduction to self, and explanation of the ACM role.   Patient declined care management services at this time.

## 2024-11-08 ENCOUNTER — OFFICE VISIT (OUTPATIENT)
Age: 84
End: 2024-11-08

## 2024-11-08 VITALS
WEIGHT: 215 LBS | BODY MASS INDEX: 33.74 KG/M2 | HEART RATE: 84 BPM | DIASTOLIC BLOOD PRESSURE: 80 MMHG | HEIGHT: 67 IN | SYSTOLIC BLOOD PRESSURE: 140 MMHG

## 2024-11-08 DIAGNOSIS — I48.0 PAROXYSMAL ATRIAL FIBRILLATION (HCC): Primary | ICD-10-CM

## 2024-11-08 DIAGNOSIS — Z95.0 PACEMAKER: ICD-10-CM

## 2024-11-08 DIAGNOSIS — I65.23 BILATERAL CAROTID ARTERY STENOSIS: ICD-10-CM

## 2024-11-08 DIAGNOSIS — I25.119 ATHEROSCLEROSIS OF NATIVE CORONARY ARTERY OF NATIVE HEART WITH ANGINA PECTORIS (HCC): ICD-10-CM

## 2024-11-08 DIAGNOSIS — I50.22 CHRONIC SYSTOLIC (CONGESTIVE) HEART FAILURE (HCC): ICD-10-CM

## 2024-11-08 NOTE — PROGRESS NOTES
[address] [facility], [address] [place] [address]  PHONE: [phone]     24    NAME:  Aguilar Titus  : 1940  MRN: [id number]       SUBJECTIVE:   Aguilar Titus is a 84 y.o. male seen for a follow up visit regarding the following:     Chief Complaint   Patient presents with    Follow-Up from Hospital    Coronary Artery Disease     Post cath        HPI: Here for CAD, Afib   prox LAD PCI 2015;   (GERMAIN on brilinta).     10/17: PPM placement for SSS.   2019: watchman device placement   2021: AVN ablation  3/2022: GIB s/p 3u PRBC, ERCP  2022: left axillary vein DVT on Eliquis, and newly diagnosed cecal mass showing adenocarcinoma s/p right hemicolectomy on 8/10  Echo 2023: EF 53%, mild to mod AI and MR     Echo 2024: EF 30-35%, apical akinesis, mild AS  LHC 2024: stable CAD     On NOAC now.  Son helping with meds.  Some GERMAIN, SOB.  Stamina not good.  He has NYHA Class II-III sx now.  No CP, pressure.   Some lack of energy.    On daily lasix. No new edema.     Patient denies recent history of orthopnea, PND, excessive dizziness and/or syncope.      Has myasthenia gravis, this has been well controlled.         Past Medical History, Past Surgical History, Family history, Social History, and Medications were all reviewed with the patient today and updated as necessary.     Current Outpatient Medications   Medication Sig Dispense Refill    famotidine (PEPCID) 20 MG tablet Take 1 tablet by mouth daily      apixaban (ELIQUIS) 5 MG TABS tablet Take 1 tablet by mouth 2 times daily at 0800 and 1400 (Patient not taking: Reported on 2024) 180 tablet 3    atorvastatin (LIPITOR) 80 MG tablet Take 1 tablet by mouth daily 90 tablet 3    furosemide (LASIX) 40 MG tablet Take 1 tablet by mouth daily 90 tablet 3    losartan (COZAAR) 50 MG tablet Take 1 tablet by mouth daily 90 tablet 3    nitroGLYCERIN (NITROSTAT) 0.4 MG SL tablet Place 1 sl under the tongue q 5 min prn cp, max 3 sl in a 15-min

## 2024-11-25 NOTE — H&P
Hospitalist Admission History and Physical         NAME:            Muna Mcnamara    Age:                79 y.o.    :               1940    MRN:              [id number]    PCP: None Provider    Consulting MD:    Treatment Team: Attending Provider: Teresita Gallardo DO; Registered Nurse: Girtha Bence, RN         Chief Complaint   Patient presents with    Fatigue   HPI:    Patient is a 79 y.o. male who presented to the ED for cc weakness since his last discharge. 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 Follows Dr. Lowell Soto. Was given bebtelovimab and then suddenly became unresponsive. No wheezing, hives. Now satting well on RA and much more alert.    Past Medical History:   Diagnosis Date    Abnormal EKG 4/22/15    Arrhythmia     Aspiration pneumonia ([address] 10/26/2017    CAD (coronary artery disease) 2015    Carotid artery stenosis without cerebral infarction 2016    US 6/15: <50% bilat ICAs    Coronary atherosclerosis of native coronary vessel 2015    GERMAIN on brilinta 5/7/15: prox LAD PCI, normal EF     Diastolic CHF, chronic ([address] 3/2/2022    Dyslipidemia 2016    Dyspnea 2015    Echo 6/15: EF 60%, mod MR, mod LVH, mild AI     ED (erectile dysfunction)     GERD (gastroesophageal reflux disease)     GERD (gastroesophageal reflux disease)     no medication    HTN (hypertension) 2015    Hypertension     Hypokalemia     Hypokalemia 2016    Mitral valve regurgitation 2016    Morbid obesity ([address]     Myasthenia gravis ([address]     Myasthenia gravis ([address] 6/17/15    Nocturia     Osteoporosis     PUD (peptic ulcer disease) 25 yrs ago    S/P coronary artery stent placement 2015    3.0x38 mm Xience CAROL to pLAD 5/7/15     Sleep apnea     Syncope and collapse     Unspecified sleep apnea     no cpap            Past Surgical History:   Procedure Laterality Date    APPENDECTOMY      CARDIAC CATHETERIZATION  05/21/2019    watchman device    CARDIAC CATHETERIZATION  05/27/2015    stent    COLONOSCOPY  2007    COLONOSCOPY N/A 8/6/2022    COLONOSCOPY POLYPECTOMY SNARE/COLD BIOPSY performed by Yojana Baires MD at [facility] ENDOSCOPY    ERCP  3/30/2022         HEMICOLECTOMY Right 8/10/2022    BOWEL RESECTION HEMICOLECTOMY LAPAROSCOPIC ROBOTIC, Right Bulmaro performed by Le Shah MD at [facility] Berto Knife  2018    Bryan Kraus/Giana for sleep apnea and reconstruction for extending jaw    UPPER GASTROINTESTINAL ENDOSCOPY N/A 8/5/2022    EGD ESOPHAGOGASTRODUODENOSCOPY Rm 525 performed by Andrea Uribe MD at [facility] Right 07/10/2019     Repair of right radial artery pseudoaneurysm            Family History   Problem Relation Age of Onset    No Known Problems Brother     Cancer Brother         brain tumor    No Known Problems Sister     Cancer Mother         kidney    Cancer Father         stomach       Family history reviewed and negative except as noted above. Social History     Social History Narrative    Not on file            Social History     Tobacco Use    Smoking status: Never    Smokeless tobacco: Never   Substance Use Topics    Alcohol use: No            Social History     Substance and Sexual Activity   Drug Use No                 Allergies   Allergen Reactions    Bebtelovimab Other (See Comments)     Syncope and hypotension            Prior to Admission medications    Medication Sig Start Date End Date Taking?  Authorizing Provider   apixaban (ELIQUIS) 5 MG TABS tablet Take 2 tablets by mouth 2 times daily for 11 doses 8/19/22 8/25/22  Chloé Edwards MD   apixaban Hermenia No) 5 MG TABS tablet Take 1 tablet by mouth 2 times daily 8/19/22 11/17/22  Chloé Edwards MD   docusate sodium (COLACE, DULCOLAX) 100 MG CAPS Take 100 mg by mouth 2 times daily for 14 days 8/19/22 9/2/22  Tanner Martin MD   potassium chloride (KLOR-CON M) 20 MEQ extended release tablet Take 1 tablet by mouth daily 6/2/22   Nury Power, DO   atorvastatin (LIPITOR) 80 MG tablet Take 80 mg by mouth 3/3/22   Ar Automatic Reconciliation   nitroGLYCERIN (NITROSTAT) 0.4 MG SL tablet Place 1 sl under the tongue q 5 min prn cp, max 3 sl in a 15-min time period.  Call 911 if no relief after the 3rd sl. 2/13/20   Ar Automatic Reconciliation   pantoprazole (PROTONIX) 40 MG tablet Take 40 mg by mouth every morning (before breakfast) 4/3/22   Ar Automatic Reconciliation   rOPINIRole (REQUIP) 0.5 MG tablet 1 nightly, increase to 1 twice a day if needed 3/3/22   Ar Automatic Reconciliation   tamsulosin (FLOMAX) 0.4 MG capsule Take 0.4 mg by mouth daily 4/8/22   Ar Automatic Reconciliation   aspirin 81 MG EC tablet Take 81 mg by mouth daily 4/8/22 8/19/22  Ar Automatic Reconciliation   cyanocobalamin 1000 MCG tablet Take 1,000 mcg by mouth daily  Patient not taking: No sig reported 4/2/22 8/19/22  Ar Automatic Reconciliation   escitalopram (LEXAPRO) 10 MG tablet Take 10 mg by mouth daily  Patient not taking: No sig reported 3/3/22 8/19/22  Ar Automatic Reconciliation   furosemide (LASIX) 40 MG tablet Take 40 mg by mouth daily 3/3/22 8/19/22  Ar Automatic Reconciliation                      Review of Systems         Constitutional:  NAD    Eyes:  no change in visual acuity, no photophobia    Ears, nose, mouth, throat, and face: no  Odynphagia, dysphagia, no thrush or exudate, negative for chronic sinus congestion, recurrent headaches    Respiratory: negative for SOB, hemoptysis or cough    Cardiovascular: negative for CP, palpitations, or PND    Gastrointestinal: negative for abdominal pain, no hematemesis, hematochezia or BRBPR    Genitourinary: no urgency, frequency, or dysuria, no nocturia    Integument/breast: negative for skin rash or skin lesions    Hematologic/lymphatic: negative for known bleeding disorder    Musculoskeletal:negative for joint pain or joint tenderness    Neurological: generalized weakness    Behavioral/Psych: negative for depression or chronic anxiety,    Endocrine: negative for polydyspia, polyuria or intolerance to heat or cold    Allergic/Immunologic: negative for chronic allergic rhinitis, or known connective tissue disorder              Objective:         Patient Vitals for the past 24 hrs:   Temp Pulse Resp BP SpO2   08/25/22 2235 -- 76 17 133/68 97 %   08/25/22 2233 -- 76 29 126/70 99 %   08/25/22 2225 -- 76 23 98/65 98 %   08/25/22 2220 -- 79 20 (!) 86/46 98 %   08/25/22 2217 -- 79 29 (!) 90/58 98 %   08/25/22 2215 -- 83 24 (!) 79/56 --   08/25/22 2212 -- 76 19 (!) 81/52 96 %   08/25/22 2208 -- (!) 108 15 -- 96 %   08/25/22 2206 -- -- -- (!) 58/37 --   08/25/22 2200 -- -- -- (!) 158/82 98 %   08/25/22 2129 -- -- -- (!) 155/78 98 %   08/25/22 2114 -- -- -- (!) 153/81 98 %   08/25/22 2059 -- -- -- (!) 151/78 98 %   08/25/22 2044 -- -- -- (!) 158/83 99 %   08/25/22 2029 -- -- -- (!) 144/77 98 %   08/25/22 2014 -- -- -- (!) 147/113 98 %   08/25/22 2004 -- -- -- (!) 162/82 97 %   08/25/22 1944 -- -- -- (!) 156/74 95 %   08/25/22 1531 98 °F (36.7 °C) 75 18 124/70 100 %            No intake/output data recorded. No intake/output data recorded.          Data Review:   Recent Results (from the past 24 hour(s))   CBC with Auto Differential    Collection Time: 08/25/22  3:38 PM   Result Value Ref Range    WBC 10.4 4.3 - 11.1 K/uL    RBC 4.40 4.23 - 5.6 M/uL    Hemoglobin 9.0 (L) 13.6 - 17.2 g/dL    Hematocrit 31.9 (L) 41.1 - 50.3 %    MCV 72.5 (L) 79.6 - 97.8 FL    MCH 20.5 (L) 26.1 - 32.9 PG    MCHC 28.2 (L) 31.4 - 35.0 g/dL    RDW 26.5 (H) 11.9 - 14.6 %    Platelets 581 913 - 757 K/uL    MPV 9.6 9.4 - 12.3 FL    nRBC 0.00 0.0 - 0.2 K/uL    Differential Type AUTOMATED      Seg Neutrophils 58 43 - 78 %    Lymphocytes 28 13 - 44 %    Monocytes 8 4.0 - 12.0 %    Eosinophils % 5 0.5 - 7.8 %    Basophils 1 0.0 - 2.0 %    Immature Granulocytes 0 0.0 - 5.0 %    Segs Absolute 6.1 1.7 - 8.2 K/UL    Absolute Lymph # 2.9 0.5 - 4.6 K/UL    Absolute Mono # 0.8 0.1 - 1.3 K/UL    Absolute Eos # 0.5 0.0 - 0.8 K/UL    Basophils Absolute 0.1 0.0 - 0.2 K/UL    Absolute Immature Granulocyte 0.0 0.0 - 0.5 K/UL   Comprehensive Metabolic Panel    Collection Time: 08/25/22  3:38 PM   Result Value Ref Range    Sodium 138 138 - 145 mmol/L    Potassium 4.0 3.5 - 5.1 mmol/L    Chloride 107 98 - 107 mmol/L    CO2 25 21 - 32 mmol/L    Anion Gap 6 (L) 7 - 16 mmol/L    Glucose 119 (H) 65 - 100 mg/dL    BUN 17 8 - 23 MG/DL    Creatinine 0.90 0.8 - 1.5 MG/DL    GFR African American >60 >60 ml/min/1.73m2    GFR Non- >60 >60 ml/min/1.73m2    Calcium 9.4 8.3 - 10.4 MG/DL    Total Bilirubin 0.4 0.2 - 1.1 MG/DL    ALT 32 12 - 65 U/L    AST 23 15 - 37 U/L    Alk Phosphatase 87 50 - 136 U/L    Total Protein 6.8 6.3 - 8.2 g/dL    Albumin 3.1 (L) 3.2 - 4.6 g/dL    Globulin 3.7 (H) 2.3 - 3.5 g/dL    Albumin/Globulin Ratio 0.8 (L) 1.2 - 3.5     Protime-INR    Collection Time: 08/25/22  3:38 PM   Result Value Ref Range    Protime 17.8 (H) 12.6 - 14.5 sec    INR 1.4     COVID-19, Rapid    Collection Time: 08/25/22  8:13 PM    Specimen: Nasopharyngeal   Result Value Ref Range    Source NASAL      SARS-CoV-2, Rapid Detected (AA) NOTD              Physical Exam:         General:    Alert, cooperative, no distress, hard of hearing. Eyes:    Conjunctivae/corneas clear. PERRL    Ears:    Normal     Nose:    Nares normal.     Mouth/Throat:    Lips, mucosa, and tongue normal. Teeth and gums normal.    Neck:     no JVD. Back:     deferred    Lungs:     Limited breath sounds throughout, no respiratory distress     Heart:    Regular rate and rhythm, S1, S2 normal    Abdomen:     Soft, non-tender.  Bowel sounds slow    Extremities:    Extremities normal, atraumatic, no cyanosis or edema. Skin:    Skin color, texture, turgor normal. No rashes or lesions    Neurologic:    Hard of hearing. Limited ROM          Assessment and Plan         Principal Problem:    COVID  Active Problems:    Cecum mass    Acute deep vein thrombosis (DVT) of axillary vein of left upper extremity (HCC)    Paroxysmal atrial fibrillation (HCC)    Pacemaker    HTN (hypertension)    Myasthenia gravis (HCC)    Major depressive disorder, recurrent, mild (HCC)  Resolved Problems:    * No resolved hospital problems. *      COVID - check CRP, procal. Decadron. PRN albuterol. Possible reaction to bebtelovimab? Much improved. NO wheezing or SOB so holding off on further meds other than COVID treatment. Left axilla DVT - Eliquis    HLD - statin    Cecal mass - Per oncology after discharge. MG - no crisis. Flomax.      PPD/PT/OT    DVT prophylaxis - Eliquis    Signed By:    Ashleigh Amin DO    August 25, 2022 yes

## 2024-12-13 DIAGNOSIS — E11.9 TYPE 2 DIABETES MELLITUS WITHOUT COMPLICATION, WITHOUT LONG-TERM CURRENT USE OF INSULIN (HCC): ICD-10-CM

## 2024-12-13 RX ORDER — METFORMIN HYDROCHLORIDE 500 MG/1
500 TABLET, EXTENDED RELEASE ORAL
Qty: 90 TABLET | Refills: 1 | OUTPATIENT
Start: 2024-12-13

## 2025-01-06 ENCOUNTER — APPOINTMENT (OUTPATIENT)
Dept: GENERAL RADIOLOGY | Age: 85
DRG: 193 | End: 2025-01-06
Payer: MEDICARE

## 2025-01-06 ENCOUNTER — HOSPITAL ENCOUNTER (INPATIENT)
Age: 85
LOS: 2 days | Discharge: HOME | DRG: 193 | End: 2025-01-09
Attending: EMERGENCY MEDICINE | Admitting: HOSPITALIST
Payer: MEDICARE

## 2025-01-06 DIAGNOSIS — J18.9 COMMUNITY ACQUIRED PNEUMONIA, UNSPECIFIED LATERALITY: Primary | ICD-10-CM

## 2025-01-06 LAB
ALBUMIN SERPL-MCNC: 3.4 G/DL (ref 3.2–4.6)
ALBUMIN/GLOB SERPL: 1 (ref 1–1.9)
ALP SERPL-CCNC: 100 U/L (ref 40–129)
ALT SERPL-CCNC: 13 U/L (ref 8–55)
ANION GAP SERPL CALC-SCNC: 12 MMOL/L (ref 7–16)
ARTERIAL PATENCY WRIST A: POSITIVE
AST SERPL-CCNC: 26 U/L (ref 15–37)
BASE EXCESS BLD CALC-SCNC: 3.3 MMOL/L
BASOPHILS # BLD: 0 K/UL (ref 0–0.2)
BASOPHILS NFR BLD: 0 % (ref 0–2)
BDY SITE: ABNORMAL
BILIRUB SERPL-MCNC: 0.6 MG/DL (ref 0–1.2)
BUN SERPL-MCNC: 12 MG/DL (ref 8–23)
CALCIUM SERPL-MCNC: 9.1 MG/DL (ref 8.8–10.2)
CHLORIDE SERPL-SCNC: 98 MMOL/L (ref 98–107)
CO2 SERPL-SCNC: 28 MMOL/L (ref 20–29)
CREAT SERPL-MCNC: 1.02 MG/DL (ref 0.8–1.3)
DIFFERENTIAL METHOD BLD: ABNORMAL
EOSINOPHIL # BLD: 0.1 K/UL (ref 0–0.8)
EOSINOPHIL NFR BLD: 1 % (ref 0.5–7.8)
ERYTHROCYTE [DISTWIDTH] IN BLOOD BY AUTOMATED COUNT: 13.4 % (ref 11.9–14.6)
FLUAV RNA SPEC QL NAA+PROBE: NOT DETECTED
FLUBV RNA SPEC QL NAA+PROBE: NOT DETECTED
GAS FLOW.O2 O2 DELIVERY SYS: ABNORMAL
GLOBULIN SER CALC-MCNC: 3.3 G/DL (ref 2.3–3.5)
GLUCOSE SERPL-MCNC: 139 MG/DL (ref 70–99)
HCO3 BLD-SCNC: 29.3 MMOL/L (ref 22–26)
HCT VFR BLD AUTO: 46.7 % (ref 41.1–50.3)
HGB BLD-MCNC: 15.2 G/DL (ref 13.6–17.2)
IMM GRANULOCYTES # BLD AUTO: 0 K/UL (ref 0–0.5)
IMM GRANULOCYTES NFR BLD AUTO: 0 % (ref 0–5)
LACTATE SERPL-SCNC: 1.6 MMOL/L (ref 0.5–2)
LYMPHOCYTES # BLD: 1.7 K/UL (ref 0.5–4.6)
LYMPHOCYTES NFR BLD: 18 % (ref 13–44)
MCH RBC QN AUTO: 30.1 PG (ref 26.1–32.9)
MCHC RBC AUTO-ENTMCNC: 32.5 G/DL (ref 31.4–35)
MCV RBC AUTO: 92.5 FL (ref 82–102)
MONOCYTES # BLD: 1 K/UL (ref 0.1–1.3)
MONOCYTES NFR BLD: 11 % (ref 4–12)
NEUTS SEG # BLD: 6.3 K/UL (ref 1.7–8.2)
NEUTS SEG NFR BLD: 70 % (ref 43–78)
NRBC # BLD: 0 K/UL (ref 0–0.2)
NT PRO BNP: 2580 PG/ML (ref 0–450)
PCO2 BLD: 48.3 MMHG (ref 35–45)
PH BLD: 7.39 (ref 7.35–7.45)
PLATELET # BLD AUTO: 151 K/UL (ref 150–450)
PMV BLD AUTO: 8.8 FL (ref 9.4–12.3)
PO2 BLD: 33 MMHG (ref 75–100)
POC FIO2: 2
POTASSIUM SERPL-SCNC: 3.7 MMOL/L (ref 3.5–5.1)
PROCALCITONIN SERPL-MCNC: 0.12 NG/ML (ref 0–0.1)
PROT SERPL-MCNC: 6.7 G/DL (ref 6.3–8.2)
RBC # BLD AUTO: 5.05 M/UL (ref 4.23–5.6)
SAO2 % BLD: 61.6 % (ref 95–98)
SARS-COV-2 RDRP RESP QL NAA+PROBE: NOT DETECTED
SERVICE CMNT-IMP: ABNORMAL
SERVICE CMNT-IMP: ABNORMAL
SODIUM SERPL-SCNC: 138 MMOL/L (ref 136–145)
SOURCE: NORMAL
SPECIMEN TYPE: ABNORMAL
WBC # BLD AUTO: 9 K/UL (ref 4.3–11.1)

## 2025-01-06 PROCEDURE — 36600 WITHDRAWAL OF ARTERIAL BLOOD: CPT

## 2025-01-06 PROCEDURE — 6360000002 HC RX W HCPCS: Performed by: EMERGENCY MEDICINE

## 2025-01-06 PROCEDURE — 87502 INFLUENZA DNA AMP PROBE: CPT

## 2025-01-06 PROCEDURE — 82803 BLOOD GASES ANY COMBINATION: CPT

## 2025-01-06 PROCEDURE — 80053 COMPREHEN METABOLIC PANEL: CPT

## 2025-01-06 PROCEDURE — 2500000003 HC RX 250 WO HCPCS: Performed by: EMERGENCY MEDICINE

## 2025-01-06 PROCEDURE — 85025 COMPLETE CBC W/AUTO DIFF WBC: CPT

## 2025-01-06 PROCEDURE — 87040 BLOOD CULTURE FOR BACTERIA: CPT

## 2025-01-06 PROCEDURE — 84145 PROCALCITONIN (PCT): CPT

## 2025-01-06 PROCEDURE — 99285 EMERGENCY DEPT VISIT HI MDM: CPT

## 2025-01-06 PROCEDURE — 2700000000 HC OXYGEN THERAPY PER DAY

## 2025-01-06 PROCEDURE — 87635 SARS-COV-2 COVID-19 AMP PRB: CPT

## 2025-01-06 PROCEDURE — 96374 THER/PROPH/DIAG INJ IV PUSH: CPT

## 2025-01-06 PROCEDURE — 83605 ASSAY OF LACTIC ACID: CPT

## 2025-01-06 PROCEDURE — 5A0935A ASSISTANCE WITH RESPIRATORY VENTILATION, LESS THAN 24 CONSECUTIVE HOURS, HIGH NASAL FLOW/VELOCITY: ICD-10-PCS | Performed by: HOSPITALIST

## 2025-01-06 PROCEDURE — 71045 X-RAY EXAM CHEST 1 VIEW: CPT

## 2025-01-06 PROCEDURE — 83880 ASSAY OF NATRIURETIC PEPTIDE: CPT

## 2025-01-06 PROCEDURE — 6370000000 HC RX 637 (ALT 250 FOR IP): Performed by: EMERGENCY MEDICINE

## 2025-01-06 PROCEDURE — 93005 ELECTROCARDIOGRAM TRACING: CPT | Performed by: EMERGENCY MEDICINE

## 2025-01-06 RX ORDER — ACETAMINOPHEN 325 MG/1
650 TABLET ORAL EVERY 4 HOURS PRN
Status: DISCONTINUED | OUTPATIENT
Start: 2025-01-06 | End: 2025-01-07 | Stop reason: SDUPTHER

## 2025-01-06 RX ADMIN — WATER 1000 MG: 1 INJECTION INTRAMUSCULAR; INTRAVENOUS; SUBCUTANEOUS at 22:53

## 2025-01-06 RX ADMIN — ACETAMINOPHEN 650 MG: 325 TABLET ORAL at 22:52

## 2025-01-06 ASSESSMENT — ENCOUNTER SYMPTOMS
SHORTNESS OF BREATH: 1
COUGH: 1

## 2025-01-06 ASSESSMENT — PAIN SCALES - GENERAL: PAINLEVEL_OUTOF10: 0

## 2025-01-06 ASSESSMENT — PAIN - FUNCTIONAL ASSESSMENT: PAIN_FUNCTIONAL_ASSESSMENT: 0-10

## 2025-01-07 PROBLEM — N39.0 UTI (URINARY TRACT INFECTION): Status: ACTIVE | Noted: 2025-01-07

## 2025-01-07 PROBLEM — J18.9 COMMUNITY ACQUIRED PNEUMONIA, UNSPECIFIED LATERALITY: Status: ACTIVE | Noted: 2025-01-07

## 2025-01-07 PROBLEM — J40 BRONCHITIS: Status: ACTIVE | Noted: 2025-01-07

## 2025-01-07 PROBLEM — J96.01 ACUTE RESPIRATORY FAILURE WITH HYPOXIA: Status: ACTIVE | Noted: 2025-01-07

## 2025-01-07 LAB
APPEARANCE UR: ABNORMAL
ARTERIAL PATENCY WRIST A: POSITIVE
BACTERIA URNS QL MICRO: 0 /HPF
BASE EXCESS BLD CALC-SCNC: 2.8 MMOL/L
BDY SITE: ABNORMAL
BILIRUB UR QL: ABNORMAL
CASTS URNS QL MICRO: ABNORMAL /LPF
COLOR UR: ABNORMAL
EKG ATRIAL RATE: 105 BPM
EKG DIAGNOSIS: NORMAL
EKG P AXIS: 0 DEGREES
EKG P-R INTERVAL: 150 MS
EKG Q-T INTERVAL: 422 MS
EKG QRS DURATION: 178 MS
EKG QTC CALCULATION (BAZETT): 453 MS
EKG R AXIS: -80 DEGREES
EKG T AXIS: 95 DEGREES
EKG VENTRICULAR RATE: 69 BPM
EPI CELLS #/AREA URNS HPF: ABNORMAL /HPF
GAS FLOW.O2 O2 DELIVERY SYS: ABNORMAL
GLUCOSE BLD STRIP.AUTO-MCNC: 107 MG/DL (ref 65–100)
GLUCOSE BLD STRIP.AUTO-MCNC: 125 MG/DL (ref 65–100)
GLUCOSE BLD STRIP.AUTO-MCNC: 142 MG/DL (ref 65–100)
GLUCOSE BLD STRIP.AUTO-MCNC: 318 MG/DL (ref 65–100)
GLUCOSE BLD STRIP.AUTO-MCNC: 322 MG/DL (ref 65–100)
GLUCOSE UR STRIP.AUTO-MCNC: NEGATIVE MG/DL
HCO3 BLD-SCNC: 28.7 MMOL/L (ref 22–26)
HGB UR QL STRIP: ABNORMAL
KETONES UR QL STRIP.AUTO: NEGATIVE MG/DL
LEUKOCYTE ESTERASE UR QL STRIP.AUTO: ABNORMAL
MRSA DNA SPEC QL NAA+PROBE: NOT DETECTED
NITRITE UR QL STRIP.AUTO: POSITIVE
OTHER OBSERVATIONS: ABNORMAL
PCO2 BLD: 47.5 MMHG (ref 35–45)
PH BLD: 7.39 (ref 7.35–7.45)
PH UR STRIP: 5 (ref 5–9)
PO2 BLD: 106 MMHG (ref 75–100)
POC FIO2: 4
PROT UR STRIP-MCNC: 100 MG/DL
RBC #/AREA URNS HPF: >100 /HPF
S AUREUS CPE NOSE QL NAA+PROBE: NOT DETECTED
SAO2 % BLD: 98 % (ref 95–98)
SERVICE CMNT-IMP: ABNORMAL
SP GR UR REFRACTOMETRY: 1.02 (ref 1–1.02)
SPECIMEN TYPE: ABNORMAL
UROBILINOGEN UR QL STRIP.AUTO: 0.2 EU/DL (ref 0.2–1)
WBC URNS QL MICRO: ABNORMAL /HPF
YEAST URNS QL MICRO: ABNORMAL

## 2025-01-07 PROCEDURE — 1100000003 HC PRIVATE W/ TELEMETRY

## 2025-01-07 PROCEDURE — 97161 PT EVAL LOW COMPLEX 20 MIN: CPT

## 2025-01-07 PROCEDURE — 6360000002 HC RX W HCPCS: Performed by: HOSPITALIST

## 2025-01-07 PROCEDURE — 6360000002 HC RX W HCPCS: Performed by: PHYSICIAN ASSISTANT

## 2025-01-07 PROCEDURE — 6370000000 HC RX 637 (ALT 250 FOR IP): Performed by: INTERNAL MEDICINE

## 2025-01-07 PROCEDURE — 2580000003 HC RX 258: Performed by: PHYSICIAN ASSISTANT

## 2025-01-07 PROCEDURE — 82803 BLOOD GASES ANY COMBINATION: CPT

## 2025-01-07 PROCEDURE — 2700000000 HC OXYGEN THERAPY PER DAY

## 2025-01-07 PROCEDURE — 81001 URINALYSIS AUTO W/SCOPE: CPT

## 2025-01-07 PROCEDURE — 94640 AIRWAY INHALATION TREATMENT: CPT

## 2025-01-07 PROCEDURE — 94760 N-INVAS EAR/PLS OXIMETRY 1: CPT

## 2025-01-07 PROCEDURE — 87086 URINE CULTURE/COLONY COUNT: CPT

## 2025-01-07 PROCEDURE — 93010 ELECTROCARDIOGRAM REPORT: CPT | Performed by: INTERNAL MEDICINE

## 2025-01-07 PROCEDURE — 6370000000 HC RX 637 (ALT 250 FOR IP): Performed by: PHYSICIAN ASSISTANT

## 2025-01-07 PROCEDURE — 97530 THERAPEUTIC ACTIVITIES: CPT

## 2025-01-07 PROCEDURE — 94761 N-INVAS EAR/PLS OXIMETRY MLT: CPT

## 2025-01-07 PROCEDURE — 6370000000 HC RX 637 (ALT 250 FOR IP): Performed by: HOSPITALIST

## 2025-01-07 PROCEDURE — 2500000003 HC RX 250 WO HCPCS: Performed by: HOSPITALIST

## 2025-01-07 PROCEDURE — 2500000003 HC RX 250 WO HCPCS: Performed by: PHYSICIAN ASSISTANT

## 2025-01-07 PROCEDURE — 36600 WITHDRAWAL OF ARTERIAL BLOOD: CPT

## 2025-01-07 PROCEDURE — 87641 MR-STAPH DNA AMP PROBE: CPT

## 2025-01-07 PROCEDURE — 82962 GLUCOSE BLOOD TEST: CPT

## 2025-01-07 RX ORDER — DOXYCYCLINE 100 MG/1
100 CAPSULE ORAL EVERY 12 HOURS SCHEDULED
Status: DISCONTINUED | OUTPATIENT
Start: 2025-01-07 | End: 2025-01-09 | Stop reason: HOSPADM

## 2025-01-07 RX ORDER — IPRATROPIUM BROMIDE AND ALBUTEROL SULFATE 2.5; .5 MG/3ML; MG/3ML
1 SOLUTION RESPIRATORY (INHALATION)
Status: DISCONTINUED | OUTPATIENT
Start: 2025-01-07 | End: 2025-01-09 | Stop reason: HOSPADM

## 2025-01-07 RX ORDER — MORPHINE SULFATE 2 MG/ML
1 INJECTION, SOLUTION INTRAMUSCULAR; INTRAVENOUS ONCE
Status: COMPLETED | OUTPATIENT
Start: 2025-01-07 | End: 2025-01-07

## 2025-01-07 RX ORDER — IPRATROPIUM BROMIDE AND ALBUTEROL SULFATE 2.5; .5 MG/3ML; MG/3ML
1 SOLUTION RESPIRATORY (INHALATION)
Status: DISCONTINUED | OUTPATIENT
Start: 2025-01-07 | End: 2025-01-07

## 2025-01-07 RX ORDER — SODIUM CHLORIDE FOR INHALATION 3 %
4 VIAL, NEBULIZER (ML) INHALATION 2 TIMES DAILY
Status: DISCONTINUED | OUTPATIENT
Start: 2025-01-07 | End: 2025-01-09 | Stop reason: HOSPADM

## 2025-01-07 RX ORDER — IBUPROFEN 600 MG/1
1 TABLET ORAL PRN
Status: DISCONTINUED | OUTPATIENT
Start: 2025-01-07 | End: 2025-01-09 | Stop reason: HOSPADM

## 2025-01-07 RX ORDER — ACETAMINOPHEN 650 MG/1
650 SUPPOSITORY RECTAL EVERY 6 HOURS PRN
Status: DISCONTINUED | OUTPATIENT
Start: 2025-01-07 | End: 2025-01-09 | Stop reason: HOSPADM

## 2025-01-07 RX ORDER — INSULIN LISPRO 100 [IU]/ML
0-4 INJECTION, SOLUTION INTRAVENOUS; SUBCUTANEOUS
Status: DISCONTINUED | OUTPATIENT
Start: 2025-01-07 | End: 2025-01-09 | Stop reason: HOSPADM

## 2025-01-07 RX ORDER — IPRATROPIUM BROMIDE AND ALBUTEROL SULFATE 2.5; .5 MG/3ML; MG/3ML
1 SOLUTION RESPIRATORY (INHALATION) EVERY 6 HOURS PRN
Status: DISCONTINUED | OUTPATIENT
Start: 2025-01-07 | End: 2025-01-07

## 2025-01-07 RX ORDER — FUROSEMIDE 10 MG/ML
40 INJECTION INTRAMUSCULAR; INTRAVENOUS 2 TIMES DAILY
Status: DISCONTINUED | OUTPATIENT
Start: 2025-01-07 | End: 2025-01-07

## 2025-01-07 RX ORDER — BUDESONIDE 0.5 MG/2ML
0.5 INHALANT ORAL
Status: DISCONTINUED | OUTPATIENT
Start: 2025-01-07 | End: 2025-01-09 | Stop reason: HOSPADM

## 2025-01-07 RX ORDER — TAMSULOSIN HYDROCHLORIDE 0.4 MG/1
0.4 CAPSULE ORAL DAILY
Status: DISCONTINUED | OUTPATIENT
Start: 2025-01-07 | End: 2025-01-09 | Stop reason: HOSPADM

## 2025-01-07 RX ORDER — SODIUM CHLORIDE 0.9 % (FLUSH) 0.9 %
5-40 SYRINGE (ML) INJECTION PRN
Status: DISCONTINUED | OUTPATIENT
Start: 2025-01-07 | End: 2025-01-09 | Stop reason: HOSPADM

## 2025-01-07 RX ORDER — LOSARTAN POTASSIUM 50 MG/1
50 TABLET ORAL DAILY
Status: DISCONTINUED | OUTPATIENT
Start: 2025-01-07 | End: 2025-01-09 | Stop reason: HOSPADM

## 2025-01-07 RX ORDER — SODIUM CHLORIDE 0.9 % (FLUSH) 0.9 %
5-40 SYRINGE (ML) INJECTION EVERY 12 HOURS SCHEDULED
Status: DISCONTINUED | OUTPATIENT
Start: 2025-01-07 | End: 2025-01-09 | Stop reason: HOSPADM

## 2025-01-07 RX ORDER — GUAIFENESIN 600 MG/1
1200 TABLET, EXTENDED RELEASE ORAL 2 TIMES DAILY
Status: DISCONTINUED | OUTPATIENT
Start: 2025-01-07 | End: 2025-01-09 | Stop reason: HOSPADM

## 2025-01-07 RX ORDER — ONDANSETRON 2 MG/ML
4 INJECTION INTRAMUSCULAR; INTRAVENOUS EVERY 6 HOURS PRN
Status: DISCONTINUED | OUTPATIENT
Start: 2025-01-07 | End: 2025-01-09 | Stop reason: HOSPADM

## 2025-01-07 RX ORDER — SODIUM CHLORIDE 9 MG/ML
INJECTION, SOLUTION INTRAVENOUS PRN
Status: DISCONTINUED | OUTPATIENT
Start: 2025-01-07 | End: 2025-01-09 | Stop reason: HOSPADM

## 2025-01-07 RX ORDER — ARFORMOTEROL TARTRATE 15 UG/2ML
15 SOLUTION RESPIRATORY (INHALATION)
Status: DISCONTINUED | OUTPATIENT
Start: 2025-01-07 | End: 2025-01-09 | Stop reason: HOSPADM

## 2025-01-07 RX ORDER — ONDANSETRON 4 MG/1
4 TABLET, ORALLY DISINTEGRATING ORAL EVERY 8 HOURS PRN
Status: DISCONTINUED | OUTPATIENT
Start: 2025-01-07 | End: 2025-01-09 | Stop reason: HOSPADM

## 2025-01-07 RX ORDER — DEXTROSE MONOHYDRATE 100 MG/ML
INJECTION, SOLUTION INTRAVENOUS CONTINUOUS PRN
Status: DISCONTINUED | OUTPATIENT
Start: 2025-01-07 | End: 2025-01-09 | Stop reason: HOSPADM

## 2025-01-07 RX ORDER — ROPINIROLE 1 MG/1
0.5 TABLET, FILM COATED ORAL NIGHTLY
Status: DISCONTINUED | OUTPATIENT
Start: 2025-01-07 | End: 2025-01-09 | Stop reason: HOSPADM

## 2025-01-07 RX ORDER — ACETAMINOPHEN 325 MG/1
650 TABLET ORAL EVERY 6 HOURS PRN
Status: DISCONTINUED | OUTPATIENT
Start: 2025-01-07 | End: 2025-01-09 | Stop reason: HOSPADM

## 2025-01-07 RX ORDER — ENOXAPARIN SODIUM 100 MG/ML
40 INJECTION SUBCUTANEOUS DAILY
Status: DISCONTINUED | OUTPATIENT
Start: 2025-01-07 | End: 2025-01-09 | Stop reason: HOSPADM

## 2025-01-07 RX ORDER — POLYETHYLENE GLYCOL 3350 17 G/17G
17 POWDER, FOR SOLUTION ORAL DAILY PRN
Status: DISCONTINUED | OUTPATIENT
Start: 2025-01-07 | End: 2025-01-09 | Stop reason: HOSPADM

## 2025-01-07 RX ORDER — ATORVASTATIN CALCIUM 40 MG/1
80 TABLET, FILM COATED ORAL DAILY
Status: DISCONTINUED | OUTPATIENT
Start: 2025-01-07 | End: 2025-01-09 | Stop reason: HOSPADM

## 2025-01-07 RX ADMIN — ROPINIROLE HYDROCHLORIDE 0.5 MG: 1 TABLET, FILM COATED ORAL at 20:42

## 2025-01-07 RX ADMIN — GUAIFENESIN 1200 MG: 600 TABLET ORAL at 20:41

## 2025-01-07 RX ADMIN — Medication 4 ML: at 20:46

## 2025-01-07 RX ADMIN — FUROSEMIDE 40 MG: 10 INJECTION, SOLUTION INTRAMUSCULAR; INTRAVENOUS at 08:29

## 2025-01-07 RX ADMIN — IPRATROPIUM BROMIDE AND ALBUTEROL SULFATE 1 DOSE: 2.5; .5 SOLUTION RESPIRATORY (INHALATION) at 14:02

## 2025-01-07 RX ADMIN — ATORVASTATIN CALCIUM 80 MG: 40 TABLET, FILM COATED ORAL at 08:29

## 2025-01-07 RX ADMIN — SODIUM CHLORIDE, PRESERVATIVE FREE 10 ML: 5 INJECTION INTRAVENOUS at 20:41

## 2025-01-07 RX ADMIN — INSULIN LISPRO 3 UNITS: 100 INJECTION, SOLUTION INTRAVENOUS; SUBCUTANEOUS at 16:14

## 2025-01-07 RX ADMIN — MORPHINE SULFATE 1 MG: 2 INJECTION, SOLUTION INTRAMUSCULAR; INTRAVENOUS at 10:04

## 2025-01-07 RX ADMIN — INSULIN LISPRO 3 UNITS: 100 INJECTION, SOLUTION INTRAVENOUS; SUBCUTANEOUS at 21:44

## 2025-01-07 RX ADMIN — LOSARTAN POTASSIUM 50 MG: 50 TABLET, FILM COATED ORAL at 08:29

## 2025-01-07 RX ADMIN — SODIUM CHLORIDE, PRESERVATIVE FREE 10 ML: 5 INJECTION INTRAVENOUS at 08:30

## 2025-01-07 RX ADMIN — TAMSULOSIN HYDROCHLORIDE 0.4 MG: 0.4 CAPSULE ORAL at 08:29

## 2025-01-07 RX ADMIN — DOXYCYCLINE HYCLATE 100 MG: 100 CAPSULE ORAL at 08:29

## 2025-01-07 RX ADMIN — GUAIFENESIN 1200 MG: 600 TABLET ORAL at 10:04

## 2025-01-07 RX ADMIN — DOXYCYCLINE HYCLATE 100 MG: 100 CAPSULE ORAL at 20:41

## 2025-01-07 RX ADMIN — IPRATROPIUM BROMIDE AND ALBUTEROL SULFATE 1 DOSE: 2.5; .5 SOLUTION RESPIRATORY (INHALATION) at 20:46

## 2025-01-07 RX ADMIN — FUROSEMIDE 40 MG: 10 INJECTION, SOLUTION INTRAMUSCULAR; INTRAVENOUS at 03:26

## 2025-01-07 RX ADMIN — BUDESONIDE INHALATION 500 MCG: 0.5 SUSPENSION RESPIRATORY (INHALATION) at 20:46

## 2025-01-07 RX ADMIN — WATER 1000 MG: 1 INJECTION INTRAMUSCULAR; INTRAVENOUS; SUBCUTANEOUS at 21:43

## 2025-01-07 RX ADMIN — WATER 80 MG: 1 INJECTION INTRAMUSCULAR; INTRAVENOUS; SUBCUTANEOUS at 16:14

## 2025-01-07 RX ADMIN — ENOXAPARIN SODIUM 40 MG: 100 INJECTION SUBCUTANEOUS at 08:29

## 2025-01-07 RX ADMIN — IPRATROPIUM BROMIDE AND ALBUTEROL SULFATE 1 DOSE: 2.5; .5 SOLUTION RESPIRATORY (INHALATION) at 08:42

## 2025-01-07 RX ADMIN — IPRATROPIUM BROMIDE AND ALBUTEROL SULFATE 1 DOSE: 2.5; .5 SOLUTION RESPIRATORY (INHALATION) at 03:51

## 2025-01-07 RX ADMIN — WATER 80 MG: 1 INJECTION INTRAMUSCULAR; INTRAVENOUS; SUBCUTANEOUS at 10:06

## 2025-01-07 ASSESSMENT — PAIN SCALES - GENERAL
PAINLEVEL_OUTOF10: 0
PAINLEVEL_OUTOF10: 0

## 2025-01-07 NOTE — ED NOTES
TRANSFER - OUT REPORT:    Verbal report given to RAYA Lebron on Aguilar Titus  being transferred to [facility] for routine progression of patient care       Report consisted of patient's Situation, Background, Assessment and   Recommendations(SBAR).     Information from the following report(s) Nurse Handoff Report was reviewed with the receiving nurse.    Coleman Fall Assessment:    Presents to emergency department  because of falls (Syncope, seizure, or loss of consciousness): No  Age > 70: Yes  Altered Mental Status, Intoxication with alcohol or substance confusion (Disorientation, impaired judgment, poor safety awaremess, or inability to follow instructions): Yes  Impaired Mobility: Ambulates or transfers with assistive devices or assistance; Unable to ambulate or transer.: Yes  Nursing Judgement: Yes          Lines:   Peripheral IV 01/06/25 Right Antecubital (Active)       Peripheral IV 01/06/25 Distal;Left Cephalic (Active)        Opportunity for questions and clarification was provided.      Patient transported with:  Registered Nurse          Sheila Olivera RN  01/07/25 3946

## 2025-01-07 NOTE — ED TRIAGE NOTES
Pt arrving from home via [facility]. Pr arriving on CPAP. Pt daughter called out d/t pt AMS. Upon EMS arrival pt wheezing and tachypneic. Pt hx UTIs     EMS gave 2.5mg albuterol and pt RR increased and WOB increased so it was d/c'ed en route     EMS states , /75, afebrile, RR 40

## 2025-01-07 NOTE — ED NOTES
Attempted straight cath on patient. Unsuccessful. Pt states he will try to use the urinal instead.      [place], Gerry, RN  01/06/25 1712

## 2025-01-07 NOTE — ED PROVIDER NOTES
Emergency Department Provider Note       PCP: Nelsy Stovall DO   Age: 84 y.o.   Sex: male     DISPOSITION                  ICD-10-CM    1. Community acquired pneumonia, unspecified laterality  J18.9           Medical Decision Making     Patient appears to be doing well with CPAP was removed he was placed on nasal cannula and ABG performed.  Although venous per respiratory therapist report oxygen saturation 90%.  He is noted to have a low-grade fever 100.4.    Chest x-ray shows bilateral infiltrates per radiology report.  There is a slight elevation of the BNP, and procalcitonin but lactic acid is normal.  WBC is normal.  Patient is symptomatically improved but on nasal cannula oxygen for support and still mildly tachypneic.  Given the fever will refer to hospitalist for admission for pneumonia.  Influenza and COVID test were negative.      1 acute illness with systemic symptoms.  Shared medical decision making was utilized in creating the patients health plan today.    I independently ordered and reviewed each unique test.       I interpreted the EKG performed at 2242 shows a ventricular paced rhythm with a left bundle branch block with a rate of 69 bpm.  Nondiagnostic..    The patient was admitted and I have discussed patient management with the admitting provider.          History     Patient arrived by EMS with history of altered mental status and shortness of breath.  Upon EMS arrival the patient was found to be tachypneic and having respiratory difficulty.  EMS states that they gave him albuterol aerosol at which point his breathing seemed to get worse and he was also placed on CPAP.  Patient does have a history of congestive heart failure.  No reported fever or chills.  He does report nasal congestion and cough onset yesterday.  He denies any chest pain and review of systems otherwise negative.    The history is provided by the patient, medical records and the EMS personnel.       ROS     Review of

## 2025-01-07 NOTE — PROGRESS NOTES
4 Eyes Skin Assessment     NAME:  Aguilar Titus  YOB: 1940  MEDICAL RECORD NUMBER:  [id number]    The patient is being assessed for  Admission    I agree that at least one RN has performed a thorough Head to Toe Skin Assessment on the patient. ALL assessment sites listed below have been assessed.      Areas assessed by both nurses:    Head, Face, Ears, Shoulders, Back, Chest, Arms, Elbows, Hands, Sacrum. Buttock, Coccyx, Ischium, and Legs. Feet and Heels        Does the Patient have a Wound? No noted wound(s)       Caesar Prevention initiated by RN: Yes  Wound Care Orders initiated by RN: No    Pressure Injury (Stage 3,4, Unstageable, DTI, NWPT, and Complex wounds) if present, place Wound referral order by RN under : No    New Ostomies, if present place, Ostomy referral order under : No     Nurse 1 eSignature: Electronically signed by Rasta Basurto RN on 1/7/25 at 3:14 AM EST    **SHARE this note so that the co-signing nurse can place an eSignature**    Nurse 2 eSignature: Electronically signed by KATIE OTERO RN on 1/7/25 at 3:25 AM EST

## 2025-01-07 NOTE — H&P
puffs, Inhalation, 4 TIMES DAILY PRN    apixaban (ELIQUIS) 5 mg, Oral, 2 TIMES DAILY    Ascorbic Acid (VITAMIN C PO) 1 tablet, Oral, DAILY RESP    atorvastatin (LIPITOR) 80 mg, Oral, DAILY    Cholecalciferol (VITAMIN D3 PO) 1 tablet, Oral, DAILY RESP    colestipol (COLESTID) 1 g tablet 60 Tablet    famotidine (PEPCID) 20 mg, Oral, DAILY    furosemide (LASIX) 40 mg, Oral, DAILY    loperamide (IMODIUM) 2 mg, Oral, DAILY PRN    losartan (COZAAR) 50 mg, Oral, DAILY    metFORMIN (GLUCOPHAGE-XR) 500 mg, Oral, DAILY WITH BREAKFAST    Multiple Vitamins-Minerals (MULTIVITAMIN WITH MINERALS) tablet Once daily OTC    nitroGLYCERIN (NITROSTAT) 0.4 MG SL tablet Place 1 sl under the tongue q 5 min prn cp, max 3 sl in a 15-min time period. Call 911 if no relief after the 3rd sl.    potassium chloride (KLOR-CON M) 20 MEQ extended release tablet 20 mEq, Oral, DAILY    rOPINIRole (REQUIP) 0.5 mg, Oral, NIGHTLY    tamsulosin (FLOMAX) 0.4 mg, Oral, DAILY    vitamin D (ERGOCALCIFEROL) 50,000 Units, Oral, WEEKLY    ZINC PO 1 capsule, Oral, DAILY RESP       I have personally reviewed labs and tests:  Recent Results (from the past 24 hour(s))   Comprehensive Metabolic Panel    Collection Time: 01/06/25 10:45 PM   Result Value Ref Range    Sodium 138 136 - 145 mmol/L    Potassium 3.7 3.5 - 5.1 mmol/L    Chloride 98 98 - 107 mmol/L    CO2 28 20 - 29 mmol/L    Anion Gap 12 7 - 16 mmol/L    Glucose 139 (H) 70 - 99 mg/dL    BUN 12 8 - 23 MG/DL    Creatinine 1.02 0.80 - 1.30 MG/DL    Est, Glom Filt Rate 72 >60 ml/min/1.73m2    Calcium 9.1 8.8 - 10.2 MG/DL    Total Bilirubin 0.6 0.0 - 1.2 MG/DL    ALT 13 8 - 55 U/L    AST 26 15 - 37 U/L    Alk Phosphatase 100 40 - 129 U/L    Total Protein 6.7 6.3 - 8.2 g/dL    Albumin 3.4 3.2 - 4.6 g/dL    Globulin 3.3 2.3 - 3.5 g/dL    Albumin/Globulin Ratio 1.0 1.0 - 1.9     CBC with Auto Differential    Collection Time: 01/06/25 10:45 PM   Result Value Ref Range    WBC 9.0 4.3 - 11.1 K/uL    RBC 5.05 4.23 -

## 2025-01-07 NOTE — PROGRESS NOTES
ACUTE PHYSICAL THERAPY GOALS:   (Developed with and agreed upon by patient and/or caregiver.)  Pt will perform bed mobility Mod (I) c inc time, cueing and use of rails as needed in 7 therapy sessions.  Pt will perform sit-to-stand/ stand-to-sit transfers Mod (I) c use of LRAD/external supports as needed and no LOB or miss-steps in 7 therapy sessions.  Pt will ambulate 250 ft Mod (I) with use of LRAD, no LOB or miss-steps and breaks as needed in 7 therapy sessions.  Pt will perform standing dynamic balance activities with minimal postural sway in 7 therapy sessions.  Pt will tolerate multiple sets and reps of BLE exercises in 7 therapy sessions.      PHYSICAL THERAPY Initial Assessment, Daily Note, and AM  (Link to Caseload Tracking: PT Visit Days : 1  Acknowledge Orders  Time In/Out  PT Charge Capture  Rehab Caseload Tracker    FALL RISK    Aguilar Titus is a 84 y.o. male   PRIMARY DIAGNOSIS: Community acquired pneumonia, unspecified laterality  Community acquired pneumonia, unspecified laterality [J18.9]       Reason for Referral: Generalized Muscle Weakness (M62.81)  Difficulty in walking, Not elsewhere classified (R26.2)  Other abnormalities of gait and mobility (R26.89)  History of falling (Z91.81)  Inpatient: Payor: [facility] MEDICARE / Plan: [employer] MEDICARE-ADVANTAGE PPO / Product Type: Medicare /     ASSESSMENT:     REHAB RECOMMENDATIONS:   Recommendation to date pending progress:  Setting:  Continued physical therapy recommended at discharge.    Equipment:    To Be Determined     ASSESSMENT:  Mr. Titus Is a 84 y.o. male presenting to PT after being admitted on 1/6/25 for community acquired PNA. At time of initial evaluation, pt presents below baseline LOF with deficits in bed mobility, strength, transfers, balance, gait and activity tolerance limiting her overall functional mobility. Today, pt performed majority of mobility with CG/Min (A), inc time and cueing while requiring additional use of gait belt

## 2025-01-07 NOTE — PROGRESS NOTES
83 yo male with PMH of adenocarcinoma of cecum, hx VTE, multiple thyroid nodules, diabetes type 2, chronic systolic congestive heart failure sick sinus syndrome, paroxysmal atrial fibrillation who presented after midnight on 1/7/25 with a week or so of feeling unwell: Fever, chills, dyspnea. He presented in respiratory distress. ABG showed hypoxia and compensated hypercapnia. Denies ARIN. CXR showed bl infiltrates. UA showed UTI. He was put on rocephin and doxy and admitted.     Seen this AM wheezing with hilario. RR in 20s. He looks unwell. He does not feel better. He denies COPD or asthma.   - start IV steroids  - start scheduled bronchodilators out of concern for bronchits  - sputum cx ordered  - check MRSA/MSSA  - mucinex  - Saline nebs  - 1mg IV morphine given 2/2 tachypnea   - stop lasix as he looks dry  - will hold off on RVP as it wont . COVID and flu negative   - urine cx added on to UA   - labs in AM   - PT and OT ordered

## 2025-01-07 NOTE — CARE COORDINATION
01/07/25 0945   Service Assessment   Patient Orientation Alert and Oriented   Cognition Alert   History Provided By Patient   Primary Caregiver Self   Support Systems Spouse/Significant Other   Patient's Healthcare Decision Maker is: Legal Next of Kin   PCP Verified by CM Yes   Last Visit to PCP Within last 6 months   Prior Functional Level Independent in ADLs/IADLs   Current Functional Level Independent in ADLs/IADLs   Can patient return to prior living arrangement Yes   Ability to make needs known: Good   Family able to assist with home care needs: Yes   Would you like for me to discuss the discharge plan with any other family members/significant others, and if so, who? Yes   Financial Resources Medicare   Community Resources None   Social/Functional History   Lives With Spouse   Type of Home House   Condition of Participation: Discharge Planning   The Plan for Transition of Care is related to the following treatment goals: return home with spouse   The Patient and/or Patient Representative was provided with a Choice of Provider? Patient   The Patient and/Or Patient Representative agree with the Discharge Plan? Yes   Freedom of Choice list was provided with basic dialogue that supports the patient's individualized plan of care/goals, treatment preferences, and shares the quality data associated with the providers?  Yes     Assessment completed with patient at bedside. Patient lives with his spouse. Patient reports no use of an assistive devices at baseline. No home oxygen, patient currently requiring supplemental oxygen. No current in home services. Demographics and PCP confirmed. Discharge plan is to return home with spouse.      Shirley WINSTON, M  [place]

## 2025-01-08 LAB
ANION GAP SERPL CALC-SCNC: 16 MMOL/L (ref 7–16)
BUN SERPL-MCNC: 26 MG/DL (ref 8–23)
CALCIUM SERPL-MCNC: 8.6 MG/DL (ref 8.8–10.2)
CHLORIDE SERPL-SCNC: 95 MMOL/L (ref 98–107)
CO2 SERPL-SCNC: 24 MMOL/L (ref 20–29)
CREAT SERPL-MCNC: 1.44 MG/DL (ref 0.8–1.3)
ERYTHROCYTE [DISTWIDTH] IN BLOOD BY AUTOMATED COUNT: 13.4 % (ref 11.9–14.6)
EST. AVERAGE GLUCOSE BLD GHB EST-MCNC: 142 MG/DL
GLUCOSE BLD STRIP.AUTO-MCNC: 151 MG/DL (ref 65–100)
GLUCOSE BLD STRIP.AUTO-MCNC: 186 MG/DL (ref 65–100)
GLUCOSE BLD STRIP.AUTO-MCNC: 205 MG/DL (ref 65–100)
GLUCOSE BLD STRIP.AUTO-MCNC: 221 MG/DL (ref 65–100)
GLUCOSE BLD STRIP.AUTO-MCNC: 233 MG/DL (ref 65–100)
GLUCOSE SERPL-MCNC: 207 MG/DL (ref 70–99)
HBA1C MFR BLD: 6.6 % (ref 0–5.6)
HCT VFR BLD AUTO: 41.5 % (ref 41.1–50.3)
HGB BLD-MCNC: 13.9 G/DL (ref 13.6–17.2)
MAGNESIUM SERPL-MCNC: 1.9 MG/DL (ref 1.8–2.4)
MCH RBC QN AUTO: 30 PG (ref 26.1–32.9)
MCHC RBC AUTO-ENTMCNC: 33.5 G/DL (ref 31.4–35)
MCV RBC AUTO: 89.6 FL (ref 82–102)
NRBC # BLD: 0 K/UL (ref 0–0.2)
PLATELET # BLD AUTO: 149 K/UL (ref 150–450)
PMV BLD AUTO: 9.3 FL (ref 9.4–12.3)
POTASSIUM SERPL-SCNC: 3.3 MMOL/L (ref 3.5–5.1)
RBC # BLD AUTO: 4.63 M/UL (ref 4.23–5.6)
SERVICE CMNT-IMP: ABNORMAL
SODIUM SERPL-SCNC: 135 MMOL/L (ref 136–145)
WBC # BLD AUTO: 7.8 K/UL (ref 4.3–11.1)

## 2025-01-08 PROCEDURE — 6370000000 HC RX 637 (ALT 250 FOR IP): Performed by: HOSPITALIST

## 2025-01-08 PROCEDURE — 2500000003 HC RX 250 WO HCPCS: Performed by: HOSPITALIST

## 2025-01-08 PROCEDURE — 6360000002 HC RX W HCPCS: Performed by: PHYSICIAN ASSISTANT

## 2025-01-08 PROCEDURE — 85027 COMPLETE CBC AUTOMATED: CPT

## 2025-01-08 PROCEDURE — 2580000003 HC RX 258: Performed by: PHYSICIAN ASSISTANT

## 2025-01-08 PROCEDURE — 6370000000 HC RX 637 (ALT 250 FOR IP): Performed by: PHYSICIAN ASSISTANT

## 2025-01-08 PROCEDURE — 94640 AIRWAY INHALATION TREATMENT: CPT

## 2025-01-08 PROCEDURE — 2500000003 HC RX 250 WO HCPCS: Performed by: PHYSICIAN ASSISTANT

## 2025-01-08 PROCEDURE — 83735 ASSAY OF MAGNESIUM: CPT

## 2025-01-08 PROCEDURE — 1100000003 HC PRIVATE W/ TELEMETRY

## 2025-01-08 PROCEDURE — 6360000002 HC RX W HCPCS: Performed by: HOSPITALIST

## 2025-01-08 PROCEDURE — 6360000002 HC RX W HCPCS: Performed by: NURSE PRACTITIONER

## 2025-01-08 PROCEDURE — 94760 N-INVAS EAR/PLS OXIMETRY 1: CPT

## 2025-01-08 PROCEDURE — 97530 THERAPEUTIC ACTIVITIES: CPT

## 2025-01-08 PROCEDURE — 80048 BASIC METABOLIC PNL TOTAL CA: CPT

## 2025-01-08 PROCEDURE — 97165 OT EVAL LOW COMPLEX 30 MIN: CPT

## 2025-01-08 PROCEDURE — 94761 N-INVAS EAR/PLS OXIMETRY MLT: CPT

## 2025-01-08 PROCEDURE — 82962 GLUCOSE BLOOD TEST: CPT

## 2025-01-08 PROCEDURE — 36415 COLL VENOUS BLD VENIPUNCTURE: CPT

## 2025-01-08 PROCEDURE — 83036 HEMOGLOBIN GLYCOSYLATED A1C: CPT

## 2025-01-08 PROCEDURE — 97535 SELF CARE MNGMENT TRAINING: CPT

## 2025-01-08 PROCEDURE — 2500000003 HC RX 250 WO HCPCS: Performed by: NURSE PRACTITIONER

## 2025-01-08 RX ORDER — PREDNISONE 5 MG/1
5 TABLET ORAL DAILY
Status: DISCONTINUED | OUTPATIENT
Start: 2025-01-18 | End: 2025-01-09 | Stop reason: HOSPADM

## 2025-01-08 RX ORDER — PREDNISONE 20 MG/1
20 TABLET ORAL DAILY
Status: DISCONTINUED | OUTPATIENT
Start: 2025-01-09 | End: 2025-01-09 | Stop reason: HOSPADM

## 2025-01-08 RX ORDER — PREDNISONE 10 MG/1
10 TABLET ORAL DAILY
Status: DISCONTINUED | OUTPATIENT
Start: 2025-01-15 | End: 2025-01-09 | Stop reason: HOSPADM

## 2025-01-08 RX ORDER — POTASSIUM CHLORIDE 1500 MG/1
20 TABLET, EXTENDED RELEASE ORAL ONCE
Status: COMPLETED | OUTPATIENT
Start: 2025-01-08 | End: 2025-01-08

## 2025-01-08 RX ADMIN — WATER 5 MG: 1 INJECTION INTRAMUSCULAR; INTRAVENOUS; SUBCUTANEOUS at 19:48

## 2025-01-08 RX ADMIN — WATER 80 MG: 1 INJECTION INTRAMUSCULAR; INTRAVENOUS; SUBCUTANEOUS at 08:08

## 2025-01-08 RX ADMIN — BUDESONIDE INHALATION 500 MCG: 0.5 SUSPENSION RESPIRATORY (INHALATION) at 08:47

## 2025-01-08 RX ADMIN — POTASSIUM CHLORIDE 20 MEQ: 1500 TABLET, EXTENDED RELEASE ORAL at 08:07

## 2025-01-08 RX ADMIN — TAMSULOSIN HYDROCHLORIDE 0.4 MG: 0.4 CAPSULE ORAL at 08:07

## 2025-01-08 RX ADMIN — SODIUM CHLORIDE, PRESERVATIVE FREE 10 ML: 5 INJECTION INTRAVENOUS at 08:11

## 2025-01-08 RX ADMIN — DOXYCYCLINE HYCLATE 100 MG: 100 CAPSULE ORAL at 21:13

## 2025-01-08 RX ADMIN — ATORVASTATIN CALCIUM 80 MG: 40 TABLET, FILM COATED ORAL at 08:06

## 2025-01-08 RX ADMIN — IPRATROPIUM BROMIDE AND ALBUTEROL SULFATE 1 DOSE: 2.5; .5 SOLUTION RESPIRATORY (INHALATION) at 08:47

## 2025-01-08 RX ADMIN — BUDESONIDE INHALATION 500 MCG: 0.5 SUSPENSION RESPIRATORY (INHALATION) at 20:37

## 2025-01-08 RX ADMIN — WATER 1000 MG: 1 INJECTION INTRAMUSCULAR; INTRAVENOUS; SUBCUTANEOUS at 21:48

## 2025-01-08 RX ADMIN — INSULIN LISPRO 1 UNITS: 100 INJECTION, SOLUTION INTRAVENOUS; SUBCUTANEOUS at 17:06

## 2025-01-08 RX ADMIN — Medication 4 ML: at 20:37

## 2025-01-08 RX ADMIN — GUAIFENESIN 1200 MG: 600 TABLET ORAL at 08:06

## 2025-01-08 RX ADMIN — Medication 4 ML: at 08:47

## 2025-01-08 RX ADMIN — ARFORMOTEROL TARTRATE 15 MCG: 15 SOLUTION RESPIRATORY (INHALATION) at 08:47

## 2025-01-08 RX ADMIN — ARFORMOTEROL TARTRATE 15 MCG: 15 SOLUTION RESPIRATORY (INHALATION) at 20:37

## 2025-01-08 RX ADMIN — WATER 80 MG: 1 INJECTION INTRAMUSCULAR; INTRAVENOUS; SUBCUTANEOUS at 00:59

## 2025-01-08 RX ADMIN — ACETAMINOPHEN 650 MG: 325 TABLET ORAL at 22:30

## 2025-01-08 RX ADMIN — INSULIN LISPRO 1 UNITS: 100 INJECTION, SOLUTION INTRAVENOUS; SUBCUTANEOUS at 11:35

## 2025-01-08 RX ADMIN — ENOXAPARIN SODIUM 40 MG: 100 INJECTION SUBCUTANEOUS at 08:07

## 2025-01-08 RX ADMIN — ROPINIROLE HYDROCHLORIDE 0.5 MG: 1 TABLET, FILM COATED ORAL at 21:13

## 2025-01-08 RX ADMIN — SODIUM CHLORIDE, PRESERVATIVE FREE 10 ML: 5 INJECTION INTRAVENOUS at 21:14

## 2025-01-08 RX ADMIN — IPRATROPIUM BROMIDE AND ALBUTEROL SULFATE 1 DOSE: 2.5; .5 SOLUTION RESPIRATORY (INHALATION) at 20:37

## 2025-01-08 RX ADMIN — GUAIFENESIN 1200 MG: 600 TABLET ORAL at 21:13

## 2025-01-08 RX ADMIN — DOXYCYCLINE HYCLATE 100 MG: 100 CAPSULE ORAL at 08:07

## 2025-01-08 RX ADMIN — IPRATROPIUM BROMIDE AND ALBUTEROL SULFATE 1 DOSE: 2.5; .5 SOLUTION RESPIRATORY (INHALATION) at 15:16

## 2025-01-08 RX ADMIN — INSULIN LISPRO 1 UNITS: 100 INJECTION, SOLUTION INTRAVENOUS; SUBCUTANEOUS at 08:07

## 2025-01-08 ASSESSMENT — PAIN SCALES - GENERAL: PAINLEVEL_OUTOF10: 0

## 2025-01-08 NOTE — PLAN OF CARE
Problem: Chronic Conditions and Co-morbidities  Goal: Patient's chronic conditions and co-morbidity symptoms are monitored and maintained or improved  Outcome: Progressing  Flowsheets (Taken 1/7/2025 1915)  Care Plan - Patient's Chronic Conditions and Co-Morbidity Symptoms are Monitored and Maintained or Improved: Monitor and assess patient's chronic conditions and comorbid symptoms for stability, deterioration, or improvement     Problem: Discharge Planning  Goal: Discharge to home or other facility with appropriate resources  Outcome: Progressing  Flowsheets (Taken 1/7/2025 1915)  Discharge to home or other facility with appropriate resources:   Identify barriers to discharge with patient and caregiver   Identify discharge learning needs (meds, wound care, etc)   Refer to discharge planning if patient needs post-hospital services based on physician order or complex needs related to functional status, cognitive ability or social support system     Problem: Pain  Goal: Verbalizes/displays adequate comfort level or baseline comfort level  Outcome: Progressing

## 2025-01-08 NOTE — PROGRESS NOTES
Hospitalist Progress Note   Admit Date:  2025 10:36 PM   Name:  Aguilar Titus   Age:  84 y.o.  Sex:  male  :  1940   MRN:  [id number]   Room:  [facility]/    Presenting/Chief Complaint: Shortness of Breath     Reason(s) for Admission: Community acquired pneumonia, unspecified laterality [J18.9]     Hospital Course:   Aguilar Titus is a 84 y.o. male with medical history of  adenocarcinoma of cecum, hx VTE, multiple thyroid nodules, diabetes type 2, chronic systolic congestive heart failure sick sinus syndrome, paroxysmal atrial fibrillation who presented after midnight on 25 with a week or so of feeling unwell: Fever, chills, dyspnea. He presented in respiratory distress. ABG showed hypoxia and compensated hypercapnia. Denies ARIN. CXR showed bl infiltrates. UA showed UTI. He was put on rocephin and doxy and admitted. Given significant bronchitis with respiratory distress steroids and bronchodilators started.     Subjective & 24hr Events:   \"I feel much better today!\"  Looks much better  Urine cx has not resulted  Scr bumped  but could be from lasix IV yest which was eventually stopped  Encouraged oral hydration   Seen by PT and OT who recommend home with home health and/or outpatient therapy     Assessment & Plan:       Community acquired pneumonia, unspecified laterality    Bronchitis    Acute respiratory failure with hypoxia  - stop IV steroids and transition to oral  - scheduled bronchodilators out of concern for bronchits  - MRSA/MSSA negative  - mucinex  - Saline nebs  - stopped lasix as he looks dry  - will hold off on RVP as it wont . COVID and flu negative   - needs walk test prior to dc  - he denies COPD, asthma or hx of tobacco use       Chronic systolic (congestive) heart failure  - no need for IV lasix. Hold on oral lasix as well given hypovolemia and scr bump      Paroxysmal atrial fibrillation (HCC)  - not on AC and not on rate controlling meds      Type 2

## 2025-01-08 NOTE — PLAN OF CARE
Problem: Respiratory - Adult  Goal: Achieves optimal ventilation and oxygenation  Outcome: Progressing  Flowsheets (Taken 1/8/2025 6221)  Achieves optimal ventilation and oxygenation:   Assess for changes in respiratory status   Position to facilitate oxygenation and minimize respiratory effort   Respiratory therapy support as indicated   Assess for changes in mentation and behavior   Oxygen supplementation based on oxygen saturation or arterial blood gases   Encourage broncho-pulmonary hygiene including cough, deep breathe, incentive spirometry   Assess and instruct to report shortness of breath or any respiratory difficulty

## 2025-01-08 NOTE — PROGRESS NOTES
Patient had 1 bowel movement this shift.  Worked with PT today.  Given ordered insulin for breakfast, lunch, and dinner.  Rounds performed throughout shift.  Patient denies needs at this time.  Bed in low position, locked, call light and personal items within reach.  Will give handoff report to PM RN.

## 2025-01-08 NOTE — THERAPY EVALUATION
ACUTE OCCUPATIONAL THERAPY GOALS:   (Developed with and agreed upon by patient and/or caregiver.)  1. Patient will complete lower body bathing and dressing with MODIFIED INDEPENDENCE and adaptive equipment as needed.     2. Patient will complete toileting with MODIFIED INDEPENDENCE.  3. Patient will complete grooming ADL standing edge of sink with MODIFIED INDEPENDENCE.  4. Patient will tolerate 25 minutes of OT treatment with 1-2 rest breaks to increase activity tolerance for ADLs.   5. Patient will complete functional transfers with MODIFIED INDEPENDENCE and adaptive equipment as needed.   6. Patient will tolerate 10 minutes BUE exercises to increase strength for safe, functional transfers.     Timeframe: 7 visits      OCCUPATIONAL THERAPY Initial Assessment, Daily Note, and AM       OT Visit Days: 1  Acknowledge Orders  Time  OT Charge Capture  Rehab Caseload Tracker      Aguilar Titus is a 84 y.o. male   PRIMARY DIAGNOSIS: Community acquired pneumonia, unspecified laterality  Community acquired pneumonia, unspecified laterality [J18.9]       Reason for Referral: Generalized Muscle Weakness (M62.81)  Other lack of cordination (R27.8)  Difficulty in walking, Not elsewhere classified (R26.2)  Inpatient: Payor: [facility] MEDICARE / Plan: [employer] MEDICARE-ADVANTAGE PPO / Product Type: Medicare /     ASSESSMENT:     REHAB RECOMMENDATIONS:   Recommendation to date pending progress:  Setting:  Home Health Therapy    Equipment:    To Be Determined  Reports he has rolling walker, cane, shower chair     ASSESSMENT:  Mr. Titus .reports that he is independent at baseline, drives and lives with his wife at baseline. Today, pt overall CGA/Min A for functional transfers and mobility of household distances. Pt completed grooming tasks standing at sink with CGA. He performs activity on room air with O2 sats remaining in low to mid 90s. He presents with decreased safety awareness and is unsteady but refuses use of walker.

## 2025-01-08 NOTE — PLAN OF CARE
Problem: Chronic Conditions and Co-morbidities  Goal: Patient's chronic conditions and co-morbidity symptoms are monitored and maintained or improved  1/8/2025 0724 by Linda Mckeon RN  Outcome: Progressing  1/7/2025 2345 by Ginger Charles RN  Outcome: Progressing  Flowsheets (Taken 1/7/2025 1915)  Care Plan - Patient's Chronic Conditions and Co-Morbidity Symptoms are Monitored and Maintained or Improved: Monitor and assess patient's chronic conditions and comorbid symptoms for stability, deterioration, or improvement     Problem: Discharge Planning  Goal: Discharge to home or other facility with appropriate resources  1/8/2025 0724 by Linda Mckeon RN  Outcome: Progressing  1/7/2025 2345 by Ginger Charles RN  Outcome: Progressing  Flowsheets (Taken 1/7/2025 1915)  Discharge to home or other facility with appropriate resources:   Identify barriers to discharge with patient and caregiver   Identify discharge learning needs (meds, wound care, etc)   Refer to discharge planning if patient needs post-hospital services based on physician order or complex needs related to functional status, cognitive ability or social support system     Problem: Pain  Goal: Verbalizes/displays adequate comfort level or baseline comfort level  1/8/2025 0724 by Linda Mckeon RN  Outcome: Progressing  1/7/2025 2345 by Ginger Charles RN  Outcome: Progressing     Problem: Skin/Tissue Integrity  Goal: Absence of new skin breakdown  Description: 1.  Monitor for areas of redness and/or skin breakdown  2.  Assess vascular access sites hourly  3.  Every 4-6 hours minimum:  Change oxygen saturation probe site  4.  Every 4-6 hours:  If on nasal continuous positive airway pressure, respiratory therapy assess nares and determine need for appliance change or resting period.  1/8/2025 0724 by Linda Mckeon, RN  Outcome: Progressing  1/7/2025 2345 by Ginger Charles RN  Outcome: Progressing     Problem: Safety - Adult  Goal: Free from

## 2025-01-08 NOTE — PROGRESS NOTES
ACUTE PHYSICAL THERAPY GOALS:   (Developed with and agreed upon by patient and/or caregiver.)  Pt will perform bed mobility Mod (I) c inc time, cueing and use of rails as needed in 7 therapy sessions.  Pt will perform sit-to-stand/ stand-to-sit transfers Mod (I) c use of LRAD/external supports as needed and no LOB or miss-steps in 7 therapy sessions.  Pt will ambulate 250 ft Mod (I) with use of LRAD, no LOB or miss-steps and breaks as needed in 7 therapy sessions.  Pt will perform standing dynamic balance activities with minimal postural sway in 7 therapy sessions.  Pt will tolerate multiple sets and reps of BLE exercises in 7 therapy sessions.    PHYSICAL THERAPY: Daily Note AM   (Link to Caseload Tracking: PT Visit Days : 2  Time In/Out PT Charge Capture  Rehab Caseload Tracker  Orders    Aguilar Titus is a 84 y.o. male   PRIMARY DIAGNOSIS: Community acquired pneumonia, unspecified laterality  Community acquired pneumonia, unspecified laterality [J18.9]       Inpatient: Payor: [facility] MEDICARE / Plan: [facility] MEDICARE-ADVANTAGE PPO / Product Type: Medicare /     ASSESSMENT:     REHAB RECOMMENDATIONS:   Recommendation to date pending progress:  Setting:  Continued physical therapy recommended at discharge.    Equipment:    To Be Determined  Has a RW at home.      ASSESSMENT:  Mr. Titus was sitting in chair on contact and agreeable to work with therapy. The PT session today focused on therapeutic activities including standing balance and amb. He did not use an AD but stated he has a RW at home if needed.  Pt needed CGA/SBA for amb and cues for step length and posture. He is somewhat impulsive.  Progress toward goals is slow. Pt still functioning below their baseline with deficits in strength and endurance and will benefit from continued physical therapy.  .     SUBJECTIVE:   Mr. Titus states \"How far do you want me to go?\"     Social/Functional Lives With: Spouse  Type of Home: House  OBJECTIVE:     PAIN:

## 2025-01-09 ENCOUNTER — APPOINTMENT (OUTPATIENT)
Dept: CT IMAGING | Age: 85
DRG: 193 | End: 2025-01-09
Payer: MEDICARE

## 2025-01-09 VITALS
HEIGHT: 69 IN | OXYGEN SATURATION: 92 % | HEART RATE: 78 BPM | TEMPERATURE: 97.3 F | DIASTOLIC BLOOD PRESSURE: 62 MMHG | RESPIRATION RATE: 17 BRPM | SYSTOLIC BLOOD PRESSURE: 103 MMHG | BODY MASS INDEX: 31.1 KG/M2 | WEIGHT: 210 LBS

## 2025-01-09 PROBLEM — J96.01 ACUTE RESPIRATORY FAILURE WITH HYPOXIA: Status: RESOLVED | Noted: 2025-01-07 | Resolved: 2025-01-09

## 2025-01-09 PROBLEM — N39.0 UTI (URINARY TRACT INFECTION): Status: RESOLVED | Noted: 2025-01-07 | Resolved: 2025-01-09

## 2025-01-09 LAB
ANION GAP SERPL CALC-SCNC: 16 MMOL/L (ref 7–16)
BACTERIA SPEC CULT: NORMAL
BUN SERPL-MCNC: 33 MG/DL (ref 8–23)
CALCIUM SERPL-MCNC: 9.3 MG/DL (ref 8.8–10.2)
CHLORIDE SERPL-SCNC: 97 MMOL/L (ref 98–107)
CO2 SERPL-SCNC: 23 MMOL/L (ref 20–29)
CREAT SERPL-MCNC: 1.18 MG/DL (ref 0.8–1.3)
ERYTHROCYTE [DISTWIDTH] IN BLOOD BY AUTOMATED COUNT: 13.2 % (ref 11.9–14.6)
GLUCOSE BLD STRIP.AUTO-MCNC: 138 MG/DL (ref 65–100)
GLUCOSE BLD STRIP.AUTO-MCNC: 167 MG/DL (ref 65–100)
GLUCOSE SERPL-MCNC: 185 MG/DL (ref 70–99)
HCT VFR BLD AUTO: 45.2 % (ref 41.1–50.3)
HGB BLD-MCNC: 15.2 G/DL (ref 13.6–17.2)
MAGNESIUM SERPL-MCNC: 1.9 MG/DL (ref 1.8–2.4)
MCH RBC QN AUTO: 30.2 PG (ref 26.1–32.9)
MCHC RBC AUTO-ENTMCNC: 33.6 G/DL (ref 31.4–35)
MCV RBC AUTO: 89.7 FL (ref 82–102)
NRBC # BLD: 0 K/UL (ref 0–0.2)
PLATELET # BLD AUTO: 181 K/UL (ref 150–450)
PMV BLD AUTO: 9.7 FL (ref 9.4–12.3)
POTASSIUM SERPL-SCNC: 3.4 MMOL/L (ref 3.5–5.1)
RBC # BLD AUTO: 5.04 M/UL (ref 4.23–5.6)
SERVICE CMNT-IMP: ABNORMAL
SERVICE CMNT-IMP: ABNORMAL
SERVICE CMNT-IMP: NORMAL
SODIUM SERPL-SCNC: 137 MMOL/L (ref 136–145)
WBC # BLD AUTO: 13.9 K/UL (ref 4.3–11.1)

## 2025-01-09 PROCEDURE — 6370000000 HC RX 637 (ALT 250 FOR IP): Performed by: PHYSICIAN ASSISTANT

## 2025-01-09 PROCEDURE — 6360000002 HC RX W HCPCS: Performed by: PHYSICIAN ASSISTANT

## 2025-01-09 PROCEDURE — 83735 ASSAY OF MAGNESIUM: CPT

## 2025-01-09 PROCEDURE — 6360000002 HC RX W HCPCS: Performed by: HOSPITALIST

## 2025-01-09 PROCEDURE — 85027 COMPLETE CBC AUTOMATED: CPT

## 2025-01-09 PROCEDURE — 82962 GLUCOSE BLOOD TEST: CPT

## 2025-01-09 PROCEDURE — 2580000003 HC RX 258: Performed by: PHYSICIAN ASSISTANT

## 2025-01-09 PROCEDURE — 80048 BASIC METABOLIC PNL TOTAL CA: CPT

## 2025-01-09 PROCEDURE — 36415 COLL VENOUS BLD VENIPUNCTURE: CPT

## 2025-01-09 PROCEDURE — 94640 AIRWAY INHALATION TREATMENT: CPT

## 2025-01-09 PROCEDURE — 70450 CT HEAD/BRAIN W/O DYE: CPT

## 2025-01-09 PROCEDURE — 6370000000 HC RX 637 (ALT 250 FOR IP): Performed by: HOSPITALIST

## 2025-01-09 RX ORDER — PREDNISONE 5 MG/1
TABLET ORAL
Qty: 26 TABLET | Refills: 0 | Status: SHIPPED | OUTPATIENT
Start: 2025-01-10 | End: 2025-01-20

## 2025-01-09 RX ORDER — DOXYCYCLINE 100 MG/1
100 CAPSULE ORAL EVERY 12 HOURS SCHEDULED
Qty: 5 CAPSULE | Refills: 0 | Status: SHIPPED | OUTPATIENT
Start: 2025-01-09 | End: 2025-01-12

## 2025-01-09 RX ORDER — CEFDINIR 300 MG/1
300 CAPSULE ORAL 2 TIMES DAILY
Qty: 4 CAPSULE | Refills: 0 | Status: SHIPPED | OUTPATIENT
Start: 2025-01-10 | End: 2025-01-12

## 2025-01-09 RX ORDER — POTASSIUM CHLORIDE 1500 MG/1
20 TABLET, EXTENDED RELEASE ORAL 2 TIMES DAILY
Status: DISCONTINUED | OUTPATIENT
Start: 2025-01-09 | End: 2025-01-09 | Stop reason: HOSPADM

## 2025-01-09 RX ORDER — GUAIFENESIN 600 MG/1
600 TABLET, EXTENDED RELEASE ORAL 2 TIMES DAILY
Qty: 14 TABLET | Refills: 0 | Status: SHIPPED | OUTPATIENT
Start: 2025-01-09

## 2025-01-09 RX ORDER — ALBUTEROL SULFATE 90 UG/1
2 INHALANT RESPIRATORY (INHALATION) EVERY 6 HOURS PRN
Qty: 18 G | Refills: 1 | Status: SHIPPED | OUTPATIENT
Start: 2025-01-09

## 2025-01-09 RX ADMIN — Medication 4 ML: at 08:30

## 2025-01-09 RX ADMIN — ENOXAPARIN SODIUM 40 MG: 100 INJECTION SUBCUTANEOUS at 08:23

## 2025-01-09 RX ADMIN — ARFORMOTEROL TARTRATE 15 MCG: 15 SOLUTION RESPIRATORY (INHALATION) at 08:30

## 2025-01-09 RX ADMIN — ATORVASTATIN CALCIUM 80 MG: 40 TABLET, FILM COATED ORAL at 08:23

## 2025-01-09 RX ADMIN — GUAIFENESIN 1200 MG: 600 TABLET ORAL at 08:23

## 2025-01-09 RX ADMIN — POTASSIUM CHLORIDE 20 MEQ: 1500 TABLET, EXTENDED RELEASE ORAL at 08:23

## 2025-01-09 RX ADMIN — BUDESONIDE INHALATION 500 MCG: 0.5 SUSPENSION RESPIRATORY (INHALATION) at 08:30

## 2025-01-09 RX ADMIN — IPRATROPIUM BROMIDE AND ALBUTEROL SULFATE 1 DOSE: 2.5; .5 SOLUTION RESPIRATORY (INHALATION) at 08:30

## 2025-01-09 RX ADMIN — PREDNISONE 20 MG: 20 TABLET ORAL at 08:23

## 2025-01-09 RX ADMIN — TAMSULOSIN HYDROCHLORIDE 0.4 MG: 0.4 CAPSULE ORAL at 08:22

## 2025-01-09 RX ADMIN — DOXYCYCLINE HYCLATE 100 MG: 100 CAPSULE ORAL at 08:22

## 2025-01-09 NOTE — DISCHARGE INSTRUCTIONS
Medications to continue have been prescribed.   CT head was without stroke.   Please see PCP in 1-2 weeks for post hospital follow up.   Be well!

## 2025-01-09 NOTE — PROGRESS NOTES
Discharge instructions given to patient and wife.  IV removed without complications.  VS stable.  Patient will be taken to lobby by [facility] for discharge.

## 2025-01-09 NOTE — CARE COORDINATION
Chart reviewed and patient discussed in IDT rounds this AM. Patient discharging home with  spouse today. [facility]/ [facility] declined patient for home health. Referral sent to [facility] and accepted.     Shirley WINSTON, ACM  [facility]

## 2025-01-09 NOTE — DISCHARGE SUMMARY
Hospitalist Discharge Summary   Admit Date:  2025 10:36 PM   DC Note date: 2025  Name:  Aguilar iTtus   Age:  84 y.o.  Sex:  male  :  1940   MRN:  [id number]   Room:  [facility]  PCP:  Nelsy Stovall DO    Presenting Complaint: Shortness of Breath     Initial Admission Diagnosis: Community acquired pneumonia, unspecified laterality [J18.9]     Problem List for this Hospitalization (present on admission):    Principal Problem:    Bilateral pneumonia  Active Problems:    Chronic systolic (congestive) heart failure    Paroxysmal atrial fibrillation (HCC)    Type 2 diabetes mellitus without complication, without long-term current use of insulin (HCC)    Bronchitis  Resolved Problems:    Acute respiratory failure with hypoxia    UTI (urinary tract infection)      Hospital Course:  Aguilar Titus is a 84 y.o. male with medical history of  adenocarcinoma of cecum, hx VTE, multiple thyroid nodules, diabetes type 2, chronic systolic congestive heart failure sick sinus syndrome, paroxysmal atrial fibrillation who presented after midnight on 25 with a week or so of feeling unwell: Fever, chills, dyspnea. He presented in respiratory distress. ABG showed hypoxia and compensated hypercapnia (PH 7.39, PO2 33 and PCO2 48) . Denies ARIN or COPD. CXR showed bl infiltrates. UA showed UTI. He was put on rocephin and doxy and admitted. Given significant bronchitis with respiratory distress steroids and bronchodilators started. He was weaned off supplemental oxygen even with ambulation on 25. Ucx and Bcx did not grow bacteria. Covid and flu were negative. He denied any  complaints. He did well with PT and OT who recommended outpatient therapy. Home health will be arranged by case management. Notably patient did have some what seems to be hospital acquired delirium. This could have been exacerbated by infection as well as burst steroids and high dose mucinex which have been weaned at discharge. I do not

## 2025-01-09 NOTE — PROGRESS NOTES
01/09/25 0900   Resting (Room Air)   SpO2 97   HR 80   During Walk (Room Air)   SpO2 95   HR 82   Walk/Assistance Device Ambulation   Rate of Dyspnea 0   During Walk (On O2)   Need Additional O2 Flow Rate Rows No   Walk/Assistance Device Ambulation   Rate of Dyspnea 0   After Walk   Does the Patient Qualify for Home O2 No   Does the Patient Need Portable Oxygen Tanks No

## 2025-01-09 NOTE — CARE COORDINATION
Home health referral sent to [facility].     Shirley WINSTON, ACM  [facility]

## 2025-01-10 ENCOUNTER — CARE COORDINATION (OUTPATIENT)
Dept: CARE COORDINATION | Facility: CLINIC | Age: 85
End: 2025-01-10

## 2025-01-10 NOTE — CARE COORDINATION
Care Transitions Note    Initial Call - Call within 2 business days of discharge: Yes    Attempted to reach patient for transitions of care follow up. Unable to reach patient.    Outreach Attempts:   Unsuccessful outreach for AVERY call    Patient: Aguilar Titus    Patient : 1940   MRN: [id number]    Reason for Admission: bilateral PNA  Discharge Date: 25  RURS: Readmission Risk Score: 13.1    Last Discharge Facility       Date Complaint Diagnosis Description Type Department Provider    25 Shortness of Breath Community acquired pneumonia, unspecified laterality ED to Hosp-Admission (Discharged) (ADMITTED) [id number] Matthew Candelario MD; Lloyd Gardner,...            Was this an external facility discharge? No    Follow Up Appointment:   Patient has hospital follow up appointment scheduled greater than 14 days after discharge; assist with appt with AVERY call.    Future Appointments         Provider Specialty Dept Phone    2025 8:30 AM [facility] DEVICE 39 Cardiology [phone]    2025 9:00 AM Manuel Hassan MD Cardiology [phone]    2025 3:00 PM Roderick Lozano MD Oncology [phone]            Plan for follow-up on next business day.      Tim Torres RN

## 2025-01-11 LAB
BACTERIA SPEC CULT: NORMAL
SERVICE CMNT-IMP: NORMAL

## 2025-01-12 LAB
BACTERIA SPEC CULT: NORMAL
SERVICE CMNT-IMP: NORMAL

## 2025-01-13 ENCOUNTER — CARE COORDINATION (OUTPATIENT)
Dept: CARE COORDINATION | Facility: CLINIC | Age: 85
End: 2025-01-13

## 2025-01-13 ENCOUNTER — TELEPHONE (OUTPATIENT)
Dept: FAMILY MEDICINE CLINIC | Facility: CLINIC | Age: 85
End: 2025-01-13

## 2025-01-13 NOTE — CARE COORDINATION
verbalized understanding.   Were discharge instructions available to patient? Yes.   Reviewed appropriate site of care based on symptoms and resources available to patient including: PCP  Specialist  Home health  When to call 911  CTN . The patient agrees to contact the primary care provider and/or specialist office for questions related to their healthcare.      Advance Care Planning:   Does patient have an Advance Directive: patient declined education.    Medication Reconciliation:  Medication reconciliation was not performed during this call, patient indicated he was not interested and could not complete at this time.  SOC completed and reviewed at that time per patient..     Remote Patient Monitoring:  Offered patient enrollment in the Remote Patient Monitoring (RPM) program for in-home monitoring: Yes, but did not enroll at this time: not interested .    Assessments:  Care Transitions 24 Hour Call    Schedule Follow Up Appointment with PCP: Completed  Do you have all of your prescriptions and are they filled?: Yes  Post Acute Services: Home Health  Patient DME: Walker  Do you have support at home?: Partner/Spouse/SO  Are you an active caregiver in your home?: No  Care Transitions Interventions     Other Services: Completed (Comment: [facility]  [place])             Follow Up Appointment:   Patient does not have a follow up appointment scheduled at time of call.  CTN to assist in scheduling    Future Appointments         Provider Specialty Dept Phone    2/12/2025 8:30 AM [facility] DEVICE 39 Cardiology [phone]    2/12/2025 9:00 AM Manuel Hassan MD Cardiology [phone]    4/8/2025 3:00 PM Roderick Lozano MD Oncology [phone]            Care Transition Nurse provided contact information.  Plan for follow-up call in 6-10 days based on severity of symptoms and risk factors.  Plan for next call:  follow up for s/s of concern and any acute needs that may arise. Assist with PCP follow up.

## 2025-01-13 NOTE — TELEPHONE ENCOUNTER
Pt needs hosp follow up  Discharged on 1/9. Needs to be seen within 7 days.    Scheduled for 2/7 (was soonest availability but nurse is requesting we see if we can get pt in sooner than this).

## 2025-01-13 NOTE — TELEPHONE ENCOUNTER
----- Message from Jacob SELF sent at 1/13/2025  1:18 PM EST -----  Regarding: ECC Appointment Request  ECC Appointment Request    Patient needs appointment for ECC Appointment Type: New to Provider.    Patient Requested Dates(s): As soon as possible  Patient Requested Time: Anytime  Provider Name: No preferably Doctor    Reason for Appointment Request: Other   --------------------------------------------------------------------------------------------------------------------------    Relationship to Patient: Mauricio Titus     Call Back Information: OK to leave message on voicemail  Preferred Call Back Number: Phone [phone]

## 2025-01-16 ENCOUNTER — OFFICE VISIT (OUTPATIENT)
Dept: INTERNAL MEDICINE CLINIC | Facility: CLINIC | Age: 85
End: 2025-01-16

## 2025-01-16 ENCOUNTER — LAB (OUTPATIENT)
Dept: FAMILY MEDICINE CLINIC | Facility: CLINIC | Age: 85
End: 2025-01-16

## 2025-01-16 DIAGNOSIS — J15.9 BACTERIAL PNEUMONIA: ICD-10-CM

## 2025-01-16 DIAGNOSIS — E11.9 TYPE 2 DIABETES MELLITUS WITHOUT COMPLICATION, WITHOUT LONG-TERM CURRENT USE OF INSULIN (HCC): ICD-10-CM

## 2025-01-16 DIAGNOSIS — N39.0 URINARY TRACT INFECTION WITHOUT HEMATURIA, SITE UNSPECIFIED: ICD-10-CM

## 2025-01-16 DIAGNOSIS — I10 PRIMARY HYPERTENSION: ICD-10-CM

## 2025-01-16 DIAGNOSIS — Z09 HOSPITAL DISCHARGE FOLLOW-UP: ICD-10-CM

## 2025-01-16 DIAGNOSIS — E78.5 DYSLIPIDEMIA: ICD-10-CM

## 2025-01-16 DIAGNOSIS — I10 PRIMARY HYPERTENSION: Primary | ICD-10-CM

## 2025-01-16 LAB
ANION GAP SERPL CALC-SCNC: 13 MMOL/L (ref 7–16)
BILIRUBIN, URINE, POC: NEGATIVE
BLOOD URINE, POC: NEGATIVE
BUN SERPL-MCNC: 20 MG/DL (ref 8–23)
CALCIUM SERPL-MCNC: 9.7 MG/DL (ref 8.8–10.2)
CHLORIDE SERPL-SCNC: 99 MMOL/L (ref 98–107)
CO2 SERPL-SCNC: 28 MMOL/L (ref 20–29)
CREAT SERPL-MCNC: 1.08 MG/DL (ref 0.8–1.3)
ERYTHROCYTE [DISTWIDTH] IN BLOOD BY AUTOMATED COUNT: 13.2 % (ref 11.9–14.6)
GLUCOSE SERPL-MCNC: 130 MG/DL (ref 70–99)
GLUCOSE URINE, POC: 100
HCT VFR BLD AUTO: 50.1 % (ref 41.1–50.3)
HGB BLD-MCNC: 16.1 G/DL (ref 13.6–17.2)
KETONES, URINE, POC: NEGATIVE
LEUKOCYTE ESTERASE, URINE, POC: NEGATIVE
MCH RBC QN AUTO: 29.9 PG (ref 26.1–32.9)
MCHC RBC AUTO-ENTMCNC: 32.1 G/DL (ref 31.4–35)
MCV RBC AUTO: 93.1 FL (ref 82–102)
NITRITE, URINE, POC: NEGATIVE
NRBC # BLD: 0 K/UL (ref 0–0.2)
PH, URINE, POC: 5.5 (ref 4.6–8)
PLATELET # BLD AUTO: 263 K/UL (ref 150–450)
PMV BLD AUTO: 9.8 FL (ref 9.4–12.3)
POTASSIUM SERPL-SCNC: 4.6 MMOL/L (ref 3.5–5.1)
PROTEIN,URINE, POC: NEGATIVE
RBC # BLD AUTO: 5.38 M/UL (ref 4.23–5.6)
SODIUM SERPL-SCNC: 140 MMOL/L (ref 136–145)
SPECIFIC GRAVITY, URINE, POC: 1.01 (ref 1–1.03)
URINALYSIS CLARITY, POC: CLEAR
URINALYSIS COLOR, POC: YELLOW
UROBILINOGEN, POC: NORMAL
WBC # BLD AUTO: 17.4 K/UL (ref 4.3–11.1)

## 2025-01-16 ASSESSMENT — PATIENT HEALTH QUESTIONNAIRE - PHQ9
SUM OF ALL RESPONSES TO PHQ9 QUESTIONS 1 & 2: 0
5. POOR APPETITE OR OVEREATING: NOT AT ALL
10. IF YOU CHECKED OFF ANY PROBLEMS, HOW DIFFICULT HAVE THESE PROBLEMS MADE IT FOR YOU TO DO YOUR WORK, TAKE CARE OF THINGS AT HOME, OR GET ALONG WITH OTHER PEOPLE: NOT DIFFICULT AT ALL
SUM OF ALL RESPONSES TO PHQ QUESTIONS 1-9: 1
9. THOUGHTS THAT YOU WOULD BE BETTER OFF DEAD, OR OF HURTING YOURSELF: NOT AT ALL
1. LITTLE INTEREST OR PLEASURE IN DOING THINGS: NOT AT ALL
3. TROUBLE FALLING OR STAYING ASLEEP: NOT AT ALL
SUM OF ALL RESPONSES TO PHQ QUESTIONS 1-9: 1
8. MOVING OR SPEAKING SO SLOWLY THAT OTHER PEOPLE COULD HAVE NOTICED. OR THE OPPOSITE, BEING SO FIGETY OR RESTLESS THAT YOU HAVE BEEN MOVING AROUND A LOT MORE THAN USUAL: NOT AT ALL
SUM OF ALL RESPONSES TO PHQ QUESTIONS 1-9: 1
6. FEELING BAD ABOUT YOURSELF - OR THAT YOU ARE A FAILURE OR HAVE LET YOURSELF OR YOUR FAMILY DOWN: NOT AT ALL
7. TROUBLE CONCENTRATING ON THINGS, SUCH AS READING THE NEWSPAPER OR WATCHING TELEVISION: NOT AT ALL
SUM OF ALL RESPONSES TO PHQ QUESTIONS 1-9: 1
4. FEELING TIRED OR HAVING LITTLE ENERGY: SEVERAL DAYS
2. FEELING DOWN, DEPRESSED OR HOPELESS: NOT AT ALL

## 2025-01-16 NOTE — PROGRESS NOTES
Heart sounds: Normal heart sounds.   Pulmonary:      Effort: Pulmonary effort is normal.      Breath sounds: Normal breath sounds.   Skin:     General: Skin is warm and dry.   Neurological:      General: No focal deficit present.      Mental Status: He is alert. Mental status is at baseline.   Psychiatric:         Mood and Affect: Mood normal.         Behavior: Behavior normal.         Thought Content: Thought content normal.         Judgment: Judgment normal.       An electronic signature was used to authenticate this note.  XIANG RODRIGUEZ DO   Elements of this note have been dictated via voice recognition software.  Text and phrases may be limited by the accuracy and autoconversion of the software.  The chart has been reviewed, but errors may still be present.

## 2025-01-20 ENCOUNTER — CARE COORDINATION (OUTPATIENT)
Dept: CARE COORDINATION | Facility: CLINIC | Age: 85
End: 2025-01-20

## 2025-01-20 NOTE — CARE COORDINATION
Care Transitions Note    Follow Up Call     Attempted to reach patient for transitions of care follow up.  Unable to reach patient.      Outreach Attempts:   Unsuccessful attempt at follow up     Care Summary Note: na    Follow Up Appointment:   Future Appointments         Provider Specialty Dept Phone    2/12/2025 8:30 AM [facility] [place] DEVICE 39 Cardiology [phone]    2/12/2025 9:00 AM Manuel Hassan MD Cardiology [phone]    3/6/2025 12:40 PM Nelsy Stovall DO Internal Medicine [phone]    4/8/2025 3:00 PM Roderick Lozano MD Oncology [phone]            Plan for follow-up call in 2-5 days based on severity of symptoms and risk factors. Plan for next call:  attempt follow up again    Tim Torres RN

## 2025-01-24 ENCOUNTER — CARE COORDINATION (OUTPATIENT)
Dept: CARE COORDINATION | Facility: CLINIC | Age: 85
End: 2025-01-24

## 2025-01-24 NOTE — CARE COORDINATION
Care Transitions Note    Initial Call - Call within 2 business days of discharge: Yes    Attempted to reach patient for transitions of care follow up.  Unable to reach patient.      Outreach Attempts:   Unsuccessful attempt at follow up     Care Summary Note: NA    Follow Up Appointment:   Future Appointments         Provider Specialty Dept Phone    2/12/2025 8:30 AM [facility] DEVICE 39 Cardiology [phone]    2/12/2025 9:00 AM Manuel Hassan MD Cardiology [phone]    3/6/2025 12:40 PM Nelsy Stovall,  Internal Medicine [phone]    4/8/2025 3:00 PM Roderick Lozano MD Oncology [phone]            Plan for follow-up call in 6-10 days based on severity of symptoms and risk factors. Plan for next call:  attempt follow up again    Tim Torres RN

## 2025-01-27 ENCOUNTER — CARE COORDINATION (OUTPATIENT)
Dept: CARE COORDINATION | Facility: CLINIC | Age: 85
End: 2025-01-27

## 2025-01-27 NOTE — CARE COORDINATION
Care Transitions Note    Final Call     Patient Current Location:  Home: [address] [place] [address] [place] [address]    N Care Coordinator contacted the patient by telephone. Verified name and  as identifiers.    Patient graduated from the Care Transitions program on 2025.  Patient/family verbalizes confidence in the ability to self-manage at this time..      Advance Care Planning:   Does patient have an Advance Directive: reviewed and current.    Handoff:   Patient was not referred to the ACM team due to no additional needs identified.       Care Summary Note: Spoke with patient states doing fine. Patient denies any acute distress during this phone call. Patient states attended hospital follow up on 2025. Patient states reports visit went well. Patient states appreciative for the follow up calls. No further calls indicated at this time.    Assessments:  Care Transitions Subsequent and Final Call    Subsequent and Final Calls  Do you have any ongoing symptoms?: No  Have your medications changed?: No  Do you have any questions related to your medications?: No  Do you currently have any active services?: No  Are you currently active with any services?: Home Health  Do you have any needs or concerns that I can assist you with?: No  Identified Barriers: None  Care Transitions Interventions  No Identified Needs     Other Services: Completed (Comment: Kevin [facility])   Other Interventions:              Upcoming Appointments:    Future Appointments         Provider Specialty Dept Phone    2025 8:30 AM [facility] DEVICE [facility] Cardiology [phone]    2025 9:00 AM Manuel Hassan MD Cardiology [phone]    3/6/2025 12:40 PM Nelsy Stovall DO Internal Medicine [phone]    2025 3:00 PM Roderick Lozano MD Oncology [phone]            Patient has agreed to contact primary care provider and/or specialist for any further questions, concerns, or needs.    Thea Jones LPN

## 2025-01-31 VITALS
HEIGHT: 69 IN | BODY MASS INDEX: 29.77 KG/M2 | SYSTOLIC BLOOD PRESSURE: 97 MMHG | DIASTOLIC BLOOD PRESSURE: 54 MMHG | WEIGHT: 201 LBS | TEMPERATURE: 98.6 F | OXYGEN SATURATION: 91 % | HEART RATE: 75 BPM

## 2025-02-03 ENCOUNTER — TELEPHONE (OUTPATIENT)
Dept: INTERNAL MEDICINE CLINIC | Facility: CLINIC | Age: 85
End: 2025-02-03

## 2025-02-03 NOTE — TELEPHONE ENCOUNTER
----- Message from Dr. Nelsy Stovall, DO sent at 1/31/2025  9:16 PM EST -----  Please inform that electrolytes + renal function is normal, elevated wbc is multifactorial, with stress, recent infection, and steroid treatment, will monitor for now. F/u as scheduled.

## 2025-02-05 ENCOUNTER — TELEPHONE (OUTPATIENT)
Dept: INTERNAL MEDICINE CLINIC | Facility: CLINIC | Age: 85
End: 2025-02-05

## 2025-02-05 ENCOUNTER — HOSPITAL ENCOUNTER (OUTPATIENT)
Dept: GENERAL RADIOLOGY | Age: 85
Discharge: HOME | End: 2025-02-08
Payer: MEDICARE

## 2025-02-05 DIAGNOSIS — J15.9 BACTERIAL PNEUMONIA: ICD-10-CM

## 2025-02-05 PROCEDURE — 71046 X-RAY EXAM CHEST 2 VIEWS: CPT

## 2025-02-05 NOTE — TELEPHONE ENCOUNTER
Home Health/PT/OT paperwork certification and plan of care, certification was received  2/5/25          Left on D.I desk for review and signature on 2/5/25

## 2025-02-07 NOTE — TELEPHONE ENCOUNTER
Home Health/PT/OT paperwork certification and plan of care, certification.   Faxed back on : 2/7/25  Confirmation Received

## 2025-02-12 ENCOUNTER — OFFICE VISIT (OUTPATIENT)
Age: 85
End: 2025-02-12
Payer: MEDICARE

## 2025-02-12 ENCOUNTER — TELEPHONE (OUTPATIENT)
Age: 85
End: 2025-02-12

## 2025-02-12 ENCOUNTER — NURSE ONLY (OUTPATIENT)
Age: 85
End: 2025-02-12

## 2025-02-12 VITALS
DIASTOLIC BLOOD PRESSURE: 60 MMHG | HEIGHT: 69 IN | BODY MASS INDEX: 30.36 KG/M2 | WEIGHT: 205 LBS | SYSTOLIC BLOOD PRESSURE: 120 MMHG | HEART RATE: 74 BPM

## 2025-02-12 DIAGNOSIS — I50.32 CHRONIC DIASTOLIC CHF (CONGESTIVE HEART FAILURE) (HCC): Primary | ICD-10-CM

## 2025-02-12 DIAGNOSIS — I50.22 CHRONIC SYSTOLIC CONGESTIVE HEART FAILURE (HCC): Primary | ICD-10-CM

## 2025-02-12 DIAGNOSIS — Z95.0 PACEMAKER: ICD-10-CM

## 2025-02-12 DIAGNOSIS — I50.20 HFREF (HEART FAILURE WITH REDUCED EJECTION FRACTION) (HCC): ICD-10-CM

## 2025-02-12 DIAGNOSIS — T82.110D FAILURE OF PACEMAKER LEAD, SUBSEQUENT ENCOUNTER: Primary | ICD-10-CM

## 2025-02-12 PROBLEM — T82.110A PACEMAKER LEAD FRACTURE: Status: ACTIVE | Noted: 2025-02-12

## 2025-02-12 PROCEDURE — 1126F AMNT PAIN NOTED NONE PRSNT: CPT | Performed by: INTERNAL MEDICINE

## 2025-02-12 PROCEDURE — 99204 OFFICE O/P NEW MOD 45 MIN: CPT | Performed by: INTERNAL MEDICINE

## 2025-02-12 PROCEDURE — 1123F ACP DISCUSS/DSCN MKR DOCD: CPT | Performed by: INTERNAL MEDICINE

## 2025-02-12 PROCEDURE — 3074F SYST BP LT 130 MM HG: CPT | Performed by: INTERNAL MEDICINE

## 2025-02-12 PROCEDURE — 3078F DIAST BP <80 MM HG: CPT | Performed by: INTERNAL MEDICINE

## 2025-02-12 NOTE — PROGRESS NOTES
device/lead failure, lead dislodgement, heart attack, stroke, arrhythmia, oversedation, respiratory arrest, and even death. We discussed not every patient has clinical improvement with a biventricular device, and implantation of a biventricular device does slightly increase the risks of the procedure. In addition, on rare occasions a patient's anatomy or underlying ventricular scar does not allow for successful placement of an LV lead or acceptable pacing parameters. After our comprehensive discussion, the patient would like to proceed with scheduling the procedure.  I will have our  meet to discuss today or call the patient as soon as possible to make the arrangements.  Patient has my professional contact information and was encouraged to call with additional questions or concerns.        --plan for new LV lead placement, LBB lead       --cap old fractured LV lead       --hold eliquis x 2 days    2. Atrial fibrillation: paced, on eliquis    3. BiV PM: LV lead off 2/2 failure, plan as above    Patient has been instructed and agrees to call our office with any issues or other concerns related to their cardiac condition(s) and/or complaint(s).    No follow-up provider specified.      Manuel Hassan MD, MS  Cardiology/Electrophysiology  02/12/25  9:45 AM

## 2025-02-18 ENCOUNTER — TELEPHONE (OUTPATIENT)
Age: 85
End: 2025-02-18

## 2025-02-18 NOTE — TELEPHONE ENCOUNTER
[employer] has requested a p2p be completed before 1/22/25, when would be the best time for me to schedule this for you also would you like the peer reviewer to call you directly or call me/ a staff member?

## 2025-02-19 NOTE — TELEPHONE ENCOUNTER
P2p has been scheduled for today 2/19/25 at 2:15 with Dr Jhon Clifton     Case number:  [id number]

## 2025-02-19 NOTE — TELEPHONE ENCOUNTER
Procedure has been approved.      Gen Change [id number])  [id number]     LV Lead (83079)  [id number]     Valid Until   8/18/25

## 2025-02-24 NOTE — PROGRESS NOTES
Patient pre-assessment complete for venogram scheduled for 25, arrival time 0930. Patient verified using . Patient instructed to bring a list of all home medications on the day of procedure. NPO status reinforced. Instructed they can take all other medications excluding vitamins & supplements. Patient verbalizes understanding of all instructions & denies any questions at this time.

## 2025-02-25 ENCOUNTER — HOSPITAL ENCOUNTER (OUTPATIENT)
Age: 85
Setting detail: OUTPATIENT SURGERY
Discharge: HOME | End: 2025-02-25
Attending: INTERNAL MEDICINE | Admitting: INTERNAL MEDICINE
Payer: MEDICARE

## 2025-02-25 VITALS
HEART RATE: 71 BPM | OXYGEN SATURATION: 94 % | SYSTOLIC BLOOD PRESSURE: 142 MMHG | RESPIRATION RATE: 20 BRPM | DIASTOLIC BLOOD PRESSURE: 77 MMHG

## 2025-02-25 DIAGNOSIS — T82.110A PACEMAKER LEAD FRACTURE: ICD-10-CM

## 2025-02-25 DIAGNOSIS — T82.110D FAILURE OF PACEMAKER LEAD, SUBSEQUENT ENCOUNTER: Primary | ICD-10-CM

## 2025-02-25 LAB
ANION GAP SERPL CALC-SCNC: 13 MMOL/L (ref 7–16)
BASOPHILS # BLD: 0.05 K/UL (ref 0–0.2)
BASOPHILS NFR BLD: 0.4 % (ref 0–2)
BUN SERPL-MCNC: 13 MG/DL (ref 8–23)
CALCIUM SERPL-MCNC: 9.7 MG/DL (ref 8.8–10.2)
CHLORIDE SERPL-SCNC: 101 MMOL/L (ref 98–107)
CO2 SERPL-SCNC: 28 MMOL/L (ref 20–29)
CREAT SERPL-MCNC: 0.98 MG/DL (ref 0.8–1.3)
DIFFERENTIAL METHOD BLD: ABNORMAL
EOSINOPHIL # BLD: 0.27 K/UL (ref 0–0.8)
EOSINOPHIL NFR BLD: 2.4 % (ref 0.5–7.8)
ERYTHROCYTE [DISTWIDTH] IN BLOOD BY AUTOMATED COUNT: 13.8 % (ref 11.9–14.6)
GLUCOSE SERPL-MCNC: 129 MG/DL (ref 70–99)
HCT VFR BLD AUTO: 42.6 % (ref 41.1–50.3)
HGB BLD-MCNC: 14 G/DL (ref 13.6–17.2)
IMM GRANULOCYTES # BLD AUTO: 0.05 K/UL (ref 0–0.5)
IMM GRANULOCYTES NFR BLD AUTO: 0.4 % (ref 0–5)
LYMPHOCYTES # BLD: 3.09 K/UL (ref 0.5–4.6)
LYMPHOCYTES NFR BLD: 27.5 % (ref 13–44)
MAGNESIUM SERPL-MCNC: 1.5 MG/DL (ref 1.8–2.4)
MCH RBC QN AUTO: 30 PG (ref 26.1–32.9)
MCHC RBC AUTO-ENTMCNC: 32.9 G/DL (ref 31.4–35)
MCV RBC AUTO: 91.2 FL (ref 82–102)
MONOCYTES # BLD: 0.85 K/UL (ref 0.1–1.3)
MONOCYTES NFR BLD: 7.6 % (ref 4–12)
NEUTS SEG # BLD: 6.94 K/UL (ref 1.7–8.2)
NEUTS SEG NFR BLD: 61.7 % (ref 43–78)
NRBC # BLD: 0 K/UL (ref 0–0.2)
PLATELET # BLD AUTO: 190 K/UL (ref 150–450)
PMV BLD AUTO: 9 FL (ref 9.4–12.3)
POTASSIUM SERPL-SCNC: 3.2 MMOL/L (ref 3.5–5.1)
RBC # BLD AUTO: 4.67 M/UL (ref 4.23–5.6)
SODIUM SERPL-SCNC: 142 MMOL/L (ref 136–145)
WBC # BLD AUTO: 11.3 K/UL (ref 4.3–11.1)

## 2025-02-25 PROCEDURE — 36005 INJECTION EXT VENOGRAPHY: CPT | Performed by: INTERNAL MEDICINE

## 2025-02-25 PROCEDURE — 85025 COMPLETE CBC W/AUTO DIFF WBC: CPT

## 2025-02-25 PROCEDURE — 80048 BASIC METABOLIC PNL TOTAL CA: CPT

## 2025-02-25 PROCEDURE — 75820 VEIN X-RAY ARM/LEG: CPT | Performed by: INTERNAL MEDICINE

## 2025-02-25 PROCEDURE — 83735 ASSAY OF MAGNESIUM: CPT

## 2025-02-25 NOTE — PROGRESS NOTES
Patient received to CPRU room # 17  Ambulatory from [facility]. Patient scheduled for venogram today with Dr Hassan. Procedure reviewed & questions answered, voiced good understanding consent obtained & placed on chart. All medications and medical history reviewed. Will prep patient per orders. Patient & family updated on plan of care.      The patient has a fraility score of 6-MODERATELY FRAIL, based on unsteady gait with ambulation.    Patient took back for venogram shortly after arrival.

## 2025-02-25 NOTE — PROGRESS NOTES
Patient discharged. Message sent to Dr Hassan nurse to schedule for upgrade.    Double O-Z Flap Text: The defect edges were debeveled with a #15 scalpel blade.  Given the location of the defect, shape of the defect and the proximity to free margins a Double O-Z flap was deemed most appropriate.  Using a sterile surgical marker, an appropriate transposition flap was drawn incorporating the defect and placing the expected incisions within the relaxed skin tension lines where possible. The area thus outlined was incised deep to adipose tissue with a #15 scalpel blade.  The skin margins were undermined to an appropriate distance in all directions utilizing iris scissors.

## 2025-02-28 DIAGNOSIS — I48.0 PAROXYSMAL ATRIAL FIBRILLATION (HCC): ICD-10-CM

## 2025-02-28 RX ORDER — MAGNESIUM OXIDE 400 MG/1
400 TABLET ORAL DAILY
Qty: 90 TABLET | Refills: 3 | Status: SHIPPED | OUTPATIENT
Start: 2025-02-28

## 2025-02-28 RX ORDER — POTASSIUM CHLORIDE 1500 MG/1
20 TABLET, EXTENDED RELEASE ORAL 2 TIMES DAILY
Qty: 180 TABLET | Refills: 3 | Status: SHIPPED | OUTPATIENT
Start: 2025-02-28

## 2025-02-28 NOTE — TELEPHONE ENCOUNTER
----- Message from Dr. Manuel Hassan MD sent at 2/26/2025  7:24 AM EST -----  Potassium and mag low, increase potassium to 40meq daily, start mag ox 400mg daily

## 2025-02-28 NOTE — TELEPHONE ENCOUNTER
Notified pt of lab result with recommendations on supplements. RX for mag & K sent to pharmacy. Pt voiced understanding.

## 2025-03-01 DIAGNOSIS — E11.9 TYPE 2 DIABETES MELLITUS WITHOUT COMPLICATION, WITHOUT LONG-TERM CURRENT USE OF INSULIN (HCC): ICD-10-CM

## 2025-03-03 ENCOUNTER — TELEPHONE (OUTPATIENT)
Age: 85
End: 2025-03-03

## 2025-03-03 RX ORDER — METFORMIN HYDROCHLORIDE 500 MG/1
500 TABLET, EXTENDED RELEASE ORAL
Qty: 90 TABLET | Refills: 1 | OUTPATIENT
Start: 2025-03-03

## 2025-03-03 NOTE — TELEPHONE ENCOUNTER
Pt is calling back saying he is calling about his pacemaker ,He got a letter and does not understand what it means. Please call pt

## 2025-03-03 NOTE — TELEPHONE ENCOUNTER
Per P2P telephone encounter- patient's procedure has been approved.    Left voicemail requesting patient and his wife to call back.

## 2025-03-03 NOTE — TELEPHONE ENCOUNTER
Wife called with concerns of upcoming procedure for new LV lead and generator change    Patient states he received a denial letter in the  mail    Will forward to staff to address. Assured wife we will follow up.

## 2025-03-14 NOTE — PROGRESS NOTES
Patient pre-assessment complete for new LV lead insertion scheduled for 3/17/25, arrival time 1200. Patient verified using . Patient instructed to bring a list of all home medications on the day of procedure. NPO status reinforced.  Patient instructed to HOLD Eliquis, Metformin, Flomax, Lasix, and Losartan. Instructed they can take all other medications excluding vitamins & supplements. Patient verbalizes understanding of all instructions & denies any questions at this time.      Confirmed 2 day Eliquis hold prior to procedure.

## 2025-03-16 RX ORDER — SODIUM CHLORIDE, SODIUM LACTATE, POTASSIUM CHLORIDE, CALCIUM CHLORIDE 600; 310; 30; 20 MG/100ML; MG/100ML; MG/100ML; MG/100ML
INJECTION, SOLUTION INTRAVENOUS CONTINUOUS
Status: CANCELLED | OUTPATIENT
Start: 2025-03-16

## 2025-03-16 RX ORDER — MIDAZOLAM HYDROCHLORIDE 2 MG/2ML
2 INJECTION, SOLUTION INTRAMUSCULAR; INTRAVENOUS
Status: CANCELLED | OUTPATIENT
Start: 2025-03-16 | End: 2025-03-17

## 2025-03-16 RX ORDER — FENTANYL CITRATE 50 UG/ML
100 INJECTION, SOLUTION INTRAMUSCULAR; INTRAVENOUS
Refills: 0 | Status: CANCELLED | OUTPATIENT
Start: 2025-03-16 | End: 2025-03-17

## 2025-03-16 RX ORDER — SODIUM CHLORIDE 9 MG/ML
INJECTION, SOLUTION INTRAVENOUS PRN
Status: CANCELLED | OUTPATIENT
Start: 2025-03-16

## 2025-03-16 RX ORDER — LIDOCAINE HYDROCHLORIDE 10 MG/ML
1 INJECTION, SOLUTION INFILTRATION; PERINEURAL
Status: CANCELLED | OUTPATIENT
Start: 2025-03-16 | End: 2025-03-17

## 2025-03-16 RX ORDER — SODIUM CHLORIDE 0.9 % (FLUSH) 0.9 %
5-40 SYRINGE (ML) INJECTION EVERY 12 HOURS SCHEDULED
Status: CANCELLED | OUTPATIENT
Start: 2025-03-16

## 2025-03-16 RX ORDER — SODIUM CHLORIDE 0.9 % (FLUSH) 0.9 %
5-40 SYRINGE (ML) INJECTION PRN
Status: CANCELLED | OUTPATIENT
Start: 2025-03-16

## 2025-03-17 ENCOUNTER — ANESTHESIA (OUTPATIENT)
Dept: CARDIAC CATH/INVASIVE PROCEDURES | Age: 85
End: 2025-03-17
Payer: MEDICARE

## 2025-03-17 ENCOUNTER — HOSPITAL ENCOUNTER (OUTPATIENT)
Age: 85
Setting detail: OBSERVATION
Discharge: HOME | End: 2025-03-18
Attending: INTERNAL MEDICINE | Admitting: INTERNAL MEDICINE
Payer: MEDICARE

## 2025-03-17 ENCOUNTER — ANESTHESIA EVENT (OUTPATIENT)
Dept: CARDIAC CATH/INVASIVE PROCEDURES | Age: 85
End: 2025-03-17
Payer: MEDICARE

## 2025-03-17 ENCOUNTER — APPOINTMENT (OUTPATIENT)
Dept: GENERAL RADIOLOGY | Age: 85
End: 2025-03-17
Attending: INTERNAL MEDICINE
Payer: MEDICARE

## 2025-03-17 DIAGNOSIS — Z95.0 STATUS POST BIVENTRICULAR PACEMAKER: ICD-10-CM

## 2025-03-17 DIAGNOSIS — T82.110D PACEMAKER LEAD FAILURE, SUBSEQUENT ENCOUNTER: Primary | ICD-10-CM

## 2025-03-17 DIAGNOSIS — I50.20 HFREF (HEART FAILURE WITH REDUCED EJECTION FRACTION) (HCC): ICD-10-CM

## 2025-03-17 LAB
ANION GAP SERPL CALC-SCNC: 13 MMOL/L (ref 7–16)
BUN SERPL-MCNC: 16 MG/DL (ref 8–23)
CALCIUM SERPL-MCNC: 9.7 MG/DL (ref 8.8–10.2)
CHLORIDE SERPL-SCNC: 103 MMOL/L (ref 98–107)
CO2 SERPL-SCNC: 21 MMOL/L (ref 20–29)
CREAT SERPL-MCNC: 1.06 MG/DL (ref 0.8–1.3)
ECHO BSA: 2.06 M2
EKG ATRIAL RATE: 67 BPM
EKG DIAGNOSIS: NORMAL
EKG Q-T INTERVAL: 444 MS
EKG QRS DURATION: 196 MS
EKG QTC CALCULATION (BAZETT): 495 MS
EKG R AXIS: -62 DEGREES
EKG T AXIS: 102 DEGREES
EKG VENTRICULAR RATE: 75 BPM
ERYTHROCYTE [DISTWIDTH] IN BLOOD BY AUTOMATED COUNT: 14 % (ref 11.9–14.6)
GLUCOSE SERPL-MCNC: 119 MG/DL (ref 70–99)
HCT VFR BLD AUTO: 40.3 % (ref 41.1–50.3)
HGB BLD-MCNC: 13.4 G/DL (ref 13.6–17.2)
MAGNESIUM SERPL-MCNC: 2.1 MG/DL (ref 1.8–2.4)
MCH RBC QN AUTO: 30.3 PG (ref 26.1–32.9)
MCHC RBC AUTO-ENTMCNC: 33.3 G/DL (ref 31.4–35)
MCV RBC AUTO: 91.2 FL (ref 82–102)
NRBC # BLD: 0 K/UL (ref 0–0.2)
PLATELET # BLD AUTO: 216 K/UL (ref 150–450)
PMV BLD AUTO: 9.1 FL (ref 9.4–12.3)
POTASSIUM SERPL-SCNC: 4.5 MMOL/L (ref 3.5–5.1)
RBC # BLD AUTO: 4.42 M/UL (ref 4.23–5.6)
SODIUM SERPL-SCNC: 137 MMOL/L (ref 136–145)
WBC # BLD AUTO: 11.7 K/UL (ref 4.3–11.1)

## 2025-03-17 PROCEDURE — 2500000003 HC RX 250 WO HCPCS: Performed by: INTERNAL MEDICINE

## 2025-03-17 PROCEDURE — 3700000000 HC ANESTHESIA ATTENDED CARE: Performed by: INTERNAL MEDICINE

## 2025-03-17 PROCEDURE — 2580000003 HC RX 258: Performed by: NURSE ANESTHETIST, CERTIFIED REGISTERED

## 2025-03-17 PROCEDURE — 2709999900 HC NON-CHARGEABLE SUPPLY: Performed by: INTERNAL MEDICINE

## 2025-03-17 PROCEDURE — 93010 ELECTROCARDIOGRAM REPORT: CPT | Performed by: INTERNAL MEDICINE

## 2025-03-17 PROCEDURE — 33225 L VENTRIC PACING LEAD ADD-ON: CPT | Performed by: INTERNAL MEDICINE

## 2025-03-17 PROCEDURE — 2580000003 HC RX 258

## 2025-03-17 PROCEDURE — 6370000000 HC RX 637 (ALT 250 FOR IP): Performed by: INTERNAL MEDICINE

## 2025-03-17 PROCEDURE — 51798 US URINE CAPACITY MEASURE: CPT

## 2025-03-17 PROCEDURE — 6370000000 HC RX 637 (ALT 250 FOR IP): Performed by: ANESTHESIOLOGY

## 2025-03-17 PROCEDURE — 71045 X-RAY EXAM CHEST 1 VIEW: CPT

## 2025-03-17 PROCEDURE — 51701 INSERT BLADDER CATHETER: CPT

## 2025-03-17 PROCEDURE — 2500000003 HC RX 250 WO HCPCS

## 2025-03-17 PROCEDURE — 2500000003 HC RX 250 WO HCPCS: Performed by: NURSE ANESTHETIST, CERTIFIED REGISTERED

## 2025-03-17 PROCEDURE — 33229 REMV&REPLC PM GEN MULT LEADS: CPT | Performed by: INTERNAL MEDICINE

## 2025-03-17 PROCEDURE — 33216 INSERT 1 ELECTRODE PM-DEFIB: CPT | Performed by: INTERNAL MEDICINE

## 2025-03-17 PROCEDURE — 7100000000 HC PACU RECOVERY - FIRST 15 MIN: Performed by: INTERNAL MEDICINE

## 2025-03-17 PROCEDURE — 6360000002 HC RX W HCPCS: Performed by: NURSE ANESTHETIST, CERTIFIED REGISTERED

## 2025-03-17 PROCEDURE — 85027 COMPLETE CBC AUTOMATED: CPT

## 2025-03-17 PROCEDURE — 94761 N-INVAS EAR/PLS OXIMETRY MLT: CPT

## 2025-03-17 PROCEDURE — 6360000002 HC RX W HCPCS

## 2025-03-17 PROCEDURE — 6360000002 HC RX W HCPCS: Performed by: ANESTHESIOLOGY

## 2025-03-17 PROCEDURE — C1887 CATHETER, GUIDING: HCPCS | Performed by: INTERNAL MEDICINE

## 2025-03-17 PROCEDURE — 7100000001 HC PACU RECOVERY - ADDTL 15 MIN: Performed by: INTERNAL MEDICINE

## 2025-03-17 PROCEDURE — 80048 BASIC METABOLIC PNL TOTAL CA: CPT

## 2025-03-17 PROCEDURE — 93005 ELECTROCARDIOGRAM TRACING: CPT | Performed by: INTERNAL MEDICINE

## 2025-03-17 PROCEDURE — G0378 HOSPITAL OBSERVATION PER HR: HCPCS

## 2025-03-17 PROCEDURE — 6360000002 HC RX W HCPCS: Performed by: INTERNAL MEDICINE

## 2025-03-17 PROCEDURE — 2700000000 HC OXYGEN THERAPY PER DAY

## 2025-03-17 PROCEDURE — 2720000010 HC SURG SUPPLY STERILE: Performed by: INTERNAL MEDICINE

## 2025-03-17 PROCEDURE — C1892 INTRO/SHEATH,FIXED,PEEL-AWAY: HCPCS | Performed by: INTERNAL MEDICINE

## 2025-03-17 PROCEDURE — C1898 LEAD, PMKR, OTHER THAN TRANS: HCPCS | Performed by: INTERNAL MEDICINE

## 2025-03-17 PROCEDURE — 83735 ASSAY OF MAGNESIUM: CPT

## 2025-03-17 PROCEDURE — 6370000000 HC RX 637 (ALT 250 FOR IP): Performed by: NURSE ANESTHETIST, CERTIFIED REGISTERED

## 2025-03-17 PROCEDURE — 3700000001 HC ADD 15 MINUTES (ANESTHESIA): Performed by: INTERNAL MEDICINE

## 2025-03-17 PROCEDURE — C2621 PMKR, OTHER THAN SING/DUAL: HCPCS | Performed by: INTERNAL MEDICINE

## 2025-03-17 DEVICE — IMPLANTABLE DEVICE
Type: IMPLANTABLE DEVICE | Status: FUNCTIONAL
Brand: EDORA 8 HF-T

## 2025-03-17 DEVICE — IMPLANTABLE DEVICE
Type: IMPLANTABLE DEVICE | Status: FUNCTIONAL
Brand: SOLIA S 60

## 2025-03-17 RX ORDER — OXYCODONE HYDROCHLORIDE 5 MG/1
5 TABLET ORAL EVERY 4 HOURS PRN
Status: DISCONTINUED | OUTPATIENT
Start: 2025-03-17 | End: 2025-03-18 | Stop reason: HOSPADM

## 2025-03-17 RX ORDER — ROCURONIUM BROMIDE 10 MG/ML
INJECTION, SOLUTION INTRAVENOUS
Status: DISCONTINUED | OUTPATIENT
Start: 2025-03-17 | End: 2025-03-17 | Stop reason: SDUPTHER

## 2025-03-17 RX ORDER — IPRATROPIUM BROMIDE AND ALBUTEROL SULFATE 2.5; .5 MG/3ML; MG/3ML
1 SOLUTION RESPIRATORY (INHALATION) ONCE
Status: COMPLETED | OUTPATIENT
Start: 2025-03-17 | End: 2025-03-17

## 2025-03-17 RX ORDER — LOSARTAN POTASSIUM 50 MG/1
50 TABLET ORAL DAILY
Status: DISCONTINUED | OUTPATIENT
Start: 2025-03-18 | End: 2025-03-18 | Stop reason: HOSPADM

## 2025-03-17 RX ORDER — ROPINIROLE 0.5 MG/1
0.5 TABLET, FILM COATED ORAL NIGHTLY
Status: DISCONTINUED | OUTPATIENT
Start: 2025-03-17 | End: 2025-03-18 | Stop reason: HOSPADM

## 2025-03-17 RX ORDER — SUCCINYLCHOLINE CHLORIDE 20 MG/ML
INJECTION INTRAMUSCULAR; INTRAVENOUS
Status: DISCONTINUED | OUTPATIENT
Start: 2025-03-17 | End: 2025-03-17 | Stop reason: SDUPTHER

## 2025-03-17 RX ORDER — LIDOCAINE HYDROCHLORIDE 20 MG/ML
INJECTION, SOLUTION EPIDURAL; INFILTRATION; INTRACAUDAL; PERINEURAL
Status: DISCONTINUED | OUTPATIENT
Start: 2025-03-17 | End: 2025-03-17 | Stop reason: SDUPTHER

## 2025-03-17 RX ORDER — LIDOCAINE HYDROCHLORIDE AND EPINEPHRINE 10; 10 MG/ML; UG/ML
INJECTION, SOLUTION INFILTRATION; PERINEURAL PRN
Status: DISCONTINUED | OUTPATIENT
Start: 2025-03-17 | End: 2025-03-17 | Stop reason: HOSPADM

## 2025-03-17 RX ORDER — TAMSULOSIN HYDROCHLORIDE 0.4 MG/1
0.4 CAPSULE ORAL DAILY
Status: DISCONTINUED | OUTPATIENT
Start: 2025-03-18 | End: 2025-03-18

## 2025-03-17 RX ORDER — METFORMIN HYDROCHLORIDE 500 MG/1
500 TABLET, EXTENDED RELEASE ORAL
Status: DISCONTINUED | OUTPATIENT
Start: 2025-03-18 | End: 2025-03-18 | Stop reason: HOSPADM

## 2025-03-17 RX ORDER — ACETAMINOPHEN 325 MG/1
650 TABLET ORAL EVERY 6 HOURS
Status: DISCONTINUED | OUTPATIENT
Start: 2025-03-18 | End: 2025-03-18 | Stop reason: HOSPADM

## 2025-03-17 RX ORDER — ATORVASTATIN CALCIUM 80 MG/1
80 TABLET, FILM COATED ORAL DAILY
Status: DISCONTINUED | OUTPATIENT
Start: 2025-03-18 | End: 2025-03-18 | Stop reason: HOSPADM

## 2025-03-17 RX ORDER — PROPOFOL 10 MG/ML
INJECTION, EMULSION INTRAVENOUS
Status: DISCONTINUED | OUTPATIENT
Start: 2025-03-17 | End: 2025-03-17 | Stop reason: SDUPTHER

## 2025-03-17 RX ORDER — NALOXONE HYDROCHLORIDE 0.4 MG/ML
INJECTION, SOLUTION INTRAMUSCULAR; INTRAVENOUS; SUBCUTANEOUS PRN
Status: DISCONTINUED | OUTPATIENT
Start: 2025-03-17 | End: 2025-03-17 | Stop reason: HOSPADM

## 2025-03-17 RX ORDER — ALBUTEROL SULFATE 0.83 MG/ML
2.5 SOLUTION RESPIRATORY (INHALATION) ONCE
Status: COMPLETED | OUTPATIENT
Start: 2025-03-17 | End: 2025-03-17

## 2025-03-17 RX ORDER — ETOMIDATE 2 MG/ML
INJECTION INTRAVENOUS
Status: DISCONTINUED | OUTPATIENT
Start: 2025-03-17 | End: 2025-03-17 | Stop reason: SDUPTHER

## 2025-03-17 RX ORDER — SODIUM CHLORIDE, SODIUM LACTATE, POTASSIUM CHLORIDE, CALCIUM CHLORIDE 600; 310; 30; 20 MG/100ML; MG/100ML; MG/100ML; MG/100ML
INJECTION, SOLUTION INTRAVENOUS
Status: DISCONTINUED | OUTPATIENT
Start: 2025-03-17 | End: 2025-03-17 | Stop reason: SDUPTHER

## 2025-03-17 RX ORDER — LANOLIN ALCOHOL/MO/W.PET/CERES
400 CREAM (GRAM) TOPICAL DAILY
Status: DISCONTINUED | OUTPATIENT
Start: 2025-03-18 | End: 2025-03-18 | Stop reason: HOSPADM

## 2025-03-17 RX ORDER — OXYCODONE HYDROCHLORIDE 5 MG/1
5 TABLET ORAL
Status: COMPLETED | OUTPATIENT
Start: 2025-03-17 | End: 2025-03-17

## 2025-03-17 RX ORDER — ALBUTEROL SULFATE 90 UG/1
INHALANT RESPIRATORY (INHALATION)
Status: DISCONTINUED | OUTPATIENT
Start: 2025-03-17 | End: 2025-03-17 | Stop reason: SDUPTHER

## 2025-03-17 RX ADMIN — PROPOFOL 10 MG: 10 INJECTION, EMULSION INTRAVENOUS at 15:16

## 2025-03-17 RX ADMIN — OXYCODONE HYDROCHLORIDE 5 MG: 5 TABLET ORAL at 17:02

## 2025-03-17 RX ADMIN — PROPOFOL 10 MG: 10 INJECTION, EMULSION INTRAVENOUS at 15:09

## 2025-03-17 RX ADMIN — PROPOFOL 10 MG: 10 INJECTION, EMULSION INTRAVENOUS at 15:19

## 2025-03-17 RX ADMIN — IPRATROPIUM BROMIDE AND ALBUTEROL SULFATE 1 DOSE: 2.5; .5 SOLUTION RESPIRATORY (INHALATION) at 17:19

## 2025-03-17 RX ADMIN — SODIUM CHLORIDE, SODIUM LACTATE, POTASSIUM CHLORIDE, AND CALCIUM CHLORIDE: 600; 310; 30; 20 INJECTION, SOLUTION INTRAVENOUS at 14:56

## 2025-03-17 RX ADMIN — Medication 140 MG: at 15:24

## 2025-03-17 RX ADMIN — CEFAZOLIN 2000 ML: 10 INJECTION, POWDER, FOR SOLUTION INTRAVENOUS at 15:09

## 2025-03-17 RX ADMIN — PROPOFOL 50 MCG/KG/MIN: 10 INJECTION, EMULSION INTRAVENOUS at 15:07

## 2025-03-17 RX ADMIN — ALBUTEROL SULFATE 2.5 MG: 2.5 SOLUTION RESPIRATORY (INHALATION) at 14:18

## 2025-03-17 RX ADMIN — PHENYLEPHRINE HYDROCHLORIDE 150 MCG: 10 INJECTION INTRAVENOUS at 15:45

## 2025-03-17 RX ADMIN — ACETAMINOPHEN 650 MG: 325 TABLET ORAL at 23:57

## 2025-03-17 RX ADMIN — ETOMIDATE 12 MG: 2 INJECTION, SOLUTION INTRAVENOUS at 15:24

## 2025-03-17 RX ADMIN — PROPOFOL 10 MG: 10 INJECTION, EMULSION INTRAVENOUS at 15:11

## 2025-03-17 RX ADMIN — SUGAMMADEX 200 MG: 100 INJECTION, SOLUTION INTRAVENOUS at 16:23

## 2025-03-17 RX ADMIN — ROCURONIUM BROMIDE 25 MG: 10 INJECTION, SOLUTION INTRAVENOUS at 15:38

## 2025-03-17 RX ADMIN — LIDOCAINE HYDROCHLORIDE 60 MG: 20 INJECTION, SOLUTION EPIDURAL; INFILTRATION; INTRACAUDAL; PERINEURAL at 15:06

## 2025-03-17 RX ADMIN — HYDROMORPHONE HYDROCHLORIDE 0.25 MG: 1 INJECTION, SOLUTION INTRAMUSCULAR; INTRAVENOUS; SUBCUTANEOUS at 17:49

## 2025-03-17 RX ADMIN — Medication 4 PUFF: at 16:25

## 2025-03-17 RX ADMIN — PROPOFOL 20 MG: 10 INJECTION, EMULSION INTRAVENOUS at 15:06

## 2025-03-17 RX ADMIN — ROPINIROLE HYDROCHLORIDE 0.5 MG: 0.5 TABLET, FILM COATED ORAL at 20:45

## 2025-03-17 ASSESSMENT — PAIN DESCRIPTION - LOCATION
LOCATION: PENIS
LOCATION: CHEST
LOCATION: INCISION

## 2025-03-17 ASSESSMENT — PAIN - FUNCTIONAL ASSESSMENT
PAIN_FUNCTIONAL_ASSESSMENT: 0-10
PAIN_FUNCTIONAL_ASSESSMENT: CRITICAL CARE PAIN OBSERVATION TOOL (CPOT)
PAIN_FUNCTIONAL_ASSESSMENT: 0-10
PAIN_FUNCTIONAL_ASSESSMENT: CRITICAL CARE PAIN OBSERVATION TOOL (CPOT)

## 2025-03-17 ASSESSMENT — PAIN SCALES - GENERAL
PAINLEVEL_OUTOF10: 8
PAINLEVEL_OUTOF10: 5
PAINLEVEL_OUTOF10: 8
PAINLEVEL_OUTOF10: 0
PAINLEVEL_OUTOF10: 0

## 2025-03-17 ASSESSMENT — ENCOUNTER SYMPTOMS
DYSPNEA ACTIVITY LEVEL: NO INTERVAL CHANGE
SHORTNESS OF BREATH: 1

## 2025-03-17 ASSESSMENT — PAIN DESCRIPTION - ORIENTATION
ORIENTATION: LEFT
ORIENTATION: LEFT

## 2025-03-17 ASSESSMENT — PAIN DESCRIPTION - DESCRIPTORS
DESCRIPTORS: ACHING
DESCRIPTORS: ACHING

## 2025-03-17 NOTE — H&P
[facility] Cardiology/Electrophyiology Consult                Date of  Admission: No admission date for patient encounter.     Aguilar Titus is a 85 y.o. male admitted for HFrEF (heart failure with reduced ejection fraction) ([facility]) [I50.20].      84 y.o. male with a past medical and cardiac history significant for CAD s/p PCI, LV dsfynction, BiV PPM, CHB, LV lead failure and presents for scheduled LV lead placement. He feels OK, but ongoing fatigue, low energy, and some GERMAIN.     Cardiac PMH: (Old records have been reviewed and summarized below)  Cath (8/7/24): mild to mod LV dsyfunction, mod CAD, patent stent  TTE (7/29/24): EF 30-35%    Patient Active Problem List   Diagnosis    Paroxysmal atrial fibrillation ([facility])    Pacemaker    HTN (hypertension)    Dyspnea    Mitral valve regurgitation    Myasthenia gravis ([facility])    S/P coronary artery stent placement    Syncope    Major depressive disorder, recurrent, moderate ([facility])    SSS (sick sinus syndrome) ([facility])    Hypotension    Bilateral carotid artery disease    Chronic diastolic CHF (congestive heart failure) ([facility])    Major depressive disorder, recurrent, mild    Hypokalemia    Atherosclerotic heart disease of native coronary artery with other forms of angina pectoris    Sepsis ([facility])    Dyslipidemia    Major depressive disorder, recurrent, unspecified    Atrial fibrillation ([facility])    Acute cholecystitis    Pancreatitis, gallstone    Anemia    Bacteremia    Urine retention    Acute blood loss anemia    Colon cancer (HCC)    Adenocarcinoma of cecum (HCC)    Acute deep vein thrombosis (DVT) of axillary vein of left upper extremity (HCC)    COVID    Unresponsive    Chronic systolic (congestive) heart failure    Pre-diabetes    Severe obesity (BMI 35.0-39.9) with comorbidity    Medication management    Iron deficiency anemia due to chronic blood loss    Liver cyst    Congestive heart failure (HCC)    Elevated hemoglobin A1c    Deep vein thrombosis (DVT) of distal vein of  puffs into the lungs every 6 hours as needed for Wheezing or Shortness of Breath    famotidine (PEPCID) 20 MG tablet Take 1 tablet by mouth daily    apixaban (ELIQUIS) 5 MG TABS tablet Take 1 tablet by mouth 2 times daily at 0800 and 1400    atorvastatin (LIPITOR) 80 MG tablet Take 1 tablet by mouth daily    furosemide (LASIX) 40 MG tablet Take 1 tablet by mouth daily    losartan (COZAAR) 50 MG tablet Take 1 tablet by mouth daily    nitroGLYCERIN (NITROSTAT) 0.4 MG SL tablet Place 1 sl under the tongue q 5 min prn cp, max 3 sl in a 15-min time period. Call 911 if no relief after the 3rd sl.    rOPINIRole (REQUIP) 0.5 MG tablet Take 1 tablet by mouth nightly    tamsulosin (FLOMAX) 0.4 MG capsule Take 1 capsule by mouth daily    vitamin D (ERGOCALCIFEROL) 1.25 MG (49558 UT) CAPS capsule Take 1 capsule by mouth once a week    metFORMIN (GLUCOPHAGE-XR) 500 MG extended release tablet Take 1 tablet by mouth daily (with breakfast)    colestipol (COLESTID) 1 g tablet 60 Tablet    loperamide (IMODIUM) 1 MG/7.5ML LIQD solution Take 15 mLs by mouth daily as needed for Diarrhea (Patient not taking: Reported on 1/16/2025)    albuterol sulfate HFA (VENTOLIN HFA) 108 (90 Base) MCG/ACT inhaler Inhale 2 puffs into the lungs 4 times daily as needed for Wheezing or Shortness of Breath    Multiple Vitamins-Minerals (MULTIVITAMIN WITH MINERALS) tablet Once daily OTC    Ascorbic Acid (VITAMIN C PO) Take 1 tablet by mouth daily.    ZINC PO Take 1 capsule by mouth daily.    Cholecalciferol (VITAMIN D3 PO) Take 1 tablet by mouth daily. (Patient not taking: Reported on 1/16/2025)    acetaminophen (TYLENOL) 500 MG tablet Take 2 tablets by mouth every 6 hours as needed for Pain     Physical Exam  There were no vitals filed for this visit.    Physical Exam:  Gen: well appearing, well developed, NAD  Eyes: Pupils equal, EOMI  CV: RRR, no M/R/G, normal JVD, normal distal pulses, no RAMÓN  Pulm: CTAB, no accessory muscle uses, no wheezes,

## 2025-03-17 NOTE — PROGRESS NOTES
Patient received to CPRU room # 9  Ambulatory from [facility]. Patient scheduled for LV lead placement today with Dr Hassan. Procedure reviewed & questions answered, voiced good understanding consent obtained & placed on chart. All medications and medical history reviewed. Will prep patient per orders. Patient & family updated on plan of care.      The patient has a fraility score of 4-VULNERABLE, based on ambulation.

## 2025-03-17 NOTE — PROGRESS NOTES
4 Eyes Skin Assessment     NAME:  Aguilar Titus  YOB: 1940  MEDICAL RECORD NUMBER:  [id number]    The patient is being assessed for  Admission    I agree that at least one RN has performed a thorough Head to Toe Skin Assessment on the patient. ALL assessment sites listed below have been assessed.      Areas assessed by both nurses:    Head, Face, Ears, Shoulders, Back, Chest, Arms, Elbows, Hands, Sacrum. Buttock, Coccyx, Ischium, Legs. Feet and Heels, and Under Medical Devices         Does the Patient have a Wound? No noted wound(s)     Blanchable redness noted to sacrum and buttocks.  Caesar Prevention initiated by RN: Yes  Wound Care Orders initiated by RN: No    Pressure Injury (Stage 3,4, Unstageable, DTI, NWPT, and Complex wounds) if present, place Wound referral order by RN under : No    New Ostomies, if present place, Ostomy referral order under : No     Nurse 1 eSignature: Electronically signed by JOVON KUMAR RN on 3/17/25 at 7:29 PM EDT    **SHARE this note so that the co-signing nurse can place an eSignature**    Nurse 2 eSignature: Electronically signed by David Overton RN on 3/17/25 at 7:32 PM EDT

## 2025-03-17 NOTE — PROGRESS NOTES
TRANSFER - IN REPORT:    Verbal report received from Venita DOS SANTOS on Aguilar Titus  being received from PACU for routine progression of patient care      Report consisted of patient's Situation, Background, Assessment and   Recommendations(SBAR).     Information from the following report(s) Nurse Handoff Report was reviewed with the receiving nurse.    Opportunity for questions and clarification was provided.      Assessment completed upon patient's arrival to unit and care assumed.

## 2025-03-17 NOTE — ANESTHESIA POSTPROCEDURE EVALUATION
Department of Anesthesiology  Postprocedure Note    Patient: Aguilar Titus  MRN: [id number]  YOB: 1940  Date of evaluation: 3/17/2025    Procedure Summary       Date: 03/17/25 Room / Location: [facility] 1 ALL EVENTS / [facility] CARDIAC CATH LAB    Anesthesia Start: 1456 Anesthesia Stop: 1639    Procedure: Insert or replace single lead Diagnosis:       HFrEF (heart failure with reduced ejection fraction) (HCC)      (HFrEF (heart failure with reduced ejection fraction) (HCC) [I50.20])    Providers: Manuel Hassan MD Responsible Provider: Malcolm Wilson DO    Anesthesia Type: TIVA ASA Status: 4            Anesthesia Type: No value filed.    Vale Phase I: Vale Score: 8    Vale Phase II:      Anesthesia Post Evaluation    Patient location during evaluation: PACU  Level of consciousness: awake and alert  Airway patency: patent  Nausea & Vomiting: no nausea  Cardiovascular status: hemodynamically stable  Respiratory status: acceptable  Hydration status: euvolemic  Comments: Blood pressure 135/63, pulse 79, temperature 97.4 °F (36.3 °C), temperature source Tympanic, resp. rate 20, height 1.727 m (5' 8\"), weight 88.5 kg (195 lb), SpO2 95%.  Pain management: satisfactory to patient    There were no known notable events for this encounter.

## 2025-03-17 NOTE — ANESTHESIA PRE PROCEDURE
8/7/2024    Left heart cath / coronary angiography performed by Tristan Fisher MD at [facility] CARDIAC CATH LAB    CARDIAC PROCEDURE N/A 8/7/2024    Instantaneous wave-free ratio (iFR) performed by Tristan Fisher MD at [facility] CARDIAC CATH LAB    COLONOSCOPY  2007    COLONOSCOPY N/A 8/6/2022    COLONOSCOPY POLYPECTOMY SNARE/COLD BIOPSY performed by Eric Chavez MD at [facility] ENDOSCOPY    EP DEVICE PROCEDURE N/A 2/25/2025    Venogram performed by Manuel Hassan MD at [facility] CARDIAC CATH LAB    ERCP  3/30/2022         HEMICOLECTOMY Right 8/10/2022    BOWEL RESECTION HEMICOLECTOMY LAPAROSCOPIC ROBOTIC, Right Bulmaro performed by Stacy Gonzalez MD at [facility] MAIN OR    HEMORRHOID SURGERY      PACEMAKER  2018    TONSILLECTOMY  1999    Dr. Kraus/Giana for sleep apnea and reconstruction for extending jaw    UPPER GASTROINTESTINAL ENDOSCOPY N/A 8/5/2022    EGD ESOPHAGOGASTRODUODENOSCOPY Rm 525 performed by Wei Briggs MD at [facility] ENDOSCOPY    VASCULAR SURGERY Right 07/10/2019     Repair of right radial artery pseudoaneurysm       Social History:    Social History     Tobacco Use    Smoking status: Never     Passive exposure: Never    Smokeless tobacco: Never   Substance Use Topics    Alcohol use: No                                Counseling given: Not Answered      Vital Signs (Current):   Vitals:    03/17/25 1246   BP: (!) 143/82   Pulse: 75   Resp: 18   Temp: 98.1 °F (36.7 °C)   TempSrc: Oral   SpO2: 94%   Weight: 88.5 kg (195 lb)   Height: 1.727 m (5' 8\")                                              BP Readings from Last 3 Encounters:   03/17/25 (!) 143/82   02/25/25 (!) 142/77   02/12/25 120/60       NPO Status: Time of last liquid consumption: 2300                        Time of last solid consumption: 2300                        Date of last liquid consumption: 03/16/25                        Date of last solid food consumption: 03/16/25    BMI:   Wt Readings from Last 3 Encounters:   03/17/25 88.5 kg (195 lb)

## 2025-03-17 NOTE — PERIOP NOTE
TRANSFER - OUT REPORT:    Verbal report given to RAYA Coates on Aguilar Titus  being transferred to CVICU for routine post-op       Report consisted of patient's Situation, Background, Assessment and   Recommendations(SBAR).     Information from the following report(s) Adult Overview, Surgery Report, and MAR was reviewed with the receiving nurse.           Lines:   Peripheral IV 03/17/25 Posterior;Right Hand (Active)   Site Assessment Clean, dry & intact 03/17/25 1800   Line Status Flushed;Capped 03/17/25 1800   Line Care Connections checked and tightened 03/17/25 1800   Phlebitis Assessment No symptoms 03/17/25 1800   Infiltration Assessment 0 03/17/25 1800   Alcohol Cap Used No 03/17/25 1800   Dressing Status Clean, dry & intact 03/17/25 1800   Dressing Type Transparent 03/17/25 1800       Peripheral IV 03/17/25 Left;Posterior Forearm (Active)   Site Assessment Clean, dry & intact 03/17/25 1800   Line Status Flushed;Capped 03/17/25 1800   Line Care Connections checked and tightened 03/17/25 1800   Phlebitis Assessment No symptoms 03/17/25 1800   Infiltration Assessment 0 03/17/25 1800   Alcohol Cap Used No 03/17/25 1800   Dressing Status Clean, dry & intact 03/17/25 1800   Dressing Type Transparent 03/17/25 1800        Opportunity for questions and clarification was provided.      Patient transported with:  Registered Nurse

## 2025-03-18 ENCOUNTER — APPOINTMENT (OUTPATIENT)
Dept: GENERAL RADIOLOGY | Age: 85
End: 2025-03-18
Payer: MEDICARE

## 2025-03-18 ENCOUNTER — HOSPITAL ENCOUNTER (OUTPATIENT)
Age: 85
Setting detail: OBSERVATION
Discharge: HOME HEALTH | End: 2025-03-22
Attending: EMERGENCY MEDICINE
Payer: MEDICARE

## 2025-03-18 VITALS
OXYGEN SATURATION: 95 % | DIASTOLIC BLOOD PRESSURE: 59 MMHG | WEIGHT: 198.85 LBS | HEIGHT: 68 IN | BODY MASS INDEX: 30.14 KG/M2 | HEART RATE: 80 BPM | RESPIRATION RATE: 21 BRPM | TEMPERATURE: 97.4 F | SYSTOLIC BLOOD PRESSURE: 110 MMHG

## 2025-03-18 DIAGNOSIS — B34.8 RHINOVIRUS: ICD-10-CM

## 2025-03-18 DIAGNOSIS — R53.1 GENERALIZED WEAKNESS: ICD-10-CM

## 2025-03-18 DIAGNOSIS — R07.89 CHEST WALL PAIN FOLLOWING SURGERY: Primary | ICD-10-CM

## 2025-03-18 DIAGNOSIS — G89.18 CHEST WALL PAIN FOLLOWING SURGERY: Primary | ICD-10-CM

## 2025-03-18 LAB
ALBUMIN SERPL-MCNC: 3 G/DL (ref 3.2–4.6)
ALBUMIN/GLOB SERPL: 0.8 (ref 1–1.9)
ALP SERPL-CCNC: 164 U/L (ref 40–129)
ALT SERPL-CCNC: 51 U/L (ref 8–55)
ANION GAP SERPL CALC-SCNC: 12 MMOL/L (ref 7–16)
APPEARANCE UR: CLEAR
AST SERPL-CCNC: 63 U/L (ref 15–37)
BACTERIA URNS QL MICRO: ABNORMAL /HPF
BASOPHILS # BLD: 0.03 K/UL (ref 0–0.2)
BASOPHILS NFR BLD: 0.2 % (ref 0–2)
BILIRUB SERPL-MCNC: 1 MG/DL (ref 0–1.2)
BILIRUB UR QL: NEGATIVE
BUN SERPL-MCNC: 15 MG/DL (ref 8–23)
CALCIUM SERPL-MCNC: 9.1 MG/DL (ref 8.8–10.2)
CASTS URNS QL MICRO: ABNORMAL /LPF
CHLORIDE SERPL-SCNC: 98 MMOL/L (ref 98–107)
CO2 SERPL-SCNC: 24 MMOL/L (ref 20–29)
COLOR UR: ABNORMAL
CREAT SERPL-MCNC: 1.17 MG/DL (ref 0.8–1.3)
CRYSTALS URNS QL MICRO: 0 /LPF
DIFFERENTIAL METHOD BLD: ABNORMAL
EKG ATRIAL RATE: 0 BPM
EKG ATRIAL RATE: 86 BPM
EKG DIAGNOSIS: NORMAL
EKG DIAGNOSIS: NORMAL
EKG Q-T INTERVAL: 418 MS
EKG Q-T INTERVAL: 426 MS
EKG QRS DURATION: 144 MS
EKG QRS DURATION: 156 MS
EKG QTC CALCULATION (BAZETT): 482 MS
EKG QTC CALCULATION (BAZETT): 492 MS
EKG R AXIS: -74 DEGREES
EKG R AXIS: 53 DEGREES
EKG T AXIS: -53 DEGREES
EKG T AXIS: 223 DEGREES
EKG VENTRICULAR RATE: 80 BPM
EKG VENTRICULAR RATE: 80 BPM
EOSINOPHIL # BLD: 0.24 K/UL (ref 0–0.8)
EOSINOPHIL NFR BLD: 2 % (ref 0.5–7.8)
EPI CELLS #/AREA URNS HPF: ABNORMAL /HPF
ERYTHROCYTE [DISTWIDTH] IN BLOOD BY AUTOMATED COUNT: 14.1 % (ref 11.9–14.6)
FLUAV RNA SPEC QL NAA+PROBE: NOT DETECTED
FLUBV RNA SPEC QL NAA+PROBE: NOT DETECTED
GLOBULIN SER CALC-MCNC: 3.7 G/DL (ref 2.3–3.5)
GLUCOSE BLD STRIP.AUTO-MCNC: 123 MG/DL (ref 65–100)
GLUCOSE SERPL-MCNC: 123 MG/DL (ref 70–99)
GLUCOSE UR STRIP.AUTO-MCNC: NEGATIVE MG/DL
HCT VFR BLD AUTO: 41.3 % (ref 41.1–50.3)
HGB BLD-MCNC: 13.4 G/DL (ref 13.6–17.2)
HGB UR QL STRIP: ABNORMAL
IMM GRANULOCYTES # BLD AUTO: 0.11 K/UL (ref 0–0.5)
IMM GRANULOCYTES NFR BLD AUTO: 0.9 % (ref 0–5)
KETONES UR QL STRIP.AUTO: 15 MG/DL
LEUKOCYTE ESTERASE UR QL STRIP.AUTO: ABNORMAL
LYMPHOCYTES # BLD: 1.55 K/UL (ref 0.5–4.6)
LYMPHOCYTES NFR BLD: 12.9 % (ref 13–44)
MCH RBC QN AUTO: 30.2 PG (ref 26.1–32.9)
MCHC RBC AUTO-ENTMCNC: 32.4 G/DL (ref 31.4–35)
MCV RBC AUTO: 93 FL (ref 82–102)
MONOCYTES # BLD: 0.8 K/UL (ref 0.1–1.3)
MONOCYTES NFR BLD: 6.7 % (ref 4–12)
MUCOUS THREADS URNS QL MICRO: 0 /LPF
NEUTS SEG # BLD: 9.28 K/UL (ref 1.7–8.2)
NEUTS SEG NFR BLD: 77.3 % (ref 43–78)
NITRITE UR QL STRIP.AUTO: NEGATIVE
NRBC # BLD: 0 K/UL (ref 0–0.2)
NT PRO BNP: 1144 PG/ML (ref 0–450)
OTHER OBSERVATIONS: ABNORMAL
PH UR STRIP: 5 (ref 5–9)
PLATELET # BLD AUTO: 187 K/UL (ref 150–450)
PMV BLD AUTO: 9 FL (ref 9.4–12.3)
POTASSIUM SERPL-SCNC: 4.4 MMOL/L (ref 3.5–5.1)
PROT SERPL-MCNC: 6.6 G/DL (ref 6.3–8.2)
PROT UR STRIP-MCNC: 30 MG/DL
RBC # BLD AUTO: 4.44 M/UL (ref 4.23–5.6)
RBC #/AREA URNS HPF: ABNORMAL /HPF
SARS-COV-2 RDRP RESP QL NAA+PROBE: NOT DETECTED
SERVICE CMNT-IMP: ABNORMAL
SODIUM SERPL-SCNC: 135 MMOL/L (ref 136–145)
SOURCE: NORMAL
SP GR UR REFRACTOMETRY: 1.02 (ref 1–1.02)
TROPONIN T SERPL HS-MCNC: 62 NG/L (ref 0–22)
TROPONIN T SERPL HS-MCNC: 65 NG/L (ref 0–22)
URINE CULTURE IF INDICATED: ABNORMAL
UROBILINOGEN UR QL STRIP.AUTO: 1 EU/DL (ref 0.2–1)
WBC # BLD AUTO: 12 K/UL (ref 4.3–11.1)
WBC URNS QL MICRO: ABNORMAL /HPF

## 2025-03-18 PROCEDURE — 80053 COMPREHEN METABOLIC PANEL: CPT

## 2025-03-18 PROCEDURE — 51701 INSERT BLADDER CATHETER: CPT

## 2025-03-18 PROCEDURE — 6360000002 HC RX W HCPCS: Performed by: EMERGENCY MEDICINE

## 2025-03-18 PROCEDURE — 6370000000 HC RX 637 (ALT 250 FOR IP): Performed by: INTERNAL MEDICINE

## 2025-03-18 PROCEDURE — 93005 ELECTROCARDIOGRAM TRACING: CPT | Performed by: INTERNAL MEDICINE

## 2025-03-18 PROCEDURE — 6370000000 HC RX 637 (ALT 250 FOR IP): Performed by: STUDENT IN AN ORGANIZED HEALTH CARE EDUCATION/TRAINING PROGRAM

## 2025-03-18 PROCEDURE — 96374 THER/PROPH/DIAG INJ IV PUSH: CPT

## 2025-03-18 PROCEDURE — 87086 URINE CULTURE/COLONY COUNT: CPT

## 2025-03-18 PROCEDURE — 99285 EMERGENCY DEPT VISIT HI MDM: CPT

## 2025-03-18 PROCEDURE — 83880 ASSAY OF NATRIURETIC PEPTIDE: CPT

## 2025-03-18 PROCEDURE — 99221 1ST HOSP IP/OBS SF/LOW 40: CPT | Performed by: NURSE PRACTITIONER

## 2025-03-18 PROCEDURE — 87635 SARS-COV-2 COVID-19 AMP PRB: CPT

## 2025-03-18 PROCEDURE — 71045 X-RAY EXAM CHEST 1 VIEW: CPT

## 2025-03-18 PROCEDURE — 51798 US URINE CAPACITY MEASURE: CPT

## 2025-03-18 PROCEDURE — 93005 ELECTROCARDIOGRAM TRACING: CPT | Performed by: STUDENT IN AN ORGANIZED HEALTH CARE EDUCATION/TRAINING PROGRAM

## 2025-03-18 PROCEDURE — 93010 ELECTROCARDIOGRAM REPORT: CPT | Performed by: INTERNAL MEDICINE

## 2025-03-18 PROCEDURE — 81001 URINALYSIS AUTO W/SCOPE: CPT

## 2025-03-18 PROCEDURE — 82962 GLUCOSE BLOOD TEST: CPT

## 2025-03-18 PROCEDURE — 6360000002 HC RX W HCPCS: Performed by: INTERNAL MEDICINE

## 2025-03-18 PROCEDURE — 85025 COMPLETE CBC W/AUTO DIFF WBC: CPT

## 2025-03-18 PROCEDURE — 36415 COLL VENOUS BLD VENIPUNCTURE: CPT

## 2025-03-18 PROCEDURE — G0378 HOSPITAL OBSERVATION PER HR: HCPCS

## 2025-03-18 PROCEDURE — 84484 ASSAY OF TROPONIN QUANT: CPT

## 2025-03-18 PROCEDURE — 51702 INSERT TEMP BLADDER CATH: CPT

## 2025-03-18 PROCEDURE — 87502 INFLUENZA DNA AMP PROBE: CPT

## 2025-03-18 RX ORDER — FUROSEMIDE 40 MG/1
40 TABLET ORAL DAILY
Status: DISCONTINUED | OUTPATIENT
Start: 2025-03-19 | End: 2025-03-22 | Stop reason: HOSPADM

## 2025-03-18 RX ORDER — FUROSEMIDE 10 MG/ML
40 INJECTION INTRAMUSCULAR; INTRAVENOUS ONCE
Status: COMPLETED | OUTPATIENT
Start: 2025-03-18 | End: 2025-03-18

## 2025-03-18 RX ORDER — LOSARTAN POTASSIUM 50 MG/1
50 TABLET ORAL DAILY
Status: DISCONTINUED | OUTPATIENT
Start: 2025-03-19 | End: 2025-03-22 | Stop reason: HOSPADM

## 2025-03-18 RX ORDER — METFORMIN HYDROCHLORIDE 500 MG/1
500 TABLET, EXTENDED RELEASE ORAL
Status: DISCONTINUED | OUTPATIENT
Start: 2025-03-19 | End: 2025-03-22 | Stop reason: HOSPADM

## 2025-03-18 RX ORDER — LIDOCAINE HYDROCHLORIDE 20 MG/ML
JELLY TOPICAL PRN
Status: DISCONTINUED | OUTPATIENT
Start: 2025-03-18 | End: 2025-03-18 | Stop reason: HOSPADM

## 2025-03-18 RX ORDER — ACETAMINOPHEN 325 MG/1
650 TABLET ORAL EVERY 6 HOURS
Status: DISCONTINUED | OUTPATIENT
Start: 2025-03-18 | End: 2025-03-22 | Stop reason: HOSPADM

## 2025-03-18 RX ORDER — TAMSULOSIN HYDROCHLORIDE 0.4 MG/1
0.4 CAPSULE ORAL DAILY
Status: DISCONTINUED | OUTPATIENT
Start: 2025-03-19 | End: 2025-03-22 | Stop reason: HOSPADM

## 2025-03-18 RX ORDER — TAMSULOSIN HYDROCHLORIDE 0.4 MG/1
0.4 CAPSULE ORAL DAILY
Status: DISCONTINUED | OUTPATIENT
Start: 2025-03-18 | End: 2025-03-18 | Stop reason: HOSPADM

## 2025-03-18 RX ORDER — ATORVASTATIN CALCIUM 80 MG/1
80 TABLET, FILM COATED ORAL DAILY
Status: DISCONTINUED | OUTPATIENT
Start: 2025-03-19 | End: 2025-03-22 | Stop reason: HOSPADM

## 2025-03-18 RX ORDER — ROPINIROLE 1 MG/1
0.5 TABLET, FILM COATED ORAL NIGHTLY
Status: DISCONTINUED | OUTPATIENT
Start: 2025-03-18 | End: 2025-03-22 | Stop reason: HOSPADM

## 2025-03-18 RX ORDER — MORPHINE SULFATE 4 MG/ML
4 INJECTION INTRAVENOUS ONCE
Refills: 0 | Status: COMPLETED | OUTPATIENT
Start: 2025-03-18 | End: 2025-03-18

## 2025-03-18 RX ORDER — HYDROCODONE BITARTRATE AND ACETAMINOPHEN 5; 325 MG/1; MG/1
1 TABLET ORAL EVERY 8 HOURS PRN
Qty: 12 TABLET | Refills: 0 | Status: SHIPPED | OUTPATIENT
Start: 2025-03-18 | End: 2025-03-22

## 2025-03-18 RX ADMIN — LOSARTAN POTASSIUM 25 MG: 50 TABLET, FILM COATED ORAL at 09:19

## 2025-03-18 RX ADMIN — OXYCODONE HYDROCHLORIDE 5 MG: 5 TABLET ORAL at 00:02

## 2025-03-18 RX ADMIN — TAMSULOSIN HYDROCHLORIDE 0.4 MG: 0.4 CAPSULE ORAL at 01:44

## 2025-03-18 RX ADMIN — MORPHINE SULFATE 4 MG: 4 INJECTION INTRAVENOUS at 17:51

## 2025-03-18 RX ADMIN — ACETAMINOPHEN 650 MG: 325 TABLET ORAL at 21:42

## 2025-03-18 RX ADMIN — MAGNESIUM GLUCONATE 500 MG ORAL TABLET 400 MG: 500 TABLET ORAL at 08:38

## 2025-03-18 RX ADMIN — FUROSEMIDE 40 MG: 10 INJECTION, SOLUTION INTRAVENOUS at 09:55

## 2025-03-18 RX ADMIN — ACETAMINOPHEN 650 MG: 325 TABLET ORAL at 06:38

## 2025-03-18 RX ADMIN — ROPINIROLE HYDROCHLORIDE 0.5 MG: 1 TABLET, FILM COATED ORAL at 21:42

## 2025-03-18 RX ADMIN — APIXABAN 5 MG: 5 TABLET, FILM COATED ORAL at 21:41

## 2025-03-18 RX ADMIN — ATORVASTATIN CALCIUM 80 MG: 80 TABLET, FILM COATED ORAL at 08:38

## 2025-03-18 RX ADMIN — METFORMIN HYDROCHLORIDE 500 MG: 500 TABLET, EXTENDED RELEASE ORAL at 08:39

## 2025-03-18 ASSESSMENT — PAIN DESCRIPTION - ORIENTATION
ORIENTATION: LEFT

## 2025-03-18 ASSESSMENT — PAIN DESCRIPTION - DESCRIPTORS
DESCRIPTORS: SHARP
DESCRIPTORS: SHARP
DESCRIPTORS: ACHING

## 2025-03-18 ASSESSMENT — PAIN DESCRIPTION - LOCATION
LOCATION: ARM;SHOULDER
LOCATION: CHEST
LOCATION: SHOULDER
LOCATION: SHOULDER

## 2025-03-18 ASSESSMENT — PAIN SCALES - GENERAL
PAINLEVEL_OUTOF10: 6
PAINLEVEL_OUTOF10: 6
PAINLEVEL_OUTOF10: 0
PAINLEVEL_OUTOF10: 2
PAINLEVEL_OUTOF10: 0
PAINLEVEL_OUTOF10: 5
PAINLEVEL_OUTOF10: 6

## 2025-03-18 ASSESSMENT — PAIN - FUNCTIONAL ASSESSMENT: PAIN_FUNCTIONAL_ASSESSMENT: 0-10

## 2025-03-18 NOTE — PLAN OF CARE
Problem: Pain  Goal: Verbalizes/displays adequate comfort level or baseline comfort level  3/18/2025 0928 by Lennie Kraus RN  Outcome: Adequate for Discharge  3/18/2025 0744 by Lennie Kraus RN  Outcome: Progressing  Flowsheets  Taken 3/18/2025 0715 by Lennie Kraus RN  Verbalizes/displays adequate comfort level or baseline comfort level: Encourage patient to monitor pain and request assistance  Taken 3/17/2025 1915 by Marie Garcia RN  Verbalizes/displays adequate comfort level or baseline comfort level:   Encourage patient to monitor pain and request assistance   Assess pain using appropriate pain scale   Administer analgesics based on type and severity of pain and evaluate response  Taken 3/17/2025 1832 by Aminata Borja RN  Verbalizes/displays adequate comfort level or baseline comfort level:   Encourage patient to monitor pain and request assistance   Assess pain using appropriate pain scale   Administer analgesics based on type and severity of pain and evaluate response   Implement non-pharmacological measures as appropriate and evaluate response   Consider cultural and social influences on pain and pain management   Notify Licensed Independent Practitioner if interventions unsuccessful or patient reports new pain     Problem: Safety - Adult  Goal: Free from fall injury  3/18/2025 0928 by Lennie Kraus RN  Outcome: Adequate for Discharge  3/18/2025 0744 by Lennie Kraus RN  Outcome: Progressing  3/18/2025 0036 by Marie Garcia RN  Outcome: Progressing     Problem: Chronic Conditions and Co-morbidities  Goal: Patient's chronic conditions and co-morbidity symptoms are monitored and maintained or improved  3/18/2025 0928 by Lennie Kraus RN  Outcome: Adequate for Discharge  3/18/2025 0744 by Lennie Kraus RN  Outcome: Progressing  Flowsheets  Taken 3/18/2025 0715 by Lennie Kraus RN  Care Plan - Patient's Chronic Conditions and Co-Morbidity Symptoms are Monitored and Maintained or Improved: Monitor  and assess patient's chronic conditions and comorbid symptoms for stability, deterioration, or improvement  Taken 3/17/2025 1920 by Marie Garcia RN  Care Plan - Patient's Chronic Conditions and Co-Morbidity Symptoms are Monitored and Maintained or Improved:   Monitor and assess patient's chronic conditions and comorbid symptoms for stability, deterioration, or improvement   Collaborate with multidisciplinary team to address chronic and comorbid conditions and prevent exacerbation or deterioration   Update acute care plan with appropriate goals if chronic or comorbid symptoms are exacerbated and prevent overall improvement and discharge     Problem: Discharge Planning  Goal: Discharge to home or other facility with appropriate resources  3/18/2025 0928 by Lennie Kraus RN  Outcome: Adequate for Discharge  3/18/2025 0744 by Lennie Kraus RN  Outcome: Progressing  Flowsheets  Taken 3/18/2025 0715 by Lennie Kraus RN  Discharge to home or other facility with appropriate resources: Identify barriers to discharge with patient and caregiver  Taken 3/17/2025 1920 by Marie Garcia RN  Discharge to home or other facility with appropriate resources: Identify barriers to discharge with patient and caregiver     Problem: ABCDS Injury Assessment  Goal: Absence of physical injury  Outcome: Adequate for Discharge      40

## 2025-03-18 NOTE — CONSULTS
Urology Consult    Requesting MD:     Patient: Aguilar Titus MRN: [id number]  SSN: [id number]    YOB: 1940  Age: 85 y.o.  Sex: male      Subjective:      Aguilar Titus is a 85 y.o. male who was admitted for HFrEF (heart failure with reduced ejection fraction) ([facility]) [I50.20].       84 y.o. male with a past medical and cardiac history significant for CAD s/p PCI, LV dsfynction, BiV PPM, CHB, LV lead failure and presents for scheduled LV lead placement. He feels OK, but ongoing fatigue, low energy, and some GERMAIN.      Cardiac PMH: (Old records have been reviewed and summarized below)  Cath (8/7/24): mild to mod LV dsyfunction, mod CAD, patent stent  TTE (7/29/24): EF 30-35%       Urology consult for urinary retention.    Patient seen at bedside. He is known to our practice followed by REKHA Bernardo for urinary retention. Last seen in 2022. Rush was place by nursing overnight.    Past Medical History:   Diagnosis Date    Abnormal EKG 4/22/15    FRANKY (acute kidney injury) 9/17/2023    Arrhythmia     Aspiration pneumonia ([facility]) 10/26/2017    CAD (coronary artery disease) 5/8/2015    Carotid artery stenosis without cerebral infarction 6/7/2016    US 6/15: <50% bilat ICAs    Coronary atherosclerosis of native coronary vessel 5/8/2015    GERMAIN on brilinta 5/7/15: prox LAD PCI, normal EF     Diastolic CHF, chronic ([facility]) 3/2/2022    Dyslipidemia 6/7/2016    Dyspnea 5/8/2015    Echo 6/15: EF 60%, mod MR, mod LVH, mild AI     ED (erectile dysfunction)     GERD (gastroesophageal reflux disease)     GERD (gastroesophageal reflux disease)     no medication    HTN (hypertension) 5/8/2015    Hypertension     Hypokalemia     Hypokalemia 6/7/2016    Mitral valve regurgitation 6/7/2016    Morbid obesity     Myasthenia gravis ([facility])     Myasthenia gravis ([facility]) 6/17/15    Nocturia     Osteoporosis     PUD (peptic ulcer disease) 25 yrs ago    S/P coronary artery stent placement 5/8/2015    3.0x38 mm Xience CAROL to pLAD 5/7/15

## 2025-03-18 NOTE — PLAN OF CARE
Problem: Safety - Adult  Goal: Free from fall injury  Outcome: Progressing     Problem: Pain  Goal: Verbalizes/displays adequate comfort level or baseline comfort level  Recent Flowsheet Documentation  Taken 3/17/2025 1915 by Marie Garcia RN  Verbalizes/displays adequate comfort level or baseline comfort level:   Encourage patient to monitor pain and request assistance   Assess pain using appropriate pain scale   Administer analgesics based on type and severity of pain and evaluate response  Taken 3/17/2025 1832 by Aminata Borja RN  Verbalizes/displays adequate comfort level or baseline comfort level:   Encourage patient to monitor pain and request assistance   Assess pain using appropriate pain scale   Administer analgesics based on type and severity of pain and evaluate response   Implement non-pharmacological measures as appropriate and evaluate response   Consider cultural and social influences on pain and pain management   Notify Licensed Independent Practitioner if interventions unsuccessful or patient reports new pain     Problem: Chronic Conditions and Co-morbidities  Goal: Patient's chronic conditions and co-morbidity symptoms are monitored and maintained or improved  Recent Flowsheet Documentation  Taken 3/17/2025 1920 by Marie Garcia RN  Care Plan - Patient's Chronic Conditions and Co-Morbidity Symptoms are Monitored and Maintained or Improved:   Monitor and assess patient's chronic conditions and comorbid symptoms for stability, deterioration, or improvement   Collaborate with multidisciplinary team to address chronic and comorbid conditions and prevent exacerbation or deterioration   Update acute care plan with appropriate goals if chronic or comorbid symptoms are exacerbated and prevent overall improvement and discharge     Problem: Discharge Planning  Goal: Discharge to home or other facility with appropriate resources  Recent Flowsheet Documentation  Taken 3/17/2025 1920 by lyndon Gomez

## 2025-03-18 NOTE — PLAN OF CARE
assess patient's chronic conditions and comorbid symptoms for stability, deterioration, or improvement   Collaborate with multidisciplinary team to address chronic and comorbid conditions and prevent exacerbation or deterioration   Update acute care plan with appropriate goals if chronic or comorbid symptoms are exacerbated and prevent overall improvement and discharge     Problem: Discharge Planning  Goal: Discharge to home or other facility with appropriate resources  Outcome: Progressing  Flowsheets  Taken 3/18/2025 0715 by Lennie Kraus, RN  Discharge to home or other facility with appropriate resources: Identify barriers to discharge with patient and caregiver  Taken 3/17/2025 1920 by Marie Garcia, RN  Discharge to home or other facility with appropriate resources: Identify barriers to discharge with patient and caregiver

## 2025-03-18 NOTE — ED TRIAGE NOTES
Pt arrives via  [facility] coming from home w c/o all over body pain 10/10 that started this morning. Pt was d/c from [facility] 2 hrs ago as pt had had a pacemake placed yesterday PT has a cowan in place.     + eliquis

## 2025-03-18 NOTE — DISCHARGE INSTRUCTIONS
Continue all your current medications  Call your cardiologist for follow-up tomorrow  Continue to use sling and avoid any lifting or strenuous activities with that left arm  Monitor for fever shortness of breath or other concerning symptoms    Call your doctor or the follow up doctor to set up appointment for recheck visit    Return to ER for any worsening symptoms or new problems which may arise

## 2025-03-18 NOTE — PROGRESS NOTES
TRANSFER - OUT REPORT:    Verbal report given to discharge nurse on Aguilar Titus  being transferred to discharge [facility] for routine progression of patient care       Report consisted of patient's Situation, Background, Assessment and   Recommendations(SBAR).     Information from the following report(s) Nurse Handoff Report and Cardiac Rhythm V paced  was reviewed with the receiving nurse.           Lines:   Peripheral IV 03/17/25 Posterior;Right Hand (Active)   Site Assessment Clean, dry & intact 03/18/25 0715   Line Status Normal saline locked;Capped 03/18/25 0715   Line Care Connections checked and tightened 03/18/25 0715   Phlebitis Assessment No symptoms 03/18/25 0715   Infiltration Assessment 0 03/18/25 0715   Alcohol Cap Used No 03/18/25 0715   Dressing Status Clean, dry & intact 03/18/25 0715   Dressing Type Transparent 03/18/25 0715        Opportunity for questions and clarification was provided.      Patient transported with:  Transport

## 2025-03-18 NOTE — ED PROVIDER NOTES
Emergency Department Provider Note       PCP: Nelsy Stovall DO   Age: 85 y.o.   Sex: male     DISPOSITION Decision To Discharge 03/18/2025 05:26:16 PM    ICD-10-CM    1. Chest wall pain following surgery  R07.89     G89.18           Medical Decision Making     85-year-old male patient presents to the ER with concerns of left upper chest and shoulder pain  Underwent wire exchange for his pacemaker yesterday  No fever nausea or vomiting  Initial EKG and lab work unremarkable  First troponin in the 60s which is slightly above his baseline.  Bump is likely related to the procedure that he underwent  Will confirm no sharp rise in his troponin with a 90-minute level  If negative patient will be discharged  ED Course as of 03/18/25 1732   Tue Mar 18, 2025   1712 NT Pro-BNP(!): 1,144 [KH]      ED Course User Index  [KH] Ross Rai MD     1 acute illness with systemic symptoms.  Parental controlled substances given in the ED.  Patient was discharged risks and benefits of hospitalization were considered.  Shared medical decision making was utilized in creating the patients health plan today.  I independently ordered and reviewed each unique test.    I reviewed external records: provider visit note from PCP.  I reviewed external records: provider visit note from outside specialist.     ED cardiac monitoring rhythm strip was ordered and interpreted:  sinus rhythm, no evidence of an arrhythmia  ST Segments:Normal ST segments - NO STEMI   Rate: 80 likely paced rhythm  I interpreted the X-rays chest x-ray reveals no evidence of acute cardiopulmonary disease.              History     85-year-old male patient presents via EMS from home with concerns over malaise and left shoulder pain  Patient was admitted to the hospital yesterday to have pacemaker lead replaced  Has had fair amount of discomfort in that left shoulder area since the procedure  No overt chest pain or shortness of breath  No fevers or chills  Has had  (TYLENOL) 500 MG TABLET    Take 2 tablets by mouth every 6 hours as needed for Pain    ALBUTEROL SULFATE HFA (VENTOLIN HFA) 108 (90 BASE) MCG/ACT INHALER    Inhale 2 puffs into the lungs 4 times daily as needed for Wheezing or Shortness of Breath    ALBUTEROL SULFATE HFA (VENTOLIN HFA) 108 (90 BASE) MCG/ACT INHALER    Inhale 2 puffs into the lungs every 6 hours as needed for Wheezing or Shortness of Breath    APIXABAN (ELIQUIS) 5 MG TABS TABLET    Take 1 tablet by mouth 2 times daily at 0800 and 1400    ASCORBIC ACID (VITAMIN C PO)    Take 1 tablet by mouth daily.    ATORVASTATIN (LIPITOR) 80 MG TABLET    Take 1 tablet by mouth daily    COLESTIPOL (COLESTID) 1 G TABLET    60 Tablet    FAMOTIDINE (PEPCID) 20 MG TABLET    Take 1 tablet by mouth daily    FUROSEMIDE (LASIX) 40 MG TABLET    Take 1 tablet by mouth daily    HYDROCODONE-ACETAMINOPHEN (NORCO) 5-325 MG PER TABLET    Take 1 tablet by mouth every 8 hours as needed for Pain for up to 4 days. Intended supply: 3 days. Take lowest dose possible to manage pain Max Daily Amount: 3 tablets    LOSARTAN (COZAAR) 50 MG TABLET    Take 1 tablet by mouth daily    MAGNESIUM OXIDE (MAG-OX) 400 MG TABLET    Take 1 tablet by mouth daily    METFORMIN (GLUCOPHAGE-XR) 500 MG EXTENDED RELEASE TABLET    Take 1 tablet by mouth daily (with breakfast)    MULTIPLE VITAMINS-MINERALS (MULTIVITAMIN WITH MINERALS) TABLET    Once daily OTC    NITROGLYCERIN (NITROSTAT) 0.4 MG SL TABLET    Place 1 sl under the tongue q 5 min prn cp, max 3 sl in a 15-min time period. Call 911 if no relief after the 3rd sl.    POTASSIUM CHLORIDE (KLOR-CON M) 20 MEQ EXTENDED RELEASE TABLET    Take 1 tablet by mouth 2 times daily    ROPINIROLE (REQUIP) 0.5 MG TABLET    Take 1 tablet by mouth nightly    TAMSULOSIN (FLOMAX) 0.4 MG CAPSULE    Take 1 capsule by mouth daily    VITAMIN D (ERGOCALCIFEROL) 1.25 MG (57661 UT) CAPS CAPSULE    Take 1 capsule by mouth once a week    ZINC PO    Take 1 capsule by mouth daily.

## 2025-03-18 NOTE — PROGRESS NOTES
[facility] CARDIOLOGY PROGRESS NOTE           3/18/2025 6:33 AM    Admit Date: 3/17/2025    Admit Diagnosis: HFrEF (heart failure with reduced ejection fraction) ([facility]) [I50.20]  Status post biventricular pacemaker [Z95.0]  Pacemaker lead failure, subsequent encounter [T82.110D]      Subjective:   No complaints this AM, no chest pain or shortness of breath, appropriate post op device pocket pain    Interval History: (History of pertinent interval events obtained from nursing staff)  Cardiac device placed yesterday, no events overnight, no immediate complications    ROS:  GEN:  No fever or chills  Cardiovascular:  As noted above  Pulmonary:  As noted above  Neuro:  No new focal motor or sensory loss      Objective:     Vitals:    03/18/25 0445 03/18/25 0500 03/18/25 0530 03/18/25 0600   BP:  (!) 122/59 116/66 (!) 105/55   Pulse: 80 80 80 80   Resp: (!) 46 23 23 24   Temp:       TempSrc:       SpO2:  95% 92% 92%   Weight: 90.2 kg (198 lb 13.7 oz)      Height:           Physical Exam:  General-Well Developed, Well Nourished, No Acute Distress, Alert & Oriented x 3, appropriate mood.  CV- regular rate and rhythm no MRG, left infraclavicular pocket with dressing in place, no evidence of hematoma, redness, warmth or excessive drainage  Lung- clear bilaterally  Abd- soft, nontender, nondistended  Ext- no edema bilaterally.    Current Facility-Administered Medications   Medication Dose Route Frequency    tamsulosin (FLOMAX) capsule 0.4 mg  0.4 mg Oral Daily    lidocaine (XYLOCAINE) 2 % uro-jet   Topical PRN    atorvastatin (LIPITOR) tablet 80 mg  80 mg Oral Daily    losartan (COZAAR) tablet 50 mg  50 mg Oral Daily    magnesium oxide (MAG-OX) tablet 400 mg  400 mg Oral Daily    metFORMIN (GLUCOPHAGE-XR) extended release tablet 500 mg  500 mg Oral Daily with breakfast    rOPINIRole (REQUIP) tablet 0.5 mg  0.5 mg Oral Nightly    acetaminophen (TYLENOL) tablet 650 mg  650 mg Oral Q6H    oxyCODONE (ROXICODONE)  17-MAR-2025 13:09,  Vent. rate has increased BY   5 BPM         EKG:  (EKG has been independently visualized by me with interpretation below)  Assessment:     Principal Problem:    HFrEF (heart failure with reduced ejection fraction) ([facility])  Active Problems:    Status post biventricular pacemaker    Pacemaker lead failure, subsequent encounter  Resolved Problems:    * No resolved hospital problems. *    Plan:   Cardiac device implantation: Pt device interrogation this AM reveals normal function, excellent pacing and sensing parameters, CXR without pneumothorax with excellent device position, no recognized complications, stable for discharge home today with appropriate follow up.    Manuel Hassan MD  Cardiology/Electrophysiology

## 2025-03-18 NOTE — DISCHARGE SUMMARY
[facility] Discharge Summary     Patient ID:  Aguilar Titus  [id number]  85 y.o.  1940    Admit date: 3/17/2025    Discharge date:  03/18/2025    Admitting Physician: Manuel Hassan MD     Discharge Physician: Aparna Conway, NAOMY - [facility]/Dr. Hassan    Admission Diagnoses: HFrEF (heart failure with reduced ejection fraction) ([facility]) [I50.20]  Status post biventricular pacemaker [Z95.0]  Pacemaker lead failure, subsequent encounter [T82.110D]    Discharge Diagnoses:   Patient Active Problem List    Diagnosis    Status post biventricular pacemaker    Severe obesity (BMI 35.0-39.9) with comorbidity    Acute deep vein thrombosis (DVT) of axillary vein of left upper extremity ([facility])    Adenocarcinoma of cecum (HCC)    Colon cancer ([facility])    Acute blood loss anemia    Pacemaker lead failure, subsequent encounter    Pacemaker lead failure    HFrEF (heart failure with reduced ejection fraction) ([facility])    Pacemaker lead fracture    Parkinson's disease    Type 2 diabetes mellitus without complication, without long-term current use of insulin (HCC)    Pacemaker    Atrial fibrillation ([facility])    Mitral valve regurgitation    Hypokalemia    Dyslipidemia    HTN (hypertension)    Dyspnea    S/P coronary artery stent placement    Atherosclerotic heart disease of native coronary artery with other forms of angina pectoris       Cardiology Procedures this admission:   Biotronik BIV PPM gen change and new LV lead  Consults: none    Hospital Course: Patient was seen at the office of [facility] by Dr. Hassan for management of LV lead fracture and was subsequently scheduled for a generator change and new LV lead at [facility] on 03/17/25. Patient was taken to the EP lab and underwent Biotronik BIV PPM gen change and new LV lead insertion by Dr. Hassan. Patient tolerated the procedure well and was taken to the telemetry floor for recovery. Follow up chest xray showed no pneumothorax.     Patient noted to have

## 2025-03-19 ENCOUNTER — APPOINTMENT (OUTPATIENT)
Dept: GENERAL RADIOLOGY | Age: 85
End: 2025-03-19
Payer: MEDICARE

## 2025-03-19 PROBLEM — R53.1 WEAKNESS: Status: ACTIVE | Noted: 2025-03-19

## 2025-03-19 PROBLEM — D68.69 SECONDARY HYPERCOAGULABLE STATE: Status: ACTIVE | Noted: 2025-03-19

## 2025-03-19 PROBLEM — B34.8 RHINOVIRUS: Status: ACTIVE | Noted: 2025-03-19

## 2025-03-19 LAB
B PERT DNA SPEC QL NAA+PROBE: NOT DETECTED
BORDETELLA PARAPERTUSSIS BY PCR: NOT DETECTED
C PNEUM DNA SPEC QL NAA+PROBE: NOT DETECTED
FLUAV SUBTYP SPEC NAA+PROBE: NOT DETECTED
FLUBV RNA SPEC QL NAA+PROBE: NOT DETECTED
HADV DNA SPEC QL NAA+PROBE: NOT DETECTED
HCOV 229E RNA SPEC QL NAA+PROBE: NOT DETECTED
HCOV HKU1 RNA SPEC QL NAA+PROBE: NOT DETECTED
HCOV NL63 RNA SPEC QL NAA+PROBE: NOT DETECTED
HCOV OC43 RNA SPEC QL NAA+PROBE: NOT DETECTED
HMPV RNA SPEC QL NAA+PROBE: NOT DETECTED
HPIV1 RNA SPEC QL NAA+PROBE: NOT DETECTED
HPIV2 RNA SPEC QL NAA+PROBE: NOT DETECTED
HPIV3 RNA SPEC QL NAA+PROBE: NOT DETECTED
HPIV4 RNA SPEC QL NAA+PROBE: NOT DETECTED
M PNEUMO DNA SPEC QL NAA+PROBE: NOT DETECTED
RSV RNA SPEC QL NAA+PROBE: NOT DETECTED
RV+EV RNA SPEC QL NAA+PROBE: DETECTED
SARS-COV-2 RNA RESP QL NAA+PROBE: NOT DETECTED

## 2025-03-19 PROCEDURE — 6370000000 HC RX 637 (ALT 250 FOR IP): Performed by: STUDENT IN AN ORGANIZED HEALTH CARE EDUCATION/TRAINING PROGRAM

## 2025-03-19 PROCEDURE — 71045 X-RAY EXAM CHEST 1 VIEW: CPT

## 2025-03-19 PROCEDURE — 99222 1ST HOSP IP/OBS MODERATE 55: CPT | Performed by: PHYSICAL MEDICINE & REHABILITATION

## 2025-03-19 PROCEDURE — 97535 SELF CARE MNGMENT TRAINING: CPT

## 2025-03-19 PROCEDURE — 6370000000 HC RX 637 (ALT 250 FOR IP): Performed by: NURSE PRACTITIONER

## 2025-03-19 PROCEDURE — 97165 OT EVAL LOW COMPLEX 30 MIN: CPT

## 2025-03-19 PROCEDURE — G0378 HOSPITAL OBSERVATION PER HR: HCPCS

## 2025-03-19 PROCEDURE — 2500000003 HC RX 250 WO HCPCS: Performed by: STUDENT IN AN ORGANIZED HEALTH CARE EDUCATION/TRAINING PROGRAM

## 2025-03-19 PROCEDURE — 97112 NEUROMUSCULAR REEDUCATION: CPT

## 2025-03-19 PROCEDURE — 0202U NFCT DS 22 TRGT SARS-COV-2: CPT

## 2025-03-19 PROCEDURE — 97161 PT EVAL LOW COMPLEX 20 MIN: CPT

## 2025-03-19 PROCEDURE — 97530 THERAPEUTIC ACTIVITIES: CPT

## 2025-03-19 RX ORDER — ONDANSETRON 4 MG/1
4 TABLET, ORALLY DISINTEGRATING ORAL EVERY 8 HOURS PRN
Status: DISCONTINUED | OUTPATIENT
Start: 2025-03-19 | End: 2025-03-22 | Stop reason: HOSPADM

## 2025-03-19 RX ORDER — INSULIN LISPRO 100 [IU]/ML
0-8 INJECTION, SOLUTION INTRAVENOUS; SUBCUTANEOUS
Status: DISCONTINUED | OUTPATIENT
Start: 2025-03-19 | End: 2025-03-22 | Stop reason: HOSPADM

## 2025-03-19 RX ORDER — POLYETHYLENE GLYCOL 3350 17 G/17G
17 POWDER, FOR SOLUTION ORAL DAILY PRN
Status: DISCONTINUED | OUTPATIENT
Start: 2025-03-19 | End: 2025-03-22 | Stop reason: HOSPADM

## 2025-03-19 RX ORDER — MORPHINE SULFATE 4 MG/ML
4 INJECTION INTRAVENOUS EVERY 4 HOURS PRN
Status: DISCONTINUED | OUTPATIENT
Start: 2025-03-19 | End: 2025-03-22 | Stop reason: HOSPADM

## 2025-03-19 RX ORDER — SODIUM CHLORIDE 0.9 % (FLUSH) 0.9 %
5-40 SYRINGE (ML) INJECTION EVERY 12 HOURS SCHEDULED
Status: DISCONTINUED | OUTPATIENT
Start: 2025-03-19 | End: 2025-03-22 | Stop reason: HOSPADM

## 2025-03-19 RX ORDER — SODIUM CHLORIDE 0.9 % (FLUSH) 0.9 %
5-40 SYRINGE (ML) INJECTION PRN
Status: DISCONTINUED | OUTPATIENT
Start: 2025-03-19 | End: 2025-03-22 | Stop reason: HOSPADM

## 2025-03-19 RX ORDER — POTASSIUM CHLORIDE 7.45 MG/ML
10 INJECTION INTRAVENOUS PRN
Status: DISCONTINUED | OUTPATIENT
Start: 2025-03-19 | End: 2025-03-22 | Stop reason: HOSPADM

## 2025-03-19 RX ORDER — POTASSIUM CHLORIDE 1500 MG/1
40 TABLET, EXTENDED RELEASE ORAL PRN
Status: DISCONTINUED | OUTPATIENT
Start: 2025-03-19 | End: 2025-03-22 | Stop reason: HOSPADM

## 2025-03-19 RX ORDER — MORPHINE SULFATE 2 MG/ML
2 INJECTION, SOLUTION INTRAMUSCULAR; INTRAVENOUS EVERY 4 HOURS PRN
Status: DISCONTINUED | OUTPATIENT
Start: 2025-03-19 | End: 2025-03-22 | Stop reason: HOSPADM

## 2025-03-19 RX ORDER — ACETAMINOPHEN 325 MG/1
650 TABLET ORAL EVERY 6 HOURS PRN
Status: DISCONTINUED | OUTPATIENT
Start: 2025-03-19 | End: 2025-03-22 | Stop reason: HOSPADM

## 2025-03-19 RX ORDER — ACETAMINOPHEN 650 MG/1
650 SUPPOSITORY RECTAL EVERY 6 HOURS PRN
Status: DISCONTINUED | OUTPATIENT
Start: 2025-03-19 | End: 2025-03-22 | Stop reason: HOSPADM

## 2025-03-19 RX ORDER — IBUPROFEN 600 MG/1
1 TABLET ORAL PRN
Status: DISCONTINUED | OUTPATIENT
Start: 2025-03-19 | End: 2025-03-22 | Stop reason: HOSPADM

## 2025-03-19 RX ORDER — DEXTROSE MONOHYDRATE 100 MG/ML
INJECTION, SOLUTION INTRAVENOUS CONTINUOUS PRN
Status: DISCONTINUED | OUTPATIENT
Start: 2025-03-19 | End: 2025-03-22 | Stop reason: HOSPADM

## 2025-03-19 RX ORDER — SODIUM CHLORIDE 9 MG/ML
INJECTION, SOLUTION INTRAVENOUS PRN
Status: DISCONTINUED | OUTPATIENT
Start: 2025-03-19 | End: 2025-03-22 | Stop reason: HOSPADM

## 2025-03-19 RX ORDER — ONDANSETRON 2 MG/ML
4 INJECTION INTRAMUSCULAR; INTRAVENOUS EVERY 6 HOURS PRN
Status: DISCONTINUED | OUTPATIENT
Start: 2025-03-19 | End: 2025-03-22 | Stop reason: HOSPADM

## 2025-03-19 RX ORDER — MAGNESIUM SULFATE IN WATER 40 MG/ML
2000 INJECTION, SOLUTION INTRAVENOUS PRN
Status: DISCONTINUED | OUTPATIENT
Start: 2025-03-19 | End: 2025-03-22 | Stop reason: HOSPADM

## 2025-03-19 RX ADMIN — ROPINIROLE HYDROCHLORIDE 0.5 MG: 1 TABLET, FILM COATED ORAL at 20:48

## 2025-03-19 RX ADMIN — METFORMIN HYDROCHLORIDE 500 MG: 500 TABLET, EXTENDED RELEASE ORAL at 09:32

## 2025-03-19 RX ADMIN — SODIUM CHLORIDE, PRESERVATIVE FREE 10 ML: 5 INJECTION INTRAVENOUS at 20:49

## 2025-03-19 RX ADMIN — ACETAMINOPHEN 650 MG: 325 TABLET ORAL at 09:32

## 2025-03-19 RX ADMIN — APIXABAN 5 MG: 5 TABLET, FILM COATED ORAL at 09:32

## 2025-03-19 RX ADMIN — FUROSEMIDE 40 MG: 40 TABLET ORAL at 09:32

## 2025-03-19 RX ADMIN — ACETAMINOPHEN 650 MG: 325 TABLET ORAL at 20:48

## 2025-03-19 RX ADMIN — APIXABAN 5 MG: 5 TABLET, FILM COATED ORAL at 20:49

## 2025-03-19 RX ADMIN — Medication 3 MG: at 21:53

## 2025-03-19 RX ADMIN — ATORVASTATIN CALCIUM 80 MG: 80 TABLET, FILM COATED ORAL at 09:32

## 2025-03-19 RX ADMIN — TAMSULOSIN HYDROCHLORIDE 0.4 MG: 0.4 CAPSULE ORAL at 09:32

## 2025-03-19 RX ADMIN — LOSARTAN POTASSIUM 50 MG: 50 TABLET, FILM COATED ORAL at 09:32

## 2025-03-19 ASSESSMENT — PAIN DESCRIPTION - LOCATION: LOCATION: SHOULDER;CHEST

## 2025-03-19 ASSESSMENT — PAIN SCALES - GENERAL
PAINLEVEL_OUTOF10: 7
PAINLEVEL_OUTOF10: 0

## 2025-03-19 ASSESSMENT — PAIN DESCRIPTION - ORIENTATION: ORIENTATION: LEFT;UPPER

## 2025-03-19 NOTE — ED PROVIDER NOTES
The patient was being held for PT/OT assessment for rehab. CM noted the PT/OT order was placed this morning, which means he will probably be evaluated this afternoon. Staff reported the patient had a cough. On exam, the patient has no acute complaints. He states he is not in pain if he lies still and he is not short of breath. Repeat CXR and Resp panel were ordered. CXR shows no acute change when compared to previous day. Resp panel is positive for rhinovirus, likely accounting for patient's cough. PT/OT evaluated the patient and recommended Rehab, consult is placed and the patient is accepted to the ninth floor.  However, we must wait for [facility] insurance authorization.  The patient had been in the emergency department for 24 hours.  With this time course and plan, consulted hospitalist for observation.    Vitals:    03/19/25 0800   BP: 111/67   Pulse: 96   Resp:    Temp:    SpO2: 96%          Sudarshan Geiger MD  03/19/25 1432

## 2025-03-19 NOTE — H&P
obtained. FINDINGS: The lungs are unchanged compared to prior study. There are no infiltrates or effusions.  The heart size is stable.  The bony thorax is intact. Stable a permanent pacemaker is in the left chest wall with no evidence of broken leads.     No significant changes compared to prior study with no evidence of pneumonia or pneumothorax or pleural effusion. Electronically signed by TANYA ADKINS    XR CHEST PORTABLE  Result Date: 3/17/2025  Chest X-ray INDICATION: Pacemaker/ICD placement and rule out pneumothorax COMPARISON: 1/6/2025. TECHNIQUE: AP/PA view of the chest was obtained. FINDINGS: Left chest pacemaker placement. No visualized pneumothorax. The lungs are clear. There are no infiltrates or effusions.  The heart size is normal. The bony thorax is intact.      No visualized pneumothorax status post left chest pacemaker placement. Electronically signed by Manuel Odonnell    Electrophysiology procedure  Result Date: 3/17/2025  Table formatting from the original result was not included. Pre-Procedure Diagnosis 1. Pacemaker lead failure 2. Documented non-reversible symptomatic bradycardia due to third degree atrioventricular block. 3. Chronic systolic congestive heart failure Procedure Performed 1. Insertion of Biotronik biventricular permanent pacemaker. 2. Insertion of left ventricular lead at time of new system Implantation. 3. Removal of BiV PPM generator Estimated Blood Loss: Less than 10 mL The procedure, indications, risks, benefits, and alternatives were discussed with the patient and family members, who desired to proceed after questions were answered and informed consent was documented. Procedure: After informed consent was obtained, the patient was brought to the Electrophysiology Laboratory in a fasting state and was prepped and draped in sterile fashion. Prophylactic antibiotic was administered 10 minutes prior to skin incision (refer to anesthesia documentation for details). Anesthesia was  administered by the anesthesia team with continuous oxygen saturation measurement and blood pressure measurement. Local anesthetic was delivered to the left pectoral region and an incision was made parallel to the deltopectoral groove. The subcutaneous pocket was opened and extended using blunt dissection and electrocautery, and adequate hemostasis was established. The BiV PPM and leads were freed from fibrous tissue and removed from the pocket.  Access x 1 was obtained under ultrasound guidance using a modified Seldinger technique with placement of a long wire down into the RA and advanced below the diaphragm. A 9F short sheath was placed over the wire. A Biotronik Selectra 3D long sheath was then advanced over the long wire and the wire was removed. A Biotronik pacing lead was placed into the sheath, screw was extended, and then placed just distal to the tip of the sheath. While pacing the lead, the lead was positioned on the septum around the location of the left bundle. Using fluoroscopy, pacing morphology, impedence and occasional contrast injection, the lead was screwed into the septum and used to paced the left bundle branch. There was clear QRS morphology change with a narrowing of the paced morphology. The thresholds were adequate with unipolar and bipolar pacing. The delivery sheath was slit and short sheath split and removed. The lead was then sutured to the underlying pectoralis fascia using 2 non-absorbable sutures around the lead collar. The BiV PPM leads were disconnected and the BiV PPM generator was removed. The lead pins were then cleaned with antibiotic soaked gauze, dried gently, and attached to a new biventricular pacemaker generator. Pins were directly observed to pass the tip electrode, and the ring hex wrench screws were secured, and leads tug tested. The device and leads were gently positioned within the pocket after the pocket was irrigated copiously with a saline antimicrobial solution.

## 2025-03-19 NOTE — PROGRESS NOTES
ACUTE PHYSICAL THERAPY GOALS:   (Developed with and agreed upon by patient and/or caregiver.)    (1.) Aguilar Titus  will move from supine to sit and sit to supine  with STAND BY ASSIST within 7 treatment day(s).    (2.) Aguilar Titus will transfer from bed to chair and chair to bed with STAND BY ASSIST using the least restrictive device within 7 treatment day(s).    (3.) Aguilar Titus will ambulate with STAND BY ASSIST for 150 feet with the least restrictive device within 7 treatment day(s).   (4.) Aguilar Titus will perform standing static and dynamic balance activities x 10 minutes with SUPERVISION to improve safety within 7 treatment day(s).  (5.) Aguilar Titus will perform therapeutic exercises x 20 min for HEP with INDEPENDENCE to improve strength, endurance, and functional mobility within 7 treatment day(s).       PHYSICAL THERAPY Initial Assessment, Daily Note, and AM  (Link to Caseload Tracking: PT Visit Days : 1  Acknowledge Orders  Time In/Out  PT Charge Capture  Rehab Caseload Tracker    Aguilar Titus is a 85 y.o. male   PRIMARY DIAGNOSIS: <principal problem not specified>  No admission diagnoses are documented for this encounter.       Reason for Referral: Generalized Muscle Weakness (M62.81)  Difficulty in walking, Not elsewhere classified (R26.2)  Emergency: Payor: [employer] MEDICARE / Plan: [employer] MEDICARE-ADVANTAGE PPO / Product Type: Medicare /     ASSESSMENT:     REHAB RECOMMENDATIONS:   Recommendation to date pending progress:  Setting:  Inpatient Rehab Facility    Equipment:    To Be Determined     ASSESSMENT:  Mr. Titus  is a 85 year old M who presents L shoulder and chest pain, weakness, inability to ambulate after returning home following a pacemaker wire exchange yesterday. At baseline, pt is independent with ADL's and mobility. He denies DME use or recent falls. This date pt performs mobility including bed mobility with Matthias of 2. He was able to ambulate 10 ft x 2  reach, Needs within reach, and Side rails x2    INTERDISCIPLINARY COLLABORATION:  OT/ CORLEY and RN Case Manager/      EDUCATION: Education Given To: Patient  Education Provided: Role of Therapy;Plan of Care;Fall Prevention Strategies  Education Method: Verbal  Barriers to Learning: None  Education Outcome: Verbalized understanding  Educated patient and/or family/caregiver on the following: Fall prevention strategies    TIME IN/OUT:  Time In: 0901  Time Out: 0925  Minutes: 24    POORNIMA GONZALEZ, PT

## 2025-03-19 NOTE — CONSULTS
[facility]  Inpatient Rehab Consult        Admission Date: 3/18/2025  Primary Care Provider: Nelsy Stovall DO  Specialty Group / Referring Service: Emergency department      Chief Complaint : Global weakness and labile gait  Admitting Diagnosis:   No admission diagnoses are documented for this encounter.    Active Problems:    * No active hospital problems. *  Resolved Problems:    * No resolved hospital problems. *      Acute Rehab Diagnoses:  Encounter for rehabilitation Z51.89  Abnormality of gait and mobility R26.9  Decreased independence for activities of daily living (ADL) Z78.9  Physical debility / deconditioning R53.81      Medical Dx:  Past Medical History:   Diagnosis Date    Abnormal EKG 4/22/15    FRANKY (acute kidney injury) 9/17/2023    Arrhythmia     Aspiration pneumonia (HCC) 10/26/2017    CAD (coronary artery disease) 5/8/2015    Carotid artery stenosis without cerebral infarction 6/7/2016     6/15: <50% bilat ICAs    Coronary atherosclerosis of native coronary vessel 5/8/2015    GERMAIN on brilinta 5/7/15: prox LAD PCI, normal EF     Diastolic CHF, chronic ([facility]) 3/2/2022    Dyslipidemia 6/7/2016    Dyspnea 5/8/2015    Echo 6/15: EF 60%, mod MR, mod LVH, mild AI     ED (erectile dysfunction)     GERD (gastroesophageal reflux disease)     GERD (gastroesophageal reflux disease)     no medication    HTN (hypertension) 5/8/2015    Hypertension     Hypokalemia     Hypokalemia 6/7/2016    Mitral valve regurgitation 6/7/2016    Morbid obesity     Myasthenia gravis ([facility])     Myasthenia gravis ([facility]) 6/17/15    Nocturia     Osteoporosis     PUD (peptic ulcer disease) 25 yrs ago    S/P coronary artery stent placement 5/8/2015    3.0x38 mm Xience CAROL to pLAD 5/7/15     Sleep apnea     Syncope and collapse     Unspecified sleep apnea     no cpap       Date of Evaluation: March 19, 2025    Subjective       HPI: Aguilar Titus is a 85 y.o. male patient who presented to [facility]  Rehabilitation       March 19, 2025

## 2025-03-19 NOTE — CARE COORDINATION
Patient and spouse in the room are aware of therapy recommendations and request the 9th floor.  Dr. Geiger makes referral

## 2025-03-19 NOTE — THERAPY EVALUATION
ACUTE OCCUPATIONAL THERAPY GOALS:   (Developed with and agreed upon by patient and/or caregiver.)  1. Patient will complete lower body bathing and dressing with SUPERVISION and adaptive equipment as needed.     2. Patient will complete toilet transfers and toileting with SUPERVISION.  3. Patient will complete self-grooming ADL tasks at standing level with SUPERVISION.  4. Patient will tolerate 25 minutes of OT treatment with 1-2 rest breaks to increase activity tolerance for ADLs.   5. Patient will complete functional transfers with SUPERVISION and adaptive equipment as needed.   6. Patient will tolerate 10 minutes BUE exercises to increase strength for safe, functional transfers.     Timeframe: 7 visits        OCCUPATIONAL THERAPY Initial Assessment, Daily Note, and AM       OT Visit Days: 1  Acknowledge Orders  Time  OT Charge Capture  Rehab Caseload Tracker      Pacemaker Precautions, LUE sling    Aguilar Titus is a 85 y.o. male   PRIMARY DIAGNOSIS: <principal problem not specified>  No admission diagnoses are documented for this encounter.       Reason for Referral: Generalized Muscle Weakness (M62.81)  Other lack of cordination (R27.8)  Emergency: Payor: T MEDICARE / Plan: AET[employer] MEDICARE-ADVANTAGE PPO / Product Type: Medicare /     ASSESSMENT:     REHAB RECOMMENDATIONS:   Recommendation to date pending progress:  Setting:  Inpatient Rehab Facility     Equipment:   To Be Determined     ASSESSMENT:  Mr. Titus is a 86 y/o M who presents to the ED with left upper chest and shoulder pain. He is POD #2 s/p pacemaker placement. At baseline he lives with his wife, is independent with BADLs, does not utilize an assistive device for mobility, and remains an active .     Today, pt is received supine in bed with LUE sling intact, agreeable to participate in the session. He is A&Ox3, mildly confused throughout today's visit. OT re-educated patient on application and use of brace, pacemaker precautions,  coordination, static balance, and dynamic balance in order to prepare for functional task, prepare for functional transfer, increase safety awareness, and prepare for self care..   Self Care (10 minutes): Patient participated in upper body dressing, grooming, functional mobility, bed mobility, and functional transfer in unsupported sitting and standing with minimal visual, verbal, and tactile cueing to increase independence, decrease assistance required, increase activity tolerance, increase safety awareness, and maintain precautions. The patient was educated on role of occupational therapy, strategies to improve safety, transfer training and safety, and surgical precautions and patient verbalized understanding and will need reinforcement at next session.     TREATMENT GRID:  N/A    AFTER TREATMENT PRECAUTIONS: Bed, Bed/Chair Locked, Call light within reach, Needs within reach, RN notified, and Side rails x2    INTERDISCIPLINARY COLLABORATION:  RN/ PCT, PT/ PTA, OT/ CORLEY, and RN Case Manager/      EDUCATION:  Education Given To: Patient  Education Provided: Role of Therapy;Plan of Care;Precautions;ADL Adaptive Strategies;Transfer Training;Fall Prevention Strategies  Education Method: Verbal;Demonstration  Barriers to Learning: Cognition  Education Outcome: Verbalized understanding;Continued education needed    TOTAL TREATMENT DURATION AND TIME:  Time In: 0900  Time Out: 0925  Minutes: 25    VICKI JACOBS, OT

## 2025-03-19 NOTE — PROGRESS NOTES
4 Eyes Skin Assessment     NAME:  Aguilar Titus  YOB: 1940  MEDICAL RECORD NUMBER:  [id number]    The patient is being assessed for  Admission    I agree that at least one RN has performed a thorough Head to Toe Skin Assessment on the patient. ALL assessment sites listed below have been assessed.      Areas assessed by both nurses:    Head, Face, Ears, Shoulders, Back, Chest, Arms, Elbows, Hands, Sacrum. Buttock, Coccyx, Ischium, and Legs. Feet and Heels        Does the Patient have a Wound? No noted wound(s)       Caesar Prevention initiated by RN: Yes  Wound Care Orders initiated by RN: No    Pressure Injury (Stage 3,4, Unstageable, DTI, NWPT, and Complex wounds) if present, place Wound referral order by RN under : No    New Ostomies, if present place, Ostomy referral order under : No     Nurse 1 eSignature: Electronically signed by Ann Marie Stafford RN on 3/19/25 at 6:59 PM EDT    **SHARE this note so that the co-signing nurse can place an eSignature**    Nurse 2 eSignature: Electronically signed by Staci Hernandez RN on 3/19/25 at 7:12 PM EDT

## 2025-03-19 NOTE — ED PROVIDER NOTES
Emergency Department Provider Signout / Continuation of Care Note         DISPOSITION Ed Observation 03/18/2025 08:24:46 PM       ICD-10-CM    1. Chest wall pain following surgery  R07.89     G89.18       2. Generalized weakness  R53.1           The patient's care was signed out to me at shift change.  Please see Dr. Rai's note for complete history and physical.  In summary patient was discharged this afternoon after having pacemaker wire exchange.  Son is now at the bedside and is concerned for patient's lack of strength and ability to ambulate at his baseline.  Reports Sunday patient was able to Oriental orthodox with his elderly wife.  Son states when patient was discharged today patient was unable to ambulate into their home which is just a few steps from the car.  Son had to physically assist him significantly.  Son is unable to be with patient 24/7.  Son logically is concern for patient's generalized weakness after recent hospitalization.      Final Plan      Will hold in emergency department to see case management in the morning in reference to evaluation and potential placement to rehab for generalized weakness.      Complexity of Problem: Acute complaint requiring workup rule out emergent etiology    I reviewed external records: provider visit note from outside specialist.   The patients assessment required an independent historian: son.  The reason they were needed is important historical information not provided by the patient.                      Ritesh Griffin, DO  03/18/25 2028

## 2025-03-19 NOTE — ED NOTES
TRANSFER - OUT REPORT:    Verbal report given to RN on Aguilar Titus  being transferred to [facility] for routine progression of patient care       Report consisted of patient's Situation, Background, Assessment and   Recommendations(SBAR).     Information from the following report(s) Nurse Handoff Report, ED Encounter Summary, ED SBAR, Adult Overview, Intake/Output, MAR, and Recent Results was reviewed with the receiving nurse.    [place]ard Fall Assessment:    Presents to emergency department  because of falls (Syncope, seizure, or loss of consciousness): No  Age > 70: Yes  Altered Mental Status, Intoxication with alcohol or substance confusion (Disorientation, impaired judgment, poor safety awaremess, or inability to follow instructions): No  Impaired Mobility: Ambulates or transfers with assistive devices or assistance; Unable to ambulate or transer.: Yes  Nursing Judgement: Yes          Lines:   Peripheral IV 03/18/25 Proximal;Right Forearm (Active)        Opportunity for questions and clarification was provided.      Patient transported with:  Norma Méndez RN  03/19/25 5150

## 2025-03-19 NOTE — ACP (ADVANCE CARE PLANNING)
Advance Care Planning   Healthcare Decision Maker:    Primary Decision Maker: Hamida Titus - Spouse - [phone]    Secondary Decision Maker: Justin Titus - Child - [phone]    Click here to complete Healthcare Decision Makers including selection of the Healthcare Decision Maker Relationship (ie \"Primary\").

## 2025-03-20 LAB
ANION GAP SERPL CALC-SCNC: 12 MMOL/L (ref 7–16)
BACTERIA SPEC CULT: NORMAL
BASOPHILS # BLD: 0.05 K/UL (ref 0–0.2)
BASOPHILS NFR BLD: 0.4 % (ref 0–2)
BUN SERPL-MCNC: 15 MG/DL (ref 8–23)
CALCIUM SERPL-MCNC: 9 MG/DL (ref 8.8–10.2)
CHLORIDE SERPL-SCNC: 98 MMOL/L (ref 98–107)
CO2 SERPL-SCNC: 23 MMOL/L (ref 20–29)
CREAT SERPL-MCNC: 0.96 MG/DL (ref 0.8–1.3)
DIFFERENTIAL METHOD BLD: ABNORMAL
EOSINOPHIL # BLD: 0.48 K/UL (ref 0–0.8)
EOSINOPHIL NFR BLD: 4 % (ref 0.5–7.8)
ERYTHROCYTE [DISTWIDTH] IN BLOOD BY AUTOMATED COUNT: 14.1 % (ref 11.9–14.6)
GLUCOSE BLD STRIP.AUTO-MCNC: 111 MG/DL (ref 65–100)
GLUCOSE BLD STRIP.AUTO-MCNC: 115 MG/DL (ref 65–100)
GLUCOSE BLD STRIP.AUTO-MCNC: 118 MG/DL (ref 65–100)
GLUCOSE BLD STRIP.AUTO-MCNC: 125 MG/DL (ref 65–100)
GLUCOSE BLD STRIP.AUTO-MCNC: 96 MG/DL (ref 65–100)
GLUCOSE SERPL-MCNC: 135 MG/DL (ref 70–99)
HCT VFR BLD AUTO: 38.6 % (ref 41.1–50.3)
HGB BLD-MCNC: 13 G/DL (ref 13.6–17.2)
IMM GRANULOCYTES # BLD AUTO: 0.21 K/UL (ref 0–0.5)
IMM GRANULOCYTES NFR BLD AUTO: 1.8 % (ref 0–5)
LYMPHOCYTES # BLD: 2.44 K/UL (ref 0.5–4.6)
LYMPHOCYTES NFR BLD: 20.5 % (ref 13–44)
MCH RBC QN AUTO: 30.1 PG (ref 26.1–32.9)
MCHC RBC AUTO-ENTMCNC: 33.7 G/DL (ref 31.4–35)
MCV RBC AUTO: 89.4 FL (ref 82–102)
MONOCYTES # BLD: 0.88 K/UL (ref 0.1–1.3)
MONOCYTES NFR BLD: 7.4 % (ref 4–12)
NEUTS SEG # BLD: 7.87 K/UL (ref 1.7–8.2)
NEUTS SEG NFR BLD: 65.9 % (ref 43–78)
NRBC # BLD: 0 K/UL (ref 0–0.2)
PLATELET # BLD AUTO: 221 K/UL (ref 150–450)
PMV BLD AUTO: 9.4 FL (ref 9.4–12.3)
POTASSIUM SERPL-SCNC: 4 MMOL/L (ref 3.5–5.1)
RBC # BLD AUTO: 4.32 M/UL (ref 4.23–5.6)
SERVICE CMNT-IMP: ABNORMAL
SERVICE CMNT-IMP: NORMAL
SERVICE CMNT-IMP: NORMAL
SODIUM SERPL-SCNC: 134 MMOL/L (ref 136–145)
WBC # BLD AUTO: 11.9 K/UL (ref 4.3–11.1)

## 2025-03-20 PROCEDURE — 6370000000 HC RX 637 (ALT 250 FOR IP): Performed by: STUDENT IN AN ORGANIZED HEALTH CARE EDUCATION/TRAINING PROGRAM

## 2025-03-20 PROCEDURE — 2500000003 HC RX 250 WO HCPCS: Performed by: STUDENT IN AN ORGANIZED HEALTH CARE EDUCATION/TRAINING PROGRAM

## 2025-03-20 PROCEDURE — 80048 BASIC METABOLIC PNL TOTAL CA: CPT

## 2025-03-20 PROCEDURE — 97530 THERAPEUTIC ACTIVITIES: CPT

## 2025-03-20 PROCEDURE — 82962 GLUCOSE BLOOD TEST: CPT

## 2025-03-20 PROCEDURE — 36415 COLL VENOUS BLD VENIPUNCTURE: CPT

## 2025-03-20 PROCEDURE — G0378 HOSPITAL OBSERVATION PER HR: HCPCS

## 2025-03-20 PROCEDURE — 6370000000 HC RX 637 (ALT 250 FOR IP): Performed by: NURSE PRACTITIONER

## 2025-03-20 PROCEDURE — 85025 COMPLETE CBC W/AUTO DIFF WBC: CPT

## 2025-03-20 RX ADMIN — TAMSULOSIN HYDROCHLORIDE 0.4 MG: 0.4 CAPSULE ORAL at 07:58

## 2025-03-20 RX ADMIN — METFORMIN HYDROCHLORIDE 500 MG: 500 TABLET, EXTENDED RELEASE ORAL at 07:58

## 2025-03-20 RX ADMIN — ATORVASTATIN CALCIUM 80 MG: 80 TABLET, FILM COATED ORAL at 07:58

## 2025-03-20 RX ADMIN — Medication 3 MG: at 20:56

## 2025-03-20 RX ADMIN — APIXABAN 5 MG: 5 TABLET, FILM COATED ORAL at 20:56

## 2025-03-20 RX ADMIN — ACETAMINOPHEN 650 MG: 325 TABLET ORAL at 07:58

## 2025-03-20 RX ADMIN — LOSARTAN POTASSIUM 50 MG: 50 TABLET, FILM COATED ORAL at 07:58

## 2025-03-20 RX ADMIN — ACETAMINOPHEN 650 MG: 325 TABLET ORAL at 20:56

## 2025-03-20 RX ADMIN — SODIUM CHLORIDE, PRESERVATIVE FREE 10 ML: 5 INJECTION INTRAVENOUS at 07:59

## 2025-03-20 RX ADMIN — APIXABAN 5 MG: 5 TABLET, FILM COATED ORAL at 07:58

## 2025-03-20 RX ADMIN — ACETAMINOPHEN 650 MG: 325 TABLET ORAL at 14:27

## 2025-03-20 RX ADMIN — ACETAMINOPHEN 650 MG: 325 TABLET ORAL at 02:21

## 2025-03-20 RX ADMIN — SODIUM CHLORIDE, PRESERVATIVE FREE 10 ML: 5 INJECTION INTRAVENOUS at 21:00

## 2025-03-20 RX ADMIN — ROPINIROLE HYDROCHLORIDE 0.5 MG: 1 TABLET, FILM COATED ORAL at 20:56

## 2025-03-20 RX ADMIN — FUROSEMIDE 40 MG: 40 TABLET ORAL at 07:59

## 2025-03-20 ASSESSMENT — PAIN SCALES - GENERAL: PAINLEVEL_OUTOF10: 0

## 2025-03-20 NOTE — PROGRESS NOTES
ACUTE PHYSICAL THERAPY GOALS:   (Developed with and agreed upon by patient and/or caregiver.)    1.) Aguilar Titus  will move from supine to sit and sit to supine  with STAND BY ASSIST within 7 treatment day(s).    (2.) Aguilar Titus will transfer from bed to chair and chair to bed with STAND BY ASSIST using the least restrictive device within 7 treatment day(s).    (3.) Aguilar Titus will ambulate with STAND BY ASSIST for 150 feet with the least restrictive device within 7 treatment day(s).   (4.) Aguilar Titus will perform standing static and dynamic balance activities x 10 minutes with SUPERVISION to improve safety within 7 treatment day(s).  (5.) Aguilar Titus will perform therapeutic exercises x 20 min for HEP with INDEPENDENCE to improve strength, endurance, and functional mobility within 7 treatment day(s).    PHYSICAL THERAPY: Daily Note AM   (Link to Caseload Tracking: PT Visit Days : 2  Time In/Out PT Charge Capture  Rehab Caseload Tracker  Orders    Aguilar Titus is a 85 y.o. male   PRIMARY DIAGNOSIS: Weakness  Weakness [R53.1]  Generalized weakness [R53.1]  Chest wall pain following surgery [R07.89, G89.18]  Rhinovirus [B34.8]       Observation: Payor: [employer] / Plan: AETNA MEDICARE-ADVANTAGE PPO / Product Type: Medicare /     ASSESSMENT:     REHAB RECOMMENDATIONS:   Recommendation to date pending progress:  Setting:  Inpatient Rehab Facility    Equipment:    To Be Determined     ASSESSMENT:  Mr. Titus is supine upon arrival and agreeable to mobility. Today he performs bed mobility, transfers, and ambulation overall with CGA/Matthias. He ambulates short distances in his room--40 ft x2 initially using HHA however due to unsteadiness uses 2WW on second bout (only light touch for balance, no heavy pushing). Patient moves very slowly and does have some increased unsteadiness with changing direction resulting in LOB (corrected with CGA/Matthias). At end of session he performs  Tested    BALANCE: Good Fair+ Fair Fair- Poor NT Comments   Sitting Static [x] [] [] [] [] []    Sitting Dynamic [x] [] [] [] [] []              Standing Static [] [] [x] [] [] []    Standing Dynamic [] [] [] [x] [] []      GAIT: I Mod I S SBA CGA Min Mod Max Total  NT x2 Comments:   Level of Assistance [] [] [] [] [x] [x] [] [] [] [] []    Distance   40 feet x2    DME Rolling Walker and HHA    Gait Quality Decreased step clearance, Decreased step length, and Decreased stance    Weightbearing Status      Stairs      I=Independent, Mod I=Modified Independent, S=Supervision, SBA=Standby Assistance, CGA=Contact Guard Assistance,   Min=Minimal Assistance, Mod=Moderate Assistance, Max=Maximal Assistance, Total=Total Assistance, NT=Not Tested    PLAN:   FREQUENCY AND DURATION: 3 times/week for duration of hospital stay or until stated goals are met, whichever comes first.    TREATMENT:   TREATMENT:   Therapeutic Activity (24 Minutes): Therapeutic activity included Rolling, Supine to Sit, Scooting, Transfer Training, Ambulation on level ground, Sitting balance , Standing balance, and BLE exercises to improve functional Activity tolerance, Balance, Mobility, and Strength.    TREATMENT GRID:  N/A    AFTER TREATMENT PRECAUTIONS: Bed/Chair Locked, Call light within reach, Chair, Needs within reach, RN notified, and Visitors at bedside    INTERDISCIPLINARY COLLABORATION:  RN/ PCT and PT/ PTA    EDUCATION:      TIME IN/OUT:  Time In: 1115  Time Out: 1139  Minutes: 24    Celia Avila, PT

## 2025-03-20 NOTE — PROGRESS NOTES
Hospitalist Progress Note   Admit Date:  3/18/2025  2:59 PM   Name:  Aguilar Titus   Age:  85 y.o.  Sex:  male  :  1940   MRN:  [id number]   Room:      Presenting/Chief Complaint: Shortness of Breath and Arm Pain     Reason(s) for Admission: Weakness [R53.1]  Generalized weakness [R53.1]  Chest wall pain following surgery [R07.89, G89.18]  Rhinovirus [B34.8]     Hospital Course:   Aguilar Titus is a 85 y.o. male with medical history of A-fib on Eliquis, pacemaker in place s/p lead repair 3/17, chronic HFrEF 30-35% in 2024 who presented with generalized weakness. Patient was admitted on 3/17 by cardiology for pacemaker lead replacement and discharged the following day on 3/18. After discharge, he presented back to the ED about 2 hours later due to weakness. Per ED provider documentation patient's son stated that patient was too weak to ambulate to the car and this is different from his baseline. PT OT consulted in the ED and inpatient rehab recommended. Patient has been admitted to the ninth floor, but is pending insurance Auth.     Patient was admitted with generalized weakness most likely secondary to rhinovirus infection.  No hypoxia therefore supportive care only was initiated for rhinovirus.  Home chronic meds continued.  Patient had Rush catheter placed for urinary retention on 3/18 when discharged from cardiology service.  Urology saw patient at that time since he is well-known to the practice for urinary retention.  Urology recommended for patient to be discharged with Rush catheter at that time and follow-up with them as an outpatient for voiding trial.    Patient has been accepted to [facility] and is awaiting insurance authorization.      Subjective & 24hr Events:     Patient alert and orient x 3 this morning.  Forgetful but able to answer questions appropriately.  Rush in place draining clear yellow urine.  Patient denies any pain.  Overall he stated that he feels stronger and  125 mL  125 mL IntraVENous PRN    Or    dextrose bolus 10% 250 mL  250 mL IntraVENous PRN    Glucagon Emergency KIT 1 mg  1 mg SubCUTAneous PRN    dextrose 10 % infusion   IntraVENous Continuous PRN    melatonin tablet 3 mg  3 mg Oral Nightly PRN    acetaminophen (TYLENOL) tablet 650 mg  650 mg Oral Q6H    atorvastatin (LIPITOR) tablet 80 mg  80 mg Oral Daily    furosemide (LASIX) tablet 40 mg  40 mg Oral Daily    losartan (COZAAR) tablet 50 mg  50 mg Oral Daily    metFORMIN (GLUCOPHAGE-XR) extended release tablet 500 mg  500 mg Oral Daily with breakfast    tamsulosin (FLOMAX) capsule 0.4 mg  0.4 mg Oral Daily    rOPINIRole (REQUIP) tablet 0.5 mg  0.5 mg Oral Nightly    apixaban (ELIQUIS) tablet 5 mg  5 mg Oral BID       Signed:  Cele Cooley DO    Part of this note may have been written by using a voice dictation software.  The note has been proof read but may still contain some grammatical/other typographical errors.

## 2025-03-20 NOTE — PLAN OF CARE
Problem: Chronic Conditions and Co-morbidities  Goal: Patient's chronic conditions and co-morbidity symptoms are monitored and maintained or improved  3/20/2025 0936 by Eloisa Bautista RN  Outcome: Progressing  3/19/2025 2242 by Myrtle Packer RN  Outcome: Progressing     Problem: Discharge Planning  Goal: Discharge to home or other facility with appropriate resources  3/20/2025 0936 by Eloisa Bautista RN  Outcome: Progressing  3/19/2025 2242 by Myrtle Packer RN  Outcome: Progressing     Problem: Pain  Goal: Verbalizes/displays adequate comfort level or baseline comfort level  3/20/2025 0936 by Eloisa Bautista RN  Outcome: Progressing  3/19/2025 2242 by Myrtle Packer RN  Outcome: Progressing     Problem: ABCDS Injury Assessment  Goal: Absence of physical injury  3/20/2025 0936 by Eloisa Bautista RN  Outcome: Progressing  3/19/2025 2242 by Myrtle Packer RN  Outcome: Progressing     Problem: Safety - Adult  Goal: Free from fall injury  Outcome: Progressing

## 2025-03-20 NOTE — PRE-CERTIFICATION NOTE
[facility]  Pre-admission Assessment    Facility Information: [facility]  Patient Name: Aguilar Joshi        MRN: [id number]    : 1940 (85 y.o.)  Gender: male   Ethnicity:Cam[place]onian, Cam[place]onian American, Chicano/a  Race:White    COVERAGE INFORMATION  Active Insurance as of 3/18/2025       Primary Coverage       Payor Plan Insurance Group Employer/Plan Group    AETNA MEDICARE AETNA MEDICARE-ADVANTAGE PPO [id number]       Payor Address Payor Phone Number Payor Fax Number Effective Dates    [phone]   2024 - None Entered    [place] [address]         Subscriber Name Subscriber Birth Date Member ID       AGUILAR JOSHI 1940 [id number]               Secondary Coverage       Payor Plan Insurance Group Employer/Plan Group    [employer] [place] [employer] [place] SUPP PLAN N       Payor Plan Address Payor Plan Phone Number Payor Plan Fax Number Effective Dates    [address] [employer] [address] [place] [phone]  3/1/2018 - None Entered    [place] [address]         Subscriber Name Subscriber Birth Date Member ID       AGUILAR JOSHI 1940 [id number]                   Update Due:     PHYSICIAN/REFERRAL INFORMATION  Attending Physician: Cele Cooley DO   Admitted From: [facility]  Date of Admission to the Hospital: 3/18/2025  2:59 PM  Date Patient Eligible for Admission: 3/20/2025    PRIOR LIVING SITUATION/LEVEL OF FUNCTION:  Social/Functional History  Lives With: Spouse  Type of Home: House  Home Layout: One level  Home Access: Stairs to enter without rails  Entrance Stairs - Number of Steps: 1  Bathroom Shower/Tub: Walk-in shower  Bathroom Equipment: Grab bars in shower, Shower chair  Home Equipment: Cane, Walker - Rolling  Has the patient had two or more falls in the past year or any fall with injury in the past year?: No  Receives Help From: Family  Prior Level of Assist for ADLs: Independent  Prior Level of Assist for Homemaking: Needs assistance  Homemaking  prior to admission?  No  Reviewed Lab and Diagnostic Reports From Current Admission: Yes  Current Rehab Issues: ADL dysfunction, bladder management, bowel management, carry over of therapy techniques, discharge planning, disease and co-morbidity management, gait/mobility dysfunction, medication management, nutrition and hydration management, ongoing assessment of safety, pain management, patient and family education, prevention of secondary complications, skin integrity management, precautions training as applicable, cognitive and communication management as applicable.   Physiatrist:  Dr Shanae Guido    PRECAUTIONS:   Restrictions/Precautions: Fall Risk, Post Pacemaker Precautions  Required Braces or Orthoses?: Yes Left Upper Extremity Brace/Splint: Sling       Isolation Precautions: None       CURRENT FUNCTIONAL STATUS:  Upper Extremity ADL’s: 02, Substantial/Maximum Assistance  Lower Extremity ADL’s:  NT  Bed, Chair, Wheelchair Transfers: 01, Dependent  Toilet Transfers:  NT  Locomotion: 01, Dependent  Bladder Continence: 9, N/A (e.g., indwelling catheter)  Bowel Continence: 0, Always continent    REHABILITATION GOALS AND PLAN:  Expected Level of Improvement For Safe Discharge: Supervision   Required Therapy:   Physical Therapy: > 90 minutes per day, > 5 days per week for duration of rehab stay  Occupational Therapy: > 90 minutes per day, > 5 days per week for duration of rehab stay  Speech Therapy: Per doctor's order   Anticipated Duration of Inpatient Rehab Stay: 2 weeks    Expected Discharge Destination: Home with Assistance  Expected Services Upon Discharge: Home Health: PT and OT    Acute Inpatient Rehabilitation Disclosure Statement provided to patient. Patient verbalized understanding.     I have reviewed and concur with the findings and results of the pre-admission screening assessment completed by the Inpatient Rehabilitation Admissions Coordinator.

## 2025-03-21 LAB
GLUCOSE BLD STRIP.AUTO-MCNC: 104 MG/DL (ref 65–100)
GLUCOSE BLD STRIP.AUTO-MCNC: 128 MG/DL (ref 65–100)
GLUCOSE BLD STRIP.AUTO-MCNC: 135 MG/DL (ref 65–100)
SERVICE CMNT-IMP: ABNORMAL

## 2025-03-21 PROCEDURE — 97116 GAIT TRAINING THERAPY: CPT

## 2025-03-21 PROCEDURE — G0378 HOSPITAL OBSERVATION PER HR: HCPCS

## 2025-03-21 PROCEDURE — 6370000000 HC RX 637 (ALT 250 FOR IP): Performed by: STUDENT IN AN ORGANIZED HEALTH CARE EDUCATION/TRAINING PROGRAM

## 2025-03-21 PROCEDURE — 6370000000 HC RX 637 (ALT 250 FOR IP): Performed by: NURSE PRACTITIONER

## 2025-03-21 PROCEDURE — 97110 THERAPEUTIC EXERCISES: CPT

## 2025-03-21 PROCEDURE — 82962 GLUCOSE BLOOD TEST: CPT

## 2025-03-21 PROCEDURE — 2500000003 HC RX 250 WO HCPCS: Performed by: STUDENT IN AN ORGANIZED HEALTH CARE EDUCATION/TRAINING PROGRAM

## 2025-03-21 RX ADMIN — ACETAMINOPHEN 650 MG: 325 TABLET ORAL at 21:21

## 2025-03-21 RX ADMIN — FUROSEMIDE 40 MG: 40 TABLET ORAL at 08:58

## 2025-03-21 RX ADMIN — ROPINIROLE HYDROCHLORIDE 0.5 MG: 1 TABLET, FILM COATED ORAL at 21:20

## 2025-03-21 RX ADMIN — ATORVASTATIN CALCIUM 80 MG: 80 TABLET, FILM COATED ORAL at 08:58

## 2025-03-21 RX ADMIN — Medication 3 MG: at 21:19

## 2025-03-21 RX ADMIN — SODIUM CHLORIDE, PRESERVATIVE FREE 10 ML: 5 INJECTION INTRAVENOUS at 21:23

## 2025-03-21 RX ADMIN — APIXABAN 5 MG: 5 TABLET, FILM COATED ORAL at 08:58

## 2025-03-21 RX ADMIN — TAMSULOSIN HYDROCHLORIDE 0.4 MG: 0.4 CAPSULE ORAL at 08:58

## 2025-03-21 RX ADMIN — METFORMIN HYDROCHLORIDE 500 MG: 500 TABLET, EXTENDED RELEASE ORAL at 08:58

## 2025-03-21 RX ADMIN — ACETAMINOPHEN 650 MG: 325 TABLET ORAL at 08:58

## 2025-03-21 RX ADMIN — APIXABAN 5 MG: 5 TABLET, FILM COATED ORAL at 21:20

## 2025-03-21 RX ADMIN — ACETAMINOPHEN 650 MG: 325 TABLET ORAL at 03:19

## 2025-03-21 RX ADMIN — SODIUM CHLORIDE, PRESERVATIVE FREE 10 ML: 5 INJECTION INTRAVENOUS at 08:59

## 2025-03-21 RX ADMIN — LOSARTAN POTASSIUM 50 MG: 50 TABLET, FILM COATED ORAL at 08:58

## 2025-03-21 ASSESSMENT — PAIN DESCRIPTION - DESCRIPTORS: DESCRIPTORS: THROBBING

## 2025-03-21 ASSESSMENT — PAIN SCALES - GENERAL
PAINLEVEL_OUTOF10: 8
PAINLEVEL_OUTOF10: 0

## 2025-03-21 ASSESSMENT — PAIN DESCRIPTION - LOCATION: LOCATION: SHOULDER

## 2025-03-21 ASSESSMENT — PAIN DESCRIPTION - ORIENTATION: ORIENTATION: RIGHT

## 2025-03-21 NOTE — PROGRESS NOTES
Coronavirus OC43 by PCR NOT DETECTED NOTDET      SARS-CoV-2, PCR NOT DETECTED NOTDET      Human Metapneumovirus by PCR NOT DETECTED NOTDET      Rhinovirus Enterovirus PCR Detected (A) NOTDET      Influenza A by PCR NOT DETECTED NOTDET      Influenza B PCR NOT DETECTED NOTDET      Parainfluenza 1 PCR NOT DETECTED NOTDET      Parainfluenza 2 PCR NOT DETECTED NOTDET      Parainfluenza 3 PCR NOT DETECTED NOTDET      Parainfluenza 4 PCR NOT DETECTED NOTDET      Respiratory Syncytial Virus by PCR NOT DETECTED NOTDET      Bordetella parapertussis by PCR NOT DETECTED NOTDET      Bordetella pertussis by PCR NOT DETECTED NOTDET      Chlamydophila Pneumonia PCR NOT DETECTED NOTDET      Mycoplasma pneumo by PCR NOT DETECTED NOTDET     POCT Glucose    Collection Time: 03/19/25  8:24 PM   Result Value Ref Range    POC Glucose 115 (H) 65 - 100 mg/dL    Performed by: [email or web address]    Basic Metabolic Panel w/ Reflex to MG    Collection Time: 03/20/25  4:56 AM   Result Value Ref Range    Sodium 134 (L) 136 - 145 mmol/L    Potassium 4.0 3.5 - 5.1 mmol/L    Chloride 98 98 - 107 mmol/L    CO2 23 20 - 29 mmol/L    Anion Gap 12 7 - 16 mmol/L    Glucose 135 (H) 70 - 99 mg/dL    BUN 15 8 - 23 MG/DL    Creatinine 0.96 0.80 - 1.30 MG/DL    Est, Glom Filt Rate 77 >60 ml/min/1.73m2    Calcium 9.0 8.8 - 10.2 MG/DL   CBC with Auto Differential    Collection Time: 03/20/25  4:56 AM   Result Value Ref Range    WBC 11.9 (H) 4.3 - 11.1 K/uL    RBC 4.32 4.23 - 5.6 M/uL    Hemoglobin 13.0 (L) 13.6 - 17.2 g/dL    Hematocrit 38.6 (L) 41.1 - 50.3 %    MCV 89.4 82 - 102 FL    MCH 30.1 26.1 - 32.9 PG    MCHC 33.7 31.4 - 35.0 g/dL    RDW 14.1 11.9 - 14.6 %    Platelets 221 150 - 450 K/uL    MPV 9.4 9.4 - 12.3 FL    nRBC 0.00 0.0 - 0.2 K/uL    Differential Type AUTOMATED      Neutrophils % 65.9 43.0 - 78.0 %    Lymphocytes % 20.5 13.0 - 44.0 %    Monocytes % 7.4 4.0 - 12.0 %    Eosinophils % 4.0 0.5 - 7.8 %    Basophils % 0.4 0.0 - 2.0 %    Immature

## 2025-03-21 NOTE — PROGRESS NOTES
ACUTE PHYSICAL THERAPY GOALS:   (Developed with and agreed upon by patient and/or caregiver.)    1.) Augilar Titus  will move from supine to sit and sit to supine  with STAND BY ASSIST within 7 treatment day(s).    (2.) Aguilar Titus will transfer from bed to chair and chair to bed with STAND BY ASSIST using the least restrictive device within 7 treatment day(s).    (3.) Aguilar Titus will ambulate with STAND BY ASSIST for 150 feet with the least restrictive device within 7 treatment day(s).   (4.) Aguilar Titus will perform standing static and dynamic balance activities x 10 minutes with SUPERVISION to improve safety within 7 treatment day(s).  (5.) Aguilar Titus will perform therapeutic exercises x 20 min for HEP with INDEPENDENCE to improve strength, endurance, and functional mobility within 7 treatment day(s).    PHYSICAL THERAPY: Daily Note AM   (Link to Caseload Tracking: PT Visit Days : 3  Time In/Out PT Charge Capture  Rehab Caseload Tracker  Orders    Aguilar Titus is a 85 y.o. male   PRIMARY DIAGNOSIS: Weakness  Weakness [R53.1]  Generalized weakness [R53.1]  Chest wall pain following surgery [R07.89, G89.18]  Rhinovirus [B34.8]       Observation: Payor: [employer]IC[employer] / Plan: AETNA MEDICARE-ADVANTAGE PPO / Product Type: Medicare /     ASSESSMENT:     REHAB RECOMMENDATIONS:   Recommendation to date pending progress:  Setting:  Inpatient Rehab Facility    Equipment:    To Be Determined     ASSESSMENT:  Mr. Titus is seated in recliner upon arrival in no apparent distress, spouse at bedside and agreeable to session. LUE sling is donned poorly, PT adjusted for patient and  Guided thru STS Matthias, and assisted him to restroom. Post standing toileting with CGA patient amb CGA/Matthias with gait belt 5 x 50 ft. Patient is fatigued post gait training and requires seated rest break. Next he is able to complete STS x 10 CGA with cues for hand placement and sequencing, lastly guided thru STS,  []     [] [] [] [] [] [] [] [] [] [] []    I=Independent, Mod I=Modified Independent, S=Supervision, SBA=Standby Assistance, CGA=Contact Guard Assistance,   Min=Minimal Assistance, Mod=Moderate Assistance, Max=Maximal Assistance, Total=Total Assistance, NT=Not Tested    BALANCE: Good Fair+ Fair Fair- Poor NT Comments   Sitting Static [x] [] [] [] [] []    Sitting Dynamic [x] [] [] [] [] []              Standing Static [] [] [x] [] [] []    Standing Dynamic [] [] [] [x] [] []      GAIT: I Mod I S SBA CGA Min Mod Max Total  NT x2 Comments:   Level of Assistance [] [] [] [] [x] [x] [] [] [] [] []    Distance   40 feet x2    DME Rolling Walker and HHA    Gait Quality Decreased step clearance, Decreased step length, and Decreased stance    Weightbearing Status      Stairs      I=Independent, Mod I=Modified Independent, S=Supervision, SBA=Standby Assistance, CGA=Contact Guard Assistance,   Min=Minimal Assistance, Mod=Moderate Assistance, Max=Maximal Assistance, Total=Total Assistance, NT=Not Tested    PLAN:   FREQUENCY AND DURATION: 3 times/week for duration of hospital stay or until stated goals are met, whichever comes first.    TREATMENT:   TREATMENT:   Therapeutic Activity (10 Minutes): Therapeutic activity included Scooting, Transfer Training, Ambulation on level ground, Sitting balance , and Standing balance to improve functional Activity tolerance, Balance, Mobility, and Strength.  Therapeutic Exercise (14 Minutes): Therapeutic exercises noted below to improve functional activity tolerance, AROM, strength, and mobility.     TREATMENT GRID:  N/A    AFTER TREATMENT PRECAUTIONS: Bed/Chair Locked, Call light within reach, Chair, Needs within reach, RN notified, and Visitors at bedside    INTERDISCIPLINARY COLLABORATION:  RN/ PCT and PT/ PTA    EDUCATION:      TIME IN/OUT:  Time In: 1423  Time Out: 1447  Minutes: 24    CARMELINA CORREA, PT

## 2025-03-21 NOTE — PLAN OF CARE
Problem: Chronic Conditions and Co-morbidities  Goal: Patient's chronic conditions and co-morbidity symptoms are monitored and maintained or improved  3/21/2025 1014 by Cristin Chinchilla RN  Outcome: Progressing  3/20/2025 2218 by Katherine Wharton RN  Outcome: Progressing     Problem: Discharge Planning  Goal: Discharge to home or other facility with appropriate resources  3/21/2025 1014 by Cristin Chinchilla RN  Outcome: Progressing  3/20/2025 2218 by Katherine Wharton RN  Outcome: Progressing     Problem: Pain  Goal: Verbalizes/displays adequate comfort level or baseline comfort level  3/21/2025 1014 by Cristin Chinchilla RN  Outcome: Progressing  3/20/2025 2218 by Katherine Wharton RN  Outcome: Progressing     Problem: ABCDS Injury Assessment  Goal: Absence of physical injury  3/21/2025 1014 by Cristin Chinchilla RN  Outcome: Progressing  3/20/2025 2218 by Katherine Wharton RN  Outcome: Progressing     Problem: Safety - Adult  Goal: Free from fall injury  3/21/2025 1014 by Cristin Chinchilla RN  Outcome: Progressing  3/20/2025 2218 by Katherine Wharton RN  Outcome: Progressing     Problem: Infection - Adult  Goal: Absence of infection at discharge  3/21/2025 1014 by Cristin Chinchilla RN  Outcome: Progressing  3/20/2025 2218 by Katherine Wharton RN  Outcome: Progressing     Problem: Metabolic/Fluid and Electrolytes - Adult  Goal: Electrolytes maintained within normal limits  3/21/2025 1014 by Cristin Chinchilla RN  Outcome: Progressing  3/20/2025 2218 by Katherine Wharton RN  Outcome: Progressing

## 2025-03-21 NOTE — PLAN OF CARE
Problem: Chronic Conditions and Co-morbidities  Goal: Patient's chronic conditions and co-morbidity symptoms are monitored and maintained or improved  3/20/2025 2218 by Katherine Wharton RN  Outcome: Progressing  3/20/2025 0936 by Eloisa Bautista RN  Outcome: Progressing     Problem: Discharge Planning  Goal: Discharge to home or other facility with appropriate resources  3/20/2025 2218 by Katherine Wharton RN  Outcome: Progressing  3/20/2025 0936 by Eloisa Bautista RN  Outcome: Progressing     Problem: Pain  Goal: Verbalizes/displays adequate comfort level or baseline comfort level  3/20/2025 2218 by Katherine Wharton RN  Outcome: Progressing  3/20/2025 0936 by Eloisa Bautista RN  Outcome: Progressing     Problem: ABCDS Injury Assessment  Goal: Absence of physical injury  3/20/2025 2218 by Katherine Wharton RN  Outcome: Progressing  3/20/2025 0936 by Eloisa Bautista RN  Outcome: Progressing     Problem: Safety - Adult  Goal: Free from fall injury  3/20/2025 2218 by Katherine Wharton RN  Outcome: Progressing  3/20/2025 0936 by Eloisa Bautista RN  Outcome: Progressing     Problem: Infection - Adult  Goal: Absence of infection at discharge  Outcome: Progressing     Problem: Metabolic/Fluid and Electrolytes - Adult  Goal: Electrolytes maintained within normal limits  Outcome: Progressing

## 2025-03-22 VITALS
SYSTOLIC BLOOD PRESSURE: 106 MMHG | BODY MASS INDEX: 30.42 KG/M2 | OXYGEN SATURATION: 96 % | DIASTOLIC BLOOD PRESSURE: 67 MMHG | RESPIRATION RATE: 17 BRPM | WEIGHT: 200.7 LBS | HEIGHT: 68 IN | HEART RATE: 79 BPM | TEMPERATURE: 98.6 F

## 2025-03-22 LAB
GLUCOSE BLD STRIP.AUTO-MCNC: 105 MG/DL (ref 65–100)
GLUCOSE BLD STRIP.AUTO-MCNC: 110 MG/DL (ref 65–100)
SERVICE CMNT-IMP: ABNORMAL
SERVICE CMNT-IMP: ABNORMAL

## 2025-03-22 PROCEDURE — G0378 HOSPITAL OBSERVATION PER HR: HCPCS

## 2025-03-22 PROCEDURE — 6370000000 HC RX 637 (ALT 250 FOR IP): Performed by: STUDENT IN AN ORGANIZED HEALTH CARE EDUCATION/TRAINING PROGRAM

## 2025-03-22 PROCEDURE — 2500000003 HC RX 250 WO HCPCS: Performed by: STUDENT IN AN ORGANIZED HEALTH CARE EDUCATION/TRAINING PROGRAM

## 2025-03-22 PROCEDURE — 82962 GLUCOSE BLOOD TEST: CPT

## 2025-03-22 RX ADMIN — ATORVASTATIN CALCIUM 80 MG: 80 TABLET, FILM COATED ORAL at 09:42

## 2025-03-22 RX ADMIN — LOSARTAN POTASSIUM 50 MG: 50 TABLET, FILM COATED ORAL at 09:43

## 2025-03-22 RX ADMIN — METFORMIN HYDROCHLORIDE 500 MG: 500 TABLET, EXTENDED RELEASE ORAL at 09:42

## 2025-03-22 RX ADMIN — APIXABAN 5 MG: 5 TABLET, FILM COATED ORAL at 09:42

## 2025-03-22 RX ADMIN — SODIUM CHLORIDE, PRESERVATIVE FREE 10 ML: 5 INJECTION INTRAVENOUS at 09:43

## 2025-03-22 RX ADMIN — TAMSULOSIN HYDROCHLORIDE 0.4 MG: 0.4 CAPSULE ORAL at 09:42

## 2025-03-22 RX ADMIN — ACETAMINOPHEN 650 MG: 325 TABLET ORAL at 13:55

## 2025-03-22 RX ADMIN — ACETAMINOPHEN 650 MG: 325 TABLET ORAL at 02:37

## 2025-03-22 RX ADMIN — ACETAMINOPHEN 650 MG: 325 TABLET ORAL at 09:43

## 2025-03-22 RX ADMIN — FUROSEMIDE 40 MG: 40 TABLET ORAL at 09:42

## 2025-03-22 NOTE — CARE COORDINATION
Pt is for discharge home today with [facility] (RN, PT/OT).  Referral called/faxed to [facility] for follow up home care as ordered.  No additional CM orders received or supportive care needs expressed at this time.

## 2025-03-22 NOTE — DISCHARGE SUMMARY
Hospitalist Discharge Summary   Admit Date:  3/18/2025  2:59 PM   DC Note date: 3/22/2025  Name:  Aguilar Titus   Age:  85 y.o.  Sex:  male  :  1940   MRN:  [id number]   Room:    PCP:  Nelsy Stovall DO    Presenting Complaint: Shortness of Breath and Arm Pain     Initial Admission Diagnosis: Weakness [R53.1]  Generalized weakness [R53.1]  Chest wall pain following surgery [R07.89, G89.18]  Rhinovirus [B34.8]     Problem List for this Hospitalization (present on admission):    Principal Problem:    Weakness  Active Problems:    Systolic CHF, chronic (HCC)    Paroxysmal atrial fibrillation (HCC)    HTN (hypertension)    Dyslipidemia    Urine retention    Type 2 diabetes mellitus without complication, without long-term current use of insulin (HCC)    HFrEF (heart failure with reduced ejection fraction) (HCC)    Pacemaker lead fracture    Secondary hypercoagulable state    Rhinovirus  Resolved Problems:    * No resolved hospital problems. *      Hospital Course:  85 y.o. male with medical history of A-fib on Eliquis, pacemaker in place s/p lead repair 3/17, chronic HFrEF 30-35% in 2024 who presented with generalized weakness. Patient was admitted on 3/17 by cardiology for pacemaker lead replacement and discharged the following day on 3/18. After discharge, he presented back to the ED about 2 hours later due to weakness. Per ED provider documentation patient's son stated that patient was too weak to ambulate to the car and this is different from his baseline. PT OT consulted in the ED and inpatient rehab recommended. Patient has been admitted to the ninth floor, but is pending insurance Auth.      Patient was admitted with generalized weakness most likely secondary to rhinovirus infection.  No hypoxia therefore supportive care only was initiated for rhinovirus.  Home chronic meds continued.  Patient had Rush catheter placed for urinary retention on 3/18 when discharged from cardiology  https://www.fda.gov/media/020664/download     Methodology: Isothermal Nucleic Acid Amplification                 All Labs from Last 24 Hrs:  Recent Results (from the past 24 hours)   POCT Glucose    Collection Time: 03/21/25  4:18 PM   Result Value Ref Range    POC Glucose 104 (H) 65 - 100 mg/dL    Performed by: Vini    POCT Glucose    Collection Time: 03/21/25  7:16 PM   Result Value Ref Range    POC Glucose 128 (H) 65 - 100 mg/dL    Performed by: Dee    POCT Glucose    Collection Time: 03/22/25  7:55 AM   Result Value Ref Range    POC Glucose 105 (H) 65 - 100 mg/dL    Performed by: Cindy    POCT Glucose    Collection Time: 03/22/25 12:05 PM   Result Value Ref Range    POC Glucose 110 (H) 65 - 100 mg/dL    Performed by: Cindy        No results for input(s): \"COVID19\" in the last 72 hours.    Recent Vital Data:  Patient Vitals for the past 24 hrs:   Temp Pulse Resp BP SpO2   03/22/25 0940 98.6 °F (37 °C) 79 17 106/67 --   03/22/25 0807 -- 80 -- -- --   03/22/25 0306 98.4 °F (36.9 °C) 69 17 111/64 96 %   03/21/25 1950 -- 80 -- -- --   03/21/25 1915 97.5 °F (36.4 °C) 80 16 117/70 95 %   03/21/25 1522 97.5 °F (36.4 °C) 79 16 126/67 96 %       Oxygen Therapy  SpO2: 96 %  Pulse via Oximetry: 79 beats per minute  O2 Device: None (Room air)    Estimated body mass index is 30.52 kg/m² as calculated from the following:    Height as of this encounter: 1.727 m (5' 8\").    Weight as of this encounter: 91 kg (200 lb 11.2 oz).    Intake/Output Summary (Last 24 hours) at 3/22/2025 1224  Last data filed at 3/22/2025 0243  Gross per 24 hour   Intake --   Output 1100 ml   Net -1100 ml         Physical Exam:    General:    Well nourished.  No overt distress  Head:  Normocephalic, atraumatic  Eyes:  Sclerae appear normal.  Pupils equally round.    HENT:  Nares appear normal, no drainage.  Moist mucous membranes  Neck:  No restricted ROM.  Trachea midline  CV:   RRR.  No m/r/g.  No

## 2025-03-22 NOTE — PLAN OF CARE
Problem: Chronic Conditions and Co-morbidities  Goal: Patient's chronic conditions and co-morbidity symptoms are monitored and maintained or improved  Outcome: Progressing     Problem: Discharge Planning  Goal: Discharge to home or other facility with appropriate resources  Outcome: Progressing     Problem: Pain  Goal: Verbalizes/displays adequate comfort level or baseline comfort level  Outcome: Progressing     Problem: ABCDS Injury Assessment  Goal: Absence of physical injury  Outcome: Progressing     Problem: Safety - Adult  Goal: Free from fall injury  Outcome: Progressing     Problem: Infection - Adult  Goal: Absence of infection at discharge  Outcome: Progressing     Problem: Metabolic/Fluid and Electrolytes - Adult  Goal: Electrolytes maintained within normal limits  Outcome: Progressing

## 2025-03-22 NOTE — PLAN OF CARE
Problem: Chronic Conditions and Co-morbidities  Goal: Patient's chronic conditions and co-morbidity symptoms are monitored and maintained or improved  3/22/2025 1128 by Alice Ordonez RN  Outcome: Progressing  3/22/2025 0058 by Ami Quiñonez RN  Outcome: Progressing     Problem: Discharge Planning  Goal: Discharge to home or other facility with appropriate resources  3/22/2025 1128 by Alice Ordonez RN  Outcome: Progressing  3/22/2025 0058 by Ami Quiñonez RN  Outcome: Progressing     Problem: Pain  Goal: Verbalizes/displays adequate comfort level or baseline comfort level  3/22/2025 1128 by Alice Ordonez RN  Outcome: Progressing  3/22/2025 0058 by Ami Quiñonez RN  Outcome: Progressing     Problem: ABCDS Injury Assessment  Goal: Absence of physical injury  3/22/2025 1128 by Alice Ordonez RN  Outcome: Progressing  3/22/2025 0058 by Ami Quiñonez RN  Outcome: Progressing     Problem: Safety - Adult  Goal: Free from fall injury  3/22/2025 1128 by Alice Ordonez RN  Outcome: Progressing  3/22/2025 0058 by Ami Quiñonez RN  Outcome: Progressing     Problem: Infection - Adult  Goal: Absence of infection at discharge  3/22/2025 1128 by Alice Ordonez RN  Outcome: Progressing  3/22/2025 0058 by Ami Quiñonez RN  Outcome: Progressing     Problem: Metabolic/Fluid and Electrolytes - Adult  Goal: Electrolytes maintained within normal limits  3/22/2025 1128 by Alice Ordonez RN  Outcome: Progressing  3/22/2025 0058 by Ami Quiñonez RN  Outcome: Progressing

## 2025-03-24 ENCOUNTER — CARE COORDINATION (OUTPATIENT)
Dept: CARE COORDINATION | Facility: CLINIC | Age: 85
End: 2025-03-24

## 2025-03-24 DIAGNOSIS — R53.1 WEAKNESS: ICD-10-CM

## 2025-03-24 DIAGNOSIS — B34.8 RHINOVIRUS: Primary | ICD-10-CM

## 2025-03-24 PROCEDURE — 1111F DSCHRG MED/CURRENT MED MERGE: CPT | Performed by: FAMILY MEDICINE

## 2025-03-24 NOTE — CARE COORDINATION
Care Transitions Note    Initial Call - Call within 2 business days of discharge: Yes    Patient Current Location:  Home: [address] [place] [address] [place] [address]    Care Transition Nurse contacted the patient by telephone to perform post hospital discharge assessment, verified name and  as identifiers.  Provided introduction to self, and explanation of the Care Transition Nurse role.    Patient: Aguilar Titus    Patient : 1940   MRN: [id number]    Reason for Admission: weakness, rhinovirus after recent procedure  Discharge Date: 3/22/25  RURS: Readmission Risk Score: 13.1      Last Discharge Facility       Date Complaint Diagnosis Description Type Department Provider    3/18/25 Shortness of Breath; Arm Pain Chest wall pain following surgery ... ED to Hosp-Admission (Discharged) (ADMITTED) [id number] Cele Cooley, ; Ross Rai M...            Was this an external facility discharge? No    Additional needs identified to be addressed with provider   No needs identified             Method of communication with provider: none.    Patients top risk factors for readmission: medical condition-HF, DM, recent lead repair    Interventions to address risk factors:   Review of patient management of conditions/medications: review of patient d/c dx.     Care Summary Note: Patient home after recent IP admission and readmission. Patient had recent lead repair for PM, followed by rhinovirus, weakness. Patient denies any new or worsening symptoms and denies any new medications. Patient awaiting SOC for HH and declined  SOC due to 'not a good day\" Patient has device check on 3/31 and declines PCP scheduling at this ttime due to family has to transport patient and unaware of schedule at this time. Family will assist to schedule based on availability. Patient denies any questions and has CTN contact information if any arise.     Care Transition Nurse reviewed discharge instructions, medical action plan, and red flags

## 2025-03-25 ENCOUNTER — TELEPHONE (OUTPATIENT)
Age: 85
End: 2025-03-25

## 2025-03-30 ENCOUNTER — TELEPHONE (OUTPATIENT)
Age: 85
End: 2025-03-30

## 2025-03-31 ENCOUNTER — CLINICAL SUPPORT (OUTPATIENT)
Age: 85
End: 2025-03-31
Payer: MEDICARE

## 2025-03-31 ENCOUNTER — TELEPHONE (OUTPATIENT)
Age: 85
End: 2025-03-31

## 2025-03-31 DIAGNOSIS — E87.5 HYPERKALEMIA: ICD-10-CM

## 2025-03-31 DIAGNOSIS — I50.32 CHRONIC DIASTOLIC CHF (CONGESTIVE HEART FAILURE) (HCC): Primary | ICD-10-CM

## 2025-03-31 DIAGNOSIS — I50.32 CHRONIC DIASTOLIC CHF (CONGESTIVE HEART FAILURE) (HCC): ICD-10-CM

## 2025-03-31 DIAGNOSIS — T82.110D FAILURE OF PACEMAKER LEAD, SUBSEQUENT ENCOUNTER: ICD-10-CM

## 2025-03-31 LAB
ANION GAP SERPL CALC-SCNC: 10 MMOL/L (ref 7–16)
BASOPHILS # BLD: 0.07 K/UL (ref 0–0.2)
BASOPHILS NFR BLD: 0.6 % (ref 0–2)
BUN SERPL-MCNC: 21 MG/DL (ref 8–23)
CALCIUM SERPL-MCNC: 10.5 MG/DL (ref 8.8–10.2)
CHLORIDE SERPL-SCNC: 101 MMOL/L (ref 98–107)
CO2 SERPL-SCNC: 27 MMOL/L (ref 20–29)
CREAT SERPL-MCNC: 1.15 MG/DL (ref 0.8–1.3)
DIFFERENTIAL METHOD BLD: NORMAL
EOSINOPHIL # BLD: 0.31 K/UL (ref 0–0.8)
EOSINOPHIL NFR BLD: 2.8 % (ref 0.5–7.8)
ERYTHROCYTE [DISTWIDTH] IN BLOOD BY AUTOMATED COUNT: 14.2 % (ref 11.9–14.6)
GLUCOSE SERPL-MCNC: 104 MG/DL (ref 70–99)
HCT VFR BLD AUTO: 41.6 % (ref 41.1–50.3)
HGB BLD-MCNC: 14 G/DL (ref 13.6–17.2)
IMM GRANULOCYTES # BLD AUTO: 0.07 K/UL (ref 0–0.5)
IMM GRANULOCYTES NFR BLD AUTO: 0.6 % (ref 0–5)
LYMPHOCYTES # BLD: 3.17 K/UL (ref 0.5–4.6)
LYMPHOCYTES NFR BLD: 28.6 % (ref 13–44)
MAGNESIUM SERPL-MCNC: 2.2 MG/DL (ref 1.8–2.4)
MCH RBC QN AUTO: 30.4 PG (ref 26.1–32.9)
MCHC RBC AUTO-ENTMCNC: 33.7 G/DL (ref 31.4–35)
MCV RBC AUTO: 90.4 FL (ref 82–102)
MONOCYTES # BLD: 1 K/UL (ref 0.1–1.3)
MONOCYTES NFR BLD: 9 % (ref 4–12)
NEUTS SEG # BLD: 6.48 K/UL (ref 1.7–8.2)
NEUTS SEG NFR BLD: 58.4 % (ref 43–78)
NRBC # BLD: 0 K/UL (ref 0–0.2)
PLATELET # BLD AUTO: 236 K/UL (ref 150–450)
PMV BLD AUTO: 9.8 FL (ref 9.4–12.3)
POTASSIUM SERPL-SCNC: 5.4 MMOL/L (ref 3.5–5.1)
RBC # BLD AUTO: 4.6 M/UL (ref 4.23–5.6)
SODIUM SERPL-SCNC: 137 MMOL/L (ref 136–145)
WBC # BLD AUTO: 11.1 K/UL (ref 4.3–11.1)

## 2025-03-31 NOTE — TELEPHONE ENCOUNTER
Patient seen for 2 week post new LV lead device check with normal findings.     1) He also reports low BP, feeling weak, and presyncopal. Home BP readings SBP 80s-100s/ 60s.     2) Diarrhea black liquid yesterday, held Eliquis last night    Taking losartan 50mg daily and eliquis 5mg BID.     Spoke with Dr. Koenig who gave verbal orders for patient to stop losartan. Hold Eliquis for 2 days. Repeat CBC. Nurse visit in 2 weeks for BP recheck. Call PCP to follow up on diarrhea if worsening or go to ER if blood in stool.     Patient verbalized understanding along with son.

## 2025-04-01 ENCOUNTER — TELEPHONE (OUTPATIENT)
Dept: ONCOLOGY | Age: 85
End: 2025-04-01

## 2025-04-01 ENCOUNTER — CARE COORDINATION (OUTPATIENT)
Dept: CARE COORDINATION | Facility: CLINIC | Age: 85
End: 2025-04-01

## 2025-04-01 ENCOUNTER — RESULTS FOLLOW-UP (OUTPATIENT)
Dept: CARDIOLOGY | Age: 85
End: 2025-04-01

## 2025-04-01 ENCOUNTER — RESULTS FOLLOW-UP (OUTPATIENT)
Age: 85
End: 2025-04-01

## 2025-04-01 NOTE — TELEPHONE ENCOUNTER
----- Message from Dr. Manuel Hassan MD sent at 4/1/2025  9:31 AM EDT -----  Potassium is high, stop potassium supplementation

## 2025-04-01 NOTE — TELEPHONE ENCOUNTER
Spoke with patient to remind him of his CT appointment on Wednesday April 2nd at 11:30. Patient Verbalized understanding and said he was writing it down.

## 2025-04-01 NOTE — CARE COORDINATION
Patient's son contacted CTN and verified all recent medication changes and verbalized understanding for clarification.

## 2025-04-01 NOTE — CARE COORDINATION
Care Transitions Note    Follow Up Call     Patient Current Location:  Home: [address] [place] [address] [address] [place] [address]    Care Transition Nurse contacted the patient by telephone. Verified name and  as identifiers.    Additional needs identified to be addressed with provider   No needs identified                 Method of communication with provider: none.    Care Summary Note: Patient seen by cardiology after recent lead placement and readmit with rhinovirus. Patent indicated he was doing 'fine' Numerous medications had changed per recent OV and patient unaware of current changes but indicated his son takes care of all the medications. Patient's son is not available at this time. Patient had his spouse talk with CTN and encouraged to have patient's son call with any questions he may have from recent appts.     Care Transition Nurse reviewed discharge instructions, medical action plan, and red flags with patient. The patient was given an opportunity to ask questions; all questions answered at this time.. The patient requires assistance and follow up regarding medications  Were discharge instructions available to patient? Yes.   Reviewed appropriate site of care based on symptoms and resources available to patient including: PCP  Specialist  Home health  When to call 911  CTN . The patient and spouse/partner agrees to contact the primary care provider and/or specialist office for questions related to their healthcare.      Advance Care Planning:   Does patient have an Advance Directive: patient declined education.    Medication Review:  Medications changed since last call, reviewed today.     Remote Patient Monitoring:  Offered patient enrollment in the Remote Patient Monitoring (RPM) program for in-home monitoring: Yes, but did not enroll at this time: limited patient ability to navigate RPM/equipment.    Assessments:  Care Transitions Subsequent and Final Call    Subsequent and Final Calls  Do you have any ongoing

## 2025-04-02 ENCOUNTER — HOSPITAL ENCOUNTER (OUTPATIENT)
Dept: CT IMAGING | Age: 85
Discharge: HOME | End: 2025-04-04
Attending: INTERNAL MEDICINE
Payer: MEDICARE

## 2025-04-02 DIAGNOSIS — C18.0 ADENOCARCINOMA OF CECUM (HCC): ICD-10-CM

## 2025-04-02 PROCEDURE — 93280 PM DEVICE PROGR EVAL DUAL: CPT | Performed by: INTERNAL MEDICINE

## 2025-04-02 PROCEDURE — 6360000004 HC RX CONTRAST MEDICATION: Performed by: INTERNAL MEDICINE

## 2025-04-02 PROCEDURE — 74177 CT ABD & PELVIS W/CONTRAST: CPT

## 2025-04-02 RX ORDER — DIATRIZOATE MEGLUMINE AND DIATRIZOATE SODIUM 660; 100 MG/ML; MG/ML
15 SOLUTION ORAL; RECTAL
Status: DISCONTINUED | OUTPATIENT
Start: 2025-04-02 | End: 2025-04-05 | Stop reason: HOSPADM

## 2025-04-02 RX ADMIN — DIATRIZOATE MEGLUMINE AND DIATRIZOATE SODIUM 15 ML: 660; 100 LIQUID ORAL; RECTAL at 12:22

## 2025-04-02 RX ADMIN — IOHEXOL 100 ML: 350 INJECTION, SOLUTION INTRAVENOUS at 12:18

## 2025-04-08 ENCOUNTER — OFFICE VISIT (OUTPATIENT)
Dept: ONCOLOGY | Age: 85
End: 2025-04-08
Payer: MEDICARE

## 2025-04-08 ENCOUNTER — HOSPITAL ENCOUNTER (OUTPATIENT)
Dept: LAB | Age: 85
Discharge: HOME | End: 2025-04-08
Payer: MEDICARE

## 2025-04-08 VITALS
BODY MASS INDEX: 34.51 KG/M2 | WEIGHT: 207.1 LBS | SYSTOLIC BLOOD PRESSURE: 145 MMHG | HEIGHT: 65 IN | DIASTOLIC BLOOD PRESSURE: 74 MMHG | HEART RATE: 75 BPM | OXYGEN SATURATION: 96 % | RESPIRATION RATE: 16 BRPM | TEMPERATURE: 97.4 F

## 2025-04-08 DIAGNOSIS — C18.0 ADENOCARCINOMA OF CECUM (HCC): Primary | ICD-10-CM

## 2025-04-08 DIAGNOSIS — C18.0 ADENOCARCINOMA OF CECUM (HCC): ICD-10-CM

## 2025-04-08 LAB
ALBUMIN SERPL-MCNC: 3.4 G/DL (ref 3.2–4.6)
ALBUMIN/GLOB SERPL: 1 (ref 1–1.9)
ALP SERPL-CCNC: 85 U/L (ref 40–129)
ALT SERPL-CCNC: 22 U/L (ref 8–55)
ANION GAP SERPL CALC-SCNC: 13 MMOL/L (ref 7–16)
AST SERPL-CCNC: 24 U/L (ref 15–37)
BASOPHILS # BLD: 0.05 K/UL (ref 0–0.2)
BASOPHILS NFR BLD: 0.5 % (ref 0–2)
BILIRUB SERPL-MCNC: 0.4 MG/DL (ref 0–1.2)
BUN SERPL-MCNC: 12 MG/DL (ref 8–23)
CALCIUM SERPL-MCNC: 9.4 MG/DL (ref 8.8–10.2)
CEA SERPL-MCNC: 3.9 NG/ML (ref 0–3.8)
CHLORIDE SERPL-SCNC: 102 MMOL/L (ref 98–107)
CO2 SERPL-SCNC: 27 MMOL/L (ref 20–29)
CREAT SERPL-MCNC: 0.99 MG/DL (ref 0.8–1.3)
DIFFERENTIAL METHOD BLD: ABNORMAL
EOSINOPHIL # BLD: 0.33 K/UL (ref 0–0.8)
EOSINOPHIL NFR BLD: 3.4 % (ref 0.5–7.8)
ERYTHROCYTE [DISTWIDTH] IN BLOOD BY AUTOMATED COUNT: 14.1 % (ref 11.9–14.6)
GLOBULIN SER CALC-MCNC: 3.3 G/DL (ref 2.3–3.5)
GLUCOSE SERPL-MCNC: 138 MG/DL (ref 70–99)
HCT VFR BLD AUTO: 39.7 % (ref 41.1–50.3)
HGB BLD-MCNC: 13 G/DL (ref 13.6–17.2)
IMM GRANULOCYTES # BLD AUTO: 0.04 K/UL (ref 0–0.5)
IMM GRANULOCYTES NFR BLD AUTO: 0.4 % (ref 0–5)
LYMPHOCYTES # BLD: 2.48 K/UL (ref 0.5–4.6)
LYMPHOCYTES NFR BLD: 25.6 % (ref 13–44)
MCH RBC QN AUTO: 30.4 PG (ref 26.1–32.9)
MCHC RBC AUTO-ENTMCNC: 32.7 G/DL (ref 31.4–35)
MCV RBC AUTO: 92.8 FL (ref 82–102)
MONOCYTES # BLD: 0.79 K/UL (ref 0.1–1.3)
MONOCYTES NFR BLD: 8.2 % (ref 4–12)
NEUTS SEG # BLD: 6 K/UL (ref 1.7–8.2)
NEUTS SEG NFR BLD: 61.9 % (ref 43–78)
NRBC # BLD: 0 K/UL (ref 0–0.2)
PLATELET # BLD AUTO: 181 K/UL (ref 150–450)
PMV BLD AUTO: 9.1 FL (ref 9.4–12.3)
POTASSIUM SERPL-SCNC: 3.6 MMOL/L (ref 3.5–5.1)
PROT SERPL-MCNC: 6.8 G/DL (ref 6.3–8.2)
RBC # BLD AUTO: 4.28 M/UL (ref 4.23–5.6)
SODIUM SERPL-SCNC: 142 MMOL/L (ref 136–145)
WBC # BLD AUTO: 9.7 K/UL (ref 4.3–11.1)

## 2025-04-08 PROCEDURE — 85025 COMPLETE CBC W/AUTO DIFF WBC: CPT

## 2025-04-08 PROCEDURE — 1160F RVW MEDS BY RX/DR IN RCRD: CPT | Performed by: INTERNAL MEDICINE

## 2025-04-08 PROCEDURE — 36415 COLL VENOUS BLD VENIPUNCTURE: CPT

## 2025-04-08 PROCEDURE — 82378 CARCINOEMBRYONIC ANTIGEN: CPT

## 2025-04-08 PROCEDURE — 1126F AMNT PAIN NOTED NONE PRSNT: CPT | Performed by: INTERNAL MEDICINE

## 2025-04-08 PROCEDURE — 1159F MED LIST DOCD IN RCRD: CPT | Performed by: INTERNAL MEDICINE

## 2025-04-08 PROCEDURE — 1123F ACP DISCUSS/DSCN MKR DOCD: CPT | Performed by: INTERNAL MEDICINE

## 2025-04-08 PROCEDURE — 99214 OFFICE O/P EST MOD 30 MIN: CPT | Performed by: INTERNAL MEDICINE

## 2025-04-08 PROCEDURE — 80053 COMPREHEN METABOLIC PANEL: CPT

## 2025-04-08 ASSESSMENT — PATIENT HEALTH QUESTIONNAIRE - PHQ9
2. FEELING DOWN, DEPRESSED OR HOPELESS: NOT AT ALL
SUM OF ALL RESPONSES TO PHQ QUESTIONS 1-9: 0
1. LITTLE INTEREST OR PLEASURE IN DOING THINGS: NOT AT ALL
SUM OF ALL RESPONSES TO PHQ QUESTIONS 1-9: 0

## 2025-04-08 NOTE — PROGRESS NOTES
[facility]: Office Visit Established Patient    Reason for follow up:  1-Moderately differentiated, right-sided colonic adenocarcinoma, pMMR  SNVs/Indels:  KRAS G12D   Cancer Staging  Colon cancer (HCC)  Staging form: Colon And Rectum, AJCC 8th Edition  - Pathologic stage from 8/22/2022: Stage IIA (pT3, pN0, cM0) - Signed by Roderick Lozano MD on 8/23/2022    Pertinent Negatives:  NO abnormalities detected in the following genes:   BRAF, HRAS, NRAS.   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 Liver cysts  4. Sub centimeter lung nodules    8/25/22-8/27/2022: Admitted with COVID.  Bebtelovimab was initiated but then aborted as pt became unresponsive to verbal stimuli. Improved over the ensuing 24 hrs and was discharged home.    Admitted 2/21-2/26 with Influenza A    Interval history:    History of Present Illness  The patient is an 85-year-old male who presents for follow-up of stage II moderately differentiated right-sided colon adenocarcinoma, liver cyst, and subcentimeter lung nodules.    A CT scan of the chest, abdomen, and pelvis performed on 04/02/2025 identified right hemicolectomy status without evidence of recurrent or metastatic disease. A 2.2 cm thyroid nodule was again noted and a previous fine-needle aspiration (FNA) returned as benign. The CEA level is borderline elevated at 3.9. The CBC shows borderline normocytic anemia, and the chemistry panel is largely unremarkable.    He reports a general sense of well-being since the last consultation. No black or bloody stools, nausea, or vomiting have been experienced. A slight weight loss is acknowledged, attributed to intentional efforts to reduce weight. Appetite remains robust, and energy levels are satisfactory. No pain is reported.    A recent episode of pneumonia a few months prior is recalled.          Review of Systems:  14 point ROS was negative except as per HPI     Reviewed and updated this visit by

## 2025-04-08 NOTE — PATIENT INSTRUCTIONS
Patient Information from Today's Visit    The members of your [facility] are listed below:    Physician Provider: Roderick Lozano, Medical Oncologist  Advanced Practice Clinician: Teresa Santiago NP  Registered Nurse: Sonal FRASER RN  Navigator: Desiree KOCH RN  Medical Assistant: Frank LUNDY MA  : Laurita MCGUIRE   Supportive Care Services: Rosemary YEN LMSW    Diagnosis: Colon      Follow Up Instructions:   - Labs reviewed; discussed elevated CEA  - Reviewed CT scan results, will repeat in 4 months due to elevated CEA    Follow up in 4 months with labs prior      Treatment Summary has been discussed and given to patient:No      Current Labs:   Hospital Outpatient Visit on 04/08/2025   Component Date Value Ref Range Status    WBC 04/08/2025 9.7  4.3 - 11.1 K/uL Final    RBC 04/08/2025 4.28  4.23 - 5.6 M/uL Final    Hemoglobin 04/08/2025 13.0 (L)  13.6 - 17.2 g/dL Final    Hematocrit 04/08/2025 39.7 (L)  41.1 - 50.3 % Final    MCV 04/08/2025 92.8  82.0 - 102.0 FL Final    MCH 04/08/2025 30.4  26.1 - 32.9 PG Final    MCHC 04/08/2025 32.7  31.4 - 35.0 g/dL Final    RDW 04/08/2025 14.1  11.9 - 14.6 % Final    Platelets 04/08/2025 181  150 - 450 K/uL Final    MPV 04/08/2025 9.1 (L)  9.4 - 12.3 FL Final    nRBC 04/08/2025 0.00  0.0 - 0.2 K/uL Final    **Note: Absolute NRBC parameter is now reported with Hemogram**    Neutrophils % 04/08/2025 61.9  43.0 - 78.0 % Final    Lymphocytes % 04/08/2025 25.6  13.0 - 44.0 % Final    Monocytes % 04/08/2025 8.2  4.0 - 12.0 % Final    Eosinophils % 04/08/2025 3.4  0.5 - 7.8 % Final    Basophils % 04/08/2025 0.5  0.0 - 2.0 % Final    Immature Granulocytes % 04/08/2025 0.4  0.0 - 5.0 % Final    Neutrophils Absolute 04/08/2025 6.00  1.70 - 8.20 K/UL Final    Lymphocytes Absolute 04/08/2025 2.48  0.50 - 4.60 K/UL Final    Monocytes Absolute 04/08/2025 0.79  0.10 - 1.30 K/UL Final    Eosinophils Absolute 04/08/2025 0.33  0.00 - 0.80 K/UL Final    Basophils Absolute 04/08/2025

## 2025-04-11 ENCOUNTER — CARE COORDINATION (OUTPATIENT)
Dept: CARE COORDINATION | Facility: CLINIC | Age: 85
End: 2025-04-11

## 2025-04-11 NOTE — CARE COORDINATION
[facility] [address] [facility]iology [phone]    8/12/2025 11:00 AM Roderick Lozano MD Oncology [phone]            Patient has agreed to contact primary care provider and/or specialist for any further questions, concerns, or needs.    Thea Jones LPN

## 2025-04-15 ENCOUNTER — CLINICAL SUPPORT (OUTPATIENT)
Age: 85
End: 2025-04-15

## 2025-04-15 VITALS — DIASTOLIC BLOOD PRESSURE: 82 MMHG | SYSTOLIC BLOOD PRESSURE: 150 MMHG

## 2025-04-15 DIAGNOSIS — I10 HTN (HYPERTENSION): Primary | ICD-10-CM

## 2025-04-15 NOTE — PROGRESS NOTES
Pt arrived today for a BP check after discontinuing his losartan on 3/31.  Pt and his wife both state he restarted losartan 50mg 1/2 tablet 2 week ago and BP at home has ranged from 130//80. BP check reported to Dr. Koenig and pt instructed to continue taking the 1/2 tablet of losartan until his follow up visit on May 19th with him. Pt and wife both verbalized discharge instructions.  I also instructed pt to keep a BP log until his next visit and instructed to take his BP at the same time each day.

## 2025-04-21 DIAGNOSIS — E87.5 HYPERKALEMIA: ICD-10-CM

## 2025-04-21 DIAGNOSIS — I50.32 CHRONIC DIASTOLIC CHF (CONGESTIVE HEART FAILURE) (HCC): ICD-10-CM

## 2025-04-21 DIAGNOSIS — T82.110D FAILURE OF PACEMAKER LEAD, SUBSEQUENT ENCOUNTER: ICD-10-CM

## 2025-04-21 LAB
ANION GAP SERPL CALC-SCNC: 14 MMOL/L (ref 7–16)
BASOPHILS # BLD: 0.05 K/UL (ref 0–0.2)
BASOPHILS NFR BLD: 0.5 % (ref 0–2)
BUN SERPL-MCNC: 16 MG/DL (ref 8–23)
CALCIUM SERPL-MCNC: 9.5 MG/DL (ref 8.8–10.2)
CHLORIDE SERPL-SCNC: 105 MMOL/L (ref 98–107)
CO2 SERPL-SCNC: 26 MMOL/L (ref 20–29)
CREAT SERPL-MCNC: 1.04 MG/DL (ref 0.8–1.3)
DIFFERENTIAL METHOD BLD: ABNORMAL
EOSINOPHIL # BLD: 0.27 K/UL (ref 0–0.8)
EOSINOPHIL NFR BLD: 2.6 % (ref 0.5–7.8)
ERYTHROCYTE [DISTWIDTH] IN BLOOD BY AUTOMATED COUNT: 14.6 % (ref 11.9–14.6)
GLUCOSE SERPL-MCNC: 117 MG/DL (ref 70–99)
HCT VFR BLD AUTO: 40.2 % (ref 41.1–50.3)
HGB BLD-MCNC: 12.9 G/DL (ref 13.6–17.2)
IMM GRANULOCYTES # BLD AUTO: 0.04 K/UL (ref 0–0.5)
IMM GRANULOCYTES NFR BLD AUTO: 0.4 % (ref 0–5)
LYMPHOCYTES # BLD: 2.72 K/UL (ref 0.5–4.6)
LYMPHOCYTES NFR BLD: 26 % (ref 13–44)
MCH RBC QN AUTO: 31 PG (ref 26.1–32.9)
MCHC RBC AUTO-ENTMCNC: 32.1 G/DL (ref 31.4–35)
MCV RBC AUTO: 96.6 FL (ref 82–102)
MONOCYTES # BLD: 0.97 K/UL (ref 0.1–1.3)
MONOCYTES NFR BLD: 9.3 % (ref 4–12)
NEUTS SEG # BLD: 6.42 K/UL (ref 1.7–8.2)
NEUTS SEG NFR BLD: 61.2 % (ref 43–78)
NRBC # BLD: 0 K/UL (ref 0–0.2)
PLATELET # BLD AUTO: 183 K/UL (ref 150–450)
PMV BLD AUTO: 9.8 FL (ref 9.4–12.3)
POTASSIUM SERPL-SCNC: 3.9 MMOL/L (ref 3.5–5.1)
RBC # BLD AUTO: 4.16 M/UL (ref 4.23–5.6)
SODIUM SERPL-SCNC: 145 MMOL/L (ref 136–145)
WBC # BLD AUTO: 10.5 K/UL (ref 4.3–11.1)

## 2025-05-19 ENCOUNTER — OFFICE VISIT (OUTPATIENT)
Age: 85
End: 2025-05-19
Payer: MEDICARE

## 2025-05-19 VITALS
BODY MASS INDEX: 34.82 KG/M2 | SYSTOLIC BLOOD PRESSURE: 159 MMHG | HEIGHT: 65 IN | HEART RATE: 74 BPM | DIASTOLIC BLOOD PRESSURE: 82 MMHG | WEIGHT: 209 LBS

## 2025-05-19 DIAGNOSIS — I49.5 SSS (SICK SINUS SYNDROME) (HCC): ICD-10-CM

## 2025-05-19 DIAGNOSIS — E78.5 DYSLIPIDEMIA: ICD-10-CM

## 2025-05-19 DIAGNOSIS — R06.02 SHORTNESS OF BREATH: ICD-10-CM

## 2025-05-19 DIAGNOSIS — I50.22 SYSTOLIC CHF, CHRONIC (HCC): Primary | ICD-10-CM

## 2025-05-19 DIAGNOSIS — I50.20 HFREF (HEART FAILURE WITH REDUCED EJECTION FRACTION) (HCC): ICD-10-CM

## 2025-05-19 DIAGNOSIS — I48.0 PAROXYSMAL ATRIAL FIBRILLATION (HCC): ICD-10-CM

## 2025-05-19 DIAGNOSIS — Z95.0 PACEMAKER: ICD-10-CM

## 2025-05-19 DIAGNOSIS — I65.23 BILATERAL CAROTID ARTERY STENOSIS: ICD-10-CM

## 2025-05-19 DIAGNOSIS — Z95.5 S/P CORONARY ARTERY STENT PLACEMENT: ICD-10-CM

## 2025-05-19 DIAGNOSIS — I10 HYPERTENSION COMPLICATIONS: ICD-10-CM

## 2025-05-19 DIAGNOSIS — Z95.0 STATUS POST BIVENTRICULAR PACEMAKER: ICD-10-CM

## 2025-05-19 PROCEDURE — 1159F MED LIST DOCD IN RCRD: CPT | Performed by: INTERNAL MEDICINE

## 2025-05-19 PROCEDURE — 1123F ACP DISCUSS/DSCN MKR DOCD: CPT | Performed by: INTERNAL MEDICINE

## 2025-05-19 PROCEDURE — 99214 OFFICE O/P EST MOD 30 MIN: CPT | Performed by: INTERNAL MEDICINE

## 2025-05-19 PROCEDURE — 1126F AMNT PAIN NOTED NONE PRSNT: CPT | Performed by: INTERNAL MEDICINE

## 2025-05-19 RX ORDER — LOSARTAN POTASSIUM 25 MG/1
25 TABLET ORAL DAILY
Qty: 90 TABLET | Refills: 3 | Status: SHIPPED | OUTPATIENT
Start: 2025-05-19

## 2025-05-19 RX ORDER — ATORVASTATIN CALCIUM 80 MG/1
80 TABLET, FILM COATED ORAL DAILY
Qty: 90 TABLET | Refills: 3 | Status: SHIPPED | OUTPATIENT
Start: 2025-05-19

## 2025-05-19 RX ORDER — FUROSEMIDE 40 MG/1
40 TABLET ORAL DAILY
Qty: 90 TABLET | Refills: 3 | Status: SHIPPED | OUTPATIENT
Start: 2025-05-19

## 2025-05-19 NOTE — PROGRESS NOTES
Component Value Date    TRIG 141 06/06/2024    TRIG 130 11/30/2023    TRIG 124 04/28/2023     Lab Results   Component Value Date    HDL 40 06/06/2024    HDL 41 11/30/2023    HDL 42 04/28/2023     No components found for: \"LDLCHOLESTEROL\", \"LDLCALC\"  Lab Results   Component Value Date    VLDL 28 (H) 06/06/2024    VLDL 26 (H) 11/30/2023    VLDL 24.8 (H) 04/28/2023     Lab Results   Component Value Date    CHOLHDLRATIO 3.8 06/06/2024    CHOLHDLRATIO 3.4 11/30/2023    CHOLHDLRATIO 2.6 04/28/2023     Lab Results   Component Value Date    LDL 83 06/06/2024    LDLDIRECT 43 09/07/2022 07/29/24    ECHO (TTE) COMPLETE (PRN CONTRAST/BUBBLE/STRAIN/3D) 07/29/2024  7:14 PM (Final)    Interpretation Summary    Left Ventricle: Moderately reduced left ventricular systolic function with a visually estimated EF of 30 - 35%. Left ventricle size is normal. Mildly increased wall thickness. Findings consistent with concentric remodeling. Akinesis of the apex. Abnormal diastolic function.    Aortic Valve: Mildly calcified cusps. Mild to moderate regurgitation. Mild stenosis of the aortic valve.    Mitral Valve: Moderate annular calcification. Mild to moderate regurgitation.    Tricuspid Valve: Mild regurgitation. The estimated RVSP is 17 mmHg.    Left Atrium: Left atrium is mildly dilated. LA Vol Index is  31 ml/m2.    Signed by: Varun Koenig DO on 7/29/2024  7:14 PM        I have Independently reviewed prior care notes, any ER records available, cardiac testing, labs and results with the patient and before seeing the patient today.  Also independently reviewed outside records when available.       ASSESSMENT:    Aguilar was seen today for atrial fibrillation, valvular heart disease and follow-up.    Diagnoses and all orders for this visit:    Systolic CHF, chronic (HCC)    S/P coronary artery stent placement  -     atorvastatin (LIPITOR) 80 MG tablet; Take 1 tablet by mouth daily    Dyslipidemia  -     atorvastatin

## 2025-05-23 ENCOUNTER — OFFICE VISIT (OUTPATIENT)
Dept: FAMILY MEDICINE CLINIC | Facility: CLINIC | Age: 85
End: 2025-05-23

## 2025-05-23 VITALS
WEIGHT: 210 LBS | BODY MASS INDEX: 31.83 KG/M2 | DIASTOLIC BLOOD PRESSURE: 74 MMHG | OXYGEN SATURATION: 96 % | HEART RATE: 76 BPM | HEIGHT: 68 IN | TEMPERATURE: 98.6 F | SYSTOLIC BLOOD PRESSURE: 138 MMHG

## 2025-05-23 DIAGNOSIS — E78.5 DYSLIPIDEMIA: ICD-10-CM

## 2025-05-23 DIAGNOSIS — Z76.89 ENCOUNTER TO ESTABLISH CARE WITH NEW DOCTOR: ICD-10-CM

## 2025-05-23 DIAGNOSIS — E11.9 TYPE 2 DIABETES MELLITUS WITHOUT COMPLICATION, WITHOUT LONG-TERM CURRENT USE OF INSULIN (HCC): ICD-10-CM

## 2025-05-23 DIAGNOSIS — Z13.29 SCREENING FOR HYPOTHYROIDISM: ICD-10-CM

## 2025-05-23 DIAGNOSIS — E55.9 VITAMIN D DEFICIENCY: ICD-10-CM

## 2025-05-23 DIAGNOSIS — E78.5 DYSLIPIDEMIA: Chronic | ICD-10-CM

## 2025-05-23 DIAGNOSIS — F33.1 MAJOR DEPRESSIVE DISORDER, RECURRENT, MODERATE (HCC): Chronic | ICD-10-CM

## 2025-05-23 DIAGNOSIS — Z00.00 MEDICARE ANNUAL WELLNESS VISIT, SUBSEQUENT: Primary | ICD-10-CM

## 2025-05-23 DIAGNOSIS — E11.9 TYPE 2 DIABETES MELLITUS WITHOUT COMPLICATION, WITHOUT LONG-TERM CURRENT USE OF INSULIN (HCC): Chronic | ICD-10-CM

## 2025-05-23 LAB
25(OH)D3 SERPL-MCNC: 25.6 NG/ML (ref 30–100)
CHOLEST SERPL-MCNC: 99 MG/DL (ref 0–200)
CREAT UR-MCNC: 160 MG/DL (ref 39–259)
HBA1C MFR BLD: 6.3 %
HDLC SERPL-MCNC: 44 MG/DL (ref 40–60)
HDLC SERPL: 2.3 (ref 0–5)
LDLC SERPL CALC-MCNC: 31 MG/DL (ref 0–100)
MICROALBUMIN UR-MCNC: <1.2 MG/DL (ref 0–20)
MICROALBUMIN/CREAT UR-RTO: NORMAL MG/G (ref 0–30)
TRIGL SERPL-MCNC: 120 MG/DL (ref 0–150)
TSH W FREE THYROID IF ABNORMAL: 2.4 UIU/ML (ref 0.27–4.2)
VLDLC SERPL CALC-MCNC: 24 MG/DL (ref 6–23)

## 2025-05-23 RX ORDER — FAMOTIDINE 20 MG/1
20 TABLET, FILM COATED ORAL DAILY
Qty: 90 TABLET | Refills: 1 | Status: SHIPPED | OUTPATIENT
Start: 2025-05-23

## 2025-05-23 RX ORDER — NITROGLYCERIN 0.4 MG/1
TABLET SUBLINGUAL
Qty: 25 TABLET | Refills: 5 | Status: CANCELLED | OUTPATIENT
Start: 2025-05-23

## 2025-05-23 RX ORDER — METFORMIN HYDROCHLORIDE 500 MG/1
500 TABLET, EXTENDED RELEASE ORAL
Qty: 90 TABLET | Refills: 1 | Status: SHIPPED | OUTPATIENT
Start: 2025-05-23

## 2025-05-23 ASSESSMENT — PATIENT HEALTH QUESTIONNAIRE - PHQ9
9. THOUGHTS THAT YOU WOULD BE BETTER OFF DEAD, OR OF HURTING YOURSELF: NOT AT ALL
4. FEELING TIRED OR HAVING LITTLE ENERGY: SEVERAL DAYS
SUM OF ALL RESPONSES TO PHQ QUESTIONS 1-9: 1
SUM OF ALL RESPONSES TO PHQ QUESTIONS 1-9: 1
10. IF YOU CHECKED OFF ANY PROBLEMS, HOW DIFFICULT HAVE THESE PROBLEMS MADE IT FOR YOU TO DO YOUR WORK, TAKE CARE OF THINGS AT HOME, OR GET ALONG WITH OTHER PEOPLE: NOT DIFFICULT AT ALL
3. TROUBLE FALLING OR STAYING ASLEEP: NOT AT ALL
SUM OF ALL RESPONSES TO PHQ QUESTIONS 1-9: 1
8. MOVING OR SPEAKING SO SLOWLY THAT OTHER PEOPLE COULD HAVE NOTICED. OR THE OPPOSITE, BEING SO FIGETY OR RESTLESS THAT YOU HAVE BEEN MOVING AROUND A LOT MORE THAN USUAL: NOT AT ALL
6. FEELING BAD ABOUT YOURSELF - OR THAT YOU ARE A FAILURE OR HAVE LET YOURSELF OR YOUR FAMILY DOWN: NOT AT ALL
7. TROUBLE CONCENTRATING ON THINGS, SUCH AS READING THE NEWSPAPER OR WATCHING TELEVISION: NOT AT ALL
2. FEELING DOWN, DEPRESSED OR HOPELESS: NOT AT ALL
1. LITTLE INTEREST OR PLEASURE IN DOING THINGS: NOT AT ALL
5. POOR APPETITE OR OVEREATING: NOT AT ALL
SUM OF ALL RESPONSES TO PHQ QUESTIONS 1-9: 1

## 2025-05-23 NOTE — PATIENT INSTRUCTIONS
each night.     Limit alcohol to 2 drinks a day for men and 1 drink a day for women. Too much alcohol can cause health problems.     Manage other health problems such as diabetes, high blood pressure, and high cholesterol. If you think you may have a problem with alcohol or drug use, talk to your doctor.   Medicines    Take your medicines exactly as prescribed. Call your doctor if you think you are having a problem with your medicine.     If your doctor recommends aspirin, take the amount directed each day. Make sure you take aspirin and not another kind of pain reliever, such as acetaminophen (Tylenol).   When should you call for help?   Call 911 if you have symptoms of a heart attack. These may include:    Chest pain or pressure, or a strange feeling in the chest.     Sweating.     Shortness of breath.     Pain, pressure, or a strange feeling in the back, neck, jaw, or upper belly or in one or both shoulders or arms.     Lightheadedness or sudden weakness.     A fast or irregular heartbeat.   After you call 911, the  may tell you to chew 1 adult-strength or 2 to 4 low-dose aspirin. Wait for an ambulance. Do not try to drive yourself.  Watch closely for changes in your health, and be sure to contact your doctor if you have any problems.  Where can you learn more?  Go to https://www.[employer].net/patientEd and enter F075 to learn more about \"A Healthy Heart: Care Instructions.\"  Current as of: July 31, 2024  Content Version: 14.4  © 2024-2025 [employer].   Care instructions adapted under license by [employer]. If you have questions about a medical condition or this instruction, always ask your healthcare professional. [employer] disclaims any warranty or liability for your use of this information.    Personalized Preventive Plan for Aguilar Titus - 5/23/2025  Medicare offers a range of preventive health benefits. Some of the tests and screenings are paid in full while other

## 2025-05-23 NOTE — PROGRESS NOTES
Medicare Annual Wellness Visit    Aguilar Titus is here for New Patient (New patient here to establish care, was previous patient this office. He was seeing a different provider recently but he is too far away for patient to go to. Patient reports cardiology has released him for a year. Patient has hx of Multiple medical conditions including atrial fib, htn, depression, chf, athlersclerosis, myasthenia gravis, anemia, type 2 diabetes... see [facility] for complete list of problems. ) and Medicare AWV    Assessment & Plan   Medicare annual wellness visit, subsequent  Type 2 diabetes mellitus without complication, without long-term current use of insulin (HCC)  Comments:  Stable , controlled with metformin 500 mg ER daily, A1C 6.3 today, continue current trx  Orders:  -     AMB POC HEMOGLOBIN A1C  -     metFORMIN (GLUCOPHAGE-XR) 500 MG extended release tablet; Take 1 tablet by mouth daily (with breakfast), Disp-90 tablet, R-1Normal  -     Albumin/Creatinine Ratio, Urine; Future  Dyslipidemia  Comments:  Repeat lipids panel today, f/u with results  Orders:  -     Lipid Panel; Future  Major depressive disorder, recurrent, moderate (HCC)  Comments:  Stable, controlled with lexapro 10 mg , continue currrent treatment  Vitamin D deficiency  -     Vitamin D 25 Hydroxy; Future  Screening for hypothyroidism  -     TSH reflex to FT4; Future  Encounter to establish care with new doctor    Assessment & Plan  1. Diabetes Mellitus.  - Blood glucose levels are within the normal range today.  - Current metformin regimen will continue without modifications.  - Urine test will be conducted today.    2. Vitamin D Deficiency.  - Patient has been taking vitamin D weekly.  - Blood test will be conducted today to assess vitamin D levels.  - If levels are normal, a daily vitamin D supplement will be prescribed and sent to pharmacy.    3. Gastroesophageal Reflux Disease (GERD).  - Pepcid is reported to work well for GERD symptoms.  - A 6-month

## 2025-05-26 ENCOUNTER — RESULTS FOLLOW-UP (OUTPATIENT)
Dept: FAMILY MEDICINE CLINIC | Facility: CLINIC | Age: 85
End: 2025-05-26

## 2025-05-26 RX ORDER — ERGOCALCIFEROL (VITAMIN D2) 50 MCG
1 CAPSULE ORAL DAILY
Qty: 90 CAPSULE | Refills: 3 | Status: SHIPPED | OUTPATIENT
Start: 2025-05-26

## 2025-05-26 NOTE — RESULT ENCOUNTER NOTE
Please call the patient let him know that his cholesterol is normal, vitamin D decreased.  Please ask him if he still takes vitamin D that was prescribed by his previous  doctor, he supposed to take it weekly, prescription is still active but it was sent just for 6 months in June of last year.   If he does , ask him to finish leftover and then start taking every day vitamin D that I am sending him .  Thyroid function normal.  Urine is normal.  Thank you Partial Purse String (Simple) Text: Given the location of the defect and the characteristics of the surrounding skin a simple purse string closure was deemed most appropriate.  Undermining was performed circumfirentially around the surgical defect.  A purse string suture was then placed and tightened. Wound tension only allowed a partial closure of the circular defect.

## 2025-07-02 ENCOUNTER — CLINICAL SUPPORT (OUTPATIENT)
Age: 85
End: 2025-07-02

## 2025-07-02 ENCOUNTER — OFFICE VISIT (OUTPATIENT)
Age: 85
End: 2025-07-02
Payer: MEDICARE

## 2025-07-02 VITALS
WEIGHT: 212 LBS | SYSTOLIC BLOOD PRESSURE: 124 MMHG | HEIGHT: 68 IN | HEART RATE: 76 BPM | DIASTOLIC BLOOD PRESSURE: 68 MMHG | BODY MASS INDEX: 32.13 KG/M2

## 2025-07-02 DIAGNOSIS — Z95.0 STATUS POST BIVENTRICULAR PACEMAKER: ICD-10-CM

## 2025-07-02 DIAGNOSIS — I48.0 PAROXYSMAL ATRIAL FIBRILLATION (HCC): Primary | ICD-10-CM

## 2025-07-02 DIAGNOSIS — I50.9 CONGESTIVE HEART FAILURE (HCC): Primary | ICD-10-CM

## 2025-07-02 DIAGNOSIS — I50.22 SYSTOLIC CHF, CHRONIC (HCC): ICD-10-CM

## 2025-07-02 DIAGNOSIS — I50.20 HFREF (HEART FAILURE WITH REDUCED EJECTION FRACTION) (HCC): ICD-10-CM

## 2025-07-02 PROCEDURE — 1126F AMNT PAIN NOTED NONE PRSNT: CPT | Performed by: PHYSICIAN ASSISTANT

## 2025-07-02 PROCEDURE — 99214 OFFICE O/P EST MOD 30 MIN: CPT | Performed by: PHYSICIAN ASSISTANT

## 2025-07-02 PROCEDURE — 1159F MED LIST DOCD IN RCRD: CPT | Performed by: PHYSICIAN ASSISTANT

## 2025-07-02 PROCEDURE — 93000 ELECTROCARDIOGRAM COMPLETE: CPT | Performed by: PHYSICIAN ASSISTANT

## 2025-07-02 PROCEDURE — 1160F RVW MEDS BY RX/DR IN RCRD: CPT | Performed by: PHYSICIAN ASSISTANT

## 2025-07-02 PROCEDURE — 1123F ACP DISCUSS/DSCN MKR DOCD: CPT | Performed by: PHYSICIAN ASSISTANT

## 2025-07-02 NOTE — PROGRESS NOTES
[facility], [address], [address]  [place] [address]  PHONE: [phone]  Aguilar Titus  1940    Chief Complant:    Chief Complaint   Patient presents with    Atrial Fibrillation    Congestive Heart Failure     History:  Aguilar Titus is a very pleasant 85 y.o. male with a past medical and cardiac history significant for CAD s/p PCI, LV dsfynction, BiV PPM, CHB, LV lead failure and presents for EP consultation. He feels ok, but ongoing fatigue, low energy, and some GERMAIN.    He is now s/p successful implantation of a new left ventricular lead and replacement of a Biotronik biventricular permanent pacemaker 3/17/25. He presents for routine EP follow up. He is feeling ok. No cardiac complaints. No concerns.     Cardiac PMH: (Old records have been reviewed and summarized below)  Device (10/25/17): Successful implantation of Biotronik biventricular permanent pacemaker     Ablation (5/6/21): AV node ablation     TTE (2/24/23): EF 53%, mild to moderate MR, LA moderately dilated     TTE (7/29/24): EF 30-35%    Cath (8/7/24): mild to mod LV dsyfunction, mod CAD, patent stent    Device (3/17/25): Successful implantation of a new left ventricular lead and replacement of a Biotronik biventricular permanent pacemaker     Reviewed office note Dr Mistry 5/23/25    Past Medical History, Past Surgical History, Family history, Social History, and Medications were all reviewed with the patient today and updated as necessary.     Current Outpatient Medications   Medication Sig Dispense Refill    Vitamin D, Ergocalciferol, 50 MCG (2000 UT) CAPS Take 1 capsule by mouth daily 90 capsule 3    famotidine (PEPCID) 20 MG tablet Take 1 tablet by mouth daily 90 tablet 1    metFORMIN (GLUCOPHAGE-XR) 500 MG extended release tablet Take 1 tablet by mouth daily (with breakfast) 90 tablet 1    atorvastatin (LIPITOR) 80 MG tablet Take 1 tablet by mouth daily 90 tablet 3    furosemide (LASIX) 40 MG tablet Take 1

## 2025-07-14 ENCOUNTER — TELEPHONE (OUTPATIENT)
Age: 85
End: 2025-07-14

## 2025-07-14 NOTE — TELEPHONE ENCOUNTER
MEDICATION REFILL REQUEST      Name of Medication:  Eliquis  Dose:  5 mg  Frequency:  BID  Quantity:  180  Days' supply:  90 with 3 refills      Pharmacy Name/Location:  [facility]-[phone]

## 2025-07-22 ENCOUNTER — TELEPHONE (OUTPATIENT)
Age: 85
End: 2025-07-22

## 2025-07-22 NOTE — TELEPHONE ENCOUNTER
Patient's son is calling to get the Eliquis refilled through the [facility]. The patient is completely out of his Eliquis for a week now. Please call the son (Justin) about getting this prescription refilled

## 2025-07-22 NOTE — TELEPHONE ENCOUNTER
Patient's son calling to see if we had any samples of eliquis till mail order comes in or what can son do since patient has been out a week. Patient's son maliha is also inquiring about asst. To help with patient's medication ( didn't  Know if there was anything) Please call son Maliha at [phone]

## 2025-08-08 ENCOUNTER — HOSPITAL ENCOUNTER (OUTPATIENT)
Dept: CT IMAGING | Age: 85
Discharge: HOME | End: 2025-08-10
Attending: INTERNAL MEDICINE
Payer: MEDICARE

## 2025-08-08 DIAGNOSIS — C18.0 ADENOCARCINOMA OF CECUM (HCC): ICD-10-CM

## 2025-08-08 LAB — CREAT BLD-MCNC: 0.82 MG/DL (ref 0.8–1.5)

## 2025-08-08 PROCEDURE — 74177 CT ABD & PELVIS W/CONTRAST: CPT

## 2025-08-08 PROCEDURE — 82565 ASSAY OF CREATININE: CPT

## 2025-08-08 PROCEDURE — 6360000004 HC RX CONTRAST MEDICATION: Performed by: INTERNAL MEDICINE

## 2025-08-08 RX ORDER — IOPAMIDOL 755 MG/ML
100 INJECTION, SOLUTION INTRAVASCULAR
Status: COMPLETED | OUTPATIENT
Start: 2025-08-08 | End: 2025-08-08

## 2025-08-08 RX ADMIN — IOPAMIDOL 100 ML: 755 INJECTION, SOLUTION INTRAVENOUS at 10:21

## 2025-08-12 ENCOUNTER — RESULTS FOLLOW-UP (OUTPATIENT)
Dept: CARDIOLOGY CLINIC | Age: 85
End: 2025-08-12

## 2025-08-12 ENCOUNTER — OFFICE VISIT (OUTPATIENT)
Dept: ONCOLOGY | Age: 85
End: 2025-08-12
Payer: MEDICARE

## 2025-08-12 ENCOUNTER — HOSPITAL ENCOUNTER (OUTPATIENT)
Dept: LAB | Age: 85
Discharge: HOME | End: 2025-08-12
Payer: MEDICARE

## 2025-08-12 VITALS
WEIGHT: 216.3 LBS | OXYGEN SATURATION: 93 % | HEART RATE: 75 BPM | HEIGHT: 65 IN | SYSTOLIC BLOOD PRESSURE: 139 MMHG | RESPIRATION RATE: 16 BRPM | BODY MASS INDEX: 36.04 KG/M2 | TEMPERATURE: 98 F | DIASTOLIC BLOOD PRESSURE: 70 MMHG

## 2025-08-12 DIAGNOSIS — C18.0 ADENOCARCINOMA OF CECUM (HCC): ICD-10-CM

## 2025-08-12 DIAGNOSIS — C18.9 MALIGNANT NEOPLASM OF COLON, UNSPECIFIED PART OF COLON (HCC): Primary | ICD-10-CM

## 2025-08-12 DIAGNOSIS — C18.9 MALIGNANT NEOPLASM OF COLON, UNSPECIFIED PART OF COLON (HCC): ICD-10-CM

## 2025-08-12 LAB
ALBUMIN SERPL-MCNC: 3.3 G/DL (ref 3.2–4.6)
ALBUMIN/GLOB SERPL: 1 (ref 1–1.9)
ALP SERPL-CCNC: 89 U/L (ref 40–129)
ALT SERPL-CCNC: 17 U/L (ref 8–55)
ANION GAP SERPL CALC-SCNC: 12 MMOL/L (ref 7–16)
AST SERPL-CCNC: 30 U/L (ref 15–37)
BASOPHILS # BLD: 0.06 K/UL (ref 0–0.2)
BASOPHILS NFR BLD: 0.5 % (ref 0–2)
BILIRUB SERPL-MCNC: 0.7 MG/DL (ref 0–1.2)
BUN SERPL-MCNC: 7 MG/DL (ref 8–23)
CALCIUM SERPL-MCNC: 9.4 MG/DL (ref 8.8–10.2)
CEA SERPL-MCNC: 3 NG/ML (ref 0–3.8)
CHLORIDE SERPL-SCNC: 102 MMOL/L (ref 98–107)
CO2 SERPL-SCNC: 23 MMOL/L (ref 20–29)
CREAT SERPL-MCNC: 0.93 MG/DL (ref 0.8–1.3)
DIFFERENTIAL METHOD BLD: ABNORMAL
EOSINOPHIL # BLD: 0.51 K/UL (ref 0–0.8)
EOSINOPHIL NFR BLD: 4.6 % (ref 0.5–7.8)
ERYTHROCYTE [DISTWIDTH] IN BLOOD BY AUTOMATED COUNT: 13.3 % (ref 11.9–14.6)
GLOBULIN SER CALC-MCNC: 3.3 G/DL (ref 2.3–3.5)
GLUCOSE SERPL-MCNC: 135 MG/DL (ref 70–99)
HCT VFR BLD AUTO: 40.5 % (ref 41.1–50.3)
HGB BLD-MCNC: 13.6 G/DL (ref 13.6–17.2)
IMM GRANULOCYTES # BLD AUTO: 0.04 K/UL (ref 0–0.5)
IMM GRANULOCYTES NFR BLD AUTO: 0.4 % (ref 0–5)
LYMPHOCYTES # BLD: 2.92 K/UL (ref 0.5–4.6)
LYMPHOCYTES NFR BLD: 26.3 % (ref 13–44)
MCH RBC QN AUTO: 31.3 PG (ref 26.1–32.9)
MCHC RBC AUTO-ENTMCNC: 33.6 G/DL (ref 31.4–35)
MCV RBC AUTO: 93.1 FL (ref 82–102)
MONOCYTES # BLD: 0.86 K/UL (ref 0.1–1.3)
MONOCYTES NFR BLD: 7.7 % (ref 4–12)
NEUTS SEG # BLD: 6.73 K/UL (ref 1.7–8.2)
NEUTS SEG NFR BLD: 60.5 % (ref 43–78)
NRBC # BLD: 0 K/UL (ref 0–0.2)
PLATELET # BLD AUTO: 146 K/UL (ref 150–450)
PMV BLD AUTO: 9.6 FL (ref 9.4–12.3)
POTASSIUM SERPL-SCNC: 4 MMOL/L (ref 3.5–5.1)
PROT SERPL-MCNC: 6.5 G/DL (ref 6.3–8.2)
RBC # BLD AUTO: 4.35 M/UL (ref 4.23–5.6)
SODIUM SERPL-SCNC: 137 MMOL/L (ref 136–145)
WBC # BLD AUTO: 11.1 K/UL (ref 4.3–11.1)

## 2025-08-12 PROCEDURE — 85025 COMPLETE CBC W/AUTO DIFF WBC: CPT

## 2025-08-12 PROCEDURE — 80053 COMPREHEN METABOLIC PANEL: CPT

## 2025-08-12 PROCEDURE — 1160F RVW MEDS BY RX/DR IN RCRD: CPT | Performed by: INTERNAL MEDICINE

## 2025-08-12 PROCEDURE — 1126F AMNT PAIN NOTED NONE PRSNT: CPT | Performed by: INTERNAL MEDICINE

## 2025-08-12 PROCEDURE — 1159F MED LIST DOCD IN RCRD: CPT | Performed by: INTERNAL MEDICINE

## 2025-08-12 PROCEDURE — 36415 COLL VENOUS BLD VENIPUNCTURE: CPT

## 2025-08-12 PROCEDURE — 99213 OFFICE O/P EST LOW 20 MIN: CPT | Performed by: INTERNAL MEDICINE

## 2025-08-12 PROCEDURE — 82378 CARCINOEMBRYONIC ANTIGEN: CPT

## 2025-08-12 PROCEDURE — 1123F ACP DISCUSS/DSCN MKR DOCD: CPT | Performed by: INTERNAL MEDICINE

## 2025-08-12 ASSESSMENT — PATIENT HEALTH QUESTIONNAIRE - PHQ9
SUM OF ALL RESPONSES TO PHQ QUESTIONS 1-9: 0
1. LITTLE INTEREST OR PLEASURE IN DOING THINGS: NOT AT ALL
2. FEELING DOWN, DEPRESSED OR HOPELESS: NOT AT ALL
SUM OF ALL RESPONSES TO PHQ QUESTIONS 1-9: 0

## 2025-08-20 RX ORDER — TAMSULOSIN HYDROCHLORIDE 0.4 MG/1
0.4 CAPSULE ORAL DAILY
Qty: 90 CAPSULE | Refills: 3 | Status: SHIPPED | OUTPATIENT
Start: 2025-08-20

## 2025-08-27 ENCOUNTER — TELEPHONE (OUTPATIENT)
Age: 85
End: 2025-08-27

## (undated) DEVICE — AGENT HEMSTAT 3GM PURIFIED PLNT STARCH PWD ABSRB ARISTA AH

## (undated) DEVICE — VESSEL SEALER EXTEND: Brand: ENDOWRIST

## (undated) DEVICE — SUTURE PROL SZ 6-0 L24IN NONABSORBABLE BLU BV-1 L9.3MM 3/8 M8805

## (undated) DEVICE — SOLUTION IRRIG 3000ML 0.9% SOD CHL FLX CONT 0797208] ICU MEDICAL INC]

## (undated) DEVICE — MEDTRONIC 5FR EBU3.5 GUIDING CATHETER

## (undated) DEVICE — 2, DISPOSABLE SUCTION/IRRIGATOR WITHOUT DISPOSABLE TIP: Brand: STRYKEFLOW

## (undated) DEVICE — PRESSURE GUIDEWIRE: Brand: COMET™ II

## (undated) DEVICE — SUTURE ABSORBABLE BRAIDED 2-0 CT-1 27 IN UD VICRYL J259H

## (undated) DEVICE — SNARE POLYP SM W13MMXL240CM SHTH DIA2.4MM OVL FLX DISP

## (undated) DEVICE — TROCAR: Brand: KII FIOS FIRST ENTRY

## (undated) DEVICE — REDUCER: Brand: ENDOWRIST

## (undated) DEVICE — PUMP SUC IRR TBNG L10FT W/ HNDPC ASSEMB STRYKEFLOW 2

## (undated) DEVICE — CARDINAL HEALTH FLEXIBLE LIGHT HANDLE COVER: Brand: CARDINAL HEALTH

## (undated) DEVICE — (D)PREP SKN CHLRAPRP APPL 26ML -- CONVERT TO ITEM 371833

## (undated) DEVICE — CANNULA SEAL

## (undated) DEVICE — CONNECTOR TBNG OD5-7MM O2 END DISP

## (undated) DEVICE — SUTURE MCRYL SZ 3-0 L27IN ABSRB UD L19MM PS-2 3/8 CIR PRIM Y427H

## (undated) DEVICE — DISPOSABLE GRASPER CARTRIDGE: Brand: DIRECT DRIVE REPOSABLE GRASPERS

## (undated) DEVICE — SUTURE PERMA-HAND SZ 2-0 L30IN NONABSORBABLE BLK L26MM SH K833H

## (undated) DEVICE — SYRINGE MED 3ML CLR PLAS STD N CTRL LUERLOCK TIP DISP

## (undated) DEVICE — AIRLIFE™ OXYGEN TUBING 7 FEET (2.1 M) CRUSH RESISTANT OXYGEN TUBING, VINYL TIPPED: Brand: AIRLIFE™

## (undated) DEVICE — KENDALL SCD EXPRESS SLEEVES, KNEE LENGTH, MEDIUM: Brand: KENDALL SCD

## (undated) DEVICE — RETRIEVAL BALLOON CATHETER: Brand: EXTRACTOR™ PRO RX

## (undated) DEVICE — NEEDLE SYR 18GA L1.5IN RED PLAS HUB S STL BLNT FILL W/O

## (undated) DEVICE — Device

## (undated) DEVICE — TUBING INSUFFLATION SMK EVAC HI FLO SET PNEUMOCLEAR

## (undated) DEVICE — SOLUTION IRRIG 1000ML 09% SOD CHL USP PIC PLAS CONTAINER

## (undated) DEVICE — SEAL

## (undated) DEVICE — SUTURE ETHIBOND EXCEL SZ 0 L30IN NONABSORBABLE GRN L26MM CT-2 X412H

## (undated) DEVICE — BIPOLAR CAUTERY CORD

## (undated) DEVICE — ARM DRAPE

## (undated) DEVICE — REM POLYHESIVE ADULT PATIENT RETURN ELECTRODE: Brand: VALLEYLAB

## (undated) DEVICE — BANDAGE COMPR 9 FTX4 IN SMOOTH COMFORTABLE SYNTH ESMRK LF

## (undated) DEVICE — 3M™ TEGADERM™ TRANSPARENT FILM DRESSING FRAME STYLE, 1624W, 2-3/8 IN X 2-3/4 IN (6 CM X 7 CM), 100/CT 4CT/CASE: Brand: 3M™ TEGADERM™

## (undated) DEVICE — APPLIER LIG CLP L13IN 10MM PSTL GRP CONTAIN 15 TI L CLP

## (undated) DEVICE — TROCAR: Brand: KII® SLEEVE

## (undated) DEVICE — GEL US 20GM NONIRRITATING OVERWRAPPED FILE PCH TRNSMIT

## (undated) DEVICE — INTRO PERC PER 8FRX3.5IN -- INTRADUCER

## (undated) DEVICE — RUNTHROUGH NS EXTRA FLOPPY PTCA GUIDEWIRE: Brand: RUNTHROUGH

## (undated) DEVICE — UNIVERSAL DRAPES: Brand: MEDLINE INDUSTRIES, INC.

## (undated) DEVICE — BLADE ASSEMB CLP HAIR FINE --

## (undated) DEVICE — THE TORRENT IRRIGATION TUBING IS INTENDED TO PROVIDE IRRIGATION VIA IRRIGATION FLUIDS, SUCH AS STERILE WATER, DURING GASTROINTESTINAL ENDOSCOPIC PROCEDURES WHEN USED IN CONJUNCTION WITH AN IRRIGATION PUMP OR ELECTROSURGICAL UNIT.: Brand: TORRENT

## (undated) DEVICE — YANKAUER,BULB TIP,W/O VENT,RIGID,STERILE: Brand: MEDLINE

## (undated) DEVICE — SYR 3ML LL TIP 1/10ML GRAD --

## (undated) DEVICE — COLUMN DRAPE

## (undated) DEVICE — GENERAL LAPAROSCOPY: Brand: MEDLINE INDUSTRIES, INC.

## (undated) DEVICE — SYRINGE MED 10ML LUERLOCK TIP W/O SFTY DISP

## (undated) DEVICE — ADULT SPO2 SENSOR: Brand: NELLCOR

## (undated) DEVICE — GARMENT,MEDLINE,DVT,INT,CALF,MED, GEN2: Brand: MEDLINE

## (undated) DEVICE — GUIDEWIRE 035IN 210CM PTFE COAT FIX COR J TIP 15MM FIRM BODY

## (undated) DEVICE — VESSEL SEALER: Brand: ENDOWRIST

## (undated) DEVICE — SUTURE MCRYL SZ 4-0 L27IN ABSRB UD L19MM PS-2 1/2 CIR PRIM Y426H

## (undated) DEVICE — SPHINCTEROTOME: Brand: JAGTOME RX 44

## (undated) DEVICE — Device: Brand: SPOT EX ENDOSCOPIC TATTOO

## (undated) DEVICE — NDL PRT INJ NSAF BLNT 18GX1.5 --

## (undated) DEVICE — DRAPE TWL SURG 16X26IN BLU ORB04] ALLCARE INC]

## (undated) DEVICE — SUTURE PERMAHAND SZ 3-0 L18IN NONABSORBABLE BLK SILK BRAID A184H

## (undated) DEVICE — RADIFOCUS OPTITORQUE ANGIOGRAPHIC CATHETER: Brand: OPTITORQUE

## (undated) DEVICE — SUTURE PERMAHAND SZ 2-0 L12X18IN NONABSORBABLE BLK SILK A185H

## (undated) DEVICE — 18G NG KIT WITH 96IN PROBE COVER (10 PK): Brand: SITE-RITE

## (undated) DEVICE — SOLUTION ANTIFOG VIS SYS CLEARIFY LAPSCP

## (undated) DEVICE — 2000CC GUARDIAN II: Brand: GUARDIAN

## (undated) DEVICE — SYRINGE, LUER SLIP, STERILE, 60ML: Brand: MEDLINE

## (undated) DEVICE — TRAP SPEC RETRV CLR PLAS POLYP IN LN SUCT QUIK CTCH

## (undated) DEVICE — SUTURE ETHLN SZ 4-0 L18IN NONABSORBABLE BLK L19MM PS-2 3/8 1667H

## (undated) DEVICE — DEVICE LCK BILI RAP EXCHG OLPS --

## (undated) DEVICE — CONTAINER SPEC FRMLN 120ML --

## (undated) DEVICE — APPLIER CLP L9.375IN APER 2.1MM CLS L3.8MM 20 SM TI CLP

## (undated) DEVICE — LAPSAC SURGICAL TISSUE POUCH: Brand: LAPSAC

## (undated) DEVICE — INTENDED FOR TISSUE SEPARATION, AND OTHER PROCEDURES THAT REQUIRE A SHARP SURGICAL BLADE TO PUNCTURE OR CUT.: Brand: BARD-PARKER SAFETY BLADES SIZE 15, STERILE

## (undated) DEVICE — NEEDLE INSUF L120MM ULT VERES ENDOPATH

## (undated) DEVICE — TUBING, SUCTION, 1/4" X 10', STRAIGHT: Brand: MEDLINE

## (undated) DEVICE — INTENDED FOR TISSUE SEPARATION, AND OTHER PROCEDURES THAT REQUIRE A SHARP SURGICAL BLADE TO PUNCTURE OR CUT.: Brand: BARD-PARKER SAFETY BLADES SIZE 11, STERILE

## (undated) DEVICE — CANNULA NSL ORAL AD FOR CAPNOFLEX CO2 O2 AIRLFE

## (undated) DEVICE — BAG SPEC REM 224ML W4XL6IN DIA10MM 1 HND GYN DISP ENDOPCH

## (undated) DEVICE — CANISTER, RIGID, 2000CC: Brand: MEDLINE INDUSTRIES, INC.

## (undated) DEVICE — Device: Brand: SELECTRA 3D-65-42

## (undated) DEVICE — DISH PTRI STRL --

## (undated) DEVICE — SUTURE V-LOC 90 3-0 L9IN ABSRB VLT L26MM V-20 1/2 CIR TAPR VLOCM0644

## (undated) DEVICE — NEEDLE HYPO 21GA L1.5IN INTRAMUSCULAR S STL LATCH BVL UP

## (undated) DEVICE — PLASMABLADE X PS210-030S-LIGHT 3.0SL: Brand: PLASMABLADE™ X

## (undated) DEVICE — ZIMMER® STERILE DISPOSABLE TOURNIQUET CUFF WITH PLC, DUAL PORT, SINGLE BLADDER, 18 IN. (46 CM)

## (undated) DEVICE — CONTAINER FORMALIN PREFILLED 10% NBF 60ML

## (undated) DEVICE — DRAPE SHT 3 QTR PROXIMA 53X77 --

## (undated) DEVICE — SINGLE PORT MANIFOLD: Brand: NEPTUNE 2

## (undated) DEVICE — SUTURE COAT VCRL SZ 0 L18IN ABSRB UD W/O NDL POLYGLACTIN J112T

## (undated) DEVICE — INTENDED TO BE USED TO OCCLUDE, RETRACT AND IDENTIFY ARTERIES, VEINS, TENDONS AND NERVES IN SURGICAL PROCEDURES: Brand: STERION®  VESSEL LOOP

## (undated) DEVICE — SYR 5ML 1/5 GRAD LL NSAF LF --

## (undated) DEVICE — COVER LT HNDL FOR OR SURG LAMP

## (undated) DEVICE — AMD ANTIMICROBIAL GAUZE SPONGES 8 PLY USP TYPE VII: Brand: CURITY

## (undated) DEVICE — GOWN,REINFORCED,POLY,AURORA,XXLARGE,STR: Brand: MEDLINE

## (undated) DEVICE — SYR 10ML LUER LOK 1/5ML GRAD --

## (undated) DEVICE — SUTURE VCRL SZ 3-0 L27IN ABSRB UD L26MM SH 1/2 CIR J416H

## (undated) DEVICE — GAUZE,SPONGE,4"X4",12PLY,WOVEN,NS,LF: Brand: MEDLINE

## (undated) DEVICE — TOTAL TRAY, DB, 100% SILI FOLEY, 16FR 10: Brand: MEDLINE

## (undated) DEVICE — SPONGE: SPECIALTY TONSIL XR MED 100/CS: Brand: MEDICAL ACTION INDUSTRIES

## (undated) DEVICE — GUIDEWIRE WITH ICE™ HYDROPHILIC COATING: Brand: CHOICE™ PT

## (undated) DEVICE — LUBE JELLY FOIL PACK 1.4 OZ: Brand: MEDLINE INDUSTRIES, INC.

## (undated) DEVICE — BLADELESS OBTURATOR: Brand: WECK VISTA

## (undated) DEVICE — ELECTRODE PT RET AD L9FT HI MOIST COND ADH HYDRGEL CORDED

## (undated) DEVICE — HARMONIC ACE

## (undated) DEVICE — CATHETER 5FR CORDIS PIG 145DEG 110CM

## (undated) DEVICE — SUTURE VCRL SZ 0 L54IN ABSRB UD POLYGLACTIN 910 COAT BRAID J608H

## (undated) DEVICE — KENDALL RADIOLUCENT FOAM MONITORING ELECTRODE RECTANGULAR SHAPE: Brand: KENDALL

## (undated) DEVICE — BLOCK BITE AD 60FR W/ VELC STRP ADDRESSES MOST PT AND

## (undated) DEVICE — DRESSING POSTOP AG PRISMASEAL 3.5X6IN

## (undated) DEVICE — STRIP,CLOSURE,WOUND,MEDI-STRIP,1/2X4: Brand: MEDLINE

## (undated) DEVICE — GUIDE NDL ST W/ 14 X 147CM TELESCOPICALLY FLD CIV FLX CVR

## (undated) DEVICE — MASTISOL ADHESIVE LIQ 2/3ML

## (undated) DEVICE — COPILOT BLEEDBACK CONTROL VALVE: Brand: COPILOT

## (undated) DEVICE — ROBOTIC COLON: Brand: MEDLINE INDUSTRIES, INC.

## (undated) DEVICE — COMPRESSION DEVICE 24 CM 10 ML LEFT

## (undated) DEVICE — GLIDESHEATH SLENDER STAINLESS STEEL KIT: Brand: GLIDESHEATH SLENDER

## (undated) DEVICE — APPLIER CLP M/L SHFT DIA5MM 15 LIG LIGAMAX 5

## (undated) DEVICE — SUTURE VCRL SZ 0 L36IN ABSRB UD L36MM CT-1 1/2 CIR J946H

## (undated) DEVICE — LUER-LOK 360°: Brand: CONNECTA, LUER-LOK

## (undated) DEVICE — CATHETER CHOLGM 4.5FR L18IN W/ MTL SUPP TB

## (undated) DEVICE — INTRODUCER SPLIT SHEATH PRELUDE SNAP 9FR X 13CM

## (undated) DEVICE — FORCEPS BX L240CM JAW DIA2.8MM L CAP W/ NDL MIC MESH TOOTH

## (undated) DEVICE — LOGICUT SCISSOR LENGTH 320MM: Brand: LOGI - LAPAROSCOPIC INSTRUMENT SYSTEM